# Patient Record
Sex: MALE | Race: WHITE | Employment: OTHER | URBAN - NONMETROPOLITAN AREA
[De-identification: names, ages, dates, MRNs, and addresses within clinical notes are randomized per-mention and may not be internally consistent; named-entity substitution may affect disease eponyms.]

---

## 2017-01-03 PROBLEM — E11.9 DIABETES MELLITUS, TYPE 2 (HCC): Status: ACTIVE | Noted: 2017-01-03

## 2017-01-12 ENCOUNTER — HOSPITAL ENCOUNTER (OUTPATIENT)
Dept: OTHER | Age: 67
Discharge: HOME | End: 2017-01-12
Attending: INTERNAL MEDICINE | Admitting: INTERNAL MEDICINE

## 2017-01-12 LAB
ANION GAP SERPL CALCULATED.3IONS-SCNC: 13 MMOL/L (ref 4–16)
BUN BLDV-MCNC: 51 MG/DL (ref 6–23)
CALCIUM SERPL-MCNC: 8.6 MG/DL (ref 8.3–10.6)
CHLORIDE BLD-SCNC: 96 MMOL/L (ref 99–110)
CO2: 23 MMOL/L (ref 21–32)
CREAT SERPL-MCNC: 2.7 MG/DL (ref 0.9–1.3)
GFR AFRICAN AMERICAN: 29 ML/MIN/1.73M2
GFR NON-AFRICAN AMERICAN: 24 ML/MIN/1.73M2
GLUCOSE BLD-MCNC: 284 MG/DL (ref 70–140)
POTASSIUM SERPL-SCNC: 4.3 MMOL/L (ref 3.5–5.1)
SODIUM BLD-SCNC: 132 MMOL/L (ref 135–145)

## 2017-01-17 ENCOUNTER — HOSPITAL ENCOUNTER (OUTPATIENT)
Dept: SLEEP CENTER | Age: 67
Discharge: HOME | End: 2017-01-17
Attending: INTERNAL MEDICINE | Admitting: INTERNAL MEDICINE

## 2017-03-03 ENCOUNTER — HOSPITAL ENCOUNTER (OUTPATIENT)
Dept: LAB | Age: 67
Discharge: HOME | End: 2017-03-03
Attending: INTERNAL MEDICINE | Admitting: INTERNAL MEDICINE

## 2017-03-03 LAB
ALBUMIN SERPL-MCNC: 3.9 GM/DL (ref 3.4–5)
ANION GAP SERPL CALCULATED.3IONS-SCNC: 10 MMOL/L (ref 4–16)
BUN BLDV-MCNC: 36 MG/DL (ref 6–23)
CALCIUM SERPL-MCNC: 8.7 MG/DL (ref 8.3–10.6)
CHLORIDE BLD-SCNC: 94 MMOL/L (ref 99–110)
CO2: 27 MMOL/L (ref 21–32)
CREAT SERPL-MCNC: 2.7 MG/DL (ref 0.9–1.3)
GFR AFRICAN AMERICAN: 29 ML/MIN/1.73M2
GFR NON-AFRICAN AMERICAN: 24 ML/MIN/1.73M2
GLUCOSE BLD-MCNC: 395 MG/DL (ref 70–140)
MAGNESIUM: 2.2 MG/DL (ref 1.8–2.4)
PHOSPHORUS: 3.3 MG/DL (ref 2.5–4.9)
POTASSIUM SERPL-SCNC: 4.9 MMOL/L (ref 3.5–5.1)
SODIUM BLD-SCNC: 131 MMOL/L (ref 135–145)

## 2017-04-07 ENCOUNTER — HOSPITAL ENCOUNTER (OUTPATIENT)
Dept: LAB | Age: 67
Discharge: HOME | End: 2017-04-07
Attending: INTERNAL MEDICINE | Admitting: INTERNAL MEDICINE

## 2017-04-07 LAB
ALBUMIN SERPL-MCNC: 4 GM/DL (ref 3.4–5)
ANION GAP SERPL CALCULATED.3IONS-SCNC: 9 MMOL/L (ref 4–16)
BUN BLDV-MCNC: 27 MG/DL (ref 6–23)
CALCIUM SERPL-MCNC: 9 MG/DL (ref 8.3–10.6)
CHLORIDE BLD-SCNC: 99 MMOL/L (ref 99–110)
CO2: 29 MMOL/L (ref 21–32)
CREAT SERPL-MCNC: 3 MG/DL (ref 0.9–1.3)
GFR AFRICAN AMERICAN: 25 ML/MIN/1.73M2
GFR NON-AFRICAN AMERICAN: 21 ML/MIN/1.73M2
GLUCOSE BLD-MCNC: 155 MG/DL (ref 70–140)
PHOSPHORUS: 3.9 MG/DL (ref 2.5–4.9)
POTASSIUM SERPL-SCNC: 4.2 MMOL/L (ref 3.5–5.1)
SODIUM BLD-SCNC: 137 MMOL/L (ref 135–145)

## 2017-05-19 ENCOUNTER — HOSPITAL ENCOUNTER (OUTPATIENT)
Dept: LAB | Age: 67
Discharge: HOME | End: 2017-05-19
Attending: INTERNAL MEDICINE | Admitting: INTERNAL MEDICINE

## 2017-05-19 LAB
ALBUMIN SERPL-MCNC: 3.7 GM/DL (ref 3.4–5)
ANION GAP SERPL CALCULATED.3IONS-SCNC: 11 MMOL/L (ref 4–16)
BUN BLDV-MCNC: 22 MG/DL (ref 6–23)
CALCIUM SERPL-MCNC: 8.9 MG/DL (ref 8.3–10.6)
CHLORIDE BLD-SCNC: 103 MMOL/L (ref 99–110)
CO2: 25 MMOL/L (ref 21–32)
CREAT SERPL-MCNC: 2.7 MG/DL (ref 0.9–1.3)
GFR AFRICAN AMERICAN: 29 ML/MIN/1.73M2
GFR NON-AFRICAN AMERICAN: 24 ML/MIN/1.73M2
GLUCOSE BLD-MCNC: 242 MG/DL (ref 70–140)
PHOSPHORUS: 3.1 MG/DL (ref 2.5–4.9)
POTASSIUM SERPL-SCNC: 4.7 MMOL/L (ref 3.5–5.1)
SODIUM BLD-SCNC: 139 MMOL/L (ref 135–145)

## 2017-05-28 PROBLEM — J13 STREPTOCOCCUS PNEUMONIAE PNEUMONIA (HCC): Status: ACTIVE | Noted: 2017-05-28

## 2017-05-29 PROBLEM — R65.20 SIRS DUE TO INFECTIOUS PROCESS WITH ACUTE ORGAN DYSFUNCTION (HCC): Status: ACTIVE | Noted: 2017-05-29

## 2017-05-29 PROBLEM — A41.9 SIRS DUE TO INFECTIOUS PROCESS WITH ACUTE ORGAN DYSFUNCTION (HCC): Status: ACTIVE | Noted: 2017-05-29

## 2017-06-01 PROBLEM — J13 STREPTOCOCCUS PNEUMONIAE PNEUMONIA (HCC): Status: RESOLVED | Noted: 2017-05-28 | Resolved: 2017-06-01

## 2017-06-01 PROBLEM — A41.9 SIRS DUE TO INFECTIOUS PROCESS WITH ACUTE ORGAN DYSFUNCTION (HCC): Status: RESOLVED | Noted: 2017-05-29 | Resolved: 2017-06-01

## 2017-06-01 PROBLEM — R65.20 SIRS DUE TO INFECTIOUS PROCESS WITH ACUTE ORGAN DYSFUNCTION (HCC): Status: RESOLVED | Noted: 2017-05-29 | Resolved: 2017-06-01

## 2017-06-01 PROBLEM — R53.81 PHYSICAL DEBILITY: Status: ACTIVE | Noted: 2017-06-01

## 2017-06-07 PROBLEM — R53.81 PHYSICAL DEBILITY: Status: RESOLVED | Noted: 2017-06-01 | Resolved: 2017-06-07

## 2017-08-25 ENCOUNTER — HOSPITAL ENCOUNTER (OUTPATIENT)
Dept: LAB | Age: 67
Discharge: HOME | End: 2017-08-25
Attending: INTERNAL MEDICINE | Admitting: INTERNAL MEDICINE

## 2017-08-25 LAB
ALBUMIN SERPL-MCNC: 4.5 GM/DL (ref 3.4–5)
ANION GAP SERPL CALCULATED.3IONS-SCNC: 11 MMOL/L (ref 4–16)
BUN BLDV-MCNC: 37 MG/DL (ref 6–23)
CALCIUM SERPL-MCNC: 9.3 MG/DL (ref 8.3–10.6)
CHLORIDE BLD-SCNC: 99 MMOL/L (ref 99–110)
CO2: 25 MMOL/L (ref 21–32)
CREAT SERPL-MCNC: 2.7 MG/DL (ref 0.9–1.3)
GFR AFRICAN AMERICAN: 29 ML/MIN/1.73M2
GFR NON-AFRICAN AMERICAN: 24 ML/MIN/1.73M2
GLUCOSE BLD-MCNC: 226 MG/DL (ref 70–140)
PHOSPHORUS: 3.7 MG/DL (ref 2.5–4.9)
POTASSIUM SERPL-SCNC: 4.4 MMOL/L (ref 3.5–5.1)
SODIUM BLD-SCNC: 135 MMOL/L (ref 135–145)

## 2017-09-22 ENCOUNTER — HOSPITAL ENCOUNTER (OUTPATIENT)
Dept: MRI IMAGING | Age: 67
Discharge: HOME | End: 2017-09-22
Attending: PAIN MEDICINE | Admitting: PAIN MEDICINE

## 2017-09-22 DIAGNOSIS — M54.16 RADICULOPATHY OF LUMBAR REGION: ICD-10-CM

## 2017-09-22 DIAGNOSIS — M54.16 LUMBAR RADICULOPATHY: ICD-10-CM

## 2017-09-22 DIAGNOSIS — M96.1 POSTLAMINECTOMY SYNDROME, UNSPECIFIED REGION: ICD-10-CM

## 2017-09-26 ENCOUNTER — OFFICE VISIT (OUTPATIENT)
Dept: SURGERY | Age: 67
End: 2017-09-26

## 2017-09-26 VITALS
SYSTOLIC BLOOD PRESSURE: 142 MMHG | HEIGHT: 70 IN | RESPIRATION RATE: 16 BRPM | HEART RATE: 77 BPM | WEIGHT: 284.4 LBS | DIASTOLIC BLOOD PRESSURE: 78 MMHG | BODY MASS INDEX: 40.71 KG/M2

## 2017-09-26 DIAGNOSIS — L72.3 SEBACEOUS CYST: Primary | ICD-10-CM

## 2017-09-26 PROCEDURE — 99202 OFFICE O/P NEW SF 15 MIN: CPT | Performed by: SURGERY

## 2017-10-03 ENCOUNTER — OFFICE VISIT (OUTPATIENT)
Dept: SURGERY | Age: 67
End: 2017-10-03

## 2017-10-03 VITALS
HEIGHT: 70 IN | OXYGEN SATURATION: 98 % | DIASTOLIC BLOOD PRESSURE: 76 MMHG | WEIGHT: 284.39 LBS | BODY MASS INDEX: 40.71 KG/M2 | HEART RATE: 88 BPM | SYSTOLIC BLOOD PRESSURE: 128 MMHG

## 2017-10-03 DIAGNOSIS — J86.9 ABSCESS OF CHEST (HCC): Primary | ICD-10-CM

## 2017-10-03 PROCEDURE — 99212 OFFICE O/P EST SF 10 MIN: CPT | Performed by: SURGERY

## 2017-10-03 NOTE — PROGRESS NOTES
Quit date: 7/24/2010    Smokeless tobacco: Never Used    Alcohol use Yes      Comment: \"quit- 1990's- use to drink every day\". Rarely    Drug use: No      Comment: daily for sleep/ on 8/16/2016\"I no longer do that\"    Sexual activity: Not Currently     Partners: Female     Other Topics Concern    None     Social History Narrative     Current Outpatient Prescriptions   Medication Sig Dispense Refill    Glycopyrrolate-Formoterol (BEVESPI AEROSPHERE) 9-4.8 MCG/ACT AERO Inhale 2 puffs into the lungs 2 times daily 1 Inhaler 5    valsartan (DIOVAN) 160 MG tablet Take 1 tablet by mouth daily 30 tablet 0    minoxidil (LONITEN) 2.5 MG tablet Take 1 tablet by mouth 2 times daily 30 tablet 0    doxazosin (CARDURA) 4 MG tablet Take 1 tablet by mouth daily 30 tablet 0    cloNIDine (CATAPRES) 0.1 MG tablet Take 1 tablet by mouth 3 times daily 60 tablet 0    linagliptin (TRADJENTA) 5 MG tablet Take 1 tablet by mouth daily 30 tablet 0    nitroGLYCERIN (NITROSTAT) 0.4 MG SL tablet up to max of 3 total doses.  If no relief after 1 dose, call 911. 25 tablet 0    albuterol sulfate HFA (PROVENTIL HFA) 108 (90 BASE) MCG/ACT inhaler Inhale 2 puffs into the lungs every 6 hours as needed for Wheezing or Shortness of Breath 30 Inhaler 0    escitalopram (LEXAPRO) 20 MG tablet Take 1 tablet by mouth daily 30 tablet 0    OLANZapine (ZYPREXA) 20 MG tablet Take 1 tablet by mouth nightly 30 tablet 0    carvedilol (COREG) 12.5 MG tablet Take 1 tablet by mouth 2 times daily (with meals) 60 tablet 0    amLODIPine (NORVASC) 10 MG tablet Take 1 tablet by mouth nightly 30 tablet 0    atorvastatin (LIPITOR) 80 MG tablet Take 1 tablet by mouth nightly 30 tablet 0    fluticasone (FLONASE) 50 MCG/ACT nasal spray 2 sprays by Nasal route daily 1 Bottle 0    clopidogrel (PLAVIX) 75 MG tablet Take 1 tablet by mouth daily 30 tablet 0    pantoprazole (PROTONIX) 20 MG tablet Take 1 tablet by mouth every morning (before breakfast) 30 tablet 0     No current facility-administered medications for this visit. Current Outpatient Prescriptions on File Prior to Visit   Medication Sig Dispense Refill    Glycopyrrolate-Formoterol (BEVESPI AEROSPHERE) 9-4.8 MCG/ACT AERO Inhale 2 puffs into the lungs 2 times daily 1 Inhaler 5    valsartan (DIOVAN) 160 MG tablet Take 1 tablet by mouth daily 30 tablet 0    minoxidil (LONITEN) 2.5 MG tablet Take 1 tablet by mouth 2 times daily 30 tablet 0    doxazosin (CARDURA) 4 MG tablet Take 1 tablet by mouth daily 30 tablet 0    cloNIDine (CATAPRES) 0.1 MG tablet Take 1 tablet by mouth 3 times daily 60 tablet 0    linagliptin (TRADJENTA) 5 MG tablet Take 1 tablet by mouth daily 30 tablet 0    nitroGLYCERIN (NITROSTAT) 0.4 MG SL tablet up to max of 3 total doses. If no relief after 1 dose, call 911. 25 tablet 0    albuterol sulfate HFA (PROVENTIL HFA) 108 (90 BASE) MCG/ACT inhaler Inhale 2 puffs into the lungs every 6 hours as needed for Wheezing or Shortness of Breath 30 Inhaler 0    escitalopram (LEXAPRO) 20 MG tablet Take 1 tablet by mouth daily 30 tablet 0    OLANZapine (ZYPREXA) 20 MG tablet Take 1 tablet by mouth nightly 30 tablet 0    carvedilol (COREG) 12.5 MG tablet Take 1 tablet by mouth 2 times daily (with meals) 60 tablet 0    amLODIPine (NORVASC) 10 MG tablet Take 1 tablet by mouth nightly 30 tablet 0    atorvastatin (LIPITOR) 80 MG tablet Take 1 tablet by mouth nightly 30 tablet 0    fluticasone (FLONASE) 50 MCG/ACT nasal spray 2 sprays by Nasal route daily 1 Bottle 0    clopidogrel (PLAVIX) 75 MG tablet Take 1 tablet by mouth daily 30 tablet 0    pantoprazole (PROTONIX) 20 MG tablet Take 1 tablet by mouth every morning (before breakfast) 30 tablet 0     No current facility-administered medications on file prior to visit. Allergies   Allergen Reactions    Penicillins Nausea Only       Review of Systems  Pertinent items are noted in HPI.       Objective:      BP (!) 142/78  Pulse 77  Resp 16  Ht 5' 10\" (1.778 m)  Wt 284 lb 6.4 oz (129 kg)  BMI 40.81 kg/m2  Physical Exam    Skin: 2 centimeter subcutaneous nodule over the left lateral chest. The lesion is  mobile. There is no erythema. There is no tenderness. Assessment:      Abscess of the left chest, this does not involve the breast tissue. Plan:     Finish antibiotics.  F/U prn

## 2017-10-03 NOTE — MR AVS SNAPSHOT
After Visit Summary             Luci Heller   10/3/2017 2:30 PM   Office Visit    Description:  Male : 1950   Provider:  Estefania Wakefield MD   Department:  [facility]              Your Follow-Up and Future Appointments         Below is a list of your follow-up and future appointments. This may not be a complete list as you may have made appointments directly with providers that we are not aware of or your providers may have made some for you. Please call your providers to confirm appointments. It is important to keep your appointments. Please bring your current insurance card, photo ID, co-pay, and all medication bottles to your appointment. If self-pay, payment is expected at the time of service. Your To-Do List     Follow-Up    Return if symptoms worsen or fail to improve. Information from Your Visit        Department     Name Address Phone Fax    [facility] [phone] [facility] ([place], [place]) [address] [phone]      You Were Seen for:         Comments    Abscess of chest [facility])   [[id number]]         Vital Signs     Blood Pressure Pulse Height Weight Oxygen Saturation Body Mass Index    128/76 88 5' 10.08\" (1.78 m) 284 lb 6.3 oz (129 kg) 98% 40.71 kg/m2    Smoking Status                   Former Smoker           Additional Information about your Body Mass Index (BMI)           Your BMI as listed above is considered obese (30 or more). BMI is an estimate of body fat, calculated from your height and weight. The higher your BMI, the greater your risk of heart disease, high blood pressure, type 2 diabetes, stroke, gallstones, arthritis, sleep apnea, and certain cancers. BMI is not perfect. It may overestimate body fat in athletes and people who are more muscular.   Even a small weight loss (between 5 and 10 percent of your current weight) by decreasing your calorie intake and REYES (dyspnea on exertion)    SAMANTHA on CPAP    Diabetes mellitus, type 2 (HCC)    Pneumonia due to Gram-negative bacteria (HCC)    Hyperkalemia    Adrenal mass (HCC)    Acute respiratory failure (HCC)    Adrenal mass (HCC)    Anemia, chronic disease    Diuretic-induced hypokalemia    Diastolic CHF (HCC)    TIA (transient ischemic attack)    Bipolar 1 disorder (HCC)    CAD (coronary artery disease)    Elevated troponin    Abnormal stress test    COPD (chronic obstructive pulmonary disease) ([facility] [address]      Immunizations as of 10/3/2017     Name Date    Influenza Vaccine, unspecified formulation 10/14/2016    Influenza Virus Vaccine 1/29/2016, 10/13/2014, 10/3/2013    Pneumococcal 13-valent Conjugate ([id number]) 10/17/2016    Pneumococcal Polysaccharide ([id number]) 10/3/2013    Tdap (Boostrix, Adacel) 6/10/2013      Preventive Care        Date Due    Hepatitis C screening is recommended for all adults regardless of risk factors born between [facility] at least once (lifetime) who have never been tested. 1950    Diabetic Foot Exam 1/1/1960    Eye Exam By An Eye Doctor 1/1/1960    Urine Check For Kidney Problems 1/1/1968    Colonoscopy 1/1/2000    Zoster Vaccine 1/1/2010    Yearly Flu Vaccine (1) 9/1/2017    Cholesterol Screening 1/4/2018    Hemoglobin A1C (Test For Long-Term Glucose Control) 5/29/2018    Pneumococcal Vaccines (two) for all adults aged 72 and over (2 of 2 - PPSV23) 10/3/2018    Tetanus Combination Vaccine (2 - Td) 6/10/2023            MyChart Signup           Our records indicate that you have declined MyChart signup.

## 2017-11-03 ENCOUNTER — HOSPITAL ENCOUNTER (OUTPATIENT)
Dept: LAB | Age: 67
Discharge: HOME | End: 2017-11-03
Attending: INTERNAL MEDICINE | Admitting: INTERNAL MEDICINE

## 2017-11-03 LAB
ALBUMIN SERPL-MCNC: 4.3 GM/DL (ref 3.4–5)
ANION GAP SERPL CALCULATED.3IONS-SCNC: 17 MMOL/L (ref 4–16)
BUN BLDV-MCNC: 24 MG/DL (ref 6–23)
CALCIUM SERPL-MCNC: 9.2 MG/DL (ref 8.3–10.6)
CHLORIDE BLD-SCNC: 98 MMOL/L (ref 99–110)
CO2: 24 MMOL/L (ref 21–32)
CREAT SERPL-MCNC: 2.6 MG/DL (ref 0.9–1.3)
GFR AFRICAN AMERICAN: 30 ML/MIN/1.73M2
GFR NON-AFRICAN AMERICAN: 25 ML/MIN/1.73M2
GLUCOSE BLD-MCNC: 231 MG/DL (ref 70–140)
PHOSPHORUS: 3 MG/DL (ref 2.5–4.9)
POTASSIUM SERPL-SCNC: 4.3 MMOL/L (ref 3.5–5.1)
SODIUM BLD-SCNC: 139 MMOL/L (ref 135–145)

## 2018-02-13 ENCOUNTER — HOSPITAL ENCOUNTER (OUTPATIENT)
Dept: LAB | Age: 68
Discharge: HOME | End: 2018-02-13
Attending: INTERNAL MEDICINE | Admitting: INTERNAL MEDICINE

## 2018-02-13 LAB
ALBUMIN SERPL-MCNC: 4 GM/DL (ref 3.4–5)
ANION GAP SERPL CALCULATED.3IONS-SCNC: 16 MMOL/L (ref 4–16)
BUN BLDV-MCNC: 23 MG/DL (ref 6–23)
CALCIUM SERPL-MCNC: 9.2 MG/DL (ref 8.3–10.6)
CHLORIDE BLD-SCNC: 98 MMOL/L (ref 99–110)
CO2: 25 MMOL/L (ref 21–32)
CREAT SERPL-MCNC: 2.6 MG/DL (ref 0.9–1.3)
GFR AFRICAN AMERICAN: 30 ML/MIN/1.73M2
GFR NON-AFRICAN AMERICAN: 25 ML/MIN/1.73M2
GLUCOSE BLD-MCNC: 112 MG/DL (ref 70–99)
PHOSPHORUS: 3.3 MG/DL (ref 2.5–4.9)
POTASSIUM SERPL-SCNC: 4 MMOL/L (ref 3.5–5.1)
SODIUM BLD-SCNC: 139 MMOL/L (ref 135–145)

## 2018-02-15 LAB — PARATHYROID HORMONE INTACT: 150

## 2018-05-18 ENCOUNTER — HOSPITAL ENCOUNTER (OUTPATIENT)
Dept: MRI IMAGING | Age: 68
Discharge: HOME | End: 2018-05-18

## 2018-05-18 DIAGNOSIS — M48.02 CERVICAL SPINAL STENOSIS: ICD-10-CM

## 2018-11-02 ENCOUNTER — HOSPITAL ENCOUNTER (OUTPATIENT)
Age: 68
Discharge: HOME | End: 2018-11-02
Payer: MEDICARE

## 2018-11-02 LAB
ALBUMIN SERPL-MCNC: 3.7 GM/DL (ref 3.4–5)
ALP BLD-CCNC: 112 IU/L (ref 40–129)
ALT SERPL-CCNC: 12 U/L (ref 10–40)
ANION GAP SERPL CALCULATED.3IONS-SCNC: 10 MMOL/L (ref 4–16)
AST SERPL-CCNC: 11 IU/L (ref 15–37)
BILIRUB SERPL-MCNC: 0.6 MG/DL (ref 0–1)
BUN BLDV-MCNC: 17 MG/DL (ref 6–23)
CALCIUM SERPL-MCNC: 9.1 MG/DL (ref 8.3–10.6)
CHLORIDE BLD-SCNC: 98 MMOL/L (ref 99–110)
CO2: 26 MMOL/L (ref 21–32)
CREAT SERPL-MCNC: 2.2 MG/DL (ref 0.9–1.3)
GFR AFRICAN AMERICAN: 36 ML/MIN/1.73M2
GFR NON-AFRICAN AMERICAN: 30 ML/MIN/1.73M2
GLUCOSE BLD-MCNC: 140 MG/DL (ref 70–99)
POTASSIUM SERPL-SCNC: 4.1 MMOL/L (ref 3.5–5.1)
SODIUM BLD-SCNC: 134 MMOL/L (ref 135–145)
TOTAL PROTEIN: 6.9 GM/DL (ref 6.4–8.2)

## 2018-11-02 PROCEDURE — 36415 COLL VENOUS BLD VENIPUNCTURE: CPT

## 2018-11-02 PROCEDURE — 80053 COMPREHEN METABOLIC PANEL: CPT

## 2018-12-01 ENCOUNTER — HOSPITAL ENCOUNTER (OUTPATIENT)
Age: 68
Discharge: HOME | End: 2018-12-01
Payer: MEDICARE

## 2018-12-01 ENCOUNTER — HOSPITAL ENCOUNTER (OUTPATIENT)
Dept: GENERAL RADIOLOGY | Age: 68
Discharge: HOME | End: 2018-12-01
Payer: MEDICARE

## 2018-12-01 DIAGNOSIS — R05.9 COUGH: ICD-10-CM

## 2018-12-01 LAB
ALBUMIN SERPL-MCNC: 3.6 GM/DL (ref 3.4–5)
ANION GAP SERPL CALCULATED.3IONS-SCNC: 13 MMOL/L (ref 4–16)
BUN BLDV-MCNC: 18 MG/DL (ref 6–23)
CALCIUM SERPL-MCNC: 8.7 MG/DL (ref 8.3–10.6)
CHLORIDE BLD-SCNC: 95 MMOL/L (ref 99–110)
CO2: 24 MMOL/L (ref 21–32)
CREAT SERPL-MCNC: 2 MG/DL (ref 0.9–1.3)
GFR AFRICAN AMERICAN: 40 ML/MIN/1.73M2
GFR NON-AFRICAN AMERICAN: 33 ML/MIN/1.73M2
GLUCOSE BLD-MCNC: 236 MG/DL (ref 70–99)
PHOSPHORUS: 2.1 MG/DL (ref 2.5–4.9)
POTASSIUM SERPL-SCNC: 4.1 MMOL/L (ref 3.5–5.1)
SODIUM BLD-SCNC: 132 MMOL/L (ref 135–145)

## 2018-12-01 PROCEDURE — 71046 X-RAY EXAM CHEST 2 VIEWS: CPT

## 2018-12-01 PROCEDURE — 36415 COLL VENOUS BLD VENIPUNCTURE: CPT

## 2018-12-01 PROCEDURE — 80069 RENAL FUNCTION PANEL: CPT

## 2019-01-25 ENCOUNTER — APPOINTMENT (OUTPATIENT)
Dept: GENERAL RADIOLOGY | Age: 69
DRG: 682 | End: 2019-01-25
Payer: MEDICARE

## 2019-01-25 ENCOUNTER — HOSPITAL ENCOUNTER (INPATIENT)
Age: 69
LOS: 5 days | Discharge: NURSING, REHAB OR LONG-TERM CARE | DRG: 682 | End: 2019-01-30
Attending: EMERGENCY MEDICINE | Admitting: FAMILY MEDICINE
Payer: MEDICARE

## 2019-01-25 DIAGNOSIS — R79.89 SERUM CREATININE RAISED: ICD-10-CM

## 2019-01-25 DIAGNOSIS — D72.829 LEUKOCYTOSIS, UNSPECIFIED TYPE: ICD-10-CM

## 2019-01-25 DIAGNOSIS — E87.1 HYPONATREMIA: ICD-10-CM

## 2019-01-25 DIAGNOSIS — L03.115 CELLULITIS OF RIGHT FOOT: ICD-10-CM

## 2019-01-25 DIAGNOSIS — M79.89 LEG SWELLING: ICD-10-CM

## 2019-01-25 DIAGNOSIS — J02.9 SORE THROAT: Primary | ICD-10-CM

## 2019-01-25 DIAGNOSIS — L03.90 CELLULITIS, UNSPECIFIED CELLULITIS SITE: ICD-10-CM

## 2019-01-25 DIAGNOSIS — R52 BODY ACHES: ICD-10-CM

## 2019-01-25 LAB
ALBUMIN SERPL-MCNC: 2.6 GM/DL (ref 3.4–5)
ALP BLD-CCNC: 171 IU/L (ref 40–129)
ALT SERPL-CCNC: 13 U/L (ref 10–40)
ANION GAP SERPL CALCULATED.3IONS-SCNC: 14 MMOL/L (ref 4–16)
AST SERPL-CCNC: 26 IU/L (ref 15–37)
BACTERIA: NEGATIVE /HPF
BASOPHILS ABSOLUTE: 0 K/CU MM
BASOPHILS RELATIVE PERCENT: 0.2 % (ref 0–1)
BILIRUB SERPL-MCNC: 1.5 MG/DL (ref 0–1)
BILIRUBIN URINE: NEGATIVE MG/DL
BLOOD, URINE: ABNORMAL
BUN BLDV-MCNC: 37 MG/DL (ref 6–23)
CALCIUM SERPL-MCNC: 8.2 MG/DL (ref 8.3–10.6)
CHLORIDE BLD-SCNC: 94 MMOL/L (ref 99–110)
CLARITY: CLEAR
CO2: 18 MMOL/L (ref 21–32)
COLOR: YELLOW
CREAT SERPL-MCNC: 2 MG/DL (ref 0.9–1.3)
DIFFERENTIAL TYPE: ABNORMAL
EOSINOPHILS ABSOLUTE: 0 K/CU MM
EOSINOPHILS RELATIVE PERCENT: 0 % (ref 0–3)
GFR AFRICAN AMERICAN: 40 ML/MIN/1.73M2
GFR NON-AFRICAN AMERICAN: 33 ML/MIN/1.73M2
GLUCOSE BLD-MCNC: 158 MG/DL (ref 70–99)
GLUCOSE, URINE: NEGATIVE MG/DL
HCT VFR BLD CALC: 34.6 % (ref 42–52)
HEMOGLOBIN: 10.2 GM/DL (ref 13.5–18)
IMMATURE NEUTROPHIL %: 2.9 % (ref 0–0.43)
KETONES, URINE: NEGATIVE MG/DL
LACTATE: 1.2 MMOL/L (ref 0.4–2)
LEUKOCYTE ESTERASE, URINE: NEGATIVE
LYMPHOCYTES ABSOLUTE: 0.7 K/CU MM
LYMPHOCYTES RELATIVE PERCENT: 4 % (ref 24–44)
MAGNESIUM: 2.8 MG/DL (ref 1.8–2.4)
MCH RBC QN AUTO: 21 PG (ref 27–31)
MCHC RBC AUTO-ENTMCNC: 29.5 % (ref 32–36)
MCV RBC AUTO: 71.2 FL (ref 78–100)
MONOCYTES ABSOLUTE: 0.7 K/CU MM
MONOCYTES RELATIVE PERCENT: 4.1 % (ref 0–4)
NITRITE URINE, QUANTITATIVE: NEGATIVE
NUCLEATED RBC %: 0 %
PDW BLD-RTO: 20.4 % (ref 11.7–14.9)
PH, URINE: 6 (ref 5–8)
PLATELET # BLD: 436 K/CU MM (ref 140–440)
PMV BLD AUTO: 9.1 FL (ref 7.5–11.1)
POTASSIUM SERPL-SCNC: 4.7 MMOL/L (ref 3.5–5.1)
PRO-BNP: 1287 PG/ML
PROTEIN UA: NEGATIVE MG/DL
RAPID INFLUENZA  B AGN: NEGATIVE
RAPID INFLUENZA A AGN: NEGATIVE
RBC # BLD: 4.86 M/CU MM (ref 4.6–6.2)
RBC URINE: <1 /HPF (ref 0–3)
SEGMENTED NEUTROPHILS ABSOLUTE COUNT: 15.7 K/CU MM
SEGMENTED NEUTROPHILS RELATIVE PERCENT: 88.8 % (ref 36–66)
SODIUM BLD-SCNC: 126 MMOL/L (ref 135–145)
SPECIFIC GRAVITY UA: 1 (ref 1–1.03)
SQUAMOUS EPITHELIAL: <1 /HPF
TOTAL CK: 141 IU/L (ref 38–174)
TOTAL IMMATURE NEUTOROPHIL: 0.51 K/CU MM
TOTAL NUCLEATED RBC: 0 K/CU MM
TOTAL PROTEIN: 8.1 GM/DL (ref 6.4–8.2)
TROPONIN T: <0.01 NG/ML
TSH HIGH SENSITIVITY: 1.35 UIU/ML (ref 0.27–4.2)
UROBILINOGEN, URINE: 8 MG/DL (ref 0.2–1)
WBC # BLD: 17.7 K/CU MM (ref 4–10.5)
WBC UA: <1 /HPF (ref 0–2)

## 2019-01-25 PROCEDURE — 81001 URINALYSIS AUTO W/SCOPE: CPT

## 2019-01-25 PROCEDURE — 36415 COLL VENOUS BLD VENIPUNCTURE: CPT

## 2019-01-25 PROCEDURE — 85025 COMPLETE CBC W/AUTO DIFF WBC: CPT

## 2019-01-25 PROCEDURE — 83735 ASSAY OF MAGNESIUM: CPT

## 2019-01-25 PROCEDURE — 96361 HYDRATE IV INFUSION ADD-ON: CPT

## 2019-01-25 PROCEDURE — 83880 ASSAY OF NATRIURETIC PEPTIDE: CPT

## 2019-01-25 PROCEDURE — 87081 CULTURE SCREEN ONLY: CPT

## 2019-01-25 PROCEDURE — 80053 COMPREHEN METABOLIC PANEL: CPT

## 2019-01-25 PROCEDURE — 2580000003 HC RX 258: Performed by: EMERGENCY MEDICINE

## 2019-01-25 PROCEDURE — 71045 X-RAY EXAM CHEST 1 VIEW: CPT

## 2019-01-25 PROCEDURE — 99285 EMERGENCY DEPT VISIT HI MDM: CPT

## 2019-01-25 PROCEDURE — 82550 ASSAY OF CK (CPK): CPT

## 2019-01-25 PROCEDURE — 6370000000 HC RX 637 (ALT 250 FOR IP): Performed by: EMERGENCY MEDICINE

## 2019-01-25 PROCEDURE — 96360 HYDRATION IV INFUSION INIT: CPT

## 2019-01-25 PROCEDURE — 87430 STREP A AG IA: CPT

## 2019-01-25 PROCEDURE — 87804 INFLUENZA ASSAY W/OPTIC: CPT

## 2019-01-25 PROCEDURE — 84484 ASSAY OF TROPONIN QUANT: CPT

## 2019-01-25 PROCEDURE — 1200000000 HC SEMI PRIVATE

## 2019-01-25 PROCEDURE — 73630 X-RAY EXAM OF FOOT: CPT

## 2019-01-25 PROCEDURE — 83605 ASSAY OF LACTIC ACID: CPT

## 2019-01-25 PROCEDURE — 84443 ASSAY THYROID STIM HORMONE: CPT

## 2019-01-25 RX ORDER — FLUTICASONE PROPIONATE 50 MCG
2 SPRAY, SUSPENSION (ML) NASAL DAILY
Status: DISCONTINUED | OUTPATIENT
Start: 2019-01-26 | End: 2019-01-30

## 2019-01-25 RX ORDER — DOXAZOSIN MESYLATE 4 MG/1
4 TABLET ORAL DAILY
Status: DISCONTINUED | OUTPATIENT
Start: 2019-01-26 | End: 2019-01-26

## 2019-01-25 RX ORDER — ACETAMINOPHEN 500 MG
1000 TABLET ORAL ONCE
Status: COMPLETED | OUTPATIENT
Start: 2019-01-25 | End: 2019-01-25

## 2019-01-25 RX ORDER — VANCOMYCIN HYDROCHLORIDE 1 G/200ML
1000 INJECTION, SOLUTION INTRAVENOUS EVERY 12 HOURS
Status: DISCONTINUED | OUTPATIENT
Start: 2019-01-26 | End: 2019-01-26 | Stop reason: DRUGHIGH

## 2019-01-25 RX ORDER — MINOXIDIL 2.5 MG/1
2.5 TABLET ORAL 2 TIMES DAILY
Status: DISCONTINUED | OUTPATIENT
Start: 2019-01-26 | End: 2019-01-26

## 2019-01-25 RX ORDER — AMLODIPINE BESYLATE 10 MG/1
10 TABLET ORAL NIGHTLY
Status: DISCONTINUED | OUTPATIENT
Start: 2019-01-26 | End: 2019-01-26

## 2019-01-25 RX ORDER — CLINDAMYCIN HYDROCHLORIDE 150 MG/1
450 CAPSULE ORAL ONCE
Status: COMPLETED | OUTPATIENT
Start: 2019-01-25 | End: 2019-01-25

## 2019-01-25 RX ORDER — 0.9 % SODIUM CHLORIDE 0.9 %
1000 INTRAVENOUS SOLUTION INTRAVENOUS ONCE
Status: COMPLETED | OUTPATIENT
Start: 2019-01-25 | End: 2019-01-25

## 2019-01-25 RX ORDER — PANTOPRAZOLE SODIUM 20 MG/1
20 TABLET, DELAYED RELEASE ORAL
Status: DISCONTINUED | OUTPATIENT
Start: 2019-01-26 | End: 2019-01-30 | Stop reason: HOSPADM

## 2019-01-25 RX ORDER — HYDROCODONE BITARTRATE AND ACETAMINOPHEN 5; 325 MG/1; MG/1
1 TABLET ORAL EVERY 6 HOURS PRN
Status: DISCONTINUED | OUTPATIENT
Start: 2019-01-25 | End: 2019-01-30 | Stop reason: HOSPADM

## 2019-01-25 RX ORDER — CLOPIDOGREL BISULFATE 75 MG/1
75 TABLET ORAL DAILY
Status: DISCONTINUED | OUTPATIENT
Start: 2019-01-26 | End: 2019-01-30 | Stop reason: HOSPADM

## 2019-01-25 RX ORDER — SODIUM CHLORIDE 0.9 % (FLUSH) 0.9 %
10 SYRINGE (ML) INJECTION PRN
Status: DISCONTINUED | OUTPATIENT
Start: 2019-01-25 | End: 2019-01-30 | Stop reason: HOSPADM

## 2019-01-25 RX ORDER — SODIUM CHLORIDE 0.9 % (FLUSH) 0.9 %
10 SYRINGE (ML) INJECTION EVERY 12 HOURS SCHEDULED
Status: DISCONTINUED | OUTPATIENT
Start: 2019-01-26 | End: 2019-01-30 | Stop reason: HOSPADM

## 2019-01-25 RX ORDER — HEPARIN SODIUM 5000 [USP'U]/ML
5000 INJECTION, SOLUTION INTRAVENOUS; SUBCUTANEOUS EVERY 8 HOURS SCHEDULED
Status: DISCONTINUED | OUTPATIENT
Start: 2019-01-26 | End: 2019-01-30 | Stop reason: HOSPADM

## 2019-01-25 RX ORDER — GUAIFENESIN 100 MG/5ML
200 SOLUTION ORAL ONCE
Status: COMPLETED | OUTPATIENT
Start: 2019-01-25 | End: 2019-01-25

## 2019-01-25 RX ORDER — ALBUTEROL SULFATE 90 UG/1
2 AEROSOL, METERED RESPIRATORY (INHALATION) EVERY 6 HOURS PRN
Status: DISCONTINUED | OUTPATIENT
Start: 2019-01-25 | End: 2019-01-30 | Stop reason: HOSPADM

## 2019-01-25 RX ORDER — CLONIDINE HYDROCHLORIDE 0.1 MG/1
0.1 TABLET ORAL 3 TIMES DAILY
Status: DISCONTINUED | OUTPATIENT
Start: 2019-01-26 | End: 2019-01-26

## 2019-01-25 RX ORDER — ACETAMINOPHEN 325 MG/1
650 TABLET ORAL EVERY 4 HOURS PRN
Status: DISCONTINUED | OUTPATIENT
Start: 2019-01-25 | End: 2019-01-30 | Stop reason: HOSPADM

## 2019-01-25 RX ORDER — ONDANSETRON 2 MG/ML
4 INJECTION INTRAMUSCULAR; INTRAVENOUS EVERY 6 HOURS PRN
Status: DISCONTINUED | OUTPATIENT
Start: 2019-01-25 | End: 2019-01-30 | Stop reason: HOSPADM

## 2019-01-25 RX ORDER — CARVEDILOL 12.5 MG/1
12.5 TABLET ORAL 2 TIMES DAILY WITH MEALS
Status: DISCONTINUED | OUTPATIENT
Start: 2019-01-26 | End: 2019-01-30 | Stop reason: HOSPADM

## 2019-01-25 RX ORDER — ATORVASTATIN CALCIUM 40 MG/1
80 TABLET, FILM COATED ORAL NIGHTLY
Status: DISCONTINUED | OUTPATIENT
Start: 2019-01-26 | End: 2019-01-26

## 2019-01-25 RX ORDER — NITROGLYCERIN 0.4 MG/1
0.4 TABLET SUBLINGUAL EVERY 5 MIN PRN
Status: DISCONTINUED | OUTPATIENT
Start: 2019-01-25 | End: 2019-01-30 | Stop reason: HOSPADM

## 2019-01-25 RX ORDER — ESCITALOPRAM OXALATE 10 MG/1
20 TABLET ORAL DAILY
Status: DISCONTINUED | OUTPATIENT
Start: 2019-01-26 | End: 2019-01-30 | Stop reason: HOSPADM

## 2019-01-25 RX ADMIN — CLINDAMYCIN HYDROCHLORIDE 450 MG: 150 CAPSULE ORAL at 20:55

## 2019-01-25 RX ADMIN — ACETAMINOPHEN 1000 MG: 500 TABLET ORAL at 20:45

## 2019-01-25 RX ADMIN — GUAIFENESIN 200 MG: 200 SOLUTION ORAL at 20:45

## 2019-01-25 RX ADMIN — SODIUM CHLORIDE 1000 ML: 9 INJECTION, SOLUTION INTRAVENOUS at 20:45

## 2019-01-25 ASSESSMENT — PAIN SCALES - GENERAL
PAINLEVEL_OUTOF10: 6
PAINLEVEL_OUTOF10: 4

## 2019-01-25 ASSESSMENT — PAIN DESCRIPTION - LOCATION
LOCATION: GENERALIZED
LOCATION: FOOT

## 2019-01-25 ASSESSMENT — PAIN DESCRIPTION - PAIN TYPE
TYPE: ACUTE PAIN
TYPE: ACUTE PAIN

## 2019-01-25 ASSESSMENT — PAIN DESCRIPTION - ONSET: ONSET: ON-GOING

## 2019-01-25 ASSESSMENT — PAIN DESCRIPTION - ORIENTATION: ORIENTATION: RIGHT

## 2019-01-25 ASSESSMENT — PAIN DESCRIPTION - FREQUENCY: FREQUENCY: CONTINUOUS

## 2019-01-26 LAB
ANION GAP SERPL CALCULATED.3IONS-SCNC: 14 MMOL/L (ref 4–16)
ANISOCYTOSIS: ABNORMAL
BASOPHILS ABSOLUTE: 0 K/CU MM
BASOPHILS RELATIVE PERCENT: 0.1 % (ref 0–1)
BUN BLDV-MCNC: 34 MG/DL (ref 6–23)
BURR CELLS: ABNORMAL
CALCIUM SERPL-MCNC: 7.4 MG/DL (ref 8.3–10.6)
CHLORIDE BLD-SCNC: 98 MMOL/L (ref 99–110)
CO2: 17 MMOL/L (ref 21–32)
CREAT SERPL-MCNC: 2 MG/DL (ref 0.9–1.3)
DIFFERENTIAL TYPE: ABNORMAL
EOSINOPHILS ABSOLUTE: 0 K/CU MM
EOSINOPHILS RELATIVE PERCENT: 0 % (ref 0–3)
GFR AFRICAN AMERICAN: 40 ML/MIN/1.73M2
GFR NON-AFRICAN AMERICAN: 33 ML/MIN/1.73M2
GLUCOSE BLD-MCNC: 171 MG/DL (ref 70–99)
GLUCOSE BLD-MCNC: 177 MG/DL (ref 70–99)
HCT VFR BLD CALC: 29.3 % (ref 42–52)
HEMOGLOBIN: 8.8 GM/DL (ref 13.5–18)
IMMATURE NEUTROPHIL %: 1.9 % (ref 0–0.43)
LYMPHOCYTES ABSOLUTE: 0.6 K/CU MM
LYMPHOCYTES RELATIVE PERCENT: 5.1 % (ref 24–44)
MCH RBC QN AUTO: 20.8 PG (ref 27–31)
MCHC RBC AUTO-ENTMCNC: 30 % (ref 32–36)
MCV RBC AUTO: 69.3 FL (ref 78–100)
MICROCYTES: ABNORMAL
MONOCYTES ABSOLUTE: 0.7 K/CU MM
MONOCYTES RELATIVE PERCENT: 6.1 % (ref 0–4)
NUCLEATED RBC %: 0 %
OVALOCYTES: ABNORMAL
PDW BLD-RTO: 20.2 % (ref 11.7–14.9)
PLATELET # BLD: 383 K/CU MM (ref 140–440)
PMV BLD AUTO: 8.9 FL (ref 7.5–11.1)
POLYCHROMASIA: ABNORMAL
POTASSIUM SERPL-SCNC: 4.4 MMOL/L (ref 3.5–5.1)
RBC # BLD: 4.23 M/CU MM (ref 4.6–6.2)
RBC # BLD: ABNORMAL 10*6/UL
SEGMENTED NEUTROPHILS ABSOLUTE COUNT: 9.6 K/CU MM
SEGMENTED NEUTROPHILS ABSOLUTE COUNT: ABNORMAL
SEGMENTED NEUTROPHILS RELATIVE PERCENT: 86.8 % (ref 36–66)
SODIUM BLD-SCNC: 129 MMOL/L (ref 135–145)
TOTAL IMMATURE NEUTOROPHIL: 0.21 K/CU MM
TOTAL NUCLEATED RBC: 0 K/CU MM
WBC # BLD: 11.1 K/CU MM (ref 4–10.5)

## 2019-01-26 PROCEDURE — 36415 COLL VENOUS BLD VENIPUNCTURE: CPT

## 2019-01-26 PROCEDURE — 6370000000 HC RX 637 (ALT 250 FOR IP): Performed by: FAMILY MEDICINE

## 2019-01-26 PROCEDURE — 94761 N-INVAS EAR/PLS OXIMETRY MLT: CPT

## 2019-01-26 PROCEDURE — 80048 BASIC METABOLIC PNL TOTAL CA: CPT

## 2019-01-26 PROCEDURE — 6360000002 HC RX W HCPCS: Performed by: HOSPITALIST

## 2019-01-26 PROCEDURE — 2580000003 HC RX 258: Performed by: HOSPITALIST

## 2019-01-26 PROCEDURE — 82962 GLUCOSE BLOOD TEST: CPT

## 2019-01-26 PROCEDURE — 6360000002 HC RX W HCPCS: Performed by: FAMILY MEDICINE

## 2019-01-26 PROCEDURE — 2580000003 HC RX 258: Performed by: FAMILY MEDICINE

## 2019-01-26 PROCEDURE — 6370000000 HC RX 637 (ALT 250 FOR IP): Performed by: INTERNAL MEDICINE

## 2019-01-26 PROCEDURE — 94660 CPAP INITIATION&MGMT: CPT

## 2019-01-26 PROCEDURE — 1200000000 HC SEMI PRIVATE

## 2019-01-26 PROCEDURE — 85025 COMPLETE CBC W/AUTO DIFF WBC: CPT

## 2019-01-26 PROCEDURE — 87040 BLOOD CULTURE FOR BACTERIA: CPT

## 2019-01-26 RX ORDER — AMLODIPINE BESYLATE 10 MG/1
10 TABLET ORAL NIGHTLY
Status: DISCONTINUED | OUTPATIENT
Start: 2019-01-26 | End: 2019-01-30 | Stop reason: HOSPADM

## 2019-01-26 RX ORDER — ATORVASTATIN CALCIUM 40 MG/1
80 TABLET, FILM COATED ORAL NIGHTLY
Status: DISCONTINUED | OUTPATIENT
Start: 2019-01-26 | End: 2019-01-30 | Stop reason: HOSPADM

## 2019-01-26 RX ORDER — SODIUM CHLORIDE 9 MG/ML
INJECTION, SOLUTION INTRAVENOUS CONTINUOUS
Status: DISCONTINUED | OUTPATIENT
Start: 2019-01-26 | End: 2019-01-27

## 2019-01-26 RX ORDER — SPIRONOLACTONE 25 MG/1
25 TABLET ORAL 2 TIMES DAILY
Status: DISCONTINUED | OUTPATIENT
Start: 2019-01-26 | End: 2019-01-30 | Stop reason: HOSPADM

## 2019-01-26 RX ADMIN — CLOPIDOGREL BISULFATE 75 MG: 75 TABLET ORAL at 10:34

## 2019-01-26 RX ADMIN — SPIRONOLACTONE 25 MG: 25 TABLET ORAL at 10:34

## 2019-01-26 RX ADMIN — ATORVASTATIN CALCIUM 80 MG: 40 TABLET, FILM COATED ORAL at 20:58

## 2019-01-26 RX ADMIN — PANTOPRAZOLE SODIUM 20 MG: 20 TABLET, DELAYED RELEASE ORAL at 06:31

## 2019-01-26 RX ADMIN — HEPARIN SODIUM 5000 UNITS: 5000 INJECTION, SOLUTION INTRAVENOUS; SUBCUTANEOUS at 02:01

## 2019-01-26 RX ADMIN — HEPARIN SODIUM 5000 UNITS: 5000 INJECTION, SOLUTION INTRAVENOUS; SUBCUTANEOUS at 06:31

## 2019-01-26 RX ADMIN — SPIRONOLACTONE 25 MG: 25 TABLET ORAL at 18:22

## 2019-01-26 RX ADMIN — FLUTICASONE PROPIONATE 2 SPRAY: 50 SPRAY, METERED NASAL at 10:40

## 2019-01-26 RX ADMIN — SODIUM CHLORIDE, PRESERVATIVE FREE 10 ML: 5 INJECTION INTRAVENOUS at 10:40

## 2019-01-26 RX ADMIN — HYDROCODONE BITARTRATE AND ACETAMINOPHEN 1 TABLET: 5; 325 TABLET ORAL at 14:03

## 2019-01-26 RX ADMIN — ESCITALOPRAM OXALATE 20 MG: 10 TABLET ORAL at 10:34

## 2019-01-26 RX ADMIN — CEFTRIAXONE 1 G: 1 INJECTION, POWDER, FOR SOLUTION INTRAMUSCULAR; INTRAVENOUS at 18:21

## 2019-01-26 RX ADMIN — HYDROCODONE BITARTRATE AND ACETAMINOPHEN 1 TABLET: 5; 325 TABLET ORAL at 20:59

## 2019-01-26 RX ADMIN — HEPARIN SODIUM 5000 UNITS: 5000 INJECTION, SOLUTION INTRAVENOUS; SUBCUTANEOUS at 22:35

## 2019-01-26 RX ADMIN — HEPARIN SODIUM 5000 UNITS: 5000 INJECTION, SOLUTION INTRAVENOUS; SUBCUTANEOUS at 14:02

## 2019-01-26 RX ADMIN — SODIUM CHLORIDE: 9 INJECTION, SOLUTION INTRAVENOUS at 10:40

## 2019-01-26 RX ADMIN — VANCOMYCIN HYDROCHLORIDE 2000 MG: 1 INJECTION, POWDER, LYOPHILIZED, FOR SOLUTION INTRAVENOUS at 02:10

## 2019-01-26 RX ADMIN — LINAGLIPTIN 5 MG: 5 TABLET, FILM COATED ORAL at 10:35

## 2019-01-26 RX ADMIN — CARVEDILOL 12.5 MG: 12.5 TABLET, FILM COATED ORAL at 10:34

## 2019-01-26 ASSESSMENT — PAIN SCALES - GENERAL
PAINLEVEL_OUTOF10: 7
PAINLEVEL_OUTOF10: 7
PAINLEVEL_OUTOF10: 6
PAINLEVEL_OUTOF10: 0
PAINLEVEL_OUTOF10: 6
PAINLEVEL_OUTOF10: 7
PAINLEVEL_OUTOF10: 7
PAINLEVEL_OUTOF10: 0

## 2019-01-26 ASSESSMENT — PAIN DESCRIPTION - DESCRIPTORS
DESCRIPTORS: SHARP
DESCRIPTORS: SHARP

## 2019-01-26 ASSESSMENT — PAIN DESCRIPTION - ORIENTATION
ORIENTATION: LEFT
ORIENTATION: LEFT

## 2019-01-26 ASSESSMENT — PAIN DESCRIPTION - PAIN TYPE
TYPE: ACUTE PAIN

## 2019-01-26 ASSESSMENT — ENCOUNTER SYMPTOMS
GASTROINTESTINAL NEGATIVE: 1
ALLERGIC/IMMUNOLOGIC NEGATIVE: 1
EYES NEGATIVE: 1
RESPIRATORY NEGATIVE: 1
SORE THROAT: 1

## 2019-01-26 ASSESSMENT — PAIN DESCRIPTION - PROGRESSION
CLINICAL_PROGRESSION: NOT CHANGED

## 2019-01-26 ASSESSMENT — PAIN DESCRIPTION - LOCATION
LOCATION: FOOT
LOCATION: RIB CAGE
LOCATION: RIB CAGE

## 2019-01-26 ASSESSMENT — PAIN DESCRIPTION - DIRECTION: RADIATING_TOWARDS: DOWN SIDE

## 2019-01-26 ASSESSMENT — PAIN DESCRIPTION - FREQUENCY
FREQUENCY: CONTINUOUS
FREQUENCY: CONTINUOUS

## 2019-01-26 ASSESSMENT — PAIN DESCRIPTION - ONSET
ONSET: ON-GOING
ONSET: ON-GOING

## 2019-01-26 ASSESSMENT — PAIN - FUNCTIONAL ASSESSMENT
PAIN_FUNCTIONAL_ASSESSMENT: PREVENTS OR INTERFERES SOME ACTIVE ACTIVITIES AND ADLS
PAIN_FUNCTIONAL_ASSESSMENT: PREVENTS OR INTERFERES SOME ACTIVE ACTIVITIES AND ADLS

## 2019-01-27 ENCOUNTER — APPOINTMENT (OUTPATIENT)
Dept: GENERAL RADIOLOGY | Age: 69
DRG: 682 | End: 2019-01-27
Payer: MEDICARE

## 2019-01-27 LAB
ANION GAP SERPL CALCULATED.3IONS-SCNC: 11 MMOL/L (ref 4–16)
BUN BLDV-MCNC: 21 MG/DL (ref 6–23)
CALCIUM SERPL-MCNC: 7.6 MG/DL (ref 8.3–10.6)
CHLORIDE BLD-SCNC: 100 MMOL/L (ref 99–110)
CO2: 19 MMOL/L (ref 21–32)
CREAT SERPL-MCNC: 1.7 MG/DL (ref 0.9–1.3)
GFR AFRICAN AMERICAN: 49 ML/MIN/1.73M2
GFR NON-AFRICAN AMERICAN: 40 ML/MIN/1.73M2
GLUCOSE BLD-MCNC: 154 MG/DL (ref 70–99)
POTASSIUM SERPL-SCNC: 4.8 MMOL/L (ref 3.5–5.1)
SODIUM BLD-SCNC: 130 MMOL/L (ref 135–145)
VANCOMYCIN RANDOM: 7.2 UG/ML

## 2019-01-27 PROCEDURE — 71045 X-RAY EXAM CHEST 1 VIEW: CPT

## 2019-01-27 PROCEDURE — 2580000003 HC RX 258: Performed by: FAMILY MEDICINE

## 2019-01-27 PROCEDURE — 94660 CPAP INITIATION&MGMT: CPT

## 2019-01-27 PROCEDURE — 97530 THERAPEUTIC ACTIVITIES: CPT

## 2019-01-27 PROCEDURE — 1200000000 HC SEMI PRIVATE

## 2019-01-27 PROCEDURE — 36415 COLL VENOUS BLD VENIPUNCTURE: CPT

## 2019-01-27 PROCEDURE — 6370000000 HC RX 637 (ALT 250 FOR IP): Performed by: FAMILY MEDICINE

## 2019-01-27 PROCEDURE — 2580000003 HC RX 258: Performed by: NURSE PRACTITIONER

## 2019-01-27 PROCEDURE — 6360000002 HC RX W HCPCS: Performed by: FAMILY MEDICINE

## 2019-01-27 PROCEDURE — 6360000002 HC RX W HCPCS: Performed by: HOSPITALIST

## 2019-01-27 PROCEDURE — 73630 X-RAY EXAM OF FOOT: CPT

## 2019-01-27 PROCEDURE — 97162 PT EVAL MOD COMPLEX 30 MIN: CPT

## 2019-01-27 PROCEDURE — 97116 GAIT TRAINING THERAPY: CPT

## 2019-01-27 PROCEDURE — 2580000003 HC RX 258: Performed by: HOSPITALIST

## 2019-01-27 PROCEDURE — 80048 BASIC METABOLIC PNL TOTAL CA: CPT

## 2019-01-27 PROCEDURE — 80202 ASSAY OF VANCOMYCIN: CPT

## 2019-01-27 PROCEDURE — 6370000000 HC RX 637 (ALT 250 FOR IP): Performed by: INTERNAL MEDICINE

## 2019-01-27 PROCEDURE — 2580000003 HC RX 258: Performed by: INTERNAL MEDICINE

## 2019-01-27 RX ORDER — SODIUM CHLORIDE 9 MG/ML
INJECTION, SOLUTION INTRAVENOUS CONTINUOUS
Status: DISCONTINUED | OUTPATIENT
Start: 2019-01-27 | End: 2019-01-28

## 2019-01-27 RX ADMIN — LINAGLIPTIN 5 MG: 5 TABLET, FILM COATED ORAL at 09:49

## 2019-01-27 RX ADMIN — ESCITALOPRAM OXALATE 20 MG: 10 TABLET ORAL at 09:49

## 2019-01-27 RX ADMIN — HEPARIN SODIUM 5000 UNITS: 5000 INJECTION, SOLUTION INTRAVENOUS; SUBCUTANEOUS at 05:41

## 2019-01-27 RX ADMIN — HYDROCODONE BITARTRATE AND ACETAMINOPHEN 1 TABLET: 5; 325 TABLET ORAL at 16:01

## 2019-01-27 RX ADMIN — SPIRONOLACTONE 25 MG: 25 TABLET ORAL at 09:50

## 2019-01-27 RX ADMIN — HEPARIN SODIUM 5000 UNITS: 5000 INJECTION, SOLUTION INTRAVENOUS; SUBCUTANEOUS at 16:01

## 2019-01-27 RX ADMIN — AMLODIPINE BESYLATE 10 MG: 10 TABLET ORAL at 21:12

## 2019-01-27 RX ADMIN — SODIUM CHLORIDE: 9 INJECTION, SOLUTION INTRAVENOUS at 12:21

## 2019-01-27 RX ADMIN — CEFTRIAXONE 1 G: 1 INJECTION, POWDER, FOR SOLUTION INTRAMUSCULAR; INTRAVENOUS at 16:08

## 2019-01-27 RX ADMIN — HYDROCODONE BITARTRATE AND ACETAMINOPHEN 1 TABLET: 5; 325 TABLET ORAL at 04:11

## 2019-01-27 RX ADMIN — SPIRONOLACTONE 25 MG: 25 TABLET ORAL at 21:12

## 2019-01-27 RX ADMIN — SODIUM CHLORIDE: 9 INJECTION, SOLUTION INTRAVENOUS at 16:01

## 2019-01-27 RX ADMIN — ATORVASTATIN CALCIUM 80 MG: 40 TABLET, FILM COATED ORAL at 21:12

## 2019-01-27 RX ADMIN — HEPARIN SODIUM 5000 UNITS: 5000 INJECTION, SOLUTION INTRAVENOUS; SUBCUTANEOUS at 21:13

## 2019-01-27 RX ADMIN — VANCOMYCIN HYDROCHLORIDE 1500 MG: 5 INJECTION, POWDER, LYOPHILIZED, FOR SOLUTION INTRAVENOUS at 12:22

## 2019-01-27 RX ADMIN — CLOPIDOGREL BISULFATE 75 MG: 75 TABLET ORAL at 09:49

## 2019-01-27 RX ADMIN — FLUTICASONE PROPIONATE 2 SPRAY: 50 SPRAY, METERED NASAL at 12:26

## 2019-01-27 RX ADMIN — PANTOPRAZOLE SODIUM 20 MG: 20 TABLET, DELAYED RELEASE ORAL at 05:41

## 2019-01-27 RX ADMIN — CARVEDILOL 12.5 MG: 12.5 TABLET, FILM COATED ORAL at 09:49

## 2019-01-27 RX ADMIN — SODIUM CHLORIDE: 9 INJECTION, SOLUTION INTRAVENOUS at 01:20

## 2019-01-27 RX ADMIN — CARVEDILOL 12.5 MG: 12.5 TABLET, FILM COATED ORAL at 16:01

## 2019-01-27 RX ADMIN — HYDROCODONE BITARTRATE AND ACETAMINOPHEN 1 TABLET: 5; 325 TABLET ORAL at 09:49

## 2019-01-27 ASSESSMENT — PAIN DESCRIPTION - PROGRESSION

## 2019-01-27 ASSESSMENT — PAIN DESCRIPTION - FREQUENCY
FREQUENCY: CONTINUOUS

## 2019-01-27 ASSESSMENT — PAIN DESCRIPTION - ORIENTATION
ORIENTATION: RIGHT

## 2019-01-27 ASSESSMENT — PAIN SCALES - GENERAL
PAINLEVEL_OUTOF10: 8
PAINLEVEL_OUTOF10: 9
PAINLEVEL_OUTOF10: 8
PAINLEVEL_OUTOF10: 7
PAINLEVEL_OUTOF10: 8

## 2019-01-27 ASSESSMENT — PAIN - FUNCTIONAL ASSESSMENT: PAIN_FUNCTIONAL_ASSESSMENT: PREVENTS OR INTERFERES SOME ACTIVE ACTIVITIES AND ADLS

## 2019-01-27 ASSESSMENT — PAIN DESCRIPTION - PAIN TYPE
TYPE: ACUTE PAIN

## 2019-01-27 ASSESSMENT — PAIN DESCRIPTION - ONSET
ONSET: ON-GOING
ONSET: ON-GOING

## 2019-01-27 ASSESSMENT — PAIN DESCRIPTION - DESCRIPTORS
DESCRIPTORS: ACHING

## 2019-01-27 ASSESSMENT — PAIN DESCRIPTION - LOCATION
LOCATION: FOOT

## 2019-01-28 ENCOUNTER — APPOINTMENT (OUTPATIENT)
Dept: ULTRASOUND IMAGING | Age: 69
DRG: 682 | End: 2019-01-28
Payer: MEDICARE

## 2019-01-28 LAB
ANION GAP SERPL CALCULATED.3IONS-SCNC: 12 MMOL/L (ref 4–16)
BUN BLDV-MCNC: 16 MG/DL (ref 6–23)
CALCIUM SERPL-MCNC: 7.3 MG/DL (ref 8.3–10.6)
CHLORIDE BLD-SCNC: 104 MMOL/L (ref 99–110)
CO2: 18 MMOL/L (ref 21–32)
CREAT SERPL-MCNC: 1.7 MG/DL (ref 0.9–1.3)
CULTURE: NORMAL
GFR AFRICAN AMERICAN: 49 ML/MIN/1.73M2
GFR NON-AFRICAN AMERICAN: 40 ML/MIN/1.73M2
GLUCOSE BLD-MCNC: 108 MG/DL (ref 70–99)
Lab: NORMAL
POTASSIUM SERPL-SCNC: 4.8 MMOL/L (ref 3.5–5.1)
REPORT STATUS: NORMAL
SODIUM BLD-SCNC: 134 MMOL/L (ref 135–145)
SPECIMEN: NORMAL
STREP A DIRECT SCREEN: NEGATIVE

## 2019-01-28 PROCEDURE — 2500000003 HC RX 250 WO HCPCS: Performed by: HOSPITALIST

## 2019-01-28 PROCEDURE — 80048 BASIC METABOLIC PNL TOTAL CA: CPT

## 2019-01-28 PROCEDURE — 6370000000 HC RX 637 (ALT 250 FOR IP): Performed by: FAMILY MEDICINE

## 2019-01-28 PROCEDURE — 94660 CPAP INITIATION&MGMT: CPT

## 2019-01-28 PROCEDURE — 6360000002 HC RX W HCPCS: Performed by: FAMILY MEDICINE

## 2019-01-28 PROCEDURE — 1200000000 HC SEMI PRIVATE

## 2019-01-28 PROCEDURE — 94761 N-INVAS EAR/PLS OXIMETRY MLT: CPT

## 2019-01-28 PROCEDURE — 36415 COLL VENOUS BLD VENIPUNCTURE: CPT

## 2019-01-28 PROCEDURE — 6370000000 HC RX 637 (ALT 250 FOR IP): Performed by: INTERNAL MEDICINE

## 2019-01-28 PROCEDURE — 2580000003 HC RX 258: Performed by: FAMILY MEDICINE

## 2019-01-28 PROCEDURE — 93971 EXTREMITY STUDY: CPT

## 2019-01-28 RX ADMIN — VANCOMYCIN HYDROCHLORIDE 1500 MG: 5 INJECTION, POWDER, LYOPHILIZED, FOR SOLUTION INTRAVENOUS at 12:28

## 2019-01-28 RX ADMIN — LINAGLIPTIN 5 MG: 5 TABLET, FILM COATED ORAL at 08:13

## 2019-01-28 RX ADMIN — HYDROCODONE BITARTRATE AND ACETAMINOPHEN 1 TABLET: 5; 325 TABLET ORAL at 10:52

## 2019-01-28 RX ADMIN — CLOPIDOGREL BISULFATE 75 MG: 75 TABLET ORAL at 08:13

## 2019-01-28 RX ADMIN — ATORVASTATIN CALCIUM 80 MG: 40 TABLET, FILM COATED ORAL at 21:13

## 2019-01-28 RX ADMIN — CARVEDILOL 12.5 MG: 12.5 TABLET, FILM COATED ORAL at 17:36

## 2019-01-28 RX ADMIN — SODIUM CHLORIDE, PRESERVATIVE FREE 10 ML: 5 INJECTION INTRAVENOUS at 21:13

## 2019-01-28 RX ADMIN — HEPARIN SODIUM 5000 UNITS: 5000 INJECTION, SOLUTION INTRAVENOUS; SUBCUTANEOUS at 21:13

## 2019-01-28 RX ADMIN — SPIRONOLACTONE 25 MG: 25 TABLET ORAL at 08:13

## 2019-01-28 RX ADMIN — TAZOBACTAM SODIUM AND PIPERACILLIN SODIUM 3.38 G: 375; 3 INJECTION, SOLUTION INTRAVENOUS at 21:16

## 2019-01-28 RX ADMIN — AMLODIPINE BESYLATE 10 MG: 10 TABLET ORAL at 21:13

## 2019-01-28 RX ADMIN — TAZOBACTAM SODIUM AND PIPERACILLIN SODIUM 3.38 G: 375; 3 INJECTION, SOLUTION INTRAVENOUS at 13:41

## 2019-01-28 RX ADMIN — HYDROCODONE BITARTRATE AND ACETAMINOPHEN 1 TABLET: 5; 325 TABLET ORAL at 03:39

## 2019-01-28 RX ADMIN — SPIRONOLACTONE 25 MG: 25 TABLET ORAL at 17:36

## 2019-01-28 RX ADMIN — ESCITALOPRAM OXALATE 20 MG: 10 TABLET ORAL at 08:13

## 2019-01-28 RX ADMIN — HEPARIN SODIUM 5000 UNITS: 5000 INJECTION, SOLUTION INTRAVENOUS; SUBCUTANEOUS at 13:42

## 2019-01-28 RX ADMIN — HEPARIN SODIUM 5000 UNITS: 5000 INJECTION, SOLUTION INTRAVENOUS; SUBCUTANEOUS at 05:31

## 2019-01-28 RX ADMIN — HYDROCODONE BITARTRATE AND ACETAMINOPHEN 1 TABLET: 5; 325 TABLET ORAL at 21:23

## 2019-01-28 RX ADMIN — SODIUM CHLORIDE, PRESERVATIVE FREE 10 ML: 5 INJECTION INTRAVENOUS at 08:26

## 2019-01-28 RX ADMIN — PANTOPRAZOLE SODIUM 20 MG: 20 TABLET, DELAYED RELEASE ORAL at 05:31

## 2019-01-28 RX ADMIN — FLUTICASONE PROPIONATE 2 SPRAY: 50 SPRAY, METERED NASAL at 08:17

## 2019-01-28 RX ADMIN — CARVEDILOL 12.5 MG: 12.5 TABLET, FILM COATED ORAL at 08:13

## 2019-01-28 ASSESSMENT — PAIN DESCRIPTION - PAIN TYPE
TYPE: ACUTE PAIN
TYPE: ACUTE PAIN

## 2019-01-28 ASSESSMENT — PAIN DESCRIPTION - PROGRESSION

## 2019-01-28 ASSESSMENT — PAIN DESCRIPTION - LOCATION
LOCATION: FOOT
LOCATION: FOOT

## 2019-01-28 ASSESSMENT — PAIN DESCRIPTION - FREQUENCY: FREQUENCY: CONTINUOUS

## 2019-01-28 ASSESSMENT — PAIN DESCRIPTION - ORIENTATION
ORIENTATION: RIGHT
ORIENTATION: RIGHT

## 2019-01-28 ASSESSMENT — PAIN SCALES - GENERAL
PAINLEVEL_OUTOF10: 9
PAINLEVEL_OUTOF10: 8
PAINLEVEL_OUTOF10: 7

## 2019-01-28 ASSESSMENT — PAIN DESCRIPTION - DESCRIPTORS: DESCRIPTORS: BURNING;ACHING;JABBING

## 2019-01-28 ASSESSMENT — PAIN DESCRIPTION - ONSET: ONSET: ON-GOING

## 2019-01-29 ENCOUNTER — APPOINTMENT (OUTPATIENT)
Dept: CT IMAGING | Age: 69
DRG: 682 | End: 2019-01-29
Payer: MEDICARE

## 2019-01-29 LAB
ANION GAP SERPL CALCULATED.3IONS-SCNC: 12 MMOL/L (ref 4–16)
BUN BLDV-MCNC: 14 MG/DL (ref 6–23)
CALCIUM SERPL-MCNC: 7.5 MG/DL (ref 8.3–10.6)
CHLORIDE BLD-SCNC: 102 MMOL/L (ref 99–110)
CO2: 20 MMOL/L (ref 21–32)
CREAT SERPL-MCNC: 1.8 MG/DL (ref 0.9–1.3)
GFR AFRICAN AMERICAN: 45 ML/MIN/1.73M2
GFR NON-AFRICAN AMERICAN: 38 ML/MIN/1.73M2
GLUCOSE BLD-MCNC: 118 MG/DL (ref 70–99)
PHOSPHORUS: 3.3 MG/DL (ref 2.5–4.9)
POTASSIUM SERPL-SCNC: 4.8 MMOL/L (ref 3.5–5.1)
SODIUM BLD-SCNC: 134 MMOL/L (ref 135–145)
URIC ACID: 4 MG/DL (ref 3.5–7.2)
VANCOMYCIN TROUGH: 24.8 UG/ML (ref 10–20)

## 2019-01-29 PROCEDURE — 73700 CT LOWER EXTREMITY W/O DYE: CPT

## 2019-01-29 PROCEDURE — 6370000000 HC RX 637 (ALT 250 FOR IP): Performed by: FAMILY MEDICINE

## 2019-01-29 PROCEDURE — 94660 CPAP INITIATION&MGMT: CPT

## 2019-01-29 PROCEDURE — 97535 SELF CARE MNGMENT TRAINING: CPT

## 2019-01-29 PROCEDURE — 6360000002 HC RX W HCPCS: Performed by: FAMILY MEDICINE

## 2019-01-29 PROCEDURE — 1200000000 HC SEMI PRIVATE

## 2019-01-29 PROCEDURE — 6370000000 HC RX 637 (ALT 250 FOR IP): Performed by: INTERNAL MEDICINE

## 2019-01-29 PROCEDURE — 36415 COLL VENOUS BLD VENIPUNCTURE: CPT

## 2019-01-29 PROCEDURE — 2500000003 HC RX 250 WO HCPCS: Performed by: HOSPITALIST

## 2019-01-29 PROCEDURE — 2580000003 HC RX 258: Performed by: FAMILY MEDICINE

## 2019-01-29 PROCEDURE — 80048 BASIC METABOLIC PNL TOTAL CA: CPT

## 2019-01-29 PROCEDURE — 84550 ASSAY OF BLOOD/URIC ACID: CPT

## 2019-01-29 PROCEDURE — 97166 OT EVAL MOD COMPLEX 45 MIN: CPT

## 2019-01-29 PROCEDURE — 84100 ASSAY OF PHOSPHORUS: CPT

## 2019-01-29 PROCEDURE — 80202 ASSAY OF VANCOMYCIN: CPT

## 2019-01-29 RX ADMIN — ESCITALOPRAM OXALATE 20 MG: 10 TABLET ORAL at 09:15

## 2019-01-29 RX ADMIN — HEPARIN SODIUM 5000 UNITS: 5000 INJECTION, SOLUTION INTRAVENOUS; SUBCUTANEOUS at 21:15

## 2019-01-29 RX ADMIN — SODIUM CHLORIDE, PRESERVATIVE FREE 10 ML: 5 INJECTION INTRAVENOUS at 09:15

## 2019-01-29 RX ADMIN — CARVEDILOL 12.5 MG: 12.5 TABLET, FILM COATED ORAL at 09:15

## 2019-01-29 RX ADMIN — HEPARIN SODIUM 5000 UNITS: 5000 INJECTION, SOLUTION INTRAVENOUS; SUBCUTANEOUS at 06:13

## 2019-01-29 RX ADMIN — PANTOPRAZOLE SODIUM 20 MG: 20 TABLET, DELAYED RELEASE ORAL at 06:13

## 2019-01-29 RX ADMIN — SODIUM CHLORIDE, PRESERVATIVE FREE 10 ML: 5 INJECTION INTRAVENOUS at 21:20

## 2019-01-29 RX ADMIN — SPIRONOLACTONE 25 MG: 25 TABLET ORAL at 18:01

## 2019-01-29 RX ADMIN — TAZOBACTAM SODIUM AND PIPERACILLIN SODIUM 3.38 G: 375; 3 INJECTION, SOLUTION INTRAVENOUS at 21:14

## 2019-01-29 RX ADMIN — HEPARIN SODIUM 5000 UNITS: 5000 INJECTION, SOLUTION INTRAVENOUS; SUBCUTANEOUS at 15:16

## 2019-01-29 RX ADMIN — CARVEDILOL 12.5 MG: 12.5 TABLET, FILM COATED ORAL at 18:01

## 2019-01-29 RX ADMIN — CLOPIDOGREL BISULFATE 75 MG: 75 TABLET ORAL at 09:15

## 2019-01-29 RX ADMIN — VANCOMYCIN HYDROCHLORIDE 1500 MG: 5 INJECTION, POWDER, LYOPHILIZED, FOR SOLUTION INTRAVENOUS at 09:16

## 2019-01-29 RX ADMIN — HYDROCODONE BITARTRATE AND ACETAMINOPHEN 1 TABLET: 5; 325 TABLET ORAL at 12:27

## 2019-01-29 RX ADMIN — TAZOBACTAM SODIUM AND PIPERACILLIN SODIUM 3.38 G: 375; 3 INJECTION, SOLUTION INTRAVENOUS at 11:50

## 2019-01-29 RX ADMIN — FLUTICASONE PROPIONATE 2 SPRAY: 50 SPRAY, METERED NASAL at 09:15

## 2019-01-29 RX ADMIN — AMLODIPINE BESYLATE 10 MG: 10 TABLET ORAL at 21:15

## 2019-01-29 RX ADMIN — ATORVASTATIN CALCIUM 80 MG: 40 TABLET, FILM COATED ORAL at 21:15

## 2019-01-29 RX ADMIN — LINAGLIPTIN 5 MG: 5 TABLET, FILM COATED ORAL at 09:15

## 2019-01-29 RX ADMIN — HYDROCODONE BITARTRATE AND ACETAMINOPHEN 1 TABLET: 5; 325 TABLET ORAL at 18:37

## 2019-01-29 RX ADMIN — HYDROCODONE BITARTRATE AND ACETAMINOPHEN 1 TABLET: 5; 325 TABLET ORAL at 06:13

## 2019-01-29 RX ADMIN — SPIRONOLACTONE 25 MG: 25 TABLET ORAL at 09:15

## 2019-01-29 RX ADMIN — TAZOBACTAM SODIUM AND PIPERACILLIN SODIUM 3.38 G: 375; 3 INJECTION, SOLUTION INTRAVENOUS at 03:34

## 2019-01-29 ASSESSMENT — PAIN SCALES - GENERAL
PAINLEVEL_OUTOF10: 7
PAINLEVEL_OUTOF10: 8
PAINLEVEL_OUTOF10: 6
PAINLEVEL_OUTOF10: 7
PAINLEVEL_OUTOF10: 6
PAINLEVEL_OUTOF10: 0
PAINLEVEL_OUTOF10: 8
PAINLEVEL_OUTOF10: 5
PAINLEVEL_OUTOF10: 0

## 2019-01-29 ASSESSMENT — PAIN DESCRIPTION - LOCATION
LOCATION: FOOT

## 2019-01-29 ASSESSMENT — PAIN - FUNCTIONAL ASSESSMENT
PAIN_FUNCTIONAL_ASSESSMENT: PREVENTS OR INTERFERES SOME ACTIVE ACTIVITIES AND ADLS

## 2019-01-29 ASSESSMENT — PAIN DESCRIPTION - ONSET
ONSET: ON-GOING
ONSET: GRADUAL

## 2019-01-29 ASSESSMENT — PAIN DESCRIPTION - PROGRESSION
CLINICAL_PROGRESSION: GRADUALLY IMPROVING
CLINICAL_PROGRESSION: GRADUALLY WORSENING
CLINICAL_PROGRESSION: GRADUALLY IMPROVING
CLINICAL_PROGRESSION: GRADUALLY WORSENING
CLINICAL_PROGRESSION: GRADUALLY IMPROVING
CLINICAL_PROGRESSION: GRADUALLY WORSENING
CLINICAL_PROGRESSION: GRADUALLY WORSENING

## 2019-01-29 ASSESSMENT — PAIN DESCRIPTION - FREQUENCY
FREQUENCY: CONTINUOUS
FREQUENCY: CONTINUOUS
FREQUENCY: OTHER (COMMENT)
FREQUENCY: CONTINUOUS
FREQUENCY: CONTINUOUS

## 2019-01-29 ASSESSMENT — PAIN DESCRIPTION - DESCRIPTORS
DESCRIPTORS: ACHING;BURNING

## 2019-01-29 ASSESSMENT — PAIN DESCRIPTION - ORIENTATION
ORIENTATION: RIGHT

## 2019-01-29 ASSESSMENT — PAIN DESCRIPTION - PAIN TYPE
TYPE: ACUTE PAIN

## 2019-01-30 VITALS
SYSTOLIC BLOOD PRESSURE: 110 MMHG | HEART RATE: 67 BPM | RESPIRATION RATE: 19 BRPM | HEIGHT: 69 IN | BODY MASS INDEX: 35.25 KG/M2 | TEMPERATURE: 98.4 F | DIASTOLIC BLOOD PRESSURE: 67 MMHG | WEIGHT: 238 LBS | OXYGEN SATURATION: 95 %

## 2019-01-30 LAB
ANION GAP SERPL CALCULATED.3IONS-SCNC: 11 MMOL/L (ref 4–16)
BUN BLDV-MCNC: 11 MG/DL (ref 6–23)
CALCIUM SERPL-MCNC: 7.7 MG/DL (ref 8.3–10.6)
CHLORIDE BLD-SCNC: 103 MMOL/L (ref 99–110)
CO2: 20 MMOL/L (ref 21–32)
CREAT SERPL-MCNC: 1.5 MG/DL (ref 0.9–1.3)
GFR AFRICAN AMERICAN: 56 ML/MIN/1.73M2
GFR NON-AFRICAN AMERICAN: 46 ML/MIN/1.73M2
GLUCOSE BLD-MCNC: 103 MG/DL (ref 70–99)
POTASSIUM SERPL-SCNC: 4.6 MMOL/L (ref 3.5–5.1)
SODIUM BLD-SCNC: 134 MMOL/L (ref 135–145)
VANCOMYCIN TROUGH: 15.7 UG/ML (ref 10–20)

## 2019-01-30 PROCEDURE — 80048 BASIC METABOLIC PNL TOTAL CA: CPT

## 2019-01-30 PROCEDURE — 80202 ASSAY OF VANCOMYCIN: CPT

## 2019-01-30 PROCEDURE — 6370000000 HC RX 637 (ALT 250 FOR IP): Performed by: FAMILY MEDICINE

## 2019-01-30 PROCEDURE — 6370000000 HC RX 637 (ALT 250 FOR IP): Performed by: INTERNAL MEDICINE

## 2019-01-30 PROCEDURE — 2580000003 HC RX 258: Performed by: FAMILY MEDICINE

## 2019-01-30 PROCEDURE — 36415 COLL VENOUS BLD VENIPUNCTURE: CPT

## 2019-01-30 PROCEDURE — 6360000002 HC RX W HCPCS: Performed by: FAMILY MEDICINE

## 2019-01-30 PROCEDURE — 94660 CPAP INITIATION&MGMT: CPT

## 2019-01-30 PROCEDURE — 2500000003 HC RX 250 WO HCPCS: Performed by: HOSPITALIST

## 2019-01-30 RX ORDER — DOXYCYCLINE HYCLATE 100 MG
100 TABLET ORAL 2 TIMES DAILY
Qty: 14 TABLET | Refills: 0 | Status: SHIPPED | OUTPATIENT
Start: 2019-01-30 | End: 2019-02-06

## 2019-01-30 RX ORDER — SPIRONOLACTONE 25 MG/1
25 TABLET ORAL DAILY
Qty: 30 TABLET | Refills: 3 | Status: SHIPPED | OUTPATIENT
Start: 2019-01-30 | End: 2019-05-24

## 2019-01-30 RX ORDER — INDOMETHACIN 25 MG/1
25 CAPSULE ORAL
Qty: 60 CAPSULE | Refills: 3 | Status: ON HOLD | OUTPATIENT
Start: 2019-01-30 | End: 2019-03-25 | Stop reason: HOSPADM

## 2019-01-30 RX ORDER — FLUTICASONE PROPIONATE 50 MCG
2 SPRAY, SUSPENSION (ML) NASAL 2 TIMES DAILY
Status: DISCONTINUED | OUTPATIENT
Start: 2019-01-30 | End: 2019-01-30 | Stop reason: HOSPADM

## 2019-01-30 RX ORDER — AMOXICILLIN AND CLAVULANATE POTASSIUM 875; 125 MG/1; MG/1
1 TABLET, FILM COATED ORAL 2 TIMES DAILY
Qty: 14 TABLET | Refills: 0 | Status: SHIPPED | OUTPATIENT
Start: 2019-01-30 | End: 2019-02-06

## 2019-01-30 RX ORDER — HYDROCODONE BITARTRATE AND ACETAMINOPHEN 5; 325 MG/1; MG/1
1-2 TABLET ORAL EVERY 6 HOURS PRN
Qty: 4 TABLET | Refills: 0 | Status: SHIPPED | OUTPATIENT
Start: 2019-01-30 | End: 2019-02-02

## 2019-01-30 RX ADMIN — HYDROCODONE BITARTRATE AND ACETAMINOPHEN 1 TABLET: 5; 325 TABLET ORAL at 00:43

## 2019-01-30 RX ADMIN — CLOPIDOGREL BISULFATE 75 MG: 75 TABLET ORAL at 10:00

## 2019-01-30 RX ADMIN — VANCOMYCIN HYDROCHLORIDE 1500 MG: 5 INJECTION, POWDER, LYOPHILIZED, FOR SOLUTION INTRAVENOUS at 10:00

## 2019-01-30 RX ADMIN — Medication 2 SPRAY: at 10:01

## 2019-01-30 RX ADMIN — PANTOPRAZOLE SODIUM 20 MG: 20 TABLET, DELAYED RELEASE ORAL at 05:48

## 2019-01-30 RX ADMIN — TAZOBACTAM SODIUM AND PIPERACILLIN SODIUM 3.38 G: 375; 3 INJECTION, SOLUTION INTRAVENOUS at 12:52

## 2019-01-30 RX ADMIN — SPIRONOLACTONE 25 MG: 25 TABLET ORAL at 16:36

## 2019-01-30 RX ADMIN — TAZOBACTAM SODIUM AND PIPERACILLIN SODIUM 3.38 G: 375; 3 INJECTION, SOLUTION INTRAVENOUS at 04:05

## 2019-01-30 RX ADMIN — HYDROCODONE BITARTRATE AND ACETAMINOPHEN 1 TABLET: 5; 325 TABLET ORAL at 10:00

## 2019-01-30 RX ADMIN — SPIRONOLACTONE 25 MG: 25 TABLET ORAL at 10:00

## 2019-01-30 RX ADMIN — ESCITALOPRAM OXALATE 20 MG: 10 TABLET ORAL at 10:00

## 2019-01-30 RX ADMIN — CARVEDILOL 12.5 MG: 12.5 TABLET, FILM COATED ORAL at 10:00

## 2019-01-30 RX ADMIN — LINAGLIPTIN 5 MG: 5 TABLET, FILM COATED ORAL at 10:00

## 2019-01-30 RX ADMIN — HEPARIN SODIUM 5000 UNITS: 5000 INJECTION, SOLUTION INTRAVENOUS; SUBCUTANEOUS at 05:48

## 2019-01-30 RX ADMIN — Medication 2 SPRAY: at 00:45

## 2019-01-30 RX ADMIN — CARVEDILOL 12.5 MG: 12.5 TABLET, FILM COATED ORAL at 16:36

## 2019-01-30 RX ADMIN — HEPARIN SODIUM 5000 UNITS: 5000 INJECTION, SOLUTION INTRAVENOUS; SUBCUTANEOUS at 15:02

## 2019-01-30 RX ADMIN — HYDROCODONE BITARTRATE AND ACETAMINOPHEN 1 TABLET: 5; 325 TABLET ORAL at 16:34

## 2019-01-30 RX ADMIN — SODIUM CHLORIDE, PRESERVATIVE FREE 10 ML: 5 INJECTION INTRAVENOUS at 10:01

## 2019-01-30 ASSESSMENT — PAIN DESCRIPTION - DESCRIPTORS
DESCRIPTORS: ACHING;BURNING;THROBBING

## 2019-01-30 ASSESSMENT — PAIN DESCRIPTION - PAIN TYPE
TYPE: ACUTE PAIN

## 2019-01-30 ASSESSMENT — PAIN SCALES - GENERAL
PAINLEVEL_OUTOF10: 6
PAINLEVEL_OUTOF10: 6
PAINLEVEL_OUTOF10: 8
PAINLEVEL_OUTOF10: 6

## 2019-01-30 ASSESSMENT — PAIN DESCRIPTION - ONSET
ONSET: ON-GOING

## 2019-01-30 ASSESSMENT — PAIN DESCRIPTION - LOCATION
LOCATION: FOOT

## 2019-01-30 ASSESSMENT — PAIN - FUNCTIONAL ASSESSMENT
PAIN_FUNCTIONAL_ASSESSMENT: PREVENTS OR INTERFERES SOME ACTIVE ACTIVITIES AND ADLS

## 2019-01-30 ASSESSMENT — PAIN DESCRIPTION - PROGRESSION
CLINICAL_PROGRESSION: GRADUALLY WORSENING
CLINICAL_PROGRESSION: GRADUALLY IMPROVING
CLINICAL_PROGRESSION: GRADUALLY WORSENING

## 2019-01-30 ASSESSMENT — PAIN DESCRIPTION - ORIENTATION
ORIENTATION: RIGHT

## 2019-01-30 ASSESSMENT — PAIN DESCRIPTION - FREQUENCY
FREQUENCY: CONTINUOUS

## 2019-01-31 LAB
CULTURE: NORMAL
CULTURE: NORMAL
Lab: NORMAL
Lab: NORMAL
REPORT STATUS: NORMAL
REPORT STATUS: NORMAL
SPECIMEN: NORMAL
SPECIMEN: NORMAL

## 2019-02-01 ENCOUNTER — HOSPITAL ENCOUNTER (OUTPATIENT)
Age: 69
Setting detail: SPECIMEN
Discharge: HOME | End: 2019-02-01

## 2019-02-01 LAB
ALBUMIN SERPL-MCNC: 2.4 GM/DL (ref 3.4–5)
ALP BLD-CCNC: 176 IU/L (ref 40–128)
ALT SERPL-CCNC: 21 U/L (ref 10–40)
ANION GAP SERPL CALCULATED.3IONS-SCNC: 13 MMOL/L (ref 4–16)
AST SERPL-CCNC: 21 IU/L (ref 15–37)
BASOPHILS ABSOLUTE: 0 K/CU MM
BASOPHILS RELATIVE PERCENT: 0.2 % (ref 0–1)
BILIRUB SERPL-MCNC: 0.7 MG/DL (ref 0–1)
BUN BLDV-MCNC: 15 MG/DL (ref 6–23)
CALCIUM SERPL-MCNC: 8 MG/DL (ref 8.3–10.6)
CHLORIDE BLD-SCNC: 100 MMOL/L (ref 99–110)
CO2: 20 MMOL/L (ref 21–32)
CREAT SERPL-MCNC: 1.9 MG/DL (ref 0.9–1.3)
DIFFERENTIAL TYPE: ABNORMAL
EOSINOPHILS ABSOLUTE: 0 K/CU MM
EOSINOPHILS RELATIVE PERCENT: 0 % (ref 0–3)
ESTIMATED AVERAGE GLUCOSE: 160 MG/DL
GFR AFRICAN AMERICAN: 43 ML/MIN/1.73M2
GFR NON-AFRICAN AMERICAN: 35 ML/MIN/1.73M2
GLUCOSE BLD-MCNC: 78 MG/DL (ref 70–99)
HBA1C MFR BLD: 7.2 % (ref 4.2–6.3)
HCT VFR BLD CALC: 30.9 % (ref 42–52)
HEMOGLOBIN: 8.9 GM/DL (ref 13.5–18)
IMMATURE NEUTROPHIL %: 1.5 % (ref 0–0.43)
LYMPHOCYTES ABSOLUTE: 1.4 K/CU MM
LYMPHOCYTES RELATIVE PERCENT: 10.4 % (ref 24–44)
MCH RBC QN AUTO: 21.1 PG (ref 27–31)
MCHC RBC AUTO-ENTMCNC: 28.8 % (ref 32–36)
MCV RBC AUTO: 73.4 FL (ref 78–100)
MONOCYTES ABSOLUTE: 0.9 K/CU MM
MONOCYTES RELATIVE PERCENT: 6.9 % (ref 0–4)
NUCLEATED RBC %: 0 %
PDW BLD-RTO: 21.5 % (ref 11.7–14.9)
PLATELET # BLD: 464 K/CU MM (ref 140–440)
PMV BLD AUTO: 8.8 FL (ref 7.5–11.1)
POTASSIUM SERPL-SCNC: 5.1 MMOL/L (ref 3.5–5.1)
RBC # BLD: 4.21 M/CU MM (ref 4.6–6.2)
SEGMENTED NEUTROPHILS ABSOLUTE COUNT: 10.7 K/CU MM
SEGMENTED NEUTROPHILS RELATIVE PERCENT: 81 % (ref 36–66)
SODIUM BLD-SCNC: 133 MMOL/L (ref 135–145)
TOTAL IMMATURE NEUTOROPHIL: 0.2 K/CU MM
TOTAL NUCLEATED RBC: 0 K/CU MM
TOTAL PROTEIN: 7.4 GM/DL (ref 6.4–8.2)
WBC # BLD: 13.2 K/CU MM (ref 4–10.5)

## 2019-02-01 PROCEDURE — 83036 HEMOGLOBIN GLYCOSYLATED A1C: CPT

## 2019-02-01 PROCEDURE — 36415 COLL VENOUS BLD VENIPUNCTURE: CPT

## 2019-02-01 PROCEDURE — 85025 COMPLETE CBC W/AUTO DIFF WBC: CPT

## 2019-02-01 PROCEDURE — 80053 COMPREHEN METABOLIC PANEL: CPT

## 2019-02-04 ENCOUNTER — HOSPITAL ENCOUNTER (OUTPATIENT)
Age: 69
Setting detail: SPECIMEN
Discharge: HOME | End: 2019-02-04

## 2019-02-04 LAB
ANION GAP SERPL CALCULATED.3IONS-SCNC: 13 MMOL/L (ref 4–16)
BASOPHILS ABSOLUTE: 0 K/CU MM
BASOPHILS RELATIVE PERCENT: 0.2 % (ref 0–1)
BUN BLDV-MCNC: 21 MG/DL (ref 6–23)
CALCIUM SERPL-MCNC: 7.9 MG/DL (ref 8.3–10.6)
CHLORIDE BLD-SCNC: 100 MMOL/L (ref 99–110)
CO2: 20 MMOL/L (ref 21–32)
CREAT SERPL-MCNC: 1.9 MG/DL (ref 0.9–1.3)
DIFFERENTIAL TYPE: ABNORMAL
EOSINOPHILS ABSOLUTE: 0 K/CU MM
EOSINOPHILS RELATIVE PERCENT: 0 % (ref 0–3)
GFR AFRICAN AMERICAN: 43 ML/MIN/1.73M2
GFR NON-AFRICAN AMERICAN: 35 ML/MIN/1.73M2
GLUCOSE BLD-MCNC: 132 MG/DL (ref 70–99)
HCT VFR BLD CALC: 25.9 % (ref 42–52)
HEMOGLOBIN: 7.5 GM/DL (ref 13.5–18)
IMMATURE NEUTROPHIL %: 0.7 % (ref 0–0.43)
LYMPHOCYTES ABSOLUTE: 1.1 K/CU MM
LYMPHOCYTES RELATIVE PERCENT: 9.2 % (ref 24–44)
MCH RBC QN AUTO: 21.1 PG (ref 27–31)
MCHC RBC AUTO-ENTMCNC: 29 % (ref 32–36)
MCV RBC AUTO: 73 FL (ref 78–100)
MONOCYTES ABSOLUTE: 0.9 K/CU MM
MONOCYTES RELATIVE PERCENT: 7 % (ref 0–4)
NUCLEATED RBC %: 0 %
PDW BLD-RTO: 21.2 % (ref 11.7–14.9)
PLATELET # BLD: 397 K/CU MM (ref 140–440)
PMV BLD AUTO: 9 FL (ref 7.5–11.1)
POTASSIUM SERPL-SCNC: 4.8 MMOL/L (ref 3.5–5.1)
RBC # BLD: 3.55 M/CU MM (ref 4.6–6.2)
SEGMENTED NEUTROPHILS ABSOLUTE COUNT: 10.3 K/CU MM
SEGMENTED NEUTROPHILS RELATIVE PERCENT: 82.9 % (ref 36–66)
SODIUM BLD-SCNC: 133 MMOL/L (ref 135–145)
TOTAL IMMATURE NEUTOROPHIL: 0.09 K/CU MM
TOTAL NUCLEATED RBC: 0 K/CU MM
WBC # BLD: 12.5 K/CU MM (ref 4–10.5)

## 2019-02-04 PROCEDURE — 85025 COMPLETE CBC W/AUTO DIFF WBC: CPT

## 2019-02-04 PROCEDURE — 36415 COLL VENOUS BLD VENIPUNCTURE: CPT

## 2019-02-04 PROCEDURE — 80048 BASIC METABOLIC PNL TOTAL CA: CPT

## 2019-02-07 ENCOUNTER — HOSPITAL ENCOUNTER (OUTPATIENT)
Age: 69
Setting detail: SPECIMEN
Discharge: HOME | End: 2019-02-07

## 2019-02-07 ENCOUNTER — HOSPITAL ENCOUNTER (OUTPATIENT)
Age: 69
Setting detail: SPECIMEN
Discharge: HOME | End: 2019-02-07
Payer: MEDICARE

## 2019-02-07 LAB
ANION GAP SERPL CALCULATED.3IONS-SCNC: 14 MMOL/L (ref 4–16)
BASOPHILS ABSOLUTE: 0 K/CU MM
BASOPHILS RELATIVE PERCENT: 0.3 % (ref 0–1)
BUN BLDV-MCNC: 19 MG/DL (ref 6–23)
CALCIUM SERPL-MCNC: 8.4 MG/DL (ref 8.3–10.6)
CHLORIDE BLD-SCNC: 98 MMOL/L (ref 99–110)
CO2: 21 MMOL/L (ref 21–32)
CREAT SERPL-MCNC: 1.9 MG/DL (ref 0.9–1.3)
DIFFERENTIAL TYPE: ABNORMAL
EOSINOPHILS ABSOLUTE: 0 K/CU MM
EOSINOPHILS RELATIVE PERCENT: 0 % (ref 0–3)
ESTIMATED AVERAGE GLUCOSE: 151 MG/DL
GFR AFRICAN AMERICAN: 43 ML/MIN/1.73M2
GFR NON-AFRICAN AMERICAN: 35 ML/MIN/1.73M2
GLUCOSE BLD-MCNC: 115 MG/DL (ref 70–99)
HBA1C MFR BLD: 6.9 % (ref 4.2–6.3)
HCT VFR BLD CALC: 25.9 % (ref 42–52)
HEMOGLOBIN: 7.5 GM/DL (ref 13.5–18)
IMMATURE NEUTROPHIL %: 0.6 % (ref 0–0.43)
LYMPHOCYTES ABSOLUTE: 1 K/CU MM
LYMPHOCYTES RELATIVE PERCENT: 9.7 % (ref 24–44)
MCH RBC QN AUTO: 21.2 PG (ref 27–31)
MCHC RBC AUTO-ENTMCNC: 29 % (ref 32–36)
MCV RBC AUTO: 73.4 FL (ref 78–100)
MONOCYTES ABSOLUTE: 0.8 K/CU MM
MONOCYTES RELATIVE PERCENT: 7.8 % (ref 0–4)
NUCLEATED RBC %: 0 %
PDW BLD-RTO: 21 % (ref 11.7–14.9)
PLATELET # BLD: 410 K/CU MM (ref 140–440)
PMV BLD AUTO: 8.7 FL (ref 7.5–11.1)
POTASSIUM SERPL-SCNC: 4.7 MMOL/L (ref 3.5–5.1)
RBC # BLD: 3.53 M/CU MM (ref 4.6–6.2)
SEGMENTED NEUTROPHILS ABSOLUTE COUNT: 8.7 K/CU MM
SEGMENTED NEUTROPHILS RELATIVE PERCENT: 81.6 % (ref 36–66)
SODIUM BLD-SCNC: 133 MMOL/L (ref 135–145)
TOTAL IMMATURE NEUTOROPHIL: 0.06 K/CU MM
TOTAL NUCLEATED RBC: 0 K/CU MM
WBC # BLD: 10.6 K/CU MM (ref 4–10.5)

## 2019-02-07 PROCEDURE — 83036 HEMOGLOBIN GLYCOSYLATED A1C: CPT

## 2019-02-07 PROCEDURE — 36415 COLL VENOUS BLD VENIPUNCTURE: CPT

## 2019-02-07 PROCEDURE — 80048 BASIC METABOLIC PNL TOTAL CA: CPT

## 2019-02-07 PROCEDURE — 85025 COMPLETE CBC W/AUTO DIFF WBC: CPT

## 2019-02-11 ENCOUNTER — HOSPITAL ENCOUNTER (OUTPATIENT)
Age: 69
Setting detail: SPECIMEN
Discharge: HOME | End: 2019-02-11
Payer: MEDICARE

## 2019-02-11 PROCEDURE — G0328 FECAL BLOOD SCRN IMMUNOASSAY: HCPCS

## 2019-02-13 ENCOUNTER — HOSPITAL ENCOUNTER (OUTPATIENT)
Age: 69
Setting detail: SPECIMEN
Discharge: HOME | End: 2019-02-13
Payer: MEDICARE

## 2019-02-13 LAB
ANION GAP SERPL CALCULATED.3IONS-SCNC: 12 MMOL/L (ref 4–16)
BUN BLDV-MCNC: 14 MG/DL (ref 6–23)
CALCIUM SERPL-MCNC: 8.1 MG/DL (ref 8.3–10.6)
CHLORIDE BLD-SCNC: 97 MMOL/L (ref 99–110)
CO2: 21 MMOL/L (ref 21–32)
CREAT SERPL-MCNC: 1.8 MG/DL (ref 0.9–1.3)
GFR AFRICAN AMERICAN: 45 ML/MIN/1.73M2
GFR NON-AFRICAN AMERICAN: 38 ML/MIN/1.73M2
GLUCOSE BLD-MCNC: 123 MG/DL (ref 70–99)
HCT VFR BLD CALC: 27.2 % (ref 42–52)
HEMOCCULT SP1 STL QL: POSITIVE
HEMOGLOBIN: 7.8 GM/DL (ref 13.5–18)
MCH RBC QN AUTO: 21 PG (ref 27–31)
MCHC RBC AUTO-ENTMCNC: 28.7 % (ref 32–36)
MCV RBC AUTO: 73.1 FL (ref 78–100)
OCCULT BLOOD 2: NEGATIVE
PDW BLD-RTO: 20.5 % (ref 11.7–14.9)
PLATELET # BLD: 549 K/CU MM (ref 140–440)
PMV BLD AUTO: 8.8 FL (ref 7.5–11.1)
POTASSIUM SERPL-SCNC: 4.4 MMOL/L (ref 3.5–5.1)
RBC # BLD: 3.72 M/CU MM (ref 4.6–6.2)
SODIUM BLD-SCNC: 130 MMOL/L (ref 135–145)
WBC # BLD: 10.1 K/CU MM (ref 4–10.5)

## 2019-02-13 PROCEDURE — 85027 COMPLETE CBC AUTOMATED: CPT

## 2019-02-13 PROCEDURE — 36415 COLL VENOUS BLD VENIPUNCTURE: CPT

## 2019-02-13 PROCEDURE — 80048 BASIC METABOLIC PNL TOTAL CA: CPT

## 2019-02-20 ENCOUNTER — HOSPITAL ENCOUNTER (OUTPATIENT)
Age: 69
Setting detail: SPECIMEN
Discharge: HOME | End: 2019-02-20
Payer: COMMERCIAL

## 2019-02-20 LAB
ANION GAP SERPL CALCULATED.3IONS-SCNC: 12 MMOL/L (ref 4–16)
BUN BLDV-MCNC: 13 MG/DL (ref 6–23)
CALCIUM SERPL-MCNC: 7.9 MG/DL (ref 8.3–10.6)
CHLORIDE BLD-SCNC: 98 MMOL/L (ref 99–110)
CO2: 22 MMOL/L (ref 21–32)
CREAT SERPL-MCNC: 1.8 MG/DL (ref 0.9–1.3)
GFR AFRICAN AMERICAN: 45 ML/MIN/1.73M2
GFR NON-AFRICAN AMERICAN: 38 ML/MIN/1.73M2
GLUCOSE BLD-MCNC: 113 MG/DL (ref 70–99)
HCT VFR BLD CALC: 28.4 % (ref 42–52)
HEMOGLOBIN: 7.8 GM/DL (ref 13.5–18)
MCH RBC QN AUTO: 20.5 PG (ref 27–31)
MCHC RBC AUTO-ENTMCNC: 27.5 % (ref 32–36)
MCV RBC AUTO: 74.7 FL (ref 78–100)
PDW BLD-RTO: 20.3 % (ref 11.7–14.9)
PLATELET # BLD: 446 K/CU MM (ref 140–440)
PMV BLD AUTO: 8.2 FL (ref 7.5–11.1)
POTASSIUM SERPL-SCNC: 4.4 MMOL/L (ref 3.5–5.1)
RBC # BLD: 3.8 M/CU MM (ref 4.6–6.2)
SODIUM BLD-SCNC: 132 MMOL/L (ref 135–145)
WBC # BLD: 9 K/CU MM (ref 4–10.5)

## 2019-02-20 PROCEDURE — 36415 COLL VENOUS BLD VENIPUNCTURE: CPT

## 2019-02-20 PROCEDURE — 80048 BASIC METABOLIC PNL TOTAL CA: CPT

## 2019-02-20 PROCEDURE — 85027 COMPLETE CBC AUTOMATED: CPT

## 2019-02-21 ENCOUNTER — HOSPITAL ENCOUNTER (OUTPATIENT)
Age: 69
Setting detail: SPECIMEN
Discharge: HOME | End: 2019-02-21
Payer: COMMERCIAL

## 2019-02-21 LAB
FERRITIN: 330 NG/ML (ref 30–400)
IRON: 16 UG/DL (ref 59–158)
PCT TRANSFERRIN: 10 % (ref 10–44)
TOTAL IRON BINDING CAPACITY: 162 UG/DL (ref 250–450)
UNSATURATED IRON BINDING CAPACITY: 146 UG/DL (ref 110–370)

## 2019-02-21 PROCEDURE — 83550 IRON BINDING TEST: CPT

## 2019-02-21 PROCEDURE — 83540 ASSAY OF IRON: CPT

## 2019-02-21 PROCEDURE — 36415 COLL VENOUS BLD VENIPUNCTURE: CPT

## 2019-02-21 PROCEDURE — 82728 ASSAY OF FERRITIN: CPT

## 2019-02-25 ENCOUNTER — HOSPITAL ENCOUNTER (OUTPATIENT)
Age: 69
Setting detail: SPECIMEN
Discharge: HOME | End: 2019-02-25
Payer: MEDICARE

## 2019-02-25 LAB
ACANTHOCYTES: ABNORMAL
ANION GAP SERPL CALCULATED.3IONS-SCNC: 13 MMOL/L (ref 4–16)
BANDED NEUTROPHILS ABSOLUTE COUNT: 0.45 K/CU MM
BANDED NEUTROPHILS RELATIVE PERCENT: 5 % (ref 5–11)
BUN BLDV-MCNC: 12 MG/DL (ref 6–23)
BURR CELLS: ABNORMAL
CALCIUM SERPL-MCNC: 8 MG/DL (ref 8.3–10.6)
CHLORIDE BLD-SCNC: 96 MMOL/L (ref 99–110)
CO2: 22 MMOL/L (ref 21–32)
CREAT SERPL-MCNC: 1.9 MG/DL (ref 0.9–1.3)
DIFFERENTIAL TYPE: ABNORMAL
GFR AFRICAN AMERICAN: 43 ML/MIN/1.73M2
GFR NON-AFRICAN AMERICAN: 35 ML/MIN/1.73M2
GLUCOSE BLD-MCNC: 93 MG/DL (ref 70–99)
HCT VFR BLD CALC: 26 % (ref 42–52)
HEMOGLOBIN: 7.4 GM/DL (ref 13.5–18)
LYMPHOCYTES ABSOLUTE: 0.8 K/CU MM
LYMPHOCYTES RELATIVE PERCENT: 9 % (ref 24–44)
MCH RBC QN AUTO: 20.7 PG (ref 27–31)
MCHC RBC AUTO-ENTMCNC: 28.5 % (ref 32–36)
MCV RBC AUTO: 72.8 FL (ref 78–100)
MICROCYTES: ABNORMAL
MONOCYTES ABSOLUTE: 0.3 K/CU MM
MONOCYTES RELATIVE PERCENT: 3 % (ref 0–4)
OVALOCYTES: ABNORMAL
PDW BLD-RTO: 20 % (ref 11.7–14.9)
PLATELET # BLD: 366 K/CU MM (ref 140–440)
PMV BLD AUTO: 8.7 FL (ref 7.5–11.1)
POLYCHROMASIA: ABNORMAL
POTASSIUM SERPL-SCNC: 4 MMOL/L (ref 3.5–5.1)
RBC # BLD: 3.57 M/CU MM (ref 4.6–6.2)
SEGMENTED NEUTROPHILS ABSOLUTE COUNT: 7.4 K/CU MM
SEGMENTED NEUTROPHILS RELATIVE PERCENT: 83 % (ref 36–66)
SODIUM BLD-SCNC: 131 MMOL/L (ref 135–145)
WBC # BLD: 9 K/CU MM (ref 4–10.5)

## 2019-02-25 PROCEDURE — 36415 COLL VENOUS BLD VENIPUNCTURE: CPT

## 2019-02-25 PROCEDURE — 85007 BL SMEAR W/DIFF WBC COUNT: CPT

## 2019-02-25 PROCEDURE — 80048 BASIC METABOLIC PNL TOTAL CA: CPT

## 2019-02-25 PROCEDURE — 85027 COMPLETE CBC AUTOMATED: CPT

## 2019-03-08 ENCOUNTER — APPOINTMENT (OUTPATIENT)
Dept: GENERAL RADIOLOGY | Age: 69
DRG: 163 | End: 2019-03-08
Payer: MEDICARE

## 2019-03-08 ENCOUNTER — APPOINTMENT (OUTPATIENT)
Dept: CT IMAGING | Age: 69
DRG: 163 | End: 2019-03-08
Payer: MEDICARE

## 2019-03-08 ENCOUNTER — HOSPITAL ENCOUNTER (INPATIENT)
Age: 69
LOS: 17 days | Discharge: NURSING, REHAB OR LONG-TERM CARE | DRG: 163 | End: 2019-03-25
Attending: EMERGENCY MEDICINE | Admitting: INTERNAL MEDICINE
Payer: MEDICARE

## 2019-03-08 DIAGNOSIS — J18.9 PNEUMONIA DUE TO ORGANISM: Primary | ICD-10-CM

## 2019-03-08 DIAGNOSIS — J86.9 EMPYEMA OF LEFT PLEURAL SPACE (HCC): ICD-10-CM

## 2019-03-08 DIAGNOSIS — J86.9 EMPYEMA (HCC): ICD-10-CM

## 2019-03-08 LAB
ALBUMIN SERPL-MCNC: 2.6 GM/DL (ref 3.4–5)
ALP BLD-CCNC: 95 IU/L (ref 40–129)
ALT SERPL-CCNC: 7 U/L (ref 10–40)
ANION GAP SERPL CALCULATED.3IONS-SCNC: 11 MMOL/L (ref 4–16)
AST SERPL-CCNC: 10 IU/L (ref 15–37)
BASOPHILS ABSOLUTE: 0 K/CU MM
BASOPHILS RELATIVE PERCENT: 0.5 % (ref 0–1)
BILIRUB SERPL-MCNC: 0.5 MG/DL (ref 0–1)
BUN BLDV-MCNC: 10 MG/DL (ref 6–23)
CALCIUM SERPL-MCNC: 7.9 MG/DL (ref 8.3–10.6)
CHLORIDE BLD-SCNC: 101 MMOL/L (ref 99–110)
CO2: 23 MMOL/L (ref 21–32)
CREAT SERPL-MCNC: 1.4 MG/DL (ref 0.9–1.3)
DIFFERENTIAL TYPE: ABNORMAL
EKG ATRIAL RATE: 141 BPM
EKG ATRIAL RATE: 147 BPM
EKG DIAGNOSIS: NORMAL
EKG DIAGNOSIS: NORMAL
EKG Q-T INTERVAL: 362 MS
EKG Q-T INTERVAL: 364 MS
EKG QRS DURATION: 100 MS
EKG QRS DURATION: 102 MS
EKG QTC CALCULATION (BAZETT): 527 MS
EKG QTC CALCULATION (BAZETT): 560 MS
EKG R AXIS: 19 DEGREES
EKG R AXIS: 24 DEGREES
EKG T AXIS: 29 DEGREES
EKG T AXIS: 5 DEGREES
EKG VENTRICULAR RATE: 126 BPM
EKG VENTRICULAR RATE: 144 BPM
EOSINOPHILS ABSOLUTE: 0 K/CU MM
EOSINOPHILS RELATIVE PERCENT: 0.1 % (ref 0–3)
GFR AFRICAN AMERICAN: >60 ML/MIN/1.73M2
GFR NON-AFRICAN AMERICAN: 50 ML/MIN/1.73M2
GLUCOSE BLD-MCNC: 115 MG/DL (ref 70–99)
GLUCOSE BLD-MCNC: 120 MG/DL (ref 70–99)
GLUCOSE BLD-MCNC: 144 MG/DL (ref 70–99)
HCT VFR BLD CALC: 30.1 % (ref 42–52)
HEMOGLOBIN: 8.4 GM/DL (ref 13.5–18)
IMMATURE NEUTROPHIL %: 0.6 % (ref 0–0.43)
LACTATE: 0.8 MMOL/L (ref 0.4–2)
LYMPHOCYTES ABSOLUTE: 1.7 K/CU MM
LYMPHOCYTES RELATIVE PERCENT: 20.9 % (ref 24–44)
MCH RBC QN AUTO: 20.8 PG (ref 27–31)
MCHC RBC AUTO-ENTMCNC: 27.9 % (ref 32–36)
MCV RBC AUTO: 74.5 FL (ref 78–100)
MONOCYTES ABSOLUTE: 0.5 K/CU MM
MONOCYTES RELATIVE PERCENT: 6.1 % (ref 0–4)
NUCLEATED RBC %: 0 %
PDW BLD-RTO: 22.6 % (ref 11.7–14.9)
PLATELET # BLD: 407 K/CU MM (ref 140–440)
PMV BLD AUTO: 8.2 FL (ref 7.5–11.1)
POTASSIUM SERPL-SCNC: 4.3 MMOL/L (ref 3.5–5.1)
PRO-BNP: 983.9 PG/ML
RBC # BLD: 4.04 M/CU MM (ref 4.6–6.2)
SEGMENTED NEUTROPHILS ABSOLUTE COUNT: 5.9 K/CU MM
SEGMENTED NEUTROPHILS RELATIVE PERCENT: 71.8 % (ref 36–66)
SODIUM BLD-SCNC: 135 MMOL/L (ref 135–145)
TOTAL IMMATURE NEUTOROPHIL: 0.05 K/CU MM
TOTAL NUCLEATED RBC: 0 K/CU MM
TOTAL PROTEIN: 8.3 GM/DL (ref 6.4–8.2)
TROPONIN T: 0.04 NG/ML
WBC # BLD: 8.3 K/CU MM (ref 4–10.5)

## 2019-03-08 PROCEDURE — 2580000003 HC RX 258: Performed by: NURSE PRACTITIONER

## 2019-03-08 PROCEDURE — 6360000002 HC RX W HCPCS: Performed by: INTERNAL MEDICINE

## 2019-03-08 PROCEDURE — 2580000003 HC RX 258: Performed by: INTERNAL MEDICINE

## 2019-03-08 PROCEDURE — 83605 ASSAY OF LACTIC ACID: CPT

## 2019-03-08 PROCEDURE — 84484 ASSAY OF TROPONIN QUANT: CPT

## 2019-03-08 PROCEDURE — 94640 AIRWAY INHALATION TREATMENT: CPT

## 2019-03-08 PROCEDURE — 99285 EMERGENCY DEPT VISIT HI MDM: CPT

## 2019-03-08 PROCEDURE — 71046 X-RAY EXAM CHEST 2 VIEWS: CPT

## 2019-03-08 PROCEDURE — 83880 ASSAY OF NATRIURETIC PEPTIDE: CPT

## 2019-03-08 PROCEDURE — 93010 ELECTROCARDIOGRAM REPORT: CPT | Performed by: INTERNAL MEDICINE

## 2019-03-08 PROCEDURE — 6370000000 HC RX 637 (ALT 250 FOR IP): Performed by: NURSE PRACTITIONER

## 2019-03-08 PROCEDURE — 6360000002 HC RX W HCPCS: Performed by: NURSE PRACTITIONER

## 2019-03-08 PROCEDURE — 71275 CT ANGIOGRAPHY CHEST: CPT

## 2019-03-08 PROCEDURE — 6360000004 HC RX CONTRAST MEDICATION: Performed by: EMERGENCY MEDICINE

## 2019-03-08 PROCEDURE — 2140000000 HC CCU INTERMEDIATE R&B

## 2019-03-08 PROCEDURE — 93005 ELECTROCARDIOGRAM TRACING: CPT | Performed by: EMERGENCY MEDICINE

## 2019-03-08 PROCEDURE — 2500000003 HC RX 250 WO HCPCS: Performed by: NURSE PRACTITIONER

## 2019-03-08 PROCEDURE — 2500000003 HC RX 250 WO HCPCS: Performed by: INTERNAL MEDICINE

## 2019-03-08 PROCEDURE — 80053 COMPREHEN METABOLIC PANEL: CPT

## 2019-03-08 PROCEDURE — 87040 BLOOD CULTURE FOR BACTERIA: CPT

## 2019-03-08 PROCEDURE — 6360000002 HC RX W HCPCS: Performed by: EMERGENCY MEDICINE

## 2019-03-08 PROCEDURE — 82962 GLUCOSE BLOOD TEST: CPT

## 2019-03-08 PROCEDURE — 36415 COLL VENOUS BLD VENIPUNCTURE: CPT

## 2019-03-08 PROCEDURE — 85025 COMPLETE CBC W/AUTO DIFF WBC: CPT

## 2019-03-08 RX ORDER — DOXAZOSIN MESYLATE 4 MG/1
4 TABLET ORAL DAILY
Status: DISCONTINUED | OUTPATIENT
Start: 2019-03-08 | End: 2019-03-09

## 2019-03-08 RX ORDER — SODIUM CHLORIDE 0.9 % (FLUSH) 0.9 %
10 SYRINGE (ML) INJECTION EVERY 12 HOURS SCHEDULED
Status: DISCONTINUED | OUTPATIENT
Start: 2019-03-08 | End: 2019-03-25 | Stop reason: HOSPADM

## 2019-03-08 RX ORDER — FLUTICASONE PROPIONATE 50 MCG
2 SPRAY, SUSPENSION (ML) NASAL DAILY
Status: DISCONTINUED | OUTPATIENT
Start: 2019-03-08 | End: 2019-03-25 | Stop reason: HOSPADM

## 2019-03-08 RX ORDER — SODIUM CHLORIDE 0.9 % (FLUSH) 0.9 %
10 SYRINGE (ML) INJECTION PRN
Status: DISCONTINUED | OUTPATIENT
Start: 2019-03-08 | End: 2019-03-25 | Stop reason: HOSPADM

## 2019-03-08 RX ORDER — NITROGLYCERIN 0.4 MG/1
0.4 TABLET SUBLINGUAL EVERY 5 MIN PRN
Status: DISCONTINUED | OUTPATIENT
Start: 2019-03-08 | End: 2019-03-25 | Stop reason: HOSPADM

## 2019-03-08 RX ORDER — SODIUM CHLORIDE 0.9 % (FLUSH) 0.9 %
10 SYRINGE (ML) INJECTION
Status: COMPLETED | OUTPATIENT
Start: 2019-03-08 | End: 2019-03-08

## 2019-03-08 RX ORDER — ALBUTEROL SULFATE 90 UG/1
2 AEROSOL, METERED RESPIRATORY (INHALATION) EVERY 6 HOURS PRN
Status: DISCONTINUED | OUTPATIENT
Start: 2019-03-08 | End: 2019-03-25 | Stop reason: HOSPADM

## 2019-03-08 RX ORDER — VANCOMYCIN HYDROCHLORIDE 1 G/200ML
1000 INJECTION, SOLUTION INTRAVENOUS ONCE
Status: COMPLETED | OUTPATIENT
Start: 2019-03-08 | End: 2019-03-08

## 2019-03-08 RX ORDER — OMEPRAZOLE 20 MG/1
20 CAPSULE, DELAYED RELEASE ORAL DAILY
COMMUNITY
End: 2019-03-08 | Stop reason: CLARIF

## 2019-03-08 RX ORDER — ONDANSETRON 2 MG/ML
4 INJECTION INTRAMUSCULAR; INTRAVENOUS EVERY 6 HOURS PRN
Status: DISCONTINUED | OUTPATIENT
Start: 2019-03-08 | End: 2019-03-25 | Stop reason: HOSPADM

## 2019-03-08 RX ORDER — DIVALPROEX SODIUM 500 MG/1
500 TABLET, DELAYED RELEASE ORAL 2 TIMES DAILY
COMMUNITY
End: 2019-03-08 | Stop reason: CLARIF

## 2019-03-08 RX ORDER — LISINOPRIL 20 MG/1
20 TABLET ORAL DAILY
COMMUNITY
End: 2019-03-08 | Stop reason: CLARIF

## 2019-03-08 RX ORDER — CLOPIDOGREL BISULFATE 75 MG/1
75 TABLET ORAL DAILY
Status: DISCONTINUED | OUTPATIENT
Start: 2019-03-08 | End: 2019-03-12

## 2019-03-08 RX ORDER — CARVEDILOL 12.5 MG/1
12.5 TABLET ORAL 2 TIMES DAILY WITH MEALS
Status: DISCONTINUED | OUTPATIENT
Start: 2019-03-08 | End: 2019-03-22

## 2019-03-08 RX ORDER — IPRATROPIUM BROMIDE AND ALBUTEROL SULFATE 2.5; .5 MG/3ML; MG/3ML
1 SOLUTION RESPIRATORY (INHALATION)
Status: DISCONTINUED | OUTPATIENT
Start: 2019-03-08 | End: 2019-03-11

## 2019-03-08 RX ORDER — SPIRONOLACTONE 25 MG/1
25 TABLET ORAL DAILY
Status: DISCONTINUED | OUTPATIENT
Start: 2019-03-08 | End: 2019-03-10

## 2019-03-08 RX ORDER — DILTIAZEM HYDROCHLORIDE 5 MG/ML
10 INJECTION INTRAVENOUS ONCE
Status: DISCONTINUED | OUTPATIENT
Start: 2019-03-08 | End: 2019-03-25

## 2019-03-08 RX ORDER — LEVETIRACETAM 500 MG/1
1000 TABLET ORAL 2 TIMES DAILY
COMMUNITY
End: 2019-03-08 | Stop reason: CLARIF

## 2019-03-08 RX ORDER — MIRTAZAPINE 30 MG/1
30 TABLET, FILM COATED ORAL NIGHTLY
COMMUNITY
End: 2019-03-08 | Stop reason: CLARIF

## 2019-03-08 RX ORDER — DEXTROSE MONOHYDRATE 25 G/50ML
12.5 INJECTION, SOLUTION INTRAVENOUS PRN
Status: DISCONTINUED | OUTPATIENT
Start: 2019-03-08 | End: 2019-03-25 | Stop reason: HOSPADM

## 2019-03-08 RX ORDER — ESCITALOPRAM OXALATE 10 MG/1
20 TABLET ORAL DAILY
Status: DISCONTINUED | OUTPATIENT
Start: 2019-03-08 | End: 2019-03-25 | Stop reason: HOSPADM

## 2019-03-08 RX ORDER — THIAMINE MONONITRATE (VIT B1) 100 MG
100 TABLET ORAL DAILY
COMMUNITY
End: 2019-03-08 | Stop reason: CLARIF

## 2019-03-08 RX ORDER — SODIUM CHLORIDE 9 MG/ML
INJECTION, SOLUTION INTRAVENOUS CONTINUOUS
Status: CANCELLED | OUTPATIENT
Start: 2019-03-08

## 2019-03-08 RX ORDER — DEXTROSE MONOHYDRATE 50 MG/ML
100 INJECTION, SOLUTION INTRAVENOUS PRN
Status: DISCONTINUED | OUTPATIENT
Start: 2019-03-08 | End: 2019-03-25 | Stop reason: HOSPADM

## 2019-03-08 RX ORDER — CLONIDINE HYDROCHLORIDE 0.1 MG/1
0.1 TABLET ORAL 3 TIMES DAILY
Status: DISCONTINUED | OUTPATIENT
Start: 2019-03-08 | End: 2019-03-09

## 2019-03-08 RX ORDER — FUROSEMIDE 10 MG/ML
40 INJECTION INTRAMUSCULAR; INTRAVENOUS 2 TIMES DAILY
Status: DISCONTINUED | OUTPATIENT
Start: 2019-03-08 | End: 2019-03-10

## 2019-03-08 RX ORDER — NICOTINE POLACRILEX 4 MG
15 LOZENGE BUCCAL PRN
Status: DISCONTINUED | OUTPATIENT
Start: 2019-03-08 | End: 2019-03-25 | Stop reason: HOSPADM

## 2019-03-08 RX ADMIN — FAMOTIDINE 20 MG: 10 INJECTION, SOLUTION INTRAVENOUS at 22:00

## 2019-03-08 RX ADMIN — CEFEPIME 2 G: 2 INJECTION, POWDER, FOR SOLUTION INTRAVENOUS at 23:22

## 2019-03-08 RX ADMIN — IPRATROPIUM BROMIDE AND ALBUTEROL SULFATE 1 AMPULE: .5; 3 SOLUTION RESPIRATORY (INHALATION) at 23:54

## 2019-03-08 RX ADMIN — IOPAMIDOL 90 ML: 755 INJECTION, SOLUTION INTRAVENOUS at 16:36

## 2019-03-08 RX ADMIN — Medication 10 ML: at 16:36

## 2019-03-08 RX ADMIN — LINAGLIPTIN 5 MG: 5 TABLET, FILM COATED ORAL at 22:04

## 2019-03-08 RX ADMIN — ENOXAPARIN SODIUM 40 MG: 40 INJECTION SUBCUTANEOUS at 22:08

## 2019-03-08 RX ADMIN — SODIUM CHLORIDE, PRESERVATIVE FREE 10 ML: 5 INJECTION INTRAVENOUS at 22:00

## 2019-03-08 RX ADMIN — AZITHROMYCIN MONOHYDRATE 500 MG: 500 INJECTION, POWDER, LYOPHILIZED, FOR SOLUTION INTRAVENOUS at 21:59

## 2019-03-08 RX ADMIN — FLUTICASONE PROPIONATE 2 SPRAY: 50 SPRAY, METERED NASAL at 22:15

## 2019-03-08 RX ADMIN — VANCOMYCIN HYDROCHLORIDE 1000 MG: 1 INJECTION, SOLUTION INTRAVENOUS at 17:48

## 2019-03-08 RX ADMIN — CLONIDINE HYDROCHLORIDE 0.1 MG: 0.1 TABLET ORAL at 22:08

## 2019-03-08 RX ADMIN — FUROSEMIDE 40 MG: 10 INJECTION, SOLUTION INTRAMUSCULAR; INTRAVENOUS at 22:00

## 2019-03-08 RX ADMIN — CARVEDILOL 12.5 MG: 12.5 TABLET, FILM COATED ORAL at 22:04

## 2019-03-08 RX ADMIN — DILTIAZEM HYDROCHLORIDE 5 MG/ML: 5 INJECTION INTRAVENOUS at 19:55

## 2019-03-08 ASSESSMENT — PAIN SCALES - GENERAL: PAINLEVEL_OUTOF10: 0

## 2019-03-09 LAB
ADENOVIRUS DETECTION BY PCR: NOT DETECTED
ALBUMIN SERPL-MCNC: 2.6 GM/DL (ref 3.4–5)
ALP BLD-CCNC: 83 IU/L (ref 40–128)
ALT SERPL-CCNC: <5 U/L (ref 10–40)
ANION GAP SERPL CALCULATED.3IONS-SCNC: 11 MMOL/L (ref 4–16)
AST SERPL-CCNC: 9 IU/L (ref 15–37)
BASOPHILS ABSOLUTE: 0 K/CU MM
BASOPHILS RELATIVE PERCENT: 0.4 % (ref 0–1)
BILIRUB SERPL-MCNC: 0.4 MG/DL (ref 0–1)
BORDETELLA PERTUSSIS PCR: NOT DETECTED
BUN BLDV-MCNC: 8 MG/DL (ref 6–23)
CALCIUM SERPL-MCNC: 8 MG/DL (ref 8.3–10.6)
CHLAMYDOPHILA PNEUMONIA PCR: NOT DETECTED
CHLORIDE BLD-SCNC: 99 MMOL/L (ref 99–110)
CO2: 24 MMOL/L (ref 21–32)
CORONAVIRUS 229E PCR: NOT DETECTED
CORONAVIRUS HKU1 PCR: NOT DETECTED
CORONAVIRUS NL63 PCR: NOT DETECTED
CORONAVIRUS OC43 PCR: NOT DETECTED
CREAT SERPL-MCNC: 1.5 MG/DL (ref 0.9–1.3)
DIFFERENTIAL TYPE: ABNORMAL
EOSINOPHILS ABSOLUTE: 0 K/CU MM
EOSINOPHILS RELATIVE PERCENT: 0.3 % (ref 0–3)
GFR AFRICAN AMERICAN: 56 ML/MIN/1.73M2
GFR NON-AFRICAN AMERICAN: 46 ML/MIN/1.73M2
GLUCOSE BLD-MCNC: 100 MG/DL (ref 70–99)
GLUCOSE BLD-MCNC: 137 MG/DL (ref 70–99)
GLUCOSE BLD-MCNC: 142 MG/DL (ref 70–99)
GLUCOSE BLD-MCNC: 173 MG/DL (ref 70–99)
HCT VFR BLD CALC: 27 % (ref 42–52)
HEMOGLOBIN: 7.5 GM/DL (ref 13.5–18)
HUMAN METAPNEUMOVIRUS PCR: NOT DETECTED
IMMATURE NEUTROPHIL %: 0.6 % (ref 0–0.43)
INFLUENZA A BY PCR: NOT DETECTED
INFLUENZA A H1 (2009) PCR: NOT DETECTED
INFLUENZA A H1 PANDEMIC PCR: NOT DETECTED
INFLUENZA A H3 PCR: NOT DETECTED
INFLUENZA B BY PCR: NOT DETECTED
LACTATE: 0.8 MMOL/L (ref 0.4–2)
LYMPHOCYTES ABSOLUTE: 1.7 K/CU MM
LYMPHOCYTES RELATIVE PERCENT: 23 % (ref 24–44)
MCH RBC QN AUTO: 20.9 PG (ref 27–31)
MCHC RBC AUTO-ENTMCNC: 27.8 % (ref 32–36)
MCV RBC AUTO: 75.4 FL (ref 78–100)
MONOCYTES ABSOLUTE: 0.6 K/CU MM
MONOCYTES RELATIVE PERCENT: 8 % (ref 0–4)
MYCOPLASMA PNEUMONIAE PCR: NOT DETECTED
NUCLEATED RBC %: 0 %
PARAINFLUENZA 1 PCR: NOT DETECTED
PARAINFLUENZA 2 PCR: NOT DETECTED
PARAINFLUENZA 3 PCR: NOT DETECTED
PARAINFLUENZA 4 PCR: NOT DETECTED
PDW BLD-RTO: 22.5 % (ref 11.7–14.9)
PLATELET # BLD: 353 K/CU MM (ref 140–440)
PMV BLD AUTO: 8.3 FL (ref 7.5–11.1)
POTASSIUM SERPL-SCNC: 4 MMOL/L (ref 3.5–5.1)
RBC # BLD: 3.58 M/CU MM (ref 4.6–6.2)
RHINOVIRUS ENTEROVIRUS PCR: NOT DETECTED
RSV PCR: NOT DETECTED
SEGMENTED NEUTROPHILS ABSOLUTE COUNT: 4.9 K/CU MM
SEGMENTED NEUTROPHILS RELATIVE PERCENT: 67.7 % (ref 36–66)
SODIUM BLD-SCNC: 134 MMOL/L (ref 135–145)
TOTAL IMMATURE NEUTOROPHIL: 0.04 K/CU MM
TOTAL NUCLEATED RBC: 0 K/CU MM
TOTAL PROTEIN: 7 GM/DL (ref 6.4–8.2)
TROPONIN T: 0.04 NG/ML
TROPONIN T: 0.04 NG/ML
WBC # BLD: 7.3 K/CU MM (ref 4–10.5)

## 2019-03-09 PROCEDURE — 6370000000 HC RX 637 (ALT 250 FOR IP): Performed by: HOSPITALIST

## 2019-03-09 PROCEDURE — 36415 COLL VENOUS BLD VENIPUNCTURE: CPT

## 2019-03-09 PROCEDURE — 80048 BASIC METABOLIC PNL TOTAL CA: CPT

## 2019-03-09 PROCEDURE — 94761 N-INVAS EAR/PLS OXIMETRY MLT: CPT

## 2019-03-09 PROCEDURE — 80053 COMPREHEN METABOLIC PANEL: CPT

## 2019-03-09 PROCEDURE — 87205 SMEAR GRAM STAIN: CPT

## 2019-03-09 PROCEDURE — 6370000000 HC RX 637 (ALT 250 FOR IP): Performed by: NURSE PRACTITIONER

## 2019-03-09 PROCEDURE — 2580000003 HC RX 258: Performed by: INTERNAL MEDICINE

## 2019-03-09 PROCEDURE — 6360000002 HC RX W HCPCS: Performed by: INTERNAL MEDICINE

## 2019-03-09 PROCEDURE — 85025 COMPLETE CBC W/AUTO DIFF WBC: CPT

## 2019-03-09 PROCEDURE — 82962 GLUCOSE BLOOD TEST: CPT

## 2019-03-09 PROCEDURE — 2500000003 HC RX 250 WO HCPCS: Performed by: NURSE PRACTITIONER

## 2019-03-09 PROCEDURE — 2500000003 HC RX 250 WO HCPCS: Performed by: INTERNAL MEDICINE

## 2019-03-09 PROCEDURE — 83605 ASSAY OF LACTIC ACID: CPT

## 2019-03-09 PROCEDURE — 94660 CPAP INITIATION&MGMT: CPT

## 2019-03-09 PROCEDURE — 87798 DETECT AGENT NOS DNA AMP: CPT

## 2019-03-09 PROCEDURE — 2140000000 HC CCU INTERMEDIATE R&B

## 2019-03-09 PROCEDURE — 6370000000 HC RX 637 (ALT 250 FOR IP): Performed by: INTERNAL MEDICINE

## 2019-03-09 PROCEDURE — 2580000003 HC RX 258: Performed by: NURSE PRACTITIONER

## 2019-03-09 PROCEDURE — 87486 CHLMYD PNEUM DNA AMP PROBE: CPT

## 2019-03-09 PROCEDURE — 84484 ASSAY OF TROPONIN QUANT: CPT

## 2019-03-09 PROCEDURE — 87581 M.PNEUMON DNA AMP PROBE: CPT

## 2019-03-09 PROCEDURE — 94640 AIRWAY INHALATION TREATMENT: CPT

## 2019-03-09 PROCEDURE — 99254 IP/OBS CNSLTJ NEW/EST MOD 60: CPT | Performed by: INTERNAL MEDICINE

## 2019-03-09 PROCEDURE — 6360000002 HC RX W HCPCS: Performed by: NURSE PRACTITIONER

## 2019-03-09 RX ORDER — HYDROCODONE BITARTRATE AND ACETAMINOPHEN 5; 325 MG/1; MG/1
1 TABLET ORAL 2 TIMES DAILY PRN
Status: DISCONTINUED | OUTPATIENT
Start: 2019-03-09 | End: 2019-03-12

## 2019-03-09 RX ORDER — DILTIAZEM HYDROCHLORIDE 60 MG/1
60 TABLET, FILM COATED ORAL EVERY 6 HOURS SCHEDULED
Status: DISCONTINUED | OUTPATIENT
Start: 2019-03-09 | End: 2019-03-22

## 2019-03-09 RX ORDER — ALPRAZOLAM 0.5 MG/1
0.5 TABLET ORAL 2 TIMES DAILY PRN
Status: DISCONTINUED | OUTPATIENT
Start: 2019-03-09 | End: 2019-03-25 | Stop reason: HOSPADM

## 2019-03-09 RX ADMIN — IPRATROPIUM BROMIDE AND ALBUTEROL SULFATE 1 AMPULE: .5; 3 SOLUTION RESPIRATORY (INHALATION) at 15:28

## 2019-03-09 RX ADMIN — INSULIN LISPRO 2 UNITS: 100 INJECTION, SOLUTION INTRAVENOUS; SUBCUTANEOUS at 12:14

## 2019-03-09 RX ADMIN — HYDROCODONE BITARTRATE AND ACETAMINOPHEN 1 TABLET: 5; 325 TABLET ORAL at 02:16

## 2019-03-09 RX ADMIN — VANCOMYCIN HYDROCHLORIDE 1750 MG: 5 INJECTION, POWDER, LYOPHILIZED, FOR SOLUTION INTRAVENOUS at 18:15

## 2019-03-09 RX ADMIN — IPRATROPIUM BROMIDE AND ALBUTEROL SULFATE 1 AMPULE: .5; 3 SOLUTION RESPIRATORY (INHALATION) at 11:22

## 2019-03-09 RX ADMIN — IPRATROPIUM BROMIDE AND ALBUTEROL SULFATE 1 AMPULE: .5; 3 SOLUTION RESPIRATORY (INHALATION) at 19:49

## 2019-03-09 RX ADMIN — DILTIAZEM HYDROCHLORIDE 60 MG: 60 TABLET, FILM COATED ORAL at 18:14

## 2019-03-09 RX ADMIN — CLOPIDOGREL BISULFATE 75 MG: 75 TABLET ORAL at 09:27

## 2019-03-09 RX ADMIN — CARVEDILOL 12.5 MG: 12.5 TABLET, FILM COATED ORAL at 18:14

## 2019-03-09 RX ADMIN — ENOXAPARIN SODIUM 40 MG: 40 INJECTION SUBCUTANEOUS at 21:12

## 2019-03-09 RX ADMIN — SODIUM CHLORIDE, PRESERVATIVE FREE 10 ML: 5 INJECTION INTRAVENOUS at 09:25

## 2019-03-09 RX ADMIN — FLUTICASONE PROPIONATE 2 SPRAY: 50 SPRAY, METERED NASAL at 09:56

## 2019-03-09 RX ADMIN — ESCITALOPRAM OXALATE 20 MG: 10 TABLET, FILM COATED ORAL at 21:12

## 2019-03-09 RX ADMIN — CEFEPIME 2 G: 2 INJECTION, POWDER, FOR SOLUTION INTRAVENOUS at 09:56

## 2019-03-09 RX ADMIN — FAMOTIDINE 20 MG: 10 INJECTION, SOLUTION INTRAVENOUS at 21:11

## 2019-03-09 RX ADMIN — CEFEPIME 2 G: 2 INJECTION, POWDER, FOR SOLUTION INTRAVENOUS at 21:12

## 2019-03-09 RX ADMIN — FUROSEMIDE 40 MG: 10 INJECTION, SOLUTION INTRAMUSCULAR; INTRAVENOUS at 18:13

## 2019-03-09 RX ADMIN — AZITHROMYCIN MONOHYDRATE 500 MG: 500 INJECTION, POWDER, LYOPHILIZED, FOR SOLUTION INTRAVENOUS at 21:42

## 2019-03-09 RX ADMIN — ALPRAZOLAM 0.5 MG: 0.5 TABLET ORAL at 00:32

## 2019-03-09 RX ADMIN — SODIUM CHLORIDE, PRESERVATIVE FREE 10 ML: 5 INJECTION INTRAVENOUS at 21:12

## 2019-03-09 RX ADMIN — FAMOTIDINE 20 MG: 10 INJECTION, SOLUTION INTRAVENOUS at 09:26

## 2019-03-09 RX ADMIN — LINAGLIPTIN 5 MG: 5 TABLET, FILM COATED ORAL at 09:27

## 2019-03-09 ASSESSMENT — PAIN SCALES - GENERAL
PAINLEVEL_OUTOF10: 3
PAINLEVEL_OUTOF10: 2
PAINLEVEL_OUTOF10: 6
PAINLEVEL_OUTOF10: 0

## 2019-03-09 ASSESSMENT — PAIN DESCRIPTION - LOCATION
LOCATION: CHEST
LOCATION: GENERALIZED

## 2019-03-09 ASSESSMENT — PAIN DESCRIPTION - PAIN TYPE
TYPE: CHRONIC PAIN
TYPE: ACUTE PAIN

## 2019-03-09 ASSESSMENT — PAIN DESCRIPTION - FREQUENCY: FREQUENCY: INTERMITTENT

## 2019-03-09 ASSESSMENT — PAIN DESCRIPTION - ONSET: ONSET: ON-GOING

## 2019-03-09 ASSESSMENT — PAIN - FUNCTIONAL ASSESSMENT
PAIN_FUNCTIONAL_ASSESSMENT: ACTIVITIES ARE NOT PREVENTED
PAIN_FUNCTIONAL_ASSESSMENT: PREVENTS OR INTERFERES SOME ACTIVE ACTIVITIES AND ADLS

## 2019-03-09 ASSESSMENT — PAIN DESCRIPTION - DESCRIPTORS
DESCRIPTORS: HEAVINESS
DESCRIPTORS: ACHING

## 2019-03-09 ASSESSMENT — PAIN DESCRIPTION - ORIENTATION: ORIENTATION: MID

## 2019-03-10 ENCOUNTER — APPOINTMENT (OUTPATIENT)
Dept: GENERAL RADIOLOGY | Age: 69
DRG: 163 | End: 2019-03-10
Payer: MEDICARE

## 2019-03-10 LAB
ANION GAP SERPL CALCULATED.3IONS-SCNC: 12 MMOL/L (ref 4–16)
ANION GAP SERPL CALCULATED.3IONS-SCNC: 8 MMOL/L (ref 4–16)
ANISOCYTOSIS: ABNORMAL
BASOPHILS ABSOLUTE: 0 K/CU MM
BASOPHILS RELATIVE PERCENT: 0.3 % (ref 0–1)
BUN BLDV-MCNC: 8 MG/DL (ref 6–23)
BUN BLDV-MCNC: 9 MG/DL (ref 6–23)
CALCIUM SERPL-MCNC: 7.6 MG/DL (ref 8.3–10.6)
CALCIUM SERPL-MCNC: 8 MG/DL (ref 8.3–10.6)
CHLORIDE BLD-SCNC: 97 MMOL/L (ref 99–110)
CHLORIDE BLD-SCNC: 98 MMOL/L (ref 99–110)
CO2: 22 MMOL/L (ref 21–32)
CO2: 25 MMOL/L (ref 21–32)
CREAT SERPL-MCNC: 1.6 MG/DL (ref 0.9–1.3)
CREAT SERPL-MCNC: 1.6 MG/DL (ref 0.9–1.3)
DIFFERENTIAL TYPE: ABNORMAL
EOSINOPHILS ABSOLUTE: 0 K/CU MM
EOSINOPHILS RELATIVE PERCENT: 0.2 % (ref 0–3)
FERRITIN: 56 NG/ML (ref 30–400)
FOLATE: 3.3 NG/ML (ref 3.1–17.5)
GFR AFRICAN AMERICAN: 52 ML/MIN/1.73M2
GFR AFRICAN AMERICAN: 52 ML/MIN/1.73M2
GFR NON-AFRICAN AMERICAN: 43 ML/MIN/1.73M2
GFR NON-AFRICAN AMERICAN: 43 ML/MIN/1.73M2
GLUCOSE BLD-MCNC: 104 MG/DL (ref 70–99)
GLUCOSE BLD-MCNC: 117 MG/DL (ref 70–99)
GLUCOSE BLD-MCNC: 126 MG/DL (ref 70–99)
GLUCOSE BLD-MCNC: 142 MG/DL (ref 70–99)
GLUCOSE BLD-MCNC: 151 MG/DL (ref 70–99)
GLUCOSE BLD-MCNC: 99 MG/DL (ref 70–99)
HCT VFR BLD CALC: 27.3 % (ref 42–52)
HEMOGLOBIN: 7.8 GM/DL (ref 13.5–18)
IMMATURE NEUTROPHIL %: 0.5 % (ref 0–0.43)
IRON: 21 UG/DL (ref 59–158)
LYMPHOCYTES ABSOLUTE: 1.9 K/CU MM
LYMPHOCYTES RELATIVE PERCENT: 21.8 % (ref 24–44)
MAGNESIUM: 2 MG/DL (ref 1.8–2.4)
MCH RBC QN AUTO: 20.9 PG (ref 27–31)
MCHC RBC AUTO-ENTMCNC: 28.6 % (ref 32–36)
MCV RBC AUTO: 73 FL (ref 78–100)
MICROCYTES: ABNORMAL
MONOCYTES ABSOLUTE: 0.6 K/CU MM
MONOCYTES RELATIVE PERCENT: 7.3 % (ref 0–4)
NUCLEATED RBC %: 0 %
OVALOCYTES: ABNORMAL
PCT TRANSFERRIN: 10 % (ref 10–44)
PDW BLD-RTO: 22.1 % (ref 11.7–14.9)
PHOSPHORUS: 3.8 MG/DL (ref 2.5–4.9)
PLATELET # BLD: 373 K/CU MM (ref 140–440)
PMV BLD AUTO: 8.3 FL (ref 7.5–11.1)
POLYCHROMASIA: ABNORMAL
POTASSIUM SERPL-SCNC: 3.7 MMOL/L (ref 3.5–5.1)
POTASSIUM SERPL-SCNC: 3.8 MMOL/L (ref 3.5–5.1)
PRO-BNP: 1464 PG/ML
PROCALCITONIN: 0.1
RBC # BLD: 3.74 M/CU MM (ref 4.6–6.2)
RBC # BLD: SLIGHT 10*6/UL
SEGMENTED NEUTROPHILS ABSOLUTE COUNT: 6.1 K/CU MM
SEGMENTED NEUTROPHILS ABSOLUTE COUNT: ABNORMAL
SEGMENTED NEUTROPHILS RELATIVE PERCENT: 69.9 % (ref 36–66)
SODIUM BLD-SCNC: 131 MMOL/L (ref 135–145)
SODIUM BLD-SCNC: 131 MMOL/L (ref 135–145)
TOTAL IMMATURE NEUTOROPHIL: 0.04 K/CU MM
TOTAL IRON BINDING CAPACITY: 201 UG/DL (ref 250–450)
TOTAL NUCLEATED RBC: 0 K/CU MM
UNSATURATED IRON BINDING CAPACITY: 180 UG/DL (ref 110–370)
VITAMIN B-12: 389.3 PG/ML (ref 211–911)
WBC # BLD: 8.6 K/CU MM (ref 4–10.5)

## 2019-03-10 PROCEDURE — 6360000002 HC RX W HCPCS: Performed by: INTERNAL MEDICINE

## 2019-03-10 PROCEDURE — 6370000000 HC RX 637 (ALT 250 FOR IP): Performed by: NURSE PRACTITIONER

## 2019-03-10 PROCEDURE — 80048 BASIC METABOLIC PNL TOTAL CA: CPT

## 2019-03-10 PROCEDURE — 6370000000 HC RX 637 (ALT 250 FOR IP): Performed by: INTERNAL MEDICINE

## 2019-03-10 PROCEDURE — 83540 ASSAY OF IRON: CPT

## 2019-03-10 PROCEDURE — 6360000002 HC RX W HCPCS: Performed by: NURSE PRACTITIONER

## 2019-03-10 PROCEDURE — 2580000003 HC RX 258: Performed by: NURSE PRACTITIONER

## 2019-03-10 PROCEDURE — 83550 IRON BINDING TEST: CPT

## 2019-03-10 PROCEDURE — 82962 GLUCOSE BLOOD TEST: CPT

## 2019-03-10 PROCEDURE — 83880 ASSAY OF NATRIURETIC PEPTIDE: CPT

## 2019-03-10 PROCEDURE — 84100 ASSAY OF PHOSPHORUS: CPT

## 2019-03-10 PROCEDURE — 82746 ASSAY OF FOLIC ACID SERUM: CPT

## 2019-03-10 PROCEDURE — 87899 AGENT NOS ASSAY W/OPTIC: CPT

## 2019-03-10 PROCEDURE — 2500000003 HC RX 250 WO HCPCS: Performed by: NURSE PRACTITIONER

## 2019-03-10 PROCEDURE — 71045 X-RAY EXAM CHEST 1 VIEW: CPT

## 2019-03-10 PROCEDURE — 2500000003 HC RX 250 WO HCPCS: Performed by: INTERNAL MEDICINE

## 2019-03-10 PROCEDURE — 87102 FUNGUS ISOLATION CULTURE: CPT

## 2019-03-10 PROCEDURE — 94640 AIRWAY INHALATION TREATMENT: CPT

## 2019-03-10 PROCEDURE — 36415 COLL VENOUS BLD VENIPUNCTURE: CPT

## 2019-03-10 PROCEDURE — 94660 CPAP INITIATION&MGMT: CPT

## 2019-03-10 PROCEDURE — 99232 SBSQ HOSP IP/OBS MODERATE 35: CPT | Performed by: INTERNAL MEDICINE

## 2019-03-10 PROCEDURE — 2580000003 HC RX 258: Performed by: INTERNAL MEDICINE

## 2019-03-10 PROCEDURE — 82728 ASSAY OF FERRITIN: CPT

## 2019-03-10 PROCEDURE — 87449 NOS EACH ORGANISM AG IA: CPT

## 2019-03-10 PROCEDURE — 82607 VITAMIN B-12: CPT

## 2019-03-10 PROCEDURE — 2140000000 HC CCU INTERMEDIATE R&B

## 2019-03-10 PROCEDURE — 83735 ASSAY OF MAGNESIUM: CPT

## 2019-03-10 PROCEDURE — 84145 PROCALCITONIN (PCT): CPT

## 2019-03-10 PROCEDURE — 94761 N-INVAS EAR/PLS OXIMETRY MLT: CPT

## 2019-03-10 RX ADMIN — DILTIAZEM HYDROCHLORIDE 60 MG: 60 TABLET, FILM COATED ORAL at 13:53

## 2019-03-10 RX ADMIN — SODIUM CHLORIDE, PRESERVATIVE FREE 10 ML: 5 INJECTION INTRAVENOUS at 08:13

## 2019-03-10 RX ADMIN — AZITHROMYCIN MONOHYDRATE 500 MG: 500 INJECTION, POWDER, LYOPHILIZED, FOR SOLUTION INTRAVENOUS at 21:05

## 2019-03-10 RX ADMIN — INSULIN LISPRO 2 UNITS: 100 INJECTION, SOLUTION INTRAVENOUS; SUBCUTANEOUS at 11:30

## 2019-03-10 RX ADMIN — INSULIN LISPRO 1 UNITS: 100 INJECTION, SOLUTION INTRAVENOUS; SUBCUTANEOUS at 21:05

## 2019-03-10 RX ADMIN — CEFEPIME 2 G: 2 INJECTION, POWDER, FOR SOLUTION INTRAVENOUS at 08:10

## 2019-03-10 RX ADMIN — CEFEPIME 2 G: 2 INJECTION, POWDER, FOR SOLUTION INTRAVENOUS at 21:05

## 2019-03-10 RX ADMIN — ESCITALOPRAM OXALATE 20 MG: 10 TABLET, FILM COATED ORAL at 08:11

## 2019-03-10 RX ADMIN — DILTIAZEM HYDROCHLORIDE 60 MG: 60 TABLET, FILM COATED ORAL at 01:00

## 2019-03-10 RX ADMIN — VANCOMYCIN HYDROCHLORIDE 1750 MG: 5 INJECTION, POWDER, LYOPHILIZED, FOR SOLUTION INTRAVENOUS at 18:07

## 2019-03-10 RX ADMIN — FLUTICASONE PROPIONATE 2 SPRAY: 50 SPRAY, METERED NASAL at 08:20

## 2019-03-10 RX ADMIN — IPRATROPIUM BROMIDE AND ALBUTEROL SULFATE 1 AMPULE: .5; 3 SOLUTION RESPIRATORY (INHALATION) at 12:00

## 2019-03-10 RX ADMIN — IPRATROPIUM BROMIDE AND ALBUTEROL SULFATE 1 AMPULE: .5; 3 SOLUTION RESPIRATORY (INHALATION) at 15:00

## 2019-03-10 RX ADMIN — ENOXAPARIN SODIUM 40 MG: 40 INJECTION SUBCUTANEOUS at 21:05

## 2019-03-10 RX ADMIN — LINAGLIPTIN 5 MG: 5 TABLET, FILM COATED ORAL at 08:11

## 2019-03-10 RX ADMIN — DILTIAZEM HYDROCHLORIDE 60 MG: 60 TABLET, FILM COATED ORAL at 18:06

## 2019-03-10 RX ADMIN — CARVEDILOL 12.5 MG: 12.5 TABLET, FILM COATED ORAL at 18:06

## 2019-03-10 RX ADMIN — SODIUM CHLORIDE, PRESERVATIVE FREE 10 ML: 5 INJECTION INTRAVENOUS at 21:05

## 2019-03-10 RX ADMIN — FAMOTIDINE 20 MG: 10 INJECTION, SOLUTION INTRAVENOUS at 21:05

## 2019-03-10 RX ADMIN — CLOPIDOGREL BISULFATE 75 MG: 75 TABLET ORAL at 08:11

## 2019-03-10 RX ADMIN — DILTIAZEM HYDROCHLORIDE 7.5 MG/HR: 5 INJECTION INTRAVENOUS at 06:02

## 2019-03-10 RX ADMIN — FAMOTIDINE 20 MG: 10 INJECTION, SOLUTION INTRAVENOUS at 08:10

## 2019-03-10 RX ADMIN — DILTIAZEM HYDROCHLORIDE 60 MG: 60 TABLET, FILM COATED ORAL at 06:01

## 2019-03-10 RX ADMIN — CARVEDILOL 12.5 MG: 12.5 TABLET, FILM COATED ORAL at 08:11

## 2019-03-10 RX ADMIN — IPRATROPIUM BROMIDE AND ALBUTEROL SULFATE 1 AMPULE: .5; 3 SOLUTION RESPIRATORY (INHALATION) at 07:22

## 2019-03-10 ASSESSMENT — PAIN SCALES - GENERAL
PAINLEVEL_OUTOF10: 0

## 2019-03-11 ENCOUNTER — APPOINTMENT (OUTPATIENT)
Dept: INTERVENTIONAL RADIOLOGY/VASCULAR | Age: 69
DRG: 163 | End: 2019-03-11
Payer: MEDICARE

## 2019-03-11 LAB
ANION GAP SERPL CALCULATED.3IONS-SCNC: 9 MMOL/L (ref 4–16)
APTT: 54.7 SECONDS (ref 21.2–33)
BASOPHILS ABSOLUTE: 0 K/CU MM
BASOPHILS RELATIVE PERCENT: 0.4 % (ref 0–1)
BUN BLDV-MCNC: 7 MG/DL (ref 6–23)
CALCIUM SERPL-MCNC: 8 MG/DL (ref 8.3–10.6)
CHLORIDE BLD-SCNC: 96 MMOL/L (ref 99–110)
CO2: 25 MMOL/L (ref 21–32)
CREAT SERPL-MCNC: 1.4 MG/DL (ref 0.9–1.3)
DIFFERENTIAL TYPE: ABNORMAL
DOSE AMOUNT: NORMAL
DOSE TIME: NORMAL
EOSINOPHILS ABSOLUTE: 0 K/CU MM
EOSINOPHILS RELATIVE PERCENT: 0.3 % (ref 0–3)
GFR AFRICAN AMERICAN: >60 ML/MIN/1.73M2
GFR NON-AFRICAN AMERICAN: 50 ML/MIN/1.73M2
GLUCOSE BLD-MCNC: 102 MG/DL (ref 70–99)
GLUCOSE BLD-MCNC: 114 MG/DL (ref 70–99)
GLUCOSE BLD-MCNC: 116 MG/DL (ref 70–99)
GLUCOSE BLD-MCNC: 128 MG/DL (ref 70–99)
GLUCOSE BLD-MCNC: 88 MG/DL (ref 70–99)
HCT VFR BLD CALC: 28 % (ref 42–52)
HEMOGLOBIN: 8.1 GM/DL (ref 13.5–18)
IMMATURE NEUTROPHIL %: 0.8 % (ref 0–0.43)
INR BLD: 1.18 INDEX
LV EF: 50 %
LVEF MODALITY: NORMAL
LYMPHOCYTES ABSOLUTE: 1.7 K/CU MM
LYMPHOCYTES RELATIVE PERCENT: 22.7 % (ref 24–44)
MCH RBC QN AUTO: 20.8 PG (ref 27–31)
MCHC RBC AUTO-ENTMCNC: 28.9 % (ref 32–36)
MCV RBC AUTO: 72 FL (ref 78–100)
MONOCYTES ABSOLUTE: 0.6 K/CU MM
MONOCYTES RELATIVE PERCENT: 7.3 % (ref 0–4)
NUCLEATED RBC %: 0 %
PDW BLD-RTO: 21.8 % (ref 11.7–14.9)
PLATELET # BLD: 350 K/CU MM (ref 140–440)
PMV BLD AUTO: 8.3 FL (ref 7.5–11.1)
POTASSIUM SERPL-SCNC: 3.9 MMOL/L (ref 3.5–5.1)
PROTHROMBIN TIME: 13.7 SECONDS (ref 9.12–12.5)
RBC # BLD: 3.89 M/CU MM (ref 4.6–6.2)
SEGMENTED NEUTROPHILS ABSOLUTE COUNT: 5.2 K/CU MM
SEGMENTED NEUTROPHILS RELATIVE PERCENT: 68.5 % (ref 36–66)
SODIUM BLD-SCNC: 130 MMOL/L (ref 135–145)
TOTAL IMMATURE NEUTOROPHIL: 0.06 K/CU MM
TOTAL NUCLEATED RBC: 0 K/CU MM
VANCOMYCIN TROUGH: 15.4 UG/ML (ref 10–20)
WBC # BLD: 7.5 K/CU MM (ref 4–10.5)

## 2019-03-11 PROCEDURE — BB4BZZZ ULTRASONOGRAPHY OF PLEURA: ICD-10-PCS | Performed by: RADIOLOGY

## 2019-03-11 PROCEDURE — 76604 US EXAM CHEST: CPT

## 2019-03-11 PROCEDURE — 85610 PROTHROMBIN TIME: CPT

## 2019-03-11 PROCEDURE — 80048 BASIC METABOLIC PNL TOTAL CA: CPT

## 2019-03-11 PROCEDURE — 2140000000 HC CCU INTERMEDIATE R&B

## 2019-03-11 PROCEDURE — 93306 TTE W/DOPPLER COMPLETE: CPT

## 2019-03-11 PROCEDURE — 97162 PT EVAL MOD COMPLEX 30 MIN: CPT

## 2019-03-11 PROCEDURE — 97116 GAIT TRAINING THERAPY: CPT

## 2019-03-11 PROCEDURE — 97535 SELF CARE MNGMENT TRAINING: CPT

## 2019-03-11 PROCEDURE — 80202 ASSAY OF VANCOMYCIN: CPT

## 2019-03-11 PROCEDURE — 94660 CPAP INITIATION&MGMT: CPT

## 2019-03-11 PROCEDURE — 2500000003 HC RX 250 WO HCPCS: Performed by: NURSE PRACTITIONER

## 2019-03-11 PROCEDURE — 2709999900 HC NON-CHARGEABLE SUPPLY

## 2019-03-11 PROCEDURE — 6360000002 HC RX W HCPCS: Performed by: NURSE PRACTITIONER

## 2019-03-11 PROCEDURE — 6360000002 HC RX W HCPCS: Performed by: INTERNAL MEDICINE

## 2019-03-11 PROCEDURE — 2580000003 HC RX 258: Performed by: NURSE PRACTITIONER

## 2019-03-11 PROCEDURE — 85025 COMPLETE CBC W/AUTO DIFF WBC: CPT

## 2019-03-11 PROCEDURE — 6370000000 HC RX 637 (ALT 250 FOR IP): Performed by: INTERNAL MEDICINE

## 2019-03-11 PROCEDURE — 85730 THROMBOPLASTIN TIME PARTIAL: CPT

## 2019-03-11 PROCEDURE — 82962 GLUCOSE BLOOD TEST: CPT

## 2019-03-11 PROCEDURE — 97166 OT EVAL MOD COMPLEX 45 MIN: CPT

## 2019-03-11 PROCEDURE — 2580000003 HC RX 258: Performed by: INTERNAL MEDICINE

## 2019-03-11 PROCEDURE — 36415 COLL VENOUS BLD VENIPUNCTURE: CPT

## 2019-03-11 PROCEDURE — 6370000000 HC RX 637 (ALT 250 FOR IP): Performed by: NURSE PRACTITIONER

## 2019-03-11 PROCEDURE — C1729 CATH, DRAINAGE: HCPCS

## 2019-03-11 PROCEDURE — 99232 SBSQ HOSP IP/OBS MODERATE 35: CPT | Performed by: INTERNAL MEDICINE

## 2019-03-11 RX ORDER — FOLIC ACID 1 MG/1
1 TABLET ORAL DAILY
Status: DISCONTINUED | OUTPATIENT
Start: 2019-03-11 | End: 2019-03-25 | Stop reason: HOSPADM

## 2019-03-11 RX ORDER — CYANOCOBALAMIN 1000 UG/ML
1000 INJECTION INTRAMUSCULAR; SUBCUTANEOUS ONCE
Status: COMPLETED | OUTPATIENT
Start: 2019-03-11 | End: 2019-03-11

## 2019-03-11 RX ORDER — IPRATROPIUM BROMIDE AND ALBUTEROL SULFATE 2.5; .5 MG/3ML; MG/3ML
1 SOLUTION RESPIRATORY (INHALATION)
Status: DISCONTINUED | OUTPATIENT
Start: 2019-03-11 | End: 2019-03-25 | Stop reason: HOSPADM

## 2019-03-11 RX ORDER — IPRATROPIUM BROMIDE AND ALBUTEROL SULFATE 2.5; .5 MG/3ML; MG/3ML
1 SOLUTION RESPIRATORY (INHALATION) EVERY 4 HOURS PRN
Status: DISCONTINUED | OUTPATIENT
Start: 2019-03-11 | End: 2019-03-11

## 2019-03-11 RX ADMIN — SODIUM CHLORIDE, PRESERVATIVE FREE 10 ML: 5 INJECTION INTRAVENOUS at 09:17

## 2019-03-11 RX ADMIN — SODIUM CHLORIDE, PRESERVATIVE FREE 10 ML: 5 INJECTION INTRAVENOUS at 21:19

## 2019-03-11 RX ADMIN — VANCOMYCIN HYDROCHLORIDE 1750 MG: 5 INJECTION, POWDER, LYOPHILIZED, FOR SOLUTION INTRAVENOUS at 16:37

## 2019-03-11 RX ADMIN — DILTIAZEM HYDROCHLORIDE 60 MG: 60 TABLET, FILM COATED ORAL at 16:36

## 2019-03-11 RX ADMIN — ESCITALOPRAM OXALATE 20 MG: 10 TABLET, FILM COATED ORAL at 09:17

## 2019-03-11 RX ADMIN — FAMOTIDINE 20 MG: 10 INJECTION, SOLUTION INTRAVENOUS at 21:19

## 2019-03-11 RX ADMIN — CEFEPIME 2 G: 2 INJECTION, POWDER, FOR SOLUTION INTRAVENOUS at 21:19

## 2019-03-11 RX ADMIN — CYANOCOBALAMIN 1000 MCG: 1000 INJECTION, SOLUTION INTRAMUSCULAR at 16:44

## 2019-03-11 RX ADMIN — FAMOTIDINE 20 MG: 10 INJECTION, SOLUTION INTRAVENOUS at 09:17

## 2019-03-11 RX ADMIN — FOLIC ACID 1 MG: 1 TABLET ORAL at 16:44

## 2019-03-11 RX ADMIN — DILTIAZEM HYDROCHLORIDE 60 MG: 60 TABLET, FILM COATED ORAL at 00:00

## 2019-03-11 RX ADMIN — LINAGLIPTIN 5 MG: 5 TABLET, FILM COATED ORAL at 09:18

## 2019-03-11 RX ADMIN — DILTIAZEM HYDROCHLORIDE 60 MG: 60 TABLET, FILM COATED ORAL at 12:44

## 2019-03-11 RX ADMIN — CARVEDILOL 12.5 MG: 12.5 TABLET, FILM COATED ORAL at 16:36

## 2019-03-11 RX ADMIN — DILTIAZEM HYDROCHLORIDE 60 MG: 60 TABLET, FILM COATED ORAL at 06:25

## 2019-03-11 RX ADMIN — FLUTICASONE PROPIONATE 2 SPRAY: 50 SPRAY, METERED NASAL at 11:49

## 2019-03-11 RX ADMIN — CLOPIDOGREL BISULFATE 75 MG: 75 TABLET ORAL at 09:18

## 2019-03-11 RX ADMIN — DILTIAZEM HYDROCHLORIDE 60 MG: 60 TABLET, FILM COATED ORAL at 23:41

## 2019-03-11 RX ADMIN — AZITHROMYCIN MONOHYDRATE 500 MG: 500 INJECTION, POWDER, LYOPHILIZED, FOR SOLUTION INTRAVENOUS at 21:19

## 2019-03-11 RX ADMIN — CARVEDILOL 12.5 MG: 12.5 TABLET, FILM COATED ORAL at 09:18

## 2019-03-11 RX ADMIN — CEFEPIME 2 G: 2 INJECTION, POWDER, FOR SOLUTION INTRAVENOUS at 09:17

## 2019-03-11 RX ADMIN — ENOXAPARIN SODIUM 40 MG: 40 INJECTION SUBCUTANEOUS at 21:19

## 2019-03-11 ASSESSMENT — PAIN DESCRIPTION - ORIENTATION
ORIENTATION: RIGHT;LEFT
ORIENTATION: RIGHT;LEFT

## 2019-03-11 ASSESSMENT — PAIN DESCRIPTION - LOCATION
LOCATION: LEG
LOCATION: LEG

## 2019-03-11 ASSESSMENT — PAIN SCALES - GENERAL
PAINLEVEL_OUTOF10: 2
PAINLEVEL_OUTOF10: 0
PAINLEVEL_OUTOF10: 0
PAINLEVEL_OUTOF10: 2
PAINLEVEL_OUTOF10: 0

## 2019-03-11 ASSESSMENT — PAIN DESCRIPTION - PAIN TYPE
TYPE: ACUTE PAIN
TYPE: ACUTE PAIN

## 2019-03-11 ASSESSMENT — PAIN DESCRIPTION - FREQUENCY
FREQUENCY: INTERMITTENT
FREQUENCY: INTERMITTENT

## 2019-03-11 ASSESSMENT — PAIN DESCRIPTION - DESCRIPTORS
DESCRIPTORS: ACHING
DESCRIPTORS: ACHING

## 2019-03-11 ASSESSMENT — PAIN DESCRIPTION - ONSET
ONSET: ON-GOING
ONSET: ON-GOING

## 2019-03-12 ENCOUNTER — APPOINTMENT (OUTPATIENT)
Dept: CT IMAGING | Age: 69
DRG: 163 | End: 2019-03-12
Payer: MEDICARE

## 2019-03-12 LAB
ANION GAP SERPL CALCULATED.3IONS-SCNC: 6 MMOL/L (ref 4–16)
ANION GAP SERPL CALCULATED.3IONS-SCNC: 8 MMOL/L (ref 4–16)
BASOPHILS ABSOLUTE: 0 K/CU MM
BASOPHILS RELATIVE PERCENT: 0.5 % (ref 0–1)
BUN BLDV-MCNC: 6 MG/DL (ref 6–23)
BUN BLDV-MCNC: 6 MG/DL (ref 6–23)
CALCIUM SERPL-MCNC: 7.5 MG/DL (ref 8.3–10.6)
CALCIUM SERPL-MCNC: 8.2 MG/DL (ref 8.3–10.6)
CHLORIDE BLD-SCNC: 95 MMOL/L (ref 99–110)
CHLORIDE BLD-SCNC: 98 MMOL/L (ref 99–110)
CO2: 22 MMOL/L (ref 21–32)
CO2: 25 MMOL/L (ref 21–32)
CREAT SERPL-MCNC: 1.2 MG/DL (ref 0.9–1.3)
CREAT SERPL-MCNC: 1.3 MG/DL (ref 0.9–1.3)
DIFFERENTIAL TYPE: ABNORMAL
EOSINOPHILS ABSOLUTE: 0 K/CU MM
EOSINOPHILS RELATIVE PERCENT: 0.2 % (ref 0–3)
FLUID TYPE: NORMAL INDEX
GFR AFRICAN AMERICAN: >60 ML/MIN/1.73M2
GFR AFRICAN AMERICAN: >60 ML/MIN/1.73M2
GFR NON-AFRICAN AMERICAN: 55 ML/MIN/1.73M2
GFR NON-AFRICAN AMERICAN: >60 ML/MIN/1.73M2
GLUCOSE BLD-MCNC: 103 MG/DL (ref 70–99)
GLUCOSE BLD-MCNC: 120 MG/DL (ref 70–99)
GLUCOSE BLD-MCNC: 128 MG/DL (ref 70–99)
GLUCOSE BLD-MCNC: 175 MG/DL (ref 70–99)
GLUCOSE BLD-MCNC: 92 MG/DL (ref 70–99)
GLUCOSE BLD-MCNC: 93 MG/DL (ref 70–99)
GLUCOSE BLD-MCNC: 97 MG/DL (ref 70–99)
GLUCOSE, FLUID: 2 MG/DL
HCT VFR BLD CALC: 27.5 % (ref 42–52)
HEMOGLOBIN: 7.5 GM/DL (ref 13.5–18)
IMMATURE NEUTROPHIL %: 0.6 % (ref 0–0.43)
LACTATE DEHYDROGENASE, FLUID: >2500 IU/L
LYMPHOCYTES ABSOLUTE: 1.4 K/CU MM
LYMPHOCYTES RELATIVE PERCENT: 21.2 % (ref 24–44)
LYMPHOCYTES, BODY FLUID: 2 %
MAGNESIUM: 2.1 MG/DL (ref 1.8–2.4)
MCH RBC QN AUTO: 20.6 PG (ref 27–31)
MCHC RBC AUTO-ENTMCNC: 27.3 % (ref 32–36)
MCV RBC AUTO: 75.5 FL (ref 78–100)
MESOTHELIAL FLUID: 0 /100 WBC
MONOCYTE, FLUID: 3 %
MONOCYTES ABSOLUTE: 0.6 K/CU MM
MONOCYTES RELATIVE PERCENT: 8.7 % (ref 0–4)
NEUTROPHIL, FLUID: 95 %
NUCLEATED RBC %: 0 %
OTHER CELLS FLUID: 0
PDW BLD-RTO: 21.3 % (ref 11.7–14.9)
PH FLUID: 7
PHOSPHORUS: 3.3 MG/DL (ref 2.5–4.9)
PLATELET # BLD: 294 K/CU MM (ref 140–440)
PMV BLD AUTO: 8.2 FL (ref 7.5–11.1)
POTASSIUM SERPL-SCNC: 4 MMOL/L (ref 3.5–5.1)
POTASSIUM SERPL-SCNC: 4.3 MMOL/L (ref 3.5–5.1)
PROTEIN FLUID: 2.9 GM/DL
RBC # BLD: 3.64 M/CU MM (ref 4.6–6.2)
RBC FLUID: NORMAL /CU MM
SEGMENTED NEUTROPHILS ABSOLUTE COUNT: 4.5 K/CU MM
SEGMENTED NEUTROPHILS RELATIVE PERCENT: 68.8 % (ref 36–66)
SODIUM BLD-SCNC: 126 MMOL/L (ref 135–145)
SODIUM BLD-SCNC: 128 MMOL/L (ref 135–145)
TOTAL IMMATURE NEUTOROPHIL: 0.04 K/CU MM
TOTAL NUCLEATED RBC: 0 K/CU MM
WBC # BLD: 6.6 K/CU MM (ref 4–10.5)
WBC FLUID: NORMAL /CU MM

## 2019-03-12 PROCEDURE — 36415 COLL VENOUS BLD VENIPUNCTURE: CPT

## 2019-03-12 PROCEDURE — 2580000003 HC RX 258: Performed by: INTERNAL MEDICINE

## 2019-03-12 PROCEDURE — 94660 CPAP INITIATION&MGMT: CPT

## 2019-03-12 PROCEDURE — 82150 ASSAY OF AMYLASE: CPT

## 2019-03-12 PROCEDURE — 2500000003 HC RX 250 WO HCPCS: Performed by: NURSE PRACTITIONER

## 2019-03-12 PROCEDURE — 88108 CYTOPATH CONCENTRATE TECH: CPT

## 2019-03-12 PROCEDURE — 83986 ASSAY PH BODY FLUID NOS: CPT

## 2019-03-12 PROCEDURE — 84100 ASSAY OF PHOSPHORUS: CPT

## 2019-03-12 PROCEDURE — 82945 GLUCOSE OTHER FLUID: CPT

## 2019-03-12 PROCEDURE — 6370000000 HC RX 637 (ALT 250 FOR IP): Performed by: INTERNAL MEDICINE

## 2019-03-12 PROCEDURE — 32551 INSERTION OF CHEST TUBE: CPT

## 2019-03-12 PROCEDURE — 87073 CULTURE BACTERIA ANAEROBIC: CPT

## 2019-03-12 PROCEDURE — 2580000003 HC RX 258: Performed by: NURSE PRACTITIONER

## 2019-03-12 PROCEDURE — 2580000003 HC RX 258: Performed by: RADIOLOGY

## 2019-03-12 PROCEDURE — 87071 CULTURE AEROBIC QUANT OTHER: CPT

## 2019-03-12 PROCEDURE — 6370000000 HC RX 637 (ALT 250 FOR IP): Performed by: HOSPITALIST

## 2019-03-12 PROCEDURE — 82962 GLUCOSE BLOOD TEST: CPT

## 2019-03-12 PROCEDURE — 99232 SBSQ HOSP IP/OBS MODERATE 35: CPT | Performed by: INTERNAL MEDICINE

## 2019-03-12 PROCEDURE — 83735 ASSAY OF MAGNESIUM: CPT

## 2019-03-12 PROCEDURE — 87205 SMEAR GRAM STAIN: CPT

## 2019-03-12 PROCEDURE — 85025 COMPLETE CBC W/AUTO DIFF WBC: CPT

## 2019-03-12 PROCEDURE — 6370000000 HC RX 637 (ALT 250 FOR IP): Performed by: NURSE PRACTITIONER

## 2019-03-12 PROCEDURE — 2140000000 HC CCU INTERMEDIATE R&B

## 2019-03-12 PROCEDURE — APPNB60 APP NON BILLABLE TIME 46-60 MINS: Performed by: NURSE PRACTITIONER

## 2019-03-12 PROCEDURE — 88305 TISSUE EXAM BY PATHOLOGIST: CPT

## 2019-03-12 PROCEDURE — 0W9B30Z DRAINAGE OF LEFT PLEURAL CAVITY WITH DRAINAGE DEVICE, PERCUTANEOUS APPROACH: ICD-10-PCS | Performed by: RADIOLOGY

## 2019-03-12 PROCEDURE — 84157 ASSAY OF PROTEIN OTHER: CPT

## 2019-03-12 PROCEDURE — 99223 1ST HOSP IP/OBS HIGH 75: CPT | Performed by: INTERNAL MEDICINE

## 2019-03-12 PROCEDURE — 6360000002 HC RX W HCPCS: Performed by: INTERNAL MEDICINE

## 2019-03-12 PROCEDURE — 80048 BASIC METABOLIC PNL TOTAL CA: CPT

## 2019-03-12 PROCEDURE — 6360000002 HC RX W HCPCS: Performed by: NURSE PRACTITIONER

## 2019-03-12 PROCEDURE — 83615 LACTATE (LD) (LDH) ENZYME: CPT

## 2019-03-12 PROCEDURE — 89051 BODY FLUID CELL COUNT: CPT

## 2019-03-12 RX ORDER — SODIUM CHLORIDE 0.9 % (FLUSH) 0.9 %
10 SYRINGE (ML) INJECTION 2 TIMES DAILY
Status: DISCONTINUED | OUTPATIENT
Start: 2019-03-12 | End: 2019-03-25 | Stop reason: HOSPADM

## 2019-03-12 RX ORDER — HYDROCODONE BITARTRATE AND ACETAMINOPHEN 5; 325 MG/1; MG/1
1 TABLET ORAL EVERY 4 HOURS PRN
Status: DISCONTINUED | OUTPATIENT
Start: 2019-03-12 | End: 2019-03-18

## 2019-03-12 RX ORDER — MORPHINE SULFATE 4 MG/ML
4 INJECTION, SOLUTION INTRAMUSCULAR; INTRAVENOUS EVERY 4 HOURS PRN
Status: DISCONTINUED | OUTPATIENT
Start: 2019-03-12 | End: 2019-03-14

## 2019-03-12 RX ORDER — PANTOPRAZOLE SODIUM 40 MG/1
40 TABLET, DELAYED RELEASE ORAL
Status: DISCONTINUED | OUTPATIENT
Start: 2019-03-12 | End: 2019-03-25 | Stop reason: HOSPADM

## 2019-03-12 RX ADMIN — SODIUM CHLORIDE, PRESERVATIVE FREE 10 ML: 5 INJECTION INTRAVENOUS at 09:04

## 2019-03-12 RX ADMIN — HYDROCODONE BITARTRATE AND ACETAMINOPHEN 1 TABLET: 5; 325 TABLET ORAL at 12:06

## 2019-03-12 RX ADMIN — FLUTICASONE PROPIONATE 2 SPRAY: 50 SPRAY, METERED NASAL at 10:02

## 2019-03-12 RX ADMIN — INSULIN LISPRO 1 UNITS: 100 INJECTION, SOLUTION INTRAVENOUS; SUBCUTANEOUS at 21:02

## 2019-03-12 RX ADMIN — FAMOTIDINE 20 MG: 10 INJECTION, SOLUTION INTRAVENOUS at 09:03

## 2019-03-12 RX ADMIN — DILTIAZEM HYDROCHLORIDE 60 MG: 60 TABLET, FILM COATED ORAL at 06:44

## 2019-03-12 RX ADMIN — LINAGLIPTIN 5 MG: 5 TABLET, FILM COATED ORAL at 09:02

## 2019-03-12 RX ADMIN — ONDANSETRON 4 MG: 2 INJECTION INTRAMUSCULAR; INTRAVENOUS at 13:03

## 2019-03-12 RX ADMIN — ALPRAZOLAM 0.5 MG: 0.5 TABLET ORAL at 12:57

## 2019-03-12 RX ADMIN — ENOXAPARIN SODIUM 40 MG: 40 INJECTION SUBCUTANEOUS at 21:02

## 2019-03-12 RX ADMIN — CARVEDILOL 12.5 MG: 12.5 TABLET, FILM COATED ORAL at 09:03

## 2019-03-12 RX ADMIN — MORPHINE SULFATE 4 MG: 4 INJECTION, SOLUTION INTRAMUSCULAR; INTRAVENOUS at 21:13

## 2019-03-12 RX ADMIN — DILTIAZEM HYDROCHLORIDE 60 MG: 60 TABLET, FILM COATED ORAL at 17:34

## 2019-03-12 RX ADMIN — DILTIAZEM HYDROCHLORIDE 60 MG: 60 TABLET, FILM COATED ORAL at 12:07

## 2019-03-12 RX ADMIN — PANTOPRAZOLE SODIUM 40 MG: 40 TABLET, DELAYED RELEASE ORAL at 17:34

## 2019-03-12 RX ADMIN — ESCITALOPRAM OXALATE 20 MG: 10 TABLET, FILM COATED ORAL at 09:02

## 2019-03-12 RX ADMIN — CARVEDILOL 12.5 MG: 12.5 TABLET, FILM COATED ORAL at 17:34

## 2019-03-12 RX ADMIN — CEFEPIME 2 G: 2 INJECTION, POWDER, FOR SOLUTION INTRAVENOUS at 09:02

## 2019-03-12 RX ADMIN — SODIUM CHLORIDE, PRESERVATIVE FREE 10 ML: 5 INJECTION INTRAVENOUS at 21:02

## 2019-03-12 RX ADMIN — VANCOMYCIN HYDROCHLORIDE 1750 MG: 5 INJECTION, POWDER, LYOPHILIZED, FOR SOLUTION INTRAVENOUS at 17:34

## 2019-03-12 RX ADMIN — SODIUM CHLORIDE, PRESERVATIVE FREE 10 ML: 5 INJECTION INTRAVENOUS at 21:05

## 2019-03-12 RX ADMIN — CEFEPIME 2 G: 2 INJECTION, POWDER, FOR SOLUTION INTRAVENOUS at 21:01

## 2019-03-12 RX ADMIN — FOLIC ACID 1 MG: 1 TABLET ORAL at 09:03

## 2019-03-12 RX ADMIN — IRON SUCROSE 200 MG: 20 INJECTION, SOLUTION INTRAVENOUS at 12:07

## 2019-03-12 RX ADMIN — MORPHINE SULFATE 4 MG: 4 INJECTION, SOLUTION INTRAMUSCULAR; INTRAVENOUS at 13:03

## 2019-03-12 RX ADMIN — AZITHROMYCIN MONOHYDRATE 500 MG: 500 INJECTION, POWDER, LYOPHILIZED, FOR SOLUTION INTRAVENOUS at 21:01

## 2019-03-12 ASSESSMENT — ENCOUNTER SYMPTOMS
VOMITING: 0
EYE REDNESS: 0
BACK PAIN: 0
NAUSEA: 0
SINUS PRESSURE: 0
SORE THROAT: 0
CONSTIPATION: 0
SHORTNESS OF BREATH: 1
COUGH: 1
CHEST TIGHTNESS: 0
DIARRHEA: 0
EYE ITCHING: 0
SINUS PAIN: 0
BLOOD IN STOOL: 1
APNEA: 0

## 2019-03-12 ASSESSMENT — PAIN SCALES - GENERAL
PAINLEVEL_OUTOF10: 9
PAINLEVEL_OUTOF10: 0
PAINLEVEL_OUTOF10: 7
PAINLEVEL_OUTOF10: 0
PAINLEVEL_OUTOF10: 10
PAINLEVEL_OUTOF10: 0
PAINLEVEL_OUTOF10: 0

## 2019-03-12 ASSESSMENT — PAIN DESCRIPTION - ONSET: ONSET: ON-GOING

## 2019-03-12 ASSESSMENT — PAIN DESCRIPTION - DESCRIPTORS: DESCRIPTORS: ACHING

## 2019-03-12 ASSESSMENT — PAIN DESCRIPTION - PAIN TYPE: TYPE: SURGICAL PAIN

## 2019-03-12 ASSESSMENT — PAIN DESCRIPTION - LOCATION: LOCATION: BACK

## 2019-03-12 ASSESSMENT — PAIN DESCRIPTION - ORIENTATION: ORIENTATION: LEFT

## 2019-03-13 ENCOUNTER — APPOINTMENT (OUTPATIENT)
Dept: GENERAL RADIOLOGY | Age: 69
DRG: 163 | End: 2019-03-13
Payer: MEDICARE

## 2019-03-13 LAB
ALBUMIN SERPL-MCNC: 2.4 GM/DL (ref 3.4–5)
ALP BLD-CCNC: 71 IU/L (ref 40–128)
ALT SERPL-CCNC: <5 U/L (ref 10–40)
AMYLASE FLUID: 23 U/L
AMYLASE: 385 U/L (ref 25–115)
ANION GAP SERPL CALCULATED.3IONS-SCNC: 7 MMOL/L (ref 4–16)
APTT: 60.8 SECONDS (ref 21.2–33)
AST SERPL-CCNC: 6 IU/L (ref 15–37)
BASOPHILS ABSOLUTE: 0 K/CU MM
BASOPHILS RELATIVE PERCENT: 0.5 % (ref 0–1)
BILIRUB SERPL-MCNC: 0.4 MG/DL (ref 0–1)
BUN BLDV-MCNC: 8 MG/DL (ref 6–23)
CALCIUM SERPL-MCNC: 7.8 MG/DL (ref 8.3–10.6)
CHLORIDE BLD-SCNC: 95 MMOL/L (ref 99–110)
CHLORIDE URINE RANDOM: 56 MMOL/L (ref 43–210)
CO2: 25 MMOL/L (ref 21–32)
CREAT SERPL-MCNC: 1.3 MG/DL (ref 0.9–1.3)
CULTURE: NORMAL
DIFFERENTIAL TYPE: ABNORMAL
EOSINOPHILS ABSOLUTE: 0 K/CU MM
EOSINOPHILS RELATIVE PERCENT: 0.3 % (ref 0–3)
GFR AFRICAN AMERICAN: >60 ML/MIN/1.73M2
GFR NON-AFRICAN AMERICAN: 55 ML/MIN/1.73M2
GLUCOSE BLD-MCNC: 101 MG/DL (ref 70–99)
GLUCOSE BLD-MCNC: 102 MG/DL (ref 70–99)
GLUCOSE BLD-MCNC: 118 MG/DL (ref 70–99)
GLUCOSE BLD-MCNC: 151 MG/DL (ref 70–99)
GLUCOSE BLD-MCNC: 90 MG/DL (ref 70–99)
HCT VFR BLD CALC: 26.6 % (ref 42–52)
HEMOGLOBIN: 7.4 GM/DL (ref 13.5–18)
IMMATURE NEUTROPHIL %: 0.5 % (ref 0–0.43)
INR BLD: 1.2 INDEX
LIPASE: 595 IU/L (ref 13–60)
LYMPHOCYTES ABSOLUTE: 1.3 K/CU MM
LYMPHOCYTES RELATIVE PERCENT: 20.2 % (ref 24–44)
Lab: NORMAL
MCH RBC QN AUTO: 20.6 PG (ref 27–31)
MCHC RBC AUTO-ENTMCNC: 27.8 % (ref 32–36)
MCV RBC AUTO: 73.9 FL (ref 78–100)
MONOCYTES ABSOLUTE: 0.6 K/CU MM
MONOCYTES RELATIVE PERCENT: 9.8 % (ref 0–4)
NUCLEATED RBC %: 0 %
OSMOLALITY URINE: 523 MOS/L (ref 292–1090)
PDW BLD-RTO: 21.2 % (ref 11.7–14.9)
PLATELET # BLD: 274 K/CU MM (ref 140–440)
PMV BLD AUTO: 8.2 FL (ref 7.5–11.1)
POTASSIUM SERPL-SCNC: 4.1 MMOL/L (ref 3.5–5.1)
POTASSIUM, UR: 43.1 MMOL/L (ref 22–119)
PROTHROMBIN TIME: 13.6 SECONDS (ref 9.12–12.5)
RBC # BLD: 3.6 M/CU MM (ref 4.6–6.2)
SEGMENTED NEUTROPHILS ABSOLUTE COUNT: 4.4 K/CU MM
SEGMENTED NEUTROPHILS RELATIVE PERCENT: 68.7 % (ref 36–66)
SODIUM BLD-SCNC: 127 MMOL/L (ref 135–145)
SODIUM URINE: 47 MMOL/L (ref 35–167)
SPECIMEN: NORMAL
TOTAL IMMATURE NEUTOROPHIL: 0.03 K/CU MM
TOTAL NUCLEATED RBC: 0 K/CU MM
TOTAL PROTEIN: 6.5 GM/DL (ref 6.4–8.2)
WBC # BLD: 6.4 K/CU MM (ref 4–10.5)

## 2019-03-13 PROCEDURE — 6360000002 HC RX W HCPCS: Performed by: NURSE PRACTITIONER

## 2019-03-13 PROCEDURE — 71045 X-RAY EXAM CHEST 1 VIEW: CPT

## 2019-03-13 PROCEDURE — 83690 ASSAY OF LIPASE: CPT

## 2019-03-13 PROCEDURE — 85730 THROMBOPLASTIN TIME PARTIAL: CPT

## 2019-03-13 PROCEDURE — 84300 ASSAY OF URINE SODIUM: CPT

## 2019-03-13 PROCEDURE — 6370000000 HC RX 637 (ALT 250 FOR IP): Performed by: NURSE PRACTITIONER

## 2019-03-13 PROCEDURE — 2580000003 HC RX 258: Performed by: RADIOLOGY

## 2019-03-13 PROCEDURE — 83935 ASSAY OF URINE OSMOLALITY: CPT

## 2019-03-13 PROCEDURE — 2580000003 HC RX 258: Performed by: NURSE PRACTITIONER

## 2019-03-13 PROCEDURE — 6370000000 HC RX 637 (ALT 250 FOR IP): Performed by: INTERNAL MEDICINE

## 2019-03-13 PROCEDURE — 82150 ASSAY OF AMYLASE: CPT

## 2019-03-13 PROCEDURE — 97110 THERAPEUTIC EXERCISES: CPT

## 2019-03-13 PROCEDURE — 36415 COLL VENOUS BLD VENIPUNCTURE: CPT

## 2019-03-13 PROCEDURE — 2140000000 HC CCU INTERMEDIATE R&B

## 2019-03-13 PROCEDURE — 85610 PROTHROMBIN TIME: CPT

## 2019-03-13 PROCEDURE — 99232 SBSQ HOSP IP/OBS MODERATE 35: CPT | Performed by: INTERNAL MEDICINE

## 2019-03-13 PROCEDURE — 6360000002 HC RX W HCPCS: Performed by: INTERNAL MEDICINE

## 2019-03-13 PROCEDURE — 84133 ASSAY OF URINE POTASSIUM: CPT

## 2019-03-13 PROCEDURE — 80053 COMPREHEN METABOLIC PANEL: CPT

## 2019-03-13 PROCEDURE — 2580000003 HC RX 258: Performed by: INTERNAL MEDICINE

## 2019-03-13 PROCEDURE — 94660 CPAP INITIATION&MGMT: CPT

## 2019-03-13 PROCEDURE — 85025 COMPLETE CBC W/AUTO DIFF WBC: CPT

## 2019-03-13 PROCEDURE — 80048 BASIC METABOLIC PNL TOTAL CA: CPT

## 2019-03-13 PROCEDURE — 82962 GLUCOSE BLOOD TEST: CPT

## 2019-03-13 PROCEDURE — 82436 ASSAY OF URINE CHLORIDE: CPT

## 2019-03-13 RX ORDER — ASPIRIN 81 MG/1
81 TABLET ORAL DAILY
Status: DISCONTINUED | OUTPATIENT
Start: 2019-03-13 | End: 2019-03-25 | Stop reason: HOSPADM

## 2019-03-13 RX ADMIN — AZITHROMYCIN MONOHYDRATE 500 MG: 500 INJECTION, POWDER, LYOPHILIZED, FOR SOLUTION INTRAVENOUS at 20:10

## 2019-03-13 RX ADMIN — ASPIRIN 81 MG: 81 TABLET, COATED ORAL at 14:03

## 2019-03-13 RX ADMIN — ONDANSETRON 4 MG: 2 INJECTION INTRAMUSCULAR; INTRAVENOUS at 09:44

## 2019-03-13 RX ADMIN — CEFEPIME 2 G: 2 INJECTION, POWDER, FOR SOLUTION INTRAVENOUS at 10:43

## 2019-03-13 RX ADMIN — HYDROCODONE BITARTRATE AND ACETAMINOPHEN 1 TABLET: 5; 325 TABLET ORAL at 14:03

## 2019-03-13 RX ADMIN — ENOXAPARIN SODIUM 40 MG: 40 INJECTION SUBCUTANEOUS at 20:12

## 2019-03-13 RX ADMIN — DILTIAZEM HYDROCHLORIDE 60 MG: 60 TABLET, FILM COATED ORAL at 06:24

## 2019-03-13 RX ADMIN — LINAGLIPTIN 5 MG: 5 TABLET, FILM COATED ORAL at 09:43

## 2019-03-13 RX ADMIN — CEFEPIME 2 G: 2 INJECTION, POWDER, FOR SOLUTION INTRAVENOUS at 20:11

## 2019-03-13 RX ADMIN — SODIUM CHLORIDE, PRESERVATIVE FREE 10 ML: 5 INJECTION INTRAVENOUS at 20:16

## 2019-03-13 RX ADMIN — CARVEDILOL 12.5 MG: 12.5 TABLET, FILM COATED ORAL at 18:27

## 2019-03-13 RX ADMIN — DILTIAZEM HYDROCHLORIDE 60 MG: 60 TABLET, FILM COATED ORAL at 00:16

## 2019-03-13 RX ADMIN — VANCOMYCIN HYDROCHLORIDE 1750 MG: 5 INJECTION, POWDER, LYOPHILIZED, FOR SOLUTION INTRAVENOUS at 18:28

## 2019-03-13 RX ADMIN — PANTOPRAZOLE SODIUM 40 MG: 40 TABLET, DELAYED RELEASE ORAL at 06:24

## 2019-03-13 RX ADMIN — DILTIAZEM HYDROCHLORIDE 60 MG: 60 TABLET, FILM COATED ORAL at 14:03

## 2019-03-13 RX ADMIN — SODIUM CHLORIDE, PRESERVATIVE FREE 10 ML: 5 INJECTION INTRAVENOUS at 20:12

## 2019-03-13 RX ADMIN — DILTIAZEM HYDROCHLORIDE 60 MG: 60 TABLET, FILM COATED ORAL at 18:27

## 2019-03-13 RX ADMIN — INSULIN LISPRO 1 UNITS: 100 INJECTION, SOLUTION INTRAVENOUS; SUBCUTANEOUS at 20:11

## 2019-03-13 RX ADMIN — FOLIC ACID 1 MG: 1 TABLET ORAL at 09:43

## 2019-03-13 RX ADMIN — ESCITALOPRAM OXALATE 20 MG: 10 TABLET, FILM COATED ORAL at 09:43

## 2019-03-13 RX ADMIN — CARVEDILOL 12.5 MG: 12.5 TABLET, FILM COATED ORAL at 09:43

## 2019-03-13 RX ADMIN — PANTOPRAZOLE SODIUM 40 MG: 40 TABLET, DELAYED RELEASE ORAL at 18:27

## 2019-03-13 RX ADMIN — MORPHINE SULFATE 4 MG: 4 INJECTION, SOLUTION INTRAMUSCULAR; INTRAVENOUS at 11:18

## 2019-03-13 RX ADMIN — HYDROCODONE BITARTRATE AND ACETAMINOPHEN 1 TABLET: 5; 325 TABLET ORAL at 09:44

## 2019-03-13 RX ADMIN — SODIUM CHLORIDE, PRESERVATIVE FREE 10 ML: 5 INJECTION INTRAVENOUS at 13:55

## 2019-03-13 RX ADMIN — MORPHINE SULFATE 4 MG: 4 INJECTION, SOLUTION INTRAMUSCULAR; INTRAVENOUS at 16:42

## 2019-03-13 RX ADMIN — SODIUM CHLORIDE, PRESERVATIVE FREE 10 ML: 5 INJECTION INTRAVENOUS at 09:48

## 2019-03-13 ASSESSMENT — PAIN DESCRIPTION - ONSET
ONSET: ON-GOING

## 2019-03-13 ASSESSMENT — PAIN SCALES - GENERAL
PAINLEVEL_OUTOF10: 0
PAINLEVEL_OUTOF10: 7
PAINLEVEL_OUTOF10: 5
PAINLEVEL_OUTOF10: 0
PAINLEVEL_OUTOF10: 7
PAINLEVEL_OUTOF10: 7
PAINLEVEL_OUTOF10: 8
PAINLEVEL_OUTOF10: 0
PAINLEVEL_OUTOF10: 2

## 2019-03-13 ASSESSMENT — PAIN DESCRIPTION - LOCATION
LOCATION: BACK
LOCATION_2: NECK
LOCATION: CHEST
LOCATION: CHEST
LOCATION: NECK;CHEST

## 2019-03-13 ASSESSMENT — PAIN DESCRIPTION - ORIENTATION
ORIENTATION: LEFT;POSTERIOR
ORIENTATION_2: LEFT;DISTAL

## 2019-03-13 ASSESSMENT — PAIN DESCRIPTION - FREQUENCY
FREQUENCY: CONTINUOUS

## 2019-03-13 ASSESSMENT — PAIN DESCRIPTION - PAIN TYPE
TYPE: ACUTE PAIN
TYPE_2: ACUTE PAIN

## 2019-03-13 ASSESSMENT — PAIN DESCRIPTION - DESCRIPTORS
DESCRIPTORS: ACHING
DESCRIPTORS: ACHING;CONSTANT
DESCRIPTORS: ACHING;CONSTANT
DESCRIPTORS: ACHING

## 2019-03-13 ASSESSMENT — PAIN DESCRIPTION - INTENSITY: RATING_2: 7

## 2019-03-13 ASSESSMENT — PAIN - FUNCTIONAL ASSESSMENT: PAIN_FUNCTIONAL_ASSESSMENT: PREVENTS OR INTERFERES SOME ACTIVE ACTIVITIES AND ADLS

## 2019-03-14 ENCOUNTER — APPOINTMENT (OUTPATIENT)
Dept: GENERAL RADIOLOGY | Age: 69
DRG: 163 | End: 2019-03-14
Payer: MEDICARE

## 2019-03-14 ENCOUNTER — APPOINTMENT (OUTPATIENT)
Dept: CT IMAGING | Age: 69
DRG: 163 | End: 2019-03-14
Payer: MEDICARE

## 2019-03-14 ENCOUNTER — APPOINTMENT (OUTPATIENT)
Dept: INTERVENTIONAL RADIOLOGY/VASCULAR | Age: 69
DRG: 163 | End: 2019-03-14
Payer: MEDICARE

## 2019-03-14 LAB
ANION GAP SERPL CALCULATED.3IONS-SCNC: 9 MMOL/L (ref 4–16)
BASOPHILS ABSOLUTE: 0 K/CU MM
BASOPHILS RELATIVE PERCENT: 0.3 % (ref 0–1)
BUN BLDV-MCNC: 9 MG/DL (ref 6–23)
CALCIUM SERPL-MCNC: 7.6 MG/DL (ref 8.3–10.6)
CHLORIDE BLD-SCNC: 94 MMOL/L (ref 99–110)
CO2: 21 MMOL/L (ref 21–32)
CREAT SERPL-MCNC: 1.1 MG/DL (ref 0.9–1.3)
DIFFERENTIAL TYPE: ABNORMAL
EOSINOPHILS ABSOLUTE: 0 K/CU MM
EOSINOPHILS RELATIVE PERCENT: 0 % (ref 0–3)
GFR AFRICAN AMERICAN: >60 ML/MIN/1.73M2
GFR NON-AFRICAN AMERICAN: >60 ML/MIN/1.73M2
GLUCOSE BLD-MCNC: 105 MG/DL (ref 70–99)
GLUCOSE BLD-MCNC: 113 MG/DL (ref 70–99)
GLUCOSE BLD-MCNC: 122 MG/DL (ref 70–99)
GLUCOSE BLD-MCNC: 123 MG/DL (ref 70–99)
GLUCOSE BLD-MCNC: 164 MG/DL (ref 70–99)
HCT VFR BLD CALC: 45.2 % (ref 42–52)
HEMOGLOBIN: 12.8 GM/DL (ref 13.5–18)
IMMATURE NEUTROPHIL %: 0.3 % (ref 0–0.43)
LEGIONELLA URINARY AG: NEGATIVE
LYMPHOCYTES ABSOLUTE: 1.1 K/CU MM
LYMPHOCYTES RELATIVE PERCENT: 16.5 % (ref 24–44)
MAGNESIUM: 2 MG/DL (ref 1.8–2.4)
MCH RBC QN AUTO: 20.9 PG (ref 27–31)
MCHC RBC AUTO-ENTMCNC: 28.3 % (ref 32–36)
MCV RBC AUTO: 73.7 FL (ref 78–100)
MONOCYTES ABSOLUTE: 0.4 K/CU MM
MONOCYTES RELATIVE PERCENT: 5.5 % (ref 0–4)
NUCLEATED RBC %: 0 %
PDW BLD-RTO: 22.2 % (ref 11.7–14.9)
PHOSPHORUS: 2.8 MG/DL (ref 2.5–4.9)
PLATELET # BLD: 200 K/CU MM (ref 140–440)
PMV BLD AUTO: 8.2 FL (ref 7.5–11.1)
POTASSIUM SERPL-SCNC: 4.2 MMOL/L (ref 3.5–5.1)
RBC # BLD: 6.13 M/CU MM (ref 4.6–6.2)
SEGMENTED NEUTROPHILS ABSOLUTE COUNT: 5 K/CU MM
SEGMENTED NEUTROPHILS RELATIVE PERCENT: 77.4 % (ref 36–66)
SODIUM BLD-SCNC: 124 MMOL/L (ref 135–145)
STREP PNEUMONIAE ANTIGEN: NORMAL
TOTAL IMMATURE NEUTOROPHIL: 0.02 K/CU MM
TOTAL NUCLEATED RBC: 0 K/CU MM
WBC # BLD: 6.5 K/CU MM (ref 4–10.5)

## 2019-03-14 PROCEDURE — 84100 ASSAY OF PHOSPHORUS: CPT

## 2019-03-14 PROCEDURE — C1729 CATH, DRAINAGE: HCPCS

## 2019-03-14 PROCEDURE — 71045 X-RAY EXAM CHEST 1 VIEW: CPT

## 2019-03-14 PROCEDURE — 94761 N-INVAS EAR/PLS OXIMETRY MLT: CPT

## 2019-03-14 PROCEDURE — 6360000002 HC RX W HCPCS: Performed by: NURSE PRACTITIONER

## 2019-03-14 PROCEDURE — 2709999900 HC NON-CHARGEABLE SUPPLY

## 2019-03-14 PROCEDURE — 6370000000 HC RX 637 (ALT 250 FOR IP): Performed by: INTERNAL MEDICINE

## 2019-03-14 PROCEDURE — 80048 BASIC METABOLIC PNL TOTAL CA: CPT

## 2019-03-14 PROCEDURE — 85025 COMPLETE CBC W/AUTO DIFF WBC: CPT

## 2019-03-14 PROCEDURE — 83735 ASSAY OF MAGNESIUM: CPT

## 2019-03-14 PROCEDURE — 6360000002 HC RX W HCPCS: Performed by: RADIOLOGY

## 2019-03-14 PROCEDURE — 36593 DECLOT VASCULAR DEVICE: CPT

## 2019-03-14 PROCEDURE — 32561 LYSE CHEST FIBRIN INIT DAY: CPT

## 2019-03-14 PROCEDURE — 2580000003 HC RX 258: Performed by: RADIOLOGY

## 2019-03-14 PROCEDURE — 99232 SBSQ HOSP IP/OBS MODERATE 35: CPT | Performed by: INTERNAL MEDICINE

## 2019-03-14 PROCEDURE — 3E0L3GC INTRODUCTION OF OTHER THERAPEUTIC SUBSTANCE INTO PLEURAL CAVITY, PERCUTANEOUS APPROACH: ICD-10-PCS | Performed by: RADIOLOGY

## 2019-03-14 PROCEDURE — 71250 CT THORAX DX C-: CPT

## 2019-03-14 PROCEDURE — 6370000000 HC RX 637 (ALT 250 FOR IP): Performed by: NURSE PRACTITIONER

## 2019-03-14 PROCEDURE — 2580000003 HC RX 258: Performed by: INTERNAL MEDICINE

## 2019-03-14 PROCEDURE — 82962 GLUCOSE BLOOD TEST: CPT

## 2019-03-14 PROCEDURE — 2140000000 HC CCU INTERMEDIATE R&B

## 2019-03-14 PROCEDURE — 6360000002 HC RX W HCPCS: Performed by: INTERNAL MEDICINE

## 2019-03-14 PROCEDURE — 36415 COLL VENOUS BLD VENIPUNCTURE: CPT

## 2019-03-14 PROCEDURE — 2580000003 HC RX 258: Performed by: NURSE PRACTITIONER

## 2019-03-14 RX ORDER — FUROSEMIDE 40 MG/1
40 TABLET ORAL DAILY
Status: DISCONTINUED | OUTPATIENT
Start: 2019-03-14 | End: 2019-03-19

## 2019-03-14 RX ORDER — SODIUM CHLORIDE 0.9 % (FLUSH) 0.9 %
50 SYRINGE (ML) INJECTION ONCE
Status: COMPLETED | OUTPATIENT
Start: 2019-03-14 | End: 2019-03-14

## 2019-03-14 RX ADMIN — DILTIAZEM HYDROCHLORIDE 60 MG: 60 TABLET, FILM COATED ORAL at 00:20

## 2019-03-14 RX ADMIN — DILTIAZEM HYDROCHLORIDE 60 MG: 60 TABLET, FILM COATED ORAL at 17:36

## 2019-03-14 RX ADMIN — HYDROCODONE BITARTRATE AND ACETAMINOPHEN 1 TABLET: 5; 325 TABLET ORAL at 15:01

## 2019-03-14 RX ADMIN — CARVEDILOL 12.5 MG: 12.5 TABLET, FILM COATED ORAL at 17:36

## 2019-03-14 RX ADMIN — SODIUM CHLORIDE, PRESERVATIVE FREE 10 ML: 5 INJECTION INTRAVENOUS at 20:51

## 2019-03-14 RX ADMIN — INSULIN LISPRO 1 UNITS: 100 INJECTION, SOLUTION INTRAVENOUS; SUBCUTANEOUS at 21:00

## 2019-03-14 RX ADMIN — SODIUM CHLORIDE, PRESERVATIVE FREE 10 ML: 5 INJECTION INTRAVENOUS at 20:52

## 2019-03-14 RX ADMIN — CEFEPIME 2 G: 2 INJECTION, POWDER, FOR SOLUTION INTRAVENOUS at 08:50

## 2019-03-14 RX ADMIN — HYDROCODONE BITARTRATE AND ACETAMINOPHEN 1 TABLET: 5; 325 TABLET ORAL at 21:00

## 2019-03-14 RX ADMIN — SODIUM CHLORIDE, PRESERVATIVE FREE 50 ML: 5 INJECTION INTRAVENOUS at 13:29

## 2019-03-14 RX ADMIN — PANTOPRAZOLE SODIUM 40 MG: 40 TABLET, DELAYED RELEASE ORAL at 17:36

## 2019-03-14 RX ADMIN — ASPIRIN 81 MG: 81 TABLET, COATED ORAL at 08:50

## 2019-03-14 RX ADMIN — AZITHROMYCIN MONOHYDRATE 500 MG: 500 INJECTION, POWDER, LYOPHILIZED, FOR SOLUTION INTRAVENOUS at 20:51

## 2019-03-14 RX ADMIN — SODIUM CHLORIDE, PRESERVATIVE FREE 10 ML: 5 INJECTION INTRAVENOUS at 08:50

## 2019-03-14 RX ADMIN — LINAGLIPTIN 5 MG: 5 TABLET, FILM COATED ORAL at 08:50

## 2019-03-14 RX ADMIN — ENOXAPARIN SODIUM 40 MG: 40 INJECTION SUBCUTANEOUS at 20:51

## 2019-03-14 RX ADMIN — DILTIAZEM HYDROCHLORIDE 60 MG: 60 TABLET, FILM COATED ORAL at 06:05

## 2019-03-14 RX ADMIN — FUROSEMIDE 40 MG: 40 TABLET ORAL at 09:08

## 2019-03-14 RX ADMIN — CARVEDILOL 12.5 MG: 12.5 TABLET, FILM COATED ORAL at 08:50

## 2019-03-14 RX ADMIN — ESCITALOPRAM OXALATE 20 MG: 10 TABLET, FILM COATED ORAL at 08:50

## 2019-03-14 RX ADMIN — PANTOPRAZOLE SODIUM 40 MG: 40 TABLET, DELAYED RELEASE ORAL at 06:05

## 2019-03-14 RX ADMIN — CEFEPIME 2 G: 2 INJECTION, POWDER, FOR SOLUTION INTRAVENOUS at 20:51

## 2019-03-14 RX ADMIN — HYDROCODONE BITARTRATE AND ACETAMINOPHEN 1 TABLET: 5; 325 TABLET ORAL at 10:43

## 2019-03-14 RX ADMIN — WATER 11 ML: 1 INJECTION INTRAMUSCULAR; INTRAVENOUS; SUBCUTANEOUS at 13:29

## 2019-03-14 RX ADMIN — SODIUM CHLORIDE, PRESERVATIVE FREE 10 ML: 5 INJECTION INTRAVENOUS at 10:45

## 2019-03-14 RX ADMIN — DILTIAZEM HYDROCHLORIDE 60 MG: 60 TABLET, FILM COATED ORAL at 12:29

## 2019-03-14 RX ADMIN — IRON SUCROSE 200 MG: 20 INJECTION, SOLUTION INTRAVENOUS at 10:16

## 2019-03-14 RX ADMIN — VANCOMYCIN HYDROCHLORIDE 1750 MG: 5 INJECTION, POWDER, LYOPHILIZED, FOR SOLUTION INTRAVENOUS at 18:07

## 2019-03-14 RX ADMIN — ALTEPLASE 10 MG: 2.2 INJECTION, POWDER, LYOPHILIZED, FOR SOLUTION INTRAVENOUS at 13:27

## 2019-03-14 RX ADMIN — FOLIC ACID 1 MG: 1 TABLET ORAL at 08:50

## 2019-03-14 ASSESSMENT — PAIN SCALES - GENERAL
PAINLEVEL_OUTOF10: 5
PAINLEVEL_OUTOF10: 5
PAINLEVEL_OUTOF10: 8
PAINLEVEL_OUTOF10: 6
PAINLEVEL_OUTOF10: 6

## 2019-03-14 ASSESSMENT — PAIN DESCRIPTION - PAIN TYPE
TYPE: ACUTE PAIN
TYPE: ACUTE PAIN
TYPE: CHRONIC PAIN

## 2019-03-14 ASSESSMENT — PAIN DESCRIPTION - ONSET
ONSET: ON-GOING
ONSET: ON-GOING

## 2019-03-14 ASSESSMENT — PAIN DESCRIPTION - FREQUENCY
FREQUENCY: CONTINUOUS

## 2019-03-14 ASSESSMENT — PAIN DESCRIPTION - LOCATION
LOCATION: BACK
LOCATION: CHEST
LOCATION: BACK;NECK

## 2019-03-14 ASSESSMENT — PAIN DESCRIPTION - DESCRIPTORS
DESCRIPTORS: ACHING

## 2019-03-14 ASSESSMENT — PAIN DESCRIPTION - ORIENTATION
ORIENTATION: LEFT;POSTERIOR
ORIENTATION: POSTERIOR;LEFT

## 2019-03-14 ASSESSMENT — PAIN - FUNCTIONAL ASSESSMENT: PAIN_FUNCTIONAL_ASSESSMENT: PREVENTS OR INTERFERES SOME ACTIVE ACTIVITIES AND ADLS

## 2019-03-15 ENCOUNTER — APPOINTMENT (OUTPATIENT)
Dept: GENERAL RADIOLOGY | Age: 69
DRG: 163 | End: 2019-03-15
Payer: MEDICARE

## 2019-03-15 ENCOUNTER — APPOINTMENT (OUTPATIENT)
Dept: INTERVENTIONAL RADIOLOGY/VASCULAR | Age: 69
DRG: 163 | End: 2019-03-15
Payer: MEDICARE

## 2019-03-15 LAB
ANION GAP SERPL CALCULATED.3IONS-SCNC: 10 MMOL/L (ref 4–16)
ANION GAP SERPL CALCULATED.3IONS-SCNC: 11 MMOL/L (ref 4–16)
BASOPHILS ABSOLUTE: 0.1 K/CU MM
BASOPHILS RELATIVE PERCENT: 0.5 % (ref 0–1)
BUN BLDV-MCNC: 10 MG/DL (ref 6–23)
BUN BLDV-MCNC: 10 MG/DL (ref 6–23)
CALCIUM SERPL-MCNC: 7.1 MG/DL (ref 8.3–10.6)
CALCIUM SERPL-MCNC: 7.3 MG/DL (ref 8.3–10.6)
CHLORIDE BLD-SCNC: 92 MMOL/L (ref 99–110)
CHLORIDE BLD-SCNC: 92 MMOL/L (ref 99–110)
CO2: 21 MMOL/L (ref 21–32)
CO2: 22 MMOL/L (ref 21–32)
CREAT SERPL-MCNC: 1.1 MG/DL (ref 0.9–1.3)
CREAT SERPL-MCNC: 1.1 MG/DL (ref 0.9–1.3)
DIFFERENTIAL TYPE: ABNORMAL
EOSINOPHILS ABSOLUTE: 0 K/CU MM
EOSINOPHILS RELATIVE PERCENT: 0.2 % (ref 0–3)
GFR AFRICAN AMERICAN: >60 ML/MIN/1.73M2
GFR AFRICAN AMERICAN: >60 ML/MIN/1.73M2
GFR NON-AFRICAN AMERICAN: >60 ML/MIN/1.73M2
GFR NON-AFRICAN AMERICAN: >60 ML/MIN/1.73M2
GLUCOSE BLD-MCNC: 106 MG/DL (ref 70–99)
GLUCOSE BLD-MCNC: 106 MG/DL (ref 70–99)
GLUCOSE BLD-MCNC: 127 MG/DL (ref 70–99)
GLUCOSE BLD-MCNC: 139 MG/DL (ref 70–99)
GLUCOSE BLD-MCNC: 193 MG/DL (ref 70–99)
GLUCOSE BLD-MCNC: 94 MG/DL (ref 70–99)
HCT VFR BLD CALC: 24.6 % (ref 42–52)
HEMOGLOBIN: 7 GM/DL (ref 13.5–18)
IMMATURE NEUTROPHIL %: 0.6 % (ref 0–0.43)
LYMPHOCYTES ABSOLUTE: 1.6 K/CU MM
LYMPHOCYTES RELATIVE PERCENT: 14.7 % (ref 24–44)
MCH RBC QN AUTO: 21 PG (ref 27–31)
MCHC RBC AUTO-ENTMCNC: 28.5 % (ref 32–36)
MCV RBC AUTO: 73.9 FL (ref 78–100)
MONOCYTES ABSOLUTE: 0.9 K/CU MM
MONOCYTES RELATIVE PERCENT: 7.8 % (ref 0–4)
NUCLEATED RBC %: 0 %
PDW BLD-RTO: 21.5 % (ref 11.7–14.9)
PLATELET # BLD: 259 K/CU MM (ref 140–440)
PMV BLD AUTO: 8.9 FL (ref 7.5–11.1)
POTASSIUM SERPL-SCNC: 4 MMOL/L (ref 3.5–5.1)
POTASSIUM SERPL-SCNC: 4 MMOL/L (ref 3.5–5.1)
PROCALCITONIN: 0.13
PROCALCITONIN: 0.14
RBC # BLD: 3.33 M/CU MM (ref 4.6–6.2)
REASON FOR REJECTION: NORMAL
REJECTED TEST: NORMAL
SEGMENTED NEUTROPHILS ABSOLUTE COUNT: 8.3 K/CU MM
SEGMENTED NEUTROPHILS RELATIVE PERCENT: 76.2 % (ref 36–66)
SODIUM BLD-SCNC: 124 MMOL/L (ref 135–145)
SODIUM BLD-SCNC: 124 MMOL/L (ref 135–145)
TOTAL IMMATURE NEUTOROPHIL: 0.07 K/CU MM
TOTAL NUCLEATED RBC: 0 K/CU MM
WBC # BLD: 10.9 K/CU MM (ref 4–10.5)

## 2019-03-15 PROCEDURE — 2580000003 HC RX 258: Performed by: INTERNAL MEDICINE

## 2019-03-15 PROCEDURE — 84145 PROCALCITONIN (PCT): CPT

## 2019-03-15 PROCEDURE — 2580000003 HC RX 258: Performed by: NURSE PRACTITIONER

## 2019-03-15 PROCEDURE — 2700000000 HC OXYGEN THERAPY PER DAY

## 2019-03-15 PROCEDURE — 80048 BASIC METABOLIC PNL TOTAL CA: CPT

## 2019-03-15 PROCEDURE — 6370000000 HC RX 637 (ALT 250 FOR IP): Performed by: HOSPITALIST

## 2019-03-15 PROCEDURE — 99232 SBSQ HOSP IP/OBS MODERATE 35: CPT | Performed by: INTERNAL MEDICINE

## 2019-03-15 PROCEDURE — 6360000002 HC RX W HCPCS: Performed by: NURSE PRACTITIONER

## 2019-03-15 PROCEDURE — 82962 GLUCOSE BLOOD TEST: CPT

## 2019-03-15 PROCEDURE — 36415 COLL VENOUS BLD VENIPUNCTURE: CPT

## 2019-03-15 PROCEDURE — 6370000000 HC RX 637 (ALT 250 FOR IP): Performed by: NURSE PRACTITIONER

## 2019-03-15 PROCEDURE — 6360000002 HC RX W HCPCS: Performed by: INTERNAL MEDICINE

## 2019-03-15 PROCEDURE — 85025 COMPLETE CBC W/AUTO DIFF WBC: CPT

## 2019-03-15 PROCEDURE — 88305 TISSUE EXAM BY PATHOLOGIST: CPT

## 2019-03-15 PROCEDURE — 2140000000 HC CCU INTERMEDIATE R&B

## 2019-03-15 PROCEDURE — 6370000000 HC RX 637 (ALT 250 FOR IP): Performed by: INTERNAL MEDICINE

## 2019-03-15 PROCEDURE — 94761 N-INVAS EAR/PLS OXIMETRY MLT: CPT

## 2019-03-15 PROCEDURE — 2580000003 HC RX 258: Performed by: RADIOLOGY

## 2019-03-15 PROCEDURE — 71045 X-RAY EXAM CHEST 1 VIEW: CPT

## 2019-03-15 PROCEDURE — 88108 CYTOPATH CONCENTRATE TECH: CPT

## 2019-03-15 RX ADMIN — PANTOPRAZOLE SODIUM 40 MG: 40 TABLET, DELAYED RELEASE ORAL at 06:10

## 2019-03-15 RX ADMIN — FUROSEMIDE 40 MG: 40 TABLET ORAL at 08:28

## 2019-03-15 RX ADMIN — LINAGLIPTIN 5 MG: 5 TABLET, FILM COATED ORAL at 08:28

## 2019-03-15 RX ADMIN — ASPIRIN 81 MG: 81 TABLET, COATED ORAL at 08:28

## 2019-03-15 RX ADMIN — ONDANSETRON 4 MG: 2 INJECTION INTRAMUSCULAR; INTRAVENOUS at 09:13

## 2019-03-15 RX ADMIN — SODIUM CHLORIDE, PRESERVATIVE FREE 10 ML: 5 INJECTION INTRAVENOUS at 08:28

## 2019-03-15 RX ADMIN — INSULIN LISPRO 1 UNITS: 100 INJECTION, SOLUTION INTRAVENOUS; SUBCUTANEOUS at 20:21

## 2019-03-15 RX ADMIN — ALPRAZOLAM 0.5 MG: 0.5 TABLET ORAL at 01:17

## 2019-03-15 RX ADMIN — DILTIAZEM HYDROCHLORIDE 60 MG: 60 TABLET, FILM COATED ORAL at 06:10

## 2019-03-15 RX ADMIN — AZITHROMYCIN MONOHYDRATE 500 MG: 500 INJECTION, POWDER, LYOPHILIZED, FOR SOLUTION INTRAVENOUS at 20:13

## 2019-03-15 RX ADMIN — PANTOPRAZOLE SODIUM 40 MG: 40 TABLET, DELAYED RELEASE ORAL at 17:22

## 2019-03-15 RX ADMIN — CEFEPIME 2 G: 2 INJECTION, POWDER, FOR SOLUTION INTRAVENOUS at 20:13

## 2019-03-15 RX ADMIN — SODIUM CHLORIDE, PRESERVATIVE FREE 10 ML: 5 INJECTION INTRAVENOUS at 20:15

## 2019-03-15 RX ADMIN — DILTIAZEM HYDROCHLORIDE 60 MG: 60 TABLET, FILM COATED ORAL at 18:58

## 2019-03-15 RX ADMIN — HYDROCODONE BITARTRATE AND ACETAMINOPHEN 1 TABLET: 5; 325 TABLET ORAL at 03:41

## 2019-03-15 RX ADMIN — ENOXAPARIN SODIUM 40 MG: 40 INJECTION SUBCUTANEOUS at 20:12

## 2019-03-15 RX ADMIN — DILTIAZEM HYDROCHLORIDE 60 MG: 60 TABLET, FILM COATED ORAL at 15:34

## 2019-03-15 RX ADMIN — FLUTICASONE PROPIONATE 2 SPRAY: 50 SPRAY, METERED NASAL at 08:31

## 2019-03-15 RX ADMIN — FOLIC ACID 1 MG: 1 TABLET ORAL at 08:28

## 2019-03-15 RX ADMIN — CEFEPIME 2 G: 2 INJECTION, POWDER, FOR SOLUTION INTRAVENOUS at 08:31

## 2019-03-15 RX ADMIN — HYDROCODONE BITARTRATE AND ACETAMINOPHEN 1 TABLET: 5; 325 TABLET ORAL at 20:13

## 2019-03-15 RX ADMIN — VANCOMYCIN HYDROCHLORIDE 1750 MG: 5 INJECTION, POWDER, LYOPHILIZED, FOR SOLUTION INTRAVENOUS at 17:23

## 2019-03-15 RX ADMIN — IRON SUCROSE 200 MG: 20 INJECTION, SOLUTION INTRAVENOUS at 19:00

## 2019-03-15 RX ADMIN — CARVEDILOL 12.5 MG: 12.5 TABLET, FILM COATED ORAL at 08:28

## 2019-03-15 RX ADMIN — HYDROCODONE BITARTRATE AND ACETAMINOPHEN 1 TABLET: 5; 325 TABLET ORAL at 09:12

## 2019-03-15 RX ADMIN — CARVEDILOL 12.5 MG: 12.5 TABLET, FILM COATED ORAL at 17:22

## 2019-03-15 RX ADMIN — DILTIAZEM HYDROCHLORIDE 60 MG: 60 TABLET, FILM COATED ORAL at 00:14

## 2019-03-15 RX ADMIN — ESCITALOPRAM OXALATE 20 MG: 10 TABLET, FILM COATED ORAL at 08:28

## 2019-03-15 ASSESSMENT — PAIN DESCRIPTION - LOCATION
LOCATION: BACK;NECK;FLANK
LOCATION: BACK;FLANK;NECK
LOCATION: BACK;FLANK

## 2019-03-15 ASSESSMENT — PAIN DESCRIPTION - FREQUENCY
FREQUENCY: CONTINUOUS
FREQUENCY: CONTINUOUS
FREQUENCY: INTERMITTENT

## 2019-03-15 ASSESSMENT — PAIN SCALES - GENERAL
PAINLEVEL_OUTOF10: 6
PAINLEVEL_OUTOF10: 6
PAINLEVEL_OUTOF10: 7
PAINLEVEL_OUTOF10: 6

## 2019-03-15 ASSESSMENT — PAIN DESCRIPTION - PAIN TYPE
TYPE: ACUTE PAIN

## 2019-03-15 ASSESSMENT — PAIN DESCRIPTION - ONSET: ONSET: ON-GOING

## 2019-03-15 ASSESSMENT — PAIN DESCRIPTION - ORIENTATION
ORIENTATION: POSTERIOR;LEFT
ORIENTATION: POSTERIOR;LEFT

## 2019-03-15 ASSESSMENT — PAIN DESCRIPTION - DESCRIPTORS
DESCRIPTORS: ACHING
DESCRIPTORS: ACHING

## 2019-03-16 ENCOUNTER — APPOINTMENT (OUTPATIENT)
Dept: GENERAL RADIOLOGY | Age: 69
DRG: 163 | End: 2019-03-16
Payer: MEDICARE

## 2019-03-16 ENCOUNTER — APPOINTMENT (OUTPATIENT)
Dept: CT IMAGING | Age: 69
DRG: 163 | End: 2019-03-16
Payer: MEDICARE

## 2019-03-16 LAB
AMORPHOUS: ABNORMAL /HPF
ANION GAP SERPL CALCULATED.3IONS-SCNC: 7 MMOL/L (ref 4–16)
ANISOCYTOSIS: ABNORMAL
APTT: 51.3 SECONDS (ref 21.2–33)
BACTERIA: NEGATIVE /HPF
BASOPHILS ABSOLUTE: 0 K/CU MM
BASOPHILS RELATIVE PERCENT: 0.3 % (ref 0–1)
BILIRUBIN URINE: NEGATIVE MG/DL
BLOOD, URINE: NEGATIVE
BUN BLDV-MCNC: 12 MG/DL (ref 6–23)
CALCIUM SERPL-MCNC: 7.8 MG/DL (ref 8.3–10.6)
CEA: 1.7 NG/ML
CHLORIDE BLD-SCNC: 88 MMOL/L (ref 99–110)
CHLORIDE URINE RANDOM: 69 MMOL/L (ref 43–210)
CLARITY: ABNORMAL
CO2: 27 MMOL/L (ref 21–32)
COLOR: YELLOW
CREAT SERPL-MCNC: 1.3 MG/DL (ref 0.9–1.3)
CULTURE: ABNORMAL
DIFFERENTIAL TYPE: ABNORMAL
DIFFERENTIAL TYPE: ABNORMAL
DOHLE BODIES: PRESENT
EOSINOPHILS ABSOLUTE: 0 K/CU MM
EOSINOPHILS RELATIVE PERCENT: 0.2 % (ref 0–3)
GFR AFRICAN AMERICAN: >60 ML/MIN/1.73M2
GFR NON-AFRICAN AMERICAN: 55 ML/MIN/1.73M2
GLUCOSE BLD-MCNC: 111 MG/DL (ref 70–99)
GLUCOSE BLD-MCNC: 134 MG/DL (ref 70–99)
GLUCOSE BLD-MCNC: 143 MG/DL (ref 70–99)
GLUCOSE BLD-MCNC: 167 MG/DL (ref 70–99)
GLUCOSE BLD-MCNC: 183 MG/DL (ref 70–99)
GLUCOSE BLD-MCNC: 96 MG/DL (ref 70–99)
GLUCOSE, URINE: NEGATIVE MG/DL
GRAM SMEAR: ABNORMAL
HCT VFR BLD CALC: 21 % (ref 42–52)
HCT VFR BLD CALC: 23.3 % (ref 42–52)
HEMOGLOBIN: 7.1 GM/DL (ref 13.5–18)
HEMOGLOBIN: ABNORMAL GM/DL (ref 13.5–18)
IMMATURE NEUTROPHIL %: 1.2 % (ref 0–0.43)
INR BLD: 1.28 INDEX
KETONES, URINE: ABNORMAL MG/DL
LEUKOCYTE ESTERASE, URINE: ABNORMAL
LYMPHOCYTES ABSOLUTE: 1.7 K/CU MM
LYMPHOCYTES RELATIVE PERCENT: 13.7 % (ref 24–44)
Lab: ABNORMAL
MAGNESIUM: 1.8 MG/DL (ref 1.8–2.4)
MCH RBC QN AUTO: 21 PG (ref 27–31)
MCH RBC QN AUTO: 23.4 PG (ref 27–31)
MCHC RBC AUTO-ENTMCNC: 28.6 % (ref 32–36)
MCHC RBC AUTO-ENTMCNC: 30.5 % (ref 32–36)
MCV RBC AUTO: 73.4 FL (ref 78–100)
MCV RBC AUTO: 76.6 FL (ref 78–100)
MONOCYTES ABSOLUTE: 0.9 K/CU MM
MONOCYTES RELATIVE PERCENT: 7.4 % (ref 0–4)
MUCUS: ABNORMAL HPF
NITRITE URINE, QUANTITATIVE: NEGATIVE
NUCLEATED RBC %: 0 %
OSMOLALITY URINE: NORMAL MOS/L (ref 292–1090)
OSMOLALITY: ABNORMAL MOS/L (ref 280–300)
PDW BLD-RTO: 22.1 % (ref 11.7–14.9)
PDW BLD-RTO: 23.6 % (ref 11.7–14.9)
PH, URINE: 6 (ref 5–8)
PHOSPHORUS: 2.8 MG/DL (ref 2.5–4.9)
PLATELET # BLD: 247 K/CU MM (ref 140–440)
PLATELET # BLD: 270 K/CU MM (ref 140–440)
PMV BLD AUTO: 8.6 FL (ref 7.5–11.1)
PMV BLD AUTO: 8.7 FL (ref 7.5–11.1)
POLYCHROMASIA: ABNORMAL
POTASSIUM SERPL-SCNC: 3.8 MMOL/L (ref 3.5–5.1)
POTASSIUM, UR: 43.5 MMOL/L (ref 22–119)
PROTEIN UA: 30 MG/DL
PROTHROMBIN TIME: 14.6 SECONDS (ref 9.12–12.5)
RBC # BLD: 2.86 M/CU MM (ref 4.6–6.2)
RBC # BLD: 3.04 M/CU MM (ref 4.6–6.2)
RBC # BLD: ABNORMAL 10*6/UL
RBC URINE: 4 /HPF (ref 0–3)
SEGMENTED NEUTROPHILS ABSOLUTE COUNT: 9.4 K/CU MM
SEGMENTED NEUTROPHILS RELATIVE PERCENT: 77.2 % (ref 36–66)
SODIUM BLD-SCNC: 122 MMOL/L (ref 135–145)
SODIUM BLD-SCNC: 122 MMOL/L (ref 135–145)
SODIUM URINE: 63 MMOL/L (ref 35–167)
SPECIFIC GRAVITY UA: 1.02 (ref 1–1.03)
SPECIMEN: ABNORMAL
SQUAMOUS EPITHELIAL: 1 /HPF
TOTAL IMMATURE NEUTOROPHIL: 0.15 K/CU MM
TOTAL IMMATURE NEUTOROPHIL: ABNORMAL K/CU MM
TOTAL NUCLEATED RBC: 0 K/CU MM
TRICHOMONAS: ABNORMAL /HPF
UROBILINOGEN, URINE: NORMAL MG/DL (ref 0.2–1)
WBC # BLD: 12.2 K/CU MM (ref 4–10.5)
WBC # BLD: 12.7 K/CU MM (ref 4–10.5)
WBC UA: ABNORMAL /HPF (ref 0–2)

## 2019-03-16 PROCEDURE — 2140000000 HC CCU INTERMEDIATE R&B

## 2019-03-16 PROCEDURE — 85610 PROTHROMBIN TIME: CPT

## 2019-03-16 PROCEDURE — 85730 THROMBOPLASTIN TIME PARTIAL: CPT

## 2019-03-16 PROCEDURE — 6360000002 HC RX W HCPCS: Performed by: NURSE PRACTITIONER

## 2019-03-16 PROCEDURE — 84295 ASSAY OF SERUM SODIUM: CPT

## 2019-03-16 PROCEDURE — 85025 COMPLETE CBC W/AUTO DIFF WBC: CPT

## 2019-03-16 PROCEDURE — 85027 COMPLETE CBC AUTOMATED: CPT

## 2019-03-16 PROCEDURE — 6370000000 HC RX 637 (ALT 250 FOR IP): Performed by: NURSE PRACTITIONER

## 2019-03-16 PROCEDURE — 84100 ASSAY OF PHOSPHORUS: CPT

## 2019-03-16 PROCEDURE — 86900 BLOOD TYPING SEROLOGIC ABO: CPT

## 2019-03-16 PROCEDURE — 82378 CARCINOEMBRYONIC ANTIGEN: CPT

## 2019-03-16 PROCEDURE — 80048 BASIC METABOLIC PNL TOTAL CA: CPT

## 2019-03-16 PROCEDURE — 2580000003 HC RX 258: Performed by: INTERNAL MEDICINE

## 2019-03-16 PROCEDURE — 6370000000 HC RX 637 (ALT 250 FOR IP): Performed by: INTERNAL MEDICINE

## 2019-03-16 PROCEDURE — 2580000003 HC RX 258: Performed by: RADIOLOGY

## 2019-03-16 PROCEDURE — 83935 ASSAY OF URINE OSMOLALITY: CPT

## 2019-03-16 PROCEDURE — 86922 COMPATIBILITY TEST ANTIGLOB: CPT

## 2019-03-16 PROCEDURE — 82436 ASSAY OF URINE CHLORIDE: CPT

## 2019-03-16 PROCEDURE — 71250 CT THORAX DX C-: CPT

## 2019-03-16 PROCEDURE — 83930 ASSAY OF BLOOD OSMOLALITY: CPT

## 2019-03-16 PROCEDURE — 84300 ASSAY OF URINE SODIUM: CPT

## 2019-03-16 PROCEDURE — 71045 X-RAY EXAM CHEST 1 VIEW: CPT

## 2019-03-16 PROCEDURE — 86901 BLOOD TYPING SEROLOGIC RH(D): CPT

## 2019-03-16 PROCEDURE — 83735 ASSAY OF MAGNESIUM: CPT

## 2019-03-16 PROCEDURE — P9016 RBC LEUKOCYTES REDUCED: HCPCS

## 2019-03-16 PROCEDURE — 86850 RBC ANTIBODY SCREEN: CPT

## 2019-03-16 PROCEDURE — 6360000002 HC RX W HCPCS: Performed by: INTERNAL MEDICINE

## 2019-03-16 PROCEDURE — 36415 COLL VENOUS BLD VENIPUNCTURE: CPT

## 2019-03-16 PROCEDURE — 81001 URINALYSIS AUTO W/SCOPE: CPT

## 2019-03-16 PROCEDURE — 82962 GLUCOSE BLOOD TEST: CPT

## 2019-03-16 PROCEDURE — 84133 ASSAY OF URINE POTASSIUM: CPT

## 2019-03-16 PROCEDURE — 36430 TRANSFUSION BLD/BLD COMPNT: CPT

## 2019-03-16 PROCEDURE — 2580000003 HC RX 258: Performed by: NURSE PRACTITIONER

## 2019-03-16 RX ORDER — TOLVAPTAN 15 MG/1
7.5 TABLET ORAL ONCE
Status: COMPLETED | OUTPATIENT
Start: 2019-03-16 | End: 2019-03-16

## 2019-03-16 RX ORDER — 0.9 % SODIUM CHLORIDE 0.9 %
250 INTRAVENOUS SOLUTION INTRAVENOUS ONCE
Status: COMPLETED | OUTPATIENT
Start: 2019-03-16 | End: 2019-03-16

## 2019-03-16 RX ADMIN — ONDANSETRON 4 MG: 2 INJECTION INTRAMUSCULAR; INTRAVENOUS at 17:29

## 2019-03-16 RX ADMIN — FUROSEMIDE 40 MG: 40 TABLET ORAL at 08:36

## 2019-03-16 RX ADMIN — LINAGLIPTIN 5 MG: 5 TABLET, FILM COATED ORAL at 08:36

## 2019-03-16 RX ADMIN — SODIUM CHLORIDE, PRESERVATIVE FREE 10 ML: 5 INJECTION INTRAVENOUS at 22:14

## 2019-03-16 RX ADMIN — ASPIRIN 81 MG: 81 TABLET, COATED ORAL at 08:36

## 2019-03-16 RX ADMIN — DILTIAZEM HYDROCHLORIDE 60 MG: 60 TABLET, FILM COATED ORAL at 00:10

## 2019-03-16 RX ADMIN — SODIUM CHLORIDE, PRESERVATIVE FREE 10 ML: 5 INJECTION INTRAVENOUS at 22:15

## 2019-03-16 RX ADMIN — PANTOPRAZOLE SODIUM 40 MG: 40 TABLET, DELAYED RELEASE ORAL at 06:18

## 2019-03-16 RX ADMIN — ONDANSETRON 4 MG: 2 INJECTION INTRAMUSCULAR; INTRAVENOUS at 11:34

## 2019-03-16 RX ADMIN — DILTIAZEM HYDROCHLORIDE 60 MG: 60 TABLET, FILM COATED ORAL at 12:50

## 2019-03-16 RX ADMIN — VANCOMYCIN HYDROCHLORIDE 1750 MG: 5 INJECTION, POWDER, LYOPHILIZED, FOR SOLUTION INTRAVENOUS at 17:33

## 2019-03-16 RX ADMIN — ESCITALOPRAM OXALATE 20 MG: 10 TABLET, FILM COATED ORAL at 08:36

## 2019-03-16 RX ADMIN — SODIUM CHLORIDE, PRESERVATIVE FREE 10 ML: 5 INJECTION INTRAVENOUS at 08:37

## 2019-03-16 RX ADMIN — HYDROCODONE BITARTRATE AND ACETAMINOPHEN 1 TABLET: 5; 325 TABLET ORAL at 08:06

## 2019-03-16 RX ADMIN — PANTOPRAZOLE SODIUM 40 MG: 40 TABLET, DELAYED RELEASE ORAL at 17:29

## 2019-03-16 RX ADMIN — INSULIN LISPRO 1 UNITS: 100 INJECTION, SOLUTION INTRAVENOUS; SUBCUTANEOUS at 21:12

## 2019-03-16 RX ADMIN — TOLVAPTAN 7.5 MG: 15 TABLET ORAL at 15:49

## 2019-03-16 RX ADMIN — DILTIAZEM HYDROCHLORIDE 60 MG: 60 TABLET, FILM COATED ORAL at 06:18

## 2019-03-16 RX ADMIN — CEFEPIME 2 G: 2 INJECTION, POWDER, FOR SOLUTION INTRAVENOUS at 22:14

## 2019-03-16 RX ADMIN — SODIUM CHLORIDE 250 ML: 9 INJECTION, SOLUTION INTRAVENOUS at 12:50

## 2019-03-16 RX ADMIN — CARVEDILOL 12.5 MG: 12.5 TABLET, FILM COATED ORAL at 17:29

## 2019-03-16 RX ADMIN — DILTIAZEM HYDROCHLORIDE 60 MG: 60 TABLET, FILM COATED ORAL at 17:29

## 2019-03-16 RX ADMIN — HYDROCODONE BITARTRATE AND ACETAMINOPHEN 1 TABLET: 5; 325 TABLET ORAL at 22:13

## 2019-03-16 RX ADMIN — FLUTICASONE PROPIONATE 2 SPRAY: 50 SPRAY, METERED NASAL at 08:38

## 2019-03-16 RX ADMIN — CARVEDILOL 12.5 MG: 12.5 TABLET, FILM COATED ORAL at 08:37

## 2019-03-16 RX ADMIN — FOLIC ACID 1 MG: 1 TABLET ORAL at 08:36

## 2019-03-16 RX ADMIN — AZITHROMYCIN MONOHYDRATE 500 MG: 500 INJECTION, POWDER, LYOPHILIZED, FOR SOLUTION INTRAVENOUS at 21:08

## 2019-03-16 RX ADMIN — CEFEPIME 2 G: 2 INJECTION, POWDER, FOR SOLUTION INTRAVENOUS at 08:37

## 2019-03-16 ASSESSMENT — PAIN DESCRIPTION - PAIN TYPE: TYPE: ACUTE PAIN

## 2019-03-16 ASSESSMENT — PAIN SCALES - GENERAL
PAINLEVEL_OUTOF10: 0
PAINLEVEL_OUTOF10: 7
PAINLEVEL_OUTOF10: 0
PAINLEVEL_OUTOF10: 8
PAINLEVEL_OUTOF10: 0
PAINLEVEL_OUTOF10: 0

## 2019-03-16 ASSESSMENT — PAIN DESCRIPTION - LOCATION: LOCATION: BACK

## 2019-03-17 ENCOUNTER — APPOINTMENT (OUTPATIENT)
Dept: GENERAL RADIOLOGY | Age: 69
DRG: 163 | End: 2019-03-17
Payer: MEDICARE

## 2019-03-17 LAB
ANION GAP SERPL CALCULATED.3IONS-SCNC: 7 MMOL/L (ref 4–16)
BASOPHILS ABSOLUTE: 0 K/CU MM
BASOPHILS RELATIVE PERCENT: 0.3 % (ref 0–1)
BUN BLDV-MCNC: 11 MG/DL (ref 6–23)
CALCIUM SERPL-MCNC: 8 MG/DL (ref 8.3–10.6)
CHLORIDE BLD-SCNC: 93 MMOL/L (ref 99–110)
CO2: 26 MMOL/L (ref 21–32)
CREAT SERPL-MCNC: 1.3 MG/DL (ref 0.9–1.3)
DIFFERENTIAL TYPE: ABNORMAL
DOSE AMOUNT: ABNORMAL
DOSE TIME: ABNORMAL
EOSINOPHILS ABSOLUTE: 0 K/CU MM
EOSINOPHILS RELATIVE PERCENT: 0.1 % (ref 0–3)
GFR AFRICAN AMERICAN: >60 ML/MIN/1.73M2
GFR NON-AFRICAN AMERICAN: 55 ML/MIN/1.73M2
GLUCOSE BLD-MCNC: 113 MG/DL (ref 70–99)
GLUCOSE BLD-MCNC: 122 MG/DL (ref 70–99)
GLUCOSE BLD-MCNC: 127 MG/DL (ref 70–99)
GLUCOSE BLD-MCNC: 156 MG/DL (ref 70–99)
GLUCOSE BLD-MCNC: 157 MG/DL (ref 70–99)
HCT VFR BLD CALC: 24.5 % (ref 42–52)
HEMOGLOBIN: 7 GM/DL (ref 13.5–18)
IMMATURE NEUTROPHIL %: 0.9 % (ref 0–0.43)
LYMPHOCYTES ABSOLUTE: 1.7 K/CU MM
LYMPHOCYTES RELATIVE PERCENT: 14.8 % (ref 24–44)
MCH RBC QN AUTO: 23.1 PG (ref 27–31)
MCHC RBC AUTO-ENTMCNC: 28.6 % (ref 32–36)
MCV RBC AUTO: 80.9 FL (ref 78–100)
MONOCYTES ABSOLUTE: 0.9 K/CU MM
MONOCYTES RELATIVE PERCENT: 7.9 % (ref 0–4)
NUCLEATED RBC %: 0 %
PDW BLD-RTO: 23.4 % (ref 11.7–14.9)
PLATELET # BLD: 267 K/CU MM (ref 140–440)
PMV BLD AUTO: 8.5 FL (ref 7.5–11.1)
POTASSIUM SERPL-SCNC: 3.8 MMOL/L (ref 3.5–5.1)
RBC # BLD: 3.03 M/CU MM (ref 4.6–6.2)
SEGMENTED NEUTROPHILS ABSOLUTE COUNT: 8.7 K/CU MM
SEGMENTED NEUTROPHILS RELATIVE PERCENT: 76 % (ref 36–66)
SODIUM BLD-SCNC: 126 MMOL/L (ref 135–145)
TOTAL IMMATURE NEUTOROPHIL: 0.1 K/CU MM
TOTAL NUCLEATED RBC: 0 K/CU MM
VANCOMYCIN TROUGH: 20.6 UG/ML (ref 10–20)
WBC # BLD: 11.5 K/CU MM (ref 4–10.5)

## 2019-03-17 PROCEDURE — 6360000002 HC RX W HCPCS: Performed by: NURSE PRACTITIONER

## 2019-03-17 PROCEDURE — 6370000000 HC RX 637 (ALT 250 FOR IP): Performed by: NURSE PRACTITIONER

## 2019-03-17 PROCEDURE — 6370000000 HC RX 637 (ALT 250 FOR IP): Performed by: INTERNAL MEDICINE

## 2019-03-17 PROCEDURE — 2580000003 HC RX 258: Performed by: NURSE PRACTITIONER

## 2019-03-17 PROCEDURE — 6360000002 HC RX W HCPCS: Performed by: INTERNAL MEDICINE

## 2019-03-17 PROCEDURE — 82962 GLUCOSE BLOOD TEST: CPT

## 2019-03-17 PROCEDURE — 94761 N-INVAS EAR/PLS OXIMETRY MLT: CPT

## 2019-03-17 PROCEDURE — 71045 X-RAY EXAM CHEST 1 VIEW: CPT

## 2019-03-17 PROCEDURE — 36415 COLL VENOUS BLD VENIPUNCTURE: CPT

## 2019-03-17 PROCEDURE — 2580000003 HC RX 258: Performed by: INTERNAL MEDICINE

## 2019-03-17 PROCEDURE — 2580000003 HC RX 258: Performed by: RADIOLOGY

## 2019-03-17 PROCEDURE — 85025 COMPLETE CBC W/AUTO DIFF WBC: CPT

## 2019-03-17 PROCEDURE — 6370000000 HC RX 637 (ALT 250 FOR IP): Performed by: HOSPITALIST

## 2019-03-17 PROCEDURE — 2100000000 HC CCU R&B

## 2019-03-17 PROCEDURE — 80202 ASSAY OF VANCOMYCIN: CPT

## 2019-03-17 PROCEDURE — 86900 BLOOD TYPING SEROLOGIC ABO: CPT

## 2019-03-17 PROCEDURE — 94640 AIRWAY INHALATION TREATMENT: CPT

## 2019-03-17 PROCEDURE — 80048 BASIC METABOLIC PNL TOTAL CA: CPT

## 2019-03-17 RX ADMIN — INSULIN LISPRO 1 UNITS: 100 INJECTION, SOLUTION INTRAVENOUS; SUBCUTANEOUS at 21:13

## 2019-03-17 RX ADMIN — PANTOPRAZOLE SODIUM 40 MG: 40 TABLET, DELAYED RELEASE ORAL at 06:28

## 2019-03-17 RX ADMIN — AZITHROMYCIN MONOHYDRATE 500 MG: 500 INJECTION, POWDER, LYOPHILIZED, FOR SOLUTION INTRAVENOUS at 21:12

## 2019-03-17 RX ADMIN — SODIUM CHLORIDE, PRESERVATIVE FREE 10 ML: 5 INJECTION INTRAVENOUS at 23:21

## 2019-03-17 RX ADMIN — HYDROCODONE BITARTRATE AND ACETAMINOPHEN 1 TABLET: 5; 325 TABLET ORAL at 10:26

## 2019-03-17 RX ADMIN — ALPRAZOLAM 0.5 MG: 0.5 TABLET ORAL at 03:49

## 2019-03-17 RX ADMIN — FLUTICASONE PROPIONATE 2 SPRAY: 50 SPRAY, METERED NASAL at 09:11

## 2019-03-17 RX ADMIN — DILTIAZEM HYDROCHLORIDE 60 MG: 60 TABLET, FILM COATED ORAL at 23:27

## 2019-03-17 RX ADMIN — SODIUM CHLORIDE, PRESERVATIVE FREE 10 ML: 5 INJECTION INTRAVENOUS at 09:10

## 2019-03-17 RX ADMIN — FOLIC ACID 1 MG: 1 TABLET ORAL at 09:11

## 2019-03-17 RX ADMIN — CARVEDILOL 12.5 MG: 12.5 TABLET, FILM COATED ORAL at 09:10

## 2019-03-17 RX ADMIN — FUROSEMIDE 40 MG: 40 TABLET ORAL at 09:10

## 2019-03-17 RX ADMIN — DILTIAZEM HYDROCHLORIDE 60 MG: 60 TABLET, FILM COATED ORAL at 06:28

## 2019-03-17 RX ADMIN — VANCOMYCIN HYDROCHLORIDE 1750 MG: 5 INJECTION, POWDER, LYOPHILIZED, FOR SOLUTION INTRAVENOUS at 18:06

## 2019-03-17 RX ADMIN — DILTIAZEM HYDROCHLORIDE 60 MG: 60 TABLET, FILM COATED ORAL at 15:08

## 2019-03-17 RX ADMIN — DILTIAZEM HYDROCHLORIDE 60 MG: 60 TABLET, FILM COATED ORAL at 18:06

## 2019-03-17 RX ADMIN — ASPIRIN 81 MG: 81 TABLET, COATED ORAL at 09:10

## 2019-03-17 RX ADMIN — CEFEPIME 2 G: 2 INJECTION, POWDER, FOR SOLUTION INTRAVENOUS at 21:12

## 2019-03-17 RX ADMIN — ESCITALOPRAM OXALATE 20 MG: 10 TABLET, FILM COATED ORAL at 09:10

## 2019-03-17 RX ADMIN — CARVEDILOL 12.5 MG: 12.5 TABLET, FILM COATED ORAL at 18:06

## 2019-03-17 RX ADMIN — DILTIAZEM HYDROCHLORIDE 60 MG: 60 TABLET, FILM COATED ORAL at 00:34

## 2019-03-17 RX ADMIN — PANTOPRAZOLE SODIUM 40 MG: 40 TABLET, DELAYED RELEASE ORAL at 18:06

## 2019-03-17 RX ADMIN — ALPRAZOLAM 0.5 MG: 0.5 TABLET ORAL at 23:27

## 2019-03-17 RX ADMIN — CEFEPIME 2 G: 2 INJECTION, POWDER, FOR SOLUTION INTRAVENOUS at 09:11

## 2019-03-17 RX ADMIN — HYDROCODONE BITARTRATE AND ACETAMINOPHEN 1 TABLET: 5; 325 TABLET ORAL at 03:49

## 2019-03-17 RX ADMIN — IPRATROPIUM BROMIDE AND ALBUTEROL SULFATE 1 AMPULE: .5; 3 SOLUTION RESPIRATORY (INHALATION) at 20:33

## 2019-03-17 RX ADMIN — SODIUM CHLORIDE, PRESERVATIVE FREE 10 ML: 5 INJECTION INTRAVENOUS at 21:12

## 2019-03-17 RX ADMIN — LINAGLIPTIN 5 MG: 5 TABLET, FILM COATED ORAL at 09:11

## 2019-03-17 RX ADMIN — HYDROCODONE BITARTRATE AND ACETAMINOPHEN 1 TABLET: 5; 325 TABLET ORAL at 23:27

## 2019-03-17 ASSESSMENT — PAIN DESCRIPTION - LOCATION
LOCATION: LEG;CHEST
LOCATION: BACK

## 2019-03-17 ASSESSMENT — PAIN DESCRIPTION - PAIN TYPE
TYPE: ACUTE PAIN
TYPE: CHRONIC PAIN;SURGICAL PAIN

## 2019-03-17 ASSESSMENT — PAIN SCALES - GENERAL
PAINLEVEL_OUTOF10: 0
PAINLEVEL_OUTOF10: 7
PAINLEVEL_OUTOF10: 0
PAINLEVEL_OUTOF10: 5
PAINLEVEL_OUTOF10: 8

## 2019-03-17 ASSESSMENT — PAIN DESCRIPTION - DESCRIPTORS: DESCRIPTORS: ACHING;SORE

## 2019-03-17 ASSESSMENT — PAIN DESCRIPTION - ORIENTATION: ORIENTATION: LEFT

## 2019-03-17 ASSESSMENT — PAIN DESCRIPTION - PROGRESSION: CLINICAL_PROGRESSION: GRADUALLY WORSENING

## 2019-03-18 ENCOUNTER — ANESTHESIA (OUTPATIENT)
Dept: OPERATING ROOM | Age: 69
DRG: 163 | End: 2019-03-18
Payer: MEDICARE

## 2019-03-18 ENCOUNTER — ANESTHESIA EVENT (OUTPATIENT)
Dept: OPERATING ROOM | Age: 69
DRG: 163 | End: 2019-03-18
Payer: MEDICARE

## 2019-03-18 ENCOUNTER — APPOINTMENT (OUTPATIENT)
Dept: GENERAL RADIOLOGY | Age: 69
DRG: 163 | End: 2019-03-18
Payer: MEDICARE

## 2019-03-18 VITALS
TEMPERATURE: 97 F | SYSTOLIC BLOOD PRESSURE: 103 MMHG | DIASTOLIC BLOOD PRESSURE: 72 MMHG | RESPIRATION RATE: 22 BRPM | OXYGEN SATURATION: 100 %

## 2019-03-18 PROBLEM — J86.9 EMPYEMA OF LEFT PLEURAL SPACE (HCC): Status: ACTIVE | Noted: 2019-03-18

## 2019-03-18 LAB
ANION GAP SERPL CALCULATED.3IONS-SCNC: 9 MMOL/L (ref 4–16)
ANION GAP SERPL CALCULATED.3IONS-SCNC: 9 MMOL/L (ref 4–16)
ANISOCYTOSIS: ABNORMAL
BASE EXCESS MIXED: 1.6 (ref 0–1.2)
BASE EXCESS MIXED: ABNORMAL (ref 0–1.2)
BASE EXCESS: ABNORMAL (ref 0–3.3)
BASOPHILS ABSOLUTE: 0 K/CU MM
BASOPHILS RELATIVE PERCENT: 0.3 % (ref 0–1)
BUN BLDV-MCNC: 10 MG/DL (ref 6–23)
BUN BLDV-MCNC: 11 MG/DL (ref 6–23)
CALCIUM SERPL-MCNC: 7.2 MG/DL (ref 8.3–10.6)
CALCIUM SERPL-MCNC: 7.5 MG/DL (ref 8.3–10.6)
CARBON MONOXIDE, BLOOD: 2.3 % (ref 0–5)
CHLORIDE BLD-SCNC: 100 MMOL/L (ref 99–110)
CHLORIDE BLD-SCNC: 99 MMOL/L (ref 99–110)
CO2 CONTENT: 24.1 MMOL/L (ref 19–24)
CO2 CONTENT: 27.6 MMOL/L (ref 19–24)
CO2: 23 MMOL/L (ref 21–32)
CO2: 26 MMOL/L (ref 21–32)
COMMENT: ABNORMAL
CREAT SERPL-MCNC: 1.3 MG/DL (ref 0.9–1.3)
CREAT SERPL-MCNC: 1.3 MG/DL (ref 0.9–1.3)
DIFFERENTIAL TYPE: ABNORMAL
EOSINOPHILS ABSOLUTE: 0 K/CU MM
EOSINOPHILS RELATIVE PERCENT: 0.1 % (ref 0–3)
GFR AFRICAN AMERICAN: >60 ML/MIN/1.73M2
GFR AFRICAN AMERICAN: >60 ML/MIN/1.73M2
GFR NON-AFRICAN AMERICAN: 55 ML/MIN/1.73M2
GFR NON-AFRICAN AMERICAN: 55 ML/MIN/1.73M2
GLUCOSE BLD-MCNC: 108 MG/DL (ref 70–99)
GLUCOSE BLD-MCNC: 134 MG/DL (ref 70–99)
GLUCOSE BLD-MCNC: 166 MG/DL (ref 70–99)
GLUCOSE BLD-MCNC: 169 MG/DL (ref 70–99)
GLUCOSE BLD-MCNC: 173 MG/DL (ref 70–99)
GLUCOSE BLD-MCNC: 184 MG/DL (ref 70–99)
HCO3 ARTERIAL: 22.9 MMOL/L (ref 18–23)
HCO3 ARTERIAL: 26.4 MMOL/L (ref 18–23)
HCT VFR BLD CALC: 22.6 % (ref 42–52)
HCT VFR BLD CALC: 23 % (ref 42–52)
HCT VFR BLD CALC: 23.9 % (ref 42–52)
HCT VFR BLD CALC: 24.6 % (ref 42–52)
HEMOGLOBIN: 7.2 GM/DL (ref 13.5–18)
HEMOGLOBIN: 7.2 GM/DL (ref 13.5–18)
HEMOGLOBIN: 7.9 GM/DL (ref 13.5–18)
HEMOGLOBIN: ABNORMAL GM/DL (ref 13.5–18)
IMMATURE NEUTROPHIL %: 0.9 % (ref 0–0.43)
LYMPHOCYTES ABSOLUTE: 1.7 K/CU MM
LYMPHOCYTES RELATIVE PERCENT: 15.8 % (ref 24–44)
MCH RBC QN AUTO: 23.7 PG (ref 27–31)
MCH RBC QN AUTO: 24 PG (ref 27–31)
MCHC RBC AUTO-ENTMCNC: 29.3 % (ref 32–36)
MCHC RBC AUTO-ENTMCNC: 30.5 % (ref 32–36)
MCV RBC AUTO: 77.7 FL (ref 78–100)
MCV RBC AUTO: 82 FL (ref 78–100)
METHEMOGLOBIN ARTERIAL: 1.2 %
MICROCYTES: ABNORMAL
MONOCYTES ABSOLUTE: 0.8 K/CU MM
MONOCYTES RELATIVE PERCENT: 7.7 % (ref 0–4)
NUCLEATED RBC %: 0 %
O2 SATURATION: 95.9 % (ref 96–97)
O2 SATURATION: 99.7 % (ref 96–97)
PCO2 ARTERIAL: 36.6 MMHG (ref 32–45)
PCO2 ARTERIAL: 38 MMHG (ref 32–45)
PDW BLD-RTO: 23.4 % (ref 11.7–14.9)
PDW BLD-RTO: 24.1 % (ref 11.7–14.9)
PH BLOOD: 7.41 (ref 7.34–7.45)
PH BLOOD: 7.45 (ref 7.34–7.45)
PLATELET # BLD: 288 K/CU MM (ref 140–440)
PLATELET # BLD: 291 K/CU MM (ref 140–440)
PMV BLD AUTO: 8.5 FL (ref 7.5–11.1)
PMV BLD AUTO: 8.7 FL (ref 7.5–11.1)
PO2 ARTERIAL: 109 MMHG (ref 75–100)
PO2 ARTERIAL: 202.3 MMHG (ref 75–100)
POC CALCIUM: 1.05 MMOL/L (ref 1.12–1.32)
POLYCHROMASIA: ABNORMAL
POTASSIUM SERPL-SCNC: 3.9 MMOL/L (ref 3.5–5.1)
POTASSIUM SERPL-SCNC: 4 MMOL/L (ref 3.5–4.5)
POTASSIUM SERPL-SCNC: 4.5 MMOL/L (ref 3.5–5.1)
RBC # BLD: 2.91 M/CU MM (ref 4.6–6.2)
RBC # BLD: 3 M/CU MM (ref 4.6–6.2)
SEGMENTED NEUTROPHILS ABSOLUTE COUNT: 8.1 K/CU MM
SEGMENTED NEUTROPHILS ABSOLUTE COUNT: ABNORMAL K/CU MM
SEGMENTED NEUTROPHILS RELATIVE PERCENT: 75.2 % (ref 36–66)
SODIUM BLD-SCNC: 132 MMOL/L (ref 135–145)
SODIUM BLD-SCNC: 134 MMOL/L (ref 135–145)
SODIUM BLD-SCNC: 137 MMOL/L (ref 138–146)
SOURCE, BLOOD GAS: ABNORMAL
TOTAL IMMATURE NEUTOROPHIL: 0.1 K/CU MM
TOTAL NUCLEATED RBC: 0 K/CU MM
WBC # BLD: 10.7 K/CU MM (ref 4–10.5)
WBC # BLD: 15.3 K/CU MM (ref 4–10.5)

## 2019-03-18 PROCEDURE — 6370000000 HC RX 637 (ALT 250 FOR IP): Performed by: PHYSICIAN ASSISTANT

## 2019-03-18 PROCEDURE — 71045 X-RAY EXAM CHEST 1 VIEW: CPT

## 2019-03-18 PROCEDURE — 94761 N-INVAS EAR/PLS OXIMETRY MLT: CPT

## 2019-03-18 PROCEDURE — 2580000003 HC RX 258: Performed by: PHYSICIAN ASSISTANT

## 2019-03-18 PROCEDURE — 6370000000 HC RX 637 (ALT 250 FOR IP)

## 2019-03-18 PROCEDURE — 94002 VENT MGMT INPAT INIT DAY: CPT

## 2019-03-18 PROCEDURE — 82803 BLOOD GASES ANY COMBINATION: CPT

## 2019-03-18 PROCEDURE — 6360000002 HC RX W HCPCS: Performed by: PHYSICIAN ASSISTANT

## 2019-03-18 PROCEDURE — 2580000003 HC RX 258: Performed by: THORACIC SURGERY (CARDIOTHORACIC VASCULAR SURGERY)

## 2019-03-18 PROCEDURE — 85025 COMPLETE CBC W/AUTO DIFF WBC: CPT

## 2019-03-18 PROCEDURE — 6360000002 HC RX W HCPCS: Performed by: NURSE ANESTHETIST, CERTIFIED REGISTERED

## 2019-03-18 PROCEDURE — 6370000000 HC RX 637 (ALT 250 FOR IP): Performed by: INTERNAL MEDICINE

## 2019-03-18 PROCEDURE — 2500000003 HC RX 250 WO HCPCS: Performed by: NURSE ANESTHETIST, CERTIFIED REGISTERED

## 2019-03-18 PROCEDURE — 0BJQ4ZZ INSPECTION OF PLEURA, PERCUTANEOUS ENDOSCOPIC APPROACH: ICD-10-PCS | Performed by: THORACIC SURGERY (CARDIOTHORACIC VASCULAR SURGERY)

## 2019-03-18 PROCEDURE — 85018 HEMOGLOBIN: CPT

## 2019-03-18 PROCEDURE — 0W9B00Z DRAINAGE OF LEFT PLEURAL CAVITY WITH DRAINAGE DEVICE, OPEN APPROACH: ICD-10-PCS | Performed by: THORACIC SURGERY (CARDIOTHORACIC VASCULAR SURGERY)

## 2019-03-18 PROCEDURE — 2709999900 HC NON-CHARGEABLE SUPPLY: Performed by: THORACIC SURGERY (CARDIOTHORACIC VASCULAR SURGERY)

## 2019-03-18 PROCEDURE — 6370000000 HC RX 637 (ALT 250 FOR IP): Performed by: NURSE PRACTITIONER

## 2019-03-18 PROCEDURE — 2580000003 HC RX 258: Performed by: INTERNAL MEDICINE

## 2019-03-18 PROCEDURE — 84132 ASSAY OF SERUM POTASSIUM: CPT

## 2019-03-18 PROCEDURE — 3700000001 HC ADD 15 MINUTES (ANESTHESIA): Performed by: THORACIC SURGERY (CARDIOTHORACIC VASCULAR SURGERY)

## 2019-03-18 PROCEDURE — 88108 CYTOPATH CONCENTRATE TECH: CPT

## 2019-03-18 PROCEDURE — 82330 ASSAY OF CALCIUM: CPT

## 2019-03-18 PROCEDURE — 84295 ASSAY OF SERUM SODIUM: CPT

## 2019-03-18 PROCEDURE — 2700000000 HC OXYGEN THERAPY PER DAY

## 2019-03-18 PROCEDURE — 2580000003 HC RX 258: Performed by: NURSE ANESTHETIST, CERTIFIED REGISTERED

## 2019-03-18 PROCEDURE — 88305 TISSUE EXAM BY PATHOLOGIST: CPT

## 2019-03-18 PROCEDURE — 36430 TRANSFUSION BLD/BLD COMPNT: CPT

## 2019-03-18 PROCEDURE — 80048 BASIC METABOLIC PNL TOTAL CA: CPT

## 2019-03-18 PROCEDURE — 82962 GLUCOSE BLOOD TEST: CPT

## 2019-03-18 PROCEDURE — 3700000000 HC ANESTHESIA ATTENDED CARE: Performed by: THORACIC SURGERY (CARDIOTHORACIC VASCULAR SURGERY)

## 2019-03-18 PROCEDURE — 0BCP0ZZ EXTIRPATION OF MATTER FROM LEFT PLEURA, OPEN APPROACH: ICD-10-PCS | Performed by: THORACIC SURGERY (CARDIOTHORACIC VASCULAR SURGERY)

## 2019-03-18 PROCEDURE — 85014 HEMATOCRIT: CPT

## 2019-03-18 PROCEDURE — 3E0L3GC INTRODUCTION OF OTHER THERAPEUTIC SUBSTANCE INTO PLEURAL CAVITY, PERCUTANEOUS APPROACH: ICD-10-PCS | Performed by: THORACIC SURGERY (CARDIOTHORACIC VASCULAR SURGERY)

## 2019-03-18 PROCEDURE — 3600000004 HC SURGERY LEVEL 4 BASE: Performed by: THORACIC SURGERY (CARDIOTHORACIC VASCULAR SURGERY)

## 2019-03-18 PROCEDURE — 94660 CPAP INITIATION&MGMT: CPT

## 2019-03-18 PROCEDURE — P9016 RBC LEUKOCYTES REDUCED: HCPCS

## 2019-03-18 PROCEDURE — C1713 ANCHOR/SCREW BN/BN,TIS/BN: HCPCS | Performed by: THORACIC SURGERY (CARDIOTHORACIC VASCULAR SURGERY)

## 2019-03-18 PROCEDURE — C1729 CATH, DRAINAGE: HCPCS | Performed by: THORACIC SURGERY (CARDIOTHORACIC VASCULAR SURGERY)

## 2019-03-18 PROCEDURE — P9045 ALBUMIN (HUMAN), 5%, 250 ML: HCPCS | Performed by: PHYSICIAN ASSISTANT

## 2019-03-18 PROCEDURE — 85027 COMPLETE CBC AUTOMATED: CPT

## 2019-03-18 PROCEDURE — 2100000000 HC CCU R&B

## 2019-03-18 PROCEDURE — 3600000014 HC SURGERY LEVEL 4 ADDTL 15MIN: Performed by: THORACIC SURGERY (CARDIOTHORACIC VASCULAR SURGERY)

## 2019-03-18 PROCEDURE — 7100000000 HC PACU RECOVERY - FIRST 15 MIN

## 2019-03-18 PROCEDURE — 0BNP0ZZ RELEASE LEFT PLEURA, OPEN APPROACH: ICD-10-PCS | Performed by: THORACIC SURGERY (CARDIOTHORACIC VASCULAR SURGERY)

## 2019-03-18 PROCEDURE — 94640 AIRWAY INHALATION TREATMENT: CPT

## 2019-03-18 DEVICE — SEALANT TISS GLUE 4ML PLEUR AIR LEAK PROGEL: Type: IMPLANTABLE DEVICE | Site: LUNG | Status: FUNCTIONAL

## 2019-03-18 RX ORDER — SODIUM CHLORIDE 9 MG/ML
INJECTION, SOLUTION INTRAVENOUS CONTINUOUS
Status: DISPENSED | OUTPATIENT
Start: 2019-03-18 | End: 2019-03-19

## 2019-03-18 RX ORDER — SODIUM CHLORIDE 0.9 % (FLUSH) 0.9 %
10 SYRINGE (ML) INJECTION PRN
Status: DISCONTINUED | OUTPATIENT
Start: 2019-03-18 | End: 2019-03-25 | Stop reason: HOSPADM

## 2019-03-18 RX ORDER — NALOXONE HYDROCHLORIDE 0.4 MG/ML
INJECTION, SOLUTION INTRAMUSCULAR; INTRAVENOUS; SUBCUTANEOUS PRN
Status: DISCONTINUED | OUTPATIENT
Start: 2019-03-18 | End: 2019-03-18 | Stop reason: SDUPTHER

## 2019-03-18 RX ORDER — EPHEDRINE SULFATE 50 MG/ML
INJECTION, SOLUTION INTRAVENOUS PRN
Status: DISCONTINUED | OUTPATIENT
Start: 2019-03-18 | End: 2019-03-18 | Stop reason: SDUPTHER

## 2019-03-18 RX ORDER — 0.9 % SODIUM CHLORIDE 0.9 %
250 INTRAVENOUS SOLUTION INTRAVENOUS ONCE
Status: COMPLETED | OUTPATIENT
Start: 2019-03-18 | End: 2019-03-18

## 2019-03-18 RX ORDER — ACETAMINOPHEN 325 MG/1
650 TABLET ORAL EVERY 4 HOURS PRN
Status: DISCONTINUED | OUTPATIENT
Start: 2019-03-18 | End: 2019-03-25 | Stop reason: HOSPADM

## 2019-03-18 RX ORDER — ALBUMIN, HUMAN INJ 5% 5 %
12.5 SOLUTION INTRAVENOUS ONCE
Status: COMPLETED | OUTPATIENT
Start: 2019-03-18 | End: 2019-03-18

## 2019-03-18 RX ORDER — FENTANYL CITRATE 50 UG/ML
INJECTION, SOLUTION INTRAMUSCULAR; INTRAVENOUS PRN
Status: DISCONTINUED | OUTPATIENT
Start: 2019-03-18 | End: 2019-03-18 | Stop reason: SDUPTHER

## 2019-03-18 RX ORDER — PROPOFOL 10 MG/ML
INJECTION, EMULSION INTRAVENOUS PRN
Status: DISCONTINUED | OUTPATIENT
Start: 2019-03-18 | End: 2019-03-18 | Stop reason: SDUPTHER

## 2019-03-18 RX ORDER — HYDROMORPHONE HCL 110MG/55ML
PATIENT CONTROLLED ANALGESIA SYRINGE INTRAVENOUS PRN
Status: DISCONTINUED | OUTPATIENT
Start: 2019-03-18 | End: 2019-03-18 | Stop reason: SDUPTHER

## 2019-03-18 RX ORDER — SODIUM CHLORIDE 9 MG/ML
INJECTION, SOLUTION INTRAVENOUS CONTINUOUS PRN
Status: DISCONTINUED | OUTPATIENT
Start: 2019-03-18 | End: 2019-03-18 | Stop reason: SDUPTHER

## 2019-03-18 RX ORDER — MORPHINE SULFATE 2 MG/ML
2 INJECTION, SOLUTION INTRAMUSCULAR; INTRAVENOUS
Status: DISCONTINUED | OUTPATIENT
Start: 2019-03-18 | End: 2019-03-20

## 2019-03-18 RX ORDER — DOCUSATE SODIUM 100 MG/1
100 CAPSULE, LIQUID FILLED ORAL 2 TIMES DAILY
Status: DISCONTINUED | OUTPATIENT
Start: 2019-03-18 | End: 2019-03-25 | Stop reason: HOSPADM

## 2019-03-18 RX ORDER — HYDROCODONE BITARTRATE AND ACETAMINOPHEN 5; 325 MG/1; MG/1
1 TABLET ORAL EVERY 4 HOURS PRN
Status: DISCONTINUED | OUTPATIENT
Start: 2019-03-18 | End: 2019-03-25 | Stop reason: HOSPADM

## 2019-03-18 RX ORDER — SODIUM CHLORIDE 0.9 % (FLUSH) 0.9 %
10 SYRINGE (ML) INJECTION EVERY 12 HOURS SCHEDULED
Status: DISCONTINUED | OUTPATIENT
Start: 2019-03-18 | End: 2019-03-25 | Stop reason: HOSPADM

## 2019-03-18 RX ORDER — SODIUM CHLORIDE 450 MG/100ML
INJECTION, SOLUTION INTRAVENOUS CONTINUOUS
Status: DISCONTINUED | OUTPATIENT
Start: 2019-03-18 | End: 2019-03-18

## 2019-03-18 RX ORDER — 0.9 % SODIUM CHLORIDE 0.9 %
250 INTRAVENOUS SOLUTION INTRAVENOUS ONCE
Status: DISCONTINUED | OUTPATIENT
Start: 2019-03-18 | End: 2019-03-20

## 2019-03-18 RX ORDER — MORPHINE SULFATE 4 MG/ML
4 INJECTION, SOLUTION INTRAMUSCULAR; INTRAVENOUS
Status: DISCONTINUED | OUTPATIENT
Start: 2019-03-18 | End: 2019-03-20

## 2019-03-18 RX ORDER — ROCURONIUM BROMIDE 10 MG/ML
INJECTION, SOLUTION INTRAVENOUS PRN
Status: DISCONTINUED | OUTPATIENT
Start: 2019-03-18 | End: 2019-03-18 | Stop reason: SDUPTHER

## 2019-03-18 RX ORDER — VECURONIUM BROMIDE 1 MG/ML
INJECTION, POWDER, LYOPHILIZED, FOR SOLUTION INTRAVENOUS PRN
Status: DISCONTINUED | OUTPATIENT
Start: 2019-03-18 | End: 2019-03-18 | Stop reason: SDUPTHER

## 2019-03-18 RX ORDER — BISACODYL 10 MG
10 SUPPOSITORY, RECTAL RECTAL DAILY PRN
Status: DISCONTINUED | OUTPATIENT
Start: 2019-03-18 | End: 2019-03-25 | Stop reason: HOSPADM

## 2019-03-18 RX ORDER — HYDROCODONE BITARTRATE AND ACETAMINOPHEN 5; 325 MG/1; MG/1
2 TABLET ORAL EVERY 4 HOURS PRN
Status: DISCONTINUED | OUTPATIENT
Start: 2019-03-18 | End: 2019-03-25 | Stop reason: HOSPADM

## 2019-03-18 RX ORDER — DEXAMETHASONE SODIUM PHOSPHATE 4 MG/ML
INJECTION, SOLUTION INTRA-ARTICULAR; INTRALESIONAL; INTRAMUSCULAR; INTRAVENOUS; SOFT TISSUE PRN
Status: DISCONTINUED | OUTPATIENT
Start: 2019-03-18 | End: 2019-03-18 | Stop reason: SDUPTHER

## 2019-03-18 RX ORDER — ONDANSETRON 2 MG/ML
INJECTION INTRAMUSCULAR; INTRAVENOUS PRN
Status: DISCONTINUED | OUTPATIENT
Start: 2019-03-18 | End: 2019-03-18 | Stop reason: SDUPTHER

## 2019-03-18 RX ORDER — SUCCINYLCHOLINE CHLORIDE 20 MG/ML
INJECTION INTRAMUSCULAR; INTRAVENOUS PRN
Status: DISCONTINUED | OUTPATIENT
Start: 2019-03-18 | End: 2019-03-18 | Stop reason: SDUPTHER

## 2019-03-18 RX ADMIN — PROPOFOL 120 MG: 10 INJECTION, EMULSION INTRAVENOUS at 08:11

## 2019-03-18 RX ADMIN — IPRATROPIUM BROMIDE AND ALBUTEROL SULFATE 1 AMPULE: .5; 3 SOLUTION RESPIRATORY (INHALATION) at 16:18

## 2019-03-18 RX ADMIN — EPHEDRINE SULFATE 10 MG: 50 INJECTION, SOLUTION INTRAMUSCULAR; INTRAVENOUS; SUBCUTANEOUS at 10:44

## 2019-03-18 RX ADMIN — EPHEDRINE SULFATE 10 MG: 50 INJECTION, SOLUTION INTRAMUSCULAR; INTRAVENOUS; SUBCUTANEOUS at 09:00

## 2019-03-18 RX ADMIN — DEXAMETHASONE SODIUM PHOSPHATE 8 MG: 4 INJECTION, SOLUTION INTRAMUSCULAR; INTRAVENOUS at 08:35

## 2019-03-18 RX ADMIN — EPHEDRINE SULFATE 10 MG: 50 INJECTION, SOLUTION INTRAMUSCULAR; INTRAVENOUS; SUBCUTANEOUS at 08:30

## 2019-03-18 RX ADMIN — INSULIN LISPRO 1 UNITS: 100 INJECTION, SOLUTION INTRAVENOUS; SUBCUTANEOUS at 21:48

## 2019-03-18 RX ADMIN — SUCCINYLCHOLINE CHLORIDE 100 MG: 20 INJECTION, SOLUTION INTRAMUSCULAR; INTRAVENOUS at 08:11

## 2019-03-18 RX ADMIN — ONDANSETRON 4 MG: 2 INJECTION INTRAMUSCULAR; INTRAVENOUS at 11:06

## 2019-03-18 RX ADMIN — SODIUM CHLORIDE, PRESERVATIVE FREE 10 ML: 5 INJECTION INTRAVENOUS at 21:35

## 2019-03-18 RX ADMIN — EPHEDRINE SULFATE 10 MG: 50 INJECTION, SOLUTION INTRAMUSCULAR; INTRAVENOUS; SUBCUTANEOUS at 09:30

## 2019-03-18 RX ADMIN — PANTOPRAZOLE SODIUM 40 MG: 40 TABLET, DELAYED RELEASE ORAL at 18:18

## 2019-03-18 RX ADMIN — PROPOFOL 80 MG: 10 INJECTION, EMULSION INTRAVENOUS at 12:15

## 2019-03-18 RX ADMIN — NALOXONE HYDROCHLORIDE 0.04 MG: 0.4 INJECTION, SOLUTION INTRAMUSCULAR; INTRAVENOUS; SUBCUTANEOUS at 12:08

## 2019-03-18 RX ADMIN — HYDROMORPHONE HYDROCHLORIDE 0.5 MG: 2 INJECTION INTRAMUSCULAR; INTRAVENOUS; SUBCUTANEOUS at 11:38

## 2019-03-18 RX ADMIN — AZITHROMYCIN MONOHYDRATE 500 MG: 500 INJECTION, POWDER, LYOPHILIZED, FOR SOLUTION INTRAVENOUS at 23:50

## 2019-03-18 RX ADMIN — ROCURONIUM BROMIDE 20 MG: 50 INJECTION, SOLUTION INTRAVENOUS at 08:57

## 2019-03-18 RX ADMIN — FENTANYL CITRATE 100 MCG: 50 INJECTION INTRAMUSCULAR; INTRAVENOUS at 09:59

## 2019-03-18 RX ADMIN — INSULIN LISPRO 2 UNITS: 100 INJECTION, SOLUTION INTRAVENOUS; SUBCUTANEOUS at 14:05

## 2019-03-18 RX ADMIN — LIDOCAINE HYDROCHLORIDE 100 MG: 20 INJECTION, SOLUTION INTRAVENOUS at 08:11

## 2019-03-18 RX ADMIN — IPRATROPIUM BROMIDE AND ALBUTEROL SULFATE 1 AMPULE: .5; 3 SOLUTION RESPIRATORY (INHALATION) at 07:22

## 2019-03-18 RX ADMIN — MORPHINE SULFATE 4 MG: 4 INJECTION INTRAVENOUS at 15:47

## 2019-03-18 RX ADMIN — IPRATROPIUM BROMIDE AND ALBUTEROL SULFATE 1 AMPULE: .5; 3 SOLUTION RESPIRATORY (INHALATION) at 21:25

## 2019-03-18 RX ADMIN — SODIUM CHLORIDE 250 ML: 9 INJECTION, SOLUTION INTRAVENOUS at 23:55

## 2019-03-18 RX ADMIN — DILTIAZEM HYDROCHLORIDE 60 MG: 60 TABLET, FILM COATED ORAL at 18:19

## 2019-03-18 RX ADMIN — HYDROCODONE BITARTRATE AND ACETAMINOPHEN 2 TABLET: 5; 325 TABLET ORAL at 19:41

## 2019-03-18 RX ADMIN — VECURONIUM BROMIDE FOR INJECTION 2 MG: 1 INJECTION, POWDER, LYOPHILIZED, FOR SOLUTION INTRAVENOUS at 10:44

## 2019-03-18 RX ADMIN — INSULIN LISPRO 2 UNITS: 100 INJECTION, SOLUTION INTRAVENOUS; SUBCUTANEOUS at 19:31

## 2019-03-18 RX ADMIN — EPHEDRINE SULFATE 5 MG: 50 INJECTION, SOLUTION INTRAMUSCULAR; INTRAVENOUS; SUBCUTANEOUS at 11:07

## 2019-03-18 RX ADMIN — CARVEDILOL 12.5 MG: 12.5 TABLET, FILM COATED ORAL at 07:53

## 2019-03-18 RX ADMIN — ROCURONIUM BROMIDE 50 MG: 50 INJECTION, SOLUTION INTRAVENOUS at 08:25

## 2019-03-18 RX ADMIN — ROCURONIUM BROMIDE 10 MG: 50 INJECTION, SOLUTION INTRAVENOUS at 09:45

## 2019-03-18 RX ADMIN — EPHEDRINE SULFATE 5 MG: 50 INJECTION, SOLUTION INTRAMUSCULAR; INTRAVENOUS; SUBCUTANEOUS at 11:02

## 2019-03-18 RX ADMIN — EPHEDRINE SULFATE 10 MG: 50 INJECTION, SOLUTION INTRAMUSCULAR; INTRAVENOUS; SUBCUTANEOUS at 08:42

## 2019-03-18 RX ADMIN — HYDROMORPHONE HYDROCHLORIDE 0.5 MG: 2 INJECTION INTRAMUSCULAR; INTRAVENOUS; SUBCUTANEOUS at 11:31

## 2019-03-18 RX ADMIN — EPHEDRINE SULFATE 10 MG: 50 INJECTION, SOLUTION INTRAMUSCULAR; INTRAVENOUS; SUBCUTANEOUS at 09:12

## 2019-03-18 RX ADMIN — ALBUMIN (HUMAN) 12.5 G: 12.5 INJECTION, SOLUTION INTRAVENOUS at 17:06

## 2019-03-18 RX ADMIN — EPHEDRINE SULFATE 10 MG: 50 INJECTION, SOLUTION INTRAMUSCULAR; INTRAVENOUS; SUBCUTANEOUS at 12:21

## 2019-03-18 RX ADMIN — FENTANYL CITRATE 100 MCG: 50 INJECTION INTRAMUSCULAR; INTRAVENOUS at 08:11

## 2019-03-18 RX ADMIN — ROCURONIUM BROMIDE 20 MG: 50 INJECTION, SOLUTION INTRAVENOUS at 10:13

## 2019-03-18 RX ADMIN — SODIUM CHLORIDE: 9 INJECTION, SOLUTION INTRAVENOUS at 10:19

## 2019-03-18 RX ADMIN — SODIUM CHLORIDE: 4.5 INJECTION, SOLUTION INTRAVENOUS at 13:15

## 2019-03-18 RX ADMIN — SODIUM CHLORIDE: 9 INJECTION, SOLUTION INTRAVENOUS at 21:28

## 2019-03-18 RX ADMIN — DOCUSATE SODIUM 100 MG: 100 CAPSULE, LIQUID FILLED ORAL at 21:35

## 2019-03-18 RX ADMIN — CARVEDILOL 12.5 MG: 12.5 TABLET, FILM COATED ORAL at 18:18

## 2019-03-18 RX ADMIN — CEFEPIME 2 G: 2 INJECTION, POWDER, FOR SOLUTION INTRAVENOUS at 21:41

## 2019-03-18 RX ADMIN — HYDROCODONE BITARTRATE AND ACETAMINOPHEN 2 TABLET: 5; 325 TABLET ORAL at 23:52

## 2019-03-18 RX ADMIN — SODIUM CHLORIDE: 9 INJECTION, SOLUTION INTRAVENOUS at 08:01

## 2019-03-18 RX ADMIN — SUGAMMADEX 200 MG: 100 INJECTION, SOLUTION INTRAVENOUS at 11:30

## 2019-03-18 ASSESSMENT — PULMONARY FUNCTION TESTS
PIF_VALUE: 23
PIF_VALUE: 4
PIF_VALUE: 23
PIF_VALUE: 31
PIF_VALUE: 7
PIF_VALUE: 24
PIF_VALUE: 23
PIF_VALUE: 23
PIF_VALUE: 25
PIF_VALUE: 23
PIF_VALUE: 5
PIF_VALUE: 23
PIF_VALUE: 0
PIF_VALUE: 0
PIF_VALUE: 23
PIF_VALUE: 25
PIF_VALUE: 23
PIF_VALUE: 1
PIF_VALUE: 19
PIF_VALUE: 23
PIF_VALUE: 21
PIF_VALUE: 4
PIF_VALUE: 2
PIF_VALUE: 23
PIF_VALUE: 12
PIF_VALUE: 31
PIF_VALUE: 20
PIF_VALUE: 23
PIF_VALUE: 31
PIF_VALUE: 23
PIF_VALUE: 23
PIF_VALUE: 1
PIF_VALUE: 2
PIF_VALUE: 35
PIF_VALUE: 19
PIF_VALUE: 25
PIF_VALUE: 18
PIF_VALUE: 23
PIF_VALUE: 30
PIF_VALUE: 23
PIF_VALUE: 29
PIF_VALUE: 26
PIF_VALUE: 24
PIF_VALUE: 23
PIF_VALUE: 23
PIF_VALUE: 26
PIF_VALUE: 5
PIF_VALUE: 23
PIF_VALUE: 26
PIF_VALUE: 23
PIF_VALUE: 20
PIF_VALUE: 17
PIF_VALUE: 23
PIF_VALUE: 23
PIF_VALUE: 21
PIF_VALUE: 23
PIF_VALUE: 24
PIF_VALUE: 18
PIF_VALUE: 23
PIF_VALUE: 2
PIF_VALUE: 23
PIF_VALUE: 17
PIF_VALUE: 17
PIF_VALUE: 0
PIF_VALUE: 26
PIF_VALUE: 26
PIF_VALUE: 23
PIF_VALUE: 31
PIF_VALUE: 23
PIF_VALUE: 23
PIF_VALUE: 0
PIF_VALUE: 20
PIF_VALUE: 23
PIF_VALUE: 26
PIF_VALUE: 23
PIF_VALUE: 23
PIF_VALUE: 4
PIF_VALUE: 23
PIF_VALUE: 0
PIF_VALUE: 23
PIF_VALUE: 17
PIF_VALUE: 23
PIF_VALUE: 1
PIF_VALUE: 23
PIF_VALUE: 23
PIF_VALUE: 2
PIF_VALUE: 23
PIF_VALUE: 23
PIF_VALUE: 18
PIF_VALUE: 23
PIF_VALUE: 31
PIF_VALUE: 24
PIF_VALUE: 23
PIF_VALUE: 13
PIF_VALUE: 23
PIF_VALUE: 27
PIF_VALUE: 23
PIF_VALUE: 3
PIF_VALUE: 4
PIF_VALUE: 1
PIF_VALUE: 23
PIF_VALUE: 23
PIF_VALUE: 19
PIF_VALUE: 7
PIF_VALUE: 23
PIF_VALUE: 2
PIF_VALUE: 0
PIF_VALUE: 15
PIF_VALUE: 1
PIF_VALUE: 26
PIF_VALUE: 23
PIF_VALUE: 27
PIF_VALUE: 23
PIF_VALUE: 0
PIF_VALUE: 23
PIF_VALUE: 3
PIF_VALUE: 23
PIF_VALUE: 5
PIF_VALUE: 31
PIF_VALUE: 19
PIF_VALUE: 19
PIF_VALUE: 1
PIF_VALUE: 0
PIF_VALUE: 23
PIF_VALUE: 26
PIF_VALUE: 31
PIF_VALUE: 17
PIF_VALUE: 30
PIF_VALUE: 23
PIF_VALUE: 31
PIF_VALUE: 23
PIF_VALUE: 23
PIF_VALUE: 19
PIF_VALUE: 19
PIF_VALUE: 23
PIF_VALUE: 18
PIF_VALUE: 23
PIF_VALUE: 26
PIF_VALUE: 30
PIF_VALUE: 31
PIF_VALUE: 26
PIF_VALUE: 19
PIF_VALUE: 29
PIF_VALUE: 23
PIF_VALUE: 0
PIF_VALUE: 16
PIF_VALUE: 24
PIF_VALUE: 30
PIF_VALUE: 2
PIF_VALUE: 17
PIF_VALUE: 23
PIF_VALUE: 31
PIF_VALUE: 23
PIF_VALUE: 23
PIF_VALUE: 5
PIF_VALUE: 23
PIF_VALUE: 19
PIF_VALUE: 23
PIF_VALUE: 19
PIF_VALUE: 23
PIF_VALUE: 3
PIF_VALUE: 23
PIF_VALUE: 25
PIF_VALUE: 2
PIF_VALUE: 19
PIF_VALUE: 30
PIF_VALUE: 23
PIF_VALUE: 16
PIF_VALUE: 26
PIF_VALUE: 23
PIF_VALUE: 32
PIF_VALUE: 23
PIF_VALUE: 2
PIF_VALUE: 23
PIF_VALUE: 17
PIF_VALUE: 18
PIF_VALUE: 20
PIF_VALUE: 18
PIF_VALUE: 1
PIF_VALUE: 23
PIF_VALUE: 16
PIF_VALUE: 16
PIF_VALUE: 23
PIF_VALUE: 23
PIF_VALUE: 19
PIF_VALUE: 6
PIF_VALUE: 4
PIF_VALUE: 17
PIF_VALUE: 19
PIF_VALUE: 3
PIF_VALUE: 8
PIF_VALUE: 22
PIF_VALUE: 23
PIF_VALUE: 1
PIF_VALUE: 23
PIF_VALUE: 1
PIF_VALUE: 19
PIF_VALUE: 23
PIF_VALUE: 1
PIF_VALUE: 23
PIF_VALUE: 31
PIF_VALUE: 23
PIF_VALUE: 9
PIF_VALUE: 23
PIF_VALUE: 31
PIF_VALUE: 23
PIF_VALUE: 2
PIF_VALUE: 23
PIF_VALUE: 2
PIF_VALUE: 1
PIF_VALUE: 24
PIF_VALUE: 23
PIF_VALUE: 26
PIF_VALUE: 23
PIF_VALUE: 23
PIF_VALUE: 4
PIF_VALUE: 23
PIF_VALUE: 18
PIF_VALUE: 19
PIF_VALUE: 26
PIF_VALUE: 17
PIF_VALUE: 23
PIF_VALUE: 1
PIF_VALUE: 23
PIF_VALUE: 21
PIF_VALUE: 23
PIF_VALUE: 23
PIF_VALUE: 1
PIF_VALUE: 31
PIF_VALUE: 24
PIF_VALUE: 23
PIF_VALUE: 22
PIF_VALUE: 26
PIF_VALUE: 23
PIF_VALUE: 20
PIF_VALUE: 4
PIF_VALUE: 23
PIF_VALUE: 23
PIF_VALUE: 3
PIF_VALUE: 24
PIF_VALUE: 23
PIF_VALUE: 5
PIF_VALUE: 1
PIF_VALUE: 23
PIF_VALUE: 19
PIF_VALUE: 23
PIF_VALUE: 6

## 2019-03-18 ASSESSMENT — PAIN DESCRIPTION - FREQUENCY
FREQUENCY: CONTINUOUS
FREQUENCY: INTERMITTENT
FREQUENCY: CONTINUOUS

## 2019-03-18 ASSESSMENT — PAIN SCALES - GENERAL
PAINLEVEL_OUTOF10: 10
PAINLEVEL_OUTOF10: 0
PAINLEVEL_OUTOF10: 2
PAINLEVEL_OUTOF10: 5
PAINLEVEL_OUTOF10: 9
PAINLEVEL_OUTOF10: 0
PAINLEVEL_OUTOF10: 9

## 2019-03-18 ASSESSMENT — PAIN DESCRIPTION - ORIENTATION
ORIENTATION: LEFT

## 2019-03-18 ASSESSMENT — PAIN DESCRIPTION - ONSET
ONSET: ON-GOING

## 2019-03-18 ASSESSMENT — PAIN DESCRIPTION - PROGRESSION
CLINICAL_PROGRESSION: GRADUALLY WORSENING
CLINICAL_PROGRESSION: GRADUALLY WORSENING
CLINICAL_PROGRESSION: GRADUALLY IMPROVING
CLINICAL_PROGRESSION: NOT CHANGED

## 2019-03-18 ASSESSMENT — PAIN DESCRIPTION - LOCATION
LOCATION: CHEST
LOCATION: CHEST;BACK

## 2019-03-18 ASSESSMENT — PAIN DESCRIPTION - DESCRIPTORS
DESCRIPTORS: ACHING

## 2019-03-18 ASSESSMENT — PAIN DESCRIPTION - PAIN TYPE
TYPE: SURGICAL PAIN
TYPE: SURGICAL PAIN;CHRONIC PAIN
TYPE: SURGICAL PAIN
TYPE: ACUTE PAIN;SURGICAL PAIN
TYPE: ACUTE PAIN;SURGICAL PAIN

## 2019-03-19 ENCOUNTER — APPOINTMENT (OUTPATIENT)
Dept: GENERAL RADIOLOGY | Age: 69
DRG: 163 | End: 2019-03-19
Payer: MEDICARE

## 2019-03-19 LAB
ANION GAP SERPL CALCULATED.3IONS-SCNC: 12 MMOL/L (ref 4–16)
BANDED NEUTROPHILS ABSOLUTE COUNT: 2.08 K/CU MM
BANDED NEUTROPHILS RELATIVE PERCENT: 13 % (ref 5–11)
BUN BLDV-MCNC: 15 MG/DL (ref 6–23)
CALCIUM SERPL-MCNC: 7.5 MG/DL (ref 8.3–10.6)
CHLORIDE BLD-SCNC: 96 MMOL/L (ref 99–110)
CO2: 22 MMOL/L (ref 21–32)
CREAT SERPL-MCNC: 1.3 MG/DL (ref 0.9–1.3)
DIFFERENTIAL TYPE: ABNORMAL
GFR AFRICAN AMERICAN: >60 ML/MIN/1.73M2
GFR NON-AFRICAN AMERICAN: 55 ML/MIN/1.73M2
GLUCOSE BLD-MCNC: 157 MG/DL (ref 70–99)
GLUCOSE BLD-MCNC: 173 MG/DL (ref 70–99)
GLUCOSE BLD-MCNC: 190 MG/DL (ref 70–99)
GLUCOSE BLD-MCNC: 197 MG/DL (ref 70–99)
GLUCOSE BLD-MCNC: 237 MG/DL (ref 70–99)
HCT VFR BLD CALC: 27.5 % (ref 42–52)
HEMOGLOBIN: 8.1 GM/DL (ref 13.5–18)
HIGH SENSITIVE C-REACTIVE PROTEIN: 56.2 MG/L
LYMPHOCYTES ABSOLUTE: 0.3 K/CU MM
LYMPHOCYTES RELATIVE PERCENT: 2 % (ref 24–44)
MCH RBC QN AUTO: 25.7 PG (ref 27–31)
MCHC RBC AUTO-ENTMCNC: 29.5 % (ref 32–36)
MCV RBC AUTO: 87.3 FL (ref 78–100)
MONOCYTES ABSOLUTE: 0.6 K/CU MM
MONOCYTES RELATIVE PERCENT: 4 % (ref 0–4)
PDW BLD-RTO: 23.4 % (ref 11.7–14.9)
PLATELET # BLD: 265 K/CU MM (ref 140–440)
PMV BLD AUTO: 8.8 FL (ref 7.5–11.1)
POLYCHROMASIA: ABNORMAL
POTASSIUM SERPL-SCNC: 4.4 MMOL/L (ref 3.5–5.1)
RBC # BLD: 3.15 M/CU MM (ref 4.6–6.2)
RBC # BLD: ABNORMAL 10*6/UL
SEGMENTED NEUTROPHILS ABSOLUTE COUNT: 13 K/CU MM
SEGMENTED NEUTROPHILS RELATIVE PERCENT: 81 % (ref 36–66)
SODIUM BLD-SCNC: 130 MMOL/L (ref 135–145)
TOXIC GRANULATION: PRESENT
WBC # BLD: 16 K/CU MM (ref 4–10.5)

## 2019-03-19 PROCEDURE — 97116 GAIT TRAINING THERAPY: CPT

## 2019-03-19 PROCEDURE — 2580000003 HC RX 258: Performed by: PHYSICIAN ASSISTANT

## 2019-03-19 PROCEDURE — 6370000000 HC RX 637 (ALT 250 FOR IP): Performed by: PHYSICIAN ASSISTANT

## 2019-03-19 PROCEDURE — 2100000000 HC CCU R&B

## 2019-03-19 PROCEDURE — 71045 X-RAY EXAM CHEST 1 VIEW: CPT

## 2019-03-19 PROCEDURE — 82962 GLUCOSE BLOOD TEST: CPT

## 2019-03-19 PROCEDURE — 2500000003 HC RX 250 WO HCPCS: Performed by: INTERNAL MEDICINE

## 2019-03-19 PROCEDURE — 85652 RBC SED RATE AUTOMATED: CPT

## 2019-03-19 PROCEDURE — 94660 CPAP INITIATION&MGMT: CPT

## 2019-03-19 PROCEDURE — 94761 N-INVAS EAR/PLS OXIMETRY MLT: CPT

## 2019-03-19 PROCEDURE — 2580000003 HC RX 258: Performed by: INTERNAL MEDICINE

## 2019-03-19 PROCEDURE — 97530 THERAPEUTIC ACTIVITIES: CPT

## 2019-03-19 PROCEDURE — 97164 PT RE-EVAL EST PLAN CARE: CPT

## 2019-03-19 PROCEDURE — 36415 COLL VENOUS BLD VENIPUNCTURE: CPT

## 2019-03-19 PROCEDURE — 97168 OT RE-EVAL EST PLAN CARE: CPT

## 2019-03-19 PROCEDURE — 94640 AIRWAY INHALATION TREATMENT: CPT

## 2019-03-19 PROCEDURE — 85007 BL SMEAR W/DIFF WBC COUNT: CPT

## 2019-03-19 PROCEDURE — 6360000002 HC RX W HCPCS: Performed by: PHYSICIAN ASSISTANT

## 2019-03-19 PROCEDURE — P9016 RBC LEUKOCYTES REDUCED: HCPCS

## 2019-03-19 PROCEDURE — 36430 TRANSFUSION BLD/BLD COMPNT: CPT

## 2019-03-19 PROCEDURE — 80048 BASIC METABOLIC PNL TOTAL CA: CPT

## 2019-03-19 PROCEDURE — 2700000000 HC OXYGEN THERAPY PER DAY

## 2019-03-19 PROCEDURE — 85027 COMPLETE CBC AUTOMATED: CPT

## 2019-03-19 PROCEDURE — 97535 SELF CARE MNGMENT TRAINING: CPT

## 2019-03-19 PROCEDURE — 99254 IP/OBS CNSLTJ NEW/EST MOD 60: CPT | Performed by: INTERNAL MEDICINE

## 2019-03-19 PROCEDURE — 84145 PROCALCITONIN (PCT): CPT

## 2019-03-19 PROCEDURE — 86141 C-REACTIVE PROTEIN HS: CPT

## 2019-03-19 PROCEDURE — 6360000002 HC RX W HCPCS: Performed by: INTERNAL MEDICINE

## 2019-03-19 RX ORDER — BUMETANIDE 0.25 MG/ML
2 INJECTION, SOLUTION INTRAMUSCULAR; INTRAVENOUS ONCE
Status: COMPLETED | OUTPATIENT
Start: 2019-03-19 | End: 2019-03-19

## 2019-03-19 RX ORDER — LINEZOLID 2 MG/ML
600 INJECTION, SOLUTION INTRAVENOUS EVERY 12 HOURS
Status: DISCONTINUED | OUTPATIENT
Start: 2019-03-19 | End: 2019-03-22

## 2019-03-19 RX ADMIN — FLUTICASONE PROPIONATE 2 SPRAY: 50 SPRAY, METERED NASAL at 08:44

## 2019-03-19 RX ADMIN — DOCUSATE SODIUM 100 MG: 100 CAPSULE, LIQUID FILLED ORAL at 20:50

## 2019-03-19 RX ADMIN — SODIUM CHLORIDE, PRESERVATIVE FREE 10 ML: 5 INJECTION INTRAVENOUS at 09:01

## 2019-03-19 RX ADMIN — DOCUSATE SODIUM 100 MG: 100 CAPSULE, LIQUID FILLED ORAL at 08:44

## 2019-03-19 RX ADMIN — VANCOMYCIN HYDROCHLORIDE 1500 MG: 5 INJECTION, POWDER, LYOPHILIZED, FOR SOLUTION INTRAVENOUS at 01:08

## 2019-03-19 RX ADMIN — ASPIRIN 81 MG: 81 TABLET, COATED ORAL at 08:43

## 2019-03-19 RX ADMIN — LINAGLIPTIN 5 MG: 5 TABLET, FILM COATED ORAL at 08:43

## 2019-03-19 RX ADMIN — TAZOBACTAM SODIUM AND PIPERACILLIN SODIUM 3.38 G: 375; 3 INJECTION, SOLUTION INTRAVENOUS at 17:38

## 2019-03-19 RX ADMIN — PANTOPRAZOLE SODIUM 40 MG: 40 TABLET, DELAYED RELEASE ORAL at 08:44

## 2019-03-19 RX ADMIN — ALPRAZOLAM 0.5 MG: 0.5 TABLET ORAL at 03:09

## 2019-03-19 RX ADMIN — ESCITALOPRAM OXALATE 20 MG: 10 TABLET, FILM COATED ORAL at 08:43

## 2019-03-19 RX ADMIN — SODIUM CHLORIDE: 9 INJECTION, SOLUTION INTRAVENOUS at 17:39

## 2019-03-19 RX ADMIN — INSULIN LISPRO 1 UNITS: 100 INJECTION, SOLUTION INTRAVENOUS; SUBCUTANEOUS at 20:55

## 2019-03-19 RX ADMIN — DILTIAZEM HYDROCHLORIDE 60 MG: 60 TABLET, FILM COATED ORAL at 17:50

## 2019-03-19 RX ADMIN — ALPRAZOLAM 0.5 MG: 0.5 TABLET ORAL at 20:51

## 2019-03-19 RX ADMIN — LINEZOLID 600 MG: 600 INJECTION, SOLUTION INTRAVENOUS at 15:44

## 2019-03-19 RX ADMIN — DILTIAZEM HYDROCHLORIDE 60 MG: 60 TABLET, FILM COATED ORAL at 12:50

## 2019-03-19 RX ADMIN — FUROSEMIDE 40 MG: 40 TABLET ORAL at 03:05

## 2019-03-19 RX ADMIN — MORPHINE SULFATE 4 MG: 4 INJECTION INTRAVENOUS at 17:39

## 2019-03-19 RX ADMIN — HYDROCODONE BITARTRATE AND ACETAMINOPHEN 2 TABLET: 5; 325 TABLET ORAL at 05:19

## 2019-03-19 RX ADMIN — CARVEDILOL 12.5 MG: 12.5 TABLET, FILM COATED ORAL at 17:50

## 2019-03-19 RX ADMIN — PANTOPRAZOLE SODIUM 40 MG: 40 TABLET, DELAYED RELEASE ORAL at 15:50

## 2019-03-19 RX ADMIN — INSULIN LISPRO 2 UNITS: 100 INJECTION, SOLUTION INTRAVENOUS; SUBCUTANEOUS at 12:51

## 2019-03-19 RX ADMIN — HYDROCODONE BITARTRATE AND ACETAMINOPHEN 2 TABLET: 5; 325 TABLET ORAL at 10:42

## 2019-03-19 RX ADMIN — HYDROCODONE BITARTRATE AND ACETAMINOPHEN 2 TABLET: 5; 325 TABLET ORAL at 20:50

## 2019-03-19 RX ADMIN — CEFEPIME 2 G: 2 INJECTION, POWDER, FOR SOLUTION INTRAVENOUS at 08:44

## 2019-03-19 RX ADMIN — FOLIC ACID 1 MG: 1 TABLET ORAL at 08:43

## 2019-03-19 RX ADMIN — INSULIN LISPRO 2 UNITS: 100 INJECTION, SOLUTION INTRAVENOUS; SUBCUTANEOUS at 08:45

## 2019-03-19 RX ADMIN — BUMETANIDE 2 MG: 0.25 INJECTION INTRAMUSCULAR; INTRAVENOUS at 08:44

## 2019-03-19 RX ADMIN — INSULIN LISPRO 2 UNITS: 100 INJECTION, SOLUTION INTRAVENOUS; SUBCUTANEOUS at 17:52

## 2019-03-19 RX ADMIN — IPRATROPIUM BROMIDE AND ALBUTEROL SULFATE 1 AMPULE: .5; 3 SOLUTION RESPIRATORY (INHALATION) at 16:06

## 2019-03-19 RX ADMIN — SODIUM CHLORIDE, PRESERVATIVE FREE 10 ML: 5 INJECTION INTRAVENOUS at 20:52

## 2019-03-19 ASSESSMENT — PAIN SCALES - GENERAL
PAINLEVEL_OUTOF10: 7
PAINLEVEL_OUTOF10: 0
PAINLEVEL_OUTOF10: 0
PAINLEVEL_OUTOF10: 7
PAINLEVEL_OUTOF10: 10
PAINLEVEL_OUTOF10: 2
PAINLEVEL_OUTOF10: 0
PAINLEVEL_OUTOF10: 7
PAINLEVEL_OUTOF10: 4

## 2019-03-19 ASSESSMENT — PAIN DESCRIPTION - ONSET: ONSET: ON-GOING

## 2019-03-19 ASSESSMENT — PAIN DESCRIPTION - ORIENTATION: ORIENTATION: LEFT

## 2019-03-19 ASSESSMENT — PAIN DESCRIPTION - DESCRIPTORS
DESCRIPTORS: ACHING
DESCRIPTORS: ACHING

## 2019-03-19 ASSESSMENT — PAIN DESCRIPTION - PROGRESSION: CLINICAL_PROGRESSION: GRADUALLY WORSENING

## 2019-03-19 ASSESSMENT — PAIN - FUNCTIONAL ASSESSMENT: PAIN_FUNCTIONAL_ASSESSMENT: PREVENTS OR INTERFERES SOME ACTIVE ACTIVITIES AND ADLS

## 2019-03-19 ASSESSMENT — PAIN DESCRIPTION - PAIN TYPE
TYPE: CHRONIC PAIN;SURGICAL PAIN
TYPE: ACUTE PAIN;SURGICAL PAIN

## 2019-03-19 ASSESSMENT — PAIN DESCRIPTION - FREQUENCY
FREQUENCY: CONTINUOUS
FREQUENCY: INTERMITTENT

## 2019-03-19 ASSESSMENT — PAIN DESCRIPTION - LOCATION
LOCATION: CHEST
LOCATION: BACK;CHEST

## 2019-03-20 ENCOUNTER — APPOINTMENT (OUTPATIENT)
Dept: GENERAL RADIOLOGY | Age: 69
DRG: 163 | End: 2019-03-20
Payer: MEDICARE

## 2019-03-20 LAB
ABO/RH: NORMAL
ANION GAP SERPL CALCULATED.3IONS-SCNC: 12 MMOL/L (ref 4–16)
ANISOCYTOSIS: ABNORMAL
ANTIBODY SCREEN: NEGATIVE
BUN BLDV-MCNC: 14 MG/DL (ref 6–23)
CALCIUM SERPL-MCNC: 7.8 MG/DL (ref 8.3–10.6)
CHLORIDE BLD-SCNC: 95 MMOL/L (ref 99–110)
CO2: 23 MMOL/L (ref 21–32)
COMPONENT: NORMAL
CREAT SERPL-MCNC: 1.4 MG/DL (ref 0.9–1.3)
CROSSMATCH RESULT: NORMAL
DIFFERENTIAL TYPE: ABNORMAL
GFR AFRICAN AMERICAN: >60 ML/MIN/1.73M2
GFR NON-AFRICAN AMERICAN: 50 ML/MIN/1.73M2
GLUCOSE BLD-MCNC: 126 MG/DL (ref 70–99)
GLUCOSE BLD-MCNC: 128 MG/DL (ref 70–99)
GLUCOSE BLD-MCNC: 128 MG/DL (ref 70–99)
GLUCOSE BLD-MCNC: 134 MG/DL (ref 70–99)
GLUCOSE BLD-MCNC: 158 MG/DL (ref 70–99)
HCT VFR BLD CALC: 27.3 % (ref 42–52)
HEMOGLOBIN: 8 GM/DL (ref 13.5–18)
LYMPHOCYTES ABSOLUTE: 1.4 K/CU MM
LYMPHOCYTES RELATIVE PERCENT: 9 % (ref 24–44)
MCH RBC QN AUTO: 26.1 PG (ref 27–31)
MCHC RBC AUTO-ENTMCNC: 29.3 % (ref 32–36)
MCV RBC AUTO: 89.2 FL (ref 78–100)
MONOCYTES ABSOLUTE: 0.8 K/CU MM
MONOCYTES RELATIVE PERCENT: 5 % (ref 0–4)
PDW BLD-RTO: 24 % (ref 11.7–14.9)
PLATELET # BLD: 268 K/CU MM (ref 140–440)
PMV BLD AUTO: 8.5 FL (ref 7.5–11.1)
POTASSIUM SERPL-SCNC: 4.3 MMOL/L (ref 3.5–5.1)
PROCALCITONIN: 0.09
RBC # BLD: 3.06 M/CU MM (ref 4.6–6.2)
SEGMENTED NEUTROPHILS ABSOLUTE COUNT: 13.8 K/CU MM
SEGMENTED NEUTROPHILS RELATIVE PERCENT: 86 % (ref 36–66)
SODIUM BLD-SCNC: 130 MMOL/L (ref 135–145)
STATUS: NORMAL
TRANSFUSION STATUS: NORMAL
UNIT DIVISION: 0
UNIT NUMBER: NORMAL
WBC # BLD: 16 K/CU MM (ref 4–10.5)

## 2019-03-20 PROCEDURE — 6370000000 HC RX 637 (ALT 250 FOR IP): Performed by: PHYSICIAN ASSISTANT

## 2019-03-20 PROCEDURE — 2580000003 HC RX 258: Performed by: PHYSICIAN ASSISTANT

## 2019-03-20 PROCEDURE — 97530 THERAPEUTIC ACTIVITIES: CPT

## 2019-03-20 PROCEDURE — 6370000000 HC RX 637 (ALT 250 FOR IP): Performed by: HOSPITALIST

## 2019-03-20 PROCEDURE — 94640 AIRWAY INHALATION TREATMENT: CPT

## 2019-03-20 PROCEDURE — 85007 BL SMEAR W/DIFF WBC COUNT: CPT

## 2019-03-20 PROCEDURE — 36415 COLL VENOUS BLD VENIPUNCTURE: CPT

## 2019-03-20 PROCEDURE — 71045 X-RAY EXAM CHEST 1 VIEW: CPT

## 2019-03-20 PROCEDURE — 2500000003 HC RX 250 WO HCPCS: Performed by: INTERNAL MEDICINE

## 2019-03-20 PROCEDURE — 2100000000 HC CCU R&B

## 2019-03-20 PROCEDURE — 99232 SBSQ HOSP IP/OBS MODERATE 35: CPT | Performed by: INTERNAL MEDICINE

## 2019-03-20 PROCEDURE — 85027 COMPLETE CBC AUTOMATED: CPT

## 2019-03-20 PROCEDURE — 87081 CULTURE SCREEN ONLY: CPT

## 2019-03-20 PROCEDURE — 97110 THERAPEUTIC EXERCISES: CPT

## 2019-03-20 PROCEDURE — 80048 BASIC METABOLIC PNL TOTAL CA: CPT

## 2019-03-20 PROCEDURE — 94150 VITAL CAPACITY TEST: CPT

## 2019-03-20 PROCEDURE — 94660 CPAP INITIATION&MGMT: CPT

## 2019-03-20 PROCEDURE — 82962 GLUCOSE BLOOD TEST: CPT

## 2019-03-20 PROCEDURE — 6360000002 HC RX W HCPCS: Performed by: INTERNAL MEDICINE

## 2019-03-20 PROCEDURE — 94761 N-INVAS EAR/PLS OXIMETRY MLT: CPT

## 2019-03-20 PROCEDURE — 97116 GAIT TRAINING THERAPY: CPT

## 2019-03-20 RX ORDER — BISACODYL 10 MG
10 SUPPOSITORY, RECTAL RECTAL ONCE
Status: COMPLETED | OUTPATIENT
Start: 2019-03-20 | End: 2019-03-20

## 2019-03-20 RX ORDER — LIDOCAINE 4 G/G
1 PATCH TOPICAL DAILY
Status: DISCONTINUED | OUTPATIENT
Start: 2019-03-20 | End: 2019-03-25 | Stop reason: HOSPADM

## 2019-03-20 RX ADMIN — ESCITALOPRAM OXALATE 20 MG: 10 TABLET, FILM COATED ORAL at 08:38

## 2019-03-20 RX ADMIN — PANTOPRAZOLE SODIUM 40 MG: 40 TABLET, DELAYED RELEASE ORAL at 16:34

## 2019-03-20 RX ADMIN — SODIUM CHLORIDE, PRESERVATIVE FREE 10 ML: 5 INJECTION INTRAVENOUS at 20:45

## 2019-03-20 RX ADMIN — PANTOPRAZOLE SODIUM 40 MG: 40 TABLET, DELAYED RELEASE ORAL at 08:39

## 2019-03-20 RX ADMIN — HYDROCODONE BITARTRATE AND ACETAMINOPHEN 2 TABLET: 5; 325 TABLET ORAL at 20:44

## 2019-03-20 RX ADMIN — TAZOBACTAM SODIUM AND PIPERACILLIN SODIUM 3.38 G: 375; 3 INJECTION, SOLUTION INTRAVENOUS at 02:05

## 2019-03-20 RX ADMIN — LINAGLIPTIN 5 MG: 5 TABLET, FILM COATED ORAL at 08:39

## 2019-03-20 RX ADMIN — ASPIRIN 81 MG: 81 TABLET, COATED ORAL at 08:39

## 2019-03-20 RX ADMIN — FLUTICASONE PROPIONATE 2 SPRAY: 50 SPRAY, METERED NASAL at 08:45

## 2019-03-20 RX ADMIN — SODIUM CHLORIDE, PRESERVATIVE FREE 10 ML: 5 INJECTION INTRAVENOUS at 08:48

## 2019-03-20 RX ADMIN — LINEZOLID 600 MG: 600 INJECTION, SOLUTION INTRAVENOUS at 14:14

## 2019-03-20 RX ADMIN — CARVEDILOL 12.5 MG: 12.5 TABLET, FILM COATED ORAL at 08:39

## 2019-03-20 RX ADMIN — TAZOBACTAM SODIUM AND PIPERACILLIN SODIUM 3.38 G: 375; 3 INJECTION, SOLUTION INTRAVENOUS at 16:34

## 2019-03-20 RX ADMIN — SODIUM CHLORIDE, PRESERVATIVE FREE 10 ML: 5 INJECTION INTRAVENOUS at 20:44

## 2019-03-20 RX ADMIN — INSULIN LISPRO 2 UNITS: 100 INJECTION, SOLUTION INTRAVENOUS; SUBCUTANEOUS at 16:50

## 2019-03-20 RX ADMIN — IPRATROPIUM BROMIDE AND ALBUTEROL SULFATE 1 AMPULE: .5; 3 SOLUTION RESPIRATORY (INHALATION) at 07:47

## 2019-03-20 RX ADMIN — HYDROCODONE BITARTRATE AND ACETAMINOPHEN 2 TABLET: 5; 325 TABLET ORAL at 13:05

## 2019-03-20 RX ADMIN — BISACODYL 10 MG: 10 SUPPOSITORY RECTAL at 14:14

## 2019-03-20 RX ADMIN — FOLIC ACID 1 MG: 1 TABLET ORAL at 08:38

## 2019-03-20 RX ADMIN — TAZOBACTAM SODIUM AND PIPERACILLIN SODIUM 3.38 G: 375; 3 INJECTION, SOLUTION INTRAVENOUS at 08:39

## 2019-03-20 RX ADMIN — DOCUSATE SODIUM 100 MG: 100 CAPSULE, LIQUID FILLED ORAL at 20:44

## 2019-03-20 RX ADMIN — IPRATROPIUM BROMIDE AND ALBUTEROL SULFATE 1 AMPULE: .5; 3 SOLUTION RESPIRATORY (INHALATION) at 15:29

## 2019-03-20 RX ADMIN — LINEZOLID 600 MG: 600 INJECTION, SOLUTION INTRAVENOUS at 02:05

## 2019-03-20 RX ADMIN — HYDROCODONE BITARTRATE AND ACETAMINOPHEN 2 TABLET: 5; 325 TABLET ORAL at 08:38

## 2019-03-20 RX ADMIN — IPRATROPIUM BROMIDE AND ALBUTEROL SULFATE 1 AMPULE: .5; 3 SOLUTION RESPIRATORY (INHALATION) at 19:45

## 2019-03-20 RX ADMIN — DOCUSATE SODIUM 100 MG: 100 CAPSULE, LIQUID FILLED ORAL at 08:38

## 2019-03-20 ASSESSMENT — PAIN SCALES - WONG BAKER
WONGBAKER_NUMERICALRESPONSE: 0

## 2019-03-20 ASSESSMENT — PAIN - FUNCTIONAL ASSESSMENT
PAIN_FUNCTIONAL_ASSESSMENT: ACTIVITIES ARE NOT PREVENTED
PAIN_FUNCTIONAL_ASSESSMENT: PREVENTS OR INTERFERES SOME ACTIVE ACTIVITIES AND ADLS
PAIN_FUNCTIONAL_ASSESSMENT: ACTIVITIES ARE NOT PREVENTED

## 2019-03-20 ASSESSMENT — PAIN DESCRIPTION - DESCRIPTORS
DESCRIPTORS: ACHING

## 2019-03-20 ASSESSMENT — PAIN DESCRIPTION - LOCATION
LOCATION: CHEST

## 2019-03-20 ASSESSMENT — PAIN DESCRIPTION - PAIN TYPE
TYPE: SURGICAL PAIN

## 2019-03-20 ASSESSMENT — PAIN SCALES - GENERAL
PAINLEVEL_OUTOF10: 8
PAINLEVEL_OUTOF10: 0
PAINLEVEL_OUTOF10: 0
PAINLEVEL_OUTOF10: 4
PAINLEVEL_OUTOF10: 0
PAINLEVEL_OUTOF10: 0
PAINLEVEL_OUTOF10: 2
PAINLEVEL_OUTOF10: 4
PAINLEVEL_OUTOF10: 0
PAINLEVEL_OUTOF10: 6
PAINLEVEL_OUTOF10: 8

## 2019-03-20 ASSESSMENT — PAIN DESCRIPTION - PROGRESSION
CLINICAL_PROGRESSION: NOT CHANGED
CLINICAL_PROGRESSION: GRADUALLY WORSENING

## 2019-03-20 ASSESSMENT — PAIN DESCRIPTION - FREQUENCY
FREQUENCY: CONTINUOUS
FREQUENCY: INTERMITTENT
FREQUENCY: INTERMITTENT

## 2019-03-20 ASSESSMENT — PAIN DESCRIPTION - DIRECTION
RADIATING_TOWARDS: BACK
RADIATING_TOWARDS: BACK

## 2019-03-20 ASSESSMENT — PAIN DESCRIPTION - ONSET
ONSET: GRADUAL
ONSET: ON-GOING

## 2019-03-20 ASSESSMENT — PAIN DESCRIPTION - ORIENTATION
ORIENTATION: LEFT

## 2019-03-21 ENCOUNTER — APPOINTMENT (OUTPATIENT)
Dept: GENERAL RADIOLOGY | Age: 69
DRG: 163 | End: 2019-03-21
Payer: MEDICARE

## 2019-03-21 LAB
ANION GAP SERPL CALCULATED.3IONS-SCNC: 12 MMOL/L (ref 4–16)
BASOPHILS ABSOLUTE: 0 K/CU MM
BASOPHILS RELATIVE PERCENT: 0.2 % (ref 0–1)
BUN BLDV-MCNC: 13 MG/DL (ref 6–23)
CALCIUM SERPL-MCNC: 7.4 MG/DL (ref 8.3–10.6)
CHLORIDE BLD-SCNC: 97 MMOL/L (ref 99–110)
CO2: 23 MMOL/L (ref 21–32)
CREAT SERPL-MCNC: 1.4 MG/DL (ref 0.9–1.3)
CULTURE: NORMAL
DIFFERENTIAL TYPE: ABNORMAL
EOSINOPHILS ABSOLUTE: 0 K/CU MM
EOSINOPHILS RELATIVE PERCENT: 0.1 % (ref 0–3)
GFR AFRICAN AMERICAN: >60 ML/MIN/1.73M2
GFR NON-AFRICAN AMERICAN: 50 ML/MIN/1.73M2
GLUCOSE BLD-MCNC: 124 MG/DL (ref 70–99)
GLUCOSE BLD-MCNC: 135 MG/DL (ref 70–99)
GLUCOSE BLD-MCNC: 137 MG/DL (ref 70–99)
GLUCOSE BLD-MCNC: 143 MG/DL (ref 70–99)
GLUCOSE BLD-MCNC: 159 MG/DL (ref 70–99)
HCT VFR BLD CALC: 20.5 % (ref 42–52)
HCT VFR BLD CALC: 23 % (ref 42–52)
HEMOGLOBIN: ABNORMAL GM/DL (ref 13.5–18)
HEMOGLOBIN: ABNORMAL GM/DL (ref 13.5–18)
IMMATURE NEUTROPHIL %: 1.1 % (ref 0–0.43)
LYMPHOCYTES ABSOLUTE: 1.4 K/CU MM
LYMPHOCYTES RELATIVE PERCENT: 9.8 % (ref 24–44)
Lab: NORMAL
MCH RBC QN AUTO: 25.7 PG (ref 27–31)
MCH RBC QN AUTO: 26.2 PG (ref 27–31)
MCHC RBC AUTO-ENTMCNC: 28.7 % (ref 32–36)
MCHC RBC AUTO-ENTMCNC: 30.2 % (ref 32–36)
MCV RBC AUTO: 86.5 FL (ref 78–100)
MCV RBC AUTO: 89.5 FL (ref 78–100)
MONOCYTES ABSOLUTE: 0.9 K/CU MM
MONOCYTES RELATIVE PERCENT: 6.2 % (ref 0–4)
NUCLEATED RBC %: 0 %
PDW BLD-RTO: 24 % (ref 11.7–14.9)
PDW BLD-RTO: 24.1 % (ref 11.7–14.9)
PLATELET # BLD: 264 K/CU MM (ref 140–440)
PLATELET # BLD: 264 K/CU MM (ref 140–440)
PMV BLD AUTO: 8.6 FL (ref 7.5–11.1)
PMV BLD AUTO: 8.6 FL (ref 7.5–11.1)
POTASSIUM SERPL-SCNC: 4 MMOL/L (ref 3.5–5.1)
PROCALCITONIN: 0.14
RBC # BLD: 2.37 M/CU MM (ref 4.6–6.2)
RBC # BLD: 2.57 M/CU MM (ref 4.6–6.2)
SEGMENTED NEUTROPHILS ABSOLUTE COUNT: 11.6 K/CU MM
SEGMENTED NEUTROPHILS RELATIVE PERCENT: 82.6 % (ref 36–66)
SODIUM BLD-SCNC: 132 MMOL/L (ref 135–145)
SPECIMEN: NORMAL
TOTAL IMMATURE NEUTOROPHIL: 0.15 K/CU MM
TOTAL NUCLEATED RBC: 0 K/CU MM
WBC # BLD: 14 K/CU MM (ref 4–10.5)
WBC # BLD: 14.9 K/CU MM (ref 4–10.5)

## 2019-03-21 PROCEDURE — 2580000003 HC RX 258: Performed by: PHYSICIAN ASSISTANT

## 2019-03-21 PROCEDURE — 6370000000 HC RX 637 (ALT 250 FOR IP): Performed by: PHYSICIAN ASSISTANT

## 2019-03-21 PROCEDURE — 2700000000 HC OXYGEN THERAPY PER DAY

## 2019-03-21 PROCEDURE — 2580000003 HC RX 258: Performed by: INTERNAL MEDICINE

## 2019-03-21 PROCEDURE — 99232 SBSQ HOSP IP/OBS MODERATE 35: CPT | Performed by: INTERNAL MEDICINE

## 2019-03-21 PROCEDURE — 82962 GLUCOSE BLOOD TEST: CPT

## 2019-03-21 PROCEDURE — 86850 RBC ANTIBODY SCREEN: CPT

## 2019-03-21 PROCEDURE — 6370000000 HC RX 637 (ALT 250 FOR IP): Performed by: INTERNAL MEDICINE

## 2019-03-21 PROCEDURE — 85027 COMPLETE CBC AUTOMATED: CPT

## 2019-03-21 PROCEDURE — 94761 N-INVAS EAR/PLS OXIMETRY MLT: CPT

## 2019-03-21 PROCEDURE — 36415 COLL VENOUS BLD VENIPUNCTURE: CPT

## 2019-03-21 PROCEDURE — 94150 VITAL CAPACITY TEST: CPT

## 2019-03-21 PROCEDURE — 80048 BASIC METABOLIC PNL TOTAL CA: CPT

## 2019-03-21 PROCEDURE — 86481 TB AG RESPONSE T-CELL SUSP: CPT

## 2019-03-21 PROCEDURE — 2100000000 HC CCU R&B

## 2019-03-21 PROCEDURE — 6360000002 HC RX W HCPCS: Performed by: INTERNAL MEDICINE

## 2019-03-21 PROCEDURE — 84145 PROCALCITONIN (PCT): CPT

## 2019-03-21 PROCEDURE — 36430 TRANSFUSION BLD/BLD COMPNT: CPT

## 2019-03-21 PROCEDURE — 94660 CPAP INITIATION&MGMT: CPT

## 2019-03-21 PROCEDURE — 86900 BLOOD TYPING SEROLOGIC ABO: CPT

## 2019-03-21 PROCEDURE — 85025 COMPLETE CBC W/AUTO DIFF WBC: CPT

## 2019-03-21 PROCEDURE — 2500000003 HC RX 250 WO HCPCS: Performed by: INTERNAL MEDICINE

## 2019-03-21 PROCEDURE — 86901 BLOOD TYPING SEROLOGIC RH(D): CPT

## 2019-03-21 PROCEDURE — 86922 COMPATIBILITY TEST ANTIGLOB: CPT

## 2019-03-21 PROCEDURE — 94640 AIRWAY INHALATION TREATMENT: CPT

## 2019-03-21 PROCEDURE — P9016 RBC LEUKOCYTES REDUCED: HCPCS

## 2019-03-21 PROCEDURE — 71045 X-RAY EXAM CHEST 1 VIEW: CPT

## 2019-03-21 RX ORDER — SODIUM CHLORIDE 0.9 % (FLUSH) 0.9 %
10 SYRINGE (ML) INJECTION EVERY 12 HOURS SCHEDULED
Status: DISCONTINUED | OUTPATIENT
Start: 2019-03-21 | End: 2019-03-25 | Stop reason: HOSPADM

## 2019-03-21 RX ORDER — 0.9 % SODIUM CHLORIDE 0.9 %
250 INTRAVENOUS SOLUTION INTRAVENOUS ONCE
Status: COMPLETED | OUTPATIENT
Start: 2019-03-21 | End: 2019-03-22

## 2019-03-21 RX ORDER — FUROSEMIDE 40 MG/1
40 TABLET ORAL DAILY
Status: DISCONTINUED | OUTPATIENT
Start: 2019-03-21 | End: 2019-03-25 | Stop reason: HOSPADM

## 2019-03-21 RX ORDER — SODIUM CHLORIDE 0.9 % (FLUSH) 0.9 %
10 SYRINGE (ML) INJECTION PRN
Status: DISCONTINUED | OUTPATIENT
Start: 2019-03-21 | End: 2019-03-25 | Stop reason: HOSPADM

## 2019-03-21 RX ORDER — LIDOCAINE HYDROCHLORIDE 10 MG/ML
5 INJECTION, SOLUTION EPIDURAL; INFILTRATION; INTRACAUDAL; PERINEURAL ONCE
Status: COMPLETED | OUTPATIENT
Start: 2019-03-21 | End: 2019-03-22

## 2019-03-21 RX ADMIN — HYDROCODONE BITARTRATE AND ACETAMINOPHEN 1 TABLET: 5; 325 TABLET ORAL at 03:51

## 2019-03-21 RX ADMIN — IPRATROPIUM BROMIDE AND ALBUTEROL SULFATE 1 AMPULE: .5; 3 SOLUTION RESPIRATORY (INHALATION) at 15:43

## 2019-03-21 RX ADMIN — FOLIC ACID 1 MG: 1 TABLET ORAL at 08:23

## 2019-03-21 RX ADMIN — SODIUM CHLORIDE, PRESERVATIVE FREE 10 ML: 5 INJECTION INTRAVENOUS at 21:33

## 2019-03-21 RX ADMIN — PANTOPRAZOLE SODIUM 40 MG: 40 TABLET, DELAYED RELEASE ORAL at 16:22

## 2019-03-21 RX ADMIN — TAZOBACTAM SODIUM AND PIPERACILLIN SODIUM 3.38 G: 375; 3 INJECTION, SOLUTION INTRAVENOUS at 01:36

## 2019-03-21 RX ADMIN — DOCUSATE SODIUM 100 MG: 100 CAPSULE, LIQUID FILLED ORAL at 08:23

## 2019-03-21 RX ADMIN — SODIUM CHLORIDE 250 ML: 9 INJECTION, SOLUTION INTRAVENOUS at 21:38

## 2019-03-21 RX ADMIN — DOCUSATE SODIUM 100 MG: 100 CAPSULE, LIQUID FILLED ORAL at 21:31

## 2019-03-21 RX ADMIN — IPRATROPIUM BROMIDE AND ALBUTEROL SULFATE 1 AMPULE: .5; 3 SOLUTION RESPIRATORY (INHALATION) at 19:35

## 2019-03-21 RX ADMIN — SODIUM CHLORIDE, PRESERVATIVE FREE 10 ML: 5 INJECTION INTRAVENOUS at 08:29

## 2019-03-21 RX ADMIN — ESCITALOPRAM OXALATE 20 MG: 10 TABLET, FILM COATED ORAL at 08:23

## 2019-03-21 RX ADMIN — PANTOPRAZOLE SODIUM 40 MG: 40 TABLET, DELAYED RELEASE ORAL at 06:20

## 2019-03-21 RX ADMIN — DILTIAZEM HYDROCHLORIDE 60 MG: 60 TABLET, FILM COATED ORAL at 04:38

## 2019-03-21 RX ADMIN — INSULIN LISPRO 2 UNITS: 100 INJECTION, SOLUTION INTRAVENOUS; SUBCUTANEOUS at 16:24

## 2019-03-21 RX ADMIN — ASPIRIN 81 MG: 81 TABLET, COATED ORAL at 08:22

## 2019-03-21 RX ADMIN — HYDROCODONE BITARTRATE AND ACETAMINOPHEN 2 TABLET: 5; 325 TABLET ORAL at 21:31

## 2019-03-21 RX ADMIN — CARVEDILOL 12.5 MG: 12.5 TABLET, FILM COATED ORAL at 08:22

## 2019-03-21 RX ADMIN — LINEZOLID 600 MG: 600 INJECTION, SOLUTION INTRAVENOUS at 01:36

## 2019-03-21 RX ADMIN — LINAGLIPTIN 5 MG: 5 TABLET, FILM COATED ORAL at 08:23

## 2019-03-21 RX ADMIN — HYDROCODONE BITARTRATE AND ACETAMINOPHEN 2 TABLET: 5; 325 TABLET ORAL at 16:22

## 2019-03-21 RX ADMIN — TAZOBACTAM SODIUM AND PIPERACILLIN SODIUM 3.38 G: 375; 3 INJECTION, SOLUTION INTRAVENOUS at 08:21

## 2019-03-21 RX ADMIN — LINEZOLID 600 MG: 600 INJECTION, SOLUTION INTRAVENOUS at 15:00

## 2019-03-21 RX ADMIN — TAZOBACTAM SODIUM AND PIPERACILLIN SODIUM 3.38 G: 375; 3 INJECTION, SOLUTION INTRAVENOUS at 16:23

## 2019-03-21 RX ADMIN — FLUTICASONE PROPIONATE 2 SPRAY: 50 SPRAY, METERED NASAL at 08:30

## 2019-03-21 RX ADMIN — FUROSEMIDE 40 MG: 40 TABLET ORAL at 08:23

## 2019-03-21 RX ADMIN — HYDROCODONE BITARTRATE AND ACETAMINOPHEN 2 TABLET: 5; 325 TABLET ORAL at 08:22

## 2019-03-21 ASSESSMENT — PAIN DESCRIPTION - ONSET
ONSET: ON-GOING
ONSET: ON-GOING
ONSET: PROGRESSIVE

## 2019-03-21 ASSESSMENT — PAIN SCALES - GENERAL
PAINLEVEL_OUTOF10: 3
PAINLEVEL_OUTOF10: 2
PAINLEVEL_OUTOF10: 6
PAINLEVEL_OUTOF10: 4
PAINLEVEL_OUTOF10: 2
PAINLEVEL_OUTOF10: 9
PAINLEVEL_OUTOF10: 10
PAINLEVEL_OUTOF10: 1
PAINLEVEL_OUTOF10: 9
PAINLEVEL_OUTOF10: 10

## 2019-03-21 ASSESSMENT — PAIN SCALES - WONG BAKER: WONGBAKER_NUMERICALRESPONSE: 0

## 2019-03-21 ASSESSMENT — PAIN DESCRIPTION - LOCATION
LOCATION: CHEST

## 2019-03-21 ASSESSMENT — PAIN DESCRIPTION - PAIN TYPE
TYPE: ACUTE PAIN;SURGICAL PAIN
TYPE: SURGICAL PAIN
TYPE: SURGICAL PAIN
TYPE: ACUTE PAIN;SURGICAL PAIN
TYPE: SURGICAL PAIN

## 2019-03-21 ASSESSMENT — PAIN DESCRIPTION - DESCRIPTORS
DESCRIPTORS: ACHING

## 2019-03-21 ASSESSMENT — PAIN DESCRIPTION - PROGRESSION
CLINICAL_PROGRESSION: GRADUALLY WORSENING
CLINICAL_PROGRESSION: GRADUALLY WORSENING
CLINICAL_PROGRESSION: RAPIDLY WORSENING

## 2019-03-21 ASSESSMENT — PAIN DESCRIPTION - ORIENTATION
ORIENTATION: LEFT
ORIENTATION: OTHER (COMMENT)
ORIENTATION: LEFT;OTHER (COMMENT)
ORIENTATION: LEFT
ORIENTATION: LEFT

## 2019-03-21 ASSESSMENT — PAIN - FUNCTIONAL ASSESSMENT: PAIN_FUNCTIONAL_ASSESSMENT: PREVENTS OR INTERFERES SOME ACTIVE ACTIVITIES AND ADLS

## 2019-03-21 ASSESSMENT — PAIN DESCRIPTION - FREQUENCY
FREQUENCY: INTERMITTENT
FREQUENCY: CONTINUOUS
FREQUENCY: CONTINUOUS

## 2019-03-22 ENCOUNTER — APPOINTMENT (OUTPATIENT)
Dept: GENERAL RADIOLOGY | Age: 69
DRG: 163 | End: 2019-03-22
Payer: MEDICARE

## 2019-03-22 LAB
ANION GAP SERPL CALCULATED.3IONS-SCNC: 8 MMOL/L (ref 4–16)
BUN BLDV-MCNC: 12 MG/DL (ref 6–23)
CALCIUM SERPL-MCNC: 7.7 MG/DL (ref 8.3–10.6)
CHLORIDE BLD-SCNC: 94 MMOL/L (ref 99–110)
CO2: 28 MMOL/L (ref 21–32)
CREAT SERPL-MCNC: 1.4 MG/DL (ref 0.9–1.3)
GFR AFRICAN AMERICAN: >60 ML/MIN/1.73M2
GFR NON-AFRICAN AMERICAN: 50 ML/MIN/1.73M2
GLUCOSE BLD-MCNC: 105 MG/DL (ref 70–99)
GLUCOSE BLD-MCNC: 116 MG/DL (ref 70–99)
GLUCOSE BLD-MCNC: 127 MG/DL (ref 70–99)
GLUCOSE BLD-MCNC: 154 MG/DL (ref 70–99)
GLUCOSE BLD-MCNC: 163 MG/DL (ref 70–99)
HCT VFR BLD CALC: 22.8 % (ref 42–52)
HEMOGLOBIN: ABNORMAL GM/DL (ref 13.5–18)
HIGH SENSITIVE C-REACTIVE PROTEIN: 178.3 MG/L
IRON: 19 UG/DL (ref 59–158)
MCH RBC QN AUTO: 26.4 PG (ref 27–31)
MCHC RBC AUTO-ENTMCNC: 30.7 % (ref 32–36)
MCV RBC AUTO: 86 FL (ref 78–100)
PCT TRANSFERRIN: 10 % (ref 10–44)
PDW BLD-RTO: 22.2 % (ref 11.7–14.9)
PLATELET # BLD: 241 K/CU MM (ref 140–440)
PMV BLD AUTO: 8.5 FL (ref 7.5–11.1)
POTASSIUM SERPL-SCNC: 3.5 MMOL/L (ref 3.5–5.1)
RBC # BLD: 2.65 M/CU MM (ref 4.6–6.2)
SODIUM BLD-SCNC: 130 MMOL/L (ref 135–145)
TOTAL IRON BINDING CAPACITY: 182 UG/DL (ref 250–450)
UNSATURATED IRON BINDING CAPACITY: 163 UG/DL (ref 110–370)
WBC # BLD: 11.5 K/CU MM (ref 4–10.5)

## 2019-03-22 PROCEDURE — C1751 CATH, INF, PER/CENT/MIDLINE: HCPCS

## 2019-03-22 PROCEDURE — 71045 X-RAY EXAM CHEST 1 VIEW: CPT

## 2019-03-22 PROCEDURE — 36569 INSJ PICC 5 YR+ W/O IMAGING: CPT

## 2019-03-22 PROCEDURE — 94660 CPAP INITIATION&MGMT: CPT

## 2019-03-22 PROCEDURE — 82962 GLUCOSE BLOOD TEST: CPT

## 2019-03-22 PROCEDURE — 80048 BASIC METABOLIC PNL TOTAL CA: CPT

## 2019-03-22 PROCEDURE — 6370000000 HC RX 637 (ALT 250 FOR IP): Performed by: PHYSICIAN ASSISTANT

## 2019-03-22 PROCEDURE — 83540 ASSAY OF IRON: CPT

## 2019-03-22 PROCEDURE — 87205 SMEAR GRAM STAIN: CPT

## 2019-03-22 PROCEDURE — 6370000000 HC RX 637 (ALT 250 FOR IP): Performed by: INTERNAL MEDICINE

## 2019-03-22 PROCEDURE — 2580000003 HC RX 258: Performed by: INTERNAL MEDICINE

## 2019-03-22 PROCEDURE — 85027 COMPLETE CBC AUTOMATED: CPT

## 2019-03-22 PROCEDURE — 94150 VITAL CAPACITY TEST: CPT

## 2019-03-22 PROCEDURE — 99232 SBSQ HOSP IP/OBS MODERATE 35: CPT | Performed by: INTERNAL MEDICINE

## 2019-03-22 PROCEDURE — 76937 US GUIDE VASCULAR ACCESS: CPT

## 2019-03-22 PROCEDURE — 83550 IRON BINDING TEST: CPT

## 2019-03-22 PROCEDURE — 36592 COLLECT BLOOD FROM PICC: CPT

## 2019-03-22 PROCEDURE — 2100000000 HC CCU R&B

## 2019-03-22 PROCEDURE — 87070 CULTURE OTHR SPECIMN AEROBIC: CPT

## 2019-03-22 PROCEDURE — 2500000003 HC RX 250 WO HCPCS: Performed by: INTERNAL MEDICINE

## 2019-03-22 PROCEDURE — 94640 AIRWAY INHALATION TREATMENT: CPT

## 2019-03-22 PROCEDURE — 6360000002 HC RX W HCPCS: Performed by: INTERNAL MEDICINE

## 2019-03-22 PROCEDURE — 94761 N-INVAS EAR/PLS OXIMETRY MLT: CPT

## 2019-03-22 PROCEDURE — 86141 C-REACTIVE PROTEIN HS: CPT

## 2019-03-22 PROCEDURE — 2700000000 HC OXYGEN THERAPY PER DAY

## 2019-03-22 PROCEDURE — 2580000003 HC RX 258: Performed by: PHYSICIAN ASSISTANT

## 2019-03-22 RX ADMIN — SODIUM CHLORIDE, PRESERVATIVE FREE 10 ML: 5 INJECTION INTRAVENOUS at 20:43

## 2019-03-22 RX ADMIN — TAZOBACTAM SODIUM AND PIPERACILLIN SODIUM 3.38 G: 375; 3 INJECTION, SOLUTION INTRAVENOUS at 16:34

## 2019-03-22 RX ADMIN — INSULIN LISPRO 1 UNITS: 100 INJECTION, SOLUTION INTRAVENOUS; SUBCUTANEOUS at 20:50

## 2019-03-22 RX ADMIN — DOCUSATE SODIUM 100 MG: 100 CAPSULE, LIQUID FILLED ORAL at 08:13

## 2019-03-22 RX ADMIN — SODIUM CHLORIDE, PRESERVATIVE FREE 10 ML: 5 INJECTION INTRAVENOUS at 20:42

## 2019-03-22 RX ADMIN — FLUTICASONE PROPIONATE 2 SPRAY: 50 SPRAY, METERED NASAL at 08:14

## 2019-03-22 RX ADMIN — IPRATROPIUM BROMIDE AND ALBUTEROL SULFATE 1 AMPULE: .5; 3 SOLUTION RESPIRATORY (INHALATION) at 11:18

## 2019-03-22 RX ADMIN — FOLIC ACID 1 MG: 1 TABLET ORAL at 08:13

## 2019-03-22 RX ADMIN — HYDROCODONE BITARTRATE AND ACETAMINOPHEN 2 TABLET: 5; 325 TABLET ORAL at 03:56

## 2019-03-22 RX ADMIN — DILTIAZEM HYDROCHLORIDE 60 MG: 60 TABLET, FILM COATED ORAL at 12:04

## 2019-03-22 RX ADMIN — TAZOBACTAM SODIUM AND PIPERACILLIN SODIUM 3.38 G: 375; 3 INJECTION, SOLUTION INTRAVENOUS at 08:39

## 2019-03-22 RX ADMIN — LINAGLIPTIN 5 MG: 5 TABLET, FILM COATED ORAL at 08:13

## 2019-03-22 RX ADMIN — DOCUSATE SODIUM 100 MG: 100 CAPSULE, LIQUID FILLED ORAL at 20:41

## 2019-03-22 RX ADMIN — SODIUM CHLORIDE, PRESERVATIVE FREE 10 ML: 5 INJECTION INTRAVENOUS at 08:40

## 2019-03-22 RX ADMIN — HYDROCODONE BITARTRATE AND ACETAMINOPHEN 2 TABLET: 5; 325 TABLET ORAL at 20:41

## 2019-03-22 RX ADMIN — IRON SUCROSE 200 MG: 20 INJECTION, SOLUTION INTRAVENOUS at 15:37

## 2019-03-22 RX ADMIN — ESCITALOPRAM OXALATE 20 MG: 10 TABLET, FILM COATED ORAL at 08:12

## 2019-03-22 RX ADMIN — HYDROCODONE BITARTRATE AND ACETAMINOPHEN 2 TABLET: 5; 325 TABLET ORAL at 08:12

## 2019-03-22 RX ADMIN — HYDROCODONE BITARTRATE AND ACETAMINOPHEN 2 TABLET: 5; 325 TABLET ORAL at 16:34

## 2019-03-22 RX ADMIN — LIDOCAINE HYDROCHLORIDE 5 ML: 10 INJECTION, SOLUTION EPIDURAL; INFILTRATION; INTRACAUDAL; PERINEURAL at 07:37

## 2019-03-22 RX ADMIN — FUROSEMIDE 40 MG: 40 TABLET ORAL at 08:13

## 2019-03-22 RX ADMIN — PANTOPRAZOLE SODIUM 40 MG: 40 TABLET, DELAYED RELEASE ORAL at 16:34

## 2019-03-22 RX ADMIN — PANTOPRAZOLE SODIUM 40 MG: 40 TABLET, DELAYED RELEASE ORAL at 08:38

## 2019-03-22 RX ADMIN — IPRATROPIUM BROMIDE AND ALBUTEROL SULFATE 1 AMPULE: .5; 3 SOLUTION RESPIRATORY (INHALATION) at 19:44

## 2019-03-22 RX ADMIN — CARVEDILOL 12.5 MG: 12.5 TABLET, FILM COATED ORAL at 08:13

## 2019-03-22 RX ADMIN — INSULIN LISPRO 2 UNITS: 100 INJECTION, SOLUTION INTRAVENOUS; SUBCUTANEOUS at 11:59

## 2019-03-22 RX ADMIN — ASPIRIN 81 MG: 81 TABLET, COATED ORAL at 08:13

## 2019-03-22 ASSESSMENT — PAIN SCALES - GENERAL
PAINLEVEL_OUTOF10: 0
PAINLEVEL_OUTOF10: 9
PAINLEVEL_OUTOF10: 3
PAINLEVEL_OUTOF10: 6
PAINLEVEL_OUTOF10: 8
PAINLEVEL_OUTOF10: 7
PAINLEVEL_OUTOF10: 9
PAINLEVEL_OUTOF10: 9
PAINLEVEL_OUTOF10: 2
PAINLEVEL_OUTOF10: 8
PAINLEVEL_OUTOF10: 8

## 2019-03-22 ASSESSMENT — PAIN DESCRIPTION - DESCRIPTORS
DESCRIPTORS: ACHING

## 2019-03-22 ASSESSMENT — PAIN DESCRIPTION - PAIN TYPE
TYPE: SURGICAL PAIN
TYPE: SURGICAL PAIN
TYPE: ACUTE PAIN;SURGICAL PAIN
TYPE: SURGICAL PAIN
TYPE: SURGICAL PAIN

## 2019-03-22 ASSESSMENT — PAIN - FUNCTIONAL ASSESSMENT
PAIN_FUNCTIONAL_ASSESSMENT: PREVENTS OR INTERFERES SOME ACTIVE ACTIVITIES AND ADLS

## 2019-03-22 ASSESSMENT — PAIN DESCRIPTION - LOCATION
LOCATION: CHEST
LOCATION: CHEST;FLANK
LOCATION: CHEST;FLANK
LOCATION: CHEST
LOCATION: CHEST
LOCATION: CHEST;FLANK
LOCATION: CHEST

## 2019-03-22 ASSESSMENT — PAIN DESCRIPTION - FREQUENCY
FREQUENCY: CONTINUOUS

## 2019-03-22 ASSESSMENT — PAIN DESCRIPTION - PROGRESSION
CLINICAL_PROGRESSION: GRADUALLY WORSENING
CLINICAL_PROGRESSION: GRADUALLY IMPROVING
CLINICAL_PROGRESSION: GRADUALLY WORSENING
CLINICAL_PROGRESSION: NOT CHANGED

## 2019-03-22 ASSESSMENT — PAIN DESCRIPTION - ORIENTATION
ORIENTATION: LEFT

## 2019-03-22 ASSESSMENT — PAIN DESCRIPTION - ONSET
ONSET: ON-GOING
ONSET: PROGRESSIVE
ONSET: ON-GOING
ONSET: ON-GOING

## 2019-03-23 ENCOUNTER — APPOINTMENT (OUTPATIENT)
Dept: GENERAL RADIOLOGY | Age: 69
DRG: 163 | End: 2019-03-23
Payer: MEDICARE

## 2019-03-23 LAB
ANION GAP SERPL CALCULATED.3IONS-SCNC: 9 MMOL/L (ref 4–16)
ANISOCYTOSIS: ABNORMAL
BASOPHILS ABSOLUTE: 0 K/CU MM
BASOPHILS RELATIVE PERCENT: 0.2 % (ref 0–1)
BUN BLDV-MCNC: 9 MG/DL (ref 6–23)
CALCIUM SERPL-MCNC: 7.7 MG/DL (ref 8.3–10.6)
CHLORIDE BLD-SCNC: 99 MMOL/L (ref 99–110)
CO2: 29 MMOL/L (ref 21–32)
CREAT SERPL-MCNC: 1.3 MG/DL (ref 0.9–1.3)
CULTURE: NORMAL
DIFFERENTIAL TYPE: ABNORMAL
EOSINOPHILS ABSOLUTE: 0 K/CU MM
EOSINOPHILS RELATIVE PERCENT: 0.1 % (ref 0–3)
GFR AFRICAN AMERICAN: >60 ML/MIN/1.73M2
GFR NON-AFRICAN AMERICAN: 55 ML/MIN/1.73M2
GLUCOSE BLD-MCNC: 101 MG/DL (ref 70–99)
GLUCOSE BLD-MCNC: 101 MG/DL (ref 70–99)
GLUCOSE BLD-MCNC: 120 MG/DL (ref 70–99)
GLUCOSE BLD-MCNC: 137 MG/DL (ref 70–99)
GLUCOSE BLD-MCNC: 162 MG/DL (ref 70–99)
GRAM SMEAR: NORMAL
HCT VFR BLD CALC: 23.1 % (ref 42–52)
HEMOGLOBIN: ABNORMAL GM/DL (ref 13.5–18)
IMMATURE NEUTROPHIL %: 1 % (ref 0–0.43)
LYMPHOCYTES ABSOLUTE: 1.3 K/CU MM
LYMPHOCYTES RELATIVE PERCENT: 13.9 % (ref 24–44)
Lab: NORMAL
MCH RBC QN AUTO: 26 PG (ref 27–31)
MCHC RBC AUTO-ENTMCNC: 29.9 % (ref 32–36)
MCV RBC AUTO: 87.2 FL (ref 78–100)
MONOCYTES ABSOLUTE: 0.6 K/CU MM
MONOCYTES RELATIVE PERCENT: 6 % (ref 0–4)
NUCLEATED RBC %: 0 %
PDW BLD-RTO: 22.4 % (ref 11.7–14.9)
PLATELET # BLD: 263 K/CU MM (ref 140–440)
PMV BLD AUTO: 8.4 FL (ref 7.5–11.1)
POLYCHROMASIA: ABNORMAL
POTASSIUM SERPL-SCNC: 3.2 MMOL/L (ref 3.5–5.1)
RBC # BLD: 2.65 M/CU MM (ref 4.6–6.2)
RBC # BLD: ABNORMAL 10*6/UL
SEGMENTED NEUTROPHILS ABSOLUTE COUNT: 7.3 K/CU MM
SEGMENTED NEUTROPHILS RELATIVE PERCENT: 78.8 % (ref 36–66)
SODIUM BLD-SCNC: 137 MMOL/L (ref 135–145)
SPECIMEN: NORMAL
STOMATOCYTES: ABNORMAL
TOTAL IMMATURE NEUTOROPHIL: 0.09 K/CU MM
TOTAL NUCLEATED RBC: 0 K/CU MM
WBC # BLD: 9.3 K/CU MM (ref 4–10.5)

## 2019-03-23 PROCEDURE — 6370000000 HC RX 637 (ALT 250 FOR IP): Performed by: SURGERY

## 2019-03-23 PROCEDURE — 6370000000 HC RX 637 (ALT 250 FOR IP): Performed by: INTERNAL MEDICINE

## 2019-03-23 PROCEDURE — 2580000003 HC RX 258: Performed by: PHYSICIAN ASSISTANT

## 2019-03-23 PROCEDURE — 36592 COLLECT BLOOD FROM PICC: CPT

## 2019-03-23 PROCEDURE — 2580000003 HC RX 258: Performed by: INTERNAL MEDICINE

## 2019-03-23 PROCEDURE — 82962 GLUCOSE BLOOD TEST: CPT

## 2019-03-23 PROCEDURE — 80048 BASIC METABOLIC PNL TOTAL CA: CPT

## 2019-03-23 PROCEDURE — 6370000000 HC RX 637 (ALT 250 FOR IP): Performed by: PHYSICIAN ASSISTANT

## 2019-03-23 PROCEDURE — 94640 AIRWAY INHALATION TREATMENT: CPT

## 2019-03-23 PROCEDURE — 94761 N-INVAS EAR/PLS OXIMETRY MLT: CPT

## 2019-03-23 PROCEDURE — 6360000002 HC RX W HCPCS: Performed by: INTERNAL MEDICINE

## 2019-03-23 PROCEDURE — 85025 COMPLETE CBC W/AUTO DIFF WBC: CPT

## 2019-03-23 PROCEDURE — 71045 X-RAY EXAM CHEST 1 VIEW: CPT

## 2019-03-23 PROCEDURE — 2700000000 HC OXYGEN THERAPY PER DAY

## 2019-03-23 PROCEDURE — 2100000000 HC CCU R&B

## 2019-03-23 PROCEDURE — 6370000000 HC RX 637 (ALT 250 FOR IP): Performed by: HOSPITALIST

## 2019-03-23 PROCEDURE — 2500000003 HC RX 250 WO HCPCS: Performed by: INTERNAL MEDICINE

## 2019-03-23 RX ORDER — POTASSIUM CHLORIDE 1.5 G/1.77G
40 POWDER, FOR SOLUTION ORAL PRN
Status: DISCONTINUED | OUTPATIENT
Start: 2019-03-23 | End: 2019-03-25 | Stop reason: HOSPADM

## 2019-03-23 RX ORDER — CARVEDILOL 3.12 MG/1
3.12 TABLET ORAL 2 TIMES DAILY
Status: DISCONTINUED | OUTPATIENT
Start: 2019-03-23 | End: 2019-03-23

## 2019-03-23 RX ORDER — POTASSIUM CHLORIDE 7.45 MG/ML
10 INJECTION INTRAVENOUS PRN
Status: DISCONTINUED | OUTPATIENT
Start: 2019-03-23 | End: 2019-03-25 | Stop reason: HOSPADM

## 2019-03-23 RX ORDER — POTASSIUM CHLORIDE 20 MEQ/1
40 TABLET, EXTENDED RELEASE ORAL PRN
Status: DISCONTINUED | OUTPATIENT
Start: 2019-03-23 | End: 2019-03-25 | Stop reason: HOSPADM

## 2019-03-23 RX ORDER — CARVEDILOL 3.12 MG/1
3.12 TABLET ORAL 2 TIMES DAILY
Status: DISCONTINUED | OUTPATIENT
Start: 2019-03-23 | End: 2019-03-25 | Stop reason: HOSPADM

## 2019-03-23 RX ADMIN — HYDROCODONE BITARTRATE AND ACETAMINOPHEN 2 TABLET: 5; 325 TABLET ORAL at 17:47

## 2019-03-23 RX ADMIN — PANTOPRAZOLE SODIUM 40 MG: 40 TABLET, DELAYED RELEASE ORAL at 16:04

## 2019-03-23 RX ADMIN — SODIUM CHLORIDE, PRESERVATIVE FREE 10 ML: 5 INJECTION INTRAVENOUS at 22:09

## 2019-03-23 RX ADMIN — ESCITALOPRAM OXALATE 20 MG: 10 TABLET, FILM COATED ORAL at 08:28

## 2019-03-23 RX ADMIN — PANTOPRAZOLE SODIUM 40 MG: 40 TABLET, DELAYED RELEASE ORAL at 06:27

## 2019-03-23 RX ADMIN — SODIUM CHLORIDE, PRESERVATIVE FREE 10 ML: 5 INJECTION INTRAVENOUS at 08:31

## 2019-03-23 RX ADMIN — TAZOBACTAM SODIUM AND PIPERACILLIN SODIUM 3.38 G: 375; 3 INJECTION, SOLUTION INTRAVENOUS at 17:23

## 2019-03-23 RX ADMIN — HYDROCODONE BITARTRATE AND ACETAMINOPHEN 2 TABLET: 5; 325 TABLET ORAL at 05:18

## 2019-03-23 RX ADMIN — TAZOBACTAM SODIUM AND PIPERACILLIN SODIUM 3.38 G: 375; 3 INJECTION, SOLUTION INTRAVENOUS at 08:29

## 2019-03-23 RX ADMIN — FUROSEMIDE 40 MG: 40 TABLET ORAL at 08:28

## 2019-03-23 RX ADMIN — ASPIRIN 81 MG: 81 TABLET, COATED ORAL at 08:29

## 2019-03-23 RX ADMIN — SODIUM CHLORIDE, PRESERVATIVE FREE 10 ML: 5 INJECTION INTRAVENOUS at 22:10

## 2019-03-23 RX ADMIN — LINAGLIPTIN 5 MG: 5 TABLET, FILM COATED ORAL at 08:29

## 2019-03-23 RX ADMIN — IPRATROPIUM BROMIDE AND ALBUTEROL SULFATE 1 AMPULE: .5; 3 SOLUTION RESPIRATORY (INHALATION) at 07:39

## 2019-03-23 RX ADMIN — HYDROCODONE BITARTRATE AND ACETAMINOPHEN 2 TABLET: 5; 325 TABLET ORAL at 10:43

## 2019-03-23 RX ADMIN — POTASSIUM CHLORIDE 40 MEQ: 20 TABLET, EXTENDED RELEASE ORAL at 10:43

## 2019-03-23 RX ADMIN — DOCUSATE SODIUM 100 MG: 100 CAPSULE, LIQUID FILLED ORAL at 08:29

## 2019-03-23 RX ADMIN — SODIUM CHLORIDE, PRESERVATIVE FREE 10 ML: 5 INJECTION INTRAVENOUS at 08:29

## 2019-03-23 RX ADMIN — IRON SUCROSE 200 MG: 20 INJECTION, SOLUTION INTRAVENOUS at 16:04

## 2019-03-23 RX ADMIN — CARVEDILOL 3.12 MG: 3.12 TABLET, FILM COATED ORAL at 17:27

## 2019-03-23 RX ADMIN — SODIUM CHLORIDE, PRESERVATIVE FREE 10 ML: 5 INJECTION INTRAVENOUS at 08:30

## 2019-03-23 RX ADMIN — HYDROCODONE BITARTRATE AND ACETAMINOPHEN 2 TABLET: 5; 325 TABLET ORAL at 22:09

## 2019-03-23 RX ADMIN — FOLIC ACID 1 MG: 1 TABLET ORAL at 08:28

## 2019-03-23 RX ADMIN — IPRATROPIUM BROMIDE AND ALBUTEROL SULFATE 1 AMPULE: .5; 3 SOLUTION RESPIRATORY (INHALATION) at 22:00

## 2019-03-23 RX ADMIN — HYDROCODONE BITARTRATE AND ACETAMINOPHEN 2 TABLET: 5; 325 TABLET ORAL at 01:05

## 2019-03-23 RX ADMIN — TAZOBACTAM SODIUM AND PIPERACILLIN SODIUM 3.38 G: 375; 3 INJECTION, SOLUTION INTRAVENOUS at 01:00

## 2019-03-23 RX ADMIN — INSULIN LISPRO 2 UNITS: 100 INJECTION, SOLUTION INTRAVENOUS; SUBCUTANEOUS at 16:51

## 2019-03-23 RX ADMIN — SODIUM CHLORIDE, PRESERVATIVE FREE 10 ML: 5 INJECTION INTRAVENOUS at 22:11

## 2019-03-23 ASSESSMENT — PAIN DESCRIPTION - FREQUENCY
FREQUENCY: CONTINUOUS

## 2019-03-23 ASSESSMENT — PAIN DESCRIPTION - LOCATION
LOCATION: CHEST
LOCATION: FLANK
LOCATION: FLANK
LOCATION: CHEST
LOCATION: FLANK
LOCATION: CHEST

## 2019-03-23 ASSESSMENT — PAIN DESCRIPTION - ONSET
ONSET: ON-GOING

## 2019-03-23 ASSESSMENT — PAIN DESCRIPTION - ORIENTATION
ORIENTATION: LEFT;OTHER (COMMENT)
ORIENTATION: LEFT;OTHER (COMMENT)
ORIENTATION: LEFT
ORIENTATION: LEFT
ORIENTATION: LEFT;OTHER (COMMENT)
ORIENTATION: LEFT

## 2019-03-23 ASSESSMENT — PAIN DESCRIPTION - PAIN TYPE
TYPE: SURGICAL PAIN
TYPE: SURGICAL PAIN
TYPE: ACUTE PAIN
TYPE: SURGICAL PAIN
TYPE: ACUTE PAIN

## 2019-03-23 ASSESSMENT — PAIN SCALES - GENERAL
PAINLEVEL_OUTOF10: 7
PAINLEVEL_OUTOF10: 7
PAINLEVEL_OUTOF10: 0
PAINLEVEL_OUTOF10: 7
PAINLEVEL_OUTOF10: 8
PAINLEVEL_OUTOF10: 8
PAINLEVEL_OUTOF10: 7
PAINLEVEL_OUTOF10: 0
PAINLEVEL_OUTOF10: 0
PAINLEVEL_OUTOF10: 3
PAINLEVEL_OUTOF10: 7

## 2019-03-23 ASSESSMENT — PAIN DESCRIPTION - PROGRESSION
CLINICAL_PROGRESSION: GRADUALLY WORSENING
CLINICAL_PROGRESSION: GRADUALLY WORSENING
CLINICAL_PROGRESSION: NOT CHANGED
CLINICAL_PROGRESSION: GRADUALLY WORSENING
CLINICAL_PROGRESSION: GRADUALLY WORSENING

## 2019-03-23 ASSESSMENT — PAIN DESCRIPTION - DESCRIPTORS
DESCRIPTORS: SORE
DESCRIPTORS: ACHING;SORE

## 2019-03-24 ENCOUNTER — APPOINTMENT (OUTPATIENT)
Dept: GENERAL RADIOLOGY | Age: 69
DRG: 163 | End: 2019-03-24
Payer: MEDICARE

## 2019-03-24 LAB
ANION GAP SERPL CALCULATED.3IONS-SCNC: 9 MMOL/L (ref 4–16)
BASOPHILS ABSOLUTE: 0 K/CU MM
BASOPHILS RELATIVE PERCENT: 0.3 % (ref 0–1)
BUN BLDV-MCNC: 9 MG/DL (ref 6–23)
CALCIUM SERPL-MCNC: 7.9 MG/DL (ref 8.3–10.6)
CHLORIDE BLD-SCNC: 96 MMOL/L (ref 99–110)
CO2: 29 MMOL/L (ref 21–32)
CREAT SERPL-MCNC: 1.3 MG/DL (ref 0.9–1.3)
DIFFERENTIAL TYPE: ABNORMAL
EOSINOPHILS ABSOLUTE: 0 K/CU MM
EOSINOPHILS RELATIVE PERCENT: 0.2 % (ref 0–3)
GFR AFRICAN AMERICAN: >60 ML/MIN/1.73M2
GFR NON-AFRICAN AMERICAN: 55 ML/MIN/1.73M2
GLUCOSE BLD-MCNC: 104 MG/DL (ref 70–99)
GLUCOSE BLD-MCNC: 110 MG/DL (ref 70–99)
GLUCOSE BLD-MCNC: 119 MG/DL (ref 70–99)
GLUCOSE BLD-MCNC: 127 MG/DL (ref 70–99)
GLUCOSE BLD-MCNC: 141 MG/DL (ref 70–99)
HCT VFR BLD CALC: 23.3 % (ref 42–52)
HEMOGLOBIN: ABNORMAL GM/DL (ref 13.5–18)
IMMATURE NEUTROPHIL %: 1.2 % (ref 0–0.43)
LYMPHOCYTES ABSOLUTE: 1.5 K/CU MM
LYMPHOCYTES RELATIVE PERCENT: 15.9 % (ref 24–44)
MCH RBC QN AUTO: 25.9 PG (ref 27–31)
MCHC RBC AUTO-ENTMCNC: 29.6 % (ref 32–36)
MCV RBC AUTO: 87.6 FL (ref 78–100)
MONOCYTES ABSOLUTE: 0.7 K/CU MM
MONOCYTES RELATIVE PERCENT: 7.4 % (ref 0–4)
NIL (NEGATIVE) SPOT CONTROL: NORMAL
NUCLEATED RBC %: 0 %
PANEL A SPOT COUNT: 1
PANEL B SPOT COUNT: 1
PDW BLD-RTO: 22.5 % (ref 11.7–14.9)
PLATELET # BLD: 289 K/CU MM (ref 140–440)
PMV BLD AUTO: 8.3 FL (ref 7.5–11.1)
POSITIVE CONTROL SPOT COUNT: NORMAL
POSITIVE CONTROL SPOT COUNT: NORMAL
POTASSIUM SERPL-SCNC: 3.3 MMOL/L (ref 3.5–5.1)
RBC # BLD: 2.66 M/CU MM (ref 4.6–6.2)
SEGMENTED NEUTROPHILS ABSOLUTE COUNT: 7 K/CU MM
SEGMENTED NEUTROPHILS RELATIVE PERCENT: 75 % (ref 36–66)
SODIUM BLD-SCNC: 134 MMOL/L (ref 135–145)
TB CELL IMMUNE MEASURE: NEGATIVE
TB CELL IMMUNE MEASURE: NORMAL
TOTAL IMMATURE NEUTOROPHIL: 0.11 K/CU MM
TOTAL NUCLEATED RBC: 0 K/CU MM
WBC # BLD: 9.3 K/CU MM (ref 4–10.5)

## 2019-03-24 PROCEDURE — 85025 COMPLETE CBC W/AUTO DIFF WBC: CPT

## 2019-03-24 PROCEDURE — 94761 N-INVAS EAR/PLS OXIMETRY MLT: CPT

## 2019-03-24 PROCEDURE — 6370000000 HC RX 637 (ALT 250 FOR IP): Performed by: HOSPITALIST

## 2019-03-24 PROCEDURE — 6370000000 HC RX 637 (ALT 250 FOR IP): Performed by: PHYSICIAN ASSISTANT

## 2019-03-24 PROCEDURE — 94660 CPAP INITIATION&MGMT: CPT

## 2019-03-24 PROCEDURE — 6360000002 HC RX W HCPCS: Performed by: INTERNAL MEDICINE

## 2019-03-24 PROCEDURE — 2100000000 HC CCU R&B

## 2019-03-24 PROCEDURE — 6370000000 HC RX 637 (ALT 250 FOR IP): Performed by: INTERNAL MEDICINE

## 2019-03-24 PROCEDURE — 2500000003 HC RX 250 WO HCPCS: Performed by: INTERNAL MEDICINE

## 2019-03-24 PROCEDURE — 2580000003 HC RX 258: Performed by: INTERNAL MEDICINE

## 2019-03-24 PROCEDURE — 71045 X-RAY EXAM CHEST 1 VIEW: CPT

## 2019-03-24 PROCEDURE — 94640 AIRWAY INHALATION TREATMENT: CPT

## 2019-03-24 PROCEDURE — 36592 COLLECT BLOOD FROM PICC: CPT

## 2019-03-24 PROCEDURE — 2580000003 HC RX 258: Performed by: PHYSICIAN ASSISTANT

## 2019-03-24 PROCEDURE — 6370000000 HC RX 637 (ALT 250 FOR IP): Performed by: SURGERY

## 2019-03-24 PROCEDURE — 82962 GLUCOSE BLOOD TEST: CPT

## 2019-03-24 PROCEDURE — 80048 BASIC METABOLIC PNL TOTAL CA: CPT

## 2019-03-24 RX ORDER — SPIRONOLACTONE 25 MG/1
25 TABLET ORAL DAILY
Status: DISCONTINUED | OUTPATIENT
Start: 2019-03-24 | End: 2019-03-25 | Stop reason: HOSPADM

## 2019-03-24 RX ORDER — POTASSIUM CHLORIDE 20 MEQ/1
20 TABLET, EXTENDED RELEASE ORAL ONCE
Status: DISCONTINUED | OUTPATIENT
Start: 2019-03-24 | End: 2019-03-25

## 2019-03-24 RX ADMIN — IPRATROPIUM BROMIDE AND ALBUTEROL SULFATE 1 AMPULE: .5; 3 SOLUTION RESPIRATORY (INHALATION) at 16:19

## 2019-03-24 RX ADMIN — IPRATROPIUM BROMIDE AND ALBUTEROL SULFATE 1 AMPULE: .5; 3 SOLUTION RESPIRATORY (INHALATION) at 08:40

## 2019-03-24 RX ADMIN — ALPRAZOLAM 0.5 MG: 0.5 TABLET ORAL at 20:12

## 2019-03-24 RX ADMIN — SODIUM CHLORIDE, PRESERVATIVE FREE 10 ML: 5 INJECTION INTRAVENOUS at 09:13

## 2019-03-24 RX ADMIN — TAZOBACTAM SODIUM AND PIPERACILLIN SODIUM 3.38 G: 375; 3 INJECTION, SOLUTION INTRAVENOUS at 16:56

## 2019-03-24 RX ADMIN — SODIUM CHLORIDE, PRESERVATIVE FREE 10 ML: 5 INJECTION INTRAVENOUS at 09:15

## 2019-03-24 RX ADMIN — TAZOBACTAM SODIUM AND PIPERACILLIN SODIUM 3.38 G: 375; 3 INJECTION, SOLUTION INTRAVENOUS at 09:11

## 2019-03-24 RX ADMIN — SODIUM CHLORIDE, PRESERVATIVE FREE 10 ML: 5 INJECTION INTRAVENOUS at 20:11

## 2019-03-24 RX ADMIN — IPRATROPIUM BROMIDE AND ALBUTEROL SULFATE 1 AMPULE: .5; 3 SOLUTION RESPIRATORY (INHALATION) at 12:14

## 2019-03-24 RX ADMIN — POTASSIUM CHLORIDE 40 MEQ: 20 TABLET, EXTENDED RELEASE ORAL at 09:11

## 2019-03-24 RX ADMIN — PANTOPRAZOLE SODIUM 40 MG: 40 TABLET, DELAYED RELEASE ORAL at 16:56

## 2019-03-24 RX ADMIN — IRON SUCROSE 200 MG: 20 INJECTION, SOLUTION INTRAVENOUS at 15:24

## 2019-03-24 RX ADMIN — TAZOBACTAM SODIUM AND PIPERACILLIN SODIUM 3.38 G: 375; 3 INJECTION, SOLUTION INTRAVENOUS at 02:19

## 2019-03-24 RX ADMIN — FUROSEMIDE 40 MG: 40 TABLET ORAL at 09:12

## 2019-03-24 RX ADMIN — FLUTICASONE PROPIONATE 2 SPRAY: 50 SPRAY, METERED NASAL at 09:13

## 2019-03-24 RX ADMIN — CARVEDILOL 3.12 MG: 3.12 TABLET, FILM COATED ORAL at 09:12

## 2019-03-24 RX ADMIN — IPRATROPIUM BROMIDE AND ALBUTEROL SULFATE 1 AMPULE: .5; 3 SOLUTION RESPIRATORY (INHALATION) at 20:08

## 2019-03-24 RX ADMIN — HYDROCODONE BITARTRATE AND ACETAMINOPHEN 2 TABLET: 5; 325 TABLET ORAL at 02:12

## 2019-03-24 RX ADMIN — ESCITALOPRAM OXALATE 20 MG: 10 TABLET, FILM COATED ORAL at 09:12

## 2019-03-24 RX ADMIN — PANTOPRAZOLE SODIUM 40 MG: 40 TABLET, DELAYED RELEASE ORAL at 06:08

## 2019-03-24 RX ADMIN — SPIRONOLACTONE 25 MG: 25 TABLET ORAL at 12:30

## 2019-03-24 RX ADMIN — LINAGLIPTIN 5 MG: 5 TABLET, FILM COATED ORAL at 09:12

## 2019-03-24 RX ADMIN — DOCUSATE SODIUM 100 MG: 100 CAPSULE, LIQUID FILLED ORAL at 09:12

## 2019-03-24 RX ADMIN — INSULIN LISPRO 2 UNITS: 100 INJECTION, SOLUTION INTRAVENOUS; SUBCUTANEOUS at 12:30

## 2019-03-24 RX ADMIN — HYDROCODONE BITARTRATE AND ACETAMINOPHEN 2 TABLET: 5; 325 TABLET ORAL at 12:29

## 2019-03-24 RX ADMIN — FOLIC ACID 1 MG: 1 TABLET ORAL at 09:12

## 2019-03-24 RX ADMIN — CARVEDILOL 3.12 MG: 3.12 TABLET, FILM COATED ORAL at 20:12

## 2019-03-24 RX ADMIN — SODIUM CHLORIDE, PRESERVATIVE FREE 10 ML: 5 INJECTION INTRAVENOUS at 20:10

## 2019-03-24 RX ADMIN — ASPIRIN 81 MG: 81 TABLET, COATED ORAL at 09:12

## 2019-03-24 RX ADMIN — HYDROCODONE BITARTRATE AND ACETAMINOPHEN 2 TABLET: 5; 325 TABLET ORAL at 20:12

## 2019-03-24 RX ADMIN — HYDROCODONE BITARTRATE AND ACETAMINOPHEN 2 TABLET: 5; 325 TABLET ORAL at 06:28

## 2019-03-24 ASSESSMENT — PAIN DESCRIPTION - LOCATION
LOCATION: CHEST
LOCATION: BACK;CHEST;GENERALIZED
LOCATION: CHEST
LOCATION: CHEST

## 2019-03-24 ASSESSMENT — PAIN SCALES - GENERAL
PAINLEVEL_OUTOF10: 0
PAINLEVEL_OUTOF10: 2
PAINLEVEL_OUTOF10: 8
PAINLEVEL_OUTOF10: 7
PAINLEVEL_OUTOF10: 7
PAINLEVEL_OUTOF10: 0
PAINLEVEL_OUTOF10: 4
PAINLEVEL_OUTOF10: 8
PAINLEVEL_OUTOF10: 4
PAINLEVEL_OUTOF10: 8
PAINLEVEL_OUTOF10: 0

## 2019-03-24 ASSESSMENT — PAIN DESCRIPTION - ORIENTATION
ORIENTATION: LEFT
ORIENTATION: LEFT;OUTER
ORIENTATION: LEFT;OTHER (COMMENT)

## 2019-03-24 ASSESSMENT — PAIN - FUNCTIONAL ASSESSMENT
PAIN_FUNCTIONAL_ASSESSMENT: PREVENTS OR INTERFERES SOME ACTIVE ACTIVITIES AND ADLS
PAIN_FUNCTIONAL_ASSESSMENT: ACTIVITIES ARE NOT PREVENTED
PAIN_FUNCTIONAL_ASSESSMENT: PREVENTS OR INTERFERES SOME ACTIVE ACTIVITIES AND ADLS

## 2019-03-24 ASSESSMENT — PAIN DESCRIPTION - PAIN TYPE
TYPE: ACUTE PAIN
TYPE: SURGICAL PAIN
TYPE: CHRONIC PAIN;SURGICAL PAIN
TYPE: ACUTE PAIN

## 2019-03-24 ASSESSMENT — PAIN DESCRIPTION - ONSET
ONSET: ON-GOING
ONSET: GRADUAL
ONSET: ON-GOING

## 2019-03-24 ASSESSMENT — PAIN DESCRIPTION - PROGRESSION
CLINICAL_PROGRESSION: GRADUALLY WORSENING

## 2019-03-24 ASSESSMENT — PAIN DESCRIPTION - DESCRIPTORS
DESCRIPTORS: ACHING;SORE
DESCRIPTORS: ACHING
DESCRIPTORS: SORE
DESCRIPTORS: ACHING

## 2019-03-24 ASSESSMENT — PAIN DESCRIPTION - FREQUENCY
FREQUENCY: CONTINUOUS
FREQUENCY: INTERMITTENT

## 2019-03-24 ASSESSMENT — PAIN DESCRIPTION - DIRECTION: RADIATING_TOWARDS: BACK

## 2019-03-25 VITALS
HEART RATE: 67 BPM | DIASTOLIC BLOOD PRESSURE: 72 MMHG | TEMPERATURE: 98.8 F | RESPIRATION RATE: 20 BRPM | WEIGHT: 230.2 LBS | SYSTOLIC BLOOD PRESSURE: 168 MMHG | OXYGEN SATURATION: 99 % | HEIGHT: 69 IN | BODY MASS INDEX: 34.1 KG/M2

## 2019-03-25 LAB
ABO/RH: NORMAL
ANION GAP SERPL CALCULATED.3IONS-SCNC: 11 MMOL/L (ref 4–16)
ANION GAP SERPL CALCULATED.3IONS-SCNC: 12 MMOL/L (ref 4–16)
ANISOCYTOSIS: ABNORMAL
ANTIBODY SCREEN: NEGATIVE
BASOPHILS ABSOLUTE: 0 K/CU MM
BASOPHILS RELATIVE PERCENT: 0.3 % (ref 0–1)
BUN BLDV-MCNC: 8 MG/DL (ref 6–23)
BUN BLDV-MCNC: 9 MG/DL (ref 6–23)
CALCIUM SERPL-MCNC: 8 MG/DL (ref 8.3–10.6)
CALCIUM SERPL-MCNC: 8.2 MG/DL (ref 8.3–10.6)
CHLORIDE BLD-SCNC: 95 MMOL/L (ref 99–110)
CHLORIDE BLD-SCNC: 97 MMOL/L (ref 99–110)
CO2: 27 MMOL/L (ref 21–32)
CO2: 27 MMOL/L (ref 21–32)
COMPONENT: NORMAL
COMPONENT: NORMAL
CREAT SERPL-MCNC: 1.3 MG/DL (ref 0.9–1.3)
CREAT SERPL-MCNC: 1.3 MG/DL (ref 0.9–1.3)
CROSSMATCH RESULT: NORMAL
CROSSMATCH RESULT: NORMAL
DIFFERENTIAL TYPE: ABNORMAL
DIFFERENTIAL TYPE: ABNORMAL
EOSINOPHILS ABSOLUTE: 0 K/CU MM
EOSINOPHILS RELATIVE PERCENT: 0.3 % (ref 0–3)
GFR AFRICAN AMERICAN: >60 ML/MIN/1.73M2
GFR AFRICAN AMERICAN: >60 ML/MIN/1.73M2
GFR NON-AFRICAN AMERICAN: 55 ML/MIN/1.73M2
GFR NON-AFRICAN AMERICAN: 55 ML/MIN/1.73M2
GLUCOSE BLD-MCNC: 140 MG/DL (ref 70–99)
GLUCOSE BLD-MCNC: 145 MG/DL (ref 70–99)
GLUCOSE BLD-MCNC: 183 MG/DL (ref 70–99)
GLUCOSE BLD-MCNC: 96 MG/DL (ref 70–99)
HCT VFR BLD CALC: 24.3 % (ref 42–52)
HEMOGLOBIN: 7.3 GM/DL (ref 13.5–18)
IMMATURE NEUTROPHIL %: 1.5 % (ref 0–0.43)
LYMPHOCYTES ABSOLUTE: 1.6 K/CU MM
LYMPHOCYTES RELATIVE PERCENT: 15.1 % (ref 24–44)
MAGNESIUM: 1.8 MG/DL (ref 1.8–2.4)
MCH RBC QN AUTO: 26.3 PG (ref 27–31)
MCHC RBC AUTO-ENTMCNC: 30 % (ref 32–36)
MCV RBC AUTO: 87.4 FL (ref 78–100)
MONOCYTES ABSOLUTE: 0.8 K/CU MM
MONOCYTES RELATIVE PERCENT: 8.1 % (ref 0–4)
NUCLEATED RBC %: 0 %
PDW BLD-RTO: 22.5 % (ref 11.7–14.9)
PLATELET # BLD: 246 K/CU MM (ref 140–440)
PMV BLD AUTO: 8.3 FL (ref 7.5–11.1)
POLYCHROMASIA: ABNORMAL
POTASSIUM SERPL-SCNC: 3.3 MMOL/L (ref 3.5–5.1)
POTASSIUM SERPL-SCNC: 3.9 MMOL/L (ref 3.5–5.1)
RBC # BLD: 2.78 M/CU MM (ref 4.6–6.2)
SEGMENTED NEUTROPHILS ABSOLUTE COUNT: 7.8 K/CU MM
SEGMENTED NEUTROPHILS ABSOLUTE COUNT: ABNORMAL K/CU MM
SEGMENTED NEUTROPHILS RELATIVE PERCENT: 74.7 % (ref 36–66)
SODIUM BLD-SCNC: 133 MMOL/L (ref 135–145)
SODIUM BLD-SCNC: 136 MMOL/L (ref 135–145)
STATUS: NORMAL
STATUS: NORMAL
STOMATOCYTES: ABNORMAL
TOTAL IMMATURE NEUTOROPHIL: 0.15 K/CU MM
TOTAL NUCLEATED RBC: 0 K/CU MM
TRANSFUSION STATUS: NORMAL
TRANSFUSION STATUS: NORMAL
UNIT DIVISION: 0
UNIT DIVISION: 0
UNIT NUMBER: NORMAL
UNIT NUMBER: NORMAL
WBC # BLD: 10.3 K/CU MM (ref 4–10.5)

## 2019-03-25 PROCEDURE — 6370000000 HC RX 637 (ALT 250 FOR IP): Performed by: PHYSICIAN ASSISTANT

## 2019-03-25 PROCEDURE — 83735 ASSAY OF MAGNESIUM: CPT

## 2019-03-25 PROCEDURE — 2580000003 HC RX 258: Performed by: PHYSICIAN ASSISTANT

## 2019-03-25 PROCEDURE — 97116 GAIT TRAINING THERAPY: CPT

## 2019-03-25 PROCEDURE — 6370000000 HC RX 637 (ALT 250 FOR IP): Performed by: HOSPITALIST

## 2019-03-25 PROCEDURE — 2500000003 HC RX 250 WO HCPCS: Performed by: INTERNAL MEDICINE

## 2019-03-25 PROCEDURE — 6370000000 HC RX 637 (ALT 250 FOR IP): Performed by: INTERNAL MEDICINE

## 2019-03-25 PROCEDURE — 94761 N-INVAS EAR/PLS OXIMETRY MLT: CPT

## 2019-03-25 PROCEDURE — 80048 BASIC METABOLIC PNL TOTAL CA: CPT

## 2019-03-25 PROCEDURE — 97110 THERAPEUTIC EXERCISES: CPT

## 2019-03-25 PROCEDURE — 94660 CPAP INITIATION&MGMT: CPT

## 2019-03-25 PROCEDURE — 2580000003 HC RX 258: Performed by: INTERNAL MEDICINE

## 2019-03-25 PROCEDURE — 97530 THERAPEUTIC ACTIVITIES: CPT

## 2019-03-25 PROCEDURE — 85025 COMPLETE CBC W/AUTO DIFF WBC: CPT

## 2019-03-25 PROCEDURE — 82962 GLUCOSE BLOOD TEST: CPT

## 2019-03-25 PROCEDURE — 99232 SBSQ HOSP IP/OBS MODERATE 35: CPT | Performed by: INTERNAL MEDICINE

## 2019-03-25 PROCEDURE — 6370000000 HC RX 637 (ALT 250 FOR IP): Performed by: SURGERY

## 2019-03-25 PROCEDURE — 84132 ASSAY OF SERUM POTASSIUM: CPT

## 2019-03-25 PROCEDURE — 94150 VITAL CAPACITY TEST: CPT

## 2019-03-25 RX ORDER — ONDANSETRON 4 MG/1
4 TABLET, FILM COATED ORAL 3 TIMES DAILY PRN
Qty: 30 TABLET | Refills: 0 | Status: SHIPPED | OUTPATIENT
Start: 2019-03-25 | End: 2019-06-05 | Stop reason: SDUPTHER

## 2019-03-25 RX ORDER — BISACODYL 10 MG
10 SUPPOSITORY, RECTAL RECTAL DAILY PRN
Qty: 5 SUPPOSITORY | Refills: 0 | Status: SHIPPED | OUTPATIENT
Start: 2019-03-25 | End: 2019-04-24

## 2019-03-25 RX ORDER — ASPIRIN 81 MG/1
81 TABLET ORAL DAILY
Qty: 30 TABLET | Refills: 3 | Status: SHIPPED | OUTPATIENT
Start: 2019-03-26 | End: 2019-08-27

## 2019-03-25 RX ORDER — NITROGLYCERIN 0.4 MG/1
TABLET SUBLINGUAL
Qty: 25 TABLET | Refills: 0 | Status: SHIPPED | OUTPATIENT
Start: 2019-03-25 | End: 2019-05-29 | Stop reason: SDUPTHER

## 2019-03-25 RX ORDER — IPRATROPIUM BROMIDE AND ALBUTEROL SULFATE 2.5; .5 MG/3ML; MG/3ML
3 SOLUTION RESPIRATORY (INHALATION)
Qty: 360 ML | Refills: 0 | Status: SHIPPED | OUTPATIENT
Start: 2019-03-25 | End: 2019-05-18 | Stop reason: DRUGHIGH

## 2019-03-25 RX ORDER — PANTOPRAZOLE SODIUM 40 MG/1
40 TABLET, DELAYED RELEASE ORAL
Qty: 30 TABLET | Refills: 0 | Status: ON HOLD | OUTPATIENT
Start: 2019-03-25 | End: 2019-04-25 | Stop reason: SDUPTHER

## 2019-03-25 RX ORDER — FUROSEMIDE 40 MG/1
40 TABLET ORAL DAILY
Qty: 60 TABLET | Refills: 0 | Status: SHIPPED | OUTPATIENT
Start: 2019-03-26 | End: 2019-05-24

## 2019-03-25 RX ORDER — AMOXICILLIN AND CLAVULANATE POTASSIUM 875; 125 MG/1; MG/1
1 TABLET, FILM COATED ORAL EVERY 12 HOURS SCHEDULED
Status: DISCONTINUED | OUTPATIENT
Start: 2019-03-25 | End: 2019-03-25 | Stop reason: HOSPADM

## 2019-03-25 RX ORDER — AMOXICILLIN AND CLAVULANATE POTASSIUM 875; 125 MG/1; MG/1
1 TABLET, FILM COATED ORAL EVERY 12 HOURS SCHEDULED
Qty: 54 TABLET | Refills: 0 | Status: SHIPPED | OUTPATIENT
Start: 2019-03-25 | End: 2019-04-25 | Stop reason: HOSPADM

## 2019-03-25 RX ORDER — FOLIC ACID 1 MG/1
1 TABLET ORAL DAILY
Qty: 30 TABLET | Refills: 0 | Status: SHIPPED | OUTPATIENT
Start: 2019-03-26 | End: 2019-06-05 | Stop reason: SDUPTHER

## 2019-03-25 RX ORDER — AMLODIPINE BESYLATE 10 MG/1
10 TABLET ORAL NIGHTLY
Status: DISCONTINUED | OUTPATIENT
Start: 2019-03-25 | End: 2019-03-25 | Stop reason: HOSPADM

## 2019-03-25 RX ORDER — POTASSIUM CHLORIDE 20 MEQ/1
20 TABLET, EXTENDED RELEASE ORAL 2 TIMES DAILY
Qty: 20 TABLET | Refills: 0
Start: 2019-03-25 | End: 2019-05-18 | Stop reason: SDUPTHER

## 2019-03-25 RX ORDER — ALPRAZOLAM 0.5 MG/1
0.5 TABLET ORAL 2 TIMES DAILY PRN
Qty: 10 TABLET | Refills: 0 | Status: SHIPPED | OUTPATIENT
Start: 2019-03-25 | End: 2019-03-30

## 2019-03-25 RX ORDER — PSEUDOEPHEDRINE HCL 30 MG
100 TABLET ORAL 2 TIMES DAILY
Qty: 30 CAPSULE | Refills: 0 | Status: SHIPPED | OUTPATIENT
Start: 2019-03-25 | End: 2019-08-27

## 2019-03-25 RX ORDER — POTASSIUM CHLORIDE 20 MEQ/1
40 TABLET, EXTENDED RELEASE ORAL ONCE
Status: DISCONTINUED | OUTPATIENT
Start: 2019-03-25 | End: 2019-03-25

## 2019-03-25 RX ORDER — CARVEDILOL 3.12 MG/1
3.12 TABLET ORAL 2 TIMES DAILY
Qty: 60 TABLET | Refills: 0 | Status: SHIPPED | OUTPATIENT
Start: 2019-03-25 | End: 2019-05-18 | Stop reason: SDUPTHER

## 2019-03-25 RX ORDER — HYDROCODONE BITARTRATE AND ACETAMINOPHEN 5; 325 MG/1; MG/1
2 TABLET ORAL EVERY 4 HOURS PRN
Qty: 24 TABLET | Refills: 0 | Status: SHIPPED | OUTPATIENT
Start: 2019-03-25 | End: 2019-03-28

## 2019-03-25 RX ORDER — ATORVASTATIN CALCIUM 40 MG/1
80 TABLET, FILM COATED ORAL NIGHTLY
Status: DISCONTINUED | OUTPATIENT
Start: 2019-03-25 | End: 2019-03-25 | Stop reason: HOSPADM

## 2019-03-25 RX ADMIN — SODIUM CHLORIDE, PRESERVATIVE FREE 10 ML: 5 INJECTION INTRAVENOUS at 10:01

## 2019-03-25 RX ADMIN — SODIUM CHLORIDE, PRESERVATIVE FREE 10 ML: 5 INJECTION INTRAVENOUS at 10:02

## 2019-03-25 RX ADMIN — DOCUSATE SODIUM 100 MG: 100 CAPSULE, LIQUID FILLED ORAL at 09:56

## 2019-03-25 RX ADMIN — LINAGLIPTIN 5 MG: 5 TABLET, FILM COATED ORAL at 09:56

## 2019-03-25 RX ADMIN — FUROSEMIDE 40 MG: 40 TABLET ORAL at 09:56

## 2019-03-25 RX ADMIN — FLUTICASONE PROPIONATE 2 SPRAY: 50 SPRAY, METERED NASAL at 09:58

## 2019-03-25 RX ADMIN — POTASSIUM CHLORIDE 40 MEQ: 20 TABLET, EXTENDED RELEASE ORAL at 06:31

## 2019-03-25 RX ADMIN — ESCITALOPRAM OXALATE 20 MG: 10 TABLET, FILM COATED ORAL at 09:56

## 2019-03-25 RX ADMIN — ALPRAZOLAM 0.5 MG: 0.5 TABLET ORAL at 01:08

## 2019-03-25 RX ADMIN — ACETAMINOPHEN 650 MG: 325 TABLET ORAL at 06:26

## 2019-03-25 RX ADMIN — TAZOBACTAM SODIUM AND PIPERACILLIN SODIUM 3.38 G: 375; 3 INJECTION, SOLUTION INTRAVENOUS at 01:09

## 2019-03-25 RX ADMIN — ASPIRIN 81 MG: 81 TABLET, COATED ORAL at 09:55

## 2019-03-25 RX ADMIN — AMOXICILLIN AND CLAVULANATE POTASSIUM 1 TABLET: 875; 125 TABLET, FILM COATED ORAL at 09:55

## 2019-03-25 RX ADMIN — HYDROCODONE BITARTRATE AND ACETAMINOPHEN 2 TABLET: 5; 325 TABLET ORAL at 02:06

## 2019-03-25 RX ADMIN — INSULIN LISPRO 2 UNITS: 100 INJECTION, SOLUTION INTRAVENOUS; SUBCUTANEOUS at 12:27

## 2019-03-25 RX ADMIN — SPIRONOLACTONE 25 MG: 25 TABLET ORAL at 09:56

## 2019-03-25 RX ADMIN — FOLIC ACID 1 MG: 1 TABLET ORAL at 09:56

## 2019-03-25 RX ADMIN — SODIUM CHLORIDE, PRESERVATIVE FREE 10 ML: 5 INJECTION INTRAVENOUS at 09:58

## 2019-03-25 RX ADMIN — CARVEDILOL 3.12 MG: 3.12 TABLET, FILM COATED ORAL at 09:56

## 2019-03-25 RX ADMIN — HYDROCODONE BITARTRATE AND ACETAMINOPHEN 2 TABLET: 5; 325 TABLET ORAL at 09:56

## 2019-03-25 RX ADMIN — PANTOPRAZOLE SODIUM 40 MG: 40 TABLET, DELAYED RELEASE ORAL at 09:56

## 2019-03-25 ASSESSMENT — PAIN DESCRIPTION - ONSET
ONSET: ON-GOING
ONSET: GRADUAL

## 2019-03-25 ASSESSMENT — PAIN DESCRIPTION - DESCRIPTORS
DESCRIPTORS: ACHING

## 2019-03-25 ASSESSMENT — PAIN DESCRIPTION - LOCATION
LOCATION: CHEST
LOCATION: CHEST
LOCATION: BACK;CHEST

## 2019-03-25 ASSESSMENT — PAIN DESCRIPTION - PAIN TYPE
TYPE: SURGICAL PAIN
TYPE: SURGICAL PAIN
TYPE: CHRONIC PAIN;SURGICAL PAIN

## 2019-03-25 ASSESSMENT — PAIN SCALES - GENERAL
PAINLEVEL_OUTOF10: 5
PAINLEVEL_OUTOF10: 6
PAINLEVEL_OUTOF10: 7
PAINLEVEL_OUTOF10: 7
PAINLEVEL_OUTOF10: 6
PAINLEVEL_OUTOF10: 0

## 2019-03-25 ASSESSMENT — PAIN DESCRIPTION - ORIENTATION
ORIENTATION: LEFT
ORIENTATION: LEFT;OUTER

## 2019-03-25 ASSESSMENT — PAIN DESCRIPTION - PROGRESSION
CLINICAL_PROGRESSION: GRADUALLY WORSENING
CLINICAL_PROGRESSION: GRADUALLY WORSENING

## 2019-03-25 ASSESSMENT — PAIN - FUNCTIONAL ASSESSMENT
PAIN_FUNCTIONAL_ASSESSMENT: PREVENTS OR INTERFERES SOME ACTIVE ACTIVITIES AND ADLS
PAIN_FUNCTIONAL_ASSESSMENT: PREVENTS OR INTERFERES SOME ACTIVE ACTIVITIES AND ADLS
PAIN_FUNCTIONAL_ASSESSMENT: ACTIVITIES ARE NOT PREVENTED

## 2019-03-25 ASSESSMENT — PAIN DESCRIPTION - FREQUENCY
FREQUENCY: INTERMITTENT
FREQUENCY: CONTINUOUS
FREQUENCY: INTERMITTENT

## 2019-03-25 ASSESSMENT — PAIN SCALES - WONG BAKER: WONGBAKER_NUMERICALRESPONSE: 0

## 2019-03-25 ASSESSMENT — PAIN DESCRIPTION - DIRECTION: RADIATING_TOWARDS: BACK

## 2019-03-28 ENCOUNTER — HOSPITAL ENCOUNTER (OUTPATIENT)
Age: 69
Discharge: HOME | End: 2019-03-28

## 2019-03-28 LAB
ALBUMIN SERPL-MCNC: 2.6 GM/DL (ref 3.4–5)
ALP BLD-CCNC: 96 IU/L (ref 40–128)
ALT SERPL-CCNC: 7 U/L (ref 10–40)
ANION GAP SERPL CALCULATED.3IONS-SCNC: 12 MMOL/L (ref 4–16)
AST SERPL-CCNC: 11 IU/L (ref 15–37)
BILIRUB SERPL-MCNC: 0.6 MG/DL (ref 0–1)
BUN BLDV-MCNC: 9 MG/DL (ref 6–23)
CALCIUM SERPL-MCNC: 8 MG/DL (ref 8.3–10.6)
CHLORIDE BLD-SCNC: 98 MMOL/L (ref 99–110)
CO2: 24 MMOL/L (ref 21–32)
CREAT SERPL-MCNC: 1.2 MG/DL (ref 0.9–1.3)
DIFFERENTIAL TYPE: ABNORMAL
ESTIMATED AVERAGE GLUCOSE: 97 MG/DL
GFR AFRICAN AMERICAN: >60 ML/MIN/1.73M2
GFR NON-AFRICAN AMERICAN: >60 ML/MIN/1.73M2
GLUCOSE BLD-MCNC: 92 MG/DL (ref 70–99)
HBA1C MFR BLD: 5 % (ref 4.2–6.3)
HCT VFR BLD CALC: 26.4 % (ref 42–52)
HEMOGLOBIN: 7.5 GM/DL (ref 13.5–18)
HYPOCHROMIA: ABNORMAL
LYMPHOCYTES ABSOLUTE: 1 K/CU MM
LYMPHOCYTES RELATIVE PERCENT: 8 % (ref 24–44)
MACROCYTES: ABNORMAL
MCH RBC QN AUTO: 26.2 PG (ref 27–31)
MCHC RBC AUTO-ENTMCNC: 28.4 % (ref 32–36)
MCV RBC AUTO: 92.3 FL (ref 78–100)
MONOCYTES ABSOLUTE: 0.6 K/CU MM
MONOCYTES RELATIVE PERCENT: 5 % (ref 0–4)
PDW BLD-RTO: 23.1 % (ref 11.7–14.9)
PLATELET # BLD: 262 K/CU MM (ref 140–440)
PMV BLD AUTO: 8.6 FL (ref 7.5–11.1)
POLYCHROMASIA: ABNORMAL
POTASSIUM SERPL-SCNC: 3.7 MMOL/L (ref 3.5–5.1)
RBC # BLD: 2.86 M/CU MM (ref 4.6–6.2)
SEGMENTED NEUTROPHILS ABSOLUTE COUNT: 10.9 K/CU MM
SEGMENTED NEUTROPHILS RELATIVE PERCENT: 87 % (ref 36–66)
SODIUM BLD-SCNC: 134 MMOL/L (ref 135–145)
TOTAL PROTEIN: 5.7 GM/DL (ref 6.4–8.2)
WBC # BLD: 12.5 K/CU MM (ref 4–10.5)

## 2019-03-28 PROCEDURE — 80053 COMPREHEN METABOLIC PANEL: CPT

## 2019-03-28 PROCEDURE — 85027 COMPLETE CBC AUTOMATED: CPT

## 2019-03-28 PROCEDURE — 36415 COLL VENOUS BLD VENIPUNCTURE: CPT

## 2019-03-28 PROCEDURE — 83036 HEMOGLOBIN GLYCOSYLATED A1C: CPT

## 2019-03-28 PROCEDURE — 85007 BL SMEAR W/DIFF WBC COUNT: CPT

## 2019-04-01 ENCOUNTER — HOSPITAL ENCOUNTER (OUTPATIENT)
Age: 69
Setting detail: SPECIMEN
Discharge: HOME | End: 2019-04-01
Payer: MEDICARE

## 2019-04-01 LAB
ANION GAP SERPL CALCULATED.3IONS-SCNC: 13 MMOL/L (ref 4–16)
BUN BLDV-MCNC: 9 MG/DL (ref 6–23)
CALCIUM SERPL-MCNC: 8.4 MG/DL (ref 8.3–10.6)
CHLORIDE BLD-SCNC: 95 MMOL/L (ref 99–110)
CO2: 23 MMOL/L (ref 21–32)
CREAT SERPL-MCNC: 1.3 MG/DL (ref 0.9–1.3)
GFR AFRICAN AMERICAN: >60 ML/MIN/1.73M2
GFR NON-AFRICAN AMERICAN: 55 ML/MIN/1.73M2
GLUCOSE BLD-MCNC: 104 MG/DL (ref 70–99)
HCT VFR BLD CALC: 28.4 % (ref 42–52)
HEMOGLOBIN: 8.1 GM/DL (ref 13.5–18)
MCH RBC QN AUTO: 26 PG (ref 27–31)
MCHC RBC AUTO-ENTMCNC: 28.5 % (ref 32–36)
MCV RBC AUTO: 91.3 FL (ref 78–100)
PDW BLD-RTO: 21.3 % (ref 11.7–14.9)
PLATELET # BLD: 348 K/CU MM (ref 140–440)
PMV BLD AUTO: 8.5 FL (ref 7.5–11.1)
POTASSIUM SERPL-SCNC: 3.8 MMOL/L (ref 3.5–5.1)
RBC # BLD: 3.11 M/CU MM (ref 4.6–6.2)
SODIUM BLD-SCNC: 131 MMOL/L (ref 135–145)
WBC # BLD: 9.1 K/CU MM (ref 4–10.5)

## 2019-04-01 PROCEDURE — 80048 BASIC METABOLIC PNL TOTAL CA: CPT

## 2019-04-01 PROCEDURE — 36415 COLL VENOUS BLD VENIPUNCTURE: CPT

## 2019-04-01 PROCEDURE — 85027 COMPLETE CBC AUTOMATED: CPT

## 2019-04-08 ENCOUNTER — HOSPITAL ENCOUNTER (OUTPATIENT)
Age: 69
Setting detail: SPECIMEN
Discharge: HOME | End: 2019-04-08
Payer: MEDICARE

## 2019-04-08 LAB
HCT VFR BLD CALC: 31.8 % (ref 42–52)
HEMOGLOBIN: 8.9 GM/DL (ref 13.5–18)
MCH RBC QN AUTO: 25.8 PG (ref 27–31)
MCHC RBC AUTO-ENTMCNC: 28 % (ref 32–36)
MCV RBC AUTO: 92.2 FL (ref 78–100)
PDW BLD-RTO: 19.2 % (ref 11.7–14.9)
PLATELET # BLD: 332 K/CU MM (ref 140–440)
PMV BLD AUTO: 8.4 FL (ref 7.5–11.1)
RBC # BLD: 3.45 M/CU MM (ref 4.6–6.2)
WBC # BLD: 6.4 K/CU MM (ref 4–10.5)

## 2019-04-08 PROCEDURE — 36415 COLL VENOUS BLD VENIPUNCTURE: CPT

## 2019-04-08 PROCEDURE — 85027 COMPLETE CBC AUTOMATED: CPT

## 2019-04-16 ENCOUNTER — HOSPITAL ENCOUNTER (OUTPATIENT)
Dept: GENERAL RADIOLOGY | Age: 69
Discharge: HOME | End: 2019-04-16
Payer: COMMERCIAL

## 2019-04-16 ENCOUNTER — HOSPITAL ENCOUNTER (OUTPATIENT)
Age: 69
Discharge: HOME | End: 2019-04-16
Payer: COMMERCIAL

## 2019-04-16 DIAGNOSIS — J90 RECURRENT LEFT PLEURAL EFFUSION: ICD-10-CM

## 2019-04-16 PROCEDURE — 71046 X-RAY EXAM CHEST 2 VIEWS: CPT

## 2019-04-18 ENCOUNTER — HOSPITAL ENCOUNTER (OUTPATIENT)
Age: 69
Discharge: HOME | End: 2019-04-18

## 2019-04-18 LAB
ALBUMIN SERPL-MCNC: 2.8 GM/DL (ref 3.4–5)
ALP BLD-CCNC: 101 IU/L (ref 40–128)
ALT SERPL-CCNC: 5 U/L (ref 10–40)
ANION GAP SERPL CALCULATED.3IONS-SCNC: 15 MMOL/L (ref 4–16)
AST SERPL-CCNC: 12 IU/L (ref 15–37)
BILIRUB SERPL-MCNC: 0.3 MG/DL (ref 0–1)
BUN BLDV-MCNC: 13 MG/DL (ref 6–23)
CALCIUM SERPL-MCNC: 8.2 MG/DL (ref 8.3–10.6)
CHLORIDE BLD-SCNC: 98 MMOL/L (ref 99–110)
CO2: 26 MMOL/L (ref 21–32)
CREAT SERPL-MCNC: 1.3 MG/DL (ref 0.9–1.3)
GFR AFRICAN AMERICAN: >60 ML/MIN/1.73M2
GFR NON-AFRICAN AMERICAN: 55 ML/MIN/1.73M2
GLUCOSE BLD-MCNC: 97 MG/DL (ref 70–99)
HCT VFR BLD CALC: 34.5 % (ref 42–52)
HEMOGLOBIN: 10 GM/DL (ref 13.5–18)
MCH RBC QN AUTO: 25.9 PG (ref 27–31)
MCHC RBC AUTO-ENTMCNC: 29 % (ref 32–36)
MCV RBC AUTO: 89.4 FL (ref 78–100)
PDW BLD-RTO: 17.2 % (ref 11.7–14.9)
PLATELET # BLD: 255 K/CU MM (ref 140–440)
PMV BLD AUTO: 8.9 FL (ref 7.5–11.1)
POTASSIUM SERPL-SCNC: 3.1 MMOL/L (ref 3.5–5.1)
RBC # BLD: 3.86 M/CU MM (ref 4.6–6.2)
SODIUM BLD-SCNC: 139 MMOL/L (ref 135–145)
TOTAL PROTEIN: 6.1 GM/DL (ref 6.4–8.2)
WBC # BLD: 7.7 K/CU MM (ref 4–10.5)

## 2019-04-18 PROCEDURE — 80053 COMPREHEN METABOLIC PANEL: CPT

## 2019-04-18 PROCEDURE — 85027 COMPLETE CBC AUTOMATED: CPT

## 2019-04-18 PROCEDURE — 36415 COLL VENOUS BLD VENIPUNCTURE: CPT

## 2019-04-21 ENCOUNTER — APPOINTMENT (OUTPATIENT)
Dept: GENERAL RADIOLOGY | Age: 69
DRG: 178 | End: 2019-04-21
Payer: MEDICARE

## 2019-04-21 ENCOUNTER — HOSPITAL ENCOUNTER (INPATIENT)
Age: 69
LOS: 4 days | Discharge: NURSING, REHAB OR LONG-TERM CARE | DRG: 178 | End: 2019-04-25
Attending: EMERGENCY MEDICINE | Admitting: HOSPITALIST
Payer: MEDICARE

## 2019-04-21 ENCOUNTER — APPOINTMENT (OUTPATIENT)
Dept: CT IMAGING | Age: 69
DRG: 178 | End: 2019-04-21
Payer: MEDICARE

## 2019-04-21 DIAGNOSIS — F31.9 BIPOLAR 1 DISORDER (HCC): ICD-10-CM

## 2019-04-21 DIAGNOSIS — R07.9 CHEST PAIN, UNSPECIFIED TYPE: Primary | ICD-10-CM

## 2019-04-21 DIAGNOSIS — R77.8 ELEVATED TROPONIN: ICD-10-CM

## 2019-04-21 DIAGNOSIS — J86.9: ICD-10-CM

## 2019-04-21 PROBLEM — J15.9 HOSPITAL-ACQUIRED BACTERIAL PNEUMONIA: Status: ACTIVE | Noted: 2019-04-21

## 2019-04-21 LAB
ALBUMIN SERPL-MCNC: 2.8 GM/DL (ref 3.4–5)
ALP BLD-CCNC: 106 IU/L (ref 40–129)
ALT SERPL-CCNC: 6 U/L (ref 10–40)
ANION GAP SERPL CALCULATED.3IONS-SCNC: 7 MMOL/L (ref 4–16)
AST SERPL-CCNC: 16 IU/L (ref 15–37)
BASOPHILS ABSOLUTE: 0 K/CU MM
BASOPHILS RELATIVE PERCENT: 0.3 % (ref 0–1)
BILIRUB SERPL-MCNC: 0.3 MG/DL (ref 0–1)
BUN BLDV-MCNC: 12 MG/DL (ref 6–23)
CALCIUM SERPL-MCNC: 8.5 MG/DL (ref 8.3–10.6)
CHLORIDE BLD-SCNC: 97 MMOL/L (ref 99–110)
CO2: 30 MMOL/L (ref 21–32)
CREAT SERPL-MCNC: 1.4 MG/DL (ref 0.9–1.3)
DIFFERENTIAL TYPE: ABNORMAL
EOSINOPHILS ABSOLUTE: 0 K/CU MM
EOSINOPHILS RELATIVE PERCENT: 0.1 % (ref 0–3)
GFR AFRICAN AMERICAN: >60 ML/MIN/1.73M2
GFR NON-AFRICAN AMERICAN: 50 ML/MIN/1.73M2
GLUCOSE BLD-MCNC: 129 MG/DL (ref 70–99)
HCT VFR BLD CALC: 34.3 % (ref 42–52)
HEMOGLOBIN: 10 GM/DL (ref 13.5–18)
IMMATURE NEUTROPHIL %: 0.5 % (ref 0–0.43)
LYMPHOCYTES ABSOLUTE: 1.8 K/CU MM
LYMPHOCYTES RELATIVE PERCENT: 22.9 % (ref 24–44)
MCH RBC QN AUTO: 26 PG (ref 27–31)
MCHC RBC AUTO-ENTMCNC: 29.2 % (ref 32–36)
MCV RBC AUTO: 89.1 FL (ref 78–100)
MONOCYTES ABSOLUTE: 0.6 K/CU MM
MONOCYTES RELATIVE PERCENT: 8.2 % (ref 0–4)
NUCLEATED RBC %: 0 %
PDW BLD-RTO: 17 % (ref 11.7–14.9)
PLATELET # BLD: 257 K/CU MM (ref 140–440)
PMV BLD AUTO: 9.1 FL (ref 7.5–11.1)
POTASSIUM SERPL-SCNC: 3.8 MMOL/L (ref 3.5–5.1)
RBC # BLD: 3.85 M/CU MM (ref 4.6–6.2)
SEGMENTED NEUTROPHILS ABSOLUTE COUNT: 5.3 K/CU MM
SEGMENTED NEUTROPHILS RELATIVE PERCENT: 68 % (ref 36–66)
SODIUM BLD-SCNC: 134 MMOL/L (ref 135–145)
TOTAL IMMATURE NEUTOROPHIL: 0.04 K/CU MM
TOTAL NUCLEATED RBC: 0 K/CU MM
TOTAL PROTEIN: 7.1 GM/DL (ref 6.4–8.2)
TROPONIN T: 0.03 NG/ML
WBC # BLD: 7.8 K/CU MM (ref 4–10.5)

## 2019-04-21 PROCEDURE — 84484 ASSAY OF TROPONIN QUANT: CPT

## 2019-04-21 PROCEDURE — 93005 ELECTROCARDIOGRAM TRACING: CPT | Performed by: EMERGENCY MEDICINE

## 2019-04-21 PROCEDURE — 6360000002 HC RX W HCPCS: Performed by: EMERGENCY MEDICINE

## 2019-04-21 PROCEDURE — 96375 TX/PRO/DX INJ NEW DRUG ADDON: CPT

## 2019-04-21 PROCEDURE — 85025 COMPLETE CBC W/AUTO DIFF WBC: CPT

## 2019-04-21 PROCEDURE — 1200000000 HC SEMI PRIVATE

## 2019-04-21 PROCEDURE — 6360000004 HC RX CONTRAST MEDICATION: Performed by: EMERGENCY MEDICINE

## 2019-04-21 PROCEDURE — 71275 CT ANGIOGRAPHY CHEST: CPT

## 2019-04-21 PROCEDURE — 71045 X-RAY EXAM CHEST 1 VIEW: CPT

## 2019-04-21 PROCEDURE — 99285 EMERGENCY DEPT VISIT HI MDM: CPT

## 2019-04-21 PROCEDURE — 80053 COMPREHEN METABOLIC PANEL: CPT

## 2019-04-21 PROCEDURE — 96374 THER/PROPH/DIAG INJ IV PUSH: CPT

## 2019-04-21 PROCEDURE — 2580000003 HC RX 258: Performed by: EMERGENCY MEDICINE

## 2019-04-21 RX ORDER — ONDANSETRON 2 MG/ML
4 INJECTION INTRAMUSCULAR; INTRAVENOUS ONCE
Status: COMPLETED | OUTPATIENT
Start: 2019-04-21 | End: 2019-04-21

## 2019-04-21 RX ORDER — SODIUM CHLORIDE 0.9 % (FLUSH) 0.9 %
10 SYRINGE (ML) INJECTION 2 TIMES DAILY
Status: DISCONTINUED | OUTPATIENT
Start: 2019-04-21 | End: 2019-04-25 | Stop reason: HOSPADM

## 2019-04-21 RX ORDER — MORPHINE SULFATE 4 MG/ML
4 INJECTION, SOLUTION INTRAMUSCULAR; INTRAVENOUS ONCE
Status: COMPLETED | OUTPATIENT
Start: 2019-04-21 | End: 2019-04-21

## 2019-04-21 RX ADMIN — IOPAMIDOL 80 ML: 755 INJECTION, SOLUTION INTRAVENOUS at 21:46

## 2019-04-21 RX ADMIN — ONDANSETRON 4 MG: 2 INJECTION INTRAMUSCULAR; INTRAVENOUS at 21:05

## 2019-04-21 RX ADMIN — MORPHINE SULFATE 4 MG: 4 INJECTION INTRAVENOUS at 21:05

## 2019-04-21 RX ADMIN — SODIUM CHLORIDE, PRESERVATIVE FREE 10 ML: 5 INJECTION INTRAVENOUS at 21:46

## 2019-04-21 ASSESSMENT — PAIN DESCRIPTION - PROGRESSION: CLINICAL_PROGRESSION: NOT CHANGED

## 2019-04-21 ASSESSMENT — PAIN DESCRIPTION - DESCRIPTORS
DESCRIPTORS: DISCOMFORT
DESCRIPTORS: PRESSURE

## 2019-04-21 ASSESSMENT — PAIN SCALES - GENERAL
PAINLEVEL_OUTOF10: 4
PAINLEVEL_OUTOF10: 2
PAINLEVEL_OUTOF10: 4
PAINLEVEL_OUTOF10: 2

## 2019-04-21 ASSESSMENT — PAIN DESCRIPTION - LOCATION
LOCATION: CHEST;RIB CAGE
LOCATION: COCCYX;BUTTOCKS;SACRUM

## 2019-04-21 ASSESSMENT — PAIN DESCRIPTION - ORIENTATION: ORIENTATION: MID

## 2019-04-21 ASSESSMENT — PAIN DESCRIPTION - FREQUENCY: FREQUENCY: CONTINUOUS

## 2019-04-21 ASSESSMENT — PAIN DESCRIPTION - PAIN TYPE
TYPE: ACUTE PAIN
TYPE: ACUTE PAIN

## 2019-04-21 ASSESSMENT — PAIN DESCRIPTION - ONSET: ONSET: ON-GOING

## 2019-04-22 ENCOUNTER — APPOINTMENT (OUTPATIENT)
Dept: NUCLEAR MEDICINE | Age: 69
DRG: 178 | End: 2019-04-22
Payer: MEDICARE

## 2019-04-22 ENCOUNTER — APPOINTMENT (OUTPATIENT)
Dept: CT IMAGING | Age: 69
DRG: 178 | End: 2019-04-22
Payer: MEDICARE

## 2019-04-22 LAB
ANION GAP SERPL CALCULATED.3IONS-SCNC: 9 MMOL/L (ref 4–16)
BUN BLDV-MCNC: 11 MG/DL (ref 6–23)
CALCIUM SERPL-MCNC: 8.2 MG/DL (ref 8.3–10.6)
CHLORIDE BLD-SCNC: 99 MMOL/L (ref 99–110)
CO2: 26 MMOL/L (ref 21–32)
CREAT SERPL-MCNC: 1.3 MG/DL (ref 0.9–1.3)
EKG ATRIAL RATE: 120 BPM
EKG ATRIAL RATE: 57 BPM
EKG DIAGNOSIS: NORMAL
EKG DIAGNOSIS: NORMAL
EKG P AXIS: 0 DEGREES
EKG P AXIS: 40 DEGREES
EKG P-R INTERVAL: 150 MS
EKG P-R INTERVAL: 158 MS
EKG Q-T INTERVAL: 340 MS
EKG Q-T INTERVAL: 480 MS
EKG QRS DURATION: 106 MS
EKG QRS DURATION: 116 MS
EKG QTC CALCULATION (BAZETT): 467 MS
EKG QTC CALCULATION (BAZETT): 480 MS
EKG R AXIS: 22 DEGREES
EKG R AXIS: 5 DEGREES
EKG T AXIS: 25 DEGREES
EKG T AXIS: 4 DEGREES
EKG VENTRICULAR RATE: 120 BPM
EKG VENTRICULAR RATE: 57 BPM
GFR AFRICAN AMERICAN: >60 ML/MIN/1.73M2
GFR NON-AFRICAN AMERICAN: 55 ML/MIN/1.73M2
GLUCOSE BLD-MCNC: 109 MG/DL (ref 70–99)
GLUCOSE BLD-MCNC: 116 MG/DL (ref 70–99)
GLUCOSE BLD-MCNC: 116 MG/DL (ref 70–99)
GLUCOSE BLD-MCNC: 130 MG/DL (ref 70–99)
HCT VFR BLD CALC: 30.4 % (ref 42–52)
HEMOGLOBIN: 8.7 GM/DL (ref 13.5–18)
LV EF: 57 %
LVEF MODALITY: NORMAL
MAGNESIUM: 1.9 MG/DL (ref 1.8–2.4)
MCH RBC QN AUTO: 25.7 PG (ref 27–31)
MCHC RBC AUTO-ENTMCNC: 28.6 % (ref 32–36)
MCV RBC AUTO: 89.7 FL (ref 78–100)
PDW BLD-RTO: 17.1 % (ref 11.7–14.9)
PLATELET # BLD: 238 K/CU MM (ref 140–440)
PMV BLD AUTO: 8.8 FL (ref 7.5–11.1)
POTASSIUM SERPL-SCNC: 3.2 MMOL/L (ref 3.5–5.1)
RBC # BLD: 3.39 M/CU MM (ref 4.6–6.2)
SODIUM BLD-SCNC: 134 MMOL/L (ref 135–145)
TROPONIN T: 0.03 NG/ML
TROPONIN T: 0.03 NG/ML
WBC # BLD: 6.3 K/CU MM (ref 4–10.5)

## 2019-04-22 PROCEDURE — A9500 TC99M SESTAMIBI: HCPCS | Performed by: INTERNAL MEDICINE

## 2019-04-22 PROCEDURE — 0W9B30Z DRAINAGE OF LEFT PLEURAL CAVITY WITH DRAINAGE DEVICE, PERCUTANEOUS APPROACH: ICD-10-PCS | Performed by: RADIOLOGY

## 2019-04-22 PROCEDURE — 32551 INSERTION OF CHEST TUBE: CPT

## 2019-04-22 PROCEDURE — 83735 ASSAY OF MAGNESIUM: CPT

## 2019-04-22 PROCEDURE — 6370000000 HC RX 637 (ALT 250 FOR IP): Performed by: INTERNAL MEDICINE

## 2019-04-22 PROCEDURE — 94761 N-INVAS EAR/PLS OXIMETRY MLT: CPT

## 2019-04-22 PROCEDURE — 80048 BASIC METABOLIC PNL TOTAL CA: CPT

## 2019-04-22 PROCEDURE — 93005 ELECTROCARDIOGRAM TRACING: CPT | Performed by: INTERNAL MEDICINE

## 2019-04-22 PROCEDURE — 87073 CULTURE BACTERIA ANAEROBIC: CPT

## 2019-04-22 PROCEDURE — 6360000002 HC RX W HCPCS: Performed by: HOSPITALIST

## 2019-04-22 PROCEDURE — 6370000000 HC RX 637 (ALT 250 FOR IP): Performed by: HOSPITALIST

## 2019-04-22 PROCEDURE — 94640 AIRWAY INHALATION TREATMENT: CPT

## 2019-04-22 PROCEDURE — 3430000000 HC RX DIAGNOSTIC RADIOPHARMACEUTICAL: Performed by: INTERNAL MEDICINE

## 2019-04-22 PROCEDURE — 2709999900 HC NON-CHARGEABLE SUPPLY

## 2019-04-22 PROCEDURE — 82962 GLUCOSE BLOOD TEST: CPT

## 2019-04-22 PROCEDURE — 2580000003 HC RX 258: Performed by: HOSPITALIST

## 2019-04-22 PROCEDURE — 84484 ASSAY OF TROPONIN QUANT: CPT

## 2019-04-22 PROCEDURE — 93010 ELECTROCARDIOGRAM REPORT: CPT | Performed by: INTERNAL MEDICINE

## 2019-04-22 PROCEDURE — 89051 BODY FLUID CELL COUNT: CPT

## 2019-04-22 PROCEDURE — 85027 COMPLETE CBC AUTOMATED: CPT

## 2019-04-22 PROCEDURE — 87205 SMEAR GRAM STAIN: CPT

## 2019-04-22 PROCEDURE — 78452 HT MUSCLE IMAGE SPECT MULT: CPT

## 2019-04-22 PROCEDURE — 2580000003 HC RX 258: Performed by: INTERNAL MEDICINE

## 2019-04-22 PROCEDURE — 6360000002 HC RX W HCPCS: Performed by: INTERNAL MEDICINE

## 2019-04-22 PROCEDURE — C1894 INTRO/SHEATH, NON-LASER: HCPCS

## 2019-04-22 PROCEDURE — C1769 GUIDE WIRE: HCPCS

## 2019-04-22 PROCEDURE — 99223 1ST HOSP IP/OBS HIGH 75: CPT | Performed by: INTERNAL MEDICINE

## 2019-04-22 PROCEDURE — 87071 CULTURE AEROBIC QUANT OTHER: CPT

## 2019-04-22 PROCEDURE — C1729 CATH, DRAINAGE: HCPCS

## 2019-04-22 PROCEDURE — 1200000000 HC SEMI PRIVATE

## 2019-04-22 PROCEDURE — 36415 COLL VENOUS BLD VENIPUNCTURE: CPT

## 2019-04-22 RX ORDER — FOLIC ACID 1 MG/1
1 TABLET ORAL DAILY
Status: DISCONTINUED | OUTPATIENT
Start: 2019-04-22 | End: 2019-04-25 | Stop reason: HOSPADM

## 2019-04-22 RX ORDER — ESCITALOPRAM OXALATE 10 MG/1
20 TABLET ORAL DAILY
Status: DISCONTINUED | OUTPATIENT
Start: 2019-04-22 | End: 2019-04-25 | Stop reason: HOSPADM

## 2019-04-22 RX ORDER — IPRATROPIUM BROMIDE AND ALBUTEROL SULFATE 2.5; .5 MG/3ML; MG/3ML
1 SOLUTION RESPIRATORY (INHALATION)
Status: DISCONTINUED | OUTPATIENT
Start: 2019-04-22 | End: 2019-04-22 | Stop reason: SDUPTHER

## 2019-04-22 RX ORDER — GUAIFENESIN 600 MG/1
600 TABLET, EXTENDED RELEASE ORAL 2 TIMES DAILY
Status: DISCONTINUED | OUTPATIENT
Start: 2019-04-22 | End: 2019-04-25 | Stop reason: HOSPADM

## 2019-04-22 RX ORDER — ATORVASTATIN CALCIUM 40 MG/1
80 TABLET, FILM COATED ORAL NIGHTLY
Status: DISCONTINUED | OUTPATIENT
Start: 2019-04-22 | End: 2019-04-25 | Stop reason: HOSPADM

## 2019-04-22 RX ORDER — FUROSEMIDE 40 MG/1
40 TABLET ORAL DAILY
Status: DISCONTINUED | OUTPATIENT
Start: 2019-04-22 | End: 2019-04-25 | Stop reason: HOSPADM

## 2019-04-22 RX ORDER — HYDROCODONE BITARTRATE AND ACETAMINOPHEN 5; 325 MG/1; MG/1
1 TABLET ORAL EVERY 4 HOURS PRN
Status: DISCONTINUED | OUTPATIENT
Start: 2019-04-22 | End: 2019-04-23

## 2019-04-22 RX ORDER — HYDROCODONE BITARTRATE AND ACETAMINOPHEN 5; 325 MG/1; MG/1
TABLET ORAL EVERY 4 HOURS PRN
Status: ON HOLD | COMMUNITY
End: 2019-04-25 | Stop reason: SDUPTHER

## 2019-04-22 RX ORDER — ALBUTEROL SULFATE 90 UG/1
2 AEROSOL, METERED RESPIRATORY (INHALATION) EVERY 6 HOURS PRN
Status: DISCONTINUED | OUTPATIENT
Start: 2019-04-22 | End: 2019-04-25 | Stop reason: HOSPADM

## 2019-04-22 RX ORDER — NITROGLYCERIN 0.4 MG/1
0.4 TABLET SUBLINGUAL EVERY 5 MIN PRN
Status: DISCONTINUED | OUTPATIENT
Start: 2019-04-22 | End: 2019-04-25 | Stop reason: HOSPADM

## 2019-04-22 RX ORDER — SODIUM CHLORIDE 0.9 % (FLUSH) 0.9 %
10 SYRINGE (ML) INJECTION EVERY 12 HOURS SCHEDULED
Status: DISCONTINUED | OUTPATIENT
Start: 2019-04-22 | End: 2019-04-25 | Stop reason: HOSPADM

## 2019-04-22 RX ORDER — ONDANSETRON 2 MG/ML
4 INJECTION INTRAMUSCULAR; INTRAVENOUS EVERY 6 HOURS PRN
Status: DISCONTINUED | OUTPATIENT
Start: 2019-04-22 | End: 2019-04-25 | Stop reason: HOSPADM

## 2019-04-22 RX ORDER — POTASSIUM CHLORIDE 7.45 MG/ML
10 INJECTION INTRAVENOUS PRN
Status: DISCONTINUED | OUTPATIENT
Start: 2019-04-22 | End: 2019-04-25 | Stop reason: HOSPADM

## 2019-04-22 RX ORDER — AMLODIPINE BESYLATE 10 MG/1
10 TABLET ORAL NIGHTLY
Status: DISCONTINUED | OUTPATIENT
Start: 2019-04-22 | End: 2019-04-25 | Stop reason: HOSPADM

## 2019-04-22 RX ORDER — DOCUSATE SODIUM 100 MG/1
100 CAPSULE, LIQUID FILLED ORAL 2 TIMES DAILY
Status: DISCONTINUED | OUTPATIENT
Start: 2019-04-22 | End: 2019-04-25 | Stop reason: HOSPADM

## 2019-04-22 RX ORDER — POLYETHYLENE GLYCOL 3350 17 G/17G
17 POWDER, FOR SOLUTION ORAL DAILY
COMMUNITY
End: 2019-05-29 | Stop reason: SDUPTHER

## 2019-04-22 RX ORDER — SPIRONOLACTONE 25 MG/1
25 TABLET ORAL DAILY
Status: DISCONTINUED | OUTPATIENT
Start: 2019-04-22 | End: 2019-04-25 | Stop reason: HOSPADM

## 2019-04-22 RX ORDER — FLUTICASONE PROPIONATE 50 MCG
2 SPRAY, SUSPENSION (ML) NASAL DAILY
Status: DISCONTINUED | OUTPATIENT
Start: 2019-04-22 | End: 2019-04-25 | Stop reason: HOSPADM

## 2019-04-22 RX ORDER — IPRATROPIUM BROMIDE AND ALBUTEROL SULFATE 2.5; .5 MG/3ML; MG/3ML
3 SOLUTION RESPIRATORY (INHALATION)
Status: DISCONTINUED | OUTPATIENT
Start: 2019-04-22 | End: 2019-04-25 | Stop reason: HOSPADM

## 2019-04-22 RX ORDER — POTASSIUM CHLORIDE 1.5 G/1.77G
40 POWDER, FOR SOLUTION ORAL PRN
Status: DISCONTINUED | OUTPATIENT
Start: 2019-04-22 | End: 2019-04-25 | Stop reason: HOSPADM

## 2019-04-22 RX ORDER — CARVEDILOL 3.12 MG/1
3.12 TABLET ORAL 2 TIMES DAILY
Status: DISCONTINUED | OUTPATIENT
Start: 2019-04-22 | End: 2019-04-25 | Stop reason: HOSPADM

## 2019-04-22 RX ORDER — ACETAMINOPHEN 160 MG
1 TABLET,DISINTEGRATING ORAL EVERY MORNING
COMMUNITY
End: 2019-05-29

## 2019-04-22 RX ORDER — POTASSIUM CHLORIDE 20 MEQ/1
40 TABLET, EXTENDED RELEASE ORAL PRN
Status: DISCONTINUED | OUTPATIENT
Start: 2019-04-22 | End: 2019-04-25 | Stop reason: HOSPADM

## 2019-04-22 RX ORDER — SODIUM CHLORIDE 0.9 % (FLUSH) 0.9 %
10 SYRINGE (ML) INJECTION PRN
Status: DISCONTINUED | OUTPATIENT
Start: 2019-04-22 | End: 2019-04-25 | Stop reason: HOSPADM

## 2019-04-22 RX ORDER — ALPRAZOLAM 0.5 MG/1
0.5 TABLET ORAL 2 TIMES DAILY
Status: DISCONTINUED | OUTPATIENT
Start: 2019-04-22 | End: 2019-04-25 | Stop reason: HOSPADM

## 2019-04-22 RX ORDER — PANTOPRAZOLE SODIUM 40 MG/1
40 TABLET, DELAYED RELEASE ORAL
Status: DISCONTINUED | OUTPATIENT
Start: 2019-04-22 | End: 2019-04-25 | Stop reason: HOSPADM

## 2019-04-22 RX ORDER — LEVOFLOXACIN 5 MG/ML
750 INJECTION, SOLUTION INTRAVENOUS EVERY 24 HOURS
Status: DISCONTINUED | OUTPATIENT
Start: 2019-04-22 | End: 2019-04-22

## 2019-04-22 RX ORDER — ASPIRIN 81 MG/1
81 TABLET ORAL DAILY
Status: DISCONTINUED | OUTPATIENT
Start: 2019-04-22 | End: 2019-04-25 | Stop reason: HOSPADM

## 2019-04-22 RX ORDER — OMEPRAZOLE 20 MG/1
20 CAPSULE, DELAYED RELEASE ORAL DAILY
Status: ON HOLD | COMMUNITY
End: 2019-04-25 | Stop reason: HOSPADM

## 2019-04-22 RX ORDER — ALPRAZOLAM 0.5 MG/1
0.5 TABLET ORAL 2 TIMES DAILY
Status: ON HOLD | COMMUNITY
End: 2019-04-25 | Stop reason: SDUPTHER

## 2019-04-22 RX ADMIN — GUAIFENESIN 600 MG: 600 TABLET, EXTENDED RELEASE ORAL at 12:42

## 2019-04-22 RX ADMIN — CARVEDILOL 3.12 MG: 3.12 TABLET, FILM COATED ORAL at 20:28

## 2019-04-22 RX ADMIN — VANCOMYCIN HYDROCHLORIDE 1750 MG: 5 INJECTION, POWDER, LYOPHILIZED, FOR SOLUTION INTRAVENOUS at 04:31

## 2019-04-22 RX ADMIN — ATORVASTATIN CALCIUM 80 MG: 40 TABLET, FILM COATED ORAL at 20:28

## 2019-04-22 RX ADMIN — AMLODIPINE BESYLATE 10 MG: 10 TABLET ORAL at 20:28

## 2019-04-22 RX ADMIN — DOCUSATE SODIUM 100 MG: 100 CAPSULE, LIQUID FILLED ORAL at 20:28

## 2019-04-22 RX ADMIN — MEROPENEM 1 G: 1 INJECTION, POWDER, FOR SOLUTION INTRAVENOUS at 14:52

## 2019-04-22 RX ADMIN — ESCITALOPRAM OXALATE 20 MG: 10 TABLET ORAL at 12:35

## 2019-04-22 RX ADMIN — HYDROCODONE BITARTRATE AND ACETAMINOPHEN 1 TABLET: 5; 325 TABLET ORAL at 12:36

## 2019-04-22 RX ADMIN — GUAIFENESIN 600 MG: 600 TABLET, EXTENDED RELEASE ORAL at 20:28

## 2019-04-22 RX ADMIN — FLUTICASONE PROPIONATE 2 SPRAY: 50 SPRAY, METERED NASAL at 12:37

## 2019-04-22 RX ADMIN — HYDROCODONE BITARTRATE AND ACETAMINOPHEN 1 TABLET: 5; 325 TABLET ORAL at 20:28

## 2019-04-22 RX ADMIN — FUROSEMIDE 40 MG: 40 TABLET ORAL at 12:36

## 2019-04-22 RX ADMIN — AMLODIPINE BESYLATE 10 MG: 10 TABLET ORAL at 02:59

## 2019-04-22 RX ADMIN — ALPRAZOLAM 0.5 MG: 0.5 TABLET ORAL at 02:59

## 2019-04-22 RX ADMIN — ALPRAZOLAM 0.5 MG: 0.5 TABLET ORAL at 12:35

## 2019-04-22 RX ADMIN — ALPRAZOLAM 0.5 MG: 0.5 TABLET ORAL at 20:28

## 2019-04-22 RX ADMIN — IPRATROPIUM BROMIDE AND ALBUTEROL SULFATE 3 ML: .5; 3 SOLUTION RESPIRATORY (INHALATION) at 14:54

## 2019-04-22 RX ADMIN — PANTOPRAZOLE SODIUM 40 MG: 40 TABLET, DELAYED RELEASE ORAL at 06:39

## 2019-04-22 RX ADMIN — SPIRONOLACTONE 25 MG: 25 TABLET ORAL at 12:36

## 2019-04-22 RX ADMIN — ATORVASTATIN CALCIUM 80 MG: 40 TABLET, FILM COATED ORAL at 02:59

## 2019-04-22 RX ADMIN — IPRATROPIUM BROMIDE AND ALBUTEROL SULFATE 3 ML: .5; 3 SOLUTION RESPIRATORY (INHALATION) at 08:04

## 2019-04-22 RX ADMIN — Medication 10 MILLICURIE: at 07:45

## 2019-04-22 RX ADMIN — MEROPENEM 1 G: 1 INJECTION, POWDER, FOR SOLUTION INTRAVENOUS at 23:07

## 2019-04-22 RX ADMIN — SODIUM CHLORIDE, PRESERVATIVE FREE 10 ML: 5 INJECTION INTRAVENOUS at 12:37

## 2019-04-22 RX ADMIN — HYDROCODONE BITARTRATE AND ACETAMINOPHEN 1 TABLET: 5; 325 TABLET ORAL at 17:07

## 2019-04-22 RX ADMIN — FOLIC ACID 1 MG: 1 TABLET ORAL at 12:36

## 2019-04-22 RX ADMIN — SERTRALINE HYDROCHLORIDE 50 MG: 50 TABLET ORAL at 20:28

## 2019-04-22 RX ADMIN — LEVOFLOXACIN 750 MG: 5 INJECTION, SOLUTION INTRAVENOUS at 02:58

## 2019-04-22 RX ADMIN — CARVEDILOL 3.12 MG: 3.12 TABLET, FILM COATED ORAL at 12:35

## 2019-04-22 RX ADMIN — DOCUSATE SODIUM 100 MG: 100 CAPSULE, LIQUID FILLED ORAL at 02:59

## 2019-04-22 RX ADMIN — CARVEDILOL 3.12 MG: 3.12 TABLET, FILM COATED ORAL at 02:59

## 2019-04-22 RX ADMIN — ENOXAPARIN SODIUM 40 MG: 40 INJECTION SUBCUTANEOUS at 12:36

## 2019-04-22 RX ADMIN — ASPIRIN 81 MG: 81 TABLET, COATED ORAL at 12:35

## 2019-04-22 RX ADMIN — HYDROCODONE BITARTRATE AND ACETAMINOPHEN 1 TABLET: 5; 325 TABLET ORAL at 02:59

## 2019-04-22 RX ADMIN — PANTOPRAZOLE SODIUM 40 MG: 40 TABLET, DELAYED RELEASE ORAL at 16:58

## 2019-04-22 ASSESSMENT — PAIN DESCRIPTION - FREQUENCY
FREQUENCY: CONTINUOUS
FREQUENCY: INTERMITTENT
FREQUENCY: CONTINUOUS

## 2019-04-22 ASSESSMENT — PAIN DESCRIPTION - PROGRESSION
CLINICAL_PROGRESSION: NOT CHANGED

## 2019-04-22 ASSESSMENT — PAIN SCALES - GENERAL
PAINLEVEL_OUTOF10: 8
PAINLEVEL_OUTOF10: 6
PAINLEVEL_OUTOF10: 5
PAINLEVEL_OUTOF10: 2
PAINLEVEL_OUTOF10: 6
PAINLEVEL_OUTOF10: 8
PAINLEVEL_OUTOF10: 8
PAINLEVEL_OUTOF10: 9

## 2019-04-22 ASSESSMENT — PAIN DESCRIPTION - PAIN TYPE
TYPE: SURGICAL PAIN
TYPE: ACUTE PAIN;SURGICAL PAIN
TYPE: ACUTE PAIN
TYPE: ACUTE PAIN

## 2019-04-22 ASSESSMENT — PAIN DESCRIPTION - ONSET
ONSET: GRADUAL
ONSET: ON-GOING

## 2019-04-22 ASSESSMENT — PAIN DESCRIPTION - DESCRIPTORS
DESCRIPTORS: ACHING;CONSTANT;DISCOMFORT
DESCRIPTORS: ACHING;CONSTANT;DISCOMFORT
DESCRIPTORS: ACHING
DESCRIPTORS: ACHING

## 2019-04-22 ASSESSMENT — PAIN DESCRIPTION - ORIENTATION
ORIENTATION: LEFT

## 2019-04-22 ASSESSMENT — PAIN DESCRIPTION - LOCATION
LOCATION: RIB CAGE;CHEST
LOCATION: RIB CAGE
LOCATION: CHEST;RIB CAGE
LOCATION: RIB CAGE

## 2019-04-22 ASSESSMENT — PAIN DESCRIPTION - DIRECTION: RADIATING_TOWARDS: BACK

## 2019-04-22 NOTE — CONSULTS
Department of Cardiovascular & Thoracic Surgery   Consult Note    Reason for Consult:  empyema    Admitting Physician: Dr. Julio Mcelroy    Date of Consult: 4/22/19      History Obtained From:  patient     HISTORY OF PRESENT ILLNESS:    The patient is a 71 y.o. male who presents with chest pain and hemoptysis. he was recently hospitalized for pneumonia and underwent L thoracotomy and decortication. He denies SOB, fever, or chills. His wound has healed well. He tried nitroglycerin for the pain with no relief.       Past Medical History:        Diagnosis Date    Anemia     per old chart    Arthritis     Back pain, chronic     \"have pain in lumbar mainly- have 5 bulging discs\"\"in my neck and my lumbar have herniated discs\"    Bipolar 1 disorder ([facility]     CAD (coronary artery disease) 1/27/2016    does not follow with a cardiologist    Cerebral artery occlusion with cerebral infarction ([facility]     \"had mini stroke in Jan 2016- fast heart rate when I would stand up - smile drooping on one side- lased just the one day\"    CKD (chronic kidney disease)     per old chart- consult with Dr Tania Schilder with admission 4/2016\"have low kidney function\"    COPD (chronic obstructive pulmonary disease) ([facility]     follow with Dr Víctor Sosa Diabetes mellitus, type 2 ([facility] 5/5/2080    Diastolic CHF ([facility] 5/93/9256    Gastric ulcer     H/O cardiovascular stress test 5/26/14    EF 68% mild ischemia anterior wall    Heart murmur     \"see Dr Hardik Mcdaniel- last echo 2014\" pt states\"I think they said I have a murmur but no chest pain or palpitations\"    Hiatal hernia     History of cardiac cath 6/11/14    MODERATE  CAD      Hx of migraines     HX OTHER MEDICAL     \"have thinning of kidney walls- they found I had that 15 yrs ago\" see Dr Bassam Vega"    Hyperlipidemia     Hypertension     Panic attack     'anything new gives me anxiety\"    Pleural effusion     scheduled for thoracentesis 7/28/2014, 8/17/2016    S/P thoracentesis     left side( per old chart had thora done 4/2016 and one done 2014)    Sleep apnea     sleep study x 2 - last one 4-5 yrs ago- uses c-pap\"    SOBOE (shortness of breath on exertion)     Spinal stenosis      Past Surgical History:        Procedure Laterality Date    CARPAL TUNNEL RELEASE Bilateral 1989    ELBOW SURGERY Left 2000's    KNEE SURGERY Bilateral     right knee x 2( scope)/ left knee- 7-8 yr ago   [address] Left 12/20/2013    THORACENTESIS  4/25/2016         THORACENTESIS Left 11/15/2016    Dr. Penny Edward 250mlsremoved     THORACOTOMY Left 03/18/2019    with Lysis of adhesions    THORACOTOMY Left 3/18/2019    THORACOSCOPY CONVERTED TO THORACOTOMY WITH LYSIS OF ADHESIONS performed by Cristal Quiroz MD at [address] [place] [address]      as a kid     Current Medications:   Current Facility-Administered Medications: albuterol sulfate  (90 Base) MCG/ACT inhaler 2 puff, 2 puff, Inhalation, Q6H PRN  amLODIPine (NORVASC) tablet 10 mg, 10 mg, Oral, Nightly  aspirin EC tablet 81 mg, 81 mg, Oral, Daily  atorvastatin (LIPITOR) tablet 80 mg, 80 mg, Oral, Nightly  carvedilol (COREG) tablet 3.125 mg, 3.125 mg, Oral, BID  docusate sodium (COLACE) capsule 100 mg, 100 mg, Oral, BID  escitalopram (LEXAPRO) tablet 20 mg, 20 mg, Oral, Daily  fluticasone (FLONASE) 50 MCG/ACT nasal spray 2 spray, 2 spray, Nasal, Daily  folic acid (FOLVITE) tablet 1 mg, 1 mg, Oral, Daily  furosemide (LASIX) tablet 40 mg, 40 mg, Oral, Daily  nitroGLYCERIN (NITROSTAT) SL tablet 0.4 mg, 0.4 mg, Sublingual, Q5 Min PRN  pantoprazole (PROTONIX) tablet 40 mg, 40 mg, Oral, BID AC  spironolactone (ALDACTONE) tablet 25 mg, 25 mg, Oral, Daily  ipratropium-albuterol (DUONEB) nebulizer solution 3 mL, 3 mL, Inhalation, Q4H WA  sodium chloride flush 0.9 % injection 10 mL, 10 mL, Intravenous, 2 times per day  sodium chloride flush 0.9 % injection 10 mL, 10 mL, Intravenous, PRN  magnesium hydroxide (MILK OF MAGNESIA) 400 MG/5ML suspension 30 mL, 30 mL, Oral, Daily PRN  ondansetron (ZOFRAN) injection 4 mg, 4 mg, Intravenous, Q6H PRN  enoxaparin (LOVENOX) injection 40 mg, 40 mg, Subcutaneous, Daily  HYDROcodone-acetaminophen (NORCO) 5-325 MG per tablet 1 tablet, 1 tablet, Oral, Q4H PRN  ALPRAZolam (XANAX) tablet 0.5 mg, 0.5 mg, Oral, BID  sertraline (ZOLOFT) tablet 50 mg, 50 mg, Oral, Nightly  [COMPLETED] vancomycin (VANCOCIN) 1,750 mg in dextrose 5 % 500 mL IVPB, 1,750 mg, Intravenous, Once **FOLLOWED BY** [START ON 4/23/2019] vancomycin (VANCOCIN) 1,500 mg in dextrose 5 % 500 mL IVPB, 1,500 mg, Intravenous, Q24H  technetium sestamibi (CARDIOLITE) injection 30 millicurie, 30 millicurie, Intravenous, ONCE PRN  regadenoson (LEXISCAN) injection 0.4 mg, 0.4 mg, Intravenous, ONCE PRN  guaiFENesin (MUCINEX) extended release tablet 600 mg, 600 mg, Oral, BID  potassium chloride (KLOR-CON M) extended release tablet 40 mEq, 40 mEq, Oral, PRN **OR** potassium chloride (KLOR-CON) packet 40 mEq, 40 mEq, Oral, PRN **OR** potassium chloride 10 mEq/100 mL IVPB (Peripheral Line), 10 mEq, Intravenous, PRN  meropenem (MERREM) 1 g in sodium chloride 0.9 % 100 mL IVPB (mini-bag), 1 g, Intravenous, Q8H  sodium chloride flush 0.9 % injection 10 mL, 10 mL, Intravenous, BID  Allergies:     Allergies   Allergen Reactions    Penicillins Nausea Only       Social History:   Social History     Socioeconomic History    Marital status:      Spouse name: Not on file    Number of children: Not on file    Years of education: Not on file    Highest education level: Not on file   Occupational History    Occupation: built trucks, retired     Employer: [employer] Financial resource strain: Not on file    Food insecurity:     Worry: Not on file     Inability: Not on file   [employer] needs:     Medical: Not on file     Non-medical: Not on file   Tobacco Use    Smoking status: Former Smoker     Packs/day: 1.50     Years: 30.00     Pack years: 45.00     Last intact   Mental status: normal affect    DATA:  CT      Impression   1. No evidence for a pulmonary embolism. 2. There is a moderate thick-walled pleural fluid collection containing gas   and complex fluid, concerning for a pyopneumothorax.  The previously seen   chest tube has been removed.  This collection has increased in size since the   prior examination of March 16, 2019.   3. Left lower lobe partial collapse with shift of the heart and mediastinum   to the left.  This could be related to secretions within the left lower lobe   bronchus.  Recommend short-term follow-up examination to exclude underlying   neoplasm. IMPRESSION  Patient Active Problem List   Diagnosis    COPD (chronic obstructive pulmonary disease) (HCC)    Pleural effusion, left    SAMANTHA on CPAP    Abnormal stress test    Chest pain    Chronic obstructive pulmonary disease ([address]    TIA (transient ischemic attack)    Hypertension    Bipolar 1 disorder ([address]    CAD (coronary artery disease)    Coronary artery disease involving native coronary artery of native heart without angina pectoris    Essential hypertension    REYES (dyspnea on exertion)    Anemia, chronic disease    Diuretic-induced hypokalemia    Diastolic CHF ([address]    Adrenal mass (HCC)    Acute respiratory failure (HCC)    Pneumonia due to gram-negative bacteria (HCC)    Hyperkalemia    Adrenal mass ([address]    Diabetes mellitus, type 2 (HCC)    Hyponatremia    Pneumonia due to organism    PAF (paroxysmal atrial fibrillation) (HCC)    Empyema of left pleural space ([address]    Hospital-acquired bacterial pneumonia       Empyema   S/p L thoracotomy and decortication    RECOMMENDATIONS:  The space in the L pleural cavity appears larger. IR placed a CT this morning, drainage has been bloody. Continue vanc/meropenem. Will follow.          Ayse Rodriguez PA-C

## 2019-04-22 NOTE — ED NOTES
Bed: ED-16  Expected date:   Expected time:   Means of arrival:   Comments:  kristy Rodriguez RN  04/21/19 2013

## 2019-04-22 NOTE — PROGRESS NOTES
PHARMACY TO DOSE VANCOMYCIN PER DR. REDD    INFECTION BEING TREATED = PNA  GOAL TROUGH = 15-20 mcg/ml  CULTURES = pending  OTHER ABT'S = levofloxacin    AGE = 71 y.o.     SEX = male  HEIGHT = 5' 9\" (1.753 m)  Wt Readings from Last 3 Encounters:   04/22/19 221 lb (100.2 kg)   04/10/19 212 lb (96.2 kg)   03/25/19 230 lb 3.2 oz (104.4 kg)     Estimated Creatinine Clearance: 58 mL/min (A) (based on SCr of 1.4 mg/dL (H)). Recent Labs     04/21/19  2042   WBC 7.8   BUN 12   CREATININE 1.4*     DOSING PLAN COMMENTS:  - Plan to start Vancomycin 1750 mg X1, followed by vancomycin 1500 mg q24h (based on recent dosing regimen)  - Pharmacy will continue to follow and adjust vancomycin dose if needed.     VANCO TROUGH SCHEDULED FOR 04/25/2019 @ 0400    [facility]  4/22/2019   5:56 AM  _______________________________________

## 2019-04-22 NOTE — PROGRESS NOTES
Consult received. Pt well known to me. He underwent a L thoracotomy for severe empyema and very thick pleural rind. Pt presents with CP. CT reviewed, empyema space is larger. Will ask IR to place a CT and plan to treat it as an empyema tube. The only other option would be a thoracoplasty which is very morbid. Will see today.

## 2019-04-22 NOTE — CARE COORDINATION
CM review of pt chart for readmission risk, last admission 3/8-3/28/19 pt needed to be discharged to SNF. Pt returns to ER today from [place] with chief complaint of CP, trop is elevated, no alternative to admission at this time.  ALYSSA,RN/CM

## 2019-04-22 NOTE — CONSULTS
dextrose 5 % 500 mL IVPB Q24H   sodium chloride flush 0.9 % injection 10 mL BID     Current Facility-Administered Medications   Medication Dose Route Frequency Provider Last Rate Last Dose    albuterol sulfate  (90 Base) MCG/ACT inhaler 2 puff  2 puff Inhalation Q6H PRN Eloy Sanhcez MD        amLODIPine (NORVASC) tablet 10 mg  10 mg Oral Nightly Eloy Sanchez MD   10 mg at 04/22/19 0259    aspirin EC tablet 81 mg  81 mg Oral Daily Eloy Sanchez MD        atorvastatin (LIPITOR) tablet 80 mg  80 mg Oral Nightly Eloy Sanchez MD   80 mg at 04/22/19 0259    carvedilol (COREG) tablet 3.125 mg  3.125 mg Oral BID Eloy Sanchez MD   3.125 mg at 04/22/19 0259    docusate sodium (COLACE) capsule 100 mg  100 mg Oral BID Eloy Sanchez MD   100 mg at 04/22/19 0259    escitalopram (LEXAPRO) tablet 20 mg  20 mg Oral Daily Eloy Sanchez MD        fluticasone (FLONASE) 50 MCG/ACT nasal spray 2 spray  2 spray Nasal Daily Eloy Sanchez MD        folic acid (FOLVITE) tablet 1 mg  1 mg Oral Daily Eloy Sanchez MD        furosemide (LASIX) tablet 40 mg  40 mg Oral Daily Eloy Sanchez MD        nitroGLYCERIN (NITROSTAT) SL tablet 0.4 mg  0.4 mg Sublingual Q5 Min PRN Eloy Sanchez MD        pantoprazole (PROTONIX) tablet 40 mg  40 mg Oral BID AC Eloy Sanchez MD        spironolactone (ALDACTONE) tablet 25 mg  25 mg Oral Daily Eloy Sanchez MD        ipratropium-albuterol (DUONEB) nebulizer solution 3 mL  3 mL Inhalation Q4H WA Eloy Sanchez MD        sodium chloride flush 0.9 % injection 10 mL  10 mL Intravenous 2 times per day Eloy Sanchez MD        sodium chloride flush 0.9 % injection 10 mL  10 mL Intravenous PRN Eloy Sanchez MD        magnesium hydroxide (MILK OF MAGNESIA) 400 MG/5ML suspension 30 mL  30 mL Oral Daily PRN Eloy Sanchez MD        ondansetron (ZOFRAN) injection 4 mg  4 mg Intravenous Q6H PRN Eloy Sanchez MD        enoxaparin (LOVENOX) injection 40 mg  40 mg Subcutaneous Daily Maciel Verma MD        levofloxacin (LEVAQUIN) 750 MG/150ML infusion 750 mg  750 mg Intravenous Q24H Maciel Verma MD   Stopped at 04/22/19 0431    HYDROcodone-acetaminophen (NORCO) 5-325 MG per tablet 1 tablet  1 tablet Oral Q4H PRN Maciel Verma MD   1 tablet at 04/22/19 0259    ALPRAZolam (XANAX) tablet 0.5 mg  0.5 mg Oral BID Maciel Verma MD   0.5 mg at 04/22/19 0259    sertraline (ZOLOFT) tablet 50 mg  50 mg Oral Nightly Maciel Verma MD        vancomycin (VANCOCIN) 1,750 mg in dextrose 5 % 500 mL IVPB  1,750 mg Intravenous Once Maciel Verma  mL/hr at 04/22/19 0431 1,750 mg at 04/22/19 0431    Followed by   Faythe Bumpers ON 4/23/2019] vancomycin (VANCOCIN) 1,500 mg in dextrose 5 % 500 mL IVPB  1,500 mg Intravenous Q24H Maciel Verma MD        sodium chloride flush 0.9 % injection 10 mL  10 mL Intravenous BID Jose M Astudillo MD   10 mL at 04/21/19 2146     Review of Systems:   · Constitutional: No Fever or Weight Loss   · Eyes: No Decreased Vision  · ENT: No Headaches, Hearing Loss or Vertigo  · Cardiovascular: As per HPI  · Respiratory: As per HPI  · Gastrointestinal: No abdominal pain, appetite loss, blood in stools, constipation, diarrhea or heartburn  · Genitourinary: No dysuria, trouble voiding, or hematuria  · Musculoskeletal:  No gait disturbance, weakness or joint complaints  · Integumentary: No rash or pruritis  · Neurological: No TIA or stroke symptoms  · Psychiatric: No anxiety or depression  · Endocrine: No malaise, fatigue or temperature intolerance  · Hematologic/Lymphatic: No bleeding problems, blood clots or swollen lymph nodes  · Allergic/Immunologic: No nasal congestion or hives  All systems negative except as marked.         Physical Examination:    Vitals:    04/21/19 2305 04/21/19 2335 04/22/19 0130 04/22/19 0504   BP: 106/63 124/66 (!) 171/84 130/60   Pulse: 63 62 65 75   Resp: 12 12 16 17   Temp:   97.9 °F (36.6 °C) 97.4 °F (36.3 °C)   TempSrc:   Oral Oral   SpO2: 93% 97% 95% 91%   Weight:   210 lb (95.3 kg) 221 lb (100.2 kg)   Height:   5' 9\" (1.753 m)        General Appearance:  No distress, conversant    Constitutional:  Well developed, Well nourished, No acute distress, Non-toxic appearance. HENT:  Normocephalic, Atraumatic, Bilateral external ears normal, Oropharynx moist, No oral exudates, Nose normal. Neck- Normal range of motion, No tenderness, Supple, No stridor,no apical-carotid delay  Lymphatics : no palpable lymph nodes  Eyes:  PERRL, EOMI, Conjunctiva normal, No discharge. Respiratory:  Normal breath sounds, No respiratory distress, No wheezing, No chest tenderness. ,no use of accessory muscles, crackles Absent   Cardiovascular: (PMI) apex non displaced,no lifts no thrills, ankle swelling Absent  , 1+, s1 and s2 audible,Murmur. Present, JVD not noted    Abdomen /GI:  Bowel sounds normal, Soft, No tenderness, No masses, No gross visceromegaly   :  No costovertebral angle tenderness   Musculoskeletal:  No edema, no tenderness, no deformities. Back- no tenderness  Integument:  Well hydrated, no rash   Lymphatic:  No lymphadenopathy noted   Neurologic:  Alert & oriented x 3, CN 2-12 normal, normal motor function, normal sensory function, no focal deficits noted           Medical decision making and Data review:    Lab Review   Recent Labs     04/21/19 2042   WBC 7.8   HGB 10.0*   HCT 34.3*         Recent Labs     04/21/19 2042   *   K 3.8   CL 97*   CO2 30   BUN 12   CREATININE 1.4*     Recent Labs     04/21/19 2042   AST 16   ALT 6*   BILITOT 0.3   ALKPHOS 106     Recent Labs     04/21/19 2042   TROPONINT 0.030*       No results for input(s): PROBNP in the last 72 hours.   Lab Results   Component Value Date    INR 1.28 03/16/2019    PROTIME 14.6 (H) 03/16/2019       EKG: (reviewed by myself)    ECHO:(reviewed by myself)    Chest Xray:(reviewed by myself)  Xr Chest Standard (2 Vw)    Result Date: 4/16/2019  EXAMINATION: TWO VIEWS OF THE CHEST been removed. The cardiomediastinal silhouette is unchanged. No pneumothorax. Unchanged extensive left pulmonary opacities. The right lung is clear. No acute osseous abnormality. 1. Status post left chest tube removal.  No pneumothorax. 2. Unchanged extensive left pulmonary opacities. Xr Chest Portable    Result Date: 3/23/2019  EXAMINATION: SINGLE XRAY VIEW OF THE CHEST 3/23/2019 6:11 am COMPARISON: 03/22/2019 6:04 a.m. HISTORY: ORDERING SYSTEM PROVIDED HISTORY: pleural effusion TECHNOLOGIST PROVIDED HISTORY: Reason for exam:->pleural effusion Ordering Physician Provided Reason for Exam: pleural effusion Acuity: Acute Type of Exam: Subsequent/Follow-up FINDINGS: Right PICC and left chest tubes remain present. Heart size and mediastinal contours are stable. Left lung opacities and left pleural effusion persists. No pneumothorax. Stable chest with left lung opacities and left pleural effusion. No pneumothorax. Cta Pulmonary W Contrast    Result Date: 4/21/2019  EXAMINATION: CTA OF THE CHEST 4/21/2019 9:35 pm TECHNIQUE: CTA of the chest was performed after the administration of intravenous contrast.  Multiplanar reformatted images are provided for review. MIP images are provided for review. Dose modulation, iterative reconstruction, and/or weight based adjustment of the mA/kV was utilized to reduce the radiation dose to as low as reasonably achievable. COMPARISON: CT of the chest dated March 16, 2019. HISTORY: ORDERING SYSTEM PROVIDED HISTORY: Chest pain, shortness of breath FINDINGS: Pulmonary Arteries: Pulmonary arteries are adequately opacified for evaluation. No evidence of intraluminal filling defect to suggest pulmonary embolism. Main pulmonary artery is normal in caliber. Mediastinum: No evidence of mediastinal lymphadenopathy. The heart and pericardium demonstrate no acute abnormality. There is no acute abnormality of the thoracic aorta. Lungs/pleura:  There is redemonstration of a disease), COPD (chronic obstructive pulmonary disease) ([facility] [address] Diabetes mellitus, type 2 ([address] Diastolic CHF ([address] Gastric ulcer, H/O cardiovascular stress test, Heart murmur, Hiatal hernia, History of cardiac cath, Hx of migraines, HX OTHER MEDICAL, Hyperlipidemia, Hypertension, Panic attack, Pleural effusion, S/P thoracentesis, Sleep apnea, SOBOE (shortness of breath on exertion), and Spinal stenosis. Plan and Recommendations:    Chest Pain: serial cardiac enzymes, EKG reviewed, further plan as per enzymes and clinical course may need stress test vs cardiac assessment depending on clinical course  Chesty pain could be lung related but he does have mild cad. I doubt he is having ACS  CHF: chronic Diastolic compensated heart failure. Continue meds  H/o atrial tach : monitor   Pneumothorax/ pneumonia: as per primary team / pulmonology and ct surgery   DVT prophylaxis if no contraindication  6. Dyslipidemia: continue statins           Thank you  much for consult and giving us the opportunity in contributing in the care of this patient. Please feel free to call me for any questions.        Lamar Linn MD, 4/22/2019 6:09 AM

## 2019-04-22 NOTE — CARE COORDINATION
Patient is from [place]; has been there for rehab sisce VATS in 3/2019. Contacted [facility] for Mary. Mariam Watts RN    [phone] Spoke to Mary from [place]; patient may return when medically stable.  Mariam Watts RN

## 2019-04-22 NOTE — CONSULTS
[address], [address] [facility]                                  CONSULTATION    PATIENT NAME: Dustin Torrez                   :        1950  MED REC NO:   [id number]                          ROOM:       200  ACCOUNT NO:   [de-identified]                           ADMIT DATE: 2019  PROVIDER:     Wolf Rousseau MD    CONSULT DATE:  2019    HISTORY OF PRESENT ILLNESS:  The patient is a 66-year-old gentleman with  multiple medical problems including COPD, coronary artery disease,  bipolar disorder, arthritis, obstructive sleep apnea, on CPAP;  hypertension, hyperlipidemia, who was admitted to the hospital few weeks  ago with complicated pneumonia. He had empyema and required VATS and  decortication. The patient was subsequently discharged to the rehab  facility. He did well, but then started having left-sided chest  discomfort, the site where he had his surgery done. He came back to the  emergency room. He was also having episodes of hemoptysis. He had a  CAT scan of the chest, which showed no evidence of PE, but significant  pleural fluid collection with gas and complex fluid concerning for  pyopneumothorax. He was subsequently admitted to the hospital.  The  amount of fluid was increased compared to 2019. He denies any  fever or chills. He denies any nausea or vomiting. PAST MEDICAL HISTORY:  Significant for COPD, coronary artery disease,  bipolar disorder, hypertension, hyperlipidemia and obstructive sleep  apnea, on CPAP. PAST SURGICAL HISTORY:  Remarkable for tonsillectomy, VATS and lysis of  adhesions, thoracentesis, elbow surgery and carpal tunnel release  surgery. FAMILY HISTORY:  Reveals that his mother had heart disease, father had  hypertension. SOCIAL HISTORY:  Reveals that he quit smoking in , but used to smoke  one and half packs per day for 30 years prior to that.   No history of  alcohol or drug abuse. MEDICATIONS:  Reviewed. ALLERGIES:  He is allergic to PENICILLIN. REVIEW OF SYSTEMS:  A 10- to 14-point review of systems were reviewed  and are negative except for what is mentioned in the history of present  illness. PHYSICAL EXAMINATION:  GENERAL:  The patient is alert, oriented x3, in no acute respiratory  distress. VITAL SIGNS:  His blood pressure is 130/60 mmHg, pulse of 75 per minute  and respiratory rate of 17 per minute. He is afebrile. His saturation  is 95% on room air. HEENT:  Essentially unremarkable. NECK:  There is no JVD. No lymphadenopathy. The neck is supple. LUNGS:  Revealed diminished breath sounds, left base. HEART:  Showed normal S1, S2. There was no S3 or S4 noted. ABDOMEN:  Benign. There is no evidence of any organomegaly. The bowel  sounds are present. NEUROLOGIC:  Reveals that he is awake and responsive, answering  questions appropriately and moving his extremities. DIAGNOSTIC DATA:  His CAT scan of the chest showed no evidence of PE,  moderate thick walled pleural fluid collection containing gas and  complex fluid concerning for pyopneumothorax. The fluid has increased  compared to the CAT scan done on 03/16/2019. Partial left lower lobe  collapse secondary to the fluid collection. His CBC showed a white count of 7.8, hemoglobin of 10.0 and hematocrit  of 34.3. His electrolytes showed a sodium of 134, potassium 3.2,  chloride 99, carbon dioxide 26, BUN 11 and creatinine 1.3. IMPRESSION:  1. Left lower lobe pleural fluid collection at the site of previous  thoracotomy, at the site of previous VATS surgery for decortication,  rule out empyema. 2.  COPD. 3.  Obstructive sleep apnea, on CPAP. 4.  Hypokalemia. PLAN:  1. The patient was seen by Dr. Gareth Barba and chest tube placement has been  ordered. 2.  Pleural fluid for appropriate studies including culture and  sensitivity.   3.  Continue broad-spectrum antibiotic coverage. 4.  Check procalcitonin level. 5.  Check BNP level. 6.  Sputum for culture and sensitivity. 7.  Auto-CPAP while sleeping. 8.  Supplement potassium and check magnesium and phosphorus. 9.  As per orders.         Ahmet Givens MD    D: 04/22/2019 10:19:25       T: 04/22/2019 12:14:12     [email or web address]  Job#: [id number]     Doc#: [id number]    CC:

## 2019-04-22 NOTE — ED NOTES
Report called to Kinjal PATTERSON for bed [address], [address] [facility]  04/22/19 [id number]

## 2019-04-22 NOTE — ED NOTES
23:39 Dr Jeffy Coffman returned call -- parked call @ [phone] -- notified Dr Garrison Schmidt [address]  04/21/19 [id number]

## 2019-04-22 NOTE — ED PROVIDER NOTES
eMERGENCY dEPARTMENT eNCOUnter      CHIEF COMPLAINT:   Chest pain    HPI: Patrick Elizabeth is a 71 y.o. male who presents to the Emergency Department complaining of chest pain. The patient states that the pain started last night and has been intermittent since then. He states that he first had an episode at 5:30 PM for which she was given nitro with relief. He had another episode at 1:30 AM which resolved with nitro as well. His third episode started at 6 PM tonight and he was given nitro without relief and so he came to the emergency department for further evaluation. It feels like pressure and is located in the center on the chest. The patient denies radiation of the pain. It is associated with a mild cough and intermittent hemoptysis. There are no exacerbating or relieving factors. Of note, the patient had LLL pneumonia with an empyema status post decortication March 18, 2019. The patient has a known history of CAD. There is no known history of DVT, PE, or thoracic aortic dissection. The patient denies leg pain or leg swelling. The patient denies fevers, chills, neck pain, shortness of breath, abdominal pain, nausea, vomiting, numbness, tingling, weakness, or any other complaints. REVIEW OF SYSTEMS:  CONSTITUTIONAL:  Denies fever, chills, weight loss or weakness  EYES:  Denies photophobia or discharge  ENT:  Denies sore throat or ear pain  CARDIOVASCULAR: + chest pain  RESPIRATORY:  + cough and hemoptysis  GI:  Denies abdominal pain, nausea, vomiting, or diarrhea  MUSCULOSKELETAL:  Denies back pain  SKIN:  No rash  NEUROLOGIC:  Denies headache, focal weakness or sensory changes  All systems negative except as marked. \"Remaining review of systems reviewed and negative. I have reviewed the nursing triage documentation and agree unless otherwise noted below. \"      PAST MEDICAL HISTORY:   Past Medical History:   Diagnosis Date    Anemia     per old chart    Arthritis     Back pain, chronic     \"have pain in lumbar mainly- have 5 bulging discs\"\"in my neck and my lumbar have herniated discs\"    [facility] 1 disorder ([facility] [address]     CAD (coronary artery disease) 1/27/2016    does not follow with a cardiologist    Cerebral artery occlusion with cerebral infarction ([facility] [address]     \"had mini stroke in Jan 2016- fast heart rate when I would stand up - smile drooping on one side- lased just the one day\"    CKD (chronic kidney disease)     per old chart- consult with Dr Prince Dsouza with admission 4/2016\"have low kidney function\"    COPD (chronic obstructive pulmonary disease) ([facility] [address]     follow with Dr Jolena Gitelman Diabetes mellitus, type 2 ([facility] [address] 4/0/5585    Diastolic CHF ([facility] [address] 6/93/9971    Gastric ulcer     H/O cardiovascular stress test 5/26/14    EF 68% mild ischemia anterior wall    Heart murmur     \"see Dr Priyanka Cedillo- last echo 2014\" pt states\"I think they said I have a murmur but no chest pain or palpitations\"    Hiatal hernia     History of cardiac cath 6/11/14    MODERATE  CAD      Hx of migraines     HX OTHER MEDICAL     \"have thinning of kidney walls- they found I had that 15 yrs ago\" see Dr Megan Yoder"    Hyperlipidemia     Hypertension     Panic attack     'anything new gives me anxiety\"    Pleural effusion     scheduled for thoracentesis 7/28/2014, 8/17/2016    S/P thoracentesis     left side( per old chart had thora done 4/2016 and one done 2014)    Sleep apnea     sleep study x 2 - last one 4-5 yrs ago- uses c-pap\"    SOBOE (shortness of breath on exertion)     Spinal stenosis        CURRENT MEDICATIONS:   Home medications reviewed.     SURGICAL HISTORY:   Past Surgical History:   Procedure Laterality Date    CARPAL TUNNEL RELEASE Bilateral 1989    ELBOW SURGERY Left 2000's    KNEE SURGERY Bilateral     right knee x 2( scope)/ left knee- 7-8 yr ago   [address] Left 12/20/2013    THORACENTESIS  4/25/2016         THORACENTESIS Left 11/15/2016    Dr. Mayen Sang 250mlsremoved     THORACOTOMY Left 2019    with Lysis of adhesions    THORACOTOMY Left 3/18/2019    THORACOSCOPY CONVERTED TO THORACOTOMY WITH LYSIS OF ADHESIONS performed by Anastacio Loco MD at [address] [place] [address]      as a kid       FAMILY HISTORY:   Family History   Problem Relation Age of Onset    Heart Disease Mother     High Blood Pressure Father        SOCIAL HISTORY:   Social History     Socioeconomic History    Marital status:      Spouse name: Not on file    Number of children: Not on file    Years of education: Not on file    Highest education level: Not on file   Occupational History    Occupation: built trucks, retired     Employer: [employer] resource strain: Not on file    Food insecurity:     Worry: Not on file     Inability: Not on file   [employer] needs:     Medical: Not on file     Non-medical: Not on file   Tobacco Use    Smoking status: Former Smoker     Packs/day: 1.50     Years: 30.00     Pack years: 45.00     Last attempt to quit: 2010     Years since quittin.7    Smokeless tobacco: Never Used   Substance and Sexual Activity    Alcohol use: Not Currently     Comment: \"quit- - use to drink every day\".   Rarely    Drug use: Not Currently     Frequency: 7.0 times per week     Comment: daily for sleep/ on 2016\"I no longer do that\"    Sexual activity: Not Currently     Partners: Female   Lifestyle    Physical activity:     Days per week: Not on file     Minutes per session: Not on file    Stress: Not on file   Relationships    Social connections:     Talks on phone: Not on file     Gets together: Not on file     Attends Church service: Not on file     Active member of club or organization: Not on file     Attends meetings of clubs or organizations: Not on file     Relationship status: Not on file    Intimate partner violence:     Fear of current or ex partner: Not on file     Emotionally abused: Not on file     Physically abused: Not on file Forced sexual activity: Not on file   Other Topics Concern    Not on file   Social History Narrative    Not on file       ALLERGIES: Patient has no known allergies. PHYSICAL EXAM:  VITAL SIGNS:   ED Triage Vitals   Enc Vitals Group      BP 04/21/19 2016 124/82      Pulse 04/21/19 2014 123      Resp 04/21/19 2014 16      Temp 04/21/19 2014 98 °F (36.7 °C)      Temp Source 04/21/19 2014 Oral      SpO2 04/21/19 2014 98 %      Weight 04/21/19 2014 210 lb (95.3 kg)      Height 04/21/19 2014 5' 9\" (1.753 m)      Head Circumference --       Peak Flow --       Pain Score --       Pain Loc --       Pain Edu? --       Excl. in [address]? --      Constitutional:  Non-toxic appearance  HENT: Normocephalic, Atraumatic, Bilateral external ears normal, Oropharynx moist, No oral exudates, Nose normal.  Eyes:  PERRL, Conjunctiva normal, No discharge. Neck: Normal range of motion, No tenderness, Supple, No stridor, No lymphadenopathy  Cardiovascular:  Tachycardic, Regular rhythm  Pulmonary/Chest:  Diminished breath sounds L>R, No respiratory distress, No wheezing  Abdomen:   Bowel sounds normal, Soft, No tenderness, No masses, No pulsatile masses  Back:  No tenderness, No CVA tenderness  Extremities:  Normal range of motion, Intact distal pulses, No edema, No tenderness  Skin:  Warm, Dry, No erythema, No rash      EKG Interpretation  Interpreted by me  Compared to 3/8/19  Rhythm: sinus tachycardia  Rate: tachycardic 120  Axis: normal  Ectopy: none  Conduction: incomplete RBBB  ST Segments: no acute change  T Waves: no acute change  Clinical Impression: sinus tachycardia, incomplete RBBB      Cardiac Monitor Strip Interpretation  Interpreted by me  Monitor strip interpreted for greater than 10 seconds  Rhythm: sinus tachycardia  Rate: tachycardic  Ectopy: none  ST Segments: normal      Radiology / Procedures:     CTA PULMONARY W CONTRAST (Final result)   Result time 04/21/19 23:52:36   Final result by Va Alvarado MD (04/21/19 23:52:36)                Impression:    1. No evidence for a pulmonary embolism. 2. There is a moderate thick-walled pleural fluid collection containing gas  and complex fluid, concerning for a pyopneumothorax.  The previously seen  chest tube has been removed.  This collection has increased in size since the  prior examination of March 16, 2019.  3. Left lower lobe partial collapse with shift of the heart and mediastinum  to the left.  This could be related to secretions within the left lower lobe  bronchus.  Recommend short-term follow-up examination to exclude underlying  neoplasm. Narrative:    EXAMINATION:  CTA OF THE CHEST 4/21/2019 9:35 pm    TECHNIQUE:  CTA of the chest was performed after the administration of intravenous  contrast.  Multiplanar reformatted images are provided for review.  MIP  images are provided for review. Dose modulation, iterative reconstruction,  and/or weight based adjustment of the mA/kV was utilized to reduce the  radiation dose to as low as reasonably achievable. COMPARISON:  CT of the chest dated March 16, 2019. HISTORY:  ORDERING SYSTEM PROVIDED HISTORY: Chest pain, shortness of breath    FINDINGS:  Pulmonary Arteries: Pulmonary arteries are adequately opacified for  evaluation.  No evidence of intraluminal filling defect to suggest pulmonary  embolism.  Main pulmonary artery is normal in caliber. Mediastinum: No evidence of mediastinal lymphadenopathy.  The heart and  pericardium demonstrate no acute abnormality.  There is no acute abnormality  of the thoracic aorta. Lungs/pleura: There is redemonstration of a moderate-to-large thick-walled  pleural fluid collection which contains a fluid level and multiple  collections of gas.  The previously noted chest tube has been removed.  This  collection measures 14.4 cm in craniocaudal dimension and 6.9 cm in  transverse dimension.  There is partial collapse of the left lower lobe.   This could be Immature Neutrophil % 0.5 (*)     All other components within normal limits   COMPREHENSIVE METABOLIC PANEL - Abnormal; Notable for the following components:    Sodium 134 (*)     Chloride 97 (*)     CREATININE 1.4 (*)     Glucose 129 (*)     Alb 2.8 (*)     ALT 6 (*)     GFR Non- 50 (*)     All other components within normal limits   TROPONIN - Abnormal; Notable for the following components:    Troponin T 0.030 (*)     All other components within normal limits   BASIC METABOLIC PANEL W/ REFLEX TO MG FOR LOW K - Abnormal; Notable for the following components:    Sodium 134 (*)     Potassium 3.2 (*)     Glucose 130 (*)     Calcium 8.2 (*)     GFR Non- 55 (*)     All other components within normal limits   CBC - Abnormal; Notable for the following components:    RBC 3.39 (*)     Hemoglobin 8.7 (*)     Hematocrit 30.4 (*)     MCH 25.7 (*)     MCHC 28.6 (*)     RDW 17.1 (*)     All other components within normal limits   TROPONIN - Abnormal; Notable for the following components:    Troponin T 0.028 (*)     All other components within normal limits   TROPONIN - Abnormal; Notable for the following components:    Troponin T 0.026 (*)     All other components within normal limits   COMPREHENSIVE METABOLIC PANEL - Abnormal; Notable for the following components:    CREATININE 1.5 (*)     Glucose 155 (*)     Calcium 8.1 (*)     Alb 2.6 (*)     Total Protein 5.8 (*)     ALT <5 (*)     AST 8 (*)     GFR Non- 46 (*)     GFR  56 (*)     All other components within normal limits   CBC WITH AUTO DIFFERENTIAL - Abnormal; Notable for the following components:    RBC 3.68 (*)     Hemoglobin 9.5 (*)     Hematocrit 33.0 (*)     MCH 25.8 (*)     MCHC 28.8 (*)     RDW 16.9 (*)     Segs Relative 68.5 (*)     Lymphocytes % 22.0 (*)     Monocytes % 8.3 (*)     Immature Neutrophil % 0.6 (*)     All other components within normal limits   BRAIN NATRIURETIC PEPTIDE - Abnormal; Notable for the following components:    Pro-BNP 1,164 (*)     All other components within normal limits   CREATININE, SERUM - Abnormal; Notable for the following components:    CREATININE 1.5 (*)     GFR Non- 46 (*)     GFR  56 (*)     All other components within normal limits       ED COURSE & MEDICAL DECISION MAKING:  Pertinent Labs & Imaging studies reviewed. (See chart for details)  On exam, the patient is afebrile and nontoxic appearing. He is initially tachycardic but this resolves. He is otherwise hemodynamically stable and neurologically intact. EKG shows sinus tachycardia with no ST elevation or depression. Labs are obtained and are significant for anemia (improving), and an elevated troponin (appears to be a chronic elevation). Chest x-ray shows stable to mildly decreased loculated left hydropneumothorax compared with 4/16/18. CT chest was obtained and is negative for PE. There is a moderate thick walled pleural fluid collection containing gas and complex fluid concerning for a pyopneumothorax. This collection has increased in size since the prior examination of March 16/2019. There is left lower lobe partial collapse with shift of the heart and mediastinum to the left. This could be related to secretions within the left lower lobe bronchus. The patient was treated with Morphine and Zofran     I suspect that the patient has chest pain, a chronically elevated troponin, and a pyopneumothorax. I have a low suspicion for STEMI, thoracic aortic dissection, pulmonary embolism, pericardial effusion or sepsis. I recommended admission to the hospital and the patient was agreeable. I discussed the case with the hospitalist who will admit the patient for further treatment and care. The patient is currently in stable condition awaiting admission. Clinical Impression:  1. Chest pain, unspecified type    2. Elevated troponin    3.  Pyopneumothorax ([address]          Comment: Please note this report has been produced using speech recognition software and may contain errors related to that system including errors in grammar, punctuation, and spelling, as well as words and phrases that may be inappropriate. If there are any questions or concerns please feel free to contact the dictating provider for clarification.         Prosper Aguillon MD  04/27/19 2126

## 2019-04-22 NOTE — PROGRESS NOTES
Patient was seen and evaluated bedside. Agree with current management. Chest tube was placed for empyema. CT surgery and pulmonology on board. Continue monitor closely.

## 2019-04-22 NOTE — H&P
Hospital Medicine History & Physical      PCP: Ricci Roca MD    Date of Admission: 4/21/2019    Date of Service: Pt seen/examined on 04/21/19 and Admitted to      taken from patient    Chief Complaint:  Acute chest pain that reoccurred after taking nitroglycerin ×1 day. History Of Present Illness: Mary Alice Ram is a 71 y.o. male presented to the emergency department complaining of chest pain after taking nitroglycerin 3 times and then the pain returned and he decided to come in and be  evaluated in the emergency department. The patient denies radiation of the pain no nausea vomiting or diaphoresis. He also had a CTA that was done that showed no pulmonary embolism. He has an extensive past medical history of left lower pneumonia with empyema and a partial collapse of that lung today per CT. He also had a prior pyopneumothorax status post VAC and decortication done by vascular surgery. At this time patient will be admitted to hospitalist service with a consultation of cardiac, vascular surgery and pulmonary for evaluation and treatment while hospitalized. Patient  denies any chest pain, no palpitation or shortness of breath at this time.     Past Medical History:        Diagnosis Date    Anemia     per old chart    Arthritis     Back pain, chronic     \"have pain in lumbar mainly- have 5 bulging discs\"\"in my neck and my lumbar have herniated discs\"    B[facility] 1 [facility] ([facility]     CAD (coronary artery disease) 1/27/2016    does not follow with a cardiologist    Cerebral artery occlusion with cerebral infarction ([facility]     \"had mini stroke in Jan 2016- fast heart rate when I would stand up - smile drooping on one side- lased just the one day\"    CKD (chronic kidney disease)     per old chart- consult with Dr Andrey Viera with admission 4/2016\"have low kidney function\"    COPD (chronic obstructive pulmonary disease) ([facility]     follow with Dr Lucien Maya Diabetes mellitus, type 2 ([facility] 1/3/2017 Historical Provider, MD   omeprazole (PRILOSEC) 20 MG delayed release capsule Take 20 mg by mouth daily   Yes Historical Provider, MD   epoetin ezequiel-epbx (RETACRIT) 99840 UNIT/ML SOLN injection Inject 5,000 Units into the skin three times a week Indications: one time a day every Tue for anemia   Yes Historical Provider, MD   ALPRAZolam (XANAX) 0.5 MG tablet Take 0.5 mg by mouth 2 times daily. Yes Historical Provider, MD   carvedilol (COREG) 3.125 MG tablet Take 1 tablet by mouth 2 times daily  Patient taking differently: Take 6.25 mg by mouth 2 times daily  3/25/19  Yes Reynold Kapadia MD   nitroGLYCERIN (NITROSTAT) 0.4 MG SL tablet up to max of 3 total doses.  If no relief after 1 dose, call 911. 3/25/19  Yes Reynold Kapadia MD   aspirin 81 MG EC tablet Take 1 tablet by mouth daily 3/26/19  Yes Reynold Kapadia MD   bisacodyl (DULCOLAX) 10 MG suppository Place 1 suppository rectally daily as needed for Constipation 3/25/19 4/24/19 Yes Reynold Kapadia MD   docusate sodium (COLACE, DULCOLAX) 100 MG CAPS Take 100 mg by mouth 2 times daily 3/25/19  Yes Reynold Kapadia MD   folic acid (FOLVITE) 1 MG tablet Take 1 tablet by mouth daily 3/26/19  Yes Reynold Kapadia MD   furosemide (LASIX) 40 MG tablet Take 1 tablet by mouth daily 3/26/19  Yes Reynold Kapadia MD   ipratropium-albuterol (DUONEB) 0.5-2.5 (3) MG/3ML SOLN nebulizer solution Inhale 3 mLs into the lungs every 4 hours (while awake) 3/25/19  Yes Reynold Kapadia MD   pantoprazole (PROTONIX) 40 MG tablet Take 1 tablet by mouth 2 times daily (before meals) 3/25/19   Reynold Kapadia MD   insulin lispro (HUMALOG) 100 UNIT/ML injection vial Inject 0-12 Units into the skin 3 times daily (with meals) Glucose: Dose: <139 No insulin 140-199 2 units 200-249 4 units 250-299 6 units 300-349 8 units 350-400 10 units About 400 12 units 3/25/19   Reynold Kapadia MD   insulin lispro (HUMALOG) 100 UNIT/ML injection vial Inject 0-6 Units into the skin nightly If <139       No Insulin 140-199      1 Unit 200-249      2 Constitutional: He is oriented to person, place, and time. He appears well-developed. HENT:   Head: Normocephalic. Eyes: Pupils are equal, round, and reactive to light. Neck: Normal range of motion. Neck supple. Cardiovascular: Normal rate and regular rhythm. Murmur heard. Pulmonary/Chest: Effort normal and breath sounds normal. No respiratory distress. Diminished w/ hyperinflated in the  posterior bases   Abdominal: Soft. Bowel sounds are normal.   Musculoskeletal: Normal range of motion. Neurological: He is alert and oriented to person, place, and time. Skin: Skin is warm and dry. Psychiatric: He has a normal mood and affect. His behavior is normal.         Imaging:    Xr Chest Standard (2 Vw)    Result Date: 4/16/2019  EXAMINATION: TWO VIEWS OF THE CHEST 4/16/2019 10:59 am COMPARISON: 03/24/2019 HISTORY: ORDERING SYSTEM PROVIDED HISTORY: Recurrent left pleural effusion TECHNOLOGIST PROVIDED HISTORY: Ordering Physician Provided Reason for Exam: s/p thoracotomy 3.18.19, pt reports hemoptysis since his surgery FINDINGS: Right-sided PICC line has been removed. Heart size stable. There is a large air-fluid collection laterally within the left pleural space. There is airspace disease in the left mid and lower lung likely representing atelectasis and there is pleural thickening on the left. Right lung is clear except for minimal atelectasis in the base. Large loculated left hydropneumothorax versus empyema with pleural thickening and adjacent atelectasis     Xr Chest Portable    Result Date: 4/21/2019  EXAMINATION: SINGLE XRAY VIEW OF THE CHEST 4/21/2019 9:05 pm COMPARISON: Chest radiograph 04/16/2019. HISTORY: ORDERING SYSTEM PROVIDED HISTORY: chest pain TECHNOLOGIST PROVIDED HISTORY: Reason for exam:->chest pain Ordering Physician Provided Reason for Exam: chest pain Acuity: Unknown Type of Exam: Unknown Initial encounter. FINDINGS: The cardiomediastinal silhouette is unchanged.   A suspected loculated left hydropneumothorax is again seen. This is stable to mildly decreased from the previous exam.  The right lung is clear. No acute osseous abnormality. Stable to mildly decreased loculated left hydropneumothorax compared with 04/16/2018. Xr Chest Portable    Result Date: 3/24/2019  EXAMINATION: SINGLE XRAY VIEW OF THE CHEST 3/24/2019 7:28 pm COMPARISON: Chest radiograph 03/23/2019. HISTORY: ORDERING SYSTEM PROVIDED HISTORY: post chest tube (pleural) removal TECHNOLOGIST PROVIDED HISTORY: Reason for exam:->post chest tube (pleural) removal Ordering Physician Provided Reason for Exam: post chest tube (pleural) removal Acuity: Acute Type of Exam: Initial FINDINGS: A right upper extremity PICC terminates at the cavoatrial junction. The left chest tube is been removed. The cardiomediastinal silhouette is unchanged. No pneumothorax. Unchanged extensive left pulmonary opacities. The right lung is clear. No acute osseous abnormality. 1. Status post left chest tube removal.  No pneumothorax. 2. Unchanged extensive left pulmonary opacities. Xr Chest Portable    Result Date: 3/23/2019  EXAMINATION: SINGLE XRAY VIEW OF THE CHEST 3/23/2019 6:11 am COMPARISON: 03/22/2019 6:04 a.m. HISTORY: ORDERING SYSTEM PROVIDED HISTORY: pleural effusion TECHNOLOGIST PROVIDED HISTORY: Reason for exam:->pleural effusion Ordering Physician Provided Reason for Exam: pleural effusion Acuity: Acute Type of Exam: Subsequent/Follow-up FINDINGS: Right PICC and left chest tubes remain present. Heart size and mediastinal contours are stable. Left lung opacities and left pleural effusion persists. No pneumothorax. Stable chest with left lung opacities and left pleural effusion. No pneumothorax.      Cta Pulmonary W Contrast    Result Date: 4/21/2019  EXAMINATION: CTA OF THE CHEST 4/21/2019 9:35 pm TECHNIQUE: CTA of the chest was performed after the administration of intravenous contrast.  Multiplanar reformatted images are provided for review. MIP images are provided for review. Dose modulation, iterative reconstruction, and/or weight based adjustment of the mA/kV was utilized to reduce the radiation dose to as low as reasonably achievable. COMPARISON: CT of the chest dated March 16, 2019. HISTORY: ORDERING SYSTEM PROVIDED HISTORY: Chest pain, shortness of breath FINDINGS: Pulmonary Arteries: Pulmonary arteries are adequately opacified for evaluation. No evidence of intraluminal filling defect to suggest pulmonary embolism. Main pulmonary artery is normal in caliber. Mediastinum: No evidence of mediastinal lymphadenopathy. The heart and pericardium demonstrate no acute abnormality. There is no acute abnormality of the thoracic aorta. Lungs/pleura: There is redemonstration of a moderate-to-large thick-walled pleural fluid collection which contains a fluid level and multiple collections of gas. The previously noted chest tube has been removed. This collection measures 14.4 cm in craniocaudal dimension and 6.9 cm in transverse dimension. There is partial collapse of the left lower lobe. This could be related to secretions within the left lower lobe bronchus. There is shift of the heart and mediastinum to the left. 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 Calcified granulomas are seen within the spleen. The remainder of the upper abdomen is otherwise unremarkable. Soft Tissues/Bones: No acute bone or soft tissue abnormality. 1. No evidence for a pulmonary embolism. 2. There is a moderate thick-walled pleural fluid collection containing gas and complex fluid, concerning for a pyopneumothorax. The previously seen chest tube has been removed. This collection has increased in size since the prior examination of March 16, 2019. 3. Left lower lobe partial collapse with shift of the heart and mediastinum to the left.   This could be related to secretions within the left lower lobe bronchus. Recommend short-term follow-up examination to exclude underlying neoplasm. EKG:      CBC   Recent Labs     04/21/19 2042   WBC 7.8   HGB 10.0*   HCT 34.3*         RENAL  Recent Labs     04/21/19 2042   *   K 3.8   CL 97*   CO2 30   BUN 12   CREATININE 1.4*     LFT'S  Recent Labs     04/21/19 2042   AST 16   ALT 6*   BILITOT 0.3   ALKPHOS 106     COAG  No results for input(s): INR in the last 72 hours. CARDIAC ENZYMES  Recent Labs     04/21/19 2042   TROPONINT 0.030*       U/A:   Lab Results   Component Value Date    COLORU YELLOW 03/16/2019    WBCUA NONE SEEN 03/16/2019    RBCUA 4 03/16/2019    MUCUS RARE 03/16/2019    BACTERIA NEGATIVE 03/16/2019    CLARITYU SLIGHTLY CLOUDY 03/16/2019    SPECGRAV 1.020 03/16/2019    LEUKOCYTESUR TRACE 03/16/2019    BLOODU NEGATIVE 03/16/2019    AMORPHOUS OCCASIONAL 03/16/2019       ABG    Lab Results   Component Value Date    DWJ4VZJ 26.4 03/18/2019    BEART 1 06/11/2014    BJK0NAT 38.0 03/18/2019    PO2ART 109 03/18/2019           Active Hospital Problems    Diagnosis Date Noted    Hospital-acquired bacterial pneumonia [J15.9] 04/21/2019       PHYSICIAN CERTIFICATION    I certify that Faustino Whelan is expected to be hospitalized for > 2  midnights based on the following assessment and plan:    ASSESSMENT/PLAN:  1. Acute unstable angina-patient's chest pain was unaffected by nitroglycerin ×3 and he decided to come in to be evaluated, serial troponins, EKG in the morning, cardiology has been consulted for evaluation and treatment. 2. Pyopneumothorax status post VAC decortication- with a chest tube had been removed for the empyema is collection of material that is growing and larger since the last CT done on 3/16/19. Will have cardiovascular surgery evaluate and treat as indicated  3.  Left lobe pneumonia with left partial lung collapse-vascular surgery has been in consulted for

## 2019-04-23 ENCOUNTER — APPOINTMENT (OUTPATIENT)
Dept: GENERAL RADIOLOGY | Age: 69
DRG: 178 | End: 2019-04-23
Payer: MEDICARE

## 2019-04-23 ENCOUNTER — APPOINTMENT (OUTPATIENT)
Dept: NUCLEAR MEDICINE | Age: 69
DRG: 178 | End: 2019-04-23
Payer: MEDICARE

## 2019-04-23 LAB
ALBUMIN SERPL-MCNC: 2.6 GM/DL (ref 3.4–5)
ALP BLD-CCNC: 90 IU/L (ref 40–129)
ALT SERPL-CCNC: <5 U/L (ref 10–40)
ANION GAP SERPL CALCULATED.3IONS-SCNC: 11 MMOL/L (ref 4–16)
AST SERPL-CCNC: 8 IU/L (ref 15–37)
BASOPHILS ABSOLUTE: 0 K/CU MM
BASOPHILS RELATIVE PERCENT: 0.3 % (ref 0–1)
BILIRUB SERPL-MCNC: 0.3 MG/DL (ref 0–1)
BUN BLDV-MCNC: 9 MG/DL (ref 6–23)
CALCIUM SERPL-MCNC: 8.1 MG/DL (ref 8.3–10.6)
CHLORIDE BLD-SCNC: 101 MMOL/L (ref 99–110)
CO2: 25 MMOL/L (ref 21–32)
CREAT SERPL-MCNC: 1.5 MG/DL (ref 0.9–1.3)
DIFFERENTIAL TYPE: ABNORMAL
EOSINOPHILS ABSOLUTE: 0 K/CU MM
EOSINOPHILS RELATIVE PERCENT: 0.3 % (ref 0–3)
FLUID TYPE: NORMAL INDEX
GFR AFRICAN AMERICAN: 56 ML/MIN/1.73M2
GFR NON-AFRICAN AMERICAN: 46 ML/MIN/1.73M2
GLUCOSE BLD-MCNC: 110 MG/DL (ref 70–99)
GLUCOSE BLD-MCNC: 117 MG/DL (ref 70–99)
GLUCOSE BLD-MCNC: 135 MG/DL (ref 70–99)
GLUCOSE BLD-MCNC: 155 MG/DL (ref 70–99)
GLUCOSE BLD-MCNC: 167 MG/DL (ref 70–99)
HCT VFR BLD CALC: 33 % (ref 42–52)
HEMOGLOBIN: 9.5 GM/DL (ref 13.5–18)
IMMATURE NEUTROPHIL %: 0.6 % (ref 0–0.43)
LYMPHOCYTES ABSOLUTE: 1.6 K/CU MM
LYMPHOCYTES RELATIVE PERCENT: 22 % (ref 24–44)
LYMPHOCYTES, BODY FLUID: 5 %
MAGNESIUM: 2.2 MG/DL (ref 1.8–2.4)
MCH RBC QN AUTO: 25.8 PG (ref 27–31)
MCHC RBC AUTO-ENTMCNC: 28.8 % (ref 32–36)
MCV RBC AUTO: 89.7 FL (ref 78–100)
MESOTHELIAL FLUID: 0 /100 WBC
MONOCYTE, FLUID: 2 %
MONOCYTES ABSOLUTE: 0.6 K/CU MM
MONOCYTES RELATIVE PERCENT: 8.3 % (ref 0–4)
NEUTROPHIL, FLUID: 93 %
NUCLEATED RBC %: 0 %
OTHER CELLS FLUID: 0
PDW BLD-RTO: 16.9 % (ref 11.7–14.9)
PHOSPHORUS: 3 MG/DL (ref 2.5–4.9)
PLATELET # BLD: 244 K/CU MM (ref 140–440)
PMV BLD AUTO: 8.5 FL (ref 7.5–11.1)
POTASSIUM SERPL-SCNC: 3.5 MMOL/L (ref 3.5–5.1)
PRO-BNP: 1164 PG/ML
PROCALCITONIN: 0.09
RBC # BLD: 3.68 M/CU MM (ref 4.6–6.2)
RBC FLUID: NORMAL /CU MM
SEGMENTED NEUTROPHILS ABSOLUTE COUNT: 4.9 K/CU MM
SEGMENTED NEUTROPHILS RELATIVE PERCENT: 68.5 % (ref 36–66)
SODIUM BLD-SCNC: 137 MMOL/L (ref 135–145)
TOTAL IMMATURE NEUTOROPHIL: 0.04 K/CU MM
TOTAL NUCLEATED RBC: 0 K/CU MM
TOTAL PROTEIN: 5.8 GM/DL (ref 6.4–8.2)
WBC # BLD: 7.2 K/CU MM (ref 4–10.5)
WBC FLUID: NORMAL /CU MM

## 2019-04-23 PROCEDURE — 6370000000 HC RX 637 (ALT 250 FOR IP): Performed by: INTERNAL MEDICINE

## 2019-04-23 PROCEDURE — 6360000002 HC RX W HCPCS: Performed by: HOSPITALIST

## 2019-04-23 PROCEDURE — APPSS30 APP SPLIT SHARED TIME 16-30 MINUTES: Performed by: NURSE PRACTITIONER

## 2019-04-23 PROCEDURE — 84100 ASSAY OF PHOSPHORUS: CPT

## 2019-04-23 PROCEDURE — 3430000000 HC RX DIAGNOSTIC RADIOPHARMACEUTICAL: Performed by: INTERNAL MEDICINE

## 2019-04-23 PROCEDURE — 87205 SMEAR GRAM STAIN: CPT

## 2019-04-23 PROCEDURE — 36415 COLL VENOUS BLD VENIPUNCTURE: CPT

## 2019-04-23 PROCEDURE — 82962 GLUCOSE BLOOD TEST: CPT

## 2019-04-23 PROCEDURE — 83735 ASSAY OF MAGNESIUM: CPT

## 2019-04-23 PROCEDURE — 85025 COMPLETE CBC W/AUTO DIFF WBC: CPT

## 2019-04-23 PROCEDURE — 84145 PROCALCITONIN (PCT): CPT

## 2019-04-23 PROCEDURE — 6370000000 HC RX 637 (ALT 250 FOR IP): Performed by: HOSPITALIST

## 2019-04-23 PROCEDURE — 2580000003 HC RX 258: Performed by: INTERNAL MEDICINE

## 2019-04-23 PROCEDURE — 2580000003 HC RX 258: Performed by: EMERGENCY MEDICINE

## 2019-04-23 PROCEDURE — 80053 COMPREHEN METABOLIC PANEL: CPT

## 2019-04-23 PROCEDURE — A9500 TC99M SESTAMIBI: HCPCS | Performed by: INTERNAL MEDICINE

## 2019-04-23 PROCEDURE — 87070 CULTURE OTHR SPECIMN AEROBIC: CPT

## 2019-04-23 PROCEDURE — 2580000003 HC RX 258: Performed by: HOSPITALIST

## 2019-04-23 PROCEDURE — 83880 ASSAY OF NATRIURETIC PEPTIDE: CPT

## 2019-04-23 PROCEDURE — 94761 N-INVAS EAR/PLS OXIMETRY MLT: CPT

## 2019-04-23 PROCEDURE — 99232 SBSQ HOSP IP/OBS MODERATE 35: CPT | Performed by: INTERNAL MEDICINE

## 2019-04-23 PROCEDURE — 6360000002 HC RX W HCPCS: Performed by: INTERNAL MEDICINE

## 2019-04-23 PROCEDURE — 87081 CULTURE SCREEN ONLY: CPT

## 2019-04-23 PROCEDURE — 71045 X-RAY EXAM CHEST 1 VIEW: CPT

## 2019-04-23 PROCEDURE — 93017 CV STRESS TEST TRACING ONLY: CPT

## 2019-04-23 PROCEDURE — 94660 CPAP INITIATION&MGMT: CPT

## 2019-04-23 PROCEDURE — 1200000000 HC SEMI PRIVATE

## 2019-04-23 RX ORDER — HYDROCODONE BITARTRATE AND ACETAMINOPHEN 5; 325 MG/1; MG/1
2 TABLET ORAL EVERY 4 HOURS PRN
Status: DISCONTINUED | OUTPATIENT
Start: 2019-04-23 | End: 2019-04-23

## 2019-04-23 RX ORDER — HYDROCODONE BITARTRATE AND ACETAMINOPHEN 5; 325 MG/1; MG/1
1 TABLET ORAL EVERY 4 HOURS PRN
Status: DISCONTINUED | OUTPATIENT
Start: 2019-04-23 | End: 2019-04-23

## 2019-04-23 RX ORDER — DEXTROSE MONOHYDRATE 50 MG/ML
100 INJECTION, SOLUTION INTRAVENOUS PRN
Status: DISCONTINUED | OUTPATIENT
Start: 2019-04-23 | End: 2019-04-25 | Stop reason: HOSPADM

## 2019-04-23 RX ORDER — OXYCODONE HYDROCHLORIDE AND ACETAMINOPHEN 5; 325 MG/1; MG/1
1 TABLET ORAL EVERY 4 HOURS PRN
Status: DISCONTINUED | OUTPATIENT
Start: 2019-04-23 | End: 2019-04-25 | Stop reason: HOSPADM

## 2019-04-23 RX ORDER — DEXTROSE MONOHYDRATE 25 G/50ML
12.5 INJECTION, SOLUTION INTRAVENOUS PRN
Status: DISCONTINUED | OUTPATIENT
Start: 2019-04-23 | End: 2019-04-25 | Stop reason: HOSPADM

## 2019-04-23 RX ORDER — OXYCODONE HYDROCHLORIDE AND ACETAMINOPHEN 5; 325 MG/1; MG/1
2 TABLET ORAL EVERY 4 HOURS PRN
Status: DISCONTINUED | OUTPATIENT
Start: 2019-04-23 | End: 2019-04-25 | Stop reason: HOSPADM

## 2019-04-23 RX ORDER — NICOTINE POLACRILEX 4 MG
15 LOZENGE BUCCAL PRN
Status: DISCONTINUED | OUTPATIENT
Start: 2019-04-23 | End: 2019-04-25 | Stop reason: HOSPADM

## 2019-04-23 RX ADMIN — ALPRAZOLAM 0.5 MG: 0.5 TABLET ORAL at 09:22

## 2019-04-23 RX ADMIN — MEROPENEM 1 G: 1 INJECTION, POWDER, FOR SOLUTION INTRAVENOUS at 13:22

## 2019-04-23 RX ADMIN — MEROPENEM 1 G: 1 INJECTION, POWDER, FOR SOLUTION INTRAVENOUS at 06:26

## 2019-04-23 RX ADMIN — SPIRONOLACTONE 25 MG: 25 TABLET ORAL at 09:22

## 2019-04-23 RX ADMIN — SERTRALINE HYDROCHLORIDE 50 MG: 50 TABLET ORAL at 21:48

## 2019-04-23 RX ADMIN — ESCITALOPRAM OXALATE 20 MG: 10 TABLET ORAL at 09:22

## 2019-04-23 RX ADMIN — FUROSEMIDE 40 MG: 40 TABLET ORAL at 09:22

## 2019-04-23 RX ADMIN — SODIUM CHLORIDE, PRESERVATIVE FREE 10 ML: 5 INJECTION INTRAVENOUS at 22:04

## 2019-04-23 RX ADMIN — SODIUM CHLORIDE, PRESERVATIVE FREE 10 ML: 5 INJECTION INTRAVENOUS at 11:23

## 2019-04-23 RX ADMIN — GUAIFENESIN 600 MG: 600 TABLET, EXTENDED RELEASE ORAL at 09:22

## 2019-04-23 RX ADMIN — DOCUSATE SODIUM 100 MG: 100 CAPSULE, LIQUID FILLED ORAL at 21:47

## 2019-04-23 RX ADMIN — SODIUM CHLORIDE, PRESERVATIVE FREE 10 ML: 5 INJECTION INTRAVENOUS at 22:05

## 2019-04-23 RX ADMIN — Medication 30 MILLICURIE: at 07:55

## 2019-04-23 RX ADMIN — OXYCODONE HYDROCHLORIDE AND ACETAMINOPHEN 2 TABLET: 5; 325 TABLET ORAL at 17:48

## 2019-04-23 RX ADMIN — ASPIRIN 81 MG: 81 TABLET, COATED ORAL at 09:22

## 2019-04-23 RX ADMIN — REGADENOSON 0.4 MG: 0.08 INJECTION, SOLUTION INTRAVENOUS at 07:50

## 2019-04-23 RX ADMIN — HYDROCODONE BITARTRATE AND ACETAMINOPHEN 2 TABLET: 5; 325 TABLET ORAL at 06:26

## 2019-04-23 RX ADMIN — CARVEDILOL 3.12 MG: 3.12 TABLET, FILM COATED ORAL at 09:22

## 2019-04-23 RX ADMIN — ALPRAZOLAM 0.5 MG: 0.5 TABLET ORAL at 21:48

## 2019-04-23 RX ADMIN — FOLIC ACID 1 MG: 1 TABLET ORAL at 09:22

## 2019-04-23 RX ADMIN — OXYCODONE HYDROCHLORIDE AND ACETAMINOPHEN 2 TABLET: 5; 325 TABLET ORAL at 13:03

## 2019-04-23 RX ADMIN — GUAIFENESIN 600 MG: 600 TABLET, EXTENDED RELEASE ORAL at 21:47

## 2019-04-23 RX ADMIN — HYDROCODONE BITARTRATE AND ACETAMINOPHEN 1 TABLET: 5; 325 TABLET ORAL at 01:19

## 2019-04-23 RX ADMIN — FLUTICASONE PROPIONATE 2 SPRAY: 50 SPRAY, METERED NASAL at 09:24

## 2019-04-23 RX ADMIN — DOCUSATE SODIUM 100 MG: 100 CAPSULE, LIQUID FILLED ORAL at 09:22

## 2019-04-23 RX ADMIN — MEROPENEM 1 G: 1 INJECTION, POWDER, FOR SOLUTION INTRAVENOUS at 21:56

## 2019-04-23 RX ADMIN — VANCOMYCIN HYDROCHLORIDE 1500 MG: 5 INJECTION, POWDER, LYOPHILIZED, FOR SOLUTION INTRAVENOUS at 04:36

## 2019-04-23 RX ADMIN — OXYCODONE HYDROCHLORIDE AND ACETAMINOPHEN 2 TABLET: 5; 325 TABLET ORAL at 21:52

## 2019-04-23 RX ADMIN — LINAGLIPTIN 5 MG: 5 TABLET, FILM COATED ORAL at 13:22

## 2019-04-23 RX ADMIN — PANTOPRAZOLE SODIUM 40 MG: 40 TABLET, DELAYED RELEASE ORAL at 15:43

## 2019-04-23 RX ADMIN — AMLODIPINE BESYLATE 10 MG: 10 TABLET ORAL at 21:48

## 2019-04-23 RX ADMIN — HYDROCODONE BITARTRATE AND ACETAMINOPHEN 1 TABLET: 5; 325 TABLET ORAL at 00:56

## 2019-04-23 RX ADMIN — INSULIN LISPRO 1 UNITS: 100 INJECTION, SOLUTION INTRAVENOUS; SUBCUTANEOUS at 21:58

## 2019-04-23 RX ADMIN — PANTOPRAZOLE SODIUM 40 MG: 40 TABLET, DELAYED RELEASE ORAL at 06:26

## 2019-04-23 RX ADMIN — ATORVASTATIN CALCIUM 80 MG: 40 TABLET, FILM COATED ORAL at 21:47

## 2019-04-23 RX ADMIN — SODIUM CHLORIDE, PRESERVATIVE FREE 10 ML: 5 INJECTION INTRAVENOUS at 09:23

## 2019-04-23 RX ADMIN — ENOXAPARIN SODIUM 40 MG: 40 INJECTION SUBCUTANEOUS at 09:22

## 2019-04-23 RX ADMIN — CARVEDILOL 3.12 MG: 3.12 TABLET, FILM COATED ORAL at 21:47

## 2019-04-23 ASSESSMENT — PAIN DESCRIPTION - DESCRIPTORS
DESCRIPTORS: ACHING;BURNING
DESCRIPTORS: ACHING;BURNING

## 2019-04-23 ASSESSMENT — PAIN DESCRIPTION - FREQUENCY
FREQUENCY: CONTINUOUS
FREQUENCY: CONTINUOUS

## 2019-04-23 ASSESSMENT — PAIN SCALES - GENERAL
PAINLEVEL_OUTOF10: 8
PAINLEVEL_OUTOF10: 6
PAINLEVEL_OUTOF10: 6
PAINLEVEL_OUTOF10: 7
PAINLEVEL_OUTOF10: 6
PAINLEVEL_OUTOF10: 7
PAINLEVEL_OUTOF10: 6
PAINLEVEL_OUTOF10: 7
PAINLEVEL_OUTOF10: 4
PAINLEVEL_OUTOF10: 8
PAINLEVEL_OUTOF10: 8
PAINLEVEL_OUTOF10: 7
PAINLEVEL_OUTOF10: 5
PAINLEVEL_OUTOF10: 7
PAINLEVEL_OUTOF10: 3
PAINLEVEL_OUTOF10: 7

## 2019-04-23 ASSESSMENT — PAIN DESCRIPTION - ORIENTATION
ORIENTATION: LEFT

## 2019-04-23 ASSESSMENT — PAIN DESCRIPTION - PAIN TYPE
TYPE: ACUTE PAIN

## 2019-04-23 ASSESSMENT — PAIN DESCRIPTION - LOCATION
LOCATION: BACK;RIB CAGE
LOCATION: BACK;RIB CAGE
LOCATION: RIB CAGE
LOCATION: RIB CAGE

## 2019-04-23 ASSESSMENT — PAIN DESCRIPTION - ONSET
ONSET: ON-GOING
ONSET: ON-GOING

## 2019-04-23 ASSESSMENT — PAIN DESCRIPTION - PROGRESSION
CLINICAL_PROGRESSION: NOT CHANGED
CLINICAL_PROGRESSION: NOT CHANGED
CLINICAL_PROGRESSION: GRADUALLY WORSENING

## 2019-04-23 NOTE — PROGRESS NOTES
[facility] [place]  Sapna Dozier  Phone: [phone]    Fax [phone]                  Perez Aquino MD, Urban Robbins MD, [address], MD, MD Daron Magaña MD Mela Fairy, MD Julianne Orn, APRBERNARDINO Barron, JULEE Roque, JULEE    Cardiology Progress Note     Today's Plan: Stress test    Admit Date:  4/21/2019    Consult reason/ Seen today for: Chest pain    Subjective and  Overnight Events:  Continues to have intermittent pain with chest tube. Patient denies any shortness of breath. Assessment / Plan / Recommendation:     1. ASCVD: Continue aspirin, statins, ACEinhibitors, beta blockers. Stress test negative for ischemia and EF is 57%  2. Elevated Troponin : will continue medical management for now. Most likely type 2. BNP is 1164 and has large empyema on L side. 3. CHF: Acute Diastolic compensated heart failure. Please continue Gudelines reccoemmended medical thearpy including Coreg, Lisinopril, lasix, and Aldactone. Daily weights , strict Is and Os  4. Pneomothroax/ pneumonia managed by Pulmonary and CTS  5. H/o Atrial tach: telemetry review sinus rhythm. 6. DVT prophylaxis if not contraindicated while in the hospital.   7. Dyslipidemia continue statin    Cardiology to sign off for now, please call as needed. History of Presenting Illness:    Chief complain on admission : 71 y. o.year old who is admitted for  Chief Complaint   Patient presents with    Chest Pain     x1day        Past medical history:    has a past medical history of Anemia, Arthritis, Back pain, chronic, Bipolar 1 disorder ([address] CAD (coronary artery disease), Cerebral artery occlusion with cerebral infarction ([address] CKD (chronic kidney disease), COPD (chronic obstructive pulmonary disease) ([address] Diabetes mellitus, type 2 ([address] Diastolic CHF ([address] Gastric ulcer, H/O cardiovascular stress test, Heart murmur, Hiatal hernia, History of cardiac cath, Hx of migraines, HX OTHER MEDICAL, Hyperlipidemia, Hypertension, Panic attack, Pleural effusion, S/P thoracentesis, Sleep apnea, SOBOE (shortness of breath on exertion), and Spinal stenosis. Past surgical history:   has a past surgical history that includes Neck surgery (1998); Carpal tunnel release (Bilateral, 1989); knee surgery (Bilateral); thoracentesis (Left, 12/20/2013); Elbow surgery (Left, 2000's); Thoracentesis (4/25/2016); Tonsillectomy; Thoracentesis (Left, 11/15/2016); thoracotomy (Left, 03/18/2019); and thoracotomy (Left, 3/18/2019). Social History:   reports that he quit smoking about 8 years ago. He has a 45.00 pack-year smoking history. He has never used smokeless tobacco. He reports that he drank alcohol. He reports that he has current or past drug history. Frequency: 7.00 times per week. Family history:  family history includes Heart Disease in his mother; High Blood Pressure in his father. No Known Allergies    Review of Systems:   All 14 systems were reviewed and are negative  Except for the positive findings  which as documented     BP (!) 120/91   Pulse 65   Temp 98.1 °F (36.7 °C) (Oral)   Resp 18   Ht 5' 9\" (1.753 m)   Wt 223 lb 3.2 oz (101.2 kg)   SpO2 95%   BMI 32.96 kg/m²       Intake/Output Summary (Last 24 hours) at 4/23/2019 1624  Last data filed at 4/23/2019 1544  Gross per 24 hour   Intake 660 ml   Output 700 ml   Net -40 ml       Physical Exam:  Constitutional:  Well developed, Well nourished, No acute distress, Non-toxic appearance. HENT:  Normocephalic, Atraumatic, Bilateral external ears normal, Oropharynx moist, No oral exudates, Nose normal. Neck- Normal range of motion, No tenderness, Supple, No stridor. Eyes:  PERRL, EOMI, Conjunctiva normal, No discharge. Respiratory:  diminished breath sounds, No respiratory distress, No wheezing, No chest tenderness.  L CT in place   Cardiovascular:  Normal heart rate, Normal rhythm, No murmurs appreciated, No rubs appreciated, No gallops appreciated, JVP not elevated  Abdomen/GI:  Bowel sounds normal, Soft, No tenderness, No masses, No pulsatile masses. Musculoskeletal:  Intact distal pulses, No edema, No tenderness, No cyanosis, No clubbing. Good range of motion in all major joints. No tenderness to palpation or major deformities noted. Back- No tenderness. Integument:  Warm, Dry, No erythema, No rash. Lymphatic:  No lymphadenopathy noted. Neurologic:  Alert & oriented x 3, Normal motor function, Normal sensory function, No focal deficits noted.    Psychiatric:  Affect  and  Mood :no change    Medications:    linagliptin  5 mg Oral Daily    insulin lispro  0-12 Units Subcutaneous TID WC    insulin lispro  0-6 Units Subcutaneous Nightly    amLODIPine  10 mg Oral Nightly    aspirin  81 mg Oral Daily    atorvastatin  80 mg Oral Nightly    carvedilol  3.125 mg Oral BID    docusate sodium  100 mg Oral BID    escitalopram  20 mg Oral Daily    fluticasone  2 spray Nasal Daily    folic acid  1 mg Oral Daily    furosemide  40 mg Oral Daily    pantoprazole  40 mg Oral BID AC    spironolactone  25 mg Oral Daily    ipratropium-albuterol  3 mL Inhalation Q4H WA    sodium chloride flush  10 mL Intravenous 2 times per day    enoxaparin  40 mg Subcutaneous Daily    ALPRAZolam  0.5 mg Oral BID    sertraline  50 mg Oral Nightly    vancomycin  1,500 mg Intravenous Q24H    guaiFENesin  600 mg Oral BID    meropenem  1 g Intravenous Q8H    sodium chloride flush  10 mL Intravenous BID      dextrose       oxyCODONE-acetaminophen **OR** oxyCODONE-acetaminophen, glucose, dextrose, glucagon (rDNA), dextrose, albuterol sulfate HFA, nitroGLYCERIN, sodium chloride flush, magnesium hydroxide, ondansetron, potassium chloride **OR** potassium alternative oral replacement **OR** potassium chloride    Lab Data:  CBC:   Recent Labs     04/21/19  2042 04/22/19  0700 04/23/19  0628   WBC 7.8 6.3 7.2 HGB 10.0* 8.7* 9.5*   HCT 34.3* 30.4* 33.0*   MCV 89.1 89.7 89.7    238 244     BMP:   Recent Labs     04/21/19 2042 04/22/19 0700 04/23/19  0628   * 134* 137   K 3.8 3.2* 3.5   CL 97* 99 101   CO2 30 26 25   PHOS  --   --  3.0   BUN 12 11 9   CREATININE 1.4* 1.3 1.5*     PT/INR: No results for input(s): PROTIME, INR in the last 72 hours. BNP:    Recent Labs     04/23/19  0628   PROBNP 1,164*     TROPONIN:   Recent Labs     04/21/19 2042 04/22/19 0700 04/22/19  1434   TROPONINT 0.030* 0.028* 0.026*        ECHO :   Echocardiogram 3/11/2019   Summary   Technically difficult examination due to body habitus.   Left ventricular function is normal, EF is estimated at 50%.  Mild concentric left ventricular hypertrophy.   Grade II diastolic dysfunction.   Right ventricular systolic pressure of 32 mm Hg.   Mild mitral regurgitation is present.   Mild tricuspid regurgitation is present.   No evidence of any pericardial effusion. All labs, medications and tests reviewed by myself , continue all other medications of all above medical condition listed as is except for changes mentioned above. Thank you very much for consult , please call with questions. Electronically signed by JULEE Clancy CNP on 4/23/2019 at 4:24 PM        I have seen ,spoken to  and examined this patient personally, independently of the nurse practitioner. I have reviewed the hospital care given to date and reviewed all pertinent labs and imaging. The plan was developed mutually at the time of the visit with the patient, Clinical Nurse Practitioner  and myself. I have spoken with patient, nursing staff and provided written and verbal instructions . The above note has been reviewed and I agree with the assessment, diagnosis, and treatment plan with changes made by me as follows     CARDIOLOGY ATTENDING ADDENDUM    HPI:  I have reviewed the above HPI  And agree with above   Georgie Rosales is a 71 y. o.year old who and presents with had concerns including Chest Pain (x1day). Chief Complaint   Patient presents with    Chest Pain     x1day     Interval history:  Cardiac testing is normal    Physical Exam:  General:  Awake, alert, NAD  Head:normal  Eye:normal  Neck:  No JVD   Chest:  Clear to auscultation, respiration easy  Cardiovascular:  RRR S1S2  Abdomen:   nontender  Extremities:  no edema  Pulses; palpable  Neuro: grossly normal      MEDICAL DECISION MAKING;    I agree with the above plan, which was planned by myself and discussed with NP.       Nellie Beyer MD [facility]

## 2019-04-23 NOTE — PROGRESS NOTES
Hospitalist Progress Note         Admit Date: 4/21/2019    PCP: Crow Perdomo MD     Chief Complaint   Patient presents with    Chest Pain     x1day        Assessment and Plan:      Hospital-acquired bacterial pneumonia/Pleural effusion, left/Chest pain:    I really doubt patient has ongoing infection with low pro-calcitonin. Wait for final cultures of the pleural fluid. Pulmonology and CT surgery on board.  Pyopneumothorax status post VAC decortication : CT surgery on board.  Essential hypertension/troponin elevation: No ACS.   BP stable        VTE prophylaxis LMWH  Pain control Percocet    Current Facility-Administered Medications   Medication Dose Route Frequency Provider Last Rate Last Dose    oxyCODONE-acetaminophen (PERCOCET) 5-325 MG per tablet 1 tablet  1 tablet Oral Q4H PRN Jake Escalera MD        Or    oxyCODONE-acetaminophen (PERCOCET) 5-325 MG per tablet 2 tablet  2 tablet Oral Q4H PRN Jake Escalera MD   2 tablet at 04/23/19 1303    linagliptin (TRADJENTA) tablet 5 mg  5 mg Oral Daily Jake Escalera MD   5 mg at 04/23/19 1322    insulin lispro (HUMALOG) injection vial 0-12 Units  0-12 Units Subcutaneous TID WC Jake Escalera MD        insulin lispro (HUMALOG) injection vial 0-6 Units  0-6 Units Subcutaneous Nightly Jake Escalera MD        glucose (GLUTOSE) 40 % oral gel 15 g  15 g Oral PRN Jake Escalera MD        dextrose 50 % solution 12.5 g  12.5 g Intravenous PRN Jake Escalera MD        glucagon (rDNA) injection 1 mg  1 mg Intramuscular PRN Jake Escalera MD        dextrose 5 % solution  100 mL/hr Intravenous PRN Jake Escalera MD        albuterol sulfate  (90 Base) MCG/ACT inhaler 2 puff  2 puff Inhalation Q6H PRN Nasir Guevara MD        amLODIPine (NORVASC) tablet 10 mg  10 mg Oral Nightly Nasir Guevara MD   10 mg at 04/22/19 2028    aspirin EC tablet 81 mg  81 mg Oral Daily Nasir Guevara MD   81 mg at 04/23/19 7687    atorvastatin (LIPITOR) tablet 80 mg  80 mg Oral Nightly Fausto Muller MD   80 mg at 04/22/19 2028    carvedilol (COREG) tablet 3.125 mg  3.125 mg Oral BID Fausto Muller MD   3.125 mg at 04/23/19 5120    docusate sodium (COLACE) capsule 100 mg  100 mg Oral BID Fausto Muller MD   100 mg at 04/23/19 0922    escitalopram (LEXAPRO) tablet 20 mg  20 mg Oral Daily Fausto Muller MD   20 mg at 04/23/19 0922    fluticasone (FLONASE) 50 MCG/ACT nasal spray 2 spray  2 spray Nasal Daily Fausto Muller MD   2 spray at 20/94/04 9464    folic acid (FOLVITE) tablet 1 mg  1 mg Oral Daily Fausto Muller MD   1 mg at 04/23/19 3956    furosemide (LASIX) tablet 40 mg  40 mg Oral Daily Fausto Muller MD   40 mg at 04/23/19 0922    nitroGLYCERIN (NITROSTAT) SL tablet 0.4 mg  0.4 mg Sublingual Q5 Min PRN Fausto Muller MD        pantoprazole (PROTONIX) tablet 40 mg  40 mg Oral BID AC Fausto Muller MD   40 mg at 04/23/19 0626    spironolactone (ALDACTONE) tablet 25 mg  25 mg Oral Daily Fausto Muller MD   25 mg at 04/23/19 0922    ipratropium-albuterol (DUONEB) nebulizer solution 3 mL  3 mL Inhalation Q4H Mary Jovel MD   Stopped at 04/23/19 0758    sodium chloride flush 0.9 % injection 10 mL  10 mL Intravenous 2 times per day Fausto Muller MD   10 mL at 04/23/19 0923    sodium chloride flush 0.9 % injection 10 mL  10 mL Intravenous PRN Fausto Muller MD        magnesium hydroxide (MILK OF MAGNESIA) 400 MG/5ML suspension 30 mL  30 mL Oral Daily PRN Fausto Muller MD        ondansetron (ZOFRAN) injection 4 mg  4 mg Intravenous Q6H PRN Fausto Muller MD        enoxaparin (LOVENOX) injection 40 mg  40 mg Subcutaneous Daily Fausto Muller MD   40 mg at 04/23/19 8079    ALPRAZolam (XANAX) tablet 0.5 mg  0.5 mg Oral BID Fausto Muller MD   0.5 mg at 04/23/19 5394    sertraline (ZOLOFT) tablet 50 mg  50 mg Oral Nightly Fausto Muller MD   50 mg at 04/22/19 2028    vancomycin (VANCOCIN) 1,500 mg in dextrose 5 % 500 mL IVPB  1,500 mg Intravenous Q24H Elvis España MD   Stopped at 04/23/19 2495    guaiFENesin [facility]) extended release tablet 600 mg  600 mg Oral BID Cas Iverson MD   600 mg at 04/23/19 4363    potassium chloride (KLOR-CON M) extended release tablet 40 mEq  40 mEq Oral PRN Cas Hassan MD        Or    potassium chloride (KLOR-CON) packet 40 mEq  40 mEq Oral PRN Cas Hassan MD        Or    potassium chloride 10 mEq/100 mL IVPB (Peripheral Line)  10 mEq Intravenous PRN Cas Hassan MD        meropenem (MERREM) 1 g in sodium chloride 0.9 % 100 mL IVPB (mini-bag)  1 g Intravenous Q8H Scot Padilla MD   Stopped at 04/23/19 1418    sodium chloride flush 0.9 % injection 10 mL  10 mL Intravenous BID Brenda Coronado MD   10 mL at 04/23/19 1123       Subjective:     Patient complains of chest pain on the side of the chest tube placement. No acute overnight events    Objective: Intake/Output Summary (Last 24 hours) at 4/23/2019 1422  Last data filed at 4/23/2019 1011  Gross per 24 hour   Intake 660 ml   Output 600 ml   Net 60 ml      Vitals:   Vitals:    04/23/19 0927   BP: (!) 163/80   Pulse: 71   Resp: 18   Temp: 97.4 °F (36.3 °C)   SpO2: 98%     Physical Exam:  General Appearance:    Alert, cooperative, no distress  Head:      Normocephalic, without obvious abnormality, atraumatic  Eyes:       Conjunctiva/corneas clear, EOM's intact  Lungs:    Clear to auscultation bilaterally, respirations unlabored, left CT tube+  Heart:                Regular rate and rhythm, S1 and S2 normal, no murmur,   rub or gallop  Abdomen:     Soft, non-tender, bowel sounds active, no masses, no organomegaly  Extremities:   Extremities normal, atraumatic, no cyanosis or edema  Neurological:   Grossly Intact.     Significant Diagnostic Studies:   DATA:    CBC   Recent Labs     04/21/19  2042 04/22/19  0700 04/23/19  0628   WBC 7.8 6.3 7.2   HGB 10.0* 8.7* 9.5*   HCT 34.3* 30.4* 33.0*    238 244      BMP hydropneumothorax versus empyema with pleural thickening and adjacent atelectasis     Xr Chest Portable    Result Date: 4/21/2019  EXAMINATION: SINGLE XRAY VIEW OF THE CHEST 4/21/2019 9:05 pm COMPARISON: Chest radiograph 04/16/2019. HISTORY: ORDERING SYSTEM PROVIDED HISTORY: chest pain TECHNOLOGIST PROVIDED HISTORY: Reason for exam:->chest pain Ordering Physician Provided Reason for Exam: chest pain Acuity: Unknown Type of Exam: Unknown Initial encounter. FINDINGS: The cardiomediastinal silhouette is unchanged. A suspected loculated left hydropneumothorax is again seen. This is stable to mildly decreased from the previous exam.  The right lung is clear. No acute osseous abnormality. Stable to mildly decreased loculated left hydropneumothorax compared with 04/16/2018. Xr Chest Portable    Result Date: 3/24/2019  EXAMINATION: SINGLE XRAY VIEW OF THE CHEST 3/24/2019 7:28 pm COMPARISON: Chest radiograph 03/23/2019. HISTORY: ORDERING SYSTEM PROVIDED HISTORY: post chest tube (pleural) removal TECHNOLOGIST PROVIDED HISTORY: Reason for exam:->post chest tube (pleural) removal Ordering Physician Provided Reason for Exam: post chest tube (pleural) removal Acuity: Acute Type of Exam: Initial FINDINGS: A right upper extremity PICC terminates at the cavoatrial junction. The left chest tube is been removed. The cardiomediastinal silhouette is unchanged. No pneumothorax. Unchanged extensive left pulmonary opacities. The right lung is clear. No acute osseous abnormality. 1. Status post left chest tube removal.  No pneumothorax. 2. Unchanged extensive left pulmonary opacities.      Ct Guided Chest Tube    Result Date: 4/22/2019  PROCEDURE: CT GUIDED CHEST TUBE PLACEMENT MODERATE CONSCIOUS SEDATION <Completed Date> H and P addendum: ASA 2 Mallampati 2 HISTORY: ORDERING SYSTEM PROVIDED HISTORY: empyema TECHNOLOGIST PROVIDED HISTORY: Reason for exam:->empyema Ordering Physician Provided Reason for Exam: empyema Arteries: Pulmonary arteries are adequately opacified for evaluation. No evidence of intraluminal filling defect to suggest pulmonary embolism. Main pulmonary artery is normal in caliber. Mediastinum: No evidence of mediastinal lymphadenopathy. The heart and pericardium demonstrate no acute abnormality. There is no acute abnormality of the thoracic aorta. Lungs/pleura: There is redemonstration of a moderate-to-large thick-walled pleural fluid collection which contains a fluid level and multiple collections of gas. The previously noted chest tube has been removed. This collection measures 14.4 cm in craniocaudal dimension and 6.9 cm in transverse dimension. There is partial collapse of the left lower lobe. This could be related to secretions within the left lower lobe bronchus. There is shift of the heart and mediastinum to the left. 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 Calcified granulomas are seen within the spleen. The remainder of the upper abdomen is otherwise unremarkable. Soft Tissues/Bones: No acute bone or soft tissue abnormality. 1. No evidence for a pulmonary embolism. 2. There is a moderate thick-walled pleural fluid collection containing gas and complex fluid, concerning for a pyopneumothorax. The previously seen chest tube has been removed. This collection has increased in size since the prior examination of March 16, 2019. 3. Left lower lobe partial collapse with shift of the heart and mediastinum to the left. This could be related to secretions within the left lower lobe bronchus. Recommend short-term follow-up examination to exclude underlying neoplasm. Nm Myocardial Spect Rest Exercise Or Rx    Result Date: 4/23/2019  Radiology exam is complete. No Radiologist dictation. Please follow up with ordering provider.            [place] Hospitalist

## 2019-04-23 NOTE — DISCHARGE INSTR - COC
Continuity of Care Form    Patient Name: Londell Cabot.    :  1950  MRN:  [id number]    Admit date:  2019  Discharge date:  19    Code Status Order: Full Code   Advance Directives:   [facility] Documentation     Date/Time Healthcare Directive Type of Healthcare Directive Copy in [facility] [address] Agent's Name Healthcare Agent's Phone Number    [phone]  No, patient does not have an advance directive for healthcare treatment -- -- -- -- --          Admitting Physician:  Fausto Muller MD  PCP: Edilberto Waller MD    Discharging Nurse: [facility] Unit/Room#: 1113/1113-A  Discharging Unit Phone Number: [phone]    Emergency Contact:   Extended Emergency Contact Information  Primary Emergency Contact: Jennifer Sams  Home Phone: [phone]  Relation: Other  Secondary Emergency Contact: cesar coker  Home Phone: [phone]  Mobile Phone: [phone]  Relation: Child    Past Surgical History:  Past Surgical History:   Procedure Laterality Date    CARPAL TUNNEL RELEASE Bilateral 1989    ELBOW SURGERY Left 's    KNEE SURGERY Bilateral     right knee x 2( scope)/ left knee- 7-8 yr ago   [address] Left 2013    THORACENTESIS  2016         THORACENTESIS Left 11/15/2016    Dr. Willard Peeling 250mlsremoved     THORACOTOMY Left 2019    with Lysis of adhesions    THORACOTOMY Left 3/18/2019    THORACOSCOPY CONVERTED TO THORACOTOMY WITH LYSIS OF ADHESIONS performed by Ct Jaime MD at [address] [place] [address]      as a kid       Immunization History:   Immunization History   Administered Date(s) Administered    Influenza Vaccine, unspecified formulation 10/14/2016    Influenza Virus Vaccine 10/03/2013, 10/13/2014, 2016    Influenza, High Dose (Fluzone 65 yrs and older) 2018    Pneumococcal 13-valent Conjugate ([id number]) 10/17/2016    Pneumococcal Polysaccharide ([id number]) 10/03/2013    Tdap (Boostrix, Adacel) 06/10/2013       Active Problems:  Patient Active Problem List   Diagnosis Code    COPD (chronic obstructive pulmonary disease) ([facility] [address] J44.9    Pleural effusion, left J90    SAMANTHA on CPAP G47.33, Z99.89    Abnormal stress test R94.39    Chest pain R07.9    Chronic obstructive pulmonary disease (HCC) J44.9    TIA (transient ischemic attack) G45.9    Hypertension I10    Bipolar 1 disorder ([facility]) F31.9    CAD (coronary artery disease) I25.10    Coronary artery disease involving native coronary artery of native heart without angina pectoris I25.10    Essential hypertension I10    REYES (dyspnea on exertion) R06.09    Anemia, chronic disease D63.8    Diuretic-induced hypokalemia E87.6, E75.8E6B    Diastolic CHF ([facility]) M29.91    Adrenal mass ([facility]) E27.9    Acute respiratory failure ([facility]) J96.00    Pneumonia due to gram-negative bacteria ([facility]) J15.6    Hyperkalemia E87.5    Adrenal mass ([facility]) E27.9    Diabetes mellitus, type 2 ([facility]) E11.9    Hyponatremia E87.1    Pneumonia due to organism J18.9    PAF (paroxysmal atrial fibrillation) ([facility]) I48.0    Empyema of left pleural space ([facility]) J86.9    Hospital-acquired bacterial pneumonia J15.9       Isolation/Infection:   Isolation          No Isolation            Nurse Assessment:  Last Vital Signs: BP (!) 163/80   Pulse 71   Temp 97.4 °F (36.3 °C) (Oral)   Resp 18   Ht 5' 9\" (1.753 m)   Wt 221 lb (100.2 kg)   SpO2 98%   BMI 32.64 kg/m²     Last documented pain score (0-10 scale): Pain Level: 8  Last Weight:   Wt Readings from Last 1 Encounters:   04/22/19 221 lb (100.2 kg)     Mental Status:  oriented, alert, coherent, logical, thought processes intact and able to concentrate and follow conversation    IV Access:  - None    Nursing Mobility/ADLs:  Walking   Assisted  Transfer  Assisted  Bathing  Assisted  Dressing  Assisted  Toileting  Assisted  Feeding  Assisted  Med Admin  Assisted  Med Delivery whole    Wound Care Documentation and Therapy:  Wound 01/26/19 Arm Right;Mid Skin Tear Cluster (Active)   Number of days: 87        Elimination:  Continence:   · Bowel: yes  · Bladder: Yes  Urinary Catheter: None   Colostomy/Ileostomy/Ileal Conduit: No       Date of Last BM: ***    Intake/Output Summary (Last 24 hours) at 4/23/2019 1149  Last data filed at 4/23/2019 7684  Gross per 24 hour   Intake 420 ml   Output 605 ml   Net -185 ml     I/O last 3 completed shifts: In: 5 [P.O.:420]  Out: 605 [Urine:550; Chest Tube:55]    Safety Concerns: At Risk for Falls    Impairments/Disabilities:      Vision      Patient's personal belongings (please select all that are sent with patient):  Glasses    RN SIGNATURE:  Electronically signed by Topher Haque RN on 4/25/19 at 4:03 PM    CASE MANAGEMENT/SOCIAL WORK SECTION    Inpatient Status Date: ***    Readmission Risk Assessment Score:  Readmission Risk              Risk of Unplanned Readmission:        36           Discharging to Facility/ Agency   · Name:   · Address:  · Phone:  · Fax:    Dialysis Facility (if applicable)   · Name:  · Address:  · Dialysis Schedule:  · Phone:  · Fax:    / signature: {Esignature:[id number]}    PHYSICIAN SECTION    Prognosis: Good    Condition at Discharge: Stable    Rehab Potential (if transferring to Rehab): Good      Physician Certification: I certify the above information and transfer of [place] Nataly.  is necessary for the continuing treatment of the diagnosis listed and that he requires [facility] for less 30 days.      Update Admission H&P: No change in H&P  Nutrition Therapy:  Current Nutrition Therapy:   - Oral Diet:  Carb Control 5 carbs/meal (2000kcals/day)    Routes of Feeding: Oral  Liquids: No Restrictions  Daily Fluid Restriction: no  Last Modified Barium Swallow with Video (Video Swallowing Test): not done    Treatments at the Time of Hospital Discharge:   Respiratory Treatments: None  Oxygen Therapy:  is not on home oxygen therapy. Ventilator:    - No ventilator support    Rehab Therapies: Physical Therapy and Occupational Therapy  Weight Bearing Status/Restrictions: No weight bearing restirctions    Continue chest tube to gravity bag. Flush the chest tube daily with 10 cc normal saline in sterile fashion. Please keep track of the chest tube output and send record with patient to follow up appointments with Dr. Ab Shea.      PHYSICIAN SIGNATURE:  Electronically signed by Neil Shankar MD on 4/25/19 at 12:13 PM

## 2019-04-23 NOTE — PROGRESS NOTES
pectoris    Essential hypertension    REYES (dyspnea on exertion)    Anemia, chronic disease    Diuretic-induced hypokalemia    Diastolic CHF (HCC)    Adrenal mass (HCC)    Acute respiratory failure (HCC)    Pneumonia due to gram-negative bacteria (HCC)    Hyperkalemia    Adrenal mass (HCC)    Diabetes mellitus, type 2 (HCC)    Hyponatremia    Pneumonia due to organism    PAF (paroxysmal atrial fibrillation) (HCC)    Empyema of left pleural space (HCC)    Hospital-acquired bacterial pneumonia       Plan:   1. Overall the patient is better. 2. Check MRSA nasal swab. 3. Inc. Activity.   Kyara Merrill MD  4/23/2019  10:13 AM

## 2019-04-23 NOTE — CARE COORDINATION
[address] Transitions Interview     2019    Patient: [facility]. Patient : 1950   MRN: [id number]  Reason for Admission:   RARS: Readmission Risk Score: 39         Spoke with:   patient      Readmission Risk  Patient Active Problem List   Diagnosis    COPD (chronic obstructive pulmonary disease) ([facility] [address]    Pleural effusion, left    SAMANTHA on CPAP    Abnormal stress test    Chest pain    Chronic obstructive pulmonary disease ([facility] [address]    TIA (transient ischemic attack)    Hypertension    Bipolar 1 disorder ([facility] [address]    CAD (coronary artery disease)    Coronary artery disease involving native coronary artery of native heart without angina pectoris    Essential hypertension    REYES (dyspnea on exertion)    Anemia, chronic disease    Diuretic-induced hypokalemia    Diastolic CHF ([facility] [address]    Adrenal mass (HCC)    Acute respiratory failure (HCC)    Pneumonia due to gram-negative bacteria (HCC)    Hyperkalemia    Adrenal mass ([facility] [address]    Diabetes mellitus, type 2 (HCC)    Hyponatremia    Pneumonia due to organism    PAF (paroxysmal atrial fibrillation) ([facility] [address]    Empyema of left pleural space (HCC)    Hospital-acquired bacterial pneumonia       Inpatient Assessment  Care Transitions Summary    Care Transitions Inpatient Review  Medication Review  Are you able to afford your medications?:  Yes  How often do you have difficulty taking your medications?:  I always take them as prescribed.   Housing Review  Who do you live with?:  Alone  Social Support  Do you have a ?:  No  Durable Medical Equipment  Functional Review  Ability to seek help/take action for Emergent/Urgent situations i.e. fire, crime, inclement weather or health crisis.:  Needs Assistance  Ability handle personal hygiene needs (bathing/dressing/grooming):  Needs Assistance  Ability to manage medications:  Dependent  Ability to prepare food:  Dependent  Ability to maintain home (clean home, laundry): Dependent  Ability to drive and/or has transportation:  Dependent  Ability to do shopping:  Dependent  Ability to manage finances:  Dependent  Is patient able to live independently?:  Yes  Hearing and Vision  Visual Impairment:  Reading glasses  Hearing Impairment:  None  Care Transitions Interventions         Follow Up:  Spoke with patient. Oriented to the role of Care Transition/ BPCI with letter given. Patient reports that he came into the facility with Chest pain. Patient reports that he was in [facility] for rehab prior to admit. Reports that he has a son who helps him when he can; but his time is limited due to work. Patient feels that he would benefit from continued therapy at SNF. Confirms plan as per CM note. Plans to return to [facility] at discharge. Denies any questions or concerns. Agreeable to continued Care Transition at discharge if criteria is met. Confirmed CTC contact information. Encouraged call back if needs arise. Follow up with patient. Patient reports that he is doing well. Confirms plan as above. Denies any questions or concerns. Agreeable to continued Care Transition. Confirmed CTC contact information. Encouraged call back if needs arise. Collaborated with CM in r/t above.    Future Appointments   Date Time Provider Makayla Horn   4/29/2019 12:30 PM [address], MD [facility]   5/13/2019  1:00 PM Mary Rincon MD [facility]  [facility]       Health Maintenance  Health Maintenance Due   Topic Date Due    Diabetic microalbuminuria test  01/01/1968    Lipid screen  01/04/2018       Radha Garcia RN

## 2019-04-23 NOTE — PROGRESS NOTES
[address] [facility]  consulted by Dr. Shayna Garcia for monitoring and adjustment. Indication for treatment: PNA  Goal trough: 15-20 mcg/mL     Pertinent Laboratory Values:   Temp Readings from Last 3 Encounters:   04/23/19 97.4 °F (36.3 °C) (Oral)   03/25/19 98.8 °F (37.1 °C) (Oral)   03/18/19 97 °F (36.1 °C)     Recent Labs     04/21/19 2042 04/22/19  0700 04/23/19  0628   WBC 7.8 6.3 7.2     Recent Labs     04/21/19 2042 04/22/19  0700 04/23/19  0628   BUN 12 11 9   CREATININE 1.4* 1.3 1.5*     Estimated Creatinine Clearance: 54 mL/min (A) (based on SCr of 1.5 mg/dL (H)). Intake/Output Summary (Last 24 hours) at 4/23/2019 1406  Last data filed at 4/23/2019 1011  Gross per 24 hour   Intake 660 ml   Output 600 ml   Net 60 ml       Pertinent Cultures:  Date    Source    Results  4/22   Surgical (Pleural fluid)  Pending  4/23   MRSA nasal   Pending    Vancomycin level:   TROUGH:  No results for input(s): VANCOTROUGH in the last 72 hours. RANDOM:  No results for input(s): VANCORANDOM in the last 72 hours. Assessment:  WBC and temperature: WNL. Afebrile. SCr, BUN, and urine output: Scr with slight increase. Day(s) of therapy: #2  Vancomycin level: To be collected    Plan:  Renal labs noted with slight increase. Scr = 1.5 and CrCl estimate = 54 ml/min. Current dosing is appropriate to continue at this time: vancomycin 1500 mg IVPB every 24 hours. Plan to check a trough level: 04-25-19 @ 0430. WBC WNL and patient is afebrile. MRSA nasal screen is pending at this time. Pharmacy will continue to monitor patient and adjust therapy as indicated.     Thank you for the consult,    Nessa Jones, [place], [facility]

## 2019-04-23 NOTE — PROGRESS NOTES
PATIENT NAME: Pretty Riley. TODAY'S DATE: 04/23/19    SUBJECTIVE:    Pt with minimal complaints. OBJECTIVE:   VITALS:    Vitals:    04/23/19 1542   BP: (!) 120/91   Pulse: 65   Resp: 18   Temp: 98.1 °F (36.7 °C)   SpO2: 95%     INTAKE/OUTPUT:    Date 04/23/19 0000 - 04/23/19 2359   Shift 8699-2841 2646-0120 3820-3689 24 Hour Total   INTAKE   P.O. 420 240  660   Shift Total(mL/kg) 420(4.2) 240(2.4)  660(6.5)   OUTPUT   Urine(mL/kg/hr) 550(0.7) 100  650   Chest Tube 35   35   Shift Total(mL/kg) 585(5.8) 100(1)  685(6.8)   Weight (kg) 100.2 101.2 101.2 101.2      Patient Vitals for the past 96 hrs (Last 3 readings):   Weight   04/23/19 0927 223 lb 3.2 oz (101.2 kg)   04/22/19 0504 221 lb (100.2 kg)   04/22/19 0130 210 lb (95.3 kg)       EXAM:  Blood pressure (!) 120/91, pulse 65, temperature 98.1 °F (36.7 °C), temperature source Oral, resp. rate 18, height 5' 9\" (1.753 m), weight 223 lb 3.2 oz (101.2 kg), SpO2 95 %. General appearance: No apparent distress, appears stated age and cooperative. Skin: unremarkable  HEENT Normocephalic, atraumatic without obvious deformity. Neck: Supple, Trachea midline   Lungs: Good respiratory effort. Diminished L side. CT with bloody output. No air leak noted.    Heart: Regular rate/ rhythm   Abdomen: Soft, non-tender or non-distended   Extremities: min edema warm well perfused  Neurologic: Alert, grossly intact  Mental status: normal affect      Data:  CBC:   Recent Labs     04/21/19 2042 04/22/19  0700 04/23/19  0628   WBC 7.8 6.3 7.2   HGB 10.0* 8.7* 9.5*   HCT 34.3* 30.4* 33.0*    238 244     BMP:    Recent Labs     04/21/19 2042 04/22/19  0700 04/23/19  0628   * 134* 137   K 3.8 3.2* 3.5   CL 97* 99 101   CO2 30 26 25   BUN 12 11 9   CREATININE 1.4* 1.3 1.5*   GLUCOSE 129* 130* 155*     Hepatic:   Recent Labs     04/21/19 2042 04/23/19  0628   AST 16 8*   ALT 6* <5*   BILITOT 0.3 0.3   ALKPHOS 106 90     Mag:      Recent Labs     04/22/19  0700

## 2019-04-23 NOTE — PROGRESS NOTES
04/23/19 0445   NIV Type   $NIV $Daily Charge   NIV Started Yes   Equipment Type ResMed   Mode CPAP   Mask Type Nasal pillows   Mask Size Medium   Bonnet size Medium   Settings/Measurements   Comfort Level Good   Using Accessory Muscles No   CPAP 5 cmH2O   Resp 16   SpO2 93   FiO2  21 %

## 2019-04-24 LAB
CREAT SERPL-MCNC: 1.5 MG/DL (ref 0.9–1.3)
CULTURE: NORMAL
GFR AFRICAN AMERICAN: 56 ML/MIN/1.73M2
GFR NON-AFRICAN AMERICAN: 46 ML/MIN/1.73M2
GLUCOSE BLD-MCNC: 100 MG/DL (ref 70–99)
GLUCOSE BLD-MCNC: 122 MG/DL (ref 70–99)
GLUCOSE BLD-MCNC: 135 MG/DL (ref 70–99)
GLUCOSE BLD-MCNC: 93 MG/DL (ref 70–99)
Lab: NORMAL
SPECIMEN: NORMAL

## 2019-04-24 PROCEDURE — 94761 N-INVAS EAR/PLS OXIMETRY MLT: CPT

## 2019-04-24 PROCEDURE — 82565 ASSAY OF CREATININE: CPT

## 2019-04-24 PROCEDURE — 6360000002 HC RX W HCPCS: Performed by: INTERNAL MEDICINE

## 2019-04-24 PROCEDURE — 2580000003 HC RX 258: Performed by: EMERGENCY MEDICINE

## 2019-04-24 PROCEDURE — 82962 GLUCOSE BLOOD TEST: CPT

## 2019-04-24 PROCEDURE — 2580000003 HC RX 258: Performed by: HOSPITALIST

## 2019-04-24 PROCEDURE — 2580000003 HC RX 258: Performed by: INTERNAL MEDICINE

## 2019-04-24 PROCEDURE — 6370000000 HC RX 637 (ALT 250 FOR IP): Performed by: INTERNAL MEDICINE

## 2019-04-24 PROCEDURE — 1200000000 HC SEMI PRIVATE

## 2019-04-24 PROCEDURE — 6370000000 HC RX 637 (ALT 250 FOR IP): Performed by: HOSPITALIST

## 2019-04-24 PROCEDURE — 36415 COLL VENOUS BLD VENIPUNCTURE: CPT

## 2019-04-24 PROCEDURE — 6360000002 HC RX W HCPCS: Performed by: HOSPITALIST

## 2019-04-24 PROCEDURE — 94640 AIRWAY INHALATION TREATMENT: CPT

## 2019-04-24 RX ADMIN — PANTOPRAZOLE SODIUM 40 MG: 40 TABLET, DELAYED RELEASE ORAL at 17:52

## 2019-04-24 RX ADMIN — SPIRONOLACTONE 25 MG: 25 TABLET ORAL at 08:01

## 2019-04-24 RX ADMIN — PANTOPRAZOLE SODIUM 40 MG: 40 TABLET, DELAYED RELEASE ORAL at 07:16

## 2019-04-24 RX ADMIN — OXYCODONE HYDROCHLORIDE AND ACETAMINOPHEN 2 TABLET: 5; 325 TABLET ORAL at 13:18

## 2019-04-24 RX ADMIN — VANCOMYCIN HYDROCHLORIDE 1500 MG: 5 INJECTION, POWDER, LYOPHILIZED, FOR SOLUTION INTRAVENOUS at 05:06

## 2019-04-24 RX ADMIN — OXYCODONE HYDROCHLORIDE AND ACETAMINOPHEN 2 TABLET: 5; 325 TABLET ORAL at 23:03

## 2019-04-24 RX ADMIN — AMLODIPINE BESYLATE 10 MG: 10 TABLET ORAL at 21:32

## 2019-04-24 RX ADMIN — MEROPENEM 1 G: 1 INJECTION, POWDER, FOR SOLUTION INTRAVENOUS at 08:03

## 2019-04-24 RX ADMIN — GUAIFENESIN 600 MG: 600 TABLET, EXTENDED RELEASE ORAL at 08:01

## 2019-04-24 RX ADMIN — CARVEDILOL 3.12 MG: 3.12 TABLET, FILM COATED ORAL at 21:32

## 2019-04-24 RX ADMIN — FOLIC ACID 1 MG: 1 TABLET ORAL at 08:02

## 2019-04-24 RX ADMIN — ATORVASTATIN CALCIUM 80 MG: 40 TABLET, FILM COATED ORAL at 21:32

## 2019-04-24 RX ADMIN — SODIUM CHLORIDE, PRESERVATIVE FREE 10 ML: 5 INJECTION INTRAVENOUS at 08:03

## 2019-04-24 RX ADMIN — LINAGLIPTIN 5 MG: 5 TABLET, FILM COATED ORAL at 08:01

## 2019-04-24 RX ADMIN — MEROPENEM 1 G: 1 INJECTION, POWDER, FOR SOLUTION INTRAVENOUS at 15:07

## 2019-04-24 RX ADMIN — SODIUM CHLORIDE, PRESERVATIVE FREE 10 ML: 5 INJECTION INTRAVENOUS at 21:33

## 2019-04-24 RX ADMIN — FLUTICASONE PROPIONATE 2 SPRAY: 50 SPRAY, METERED NASAL at 08:47

## 2019-04-24 RX ADMIN — IPRATROPIUM BROMIDE AND ALBUTEROL SULFATE 3 ML: .5; 3 SOLUTION RESPIRATORY (INHALATION) at 08:20

## 2019-04-24 RX ADMIN — OXYCODONE HYDROCHLORIDE AND ACETAMINOPHEN 2 TABLET: 5; 325 TABLET ORAL at 18:48

## 2019-04-24 RX ADMIN — GUAIFENESIN 600 MG: 600 TABLET, EXTENDED RELEASE ORAL at 21:32

## 2019-04-24 RX ADMIN — FUROSEMIDE 40 MG: 40 TABLET ORAL at 08:02

## 2019-04-24 RX ADMIN — MEROPENEM 1 G: 1 INJECTION, POWDER, FOR SOLUTION INTRAVENOUS at 21:31

## 2019-04-24 RX ADMIN — ALPRAZOLAM 0.5 MG: 0.5 TABLET ORAL at 08:01

## 2019-04-24 RX ADMIN — SERTRALINE HYDROCHLORIDE 50 MG: 50 TABLET ORAL at 21:32

## 2019-04-24 RX ADMIN — ALPRAZOLAM 0.5 MG: 0.5 TABLET ORAL at 21:32

## 2019-04-24 RX ADMIN — DOCUSATE SODIUM 100 MG: 100 CAPSULE, LIQUID FILLED ORAL at 21:32

## 2019-04-24 RX ADMIN — IPRATROPIUM BROMIDE AND ALBUTEROL SULFATE 3 ML: .5; 3 SOLUTION RESPIRATORY (INHALATION) at 21:37

## 2019-04-24 RX ADMIN — ESCITALOPRAM OXALATE 20 MG: 10 TABLET ORAL at 08:01

## 2019-04-24 RX ADMIN — OXYCODONE HYDROCHLORIDE AND ACETAMINOPHEN 2 TABLET: 5; 325 TABLET ORAL at 08:01

## 2019-04-24 RX ADMIN — CARVEDILOL 3.12 MG: 3.12 TABLET, FILM COATED ORAL at 08:02

## 2019-04-24 RX ADMIN — SODIUM CHLORIDE, PRESERVATIVE FREE 10 ML: 5 INJECTION INTRAVENOUS at 08:02

## 2019-04-24 RX ADMIN — ENOXAPARIN SODIUM 40 MG: 40 INJECTION SUBCUTANEOUS at 08:02

## 2019-04-24 RX ADMIN — ASPIRIN 81 MG: 81 TABLET, COATED ORAL at 08:01

## 2019-04-24 RX ADMIN — DOCUSATE SODIUM 100 MG: 100 CAPSULE, LIQUID FILLED ORAL at 08:02

## 2019-04-24 ASSESSMENT — PAIN SCALES - GENERAL
PAINLEVEL_OUTOF10: 7
PAINLEVEL_OUTOF10: 7
PAINLEVEL_OUTOF10: 4
PAINLEVEL_OUTOF10: 7
PAINLEVEL_OUTOF10: 7

## 2019-04-24 NOTE — PROGRESS NOTES
PATIENT NAME: Adriana Vilchis. TODAY'S DATE: 04/24/19    SUBJECTIVE:    Pt with minimal complaints. Denies CP. OBJECTIVE:   VITALS:    Vitals:    04/24/19 1130   BP: 124/75   Pulse: 67   Resp:    Temp:    SpO2:      INTAKE/OUTPUT:    Date 04/24/19 0000 - 04/24/19 2359   Shift 5628-6350 5579-2501 7607-7098 24 Hour Total   INTAKE   P.O.  740  740   Shift Total(mL/kg)  740(7.3)  740(7.3)   OUTPUT   Urine(mL/kg/hr)  925  925   Shift Total(mL/kg)  925(9.1)  925(9.1)   Weight (kg) 101.9 101.9 101.9 101.9      Patient Vitals for the past 96 hrs (Last 3 readings):   Weight   04/24/19 0600 224 lb 9.6 oz (101.9 kg)   04/23/19 0927 223 lb 3.2 oz (101.2 kg)   04/22/19 0504 221 lb (100.2 kg)       EXAM:  Blood pressure 124/75, pulse 67, temperature 98.5 °F (36.9 °C), temperature source Oral, resp. rate 16, height 5' 9\" (1.753 m), weight 224 lb 9.6 oz (101.9 kg), SpO2 98 %. General appearance: No apparent distress, appears stated age and cooperative. Skin: unremarkable  HEENT Normocephalic, atraumatic without obvious deformity. Neck: Supple, Trachea midline   Lungs: Good respiratory effort. Diminished L side. CT with bloody output. No air leak noted.    Heart: Regular rate/ rhythm   Abdomen: Soft, non-tender or non-distended   Extremities: min edema warm well perfused  Neurologic: Alert, grossly intact  Mental status: normal affect      Data:  CBC:   Recent Labs     04/21/19  2042 04/22/19  0700 04/23/19  0628   WBC 7.8 6.3 7.2   HGB 10.0* 8.7* 9.5*   HCT 34.3* 30.4* 33.0*    238 244     BMP:    Recent Labs     04/21/19  2042 04/22/19  0700 04/23/19  0628 04/24/19  0835   * 134* 137  --    K 3.8 3.2* 3.5  --    CL 97* 99 101  --    CO2 30 26 25  --    BUN 12 11 9  --    CREATININE 1.4* 1.3 1.5* 1.5*   GLUCOSE 129* 130* 155*  --      Hepatic:   Recent Labs     04/21/19 2042 04/23/19  0628   AST 16 8*   ALT 6* <5*   BILITOT 0.3 0.3   ALKPHOS 106 90     Mag:      Recent Labs     04/22/19  0700 04/23/19  0628   MG 1.9 2.2      Phos:     Recent Labs     04/23/19  0628   PHOS 3.0      INR: No results for input(s): INR in the last 72 hours. Radiology Review:  CXR   Impression:     Left thoracotomy tube placement without additional change. ASSESSMENT AND PLAN:    Patient Active Problem List   Diagnosis    COPD (chronic obstructive pulmonary disease) (HCC)    Pleural effusion, left    SAMANTHA on CPAP    Abnormal stress test    Chest pain    Chronic obstructive pulmonary disease ([address]    TIA (transient ischemic attack)    Hypertension    Bipolar 1 disorder ([address]    CAD (coronary artery disease)    Coronary artery disease involving native coronary artery of native heart without angina pectoris    Essential hypertension    REYES (dyspnea on exertion)    Anemia, chronic disease    Diuretic-induced hypokalemia    Diastolic CHF ([address]    Adrenal mass (HCC)    Acute respiratory failure ([address]    Pneumonia due to gram-negative bacteria (HCC)    Hyperkalemia    Adrenal mass ([address]    Diabetes mellitus, type 2 (HCC)    Hyponatremia    Pneumonia due to organism    [facility]ox[facility] fibrillation) ([facility])    Empyema of left pleural space ([address]    Hospital-acquired bacterial pneumonia         CXR stable, place CT to [facility]. Plan for pt to return to SNF with CT for 1-2 more weeks and we can remove it in the office. Continue IV abx      Mardella Devoid PA-C      CXR with continued trapped lung. Will plan for leaving CT as an empyema tube and withdraw slowly as an outpatient.

## 2019-04-24 NOTE — PROGRESS NOTES
Hospitalist Progress Note         Admit Date: 4/21/2019    PCP: Marcella George MD     Chief Complaint   Patient presents with    Chest Pain     x1day        Assessment and Plan:      Hospital-acquired bacterial pneumonia/Pleural effusion, left/Chest pain:    I really doubt patient has ongoing infection with low pro-calcitonin. Wait for final cultures of the pleural fluid. Pulmonology and CT surgery on board.  Pyopneumothorax status post VAC decortication : CT surgery on board.  Essential hypertension/troponin elevation: No ACS.   BP stable      VTE prophylaxis LMWH  Pain control Percocet    Current Facility-Administered Medications   Medication Dose Route Frequency Provider Last Rate Last Dose    oxyCODONE-acetaminophen (PERCOCET) 5-325 MG per tablet 1 tablet  1 tablet Oral Q4H PRN MD Maria L        Or    oxyCODONE-acetaminophen (PERCOCET) 5-325 MG per tablet 2 tablet  2 tablet Oral Q4H PRN MD Maria L   2 tablet at 04/24/19 1318    linagliptin (TRADJENTA) tablet 5 mg  5 mg Oral Daily MD Maria L   5 mg at 04/24/19 0801    insulin lispro (HUMALOG) injection vial 0-12 Units  0-12 Units Subcutaneous TID WC MD Maria L        insulin lispro (HUMALOG) injection vial 0-6 Units  0-6 Units Subcutaneous Nightly MD Maria L   1 Units at 04/23/19 2158    glucose (GLUTOSE) 40 % oral gel 15 g  15 g Oral PRN MD Maria L        dextrose 50 % solution 12.5 g  12.5 g Intravenous PRN MD Maria L        glucagon (rDNA) injection 1 mg  1 mg Intramuscular PRN MD Maria L        dextrose 5 % solution  100 mL/hr Intravenous PRN MD Maria L        albuterol sulfate  (90 Base) MCG/ACT inhaler 2 puff  2 puff Inhalation Q6H PRN Yessi Lewis MD        amLODIPine (NORVASC) tablet 10 mg  10 mg Oral Nightly Yessi Lewis MD   10 mg at 04/23/19 2148    aspirin EC tablet 81 mg  81 mg Oral Daily Yessi Lewis MD   81 mg at 04/24/19 0801    atorvastatin (LIPITOR) tablet 80 mg  80 mg Oral Nightly Melodie Plan, MD   80 mg at 04/23/19 2147    carvedilol (COREG) tablet 3.125 mg  3.125 mg Oral BID Melodie Kim MD   3.125 mg at 04/24/19 0802    docusate sodium (COLACE) capsule 100 mg  100 mg Oral BID Meldoie Kim MD   100 mg at 04/24/19 0802    escitalopram (LEXAPRO) tablet 20 mg  20 mg Oral Daily Melodie Plan, MD   20 mg at 04/24/19 0801    fluticasone (FLONASE) 50 MCG/ACT nasal spray 2 spray  2 spray Nasal Daily Melodie Plan, MD   2 spray at 70/48/41 4545    folic acid (FOLVITE) tablet 1 mg  1 mg Oral Daily Melodie MD Judy   1 mg at 04/24/19 0802    furosemide (LASIX) tablet 40 mg  40 mg Oral Daily Melodie MD Judy   40 mg at 04/24/19 0802    nitroGLYCERIN (NITROSTAT) SL tablet 0.4 mg  0.4 mg Sublingual Q5 Min PRN Melodie MD Judy        pantoprazole (PROTONIX) tablet 40 mg  40 mg Oral BID AC Melodie Plan, MD   40 mg at 04/24/19 0716    spironolactone (ALDACTONE) tablet 25 mg  25 mg Oral Daily Melodie Kim MD   25 mg at 04/24/19 0801    ipratropium-albuterol (DUONEB) nebulizer solution 3 mL  3 mL Inhalation Q4H WA Melodie Kim MD   3 mL at 04/24/19 0820    sodium chloride flush 0.9 % injection 10 mL  10 mL Intravenous 2 times per day Melodie MD Judy   10 mL at 04/24/19 0802    sodium chloride flush 0.9 % injection 10 mL  10 mL Intravenous PRN Melodie Plan, MD        magnesium hydroxide (MILK OF MAGNESIA) 400 MG/5ML suspension 30 mL  30 mL Oral Daily PRN Melodie MD Judy        ondansetron (ZOFRAN) injection 4 mg  4 mg Intravenous Q6H PRN Melodie MD Judy        enoxaparin (LOVENOX) injection 40 mg  40 mg Subcutaneous Daily Melodie MD Judy   40 mg at 04/24/19 0802    ALPRAZolam (XANAX) tablet 0.5 mg  0.5 mg Oral BID Melodie MD Judy   0.5 mg at 04/24/19 0801    sertraline (ZOLOFT) tablet 50 mg  50 mg Oral Nightly Melodie Kim MD   50 mg at 04/23/19 5789    vancomycin (VANCOCIN) 1,500 mg in dextrose 5 % 500 mL IVPB 04/21/19 2042 04/22/19  0700 04/23/19  0628 04/24/19  0835   * 134* 137  --    K 3.8 3.2* 3.5  --    CL 97* 99 101  --    CO2 30 26 25  --    PHOS  --   --  3.0  --    BUN 12 11 9  --    CREATININE 1.4* 1.3 1.5* 1.5*     LFT'S   Recent Labs     04/21/19 2042 04/23/19  0628   AST 16 8*   ALT 6* <5*   BILITOT 0.3 0.3   ALKPHOS 106 90     COAG No results for input(s): INR in the last 72 hours. POC:   Lab Results   Component Value Date    POCGLU 100 04/24/2019    POCGLU 122 04/24/2019    POCGLU 167 04/23/2019    POCGLU 110 04/23/2019     [id number]:  Lab Results   Component Value Date    LABA1C 5.0 03/28/2019     CARDIAC ENZYMES  No results for input(s): CKTOTAL, CKMB, CKMBINDEX, TROPONINI in the last 72 hours. Troponin:   Recent Labs     04/21/19 2042 04/22/19  0700 04/22/19  1434   TROPONINT 0.030* 0.028* 0.026*     BNP:   Recent Labs     04/23/19 0628   PROBNP 1,164*     U/A:    Lab Results   Component Value Date    COLORU YELLOW 03/16/2019    WBCUA NONE SEEN 03/16/2019    RBCUA 4 03/16/2019    MUCUS RARE 03/16/2019    BACTERIA NEGATIVE 03/16/2019    CLARITYU SLIGHTLY CLOUDY 03/16/2019    SPECGRAV 1.020 03/16/2019    LEUKOCYTESUR TRACE 03/16/2019    BLOODU NEGATIVE 03/16/2019    AMORPHOUS OCCASIONAL 03/16/2019       Xr Chest Standard (2 Vw)    Result Date: 4/16/2019  EXAMINATION: TWO VIEWS OF THE CHEST 4/16/2019 10:59 am COMPARISON: 03/24/2019 HISTORY: ORDERING SYSTEM PROVIDED HISTORY: Recurrent left pleural effusion TECHNOLOGIST PROVIDED HISTORY: Ordering Physician Provided Reason for Exam: s/p thoracotomy 3.18.19, pt reports hemoptysis since his surgery FINDINGS: Right-sided PICC line has been removed. Heart size stable. There is a large air-fluid collection laterally within the left pleural space. There is airspace disease in the left mid and lower lung likely representing atelectasis and there is pleural thickening on the left. Right lung is clear except for minimal atelectasis in the base. Large loculated left hydropneumothorax versus empyema with pleural thickening and adjacent atelectasis     Xr Chest Portable    Result Date: 4/21/2019  EXAMINATION: SINGLE XRAY VIEW OF THE CHEST 4/21/2019 9:05 pm COMPARISON: Chest radiograph 04/16/2019. HISTORY: ORDERING SYSTEM PROVIDED HISTORY: chest pain TECHNOLOGIST PROVIDED HISTORY: Reason for exam:->chest pain Ordering Physician Provided Reason for Exam: chest pain Acuity: Unknown Type of Exam: Unknown Initial encounter. FINDINGS: The cardiomediastinal silhouette is unchanged. A suspected loculated left hydropneumothorax is again seen. This is stable to mildly decreased from the previous exam.  The right lung is clear. No acute osseous abnormality. Stable to mildly decreased loculated left hydropneumothorax compared with 04/16/2018. Xr Chest Portable    Result Date: 3/24/2019  EXAMINATION: SINGLE XRAY VIEW OF THE CHEST 3/24/2019 7:28 pm COMPARISON: Chest radiograph 03/23/2019. HISTORY: ORDERING SYSTEM PROVIDED HISTORY: post chest tube (pleural) removal TECHNOLOGIST PROVIDED HISTORY: Reason for exam:->post chest tube (pleural) removal Ordering Physician Provided Reason for Exam: post chest tube (pleural) removal Acuity: Acute Type of Exam: Initial FINDINGS: A right upper extremity PICC terminates at the cavoatrial junction. The left chest tube is been removed. The cardiomediastinal silhouette is unchanged. No pneumothorax. Unchanged extensive left pulmonary opacities. The right lung is clear. No acute osseous abnormality. 1. Status post left chest tube removal.  No pneumothorax. 2. Unchanged extensive left pulmonary opacities.      Ct Guided Chest Tube    Result Date: 4/22/2019  PROCEDURE: CT GUIDED CHEST TUBE PLACEMENT MODERATE CONSCIOUS SEDATION <Completed Date> H and P addendum: ASA 2 Mallampati 2 HISTORY: ORDERING SYSTEM PROVIDED HISTORY: empyema TECHNOLOGIST PROVIDED HISTORY: Reason for exam:->empyema Ordering Physician Provided Reason for Exam: empyema Acuity: Acute Type of Exam: Initial Relevant Medical/Surgical History: empyema, pleural effusion, CHF Pneumothorax SEDATION: Versed 2 mg fentanyl 100 mcg TECHNIQUE: Informed consent was obtained after complete explanation of the procedure including the inherent risks. Patient understands and has given consent. Patient placed prone on the CT table. Scans performed again demonstrating a left posterior pleural based fluid collection with multiple small gas bubbles suggesting loculations. The skin overlying this prepped and draped using maximal sterile barrier technique. Skinny needle access site anesthetized with 2% lidocaine and a 5 Congolese needle catheter advanced into the pleural space. Once fluid aspirated the catheter stripped ahead of the guiding needle needle removed. The fluid aspirated is bloody in nature. Glidewire advanced through this access 5 Congolese catheter and catheter removed. 10 Congolese drainage tube was then placed over this wire. Aspiration performed. A large amount of gas is obtained aspirating through this access catheter. Catheter is connected to a pleura vac with wall suction. Views of the area concern of a bronchopleural fistula is raised. Clinical correlation needed. FINDINGS: Left-sided chest tube placement using CT guidance as described above     Successful CT guided placement of a  chest tube     Cta Pulmonary W Contrast    Result Date: 4/21/2019  EXAMINATION: CTA OF THE CHEST 4/21/2019 9:35 pm TECHNIQUE: CTA of the chest was performed after the administration of intravenous contrast.  Multiplanar reformatted images are provided for review. MIP images are provided for review. Dose modulation, iterative reconstruction, and/or weight based adjustment of the mA/kV was utilized to reduce the radiation dose to as low as reasonably achievable. COMPARISON: CT of the chest dated March 16, 2019.  HISTORY: ORDERING SYSTEM PROVIDED HISTORY: Chest pain, shortness of

## 2019-04-24 NOTE — PROGRESS NOTES
Pulmonary and Critical Care  Progress Note    Subjective: The patient chest tube drainage 40cc/24 hrs. Shortness of breath none. Chest pain at chest at chest tube site. Addressing respiratory complaints Patient is negative for  hemoptysis and cyanosis. CONSTITUTIONAL:  negative for fevers and chills. Past Medical History:     has a past medical history of Anemia, Arthritis, Back pain, chronic, Bipolar 1 disorder ([facility] [address] CAD (coronary artery disease), Cerebral artery occlusion with cerebral infarction ([facility] CKD (chronic kidney disease), COPD (chronic obstructive pulmonary disease) ([facility] [address] Diabetes mellitus, type 2 ([facility] [address] Diastolic CHF ([facility] [address] Gastric ulcer, H/O cardiovascular stress test, Heart murmur, Hiatal hernia, History of cardiac cath, Hx of migraines, HX OTHER MEDICAL, Hyperlipidemia, Hypertension, Panic attack, Pleural effusion, S/P thoracentesis, Sleep apnea, SOBOE (shortness of breath on exertion), and Spinal stenosis. has a past surgical history that includes Neck surgery (1998); Carpal tunnel release (Bilateral, 1989); knee surgery (Bilateral); thoracentesis (Left, 12/20/2013); Elbow surgery (Left, 2000's); Thoracentesis (4/25/2016); Tonsillectomy; Thoracentesis (Left, 11/15/2016); thoracotomy (Left, 03/18/2019); and thoracotomy (Left, 3/18/2019). reports that he quit smoking about 8 years ago. He has a 45.00 pack-year smoking history. He has never used smokeless tobacco. He reports that he drank alcohol. He reports that he has current or past drug history. Frequency: 7.00 times per week. Family history:  family history includes Heart Disease in his mother; High Blood Pressure in his father.     No Known Allergies  Social History:    Reviewed; no changes    Objective:   PHYSICAL EXAM:        VITALS:  BP (!) 141/89   Pulse 119   Temp 98.5 °F (36.9 °C) (Oral)   Resp 16   Ht 5' 9\" (1.753 m)   Wt 224 lb 9.6 oz (101.9 kg)   SpO2 98%   BMI 33.17 kg/m²     24HR INTAKE/OUTPUT: Intake/Output Summary (Last 24 hours) at 4/24/2019 1004  Last data filed at 4/24/2019 0031  Gross per 24 hour   Intake 1440 ml   Output 840 ml   Net 600 ml       CONSTITUTIONAL:  awake, alert, cooperative, no apparent distress, and appears stated age  LUNGS:  decreased breath sounds. Occ basilar crackles. CARDIOVASCULAR:  normal S1 and S2 and negative JVD  ABD:Abdomen soft, non-tender. BS normal. No masses,  No organomegaly  NEURO:Alert and oriented x3. Gait normal. Reflexes and motor strength normal and symmetric. Cranial nerves 2-12 and sensation grossly intact. DATA:    CBC:  Recent Labs     04/21/19 2042 04/22/19  0700 04/23/19  0628   WBC 7.8 6.3 7.2   RBC 3.85* 3.39* 3.68*   HGB 10.0* 8.7* 9.5*   HCT 34.3* 30.4* 33.0*    238 244   MCV 89.1 89.7 89.7   MCH 26.0* 25.7* 25.8*   MCHC 29.2* 28.6* 28.8*   RDW 17.0* 17.1* 16.9*   SEGSPCT 68.0*  --  68.5*      BMP:  Recent Labs     04/21/19 2042 04/22/19  0700 04/23/19  0628 04/24/19  0835   * 134* 137  --    K 3.8 3.2* 3.5  --    CL 97* 99 101  --    CO2 30 26 25  --    BUN 12 11 9  --    CREATININE 1.4* 1.3 1.5* 1.5*   CALCIUM 8.5 8.2* 8.1*  --    GLUCOSE 129* 130* 155*  --       ABG:  No results for input(s): PH, PO2ART, DYU6VLO, HCO3, BEART, O2SAT in the last 72 hours. Lab Results   Component Value Date    PROBNP 1,164 (H) 04/23/2019    PROBNP 1,464 (H) 03/10/2019    PROBNP 983.9 (H) 03/08/2019     No results found for: [facility]    Radiology Review:  Pertinent images / reports were reviewed as a part of this visit.     Assessment:     Patient Active Problem List   Diagnosis    COPD (chronic obstructive pulmonary disease) ([address]    Pleural effusion, left    SAMANTHA on CPAP    Abnormal stress test    Chest pain    Chronic obstructive pulmonary disease ([address]    TIA (transient ischemic attack)    Hypertension    Bipolar 1 disorder ([address]    CAD (coronary artery disease)    Coronary artery disease involving native coronary artery of native heart without angina pectoris    Essential hypertension    REYES (dyspnea on exertion)    Anemia, chronic disease    Diuretic-induced hypokalemia    Diastolic CHF (HCC)    Adrenal mass (HCC)    Acute respiratory failure (HCC)    Pneumonia due to gram-negative bacteria (HCC)    Hyperkalemia    Adrenal mass (HCC)    Diabetes mellitus, type 2 (HCC)    Hyponatremia    Pneumonia due to organism    PAF (paroxysmal atrial fibrillation) (HCC)    Empyema of left pleural space (HCC)    Hospital-acquired bacterial pneumonia       Plan:   1. Overall the patient is better. 2. Chest tube management as per CT surgery. 3. Inc. Activity. 4. Repeat CXR.   Elizabeth Crouch MD  4/24/2019  10:04 AM

## 2019-04-24 NOTE — CARE COORDINATION
Pt discharge plan continues to be to return to [place].      Electronically signed by ESTER Chung on 4/24/2019 at 11:56 AM

## 2019-04-25 ENCOUNTER — APPOINTMENT (OUTPATIENT)
Dept: GENERAL RADIOLOGY | Age: 69
DRG: 178 | End: 2019-04-25
Payer: MEDICARE

## 2019-04-25 VITALS
WEIGHT: 224.6 LBS | OXYGEN SATURATION: 98 % | BODY MASS INDEX: 33.27 KG/M2 | RESPIRATION RATE: 18 BRPM | TEMPERATURE: 98.2 F | SYSTOLIC BLOOD PRESSURE: 116 MMHG | HEART RATE: 62 BPM | DIASTOLIC BLOOD PRESSURE: 59 MMHG | HEIGHT: 69 IN

## 2019-04-25 LAB
CREAT SERPL-MCNC: 1.6 MG/DL (ref 0.9–1.3)
CULTURE: NORMAL
DOSE AMOUNT: NORMAL
DOSE TIME: NORMAL
GFR AFRICAN AMERICAN: 52 ML/MIN/1.73M2
GFR NON-AFRICAN AMERICAN: 43 ML/MIN/1.73M2
GLUCOSE BLD-MCNC: 119 MG/DL (ref 70–99)
GLUCOSE BLD-MCNC: 140 MG/DL (ref 70–99)
GLUCOSE BLD-MCNC: 93 MG/DL (ref 70–99)
GRAM SMEAR: NORMAL
GRAM SMEAR: NORMAL
Lab: NORMAL
SPECIMEN: NORMAL
VANCOMYCIN TROUGH: 16 UG/ML (ref 10–20)

## 2019-04-25 PROCEDURE — 6360000002 HC RX W HCPCS: Performed by: INTERNAL MEDICINE

## 2019-04-25 PROCEDURE — 6370000000 HC RX 637 (ALT 250 FOR IP): Performed by: HOSPITALIST

## 2019-04-25 PROCEDURE — 80202 ASSAY OF VANCOMYCIN: CPT

## 2019-04-25 PROCEDURE — 32551 INSERTION OF CHEST TUBE: CPT

## 2019-04-25 PROCEDURE — 94660 CPAP INITIATION&MGMT: CPT

## 2019-04-25 PROCEDURE — 36415 COLL VENOUS BLD VENIPUNCTURE: CPT

## 2019-04-25 PROCEDURE — 6360000002 HC RX W HCPCS: Performed by: HOSPITALIST

## 2019-04-25 PROCEDURE — 2580000003 HC RX 258: Performed by: INTERNAL MEDICINE

## 2019-04-25 PROCEDURE — 82962 GLUCOSE BLOOD TEST: CPT

## 2019-04-25 PROCEDURE — 94640 AIRWAY INHALATION TREATMENT: CPT

## 2019-04-25 PROCEDURE — 2580000003 HC RX 258: Performed by: HOSPITALIST

## 2019-04-25 PROCEDURE — 6370000000 HC RX 637 (ALT 250 FOR IP): Performed by: INTERNAL MEDICINE

## 2019-04-25 PROCEDURE — 82565 ASSAY OF CREATININE: CPT

## 2019-04-25 PROCEDURE — 94761 N-INVAS EAR/PLS OXIMETRY MLT: CPT

## 2019-04-25 PROCEDURE — 71045 X-RAY EXAM CHEST 1 VIEW: CPT

## 2019-04-25 RX ORDER — HYDROCODONE BITARTRATE AND ACETAMINOPHEN 5; 325 MG/1; MG/1
1 TABLET ORAL EVERY 4 HOURS PRN
Qty: 30 TABLET | Refills: 0 | Status: SHIPPED | OUTPATIENT
Start: 2019-04-25 | End: 2019-04-30

## 2019-04-25 RX ORDER — ESCITALOPRAM OXALATE 20 MG/1
20 TABLET ORAL DAILY
Qty: 30 TABLET | Refills: 3 | Status: SHIPPED | OUTPATIENT
Start: 2019-04-25 | End: 2019-05-24

## 2019-04-25 RX ORDER — ALPRAZOLAM 0.5 MG/1
0.5 TABLET ORAL 2 TIMES DAILY
Qty: 20 TABLET | Refills: 0 | Status: SHIPPED | OUTPATIENT
Start: 2019-04-25 | End: 2019-05-05

## 2019-04-25 RX ORDER — PANTOPRAZOLE SODIUM 40 MG/1
40 TABLET, DELAYED RELEASE ORAL
Qty: 60 TABLET | Refills: 0 | Status: ON HOLD | OUTPATIENT
Start: 2019-04-25 | End: 2019-10-11 | Stop reason: HOSPADM

## 2019-04-25 RX ADMIN — OXYCODONE HYDROCHLORIDE AND ACETAMINOPHEN 2 TABLET: 5; 325 TABLET ORAL at 15:25

## 2019-04-25 RX ADMIN — ESCITALOPRAM OXALATE 20 MG: 10 TABLET ORAL at 08:39

## 2019-04-25 RX ADMIN — SPIRONOLACTONE 25 MG: 25 TABLET ORAL at 08:40

## 2019-04-25 RX ADMIN — PANTOPRAZOLE SODIUM 40 MG: 40 TABLET, DELAYED RELEASE ORAL at 06:48

## 2019-04-25 RX ADMIN — ENOXAPARIN SODIUM 40 MG: 40 INJECTION SUBCUTANEOUS at 08:39

## 2019-04-25 RX ADMIN — ASPIRIN 81 MG: 81 TABLET, COATED ORAL at 08:40

## 2019-04-25 RX ADMIN — MEROPENEM 1 G: 1 INJECTION, POWDER, FOR SOLUTION INTRAVENOUS at 07:41

## 2019-04-25 RX ADMIN — IPRATROPIUM BROMIDE AND ALBUTEROL SULFATE 3 ML: .5; 3 SOLUTION RESPIRATORY (INHALATION) at 07:51

## 2019-04-25 RX ADMIN — MEROPENEM 1 G: 1 INJECTION, POWDER, FOR SOLUTION INTRAVENOUS at 15:54

## 2019-04-25 RX ADMIN — PANTOPRAZOLE SODIUM 40 MG: 40 TABLET, DELAYED RELEASE ORAL at 15:53

## 2019-04-25 RX ADMIN — LINAGLIPTIN 5 MG: 5 TABLET, FILM COATED ORAL at 08:40

## 2019-04-25 RX ADMIN — FOLIC ACID 1 MG: 1 TABLET ORAL at 08:40

## 2019-04-25 RX ADMIN — VANCOMYCIN HYDROCHLORIDE 1500 MG: 5 INJECTION, POWDER, LYOPHILIZED, FOR SOLUTION INTRAVENOUS at 05:22

## 2019-04-25 RX ADMIN — OXYCODONE HYDROCHLORIDE AND ACETAMINOPHEN 2 TABLET: 5; 325 TABLET ORAL at 05:28

## 2019-04-25 RX ADMIN — OXYCODONE HYDROCHLORIDE AND ACETAMINOPHEN 2 TABLET: 5; 325 TABLET ORAL at 10:53

## 2019-04-25 RX ADMIN — DOCUSATE SODIUM 100 MG: 100 CAPSULE, LIQUID FILLED ORAL at 08:40

## 2019-04-25 RX ADMIN — SODIUM CHLORIDE, PRESERVATIVE FREE 10 ML: 5 INJECTION INTRAVENOUS at 08:39

## 2019-04-25 RX ADMIN — GUAIFENESIN 600 MG: 600 TABLET, EXTENDED RELEASE ORAL at 08:39

## 2019-04-25 RX ADMIN — CARVEDILOL 3.12 MG: 3.12 TABLET, FILM COATED ORAL at 08:39

## 2019-04-25 RX ADMIN — ALPRAZOLAM 0.5 MG: 0.5 TABLET ORAL at 08:40

## 2019-04-25 RX ADMIN — FUROSEMIDE 40 MG: 40 TABLET ORAL at 08:39

## 2019-04-25 ASSESSMENT — PAIN SCALES - GENERAL
PAINLEVEL_OUTOF10: 7
PAINLEVEL_OUTOF10: 7
PAINLEVEL_OUTOF10: 8

## 2019-04-25 ASSESSMENT — PAIN DESCRIPTION - LOCATION: LOCATION: BACK

## 2019-04-25 ASSESSMENT — PAIN DESCRIPTION - PAIN TYPE: TYPE: ACUTE PAIN

## 2019-04-25 NOTE — PROGRESS NOTES
hours. Radiology Review:  CXR   Impression:     No change in the small to moderate loculated left-sided pneumothorax.  Chest  tube in place.  Moderate left basilar atelectasis and small left pleural  effusion. ASSESSMENT AND PLAN:    Patient Active Problem List   Diagnosis    COPD (chronic obstructive pulmonary disease) (HCC)    Pleural effusion, left    SAMANTHA on CPAP    Abnormal stress test    Chest pain    Chronic obstructive pulmonary disease ([address]    TIA (transient ischemic attack)    Hypertension    Bipolar 1 disorder ([address]    CAD (coronary artery disease)    Coronary artery disease involving native coronary artery of native heart without angina pectoris    Essential hypertension    REYES (dyspnea on exertion)    Anemia, chronic disease    Diuretic-induced hypokalemia    Diastolic CHF ([address]    Adrenal mass (HCC)    Acute respiratory failure ([address]    [facility] to gram[facility]negative bacteria ([facility])    Hyperkalemia    Adrenal mass ([address]    Diabetes mellitus, type 2 (HCC)    Hyponatremia    Pneumonia due to organism    PAF (paroxysmal atrial fibrillation) ([facility])    Empyema of left pleural space ([address]    Hospital-acquired bacterial pneumonia     Stable for DC today. Will place CT to heimlich valve prior to DC.   outpt f/u 2 weeks.      Trisha Dubon PA-C

## 2019-04-25 NOTE — DISCHARGE SUMMARY
Sumeet Irizarry. 1950 [id number]  PCP:  Tawanna Garcia MD    Admit date: 4/21/2019  Admitting Physician: Nan Lucia MD    Discharge date: 4/25/2019 Discharge Physician: Eva Leung MD         Discharge Diagnoses. As per below    Hospital Course:  History of present illness at admission: As per H&P  Subsequent Hospital Course:      Hospital-acquired bacterial pneumonia/Pleural effusion, left/Chest pain:    I really doubt patient has ongoing infection with low pro-calcitonin. Pneumonia was ruled out by the time of discharge.  Hx Pyopneumothorax status post VAC decortication : CT surgery evaluated   Essential hypertension/troponin elevation: No ACS. BP stable        VTE prophylaxis LMWH        On the day of discharge, pt felt better. No new complaints. Pertinent Exam Findings on Day of Discharge:  General Appearance:    Alert, cooperative, no distress, appears stated age  Head:    Normocephalic, without obvious abnormality, atraumatic  Eyes:    PERRL, conjunctiva/corneas clear, EOM's intact  Lungs:    Clear to auscultation bilaterally, respirations unlabored   Heart:    Regular rate and rhythm, S1 and S2 normal, no murmur,   rub or gallop  Abdomen:     Soft, non-tender, bowel sounds active, no masses, no organomegaly  Extremities:   Extremities normal, atraumatic, no cyanosis or edema    Consults:  IP CONSULT TO HOSPITALIST  IP CONSULT TO PHARMACY  IP CONSULT TO VASCULAR SURGERY  IP CONSULT TO PULMONOLOGY  IP CONSULT TO CARDIOLOGY  IP CONSULT TO INTERVENTIONAL RADIOLOGY    Patient Instructions:   Samuel Heller.    Home Medication Instructions [id number]    Printed on:04/25/19 1214   Medication Information                      albuterol sulfate HFA (PROVENTIL HFA) 108 (90 BASE) MCG/ACT inhaler  Inhale 2 puffs into the lungs every 6 hours as needed for Wheezing or Shortness of Breath             ALPRAZolam (XANAX) 0.5 MG tablet  Take 1 tablet by mouth 2 times daily for 10 days.             amLODIPine (NORVASC) 10 MG tablet  Take 1 tablet by mouth nightly             aspirin 81 MG EC tablet  Take 1 tablet by mouth daily             atorvastatin (LIPITOR) 80 MG tablet  Take 1 tablet by mouth nightly             carvedilol (COREG) 3.125 MG tablet  Take 1 tablet by mouth 2 times daily             docusate sodium (COLACE, DULCOLAX) 100 MG CAPS  Take 100 mg by mouth 2 times daily             epoetin ezequiel-epbx (RETACRIT) 13261 UNIT/ML SOLN injection  Inject 5,000 Units into the skin three times a week Indications: one time a day every Tue for anemia             escitalopram (LEXAPRO) 20 MG tablet  Take 1 tablet by mouth daily             fluticasone (FLONASE) 50 MCG/ACT nasal spray  2 sprays by Nasal route daily             folic acid (FOLVITE) 1 MG tablet  Take 1 tablet by mouth daily             furosemide (LASIX) 40 MG tablet  Take 1 tablet by mouth daily             HYDROcodone-acetaminophen (NORCO) 5-325 MG per tablet  Take 1 tablet by mouth every 4 hours as needed for Pain for up to 5 days.              insulin lispro (HUMALOG) 100 UNIT/ML injection vial  Inject 0-12 Units into the skin 3 times daily (with meals) Glucose: Dose: <139 No insulin 140-199 2 units 200-249 4 units 250-299 6 units 300-349 8 units 350-400 10 units About 400 12 units             insulin lispro (HUMALOG) 100 UNIT/ML injection vial  Inject 0-6 Units into the skin nightly If <139       No Insulin 140-199      1 Unit 200-249      2 Units 250-299      3 Units 300-349      4 Units 350-400      5 Units Above 400    6 Units             ipratropium-albuterol (DUONEB) 0.5-2.5 (3) MG/3ML SOLN nebulizer solution  Inhale 3 mLs into the lungs every 4 hours (while awake)             LACTOBACILLUS EXTRA STRENGTH CAPS  Take by mouth             linagliptin (TRADJENTA) 5 MG tablet  Take 1 tablet by mouth daily             mineral oil-hydrophilic petrolatum (AQUAPHOR) ointment  Apply topically as needed for Dry Skin Apply topically as needed. nitroGLYCERIN (NITROSTAT) 0.4 MG SL tablet  up to max of 3 total doses. If no relief after 1 dose, call 911. ondansetron (ZOFRAN) 4 MG tablet  Take 1 tablet by mouth 3 times daily as needed for Nausea or Vomiting             pantoprazole (PROTONIX) 40 MG tablet  Take 1 tablet by mouth 2 times daily (before meals)             polyethylene glycol (GLYCOLAX) powder  Take 17 g by mouth daily             potassium chloride (KLOR-CON M) 20 MEQ extended release tablet  Take 1 tablet by mouth 2 times daily for 10 days             spironolactone (ALDACTONE) 25 MG tablet  Take 1 tablet by mouth daily                   Diet:  DIET CARB CONTROL; Activity:   activity as tolerated     Discharge Condition:   good    Disposition:   home    Follow-up    Sami Geronimo MD  Schedule an appointment as soon as possible for a visit  Medical Management and F/U labs  [facility] [address]   [facility]      [address] [facility] [address]   Jessica Sherman MD  In 2 weeks  [address] [facility] [address]  [address] [facility] [address] [facility]   Ankit Serna MD  Schedule an appointment as soon as possible for a visit  Medical Management  [address] [facility]  [address]  [phone]         Time spent on discharge in the examination, evaluation, counseling and review of medications and discharge plan: 40 minutes       Discharge Physician Signed: Lizy Umana

## 2019-04-25 NOTE — CARE COORDINATION
LSW noted Pt has a discharge for today. LSW into room to discuss the discharge with the Pt. Transportation arranged through [employer] for a 6:45  time. Packet prepared. Pt did not want LSW to contact any family. Pt RN, SNF notified of the time.      Electronically signed by ESTER Vila on 4/25/2019 at 4:10 PM

## 2019-04-25 NOTE — PROGRESS NOTES
Pulmonary and Critical Care  Progress Note    Subjective: The patient chest tube drainage 67cc/24 hrs. MRSA nasal swab neg. Shortness of breath none. Chest pain at chest at chest tube site. Addressing respiratory complaints Patient is negative for  hemoptysis and cyanosis. CONSTITUTIONAL:  negative for fevers and chills. Past Medical History:     has a past medical history of Anemia, Arthritis, Back pain, chronic, Bipolar 1 disorder ([address] CAD (coronary artery disease), Cerebral artery occlusion with cerebral infarction ([facility] [address] CKD (chronic kidney disease), COPD (chronic obstructive pulmonary disease) ([facility] [address] Diabetes mellitus, type 2 ([facility] [address] Diastolic CHF ([facility] [address] Gastric ulcer, H/O cardiovascular stress test, Heart murmur, Hiatal hernia, History of cardiac cath, Hx of migraines, HX OTHER MEDICAL, Hyperlipidemia, Hypertension, Panic attack, Pleural effusion, S/P thoracentesis, Sleep apnea, SOBOE (shortness of breath on exertion), and Spinal stenosis. has a past surgical history that includes Neck surgery (1998); Carpal tunnel release (Bilateral, 1989); knee surgery (Bilateral); thoracentesis (Left, 12/20/2013); Elbow surgery (Left, 2000's); Thoracentesis (4/25/2016); Tonsillectomy; Thoracentesis (Left, 11/15/2016); thoracotomy (Left, 03/18/2019); and thoracotomy (Left, 3/18/2019). reports that he quit smoking about 8 years ago. He has a 45.00 pack-year smoking history. He has never used smokeless tobacco. He reports that he drank alcohol. He reports that he has current or past drug history. Frequency: 7.00 times per week. Family history:  family history includes Heart Disease in his mother; High Blood Pressure in his father.     No Known Allergies  Social History:    Reviewed; no changes    Objective:   PHYSICAL EXAM:        VITALS:  /85   Pulse 117   Temp 97.6 °F (36.4 °C) (Oral)   Resp 17   Ht 5' 9\" (1.753 m)   Wt 224 lb 9.6 oz (101.9 kg)   SpO2 97%   BMI 33.17 kg/m²     24HR INTAKE/OUTPUT: Intake/Output Summary (Last 24 hours) at 4/25/2019 1030  Last data filed at 4/25/2019 0650  Gross per 24 hour   Intake 1560 ml   Output 1542 ml   Net 18 ml       CONSTITUTIONAL:  awake, alert, cooperative, no apparent distress, and appears stated age  LUNGS:  decreased breath sounds. Occ basilar crackles. CARDIOVASCULAR:  normal S1 and S2 and negative JVD  ABD:Abdomen soft, non-tender. BS normal. No masses,  No organomegaly  NEURO:Alert and oriented x3. Gait normal. Reflexes and motor strength normal and symmetric. Cranial nerves 2-12 and sensation grossly intact. DATA:    CBC:  Recent Labs     04/23/19  0628   WBC 7.2   RBC 3.68*   HGB 9.5*   HCT 33.0*      MCV 89.7   MCH 25.8*   MCHC 28.8*   RDW 16.9*   SEGSPCT 68.5*      BMP:  Recent Labs     04/23/19  0628 04/24/19  0835 04/25/19  0434     --   --    K 3.5  --   --      --   --    CO2 25  --   --    BUN 9  --   --    CREATININE 1.5* 1.5* 1.6*   CALCIUM 8.1*  --   --    GLUCOSE 155*  --   --       ABG:  No results for input(s): PH, PO2ART, WOW1OPJ, HCO3, BEART, O2SAT in the last 72 hours. Lab Results   Component Value Date    PROBNP 1,164 (H) 04/23/2019    PROBNP 1,464 (H) 03/10/2019    PROBNP 983.9 (H) 03/08/2019     No results found for: [facility]    Radiology Review:  Pertinent images / reports were reviewed as a part of this visit.     Assessment:     Patient Active Problem List   Diagnosis    COPD (chronic obstructive pulmonary disease) (HCC)    Pleural effusion, left    SAMANTHA on CPAP    Abnormal stress test    Chest pain    Chronic obstructive pulmonary disease ([address]    TIA (transient ischemic attack)    Hypertension    Bipolar 1 disorder ([address]    CAD (coronary artery disease)    Coronary artery disease involving native coronary artery of native heart without angina pectoris    Essential hypertension    REYES (dyspnea on exertion)    Anemia, chronic disease    Diuretic-induced hypokalemia    Diastolic CHF ([address]    Adrenal mass ([facility] [address]    Acute respiratory failure (HCC)    Pneumonia due to gram-negative bacteria (HCC)    Hyperkalemia    Adrenal mass (HCC)    Diabetes mellitus, type 2 (HCC)    Hyponatremia    Pneumonia due to organism    PAF (paroxysmal atrial fibrillation) (HCC)    Empyema of left pleural space (HCC)    Hospital-acquired bacterial pneumonia       Plan:   1. Overall the patient is better. 2. Chest tube management as per CT surgery. 3. Inc. Activity.   4. D/C Alberto Peters MD  4/25/2019  10:30 AM

## 2019-04-25 NOTE — PLAN OF CARE
Problem: Pain:  Goal: Pain level will decrease  Description  Pain level will decrease  Outcome: Ongoing  Goal: Control of acute pain  Description  Control of acute pain  Outcome: Ongoing  Goal: Control of chronic pain  Description  Control of chronic pain  Outcome: Ongoing  Goal: Patient's pain/discomfort is manageable  Description  Patient's pain/discomfort is manageable  Outcome: Ongoing     Problem: Infection:  Goal: Will remain free from infection  Description  Will remain free from infection  Outcome: Ongoing     Problem: Safety:  Goal: Free from accidental physical injury  Description  Free from accidental physical injury  Outcome: Ongoing  Goal: Free from intentional harm  Description  Free from intentional harm  Outcome: Ongoing     Problem: Daily Care:  Goal: Daily care needs are met  Description  Daily care needs are met  Outcome: Ongoing     Problem: Skin Integrity:  Goal: Skin integrity will stabilize  Description  Skin integrity will stabilize  Outcome: Ongoing     Problem: Discharge Planning:  Goal: Patients continuum of care needs are met  Description  Patients continuum of care needs are met  Outcome: Ongoing     Problem: Discharge Planning:  Goal: Discharged to appropriate level of care  Description  Discharged to appropriate level of care  Outcome: Ongoing  Goal: Participates in care planning  Description  Participates in care planning  Outcome: Ongoing     Problem: Airway Clearance - Ineffective:  Goal: Clear lung sounds  Description  Clear lung sounds  Outcome: Ongoing  Goal: Ability to maintain a clear airway will improve  Description  Ability to maintain a clear airway will improve  Outcome: Ongoing     Problem: Fluid Volume - Deficit:  Goal: Achieves intake and output within specified parameters  Description  Achieves intake and output within specified parameters  Outcome: Ongoing     Problem: Gas Exchange - Impaired:  Goal: Levels of oxygenation will improve  Description  Levels of oxygenation will improve  Outcome: Ongoing     Problem: Hyperthermia:  Goal: Ability to maintain a body temperature in the normal range will improve  Description  Ability to maintain a body temperature in the normal range will improve  Outcome: Ongoing     Problem: Tobacco Use:  Goal: Will participate in inpatient tobacco-use cessation counseling  Description  Will participate in inpatient tobacco-use cessation counseling  Outcome: Ongoing

## 2019-04-26 ENCOUNTER — HOSPITAL ENCOUNTER (OUTPATIENT)
Age: 69
Setting detail: SPECIMEN
Discharge: HOME | End: 2019-04-26

## 2019-04-26 LAB
ALBUMIN SERPL-MCNC: 3 GM/DL (ref 3.4–5)
ALP BLD-CCNC: 102 IU/L (ref 40–128)
ALT SERPL-CCNC: <5 U/L (ref 10–40)
ANION GAP SERPL CALCULATED.3IONS-SCNC: 12 MMOL/L (ref 4–16)
AST SERPL-CCNC: 10 IU/L (ref 15–37)
BILIRUB SERPL-MCNC: 0.4 MG/DL (ref 0–1)
BUN BLDV-MCNC: 12 MG/DL (ref 6–23)
CALCIUM SERPL-MCNC: 8.6 MG/DL (ref 8.3–10.6)
CHLORIDE BLD-SCNC: 101 MMOL/L (ref 99–110)
CO2: 25 MMOL/L (ref 21–32)
CREAT SERPL-MCNC: 1.5 MG/DL (ref 0.9–1.3)
CULTURE: ABNORMAL
GFR AFRICAN AMERICAN: 56 ML/MIN/1.73M2
GFR NON-AFRICAN AMERICAN: 46 ML/MIN/1.73M2
GLUCOSE BLD-MCNC: 81 MG/DL (ref 70–99)
GRAM SMEAR: ABNORMAL
HCT VFR BLD CALC: 36.6 % (ref 42–52)
HEMOGLOBIN: 10.7 GM/DL (ref 13.5–18)
Lab: ABNORMAL
MCH RBC QN AUTO: 26 PG (ref 27–31)
MCHC RBC AUTO-ENTMCNC: 29.2 % (ref 32–36)
MCV RBC AUTO: 88.8 FL (ref 78–100)
PDW BLD-RTO: 16.6 % (ref 11.7–14.9)
PLATELET # BLD: 317 K/CU MM (ref 140–440)
PMV BLD AUTO: 9.1 FL (ref 7.5–11.1)
POTASSIUM SERPL-SCNC: 3.9 MMOL/L (ref 3.5–5.1)
RBC # BLD: 4.12 M/CU MM (ref 4.6–6.2)
SODIUM BLD-SCNC: 138 MMOL/L (ref 135–145)
SPECIMEN: ABNORMAL
TOTAL PROTEIN: 6.5 GM/DL (ref 6.4–8.2)
WBC # BLD: 7.9 K/CU MM (ref 4–10.5)

## 2019-04-26 PROCEDURE — 80053 COMPREHEN METABOLIC PANEL: CPT

## 2019-04-26 PROCEDURE — 36415 COLL VENOUS BLD VENIPUNCTURE: CPT

## 2019-04-26 PROCEDURE — 85027 COMPLETE CBC AUTOMATED: CPT

## 2019-05-03 ENCOUNTER — HOSPITAL ENCOUNTER (OUTPATIENT)
Age: 69
Setting detail: SPECIMEN
Discharge: HOME | End: 2019-05-03

## 2019-05-03 LAB
ANION GAP SERPL CALCULATED.3IONS-SCNC: 12 MMOL/L (ref 4–16)
BASOPHILS ABSOLUTE: 0 K/CU MM
BASOPHILS RELATIVE PERCENT: 0.5 % (ref 0–1)
BUN BLDV-MCNC: 19 MG/DL (ref 6–23)
CALCIUM SERPL-MCNC: 8.5 MG/DL (ref 8.3–10.6)
CHLORIDE BLD-SCNC: 100 MMOL/L (ref 99–110)
CO2: 25 MMOL/L (ref 21–32)
CREAT SERPL-MCNC: 1.8 MG/DL (ref 0.9–1.3)
DIFFERENTIAL TYPE: ABNORMAL
EOSINOPHILS ABSOLUTE: 0 K/CU MM
EOSINOPHILS RELATIVE PERCENT: 0.4 % (ref 0–3)
GFR AFRICAN AMERICAN: 45 ML/MIN/1.73M2
GFR NON-AFRICAN AMERICAN: 38 ML/MIN/1.73M2
GLUCOSE BLD-MCNC: 85 MG/DL (ref 70–99)
HCT VFR BLD CALC: 34.8 % (ref 42–52)
HEMOGLOBIN: 10.2 GM/DL (ref 13.5–18)
IMMATURE NEUTROPHIL %: 0.9 % (ref 0–0.43)
LYMPHOCYTES ABSOLUTE: 2 K/CU MM
LYMPHOCYTES RELATIVE PERCENT: 24.6 % (ref 24–44)
MCH RBC QN AUTO: 25.9 PG (ref 27–31)
MCHC RBC AUTO-ENTMCNC: 29.3 % (ref 32–36)
MCV RBC AUTO: 88.3 FL (ref 78–100)
MONOCYTES ABSOLUTE: 0.7 K/CU MM
MONOCYTES RELATIVE PERCENT: 8.6 % (ref 0–4)
NUCLEATED RBC %: 0 %
PDW BLD-RTO: 16.2 % (ref 11.7–14.9)
PLATELET # BLD: 317 K/CU MM (ref 140–440)
PMV BLD AUTO: 9 FL (ref 7.5–11.1)
POTASSIUM SERPL-SCNC: 4.6 MMOL/L (ref 3.5–5.1)
RBC # BLD: 3.94 M/CU MM (ref 4.6–6.2)
SEGMENTED NEUTROPHILS ABSOLUTE COUNT: 5.2 K/CU MM
SEGMENTED NEUTROPHILS RELATIVE PERCENT: 65 % (ref 36–66)
SODIUM BLD-SCNC: 137 MMOL/L (ref 135–145)
TOTAL IMMATURE NEUTOROPHIL: 0.07 K/CU MM
TOTAL NUCLEATED RBC: 0 K/CU MM
WBC # BLD: 8.1 K/CU MM (ref 4–10.5)

## 2019-05-03 PROCEDURE — 80048 BASIC METABOLIC PNL TOTAL CA: CPT

## 2019-05-03 PROCEDURE — 36415 COLL VENOUS BLD VENIPUNCTURE: CPT

## 2019-05-03 PROCEDURE — 85025 COMPLETE CBC W/AUTO DIFF WBC: CPT

## 2019-05-09 ENCOUNTER — HOSPITAL ENCOUNTER (OUTPATIENT)
Age: 69
Discharge: HOME | End: 2019-05-09

## 2019-05-09 LAB
ANION GAP SERPL CALCULATED.3IONS-SCNC: 12 MMOL/L (ref 4–16)
BUN BLDV-MCNC: 22 MG/DL (ref 6–23)
CALCIUM SERPL-MCNC: 8.8 MG/DL (ref 8.3–10.6)
CHLORIDE BLD-SCNC: 101 MMOL/L (ref 99–110)
CO2: 25 MMOL/L (ref 21–32)
CREAT SERPL-MCNC: 1.8 MG/DL (ref 0.9–1.3)
GFR AFRICAN AMERICAN: 45 ML/MIN/1.73M2
GFR NON-AFRICAN AMERICAN: 38 ML/MIN/1.73M2
GLUCOSE BLD-MCNC: 93 MG/DL (ref 70–99)
POTASSIUM SERPL-SCNC: 3.8 MMOL/L (ref 3.5–5.1)
SODIUM BLD-SCNC: 138 MMOL/L (ref 135–145)

## 2019-05-09 PROCEDURE — 36415 COLL VENOUS BLD VENIPUNCTURE: CPT

## 2019-05-09 PROCEDURE — 80048 BASIC METABOLIC PNL TOTAL CA: CPT

## 2019-05-14 ENCOUNTER — HOSPITAL ENCOUNTER (OUTPATIENT)
Age: 69
Discharge: HOME | End: 2019-05-14
Payer: COMMERCIAL

## 2019-05-14 ENCOUNTER — HOSPITAL ENCOUNTER (OUTPATIENT)
Dept: GENERAL RADIOLOGY | Age: 69
Discharge: HOME | End: 2019-05-14
Payer: COMMERCIAL

## 2019-05-14 DIAGNOSIS — J90 PLEURAL EFFUSION, LEFT: ICD-10-CM

## 2019-05-14 PROCEDURE — 71046 X-RAY EXAM CHEST 2 VIEWS: CPT

## 2019-05-17 ENCOUNTER — TELEPHONE (OUTPATIENT)
Dept: FAMILY MEDICINE CLINIC | Age: 69
End: 2019-05-17

## 2019-05-17 DIAGNOSIS — E11.42 DIABETIC PERIPHERAL NEUROPATHY (HCC): ICD-10-CM

## 2019-05-17 DIAGNOSIS — M54.16 LUMBAR RADICULOPATHY: ICD-10-CM

## 2019-05-17 DIAGNOSIS — N18.30 CHRONIC KIDNEY DISEASE (CKD), STAGE III (MODERATE) (HCC): ICD-10-CM

## 2019-05-17 RX ORDER — LANSOPRAZOLE 30 MG/1
30 CAPSULE, DELAYED RELEASE ORAL DAILY
COMMUNITY
End: 2019-05-18

## 2019-05-17 RX ORDER — LANCETS 33 GAUGE
1 EACH MISCELLANEOUS 2 TIMES DAILY
COMMUNITY
End: 2019-05-18

## 2019-05-17 RX ORDER — AMLODIPINE BESYLATE AND ATORVASTATIN CALCIUM 10; 80 MG/1; MG/1
1 TABLET, FILM COATED ORAL DAILY
COMMUNITY
End: 2019-05-18

## 2019-05-17 RX ORDER — POTASSIUM CHLORIDE 750 MG/1
20 TABLET, EXTENDED RELEASE ORAL DAILY
COMMUNITY
End: 2019-05-24

## 2019-05-17 RX ORDER — OLANZAPINE 20 MG/1
20 TABLET ORAL DAILY
COMMUNITY
End: 2019-05-29

## 2019-05-17 RX ORDER — CLOPIDOGREL BISULFATE 75 MG/1
75 TABLET ORAL DAILY
COMMUNITY
End: 2019-05-29

## 2019-05-17 RX ORDER — CARVEDILOL 12.5 MG/1
12.5 TABLET ORAL 2 TIMES DAILY
COMMUNITY
End: 2019-05-21

## 2019-05-17 RX ORDER — ALPRAZOLAM 0.5 MG/1
0.5 TABLET ORAL 2 TIMES DAILY PRN
COMMUNITY
End: 2019-05-20 | Stop reason: SDUPTHER

## 2019-05-17 RX ORDER — GABAPENTIN 800 MG/1
800 TABLET ORAL 3 TIMES DAILY
COMMUNITY
End: 2019-05-29

## 2019-05-17 NOTE — TELEPHONE ENCOUNTER
----- Message from Boogie Lopez sent at 5/17/2019  3:40 PM EDT -----  Contact: Hina Malcolm [phone]  NEED A VERBAL FOR [facility]

## 2019-05-18 ENCOUNTER — HOSPITAL ENCOUNTER (EMERGENCY)
Age: 69
Discharge: HOME | End: 2019-05-18
Attending: EMERGENCY MEDICINE
Payer: MEDICARE

## 2019-05-18 ENCOUNTER — APPOINTMENT (OUTPATIENT)
Dept: CT IMAGING | Age: 69
End: 2019-05-18
Payer: MEDICARE

## 2019-05-18 ENCOUNTER — APPOINTMENT (OUTPATIENT)
Dept: GENERAL RADIOLOGY | Age: 69
End: 2019-05-18
Payer: MEDICARE

## 2019-05-18 VITALS
HEIGHT: 69 IN | OXYGEN SATURATION: 95 % | SYSTOLIC BLOOD PRESSURE: 117 MMHG | BODY MASS INDEX: 31.1 KG/M2 | WEIGHT: 210 LBS | TEMPERATURE: 98 F | HEART RATE: 59 BPM | RESPIRATION RATE: 14 BRPM | DIASTOLIC BLOOD PRESSURE: 71 MMHG

## 2019-05-18 DIAGNOSIS — R00.0 TACHYCARDIA: ICD-10-CM

## 2019-05-18 DIAGNOSIS — R79.89 ELEVATED BRAIN NATRIURETIC PEPTIDE (BNP) LEVEL: ICD-10-CM

## 2019-05-18 DIAGNOSIS — R79.89 SERUM CREATININE RAISED: ICD-10-CM

## 2019-05-18 DIAGNOSIS — R07.9 CHEST PAIN, UNSPECIFIED TYPE: Primary | ICD-10-CM

## 2019-05-18 LAB
ALBUMIN SERPL-MCNC: 3 GM/DL (ref 3.4–5)
ALP BLD-CCNC: 102 IU/L (ref 40–129)
ALT SERPL-CCNC: 20 U/L (ref 10–40)
ANION GAP SERPL CALCULATED.3IONS-SCNC: 13 MMOL/L (ref 4–16)
APTT: 32.8 SECONDS (ref 21.2–33)
AST SERPL-CCNC: 25 IU/L (ref 15–37)
BASOPHILS ABSOLUTE: 0.1 K/CU MM
BASOPHILS RELATIVE PERCENT: 0.4 % (ref 0–1)
BILIRUB SERPL-MCNC: 0.3 MG/DL (ref 0–1)
BUN BLDV-MCNC: 18 MG/DL (ref 6–23)
CALCIUM SERPL-MCNC: 9.2 MG/DL (ref 8.3–10.6)
CHLORIDE BLD-SCNC: 101 MMOL/L (ref 99–110)
CO2: 21 MMOL/L (ref 21–32)
CREAT SERPL-MCNC: 1.5 MG/DL (ref 0.9–1.3)
DIFFERENTIAL TYPE: ABNORMAL
EOSINOPHILS ABSOLUTE: 0.1 K/CU MM
EOSINOPHILS RELATIVE PERCENT: 0.4 % (ref 0–3)
GFR AFRICAN AMERICAN: 56 ML/MIN/1.73M2
GFR NON-AFRICAN AMERICAN: 46 ML/MIN/1.73M2
GLUCOSE BLD-MCNC: 182 MG/DL (ref 70–99)
HCT VFR BLD CALC: 39.2 % (ref 42–52)
HEMOGLOBIN: 11.2 GM/DL (ref 13.5–18)
IMMATURE NEUTROPHIL %: 0.6 % (ref 0–0.43)
INR BLD: 1.04 INDEX
LACTATE: 1.4 MMOL/L (ref 0.4–2)
LIPASE: 19 IU/L (ref 13–60)
LYMPHOCYTES ABSOLUTE: 2.1 K/CU MM
LYMPHOCYTES RELATIVE PERCENT: 17.9 % (ref 24–44)
MAGNESIUM: 2.2 MG/DL (ref 1.8–2.4)
MCH RBC QN AUTO: 25.5 PG (ref 27–31)
MCHC RBC AUTO-ENTMCNC: 28.6 % (ref 32–36)
MCV RBC AUTO: 89.1 FL (ref 78–100)
MONOCYTES ABSOLUTE: 0.9 K/CU MM
MONOCYTES RELATIVE PERCENT: 7.5 % (ref 0–4)
NUCLEATED RBC %: 0 %
PDW BLD-RTO: 15 % (ref 11.7–14.9)
PLATELET # BLD: 280 K/CU MM (ref 140–440)
PMV BLD AUTO: 8.8 FL (ref 7.5–11.1)
POTASSIUM SERPL-SCNC: 3.7 MMOL/L (ref 3.5–5.1)
PRO-BNP: 552.7 PG/ML
PROTHROMBIN TIME: 11.8 SECONDS (ref 9.12–12.5)
RBC # BLD: 4.4 M/CU MM (ref 4.6–6.2)
SEGMENTED NEUTROPHILS ABSOLUTE COUNT: 8.4 K/CU MM
SEGMENTED NEUTROPHILS RELATIVE PERCENT: 73.2 % (ref 36–66)
SODIUM BLD-SCNC: 135 MMOL/L (ref 135–145)
TOTAL CK: 40 IU/L (ref 38–174)
TOTAL IMMATURE NEUTOROPHIL: 0.07 K/CU MM
TOTAL NUCLEATED RBC: 0 K/CU MM
TOTAL PROTEIN: 7.9 GM/DL (ref 6.4–8.2)
TROPONIN T: 0.02 NG/ML
TROPONIN T: <0.01 NG/ML
TSH HIGH SENSITIVITY: 1.37 UIU/ML (ref 0.27–4.2)
WBC # BLD: 11.5 K/CU MM (ref 4–10.5)

## 2019-05-18 PROCEDURE — 83880 ASSAY OF NATRIURETIC PEPTIDE: CPT

## 2019-05-18 PROCEDURE — 85025 COMPLETE CBC W/AUTO DIFF WBC: CPT

## 2019-05-18 PROCEDURE — 84443 ASSAY THYROID STIM HORMONE: CPT

## 2019-05-18 PROCEDURE — 2580000003 HC RX 258: Performed by: EMERGENCY MEDICINE

## 2019-05-18 PROCEDURE — 83690 ASSAY OF LIPASE: CPT

## 2019-05-18 PROCEDURE — 85730 THROMBOPLASTIN TIME PARTIAL: CPT

## 2019-05-18 PROCEDURE — 82550 ASSAY OF CK (CPK): CPT

## 2019-05-18 PROCEDURE — 85610 PROTHROMBIN TIME: CPT

## 2019-05-18 PROCEDURE — 83735 ASSAY OF MAGNESIUM: CPT

## 2019-05-18 PROCEDURE — 93005 ELECTROCARDIOGRAM TRACING: CPT | Performed by: EMERGENCY MEDICINE

## 2019-05-18 PROCEDURE — 96375 TX/PRO/DX INJ NEW DRUG ADDON: CPT

## 2019-05-18 PROCEDURE — 36415 COLL VENOUS BLD VENIPUNCTURE: CPT

## 2019-05-18 PROCEDURE — 96374 THER/PROPH/DIAG INJ IV PUSH: CPT

## 2019-05-18 PROCEDURE — 6360000002 HC RX W HCPCS: Performed by: EMERGENCY MEDICINE

## 2019-05-18 PROCEDURE — 84484 ASSAY OF TROPONIN QUANT: CPT

## 2019-05-18 PROCEDURE — 99291 CRITICAL CARE FIRST HOUR: CPT

## 2019-05-18 PROCEDURE — 83605 ASSAY OF LACTIC ACID: CPT

## 2019-05-18 PROCEDURE — 6360000004 HC RX CONTRAST MEDICATION: Performed by: EMERGENCY MEDICINE

## 2019-05-18 PROCEDURE — 71045 X-RAY EXAM CHEST 1 VIEW: CPT

## 2019-05-18 PROCEDURE — 71275 CT ANGIOGRAPHY CHEST: CPT

## 2019-05-18 PROCEDURE — 6370000000 HC RX 637 (ALT 250 FOR IP): Performed by: EMERGENCY MEDICINE

## 2019-05-18 PROCEDURE — 80053 COMPREHEN METABOLIC PANEL: CPT

## 2019-05-18 RX ORDER — ONDANSETRON 2 MG/ML
4 INJECTION INTRAMUSCULAR; INTRAVENOUS EVERY 30 MIN PRN
Status: DISCONTINUED | OUTPATIENT
Start: 2019-05-18 | End: 2019-05-19 | Stop reason: HOSPADM

## 2019-05-18 RX ORDER — FERROUS SULFATE 325(65) MG
650 TABLET ORAL
COMMUNITY
End: 2019-05-24

## 2019-05-18 RX ORDER — CARVEDILOL 12.5 MG/1
12.5 TABLET ORAL 2 TIMES DAILY WITH MEALS
Status: DISCONTINUED | OUTPATIENT
Start: 2019-05-18 | End: 2019-05-19 | Stop reason: HOSPADM

## 2019-05-18 RX ORDER — IPRATROPIUM BROMIDE AND ALBUTEROL SULFATE 2.5; .5 MG/3ML; MG/3ML
1 SOLUTION RESPIRATORY (INHALATION) EVERY 6 HOURS PRN
COMMUNITY
End: 2019-05-29 | Stop reason: SDUPTHER

## 2019-05-18 RX ORDER — 0.9 % SODIUM CHLORIDE 0.9 %
10 VIAL (ML) INJECTION
Status: COMPLETED | OUTPATIENT
Start: 2019-05-18 | End: 2019-05-18

## 2019-05-18 RX ORDER — 0.9 % SODIUM CHLORIDE 0.9 %
1000 INTRAVENOUS SOLUTION INTRAVENOUS ONCE
Status: COMPLETED | OUTPATIENT
Start: 2019-05-18 | End: 2019-05-18

## 2019-05-18 RX ORDER — CARVEDILOL 12.5 MG/1
12.5 TABLET ORAL 2 TIMES DAILY
Qty: 90 TABLET | Refills: 1 | Status: SHIPPED | OUTPATIENT
Start: 2019-05-18 | End: 2019-05-21 | Stop reason: SDUPTHER

## 2019-05-18 RX ORDER — MORPHINE SULFATE 4 MG/ML
4 INJECTION, SOLUTION INTRAMUSCULAR; INTRAVENOUS EVERY 30 MIN PRN
Status: DISCONTINUED | OUTPATIENT
Start: 2019-05-18 | End: 2019-05-19 | Stop reason: HOSPADM

## 2019-05-18 RX ORDER — CLOPIDOGREL BISULFATE 75 MG/1
75 TABLET ORAL ONCE
Status: COMPLETED | OUTPATIENT
Start: 2019-05-18 | End: 2019-05-18

## 2019-05-18 RX ORDER — OMEPRAZOLE 20 MG/1
20 CAPSULE, DELAYED RELEASE ORAL DAILY
COMMUNITY
End: 2019-08-27

## 2019-05-18 RX ADMIN — CLOPIDOGREL BISULFATE 75 MG: 75 TABLET ORAL at 16:25

## 2019-05-18 RX ADMIN — SODIUM CHLORIDE 1000 ML: 9 INJECTION, SOLUTION INTRAVENOUS at 16:19

## 2019-05-18 RX ADMIN — SODIUM CHLORIDE 1000 ML: 9 INJECTION, SOLUTION INTRAVENOUS at 16:20

## 2019-05-18 RX ADMIN — MORPHINE SULFATE 4 MG: 4 INJECTION, SOLUTION INTRAMUSCULAR; INTRAVENOUS at 16:25

## 2019-05-18 RX ADMIN — ONDANSETRON 4 MG: 2 INJECTION INTRAMUSCULAR; INTRAVENOUS at 16:25

## 2019-05-18 RX ADMIN — IOPAMIDOL 80 ML: 755 INJECTION, SOLUTION INTRAVENOUS at 17:26

## 2019-05-18 RX ADMIN — CARVEDILOL 12.5 MG: 12.5 TABLET, FILM COATED ORAL at 16:46

## 2019-05-18 RX ADMIN — SODIUM CHLORIDE, PRESERVATIVE FREE 10 ML: 5 INJECTION INTRAVENOUS at 17:26

## 2019-05-18 ASSESSMENT — PAIN SCALES - GENERAL: PAINLEVEL_OUTOF10: 8

## 2019-05-18 ASSESSMENT — ENCOUNTER SYMPTOMS
ALLERGIC/IMMUNOLOGIC NEGATIVE: 1
GASTROINTESTINAL NEGATIVE: 1
EYES NEGATIVE: 1
SHORTNESS OF BREATH: 1

## 2019-05-18 ASSESSMENT — PAIN DESCRIPTION - PAIN TYPE: TYPE: ACUTE PAIN

## 2019-05-18 ASSESSMENT — PAIN DESCRIPTION - LOCATION: LOCATION: CHEST

## 2019-05-18 NOTE — PROGRESS NOTES
Medication History  [facility]    Patient Name: Vijay Bee. 1950     Medication history has been completed by: Freddy Mari CPhT    Source(s) of information: Patient and [facility] discharge report    Primary Care Physician: Donya Harris MD     Pharmacy: Unknown    Allergies as of 05/18/2019 - Review Complete 05/18/2019   Allergen Reaction Noted    Nsaids      Pcn [penicillins]          Prior to Admission medications    Medication Sig Start Date End Date Taking?  Authorizing Provider   omeprazole (PRILOSEC) 20 MG delayed release capsule Take 20 mg by mouth daily   Yes Historical Provider, MD   ferrous sulfate 325 (65 Fe) MG tablet Take 650 mg by mouth daily (with breakfast)   Yes Historical Provider, MD   sertraline (ZOLOFT) 50 MG tablet Take 50 mg by mouth nightly   Yes Historical Provider, MD   carvedilol (COREG) 12.5 MG tablet Take 12.5 mg by mouth 2 times daily   Yes Historical Provider, MD   pantoprazole (PROTONIX) 40 MG tablet Take 1 tablet by mouth 2 times daily (before meals) 4/25/19 5/25/19 Yes Tico Nash MD   escitalopram (LEXAPRO) 20 MG tablet Take 1 tablet by mouth daily 4/25/19  Yes Tico Nash MD   LACTOBACILLUS EXTRA STRENGTH CAPS Take 1 each by mouth every morning    Yes Historical Provider, MD   epoetin ezequiel-epbx (RETACRIT) 19139 UNIT/ML SOLN injection Inject 5,000 Units into the skin three times a week Indications: one time a day every Tue for anemia   Yes Historical Provider, MD   polyethylene glycol (GLYCOLAX) powder Take 17 g by mouth daily   Yes Historical Provider, MD   aspirin 81 MG EC tablet Take 1 tablet by mouth daily 3/26/19  Yes Yusuf Mariscal MD   docusate sodium (COLACE, DULCOLAX) 100 MG CAPS Take 100 mg by mouth 2 times daily 3/25/19  Yes Yusuf Mariscal MD   folic acid (FOLVITE) 1 MG tablet Take 1 tablet by mouth daily 3/26/19  Yes Yusuf Mariscal MD   furosemide (LASIX) 40 MG tablet Take 1 tablet by mouth daily 3/26/19  Yes

## 2019-05-18 NOTE — ED PROVIDER NOTES
[facility]      TRIAGE CHIEF COMPLAINT:   Chest Pain (started today and has progressed; pt describes pain as a pressure)      [place]:  Obdulio Marshall is a 71 y.o. male that presents with chest pain shortness of breath palpitations. Patient has a history of high blood pressure he states he 1 home on Thursday the nursing home did not give him his medication to go home with he has been off his medications since Thursday he was on amlodipine and Coreg he has not had these medications couple hours ago he developed chest pain and tachycardia denies other questions or concerns of fevers or nausea vomiting no cough abdominal pain no other questions or concerns. Pain is constant. REVIEW OF SYSTEMS:  At least 10 systems reviewed and otherwise acutely negative except as in the [address] Review of Systems   Constitutional: Negative. HENT: Negative. Eyes: Negative. Respiratory: Positive for shortness of breath. Cardiovascular: Positive for chest pain and palpitations. Gastrointestinal: Negative. Endocrine: Negative. Polyphagia: 30.   Genitourinary: Negative. Musculoskeletal: Negative. Skin: Negative. Allergic/Immunologic: Negative. Neurological: Negative. Hematological: Negative. Psychiatric/Behavioral: Negative. All other systems reviewed and are negative.       Past Medical History:   Diagnosis Date    Anemia     per old chart    Arthritis     Back pain, chronic     \"have pain in lumbar mainly- have 5 bulging discs\"\"in my neck and my lumbar have herniated discs\"    Bipolar 1 disorder ([facility]     CAD (coronary artery disease) 1/27/2016    does not follow with a cardiologist    Cerebral artery occlusion with cerebral infarction ([facility]     \"had mini stroke in Jan 2016- fast heart rate when I would stand up - smile drooping on one side- lased just the one day\"    Chronic kidney disease (CKD), stage III (moderate) (HCC)     CKD (chronic kidney disease) per old chart- consult with Dr Jayme Dunbar with admission 4/2016\"have low kidney function\"    COPD (chronic obstructive pulmonary disease) ([facility]     follow with Dr Deb Ram Diabetes mellitus, type 2 ([facility] 1/3/2017    Diabetic peripheral neuropathy (HCC)     Diastolic CHF ([facility] 0/73/8733    Gastric ulcer     H/O cardiovascular stress test 5/26/14    EF 68% mild ischemia anterior wall    Heart murmur     \"see Dr Diomedes Nicholas- last echo 2014\" pt states\"I think they said I have a murmur but no chest pain or palpitations\"    Hiatal hernia     History of cardiac cath 6/11/14    MODERATE  CAD      Hx of migraines     HX OTHER MEDICAL     \"have thinning of kidney walls- they found I had that 15 yrs ago\" see Dr Emmie Rose"    Hyperlipidemia     Hypertension     Lumbar radiculopathy     Panic attack     'anything new gives me anxiety\"    Pleural effusion     scheduled for thoracentesis 7/28/2014, 8/17/2016    S/P thoracentesis     left side( per old chart had thora done 4/2016 and one done 2014)    Sleep apnea     sleep study x 2 - last one 4-5 yrs ago- uses c-pap\"    SOBOE (shortness of breath on exertion)     Spinal stenosis      Past Surgical History:   Procedure Laterality Date    CARPAL TUNNEL RELEASE Bilateral 1989    ELBOW SURGERY Left 2000's    KNEE SURGERY Bilateral     right knee x 2( scope)/ left knee- 7-8 yr ago   [address] fusion    THORACENTESIS Left 12/20/2013    THORACENTESIS  4/25/2016         THORACENTESIS Left 11/15/2016    Dr. Willard Peeling 250mlsremoved     THORACOTOMY Left 03/18/2019    with Lysis of adhesions    THORACOTOMY Left 3/18/2019    THORACOSCOPY CONVERTED TO THORACOTOMY WITH LYSIS OF ADHESIONS performed by Ct Jaime MD at [address] [place] [address]      as a kid     Family History   Problem Relation Age of Onset    Heart Disease Mother         heart attack    High Blood Pressure Father      Social History     Socioeconomic History    Marital status:  Spouse name: Not on file    Number of children: Not on file    Years of education: Not on file    Highest education level: Not on file   Occupational History    Occupation: built trucks, retired     Employer: [employer]   Social Needs    Financial resource strain: Not on file    Food insecurity:     Worry: Not on file     Inability: Not on file   [employer] needs:     Medical: Not on file     Non-medical: Not on file   Tobacco Use    Smoking status: Former Smoker     Packs/day: 1.50     Years: 30.00     Pack years: 45.00     Last attempt to quit: 2010     Years since quittin.8    Smokeless tobacco: Never Used   Substance and Sexual Activity    Alcohol use: Not Currently     Comment: \"quit- - use to drink every day\".   Rarely    Drug use: Not Currently     Frequency: 7.0 times per week     Comment: daily for sleep/ on 2016\"I no longer do that\"    Sexual activity: Not Currently     Partners: Female   Lifestyle    Physical activity:     Days per week: Not on file     Minutes per session: Not on file    Stress: Not on file   Relationships    Social connections:     Talks on phone: Not on file     Gets together: Not on file     Attends Jainism service: Not on file     Active member of club or organization: Not on file     Attends meetings of clubs or organizations: Not on file     Relationship status: Not on file    Intimate partner violence:     Fear of current or ex partner: Not on file     Emotionally abused: Not on file     Physically abused: Not on file     Forced sexual activity: Not on file   Other Topics Concern    Not on file   Social History Narrative    Not on file     Current Facility-Administered Medications   Medication Dose Route Frequency Provider Last Rate Last Dose    morphine sulfate (PF) injection 4 mg  4 mg Intravenous Q30 Min PRN Michael Beckman DO   4 mg at 19 1625    ondansetron (ZOFRAN) injection 4 mg  4 mg Intravenous Q30 Min PRN Gatito Mensah Isaak, DO   4 mg at 05/18/19 1625    carvedilol (COREG) tablet 12.5 mg  12.5 mg Oral BID  Eben Isaak, DO   12.5 mg at 05/18/19 1646     Current Outpatient Medications   Medication Sig Dispense Refill    omeprazole (PRILOSEC) 20 MG delayed release capsule Take 20 mg by mouth daily      ferrous sulfate 325 (65 Fe) MG tablet Take 650 mg by mouth daily (with breakfast)      sertraline (ZOLOFT) 50 MG tablet Take 50 mg by mouth nightly      carvedilol (COREG) 12.5 MG tablet Take 1 tablet by mouth 2 times daily 90 tablet 1    carvedilol (COREG) 12.5 MG tablet Take 12.5 mg by mouth 2 times daily      pantoprazole (PROTONIX) 40 MG tablet Take 1 tablet by mouth 2 times daily (before meals) 60 tablet 0    escitalopram (LEXAPRO) 20 MG tablet Take 1 tablet by mouth daily 30 tablet 3    LACTOBACILLUS EXTRA STRENGTH CAPS Take 1 each by mouth every morning       epoetin ezequiel-epbx (RETACRIT) 69737 UNIT/ML SOLN injection Inject 5,000 Units into the skin three times a week Indications: one time a day every Tue for anemia      polyethylene glycol (GLYCOLAX) powder Take 17 g by mouth daily      aspirin 81 MG EC tablet Take 1 tablet by mouth daily 30 tablet 3    docusate sodium (COLACE, DULCOLAX) 100 MG CAPS Take 100 mg by mouth 2 times daily 30 capsule 0    folic acid (FOLVITE) 1 MG tablet Take 1 tablet by mouth daily 30 tablet 0    furosemide (LASIX) 40 MG tablet Take 1 tablet by mouth daily 60 tablet 0    spironolactone (ALDACTONE) 25 MG tablet Take 1 tablet by mouth daily 30 tablet 3    linagliptin (TRADJENTA) 5 MG tablet Take 1 tablet by mouth daily 30 tablet 0    amLODIPine (NORVASC) 10 MG tablet Take 1 tablet by mouth nightly 30 tablet 0    atorvastatin (LIPITOR) 80 MG tablet Take 1 tablet by mouth nightly 30 tablet 0    fluticasone (FLONASE) 50 MCG/ACT nasal spray 2 sprays by Nasal route daily 1 Bottle 0    ipratropium-albuterol (DUONEB) 0.5-2.5 (3) MG/3ML SOLN nebulizer solution Inhale 1 vial into the lungs every 6 hours as needed for Shortness of Breath (wheezing)      gabapentin (NEURONTIN) 800 MG tablet Take 800 mg by mouth 3 times daily.  potassium chloride (KLOR-CON M) 10 MEQ extended release tablet Take 20 mEq by mouth daily      OLANZapine (ZYPREXA) 20 MG tablet Take 20 mg by mouth daily      clopidogrel (PLAVIX) 75 MG tablet Take 75 mg by mouth daily      insulin glargine (TOUJEO SOLOSTAR) 300 UNIT/ML injection pen Inject 42 Units into the skin nightly      aclidinium (TUDORZA PRESSAIR) 400 MCG/ACT AEPB inhaler Inhale 1 puff into the lungs 2 times daily      ALPRAZolam (XANAX) 0.5 MG tablet Take 0.5 mg by mouth 2 times daily as needed. Must last 30 days.  mineral oil-hydrophilic petrolatum (AQUAPHOR) ointment Apply topically as needed for Dry Skin Apply topically as needed.  nitroGLYCERIN (NITROSTAT) 0.4 MG SL tablet up to max of 3 total doses.  If no relief after 1 dose, call 911. 25 tablet 0    insulin lispro (HUMALOG) 100 UNIT/ML injection vial Inject 0-12 Units into the skin 3 times daily (with meals) Glucose: Dose: <139 No insulin 140-199 2 units 200-249 4 units 250-299 6 units 300-349 8 units 350-400 10 units About 400 12 units 1 vial 0    insulin lispro (HUMALOG) 100 UNIT/ML injection vial Inject 0-6 Units into the skin nightly If <139       No Insulin 140-199      1 Unit 200-249      2 Units 250-299      3 Units 300-349      4 Units 350-400      5 Units Above 400    6 Units 1 vial 3    ondansetron (ZOFRAN) 4 MG tablet Take 1 tablet by mouth 3 times daily as needed for Nausea or Vomiting 30 tablet 0    albuterol sulfate HFA (PROVENTIL HFA) 108 (90 BASE) MCG/ACT inhaler Inhale 2 puffs into the lungs every 6 hours as needed for Wheezing or Shortness of Breath 30 Inhaler 0      Allergies   Allergen Reactions    Nsaids      Renal     Pcn [Penicillins]      Per Dr Brendan Benito paper chart     Current Facility-Administered Medications   Medication Dose Route Frequency Provider Last Rate Last Dose    morphine sulfate (PF) injection 4 mg  4 mg Intravenous Q30 Min PRN Jackson Gisella, DO   4 mg at 05/18/19 1625    ondansetron (ZOFRAN) injection 4 mg  4 mg Intravenous Q30 Min PRN Abdirashid Gisella, DO   4 mg at 05/18/19 1625    carvedilol (COREG) tablet 12.5 mg  12.5 mg Oral BID  Eben Littlejaewa, DO   12.5 mg at 05/18/19 1646     Current Outpatient Medications   Medication Sig Dispense Refill    omeprazole (PRILOSEC) 20 MG delayed release capsule Take 20 mg by mouth daily      ferrous sulfate 325 (65 Fe) MG tablet Take 650 mg by mouth daily (with breakfast)      sertraline (ZOLOFT) 50 MG tablet Take 50 mg by mouth nightly      carvedilol (COREG) 12.5 MG tablet Take 1 tablet by mouth 2 times daily 90 tablet 1    carvedilol (COREG) 12.5 MG tablet Take 12.5 mg by mouth 2 times daily      pantoprazole (PROTONIX) 40 MG tablet Take 1 tablet by mouth 2 times daily (before meals) 60 tablet 0    escitalopram (LEXAPRO) 20 MG tablet Take 1 tablet by mouth daily 30 tablet 3    LACTOBACILLUS EXTRA STRENGTH CAPS Take 1 each by mouth every morning       epoetin ezequiel-epbx (RETACRIT) 74636 UNIT/ML SOLN injection Inject 5,000 Units into the skin three times a week Indications: one time a day every Tue for anemia      polyethylene glycol (GLYCOLAX) powder Take 17 g by mouth daily      aspirin 81 MG EC tablet Take 1 tablet by mouth daily 30 tablet 3    docusate sodium (COLACE, DULCOLAX) 100 MG CAPS Take 100 mg by mouth 2 times daily 30 capsule 0    folic acid (FOLVITE) 1 MG tablet Take 1 tablet by mouth daily 30 tablet 0    furosemide (LASIX) 40 MG tablet Take 1 tablet by mouth daily 60 tablet 0    spironolactone (ALDACTONE) 25 MG tablet Take 1 tablet by mouth daily 30 tablet 3    linagliptin (TRADJENTA) 5 MG tablet Take 1 tablet by mouth daily 30 tablet 0    amLODIPine (NORVASC) 10 MG tablet Take 1 tablet by mouth nightly 30 tablet 0    atorvastatin (LIPITOR) 80 MG tablet Take 1 tablet by Inhaler 0       Nursing Notes Reviewed    VITAL SIGNS:  ED Triage Vitals   Enc Vitals Group      BP       Pulse       Resp       Temp       Temp src       SpO2       Weight       Height       Head Circumference       Peak Flow       Pain Score       Pain Loc       Pain Edu? Excl. in [address]? PHYSICAL EXAM:  Physical Exam   Constitutional: He is oriented to person, place, and time. He appears well-developed and well-nourished. He is active and cooperative. Non-toxic appearance. He does not have a sickly appearance. He does not appear ill. No distress. HENT:   Head: Normocephalic and atraumatic. Right Ear: External ear normal.   Left Ear: External ear normal.   Mouth/Throat: Oropharynx is clear and moist.   Eyes: Pupils are equal, round, and reactive to light. Conjunctivae and EOM are normal. Right eye exhibits no discharge. Left eye exhibits no discharge. No scleral icterus. Neck: Normal range of motion, full passive range of motion without pain and phonation normal. No JVD present. No neck rigidity. No edema, no erythema and normal range of motion present. Cardiovascular: Regular rhythm, normal heart sounds, intact distal pulses and normal pulses. Tachycardia present. Exam reveals no gallop and no friction rub. No murmur heard. Pulmonary/Chest: Effort normal and breath sounds normal. No stridor. No respiratory distress. He has no wheezes. He has no rales. Abdominal: Soft. Bowel sounds are normal. He exhibits no distension and no mass. There is no tenderness. There is no rigidity, no rebound, no guarding, no tenderness at McBurney's point and negative Artis's sign. Musculoskeletal: Normal range of motion. He exhibits no edema, tenderness or deformity. Neurological: He is alert and oriented to person, place, and time. He has normal strength. He is not disoriented. He displays no atrophy and no tremor. No cranial nerve deficit or sensory deficit. He exhibits normal muscle tone.  He displays no seizure activity. Coordination normal. GCS eye subscore is 4. GCS verbal subscore is 5. GCS motor subscore is 6. Skin: Skin is warm. No rash noted. He is not diaphoretic. No erythema. No pallor. Psychiatric: He has a normal mood and affect. His behavior is normal. Judgment and thought content normal.   Nursing note and vitals reviewed.         I have reviewed andinterpreted all of the currently available lab results from this visit (if applicable):    Results for orders placed or performed during the hospital encounter of 05/18/19   CBC Auto Differential   Result Value Ref Range    WBC 11.5 (H) 4.0 - 10.5 K/CU MM    RBC 4.40 (L) 4.6 - 6.2 M/CU MM    Hemoglobin 11.2 (L) 13.5 - 18.0 GM/DL    Hematocrit 39.2 (L) 42 - 52 %    MCV 89.1 78 - 100 FL    MCH 25.5 (L) 27 - 31 PG    MCHC 28.6 (L) 32.0 - 36.0 %    RDW 15.0 (H) 11.7 - 14.9 %    Platelets 685 896 - 521 K/CU MM    MPV 8.8 7.5 - 11.1 FL    Differential Type AUTOMATED DIFFERENTIAL     Segs Relative 73.2 (H) 36 - 66 %    Lymphocytes % 17.9 (L) 24 - 44 %    Monocytes % 7.5 (H) 0 - 4 %    Eosinophils % 0.4 0 - 3 %    Basophils % 0.4 0 - 1 %    Segs Absolute 8.4 K/CU MM    Lymphocytes # 2.1 K/CU MM    Monocytes # 0.9 K/CU MM    Eosinophils # 0.1 K/CU MM    Basophils # 0.1 K/CU MM    Nucleated RBC % 0.0 %    Total Nucleated RBC 0.0 K/CU MM    Total Immature Neutrophil 0.07 K/CU MM    Immature Neutrophil % 0.6 (H) 0 - 0.43 %   CMP   Result Value Ref Range    Sodium 135 135 - 145 MMOL/L    Potassium 3.7 3.5 - 5.1 MMOL/L    Chloride 101 99 - 110 mMol/L    CO2 21 21 - 32 MMOL/L    BUN 18 6 - 23 MG/DL    CREATININE 1.5 (H) 0.9 - 1.3 MG/DL    Glucose 182 (H) 70 - 99 MG/DL    Calcium 9.2 8.3 - 10.6 MG/DL    Alb 3.0 (L) 3.4 - 5.0 GM/DL    Total Protein 7.9 6.4 - 8.2 GM/DL    Total Bilirubin 0.3 0.0 - 1.0 MG/DL    ALT 20 10 - 40 U/L    AST 25 15 - 37 IU/L    Alkaline Phosphatase 102 40 - 129 IU/L    GFR Non- 46 (L) >60 mL/min/1.73m2    GFR  56 (L) >60 mL/min/1.73m2    Anion Gap 13 4 - 16   Troponin   Result Value Ref Range    Troponin T 0.018 (H) <0.01 NG/ML   Lipase   Result Value Ref Range    Lipase 19 13 - 60 IU/L   CK   Result Value Ref Range    Total CK 40 38 - 174 IU/L   Brain Natriuretic Peptide   Result Value Ref Range    Pro-.7 (H) <300 PG/ML   Magnesium   Result Value Ref Range    Magnesium 2.2 1.8 - 2.4 mg/dl   PT - INR   Result Value Ref Range    Protime 11.8 9.12 - 12.5 SECONDS    INR 1.04 INDEX   Lactic Acid, Plasma   Result Value Ref Range    Lactate 1.4 0.4 - 2.0 mMOL/L   APTT   Result Value Ref Range    aPTT 32.8 21.2 - 33.0 SECONDS   TSH without Reflex   Result Value Ref Range    TSH, High Sensitivity 1.370 0.270 - 4.20 uIu/ml   Troponin   Result Value Ref Range    Troponin T <0.010 <0.01 NG/ML   EKG 12 Lead   Result Value Ref Range    Ventricular Rate 131 BPM    Atrial Rate 131 BPM    P-R Interval 176 ms    QRS Duration 110 ms    Q-T Interval 298 ms    QTc Calculation (Bazett) 440 ms    P Axis -9 degrees    R Axis -13 degrees    T Axis -10 degrees    Diagnosis       Sinus tachycardia  Incomplete right bundle branch block  Possible Lateral infarct , age undetermined  Abnormal ECG  When compared with ECG of 22-APR-2019 06:08,  Vent. rate has increased BY  74 BPM  Borderline criteria for Lateral infarct are now present  T wave inversion now evident in Anterior leads          Radiographs (if obtained):  [] The following radiograph was interpreted by myself in the absence of a radiologist:  [x] Radiologist's Report Reviewed:    CXR, CTA chest    Xr Chest Standard (2 Vw)    Result Date: 5/14/2019  EXAMINATION: TWO XRAY VIEWS OF THE CHEST 5/14/2019 9:36 am COMPARISON: 04/25/2019 HISTORY: ORDERING SYSTEM PROVIDED HISTORY: Pleural effusion, left TECHNOLOGIST PROVIDED HISTORY: Reason for exam:->pleural effusion Ordering Physician Provided Reason for Exam: hx left pleural effusion FINDINGS: Left-sided chest tube has been removed.   There is a persistent left-sided hydropneumothorax. Possibility of an empyema cannot be excluded. Some compressive left basilar atelectasis is present. The heart and mediastinal structures are midline. The right lung is clear. There is questionable blunting of the posterior costophrenic sulcus on the right consistent with a small right pleural effusion. Persistent left hydropneumothorax with compressive atelectasis left lung base. Empyema cannot be excluded. Probable small right pleural effusion. Xr Chest Portable    Result Date: 4/27/2019  EXAMINATION: SINGLE XRAY VIEW OF THE CHEST 4/23/2019 4:38 pm COMPARISON: 04/21/2019 HISTORY: ORDERING SYSTEM PROVIDED HISTORY: trapped lung TECHNOLOGIST PROVIDED HISTORY: Reason for exam:->trapped lung Ordering Physician Provided Reason for Exam: trapped lung Acuity: Acute Type of Exam: Initial Additional signs and symptoms: na Relevant Medical/Surgical History: diabetes, cad, copd FINDINGS: Interval placement left thoracotomy tube. No change in appearance of the complex loculated left pleural fluid collection containing air and fluid. Compressive atelectatic changes within the lingula and left lower lobe. The right lung is clear. Heart size and mediastinal contours are stable. Left thoracotomy tube placement without additional change. Xr Chest Portable    Result Date: 4/25/2019  EXAMINATION: SINGLE XRAY VIEW OF THE CHEST 4/25/2019 6:45 am COMPARISON: 04/23/2019 HISTORY: ORDERING SYSTEM PROVIDED HISTORY: chest tube TECHNOLOGIST PROVIDED HISTORY: Reason for exam:->chest tube Ordering Physician Provided Reason for Exam: chest tube Acuity: Unknown Type of Exam: Subsequent/Follow-up Additional signs and symptoms: chest tube Relevant Medical/Surgical History: chest tube Acute symptoms. FINDINGS: Heart size is normal.  A small to moderate amount of pleural air remains laterally in the mid left chest.  No air-fluid level.   Small left pleural effusion thought to be present. Small caliber chest tube present laterally in the lower left chest.  Right lung essentially clear. No pulmonary vascular congestion. Prior ACDF. No change in the small to moderate loculated left-sided pneumothorax. Chest tube in place. Moderate left basilar atelectasis and small left pleural effusion. Xr Chest Portable    Result Date: 4/21/2019  EXAMINATION: SINGLE XRAY VIEW OF THE CHEST 4/21/2019 9:05 pm COMPARISON: Chest radiograph 04/16/2019. HISTORY: ORDERING SYSTEM PROVIDED HISTORY: chest pain TECHNOLOGIST PROVIDED HISTORY: Reason for exam:->chest pain Ordering Physician Provided Reason for Exam: chest pain Acuity: Unknown Type of Exam: Unknown Initial encounter. FINDINGS: The cardiomediastinal silhouette is unchanged. A suspected loculated left hydropneumothorax is again seen. This is stable to mildly decreased from the previous exam.  The right lung is clear. No acute osseous abnormality. Stable to mildly decreased loculated left hydropneumothorax compared with 04/16/2018. Ct Guided Chest Tube    Result Date: 4/22/2019  PROCEDURE: CT GUIDED CHEST TUBE PLACEMENT MODERATE CONSCIOUS SEDATION <Completed Date> H and P addendum: ASA 2 Mallampati 2 HISTORY: ORDERING SYSTEM PROVIDED HISTORY: empyema TECHNOLOGIST PROVIDED HISTORY: Reason for exam:->empyema Ordering Physician Provided Reason for Exam: empyema Acuity: Acute Type of Exam: Initial Relevant Medical/Surgical History: empyema, pleural effusion, CHF Pneumothorax SEDATION: Versed 2 mg fentanyl 100 mcg TECHNIQUE: Informed consent was obtained after complete explanation of the procedure including the inherent risks. Patient understands and has given consent. Patient placed prone on the CT table. Scans performed again demonstrating a left posterior pleural based fluid collection with multiple small gas bubbles suggesting loculations. The skin overlying this prepped and draped using maximal sterile barrier technique.   Skinny needle access site anesthetized with 2% lidocaine and a 5 [place] needle catheter advanced into the pleural space. Once fluid aspirated the catheter stripped ahead of the guiding needle needle removed. The fluid aspirated is bloody in nature. Glidewire advanced through this access 5 [place]ran catheter and catheter removed. 10 [place]ran drainage tube was then placed over this wire. Aspiration performed. A large amount of gas is obtained aspirating through this access catheter. Catheter is connected to a pleura vac with wall suction. Views of the area concern of a bronchopleural fistula is raised. Clinical correlation needed. FINDINGS: Left-sided chest tube placement using CT guidance as described above     Successful CT guided placement of a  chest tube     Cta Pulmonary W Contrast    Result Date: 4/21/2019  EXAMINATION: CTA OF THE CHEST 4/21/2019 9:35 pm TECHNIQUE: CTA of the chest was performed after the administration of intravenous contrast.  Multiplanar reformatted images are provided for review. MIP images are provided for review. Dose modulation, iterative reconstruction, and/or weight based adjustment of the mA/kV was utilized to reduce the radiation dose to as low as reasonably achievable. COMPARISON: CT of the chest dated March 16, 2019. HISTORY: ORDERING SYSTEM PROVIDED HISTORY: Chest pain, shortness of breath FINDINGS: Pulmonary Arteries: Pulmonary arteries are adequately opacified for evaluation. No evidence of intraluminal filling defect to suggest pulmonary embolism. Main pulmonary artery is normal in caliber. Mediastinum: No evidence of mediastinal lymphadenopathy. The heart and pericardium demonstrate no acute abnormality. There is no acute abnormality of the thoracic aorta. Lungs/pleura: There is redemonstration of a moderate-to-large thick-walled pleural fluid collection which contains a fluid level and multiple collections of gas. The previously noted chest tube has been removed.   This collection measures 14.4 cm in craniocaudal dimension and 6.9 cm in transverse dimension. There is partial collapse of the left lower lobe. This could be related to secretions within the left lower lobe bronchus. There is shift of the heart and mediastinum to the left. 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 Calcified granulomas are seen within the spleen. The remainder of the upper abdomen is otherwise unremarkable. Soft Tissues/Bones: No acute bone or soft tissue abnormality. 1. No evidence for a pulmonary embolism. 2. There is a moderate thick-walled pleural fluid collection containing gas and complex fluid, concerning for a pyopneumothorax. The previously seen chest tube has been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

## 2019-05-18 NOTE — ED TRIAGE NOTES
Pt presents to the ED today via EMS with c/o chest pain that started 2-3 hours ago and has progressed. Pt describes the pain as a pressure type feeling.

## 2019-05-18 NOTE — ED NOTES
Care and report to PARVIZ ALLEN [facility], RN     Alexey Al RN  05/18/19 9982

## 2019-05-19 PROCEDURE — 93010 ELECTROCARDIOGRAM REPORT: CPT | Performed by: INTERNAL MEDICINE

## 2019-05-19 NOTE — ED NOTES
[facility] called for patient transport home, ETA [phone].      Abelino Khalil  05/18/19 213

## 2019-05-20 ENCOUNTER — HOSPITAL ENCOUNTER (OUTPATIENT)
Age: 69
Discharge: HOME | End: 2019-05-20
Payer: MEDICARE

## 2019-05-20 ENCOUNTER — HOSPITAL ENCOUNTER (OUTPATIENT)
Dept: GENERAL RADIOLOGY | Age: 69
Discharge: HOME | End: 2019-05-20
Payer: MEDICARE

## 2019-05-20 DIAGNOSIS — F41.9 ANXIETY: Primary | ICD-10-CM

## 2019-05-20 DIAGNOSIS — J90 PLEURAL EFFUSION, LEFT: ICD-10-CM

## 2019-05-20 PROCEDURE — 71046 X-RAY EXAM CHEST 2 VIEWS: CPT

## 2019-05-20 NOTE — TELEPHONE ENCOUNTER
MESSAGE FWD TO DR Linena Valdes TO SIGN AND SEND. [address] IS NOT ON HIS MEDICATION LIST. PLEASE ADVISE IF HE CAN HAVE IT. IF SO SIG AND DIRECTIONS.     Electronically signed by Marie Brown MA on 5/20/2019 at 5:10 PM

## 2019-05-20 NOTE — TELEPHONE ENCOUNTER
----- Message from Corinn Severs sent at 5/20/2019 12:00 PM EDT -----  Contact: PATIENT  MED REFILL:     NORCO [id number]     [address] [place] [address]

## 2019-05-21 ENCOUNTER — OFFICE VISIT (OUTPATIENT)
Dept: CARDIOLOGY CLINIC | Age: 69
End: 2019-05-21
Payer: MEDICARE

## 2019-05-21 VITALS
DIASTOLIC BLOOD PRESSURE: 100 MMHG | BODY MASS INDEX: 30.78 KG/M2 | SYSTOLIC BLOOD PRESSURE: 148 MMHG | HEART RATE: 74 BPM | HEIGHT: 70 IN | WEIGHT: 215 LBS

## 2019-05-21 DIAGNOSIS — I50.30 DIASTOLIC CONGESTIVE HEART FAILURE, UNSPECIFIED HF CHRONICITY (HCC): ICD-10-CM

## 2019-05-21 DIAGNOSIS — I48.0 PAF (PAROXYSMAL ATRIAL FIBRILLATION) (HCC): ICD-10-CM

## 2019-05-21 DIAGNOSIS — I10 ESSENTIAL HYPERTENSION: ICD-10-CM

## 2019-05-21 DIAGNOSIS — E78.5 HYPERLIPIDEMIA, UNSPECIFIED HYPERLIPIDEMIA TYPE: ICD-10-CM

## 2019-05-21 DIAGNOSIS — G47.33 OSA ON CPAP: ICD-10-CM

## 2019-05-21 DIAGNOSIS — N18.30 CHRONIC KIDNEY DISEASE (CKD), STAGE III (MODERATE) (HCC): ICD-10-CM

## 2019-05-21 DIAGNOSIS — Z99.89 OSA ON CPAP: ICD-10-CM

## 2019-05-21 DIAGNOSIS — I25.10 CORONARY ARTERY DISEASE INVOLVING NATIVE CORONARY ARTERY OF NATIVE HEART WITHOUT ANGINA PECTORIS: Primary | ICD-10-CM

## 2019-05-21 DIAGNOSIS — I50.32 CHRONIC DIASTOLIC CONGESTIVE HEART FAILURE (HCC): ICD-10-CM

## 2019-05-21 PROCEDURE — G8598 ASA/ANTIPLAT THER USED: HCPCS | Performed by: NURSE PRACTITIONER

## 2019-05-21 PROCEDURE — G8427 DOCREV CUR MEDS BY ELIG CLIN: HCPCS | Performed by: NURSE PRACTITIONER

## 2019-05-21 PROCEDURE — 1111F DSCHRG MED/CURRENT MED MERGE: CPT | Performed by: NURSE PRACTITIONER

## 2019-05-21 PROCEDURE — G8417 CALC BMI ABV UP PARAM F/U: HCPCS | Performed by: NURSE PRACTITIONER

## 2019-05-21 PROCEDURE — 4040F PNEUMOC VAC/ADMIN/RCVD: CPT | Performed by: NURSE PRACTITIONER

## 2019-05-21 PROCEDURE — 3017F COLORECTAL CA SCREEN DOC REV: CPT | Performed by: NURSE PRACTITIONER

## 2019-05-21 PROCEDURE — 1123F ACP DISCUSS/DSCN MKR DOCD: CPT | Performed by: NURSE PRACTITIONER

## 2019-05-21 PROCEDURE — 1036F TOBACCO NON-USER: CPT | Performed by: NURSE PRACTITIONER

## 2019-05-21 PROCEDURE — 93000 ELECTROCARDIOGRAM COMPLETE: CPT | Performed by: NURSE PRACTITIONER

## 2019-05-21 PROCEDURE — 99214 OFFICE O/P EST MOD 30 MIN: CPT | Performed by: NURSE PRACTITIONER

## 2019-05-21 RX ORDER — CARVEDILOL 12.5 MG/1
12.5 TABLET ORAL 2 TIMES DAILY
Qty: 90 TABLET | Refills: 1 | Status: SHIPPED | OUTPATIENT
Start: 2019-05-21 | End: 2019-05-24

## 2019-05-21 RX ORDER — ALPRAZOLAM 0.5 MG/1
0.5 TABLET ORAL 2 TIMES DAILY PRN
Qty: 60 TABLET | Refills: 0 | Status: SHIPPED | OUTPATIENT
Start: 2019-05-21 | End: 2019-06-20

## 2019-05-21 NOTE — PROGRESS NOTES
[facility] [facility],Third [address]  Phone: [phone]    Fax [phone]                  Louis Maciel MD, Melva Grady MD, [address] Arreola MD, MD Bryant Dodge MD Amil Camper, MD Katrina Ades, JULEE Evans, JULEE Monroy, JULEE    CARDIOLOGY  NOTE      5/21/2019    RE: Jori Roach.  (1950)                               TO:  Dr. Fabian Stout MD     The primary cardiologist is Dr. Viral Brownlee    CC: CAD, CHF, PAF, HTN, HLD, CKD, SAMANTHA. Patient denies all of the following:  Chest Pain  Palpitations  Shortness of Breath  Increased Edema  Dizziness  Syncope      HPI: Thank you for involving me in taking care of your patient Jori Roach., who is a  71y.o. year old male with a history of Thoracotomy, multiple chest tubes to manage loculated fluid, CAD, CHF, PAF, HTN, HLD, CKD, SAMANTHA. He has been having multiple occurences of tachycardia. He has not had his medications since leaving the hospital on 4/25/2019. He continues to have pain in his side and back and has ran out of pain medicine. Vitals:    05/21/19 1224   BP: (!) 148/100   Pulse: 74       Current Outpatient Medications   Medication Sig Dispense Refill    ALPRAZolam (XANAX) 0.5 MG tablet Take 1 tablet by mouth 2 times daily as needed (TAKE 1 BID PRN) for up to 30 days. Must last 30 days.  60 tablet 0    omeprazole (PRILOSEC) 20 MG delayed release capsule Take 20 mg by mouth daily      ferrous sulfate 325 (65 Fe) MG tablet Take 650 mg by mouth daily (with breakfast)      sertraline (ZOLOFT) 50 MG tablet Take 50 mg by mouth nightly      ipratropium-albuterol (DUONEB) 0.5-2.5 (3) MG/3ML SOLN nebulizer solution Inhale 1 vial into the lungs every 6 hours as needed for Shortness of Breath (wheezing)      carvedilol (COREG) 12.5 MG tablet Take 1 tablet by mouth 2 times daily 90 tablet 1    carvedilol (COREG) 12.5 MG tablet cardiovascular stress test 14    EF 68% mild ischemia anterior wall    Heart murmur     \"see Dr Lila Morejon- last echo \" pt states\"I think they said I have a murmur but no chest pain or palpitations\"    Hiatal hernia     History of cardiac cath 14    MODERATE  CAD      Hx of migraines     HX OTHER MEDICAL     \"have thinning of kidney walls- they found I had that 15 yrs ago\" see Dr Mahesh Simental"    Hyperlipidemia     Hypertension     Lumbar radiculopathy     Panic attack     'anything new gives me anxiety\"    Pleural effusion     scheduled for thoracentesis 2014, 2016    S/P thoracentesis     left side( per old chart had thora done 2016 and one done )    Sleep apnea     sleep study x 2 - last one 4-5 yrs ago- uses c-pap\"    SOBOE (shortness of breath on exertion)     Spinal stenosis      Past Surgical History:   Procedure Laterality Date    CARPAL TUNNEL RELEASE Bilateral 1989    ELBOW SURGERY Left 's    KNEE SURGERY Bilateral     right knee x 2( scope)/ left knee- 7-8 yr ago   [address] fusion    THORACENTESIS Left 2013    THORACENTESIS  2016         THORACENTESIS Left 11/15/2016    Dr. Dhillon [place] 250mlsremoved     THORACOTOMY Left 2019    with Lysis of adhesions    THORACOTOMY Left 3/18/2019    THORACOSCOPY CONVERTED TO THORACOTOMY WITH LYSIS OF ADHESIONS performed by Sunshine Liz MD at [address]      as a kid     Family History   Problem Relation Age of Onset    Heart Disease Mother         heart attack    High Blood Pressure Father      Social History     Tobacco Use    Smoking status: Former Smoker     Packs/day: 1.50     Years: 30.00     Pack years: 45.00     Last attempt to quit: 2010     Years since quittin.8    Smokeless tobacco: Never Used   Substance Use Topics    Alcohol use: Not Currently     Comment: \"quit- - use to drink every day\".   Rarely        Review of Systems - History obtained from the patient  General: negative for - fatigue, malaise, night sweats, weight gain  Psychological: negative for - anxiety, depression, sleep disturbances  Ophthalmic: negative for - blurry vision, loss of vision  ENT: negative for - headaches, vertigo, visual changes  Hematological and Lymphatic: negative for - bleeding problems, blood clots, bruising, fatigue or pallor  Endocrine: negative for - malaise/lethargy, palpitations, unexpected weight changes  Respiratory: negative for - cough, orthopnea, shortness of breath or tachypnea  Cardiovascular: negative for - chest pain, dyspnea on exertion, edema or palpitations  Gastrointestinal: no abdominal pain, change in bowel habits, or black or bloody stools  Genito-Urinary: no dysuria, trouble voiding, or hematuria  Musculoskeletal: negative for - gait disturbance, pain in left, lower back, swelling in bilateral leg   Neurological: negative for - confusion, dizziness, impaired coordination/balance or numbness/tingling  He is using a walker for ambulation. Objective:      Physical Exam:  BP (!) 148/100   Pulse 74   Ht 5' 9.5\" (1.765 m)   Wt 215 lb (97.5 kg)   BMI 31.29 kg/m²   Wt Readings from Last 3 Encounters:   05/21/19 215 lb (97.5 kg)   05/18/19 210 lb (95.3 kg)   04/29/19 210 lb (95.3 kg)     Body mass index is 31.29 kg/m². GENERAL - Alert, oriented, pleasant, in no apparent distress. Head unremarkable  Eyes - pupils equal and reactive to light - bilateral conjunctiva are pink: sclera are white   ENT - external ears intact, nose is intact:  tongue is midline pink and moist  Neck - Supple. No jugular venous distention noted. No carotid bruits appreciated. Cardiovascular - Normal S1 and S2:  murmur appreciated No, No gallop. Regular rate- Yes,  rhythm regular-Yes. Extremities - No cyanosis, clubbing, +1 edema to lower legs. Pulmonary - No respiratory distress. No wheezes or rales. Chest is Clear on the R side.  L side upper lobe is clear and Lower medications   EKG today Sinus rhythm    Hyperlipidemia  · Lipid panel reviewed  · Need current Lipids  · Goal is not able to be determined  · Patient is on a Statin    CKD  · Managed by Renal.   Lab Results   Component Value Date    CREATININE 1.5 (H) 05/18/2019   ·     SAMANTHA   Patient has a CPAP machine   Patient does wear it every night   Patient does sleep well.  Pulmonology manages     Patient seen, interviewed and examined. Testing was reviewed. Lifestyle and risk factor modificatons discussed. Various goals are discussed and questions answered. Continue current medications. Appropriate prescriptions are addressed. Questions answered and patient verbalizes understanding. Call for any problems, questions, or concerns.     Pt is to follow up in 3 months for Cardiac management    BP check in 1 month    Electronically signed by JULEE Clancy CNP on 5/21/2019 at 1:14 PM

## 2019-05-22 ENCOUNTER — TELEPHONE (OUTPATIENT)
Dept: FAMILY MEDICINE CLINIC | Age: 69
End: 2019-05-22

## 2019-05-22 LAB
EKG ATRIAL RATE: 131 BPM
EKG DIAGNOSIS: NORMAL
EKG P AXIS: -9 DEGREES
EKG P-R INTERVAL: 176 MS
EKG Q-T INTERVAL: 298 MS
EKG QRS DURATION: 110 MS
EKG QTC CALCULATION (BAZETT): 440 MS
EKG R AXIS: -13 DEGREES
EKG T AXIS: -10 DEGREES
EKG VENTRICULAR RATE: 131 BPM

## 2019-05-22 RX ORDER — LANSOPRAZOLE 30 MG/1
30 CAPSULE, DELAYED RELEASE ORAL DAILY
Qty: 30 CAPSULE | Refills: 0 | Status: SHIPPED | OUTPATIENT
Start: 2019-05-22 | End: 2019-08-27

## 2019-05-22 NOTE — TELEPHONE ENCOUNTER
SPK W/ PT ADVISED WE HAVE NOT SEEN PT SINCE SURGERY OR NURSING HOME, WE HAVE PREVACID ON FILE AS STOMACH MED HE USED TO RCV HERE. NO NORCO LISTED. PT WILL HAVE TO BE SEEN TO DISCUSS [address] AND REVIEW MEDS. CAN SEND PREVACID FOR NOW. PT VOICED UNDERSTANDING, HAS APPT 5/29.  SENT PREVACID ([address])  Electronically signed by Kaden Gallagher MA on 5/22/2019 at 8:53 AM

## 2019-05-22 NOTE — TELEPHONE ENCOUNTER
----- Message from Ksenia Gonzales sent at 5/22/2019  8:34 AM EDT -----  Contact: parveen   Needs his norco and his stomach meds says he needs it called in before noon to get it delivered by [facility] [address]

## 2019-05-22 NOTE — TELEPHONE ENCOUNTER
We received a [facility]icemail today at 11:34am [facility])[phone] stating patients heart rate was 132, bp 148/84 he's been in P.T. For a couple weeks for strength, balancing and encouragement. Patient with increased chest discomfort, see cardiologist yesterday 5-21-19 and hasnt received new meds rx'd from pharm. Their protocol is to alert pcp. Dr Jf Hinojosa is out so per  Dr. Maksim White I called and spoke with the therapist to see if there were any actual cp, sob fevers, or irreg hrt beat. He states none of this but patient received meds from cardio to slow heart rate just hasn't started yet. I waited a bit, called patient he stated he finally received the new medication from cardiologist and sine starting a while ago his heart rate is back to normal and he feels just fine.

## 2019-05-23 ENCOUNTER — TELEPHONE (OUTPATIENT)
Dept: FAMILY MEDICINE CLINIC | Age: 69
End: 2019-05-23

## 2019-05-23 NOTE — TELEPHONE ENCOUNTER
LM FOR PT ON THIRD PH# LISTED. FIRST PH# NOT WORKING. SECOND PH# LISTED IS FOR DIFFERENT PERSON. PT HAS NOT BEEN SEEN HERE SINCE HOSP OR NURSING HOME AND DOSES WE HAVE HERE ARE DIFFERENT/NOT ON MED LIST AND SOME OF THE MEDS HE LEFT DO NOT HAVE DOSES ON THE NOTE. NEED TO SPK W/ PT TO CLARIFY.   Electronically signed by Patricia Figueroa MA on 5/23/2019 at 4:58 PM

## 2019-05-24 ENCOUNTER — TELEPHONE (OUTPATIENT)
Dept: FAMILY MEDICINE CLINIC | Age: 69
End: 2019-05-24

## 2019-05-24 RX ORDER — AMLODIPINE BESYLATE AND ATORVASTATIN CALCIUM 10; 80 MG/1; MG/1
1 TABLET, FILM COATED ORAL DAILY
Qty: 7 TABLET | Refills: 0 | Status: SHIPPED | OUTPATIENT
Start: 2019-05-24 | End: 2019-05-24

## 2019-05-24 RX ORDER — AMLODIPINE BESYLATE 10 MG/1
10 TABLET ORAL DAILY
Qty: 7 TABLET | Refills: 0 | Status: SHIPPED | OUTPATIENT
Start: 2019-05-24 | End: 2019-05-29 | Stop reason: SDUPTHER

## 2019-05-24 RX ORDER — SPIRONOLACTONE 25 MG/1
25 TABLET ORAL DAILY
Qty: 7 TABLET | Refills: 0 | Status: SHIPPED | OUTPATIENT
Start: 2019-05-24 | End: 2019-05-29

## 2019-05-24 RX ORDER — FUROSEMIDE 40 MG/1
40 TABLET ORAL DAILY
Qty: 7 TABLET | Refills: 0 | Status: SHIPPED | OUTPATIENT
Start: 2019-05-24 | End: 2019-05-29 | Stop reason: SDUPTHER

## 2019-05-24 RX ORDER — ESCITALOPRAM OXALATE 20 MG/1
20 TABLET ORAL DAILY
Qty: 7 TABLET | Refills: 0 | Status: SHIPPED | OUTPATIENT
Start: 2019-05-24 | End: 2019-05-29 | Stop reason: SDUPTHER

## 2019-05-24 RX ORDER — ATORVASTATIN CALCIUM 80 MG/1
80 TABLET, FILM COATED ORAL DAILY
Qty: 7 TABLET | Refills: 0 | Status: SHIPPED | OUTPATIENT
Start: 2019-05-24 | End: 2019-05-29 | Stop reason: SDUPTHER

## 2019-05-24 RX ORDER — FERROUS SULFATE 325(65) MG
325 TABLET ORAL
Qty: 7 TABLET | Refills: 0 | Status: SHIPPED | OUTPATIENT
Start: 2019-05-24 | End: 2019-05-29 | Stop reason: SDUPTHER

## 2019-05-24 NOTE — TELEPHONE ENCOUNTER
----- Message from Terrence Slade sent at 5/24/2019 10:03 AM EDT -----  Contact: [address]  CADUET 10-80 MG NOT AVAILABLE     THE FOLLOWING ARE:  10/20  10/40  5/40   5/10  5/20  5/80   PLEASE LET HER KNOW WHICH YOU WOULD LIKE TO USE

## 2019-05-24 NOTE — TELEPHONE ENCOUNTER
MESSAGE FWD TO DR Berenice Phan TO ADDRESS.     Electronically signed by Pedro Luis Zarate MA on 5/24/2019 at 11:49 AM

## 2019-05-24 NOTE — TELEPHONE ENCOUNTER
[address] W/ PT, REVIEWED MEDS AND SENT MEDS FOR 7 DAYS, PT HAS APPT 5/29 TO REVIEW MEDS FROM [facility] ([facility])  Electronically signed by Lyle Marie MA on 5/24/2019 at 9:32 AM

## 2019-05-29 ENCOUNTER — OFFICE VISIT (OUTPATIENT)
Dept: FAMILY MEDICINE CLINIC | Age: 69
End: 2019-05-29
Payer: MEDICARE

## 2019-05-29 VITALS
BODY MASS INDEX: 31.25 KG/M2 | OXYGEN SATURATION: 95 % | WEIGHT: 211 LBS | HEIGHT: 69 IN | DIASTOLIC BLOOD PRESSURE: 80 MMHG | HEART RATE: 63 BPM | SYSTOLIC BLOOD PRESSURE: 130 MMHG

## 2019-05-29 DIAGNOSIS — J18.9 PNEUMONIA OF LEFT LUNG DUE TO INFECTIOUS ORGANISM, UNSPECIFIED PART OF LUNG: Primary | ICD-10-CM

## 2019-05-29 PROCEDURE — G8598 ASA/ANTIPLAT THER USED: HCPCS | Performed by: FAMILY MEDICINE

## 2019-05-29 PROCEDURE — G8417 CALC BMI ABV UP PARAM F/U: HCPCS | Performed by: FAMILY MEDICINE

## 2019-05-29 PROCEDURE — 4040F PNEUMOC VAC/ADMIN/RCVD: CPT | Performed by: FAMILY MEDICINE

## 2019-05-29 PROCEDURE — 3017F COLORECTAL CA SCREEN DOC REV: CPT | Performed by: FAMILY MEDICINE

## 2019-05-29 PROCEDURE — 99214 OFFICE O/P EST MOD 30 MIN: CPT | Performed by: FAMILY MEDICINE

## 2019-05-29 PROCEDURE — 1123F ACP DISCUSS/DSCN MKR DOCD: CPT | Performed by: FAMILY MEDICINE

## 2019-05-29 PROCEDURE — G8427 DOCREV CUR MEDS BY ELIG CLIN: HCPCS | Performed by: FAMILY MEDICINE

## 2019-05-29 PROCEDURE — 1036F TOBACCO NON-USER: CPT | Performed by: FAMILY MEDICINE

## 2019-05-29 RX ORDER — PANTOPRAZOLE SODIUM 20 MG/1
20 TABLET, DELAYED RELEASE ORAL DAILY
COMMUNITY
End: 2019-05-29

## 2019-05-29 RX ORDER — PANTOPRAZOLE SODIUM 40 MG/1
40 TABLET, DELAYED RELEASE ORAL
Qty: 60 TABLET | Refills: 0 | Status: CANCELLED | OUTPATIENT
Start: 2019-05-29 | End: 2019-06-28

## 2019-05-29 RX ORDER — PROMETHAZINE HYDROCHLORIDE 12.5 MG/1
12.5 TABLET ORAL EVERY 6 HOURS PRN
Qty: 30 TABLET | Refills: 0 | Status: SHIPPED | OUTPATIENT
Start: 2019-05-29 | End: 2019-06-05

## 2019-05-29 RX ORDER — POLYETHYLENE GLYCOL 3350 17 G/17G
17 POWDER, FOR SOLUTION ORAL DAILY
Qty: 510 G | Refills: 5 | Status: SHIPPED | OUTPATIENT
Start: 2019-05-29 | End: 2019-08-27

## 2019-05-29 RX ORDER — NYSTATIN 100000 [USP'U]/G
POWDER TOPICAL 4 TIMES DAILY
COMMUNITY
End: 2019-08-27

## 2019-05-29 RX ORDER — HYDROCODONE BITARTRATE AND ACETAMINOPHEN 5; 325 MG/1; MG/1
1 TABLET ORAL EVERY 4 HOURS
COMMUNITY
End: 2019-08-27

## 2019-05-29 RX ORDER — ATORVASTATIN CALCIUM 80 MG/1
80 TABLET, FILM COATED ORAL DAILY
Qty: 7 TABLET | Refills: 0 | Status: SHIPPED | OUTPATIENT
Start: 2019-05-29 | End: 2019-06-05 | Stop reason: SDUPTHER

## 2019-05-29 RX ORDER — CARVEDILOL 12.5 MG/1
12.5 TABLET ORAL 2 TIMES DAILY WITH MEALS
COMMUNITY
End: 2019-06-05 | Stop reason: SDUPTHER

## 2019-05-29 RX ORDER — PANTOPRAZOLE SODIUM 40 MG/1
40 TABLET, DELAYED RELEASE ORAL
Qty: 60 TABLET | Refills: 5 | Status: SHIPPED | OUTPATIENT
Start: 2019-05-29 | End: 2019-06-05 | Stop reason: SDUPTHER

## 2019-05-29 RX ORDER — IPRATROPIUM BROMIDE AND ALBUTEROL SULFATE 2.5; .5 MG/3ML; MG/3ML
1 SOLUTION RESPIRATORY (INHALATION) EVERY 6 HOURS PRN
Qty: 360 ML | Refills: 5 | Status: SHIPPED | OUTPATIENT
Start: 2019-05-29 | End: 2019-08-27

## 2019-05-29 RX ORDER — FERROUS SULFATE 325(65) MG
325 TABLET ORAL
Qty: 7 TABLET | Refills: 0 | Status: SHIPPED | OUTPATIENT
Start: 2019-05-29 | End: 2019-08-27

## 2019-05-29 RX ORDER — NITROGLYCERIN 0.4 MG/1
TABLET SUBLINGUAL
Qty: 25 TABLET | Refills: 0 | Status: SHIPPED | OUTPATIENT
Start: 2019-05-29 | End: 2019-06-05 | Stop reason: SDUPTHER

## 2019-05-29 RX ORDER — AMLODIPINE BESYLATE 10 MG/1
10 TABLET ORAL DAILY
Qty: 7 TABLET | Refills: 0 | Status: SHIPPED | OUTPATIENT
Start: 2019-05-29 | End: 2019-06-18 | Stop reason: SDUPTHER

## 2019-05-29 RX ORDER — ESCITALOPRAM OXALATE 20 MG/1
20 TABLET ORAL DAILY
Qty: 7 TABLET | Refills: 0 | Status: SHIPPED | OUTPATIENT
Start: 2019-05-29 | End: 2019-06-05 | Stop reason: SDUPTHER

## 2019-05-29 RX ORDER — BISACODYL 10 MG
10 SUPPOSITORY, RECTAL RECTAL DAILY PRN
COMMUNITY
End: 2019-08-27

## 2019-05-29 RX ORDER — OMEPRAZOLE 20 MG/1
20 CAPSULE, DELAYED RELEASE ORAL DAILY
Status: CANCELLED | OUTPATIENT
Start: 2019-05-29

## 2019-05-29 RX ORDER — FERROUS SULFATE 325(65) MG
325 TABLET ORAL
Qty: 7 TABLET | Refills: 0 | Status: CANCELLED | OUTPATIENT
Start: 2019-05-29

## 2019-05-29 RX ORDER — ALPRAZOLAM 0.5 MG/1
0.5 TABLET ORAL EVERY 12 HOURS PRN
COMMUNITY
End: 2019-07-08 | Stop reason: SDUPTHER

## 2019-05-29 RX ORDER — SPIRONOLACTONE 25 MG/1
25 TABLET ORAL DAILY
COMMUNITY
End: 2019-06-05 | Stop reason: SDUPTHER

## 2019-05-29 RX ORDER — FUROSEMIDE 40 MG/1
40 TABLET ORAL DAILY
Qty: 7 TABLET | Refills: 0 | Status: SHIPPED | OUTPATIENT
Start: 2019-05-29 | End: 2019-06-05 | Stop reason: SDUPTHER

## 2019-05-29 ASSESSMENT — PATIENT HEALTH QUESTIONNAIRE - PHQ9
1. LITTLE INTEREST OR PLEASURE IN DOING THINGS: 1
SUM OF ALL RESPONSES TO PHQ QUESTIONS 1-9: 1
SUM OF ALL RESPONSES TO PHQ9 QUESTIONS 1 & 2: 1
2. FEELING DOWN, DEPRESSED OR HOPELESS: 0
SUM OF ALL RESPONSES TO PHQ QUESTIONS 1-9: 1

## 2019-05-29 ASSESSMENT — ENCOUNTER SYMPTOMS: SHORTNESS OF BREATH: 0

## 2019-05-29 NOTE — PROGRESS NOTES
SURGERY Bilateral     right knee x 2( scope)/ left knee- 7-8 yr ago   [address] fusion    THORACENTESIS Left 12/20/2013    THORACENTESIS  4/25/2016         THORACENTESIS Left 11/15/2016    Dr. Tiarra Teresa 250mlsremoved     THORACOTOMY Left 03/18/2019    with Lysis of adhesions    THORACOTOMY Left 3/18/2019    THORACOSCOPY CONVERTED TO THORACOTOMY WITH LYSIS OF ADHESIONS performed by Anastacio Loco MD at [facility]      as a kid       CURRENT MEDICATIONS  Current Outpatient Medications   Medication Sig Dispense Refill    sertraline (ZOLOFT) 50 MG tablet Take 50 mg by mouth every evening      spironolactone (ALDACTONE) 25 MG tablet Take 25 mg by mouth daily      bisacodyl (DULCOLAX) 10 MG suppository Place 10 mg rectally daily as needed      carvedilol (COREG) 12.5 MG tablet Take 12.5 mg by mouth 2 times daily (with meals)      magnesium hydroxide (MILK OF MAGNESIA CONCENTRATE) 2400 MG/10ML SUSP Take 2,400 mg by mouth once as needed 30 ml po prn      nystatin (MYCOSTATIN) 135932 UNIT/GM powder Apply topically 4 times daily 1 gm powder apply groin topically prn      ALPRAZolam (XANAX) 0.5 MG tablet Take 0.5 mg by mouth every 12 hours as needed.  HYDROcodone-acetaminophen (NORCO) 5-325 MG per tablet Take 1 tablet by mouth every 4 hours.  Take 2 tab po q4 prn for pain      furosemide (LASIX) 40 MG tablet Take 1 tablet by mouth daily 7 tablet 0    linagliptin (TRADJENTA) 5 MG tablet Take 1 tablet by mouth daily 7 tablet 0    escitalopram (LEXAPRO) 20 MG tablet Take 1 tablet by mouth daily 7 tablet 0    ferrous sulfate 325 (65 Fe) MG tablet Take 1 tablet by mouth daily (with breakfast) 7 tablet 0    amLODIPine (NORVASC) 10 MG tablet Take 1 tablet by mouth daily 7 tablet 0    atorvastatin (LIPITOR) 80 MG tablet Take 1 tablet by mouth daily 7 tablet 0    lansoprazole (PREVACID) 30 MG delayed release capsule Take 1 capsule by mouth daily 30 capsule 0    ALPRAZolam (XANAX) 0.5 MG tablet Take 1 tablet by mouth 2 times daily as needed (TAKE 1 BID PRN) for up to 30 days. Must last 30 days. 60 tablet 0    omeprazole (PRILOSEC) 20 MG delayed release capsule Take 20 mg by mouth daily      ipratropium-albuterol (DUONEB) 0.5-2.5 (3) MG/3ML SOLN nebulizer solution Inhale 1 vial into the lungs every 6 hours as needed for Shortness of Breath (wheezing)      insulin glargine (TOUJEO SOLOSTAR) 300 UNIT/ML injection pen Inject 42 Units into the skin nightly      aclidinium (TUDORZA PRESSAIR) 400 MCG/ACT AEPB inhaler Inhale 1 puff into the lungs 2 times daily      pantoprazole (PROTONIX) 40 MG tablet Take 1 tablet by mouth 2 times daily (before meals) 60 tablet 0    mineral oil-hydrophilic petrolatum (AQUAPHOR) ointment Apply topically as needed for Dry Skin Apply topically as needed.  epoetin ezequiel-epbx (RETACRIT) 44100 UNIT/ML SOLN injection Inject 5,000 Units into the skin three times a week Indications: one time a day every Tue for anemia      polyethylene glycol (GLYCOLAX) powder Take 17 g by mouth daily      nitroGLYCERIN (NITROSTAT) 0.4 MG SL tablet up to max of 3 total doses. If no relief after 1 dose, call 911. 25 tablet 0    aspirin 81 MG EC tablet Take 1 tablet by mouth daily 30 tablet 3    docusate sodium (COLACE, DULCOLAX) 100 MG CAPS Take 100 mg by mouth 2 times daily 30 capsule 0    folic acid (FOLVITE) 1 MG tablet Take 1 tablet by mouth daily 30 tablet 0    ondansetron (ZOFRAN) 4 MG tablet Take 1 tablet by mouth 3 times daily as needed for Nausea or Vomiting 30 tablet 0    albuterol sulfate HFA (PROVENTIL HFA) 108 (90 BASE) MCG/ACT inhaler Inhale 2 puffs into the lungs every 6 hours as needed for Wheezing or Shortness of Breath 30 Inhaler 0    fluticasone (FLONASE) 50 MCG/ACT nasal spray 2 sprays by Nasal route daily 1 Bottle 0     No current facility-administered medications for this visit.         ALLERGIES  Allergies   Allergen Reactions    Nsaids      Renal     Pcn [Penicillins]      Per Dr Orlando Killian paper chart       PHYSICAL EXAM    Physical Exam   Constitutional: He is oriented to person, place, and time. He appears well-developed and well-nourished. No distress. HENT:   Head: Normocephalic and atraumatic. Cardiovascular: Normal rate, regular rhythm and normal heart sounds. Pulmonary/Chest: Effort normal. He has decreased breath sounds (in the LLL - better when compared to notes from previous exams. ). He has no wheezes. He has no rhonchi. Patient is speaking in full sentences. Lungs CTA in all lung fields outside of the LLL. Neurological: He is alert and oriented to person, place, and time. Skin: Skin is warm and dry. He is not diaphoretic. Psychiatric: He has a normal mood and affect. Thought content normal.   Nursing note and vitals reviewed. ASSESSMENT & PLAN    1. Pneumonia of left lung due to infectious organism, unspecified part of lung  Patient is to continue with physical therapy, and continue having home care added his place of residence. We'll need to contact home care to find out what medications he is actually on and what doses he needs to be taking as he had duplications of medications and different doses of medications including Coreg. Pt verbalized understanding of and agreement w/ POC. Electronically signed by LORRAINE Abraham on 5/29/2019      Please note that this chart was generated using dragon dictation software. Although every effort was made to ensure the accuracy of this automated transcription, some errors in transcription may have occurred.

## 2019-05-30 ENCOUNTER — TELEPHONE (OUTPATIENT)
Dept: FAMILY MEDICINE CLINIC | Age: 69
End: 2019-05-30

## 2019-05-30 NOTE — TELEPHONE ENCOUNTER
SPOKE WITH PT  AM REGARD. THE MESSAGE BELOW PER GARCIA BANEGAS PAC PT STATES HE IS CURRENTLY OUT OF HIS MEDICATION PT STATED GARC[facility] PAC TOLD HIM HE COULD NOT RECEIVE ANY MORE REFILLS UNTIL HE BROUGHT IN A D/C SUM. [address] PT IS CONFUSED STATES HE WENT OVER HIS MEDS. WITH GARCIA [facility] PAC AND WOULD LIKE TO KNOW WHEN HE COULD HAVE HIS MEDICATIONS REFILLED AND SENT  [facility]   Electronically signed by Shane Bah LPN on 2/9/8236 at 5:80 AM      LMTRC  AM MESSAGE BELOW PER [address] PAC WILL F/U [address]. Electronically signed by Shane Bah LPN on 8/92/6396 at 9:70 AM        LMTRC  [facility] PAC WILL F/U AGAIN TOMORROW.   Electronically signed by Shane Bah LPN on 5/08/6607 at 0:20 PM

## 2019-05-30 NOTE — TELEPHONE ENCOUNTER
Please call patient to find out thei home health care nurse to sort out his medication doses.     Thanks, Bharti

## 2019-06-03 NOTE — TELEPHONE ENCOUNTER
We need home health care to call us when they are there and go over the medications as we have multiple doses of some medications and there is confusion as to what he should be taking. We can refill some of the medications that we know the dosing on, but we really need HHC to call us like the pt and I discussed last week.

## 2019-06-03 NOTE — TELEPHONE ENCOUNTER
SPOKE WITH PT PER [address] PAC NEED TO CONFIRM MEDS WITH HOME CARE NURSE ASAP PT VOICED UNDERSTANDING AND STATED HE WOULD INFORM [address] HIS HOME CARE NURSE TO CALL US. PT STATED HE DID FIND HIS MEDICATIONS AT HIS HOME AND HE TALKED TO THE PHARM. WHO WILL BE DELIVERING A DISPILL PACKAGE TODAY TO TAKE HIS MEDICATIONS TODAY.   Electronically signed by Jeanette Purdy LPN on 5/9/8666 at 4:11 PM

## 2019-06-05 ENCOUNTER — TELEPHONE (OUTPATIENT)
Dept: FAMILY MEDICINE CLINIC | Age: 69
End: 2019-06-05

## 2019-06-05 RX ORDER — SPIRONOLACTONE 25 MG/1
25 TABLET ORAL DAILY
Qty: 30 TABLET | Refills: 5 | Status: SHIPPED | OUTPATIENT
Start: 2019-06-05 | End: 2019-08-27

## 2019-06-05 RX ORDER — ONDANSETRON 4 MG/1
4 TABLET, FILM COATED ORAL 3 TIMES DAILY PRN
Qty: 30 TABLET | Refills: 2 | Status: SHIPPED | OUTPATIENT
Start: 2019-06-05 | End: 2019-08-27 | Stop reason: ALTCHOICE

## 2019-06-05 RX ORDER — GREEN TEA/HOODIA GORDONII 315-12.5MG
1 CAPSULE ORAL DAILY
Qty: 30 TABLET | Refills: 0 | Status: SHIPPED | OUTPATIENT
Start: 2019-06-05 | End: 2019-08-27

## 2019-06-05 RX ORDER — ATORVASTATIN CALCIUM 80 MG/1
80 TABLET, FILM COATED ORAL DAILY
Qty: 30 TABLET | Refills: 5 | Status: SHIPPED | OUTPATIENT
Start: 2019-06-05 | End: 2019-08-27

## 2019-06-05 RX ORDER — FOLIC ACID 1 MG/1
1 TABLET ORAL DAILY
Qty: 30 TABLET | Refills: 5 | Status: SHIPPED | OUTPATIENT
Start: 2019-06-05 | End: 2019-08-27

## 2019-06-05 RX ORDER — CARVEDILOL 12.5 MG/1
12.5 TABLET ORAL 2 TIMES DAILY WITH MEALS
Qty: 60 TABLET | Refills: 5 | Status: SHIPPED | OUTPATIENT
Start: 2019-06-05 | End: 2019-08-27

## 2019-06-05 RX ORDER — NITROGLYCERIN 0.4 MG/1
TABLET SUBLINGUAL
Qty: 25 TABLET | Refills: 0 | Status: SHIPPED | OUTPATIENT
Start: 2019-06-05 | End: 2019-08-27

## 2019-06-05 RX ORDER — PANTOPRAZOLE SODIUM 40 MG/1
40 TABLET, DELAYED RELEASE ORAL
Qty: 60 TABLET | Refills: 5 | Status: SHIPPED | OUTPATIENT
Start: 2019-06-05 | End: 2019-08-27

## 2019-06-05 RX ORDER — FUROSEMIDE 40 MG/1
40 TABLET ORAL DAILY
Qty: 30 TABLET | Refills: 5 | Status: SHIPPED | OUTPATIENT
Start: 2019-06-05 | End: 2019-08-27

## 2019-06-05 RX ORDER — FLUTICASONE PROPIONATE 50 MCG
2 SPRAY, SUSPENSION (ML) NASAL DAILY
Qty: 1 BOTTLE | Refills: 2 | Status: SHIPPED | OUTPATIENT
Start: 2019-06-05 | End: 2019-08-27

## 2019-06-05 RX ORDER — ESCITALOPRAM OXALATE 20 MG/1
20 TABLET ORAL DAILY
Qty: 30 TABLET | Refills: 5 | Status: SHIPPED | OUTPATIENT
Start: 2019-06-05 | End: 2019-08-27

## 2019-06-05 NOTE — TELEPHONE ENCOUNTER
----- Message from Cyril Burris sent at 6/5/2019 11:04 AM EDT -----  Contact: PATIENT  FOR DAYS NOW TRYING TO GET [address] TO GET IN CONTACT WITH [facility]. NEEDS TO GET THINGS STRAIGHTENED OUT. SHE WAS CHECKING ON DISCREPANCIES. PLEASE CALL.  [phone]

## 2019-06-05 NOTE — TELEPHONE ENCOUNTER
SPOKE WITH PT  PM REGARD. MESSAGE BELOW PER [address] PAC PT VOICED UNDERSTANDING AND IS SO THANKFUL HIS MEDICATIONS ARE NOW AT THE PHARM.    Electronically signed by Chiara Salinas LPN on 0/2/5951 at 4:97 PM

## 2019-06-05 NOTE — TELEPHONE ENCOUNTER
The patient know that I did speak with his home health care nursing at the medication straight now, and that the refills were sent to [facility].

## 2019-06-18 ENCOUNTER — TELEPHONE (OUTPATIENT)
Dept: FAMILY MEDICINE CLINIC | Age: 69
End: 2019-06-18

## 2019-06-18 RX ORDER — AMLODIPINE BESYLATE 10 MG/1
10 TABLET ORAL DAILY
Qty: 30 TABLET | Refills: 3 | Status: SHIPPED | OUTPATIENT
Start: 2019-06-18 | End: 2019-08-27

## 2019-06-18 NOTE — TELEPHONE ENCOUNTER
ANN W/ PHARMACIST, ALL MEDS SENT 6/5. HE WILL GET THEM FILLED. AMLODIPINE NOT SENT, WILL SEND. NOTIFIED PT, VOICED UNDERSTADING.   Electronically signed by Portia Carter MA on 6/18/2019 at 11:27 AM

## 2019-06-21 ENCOUNTER — TELEPHONE (OUTPATIENT)
Dept: FAMILY MEDICINE CLINIC | Age: 69
End: 2019-06-21

## 2019-06-21 NOTE — TELEPHONE ENCOUNTER
PREV MESSAGE. MESSAGE FWD TO DR PLUNKETT'S INBOX TO ADDRESS.     Electronically signed by Dina Lopez MA on 6/21/2019 at 12:47 PM

## 2019-06-21 NOTE — TELEPHONE ENCOUNTER
----- Message from Sulema Denise sent at 6/21/2019 10:56 AM EDT -----  Contact: Reji Sender [address]  LAST DAY OF   2X WEEK FOR 4 WEEKS, [facility]GR[facility] A L[facility]LE [phone]

## 2019-06-21 NOTE — TELEPHONE ENCOUNTER
PREV MESSAGE. JUST 2 TIMES PER WEEK X 4 WEEKS.     Electronically signed by Kaylie Stewart MA on 6/21/2019 at 4:55 PM

## 2019-07-08 ENCOUNTER — TELEPHONE (OUTPATIENT)
Dept: CARDIOLOGY CLINIC | Age: 69
End: 2019-07-08

## 2019-07-08 DIAGNOSIS — F31.9 BIPOLAR 1 DISORDER (HCC): Primary | ICD-10-CM

## 2019-07-08 RX ORDER — ALPRAZOLAM 0.5 MG/1
0.5 TABLET ORAL EVERY 12 HOURS PRN
Qty: 60 TABLET | Refills: 0 | Status: SHIPPED | OUTPATIENT
Start: 2019-07-08 | End: 2019-08-06 | Stop reason: SDUPTHER

## 2019-07-09 ENCOUNTER — TELEPHONE (OUTPATIENT)
Dept: CARDIOLOGY CLINIC | Age: 69
End: 2019-07-09

## 2019-07-11 ENCOUNTER — TELEPHONE (OUTPATIENT)
Dept: CARDIOLOGY CLINIC | Age: 69
End: 2019-07-11

## 2019-07-11 NOTE — TELEPHONE ENCOUNTER
Desi the patient home health nurse called and stated that the patient heart rate was up running 140 -110 and he took a Xanex to help relax and bring it down and then also the patient took 1/2 carvedilol medication which is a change from what he has done before he was taking a whole carvedilol .  I spoke with Bharat Nuñez and she went ahead and took over the phone call

## 2019-07-15 NOTE — TELEPHONE ENCOUNTER
----- Message from Emi Ontiveros sent at 7/15/2019 11:41 AM EDT -----  Contact: PATIENT  [address] ?   W[place]  [phone]

## 2019-07-16 RX ORDER — GABAPENTIN 800 MG/1
800 TABLET ORAL 3 TIMES DAILY
Qty: 90 TABLET | Refills: 0 | Status: SHIPPED | OUTPATIENT
Start: 2019-07-16 | End: 2019-08-27 | Stop reason: SDUPTHER

## 2019-07-23 ENCOUNTER — TELEPHONE (OUTPATIENT)
Dept: FAMILY MEDICINE CLINIC | Age: 69
End: 2019-07-23

## 2019-07-25 ENCOUNTER — TELEPHONE (OUTPATIENT)
Dept: FAMILY MEDICINE CLINIC | Age: 69
End: 2019-07-25

## 2019-07-29 ENCOUNTER — TELEPHONE (OUTPATIENT)
Dept: CARDIOLOGY CLINIC | Age: 69
End: 2019-07-29

## 2019-07-30 ENCOUNTER — TELEPHONE (OUTPATIENT)
Dept: FAMILY MEDICINE CLINIC | Age: 69
End: 2019-07-30

## 2019-07-30 NOTE — TELEPHONE ENCOUNTER
Lm for # BELOW, ALREADY GAVE ORDERS TO D/C AMLODIPINE TO [facility] EARLIER. PLEASE SEE SEPARATE ENCOUNTER.   Electronically signed by Enrrique Bosch MA on 7/30/2019 at 4:35 PM

## 2019-07-31 ENCOUNTER — HOSPITAL ENCOUNTER (OUTPATIENT)
Dept: GENERAL RADIOLOGY | Age: 69
Discharge: HOME | End: 2019-07-31
Payer: MEDICARE

## 2019-07-31 ENCOUNTER — HOSPITAL ENCOUNTER (OUTPATIENT)
Age: 69
Discharge: HOME | End: 2019-07-31
Payer: MEDICARE

## 2019-07-31 DIAGNOSIS — R06.09 DOE (DYSPNEA ON EXERTION): ICD-10-CM

## 2019-07-31 DIAGNOSIS — J90 PLEURAL EFFUSION, LEFT: ICD-10-CM

## 2019-07-31 LAB
ALBUMIN SERPL-MCNC: 4.4 GM/DL (ref 3.4–5)
ANION GAP SERPL CALCULATED.3IONS-SCNC: 16 MMOL/L (ref 4–16)
BUN BLDV-MCNC: 19 MG/DL (ref 6–23)
CALCIUM SERPL-MCNC: 9.3 MG/DL (ref 8.3–10.6)
CHLORIDE BLD-SCNC: 99 MMOL/L (ref 99–110)
CO2: 22 MMOL/L (ref 21–32)
CREAT SERPL-MCNC: 1.8 MG/DL (ref 0.9–1.3)
GFR AFRICAN AMERICAN: 45 ML/MIN/1.73M2
GFR NON-AFRICAN AMERICAN: 38 ML/MIN/1.73M2
GLUCOSE BLD-MCNC: 106 MG/DL (ref 70–99)
PHOSPHORUS: 3.6 MG/DL (ref 2.5–4.9)
POTASSIUM SERPL-SCNC: 4.1 MMOL/L (ref 3.5–5.1)
SODIUM BLD-SCNC: 137 MMOL/L (ref 135–145)

## 2019-07-31 PROCEDURE — 71046 X-RAY EXAM CHEST 2 VIEWS: CPT

## 2019-07-31 PROCEDURE — 36415 COLL VENOUS BLD VENIPUNCTURE: CPT

## 2019-07-31 PROCEDURE — 80069 RENAL FUNCTION PANEL: CPT

## 2019-08-06 DIAGNOSIS — F31.9 BIPOLAR 1 DISORDER (HCC): ICD-10-CM

## 2019-08-06 RX ORDER — ALPRAZOLAM 0.5 MG/1
0.5 TABLET ORAL EVERY 12 HOURS PRN
Qty: 60 TABLET | Refills: 0 | Status: SHIPPED | OUTPATIENT
Start: 2019-08-06 | End: 2019-08-27

## 2019-08-06 NOTE — TELEPHONE ENCOUNTER
----- Message from Talha Anderson sent at 8/6/2019  9:17 AM EDT -----  Xanax 0.5 mg 1 bid prn   [address] delivery

## 2019-08-13 ENCOUNTER — OFFICE VISIT (OUTPATIENT)
Dept: FAMILY MEDICINE CLINIC | Age: 69
End: 2019-08-13
Payer: MEDICARE

## 2019-08-13 VITALS
HEIGHT: 69 IN | HEART RATE: 81 BPM | OXYGEN SATURATION: 96 % | SYSTOLIC BLOOD PRESSURE: 138 MMHG | WEIGHT: 212.8 LBS | BODY MASS INDEX: 31.52 KG/M2 | DIASTOLIC BLOOD PRESSURE: 74 MMHG

## 2019-08-13 DIAGNOSIS — M48.061 SPINAL STENOSIS OF LUMBAR REGION WITHOUT NEUROGENIC CLAUDICATION: ICD-10-CM

## 2019-08-13 DIAGNOSIS — R07.81 PLEURITIC CHEST PAIN: Primary | ICD-10-CM

## 2019-08-13 DIAGNOSIS — M48.02 CERVICAL STENOSIS OF SPINE: ICD-10-CM

## 2019-08-13 PROCEDURE — 99214 OFFICE O/P EST MOD 30 MIN: CPT | Performed by: PHYSICIAN ASSISTANT

## 2019-08-13 ASSESSMENT — ENCOUNTER SYMPTOMS
BACK PAIN: 1
COUGH: 0
SHORTNESS OF BREATH: 0

## 2019-08-13 NOTE — PROGRESS NOTES
Tobacco Use    Smoking status: Former Smoker     Packs/day: 1.50     Years: 30.00     Pack years: 45.00     Types: Cigarettes     Last attempt to quit: 2010     Years since quittin.0    Smokeless tobacco: Never Used   Substance and Sexual Activity    Alcohol use: Not Currently    Drug use: Yes     Types: Marijuana     Comment: DAILY    Sexual activity: Not Currently     Partners: Female   Lifestyle    Physical activity:     Days per week: None     Minutes per session: None    Stress: None   Relationships    Social connections:     Talks on phone: None     Gets together: None     Attends Taoist service: None     Active member of club or organization: None     Attends meetings of clubs or organizations: None     Relationship status: None    Intimate partner violence:     Fear of current or ex partner: None     Emotionally abused: None     Physically abused: None     Forced sexual activity: None   Other Topics Concern    None   Social History Narrative    None        CURRENT MEDICATIONS  Current Outpatient Medications   Medication Sig Dispense Refill    ALPRAZolam (XANAX) 0.5 MG tablet Take 1 tablet by mouth every 12 hours as needed for Anxiety for up to 30 days. (Patient taking differently: Take 0.5 mg by mouth 2 times daily as needed for Anxiety. ) 60 tablet 0    gabapentin (NEURONTIN) 800 MG tablet Take 1 tablet by mouth 3 times daily for 30 days.  90 tablet 0    fluticasone (FLONASE) 50 MCG/ACT nasal spray 2 sprays by Nasal route daily 1 Bottle 2    folic acid (FOLVITE) 1 MG tablet Take 1 tablet by mouth daily 30 tablet 5    furosemide (LASIX) 40 MG tablet Take 1 tablet by mouth daily 30 tablet 5    spironolactone (ALDACTONE) 25 MG tablet Take 1 tablet by mouth daily 30 tablet 5    escitalopram (LEXAPRO) 20 MG tablet Take 1 tablet by mouth daily 30 tablet 5    linagliptin (TRADJENTA) 5 MG tablet Take 1 tablet by mouth daily 30 tablet 5    nitroGLYCERIN (NITROSTAT) 0.4 MG SL tablet (Patient not taking: Reported on 8/13/2019) 510 g 5    Nebulizers (COMPRESSOR/NEBULIZER) MISC 1 Units by Does not apply route every 6 hours as needed (for shortness of breath or wheezing vie nebulizer while awake) (Patient not taking: Reported on 8/13/2019) 1 each 3    ipratropium-albuterol (DUONEB) 0.5-2.5 (3) MG/3ML SOLN nebulizer solution Inhale 3 mLs into the lungs every 6 hours as needed for Shortness of Breath (wheezing) (Patient not taking: Reported on 8/13/2019) 360 mL 5    omeprazole (PRILOSEC) 20 MG delayed release capsule Take 20 mg by mouth daily      insulin glargine (TOUJEO SOLOSTAR) 300 UNIT/ML injection pen Inject 42 Units into the skin nightly      aclidinium (TUDORZA PRESSAIR) 400 MCG/ACT AEPB inhaler Inhale 1 puff into the lungs 2 times daily      pantoprazole (PROTONIX) 40 MG tablet Take 1 tablet by mouth 2 times daily (before meals) 60 tablet 0    mineral oil-hydrophilic petrolatum (AQUAPHOR) ointment Apply topically as needed for Dry Skin Apply topically as needed.  epoetin ezequiel-epbx (RETACRIT) 45898 UNIT/ML SOLN injection Inject 5,000 Units into the skin three times a week Indications: one time a day every Tue for anemia      aspirin 81 MG EC tablet Take 1 tablet by mouth daily (Patient not taking: Reported on 8/13/2019) 30 tablet 3    docusate sodium (COLACE, DULCOLAX) 100 MG CAPS Take 100 mg by mouth 2 times daily (Patient not taking: Reported on 8/13/2019) 30 capsule 0     No current facility-administered medications for this visit. PHYSICAL EXAM    /74 (Site: Left Upper Arm, Position: Sitting, Cuff Size: Large Adult)   Pulse 81   Ht 5' 9\" (1.753 m)   Wt 212 lb 12.8 oz (96.5 kg)   SpO2 96% Comment: RA  BMI 31.43 kg/m²     Physical Exam   Constitutional: He is oriented to person, place, and time. He appears well-developed and well-nourished. Cardiovascular: Normal rate, regular rhythm and normal heart sounds.    Pulmonary/Chest: Effort normal and breath

## 2019-08-15 ENCOUNTER — HOSPITAL ENCOUNTER (OUTPATIENT)
Age: 69
Discharge: HOME | End: 2019-08-15
Payer: MEDICARE

## 2019-08-15 ENCOUNTER — HOSPITAL ENCOUNTER (OUTPATIENT)
Dept: GENERAL RADIOLOGY | Age: 69
Discharge: HOME | End: 2019-08-15
Payer: MEDICARE

## 2019-08-15 DIAGNOSIS — R07.81 PLEURITIC CHEST PAIN: ICD-10-CM

## 2019-08-15 PROCEDURE — 71046 X-RAY EXAM CHEST 2 VIEWS: CPT

## 2019-08-16 ENCOUNTER — TELEPHONE (OUTPATIENT)
Dept: FAMILY MEDICINE CLINIC | Age: 69
End: 2019-08-16

## 2019-08-16 NOTE — TELEPHONE ENCOUNTER
----- Message from Kimberley Mcdermott PA-C sent at 8/16/2019  9:35 AM EDT -----  CXR unchanged; with stable Hydropneumothorax; Results forwarded to Dr. Nasrin Diggs f/u at 8/19 appt.

## 2019-08-27 ENCOUNTER — OFFICE VISIT (OUTPATIENT)
Dept: FAMILY MEDICINE CLINIC | Age: 69
End: 2019-08-27
Payer: MEDICARE

## 2019-08-27 VITALS
HEART RATE: 72 BPM | HEIGHT: 69 IN | DIASTOLIC BLOOD PRESSURE: 74 MMHG | WEIGHT: 210.4 LBS | BODY MASS INDEX: 31.16 KG/M2 | SYSTOLIC BLOOD PRESSURE: 104 MMHG

## 2019-08-27 DIAGNOSIS — M48.061 SPINAL STENOSIS OF LUMBAR REGION WITHOUT NEUROGENIC CLAUDICATION: ICD-10-CM

## 2019-08-27 DIAGNOSIS — M48.02 CERVICAL STENOSIS OF SPINE: ICD-10-CM

## 2019-08-27 DIAGNOSIS — N18.30 CHRONIC KIDNEY DISEASE (CKD), STAGE III (MODERATE) (HCC): ICD-10-CM

## 2019-08-27 DIAGNOSIS — J86.9 EMPYEMA OF LEFT PLEURAL SPACE (HCC): ICD-10-CM

## 2019-08-27 DIAGNOSIS — N18.1 TYPE 2 DIABETES MELLITUS WITH STAGE 1 CHRONIC KIDNEY DISEASE, WITHOUT LONG-TERM CURRENT USE OF INSULIN (HCC): Primary | ICD-10-CM

## 2019-08-27 DIAGNOSIS — F31.9 BIPOLAR 1 DISORDER (HCC): ICD-10-CM

## 2019-08-27 DIAGNOSIS — N18.1 TYPE 2 DIABETES MELLITUS WITH STAGE 1 CHRONIC KIDNEY DISEASE, WITHOUT LONG-TERM CURRENT USE OF INSULIN (HCC): ICD-10-CM

## 2019-08-27 DIAGNOSIS — J44.9 CHRONIC OBSTRUCTIVE PULMONARY DISEASE, UNSPECIFIED COPD TYPE (HCC): ICD-10-CM

## 2019-08-27 DIAGNOSIS — E11.22 TYPE 2 DIABETES MELLITUS WITH STAGE 1 CHRONIC KIDNEY DISEASE, WITHOUT LONG-TERM CURRENT USE OF INSULIN (HCC): ICD-10-CM

## 2019-08-27 DIAGNOSIS — E11.22 TYPE 2 DIABETES MELLITUS WITH STAGE 1 CHRONIC KIDNEY DISEASE, WITHOUT LONG-TERM CURRENT USE OF INSULIN (HCC): Primary | ICD-10-CM

## 2019-08-27 PROCEDURE — 3017F COLORECTAL CA SCREEN DOC REV: CPT | Performed by: FAMILY MEDICINE

## 2019-08-27 PROCEDURE — 99214 OFFICE O/P EST MOD 30 MIN: CPT | Performed by: FAMILY MEDICINE

## 2019-08-27 PROCEDURE — G8598 ASA/ANTIPLAT THER USED: HCPCS | Performed by: FAMILY MEDICINE

## 2019-08-27 PROCEDURE — 3044F HG A1C LEVEL LT 7.0%: CPT | Performed by: FAMILY MEDICINE

## 2019-08-27 PROCEDURE — 3023F SPIROM DOC REV: CPT | Performed by: FAMILY MEDICINE

## 2019-08-27 PROCEDURE — G8926 SPIRO NO PERF OR DOC: HCPCS | Performed by: FAMILY MEDICINE

## 2019-08-27 PROCEDURE — 4040F PNEUMOC VAC/ADMIN/RCVD: CPT | Performed by: FAMILY MEDICINE

## 2019-08-27 PROCEDURE — 1036F TOBACCO NON-USER: CPT | Performed by: FAMILY MEDICINE

## 2019-08-27 PROCEDURE — 2022F DILAT RTA XM EVC RTNOPTHY: CPT | Performed by: FAMILY MEDICINE

## 2019-08-27 PROCEDURE — G8427 DOCREV CUR MEDS BY ELIG CLIN: HCPCS | Performed by: FAMILY MEDICINE

## 2019-08-27 PROCEDURE — G8417 CALC BMI ABV UP PARAM F/U: HCPCS | Performed by: FAMILY MEDICINE

## 2019-08-27 PROCEDURE — 1123F ACP DISCUSS/DSCN MKR DOCD: CPT | Performed by: FAMILY MEDICINE

## 2019-08-27 RX ORDER — FOLIC ACID 1 MG/1
1 TABLET ORAL DAILY
COMMUNITY
End: 2019-12-09 | Stop reason: SDUPTHER

## 2019-08-27 RX ORDER — ALPRAZOLAM 1 MG/1
1 TABLET ORAL 3 TIMES DAILY PRN
Qty: 90 TABLET | Refills: 0 | Status: SHIPPED | OUTPATIENT
Start: 2019-08-27 | End: 2019-09-27 | Stop reason: SDUPTHER

## 2019-08-27 RX ORDER — GABAPENTIN 800 MG/1
800 TABLET ORAL 3 TIMES DAILY
Qty: 90 TABLET | Refills: 3 | Status: SHIPPED | OUTPATIENT
Start: 2019-08-27 | End: 2019-08-27

## 2019-08-27 RX ORDER — GABAPENTIN 800 MG/1
800 TABLET ORAL 3 TIMES DAILY
COMMUNITY
End: 2019-08-27

## 2019-08-27 RX ORDER — PANTOPRAZOLE SODIUM 40 MG/1
40 TABLET, DELAYED RELEASE ORAL DAILY
Status: ON HOLD | COMMUNITY
End: 2019-12-18 | Stop reason: HOSPADM

## 2019-08-27 RX ORDER — ESCITALOPRAM OXALATE 20 MG/1
20 TABLET ORAL DAILY
COMMUNITY
End: 2019-12-09 | Stop reason: SDUPTHER

## 2019-08-27 RX ORDER — ALPRAZOLAM 0.5 MG/1
0.5 TABLET ORAL 2 TIMES DAILY PRN
COMMUNITY
End: 2019-08-27

## 2019-08-27 RX ORDER — FUROSEMIDE 40 MG/1
40 TABLET ORAL DAILY
COMMUNITY
End: 2019-11-01 | Stop reason: SDUPTHER

## 2019-08-27 RX ORDER — ATORVASTATIN CALCIUM 80 MG/1
80 TABLET, FILM COATED ORAL DAILY
COMMUNITY
End: 2019-12-09 | Stop reason: SDUPTHER

## 2019-08-27 RX ORDER — GABAPENTIN 800 MG/1
800 TABLET ORAL 3 TIMES DAILY
Qty: 90 TABLET | Refills: 0 | Status: SHIPPED | OUTPATIENT
Start: 2019-08-27 | End: 2019-09-27 | Stop reason: SDUPTHER

## 2019-08-27 RX ORDER — SPIRONOLACTONE 25 MG/1
25 TABLET ORAL DAILY
Status: ON HOLD | COMMUNITY
End: 2019-11-17 | Stop reason: HOSPADM

## 2019-08-27 RX ORDER — ALPRAZOLAM 1 MG/1
1 TABLET ORAL NIGHTLY PRN
Qty: 90 TABLET | Refills: 0 | Status: SHIPPED | OUTPATIENT
Start: 2019-08-27 | End: 2019-08-27 | Stop reason: SDUPTHER

## 2019-08-27 RX ORDER — CARVEDILOL 6.25 MG/1
6.25-12.5 TABLET ORAL 2 TIMES DAILY WITH MEALS
Status: ON HOLD | COMMUNITY
End: 2019-11-08 | Stop reason: HOSPADM

## 2019-08-27 ASSESSMENT — ENCOUNTER SYMPTOMS
ABDOMINAL PAIN: 0
SHORTNESS OF BREATH: 1
COUGH: 0
GASTROINTESTINAL NEGATIVE: 1
WHEEZING: 0
CHEST TIGHTNESS: 1

## 2019-08-27 NOTE — PROGRESS NOTES
(moderate) ([facility])        REVIEW OF SYMPTOMS    Review of Systems   Constitutional: Positive for unexpected weight change. Negative for activity change and appetite change. 100 pound weight loss in the past 4 months   HENT: Negative. Respiratory: Positive for chest tightness and shortness of breath. Negative for cough and wheezing. History of recurrent pleural effusion  COPD  No longer smokes   Cardiovascular: Negative. Negative for chest pain and leg swelling. History of coronary artery disease now stable   Gastrointestinal: Negative. Negative for abdominal pain. Endocrine:        Diabetes type 2 long-standing   Genitourinary: Negative. Musculoskeletal:        Both lumbar and cervical spine disease mostly spinal stenosis  Probably not surgically treatable  Wants pain management referral   Hematological: Negative.     Psychiatric/Behavioral:        [employer] 1 looking for psychiatry  Predominant symptoms anxiety       PAST MEDICAL HISTORY  Past Medical History:   Diagnosis Date    Anemia     per old chart    Arthritis     Back pain, chronic     \"have pain in lumbar mainly- have 5 bulging discs\"\"in my neck and my lumbar have herniated discs\"    Bipolar 1 disorder ([facility]     CAD (coronary artery disease) 1/27/2016    does not follow with a cardiologist    Cerebral artery occlusion with cerebral infarction ([facility]     \"had mini stroke in Jan 2016- fast heart rate when I would stand up - smile drooping on one side- lased just the one day\"    Chronic kidney disease (CKD), stage III (moderate) ([facility])     CKD (chronic kidney disease)     per old chart- consult with Dr Li Headings with admission 4/2016\"have low kidney function\"    COPD (chronic obstructive pulmonary disease) ([facility]     follow with Dr Desire Marr Diabetes mellitus, type 2 ([facility] 1/3/2017    Diabetic peripheral neuropathy ([facility])     Diastolic CHF ([address] 3/47/4675    Gastric ulcer     H/O cardiovascular stress test 5/26/14    EF insulin (HCC)  -     Hemoglobin A1C; Future    Bipolar 1 disorder (HCC)  -     ALPRAZolam (XANAX) 1 MG tablet; Take 1 tablet by mouth nightly as needed for Anxiety for up to 30 days. Empyema of left pleural space ([facility])    Chronic obstructive pulmonary disease, unspecified COPD type ([facility]    Cervical stenosis of spine    Spinal stenosis of lumbar region without neurogenic claudication    Chronic kidney disease (CKD), stage III (moderate) ([facility])    Other orders  -     gabapentin (NEURONTIN) 800 MG tablet; Take 1 tablet by mouth 3 times daily for 30 days. Getting A1c today because of with this weight loss he may be able to scale back his diabetes treatment  The pleural effusion is stable he needs follow-up with cardiothoracic surgery  He is getting a referral to pain management  Increase his Xanax for now until he finds a psychiatrist  I will see him back in 4    Return in about 4 weeks (around 9/24/2019). Electronically signed by Tam Herrmann MD on 8/27/2019    Please note that this chart was generated using dragon dictation software. Although every effort was made to ensure the accuracy of this automated transcription, some errors in transcription may have occurred.

## 2019-08-28 LAB
ESTIMATED AVERAGE GLUCOSE: 128.4 MG/DL
HBA1C MFR BLD: 6.1 %

## 2019-08-29 RX ORDER — DOCUSATE SODIUM 100 MG/1
100 CAPSULE, LIQUID FILLED ORAL DAILY
Qty: 30 CAPSULE | Refills: 1 | Status: SHIPPED | OUTPATIENT
Start: 2019-08-29 | End: 2019-10-28

## 2019-09-09 ENCOUNTER — OFFICE VISIT (OUTPATIENT)
Dept: INTERNAL MEDICINE CLINIC | Age: 69
End: 2019-09-09
Payer: MEDICARE

## 2019-09-09 VITALS
WEIGHT: 212.4 LBS | BODY MASS INDEX: 31.46 KG/M2 | OXYGEN SATURATION: 97 % | HEIGHT: 69 IN | HEART RATE: 54 BPM | DIASTOLIC BLOOD PRESSURE: 67 MMHG | SYSTOLIC BLOOD PRESSURE: 127 MMHG

## 2019-09-09 DIAGNOSIS — E11.42 DIABETIC PERIPHERAL NEUROPATHY (HCC): ICD-10-CM

## 2019-09-09 DIAGNOSIS — I10 ESSENTIAL HYPERTENSION: ICD-10-CM

## 2019-09-09 DIAGNOSIS — M54.16 LUMBAR RADICULOPATHY: ICD-10-CM

## 2019-09-09 DIAGNOSIS — E78.5 HYPERLIPIDEMIA, UNSPECIFIED HYPERLIPIDEMIA TYPE: ICD-10-CM

## 2019-09-09 DIAGNOSIS — M48.02 CERVICAL STENOSIS OF SPINE: ICD-10-CM

## 2019-09-09 DIAGNOSIS — E11.22 TYPE 2 DIABETES MELLITUS WITH STAGE 1 CHRONIC KIDNEY DISEASE, WITHOUT LONG-TERM CURRENT USE OF INSULIN (HCC): ICD-10-CM

## 2019-09-09 DIAGNOSIS — N18.1 TYPE 2 DIABETES MELLITUS WITH STAGE 1 CHRONIC KIDNEY DISEASE, WITHOUT LONG-TERM CURRENT USE OF INSULIN (HCC): ICD-10-CM

## 2019-09-09 DIAGNOSIS — Z76.89 ENCOUNTER TO ESTABLISH CARE WITH NEW DOCTOR: ICD-10-CM

## 2019-09-09 LAB
EPITHELIAL CELLS, UA: 2 /HPF (ref 0–5)
HYALINE CASTS: 0 /LPF (ref 0–8)
RBC UA: 1 /HPF (ref 0–4)
WBC UA: 0 /HPF (ref 0–5)

## 2019-09-09 PROCEDURE — G8598 ASA/ANTIPLAT THER USED: HCPCS | Performed by: FAMILY MEDICINE

## 2019-09-09 PROCEDURE — 3017F COLORECTAL CA SCREEN DOC REV: CPT | Performed by: FAMILY MEDICINE

## 2019-09-09 PROCEDURE — 99214 OFFICE O/P EST MOD 30 MIN: CPT | Performed by: FAMILY MEDICINE

## 2019-09-09 PROCEDURE — 2022F DILAT RTA XM EVC RTNOPTHY: CPT | Performed by: FAMILY MEDICINE

## 2019-09-09 PROCEDURE — G8417 CALC BMI ABV UP PARAM F/U: HCPCS | Performed by: FAMILY MEDICINE

## 2019-09-09 PROCEDURE — 4040F PNEUMOC VAC/ADMIN/RCVD: CPT | Performed by: FAMILY MEDICINE

## 2019-09-09 PROCEDURE — 1036F TOBACCO NON-USER: CPT | Performed by: FAMILY MEDICINE

## 2019-09-09 PROCEDURE — 3044F HG A1C LEVEL LT 7.0%: CPT | Performed by: FAMILY MEDICINE

## 2019-09-09 PROCEDURE — 1123F ACP DISCUSS/DSCN MKR DOCD: CPT | Performed by: FAMILY MEDICINE

## 2019-09-09 PROCEDURE — G8427 DOCREV CUR MEDS BY ELIG CLIN: HCPCS | Performed by: FAMILY MEDICINE

## 2019-09-09 RX ORDER — CYCLOBENZAPRINE HCL 5 MG
5 TABLET ORAL DAILY
Qty: 30 TABLET | Refills: 0 | Status: ON HOLD | OUTPATIENT
Start: 2019-09-09 | End: 2019-10-11

## 2019-09-09 ASSESSMENT — ENCOUNTER SYMPTOMS
BACK PAIN: 1
CONSTIPATION: 0
EYE ITCHING: 0
ABDOMINAL PAIN: 0
NAUSEA: 0
VOMITING: 0
CHEST TIGHTNESS: 0
COUGH: 0
EYE REDNESS: 0
ABDOMINAL DISTENTION: 0
DIARRHEA: 0
TROUBLE SWALLOWING: 0
SINUS PAIN: 0
WHEEZING: 0
SORE THROAT: 0
SHORTNESS OF BREATH: 1

## 2019-09-09 NOTE — PROGRESS NOTES
Ht 5' 9.02\" (1.753 m)   Wt 212 lb 6.4 oz (96.3 kg)   SpO2 97%   BMI 31.35 kg/m²      Physical Exam   Constitutional: He is oriented to person, place, and time. He appears well-developed and well-nourished. HENT:   Head: Normocephalic. Mouth/Throat: Oropharynx is clear and moist.   Eyes: Pupils are equal, round, and reactive to light. Conjunctivae are normal.   Neck: Neck supple. No thyromegaly present. Positive Spurling test on right side   Cardiovascular: Normal rate, regular rhythm and normal heart sounds. Pulmonary/Chest: Effort normal and breath sounds normal.   Abdominal: Soft. Bowel sounds are normal. He exhibits no distension. There is no tenderness. There is no guarding. Musculoskeletal: He exhibits edema. 5/5 muscular strength throughout  Positive b/l straight leg test for radulopathy   Neurological: He is alert and oriented to person, place, and time. Skin: Skin is warm and dry. Psychiatric: He has a normal mood and affect. His behavior is normal. Judgment and thought content normal.          Assessment / Plan:      1. Cervical stenosis of spine  - Cervical MRI result reviewed and result mentioned in HPI  - Muscular relaxant  - Continuous PT  - Started muscular relaxant  - May benefit from pain management counsult    - cyclobenzaprine (FLEXERIL) 5 MG tablet; Take 1 tablet by mouth daily  Dispense: 30 tablet; Refill: 0    2. Lumbar radiculopathy  - Same is item # 1  - MRI reviewed and mentioned in HPI    3. Essential hypertension  - Stable and at goal    - Microscopic Urinalysis  - Comprehensive Metabolic Panel; Future  - CBC Auto Differential; Future  - Lipid Panel; Future  - Hemoglobin A1C; Future  - TSH without Reflex; Future  - T4, Free; Future    4. Type 2 diabetes mellitus with stage 1 chronic kidney disease, without long-term current use of insulin (HCC)  - Stable and at goal Hbg A1C ( 6.1)    - Microscopic Urinalysis  - Comprehensive Metabolic Panel;  Future  - CBC Auto

## 2019-09-11 ENCOUNTER — OFFICE VISIT (OUTPATIENT)
Dept: INTERNAL MEDICINE CLINIC | Age: 69
End: 2019-09-11
Payer: MEDICARE

## 2019-09-11 ENCOUNTER — HOSPITAL ENCOUNTER (OUTPATIENT)
Age: 69
Discharge: HOME | End: 2019-09-11
Payer: MEDICARE

## 2019-09-11 VITALS
WEIGHT: 211.8 LBS | SYSTOLIC BLOOD PRESSURE: 180 MMHG | HEART RATE: 55 BPM | OXYGEN SATURATION: 100 % | BODY MASS INDEX: 31.26 KG/M2 | DIASTOLIC BLOOD PRESSURE: 96 MMHG

## 2019-09-11 DIAGNOSIS — M99.05 SOMATIC DYSFUNCTION OF PELVIS REGION: ICD-10-CM

## 2019-09-11 DIAGNOSIS — M99.06 SOMATIC DYSFUNCTION OF BOTH LOWER EXTREMITIES: ICD-10-CM

## 2019-09-11 DIAGNOSIS — M99.09 SOMATIC DYSFUNCTION OF BACK: ICD-10-CM

## 2019-09-11 DIAGNOSIS — M54.16 LUMBAR RADICULOPATHY: ICD-10-CM

## 2019-09-11 DIAGNOSIS — M48.02 CERVICAL STENOSIS OF SPINE: ICD-10-CM

## 2019-09-11 DIAGNOSIS — M99.01 SOMATIC DYSFUNCTION OF CERVICAL REGION: ICD-10-CM

## 2019-09-11 LAB
ALBUMIN SERPL-MCNC: 4.5 GM/DL (ref 3.4–5)
ALP BLD-CCNC: 151 IU/L (ref 40–128)
ALT SERPL-CCNC: 19 U/L (ref 10–40)
ANION GAP SERPL CALCULATED.3IONS-SCNC: 16 MMOL/L (ref 4–16)
AST SERPL-CCNC: 22 IU/L (ref 15–37)
BASOPHILS ABSOLUTE: 0.1 K/CU MM
BASOPHILS RELATIVE PERCENT: 0.6 % (ref 0–1)
BILIRUB SERPL-MCNC: 0.4 MG/DL (ref 0–1)
BUN BLDV-MCNC: 20 MG/DL (ref 6–23)
CALCIUM SERPL-MCNC: 9.3 MG/DL (ref 8.3–10.6)
CHLORIDE BLD-SCNC: 100 MMOL/L (ref 99–110)
CHOLESTEROL, FASTING: 134 MG/DL
CO2: 23 MMOL/L (ref 21–32)
CREAT SERPL-MCNC: 1.9 MG/DL (ref 0.9–1.3)
DIFFERENTIAL TYPE: ABNORMAL
EOSINOPHILS ABSOLUTE: 0 K/CU MM
EOSINOPHILS RELATIVE PERCENT: 0.2 % (ref 0–3)
ESTIMATED AVERAGE GLUCOSE: 140 MG/DL
GFR AFRICAN AMERICAN: 43 ML/MIN/1.73M2
GFR NON-AFRICAN AMERICAN: 35 ML/MIN/1.73M2
GLUCOSE BLD-MCNC: 105 MG/DL (ref 70–99)
HBA1C MFR BLD: 6.5 % (ref 4.2–6.3)
HCT VFR BLD CALC: 44.2 % (ref 42–52)
HDLC SERPL-MCNC: 47 MG/DL
HEMOGLOBIN: 12.9 GM/DL (ref 13.5–18)
IMMATURE NEUTROPHIL %: 0.8 % (ref 0–0.43)
LDL CHOLESTEROL DIRECT: 71 MG/DL
LYMPHOCYTES ABSOLUTE: 1.7 K/CU MM
LYMPHOCYTES RELATIVE PERCENT: 16.4 % (ref 24–44)
MCH RBC QN AUTO: 24.3 PG (ref 27–31)
MCHC RBC AUTO-ENTMCNC: 29.2 % (ref 32–36)
MCV RBC AUTO: 83.2 FL (ref 78–100)
MONOCYTES ABSOLUTE: 0.7 K/CU MM
MONOCYTES RELATIVE PERCENT: 6.5 % (ref 0–4)
NUCLEATED RBC %: 0 %
PDW BLD-RTO: 18.1 % (ref 11.7–14.9)
PLATELET # BLD: 278 K/CU MM (ref 140–440)
PMV BLD AUTO: 9.8 FL (ref 7.5–11.1)
POTASSIUM SERPL-SCNC: 4.2 MMOL/L (ref 3.5–5.1)
RBC # BLD: 5.31 M/CU MM (ref 4.6–6.2)
SEGMENTED NEUTROPHILS ABSOLUTE COUNT: 7.8 K/CU MM
SEGMENTED NEUTROPHILS RELATIVE PERCENT: 75.5 % (ref 36–66)
SODIUM BLD-SCNC: 139 MMOL/L (ref 135–145)
T4 FREE: 1.22 NG/DL (ref 0.9–1.8)
TOTAL IMMATURE NEUTOROPHIL: 0.08 K/CU MM
TOTAL NUCLEATED RBC: 0 K/CU MM
TOTAL PROTEIN: 8.4 GM/DL (ref 6.4–8.2)
TRIGLYCERIDE, FASTING: 122 MG/DL
TSH HIGH SENSITIVITY: 3.15 UIU/ML (ref 0.27–4.2)
WBC # BLD: 10.4 K/CU MM (ref 4–10.5)

## 2019-09-11 PROCEDURE — 98926 OSTEOPATH MANJ 3-4 REGIONS: CPT | Performed by: FAMILY MEDICINE

## 2019-09-11 PROCEDURE — 84443 ASSAY THYROID STIM HORMONE: CPT

## 2019-09-11 PROCEDURE — 80053 COMPREHEN METABOLIC PANEL: CPT

## 2019-09-11 PROCEDURE — 99213 OFFICE O/P EST LOW 20 MIN: CPT | Performed by: FAMILY MEDICINE

## 2019-09-11 PROCEDURE — G8427 DOCREV CUR MEDS BY ELIG CLIN: HCPCS | Performed by: FAMILY MEDICINE

## 2019-09-11 PROCEDURE — 1123F ACP DISCUSS/DSCN MKR DOCD: CPT | Performed by: FAMILY MEDICINE

## 2019-09-11 PROCEDURE — G8417 CALC BMI ABV UP PARAM F/U: HCPCS | Performed by: FAMILY MEDICINE

## 2019-09-11 PROCEDURE — 3017F COLORECTAL CA SCREEN DOC REV: CPT | Performed by: FAMILY MEDICINE

## 2019-09-11 PROCEDURE — 4040F PNEUMOC VAC/ADMIN/RCVD: CPT | Performed by: FAMILY MEDICINE

## 2019-09-11 PROCEDURE — 85025 COMPLETE CBC W/AUTO DIFF WBC: CPT

## 2019-09-11 PROCEDURE — G8598 ASA/ANTIPLAT THER USED: HCPCS | Performed by: FAMILY MEDICINE

## 2019-09-11 PROCEDURE — 80061 LIPID PANEL: CPT

## 2019-09-11 PROCEDURE — 84439 ASSAY OF FREE THYROXINE: CPT

## 2019-09-11 PROCEDURE — 83036 HEMOGLOBIN GLYCOSYLATED A1C: CPT

## 2019-09-11 PROCEDURE — 1036F TOBACCO NON-USER: CPT | Performed by: FAMILY MEDICINE

## 2019-09-11 PROCEDURE — 36415 COLL VENOUS BLD VENIPUNCTURE: CPT

## 2019-09-11 RX ORDER — PREDNISONE 10 MG/1
10 TABLET ORAL DAILY
Qty: 7 TABLET | Refills: 0 | Status: SHIPPED | OUTPATIENT
Start: 2019-09-11 | End: 2019-09-18

## 2019-09-11 ASSESSMENT — ENCOUNTER SYMPTOMS
CHEST TIGHTNESS: 0
SHORTNESS OF BREATH: 0
CONSTIPATION: 0
ABDOMINAL PAIN: 0
EYE ITCHING: 0
DIARRHEA: 0
EYE REDNESS: 0
SORE THROAT: 0
COUGH: 0
WHEEZING: 0
VOMITING: 0
BACK PAIN: 1
NAUSEA: 0
TROUBLE SWALLOWING: 0
ABDOMINAL DISTENTION: 0
SINUS PAIN: 0

## 2019-09-11 NOTE — PROGRESS NOTES
Subjective:      Chief Complaint: Neck and back pain    HPI:  Ellie San. is a 71 y.o. male who presents today     Neck and Back Pain: Patient continues to have moderate to sever back and neck pain. Pain is continuous, radiate to b/l lower extremity and right shoulder. Patient has multiple level of stenosis at both cervical and lumber spine. Pain is associated with numbness and tingling in both right upper and both lower extremity. Pain gets better with OMT and pain medications. He is scheduled to see a spine specialist for surgery.     Patient Active Problem List   Diagnosis    [facility]ch[facility] disease) [facility]    Pleural effusion, left    SAMANTHA on CPAP    Chronic obstructive pulmonary disease ([facility]    TIA (transient ischemic attack)    Bipolar 1 disorder ([address]    Coronary artery disease involving native coronary artery of native heart without angina pectoris    Essential hypertension    REYES (dyspnea on exertion)    Anemia, chronic disease    Diuretic-induced hypokalemia    Diastolic CHF ([address]    Adrenal mass ([facility]    Acute respiratory failure ([address]    Pneumonia due to gram-negative bacteria ([facility])    Hyperkalemia    Adrenal mass ([address]    Diabetes mellitus, type 2 ([address]    Hyponatremia    Pneumonia due to organism    PAF (paroxysmal atrial fibrillation) ([facility]    Empyema of left pleural space ([facility])    Hospital-acquired bacterial pneumonia    Hyperlipidemia    Lumbar radiculopathy    CKD (chronic kidney disease)    Diabetic peripheral neuropathy ([facility]    Chronic kidney disease (CKD), stage III (moderate) ([facility])    Cervical stenosis of spine    Spinal stenosis of lumbar region without neurogenic claudication    Somatic dysfunction of back    Somatic dysfunction of cervical region    Somatic dysfunction of pelvis region    Somatic dysfunction of both lower extremities       Past Medical History:   Diagnosis Date    Anemia     per old chart    Arthritis     Back pain, chronic     \"have pain in lumbar mainly- have 5 bulging discs\"\"in my neck and my lumbar have herniated discs\"    B[facility] 1 disorder ([facility]     CAD (coronary artery disease) 2016    does not follow with a cardiologist    Cerebral artery occlusion with cerebral infarction ([facility]     \"had mini stroke in 2016- fast heart rate when I would stand up - smile drooping on one side- lased just the one day\"    Chronic kidney disease (CKD), stage III (moderate) ([facility]     CKD (chronic kidney disease)     per old chart- consult with Dr Zheng Ash with admission 2016\"have low kidney function\"    COPD (chronic obstructive pulmonary disease) ([facility]     follow with Dr Ciara Barnes Diabetes mellitus, type 2 ([facility] 1/3/2017    Diabetic peripheral neuropathy ([facility])     Diastolic CHF ([facility] 3/25/0787    Gastric ulcer     H/O cardiovascular stress test 14    EF 68% mild ischemia anterior wall    Heart murmur     \"see Dr Bianca García- last echo \" pt states\"I think they said I have a murmur but no chest pain or palpitations\"    Hiatal hernia     History of cardiac cath 14    MODERATE  CAD      Hx of migraines     HX OTHER MEDICAL     \"have thinning of kidney walls- they found I had that 15 yrs ago\" see Dr Chelsie Chávez"    Hyperlipidemia     Hypertension     Lumbar radiculopathy     Panic attack     'anything new gives me anxiety\"    Pleural effusion     scheduled for thoracentesis 2014, 2016    S/P thoracentesis     left side( per old chart had thora done 2016 and one done )    Sleep apnea     sleep study x 2 - last one 4-5 yrs ago- uses c-pap\"    SOBOE (shortness of breath on exertion)     Spinal stenosis         Social History     Tobacco Use    Smoking status: Former Smoker     Packs/day: 1.50     Years: 30.00     Pack years: 45.00     Types: Cigarettes     Last attempt to quit: 2010     Years since quittin.1    Smokeless tobacco: Never Used   Substance Use Topics    Alcohol use: Not Currently        Review of Systems   Constitutional: Negative for appetite change, chills, fatigue, fever and unexpected weight change. HENT: Negative for congestion, ear pain, sinus pain, sore throat and trouble swallowing. Eyes: Negative for redness and itching. Respiratory: Negative for cough, chest tightness, shortness of breath and wheezing. Cardiovascular: Negative for chest pain and palpitations. Gastrointestinal: Negative for abdominal distention, abdominal pain, constipation, diarrhea, nausea and vomiting. Endocrine: Negative for polyuria. Genitourinary: Negative for difficulty urinating, dysuria, frequency and urgency. Musculoskeletal: Positive for arthralgias, back pain, myalgias and neck pain. Skin: Negative for rash. Neurological: Negative for dizziness and headaches. Psychiatric/Behavioral: Negative for behavioral problems, confusion and suicidal ideas. Objective:      BP (!) 180/96   Pulse 55   Wt 211 lb 12.8 oz (96.1 kg)   SpO2 100%   BMI 31.26 kg/m²      Physical Exam   Constitutional: He is oriented to person, place, and time. He appears well-developed and well-nourished. Patient noticeable in pain   HENT:   Head: Normocephalic. Mouth/Throat: Oropharynx is clear and moist.   Eyes: Pupils are equal, round, and reactive to light. Conjunctivae are normal.   Neck: No thyromegaly present. Cardiovascular: Regular rhythm and normal heart sounds. Bradycardia   Pulmonary/Chest: Effort normal and breath sounds normal.   Abdominal: Soft. Bowel sounds are normal. He exhibits no distension. There is no tenderness. There is no guarding. Musculoskeletal: He exhibits no edema. Decrease or limited ROM in both cervical spine and lumber spine   Lymphadenopathy:     He has no cervical adenopathy. Neurological: He is alert and oriented to person, place, and time. Skin: Skin is warm and dry. Psychiatric: He has a normal mood and affect.  His behavior is normal. Judgment and thought content normal.     Somatic Findings:   Decreased ROM of cervical spine  Right trapezius pain  Midline tender points, worst at L3-L5 and S1  Decrease nutation and counter-nutation of pelvis  Bilateral Para-spinal muscular spasm and tenderness  L1- L5 TART changes  Leg length discrepancy          Assessment / Plan:      1. Cervical stenosis of spine  - Continue your pain medication and muscular relaxant.  - Short course of oral Prednisone.  - Drink plenty of water.  - Follow up in a week for OMT reassessment.   - Patient knows that he has structural vertebral issue and can't be completely improved. OMT will provided symptomatic relief of pain and improve mobility and may decrease the dependence on Pain medications such Opioid analgesic. - predniSONE (DELTASONE) 10 MG tablet; Take 1 tablet by mouth daily for 7 days  Dispense: 7 tablet; Refill: 0    - VT OSTEOPATHIC MANIP,3-4 BODY REGN    2. Lumbar radiculopathy  - Same as item # 1    - predniSONE (DELTASONE) 10 MG tablet; Take 1 tablet by mouth daily for 7 days  Dispense: 7 tablet; Refill: 0    - VT OSTEOPATHIC MANIP,3-4 BODY REGN    Procedure Note:  The patient verbally consented to the application of OMT. Patient was positioned appropriately for the techniques and repositioned as needed. Appropriate hand positioning and monitoring technique was performed. Reassessment of the effectiveness of the techniques was performed    3. Somatic dysfunction of cervical region  - Soft myofacial technique was applied to the neck. - Reassessment: Improved pain and ROM of neck  - Need f/u OMT for further improvement. Drink plenty of fluid and home stretching exercises. - VT OSTEOPATHIC MANIP,3-4 BODY REGN    4. Somatic dysfunction of back  - Myofacial techniques applied to tense para-spinal muscles followed by muscle energy to the targeted muscle.    - Re-assessment: Post-treatment para-spinal muscle are less tense with increased ROM of lumber

## 2019-09-27 DIAGNOSIS — F31.9 BIPOLAR 1 DISORDER (HCC): ICD-10-CM

## 2019-09-27 RX ORDER — GABAPENTIN 800 MG/1
800 TABLET ORAL 3 TIMES DAILY
Qty: 90 TABLET | Refills: 0 | Status: SHIPPED | OUTPATIENT
Start: 2019-09-27 | End: 2019-10-28 | Stop reason: SDUPTHER

## 2019-09-27 RX ORDER — ALPRAZOLAM 1 MG/1
1 TABLET ORAL 3 TIMES DAILY PRN
Qty: 90 TABLET | Refills: 0 | Status: SHIPPED | OUTPATIENT
Start: 2019-09-27 | End: 2019-10-25 | Stop reason: SDUPTHER

## 2019-10-07 ENCOUNTER — APPOINTMENT (OUTPATIENT)
Dept: CT IMAGING | Age: 69
DRG: 178 | End: 2019-10-07
Payer: MEDICARE

## 2019-10-07 ENCOUNTER — HOSPITAL ENCOUNTER (INPATIENT)
Age: 69
LOS: 4 days | Discharge: HOME HEALTH | DRG: 178 | End: 2019-10-11
Attending: FAMILY MEDICINE | Admitting: INTERNAL MEDICINE
Payer: MEDICARE

## 2019-10-07 ENCOUNTER — APPOINTMENT (OUTPATIENT)
Dept: GENERAL RADIOLOGY | Age: 69
DRG: 178 | End: 2019-10-07
Payer: MEDICARE

## 2019-10-07 DIAGNOSIS — R07.9 CHEST PAIN, UNSPECIFIED TYPE: Primary | ICD-10-CM

## 2019-10-07 DIAGNOSIS — N17.9 AKI (ACUTE KIDNEY INJURY) (HCC): ICD-10-CM

## 2019-10-07 DIAGNOSIS — R06.00 DYSPNEA, UNSPECIFIED TYPE: ICD-10-CM

## 2019-10-07 DIAGNOSIS — M48.02 CERVICAL STENOSIS OF SPINE: ICD-10-CM

## 2019-10-07 DIAGNOSIS — J86.9 EMPYEMA OF LUNG (HCC): ICD-10-CM

## 2019-10-07 LAB
ALBUMIN SERPL-MCNC: 4.1 GM/DL (ref 3.4–5)
ALP BLD-CCNC: 125 IU/L (ref 40–129)
ALT SERPL-CCNC: 19 U/L (ref 10–40)
ANION GAP SERPL CALCULATED.3IONS-SCNC: 13 MMOL/L (ref 4–16)
AST SERPL-CCNC: 19 IU/L (ref 15–37)
BASOPHILS ABSOLUTE: 0.1 K/CU MM
BASOPHILS RELATIVE PERCENT: 0.4 % (ref 0–1)
BILIRUB SERPL-MCNC: 0.7 MG/DL (ref 0–1)
BUN BLDV-MCNC: 25 MG/DL (ref 6–23)
CALCIUM SERPL-MCNC: 8.8 MG/DL (ref 8.3–10.6)
CHLORIDE BLD-SCNC: 96 MMOL/L (ref 99–110)
CO2: 27 MMOL/L (ref 21–32)
CREAT SERPL-MCNC: 2.2 MG/DL (ref 0.9–1.3)
DIFFERENTIAL TYPE: ABNORMAL
EOSINOPHILS ABSOLUTE: 0.1 K/CU MM
EOSINOPHILS RELATIVE PERCENT: 0.4 % (ref 0–3)
GFR AFRICAN AMERICAN: 36 ML/MIN/1.73M2
GFR NON-AFRICAN AMERICAN: 30 ML/MIN/1.73M2
GLUCOSE BLD-MCNC: 104 MG/DL (ref 70–99)
HCT VFR BLD CALC: 46.8 % (ref 42–52)
HEMOGLOBIN: 14 GM/DL (ref 13.5–18)
IMMATURE NEUTROPHIL %: 1 % (ref 0–0.43)
LACTATE: 1 MMOL/L (ref 0.4–2)
LYMPHOCYTES ABSOLUTE: 2.3 K/CU MM
LYMPHOCYTES RELATIVE PERCENT: 18.1 % (ref 24–44)
MCH RBC QN AUTO: 24.6 PG (ref 27–31)
MCHC RBC AUTO-ENTMCNC: 29.9 % (ref 32–36)
MCV RBC AUTO: 82.4 FL (ref 78–100)
MONOCYTES ABSOLUTE: 0.8 K/CU MM
MONOCYTES RELATIVE PERCENT: 6.3 % (ref 0–4)
NUCLEATED RBC %: 0 %
PDW BLD-RTO: 19 % (ref 11.7–14.9)
PLATELET # BLD: 239 K/CU MM (ref 140–440)
PMV BLD AUTO: 9.5 FL (ref 7.5–11.1)
POTASSIUM SERPL-SCNC: 4.1 MMOL/L (ref 3.5–5.1)
PRO-BNP: 128.8 PG/ML
RBC # BLD: 5.68 M/CU MM (ref 4.6–6.2)
SEGMENTED NEUTROPHILS ABSOLUTE COUNT: 9.3 K/CU MM
SEGMENTED NEUTROPHILS RELATIVE PERCENT: 73.8 % (ref 36–66)
SODIUM BLD-SCNC: 136 MMOL/L (ref 135–145)
TOTAL IMMATURE NEUTOROPHIL: 0.12 K/CU MM
TOTAL NUCLEATED RBC: 0 K/CU MM
TOTAL PROTEIN: 8.4 GM/DL (ref 6.4–8.2)
TROPONIN T: <0.01 NG/ML
WBC # BLD: 12.6 K/CU MM (ref 4–10.5)

## 2019-10-07 PROCEDURE — 36415 COLL VENOUS BLD VENIPUNCTURE: CPT

## 2019-10-07 PROCEDURE — 96365 THER/PROPH/DIAG IV INF INIT: CPT

## 2019-10-07 PROCEDURE — 71250 CT THORAX DX C-: CPT

## 2019-10-07 PROCEDURE — 71046 X-RAY EXAM CHEST 2 VIEWS: CPT

## 2019-10-07 PROCEDURE — 1200000000 HC SEMI PRIVATE

## 2019-10-07 PROCEDURE — 96367 TX/PROPH/DG ADDL SEQ IV INF: CPT

## 2019-10-07 PROCEDURE — 85025 COMPLETE CBC W/AUTO DIFF WBC: CPT

## 2019-10-07 PROCEDURE — 84484 ASSAY OF TROPONIN QUANT: CPT

## 2019-10-07 PROCEDURE — 93005 ELECTROCARDIOGRAM TRACING: CPT | Performed by: PHYSICIAN ASSISTANT

## 2019-10-07 PROCEDURE — 96375 TX/PRO/DX INJ NEW DRUG ADDON: CPT

## 2019-10-07 PROCEDURE — 83880 ASSAY OF NATRIURETIC PEPTIDE: CPT

## 2019-10-07 PROCEDURE — 6360000002 HC RX W HCPCS: Performed by: FAMILY MEDICINE

## 2019-10-07 PROCEDURE — 80053 COMPREHEN METABOLIC PANEL: CPT

## 2019-10-07 PROCEDURE — 87040 BLOOD CULTURE FOR BACTERIA: CPT

## 2019-10-07 PROCEDURE — 99291 CRITICAL CARE FIRST HOUR: CPT

## 2019-10-07 PROCEDURE — 83605 ASSAY OF LACTIC ACID: CPT

## 2019-10-07 PROCEDURE — 94640 AIRWAY INHALATION TREATMENT: CPT

## 2019-10-07 PROCEDURE — 2580000003 HC RX 258: Performed by: FAMILY MEDICINE

## 2019-10-07 PROCEDURE — 6370000000 HC RX 637 (ALT 250 FOR IP): Performed by: FAMILY MEDICINE

## 2019-10-07 RX ORDER — IPRATROPIUM BROMIDE AND ALBUTEROL SULFATE 2.5; .5 MG/3ML; MG/3ML
3 SOLUTION RESPIRATORY (INHALATION) ONCE
Status: COMPLETED | OUTPATIENT
Start: 2019-10-07 | End: 2019-10-07

## 2019-10-07 RX ORDER — MORPHINE SULFATE 4 MG/ML
4 INJECTION, SOLUTION INTRAMUSCULAR; INTRAVENOUS ONCE
Status: COMPLETED | OUTPATIENT
Start: 2019-10-07 | End: 2019-10-07

## 2019-10-07 RX ADMIN — CEFEPIME HYDROCHLORIDE 2 G: 2 INJECTION, POWDER, FOR SOLUTION INTRAVENOUS at 22:52

## 2019-10-07 RX ADMIN — MORPHINE SULFATE 4 MG: 4 INJECTION, SOLUTION INTRAMUSCULAR; INTRAVENOUS at 21:21

## 2019-10-07 RX ADMIN — IPRATROPIUM BROMIDE AND ALBUTEROL SULFATE 3 AMPULE: .5; 3 SOLUTION RESPIRATORY (INHALATION) at 22:40

## 2019-10-07 RX ADMIN — VANCOMYCIN HYDROCHLORIDE 1500 MG: 5 INJECTION, POWDER, LYOPHILIZED, FOR SOLUTION INTRAVENOUS at 23:26

## 2019-10-07 ASSESSMENT — PAIN DESCRIPTION - FREQUENCY: FREQUENCY: CONTINUOUS

## 2019-10-07 ASSESSMENT — PAIN DESCRIPTION - PAIN TYPE
TYPE: ACUTE PAIN
TYPE: ACUTE PAIN

## 2019-10-07 ASSESSMENT — PAIN SCALES - GENERAL
PAINLEVEL_OUTOF10: 5
PAINLEVEL_OUTOF10: 8
PAINLEVEL_OUTOF10: 8

## 2019-10-07 ASSESSMENT — PAIN DESCRIPTION - DIRECTION: RADIATING_TOWARDS: WHEN COUGHING

## 2019-10-07 ASSESSMENT — PAIN DESCRIPTION - ORIENTATION
ORIENTATION: LEFT
ORIENTATION: LEFT

## 2019-10-07 ASSESSMENT — PAIN DESCRIPTION - LOCATION
LOCATION: CHEST
LOCATION: CHEST

## 2019-10-07 ASSESSMENT — PAIN DESCRIPTION - DESCRIPTORS: DESCRIPTORS: CONSTANT

## 2019-10-08 LAB
ANION GAP SERPL CALCULATED.3IONS-SCNC: 11 MMOL/L (ref 4–16)
BASOPHILS ABSOLUTE: 0 K/CU MM
BASOPHILS RELATIVE PERCENT: 0.4 % (ref 0–1)
BUN BLDV-MCNC: 25 MG/DL (ref 6–23)
CALCIUM SERPL-MCNC: 8.5 MG/DL (ref 8.3–10.6)
CHLORIDE BLD-SCNC: 99 MMOL/L (ref 99–110)
CO2: 29 MMOL/L (ref 21–32)
CREAT SERPL-MCNC: 2.1 MG/DL (ref 0.9–1.3)
DIFFERENTIAL TYPE: ABNORMAL
EOSINOPHILS ABSOLUTE: 0.1 K/CU MM
EOSINOPHILS RELATIVE PERCENT: 0.5 % (ref 0–3)
ESTIMATED AVERAGE GLUCOSE: 143 MG/DL
GFR AFRICAN AMERICAN: 38 ML/MIN/1.73M2
GFR NON-AFRICAN AMERICAN: 31 ML/MIN/1.73M2
GLUCOSE BLD-MCNC: 104 MG/DL (ref 70–99)
HBA1C MFR BLD: 6.6 % (ref 4.2–6.3)
HCT VFR BLD CALC: 46.7 % (ref 42–52)
HEMOGLOBIN: 13.1 GM/DL (ref 13.5–18)
IMMATURE NEUTROPHIL %: 0.9 % (ref 0–0.43)
LYMPHOCYTES ABSOLUTE: 1.7 K/CU MM
LYMPHOCYTES RELATIVE PERCENT: 17.7 % (ref 24–44)
MCH RBC QN AUTO: 24.8 PG (ref 27–31)
MCHC RBC AUTO-ENTMCNC: 28.1 % (ref 32–36)
MCV RBC AUTO: 88.4 FL (ref 78–100)
MONOCYTES ABSOLUTE: 0.7 K/CU MM
MONOCYTES RELATIVE PERCENT: 7.6 % (ref 0–4)
NUCLEATED RBC %: 0 %
PDW BLD-RTO: 18.2 % (ref 11.7–14.9)
PLATELET # BLD: 203 K/CU MM (ref 140–440)
PMV BLD AUTO: 9.5 FL (ref 7.5–11.1)
POTASSIUM SERPL-SCNC: 3.9 MMOL/L (ref 3.5–5.1)
RBC # BLD: 5.28 M/CU MM (ref 4.6–6.2)
SEGMENTED NEUTROPHILS ABSOLUTE COUNT: 7.1 K/CU MM
SEGMENTED NEUTROPHILS RELATIVE PERCENT: 72.9 % (ref 36–66)
SODIUM BLD-SCNC: 139 MMOL/L (ref 135–145)
TOTAL IMMATURE NEUTOROPHIL: 0.09 K/CU MM
TOTAL NUCLEATED RBC: 0 K/CU MM
WBC # BLD: 9.7 K/CU MM (ref 4–10.5)

## 2019-10-08 PROCEDURE — 6370000000 HC RX 637 (ALT 250 FOR IP): Performed by: INTERNAL MEDICINE

## 2019-10-08 PROCEDURE — 94761 N-INVAS EAR/PLS OXIMETRY MLT: CPT

## 2019-10-08 PROCEDURE — 85025 COMPLETE CBC W/AUTO DIFF WBC: CPT

## 2019-10-08 PROCEDURE — 96366 THER/PROPH/DIAG IV INF ADDON: CPT

## 2019-10-08 PROCEDURE — 6360000002 HC RX W HCPCS: Performed by: FAMILY MEDICINE

## 2019-10-08 PROCEDURE — 83036 HEMOGLOBIN GLYCOSYLATED A1C: CPT

## 2019-10-08 PROCEDURE — 2580000003 HC RX 258: Performed by: INTERNAL MEDICINE

## 2019-10-08 PROCEDURE — 96376 TX/PRO/DX INJ SAME DRUG ADON: CPT

## 2019-10-08 PROCEDURE — 36415 COLL VENOUS BLD VENIPUNCTURE: CPT

## 2019-10-08 PROCEDURE — 93010 ELECTROCARDIOGRAM REPORT: CPT | Performed by: INTERNAL MEDICINE

## 2019-10-08 PROCEDURE — 1200000000 HC SEMI PRIVATE

## 2019-10-08 PROCEDURE — 80048 BASIC METABOLIC PNL TOTAL CA: CPT

## 2019-10-08 RX ORDER — SODIUM CHLORIDE 0.9 % (FLUSH) 0.9 %
10 SYRINGE (ML) INJECTION EVERY 12 HOURS SCHEDULED
Status: DISCONTINUED | OUTPATIENT
Start: 2019-10-08 | End: 2019-10-11 | Stop reason: HOSPADM

## 2019-10-08 RX ORDER — ALPRAZOLAM 0.5 MG/1
1 TABLET ORAL 3 TIMES DAILY PRN
Status: DISCONTINUED | OUTPATIENT
Start: 2019-10-08 | End: 2019-10-11 | Stop reason: HOSPADM

## 2019-10-08 RX ORDER — ESCITALOPRAM OXALATE 10 MG/1
20 TABLET ORAL DAILY
Status: DISCONTINUED | OUTPATIENT
Start: 2019-10-08 | End: 2019-10-11 | Stop reason: HOSPADM

## 2019-10-08 RX ORDER — FUROSEMIDE 40 MG/1
40 TABLET ORAL DAILY
Status: DISCONTINUED | OUTPATIENT
Start: 2019-10-08 | End: 2019-10-11 | Stop reason: HOSPADM

## 2019-10-08 RX ORDER — SODIUM CHLORIDE 9 MG/ML
INJECTION, SOLUTION INTRAVENOUS CONTINUOUS
Status: DISPENSED | OUTPATIENT
Start: 2019-10-08 | End: 2019-10-08

## 2019-10-08 RX ORDER — FOLIC ACID 1 MG/1
1 TABLET ORAL DAILY
Status: DISCONTINUED | OUTPATIENT
Start: 2019-10-08 | End: 2019-10-11 | Stop reason: HOSPADM

## 2019-10-08 RX ORDER — CARVEDILOL 6.25 MG/1
6.25 TABLET ORAL 2 TIMES DAILY WITH MEALS
Status: DISCONTINUED | OUTPATIENT
Start: 2019-10-08 | End: 2019-10-10

## 2019-10-08 RX ORDER — CYCLOBENZAPRINE HCL 10 MG
5 TABLET ORAL NIGHTLY PRN
Status: DISCONTINUED | OUTPATIENT
Start: 2019-10-08 | End: 2019-10-11 | Stop reason: HOSPADM

## 2019-10-08 RX ORDER — SODIUM CHLORIDE 0.9 % (FLUSH) 0.9 %
10 SYRINGE (ML) INJECTION PRN
Status: DISCONTINUED | OUTPATIENT
Start: 2019-10-08 | End: 2019-10-11 | Stop reason: HOSPADM

## 2019-10-08 RX ORDER — MORPHINE SULFATE 4 MG/ML
4 INJECTION, SOLUTION INTRAMUSCULAR; INTRAVENOUS EVERY 30 MIN PRN
Status: DISCONTINUED | OUTPATIENT
Start: 2019-10-08 | End: 2019-10-08

## 2019-10-08 RX ORDER — MORPHINE SULFATE 4 MG/ML
2 INJECTION, SOLUTION INTRAMUSCULAR; INTRAVENOUS
Status: DISCONTINUED | OUTPATIENT
Start: 2019-10-08 | End: 2019-10-09

## 2019-10-08 RX ORDER — ATORVASTATIN CALCIUM 40 MG/1
80 TABLET, FILM COATED ORAL NIGHTLY
Status: DISCONTINUED | OUTPATIENT
Start: 2019-10-08 | End: 2019-10-11 | Stop reason: HOSPADM

## 2019-10-08 RX ORDER — NICOTINE POLACRILEX 4 MG
15 LOZENGE BUCCAL PRN
Status: DISCONTINUED | OUTPATIENT
Start: 2019-10-08 | End: 2019-10-11 | Stop reason: HOSPADM

## 2019-10-08 RX ORDER — DEXTROSE MONOHYDRATE 50 MG/ML
100 INJECTION, SOLUTION INTRAVENOUS PRN
Status: DISCONTINUED | OUTPATIENT
Start: 2019-10-08 | End: 2019-10-11 | Stop reason: HOSPADM

## 2019-10-08 RX ORDER — LACTOBACILLUS RHAMNOSUS GG 10B CELL
1 CAPSULE ORAL DAILY
Status: DISCONTINUED | OUTPATIENT
Start: 2019-10-08 | End: 2019-10-11 | Stop reason: HOSPADM

## 2019-10-08 RX ORDER — PANTOPRAZOLE SODIUM 40 MG/1
40 TABLET, DELAYED RELEASE ORAL
Status: DISCONTINUED | OUTPATIENT
Start: 2019-10-08 | End: 2019-10-11 | Stop reason: HOSPADM

## 2019-10-08 RX ORDER — DEXTROSE MONOHYDRATE 25 G/50ML
12.5 INJECTION, SOLUTION INTRAVENOUS PRN
Status: DISCONTINUED | OUTPATIENT
Start: 2019-10-08 | End: 2019-10-11 | Stop reason: HOSPADM

## 2019-10-08 RX ORDER — OXYCODONE AND ACETAMINOPHEN 7.5; 325 MG/1; MG/1
1 TABLET ORAL EVERY 6 HOURS PRN
Status: DISCONTINUED | OUTPATIENT
Start: 2019-10-08 | End: 2019-10-08

## 2019-10-08 RX ORDER — OXYCODONE AND ACETAMINOPHEN 7.5; 325 MG/1; MG/1
1 TABLET ORAL EVERY 6 HOURS PRN
Status: DISCONTINUED | OUTPATIENT
Start: 2019-10-08 | End: 2019-10-11 | Stop reason: HOSPADM

## 2019-10-08 RX ORDER — SPIRONOLACTONE 25 MG/1
25 TABLET ORAL DAILY
Status: DISCONTINUED | OUTPATIENT
Start: 2019-10-08 | End: 2019-10-11 | Stop reason: HOSPADM

## 2019-10-08 RX ORDER — DOCUSATE SODIUM 100 MG/1
100 CAPSULE, LIQUID FILLED ORAL DAILY
Status: DISCONTINUED | OUTPATIENT
Start: 2019-10-08 | End: 2019-10-11 | Stop reason: HOSPADM

## 2019-10-08 RX ORDER — ACETAMINOPHEN 80 MG
TABLET,CHEWABLE ORAL
Status: DISPENSED
Start: 2019-10-08 | End: 2019-10-08

## 2019-10-08 RX ORDER — GABAPENTIN 400 MG/1
800 CAPSULE ORAL 3 TIMES DAILY
Status: DISCONTINUED | OUTPATIENT
Start: 2019-10-08 | End: 2019-10-11 | Stop reason: HOSPADM

## 2019-10-08 RX ORDER — ONDANSETRON 2 MG/ML
4 INJECTION INTRAMUSCULAR; INTRAVENOUS EVERY 6 HOURS PRN
Status: DISCONTINUED | OUTPATIENT
Start: 2019-10-08 | End: 2019-10-11 | Stop reason: HOSPADM

## 2019-10-08 RX ORDER — GABAPENTIN 400 MG/1
800 CAPSULE ORAL ONCE
Status: COMPLETED | OUTPATIENT
Start: 2019-10-08 | End: 2019-10-08

## 2019-10-08 RX ORDER — ACETAMINOPHEN 325 MG/1
650 TABLET ORAL EVERY 4 HOURS PRN
Status: DISCONTINUED | OUTPATIENT
Start: 2019-10-08 | End: 2019-10-11 | Stop reason: HOSPADM

## 2019-10-08 RX ADMIN — ALPRAZOLAM 1 MG: 0.5 TABLET ORAL at 23:47

## 2019-10-08 RX ADMIN — ATORVASTATIN CALCIUM 80 MG: 40 TABLET, FILM COATED ORAL at 23:33

## 2019-10-08 RX ADMIN — ALPRAZOLAM 1 MG: 0.5 TABLET ORAL at 04:10

## 2019-10-08 RX ADMIN — CARVEDILOL 6.25 MG: 6.25 TABLET, FILM COATED ORAL at 10:10

## 2019-10-08 RX ADMIN — CYCLOBENZAPRINE 5 MG: 10 TABLET, FILM COATED ORAL at 23:47

## 2019-10-08 RX ADMIN — CYCLOBENZAPRINE 5 MG: 10 TABLET, FILM COATED ORAL at 04:09

## 2019-10-08 RX ADMIN — FUROSEMIDE 40 MG: 40 TABLET ORAL at 10:10

## 2019-10-08 RX ADMIN — MORPHINE SULFATE 2 MG: 4 INJECTION, SOLUTION INTRAMUSCULAR; INTRAVENOUS at 19:17

## 2019-10-08 RX ADMIN — SODIUM CHLORIDE: 9 INJECTION, SOLUTION INTRAVENOUS at 04:09

## 2019-10-08 RX ADMIN — MAGNESIUM HYDROXIDE 30 ML: 400 SUSPENSION ORAL at 23:48

## 2019-10-08 RX ADMIN — GABAPENTIN 800 MG: 400 CAPSULE ORAL at 23:33

## 2019-10-08 RX ADMIN — GABAPENTIN 800 MG: 400 CAPSULE ORAL at 16:32

## 2019-10-08 RX ADMIN — Medication 10 ML: at 04:10

## 2019-10-08 RX ADMIN — MORPHINE SULFATE 2 MG: 4 INJECTION, SOLUTION INTRAMUSCULAR; INTRAVENOUS at 11:33

## 2019-10-08 RX ADMIN — OXYCODONE HYDROCHLORIDE AND ACETAMINOPHEN 1 TABLET: 7.5; 325 TABLET ORAL at 04:16

## 2019-10-08 RX ADMIN — Medication 10 ML: at 10:12

## 2019-10-08 RX ADMIN — CARVEDILOL 6.25 MG: 6.25 TABLET, FILM COATED ORAL at 19:17

## 2019-10-08 RX ADMIN — MORPHINE SULFATE 4 MG: 4 INJECTION, SOLUTION INTRAMUSCULAR; INTRAVENOUS at 01:25

## 2019-10-08 RX ADMIN — GABAPENTIN 800 MG: 400 CAPSULE ORAL at 05:02

## 2019-10-08 RX ADMIN — Medication 1 CAPSULE: at 10:11

## 2019-10-08 RX ADMIN — FOLIC ACID 1 MG: 1 TABLET ORAL at 10:11

## 2019-10-08 RX ADMIN — ESCITALOPRAM OXALATE 20 MG: 10 TABLET ORAL at 10:10

## 2019-10-08 RX ADMIN — ALPRAZOLAM 1 MG: 0.5 TABLET ORAL at 13:30

## 2019-10-08 RX ADMIN — SPIRONOLACTONE 25 MG: 25 TABLET ORAL at 10:11

## 2019-10-08 RX ADMIN — OXYCODONE HYDROCHLORIDE AND ACETAMINOPHEN 1 TABLET: 7.5; 325 TABLET ORAL at 10:11

## 2019-10-08 RX ADMIN — DOCUSATE SODIUM 100 MG: 100 CAPSULE, LIQUID FILLED ORAL at 10:11

## 2019-10-08 RX ADMIN — Medication 10 ML: at 23:34

## 2019-10-08 RX ADMIN — PANTOPRAZOLE SODIUM 40 MG: 40 TABLET, DELAYED RELEASE ORAL at 10:11

## 2019-10-08 ASSESSMENT — PAIN SCALES - GENERAL
PAINLEVEL_OUTOF10: 7
PAINLEVEL_OUTOF10: 8
PAINLEVEL_OUTOF10: 7
PAINLEVEL_OUTOF10: 6
PAINLEVEL_OUTOF10: 8

## 2019-10-08 ASSESSMENT — PAIN DESCRIPTION - LOCATION
LOCATION: CHEST
LOCATION: CHEST

## 2019-10-08 ASSESSMENT — PAIN DESCRIPTION - DESCRIPTORS
DESCRIPTORS: SQUEEZING;STABBING
DESCRIPTORS: SQUEEZING;STABBING

## 2019-10-08 ASSESSMENT — PAIN DESCRIPTION - PAIN TYPE
TYPE: ACUTE PAIN
TYPE: ACUTE PAIN

## 2019-10-08 ASSESSMENT — PAIN DESCRIPTION - ORIENTATION
ORIENTATION: LEFT
ORIENTATION: LEFT

## 2019-10-08 ASSESSMENT — PAIN DESCRIPTION - FREQUENCY
FREQUENCY: CONTINUOUS
FREQUENCY: CONTINUOUS

## 2019-10-09 ENCOUNTER — TELEPHONE (OUTPATIENT)
Dept: FAMILY MEDICINE CLINIC | Age: 69
End: 2019-10-09

## 2019-10-09 ENCOUNTER — APPOINTMENT (OUTPATIENT)
Dept: GENERAL RADIOLOGY | Age: 69
DRG: 178 | End: 2019-10-09
Payer: MEDICARE

## 2019-10-09 PROBLEM — I50.32 CHRONIC DIASTOLIC HEART FAILURE (HCC): Status: ACTIVE | Noted: 2019-10-09

## 2019-10-09 PROBLEM — N18.9 ACUTE KIDNEY INJURY SUPERIMPOSED ON CKD (HCC): Status: ACTIVE | Noted: 2019-10-09

## 2019-10-09 PROBLEM — N17.9 ACUTE KIDNEY INJURY SUPERIMPOSED ON CKD (HCC): Status: ACTIVE | Noted: 2019-10-09

## 2019-10-09 LAB
ANION GAP SERPL CALCULATED.3IONS-SCNC: 11 MMOL/L (ref 4–16)
APTT: 30.6 SECONDS (ref 21.2–33)
BUN BLDV-MCNC: 23 MG/DL (ref 6–23)
CALCIUM SERPL-MCNC: 8.8 MG/DL (ref 8.3–10.6)
CHLORIDE BLD-SCNC: 94 MMOL/L (ref 99–110)
CO2: 27 MMOL/L (ref 21–32)
CREAT SERPL-MCNC: 1.9 MG/DL (ref 0.9–1.3)
ERYTHROCYTE SEDIMENTATION RATE: 47 MM/HR (ref 0–20)
GFR AFRICAN AMERICAN: 43 ML/MIN/1.73M2
GFR NON-AFRICAN AMERICAN: 35 ML/MIN/1.73M2
GLUCOSE BLD-MCNC: 118 MG/DL (ref 70–99)
GLUCOSE BLD-MCNC: 145 MG/DL (ref 70–99)
GLUCOSE BLD-MCNC: 149 MG/DL (ref 70–99)
GLUCOSE BLD-MCNC: 155 MG/DL (ref 70–99)
GLUCOSE BLD-MCNC: 158 MG/DL (ref 70–99)
HCT VFR BLD CALC: 46 % (ref 42–52)
HEMOGLOBIN: 13.4 GM/DL (ref 13.5–18)
HIGH SENSITIVE C-REACTIVE PROTEIN: 32.6 MG/L
INR BLD: 0.91 INDEX
MCH RBC QN AUTO: 24.3 PG (ref 27–31)
MCHC RBC AUTO-ENTMCNC: 29.1 % (ref 32–36)
MCV RBC AUTO: 83.3 FL (ref 78–100)
PDW BLD-RTO: 18.3 % (ref 11.7–14.9)
PLATELET # BLD: 227 K/CU MM (ref 140–440)
PMV BLD AUTO: 9.3 FL (ref 7.5–11.1)
POTASSIUM SERPL-SCNC: 4.2 MMOL/L (ref 3.5–5.1)
PROCALCITONIN: 0.04
PROTHROMBIN TIME: 10.5 SECONDS (ref 9.12–12.5)
RBC # BLD: 5.52 M/CU MM (ref 4.6–6.2)
SODIUM BLD-SCNC: 132 MMOL/L (ref 135–145)
WBC # BLD: 12.3 K/CU MM (ref 4–10.5)

## 2019-10-09 PROCEDURE — 1200000000 HC SEMI PRIVATE

## 2019-10-09 PROCEDURE — 86141 C-REACTIVE PROTEIN HS: CPT

## 2019-10-09 PROCEDURE — 87070 CULTURE OTHR SPECIMN AEROBIC: CPT

## 2019-10-09 PROCEDURE — 85610 PROTHROMBIN TIME: CPT

## 2019-10-09 PROCEDURE — 87205 SMEAR GRAM STAIN: CPT

## 2019-10-09 PROCEDURE — 90686 IIV4 VACC NO PRSV 0.5 ML IM: CPT | Performed by: INTERNAL MEDICINE

## 2019-10-09 PROCEDURE — 87186 SC STD MICRODIL/AGAR DIL: CPT

## 2019-10-09 PROCEDURE — 6370000000 HC RX 637 (ALT 250 FOR IP): Performed by: INTERNAL MEDICINE

## 2019-10-09 PROCEDURE — 6360000002 HC RX W HCPCS: Performed by: FAMILY MEDICINE

## 2019-10-09 PROCEDURE — 85652 RBC SED RATE AUTOMATED: CPT

## 2019-10-09 PROCEDURE — 6370000000 HC RX 637 (ALT 250 FOR IP): Performed by: FAMILY MEDICINE

## 2019-10-09 PROCEDURE — 2580000003 HC RX 258: Performed by: INTERNAL MEDICINE

## 2019-10-09 PROCEDURE — 94761 N-INVAS EAR/PLS OXIMETRY MLT: CPT

## 2019-10-09 PROCEDURE — 85730 THROMBOPLASTIN TIME PARTIAL: CPT

## 2019-10-09 PROCEDURE — 87077 CULTURE AEROBIC IDENTIFY: CPT

## 2019-10-09 PROCEDURE — 36415 COLL VENOUS BLD VENIPUNCTURE: CPT

## 2019-10-09 PROCEDURE — 6360000002 HC RX W HCPCS: Performed by: INTERNAL MEDICINE

## 2019-10-09 PROCEDURE — 87899 AGENT NOS ASSAY W/OPTIC: CPT

## 2019-10-09 PROCEDURE — 87147 CULTURE TYPE IMMUNOLOGIC: CPT

## 2019-10-09 PROCEDURE — G0008 ADMIN INFLUENZA VIRUS VAC: HCPCS | Performed by: INTERNAL MEDICINE

## 2019-10-09 PROCEDURE — 87449 NOS EACH ORGANISM AG IA: CPT

## 2019-10-09 PROCEDURE — 84145 PROCALCITONIN (PCT): CPT

## 2019-10-09 PROCEDURE — 80048 BASIC METABOLIC PNL TOTAL CA: CPT

## 2019-10-09 PROCEDURE — 82962 GLUCOSE BLOOD TEST: CPT

## 2019-10-09 PROCEDURE — 85027 COMPLETE CBC AUTOMATED: CPT

## 2019-10-09 RX ADMIN — ATORVASTATIN CALCIUM 80 MG: 40 TABLET, FILM COATED ORAL at 20:42

## 2019-10-09 RX ADMIN — GABAPENTIN 800 MG: 400 CAPSULE ORAL at 13:32

## 2019-10-09 RX ADMIN — DOCUSATE SODIUM 100 MG: 100 CAPSULE, LIQUID FILLED ORAL at 08:56

## 2019-10-09 RX ADMIN — INFLUENZA A VIRUS A/BRISBANE/02/2018 IVR-190 (H1N1) ANTIGEN (PROPIOLACTONE INACTIVATED), INFLUENZA A VIRUS A/KANSAS/14/2017 X-327 (H3N2) ANTIGEN (PROPIOLACTONE INACTIVATED), INFLUENZA B VIRUS B/MARYLAND/15/2016 ANTIGEN (PROPIOLACTONE INACTIVATED), INFLUENZA B VIRUS B/PHUKET/3073/2013 BVR-1B ANTIGEN (PROPIOLACTONE INACTIVATED) 0.5 ML: 15; 15; 15; 15 INJECTION, SUSPENSION INTRAMUSCULAR at 08:56

## 2019-10-09 RX ADMIN — Medication 1 CAPSULE: at 08:56

## 2019-10-09 RX ADMIN — MAGNESIUM HYDROXIDE 30 ML: 400 SUSPENSION ORAL at 13:32

## 2019-10-09 RX ADMIN — Medication 10 ML: at 08:56

## 2019-10-09 RX ADMIN — MORPHINE SULFATE 2 MG: 4 INJECTION, SOLUTION INTRAMUSCULAR; INTRAVENOUS at 07:07

## 2019-10-09 RX ADMIN — CYCLOBENZAPRINE 5 MG: 10 TABLET, FILM COATED ORAL at 20:42

## 2019-10-09 RX ADMIN — PANTOPRAZOLE SODIUM 40 MG: 40 TABLET, DELAYED RELEASE ORAL at 06:39

## 2019-10-09 RX ADMIN — CARVEDILOL 6.25 MG: 6.25 TABLET, FILM COATED ORAL at 08:56

## 2019-10-09 RX ADMIN — INSULIN LISPRO 1 UNITS: 100 INJECTION, SOLUTION INTRAVENOUS; SUBCUTANEOUS at 12:58

## 2019-10-09 RX ADMIN — VANCOMYCIN HYDROCHLORIDE 1500 MG: 5 INJECTION, POWDER, LYOPHILIZED, FOR SOLUTION INTRAVENOUS at 17:54

## 2019-10-09 RX ADMIN — CARVEDILOL 6.25 MG: 6.25 TABLET, FILM COATED ORAL at 17:54

## 2019-10-09 RX ADMIN — PIPERACILLIN AND TAZOBACTAM 2.25 G: 2; .25 INJECTION, POWDER, FOR SOLUTION INTRAVENOUS at 17:54

## 2019-10-09 RX ADMIN — OXYCODONE HYDROCHLORIDE AND ACETAMINOPHEN 1 TABLET: 7.5; 325 TABLET ORAL at 13:01

## 2019-10-09 RX ADMIN — PIPERACILLIN AND TAZOBACTAM 2.25 G: 2; .25 INJECTION, POWDER, FOR SOLUTION INTRAVENOUS at 23:35

## 2019-10-09 RX ADMIN — ESCITALOPRAM OXALATE 20 MG: 10 TABLET ORAL at 08:58

## 2019-10-09 RX ADMIN — SPIRONOLACTONE 25 MG: 25 TABLET ORAL at 08:56

## 2019-10-09 RX ADMIN — ALPRAZOLAM 1 MG: 0.5 TABLET ORAL at 21:15

## 2019-10-09 RX ADMIN — ENOXAPARIN SODIUM 30 MG: 30 INJECTION SUBCUTANEOUS at 17:54

## 2019-10-09 RX ADMIN — GABAPENTIN 800 MG: 400 CAPSULE ORAL at 08:56

## 2019-10-09 RX ADMIN — GABAPENTIN 800 MG: 400 CAPSULE ORAL at 20:42

## 2019-10-09 RX ADMIN — FOLIC ACID 1 MG: 1 TABLET ORAL at 08:56

## 2019-10-09 RX ADMIN — OXYCODONE HYDROCHLORIDE AND ACETAMINOPHEN 1 TABLET: 7.5; 325 TABLET ORAL at 20:42

## 2019-10-09 ASSESSMENT — PAIN SCALES - GENERAL
PAINLEVEL_OUTOF10: 8
PAINLEVEL_OUTOF10: 6
PAINLEVEL_OUTOF10: 4
PAINLEVEL_OUTOF10: 8

## 2019-10-10 ENCOUNTER — APPOINTMENT (OUTPATIENT)
Dept: INTERVENTIONAL RADIOLOGY/VASCULAR | Age: 69
DRG: 178 | End: 2019-10-10
Payer: MEDICARE

## 2019-10-10 ENCOUNTER — APPOINTMENT (OUTPATIENT)
Dept: GENERAL RADIOLOGY | Age: 69
DRG: 178 | End: 2019-10-10
Payer: MEDICARE

## 2019-10-10 LAB
ANION GAP SERPL CALCULATED.3IONS-SCNC: 13 MMOL/L (ref 4–16)
BUN BLDV-MCNC: 23 MG/DL (ref 6–23)
CALCIUM SERPL-MCNC: 9 MG/DL (ref 8.3–10.6)
CHLORIDE BLD-SCNC: 97 MMOL/L (ref 99–110)
CO2: 26 MMOL/L (ref 21–32)
CREAT SERPL-MCNC: 1.9 MG/DL (ref 0.9–1.3)
CULTURE: ABNORMAL
CULTURE: ABNORMAL
FLUID TYPE: NORMAL INDEX
GFR AFRICAN AMERICAN: 43 ML/MIN/1.73M2
GFR NON-AFRICAN AMERICAN: 35 ML/MIN/1.73M2
GLUCOSE BLD-MCNC: 120 MG/DL (ref 70–99)
GLUCOSE BLD-MCNC: 122 MG/DL (ref 70–99)
GLUCOSE BLD-MCNC: 140 MG/DL (ref 70–99)
GLUCOSE BLD-MCNC: 146 MG/DL (ref 70–99)
GLUCOSE BLD-MCNC: 163 MG/DL (ref 70–99)
HCT VFR BLD CALC: 45.1 % (ref 42–52)
HEMOGLOBIN: 13.5 GM/DL (ref 13.5–18)
LYMPHOCYTES, BODY FLUID: 1 %
Lab: ABNORMAL
MCH RBC QN AUTO: 24.9 PG (ref 27–31)
MCHC RBC AUTO-ENTMCNC: 29.9 % (ref 32–36)
MCV RBC AUTO: 83.2 FL (ref 78–100)
MONOCYTE, FLUID: 5 %
NEUTROPHIL, FLUID: 94 %
PDW BLD-RTO: 18 % (ref 11.7–14.9)
PLATELET # BLD: 202 K/CU MM (ref 140–440)
PMV BLD AUTO: 9.5 FL (ref 7.5–11.1)
POTASSIUM SERPL-SCNC: 4.5 MMOL/L (ref 3.5–5.1)
RBC # BLD: 5.42 M/CU MM (ref 4.6–6.2)
RBC FLUID: NORMAL /CU MM
REASON FOR REJECTION: NORMAL
REJECTED TEST: NORMAL
SODIUM BLD-SCNC: 136 MMOL/L (ref 135–145)
SPECIMEN: ABNORMAL
WBC # BLD: 9.1 K/CU MM (ref 4–10.5)
WBC FLUID: 4618 /CU MM

## 2019-10-10 PROCEDURE — 87073 CULTURE BACTERIA ANAEROBIC: CPT

## 2019-10-10 PROCEDURE — 71045 X-RAY EXAM CHEST 1 VIEW: CPT

## 2019-10-10 PROCEDURE — 2580000003 HC RX 258: Performed by: INTERNAL MEDICINE

## 2019-10-10 PROCEDURE — 80048 BASIC METABOLIC PNL TOTAL CA: CPT

## 2019-10-10 PROCEDURE — 82945 GLUCOSE OTHER FLUID: CPT

## 2019-10-10 PROCEDURE — 6360000002 HC RX W HCPCS: Performed by: INTERNAL MEDICINE

## 2019-10-10 PROCEDURE — C1729 CATH, DRAINAGE: HCPCS

## 2019-10-10 PROCEDURE — 6370000000 HC RX 637 (ALT 250 FOR IP): Performed by: FAMILY MEDICINE

## 2019-10-10 PROCEDURE — 1200000000 HC SEMI PRIVATE

## 2019-10-10 PROCEDURE — 6370000000 HC RX 637 (ALT 250 FOR IP): Performed by: INTERNAL MEDICINE

## 2019-10-10 PROCEDURE — 87116 MYCOBACTERIA CULTURE: CPT

## 2019-10-10 PROCEDURE — 89051 BODY FLUID CELL COUNT: CPT

## 2019-10-10 PROCEDURE — 82962 GLUCOSE BLOOD TEST: CPT

## 2019-10-10 PROCEDURE — 88305 TISSUE EXAM BY PATHOLOGIST: CPT

## 2019-10-10 PROCEDURE — 2709999900 HC NON-CHARGEABLE SUPPLY

## 2019-10-10 PROCEDURE — 32555 ASPIRATE PLEURA W/ IMAGING: CPT

## 2019-10-10 PROCEDURE — 36415 COLL VENOUS BLD VENIPUNCTURE: CPT

## 2019-10-10 PROCEDURE — 88108 CYTOPATH CONCENTRATE TECH: CPT

## 2019-10-10 PROCEDURE — 87205 SMEAR GRAM STAIN: CPT

## 2019-10-10 PROCEDURE — 0W9B3ZZ DRAINAGE OF LEFT PLEURAL CAVITY, PERCUTANEOUS APPROACH: ICD-10-PCS | Performed by: RADIOLOGY

## 2019-10-10 PROCEDURE — 87102 FUNGUS ISOLATION CULTURE: CPT

## 2019-10-10 PROCEDURE — 85027 COMPLETE CBC AUTOMATED: CPT

## 2019-10-10 RX ADMIN — INSULIN LISPRO 1 UNITS: 100 INJECTION, SOLUTION INTRAVENOUS; SUBCUTANEOUS at 13:03

## 2019-10-10 RX ADMIN — GABAPENTIN 800 MG: 400 CAPSULE ORAL at 20:35

## 2019-10-10 RX ADMIN — PIPERACILLIN AND TAZOBACTAM 2.25 G: 2; .25 INJECTION, POWDER, FOR SOLUTION INTRAVENOUS at 19:35

## 2019-10-10 RX ADMIN — PANTOPRAZOLE SODIUM 40 MG: 40 TABLET, DELAYED RELEASE ORAL at 06:58

## 2019-10-10 RX ADMIN — FOLIC ACID 1 MG: 1 TABLET ORAL at 10:10

## 2019-10-10 RX ADMIN — Medication 1 CAPSULE: at 10:10

## 2019-10-10 RX ADMIN — DOCUSATE SODIUM 100 MG: 100 CAPSULE, LIQUID FILLED ORAL at 10:10

## 2019-10-10 RX ADMIN — MAGNESIUM HYDROXIDE 30 ML: 400 SUSPENSION ORAL at 13:03

## 2019-10-10 RX ADMIN — ALPRAZOLAM 1 MG: 0.5 TABLET ORAL at 06:58

## 2019-10-10 RX ADMIN — VANCOMYCIN HYDROCHLORIDE 1500 MG: 5 INJECTION, POWDER, LYOPHILIZED, FOR SOLUTION INTRAVENOUS at 17:15

## 2019-10-10 RX ADMIN — ATORVASTATIN CALCIUM 80 MG: 40 TABLET, FILM COATED ORAL at 20:34

## 2019-10-10 RX ADMIN — Medication 10 ML: at 10:13

## 2019-10-10 RX ADMIN — SPIRONOLACTONE 25 MG: 25 TABLET ORAL at 10:10

## 2019-10-10 RX ADMIN — OXYCODONE HYDROCHLORIDE AND ACETAMINOPHEN 1 TABLET: 7.5; 325 TABLET ORAL at 19:35

## 2019-10-10 RX ADMIN — GABAPENTIN 800 MG: 400 CAPSULE ORAL at 10:09

## 2019-10-10 RX ADMIN — ENOXAPARIN SODIUM 30 MG: 30 INJECTION SUBCUTANEOUS at 10:10

## 2019-10-10 RX ADMIN — OXYCODONE HYDROCHLORIDE AND ACETAMINOPHEN 1 TABLET: 7.5; 325 TABLET ORAL at 00:52

## 2019-10-10 RX ADMIN — ALPRAZOLAM 1 MG: 0.5 TABLET ORAL at 20:34

## 2019-10-10 RX ADMIN — OXYCODONE HYDROCHLORIDE AND ACETAMINOPHEN 1 TABLET: 7.5; 325 TABLET ORAL at 13:03

## 2019-10-10 RX ADMIN — ESCITALOPRAM OXALATE 20 MG: 10 TABLET ORAL at 10:10

## 2019-10-10 RX ADMIN — HYDROMORPHONE HYDROCHLORIDE 0.5 MG: 1 INJECTION, SOLUTION INTRAMUSCULAR; INTRAVENOUS; SUBCUTANEOUS at 09:54

## 2019-10-10 RX ADMIN — CYCLOBENZAPRINE 5 MG: 10 TABLET, FILM COATED ORAL at 20:35

## 2019-10-10 RX ADMIN — GABAPENTIN 800 MG: 400 CAPSULE ORAL at 14:02

## 2019-10-10 RX ADMIN — OXYCODONE HYDROCHLORIDE AND ACETAMINOPHEN 1 TABLET: 7.5; 325 TABLET ORAL at 06:58

## 2019-10-10 RX ADMIN — PIPERACILLIN AND TAZOBACTAM 2.25 G: 2; .25 INJECTION, POWDER, FOR SOLUTION INTRAVENOUS at 10:13

## 2019-10-10 ASSESSMENT — PAIN DESCRIPTION - DESCRIPTORS
DESCRIPTORS: DISCOMFORT;CONSTANT;SHARP
DESCRIPTORS: CONSTANT;DISCOMFORT;SHARP

## 2019-10-10 ASSESSMENT — PAIN DESCRIPTION - FREQUENCY
FREQUENCY: CONTINUOUS
FREQUENCY: CONTINUOUS

## 2019-10-10 ASSESSMENT — PAIN DESCRIPTION - PROGRESSION
CLINICAL_PROGRESSION: NOT CHANGED

## 2019-10-10 ASSESSMENT — PAIN SCALES - GENERAL
PAINLEVEL_OUTOF10: 8
PAINLEVEL_OUTOF10: 8
PAINLEVEL_OUTOF10: 4
PAINLEVEL_OUTOF10: 9
PAINLEVEL_OUTOF10: 8
PAINLEVEL_OUTOF10: 5
PAINLEVEL_OUTOF10: 6

## 2019-10-10 ASSESSMENT — PAIN DESCRIPTION - ONSET
ONSET: ON-GOING
ONSET: ON-GOING

## 2019-10-10 ASSESSMENT — PAIN DESCRIPTION - PAIN TYPE: TYPE: ACUTE PAIN

## 2019-10-10 ASSESSMENT — PAIN DESCRIPTION - ORIENTATION
ORIENTATION: LEFT
ORIENTATION: LEFT

## 2019-10-10 ASSESSMENT — PAIN DESCRIPTION - LOCATION
LOCATION: CHEST
LOCATION: CHEST

## 2019-10-11 VITALS
HEIGHT: 69 IN | DIASTOLIC BLOOD PRESSURE: 66 MMHG | HEART RATE: 77 BPM | RESPIRATION RATE: 16 BRPM | WEIGHT: 211.4 LBS | BODY MASS INDEX: 31.31 KG/M2 | TEMPERATURE: 98.6 F | OXYGEN SATURATION: 95 % | SYSTOLIC BLOOD PRESSURE: 144 MMHG

## 2019-10-11 LAB
ANION GAP SERPL CALCULATED.3IONS-SCNC: 10 MMOL/L (ref 4–16)
BUN BLDV-MCNC: 24 MG/DL (ref 6–23)
CALCIUM SERPL-MCNC: 8.6 MG/DL (ref 8.3–10.6)
CHLORIDE BLD-SCNC: 97 MMOL/L (ref 99–110)
CO2: 27 MMOL/L (ref 21–32)
CREAT SERPL-MCNC: 2 MG/DL (ref 0.9–1.3)
CULTURE: ABNORMAL
GFR AFRICAN AMERICAN: 40 ML/MIN/1.73M2
GFR NON-AFRICAN AMERICAN: 33 ML/MIN/1.73M2
GLUCOSE BLD-MCNC: 112 MG/DL (ref 70–99)
GLUCOSE BLD-MCNC: 112 MG/DL (ref 70–99)
GLUCOSE BLD-MCNC: 163 MG/DL (ref 70–99)
GRAM SMEAR: ABNORMAL
HCT VFR BLD CALC: 39.7 % (ref 42–52)
HEMOGLOBIN: 12.2 GM/DL (ref 13.5–18)
LEGIONELLA URINARY AG: NEGATIVE
Lab: ABNORMAL
MCH RBC QN AUTO: 24.9 PG (ref 27–31)
MCHC RBC AUTO-ENTMCNC: 30.7 % (ref 32–36)
MCV RBC AUTO: 81.2 FL (ref 78–100)
PDW BLD-RTO: 17.9 % (ref 11.7–14.9)
PLATELET # BLD: 199 K/CU MM (ref 140–440)
PMV BLD AUTO: 9.2 FL (ref 7.5–11.1)
POTASSIUM SERPL-SCNC: 4.4 MMOL/L (ref 3.5–5.1)
RBC # BLD: 4.89 M/CU MM (ref 4.6–6.2)
SODIUM BLD-SCNC: 134 MMOL/L (ref 135–145)
SPECIMEN: ABNORMAL
STREP PNEUMONIAE ANTIGEN: NORMAL
WBC # BLD: 9.6 K/CU MM (ref 4–10.5)

## 2019-10-11 PROCEDURE — 85027 COMPLETE CBC AUTOMATED: CPT

## 2019-10-11 PROCEDURE — 82962 GLUCOSE BLOOD TEST: CPT

## 2019-10-11 PROCEDURE — 36415 COLL VENOUS BLD VENIPUNCTURE: CPT

## 2019-10-11 PROCEDURE — 6370000000 HC RX 637 (ALT 250 FOR IP): Performed by: INTERNAL MEDICINE

## 2019-10-11 PROCEDURE — 6370000000 HC RX 637 (ALT 250 FOR IP): Performed by: FAMILY MEDICINE

## 2019-10-11 PROCEDURE — 6360000002 HC RX W HCPCS: Performed by: INTERNAL MEDICINE

## 2019-10-11 PROCEDURE — 2580000003 HC RX 258: Performed by: INTERNAL MEDICINE

## 2019-10-11 PROCEDURE — 80048 BASIC METABOLIC PNL TOTAL CA: CPT

## 2019-10-11 RX ORDER — GUAIFENESIN 600 MG/1
600 TABLET, EXTENDED RELEASE ORAL 2 TIMES DAILY
Qty: 30 TABLET | Refills: 0 | Status: SHIPPED | OUTPATIENT
Start: 2019-10-11 | End: 2019-10-26

## 2019-10-11 RX ORDER — CYCLOBENZAPRINE HCL 5 MG
5 TABLET ORAL DAILY
Qty: 30 TABLET | Refills: 0 | Status: SHIPPED | OUTPATIENT
Start: 2019-10-11 | End: 2019-11-04 | Stop reason: ALTCHOICE

## 2019-10-11 RX ORDER — OXYCODONE AND ACETAMINOPHEN 7.5; 325 MG/1; MG/1
1 TABLET ORAL EVERY 6 HOURS PRN
Qty: 20 TABLET | Refills: 0 | Status: SHIPPED | OUTPATIENT
Start: 2019-10-11 | End: 2019-10-16

## 2019-10-11 RX ORDER — DOXYCYCLINE HYCLATE 100 MG
100 TABLET ORAL 2 TIMES DAILY
Qty: 28 TABLET | Refills: 0 | Status: SHIPPED | OUTPATIENT
Start: 2019-10-11 | End: 2019-10-25

## 2019-10-11 RX ORDER — BISACODYL 10 MG
10 SUPPOSITORY, RECTAL RECTAL ONCE
Status: COMPLETED | OUTPATIENT
Start: 2019-10-11 | End: 2019-10-11

## 2019-10-11 RX ADMIN — GABAPENTIN 800 MG: 400 CAPSULE ORAL at 10:11

## 2019-10-11 RX ADMIN — PIPERACILLIN AND TAZOBACTAM 2.25 G: 2; .25 INJECTION, POWDER, FOR SOLUTION INTRAVENOUS at 10:16

## 2019-10-11 RX ADMIN — FOLIC ACID 1 MG: 1 TABLET ORAL at 10:11

## 2019-10-11 RX ADMIN — BISACODYL 10 MG: 10 SUPPOSITORY RECTAL at 10:11

## 2019-10-11 RX ADMIN — ENOXAPARIN SODIUM 30 MG: 30 INJECTION SUBCUTANEOUS at 10:10

## 2019-10-11 RX ADMIN — INSULIN LISPRO 1 UNITS: 100 INJECTION, SOLUTION INTRAVENOUS; SUBCUTANEOUS at 14:27

## 2019-10-11 RX ADMIN — DOCUSATE SODIUM 100 MG: 100 CAPSULE, LIQUID FILLED ORAL at 10:11

## 2019-10-11 RX ADMIN — GABAPENTIN 800 MG: 400 CAPSULE ORAL at 14:27

## 2019-10-11 RX ADMIN — Medication 10 ML: at 10:16

## 2019-10-11 RX ADMIN — ESCITALOPRAM OXALATE 20 MG: 10 TABLET ORAL at 10:11

## 2019-10-11 RX ADMIN — Medication 1 CAPSULE: at 10:11

## 2019-10-11 RX ADMIN — OXYCODONE HYDROCHLORIDE AND ACETAMINOPHEN 1 TABLET: 7.5; 325 TABLET ORAL at 10:15

## 2019-10-11 RX ADMIN — SPIRONOLACTONE 25 MG: 25 TABLET ORAL at 10:11

## 2019-10-11 RX ADMIN — OXYCODONE HYDROCHLORIDE AND ACETAMINOPHEN 1 TABLET: 7.5; 325 TABLET ORAL at 03:22

## 2019-10-11 RX ADMIN — PIPERACILLIN AND TAZOBACTAM 2.25 G: 2; .25 INJECTION, POWDER, FOR SOLUTION INTRAVENOUS at 02:21

## 2019-10-11 RX ADMIN — PANTOPRAZOLE SODIUM 40 MG: 40 TABLET, DELAYED RELEASE ORAL at 06:34

## 2019-10-11 RX ADMIN — HYDROMORPHONE HYDROCHLORIDE 0.5 MG: 1 INJECTION, SOLUTION INTRAMUSCULAR; INTRAVENOUS; SUBCUTANEOUS at 00:31

## 2019-10-11 ASSESSMENT — PAIN SCALES - GENERAL
PAINLEVEL_OUTOF10: 3
PAINLEVEL_OUTOF10: 9
PAINLEVEL_OUTOF10: 7
PAINLEVEL_OUTOF10: 3
PAINLEVEL_OUTOF10: 6
PAINLEVEL_OUTOF10: 9

## 2019-10-11 ASSESSMENT — PAIN DESCRIPTION - ORIENTATION: ORIENTATION: LEFT

## 2019-10-11 ASSESSMENT — PAIN DESCRIPTION - DESCRIPTORS: DESCRIPTORS: ACHING

## 2019-10-11 ASSESSMENT — PAIN - FUNCTIONAL ASSESSMENT: PAIN_FUNCTIONAL_ASSESSMENT: PREVENTS OR INTERFERES SOME ACTIVE ACTIVITIES AND ADLS

## 2019-10-11 ASSESSMENT — PAIN DESCRIPTION - DIRECTION: RADIATING_TOWARDS: MID

## 2019-10-11 ASSESSMENT — PAIN DESCRIPTION - FREQUENCY: FREQUENCY: CONTINUOUS

## 2019-10-11 ASSESSMENT — PAIN DESCRIPTION - ONSET: ONSET: ON-GOING

## 2019-10-11 ASSESSMENT — PAIN DESCRIPTION - LOCATION: LOCATION: CHEST

## 2019-10-11 ASSESSMENT — PAIN DESCRIPTION - PAIN TYPE: TYPE: ACUTE PAIN

## 2019-10-11 ASSESSMENT — PAIN DESCRIPTION - PROGRESSION: CLINICAL_PROGRESSION: NOT CHANGED

## 2019-10-12 ENCOUNTER — CARE COORDINATION (OUTPATIENT)
Dept: CASE MANAGEMENT | Age: 69
End: 2019-10-12

## 2019-10-12 LAB
CULTURE: NORMAL
Lab: NORMAL
SPECIMEN: NORMAL

## 2019-10-14 ENCOUNTER — TELEPHONE (OUTPATIENT)
Dept: FAMILY MEDICINE CLINIC | Age: 69
End: 2019-10-14

## 2019-10-14 ENCOUNTER — CARE COORDINATION (OUTPATIENT)
Dept: CARE COORDINATION | Age: 69
End: 2019-10-14

## 2019-10-14 ENCOUNTER — CARE COORDINATION (OUTPATIENT)
Dept: CASE MANAGEMENT | Age: 69
End: 2019-10-14

## 2019-10-14 LAB
EKG ATRIAL RATE: 57 BPM
EKG DIAGNOSIS: NORMAL
EKG P AXIS: 43 DEGREES
EKG P-R INTERVAL: 172 MS
EKG Q-T INTERVAL: 456 MS
EKG QRS DURATION: 110 MS
EKG QTC CALCULATION (BAZETT): 443 MS
EKG R AXIS: -12 DEGREES
EKG T AXIS: 15 DEGREES
EKG VENTRICULAR RATE: 57 BPM

## 2019-10-15 ENCOUNTER — CARE COORDINATION (OUTPATIENT)
Dept: CASE MANAGEMENT | Age: 69
End: 2019-10-15

## 2019-10-16 ENCOUNTER — CARE COORDINATION (OUTPATIENT)
Dept: CASE MANAGEMENT | Age: 69
End: 2019-10-16

## 2019-10-18 ENCOUNTER — CARE COORDINATION (OUTPATIENT)
Dept: CARE COORDINATION | Age: 69
End: 2019-10-18

## 2019-10-22 ENCOUNTER — CARE COORDINATION (OUTPATIENT)
Dept: CASE MANAGEMENT | Age: 69
End: 2019-10-22

## 2019-10-23 ENCOUNTER — TELEPHONE (OUTPATIENT)
Dept: FAMILY MEDICINE CLINIC | Age: 69
End: 2019-10-23

## 2019-10-24 ENCOUNTER — TELEPHONE (OUTPATIENT)
Dept: FAMILY MEDICINE CLINIC | Age: 69
End: 2019-10-24

## 2019-10-24 DIAGNOSIS — M54.16 LUMBAR RADICULOPATHY: Primary | ICD-10-CM

## 2019-10-24 DIAGNOSIS — F31.9 BIPOLAR 1 DISORDER (HCC): ICD-10-CM

## 2019-10-25 ENCOUNTER — CARE COORDINATION (OUTPATIENT)
Dept: CARE COORDINATION | Age: 69
End: 2019-10-25

## 2019-10-25 DIAGNOSIS — F31.9 BIPOLAR 1 DISORDER (HCC): ICD-10-CM

## 2019-10-28 ENCOUNTER — TELEPHONE (OUTPATIENT)
Dept: FAMILY MEDICINE CLINIC | Age: 69
End: 2019-10-28

## 2019-10-28 DIAGNOSIS — F31.9 BIPOLAR 1 DISORDER (HCC): ICD-10-CM

## 2019-10-28 RX ORDER — GABAPENTIN 800 MG/1
800 TABLET ORAL 3 TIMES DAILY
Qty: 90 TABLET | Refills: 0 | Status: SHIPPED | OUTPATIENT
Start: 2019-10-28 | End: 2019-11-26 | Stop reason: SDUPTHER

## 2019-10-28 RX ORDER — ALPRAZOLAM 1 MG/1
1 TABLET ORAL 2 TIMES DAILY PRN
Qty: 60 TABLET | Refills: 0 | Status: SHIPPED | OUTPATIENT
Start: 2019-10-28 | End: 2019-11-18 | Stop reason: DRUGHIGH

## 2019-10-29 ENCOUNTER — CARE COORDINATION (OUTPATIENT)
Dept: CASE MANAGEMENT | Age: 69
End: 2019-10-29

## 2019-10-29 RX ORDER — GABAPENTIN 800 MG/1
800 TABLET ORAL 3 TIMES DAILY
Qty: 90 TABLET | Refills: 0 | Status: CANCELLED | OUTPATIENT
Start: 2019-10-29 | End: 2019-11-28

## 2019-10-29 RX ORDER — ALPRAZOLAM 1 MG/1
1 TABLET ORAL 2 TIMES DAILY PRN
Qty: 60 TABLET | Refills: 0 | Status: CANCELLED | OUTPATIENT
Start: 2019-10-29 | End: 2019-11-28

## 2019-10-30 ENCOUNTER — OFFICE VISIT (OUTPATIENT)
Dept: FAMILY MEDICINE CLINIC | Age: 69
End: 2019-10-30
Payer: MEDICARE

## 2019-10-30 VITALS
WEIGHT: 209 LBS | HEART RATE: 80 BPM | HEIGHT: 69 IN | DIASTOLIC BLOOD PRESSURE: 80 MMHG | SYSTOLIC BLOOD PRESSURE: 154 MMHG | BODY MASS INDEX: 30.96 KG/M2 | OXYGEN SATURATION: 95 %

## 2019-10-30 DIAGNOSIS — I10 ESSENTIAL HYPERTENSION: ICD-10-CM

## 2019-10-30 DIAGNOSIS — J18.9 PNEUMONIA OF LEFT LUNG DUE TO INFECTIOUS ORGANISM, UNSPECIFIED PART OF LUNG: Primary | ICD-10-CM

## 2019-10-30 DIAGNOSIS — J86.9 EMPYEMA LUNG (HCC): ICD-10-CM

## 2019-10-30 DIAGNOSIS — N17.9 AKI (ACUTE KIDNEY INJURY) (HCC): ICD-10-CM

## 2019-10-30 LAB
ANION GAP SERPL CALCULATED.3IONS-SCNC: 17 MMOL/L (ref 3–16)
BUN BLDV-MCNC: 23 MG/DL (ref 7–20)
CALCIUM SERPL-MCNC: 9.4 MG/DL (ref 8.3–10.6)
CHLORIDE BLD-SCNC: 96 MMOL/L (ref 99–110)
CO2: 25 MMOL/L (ref 21–32)
CREAT SERPL-MCNC: 2.1 MG/DL (ref 0.8–1.3)
GFR AFRICAN AMERICAN: 38
GFR NON-AFRICAN AMERICAN: 31
GLUCOSE BLD-MCNC: 111 MG/DL (ref 70–99)
POTASSIUM SERPL-SCNC: 3.9 MMOL/L (ref 3.5–5.1)
SODIUM BLD-SCNC: 138 MMOL/L (ref 136–145)

## 2019-10-30 PROCEDURE — 1111F DSCHRG MED/CURRENT MED MERGE: CPT | Performed by: PHYSICIAN ASSISTANT

## 2019-10-30 PROCEDURE — 1123F ACP DISCUSS/DSCN MKR DOCD: CPT | Performed by: PHYSICIAN ASSISTANT

## 2019-10-30 PROCEDURE — G8598 ASA/ANTIPLAT THER USED: HCPCS | Performed by: PHYSICIAN ASSISTANT

## 2019-10-30 PROCEDURE — G8427 DOCREV CUR MEDS BY ELIG CLIN: HCPCS | Performed by: PHYSICIAN ASSISTANT

## 2019-10-30 PROCEDURE — 3017F COLORECTAL CA SCREEN DOC REV: CPT | Performed by: PHYSICIAN ASSISTANT

## 2019-10-30 PROCEDURE — G8417 CALC BMI ABV UP PARAM F/U: HCPCS | Performed by: PHYSICIAN ASSISTANT

## 2019-10-30 PROCEDURE — G8482 FLU IMMUNIZE ORDER/ADMIN: HCPCS | Performed by: PHYSICIAN ASSISTANT

## 2019-10-30 PROCEDURE — 4040F PNEUMOC VAC/ADMIN/RCVD: CPT | Performed by: PHYSICIAN ASSISTANT

## 2019-10-30 PROCEDURE — 99214 OFFICE O/P EST MOD 30 MIN: CPT | Performed by: PHYSICIAN ASSISTANT

## 2019-10-30 PROCEDURE — 1036F TOBACCO NON-USER: CPT | Performed by: PHYSICIAN ASSISTANT

## 2019-10-30 ASSESSMENT — ENCOUNTER SYMPTOMS
COUGH: 0
CHEST TIGHTNESS: 0
SHORTNESS OF BREATH: 0
WHEEZING: 0

## 2019-10-31 ENCOUNTER — CARE COORDINATION (OUTPATIENT)
Dept: CASE MANAGEMENT | Age: 69
End: 2019-10-31

## 2019-10-31 ENCOUNTER — OFFICE VISIT (OUTPATIENT)
Dept: CARDIOLOGY CLINIC | Age: 69
End: 2019-10-31
Payer: MEDICARE

## 2019-10-31 ENCOUNTER — TELEPHONE (OUTPATIENT)
Dept: FAMILY MEDICINE CLINIC | Age: 69
End: 2019-10-31

## 2019-10-31 VITALS
HEART RATE: 71 BPM | DIASTOLIC BLOOD PRESSURE: 60 MMHG | SYSTOLIC BLOOD PRESSURE: 98 MMHG | BODY MASS INDEX: 29.92 KG/M2 | WEIGHT: 209 LBS | HEIGHT: 70 IN

## 2019-10-31 DIAGNOSIS — I48.0 PAF (PAROXYSMAL ATRIAL FIBRILLATION) (HCC): ICD-10-CM

## 2019-10-31 DIAGNOSIS — E78.5 HYPERLIPIDEMIA, UNSPECIFIED HYPERLIPIDEMIA TYPE: ICD-10-CM

## 2019-10-31 DIAGNOSIS — I50.32 CHRONIC DIASTOLIC HEART FAILURE (HCC): Primary | ICD-10-CM

## 2019-10-31 DIAGNOSIS — I25.10 CORONARY ARTERY DISEASE INVOLVING NATIVE CORONARY ARTERY OF NATIVE HEART WITHOUT ANGINA PECTORIS: ICD-10-CM

## 2019-10-31 DIAGNOSIS — I10 ESSENTIAL HYPERTENSION: ICD-10-CM

## 2019-10-31 PROCEDURE — G8482 FLU IMMUNIZE ORDER/ADMIN: HCPCS | Performed by: NURSE PRACTITIONER

## 2019-10-31 PROCEDURE — G8417 CALC BMI ABV UP PARAM F/U: HCPCS | Performed by: NURSE PRACTITIONER

## 2019-10-31 PROCEDURE — 1036F TOBACCO NON-USER: CPT | Performed by: NURSE PRACTITIONER

## 2019-10-31 PROCEDURE — 1123F ACP DISCUSS/DSCN MKR DOCD: CPT | Performed by: NURSE PRACTITIONER

## 2019-10-31 PROCEDURE — 99213 OFFICE O/P EST LOW 20 MIN: CPT | Performed by: NURSE PRACTITIONER

## 2019-10-31 PROCEDURE — 4040F PNEUMOC VAC/ADMIN/RCVD: CPT | Performed by: NURSE PRACTITIONER

## 2019-10-31 PROCEDURE — 3017F COLORECTAL CA SCREEN DOC REV: CPT | Performed by: NURSE PRACTITIONER

## 2019-10-31 PROCEDURE — 1111F DSCHRG MED/CURRENT MED MERGE: CPT | Performed by: NURSE PRACTITIONER

## 2019-10-31 PROCEDURE — G8427 DOCREV CUR MEDS BY ELIG CLIN: HCPCS | Performed by: NURSE PRACTITIONER

## 2019-10-31 PROCEDURE — G8598 ASA/ANTIPLAT THER USED: HCPCS | Performed by: NURSE PRACTITIONER

## 2019-11-01 RX ORDER — FUROSEMIDE 20 MG/1
20 TABLET ORAL DAILY
Qty: 30 TABLET | Refills: 1 | Status: ON HOLD | OUTPATIENT
Start: 2019-11-01 | End: 2019-11-17 | Stop reason: SDUPTHER

## 2019-11-04 ENCOUNTER — OFFICE VISIT (OUTPATIENT)
Dept: FAMILY MEDICINE CLINIC | Age: 69
End: 2019-11-04
Payer: MEDICARE

## 2019-11-04 VITALS
SYSTOLIC BLOOD PRESSURE: 148 MMHG | HEIGHT: 70 IN | BODY MASS INDEX: 29.92 KG/M2 | HEART RATE: 72 BPM | WEIGHT: 209 LBS | DIASTOLIC BLOOD PRESSURE: 96 MMHG | OXYGEN SATURATION: 98 %

## 2019-11-04 DIAGNOSIS — M54.2 NECK PAIN: Primary | ICD-10-CM

## 2019-11-04 PROCEDURE — 1123F ACP DISCUSS/DSCN MKR DOCD: CPT | Performed by: NURSE PRACTITIONER

## 2019-11-04 PROCEDURE — 4040F PNEUMOC VAC/ADMIN/RCVD: CPT | Performed by: NURSE PRACTITIONER

## 2019-11-04 PROCEDURE — 1111F DSCHRG MED/CURRENT MED MERGE: CPT | Performed by: NURSE PRACTITIONER

## 2019-11-04 PROCEDURE — 1036F TOBACCO NON-USER: CPT | Performed by: NURSE PRACTITIONER

## 2019-11-04 PROCEDURE — 3017F COLORECTAL CA SCREEN DOC REV: CPT | Performed by: NURSE PRACTITIONER

## 2019-11-04 PROCEDURE — G8427 DOCREV CUR MEDS BY ELIG CLIN: HCPCS | Performed by: NURSE PRACTITIONER

## 2019-11-04 PROCEDURE — G8482 FLU IMMUNIZE ORDER/ADMIN: HCPCS | Performed by: NURSE PRACTITIONER

## 2019-11-04 PROCEDURE — G8417 CALC BMI ABV UP PARAM F/U: HCPCS | Performed by: NURSE PRACTITIONER

## 2019-11-04 PROCEDURE — G8598 ASA/ANTIPLAT THER USED: HCPCS | Performed by: NURSE PRACTITIONER

## 2019-11-04 PROCEDURE — 99213 OFFICE O/P EST LOW 20 MIN: CPT | Performed by: NURSE PRACTITIONER

## 2019-11-04 RX ORDER — METHYLPREDNISOLONE 4 MG/1
TABLET ORAL
Qty: 1 KIT | Refills: 0 | Status: SHIPPED | OUTPATIENT
Start: 2019-11-04 | End: 2019-11-04 | Stop reason: SDUPTHER

## 2019-11-04 RX ORDER — TIZANIDINE 2 MG/1
2 TABLET ORAL 3 TIMES DAILY PRN
Qty: 20 TABLET | Refills: 0 | Status: SHIPPED | OUTPATIENT
Start: 2019-11-04 | End: 2019-11-14

## 2019-11-04 RX ORDER — METHYLPREDNISOLONE 4 MG/1
TABLET ORAL
Qty: 1 KIT | Refills: 0 | Status: SHIPPED | OUTPATIENT
Start: 2019-11-04 | End: 2019-11-10

## 2019-11-04 RX ORDER — TIZANIDINE 2 MG/1
2 TABLET ORAL 3 TIMES DAILY PRN
Qty: 20 TABLET | Refills: 0 | Status: SHIPPED | OUTPATIENT
Start: 2019-11-04 | End: 2019-11-04 | Stop reason: SDUPTHER

## 2019-11-04 ASSESSMENT — ENCOUNTER SYMPTOMS
TROUBLE SWALLOWING: 0
PHOTOPHOBIA: 0
VISUAL CHANGE: 0

## 2019-11-06 ENCOUNTER — APPOINTMENT (OUTPATIENT)
Dept: CT IMAGING | Age: 69
DRG: 312 | End: 2019-11-06
Payer: MEDICARE

## 2019-11-06 ENCOUNTER — HOSPITAL ENCOUNTER (INPATIENT)
Age: 69
LOS: 1 days | Discharge: HOME | DRG: 312 | End: 2019-11-08
Attending: EMERGENCY MEDICINE | Admitting: HOSPITALIST
Payer: MEDICARE

## 2019-11-06 ENCOUNTER — TELEPHONE (OUTPATIENT)
Dept: CARDIOLOGY CLINIC | Age: 69
End: 2019-11-06

## 2019-11-06 DIAGNOSIS — R55 SYNCOPE, UNSPECIFIED SYNCOPE TYPE: Primary | ICD-10-CM

## 2019-11-06 PROBLEM — I95.9 HYPOTENSIVE EPISODE: Status: ACTIVE | Noted: 2019-11-06

## 2019-11-06 PROBLEM — R00.1 BRADYCARDIA ON ECG: Status: ACTIVE | Noted: 2019-11-06

## 2019-11-06 LAB
ALBUMIN SERPL-MCNC: 3.3 GM/DL (ref 3.4–5)
ALP BLD-CCNC: 106 IU/L (ref 40–129)
ALT SERPL-CCNC: 21 U/L (ref 10–40)
ANION GAP SERPL CALCULATED.3IONS-SCNC: 11 MMOL/L (ref 4–16)
AST SERPL-CCNC: 21 IU/L (ref 15–37)
BASOPHILS ABSOLUTE: 0 K/CU MM
BASOPHILS RELATIVE PERCENT: 0.2 % (ref 0–1)
BILIRUB SERPL-MCNC: 0.4 MG/DL (ref 0–1)
BUN BLDV-MCNC: 25 MG/DL (ref 6–23)
CALCIUM SERPL-MCNC: 9.1 MG/DL (ref 8.3–10.6)
CHLORIDE BLD-SCNC: 102 MMOL/L (ref 99–110)
CO2: 25 MMOL/L (ref 21–32)
CREAT SERPL-MCNC: 1.7 MG/DL (ref 0.9–1.3)
DIFFERENTIAL TYPE: ABNORMAL
EOSINOPHILS ABSOLUTE: 0 K/CU MM
EOSINOPHILS RELATIVE PERCENT: 0.1 % (ref 0–3)
GFR AFRICAN AMERICAN: 49 ML/MIN/1.73M2
GFR NON-AFRICAN AMERICAN: 40 ML/MIN/1.73M2
GLUCOSE BLD-MCNC: 125 MG/DL (ref 70–99)
GLUCOSE BLD-MCNC: 126 MG/DL (ref 70–99)
HCT VFR BLD CALC: 40.4 % (ref 42–52)
HEMOGLOBIN: 12.4 GM/DL (ref 13.5–18)
IMMATURE NEUTROPHIL %: 0.8 % (ref 0–0.43)
LACTATE: 1.6 MMOL/L (ref 0.4–2)
LIPASE: 16 IU/L (ref 13–60)
LYMPHOCYTES ABSOLUTE: 1.2 K/CU MM
LYMPHOCYTES RELATIVE PERCENT: 8.7 % (ref 24–44)
MCH RBC QN AUTO: 25.4 PG (ref 27–31)
MCHC RBC AUTO-ENTMCNC: 30.7 % (ref 32–36)
MCV RBC AUTO: 82.6 FL (ref 78–100)
MONOCYTES ABSOLUTE: 0.4 K/CU MM
MONOCYTES RELATIVE PERCENT: 3.1 % (ref 0–4)
NUCLEATED RBC %: 0 %
PDW BLD-RTO: 18.2 % (ref 11.7–14.9)
PLATELET # BLD: 214 K/CU MM (ref 140–440)
PMV BLD AUTO: 9.9 FL (ref 7.5–11.1)
POTASSIUM SERPL-SCNC: 4 MMOL/L (ref 3.5–5.1)
PRO-BNP: 468.3 PG/ML
RBC # BLD: 4.89 M/CU MM (ref 4.6–6.2)
SEGMENTED NEUTROPHILS ABSOLUTE COUNT: 12.5 K/CU MM
SEGMENTED NEUTROPHILS RELATIVE PERCENT: 87.1 % (ref 36–66)
SODIUM BLD-SCNC: 138 MMOL/L (ref 135–145)
TOTAL IMMATURE NEUTOROPHIL: 0.11 K/CU MM
TOTAL NUCLEATED RBC: 0 K/CU MM
TOTAL PROTEIN: 7.2 GM/DL (ref 6.4–8.2)
TROPONIN T: 0.02 NG/ML
TROPONIN T: <0.01 NG/ML
WBC # BLD: 14.3 K/CU MM (ref 4–10.5)

## 2019-11-06 PROCEDURE — 70450 CT HEAD/BRAIN W/O DYE: CPT

## 2019-11-06 PROCEDURE — 85025 COMPLETE CBC W/AUTO DIFF WBC: CPT

## 2019-11-06 PROCEDURE — 80053 COMPREHEN METABOLIC PANEL: CPT

## 2019-11-06 PROCEDURE — 84443 ASSAY THYROID STIM HORMONE: CPT

## 2019-11-06 PROCEDURE — 6370000000 HC RX 637 (ALT 250 FOR IP): Performed by: NURSE PRACTITIONER

## 2019-11-06 PROCEDURE — 36415 COLL VENOUS BLD VENIPUNCTURE: CPT

## 2019-11-06 PROCEDURE — 6370000000 HC RX 637 (ALT 250 FOR IP): Performed by: EMERGENCY MEDICINE

## 2019-11-06 PROCEDURE — 2700000000 HC OXYGEN THERAPY PER DAY

## 2019-11-06 PROCEDURE — 96372 THER/PROPH/DIAG INJ SC/IM: CPT

## 2019-11-06 PROCEDURE — 93010 ELECTROCARDIOGRAM REPORT: CPT | Performed by: INTERNAL MEDICINE

## 2019-11-06 PROCEDURE — 6360000002 HC RX W HCPCS: Performed by: NURSE PRACTITIONER

## 2019-11-06 PROCEDURE — 82962 GLUCOSE BLOOD TEST: CPT

## 2019-11-06 PROCEDURE — 94761 N-INVAS EAR/PLS OXIMETRY MLT: CPT

## 2019-11-06 PROCEDURE — 83690 ASSAY OF LIPASE: CPT

## 2019-11-06 PROCEDURE — 99285 EMERGENCY DEPT VISIT HI MDM: CPT

## 2019-11-06 PROCEDURE — 84484 ASSAY OF TROPONIN QUANT: CPT

## 2019-11-06 PROCEDURE — 83605 ASSAY OF LACTIC ACID: CPT

## 2019-11-06 PROCEDURE — G0378 HOSPITAL OBSERVATION PER HR: HCPCS

## 2019-11-06 PROCEDURE — 2580000003 HC RX 258: Performed by: NURSE PRACTITIONER

## 2019-11-06 PROCEDURE — 83880 ASSAY OF NATRIURETIC PEPTIDE: CPT

## 2019-11-06 PROCEDURE — 93005 ELECTROCARDIOGRAM TRACING: CPT | Performed by: EMERGENCY MEDICINE

## 2019-11-06 RX ORDER — PANTOPRAZOLE SODIUM 40 MG/1
40 TABLET, DELAYED RELEASE ORAL DAILY
Status: DISCONTINUED | OUTPATIENT
Start: 2019-11-06 | End: 2019-11-08 | Stop reason: HOSPADM

## 2019-11-06 RX ORDER — NICOTINE POLACRILEX 4 MG
15 LOZENGE BUCCAL PRN
Status: DISCONTINUED | OUTPATIENT
Start: 2019-11-06 | End: 2019-11-08 | Stop reason: HOSPADM

## 2019-11-06 RX ORDER — SODIUM CHLORIDE 0.9 % (FLUSH) 0.9 %
10 SYRINGE (ML) INJECTION EVERY 12 HOURS SCHEDULED
Status: DISCONTINUED | OUTPATIENT
Start: 2019-11-06 | End: 2019-11-08 | Stop reason: HOSPADM

## 2019-11-06 RX ORDER — HYDROCODONE BITARTRATE AND ACETAMINOPHEN 5; 325 MG/1; MG/1
1 TABLET ORAL ONCE
Status: COMPLETED | OUTPATIENT
Start: 2019-11-06 | End: 2019-11-06

## 2019-11-06 RX ORDER — CARVEDILOL 12.5 MG/1
12.5 TABLET ORAL 2 TIMES DAILY WITH MEALS
Status: DISCONTINUED | OUTPATIENT
Start: 2019-11-06 | End: 2019-11-06

## 2019-11-06 RX ORDER — CARVEDILOL 6.25 MG/1
6.25 TABLET ORAL
Status: DISCONTINUED | OUTPATIENT
Start: 2019-11-07 | End: 2019-11-08 | Stop reason: HOSPADM

## 2019-11-06 RX ORDER — SODIUM CHLORIDE 0.9 % (FLUSH) 0.9 %
10 SYRINGE (ML) INJECTION PRN
Status: DISCONTINUED | OUTPATIENT
Start: 2019-11-06 | End: 2019-11-08 | Stop reason: HOSPADM

## 2019-11-06 RX ORDER — GABAPENTIN 400 MG/1
400 CAPSULE ORAL 3 TIMES DAILY
Status: DISCONTINUED | OUTPATIENT
Start: 2019-11-06 | End: 2019-11-08 | Stop reason: HOSPADM

## 2019-11-06 RX ORDER — METHYLPREDNISOLONE 4 MG/1
4 TABLET ORAL SEE ADMIN INSTRUCTIONS
Status: DISCONTINUED | OUTPATIENT
Start: 2019-11-06 | End: 2019-11-07

## 2019-11-06 RX ORDER — TIZANIDINE 2 MG/1
2 TABLET ORAL 3 TIMES DAILY PRN
Status: DISCONTINUED | OUTPATIENT
Start: 2019-11-06 | End: 2019-11-08 | Stop reason: HOSPADM

## 2019-11-06 RX ORDER — CARVEDILOL 12.5 MG/1
12.5 TABLET ORAL
Status: DISCONTINUED | OUTPATIENT
Start: 2019-11-07 | End: 2019-11-08 | Stop reason: HOSPADM

## 2019-11-06 RX ORDER — FOLIC ACID 1 MG/1
1 TABLET ORAL DAILY
Status: DISCONTINUED | OUTPATIENT
Start: 2019-11-06 | End: 2019-11-08 | Stop reason: HOSPADM

## 2019-11-06 RX ORDER — DEXTROSE MONOHYDRATE 25 G/50ML
12.5 INJECTION, SOLUTION INTRAVENOUS PRN
Status: DISCONTINUED | OUTPATIENT
Start: 2019-11-06 | End: 2019-11-08 | Stop reason: HOSPADM

## 2019-11-06 RX ORDER — ONDANSETRON 2 MG/ML
4 INJECTION INTRAMUSCULAR; INTRAVENOUS EVERY 6 HOURS PRN
Status: DISCONTINUED | OUTPATIENT
Start: 2019-11-06 | End: 2019-11-08 | Stop reason: HOSPADM

## 2019-11-06 RX ORDER — ATORVASTATIN CALCIUM 40 MG/1
80 TABLET, FILM COATED ORAL DAILY
Status: DISCONTINUED | OUTPATIENT
Start: 2019-11-06 | End: 2019-11-08 | Stop reason: HOSPADM

## 2019-11-06 RX ORDER — OXYCODONE HYDROCHLORIDE AND ACETAMINOPHEN 5; 325 MG/1; MG/1
2 TABLET ORAL EVERY 4 HOURS PRN
Status: DISCONTINUED | OUTPATIENT
Start: 2019-11-06 | End: 2019-11-08 | Stop reason: HOSPADM

## 2019-11-06 RX ORDER — ALPRAZOLAM 0.5 MG/1
1 TABLET ORAL 2 TIMES DAILY PRN
Status: DISCONTINUED | OUTPATIENT
Start: 2019-11-06 | End: 2019-11-08 | Stop reason: HOSPADM

## 2019-11-06 RX ORDER — ESCITALOPRAM OXALATE 10 MG/1
20 TABLET ORAL DAILY
Status: DISCONTINUED | OUTPATIENT
Start: 2019-11-06 | End: 2019-11-08 | Stop reason: HOSPADM

## 2019-11-06 RX ORDER — DEXTROSE MONOHYDRATE 50 MG/ML
100 INJECTION, SOLUTION INTRAVENOUS PRN
Status: DISCONTINUED | OUTPATIENT
Start: 2019-11-06 | End: 2019-11-08 | Stop reason: HOSPADM

## 2019-11-06 RX ADMIN — OXYCODONE HYDROCHLORIDE AND ACETAMINOPHEN 2 TABLET: 5; 325 TABLET ORAL at 22:45

## 2019-11-06 RX ADMIN — FOLIC ACID 1 MG: 1 TABLET ORAL at 22:44

## 2019-11-06 RX ADMIN — GABAPENTIN 400 MG: 400 CAPSULE ORAL at 22:45

## 2019-11-06 RX ADMIN — Medication 10 ML: at 22:45

## 2019-11-06 RX ADMIN — HYDROCODONE BITARTRATE AND ACETAMINOPHEN 1 TABLET: 5; 325 TABLET ORAL at 16:59

## 2019-11-06 RX ADMIN — ENOXAPARIN SODIUM 40 MG: 40 INJECTION SUBCUTANEOUS at 22:46

## 2019-11-06 RX ADMIN — TIZANIDINE 2 MG: 2 TABLET ORAL at 22:45

## 2019-11-06 RX ADMIN — ESCITALOPRAM OXALATE 20 MG: 10 TABLET ORAL at 22:44

## 2019-11-06 RX ADMIN — PANTOPRAZOLE SODIUM 40 MG: 40 TABLET, DELAYED RELEASE ORAL at 22:44

## 2019-11-06 RX ADMIN — ATORVASTATIN CALCIUM 80 MG: 40 TABLET, FILM COATED ORAL at 22:45

## 2019-11-06 ASSESSMENT — PAIN DESCRIPTION - LOCATION
LOCATION: NECK;BACK
LOCATION: NECK;SHOULDER

## 2019-11-06 ASSESSMENT — PAIN DESCRIPTION - PAIN TYPE
TYPE: ACUTE PAIN;CHRONIC PAIN
TYPE: ACUTE PAIN;CHRONIC PAIN

## 2019-11-06 ASSESSMENT — PAIN SCALES - GENERAL
PAINLEVEL_OUTOF10: 8
PAINLEVEL_OUTOF10: 8
PAINLEVEL_OUTOF10: 10
PAINLEVEL_OUTOF10: 7
PAINLEVEL_OUTOF10: 7

## 2019-11-06 ASSESSMENT — PAIN DESCRIPTION - PROGRESSION
CLINICAL_PROGRESSION: GRADUALLY WORSENING

## 2019-11-06 ASSESSMENT — PAIN DESCRIPTION - ORIENTATION
ORIENTATION: MID
ORIENTATION: RIGHT

## 2019-11-06 ASSESSMENT — PAIN DESCRIPTION - DESCRIPTORS
DESCRIPTORS: SPASM
DESCRIPTORS: SHARP

## 2019-11-06 ASSESSMENT — PAIN DESCRIPTION - FREQUENCY
FREQUENCY: CONTINUOUS
FREQUENCY: CONTINUOUS

## 2019-11-06 ASSESSMENT — PAIN DESCRIPTION - ONSET
ONSET: GRADUAL
ONSET: ON-GOING

## 2019-11-07 ENCOUNTER — APPOINTMENT (OUTPATIENT)
Dept: ULTRASOUND IMAGING | Age: 69
DRG: 312 | End: 2019-11-07
Payer: MEDICARE

## 2019-11-07 ENCOUNTER — APPOINTMENT (OUTPATIENT)
Dept: GENERAL RADIOLOGY | Age: 69
DRG: 312 | End: 2019-11-07
Payer: MEDICARE

## 2019-11-07 LAB
ALBUMIN SERPL-MCNC: 3.6 GM/DL (ref 3.4–5)
ALP BLD-CCNC: 96 IU/L (ref 40–128)
ALT SERPL-CCNC: 18 U/L (ref 10–40)
ANION GAP SERPL CALCULATED.3IONS-SCNC: 13 MMOL/L (ref 4–16)
AST SERPL-CCNC: 17 IU/L (ref 15–37)
BASOPHILS ABSOLUTE: 0 K/CU MM
BASOPHILS RELATIVE PERCENT: 0.3 % (ref 0–1)
BILIRUB SERPL-MCNC: 0.3 MG/DL (ref 0–1)
BUN BLDV-MCNC: 30 MG/DL (ref 6–23)
CALCIUM SERPL-MCNC: 8.7 MG/DL (ref 8.3–10.6)
CHLORIDE BLD-SCNC: 102 MMOL/L (ref 99–110)
CO2: 28 MMOL/L (ref 21–32)
CREAT SERPL-MCNC: 1.9 MG/DL (ref 0.9–1.3)
DIFFERENTIAL TYPE: ABNORMAL
EOSINOPHILS ABSOLUTE: 0 K/CU MM
EOSINOPHILS RELATIVE PERCENT: 0.2 % (ref 0–3)
GFR AFRICAN AMERICAN: 43 ML/MIN/1.73M2
GFR NON-AFRICAN AMERICAN: 35 ML/MIN/1.73M2
GLUCOSE BLD-MCNC: 121 MG/DL (ref 70–99)
HCT VFR BLD CALC: 40.9 % (ref 42–52)
HEMOGLOBIN: 12.5 GM/DL (ref 13.5–18)
IMMATURE NEUTROPHIL %: 0.6 % (ref 0–0.43)
LV EF: 58 %
LVEF MODALITY: NORMAL
LYMPHOCYTES ABSOLUTE: 2.2 K/CU MM
LYMPHOCYTES RELATIVE PERCENT: 17.3 % (ref 24–44)
MCH RBC QN AUTO: 25.3 PG (ref 27–31)
MCHC RBC AUTO-ENTMCNC: 30.6 % (ref 32–36)
MCV RBC AUTO: 82.6 FL (ref 78–100)
MONOCYTES ABSOLUTE: 1 K/CU MM
MONOCYTES RELATIVE PERCENT: 7.9 % (ref 0–4)
NUCLEATED RBC %: 0 %
PDW BLD-RTO: 18.4 % (ref 11.7–14.9)
PLATELET # BLD: 223 K/CU MM (ref 140–440)
PMV BLD AUTO: 9.6 FL (ref 7.5–11.1)
POTASSIUM SERPL-SCNC: 4 MMOL/L (ref 3.5–5.1)
RBC # BLD: 4.95 M/CU MM (ref 4.6–6.2)
SEGMENTED NEUTROPHILS ABSOLUTE COUNT: 9.2 K/CU MM
SEGMENTED NEUTROPHILS RELATIVE PERCENT: 73.7 % (ref 36–66)
SODIUM BLD-SCNC: 143 MMOL/L (ref 135–145)
TOTAL IMMATURE NEUTOROPHIL: 0.08 K/CU MM
TOTAL NUCLEATED RBC: 0 K/CU MM
TOTAL PROTEIN: 6.7 GM/DL (ref 6.4–8.2)
TROPONIN T: <0.01 NG/ML
TSH HIGH SENSITIVITY: 1.24 UIU/ML (ref 0.27–4.2)
WBC # BLD: 12.5 K/CU MM (ref 4–10.5)

## 2019-11-07 PROCEDURE — 96372 THER/PROPH/DIAG INJ SC/IM: CPT

## 2019-11-07 PROCEDURE — 85025 COMPLETE CBC W/AUTO DIFF WBC: CPT

## 2019-11-07 PROCEDURE — 93880 EXTRACRANIAL BILAT STUDY: CPT

## 2019-11-07 PROCEDURE — G0378 HOSPITAL OBSERVATION PER HR: HCPCS

## 2019-11-07 PROCEDURE — 36415 COLL VENOUS BLD VENIPUNCTURE: CPT

## 2019-11-07 PROCEDURE — 72040 X-RAY EXAM NECK SPINE 2-3 VW: CPT

## 2019-11-07 PROCEDURE — 6360000002 HC RX W HCPCS: Performed by: NURSE PRACTITIONER

## 2019-11-07 PROCEDURE — 6370000000 HC RX 637 (ALT 250 FOR IP): Performed by: NURSE PRACTITIONER

## 2019-11-07 PROCEDURE — 6370000000 HC RX 637 (ALT 250 FOR IP): Performed by: HOSPITALIST

## 2019-11-07 PROCEDURE — 2580000003 HC RX 258: Performed by: NURSE PRACTITIONER

## 2019-11-07 PROCEDURE — 84484 ASSAY OF TROPONIN QUANT: CPT

## 2019-11-07 PROCEDURE — 94761 N-INVAS EAR/PLS OXIMETRY MLT: CPT

## 2019-11-07 PROCEDURE — 93306 TTE W/DOPPLER COMPLETE: CPT

## 2019-11-07 PROCEDURE — 80053 COMPREHEN METABOLIC PANEL: CPT

## 2019-11-07 PROCEDURE — 99218 PR INITIAL OBSERVATION CARE/DAY 30 MINUTES: CPT | Performed by: INTERNAL MEDICINE

## 2019-11-07 RX ORDER — POLYETHYLENE GLYCOL 3350 17 G/17G
17 POWDER, FOR SOLUTION ORAL 2 TIMES DAILY
Status: DISCONTINUED | OUTPATIENT
Start: 2019-11-07 | End: 2019-11-08 | Stop reason: HOSPADM

## 2019-11-07 RX ADMIN — TIZANIDINE 2 MG: 2 TABLET ORAL at 10:25

## 2019-11-07 RX ADMIN — OXYCODONE HYDROCHLORIDE AND ACETAMINOPHEN 2 TABLET: 5; 325 TABLET ORAL at 10:25

## 2019-11-07 RX ADMIN — ALPRAZOLAM 1 MG: 0.5 TABLET ORAL at 20:45

## 2019-11-07 RX ADMIN — CARVEDILOL 12.5 MG: 12.5 TABLET, FILM COATED ORAL at 10:20

## 2019-11-07 RX ADMIN — ALPRAZOLAM 1 MG: 0.5 TABLET ORAL at 02:54

## 2019-11-07 RX ADMIN — POLYETHYLENE GLYCOL (3350) 17 G: 17 POWDER, FOR SOLUTION ORAL at 20:34

## 2019-11-07 RX ADMIN — OXYCODONE HYDROCHLORIDE AND ACETAMINOPHEN 2 TABLET: 5; 325 TABLET ORAL at 15:17

## 2019-11-07 RX ADMIN — ATORVASTATIN CALCIUM 80 MG: 40 TABLET, FILM COATED ORAL at 10:20

## 2019-11-07 RX ADMIN — MAGNESIUM HYDROXIDE 30 ML: 400 SUSPENSION ORAL at 12:31

## 2019-11-07 RX ADMIN — OXYCODONE HYDROCHLORIDE AND ACETAMINOPHEN 2 TABLET: 5; 325 TABLET ORAL at 20:45

## 2019-11-07 RX ADMIN — ENOXAPARIN SODIUM 40 MG: 40 INJECTION SUBCUTANEOUS at 10:21

## 2019-11-07 RX ADMIN — ESCITALOPRAM OXALATE 20 MG: 10 TABLET ORAL at 10:20

## 2019-11-07 RX ADMIN — GABAPENTIN 400 MG: 400 CAPSULE ORAL at 20:34

## 2019-11-07 RX ADMIN — GABAPENTIN 400 MG: 400 CAPSULE ORAL at 10:20

## 2019-11-07 RX ADMIN — TIZANIDINE 2 MG: 2 TABLET ORAL at 20:45

## 2019-11-07 RX ADMIN — TIZANIDINE 2 MG: 2 TABLET ORAL at 15:17

## 2019-11-07 RX ADMIN — PANTOPRAZOLE SODIUM 40 MG: 40 TABLET, DELAYED RELEASE ORAL at 04:46

## 2019-11-07 RX ADMIN — Medication 10 ML: at 10:21

## 2019-11-07 RX ADMIN — OXYCODONE HYDROCHLORIDE AND ACETAMINOPHEN 2 TABLET: 5; 325 TABLET ORAL at 02:54

## 2019-11-07 RX ADMIN — Medication 10 ML: at 20:34

## 2019-11-07 RX ADMIN — GABAPENTIN 400 MG: 400 CAPSULE ORAL at 15:13

## 2019-11-07 RX ADMIN — FOLIC ACID 1 MG: 1 TABLET ORAL at 10:20

## 2019-11-07 ASSESSMENT — PAIN DESCRIPTION - PROGRESSION
CLINICAL_PROGRESSION: GRADUALLY WORSENING

## 2019-11-07 ASSESSMENT — PAIN SCALES - GENERAL
PAINLEVEL_OUTOF10: 6
PAINLEVEL_OUTOF10: 9
PAINLEVEL_OUTOF10: 6
PAINLEVEL_OUTOF10: 9
PAINLEVEL_OUTOF10: 7
PAINLEVEL_OUTOF10: 6

## 2019-11-07 ASSESSMENT — PAIN DESCRIPTION - LOCATION: LOCATION: BACK;NECK

## 2019-11-07 ASSESSMENT — PAIN DESCRIPTION - PAIN TYPE: TYPE: ACUTE PAIN;CHRONIC PAIN

## 2019-11-08 ENCOUNTER — APPOINTMENT (OUTPATIENT)
Dept: GENERAL RADIOLOGY | Age: 69
DRG: 312 | End: 2019-11-08
Payer: MEDICARE

## 2019-11-08 VITALS
HEART RATE: 62 BPM | WEIGHT: 219.38 LBS | BODY MASS INDEX: 32.49 KG/M2 | HEIGHT: 69 IN | RESPIRATION RATE: 26 BRPM | SYSTOLIC BLOOD PRESSURE: 116 MMHG | DIASTOLIC BLOOD PRESSURE: 68 MMHG | TEMPERATURE: 98.5 F | OXYGEN SATURATION: 99 %

## 2019-11-08 LAB
ANION GAP SERPL CALCULATED.3IONS-SCNC: 11 MMOL/L (ref 4–16)
BUN BLDV-MCNC: 28 MG/DL (ref 6–23)
CALCIUM SERPL-MCNC: 8.5 MG/DL (ref 8.3–10.6)
CHLORIDE BLD-SCNC: 104 MMOL/L (ref 99–110)
CO2: 24 MMOL/L (ref 21–32)
CREAT SERPL-MCNC: 1.9 MG/DL (ref 0.9–1.3)
GFR AFRICAN AMERICAN: 43 ML/MIN/1.73M2
GFR NON-AFRICAN AMERICAN: 35 ML/MIN/1.73M2
GLUCOSE BLD-MCNC: 100 MG/DL (ref 70–99)
HCT VFR BLD CALC: 44.7 % (ref 42–52)
HEMOGLOBIN: 12.8 GM/DL (ref 13.5–18)
MCH RBC QN AUTO: 25.2 PG (ref 27–31)
MCHC RBC AUTO-ENTMCNC: 28.6 % (ref 32–36)
MCV RBC AUTO: 88.2 FL (ref 78–100)
PDW BLD-RTO: 18.7 % (ref 11.7–14.9)
PLATELET # BLD: 197 K/CU MM (ref 140–440)
PMV BLD AUTO: 9.8 FL (ref 7.5–11.1)
POTASSIUM SERPL-SCNC: 4.7 MMOL/L (ref 3.5–5.1)
RBC # BLD: 5.07 M/CU MM (ref 4.6–6.2)
SODIUM BLD-SCNC: 139 MMOL/L (ref 135–145)
WBC # BLD: 8.5 K/CU MM (ref 4–10.5)

## 2019-11-08 PROCEDURE — 97162 PT EVAL MOD COMPLEX 30 MIN: CPT

## 2019-11-08 PROCEDURE — 6370000000 HC RX 637 (ALT 250 FOR IP): Performed by: HOSPITALIST

## 2019-11-08 PROCEDURE — 6360000002 HC RX W HCPCS: Performed by: NURSE PRACTITIONER

## 2019-11-08 PROCEDURE — 36415 COLL VENOUS BLD VENIPUNCTURE: CPT

## 2019-11-08 PROCEDURE — 2580000003 HC RX 258: Performed by: NURSE PRACTITIONER

## 2019-11-08 PROCEDURE — 71046 X-RAY EXAM CHEST 2 VIEWS: CPT

## 2019-11-08 PROCEDURE — 97165 OT EVAL LOW COMPLEX 30 MIN: CPT

## 2019-11-08 PROCEDURE — 1200000000 HC SEMI PRIVATE

## 2019-11-08 PROCEDURE — 97535 SELF CARE MNGMENT TRAINING: CPT

## 2019-11-08 PROCEDURE — 85027 COMPLETE CBC AUTOMATED: CPT

## 2019-11-08 PROCEDURE — 97530 THERAPEUTIC ACTIVITIES: CPT

## 2019-11-08 PROCEDURE — 96372 THER/PROPH/DIAG INJ SC/IM: CPT

## 2019-11-08 PROCEDURE — 6370000000 HC RX 637 (ALT 250 FOR IP): Performed by: NURSE PRACTITIONER

## 2019-11-08 PROCEDURE — APPSS45 APP SPLIT SHARED TIME 31-45 MINUTES: Performed by: NURSE PRACTITIONER

## 2019-11-08 PROCEDURE — 80048 BASIC METABOLIC PNL TOTAL CA: CPT

## 2019-11-08 PROCEDURE — 99231 SBSQ HOSP IP/OBS SF/LOW 25: CPT | Performed by: INTERNAL MEDICINE

## 2019-11-08 RX ORDER — CARVEDILOL 6.25 MG/1
6.25 TABLET ORAL 2 TIMES DAILY WITH MEALS
Qty: 60 TABLET | Refills: 0 | Status: SHIPPED | OUTPATIENT
Start: 2019-11-08 | End: 2019-11-11 | Stop reason: SDUPTHER

## 2019-11-08 RX ORDER — BISACODYL 10 MG
10 SUPPOSITORY, RECTAL RECTAL DAILY PRN
Status: DISCONTINUED | OUTPATIENT
Start: 2019-11-08 | End: 2019-11-08 | Stop reason: HOSPADM

## 2019-11-08 RX ADMIN — PANTOPRAZOLE SODIUM 40 MG: 40 TABLET, DELAYED RELEASE ORAL at 06:24

## 2019-11-08 RX ADMIN — CARVEDILOL 12.5 MG: 12.5 TABLET, FILM COATED ORAL at 10:07

## 2019-11-08 RX ADMIN — POLYETHYLENE GLYCOL (3350) 17 G: 17 POWDER, FOR SOLUTION ORAL at 10:06

## 2019-11-08 RX ADMIN — OXYCODONE HYDROCHLORIDE AND ACETAMINOPHEN 2 TABLET: 5; 325 TABLET ORAL at 15:19

## 2019-11-08 RX ADMIN — TIZANIDINE 2 MG: 2 TABLET ORAL at 10:07

## 2019-11-08 RX ADMIN — OXYCODONE HYDROCHLORIDE AND ACETAMINOPHEN 2 TABLET: 5; 325 TABLET ORAL at 10:07

## 2019-11-08 RX ADMIN — ESCITALOPRAM OXALATE 20 MG: 10 TABLET ORAL at 10:07

## 2019-11-08 RX ADMIN — FOLIC ACID 1 MG: 1 TABLET ORAL at 10:07

## 2019-11-08 RX ADMIN — GABAPENTIN 400 MG: 400 CAPSULE ORAL at 15:19

## 2019-11-08 RX ADMIN — ENOXAPARIN SODIUM 40 MG: 40 INJECTION SUBCUTANEOUS at 10:06

## 2019-11-08 RX ADMIN — GABAPENTIN 400 MG: 400 CAPSULE ORAL at 10:07

## 2019-11-08 RX ADMIN — ATORVASTATIN CALCIUM 80 MG: 40 TABLET, FILM COATED ORAL at 10:07

## 2019-11-08 RX ADMIN — BISACODYL 10 MG: 10 SUPPOSITORY RECTAL at 15:19

## 2019-11-08 RX ADMIN — Medication 10 ML: at 10:06

## 2019-11-08 ASSESSMENT — PAIN DESCRIPTION - PROGRESSION
CLINICAL_PROGRESSION: GRADUALLY WORSENING

## 2019-11-08 ASSESSMENT — PAIN DESCRIPTION - LOCATION: LOCATION: BACK;NECK

## 2019-11-08 ASSESSMENT — PAIN DESCRIPTION - PAIN TYPE: TYPE: ACUTE PAIN;CHRONIC PAIN

## 2019-11-08 ASSESSMENT — PAIN SCALES - GENERAL
PAINLEVEL_OUTOF10: 7
PAINLEVEL_OUTOF10: 8
PAINLEVEL_OUTOF10: 8

## 2019-11-09 ENCOUNTER — CARE COORDINATION (OUTPATIENT)
Dept: CASE MANAGEMENT | Age: 69
End: 2019-11-09

## 2019-11-09 DIAGNOSIS — R55 SYNCOPE, UNSPECIFIED SYNCOPE TYPE: Primary | ICD-10-CM

## 2019-11-09 PROCEDURE — 1111F DSCHRG MED/CURRENT MED MERGE: CPT | Performed by: FAMILY MEDICINE

## 2019-11-11 ENCOUNTER — CARE COORDINATION (OUTPATIENT)
Dept: CARE COORDINATION | Age: 69
End: 2019-11-11

## 2019-11-11 LAB
EKG ATRIAL RATE: 52 BPM
EKG DIAGNOSIS: NORMAL
EKG P AXIS: 35 DEGREES
EKG P-R INTERVAL: 168 MS
EKG Q-T INTERVAL: 474 MS
EKG QRS DURATION: 114 MS
EKG QTC CALCULATION (BAZETT): 440 MS
EKG R AXIS: -8 DEGREES
EKG T AXIS: 11 DEGREES
EKG VENTRICULAR RATE: 52 BPM

## 2019-11-11 RX ORDER — CARVEDILOL 6.25 MG/1
6.25 TABLET ORAL 2 TIMES DAILY WITH MEALS
Qty: 60 TABLET | Refills: 5 | Status: ON HOLD | OUTPATIENT
Start: 2019-11-11 | End: 2019-11-17 | Stop reason: HOSPADM

## 2019-11-12 ENCOUNTER — CARE COORDINATION (OUTPATIENT)
Dept: CARE COORDINATION | Age: 69
End: 2019-11-12

## 2019-11-14 ENCOUNTER — OFFICE VISIT (OUTPATIENT)
Dept: FAMILY MEDICINE CLINIC | Age: 69
End: 2019-11-14
Payer: MEDICARE

## 2019-11-14 VITALS
TEMPERATURE: 98.5 F | HEART RATE: 65 BPM | WEIGHT: 213.8 LBS | OXYGEN SATURATION: 99 % | SYSTOLIC BLOOD PRESSURE: 136 MMHG | BODY MASS INDEX: 31.57 KG/M2 | DIASTOLIC BLOOD PRESSURE: 78 MMHG

## 2019-11-14 DIAGNOSIS — I50.32 CHRONIC DIASTOLIC CONGESTIVE HEART FAILURE (HCC): ICD-10-CM

## 2019-11-14 DIAGNOSIS — I10 ESSENTIAL HYPERTENSION: Primary | ICD-10-CM

## 2019-11-14 DIAGNOSIS — R55 SYNCOPE AND COLLAPSE: ICD-10-CM

## 2019-11-14 PROCEDURE — 99214 OFFICE O/P EST MOD 30 MIN: CPT | Performed by: NURSE PRACTITIONER

## 2019-11-14 PROCEDURE — 1111F DSCHRG MED/CURRENT MED MERGE: CPT | Performed by: NURSE PRACTITIONER

## 2019-11-15 ENCOUNTER — CARE COORDINATION (OUTPATIENT)
Dept: CASE MANAGEMENT | Age: 69
End: 2019-11-15

## 2019-11-15 ENCOUNTER — TELEPHONE (OUTPATIENT)
Dept: CARDIOLOGY CLINIC | Age: 69
End: 2019-11-15

## 2019-11-15 ENCOUNTER — HOSPITAL ENCOUNTER (INPATIENT)
Age: 69
LOS: 2 days | Discharge: HOME | DRG: 149 | End: 2019-11-17
Attending: EMERGENCY MEDICINE | Admitting: INTERNAL MEDICINE
Payer: MEDICARE

## 2019-11-15 ENCOUNTER — APPOINTMENT (OUTPATIENT)
Dept: GENERAL RADIOLOGY | Age: 69
DRG: 149 | End: 2019-11-15
Payer: MEDICARE

## 2019-11-15 DIAGNOSIS — G89.4 CHRONIC PAIN SYNDROME: ICD-10-CM

## 2019-11-15 DIAGNOSIS — R42 DIZZINESS: ICD-10-CM

## 2019-11-15 DIAGNOSIS — R77.8 ELEVATED TROPONIN I LEVEL: ICD-10-CM

## 2019-11-15 DIAGNOSIS — H81.10 BENIGN PAROXYSMAL POSITIONAL VERTIGO, UNSPECIFIED LATERALITY: Primary | ICD-10-CM

## 2019-11-15 LAB
ALBUMIN SERPL-MCNC: 3.2 GM/DL (ref 3.4–5)
ALP BLD-CCNC: 96 IU/L (ref 40–129)
ALT SERPL-CCNC: 27 U/L (ref 10–40)
ANION GAP SERPL CALCULATED.3IONS-SCNC: 11 MMOL/L (ref 4–16)
AST SERPL-CCNC: 23 IU/L (ref 15–37)
BILIRUB SERPL-MCNC: 0.6 MG/DL (ref 0–1)
BUN BLDV-MCNC: 16 MG/DL (ref 6–23)
CALCIUM SERPL-MCNC: 8.5 MG/DL (ref 8.3–10.6)
CHLORIDE BLD-SCNC: 101 MMOL/L (ref 99–110)
CO2: 24 MMOL/L (ref 21–32)
CREAT SERPL-MCNC: 1.9 MG/DL (ref 0.9–1.3)
GFR AFRICAN AMERICAN: 43 ML/MIN/1.73M2
GFR NON-AFRICAN AMERICAN: 35 ML/MIN/1.73M2
GLUCOSE BLD-MCNC: 101 MG/DL (ref 70–99)
HCT VFR BLD CALC: 43 % (ref 42–52)
HEMOGLOBIN: 12.9 GM/DL (ref 13.5–18)
MCH RBC QN AUTO: 25.4 PG (ref 27–31)
MCHC RBC AUTO-ENTMCNC: 30 % (ref 32–36)
MCV RBC AUTO: 84.6 FL (ref 78–100)
PDW BLD-RTO: 19.8 % (ref 11.7–14.9)
PLATELET # BLD: 188 K/CU MM (ref 140–440)
PMV BLD AUTO: 9.8 FL (ref 7.5–11.1)
POTASSIUM SERPL-SCNC: 4.3 MMOL/L (ref 3.5–5.1)
RBC # BLD: 5.08 M/CU MM (ref 4.6–6.2)
SODIUM BLD-SCNC: 136 MMOL/L (ref 135–145)
TOTAL PROTEIN: 7.1 GM/DL (ref 6.4–8.2)
TROPONIN T: 0.01 NG/ML
TROPONIN T: 0.02 NG/ML
WBC # BLD: 9.2 K/CU MM (ref 4–10.5)

## 2019-11-15 PROCEDURE — 93005 ELECTROCARDIOGRAM TRACING: CPT | Performed by: PHYSICIAN ASSISTANT

## 2019-11-15 PROCEDURE — 6370000000 HC RX 637 (ALT 250 FOR IP): Performed by: PHYSICIAN ASSISTANT

## 2019-11-15 PROCEDURE — G0378 HOSPITAL OBSERVATION PER HR: HCPCS

## 2019-11-15 PROCEDURE — 36415 COLL VENOUS BLD VENIPUNCTURE: CPT

## 2019-11-15 PROCEDURE — 80053 COMPREHEN METABOLIC PANEL: CPT

## 2019-11-15 PROCEDURE — 1200000000 HC SEMI PRIVATE

## 2019-11-15 PROCEDURE — 84484 ASSAY OF TROPONIN QUANT: CPT

## 2019-11-15 PROCEDURE — 85027 COMPLETE CBC AUTOMATED: CPT

## 2019-11-15 PROCEDURE — 99285 EMERGENCY DEPT VISIT HI MDM: CPT

## 2019-11-15 PROCEDURE — 6370000000 HC RX 637 (ALT 250 FOR IP): Performed by: INTERNAL MEDICINE

## 2019-11-15 PROCEDURE — 71046 X-RAY EXAM CHEST 2 VIEWS: CPT

## 2019-11-15 PROCEDURE — 2580000003 HC RX 258: Performed by: INTERNAL MEDICINE

## 2019-11-15 RX ORDER — ONDANSETRON 2 MG/ML
4 INJECTION INTRAMUSCULAR; INTRAVENOUS EVERY 6 HOURS PRN
Status: DISCONTINUED | OUTPATIENT
Start: 2019-11-15 | End: 2019-11-17 | Stop reason: HOSPADM

## 2019-11-15 RX ORDER — SPIRONOLACTONE 25 MG/1
25 TABLET ORAL DAILY
Status: DISCONTINUED | OUTPATIENT
Start: 2019-11-16 | End: 2019-11-16

## 2019-11-15 RX ORDER — TIZANIDINE 4 MG/1
4 TABLET ORAL EVERY 8 HOURS PRN
Status: DISCONTINUED | OUTPATIENT
Start: 2019-11-15 | End: 2019-11-17 | Stop reason: HOSPADM

## 2019-11-15 RX ORDER — ASPIRIN 81 MG/1
81 TABLET, CHEWABLE ORAL DAILY
Status: DISCONTINUED | OUTPATIENT
Start: 2019-11-16 | End: 2019-11-17 | Stop reason: HOSPADM

## 2019-11-15 RX ORDER — PANTOPRAZOLE SODIUM 20 MG/1
40 TABLET, DELAYED RELEASE ORAL DAILY
Status: DISCONTINUED | OUTPATIENT
Start: 2019-11-16 | End: 2019-11-17 | Stop reason: HOSPADM

## 2019-11-15 RX ORDER — CARVEDILOL 6.25 MG/1
6.25 TABLET ORAL 2 TIMES DAILY WITH MEALS
Status: DISCONTINUED | OUTPATIENT
Start: 2019-11-16 | End: 2019-11-16

## 2019-11-15 RX ORDER — FOLIC ACID 1 MG/1
1 TABLET ORAL DAILY
Status: DISCONTINUED | OUTPATIENT
Start: 2019-11-16 | End: 2019-11-17 | Stop reason: HOSPADM

## 2019-11-15 RX ORDER — ESCITALOPRAM OXALATE 10 MG/1
20 TABLET ORAL DAILY
Status: DISCONTINUED | OUTPATIENT
Start: 2019-11-16 | End: 2019-11-17 | Stop reason: HOSPADM

## 2019-11-15 RX ORDER — TRAMADOL HYDROCHLORIDE 50 MG/1
50 TABLET ORAL ONCE
Status: COMPLETED | OUTPATIENT
Start: 2019-11-15 | End: 2019-11-15

## 2019-11-15 RX ORDER — GABAPENTIN 400 MG/1
800 CAPSULE ORAL 3 TIMES DAILY
Status: DISCONTINUED | OUTPATIENT
Start: 2019-11-15 | End: 2019-11-17 | Stop reason: HOSPADM

## 2019-11-15 RX ORDER — OXYCODONE HYDROCHLORIDE AND ACETAMINOPHEN 5; 325 MG/1; MG/1
2 TABLET ORAL EVERY 8 HOURS PRN
Status: DISCONTINUED | OUTPATIENT
Start: 2019-11-15 | End: 2019-11-17 | Stop reason: HOSPADM

## 2019-11-15 RX ORDER — ATORVASTATIN CALCIUM 40 MG/1
80 TABLET, FILM COATED ORAL DAILY
Status: DISCONTINUED | OUTPATIENT
Start: 2019-11-16 | End: 2019-11-17 | Stop reason: HOSPADM

## 2019-11-15 RX ORDER — SODIUM CHLORIDE 0.9 % (FLUSH) 0.9 %
10 SYRINGE (ML) INJECTION PRN
Status: DISCONTINUED | OUTPATIENT
Start: 2019-11-15 | End: 2019-11-17 | Stop reason: HOSPADM

## 2019-11-15 RX ORDER — SODIUM CHLORIDE 0.9 % (FLUSH) 0.9 %
10 SYRINGE (ML) INJECTION EVERY 12 HOURS SCHEDULED
Status: DISCONTINUED | OUTPATIENT
Start: 2019-11-15 | End: 2019-11-17 | Stop reason: HOSPADM

## 2019-11-15 RX ORDER — ALPRAZOLAM 0.5 MG/1
1 TABLET ORAL 3 TIMES DAILY PRN
Status: DISCONTINUED | OUTPATIENT
Start: 2019-11-15 | End: 2019-11-17 | Stop reason: HOSPADM

## 2019-11-15 RX ORDER — MECLIZINE HYDROCHLORIDE 25 MG/1
25 TABLET ORAL ONCE
Status: COMPLETED | OUTPATIENT
Start: 2019-11-15 | End: 2019-11-15

## 2019-11-15 RX ADMIN — OXYCODONE HYDROCHLORIDE AND ACETAMINOPHEN 2 TABLET: 5; 325 TABLET ORAL at 22:39

## 2019-11-15 RX ADMIN — ALPRAZOLAM 1 MG: 0.5 TABLET ORAL at 22:06

## 2019-11-15 RX ADMIN — TIZANIDINE 4 MG: 4 TABLET ORAL at 22:39

## 2019-11-15 RX ADMIN — LINAGLIPTIN 5 MG: 5 TABLET, FILM COATED ORAL at 22:06

## 2019-11-15 RX ADMIN — Medication 10 ML: at 22:07

## 2019-11-15 RX ADMIN — GABAPENTIN 800 MG: 400 CAPSULE ORAL at 22:06

## 2019-11-15 RX ADMIN — TRAMADOL HYDROCHLORIDE 50 MG: 50 TABLET, FILM COATED ORAL at 18:00

## 2019-11-15 RX ADMIN — MECLIZINE HYDROCHLORIDE 25 MG: 25 TABLET ORAL at 18:00

## 2019-11-15 ASSESSMENT — PAIN DESCRIPTION - LOCATION
LOCATION: BACK;NECK

## 2019-11-15 ASSESSMENT — PAIN - FUNCTIONAL ASSESSMENT
PAIN_FUNCTIONAL_ASSESSMENT: ACTIVITIES ARE NOT PREVENTED
PAIN_FUNCTIONAL_ASSESSMENT: ACTIVITIES ARE NOT PREVENTED

## 2019-11-15 ASSESSMENT — PAIN SCALES - GENERAL
PAINLEVEL_OUTOF10: 8
PAINLEVEL_OUTOF10: 7
PAINLEVEL_OUTOF10: 9
PAINLEVEL_OUTOF10: 9

## 2019-11-15 ASSESSMENT — PAIN DESCRIPTION - FREQUENCY
FREQUENCY: CONTINUOUS
FREQUENCY: CONTINUOUS

## 2019-11-15 ASSESSMENT — PAIN DESCRIPTION - PAIN TYPE
TYPE: CHRONIC PAIN

## 2019-11-15 ASSESSMENT — PAIN DESCRIPTION - PROGRESSION
CLINICAL_PROGRESSION: GRADUALLY WORSENING

## 2019-11-15 ASSESSMENT — PAIN DESCRIPTION - ONSET
ONSET: ON-GOING
ONSET: ON-GOING

## 2019-11-15 ASSESSMENT — PAIN DESCRIPTION - DESCRIPTORS
DESCRIPTORS: SPASM
DESCRIPTORS: SPASM

## 2019-11-16 ENCOUNTER — APPOINTMENT (OUTPATIENT)
Dept: MRI IMAGING | Age: 69
DRG: 149 | End: 2019-11-16
Payer: MEDICARE

## 2019-11-16 LAB
ESTIMATED AVERAGE GLUCOSE: 137 MG/DL
GLUCOSE BLD-MCNC: 128 MG/DL (ref 70–99)
GLUCOSE BLD-MCNC: 93 MG/DL (ref 70–99)
GLUCOSE BLD-MCNC: 95 MG/DL (ref 70–99)
HBA1C MFR BLD: 6.4 % (ref 4.2–6.3)
HCT VFR BLD CALC: 38 % (ref 42–52)
HEMOGLOBIN: 11.4 GM/DL (ref 13.5–18)
MCH RBC QN AUTO: 25.7 PG (ref 27–31)
MCHC RBC AUTO-ENTMCNC: 30 % (ref 32–36)
MCV RBC AUTO: 85.6 FL (ref 78–100)
PDW BLD-RTO: 19.5 % (ref 11.7–14.9)
PLATELET # BLD: 176 K/CU MM (ref 140–440)
PMV BLD AUTO: 9.6 FL (ref 7.5–11.1)
RBC # BLD: 4.44 M/CU MM (ref 4.6–6.2)
TROPONIN T: 0.01 NG/ML
WBC # BLD: 8.5 K/CU MM (ref 4–10.5)

## 2019-11-16 PROCEDURE — 1200000000 HC SEMI PRIVATE

## 2019-11-16 PROCEDURE — G0378 HOSPITAL OBSERVATION PER HR: HCPCS

## 2019-11-16 PROCEDURE — 2580000003 HC RX 258: Performed by: INTERNAL MEDICINE

## 2019-11-16 PROCEDURE — 82533 TOTAL CORTISOL: CPT

## 2019-11-16 PROCEDURE — 70551 MRI BRAIN STEM W/O DYE: CPT

## 2019-11-16 PROCEDURE — 6370000000 HC RX 637 (ALT 250 FOR IP): Performed by: INTERNAL MEDICINE

## 2019-11-16 PROCEDURE — 85027 COMPLETE CBC AUTOMATED: CPT

## 2019-11-16 PROCEDURE — 83036 HEMOGLOBIN GLYCOSYLATED A1C: CPT

## 2019-11-16 PROCEDURE — 36415 COLL VENOUS BLD VENIPUNCTURE: CPT

## 2019-11-16 PROCEDURE — 84484 ASSAY OF TROPONIN QUANT: CPT

## 2019-11-16 PROCEDURE — 96374 THER/PROPH/DIAG INJ IV PUSH: CPT

## 2019-11-16 PROCEDURE — 96372 THER/PROPH/DIAG INJ SC/IM: CPT

## 2019-11-16 PROCEDURE — 82962 GLUCOSE BLOOD TEST: CPT

## 2019-11-16 PROCEDURE — 93005 ELECTROCARDIOGRAM TRACING: CPT | Performed by: INTERNAL MEDICINE

## 2019-11-16 PROCEDURE — 6360000002 HC RX W HCPCS: Performed by: HOSPITALIST

## 2019-11-16 PROCEDURE — 6360000002 HC RX W HCPCS: Performed by: INTERNAL MEDICINE

## 2019-11-16 PROCEDURE — 96361 HYDRATE IV INFUSION ADD-ON: CPT

## 2019-11-16 PROCEDURE — 6370000000 HC RX 637 (ALT 250 FOR IP): Performed by: PHYSICIAN ASSISTANT

## 2019-11-16 PROCEDURE — 99219 PR INITIAL OBSERVATION CARE/DAY 50 MINUTES: CPT | Performed by: INTERNAL MEDICINE

## 2019-11-16 PROCEDURE — 72141 MRI NECK SPINE W/O DYE: CPT

## 2019-11-16 RX ORDER — MECLIZINE HCL 12.5 MG/1
12.5 TABLET ORAL 3 TIMES DAILY PRN
Status: DISCONTINUED | OUTPATIENT
Start: 2019-11-16 | End: 2019-11-17 | Stop reason: HOSPADM

## 2019-11-16 RX ORDER — 0.9 % SODIUM CHLORIDE 0.9 %
500 INTRAVENOUS SOLUTION INTRAVENOUS ONCE
Status: COMPLETED | OUTPATIENT
Start: 2019-11-16 | End: 2019-11-16

## 2019-11-16 RX ORDER — NICOTINE POLACRILEX 4 MG
15 LOZENGE BUCCAL PRN
Status: DISCONTINUED | OUTPATIENT
Start: 2019-11-16 | End: 2019-11-17 | Stop reason: HOSPADM

## 2019-11-16 RX ORDER — MORPHINE SULFATE 2 MG/ML
2 INJECTION, SOLUTION INTRAMUSCULAR; INTRAVENOUS ONCE
Status: COMPLETED | OUTPATIENT
Start: 2019-11-16 | End: 2019-11-16

## 2019-11-16 RX ORDER — SODIUM CHLORIDE 9 MG/ML
INJECTION, SOLUTION INTRAVENOUS CONTINUOUS
Status: DISPENSED | OUTPATIENT
Start: 2019-11-16 | End: 2019-11-16

## 2019-11-16 RX ORDER — DEXTROSE MONOHYDRATE 25 G/50ML
12.5 INJECTION, SOLUTION INTRAVENOUS PRN
Status: DISCONTINUED | OUTPATIENT
Start: 2019-11-16 | End: 2019-11-17 | Stop reason: HOSPADM

## 2019-11-16 RX ORDER — DEXTROSE MONOHYDRATE 50 MG/ML
100 INJECTION, SOLUTION INTRAVENOUS PRN
Status: DISCONTINUED | OUTPATIENT
Start: 2019-11-16 | End: 2019-11-17 | Stop reason: HOSPADM

## 2019-11-16 RX ADMIN — Medication 10 ML: at 08:31

## 2019-11-16 RX ADMIN — LINAGLIPTIN 5 MG: 5 TABLET, FILM COATED ORAL at 08:30

## 2019-11-16 RX ADMIN — SODIUM CHLORIDE: 9 INJECTION, SOLUTION INTRAVENOUS at 11:47

## 2019-11-16 RX ADMIN — GABAPENTIN 800 MG: 400 CAPSULE ORAL at 13:27

## 2019-11-16 RX ADMIN — ALPRAZOLAM 1 MG: 0.5 TABLET ORAL at 13:27

## 2019-11-16 RX ADMIN — TIZANIDINE 4 MG: 4 TABLET ORAL at 07:06

## 2019-11-16 RX ADMIN — ASPIRIN 81 MG 81 MG: 81 TABLET ORAL at 08:30

## 2019-11-16 RX ADMIN — MORPHINE SULFATE 2 MG: 2 INJECTION, SOLUTION INTRAMUSCULAR; INTRAVENOUS at 17:50

## 2019-11-16 RX ADMIN — SODIUM CHLORIDE 500 ML: 9 INJECTION, SOLUTION INTRAVENOUS at 10:58

## 2019-11-16 RX ADMIN — OXYCODONE HYDROCHLORIDE AND ACETAMINOPHEN 2 TABLET: 5; 325 TABLET ORAL at 15:22

## 2019-11-16 RX ADMIN — SODIUM CHLORIDE: 9 INJECTION, SOLUTION INTRAVENOUS at 15:22

## 2019-11-16 RX ADMIN — ATORVASTATIN CALCIUM 80 MG: 40 TABLET, FILM COATED ORAL at 13:27

## 2019-11-16 RX ADMIN — ENOXAPARIN SODIUM 40 MG: 40 INJECTION SUBCUTANEOUS at 08:30

## 2019-11-16 RX ADMIN — FOLIC ACID 1 MG: 1 TABLET ORAL at 08:30

## 2019-11-16 RX ADMIN — ESCITALOPRAM OXALATE 20 MG: 10 TABLET ORAL at 08:30

## 2019-11-16 RX ADMIN — OXYCODONE HYDROCHLORIDE AND ACETAMINOPHEN 2 TABLET: 5; 325 TABLET ORAL at 07:06

## 2019-11-16 RX ADMIN — GABAPENTIN 800 MG: 400 CAPSULE ORAL at 08:30

## 2019-11-16 RX ADMIN — ALPRAZOLAM 1 MG: 0.5 TABLET ORAL at 21:10

## 2019-11-16 RX ADMIN — PANTOPRAZOLE SODIUM 40 MG: 20 TABLET, DELAYED RELEASE ORAL at 06:24

## 2019-11-16 RX ADMIN — TIZANIDINE 4 MG: 4 TABLET ORAL at 15:33

## 2019-11-16 RX ADMIN — GABAPENTIN 800 MG: 400 CAPSULE ORAL at 21:10

## 2019-11-16 ASSESSMENT — PAIN DESCRIPTION - PROGRESSION
CLINICAL_PROGRESSION: GRADUALLY WORSENING
CLINICAL_PROGRESSION: GRADUALLY WORSENING
CLINICAL_PROGRESSION: GRADUALLY IMPROVING
CLINICAL_PROGRESSION: GRADUALLY WORSENING
CLINICAL_PROGRESSION: GRADUALLY IMPROVING
CLINICAL_PROGRESSION: GRADUALLY IMPROVING
CLINICAL_PROGRESSION: GRADUALLY WORSENING
CLINICAL_PROGRESSION: NOT CHANGED
CLINICAL_PROGRESSION: GRADUALLY WORSENING
CLINICAL_PROGRESSION: GRADUALLY WORSENING
CLINICAL_PROGRESSION: GRADUALLY IMPROVING
CLINICAL_PROGRESSION: GRADUALLY WORSENING
CLINICAL_PROGRESSION: NOT CHANGED

## 2019-11-16 ASSESSMENT — PAIN DESCRIPTION - LOCATION
LOCATION: BACK;NECK

## 2019-11-16 ASSESSMENT — PAIN SCALES - GENERAL
PAINLEVEL_OUTOF10: 10
PAINLEVEL_OUTOF10: 5
PAINLEVEL_OUTOF10: 10
PAINLEVEL_OUTOF10: 7
PAINLEVEL_OUTOF10: 5
PAINLEVEL_OUTOF10: 10
PAINLEVEL_OUTOF10: 2

## 2019-11-16 ASSESSMENT — PAIN DESCRIPTION - ORIENTATION: ORIENTATION: RIGHT

## 2019-11-16 ASSESSMENT — PAIN DESCRIPTION - DESCRIPTORS: DESCRIPTORS: SPASM

## 2019-11-16 ASSESSMENT — PAIN - FUNCTIONAL ASSESSMENT
PAIN_FUNCTIONAL_ASSESSMENT: ACTIVITIES ARE NOT PREVENTED

## 2019-11-16 ASSESSMENT — PAIN DESCRIPTION - PAIN TYPE
TYPE: CHRONIC PAIN

## 2019-11-16 ASSESSMENT — PAIN DESCRIPTION - ONSET: ONSET: ON-GOING

## 2019-11-16 ASSESSMENT — PAIN DESCRIPTION - FREQUENCY: FREQUENCY: CONTINUOUS

## 2019-11-17 VITALS
TEMPERATURE: 97.4 F | OXYGEN SATURATION: 92 % | RESPIRATION RATE: 12 BRPM | SYSTOLIC BLOOD PRESSURE: 103 MMHG | BODY MASS INDEX: 31.44 KG/M2 | HEART RATE: 62 BPM | WEIGHT: 212.3 LBS | DIASTOLIC BLOOD PRESSURE: 63 MMHG | HEIGHT: 69 IN

## 2019-11-17 LAB
ALBUMIN SERPL-MCNC: 3.2 GM/DL (ref 3.4–5)
ANION GAP SERPL CALCULATED.3IONS-SCNC: 8 MMOL/L (ref 4–16)
BUN BLDV-MCNC: 17 MG/DL (ref 6–23)
CALCIUM SERPL-MCNC: 8.3 MG/DL (ref 8.3–10.6)
CHLORIDE BLD-SCNC: 106 MMOL/L (ref 99–110)
CO2: 25 MMOL/L (ref 21–32)
CORTISOL, PLASMA: 4.28
CREAT SERPL-MCNC: 1.9 MG/DL (ref 0.9–1.3)
EKG ATRIAL RATE: 117 BPM
EKG ATRIAL RATE: 50 BPM
EKG ATRIAL RATE: 55 BPM
EKG DIAGNOSIS: NORMAL
EKG P AXIS: -25 DEGREES
EKG P AXIS: 46 DEGREES
EKG P AXIS: 48 DEGREES
EKG P-R INTERVAL: 152 MS
EKG P-R INTERVAL: 156 MS
EKG P-R INTERVAL: 208 MS
EKG Q-T INTERVAL: 324 MS
EKG Q-T INTERVAL: 474 MS
EKG Q-T INTERVAL: 508 MS
EKG QRS DURATION: 106 MS
EKG QRS DURATION: 108 MS
EKG QRS DURATION: 108 MS
EKG QTC CALCULATION (BAZETT): 451 MS
EKG QTC CALCULATION (BAZETT): 453 MS
EKG QTC CALCULATION (BAZETT): 463 MS
EKG R AXIS: -20 DEGREES
EKG R AXIS: -7 DEGREES
EKG R AXIS: 3 DEGREES
EKG T AXIS: 15 DEGREES
EKG T AXIS: 43 DEGREES
EKG T AXIS: 46 DEGREES
EKG VENTRICULAR RATE: 117 BPM
EKG VENTRICULAR RATE: 50 BPM
EKG VENTRICULAR RATE: 55 BPM
GFR AFRICAN AMERICAN: 43 ML/MIN/1.73M2
GFR NON-AFRICAN AMERICAN: 35 ML/MIN/1.73M2
GLUCOSE BLD-MCNC: 112 MG/DL (ref 70–99)
GLUCOSE BLD-MCNC: 129 MG/DL (ref 70–99)
GLUCOSE BLD-MCNC: 90 MG/DL (ref 70–99)
GLUCOSE BLD-MCNC: 98 MG/DL (ref 70–99)
HCT VFR BLD CALC: 37.9 % (ref 42–52)
HEMOGLOBIN: 11.4 GM/DL (ref 13.5–18)
MCH RBC QN AUTO: 25.6 PG (ref 27–31)
MCHC RBC AUTO-ENTMCNC: 30.1 % (ref 32–36)
MCV RBC AUTO: 85.2 FL (ref 78–100)
PDW BLD-RTO: 19.5 % (ref 11.7–14.9)
PHOSPHORUS: 3.6 MG/DL (ref 2.5–4.9)
PLATELET # BLD: 181 K/CU MM (ref 140–440)
PMV BLD AUTO: 9.9 FL (ref 7.5–11.1)
POTASSIUM SERPL-SCNC: 4.6 MMOL/L (ref 3.5–5.1)
RBC # BLD: 4.45 M/CU MM (ref 4.6–6.2)
SODIUM BLD-SCNC: 139 MMOL/L (ref 135–145)
WBC # BLD: 7.5 K/CU MM (ref 4–10.5)

## 2019-11-17 PROCEDURE — 80069 RENAL FUNCTION PANEL: CPT

## 2019-11-17 PROCEDURE — 2580000003 HC RX 258: Performed by: INTERNAL MEDICINE

## 2019-11-17 PROCEDURE — 93005 ELECTROCARDIOGRAM TRACING: CPT | Performed by: PHYSICIAN ASSISTANT

## 2019-11-17 PROCEDURE — G0378 HOSPITAL OBSERVATION PER HR: HCPCS

## 2019-11-17 PROCEDURE — 96361 HYDRATE IV INFUSION ADD-ON: CPT

## 2019-11-17 PROCEDURE — 6370000000 HC RX 637 (ALT 250 FOR IP): Performed by: INTERNAL MEDICINE

## 2019-11-17 PROCEDURE — 85027 COMPLETE CBC AUTOMATED: CPT

## 2019-11-17 PROCEDURE — 6370000000 HC RX 637 (ALT 250 FOR IP): Performed by: PHYSICIAN ASSISTANT

## 2019-11-17 PROCEDURE — 96372 THER/PROPH/DIAG INJ SC/IM: CPT

## 2019-11-17 PROCEDURE — 93010 ELECTROCARDIOGRAM REPORT: CPT | Performed by: INTERNAL MEDICINE

## 2019-11-17 PROCEDURE — 6360000002 HC RX W HCPCS: Performed by: INTERNAL MEDICINE

## 2019-11-17 PROCEDURE — 36415 COLL VENOUS BLD VENIPUNCTURE: CPT

## 2019-11-17 PROCEDURE — 82962 GLUCOSE BLOOD TEST: CPT

## 2019-11-17 RX ORDER — MECLIZINE HCL 12.5 MG/1
12.5 TABLET ORAL 3 TIMES DAILY PRN
Qty: 20 TABLET | Refills: 0 | Status: SHIPPED | OUTPATIENT
Start: 2019-11-17 | End: 2019-11-19

## 2019-11-17 RX ORDER — FUROSEMIDE 20 MG/1
20 TABLET ORAL
Qty: 30 TABLET | Refills: 1 | Status: ON HOLD | OUTPATIENT
Start: 2019-11-18 | End: 2019-12-18 | Stop reason: HOSPADM

## 2019-11-17 RX ORDER — CLOPIDOGREL BISULFATE 75 MG/1
75 TABLET ORAL DAILY
Qty: 30 TABLET | Refills: 3 | Status: SHIPPED | OUTPATIENT
Start: 2019-11-17 | End: 2020-02-10

## 2019-11-17 RX ORDER — OXYCODONE HYDROCHLORIDE AND ACETAMINOPHEN 5; 325 MG/1; MG/1
2 TABLET ORAL EVERY 8 HOURS PRN
Qty: 8 TABLET | Refills: 0 | Status: SHIPPED | OUTPATIENT
Start: 2019-11-17 | End: 2019-11-20

## 2019-11-17 RX ORDER — ASPIRIN 81 MG/1
81 TABLET, CHEWABLE ORAL DAILY
Qty: 30 TABLET | Refills: 3 | Status: SHIPPED | OUTPATIENT
Start: 2019-11-18 | End: 2020-02-10 | Stop reason: ALTCHOICE

## 2019-11-17 RX ADMIN — ALPRAZOLAM 1 MG: 0.5 TABLET ORAL at 06:46

## 2019-11-17 RX ADMIN — TIZANIDINE 4 MG: 4 TABLET ORAL at 09:12

## 2019-11-17 RX ADMIN — GABAPENTIN 800 MG: 400 CAPSULE ORAL at 09:12

## 2019-11-17 RX ADMIN — TIZANIDINE 4 MG: 4 TABLET ORAL at 00:42

## 2019-11-17 RX ADMIN — ASPIRIN 81 MG 81 MG: 81 TABLET ORAL at 09:12

## 2019-11-17 RX ADMIN — Medication 10 ML: at 09:12

## 2019-11-17 RX ADMIN — ESCITALOPRAM OXALATE 20 MG: 10 TABLET ORAL at 09:12

## 2019-11-17 RX ADMIN — FOLIC ACID 1 MG: 1 TABLET ORAL at 09:12

## 2019-11-17 RX ADMIN — ENOXAPARIN SODIUM 40 MG: 40 INJECTION SUBCUTANEOUS at 09:11

## 2019-11-17 RX ADMIN — ATORVASTATIN CALCIUM 80 MG: 40 TABLET, FILM COATED ORAL at 09:12

## 2019-11-17 RX ADMIN — OXYCODONE HYDROCHLORIDE AND ACETAMINOPHEN 2 TABLET: 5; 325 TABLET ORAL at 09:12

## 2019-11-17 RX ADMIN — OXYCODONE HYDROCHLORIDE AND ACETAMINOPHEN 2 TABLET: 5; 325 TABLET ORAL at 00:42

## 2019-11-17 RX ADMIN — GABAPENTIN 800 MG: 400 CAPSULE ORAL at 17:30

## 2019-11-17 RX ADMIN — LINAGLIPTIN 5 MG: 5 TABLET, FILM COATED ORAL at 09:17

## 2019-11-17 RX ADMIN — PANTOPRAZOLE SODIUM 40 MG: 20 TABLET, DELAYED RELEASE ORAL at 06:46

## 2019-11-17 ASSESSMENT — PAIN DESCRIPTION - PROGRESSION
CLINICAL_PROGRESSION: NOT CHANGED

## 2019-11-17 ASSESSMENT — PAIN SCALES - GENERAL
PAINLEVEL_OUTOF10: 10
PAINLEVEL_OUTOF10: 6

## 2019-11-18 ENCOUNTER — HOSPITAL ENCOUNTER (EMERGENCY)
Age: 69
Discharge: HOME | End: 2019-11-18
Payer: MEDICARE

## 2019-11-18 ENCOUNTER — OFFICE VISIT (OUTPATIENT)
Dept: FAMILY MEDICINE CLINIC | Age: 69
End: 2019-11-18
Payer: MEDICARE

## 2019-11-18 ENCOUNTER — APPOINTMENT (OUTPATIENT)
Dept: CT IMAGING | Age: 69
End: 2019-11-18
Payer: MEDICARE

## 2019-11-18 VITALS
HEIGHT: 69 IN | RESPIRATION RATE: 17 BRPM | BODY MASS INDEX: 31.4 KG/M2 | DIASTOLIC BLOOD PRESSURE: 82 MMHG | SYSTOLIC BLOOD PRESSURE: 155 MMHG | HEART RATE: 57 BPM | WEIGHT: 212 LBS | OXYGEN SATURATION: 98 % | TEMPERATURE: 98.3 F

## 2019-11-18 VITALS
HEIGHT: 69 IN | WEIGHT: 214.8 LBS | BODY MASS INDEX: 31.81 KG/M2 | DIASTOLIC BLOOD PRESSURE: 80 MMHG | HEART RATE: 63 BPM | SYSTOLIC BLOOD PRESSURE: 140 MMHG

## 2019-11-18 DIAGNOSIS — R51.9 ACUTE NONINTRACTABLE HEADACHE, UNSPECIFIED HEADACHE TYPE: ICD-10-CM

## 2019-11-18 DIAGNOSIS — I50.32 CHRONIC DIASTOLIC HEART FAILURE (HCC): ICD-10-CM

## 2019-11-18 DIAGNOSIS — R77.8 ELEVATED TROPONIN: ICD-10-CM

## 2019-11-18 DIAGNOSIS — M48.02 CERVICAL STENOSIS OF SPINE: ICD-10-CM

## 2019-11-18 DIAGNOSIS — F31.9 BIPOLAR 1 DISORDER (HCC): ICD-10-CM

## 2019-11-18 DIAGNOSIS — I95.9 HYPOTENSIVE EPISODE: ICD-10-CM

## 2019-11-18 DIAGNOSIS — I10 ESSENTIAL HYPERTENSION: Primary | ICD-10-CM

## 2019-11-18 DIAGNOSIS — M54.16 LUMBAR RADICULOPATHY: ICD-10-CM

## 2019-11-18 DIAGNOSIS — N18.1 TYPE 2 DIABETES MELLITUS WITH STAGE 1 CHRONIC KIDNEY DISEASE, WITHOUT LONG-TERM CURRENT USE OF INSULIN (HCC): ICD-10-CM

## 2019-11-18 DIAGNOSIS — N18.9 CHRONIC KIDNEY DISEASE, UNSPECIFIED CKD STAGE: ICD-10-CM

## 2019-11-18 DIAGNOSIS — E11.22 TYPE 2 DIABETES MELLITUS WITH STAGE 1 CHRONIC KIDNEY DISEASE, WITHOUT LONG-TERM CURRENT USE OF INSULIN (HCC): ICD-10-CM

## 2019-11-18 LAB
ALBUMIN SERPL-MCNC: 3.6 GM/DL (ref 3.4–5)
ALP BLD-CCNC: 141 IU/L (ref 40–129)
ALT SERPL-CCNC: 25 U/L (ref 10–40)
ANION GAP SERPL CALCULATED.3IONS-SCNC: 10 MMOL/L (ref 4–16)
AST SERPL-CCNC: 25 IU/L (ref 15–37)
BACTERIA: NEGATIVE /HPF
BASOPHILS ABSOLUTE: 0 K/CU MM
BASOPHILS RELATIVE PERCENT: 0.2 % (ref 0–1)
BILIRUB SERPL-MCNC: 0.7 MG/DL (ref 0–1)
BILIRUBIN DIRECT: 0.2 MG/DL (ref 0–0.3)
BILIRUBIN URINE: NEGATIVE MG/DL
BILIRUBIN, INDIRECT: 0.5 MG/DL (ref 0–0.7)
BLOOD, URINE: NEGATIVE
BUN BLDV-MCNC: 19 MG/DL (ref 6–23)
CALCIUM SERPL-MCNC: 9.1 MG/DL (ref 8.3–10.6)
CHLORIDE BLD-SCNC: 104 MMOL/L (ref 99–110)
CLARITY: CLEAR
CO2: 24 MMOL/L (ref 21–32)
COLOR: ABNORMAL
CREAT SERPL-MCNC: 1.9 MG/DL (ref 0.9–1.3)
DIFFERENTIAL TYPE: ABNORMAL
EOSINOPHILS ABSOLUTE: 0 K/CU MM
EOSINOPHILS RELATIVE PERCENT: 0.2 % (ref 0–3)
GFR AFRICAN AMERICAN: 43 ML/MIN/1.73M2
GFR NON-AFRICAN AMERICAN: 35 ML/MIN/1.73M2
GLUCOSE BLD-MCNC: 109 MG/DL (ref 70–99)
GLUCOSE, URINE: NEGATIVE MG/DL
HCT VFR BLD CALC: 41.9 % (ref 42–52)
HEMOGLOBIN: 12.5 GM/DL (ref 13.5–18)
IMMATURE NEUTROPHIL %: 0.6 % (ref 0–0.43)
KETONES, URINE: NEGATIVE MG/DL
LEUKOCYTE ESTERASE, URINE: NEGATIVE
LYMPHOCYTES ABSOLUTE: 1.5 K/CU MM
LYMPHOCYTES RELATIVE PERCENT: 15 % (ref 24–44)
MCH RBC QN AUTO: 25.4 PG (ref 27–31)
MCHC RBC AUTO-ENTMCNC: 29.8 % (ref 32–36)
MCV RBC AUTO: 85 FL (ref 78–100)
MONOCYTES ABSOLUTE: 0.7 K/CU MM
MONOCYTES RELATIVE PERCENT: 7.3 % (ref 0–4)
NITRITE URINE, QUANTITATIVE: NEGATIVE
NUCLEATED RBC %: 0 %
PDW BLD-RTO: 19 % (ref 11.7–14.9)
PH, URINE: 7 (ref 5–8)
PLATELET # BLD: 201 K/CU MM (ref 140–440)
PMV BLD AUTO: 9.7 FL (ref 7.5–11.1)
POTASSIUM SERPL-SCNC: 4.4 MMOL/L (ref 3.5–5.1)
PROTEIN UA: NEGATIVE MG/DL
RBC # BLD: 4.93 M/CU MM (ref 4.6–6.2)
RBC URINE: <1 /HPF (ref 0–3)
SEGMENTED NEUTROPHILS ABSOLUTE COUNT: 7.8 K/CU MM
SEGMENTED NEUTROPHILS RELATIVE PERCENT: 76.7 % (ref 36–66)
SODIUM BLD-SCNC: 138 MMOL/L (ref 135–145)
SPECIFIC GRAVITY UA: 1.01 (ref 1–1.03)
TOTAL IMMATURE NEUTOROPHIL: 0.06 K/CU MM
TOTAL NUCLEATED RBC: 0 K/CU MM
TOTAL PROTEIN: 7.5 GM/DL (ref 6.4–8.2)
TRICHOMONAS: ABNORMAL /HPF
TROPONIN T: 0.01 NG/ML
TROPONIN T: 0.02 NG/ML
UROBILINOGEN, URINE: NORMAL MG/DL (ref 0.2–1)
WBC # BLD: 10.1 K/CU MM (ref 4–10.5)
WBC UA: <1 /HPF (ref 0–2)

## 2019-11-18 PROCEDURE — 6370000000 HC RX 637 (ALT 250 FOR IP): Performed by: PHYSICIAN ASSISTANT

## 2019-11-18 PROCEDURE — 99284 EMERGENCY DEPT VISIT MOD MDM: CPT

## 2019-11-18 PROCEDURE — 84484 ASSAY OF TROPONIN QUANT: CPT

## 2019-11-18 PROCEDURE — 4040F PNEUMOC VAC/ADMIN/RCVD: CPT | Performed by: FAMILY MEDICINE

## 2019-11-18 PROCEDURE — G8598 ASA/ANTIPLAT THER USED: HCPCS | Performed by: FAMILY MEDICINE

## 2019-11-18 PROCEDURE — G8482 FLU IMMUNIZE ORDER/ADMIN: HCPCS | Performed by: FAMILY MEDICINE

## 2019-11-18 PROCEDURE — 81001 URINALYSIS AUTO W/SCOPE: CPT

## 2019-11-18 PROCEDURE — 70450 CT HEAD/BRAIN W/O DYE: CPT

## 2019-11-18 PROCEDURE — 1111F DSCHRG MED/CURRENT MED MERGE: CPT | Performed by: FAMILY MEDICINE

## 2019-11-18 PROCEDURE — 1036F TOBACCO NON-USER: CPT | Performed by: FAMILY MEDICINE

## 2019-11-18 PROCEDURE — 96372 THER/PROPH/DIAG INJ SC/IM: CPT

## 2019-11-18 PROCEDURE — G8417 CALC BMI ABV UP PARAM F/U: HCPCS | Performed by: FAMILY MEDICINE

## 2019-11-18 PROCEDURE — G8427 DOCREV CUR MEDS BY ELIG CLIN: HCPCS | Performed by: FAMILY MEDICINE

## 2019-11-18 PROCEDURE — 3044F HG A1C LEVEL LT 7.0%: CPT | Performed by: FAMILY MEDICINE

## 2019-11-18 PROCEDURE — 85025 COMPLETE CBC W/AUTO DIFF WBC: CPT

## 2019-11-18 PROCEDURE — 80048 BASIC METABOLIC PNL TOTAL CA: CPT

## 2019-11-18 PROCEDURE — 3017F COLORECTAL CA SCREEN DOC REV: CPT | Performed by: FAMILY MEDICINE

## 2019-11-18 PROCEDURE — 80076 HEPATIC FUNCTION PANEL: CPT

## 2019-11-18 PROCEDURE — 93005 ELECTROCARDIOGRAM TRACING: CPT | Performed by: PHYSICIAN ASSISTANT

## 2019-11-18 PROCEDURE — 2022F DILAT RTA XM EVC RTNOPTHY: CPT | Performed by: FAMILY MEDICINE

## 2019-11-18 PROCEDURE — 36415 COLL VENOUS BLD VENIPUNCTURE: CPT

## 2019-11-18 PROCEDURE — 99214 OFFICE O/P EST MOD 30 MIN: CPT | Performed by: FAMILY MEDICINE

## 2019-11-18 PROCEDURE — 1123F ACP DISCUSS/DSCN MKR DOCD: CPT | Performed by: FAMILY MEDICINE

## 2019-11-18 RX ORDER — TIZANIDINE 4 MG/1
4 TABLET ORAL 3 TIMES DAILY
Qty: 60 TABLET | Refills: 2 | Status: ON HOLD | OUTPATIENT
Start: 2019-11-18 | End: 2019-12-18 | Stop reason: HOSPADM

## 2019-11-18 RX ORDER — ALPRAZOLAM 1 MG/1
1 TABLET ORAL 3 TIMES DAILY PRN
Qty: 90 TABLET | Refills: 0 | Status: SHIPPED | OUTPATIENT
Start: 2019-11-18 | End: 2019-11-19 | Stop reason: SDUPTHER

## 2019-11-18 RX ORDER — CLONIDINE HYDROCHLORIDE 0.1 MG/1
0.1 TABLET ORAL ONCE
Status: COMPLETED | OUTPATIENT
Start: 2019-11-18 | End: 2019-11-18

## 2019-11-18 RX ORDER — OXYCODONE HYDROCHLORIDE AND ACETAMINOPHEN 5; 325 MG/1; MG/1
1 TABLET ORAL ONCE
Status: COMPLETED | OUTPATIENT
Start: 2019-11-18 | End: 2019-11-18

## 2019-11-18 RX ORDER — SUMATRIPTAN 6 MG/.5ML
6 INJECTION, SOLUTION SUBCUTANEOUS ONCE
Status: COMPLETED | OUTPATIENT
Start: 2019-11-18 | End: 2019-11-18

## 2019-11-18 RX ADMIN — SUMATRIPTAN SUCCINATE 6 MG: 6 INJECTION SUBCUTANEOUS at 04:49

## 2019-11-18 RX ADMIN — OXYCODONE HYDROCHLORIDE AND ACETAMINOPHEN 1 TABLET: 5; 325 TABLET ORAL at 05:34

## 2019-11-18 RX ADMIN — CLONIDINE HYDROCHLORIDE 0.1 MG: 0.1 TABLET ORAL at 04:49

## 2019-11-18 ASSESSMENT — ENCOUNTER SYMPTOMS
SHORTNESS OF BREATH: 0
WHEEZING: 0
COUGH: 0
CHEST TIGHTNESS: 0
ABDOMINAL PAIN: 0
RESPIRATORY NEGATIVE: 1

## 2019-11-18 ASSESSMENT — PAIN SCALES - GENERAL
PAINLEVEL_OUTOF10: 10
PAINLEVEL_OUTOF10: 8
PAINLEVEL_OUTOF10: 10

## 2019-11-18 ASSESSMENT — PAIN DESCRIPTION - LOCATION: LOCATION: HEAD

## 2019-11-18 ASSESSMENT — PAIN DESCRIPTION - PAIN TYPE: TYPE: ACUTE PAIN

## 2019-11-19 ENCOUNTER — OFFICE VISIT (OUTPATIENT)
Dept: CARDIOLOGY CLINIC | Age: 69
End: 2019-11-19
Payer: MEDICARE

## 2019-11-19 VITALS
DIASTOLIC BLOOD PRESSURE: 60 MMHG | BODY MASS INDEX: 30.75 KG/M2 | HEIGHT: 70 IN | WEIGHT: 214.8 LBS | HEART RATE: 60 BPM | SYSTOLIC BLOOD PRESSURE: 108 MMHG

## 2019-11-19 DIAGNOSIS — I25.10 CORONARY ARTERY DISEASE INVOLVING NATIVE CORONARY ARTERY OF NATIVE HEART WITHOUT ANGINA PECTORIS: ICD-10-CM

## 2019-11-19 DIAGNOSIS — I48.0 PAF (PAROXYSMAL ATRIAL FIBRILLATION) (HCC): ICD-10-CM

## 2019-11-19 DIAGNOSIS — E78.5 HYPERLIPIDEMIA, UNSPECIFIED HYPERLIPIDEMIA TYPE: ICD-10-CM

## 2019-11-19 DIAGNOSIS — F31.9 BIPOLAR 1 DISORDER (HCC): ICD-10-CM

## 2019-11-19 DIAGNOSIS — I50.32 CHRONIC DIASTOLIC HEART FAILURE (HCC): Primary | ICD-10-CM

## 2019-11-19 DIAGNOSIS — N18.30 CHRONIC KIDNEY DISEASE (CKD), STAGE III (MODERATE) (HCC): ICD-10-CM

## 2019-11-19 PROCEDURE — G8427 DOCREV CUR MEDS BY ELIG CLIN: HCPCS | Performed by: NURSE PRACTITIONER

## 2019-11-19 PROCEDURE — 93010 ELECTROCARDIOGRAM REPORT: CPT | Performed by: INTERNAL MEDICINE

## 2019-11-19 PROCEDURE — G8482 FLU IMMUNIZE ORDER/ADMIN: HCPCS | Performed by: NURSE PRACTITIONER

## 2019-11-19 PROCEDURE — 1036F TOBACCO NON-USER: CPT | Performed by: NURSE PRACTITIONER

## 2019-11-19 PROCEDURE — 99214 OFFICE O/P EST MOD 30 MIN: CPT | Performed by: NURSE PRACTITIONER

## 2019-11-19 PROCEDURE — 1111F DSCHRG MED/CURRENT MED MERGE: CPT | Performed by: NURSE PRACTITIONER

## 2019-11-19 PROCEDURE — 3017F COLORECTAL CA SCREEN DOC REV: CPT | Performed by: NURSE PRACTITIONER

## 2019-11-19 PROCEDURE — G8598 ASA/ANTIPLAT THER USED: HCPCS | Performed by: NURSE PRACTITIONER

## 2019-11-19 PROCEDURE — 4040F PNEUMOC VAC/ADMIN/RCVD: CPT | Performed by: NURSE PRACTITIONER

## 2019-11-19 PROCEDURE — G8417 CALC BMI ABV UP PARAM F/U: HCPCS | Performed by: NURSE PRACTITIONER

## 2019-11-19 PROCEDURE — 1123F ACP DISCUSS/DSCN MKR DOCD: CPT | Performed by: NURSE PRACTITIONER

## 2019-11-19 RX ORDER — ALPRAZOLAM 1 MG/1
1 TABLET ORAL 3 TIMES DAILY PRN
Qty: 90 TABLET | Refills: 0 | Status: ON HOLD | OUTPATIENT
Start: 2019-11-19 | End: 2019-12-18 | Stop reason: HOSPADM

## 2019-11-19 RX ORDER — CARVEDILOL 6.25 MG/1
6.25 TABLET ORAL 2 TIMES DAILY WITH MEALS
Status: ON HOLD | COMMUNITY
End: 2020-05-13 | Stop reason: HOSPADM

## 2019-11-20 ENCOUNTER — TELEPHONE (OUTPATIENT)
Dept: CARDIOLOGY CLINIC | Age: 69
End: 2019-11-20

## 2019-11-22 ENCOUNTER — CARE COORDINATION (OUTPATIENT)
Dept: CARE COORDINATION | Age: 69
End: 2019-11-22

## 2019-11-27 ENCOUNTER — TELEPHONE (OUTPATIENT)
Dept: FAMILY MEDICINE CLINIC | Age: 69
End: 2019-11-27

## 2019-11-27 LAB
EKG ATRIAL RATE: 56 BPM
EKG DIAGNOSIS: NORMAL
EKG P AXIS: 39 DEGREES
EKG P-R INTERVAL: 150 MS
EKG Q-T INTERVAL: 448 MS
EKG QRS DURATION: 108 MS
EKG QTC CALCULATION (BAZETT): 432 MS
EKG R AXIS: -13 DEGREES
EKG T AXIS: 23 DEGREES
EKG VENTRICULAR RATE: 56 BPM

## 2019-11-27 RX ORDER — GABAPENTIN 800 MG/1
TABLET ORAL
Qty: 90 TABLET | Refills: 0 | Status: ON HOLD | OUTPATIENT
Start: 2019-11-27 | End: 2019-12-18 | Stop reason: HOSPADM

## 2019-12-02 ENCOUNTER — CARE COORDINATION (OUTPATIENT)
Dept: CASE MANAGEMENT | Age: 69
End: 2019-12-02

## 2019-12-04 ENCOUNTER — HOSPITAL ENCOUNTER (OUTPATIENT)
Age: 69
Discharge: HOME | End: 2019-12-04
Payer: MEDICARE

## 2019-12-04 LAB
ALBUMIN SERPL-MCNC: 4 GM/DL (ref 3.4–5)
ALBUMIN SERPL-MCNC: 4 GM/DL (ref 3.4–5)
ALP BLD-CCNC: 159 IU/L (ref 40–128)
ALP BLD-CCNC: 159 IU/L (ref 40–129)
ALT SERPL-CCNC: 33 U/L (ref 10–40)
ALT SERPL-CCNC: 33 U/L (ref 10–40)
AMPHETAMINES: NEGATIVE
ANION GAP SERPL CALCULATED.3IONS-SCNC: 13 MMOL/L (ref 4–16)
AST SERPL-CCNC: 40 IU/L (ref 15–37)
AST SERPL-CCNC: 40 IU/L (ref 15–37)
BARBITURATE SCREEN URINE: NEGATIVE
BASOPHILS ABSOLUTE: 0 K/CU MM
BASOPHILS RELATIVE PERCENT: 0.5 % (ref 0–1)
BENZODIAZEPINE SCREEN, URINE: ABNORMAL
BILIRUB SERPL-MCNC: 0.3 MG/DL (ref 0–1)
BILIRUB SERPL-MCNC: 0.3 MG/DL (ref 0–1)
BILIRUBIN DIRECT: 0.2 MG/DL (ref 0–0.3)
BILIRUBIN, INDIRECT: 0.1 MG/DL (ref 0–0.7)
BUN BLDV-MCNC: 19 MG/DL (ref 6–23)
CALCIUM SERPL-MCNC: 8.6 MG/DL (ref 8.3–10.6)
CANNABINOID SCREEN URINE: ABNORMAL
CHLORIDE BLD-SCNC: 104 MMOL/L (ref 99–110)
CO2: 24 MMOL/L (ref 21–32)
COCAINE METABOLITE: NEGATIVE
CREAT SERPL-MCNC: 2 MG/DL (ref 0.9–1.3)
DIFFERENTIAL TYPE: ABNORMAL
EOSINOPHILS ABSOLUTE: 0 K/CU MM
EOSINOPHILS RELATIVE PERCENT: 0.5 % (ref 0–3)
GFR AFRICAN AMERICAN: 40 ML/MIN/1.73M2
GFR NON-AFRICAN AMERICAN: 33 ML/MIN/1.73M2
GLUCOSE BLD-MCNC: 107 MG/DL (ref 70–99)
HCT VFR BLD CALC: 43.9 % (ref 42–52)
HEMOGLOBIN: 12.9 GM/DL (ref 13.5–18)
IMMATURE NEUTROPHIL %: 0.3 % (ref 0–0.43)
LYMPHOCYTES ABSOLUTE: 1.3 K/CU MM
LYMPHOCYTES RELATIVE PERCENT: 21.5 % (ref 24–44)
MCH RBC QN AUTO: 25.6 PG (ref 27–31)
MCHC RBC AUTO-ENTMCNC: 29.4 % (ref 32–36)
MCV RBC AUTO: 87.3 FL (ref 78–100)
MONOCYTES ABSOLUTE: 0.4 K/CU MM
MONOCYTES RELATIVE PERCENT: 6.4 % (ref 0–4)
NUCLEATED RBC %: 0 %
OPIATES, URINE: NEGATIVE
OXYCODONE: ABNORMAL
PDW BLD-RTO: 18.4 % (ref 11.7–14.9)
PHENCYCLIDINE, URINE: NEGATIVE
PLATELET # BLD: 249 K/CU MM (ref 140–440)
PMV BLD AUTO: 9.5 FL (ref 7.5–11.1)
POTASSIUM SERPL-SCNC: 4 MMOL/L (ref 3.5–5.1)
RBC # BLD: 5.03 M/CU MM (ref 4.6–6.2)
SEGMENTED NEUTROPHILS ABSOLUTE COUNT: 4.2 K/CU MM
SEGMENTED NEUTROPHILS RELATIVE PERCENT: 70.8 % (ref 36–66)
SODIUM BLD-SCNC: 141 MMOL/L (ref 135–145)
TOTAL IMMATURE NEUTOROPHIL: 0.02 K/CU MM
TOTAL NUCLEATED RBC: 0 K/CU MM
TOTAL PROTEIN: 7 GM/DL (ref 6.4–8.2)
TOTAL PROTEIN: 7 GM/DL (ref 6.4–8.2)
TSH HIGH SENSITIVITY: 3.99 UIU/ML (ref 0.27–4.2)
WBC # BLD: 5.9 K/CU MM (ref 4–10.5)

## 2019-12-04 PROCEDURE — 80307 DRUG TEST PRSMV CHEM ANLYZR: CPT

## 2019-12-04 PROCEDURE — 84436 ASSAY OF TOTAL THYROXINE: CPT

## 2019-12-04 PROCEDURE — 82248 BILIRUBIN DIRECT: CPT

## 2019-12-04 PROCEDURE — 84443 ASSAY THYROID STIM HORMONE: CPT

## 2019-12-04 PROCEDURE — 85025 COMPLETE CBC W/AUTO DIFF WBC: CPT

## 2019-12-04 PROCEDURE — 80053 COMPREHEN METABOLIC PANEL: CPT

## 2019-12-04 PROCEDURE — 36415 COLL VENOUS BLD VENIPUNCTURE: CPT

## 2019-12-06 ENCOUNTER — TELEPHONE (OUTPATIENT)
Dept: FAMILY MEDICINE CLINIC | Age: 69
End: 2019-12-06

## 2019-12-06 LAB
T4 TOTAL: 7.06 UG/DL (ref 5.1–14.1)
T4 TOTAL: NORMAL UG/DL (ref 5.1–14.1)

## 2019-12-06 NOTE — TELEPHONE ENCOUNTER
BALTA HAS BEEN TRYING TO GET HIS PILL DIVYA FILLED. WILL NEED IT FOR TUES SO COULD IT BE CALLED IN TODAY?

## 2019-12-09 ENCOUNTER — TELEPHONE (OUTPATIENT)
Dept: FAMILY MEDICINE CLINIC | Age: 69
End: 2019-12-09

## 2019-12-09 RX ORDER — ATORVASTATIN CALCIUM 80 MG/1
80 TABLET, FILM COATED ORAL DAILY
Qty: 30 TABLET | Refills: 2 | Status: SHIPPED | OUTPATIENT
Start: 2019-12-09 | End: 2020-02-10

## 2019-12-09 RX ORDER — FOLIC ACID 1 MG/1
1 TABLET ORAL DAILY
Qty: 30 TABLET | Refills: 2 | Status: SHIPPED | OUTPATIENT
Start: 2019-12-09 | End: 2020-02-10

## 2019-12-09 RX ORDER — ESCITALOPRAM OXALATE 20 MG/1
20 TABLET ORAL DAILY
Qty: 30 TABLET | Refills: 2 | Status: ON HOLD | OUTPATIENT
Start: 2019-12-09 | End: 2019-12-18 | Stop reason: HOSPADM

## 2019-12-10 RX ORDER — LINAGLIPTIN 5 MG/1
TABLET, FILM COATED ORAL
Qty: 60 TABLET | Refills: 4 | Status: ON HOLD
Start: 2019-12-10 | End: 2020-05-28 | Stop reason: HOSPADM

## 2019-12-11 ENCOUNTER — TELEPHONE (OUTPATIENT)
Dept: FAMILY MEDICINE CLINIC | Age: 69
End: 2019-12-11

## 2019-12-12 ENCOUNTER — HOSPITAL ENCOUNTER (OUTPATIENT)
Age: 69
Setting detail: SPECIMEN
Discharge: HOME | DRG: 190 | End: 2019-12-12
Payer: MEDICARE

## 2019-12-12 LAB
ALBUMIN SERPL-MCNC: 4.1 GM/DL (ref 3.4–5)
ALP BLD-CCNC: 151 IU/L (ref 40–128)
ALT SERPL-CCNC: 24 U/L (ref 10–40)
ANION GAP SERPL CALCULATED.3IONS-SCNC: 21 MMOL/L (ref 4–16)
AST SERPL-CCNC: 30 IU/L (ref 15–37)
BASOPHILS ABSOLUTE: 0 K/CU MM
BASOPHILS RELATIVE PERCENT: 0.5 % (ref 0–1)
BILIRUB SERPL-MCNC: 0.4 MG/DL (ref 0–1)
BUN BLDV-MCNC: 23 MG/DL (ref 6–23)
CALCIUM SERPL-MCNC: 8.9 MG/DL (ref 8.3–10.6)
CHLORIDE BLD-SCNC: 104 MMOL/L (ref 99–110)
CO2: 16 MMOL/L (ref 21–32)
CREAT SERPL-MCNC: 2 MG/DL (ref 0.9–1.3)
DIFFERENTIAL TYPE: ABNORMAL
EOSINOPHILS ABSOLUTE: 0.1 K/CU MM
EOSINOPHILS RELATIVE PERCENT: 0.7 % (ref 0–3)
GFR AFRICAN AMERICAN: 40 ML/MIN/1.73M2
GFR NON-AFRICAN AMERICAN: 33 ML/MIN/1.73M2
GLUCOSE FASTING: 105 MG/DL (ref 70–99)
HCT VFR BLD CALC: 49.2 % (ref 42–52)
HEMOGLOBIN: 13.8 GM/DL (ref 13.5–18)
IMMATURE NEUTROPHIL %: 0.5 % (ref 0–0.43)
LYMPHOCYTES ABSOLUTE: 2.1 K/CU MM
LYMPHOCYTES RELATIVE PERCENT: 27.2 % (ref 24–44)
MCH RBC QN AUTO: 25.5 PG (ref 27–31)
MCHC RBC AUTO-ENTMCNC: 28 % (ref 32–36)
MCV RBC AUTO: 90.8 FL (ref 78–100)
MONOCYTES ABSOLUTE: 0.6 K/CU MM
MONOCYTES RELATIVE PERCENT: 8.2 % (ref 0–4)
NUCLEATED RBC %: 0 %
PDW BLD-RTO: 18.4 % (ref 11.7–14.9)
PLATELET # BLD: 227 K/CU MM (ref 140–440)
PMV BLD AUTO: 9.7 FL (ref 7.5–11.1)
POTASSIUM SERPL-SCNC: 5.1 MMOL/L (ref 3.5–5.1)
RBC # BLD: 5.42 M/CU MM (ref 4.6–6.2)
SEGMENTED NEUTROPHILS ABSOLUTE COUNT: 4.8 K/CU MM
SEGMENTED NEUTROPHILS RELATIVE PERCENT: 62.9 % (ref 36–66)
SODIUM BLD-SCNC: 141 MMOL/L (ref 135–145)
TOTAL IMMATURE NEUTOROPHIL: 0.04 K/CU MM
TOTAL NUCLEATED RBC: 0 K/CU MM
TOTAL PROTEIN: 7.2 GM/DL (ref 6.4–8.2)
TSH HIGH SENSITIVITY: 3.19 UIU/ML (ref 0.27–4.2)
WBC # BLD: 7.6 K/CU MM (ref 4–10.5)

## 2019-12-12 PROCEDURE — 80053 COMPREHEN METABOLIC PANEL: CPT

## 2019-12-12 PROCEDURE — 84443 ASSAY THYROID STIM HORMONE: CPT

## 2019-12-12 PROCEDURE — 81001 URINALYSIS AUTO W/SCOPE: CPT

## 2019-12-12 PROCEDURE — 36415 COLL VENOUS BLD VENIPUNCTURE: CPT

## 2019-12-12 PROCEDURE — 85025 COMPLETE CBC W/AUTO DIFF WBC: CPT

## 2019-12-13 ENCOUNTER — HOSPITAL ENCOUNTER (OUTPATIENT)
Age: 69
Setting detail: SPECIMEN
Discharge: HOME | End: 2019-12-13
Payer: MEDICARE

## 2019-12-13 LAB
BACTERIA: ABNORMAL /HPF
BILIRUBIN URINE: NEGATIVE MG/DL
BLOOD, URINE: NEGATIVE
CLARITY: CLEAR
COLOR: ABNORMAL
GLUCOSE, URINE: NEGATIVE MG/DL
KETONES, URINE: NEGATIVE MG/DL
LEUKOCYTE ESTERASE, URINE: NEGATIVE
MUCUS: ABNORMAL HPF
NITRITE URINE, QUANTITATIVE: NEGATIVE
PH, URINE: 6 (ref 5–8)
PROTEIN UA: NEGATIVE MG/DL
RBC URINE: ABNORMAL /HPF (ref 0–3)
SPECIFIC GRAVITY UA: 1 (ref 1–1.03)
SQUAMOUS EPITHELIAL: 2 /HPF
TRICHOMONAS: ABNORMAL /HPF
UROBILINOGEN, URINE: NORMAL MG/DL (ref 0.2–1)
WBC UA: <1 /HPF (ref 0–2)

## 2019-12-14 ENCOUNTER — HOSPITAL ENCOUNTER (OUTPATIENT)
Age: 69
Setting detail: SPECIMEN
Discharge: HOME | End: 2019-12-14

## 2019-12-14 LAB
AMMONIA: 50 UMOL/L (ref 16–60)
DOSE AMOUNT: ABNORMAL
DOSE TIME: ABNORMAL
VALPROIC ACID LEVEL: 32 UG/ML (ref 50–100)

## 2019-12-14 PROCEDURE — 82140 ASSAY OF AMMONIA: CPT

## 2019-12-14 PROCEDURE — 80164 ASSAY DIPROPYLACETIC ACD TOT: CPT

## 2019-12-14 PROCEDURE — 36415 COLL VENOUS BLD VENIPUNCTURE: CPT

## 2019-12-15 ENCOUNTER — HOSPITAL ENCOUNTER (INPATIENT)
Age: 69
LOS: 3 days | Discharge: HOME HEALTH | DRG: 190 | End: 2019-12-18
Attending: EMERGENCY MEDICINE | Admitting: INTERNAL MEDICINE
Payer: MEDICARE

## 2019-12-15 ENCOUNTER — APPOINTMENT (OUTPATIENT)
Dept: GENERAL RADIOLOGY | Age: 69
DRG: 190 | End: 2019-12-15
Payer: MEDICARE

## 2019-12-15 ENCOUNTER — APPOINTMENT (OUTPATIENT)
Dept: CT IMAGING | Age: 69
DRG: 190 | End: 2019-12-15
Payer: MEDICARE

## 2019-12-15 DIAGNOSIS — M54.2 CHRONIC NECK PAIN: ICD-10-CM

## 2019-12-15 DIAGNOSIS — G89.29 CHRONIC NECK PAIN: ICD-10-CM

## 2019-12-15 DIAGNOSIS — J96.01 ACUTE RESPIRATORY FAILURE WITH HYPOXIA (HCC): Primary | ICD-10-CM

## 2019-12-15 DIAGNOSIS — R77.8 ELEVATED TROPONIN: ICD-10-CM

## 2019-12-15 PROBLEM — J96.21 ACUTE ON CHRONIC RESPIRATORY FAILURE WITH HYPOXIA (HCC): Status: ACTIVE | Noted: 2019-12-15

## 2019-12-15 LAB
ALBUMIN SERPL-MCNC: 3.7 GM/DL (ref 3.4–5)
ALP BLD-CCNC: 133 IU/L (ref 40–129)
ALT SERPL-CCNC: 17 U/L (ref 10–40)
ANION GAP SERPL CALCULATED.3IONS-SCNC: 15 MMOL/L (ref 4–16)
AST SERPL-CCNC: 20 IU/L (ref 15–37)
BASOPHILS ABSOLUTE: 0 K/CU MM
BASOPHILS RELATIVE PERCENT: 0.4 % (ref 0–1)
BILIRUB SERPL-MCNC: 0.4 MG/DL (ref 0–1)
BILIRUBIN DIRECT: 0.2 MG/DL (ref 0–0.3)
BILIRUBIN, INDIRECT: 0.2 MG/DL (ref 0–0.7)
BUN BLDV-MCNC: 20 MG/DL (ref 6–23)
CALCIUM SERPL-MCNC: 8.4 MG/DL (ref 8.3–10.6)
CHLORIDE BLD-SCNC: 103 MMOL/L (ref 99–110)
CO2: 21 MMOL/L (ref 21–32)
CREAT SERPL-MCNC: 1.9 MG/DL (ref 0.9–1.3)
DIFFERENTIAL TYPE: ABNORMAL
EOSINOPHILS ABSOLUTE: 0.1 K/CU MM
EOSINOPHILS RELATIVE PERCENT: 0.8 % (ref 0–3)
GFR AFRICAN AMERICAN: 43 ML/MIN/1.73M2
GFR NON-AFRICAN AMERICAN: 35 ML/MIN/1.73M2
GLUCOSE BLD-MCNC: 119 MG/DL (ref 70–99)
GLUCOSE BLD-MCNC: 89 MG/DL (ref 70–99)
HCT VFR BLD CALC: 38.9 % (ref 42–52)
HEMOGLOBIN: 11.3 GM/DL (ref 13.5–18)
IMMATURE NEUTROPHIL %: 0.5 % (ref 0–0.43)
LYMPHOCYTES ABSOLUTE: 1.5 K/CU MM
LYMPHOCYTES RELATIVE PERCENT: 19.8 % (ref 24–44)
MCH RBC QN AUTO: 25.2 PG (ref 27–31)
MCHC RBC AUTO-ENTMCNC: 29 % (ref 32–36)
MCV RBC AUTO: 86.8 FL (ref 78–100)
MONOCYTES ABSOLUTE: 0.7 K/CU MM
MONOCYTES RELATIVE PERCENT: 9.1 % (ref 0–4)
NUCLEATED RBC %: 0 %
PDW BLD-RTO: 18.4 % (ref 11.7–14.9)
PLATELET # BLD: 205 K/CU MM (ref 140–440)
PMV BLD AUTO: 10.1 FL (ref 7.5–11.1)
POTASSIUM SERPL-SCNC: 4.5 MMOL/L (ref 3.5–5.1)
PRO-BNP: 397.3 PG/ML
RBC # BLD: 4.48 M/CU MM (ref 4.6–6.2)
SEGMENTED NEUTROPHILS ABSOLUTE COUNT: 5.2 K/CU MM
SEGMENTED NEUTROPHILS RELATIVE PERCENT: 69.4 % (ref 36–66)
SODIUM BLD-SCNC: 139 MMOL/L (ref 135–145)
TOTAL IMMATURE NEUTOROPHIL: 0.04 K/CU MM
TOTAL NUCLEATED RBC: 0 K/CU MM
TOTAL PROTEIN: 6.4 GM/DL (ref 6.4–8.2)
TROPONIN T: 0.01 NG/ML
WBC # BLD: 7.5 K/CU MM (ref 4–10.5)

## 2019-12-15 PROCEDURE — 84484 ASSAY OF TROPONIN QUANT: CPT

## 2019-12-15 PROCEDURE — 2580000003 HC RX 258: Performed by: NURSE PRACTITIONER

## 2019-12-15 PROCEDURE — 87205 SMEAR GRAM STAIN: CPT

## 2019-12-15 PROCEDURE — 99285 EMERGENCY DEPT VISIT HI MDM: CPT

## 2019-12-15 PROCEDURE — 94761 N-INVAS EAR/PLS OXIMETRY MLT: CPT

## 2019-12-15 PROCEDURE — 85025 COMPLETE CBC W/AUTO DIFF WBC: CPT

## 2019-12-15 PROCEDURE — 2700000000 HC OXYGEN THERAPY PER DAY

## 2019-12-15 PROCEDURE — 6360000002 HC RX W HCPCS: Performed by: NURSE PRACTITIONER

## 2019-12-15 PROCEDURE — 71275 CT ANGIOGRAPHY CHEST: CPT

## 2019-12-15 PROCEDURE — 6370000000 HC RX 637 (ALT 250 FOR IP): Performed by: EMERGENCY MEDICINE

## 2019-12-15 PROCEDURE — 94640 AIRWAY INHALATION TREATMENT: CPT

## 2019-12-15 PROCEDURE — 6370000000 HC RX 637 (ALT 250 FOR IP): Performed by: NURSE PRACTITIONER

## 2019-12-15 PROCEDURE — 83880 ASSAY OF NATRIURETIC PEPTIDE: CPT

## 2019-12-15 PROCEDURE — 80053 COMPREHEN METABOLIC PANEL: CPT

## 2019-12-15 PROCEDURE — 82248 BILIRUBIN DIRECT: CPT

## 2019-12-15 PROCEDURE — 82962 GLUCOSE BLOOD TEST: CPT

## 2019-12-15 PROCEDURE — 93005 ELECTROCARDIOGRAM TRACING: CPT | Performed by: EMERGENCY MEDICINE

## 2019-12-15 PROCEDURE — 71046 X-RAY EXAM CHEST 2 VIEWS: CPT

## 2019-12-15 PROCEDURE — 6360000004 HC RX CONTRAST MEDICATION: Performed by: EMERGENCY MEDICINE

## 2019-12-15 PROCEDURE — 2140000000 HC CCU INTERMEDIATE R&B

## 2019-12-15 RX ORDER — METHYLPREDNISOLONE SODIUM SUCCINATE 125 MG/2ML
40 INJECTION, POWDER, LYOPHILIZED, FOR SOLUTION INTRAMUSCULAR; INTRAVENOUS EVERY 6 HOURS
Status: DISCONTINUED | OUTPATIENT
Start: 2019-12-15 | End: 2019-12-17

## 2019-12-15 RX ORDER — SODIUM CHLORIDE 0.9 % (FLUSH) 0.9 %
10 SYRINGE (ML) INJECTION EVERY 12 HOURS SCHEDULED
Status: DISCONTINUED | OUTPATIENT
Start: 2019-12-15 | End: 2019-12-18 | Stop reason: HOSPADM

## 2019-12-15 RX ORDER — ATORVASTATIN CALCIUM 40 MG/1
80 TABLET, FILM COATED ORAL DAILY
Status: DISCONTINUED | OUTPATIENT
Start: 2019-12-16 | End: 2019-12-18 | Stop reason: HOSPADM

## 2019-12-15 RX ORDER — IPRATROPIUM BROMIDE AND ALBUTEROL SULFATE 2.5; .5 MG/3ML; MG/3ML
1 SOLUTION RESPIRATORY (INHALATION) ONCE
Status: COMPLETED | OUTPATIENT
Start: 2019-12-15 | End: 2019-12-15

## 2019-12-15 RX ORDER — ESCITALOPRAM OXALATE 10 MG/1
20 TABLET ORAL DAILY
Status: DISCONTINUED | OUTPATIENT
Start: 2019-12-16 | End: 2019-12-17

## 2019-12-15 RX ORDER — SODIUM CHLORIDE 0.9 % (FLUSH) 0.9 %
10 SYRINGE (ML) INJECTION PRN
Status: DISCONTINUED | OUTPATIENT
Start: 2019-12-15 | End: 2019-12-18 | Stop reason: HOSPADM

## 2019-12-15 RX ORDER — LACTOBACILLUS RHAMNOSUS GG 10B CELL
1 CAPSULE ORAL DAILY
Status: DISCONTINUED | OUTPATIENT
Start: 2019-12-16 | End: 2019-12-18 | Stop reason: HOSPADM

## 2019-12-15 RX ORDER — ONDANSETRON 2 MG/ML
4 INJECTION INTRAMUSCULAR; INTRAVENOUS EVERY 6 HOURS PRN
Status: DISCONTINUED | OUTPATIENT
Start: 2019-12-15 | End: 2019-12-18 | Stop reason: HOSPADM

## 2019-12-15 RX ORDER — CARVEDILOL 6.25 MG/1
6.25 TABLET ORAL 2 TIMES DAILY WITH MEALS
Status: DISCONTINUED | OUTPATIENT
Start: 2019-12-16 | End: 2019-12-18 | Stop reason: HOSPADM

## 2019-12-15 RX ORDER — LABETALOL 20 MG/4 ML (5 MG/ML) INTRAVENOUS SYRINGE
5 EVERY 6 HOURS PRN
Status: DISCONTINUED | OUTPATIENT
Start: 2019-12-15 | End: 2019-12-18 | Stop reason: HOSPADM

## 2019-12-15 RX ORDER — PREDNISONE 20 MG/1
40 TABLET ORAL DAILY
Status: DISCONTINUED | OUTPATIENT
Start: 2019-12-18 | End: 2019-12-17

## 2019-12-15 RX ORDER — ALPRAZOLAM 0.25 MG/1
1 TABLET ORAL 3 TIMES DAILY PRN
Status: DISCONTINUED | OUTPATIENT
Start: 2019-12-15 | End: 2019-12-16

## 2019-12-15 RX ORDER — ASPIRIN 81 MG/1
81 TABLET, CHEWABLE ORAL DAILY
Status: DISCONTINUED | OUTPATIENT
Start: 2019-12-16 | End: 2019-12-18 | Stop reason: HOSPADM

## 2019-12-15 RX ORDER — TIZANIDINE 4 MG/1
4 TABLET ORAL 3 TIMES DAILY
Status: DISCONTINUED | OUTPATIENT
Start: 2019-12-15 | End: 2019-12-16

## 2019-12-15 RX ORDER — CLOPIDOGREL BISULFATE 75 MG/1
75 TABLET ORAL DAILY
Status: DISCONTINUED | OUTPATIENT
Start: 2019-12-16 | End: 2019-12-18 | Stop reason: HOSPADM

## 2019-12-15 RX ORDER — FUROSEMIDE 20 MG/1
20 TABLET ORAL
Status: DISCONTINUED | OUTPATIENT
Start: 2019-12-16 | End: 2019-12-18 | Stop reason: HOSPADM

## 2019-12-15 RX ORDER — ACETAMINOPHEN 325 MG/1
650 TABLET ORAL EVERY 4 HOURS PRN
Status: DISCONTINUED | OUTPATIENT
Start: 2019-12-15 | End: 2019-12-18 | Stop reason: HOSPADM

## 2019-12-15 RX ORDER — GABAPENTIN 400 MG/1
800 CAPSULE ORAL 3 TIMES DAILY
Status: DISCONTINUED | OUTPATIENT
Start: 2019-12-15 | End: 2019-12-16

## 2019-12-15 RX ORDER — FOLIC ACID 1 MG/1
1 TABLET ORAL DAILY
Status: DISCONTINUED | OUTPATIENT
Start: 2019-12-16 | End: 2019-12-18 | Stop reason: HOSPADM

## 2019-12-15 RX ORDER — PANTOPRAZOLE SODIUM 40 MG/1
40 TABLET, DELAYED RELEASE ORAL DAILY
Status: DISCONTINUED | OUTPATIENT
Start: 2019-12-16 | End: 2019-12-18 | Stop reason: HOSPADM

## 2019-12-15 RX ADMIN — AZITHROMYCIN MONOHYDRATE 500 MG: 500 INJECTION, POWDER, LYOPHILIZED, FOR SOLUTION INTRAVENOUS at 23:55

## 2019-12-15 RX ADMIN — IPRATROPIUM BROMIDE AND ALBUTEROL SULFATE 1 AMPULE: .5; 3 SOLUTION RESPIRATORY (INHALATION) at 18:43

## 2019-12-15 RX ADMIN — ALPRAZOLAM 1 MG: 0.25 TABLET ORAL at 23:57

## 2019-12-15 RX ADMIN — Medication 10 ML: at 23:57

## 2019-12-15 RX ADMIN — METHYLPREDNISOLONE SODIUM SUCCINATE 40 MG: 125 INJECTION, POWDER, LYOPHILIZED, FOR SOLUTION INTRAMUSCULAR; INTRAVENOUS at 23:56

## 2019-12-15 RX ADMIN — GABAPENTIN 800 MG: 400 CAPSULE ORAL at 23:56

## 2019-12-15 RX ADMIN — TIZANIDINE 4 MG: 4 TABLET ORAL at 23:56

## 2019-12-15 RX ADMIN — IOPAMIDOL 75 ML: 755 INJECTION, SOLUTION INTRAVENOUS at 20:12

## 2019-12-15 ASSESSMENT — PAIN DESCRIPTION - ORIENTATION: ORIENTATION: RIGHT

## 2019-12-15 ASSESSMENT — ENCOUNTER SYMPTOMS
ABDOMINAL PAIN: 0
STRIDOR: 0
VOICE CHANGE: 0
NAUSEA: 0
APNEA: 0
WHEEZING: 0
BACK PAIN: 0
SHORTNESS OF BREATH: 1
EYE PAIN: 0
COUGH: 0
CHOKING: 0
CHEST TIGHTNESS: 0
VOMITING: 0
TROUBLE SWALLOWING: 0

## 2019-12-15 ASSESSMENT — PAIN DESCRIPTION - LOCATION
LOCATION: HEAD;NECK;SHOULDER
LOCATION: BACK

## 2019-12-15 ASSESSMENT — PAIN SCALES - GENERAL
PAINLEVEL_OUTOF10: 9
PAINLEVEL_OUTOF10: 10

## 2019-12-15 ASSESSMENT — PAIN DESCRIPTION - PAIN TYPE: TYPE: CHRONIC PAIN

## 2019-12-16 LAB
ADENOVIRUS DETECTION BY PCR: NOT DETECTED
ADENOVIRUS DETECTION BY PCR: NOT DETECTED
ANION GAP SERPL CALCULATED.3IONS-SCNC: 11 MMOL/L (ref 4–16)
BORDETELLA PERTUSSIS PCR: NOT DETECTED
BORDETELLA PERTUSSIS PCR: NOT DETECTED
BUN BLDV-MCNC: 20 MG/DL (ref 6–23)
CALCIUM SERPL-MCNC: 8.8 MG/DL (ref 8.3–10.6)
CHLAMYDOPHILA PNEUMONIA PCR: NOT DETECTED
CHLAMYDOPHILA PNEUMONIA PCR: NOT DETECTED
CHLORIDE BLD-SCNC: 108 MMOL/L (ref 99–110)
CO2: 23 MMOL/L (ref 21–32)
CORONAVIRUS 229E PCR: NOT DETECTED
CORONAVIRUS 229E PCR: NOT DETECTED
CORONAVIRUS HKU1 PCR: NOT DETECTED
CORONAVIRUS HKU1 PCR: NOT DETECTED
CORONAVIRUS NL63 PCR: NOT DETECTED
CORONAVIRUS NL63 PCR: NOT DETECTED
CORONAVIRUS OC43 PCR: NOT DETECTED
CORONAVIRUS OC43 PCR: NOT DETECTED
CREAT SERPL-MCNC: 1.7 MG/DL (ref 0.9–1.3)
GFR AFRICAN AMERICAN: 49 ML/MIN/1.73M2
GFR NON-AFRICAN AMERICAN: 40 ML/MIN/1.73M2
GLUCOSE BLD-MCNC: 189 MG/DL (ref 70–99)
GLUCOSE BLD-MCNC: 248 MG/DL (ref 70–99)
GLUCOSE BLD-MCNC: 252 MG/DL (ref 70–99)
GLUCOSE BLD-MCNC: 299 MG/DL (ref 70–99)
GLUCOSE BLD-MCNC: 299 MG/DL (ref 70–99)
HCT VFR BLD CALC: 39.6 % (ref 42–52)
HEMOGLOBIN: 11.8 GM/DL (ref 13.5–18)
HUMAN METAPNEUMOVIRUS PCR: NOT DETECTED
HUMAN METAPNEUMOVIRUS PCR: NOT DETECTED
INFLUENZA A BY PCR: NOT DETECTED
INFLUENZA A BY PCR: NOT DETECTED
INFLUENZA A H1 (2009) PCR: NOT DETECTED
INFLUENZA A H1 (2009) PCR: NOT DETECTED
INFLUENZA A H1 PANDEMIC PCR: NOT DETECTED
INFLUENZA A H1 PANDEMIC PCR: NOT DETECTED
INFLUENZA A H3 PCR: NOT DETECTED
INFLUENZA A H3 PCR: NOT DETECTED
INFLUENZA B BY PCR: NOT DETECTED
INFLUENZA B BY PCR: NOT DETECTED
MCH RBC QN AUTO: 25.3 PG (ref 27–31)
MCHC RBC AUTO-ENTMCNC: 29.8 % (ref 32–36)
MCV RBC AUTO: 84.8 FL (ref 78–100)
MYCOPLASMA PNEUMONIAE PCR: NOT DETECTED
MYCOPLASMA PNEUMONIAE PCR: NOT DETECTED
PARAINFLUENZA 1 PCR: NOT DETECTED
PARAINFLUENZA 1 PCR: NOT DETECTED
PARAINFLUENZA 2 PCR: NOT DETECTED
PARAINFLUENZA 2 PCR: NOT DETECTED
PARAINFLUENZA 3 PCR: NOT DETECTED
PARAINFLUENZA 3 PCR: NOT DETECTED
PARAINFLUENZA 4 PCR: NOT DETECTED
PARAINFLUENZA 4 PCR: NOT DETECTED
PDW BLD-RTO: 18.2 % (ref 11.7–14.9)
PLATELET # BLD: 204 K/CU MM (ref 140–440)
PMV BLD AUTO: 9.6 FL (ref 7.5–11.1)
POTASSIUM SERPL-SCNC: 4.6 MMOL/L (ref 3.5–5.1)
PROCALCITONIN: 0.05
RBC # BLD: 4.67 M/CU MM (ref 4.6–6.2)
RHINOVIRUS ENTEROVIRUS PCR: NOT DETECTED
RHINOVIRUS ENTEROVIRUS PCR: NOT DETECTED
RSV PCR: NOT DETECTED
RSV PCR: NOT DETECTED
SODIUM BLD-SCNC: 142 MMOL/L (ref 135–145)
TROPONIN T: 0.01 NG/ML
TROPONIN T: <0.01 NG/ML
WBC # BLD: 10.7 K/CU MM (ref 4–10.5)

## 2019-12-16 PROCEDURE — 84145 PROCALCITONIN (PCT): CPT

## 2019-12-16 PROCEDURE — 2500000003 HC RX 250 WO HCPCS: Performed by: NURSE PRACTITIONER

## 2019-12-16 PROCEDURE — 87633 RESP VIRUS 12-25 TARGETS: CPT

## 2019-12-16 PROCEDURE — 6360000002 HC RX W HCPCS: Performed by: NURSE PRACTITIONER

## 2019-12-16 PROCEDURE — 36415 COLL VENOUS BLD VENIPUNCTURE: CPT

## 2019-12-16 PROCEDURE — 6370000000 HC RX 637 (ALT 250 FOR IP): Performed by: INTERNAL MEDICINE

## 2019-12-16 PROCEDURE — 2580000003 HC RX 258: Performed by: NURSE PRACTITIONER

## 2019-12-16 PROCEDURE — 82962 GLUCOSE BLOOD TEST: CPT

## 2019-12-16 PROCEDURE — 87798 DETECT AGENT NOS DNA AMP: CPT

## 2019-12-16 PROCEDURE — 6370000000 HC RX 637 (ALT 250 FOR IP): Performed by: NURSE PRACTITIONER

## 2019-12-16 PROCEDURE — 6370000000 HC RX 637 (ALT 250 FOR IP): Performed by: HOSPITALIST

## 2019-12-16 PROCEDURE — 6370000000 HC RX 637 (ALT 250 FOR IP): Performed by: PAIN MEDICINE

## 2019-12-16 PROCEDURE — 87486 CHLMYD PNEUM DNA AMP PROBE: CPT

## 2019-12-16 PROCEDURE — 85027 COMPLETE CBC AUTOMATED: CPT

## 2019-12-16 PROCEDURE — 2700000000 HC OXYGEN THERAPY PER DAY

## 2019-12-16 PROCEDURE — 87581 M.PNEUMON DNA AMP PROBE: CPT

## 2019-12-16 PROCEDURE — 94761 N-INVAS EAR/PLS OXIMETRY MLT: CPT

## 2019-12-16 PROCEDURE — 94640 AIRWAY INHALATION TREATMENT: CPT

## 2019-12-16 PROCEDURE — 99222 1ST HOSP IP/OBS MODERATE 55: CPT | Performed by: INTERNAL MEDICINE

## 2019-12-16 PROCEDURE — 2140000000 HC CCU INTERMEDIATE R&B

## 2019-12-16 PROCEDURE — 84484 ASSAY OF TROPONIN QUANT: CPT

## 2019-12-16 PROCEDURE — 80048 BASIC METABOLIC PNL TOTAL CA: CPT

## 2019-12-16 RX ORDER — LABETALOL 100 MG/1
50 TABLET, FILM COATED ORAL EVERY 12 HOURS SCHEDULED
Status: DISCONTINUED | OUTPATIENT
Start: 2019-12-16 | End: 2019-12-18 | Stop reason: HOSPADM

## 2019-12-16 RX ORDER — AMLODIPINE BESYLATE 5 MG/1
5 TABLET ORAL DAILY
Status: DISCONTINUED | OUTPATIENT
Start: 2019-12-16 | End: 2019-12-18

## 2019-12-16 RX ORDER — LORAZEPAM 0.5 MG/1
0.5 TABLET ORAL EVERY 8 HOURS PRN
Status: DISCONTINUED | OUTPATIENT
Start: 2019-12-16 | End: 2019-12-18 | Stop reason: HOSPADM

## 2019-12-16 RX ORDER — TIZANIDINE 2 MG/1
2 TABLET ORAL 3 TIMES DAILY
Status: DISCONTINUED | OUTPATIENT
Start: 2019-12-16 | End: 2019-12-18 | Stop reason: HOSPADM

## 2019-12-16 RX ORDER — GUAIFENESIN 600 MG/1
600 TABLET, EXTENDED RELEASE ORAL 2 TIMES DAILY
Status: DISCONTINUED | OUTPATIENT
Start: 2019-12-16 | End: 2019-12-18 | Stop reason: HOSPADM

## 2019-12-16 RX ORDER — IPRATROPIUM BROMIDE AND ALBUTEROL SULFATE 2.5; .5 MG/3ML; MG/3ML
1 SOLUTION RESPIRATORY (INHALATION)
Status: DISCONTINUED | OUTPATIENT
Start: 2019-12-16 | End: 2019-12-18 | Stop reason: HOSPADM

## 2019-12-16 RX ORDER — GABAPENTIN 300 MG/1
600 CAPSULE ORAL 3 TIMES DAILY
Status: DISCONTINUED | OUTPATIENT
Start: 2019-12-16 | End: 2019-12-18 | Stop reason: HOSPADM

## 2019-12-16 RX ORDER — HYDROCODONE BITARTRATE AND ACETAMINOPHEN 5; 325 MG/1; MG/1
1 TABLET ORAL EVERY 8 HOURS PRN
Status: DISCONTINUED | OUTPATIENT
Start: 2019-12-16 | End: 2019-12-18 | Stop reason: HOSPADM

## 2019-12-16 RX ADMIN — ASPIRIN 81 MG 81 MG: 81 TABLET ORAL at 09:20

## 2019-12-16 RX ADMIN — CLOPIDOGREL BISULFATE 75 MG: 75 TABLET ORAL at 09:20

## 2019-12-16 RX ADMIN — Medication 10 ML: at 09:23

## 2019-12-16 RX ADMIN — CARVEDILOL 6.25 MG: 6.25 TABLET, FILM COATED ORAL at 16:57

## 2019-12-16 RX ADMIN — ALPRAZOLAM 1 MG: 0.25 TABLET ORAL at 08:23

## 2019-12-16 RX ADMIN — CEFTRIAXONE 1 G: 1 INJECTION, POWDER, FOR SOLUTION INTRAMUSCULAR; INTRAVENOUS at 02:31

## 2019-12-16 RX ADMIN — Medication 1 CAPSULE: at 09:21

## 2019-12-16 RX ADMIN — TIZANIDINE 2 MG: 2 TABLET ORAL at 20:40

## 2019-12-16 RX ADMIN — METHYLPREDNISOLONE SODIUM SUCCINATE 40 MG: 125 INJECTION, POWDER, LYOPHILIZED, FOR SOLUTION INTRAMUSCULAR; INTRAVENOUS at 22:46

## 2019-12-16 RX ADMIN — IPRATROPIUM BROMIDE AND ALBUTEROL SULFATE 1 AMPULE: .5; 3 SOLUTION RESPIRATORY (INHALATION) at 15:04

## 2019-12-16 RX ADMIN — TIZANIDINE 4 MG: 4 TABLET ORAL at 13:36

## 2019-12-16 RX ADMIN — LORAZEPAM 0.5 MG: 0.5 TABLET ORAL at 16:57

## 2019-12-16 RX ADMIN — ESCITALOPRAM OXALATE 20 MG: 10 TABLET ORAL at 09:20

## 2019-12-16 RX ADMIN — THEOPHYLLINE ANHYDROUS 100 MG: 100 CAPSULE, EXTENDED RELEASE ORAL at 20:40

## 2019-12-16 RX ADMIN — Medication 10 ML: at 22:48

## 2019-12-16 RX ADMIN — HYDROCODONE BITARTRATE AND ACETAMINOPHEN 1 TABLET: 5; 325 TABLET ORAL at 20:45

## 2019-12-16 RX ADMIN — METHYLPREDNISOLONE SODIUM SUCCINATE 40 MG: 125 INJECTION, POWDER, LYOPHILIZED, FOR SOLUTION INTRAMUSCULAR; INTRAVENOUS at 16:57

## 2019-12-16 RX ADMIN — ENOXAPARIN SODIUM 40 MG: 40 INJECTION SUBCUTANEOUS at 09:21

## 2019-12-16 RX ADMIN — FUROSEMIDE 20 MG: 20 TABLET ORAL at 16:57

## 2019-12-16 RX ADMIN — IPRATROPIUM BROMIDE 0.5 MG: 0.5 SOLUTION RESPIRATORY (INHALATION) at 08:34

## 2019-12-16 RX ADMIN — AZITHROMYCIN MONOHYDRATE 500 MG: 500 INJECTION, POWDER, LYOPHILIZED, FOR SOLUTION INTRAVENOUS at 23:09

## 2019-12-16 RX ADMIN — FOLIC ACID 1 MG: 1 TABLET ORAL at 09:20

## 2019-12-16 RX ADMIN — CEFTRIAXONE 1 G: 1 INJECTION, POWDER, FOR SOLUTION INTRAMUSCULAR; INTRAVENOUS at 22:50

## 2019-12-16 RX ADMIN — THEOPHYLLINE ANHYDROUS 100 MG: 100 CAPSULE, EXTENDED RELEASE ORAL at 11:53

## 2019-12-16 RX ADMIN — GUAIFENESIN 600 MG: 600 TABLET, EXTENDED RELEASE ORAL at 20:38

## 2019-12-16 RX ADMIN — PANTOPRAZOLE SODIUM 40 MG: 40 TABLET, DELAYED RELEASE ORAL at 05:32

## 2019-12-16 RX ADMIN — TIZANIDINE 4 MG: 4 TABLET ORAL at 09:21

## 2019-12-16 RX ADMIN — GUAIFENESIN 600 MG: 600 TABLET, EXTENDED RELEASE ORAL at 11:53

## 2019-12-16 RX ADMIN — METHYLPREDNISOLONE SODIUM SUCCINATE 40 MG: 125 INJECTION, POWDER, LYOPHILIZED, FOR SOLUTION INTRAMUSCULAR; INTRAVENOUS at 09:22

## 2019-12-16 RX ADMIN — GABAPENTIN 600 MG: 300 CAPSULE ORAL at 20:38

## 2019-12-16 RX ADMIN — ATORVASTATIN CALCIUM 80 MG: 40 TABLET, FILM COATED ORAL at 09:20

## 2019-12-16 RX ADMIN — GABAPENTIN 800 MG: 400 CAPSULE ORAL at 13:36

## 2019-12-16 RX ADMIN — CARVEDILOL 6.25 MG: 6.25 TABLET, FILM COATED ORAL at 09:21

## 2019-12-16 RX ADMIN — AMLODIPINE BESYLATE 5 MG: 5 TABLET ORAL at 11:53

## 2019-12-16 RX ADMIN — LABETALOL HYDROCHLORIDE 50 MG: 100 TABLET, FILM COATED ORAL at 20:38

## 2019-12-16 RX ADMIN — LABETALOL 20 MG/4 ML (5 MG/ML) INTRAVENOUS SYRINGE 5 MG: at 00:10

## 2019-12-16 RX ADMIN — GABAPENTIN 800 MG: 400 CAPSULE ORAL at 09:20

## 2019-12-16 RX ADMIN — METHYLPREDNISOLONE SODIUM SUCCINATE 40 MG: 125 INJECTION, POWDER, LYOPHILIZED, FOR SOLUTION INTRAMUSCULAR; INTRAVENOUS at 04:52

## 2019-12-16 ASSESSMENT — PAIN SCALES - GENERAL
PAINLEVEL_OUTOF10: 0
PAINLEVEL_OUTOF10: 3
PAINLEVEL_OUTOF10: 6

## 2019-12-16 ASSESSMENT — PAIN DESCRIPTION - LOCATION: LOCATION: BACK

## 2019-12-17 PROBLEM — F31.4 BIPOLAR 1 DISORDER, DEPRESSED, SEVERE (HCC): Chronic | Status: ACTIVE | Noted: 2019-12-17

## 2019-12-17 PROBLEM — F31.63 SEVERE MIXED BIPOLAR 1 DISORDER WITHOUT PSYCHOSIS (HCC): Status: ACTIVE | Noted: 2019-12-17

## 2019-12-17 LAB
ALBUMIN SERPL-MCNC: 3.8 GM/DL (ref 3.4–5)
ALP BLD-CCNC: 114 IU/L (ref 40–129)
ALT SERPL-CCNC: 14 U/L (ref 10–40)
ANION GAP SERPL CALCULATED.3IONS-SCNC: 13 MMOL/L (ref 4–16)
ANISOCYTOSIS: ABNORMAL
AST SERPL-CCNC: 13 IU/L (ref 15–37)
BANDED NEUTROPHILS ABSOLUTE COUNT: 1.21 K/CU MM
BANDED NEUTROPHILS RELATIVE PERCENT: 6 % (ref 5–11)
BILIRUB SERPL-MCNC: 0.3 MG/DL (ref 0–1)
BUN BLDV-MCNC: 25 MG/DL (ref 6–23)
BURR CELLS: ABNORMAL
CALCIUM SERPL-MCNC: 9.2 MG/DL (ref 8.3–10.6)
CHLORIDE BLD-SCNC: 105 MMOL/L (ref 99–110)
CO2: 20 MMOL/L (ref 21–32)
CREAT SERPL-MCNC: 1.6 MG/DL (ref 0.9–1.3)
DIFFERENTIAL TYPE: ABNORMAL
GFR AFRICAN AMERICAN: 52 ML/MIN/1.73M2
GFR NON-AFRICAN AMERICAN: 43 ML/MIN/1.73M2
GLUCOSE BLD-MCNC: 217 MG/DL (ref 70–99)
GLUCOSE BLD-MCNC: 262 MG/DL (ref 70–99)
GLUCOSE BLD-MCNC: 278 MG/DL (ref 70–99)
GLUCOSE BLD-MCNC: 297 MG/DL (ref 70–99)
GLUCOSE BLD-MCNC: 366 MG/DL (ref 70–99)
HCT VFR BLD CALC: 38.4 % (ref 42–52)
HEMOGLOBIN: 11.6 GM/DL (ref 13.5–18)
LYMPHOCYTES ABSOLUTE: 1.6 K/CU MM
LYMPHOCYTES RELATIVE PERCENT: 8 % (ref 24–44)
MAGNESIUM: 2.5 MG/DL (ref 1.8–2.4)
MCH RBC QN AUTO: 25.3 PG (ref 27–31)
MCHC RBC AUTO-ENTMCNC: 30.2 % (ref 32–36)
MCV RBC AUTO: 83.7 FL (ref 78–100)
MONOCYTES ABSOLUTE: 0.2 K/CU MM
MONOCYTES RELATIVE PERCENT: 1 % (ref 0–4)
OVALOCYTES: ABNORMAL
PDW BLD-RTO: 17.7 % (ref 11.7–14.9)
PHOSPHORUS: 2.9 MG/DL (ref 2.5–4.9)
PLATELET # BLD: 231 K/CU MM (ref 140–440)
PMV BLD AUTO: 9.9 FL (ref 7.5–11.1)
POLYCHROMASIA: ABNORMAL
POTASSIUM SERPL-SCNC: 4.6 MMOL/L (ref 3.5–5.1)
PROCALCITONIN: 0.06
RBC # BLD: 4.59 M/CU MM (ref 4.6–6.2)
SEGMENTED NEUTROPHILS ABSOLUTE COUNT: 17.1 K/CU MM
SEGMENTED NEUTROPHILS RELATIVE PERCENT: 85 % (ref 36–66)
SODIUM BLD-SCNC: 138 MMOL/L (ref 135–145)
TOTAL PROTEIN: 7 GM/DL (ref 6.4–8.2)
WBC # BLD: 20.1 K/CU MM (ref 4–10.5)

## 2019-12-17 PROCEDURE — 6370000000 HC RX 637 (ALT 250 FOR IP): Performed by: PAIN MEDICINE

## 2019-12-17 PROCEDURE — 2580000003 HC RX 258: Performed by: NURSE PRACTITIONER

## 2019-12-17 PROCEDURE — 6370000000 HC RX 637 (ALT 250 FOR IP): Performed by: INTERNAL MEDICINE

## 2019-12-17 PROCEDURE — 84100 ASSAY OF PHOSPHORUS: CPT

## 2019-12-17 PROCEDURE — 6370000000 HC RX 637 (ALT 250 FOR IP): Performed by: HOSPITALIST

## 2019-12-17 PROCEDURE — 85007 BL SMEAR W/DIFF WBC COUNT: CPT

## 2019-12-17 PROCEDURE — 99221 1ST HOSP IP/OBS SF/LOW 40: CPT | Performed by: NURSE PRACTITIONER

## 2019-12-17 PROCEDURE — 6370000000 HC RX 637 (ALT 250 FOR IP): Performed by: NURSE PRACTITIONER

## 2019-12-17 PROCEDURE — APPSS60 APP SPLIT SHARED TIME 46-60 MINUTES: Performed by: NURSE PRACTITIONER

## 2019-12-17 PROCEDURE — 85027 COMPLETE CBC AUTOMATED: CPT

## 2019-12-17 PROCEDURE — 6360000002 HC RX W HCPCS: Performed by: NURSE PRACTITIONER

## 2019-12-17 PROCEDURE — 82962 GLUCOSE BLOOD TEST: CPT

## 2019-12-17 PROCEDURE — 36415 COLL VENOUS BLD VENIPUNCTURE: CPT

## 2019-12-17 PROCEDURE — 6360000002 HC RX W HCPCS: Performed by: INTERNAL MEDICINE

## 2019-12-17 PROCEDURE — 94762 N-INVAS EAR/PLS OXIMTRY CONT: CPT

## 2019-12-17 PROCEDURE — 94761 N-INVAS EAR/PLS OXIMETRY MLT: CPT

## 2019-12-17 PROCEDURE — 94640 AIRWAY INHALATION TREATMENT: CPT

## 2019-12-17 PROCEDURE — 80053 COMPREHEN METABOLIC PANEL: CPT

## 2019-12-17 PROCEDURE — 83735 ASSAY OF MAGNESIUM: CPT

## 2019-12-17 PROCEDURE — 2140000000 HC CCU INTERMEDIATE R&B

## 2019-12-17 PROCEDURE — 84145 PROCALCITONIN (PCT): CPT

## 2019-12-17 PROCEDURE — 93010 ELECTROCARDIOGRAM REPORT: CPT | Performed by: INTERNAL MEDICINE

## 2019-12-17 PROCEDURE — 99232 SBSQ HOSP IP/OBS MODERATE 35: CPT | Performed by: INTERNAL MEDICINE

## 2019-12-17 RX ORDER — BUSPIRONE HYDROCHLORIDE 10 MG/1
10 TABLET ORAL 3 TIMES DAILY
Status: DISCONTINUED | OUTPATIENT
Start: 2019-12-17 | End: 2019-12-18 | Stop reason: HOSPADM

## 2019-12-17 RX ORDER — DIVALPROEX SODIUM 500 MG/1
1000 TABLET, EXTENDED RELEASE ORAL NIGHTLY
Status: DISCONTINUED | OUTPATIENT
Start: 2019-12-17 | End: 2019-12-18 | Stop reason: HOSPADM

## 2019-12-17 RX ORDER — RISPERIDONE 0.5 MG/1
1 TABLET, FILM COATED ORAL NIGHTLY
Status: DISCONTINUED | OUTPATIENT
Start: 2019-12-17 | End: 2019-12-18 | Stop reason: HOSPADM

## 2019-12-17 RX ORDER — TRAZODONE HYDROCHLORIDE 50 MG/1
50 TABLET ORAL NIGHTLY PRN
Status: DISCONTINUED | OUTPATIENT
Start: 2019-12-17 | End: 2019-12-18 | Stop reason: HOSPADM

## 2019-12-17 RX ORDER — ESCITALOPRAM OXALATE 10 MG/1
10 TABLET ORAL DAILY
Status: DISCONTINUED | OUTPATIENT
Start: 2019-12-18 | End: 2019-12-18 | Stop reason: HOSPADM

## 2019-12-17 RX ORDER — ACETAMINOPHEN 80 MG
TABLET,CHEWABLE ORAL
Status: DISPENSED
Start: 2019-12-17 | End: 2019-12-18

## 2019-12-17 RX ORDER — METHYLPREDNISOLONE SODIUM SUCCINATE 40 MG/ML
40 INJECTION, POWDER, LYOPHILIZED, FOR SOLUTION INTRAMUSCULAR; INTRAVENOUS EVERY 8 HOURS
Status: DISCONTINUED | OUTPATIENT
Start: 2019-12-17 | End: 2019-12-18

## 2019-12-17 RX ADMIN — THEOPHYLLINE ANHYDROUS 100 MG: 100 CAPSULE, EXTENDED RELEASE ORAL at 22:24

## 2019-12-17 RX ADMIN — Medication 10 ML: at 09:07

## 2019-12-17 RX ADMIN — METHYLPREDNISOLONE SODIUM SUCCINATE 40 MG: 40 INJECTION, POWDER, FOR SOLUTION INTRAMUSCULAR; INTRAVENOUS at 18:42

## 2019-12-17 RX ADMIN — ATORVASTATIN CALCIUM 80 MG: 40 TABLET, FILM COATED ORAL at 09:04

## 2019-12-17 RX ADMIN — GUAIFENESIN 600 MG: 600 TABLET, EXTENDED RELEASE ORAL at 22:26

## 2019-12-17 RX ADMIN — RISPERIDONE 1 MG: 0.5 TABLET, FILM COATED ORAL at 22:25

## 2019-12-17 RX ADMIN — TIZANIDINE 2 MG: 2 TABLET ORAL at 12:50

## 2019-12-17 RX ADMIN — TIZANIDINE 2 MG: 2 TABLET ORAL at 23:02

## 2019-12-17 RX ADMIN — IPRATROPIUM BROMIDE AND ALBUTEROL SULFATE 1 AMPULE: .5; 3 SOLUTION RESPIRATORY (INHALATION) at 00:15

## 2019-12-17 RX ADMIN — GUAIFENESIN 600 MG: 600 TABLET, EXTENDED RELEASE ORAL at 09:05

## 2019-12-17 RX ADMIN — AMLODIPINE BESYLATE 5 MG: 5 TABLET ORAL at 09:06

## 2019-12-17 RX ADMIN — TRAZODONE HYDROCHLORIDE 50 MG: 50 TABLET ORAL at 22:46

## 2019-12-17 RX ADMIN — ESCITALOPRAM OXALATE 20 MG: 10 TABLET ORAL at 09:04

## 2019-12-17 RX ADMIN — LABETALOL HYDROCHLORIDE 50 MG: 100 TABLET, FILM COATED ORAL at 09:05

## 2019-12-17 RX ADMIN — CARVEDILOL 6.25 MG: 6.25 TABLET, FILM COATED ORAL at 18:43

## 2019-12-17 RX ADMIN — HYDROCODONE BITARTRATE AND ACETAMINOPHEN 1 TABLET: 5; 325 TABLET ORAL at 06:03

## 2019-12-17 RX ADMIN — FOLIC ACID 1 MG: 1 TABLET ORAL at 09:04

## 2019-12-17 RX ADMIN — Medication 10 ML: at 03:24

## 2019-12-17 RX ADMIN — METHYLPREDNISOLONE SODIUM SUCCINATE 40 MG: 125 INJECTION, POWDER, LYOPHILIZED, FOR SOLUTION INTRAMUSCULAR; INTRAVENOUS at 09:03

## 2019-12-17 RX ADMIN — ENOXAPARIN SODIUM 40 MG: 40 INJECTION SUBCUTANEOUS at 09:06

## 2019-12-17 RX ADMIN — GABAPENTIN 600 MG: 300 CAPSULE ORAL at 06:07

## 2019-12-17 RX ADMIN — METHYLPREDNISOLONE SODIUM SUCCINATE 40 MG: 125 INJECTION, POWDER, LYOPHILIZED, FOR SOLUTION INTRAMUSCULAR; INTRAVENOUS at 03:25

## 2019-12-17 RX ADMIN — CARVEDILOL 6.25 MG: 6.25 TABLET, FILM COATED ORAL at 09:05

## 2019-12-17 RX ADMIN — IPRATROPIUM BROMIDE AND ALBUTEROL SULFATE 1 AMPULE: .5; 3 SOLUTION RESPIRATORY (INHALATION) at 08:31

## 2019-12-17 RX ADMIN — CLOPIDOGREL BISULFATE 75 MG: 75 TABLET ORAL at 09:04

## 2019-12-17 RX ADMIN — LORAZEPAM 0.5 MG: 0.5 TABLET ORAL at 12:50

## 2019-12-17 RX ADMIN — IPRATROPIUM BROMIDE AND ALBUTEROL SULFATE 1 AMPULE: .5; 3 SOLUTION RESPIRATORY (INHALATION) at 12:09

## 2019-12-17 RX ADMIN — GABAPENTIN 600 MG: 300 CAPSULE ORAL at 22:25

## 2019-12-17 RX ADMIN — DIVALPROEX SODIUM 1000 MG: 500 TABLET, FILM COATED, EXTENDED RELEASE ORAL at 22:26

## 2019-12-17 RX ADMIN — TIZANIDINE 2 MG: 2 TABLET ORAL at 06:09

## 2019-12-17 RX ADMIN — IPRATROPIUM BROMIDE AND ALBUTEROL SULFATE 1 AMPULE: .5; 3 SOLUTION RESPIRATORY (INHALATION) at 21:47

## 2019-12-17 RX ADMIN — THEOPHYLLINE ANHYDROUS 100 MG: 100 CAPSULE, EXTENDED RELEASE ORAL at 09:04

## 2019-12-17 RX ADMIN — Medication 10 ML: at 22:24

## 2019-12-17 RX ADMIN — LORAZEPAM 0.5 MG: 0.5 TABLET ORAL at 01:15

## 2019-12-17 RX ADMIN — ASPIRIN 81 MG 81 MG: 81 TABLET ORAL at 09:05

## 2019-12-17 RX ADMIN — BUSPIRONE HYDROCHLORIDE 10 MG: 10 TABLET ORAL at 22:26

## 2019-12-17 RX ADMIN — PANTOPRAZOLE SODIUM 40 MG: 40 TABLET, DELAYED RELEASE ORAL at 06:03

## 2019-12-17 RX ADMIN — AZITHROMYCIN MONOHYDRATE 500 MG: 500 INJECTION, POWDER, LYOPHILIZED, FOR SOLUTION INTRAVENOUS at 23:32

## 2019-12-17 RX ADMIN — INSULIN LISPRO 2 UNITS: 100 INJECTION, SOLUTION INTRAVENOUS; SUBCUTANEOUS at 22:47

## 2019-12-17 RX ADMIN — LABETALOL HYDROCHLORIDE 50 MG: 100 TABLET, FILM COATED ORAL at 22:25

## 2019-12-17 RX ADMIN — CEFTRIAXONE 1 G: 1 INJECTION, POWDER, FOR SOLUTION INTRAMUSCULAR; INTRAVENOUS at 22:23

## 2019-12-17 RX ADMIN — GABAPENTIN 600 MG: 300 CAPSULE ORAL at 12:50

## 2019-12-17 RX ADMIN — Medication 1 CAPSULE: at 09:05

## 2019-12-17 ASSESSMENT — PAIN DESCRIPTION - LOCATION
LOCATION: SHOULDER
LOCATION: NECK;SHOULDER

## 2019-12-17 ASSESSMENT — PAIN DESCRIPTION - FREQUENCY: FREQUENCY: CONTINUOUS

## 2019-12-17 ASSESSMENT — PAIN SCALES - GENERAL
PAINLEVEL_OUTOF10: 0
PAINLEVEL_OUTOF10: 9
PAINLEVEL_OUTOF10: 2
PAINLEVEL_OUTOF10: 6

## 2019-12-17 ASSESSMENT — PAIN DESCRIPTION - PROGRESSION: CLINICAL_PROGRESSION: NOT CHANGED

## 2019-12-17 ASSESSMENT — PAIN DESCRIPTION - ONSET: ONSET: ON-GOING

## 2019-12-17 ASSESSMENT — PAIN DESCRIPTION - DESCRIPTORS: DESCRIPTORS: ACHING;SHARP

## 2019-12-17 ASSESSMENT — PAIN DESCRIPTION - ORIENTATION: ORIENTATION: RIGHT

## 2019-12-17 ASSESSMENT — PAIN - FUNCTIONAL ASSESSMENT: PAIN_FUNCTIONAL_ASSESSMENT: ACTIVITIES ARE NOT PREVENTED

## 2019-12-17 ASSESSMENT — PAIN DESCRIPTION - PAIN TYPE: TYPE: CHRONIC PAIN

## 2019-12-18 VITALS
WEIGHT: 225 LBS | BODY MASS INDEX: 33.33 KG/M2 | HEIGHT: 69 IN | HEART RATE: 81 BPM | SYSTOLIC BLOOD PRESSURE: 150 MMHG | OXYGEN SATURATION: 97 % | DIASTOLIC BLOOD PRESSURE: 66 MMHG | RESPIRATION RATE: 11 BRPM | TEMPERATURE: 97.9 F

## 2019-12-18 LAB
DOSE AMOUNT: ABNORMAL
DOSE TIME: ABNORMAL
GLUCOSE BLD-MCNC: 205 MG/DL (ref 70–99)
GLUCOSE BLD-MCNC: 380 MG/DL (ref 70–99)
THEOPHYLLINE LEVEL: 4.3 UG/ML (ref 10–20)

## 2019-12-18 PROCEDURE — 6370000000 HC RX 637 (ALT 250 FOR IP): Performed by: NURSE PRACTITIONER

## 2019-12-18 PROCEDURE — 82962 GLUCOSE BLOOD TEST: CPT

## 2019-12-18 PROCEDURE — 97110 THERAPEUTIC EXERCISES: CPT

## 2019-12-18 PROCEDURE — 6370000000 HC RX 637 (ALT 250 FOR IP): Performed by: HOSPITALIST

## 2019-12-18 PROCEDURE — APPSS60 APP SPLIT SHARED TIME 46-60 MINUTES: Performed by: NURSE PRACTITIONER

## 2019-12-18 PROCEDURE — 6370000000 HC RX 637 (ALT 250 FOR IP): Performed by: INTERNAL MEDICINE

## 2019-12-18 PROCEDURE — 94640 AIRWAY INHALATION TREATMENT: CPT

## 2019-12-18 PROCEDURE — 36415 COLL VENOUS BLD VENIPUNCTURE: CPT

## 2019-12-18 PROCEDURE — 97162 PT EVAL MOD COMPLEX 30 MIN: CPT

## 2019-12-18 PROCEDURE — 6370000000 HC RX 637 (ALT 250 FOR IP): Performed by: PAIN MEDICINE

## 2019-12-18 PROCEDURE — 6360000002 HC RX W HCPCS: Performed by: NURSE PRACTITIONER

## 2019-12-18 PROCEDURE — 2580000003 HC RX 258: Performed by: NURSE PRACTITIONER

## 2019-12-18 PROCEDURE — 97116 GAIT TRAINING THERAPY: CPT

## 2019-12-18 PROCEDURE — 80198 ASSAY OF THEOPHYLLINE: CPT

## 2019-12-18 PROCEDURE — 6360000002 HC RX W HCPCS: Performed by: INTERNAL MEDICINE

## 2019-12-18 RX ORDER — GUAIFENESIN 600 MG/1
600 TABLET, EXTENDED RELEASE ORAL 2 TIMES DAILY
Qty: 60 TABLET | Refills: 0 | Status: SHIPPED | OUTPATIENT
Start: 2019-12-18 | End: 2020-04-16

## 2019-12-18 RX ORDER — METHYLPREDNISOLONE SODIUM SUCCINATE 40 MG/ML
40 INJECTION, POWDER, LYOPHILIZED, FOR SOLUTION INTRAMUSCULAR; INTRAVENOUS EVERY 12 HOURS
Status: DISCONTINUED | OUTPATIENT
Start: 2019-12-18 | End: 2019-12-18 | Stop reason: HOSPADM

## 2019-12-18 RX ORDER — BUSPIRONE HYDROCHLORIDE 10 MG/1
10 TABLET ORAL 3 TIMES DAILY
Qty: 90 TABLET | Refills: 0 | Status: SHIPPED | OUTPATIENT
Start: 2019-12-18 | End: 2020-07-24 | Stop reason: SDUPTHER

## 2019-12-18 RX ORDER — ESCITALOPRAM OXALATE 10 MG/1
10 TABLET ORAL DAILY
Qty: 30 TABLET | Refills: 0 | Status: SHIPPED | OUTPATIENT
Start: 2019-12-19 | End: 2020-07-24 | Stop reason: SDUPTHER

## 2019-12-18 RX ORDER — TRAZODONE HYDROCHLORIDE 50 MG/1
50 TABLET ORAL NIGHTLY
Qty: 30 TABLET | Refills: 0 | Status: SHIPPED | OUTPATIENT
Start: 2019-12-18 | End: 2020-01-06 | Stop reason: SDUPTHER

## 2019-12-18 RX ORDER — RISPERIDONE 1 MG/1
1 TABLET, FILM COATED ORAL NIGHTLY
Qty: 30 TABLET | Refills: 0 | Status: SHIPPED | OUTPATIENT
Start: 2019-12-18 | End: 2020-01-06 | Stop reason: SDUPTHER

## 2019-12-18 RX ORDER — LABETALOL 100 MG/1
50 TABLET, FILM COATED ORAL EVERY 12 HOURS SCHEDULED
Qty: 60 TABLET | Refills: 0 | Status: SHIPPED | OUTPATIENT
Start: 2019-12-18 | End: 2020-01-21

## 2019-12-18 RX ORDER — TIZANIDINE 2 MG/1
2 TABLET ORAL 3 TIMES DAILY
Qty: 90 TABLET | Refills: 0 | Status: SHIPPED | OUTPATIENT
Start: 2019-12-18 | End: 2020-02-04 | Stop reason: SDUPTHER

## 2019-12-18 RX ORDER — GABAPENTIN 300 MG/1
600 CAPSULE ORAL 3 TIMES DAILY
Qty: 180 CAPSULE | Refills: 0 | Status: SHIPPED | OUTPATIENT
Start: 2019-12-18 | End: 2020-01-08 | Stop reason: SDUPTHER

## 2019-12-18 RX ORDER — FUROSEMIDE 20 MG/1
10 TABLET ORAL DAILY
Qty: 30 TABLET | Refills: 0 | Status: SHIPPED | OUTPATIENT
Start: 2019-12-18 | End: 2020-04-16 | Stop reason: SDUPTHER

## 2019-12-18 RX ORDER — AMLODIPINE BESYLATE 10 MG/1
10 TABLET ORAL DAILY
Qty: 30 TABLET | Refills: 0 | Status: SHIPPED | OUTPATIENT
Start: 2019-12-19 | End: 2020-01-06

## 2019-12-18 RX ORDER — AMLODIPINE BESYLATE 10 MG/1
10 TABLET ORAL DAILY
Status: DISCONTINUED | OUTPATIENT
Start: 2019-12-19 | End: 2019-12-18 | Stop reason: HOSPADM

## 2019-12-18 RX ORDER — DIVALPROEX SODIUM 500 MG/1
1000 TABLET, EXTENDED RELEASE ORAL NIGHTLY
Qty: 30 TABLET | Refills: 0 | Status: SHIPPED | OUTPATIENT
Start: 2019-12-18 | End: 2020-01-06 | Stop reason: SDUPTHER

## 2019-12-18 RX ADMIN — THEOPHYLLINE ANHYDROUS 100 MG: 100 CAPSULE, EXTENDED RELEASE ORAL at 13:41

## 2019-12-18 RX ADMIN — METHYLPREDNISOLONE SODIUM SUCCINATE 40 MG: 40 INJECTION, POWDER, FOR SOLUTION INTRAMUSCULAR; INTRAVENOUS at 09:00

## 2019-12-18 RX ADMIN — ATORVASTATIN CALCIUM 80 MG: 40 TABLET, FILM COATED ORAL at 09:01

## 2019-12-18 RX ADMIN — BUSPIRONE HYDROCHLORIDE 10 MG: 10 TABLET ORAL at 13:41

## 2019-12-18 RX ADMIN — LABETALOL HYDROCHLORIDE 50 MG: 100 TABLET, FILM COATED ORAL at 09:01

## 2019-12-18 RX ADMIN — TIZANIDINE 2 MG: 2 TABLET ORAL at 13:41

## 2019-12-18 RX ADMIN — ESCITALOPRAM OXALATE 10 MG: 10 TABLET ORAL at 09:02

## 2019-12-18 RX ADMIN — PANTOPRAZOLE SODIUM 40 MG: 40 TABLET, DELAYED RELEASE ORAL at 08:23

## 2019-12-18 RX ADMIN — METHYLPREDNISOLONE SODIUM SUCCINATE 40 MG: 40 INJECTION, POWDER, FOR SOLUTION INTRAMUSCULAR; INTRAVENOUS at 00:58

## 2019-12-18 RX ADMIN — BUSPIRONE HYDROCHLORIDE 10 MG: 10 TABLET ORAL at 09:01

## 2019-12-18 RX ADMIN — ENOXAPARIN SODIUM 40 MG: 40 INJECTION SUBCUTANEOUS at 09:01

## 2019-12-18 RX ADMIN — GUAIFENESIN 600 MG: 600 TABLET, EXTENDED RELEASE ORAL at 09:02

## 2019-12-18 RX ADMIN — THEOPHYLLINE ANHYDROUS 100 MG: 100 CAPSULE, EXTENDED RELEASE ORAL at 09:00

## 2019-12-18 RX ADMIN — HYDROCODONE BITARTRATE AND ACETAMINOPHEN 1 TABLET: 5; 325 TABLET ORAL at 17:00

## 2019-12-18 RX ADMIN — FOLIC ACID 1 MG: 1 TABLET ORAL at 09:01

## 2019-12-18 RX ADMIN — CARVEDILOL 6.25 MG: 6.25 TABLET, FILM COATED ORAL at 17:00

## 2019-12-18 RX ADMIN — IPRATROPIUM BROMIDE AND ALBUTEROL SULFATE 1 AMPULE: .5; 3 SOLUTION RESPIRATORY (INHALATION) at 16:00

## 2019-12-18 RX ADMIN — Medication 10 ML: at 09:00

## 2019-12-18 RX ADMIN — AMLODIPINE BESYLATE 5 MG: 5 TABLET ORAL at 09:01

## 2019-12-18 RX ADMIN — ASPIRIN 81 MG 81 MG: 81 TABLET ORAL at 09:01

## 2019-12-18 RX ADMIN — TIZANIDINE 2 MG: 2 TABLET ORAL at 09:01

## 2019-12-18 RX ADMIN — Medication 1 CAPSULE: at 09:01

## 2019-12-18 RX ADMIN — CARVEDILOL 6.25 MG: 6.25 TABLET, FILM COATED ORAL at 09:01

## 2019-12-18 RX ADMIN — HYDROCODONE BITARTRATE AND ACETAMINOPHEN 1 TABLET: 5; 325 TABLET ORAL at 09:05

## 2019-12-18 RX ADMIN — GABAPENTIN 600 MG: 300 CAPSULE ORAL at 13:41

## 2019-12-18 RX ADMIN — LORAZEPAM 0.5 MG: 0.5 TABLET ORAL at 10:03

## 2019-12-18 RX ADMIN — CLOPIDOGREL BISULFATE 75 MG: 75 TABLET ORAL at 09:01

## 2019-12-18 RX ADMIN — GABAPENTIN 600 MG: 300 CAPSULE ORAL at 09:00

## 2019-12-18 ASSESSMENT — PAIN SCALES - GENERAL
PAINLEVEL_OUTOF10: 5
PAINLEVEL_OUTOF10: 4
PAINLEVEL_OUTOF10: 3
PAINLEVEL_OUTOF10: 4

## 2019-12-18 ASSESSMENT — PAIN DESCRIPTION - PAIN TYPE
TYPE: CHRONIC PAIN
TYPE: CHRONIC PAIN

## 2019-12-18 ASSESSMENT — PAIN DESCRIPTION - LOCATION
LOCATION: NECK
LOCATION: BACK

## 2019-12-19 ENCOUNTER — CARE COORDINATION (OUTPATIENT)
Dept: CASE MANAGEMENT | Age: 69
End: 2019-12-19

## 2019-12-19 ENCOUNTER — TELEPHONE (OUTPATIENT)
Dept: FAMILY MEDICINE CLINIC | Age: 69
End: 2019-12-19

## 2019-12-19 NOTE — TELEPHONE ENCOUNTER
TO DR. Arely Chi--      NEEDS A NEW REFERRAL TO DR. STOVER'S OFFICE. PLEASE CALL HIM TODAY AND LET HIM KNOW IF HE NEEDS TO BE SEEN FOR A REFERRAL TO THE PAIN OFFICE OR IF A REFERRAL CAN BE SENT OVER WITHOUT AN APPT.     PHONE:  [phone]

## 2019-12-20 ENCOUNTER — CARE COORDINATION (OUTPATIENT)
Dept: CASE MANAGEMENT | Age: 69
End: 2019-12-20

## 2019-12-23 DIAGNOSIS — F31.9 BIPOLAR 1 DISORDER (HCC): ICD-10-CM

## 2019-12-23 DIAGNOSIS — M54.16 LUMBAR RADICULOPATHY: Primary | ICD-10-CM

## 2019-12-23 RX ORDER — ALPRAZOLAM 1 MG/1
1 TABLET ORAL 3 TIMES DAILY PRN
Qty: 90 TABLET | Refills: 0 | Status: SHIPPED | OUTPATIENT
Start: 2019-12-23 | End: 2020-01-20 | Stop reason: SDUPTHER

## 2019-12-26 ENCOUNTER — TELEPHONE (OUTPATIENT)
Dept: FAMILY MEDICINE CLINIC | Age: 69
End: 2019-12-26

## 2019-12-27 ENCOUNTER — TELEPHONE (OUTPATIENT)
Dept: FAMILY MEDICINE CLINIC | Age: 69
End: 2019-12-27

## 2019-12-27 ENCOUNTER — CARE COORDINATION (OUTPATIENT)
Dept: CASE MANAGEMENT | Age: 69
End: 2019-12-27

## 2020-01-02 ENCOUNTER — OFFICE VISIT (OUTPATIENT)
Dept: CARDIOLOGY CLINIC | Age: 70
End: 2020-01-02
Payer: MEDICARE

## 2020-01-02 ENCOUNTER — TELEPHONE (OUTPATIENT)
Dept: FAMILY MEDICINE CLINIC | Age: 70
End: 2020-01-02

## 2020-01-02 VITALS
WEIGHT: 234.4 LBS | DIASTOLIC BLOOD PRESSURE: 62 MMHG | BODY MASS INDEX: 34.72 KG/M2 | HEIGHT: 69 IN | HEART RATE: 66 BPM | SYSTOLIC BLOOD PRESSURE: 110 MMHG

## 2020-01-02 PROBLEM — L03.116 CELLULITIS OF LEFT LOWER EXTREMITY: Status: ACTIVE | Noted: 2020-01-02

## 2020-01-02 PROCEDURE — 93000 ELECTROCARDIOGRAM COMPLETE: CPT | Performed by: NURSE PRACTITIONER

## 2020-01-02 PROCEDURE — 99214 OFFICE O/P EST MOD 30 MIN: CPT | Performed by: NURSE PRACTITIONER

## 2020-01-02 RX ORDER — SPIRONOLACTONE 25 MG/1
12.5 TABLET ORAL 2 TIMES DAILY
COMMUNITY
End: 2020-04-16 | Stop reason: SDUPTHER

## 2020-01-02 RX ORDER — CEPHALEXIN 500 MG/1
500 CAPSULE ORAL 2 TIMES DAILY
Qty: 10 CAPSULE | Refills: 0 | Status: SHIPPED | OUTPATIENT
Start: 2020-01-02 | End: 2020-01-07

## 2020-01-02 NOTE — ASSESSMENT & PLAN NOTE
Primary and secondary prevention discussed with patient:   -Low sodium cardiac diet   -exercise 30 min a day three days a week  Continue current medications    Patient's cardiovascular risk history includes: LDL goal is under 80  existing CAD  hypertension  hyperlipidemia cardiovascular

## 2020-01-02 NOTE — LETTER
CLINICAL STAFF DOCUMENTATION    Luther Monk.  1950  [id number]    Have you had any Chest Pain - No    Have you had any Shortness of Breath - Yes    Have you had any dizziness - No    Have you had any palpitations - No    Do you have any edema - swelling in legs    If Yes - CHECK TO SEE IF THE EDEMA IS PITTING  How long have they been having edema - Day's  If Yes - Have they worn compression stockings No    Check Venous \"LEG PROBLEM Questionnaire\"    Do you have a surgery or procedure scheduled in the near future - No  If Yes- DO EKG  BE SURE TO GIVE THIS INFORMATION to [facility]    Ask patient if they want to sign up for MyChart if they are not already signed up    Check to see if we have an E-MAIL on file for the patient    Check medication list thoroughly!!!  BE SURE TO ASK PATIENT IF THEY NEED MEDICATION REFILLS

## 2020-01-02 NOTE — ASSESSMENT & PLAN NOTE
Controlled  He is to continue current medications   advised low salt diet   Testing ordered:  no   Last testing: Echo 2019

## 2020-01-02 NOTE — PROGRESS NOTES
Arthritis     Back pain, chronic     \"have pain in lumbar mainly- have 5 bulging discs\"\"in my neck and my lumbar have herniated discs\"    Bipolar 1 disorder ([facility]     CAD (coronary artery disease) 1/27/2016    does not follow with a cardiologist    Cerebral artery occlusion with cerebral infarction ([facility]     \"had mini stroke in Jan 2016- fast heart rate when I would stand up - smile drooping on one side- lased just the one day\"    Chronic kidney disease (CKD), stage III (moderate) ([facility])     CKD (chronic kidney disease)     per old chart- consult with Dr Lynette Ellington with admission 4/2016\"have low kidney function\"    COPD (chronic obstructive pulmonary disease) ([facility]     follow with Dr Chel Echeverria Diabetes mellitus, type 2 ([facility] 1/3/2017    Diabetic peripheral neuropathy ([facility])     Diastolic CHF ([facility] 6/80/6846    Gastric ulcer     H/O cardiovascular stress test 5/26/14    EF 68% mild ischemia anterior wall    Heart murmur     \"see Dr Brown Brain- last echo 2014\" pt states\"I think they said I have a murmur but no chest pain or palpitations\"    Hiatal hernia     History of cardiac cath 6/11/14    MODERATE  CAD      Hx of migraines     HX OTHER MEDICAL     \"have thinning of kidney walls- they found I had that 15 yrs ago\" see Dr Patrick Pod"    Hyperlipidemia     Hypertension     Lumbar radiculopathy     Panic attack     'anything new gives me anxiety\"    Pleural effusion     scheduled for thoracentesis 7/28/2014, 8/17/2016    S/P thoracentesis     left side( per old chart had thora done 4/2016 and one done 2014)    Sleep apnea     sleep study x 2 - last one 4-5 yrs ago- uses c-pap\"    SOBOE (shortness of breath on exertion)     Spinal stenosis      Past Surgical History:   Procedure Laterality Date    CARPAL TUNNEL RELEASE Bilateral 1989    ELBOW SURGERY Left 2000's    KNEE SURGERY Bilateral     right knee x 2( scope)/ left knee- 7-8 yr ago   [address] Spine fusion    THORACENTESIS Left effusion. Stress Test: 4/23/19  Normal perfusion study with normal distribution in all coronal, short, and    horizontal axis.    Normal LV function. LVEF is 57 %. The 10-year ASCVD risk score (Chayo Reaves., et al., 2013) is: 24.9%    Values used to calculate the score:      Age: 79 years      Sex: Male      Is Non- : No      Diabetic: Yes      Tobacco smoker: No      Systolic Blood Pressure: 706 mmHg      Is BP treated: Yes      HDL Cholesterol: 47 MG/DL      Total Cholesterol: 134 MG/DL    Assessment/ Plan:     Coronary artery disease involving native coronary artery of native heart without angina pectoris  Primary and secondary prevention discussed with patient:   -Low sodium cardiac diet   -exercise 30 min a day three days a week  Continue current medications    Patient's cardiovascular risk history includes: LDL goal is under 80  existing CAD  hypertension  hyperlipidemia cardiovascular     Diastolic CHF (HCC)    Appears hypervolemic   Cont with current medications ( will get BMP and Mag) will increase lasix to daily if okay renal function is okay. Monitor weight daily  Limit sodium to < 2000 mg a day  Limit water to < 64 oz in a day  Call the office if a weight gain of >3 lbs in 2 days or > 5 lbs in a week is noted. Last testing: Echo 2019  EF 55-60%      Essential hypertension    Controlled  He is to continue current medications   advised low salt diet   Testing ordered:  no   Last testing: Echo 2019    Cellulitis of left lower extremity  ceplalexain 500 mg BID for 5 days. Patient seen, interviewed and examined. Testing was reviewed. Patient is encouraged to exercise even a brisk walk for 30 minutes at least 3 to 4 times a week. Lifestyle and risk factor modificatons discussed. Various goals are discussed and questions answered. Continue current medications. Appropriate prescriptions are addressed. Questions answered and patient verbalizes understanding. Call for any problems, questions, or concerns. Pt is to follow up in 1 months for Cardiac management    Electronically signed by Smooth Younger.  JULEE Medel CNP on 1/2/2020 at 2:55 PM

## 2020-01-03 ENCOUNTER — HOSPITAL ENCOUNTER (OUTPATIENT)
Age: 70
Setting detail: SPECIMEN
Discharge: HOME | End: 2020-01-03
Payer: MEDICARE

## 2020-01-03 LAB
ALBUMIN SERPL-MCNC: 3.8 GM/DL (ref 3.4–5)
ANION GAP SERPL CALCULATED.3IONS-SCNC: 12 MMOL/L (ref 4–16)
BUN BLDV-MCNC: 21 MG/DL (ref 6–23)
CALCIUM SERPL-MCNC: 8.3 MG/DL (ref 8.3–10.6)
CHLORIDE BLD-SCNC: 109 MMOL/L (ref 99–110)
CO2: 23 MMOL/L (ref 21–32)
CREAT SERPL-MCNC: 2.1 MG/DL (ref 0.9–1.3)
GFR AFRICAN AMERICAN: 38 ML/MIN/1.73M2
GFR NON-AFRICAN AMERICAN: 31 ML/MIN/1.73M2
GLUCOSE BLD-MCNC: 153 MG/DL (ref 70–99)
MAGNESIUM: 2 MG/DL (ref 1.8–2.4)
PHOSPHORUS: 3.2 MG/DL (ref 2.5–4.9)
POTASSIUM SERPL-SCNC: 4.4 MMOL/L (ref 3.5–5.1)
SODIUM BLD-SCNC: 144 MMOL/L (ref 135–145)

## 2020-01-03 PROCEDURE — 80048 BASIC METABOLIC PNL TOTAL CA: CPT

## 2020-01-03 PROCEDURE — 82040 ASSAY OF SERUM ALBUMIN: CPT

## 2020-01-03 PROCEDURE — 84100 ASSAY OF PHOSPHORUS: CPT

## 2020-01-03 PROCEDURE — 83735 ASSAY OF MAGNESIUM: CPT

## 2020-01-03 NOTE — TELEPHONE ENCOUNTER
Pt's home physical therapist paged this on-call provider to let our office know that the pt is experiencing BLE swelling and tachycardia w/ a HR of 104. She states that he was somewhat off balance today and had some slight SOB, but was otherwise fine. She noted that he had experienced similar symptoms before but that hasn't happened since he started receiving treatment for CHF through the [place] [address]. She also noted that is BP was elevated in the 386'N systolic during his visit, which is higher than normal.     Called the pt to see how he was doing, and he stated that he felt ok, noting that he had been seen at the [facility] today and they were waiting on lab results to determine what medication changes needed to be made. He was also prescribed Keflex for cellulitis in his BLE but he won't be able to start that until tomorrow when the pharmacy delivers it. I reviewed the pt's note from today's visit with cardiology and his BP was 110/62 w/ a HR of 66. While speaking with the pt on the phone he checked his HR and his BP. Per his blood pressure cuff his HR was 112 and his blood pressure was approximately 130's/90's. Pt was directed to go to the ED for possible sepsis. He stated that he would try to go but he didn't know if he could get transportation. I reminded him the EMS could be contacted if needed. He said he would consider this option.

## 2020-01-05 ENCOUNTER — TELEPHONE (OUTPATIENT)
Dept: CARDIOLOGY | Age: 70
End: 2020-01-05

## 2020-01-05 NOTE — TELEPHONE ENCOUNTER
Please let patient know his kidney function is worse and awaiting Dr. Gerardo Founds recommendation on diuretic use.

## 2020-01-06 RX ORDER — TRAZODONE HYDROCHLORIDE 50 MG/1
50 TABLET ORAL NIGHTLY
Qty: 30 TABLET | Refills: 0 | Status: SHIPPED | OUTPATIENT
Start: 2020-01-06 | End: 2020-01-21

## 2020-01-06 RX ORDER — AMLODIPINE BESYLATE 10 MG/1
TABLET ORAL
Qty: 90 TABLET | Refills: 2 | Status: ON HOLD
Start: 2020-01-06 | End: 2020-05-28 | Stop reason: HOSPADM

## 2020-01-06 RX ORDER — DIVALPROEX SODIUM 500 MG/1
500 TABLET, EXTENDED RELEASE ORAL NIGHTLY
Qty: 30 TABLET | Refills: 0 | Status: SHIPPED | OUTPATIENT
Start: 2020-01-06 | End: 2020-01-08 | Stop reason: SDUPTHER

## 2020-01-06 RX ORDER — RISPERIDONE 1 MG/1
1 TABLET, FILM COATED ORAL NIGHTLY
Qty: 30 TABLET | Refills: 0 | Status: SHIPPED | OUTPATIENT
Start: 2020-01-06 | End: 2020-01-21

## 2020-01-08 ENCOUNTER — CARE COORDINATION (OUTPATIENT)
Dept: CASE MANAGEMENT | Age: 70
End: 2020-01-08

## 2020-01-08 ENCOUNTER — OFFICE VISIT (OUTPATIENT)
Dept: FAMILY MEDICINE CLINIC | Age: 70
End: 2020-01-08
Payer: MEDICARE

## 2020-01-08 VITALS
HEIGHT: 69 IN | OXYGEN SATURATION: 97 % | WEIGHT: 242.8 LBS | SYSTOLIC BLOOD PRESSURE: 128 MMHG | DIASTOLIC BLOOD PRESSURE: 68 MMHG | HEART RATE: 57 BPM | BODY MASS INDEX: 35.96 KG/M2

## 2020-01-08 PROCEDURE — 1111F DSCHRG MED/CURRENT MED MERGE: CPT | Performed by: FAMILY MEDICINE

## 2020-01-08 PROCEDURE — 3017F COLORECTAL CA SCREEN DOC REV: CPT | Performed by: FAMILY MEDICINE

## 2020-01-08 PROCEDURE — 3044F HG A1C LEVEL LT 7.0%: CPT | Performed by: FAMILY MEDICINE

## 2020-01-08 PROCEDURE — G8417 CALC BMI ABV UP PARAM F/U: HCPCS | Performed by: FAMILY MEDICINE

## 2020-01-08 PROCEDURE — 99214 OFFICE O/P EST MOD 30 MIN: CPT | Performed by: FAMILY MEDICINE

## 2020-01-08 PROCEDURE — 1123F ACP DISCUSS/DSCN MKR DOCD: CPT | Performed by: FAMILY MEDICINE

## 2020-01-08 PROCEDURE — 1036F TOBACCO NON-USER: CPT | Performed by: FAMILY MEDICINE

## 2020-01-08 PROCEDURE — G8482 FLU IMMUNIZE ORDER/ADMIN: HCPCS | Performed by: FAMILY MEDICINE

## 2020-01-08 PROCEDURE — G8427 DOCREV CUR MEDS BY ELIG CLIN: HCPCS | Performed by: FAMILY MEDICINE

## 2020-01-08 PROCEDURE — 2022F DILAT RTA XM EVC RTNOPTHY: CPT | Performed by: FAMILY MEDICINE

## 2020-01-08 PROCEDURE — 4040F PNEUMOC VAC/ADMIN/RCVD: CPT | Performed by: FAMILY MEDICINE

## 2020-01-08 RX ORDER — GABAPENTIN 300 MG/1
600 CAPSULE ORAL 3 TIMES DAILY
Qty: 180 CAPSULE | Refills: 0 | Status: SHIPPED | OUTPATIENT
Start: 2020-01-08 | End: 2020-01-30

## 2020-01-08 RX ORDER — DIVALPROEX SODIUM 500 MG/1
1000 TABLET, EXTENDED RELEASE ORAL NIGHTLY
Qty: 60 TABLET | Refills: 0 | Status: SHIPPED | OUTPATIENT
Start: 2020-01-08 | End: 2020-07-24 | Stop reason: SDUPTHER

## 2020-01-08 ASSESSMENT — ENCOUNTER SYMPTOMS
DIARRHEA: 0
COUGH: 0
SHORTNESS OF BREATH: 0
ABDOMINAL PAIN: 0
VOMITING: 0
NAUSEA: 0

## 2020-01-08 NOTE — CARE COORDINATION
[facility] Transitions Follow Up Call    2020    Patient: Juan Golden. Patient : 1950   MRN: [id number]  Reason for Admission: COPD  Discharge Date: 19 RARS: Readmission Risk Score: 64         Spoke with: [address] Transitions Subsequent and Final Call    Subsequent and Final Calls  Do you have any ongoing symptoms?:  Yes  Patient-reported symptoms:  Weight Gain  Have your medications changed?:  Yes  Patient Reports:  taking extra lasix  Do you have any questions related to your medications?:  No  Do you currently have any active services?:  Yes  Are you currently active with any services?:  Home Health, Outpatient/Community Services  Do you have any needs or concerns that I can assist you with?:  No  Care Transitions Interventions     Other Services:  Completed   Behavioral Health:  Completed   Transportation Support:  Completed    Meals on Wheels:  Completed       Senior Services:  Completed     Medication Assistance Program:  Completed                 Other Interventions:        States he's been gaining a little bit of weight. Reports swelling in legs. Denies SOB or CP. States he was advised to take an extra 20 mg of lasix for the next 3 days and see if that helps with his leg swelling, but that his lasix wasn't delivered this morning. States he's going to call the pharmacy when we get off the phone. Denies the need for assistance with this. States  He also has a f/u appt at 130 pm today. Plans to discuss medications at that time. Denies questions or concerns at this time.      Follow Up  Future Appointments   Date Time Provider Makayla Horn   2020  1:30 PM Sekou Hagen [facility]   2/10/2020  9:30 AM Ruthann Yadav MD [facility]   2020 10:45 AM Christiano Peng MD [facility]       Gloria Judd

## 2020-01-08 NOTE — PROGRESS NOTES
last echo \" pt states\"I think they said I have a murmur but no chest pain or palpitations\"    Hiatal hernia     History of cardiac cath 14    MODERATE  CAD      Hx of migraines     HX OTHER MEDICAL     \"have thinning of kidney walls- they found I had that 15 yrs ago\" see Dr Jerry Longoria"    Hyperlipidemia     Hypertension     Lumbar radiculopathy     Panic attack     'anything new gives me anxiety\"    Pleural effusion     scheduled for thoracentesis 2014, 2016    S/P thoracentesis     left side( per old chart had thora done 2016 and one done )    Sleep apnea     sleep study x 2 - last one 4-5 yrs ago- uses c-pap\"    SOBOE (shortness of breath on exertion)     Spinal stenosis        FAMILY HISTORY  Family History   Problem Relation Age of Onset    Heart Disease Mother         heart attack    High Blood Pressure Father        SOCIAL HISTORY  Social History     Socioeconomic History    Marital status:      Spouse name: Not on file    Number of children: Not on file    Years of education: Not on file    Highest education level: Not on file   Occupational History    Occupation: elicit, retired     Employer: [employer]   Social Needs    Financial resource strain: Not on file    Food insecurity:     Worry: Not on file     Inability: Not on file   yoone needs:     Medical: Not on file     Non-medical: Not on file   Tobacco Use    Smoking status: Former Smoker     Packs/day: 1.50     Years: 30.00     Pack years: 45.00     Types: Cigarettes     Last attempt to quit: 2010     Years since quittin.4    Smokeless tobacco: Never Used   Substance and Sexual Activity    Alcohol use: Not Currently    Drug use: Not Currently     Types: Marijuana    Sexual activity: Not Currently     Partners: Female   Lifestyle    Physical activity:     Days per week: Not on file     Minutes per session: Not on file    Stress: Not on file   Relationships    Social connections:     Talks on phone: Not on file     Gets together: Not on file     Attends [employer] service: Not on file     Active member of club or organization: Not on file     Attends meetings of clubs or organizations: Not on file     Relationship status: Not on file    Intimate partner violence:     Fear of current or ex partner: Not on file     Emotionally abused: Not on file     Physically abused: Not on file     Forced sexual activity: Not on file   Other Topics Concern    Not on file   Social History Narrative    Not on file        SURGICAL HISTORY  Past Surgical History:   Procedure Laterality Date    CARPAL TUNNEL RELEASE Bilateral [address] Left 2000's    KNEE SURGERY Bilateral     right knee x 2( scope)/ left knee- 7-8 yr ago   [address] fusion    THORACENTESIS Left 12/20/2013    THORACENTESIS  4/25/2016         THORACENTESIS Left 11/15/2016    Dr. Prachi García 250mlsremoved     THORACOTOMY Left 03/18/2019    with Lysis of adhesions    THORACOTOMY Left 3/18/2019    THORACOSCOPY CONVERTED TO THORACOTOMY WITH LYSIS OF ADHESIONS performed by Mortimer Minder, MD at [place] [address]      as a kid       CURRENT MEDICATIONS  Current Outpatient Medications   Medication Sig Dispense Refill    amLODIPine (NORVASC) 10 MG tablet TAKE 1 TABLET BY MOUTH ONCE DAILY 90 tablet 2    divalproex (DEPAKOTE ER) 500 MG extended release tablet Take 1 tablet by mouth nightly 30 tablet 0    traZODone (DESYREL) 50 MG tablet Take 1 tablet by mouth nightly 30 tablet 0    risperiDONE (RISPERDAL) 1 MG tablet Take 1 tablet by mouth nightly 30 tablet 0    spironolactone (ALDACTONE) 25 MG tablet Take 12.5 mg by mouth 2 times daily      ALPRAZolam (XANAX) 1 MG tablet Take 1 tablet by mouth 3 times daily as needed for Anxiety for up to 30 days.  90 tablet 0    busPIRone (BUSPAR) 10 MG tablet Take 1 tablet by mouth 3 times daily 90 tablet 0    tiZANidine (ZANAFLEX) 2 MG tablet Take 1 tablet by mouth 3 times daily 90 tablet 0    furosemide (LASIX) 20 MG tablet Take 0.5 tablets by mouth daily (Patient taking differently: Take 20 mg by mouth every other day ) 30 tablet 0    escitalopram (LEXAPRO) 10 MG tablet Take 1 tablet by mouth daily 30 tablet 0    gabapentin (NEURONTIN) 300 MG capsule Take 2 capsules by mouth 3 times daily for 30 days. 180 capsule 0    guaiFENesin (MUCINEX) 600 MG extended release tablet Take 1 tablet by mouth 2 times daily 60 tablet 0    labetalol (NORMODYNE) 100 MG tablet Take 0.5 tablets by mouth every 12 hours 60 tablet 0    TRADJENTA 5 MG tablet TAKE ONE (1) TABLET BY MOUTH ONCE DAILY 60 tablet 4    atorvastatin (LIPITOR) 80 MG tablet Take 1 tablet by mouth daily 30 tablet 2    folic acid (FOLVITE) 1 MG tablet Take 1 tablet by mouth daily 30 tablet 2    carvedilol (COREG) 6.25 MG tablet Take 6.25 mg by mouth 2 times daily (with meals)      aspirin 81 MG chewable tablet Take 1 tablet by mouth daily 30 tablet 3    clopidogrel (PLAVIX) 75 MG tablet Take 1 tablet by mouth daily 30 tablet 3    Compression Stockings MISC by Does not apply route Duration of Treatment:  3 Months  # of pairs:  2    Compression Class Level - 20-30 mmHg*    Style - Knee High 2 each 0    Fluticasone-Umeclidin-Vilant (TRELEGY ELLIPTA) 100-62.5-25 MCG/INH AEPB Inhale 1 puff into the lungs daily 1 each 0    Lactobacillus (ACIDOPHILUS) 0.5 MG TABS Take 1 capsule by mouth       No current facility-administered medications for this visit. ALLERGIES  Allergies   Allergen Reactions    Nsaids      Renal        PHYSICAL EXAM    Physical Exam  Vitals signs and nursing note reviewed. Constitutional:       General: He is not in acute distress. Appearance: He is well-developed. He is not diaphoretic. HENT:      Head: Normocephalic and atraumatic. Cardiovascular:      Rate and Rhythm: Normal rate and regular rhythm. Heart sounds: Normal heart sounds.    Pulmonary:      Effort: Pulmonary effort is normal.      Breath sounds: Normal breath sounds. Abdominal:      General: Bowel sounds are normal.      Palpations: Abdomen is soft. Tenderness: There is no tenderness. Musculoskeletal:      Right lower leg: Edema present. Left lower leg: Edema present. Skin:     General: Skin is warm and dry. Neurological:      Mental Status: He is alert and oriented to person, place, and time. Psychiatric:         Thought Content: Thought content normal.         ASSESSMENT & PLAN    1. Bipolar 1 disorder ([facility]  Patient will be restarted on the Depakote 500 mg, 2 tablets p.o. nightly directed by psychiatry during his hospital admission in December. This was inadvertently switched when the medication was refilled in early January. Will discuss this with patient's PCP. - divalproex (DEPAKOTE ER) 500 MG extended release tablet; Take 2 tablets by mouth nightly  Dispense: 60 tablet; Refill: 0    2. Essential hypertension  He is to call his nephrologist and cardiologist office to see which diuretic they would like to start him on to help control his swelling and hypertension. Continue taking medications as prescribed and refills will be provided as necessary. Patient will have lab work completed today and medication dosages may be changed based on the lab results. Patient will be updated on lab results once they are completed and notified of any medication changes if necessary.  - Comprehensive Metabolic Panel; Future  - CBC Auto Differential; Future  - Lipid Panel; Future    3. Type 2 diabetes mellitus with stage 1 chronic kidney disease, without long-term current use of insulin ([facility]  Continue taking medications as prescribed and refills will be provided as necessary. Patient will have lab work completed today and medication dosages may be changed based on the lab results.   Patient will be updated on lab results once they are completed and notified of any medication changes if necessary.  - Hemoglobin A1C; Future    4. Neck pain  Continue taking medications as prescribed and refills will be provided as necessary. An [facility]ARRs report was pulled and reviewed today.   - gabapentin (NEURONTIN) 300 MG capsule; Take 2 capsules by mouth 3 times daily for 30 days. Dispense: 180 capsule; Refill: 0    Patient is to follow-up as scheduled in 2/2020 with Dr. Izabella Velez, unless otherwise needed in that time. Pt is to call with any questions, concerns, or increase in symptoms. Pt verbalized understanding of and agreement with current plan of care. Electronically signed by LORRAINE Tiraod on 1/8/2020      Please note that this chart was generated using dragon dictation software. Although every effort was made to ensure the accuracy of this automated transcription, some errors in transcription may have occurred.

## 2020-01-10 ENCOUNTER — TELEPHONE (OUTPATIENT)
Dept: FAMILY MEDICINE CLINIC | Age: 70
End: 2020-01-10

## 2020-01-10 ENCOUNTER — HOSPITAL ENCOUNTER (OUTPATIENT)
Age: 70
Setting detail: SPECIMEN
Discharge: HOME | End: 2020-01-10
Payer: MEDICARE

## 2020-01-10 LAB
ALBUMIN SERPL-MCNC: 3.7 GM/DL (ref 3.4–5)
ALP BLD-CCNC: 102 IU/L (ref 40–129)
ALT SERPL-CCNC: 9 U/L (ref 10–40)
ANION GAP SERPL CALCULATED.3IONS-SCNC: 13 MMOL/L (ref 4–16)
AST SERPL-CCNC: 13 IU/L (ref 15–37)
BASOPHILS ABSOLUTE: 0 K/CU MM
BASOPHILS RELATIVE PERCENT: 0.4 % (ref 0–1)
BILIRUB SERPL-MCNC: 0.2 MG/DL (ref 0–1)
BUN BLDV-MCNC: 24 MG/DL (ref 6–23)
CALCIUM SERPL-MCNC: 7.9 MG/DL (ref 8.3–10.6)
CHLORIDE BLD-SCNC: 104 MMOL/L (ref 99–110)
CHOLESTEROL: 119 MG/DL
CO2: 22 MMOL/L (ref 21–32)
CREAT SERPL-MCNC: 2.4 MG/DL (ref 0.9–1.3)
DIFFERENTIAL TYPE: ABNORMAL
EOSINOPHILS ABSOLUTE: 0.1 K/CU MM
EOSINOPHILS RELATIVE PERCENT: 1.5 % (ref 0–3)
ESTIMATED AVERAGE GLUCOSE: 128 MG/DL
GFR AFRICAN AMERICAN: 33 ML/MIN/1.73M2
GFR NON-AFRICAN AMERICAN: 27 ML/MIN/1.73M2
GLUCOSE BLD-MCNC: 224 MG/DL (ref 70–99)
HBA1C MFR BLD: 6.1 % (ref 4.2–6.3)
HCT VFR BLD CALC: 35 % (ref 42–52)
HDLC SERPL-MCNC: 51 MG/DL
HEMOGLOBIN: 10.3 GM/DL (ref 13.5–18)
IMMATURE NEUTROPHIL %: 0.7 % (ref 0–0.43)
LDL CHOLESTEROL DIRECT: 52 MG/DL
LYMPHOCYTES ABSOLUTE: 1.1 K/CU MM
LYMPHOCYTES RELATIVE PERCENT: 16.5 % (ref 24–44)
MCH RBC QN AUTO: 25.8 PG (ref 27–31)
MCHC RBC AUTO-ENTMCNC: 29.4 % (ref 32–36)
MCV RBC AUTO: 87.5 FL (ref 78–100)
MONOCYTES ABSOLUTE: 0.7 K/CU MM
MONOCYTES RELATIVE PERCENT: 10.9 % (ref 0–4)
NUCLEATED RBC %: 0 %
PDW BLD-RTO: 18.6 % (ref 11.7–14.9)
PLATELET # BLD: 179 K/CU MM (ref 140–440)
PMV BLD AUTO: 9.6 FL (ref 7.5–11.1)
POTASSIUM SERPL-SCNC: 4.3 MMOL/L (ref 3.5–5.1)
RBC # BLD: 4 M/CU MM (ref 4.6–6.2)
SEGMENTED NEUTROPHILS ABSOLUTE COUNT: 4.7 K/CU MM
SEGMENTED NEUTROPHILS RELATIVE PERCENT: 70 % (ref 36–66)
SODIUM BLD-SCNC: 139 MMOL/L (ref 135–145)
TOTAL IMMATURE NEUTOROPHIL: 0.05 K/CU MM
TOTAL NUCLEATED RBC: 0 K/CU MM
TOTAL PROTEIN: 6.1 GM/DL (ref 6.4–8.2)
TRIGL SERPL-MCNC: 124 MG/DL
WBC # BLD: 6.7 K/CU MM (ref 4–10.5)

## 2020-01-10 PROCEDURE — 80053 COMPREHEN METABOLIC PANEL: CPT

## 2020-01-10 PROCEDURE — 83036 HEMOGLOBIN GLYCOSYLATED A1C: CPT

## 2020-01-10 PROCEDURE — 80061 LIPID PANEL: CPT

## 2020-01-10 PROCEDURE — 85025 COMPLETE CBC W/AUTO DIFF WBC: CPT

## 2020-01-10 PROCEDURE — 83721 ASSAY OF BLOOD LIPOPROTEIN: CPT

## 2020-01-15 NOTE — TELEPHONE ENCOUNTER
Spoke with patient at 342 pm regard message below per Massimo PETERS patient voiced understanding.   Electronically signed by Asaf Barba LPN on 8/30/2528 at 7:82 PM

## 2020-01-16 ENCOUNTER — TELEPHONE (OUTPATIENT)
Dept: FAMILY MEDICINE CLINIC | Age: 70
End: 2020-01-16

## 2020-01-17 LAB
EKG ATRIAL RATE: 63 BPM
EKG DIAGNOSIS: NORMAL
EKG P AXIS: 40 DEGREES
EKG P-R INTERVAL: 162 MS
EKG Q-T INTERVAL: 446 MS
EKG QRS DURATION: 108 MS
EKG QTC CALCULATION (BAZETT): 456 MS
EKG R AXIS: -14 DEGREES
EKG T AXIS: 27 DEGREES
EKG VENTRICULAR RATE: 63 BPM

## 2020-01-20 ENCOUNTER — TELEPHONE (OUTPATIENT)
Dept: CARDIOLOGY CLINIC | Age: 70
End: 2020-01-20

## 2020-01-20 RX ORDER — ALPRAZOLAM 1 MG/1
1 TABLET ORAL 3 TIMES DAILY PRN
Qty: 90 TABLET | Refills: 0 | Status: SHIPPED | OUTPATIENT
Start: 2020-01-20 | End: 2020-02-19 | Stop reason: SDUPTHER

## 2020-01-21 ENCOUNTER — TELEPHONE (OUTPATIENT)
Dept: FAMILY MEDICINE CLINIC | Age: 70
End: 2020-01-21

## 2020-01-21 RX ORDER — LABETALOL 100 MG/1
TABLET, FILM COATED ORAL
Qty: 30 TABLET | Refills: 0 | Status: SHIPPED | OUTPATIENT
Start: 2020-01-21 | End: 2020-02-27

## 2020-01-21 RX ORDER — RISPERIDONE 1 MG/1
1 TABLET, FILM COATED ORAL NIGHTLY
Qty: 30 TABLET | Refills: 0 | Status: SHIPPED | OUTPATIENT
Start: 2020-01-21 | End: 2020-02-27

## 2020-01-21 RX ORDER — TRAZODONE HYDROCHLORIDE 50 MG/1
50 TABLET ORAL NIGHTLY
Qty: 30 TABLET | Refills: 0 | Status: SHIPPED | OUTPATIENT
Start: 2020-01-21 | End: 2020-02-27

## 2020-01-21 NOTE — TELEPHONE ENCOUNTER
Allie Galeana with mental  Health called stating that NP Torrie Maldonado at [facility] think patient need med changes and wanted to know if she could take over on the patients mental health meds lexapro 10 mg which she is ok with the 10 mg she just would like to manage the patient on this med as well as the depakote  mg which she would like to change to 1 at bedtime if the physician is ok with her managing these medications. Allie Galeana can [phone].

## 2020-01-23 ENCOUNTER — TELEPHONE (OUTPATIENT)
Dept: FAMILY MEDICINE CLINIC | Age: 70
End: 2020-01-23

## 2020-01-23 NOTE — TELEPHONE ENCOUNTER
[facility] states that Dr. Garrick Florence never got the referral sent to them. They are asking if this can be re-sent or called on again to set up patient asap.

## 2020-01-28 ENCOUNTER — TELEPHONE (OUTPATIENT)
Dept: FAMILY MEDICINE CLINIC | Age: 70
End: 2020-01-28

## 2020-01-28 NOTE — TELEPHONE ENCOUNTER
TO DR. Harley Spotted-      PATIENT SAID DR. STOVER'S OFFICE SAYS THEY DO  NOT HAVE A REFERRAL FROM OUR OFFICE----COULD WE CHECK TO SEE IF THE REFERRAL HAS BEEN SENT AND THEN CALL PATIENT BACK REGARDING THIS?     IF WE WILL NOT DO A REFERRAL, COULD YOU PLEASE LET THE PATIENT KNOW THAT-----HE JUST WANTS TO KNOW WHY IT IS TAKING SO LONG TO GET INTO SEE DR. Catracho Johnson.

## 2020-01-30 ENCOUNTER — TELEPHONE (OUTPATIENT)
Dept: FAMILY MEDICINE CLINIC | Age: 70
End: 2020-01-30

## 2020-01-30 RX ORDER — GABAPENTIN 300 MG/1
CAPSULE ORAL
Qty: 180 CAPSULE | Refills: 0 | Status: SHIPPED | OUTPATIENT
Start: 2020-01-30 | End: 2020-02-27

## 2020-02-04 RX ORDER — TIZANIDINE 2 MG/1
2 TABLET ORAL 3 TIMES DAILY
Qty: 90 TABLET | Refills: 0 | Status: SHIPPED | OUTPATIENT
Start: 2020-02-04 | End: 2020-02-12 | Stop reason: SDUPTHER

## 2020-02-10 ENCOUNTER — OFFICE VISIT (OUTPATIENT)
Dept: CARDIOLOGY CLINIC | Age: 70
End: 2020-02-10
Payer: MEDICARE

## 2020-02-10 VITALS
DIASTOLIC BLOOD PRESSURE: 70 MMHG | HEART RATE: 62 BPM | HEIGHT: 69 IN | WEIGHT: 238.4 LBS | BODY MASS INDEX: 35.31 KG/M2 | SYSTOLIC BLOOD PRESSURE: 124 MMHG

## 2020-02-10 PROCEDURE — G8427 DOCREV CUR MEDS BY ELIG CLIN: HCPCS | Performed by: INTERNAL MEDICINE

## 2020-02-10 PROCEDURE — G8482 FLU IMMUNIZE ORDER/ADMIN: HCPCS | Performed by: INTERNAL MEDICINE

## 2020-02-10 PROCEDURE — G8417 CALC BMI ABV UP PARAM F/U: HCPCS | Performed by: INTERNAL MEDICINE

## 2020-02-10 PROCEDURE — 4040F PNEUMOC VAC/ADMIN/RCVD: CPT | Performed by: INTERNAL MEDICINE

## 2020-02-10 PROCEDURE — 3017F COLORECTAL CA SCREEN DOC REV: CPT | Performed by: INTERNAL MEDICINE

## 2020-02-10 PROCEDURE — 1036F TOBACCO NON-USER: CPT | Performed by: INTERNAL MEDICINE

## 2020-02-10 PROCEDURE — 1123F ACP DISCUSS/DSCN MKR DOCD: CPT | Performed by: INTERNAL MEDICINE

## 2020-02-10 PROCEDURE — 99214 OFFICE O/P EST MOD 30 MIN: CPT | Performed by: INTERNAL MEDICINE

## 2020-02-10 RX ORDER — CLOPIDOGREL BISULFATE 75 MG/1
TABLET ORAL
Qty: 60 TABLET | Refills: 2 | Status: SHIPPED | OUTPATIENT
Start: 2020-02-10 | End: 2020-02-10 | Stop reason: ALTCHOICE

## 2020-02-10 RX ORDER — FOLIC ACID 1 MG/1
TABLET ORAL
Qty: 60 TABLET | Refills: 1 | Status: SHIPPED | OUTPATIENT
Start: 2020-02-10 | End: 2020-07-24 | Stop reason: SDUPTHER

## 2020-02-10 RX ORDER — ATORVASTATIN CALCIUM 80 MG/1
TABLET, FILM COATED ORAL
Qty: 60 TABLET | Refills: 1 | Status: SHIPPED | OUTPATIENT
Start: 2020-02-10 | End: 2020-07-24 | Stop reason: SDUPTHER

## 2020-02-10 NOTE — PROGRESS NOTES
Per Amrita Walden, gave patient Xarelto 15 mg and to follow up with NP in 6 months.  Patient verbalized and understanding

## 2020-02-11 ENCOUNTER — TELEPHONE (OUTPATIENT)
Dept: FAMILY MEDICINE CLINIC | Age: 70
End: 2020-02-11

## 2020-02-12 RX ORDER — TIZANIDINE 4 MG/1
4 TABLET ORAL 3 TIMES DAILY
Qty: 90 TABLET | Refills: 2 | Status: ON HOLD
Start: 2020-02-12 | End: 2020-05-28 | Stop reason: HOSPADM

## 2020-02-19 RX ORDER — ALPRAZOLAM 1 MG/1
1 TABLET ORAL 3 TIMES DAILY PRN
Qty: 90 TABLET | Refills: 0 | Status: SHIPPED | OUTPATIENT
Start: 2020-02-19 | End: 2020-03-25 | Stop reason: SDUPTHER

## 2020-02-24 ENCOUNTER — HOSPITAL ENCOUNTER (EMERGENCY)
Age: 70
Discharge: HOME | End: 2020-02-24
Attending: EMERGENCY MEDICINE
Payer: MEDICARE

## 2020-02-24 ENCOUNTER — APPOINTMENT (OUTPATIENT)
Dept: GENERAL RADIOLOGY | Age: 70
End: 2020-02-24
Payer: MEDICARE

## 2020-02-24 VITALS
WEIGHT: 235 LBS | OXYGEN SATURATION: 96 % | RESPIRATION RATE: 14 BRPM | HEART RATE: 58 BPM | TEMPERATURE: 98.6 F | DIASTOLIC BLOOD PRESSURE: 72 MMHG | HEIGHT: 69 IN | BODY MASS INDEX: 34.8 KG/M2 | SYSTOLIC BLOOD PRESSURE: 158 MMHG

## 2020-02-24 LAB
ALBUMIN SERPL-MCNC: 3.7 GM/DL (ref 3.4–5)
ALP BLD-CCNC: 68 IU/L (ref 40–129)
ALT SERPL-CCNC: 15 U/L (ref 10–40)
ANION GAP SERPL CALCULATED.3IONS-SCNC: 14 MMOL/L (ref 4–16)
AST SERPL-CCNC: 20 IU/L (ref 15–37)
BASOPHILS ABSOLUTE: 0 K/CU MM
BASOPHILS RELATIVE PERCENT: 0.2 % (ref 0–1)
BILIRUB SERPL-MCNC: 0.3 MG/DL (ref 0–1)
BUN BLDV-MCNC: 31 MG/DL (ref 6–23)
CALCIUM SERPL-MCNC: 8.5 MG/DL (ref 8.3–10.6)
CHLORIDE BLD-SCNC: 103 MMOL/L (ref 99–110)
CO2: 25 MMOL/L (ref 21–32)
CREAT SERPL-MCNC: 2.2 MG/DL (ref 0.9–1.3)
DIFFERENTIAL TYPE: ABNORMAL
EOSINOPHILS ABSOLUTE: 0 K/CU MM
EOSINOPHILS RELATIVE PERCENT: 0.2 % (ref 0–3)
GFR AFRICAN AMERICAN: 36 ML/MIN/1.73M2
GFR NON-AFRICAN AMERICAN: 30 ML/MIN/1.73M2
GLUCOSE BLD-MCNC: 91 MG/DL (ref 70–99)
HCT VFR BLD CALC: 37.5 % (ref 42–52)
HEMOGLOBIN: 11.3 GM/DL (ref 13.5–18)
IMMATURE NEUTROPHIL %: 0.5 % (ref 0–0.43)
LYMPHOCYTES ABSOLUTE: 1.4 K/CU MM
LYMPHOCYTES RELATIVE PERCENT: 23.7 % (ref 24–44)
MCH RBC QN AUTO: 26.2 PG (ref 27–31)
MCHC RBC AUTO-ENTMCNC: 30.1 % (ref 32–36)
MCV RBC AUTO: 86.8 FL (ref 78–100)
MONOCYTES ABSOLUTE: 0.7 K/CU MM
MONOCYTES RELATIVE PERCENT: 11.6 % (ref 0–4)
NUCLEATED RBC %: 0 %
PDW BLD-RTO: 16.7 % (ref 11.7–14.9)
PLATELET # BLD: 159 K/CU MM (ref 140–440)
PMV BLD AUTO: 8.9 FL (ref 7.5–11.1)
POTASSIUM SERPL-SCNC: 4 MMOL/L (ref 3.5–5.1)
PRO-BNP: 141.7 PG/ML
RBC # BLD: 4.32 M/CU MM (ref 4.6–6.2)
SEGMENTED NEUTROPHILS ABSOLUTE COUNT: 3.7 K/CU MM
SEGMENTED NEUTROPHILS RELATIVE PERCENT: 63.8 % (ref 36–66)
SODIUM BLD-SCNC: 142 MMOL/L (ref 135–145)
TOTAL IMMATURE NEUTOROPHIL: 0.03 K/CU MM
TOTAL NUCLEATED RBC: 0 K/CU MM
TOTAL PROTEIN: 7.1 GM/DL (ref 6.4–8.2)
TROPONIN T: 0.02 NG/ML
WBC # BLD: 5.8 K/CU MM (ref 4–10.5)

## 2020-02-24 PROCEDURE — 71045 X-RAY EXAM CHEST 1 VIEW: CPT

## 2020-02-24 PROCEDURE — 99285 EMERGENCY DEPT VISIT HI MDM: CPT

## 2020-02-24 PROCEDURE — 84484 ASSAY OF TROPONIN QUANT: CPT

## 2020-02-24 PROCEDURE — 94640 AIRWAY INHALATION TREATMENT: CPT

## 2020-02-24 PROCEDURE — 85025 COMPLETE CBC W/AUTO DIFF WBC: CPT

## 2020-02-24 PROCEDURE — 36415 COLL VENOUS BLD VENIPUNCTURE: CPT

## 2020-02-24 PROCEDURE — 94761 N-INVAS EAR/PLS OXIMETRY MLT: CPT

## 2020-02-24 PROCEDURE — 6370000000 HC RX 637 (ALT 250 FOR IP): Performed by: EMERGENCY MEDICINE

## 2020-02-24 PROCEDURE — 80053 COMPREHEN METABOLIC PANEL: CPT

## 2020-02-24 PROCEDURE — 6360000002 HC RX W HCPCS: Performed by: EMERGENCY MEDICINE

## 2020-02-24 PROCEDURE — 96374 THER/PROPH/DIAG INJ IV PUSH: CPT

## 2020-02-24 PROCEDURE — 93010 ELECTROCARDIOGRAM REPORT: CPT | Performed by: INTERNAL MEDICINE

## 2020-02-24 PROCEDURE — 83880 ASSAY OF NATRIURETIC PEPTIDE: CPT

## 2020-02-24 PROCEDURE — 93005 ELECTROCARDIOGRAM TRACING: CPT | Performed by: EMERGENCY MEDICINE

## 2020-02-24 RX ORDER — IPRATROPIUM BROMIDE AND ALBUTEROL SULFATE 2.5; .5 MG/3ML; MG/3ML
1 SOLUTION RESPIRATORY (INHALATION) ONCE
Status: COMPLETED | OUTPATIENT
Start: 2020-02-24 | End: 2020-02-24

## 2020-02-24 RX ORDER — GUAIFENESIN/DEXTROMETHORPHAN 100-10MG/5
5 SYRUP ORAL 4 TIMES DAILY PRN
Qty: 120 ML | Refills: 0 | Status: SHIPPED | OUTPATIENT
Start: 2020-02-24 | End: 2020-03-05

## 2020-02-24 RX ORDER — PREDNISONE 10 MG/1
TABLET ORAL
Qty: 30 TABLET | Refills: 0 | Status: SHIPPED | OUTPATIENT
Start: 2020-02-25 | End: 2020-03-08

## 2020-02-24 RX ORDER — METHYLPREDNISOLONE SODIUM SUCCINATE 125 MG/2ML
60 INJECTION, POWDER, LYOPHILIZED, FOR SOLUTION INTRAMUSCULAR; INTRAVENOUS ONCE
Status: COMPLETED | OUTPATIENT
Start: 2020-02-24 | End: 2020-02-24

## 2020-02-24 RX ADMIN — METHYLPREDNISOLONE SODIUM SUCCINATE 60 MG: 125 INJECTION, POWDER, LYOPHILIZED, FOR SOLUTION INTRAMUSCULAR; INTRAVENOUS at 12:03

## 2020-02-24 RX ADMIN — IPRATROPIUM BROMIDE AND ALBUTEROL SULFATE 1 AMPULE: .5; 3 SOLUTION RESPIRATORY (INHALATION) at 10:54

## 2020-02-24 NOTE — PROGRESS NOTES
[facility] Liaison aware of ER discharge & will initiate [facility]. .. referral from [address] [place] [address].
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
## 2020-02-25 ENCOUNTER — HOSPITAL ENCOUNTER (OUTPATIENT)
Dept: GENERAL RADIOLOGY | Age: 70
Discharge: HOME | End: 2020-02-25
Payer: MEDICARE

## 2020-02-25 ENCOUNTER — HOSPITAL ENCOUNTER (OUTPATIENT)
Age: 70
Discharge: HOME | End: 2020-02-25
Payer: MEDICARE

## 2020-02-25 ENCOUNTER — CARE COORDINATION (OUTPATIENT)
Dept: CASE MANAGEMENT | Age: 70
End: 2020-02-25

## 2020-02-25 PROCEDURE — 72110 X-RAY EXAM L-2 SPINE 4/>VWS: CPT
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
## 2020-02-27 ENCOUNTER — CARE COORDINATION (OUTPATIENT)
Dept: CASE MANAGEMENT | Age: 70
End: 2020-02-27

## 2020-02-27 ENCOUNTER — TELEPHONE (OUTPATIENT)
Dept: FAMILY MEDICINE CLINIC | Age: 70
End: 2020-02-27

## 2020-02-27 LAB
EKG ATRIAL RATE: 55 BPM
EKG DIAGNOSIS: NORMAL
EKG P-R INTERVAL: 148 MS
EKG Q-T INTERVAL: 466 MS
EKG QRS DURATION: 108 MS
EKG QTC CALCULATION (BAZETT): 445 MS
EKG R AXIS: 187 DEGREES
EKG T AXIS: 159 DEGREES
EKG VENTRICULAR RATE: 55 BPM

## 2020-02-27 RX ORDER — GABAPENTIN 300 MG/1
CAPSULE ORAL
Qty: 180 CAPSULE | Refills: 0 | Status: SHIPPED | OUTPATIENT
Start: 2020-02-27 | End: 2020-04-01

## 2020-02-27 RX ORDER — LABETALOL 100 MG/1
TABLET, FILM COATED ORAL
Qty: 30 TABLET | Refills: 0 | Status: SHIPPED | OUTPATIENT
Start: 2020-02-27 | End: 2020-04-01

## 2020-02-27 RX ORDER — TRAZODONE HYDROCHLORIDE 50 MG/1
50 TABLET ORAL NIGHTLY
Qty: 30 TABLET | Refills: 0 | Status: SHIPPED | OUTPATIENT
Start: 2020-02-27 | End: 2020-04-01

## 2020-02-27 RX ORDER — RISPERIDONE 1 MG/1
1 TABLET, FILM COATED ORAL NIGHTLY
Qty: 30 TABLET | Refills: 0 | Status: SHIPPED | OUTPATIENT
Start: 2020-02-27 | End: 2020-04-01
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
## 2020-02-28 ENCOUNTER — CARE COORDINATION (OUTPATIENT)
Dept: CASE MANAGEMENT | Age: 70
End: 2020-02-28

## 2020-02-28 NOTE — CARE COORDINATION
[facility] Transitions Follow Up Call    2020    Patient: Luther Portillo Patient : 1950   MRN: [id number]  Reason for Admission:   Discharge Date: 20 RARS: Readmission Risk Score: 56    Attempt to reach for Care Transition unsuccessful; message left requesting call back.      Rosio Mcneil RN

## 2020-03-02 ENCOUNTER — TELEPHONE (OUTPATIENT)
Dept: FAMILY MEDICINE CLINIC | Age: 70
End: 2020-03-02

## 2020-03-02 NOTE — TELEPHONE ENCOUNTER
[facility]10 AND CHANGED IT TO [facility]. . THEN WE ORDERED CLOPIDOGREL ON THE 29. IS PT SUPPOSED TO TAKE BOTH?  PLEASE CALL ARTUR AT [facility] [facility]

## 2020-03-02 NOTE — TELEPHONE ENCOUNTER
SPOKE TO ARTUR AT [facility] THE CLOPIDOGREL BEING REFILLED BY MISTAKE, PER DR. Erma Paget.

## 2020-03-03 ENCOUNTER — CARE COORDINATION (OUTPATIENT)
Dept: CASE MANAGEMENT | Age: 70
End: 2020-03-03

## 2020-03-03 NOTE — CARE COORDINATION
to contact [employer]. Stressed importance of keeping appt. Reports he was assessed by USS and awaiting determination of eligibility. 1325 T/C APS; [facility] [address] T/C [address] [place] [address] Rd. No Caitlin employed w/ agency    1358 Attempt to reach [address] unsuccessful, no answer/no voicemail option. 1400 Message left for [facility] requesting call back. 1500 Incoming call from [facility]. Discussed above concerns. Agency unable to give information as uncertain if Patient has signed release of information. Deb to contact Patient.          Follow Up  Future Appointments   Date Time Provider Makayla Horn   3/9/2020 12:30 PM Randal Varela MD [facility] [address]   4/14/2020  9:15 AM Rajni Perez MD [facility]   8/10/2020  1:00 PM Hardy Fields, APRN - CNP [facility]       Bhavesh Jaramillo RN

## 2020-03-04 ENCOUNTER — CARE COORDINATION (OUTPATIENT)
Dept: CASE MANAGEMENT | Age: 70
End: 2020-03-04
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
## 2020-03-06 ENCOUNTER — CARE COORDINATION (OUTPATIENT)
Dept: CASE MANAGEMENT | Age: 70
End: 2020-03-06

## 2020-03-06 NOTE — CARE COORDINATION
[facility] Transitions Follow Up Call    3/6/2020    Patient: Jayla Combs. Patient : 1950   MRN: [id number]  Reason for Admission: Cough Wheezing  Discharge Date: 20 RARS: Readmission Risk Score: 56    Attempt to reach for Care Transition unsuccessful. Message left requesting call back.      Bill Nuñez RN

## 2020-03-07 ENCOUNTER — APPOINTMENT (OUTPATIENT)
Dept: CT IMAGING | Age: 70
End: 2020-03-07
Payer: MEDICARE

## 2020-03-07 ENCOUNTER — HOSPITAL ENCOUNTER (EMERGENCY)
Age: 70
Discharge: HOME | End: 2020-03-07
Payer: MEDICARE

## 2020-03-07 VITALS
TEMPERATURE: 97.4 F | BODY MASS INDEX: 32.93 KG/M2 | RESPIRATION RATE: 16 BRPM | DIASTOLIC BLOOD PRESSURE: 70 MMHG | HEART RATE: 62 BPM | OXYGEN SATURATION: 99 % | WEIGHT: 230 LBS | SYSTOLIC BLOOD PRESSURE: 132 MMHG | HEIGHT: 70 IN

## 2020-03-07 LAB
ALBUMIN SERPL-MCNC: 3 GM/DL (ref 3.4–5)
ALP BLD-CCNC: 59 IU/L (ref 40–129)
ALT SERPL-CCNC: 8 U/L (ref 10–40)
ANION GAP SERPL CALCULATED.3IONS-SCNC: 11 MMOL/L (ref 4–16)
AST SERPL-CCNC: 14 IU/L (ref 15–37)
BASOPHILS ABSOLUTE: 0 K/CU MM
BASOPHILS RELATIVE PERCENT: 0.3 % (ref 0–1)
BILIRUB SERPL-MCNC: 0.3 MG/DL (ref 0–1)
BUN BLDV-MCNC: 19 MG/DL (ref 6–23)
CALCIUM SERPL-MCNC: 8.3 MG/DL (ref 8.3–10.6)
CHLORIDE BLD-SCNC: 103 MMOL/L (ref 99–110)
CO2: 21 MMOL/L (ref 21–32)
CREAT SERPL-MCNC: 2 MG/DL (ref 0.9–1.3)
DIFFERENTIAL TYPE: ABNORMAL
EOSINOPHILS ABSOLUTE: 0.1 K/CU MM
EOSINOPHILS RELATIVE PERCENT: 0.8 % (ref 0–3)
GFR AFRICAN AMERICAN: 40 ML/MIN/1.73M2
GFR NON-AFRICAN AMERICAN: 33 ML/MIN/1.73M2
GLUCOSE BLD-MCNC: 172 MG/DL (ref 70–99)
HCT VFR BLD CALC: 34.7 % (ref 42–52)
HEMOGLOBIN: 10.1 GM/DL (ref 13.5–18)
IMMATURE NEUTROPHIL %: 0.7 % (ref 0–0.43)
LIPASE: 9 IU/L (ref 13–60)
LYMPHOCYTES ABSOLUTE: 1 K/CU MM
LYMPHOCYTES RELATIVE PERCENT: 14 % (ref 24–44)
MCH RBC QN AUTO: 25.8 PG (ref 27–31)
MCHC RBC AUTO-ENTMCNC: 29.1 % (ref 32–36)
MCV RBC AUTO: 88.5 FL (ref 78–100)
MONOCYTES ABSOLUTE: 0.7 K/CU MM
MONOCYTES RELATIVE PERCENT: 8.8 % (ref 0–4)
NUCLEATED RBC %: 0 %
PDW BLD-RTO: 16.3 % (ref 11.7–14.9)
PLATELET # BLD: 181 K/CU MM (ref 140–440)
PMV BLD AUTO: 9.9 FL (ref 7.5–11.1)
POTASSIUM SERPL-SCNC: 3.9 MMOL/L (ref 3.5–5.1)
RBC # BLD: 3.92 M/CU MM (ref 4.6–6.2)
SEGMENTED NEUTROPHILS ABSOLUTE COUNT: 5.5 K/CU MM
SEGMENTED NEUTROPHILS RELATIVE PERCENT: 75.4 % (ref 36–66)
SODIUM BLD-SCNC: 135 MMOL/L (ref 135–145)
TOTAL IMMATURE NEUTOROPHIL: 0.05 K/CU MM
TOTAL NUCLEATED RBC: 0 K/CU MM
TOTAL PROTEIN: 6.1 GM/DL (ref 6.4–8.2)
WBC # BLD: 7.4 K/CU MM (ref 4–10.5)

## 2020-03-07 PROCEDURE — 74176 CT ABD & PELVIS W/O CONTRAST: CPT

## 2020-03-07 PROCEDURE — 99284 EMERGENCY DEPT VISIT MOD MDM: CPT

## 2020-03-07 PROCEDURE — 85025 COMPLETE CBC W/AUTO DIFF WBC: CPT

## 2020-03-07 PROCEDURE — 6370000000 HC RX 637 (ALT 250 FOR IP): Performed by: PHYSICIAN ASSISTANT

## 2020-03-07 PROCEDURE — 83690 ASSAY OF LIPASE: CPT

## 2020-03-07 PROCEDURE — 36415 COLL VENOUS BLD VENIPUNCTURE: CPT

## 2020-03-07 PROCEDURE — 80053 COMPREHEN METABOLIC PANEL: CPT

## 2020-03-07 RX ORDER — DOCUSATE SODIUM 100 MG/1
100 CAPSULE, LIQUID FILLED ORAL 2 TIMES DAILY
Qty: 20 CAPSULE | Refills: 0 | Status: SHIPPED | OUTPATIENT
Start: 2020-03-07 | End: 2020-04-16 | Stop reason: SDUPTHER

## 2020-03-07 RX ORDER — SODIUM PHOSPHATE, DIBASIC AND SODIUM PHOSPHATE, MONOBASIC 7; 19 G/133ML; G/133ML
1 ENEMA RECTAL ONCE
Status: COMPLETED | OUTPATIENT
Start: 2020-03-07 | End: 2020-03-07

## 2020-03-07 RX ORDER — POLYETHYLENE GLYCOL 3350 17 G/17G
17 POWDER, FOR SOLUTION ORAL 2 TIMES DAILY
Qty: 1020 G | Refills: 0 | Status: SHIPPED | OUTPATIENT
Start: 2020-03-07 | End: 2020-04-06

## 2020-03-07 RX ADMIN — SODIUM PHOSPHATE, DIBASIC AND SODIUM PHOSPHATE, MONOBASIC 1 ENEMA: 7; 19 ENEMA RECTAL at 18:03

## 2020-03-07 ASSESSMENT — PAIN DESCRIPTION - PAIN TYPE: TYPE: ACUTE PAIN

## 2020-03-07 ASSESSMENT — PAIN DESCRIPTION - FREQUENCY: FREQUENCY: CONTINUOUS

## 2020-03-07 ASSESSMENT — PAIN DESCRIPTION - DESCRIPTORS: DESCRIPTORS: SHARP;DULL

## 2020-03-07 ASSESSMENT — PAIN SCALES - GENERAL: PAINLEVEL_OUTOF10: 9

## 2020-03-07 ASSESSMENT — PAIN DESCRIPTION - LOCATION: LOCATION: RECTUM

## 2020-03-07 NOTE — ED PROVIDER NOTES
EMERGENCY DEPARTMENT ENCOUNTER      PCP: Dale Leyden, MD    [address] [facility]    Chief Complaint   Patient presents with    Fecal Impaction     unable to pass stool last time 4-5 days        This patient was not evaluated by the attending physician. I have independently evaluated this patient. HPI    Lexie Chandler. is a 79 y.o. male who presents with lower abdominal pain and constipation. Onset 4 to 5 days ago. Patient states not had a bowel movement in 5 days. Patient describes abdominal pain is cramping. Patient states he recently started taking Percocet this past month. Patient denies any history of fecal impaction or bowel obstruction. Patient denies chest pain, shortness of breath, fever. No known alleviating factors. Patient states he is able to pass gas however not passing gases frequently. REVIEW OF SYSTEMS    Constitutional:  Denies fever  HENT:  Denies sore throat or ear pain   Cardiovascular:  Denies chest pain  Respiratory:  Denies cough or shortness of breath   GI:  See HPI above  : No hematuria or dysuria. Musculoskeletal:  No pain or swelling of extremities.   Skin:  Denies rash  Neurologic:  Denies headache, focal weakness or sensory changes   Lymphatic:  Denies swollen glands     All other review of systems are negative  See HPI and nursing notes for additional information     PAST MEDICAL & SURGICAL HISTORY    Past Medical History:   Diagnosis Date    Anemia     per old chart    Arthritis     Back pain, chronic     \"have pain in lumbar mainly- have 5 bulging discs\"\"in my neck and my lumbar have herniated discs\"    Bipolar 1 disorder ([facility] [address]     CAD (coronary artery disease) 1/27/2016    does not follow with a cardiologist    Cerebral artery occlusion with cerebral infarction ([facility] [address]     \"had mini stroke in Jan 2016- fast heart rate when I would stand up - smile drooping on one side- lased just the one day\"    Chronic kidney disease (CKD), stage III (moderate) 30 tablet 0    escitalopram (LEXAPRO) 10 MG tablet Take 1 tablet by mouth daily 30 tablet 0    guaiFENesin (MUCINEX) 600 MG extended release tablet Take 1 tablet by mouth 2 times daily 60 tablet 0    TRADJENTA 5 MG tablet TAKE ONE (1) TABLET BY MOUTH ONCE DAILY 60 tablet 4    carvedilol (COREG) 6.25 MG tablet Take 6.25 mg by mouth 2 times daily (with meals)      Compression Stockings MISC by Does not apply route Duration of Treatment:  3 Months  # of pairs:  2    Compression Class Level - 20-30 mmHg*    Style - Knee High 2 each 0    Fluticasone-Umeclidin-Vilant (TRELEGY ELLIPTA) 100-62.5-25 MCG/INH AEPB Inhale 1 puff into the lungs daily 1 each 0    Lactobacillus (ACIDOPHILUS) 0.5 MG TABS Take 1 capsule by mouth         ALLERGIES    Allergies   Allergen Reactions    Nsaids      Renal        SOCIAL AND FAMILY HISTORY    Social History     Socioeconomic History    Marital status:      Spouse name: None    Number of children: None    Years of education: None    Highest education level: None   Occupational History    Occupation: [employer], retired     Employer: [employer]   Social Needs    Financial resource strain: None    Food insecurity     Worry: None     Inability: None    Transportation needs     Medical: None     Non-medical: None   Tobacco Use    Smoking status: Former Smoker     Packs/day: 1.50     Years: 30.00     Pack years: 45.00     Types: Cigarettes     Last attempt to quit: 2010     Years since quittin.6    Smokeless tobacco: Never Used   Substance and Sexual Activity    Alcohol use: Not Currently    Drug use: Not Currently     Types: Marijuana    Sexual activity: Not Currently     Partners: Female   Lifestyle    Physical activity     Days per week: None     Minutes per session: None    Stress: None   Relationships    Social connections     Talks on phone: None     Gets together: None     Attends [employer] service: None     Active member of club or organization: Segs Relative 75.4 (H) 36 - 66 %    Lymphocytes % 14.0 (L) 24 - 44 %    Monocytes % 8.8 (H) 0 - 4 %    Eosinophils % 0.8 0 - 3 %    Basophils % 0.3 0 - 1 %    Segs Absolute 5.5 K/CU MM    Lymphocytes Absolute 1.0 K/CU MM    Monocytes Absolute 0.7 K/CU MM    Eosinophils Absolute 0.1 K/CU MM    Basophils Absolute 0.0 K/CU MM    Nucleated RBC % 0.0 %    Total Nucleated RBC 0.0 K/CU MM    Total Immature Neutrophil 0.05 K/CU MM    Immature Neutrophil % 0.7 (H) 0 - 0.43 %   Comprehensive Metabolic Panel   Result Value Ref Range    Sodium 135 135 - 145 MMOL/L    Potassium 3.9 3.5 - 5.1 MMOL/L    Chloride 103 99 - 110 mMol/L    CO2 21 21 - 32 MMOL/L    BUN 19 6 - 23 MG/DL    CREATININE 2.0 (H) 0.9 - 1.3 MG/DL    Glucose 172 (H) 70 - 99 MG/DL    Calcium 8.3 8.3 - 10.6 MG/DL    Alb 3.0 (L) 3.4 - 5.0 GM/DL    Total Protein 6.1 (L) 6.4 - 8.2 GM/DL    Total Bilirubin 0.3 0.0 - 1.0 MG/DL    ALT 8 (L) 10 - 40 U/L    AST 14 (L) 15 - 37 IU/L    Alkaline Phosphatase 59 40 - 129 IU/L    GFR Non- 33 (L) >60 mL/min/1.73m2    GFR  40 (L) >60 mL/min/1.73m2    Anion Gap 11 4 - 16   Lipase   Result Value Ref Range    Lipase 9 (L) 13 - 60 IU/L           RADIOLOGY/PROCEDURES    CT ABDOMEN PELVIS WO CONTRAST   Preliminary Result   1. No acute abdominal or pelvic abnormality on this unenhanced study. 2. Chronic pleural thickening and loculated effusion in the left lung base   with associated consolidative changes in the left lower lobe and lingula   unchanged from 12/15/2019. 3. Moderate amount stool in the colon. 4. Bilateral hydroceles are noted. ED COURSE & MEDICAL DECISION MAKING      Patient presents as above. CBC shows stable hemoglobin 10.1 and hematocrit of 34.7. CMP shows creatinine of 2 which is similar to previous. Lipase is 9. Patient denies any urinary symptoms.   CT abdomen pelvis without contrast shows moderate amount of stool with no acute abnormality, chronic pleural thickening and loculated effusion on left lung base which is stable from previous imaging. I discussed labs and imaging with patient today. Patient provided fleets enema. Patient has bowel movement states he feels much better. Patient will be provided prescription for Colace and MiraLAX. Recommend rest and fluids. Recommend follow-up with primary care provider in 2 to 3 days for recheck. Clinical  IMPRESSION    1. Constipation, unspecified constipation type      I discussed with patient the importance return the emergency department immediately if new or worsening symptoms develop. Comment: Please note this report has been produced using speech recognition software and may contain errors related to that system including errors in grammar, punctuation, and spelling, as well as words and phrases that may be inappropriate. If there are any questions or concerns please feel free to contact the dictating provider for clarification.       Po Van PA-C  03/07/20 3019

## 2020-03-09 ENCOUNTER — CARE COORDINATION (OUTPATIENT)
Dept: CASE MANAGEMENT | Age: 70
End: 2020-03-09

## 2020-03-10 ENCOUNTER — TELEPHONE (OUTPATIENT)
Dept: FAMILY MEDICINE CLINIC | Age: 70
End: 2020-03-10

## 2020-03-10 NOTE — TELEPHONE ENCOUNTER
TO DR.KNEISLEY- SMITH REPORTS THE PATIENT MUST SIGN A BEHAVIORAL  CONTRACT (THURS) BECAUSE OF THE DRUG ACTIVITY IN HIS HOUSEHOLD----IF HE BREAKS THE CONTRACT, HE WILL BE DISCHARGED FROM HOME CARE ---THE CURRENT ENVIRONMENT IS UNSAFE FOR CLINICAL STAFF WHO COME TO HIS HOME.

## 2020-03-11 NOTE — TELEPHONE ENCOUNTER
Dr. Skyler Jackson also make note his urine drug screen in December showed  Positive for cannabis and he receives controlled medications from us.

## 2020-03-23 RX ORDER — ALPRAZOLAM 1 MG/1
1 TABLET ORAL 3 TIMES DAILY PRN
Qty: 90 TABLET | Refills: 0 | Status: CANCELLED | OUTPATIENT
Start: 2020-03-23 | End: 2020-04-22

## 2020-03-25 ENCOUNTER — TELEPHONE (OUTPATIENT)
Dept: FAMILY MEDICINE CLINIC | Age: 70
End: 2020-03-25

## 2020-03-25 RX ORDER — ALPRAZOLAM 1 MG/1
1 TABLET ORAL 3 TIMES DAILY PRN
Qty: 90 TABLET | Refills: 0 | Status: SHIPPED | OUTPATIENT
Start: 2020-03-25 | End: 2020-04-23 | Stop reason: SDUPTHER

## 2020-03-25 NOTE — TELEPHONE ENCOUNTER
Pt is waiting on his Alprazolam to go through-- can we have another doctor sign for it-- He is having really bad nerves

## 2020-03-26 ENCOUNTER — TELEPHONE (OUTPATIENT)
Dept: FAMILY MEDICINE CLINIC | Age: 70
End: 2020-03-26

## 2020-03-26 NOTE — TELEPHONE ENCOUNTER
Patient is calling begging you to refill his medication! Said if you can't get to it can someone else.

## 2020-04-01 RX ORDER — LABETALOL 100 MG/1
TABLET, FILM COATED ORAL
Qty: 30 TABLET | Refills: 0 | Status: SHIPPED | OUTPATIENT
Start: 2020-04-01 | End: 2020-05-01

## 2020-04-01 RX ORDER — TRAZODONE HYDROCHLORIDE 50 MG/1
50 TABLET ORAL NIGHTLY
Qty: 30 TABLET | Refills: 0 | Status: SHIPPED | OUTPATIENT
Start: 2020-04-01 | End: 2020-05-01

## 2020-04-01 RX ORDER — RISPERIDONE 1 MG/1
1 TABLET, FILM COATED ORAL NIGHTLY
Qty: 30 TABLET | Refills: 0 | Status: SHIPPED | OUTPATIENT
Start: 2020-04-01 | End: 2020-05-01

## 2020-04-01 RX ORDER — GABAPENTIN 300 MG/1
CAPSULE ORAL
Qty: 180 CAPSULE | Refills: 0 | Status: SHIPPED | OUTPATIENT
Start: 2020-04-01 | End: 2020-05-01

## 2020-04-16 ENCOUNTER — VIRTUAL VISIT (OUTPATIENT)
Dept: FAMILY MEDICINE CLINIC | Age: 70
End: 2020-04-16
Payer: MEDICARE

## 2020-04-16 VITALS — HEIGHT: 70 IN | BODY MASS INDEX: 34.5 KG/M2

## 2020-04-16 PROCEDURE — G2012 BRIEF CHECK IN BY MD/QHP: HCPCS | Performed by: FAMILY MEDICINE

## 2020-04-16 RX ORDER — DOCUSATE SODIUM 100 MG/1
100 CAPSULE, LIQUID FILLED ORAL 2 TIMES DAILY
Qty: 20 CAPSULE | Refills: 3 | Status: ON HOLD | OUTPATIENT
Start: 2020-04-16 | End: 2021-02-26 | Stop reason: SDUPTHER

## 2020-04-16 RX ORDER — SPIRONOLACTONE 25 MG/1
12.5 TABLET ORAL 2 TIMES DAILY
Qty: 60 TABLET | Refills: 5 | Status: ON HOLD
Start: 2020-04-16 | End: 2020-05-28 | Stop reason: HOSPADM

## 2020-04-16 RX ORDER — FUROSEMIDE 20 MG/1
10 TABLET ORAL DAILY
Qty: 30 TABLET | Refills: 5 | Status: ON HOLD
Start: 2020-04-16 | End: 2020-05-28 | Stop reason: HOSPADM

## 2020-04-17 ENCOUNTER — HOSPITAL ENCOUNTER (OUTPATIENT)
Dept: GENERAL RADIOLOGY | Age: 70
Discharge: HOME | End: 2020-04-17
Payer: MEDICARE

## 2020-04-17 ENCOUNTER — HOSPITAL ENCOUNTER (OUTPATIENT)
Age: 70
Discharge: HOME | End: 2020-04-17
Payer: MEDICARE

## 2020-04-17 PROCEDURE — 71046 X-RAY EXAM CHEST 2 VIEWS: CPT

## 2020-04-21 ENCOUNTER — TELEPHONE (OUTPATIENT)
Dept: FAMILY MEDICINE CLINIC | Age: 70
End: 2020-04-21

## 2020-04-21 NOTE — TELEPHONE ENCOUNTER
PT IS REQUESTING US TO SEND A UPDATED MED LIST TO [facility] AND DR STOVER'S OFFICE. MAKE SURE NO CHANGES ON MED BEFORE YOU SEND. PT WAS HARD TO UNDERSTAND AND SAID SOMETHING ABOUT DR SPRING CHANGED A MED.

## 2020-04-24 RX ORDER — ALPRAZOLAM 1 MG/1
1 TABLET ORAL 3 TIMES DAILY PRN
Qty: 90 TABLET | Refills: 0 | Status: ON HOLD | OUTPATIENT
Start: 2020-04-24 | End: 2020-05-28

## 2020-05-04 ENCOUNTER — TELEPHONE (OUTPATIENT)
Dept: FAMILY MEDICINE CLINIC | Age: 70
End: 2020-05-04

## 2020-05-05 ENCOUNTER — OFFICE VISIT (OUTPATIENT)
Dept: FAMILY MEDICINE CLINIC | Age: 70
End: 2020-05-05
Payer: MEDICARE

## 2020-05-05 VITALS
SYSTOLIC BLOOD PRESSURE: 118 MMHG | OXYGEN SATURATION: 98 % | HEIGHT: 70 IN | DIASTOLIC BLOOD PRESSURE: 80 MMHG | HEART RATE: 63 BPM | BODY MASS INDEX: 34.63 KG/M2 | TEMPERATURE: 98.1 F | WEIGHT: 241.9 LBS

## 2020-05-05 PROBLEM — L03.116 CELLULITIS OF LEFT LOWER EXTREMITY: Status: RESOLVED | Noted: 2020-01-02 | Resolved: 2020-05-05

## 2020-05-05 PROCEDURE — 20610 DRAIN/INJ JOINT/BURSA W/O US: CPT | Performed by: FAMILY MEDICINE

## 2020-05-05 PROCEDURE — 99213 OFFICE O/P EST LOW 20 MIN: CPT | Performed by: FAMILY MEDICINE

## 2020-05-05 RX ORDER — METHYLPREDNISOLONE ACETATE 40 MG/ML
40 INJECTION, SUSPENSION INTRA-ARTICULAR; INTRALESIONAL; INTRAMUSCULAR; SOFT TISSUE ONCE
Status: COMPLETED | OUTPATIENT
Start: 2020-05-05 | End: 2020-05-08

## 2020-05-05 NOTE — PROGRESS NOTES
ADHESIONS performed by Lanette Farfan MD at [address]      as a kid       CURRENT MEDICATIONS  Current Outpatient Medications   Medication Sig Dispense Refill    gabapentin (NEURONTIN) 300 MG capsule TAKE TWO (2) CAPSULES BY MOUTH THREE TIMES DAILY 180 capsule 0    traZODone (DESYREL) 50 MG tablet TAKE ONE (1) TABLET BY MOUTH EVERY NIGHT 30 tablet 0    risperiDONE (RISPERDAL) 1 MG tablet TAKE ONE (1) TABLET BY MOUTH EVERY NIGHT 30 tablet 0    labetalol (NORMODYNE) 100 MG tablet TAKE 1/2  TABLET BY MOUTH EVERY 12 HOURS 30 tablet 0    ALPRAZolam (XANAX) 1 MG tablet Take 1 tablet by mouth 3 times daily as needed for Anxiety for up to 30 days.  90 tablet 0    furosemide (LASIX) 20 MG tablet Take 0.5 tablets by mouth daily 30 tablet 5    docusate sodium (COLACE) 100 MG capsule Take 1 capsule by mouth 2 times daily 20 capsule 3    spironolactone (ALDACTONE) 25 MG tablet Take 0.5 tablets by mouth 2 times daily 60 tablet 5    oxyCODONE-acetaminophen (PERCOCET) 7.5-325 MG per tablet       fluticasone-umeclidin-vilant (TRELEGY ELLIPTA) 100-62.5-25 MCG/INH AEPB Inhale 1 puff into the lungs daily 1 each 3    tiZANidine (ZANAFLEX) 4 MG tablet Take 1 tablet by mouth 3 times daily 90 tablet 2    atorvastatin (LIPITOR) 80 MG tablet TAKE 1 TABLET BY MOUTH ONCE DAILY 60 tablet 1    folic acid (FOLVITE) 1 MG tablet TAKE 1 TABLET BY MOUTH ONCE DAILY 60 tablet 1    rivaroxaban (XARELTO) 15 MG TABS tablet Take 1 tablet by mouth daily (with breakfast) 28 tablet 0    divalproex (DEPAKOTE ER) 500 MG extended release tablet Take 2 tablets by mouth nightly 60 tablet 0    amLODIPine (NORVASC) 10 MG tablet TAKE 1 TABLET BY MOUTH ONCE DAILY (Patient taking differently: Take 10 mg by mouth daily ) 90 tablet 2    busPIRone (BUSPAR) 10 MG tablet Take 1 tablet by mouth 3 times daily 90 tablet 0    escitalopram (LEXAPRO) 10 MG tablet Take 1 tablet by mouth daily 30 tablet 0    TRADJENTA 5 MG tablet TAKE ONE (1) TABLET

## 2020-05-08 RX ADMIN — METHYLPREDNISOLONE ACETATE 40 MG: 40 INJECTION, SUSPENSION INTRA-ARTICULAR; INTRALESIONAL; INTRAMUSCULAR; SOFT TISSUE at 11:09

## 2020-05-08 RX ADMIN — METHYLPREDNISOLONE ACETATE 40 MG: 40 INJECTION, SUSPENSION INTRA-ARTICULAR; INTRALESIONAL; INTRAMUSCULAR; SOFT TISSUE at 11:10

## 2020-05-09 ENCOUNTER — APPOINTMENT (OUTPATIENT)
Dept: NUCLEAR MEDICINE | Age: 70
DRG: 375 | End: 2020-05-09
Payer: MEDICARE

## 2020-05-09 ENCOUNTER — APPOINTMENT (OUTPATIENT)
Dept: GENERAL RADIOLOGY | Age: 70
DRG: 375 | End: 2020-05-09
Payer: MEDICARE

## 2020-05-09 ENCOUNTER — HOSPITAL ENCOUNTER (INPATIENT)
Age: 70
LOS: 4 days | Discharge: HOME | DRG: 375 | End: 2020-05-13
Attending: EMERGENCY MEDICINE | Admitting: INTERNAL MEDICINE
Payer: MEDICARE

## 2020-05-09 PROBLEM — R07.9 CHEST PAIN: Status: ACTIVE | Noted: 2020-05-09

## 2020-05-09 LAB
ABO/RH: NORMAL
ALBUMIN SERPL-MCNC: 3.6 GM/DL (ref 3.4–5)
ALP BLD-CCNC: 74 IU/L (ref 40–128)
ALT SERPL-CCNC: 7 U/L (ref 10–40)
AMMONIA: 35 UMOL/L (ref 16–60)
ANION GAP SERPL CALCULATED.3IONS-SCNC: 13 MMOL/L (ref 4–16)
ANTIBODY SCREEN: NEGATIVE
APTT: 35.4 SECONDS (ref 25.1–37.1)
AST SERPL-CCNC: 11 IU/L (ref 15–37)
BASOPHILS ABSOLUTE: 0 K/CU MM
BASOPHILS RELATIVE PERCENT: 0.4 % (ref 0–1)
BILIRUB SERPL-MCNC: 0.2 MG/DL (ref 0–1)
BUN BLDV-MCNC: 27 MG/DL (ref 6–23)
CALCIUM SERPL-MCNC: 8.4 MG/DL (ref 8.3–10.6)
CHLORIDE BLD-SCNC: 104 MMOL/L (ref 99–110)
CO2: 23 MMOL/L (ref 21–32)
CREAT SERPL-MCNC: 2.1 MG/DL (ref 0.9–1.3)
D DIMER: 375 NG/ML(DDU)
DIFFERENTIAL TYPE: ABNORMAL
DOSE AMOUNT: ABNORMAL
DOSE TIME: ABNORMAL
EKG ATRIAL RATE: 125 BPM
EKG DIAGNOSIS: NORMAL
EKG P AXIS: 11 DEGREES
EKG P-R INTERVAL: 150 MS
EKG Q-T INTERVAL: 336 MS
EKG QRS DURATION: 110 MS
EKG QTC CALCULATION (BAZETT): 484 MS
EKG R AXIS: -22 DEGREES
EKG T AXIS: 7 DEGREES
EKG VENTRICULAR RATE: 125 BPM
EOSINOPHILS ABSOLUTE: 0.1 K/CU MM
EOSINOPHILS RELATIVE PERCENT: 0.8 % (ref 0–3)
ESTIMATED AVERAGE GLUCOSE: 131 MG/DL
GFR AFRICAN AMERICAN: 38 ML/MIN/1.73M2
GFR NON-AFRICAN AMERICAN: 31 ML/MIN/1.73M2
GLUCOSE BLD-MCNC: 113 MG/DL (ref 70–99)
GLUCOSE BLD-MCNC: 141 MG/DL (ref 70–99)
GLUCOSE BLD-MCNC: 172 MG/DL (ref 70–99)
GLUCOSE BLD-MCNC: 177 MG/DL (ref 70–99)
GLUCOSE BLD-MCNC: 200 MG/DL (ref 70–99)
HBA1C MFR BLD: 6.2 % (ref 4.2–6.3)
HCT VFR BLD CALC: 22.9 % (ref 42–52)
HCT VFR BLD CALC: 25.3 % (ref 42–52)
HCT VFR BLD CALC: 27.1 % (ref 42–52)
HCT VFR BLD CALC: 27.2 % (ref 42–52)
HEMOGLOBIN: 6.8 GM/DL (ref 13.5–18)
HEMOGLOBIN: 7.4 GM/DL (ref 13.5–18)
HEMOGLOBIN: 8 GM/DL (ref 13.5–18)
HEMOGLOBIN: 8 GM/DL (ref 13.5–18)
IMMATURE NEUTROPHIL %: 0.6 % (ref 0–0.43)
INR BLD: 0.98 INDEX
LYMPHOCYTES ABSOLUTE: 1.5 K/CU MM
LYMPHOCYTES RELATIVE PERCENT: 17.6 % (ref 24–44)
MCH RBC QN AUTO: 23.3 PG (ref 27–31)
MCHC RBC AUTO-ENTMCNC: 29.5 % (ref 32–36)
MCV RBC AUTO: 78.8 FL (ref 78–100)
MONOCYTES ABSOLUTE: 1 K/CU MM
MONOCYTES RELATIVE PERCENT: 11.5 % (ref 0–4)
NUCLEATED RBC %: 0 %
PDW BLD-RTO: 15.7 % (ref 11.7–14.9)
PLATELET # BLD: 172 K/CU MM (ref 140–440)
PMV BLD AUTO: 9 FL (ref 7.5–11.1)
POTASSIUM SERPL-SCNC: 3.9 MMOL/L (ref 3.5–5.1)
PRO-BNP: 547.4 PG/ML
PROTHROMBIN TIME: 11.9 SECONDS (ref 11.7–14.5)
RBC # BLD: 3.44 M/CU MM (ref 4.6–6.2)
SEGMENTED NEUTROPHILS ABSOLUTE COUNT: 5.7 K/CU MM
SEGMENTED NEUTROPHILS RELATIVE PERCENT: 69.1 % (ref 36–66)
SODIUM BLD-SCNC: 140 MMOL/L (ref 135–145)
TOTAL IMMATURE NEUTOROPHIL: 0.05 K/CU MM
TOTAL NUCLEATED RBC: 0 K/CU MM
TOTAL PROTEIN: 6.5 GM/DL (ref 6.4–8.2)
TROPONIN T: 0.02 NG/ML
TROPONIN T: 0.03 NG/ML
TROPONIN T: 0.04 NG/ML
VALPROIC ACID LEVEL: 49.5 UG/ML (ref 50–100)
WBC # BLD: 8.3 K/CU MM (ref 4–10.5)

## 2020-05-09 PROCEDURE — 94761 N-INVAS EAR/PLS OXIMETRY MLT: CPT

## 2020-05-09 PROCEDURE — 99285 EMERGENCY DEPT VISIT HI MDM: CPT

## 2020-05-09 PROCEDURE — 86901 BLOOD TYPING SEROLOGIC RH(D): CPT

## 2020-05-09 PROCEDURE — 85730 THROMBOPLASTIN TIME PARTIAL: CPT

## 2020-05-09 PROCEDURE — 82140 ASSAY OF AMMONIA: CPT

## 2020-05-09 PROCEDURE — 2580000003 HC RX 258: Performed by: NURSE PRACTITIONER

## 2020-05-09 PROCEDURE — 6370000000 HC RX 637 (ALT 250 FOR IP): Performed by: NURSE PRACTITIONER

## 2020-05-09 PROCEDURE — 0DBL8ZX EXCISION OF TRANSVERSE COLON, VIA NATURAL OR ARTIFICIAL OPENING ENDOSCOPIC, DIAGNOSTIC: ICD-10-PCS | Performed by: SPECIALIST

## 2020-05-09 PROCEDURE — 6360000002 HC RX W HCPCS: Performed by: NURSE PRACTITIONER

## 2020-05-09 PROCEDURE — 96374 THER/PROPH/DIAG INJ IV PUSH: CPT

## 2020-05-09 PROCEDURE — 99222 1ST HOSP IP/OBS MODERATE 55: CPT | Performed by: INTERNAL MEDICINE

## 2020-05-09 PROCEDURE — 82962 GLUCOSE BLOOD TEST: CPT

## 2020-05-09 PROCEDURE — 83036 HEMOGLOBIN GLYCOSYLATED A1C: CPT

## 2020-05-09 PROCEDURE — 80053 COMPREHEN METABOLIC PANEL: CPT

## 2020-05-09 PROCEDURE — C9113 INJ PANTOPRAZOLE SODIUM, VIA: HCPCS | Performed by: EMERGENCY MEDICINE

## 2020-05-09 PROCEDURE — 85379 FIBRIN DEGRADATION QUANT: CPT

## 2020-05-09 PROCEDURE — 2580000003 HC RX 258: Performed by: EMERGENCY MEDICINE

## 2020-05-09 PROCEDURE — 85014 HEMATOCRIT: CPT

## 2020-05-09 PROCEDURE — 71045 X-RAY EXAM CHEST 1 VIEW: CPT

## 2020-05-09 PROCEDURE — 86900 BLOOD TYPING SEROLOGIC ABO: CPT

## 2020-05-09 PROCEDURE — 2140000000 HC CCU INTERMEDIATE R&B

## 2020-05-09 PROCEDURE — 80164 ASSAY DIPROPYLACETIC ACD TOT: CPT

## 2020-05-09 PROCEDURE — 93010 ELECTROCARDIOGRAM REPORT: CPT | Performed by: INTERNAL MEDICINE

## 2020-05-09 PROCEDURE — A9540 TC99M MAA: HCPCS | Performed by: EMERGENCY MEDICINE

## 2020-05-09 PROCEDURE — 83880 ASSAY OF NATRIURETIC PEPTIDE: CPT

## 2020-05-09 PROCEDURE — 78582 LUNG VENTILAT&PERFUS IMAGING: CPT

## 2020-05-09 PROCEDURE — 36415 COLL VENOUS BLD VENIPUNCTURE: CPT

## 2020-05-09 PROCEDURE — 93005 ELECTROCARDIOGRAM TRACING: CPT | Performed by: NURSE PRACTITIONER

## 2020-05-09 PROCEDURE — 85025 COMPLETE CBC W/AUTO DIFF WBC: CPT

## 2020-05-09 PROCEDURE — 93005 ELECTROCARDIOGRAM TRACING: CPT | Performed by: EMERGENCY MEDICINE

## 2020-05-09 PROCEDURE — 96361 HYDRATE IV INFUSION ADD-ON: CPT

## 2020-05-09 PROCEDURE — 6360000002 HC RX W HCPCS: Performed by: EMERGENCY MEDICINE

## 2020-05-09 PROCEDURE — 3430000000 HC RX DIAGNOSTIC RADIOPHARMACEUTICAL: Performed by: EMERGENCY MEDICINE

## 2020-05-09 PROCEDURE — 86850 RBC ANTIBODY SCREEN: CPT

## 2020-05-09 PROCEDURE — 85018 HEMOGLOBIN: CPT

## 2020-05-09 PROCEDURE — 6370000000 HC RX 637 (ALT 250 FOR IP): Performed by: HOSPITALIST

## 2020-05-09 PROCEDURE — 85610 PROTHROMBIN TIME: CPT

## 2020-05-09 PROCEDURE — 84484 ASSAY OF TROPONIN QUANT: CPT

## 2020-05-09 PROCEDURE — 94640 AIRWAY INHALATION TREATMENT: CPT

## 2020-05-09 PROCEDURE — 0DBF8ZX EXCISION OF RIGHT LARGE INTESTINE, VIA NATURAL OR ARTIFICIAL OPENING ENDOSCOPIC, DIAGNOSTIC: ICD-10-PCS | Performed by: SPECIALIST

## 2020-05-09 RX ORDER — ALPRAZOLAM 0.5 MG/1
1 TABLET ORAL 3 TIMES DAILY PRN
Status: DISCONTINUED | OUTPATIENT
Start: 2020-05-09 | End: 2020-05-13 | Stop reason: HOSPADM

## 2020-05-09 RX ORDER — DOCUSATE SODIUM 100 MG/1
100 CAPSULE, LIQUID FILLED ORAL 2 TIMES DAILY
Status: DISCONTINUED | OUTPATIENT
Start: 2020-05-09 | End: 2020-05-13 | Stop reason: HOSPADM

## 2020-05-09 RX ORDER — PANTOPRAZOLE SODIUM 40 MG/10ML
40 INJECTION, POWDER, LYOPHILIZED, FOR SOLUTION INTRAVENOUS ONCE
Status: COMPLETED | OUTPATIENT
Start: 2020-05-09 | End: 2020-05-09

## 2020-05-09 RX ORDER — PROMETHAZINE HYDROCHLORIDE 12.5 MG/1
12.5 TABLET ORAL EVERY 6 HOURS PRN
Status: DISCONTINUED | OUTPATIENT
Start: 2020-05-09 | End: 2020-05-13 | Stop reason: HOSPADM

## 2020-05-09 RX ORDER — ASPIRIN 81 MG/1
81 TABLET, CHEWABLE ORAL DAILY
Status: DISCONTINUED | OUTPATIENT
Start: 2020-05-10 | End: 2020-05-13 | Stop reason: HOSPADM

## 2020-05-09 RX ORDER — CARVEDILOL 6.25 MG/1
6.25 TABLET ORAL 2 TIMES DAILY WITH MEALS
Status: DISCONTINUED | OUTPATIENT
Start: 2020-05-09 | End: 2020-05-11

## 2020-05-09 RX ORDER — FUROSEMIDE 20 MG/1
10 TABLET ORAL DAILY
Status: DISCONTINUED | OUTPATIENT
Start: 2020-05-09 | End: 2020-05-13 | Stop reason: HOSPADM

## 2020-05-09 RX ORDER — SPIRONOLACTONE 25 MG/1
12.5 TABLET ORAL 2 TIMES DAILY
Status: DISCONTINUED | OUTPATIENT
Start: 2020-05-09 | End: 2020-05-13 | Stop reason: HOSPADM

## 2020-05-09 RX ORDER — POLYETHYLENE GLYCOL 3350 17 G/17G
17 POWDER, FOR SOLUTION ORAL DAILY PRN
Status: DISCONTINUED | OUTPATIENT
Start: 2020-05-09 | End: 2020-05-13 | Stop reason: HOSPADM

## 2020-05-09 RX ORDER — ASPIRIN 81 MG/1
324 TABLET, CHEWABLE ORAL ONCE
Status: DISCONTINUED | OUTPATIENT
Start: 2020-05-09 | End: 2020-05-13 | Stop reason: HOSPADM

## 2020-05-09 RX ORDER — DEXTROSE MONOHYDRATE 25 G/50ML
12.5 INJECTION, SOLUTION INTRAVENOUS PRN
Status: DISCONTINUED | OUTPATIENT
Start: 2020-05-09 | End: 2020-05-13 | Stop reason: HOSPADM

## 2020-05-09 RX ORDER — BUDESONIDE 0.25 MG/2ML
0.5 INHALANT ORAL 2 TIMES DAILY
Status: DISCONTINUED | OUTPATIENT
Start: 2020-05-09 | End: 2020-05-09

## 2020-05-09 RX ORDER — OXYCODONE AND ACETAMINOPHEN 7.5; 325 MG/1; MG/1
1 TABLET ORAL EVERY 4 HOURS PRN
Status: DISCONTINUED | OUTPATIENT
Start: 2020-05-09 | End: 2020-05-13 | Stop reason: HOSPADM

## 2020-05-09 RX ORDER — AMLODIPINE BESYLATE 10 MG/1
10 TABLET ORAL DAILY
Status: DISCONTINUED | OUTPATIENT
Start: 2020-05-09 | End: 2020-05-13 | Stop reason: HOSPADM

## 2020-05-09 RX ORDER — ATORVASTATIN CALCIUM 80 MG/1
80 TABLET, FILM COATED ORAL DAILY
Status: DISCONTINUED | OUTPATIENT
Start: 2020-05-09 | End: 2020-05-13 | Stop reason: HOSPADM

## 2020-05-09 RX ORDER — DIVALPROEX SODIUM 500 MG/1
1000 TABLET, EXTENDED RELEASE ORAL NIGHTLY
Status: DISCONTINUED | OUTPATIENT
Start: 2020-05-09 | End: 2020-05-13 | Stop reason: HOSPADM

## 2020-05-09 RX ORDER — ESCITALOPRAM OXALATE 10 MG/1
10 TABLET ORAL DAILY
Status: DISCONTINUED | OUTPATIENT
Start: 2020-05-09 | End: 2020-05-13 | Stop reason: HOSPADM

## 2020-05-09 RX ORDER — TRAZODONE HYDROCHLORIDE 50 MG/1
50 TABLET ORAL NIGHTLY
Status: DISCONTINUED | OUTPATIENT
Start: 2020-05-09 | End: 2020-05-13 | Stop reason: HOSPADM

## 2020-05-09 RX ORDER — FOLIC ACID 1 MG/1
1 TABLET ORAL DAILY
Status: DISCONTINUED | OUTPATIENT
Start: 2020-05-09 | End: 2020-05-13 | Stop reason: HOSPADM

## 2020-05-09 RX ORDER — NITROGLYCERIN 0.4 MG/1
0.4 TABLET SUBLINGUAL EVERY 5 MIN PRN
Status: DISCONTINUED | OUTPATIENT
Start: 2020-05-09 | End: 2020-05-13 | Stop reason: HOSPADM

## 2020-05-09 RX ORDER — IPRATROPIUM BROMIDE AND ALBUTEROL SULFATE 2.5; .5 MG/3ML; MG/3ML
1 SOLUTION RESPIRATORY (INHALATION)
Status: DISCONTINUED | OUTPATIENT
Start: 2020-05-09 | End: 2020-05-09

## 2020-05-09 RX ORDER — PANTOPRAZOLE SODIUM 40 MG/10ML
40 INJECTION, POWDER, LYOPHILIZED, FOR SOLUTION INTRAVENOUS DAILY
Status: DISCONTINUED | OUTPATIENT
Start: 2020-05-09 | End: 2020-05-13 | Stop reason: HOSPADM

## 2020-05-09 RX ORDER — BUSPIRONE HYDROCHLORIDE 10 MG/1
10 TABLET ORAL 3 TIMES DAILY
Status: DISCONTINUED | OUTPATIENT
Start: 2020-05-09 | End: 2020-05-13 | Stop reason: HOSPADM

## 2020-05-09 RX ORDER — ACETAMINOPHEN 650 MG/1
650 SUPPOSITORY RECTAL EVERY 6 HOURS PRN
Status: DISCONTINUED | OUTPATIENT
Start: 2020-05-09 | End: 2020-05-13 | Stop reason: HOSPADM

## 2020-05-09 RX ORDER — SODIUM CHLORIDE 0.9 % (FLUSH) 0.9 %
10 SYRINGE (ML) INJECTION PRN
Status: DISCONTINUED | OUTPATIENT
Start: 2020-05-09 | End: 2020-05-13 | Stop reason: HOSPADM

## 2020-05-09 RX ORDER — BUDESONIDE 0.25 MG/2ML
0.5 INHALANT ORAL
Status: DISCONTINUED | OUTPATIENT
Start: 2020-05-09 | End: 2020-05-13 | Stop reason: HOSPADM

## 2020-05-09 RX ORDER — 0.9 % SODIUM CHLORIDE 0.9 %
250 INTRAVENOUS SOLUTION INTRAVENOUS ONCE
Status: COMPLETED | OUTPATIENT
Start: 2020-05-09 | End: 2020-05-09

## 2020-05-09 RX ORDER — ONDANSETRON 2 MG/ML
4 INJECTION INTRAMUSCULAR; INTRAVENOUS EVERY 6 HOURS PRN
Status: DISCONTINUED | OUTPATIENT
Start: 2020-05-09 | End: 2020-05-13 | Stop reason: HOSPADM

## 2020-05-09 RX ORDER — NICOTINE POLACRILEX 4 MG
15 LOZENGE BUCCAL PRN
Status: DISCONTINUED | OUTPATIENT
Start: 2020-05-09 | End: 2020-05-13 | Stop reason: HOSPADM

## 2020-05-09 RX ORDER — SODIUM CHLORIDE 0.9 % (FLUSH) 0.9 %
10 SYRINGE (ML) INJECTION EVERY 12 HOURS SCHEDULED
Status: DISCONTINUED | OUTPATIENT
Start: 2020-05-09 | End: 2020-05-13 | Stop reason: HOSPADM

## 2020-05-09 RX ORDER — ACETAMINOPHEN 325 MG/1
650 TABLET ORAL EVERY 6 HOURS PRN
Status: DISCONTINUED | OUTPATIENT
Start: 2020-05-09 | End: 2020-05-13 | Stop reason: HOSPADM

## 2020-05-09 RX ORDER — 0.9 % SODIUM CHLORIDE 0.9 %
250 INTRAVENOUS SOLUTION INTRAVENOUS ONCE
Status: COMPLETED | OUTPATIENT
Start: 2020-05-09 | End: 2020-05-10

## 2020-05-09 RX ORDER — DEXTROSE MONOHYDRATE 50 MG/ML
100 INJECTION, SOLUTION INTRAVENOUS PRN
Status: DISCONTINUED | OUTPATIENT
Start: 2020-05-09 | End: 2020-05-13 | Stop reason: HOSPADM

## 2020-05-09 RX ADMIN — CARVEDILOL 6.25 MG: 6.25 TABLET, FILM COATED ORAL at 10:05

## 2020-05-09 RX ADMIN — SPIRONOLACTONE 12.5 MG: 25 TABLET ORAL at 12:33

## 2020-05-09 RX ADMIN — OXYCODONE HYDROCHLORIDE AND ACETAMINOPHEN 1 TABLET: 7.5; 325 TABLET ORAL at 21:27

## 2020-05-09 RX ADMIN — DIVALPROEX SODIUM 1000 MG: 500 TABLET, FILM COATED, EXTENDED RELEASE ORAL at 21:27

## 2020-05-09 RX ADMIN — IPRATROPIUM BROMIDE AND ALBUTEROL SULFATE 1 AMPULE: .5; 3 SOLUTION RESPIRATORY (INHALATION) at 08:06

## 2020-05-09 RX ADMIN — ESCITALOPRAM OXALATE 10 MG: 10 TABLET ORAL at 10:05

## 2020-05-09 RX ADMIN — SODIUM CHLORIDE 250 ML: 9 INJECTION, SOLUTION INTRAVENOUS at 02:17

## 2020-05-09 RX ADMIN — TRAZODONE HYDROCHLORIDE 50 MG: 50 TABLET ORAL at 21:28

## 2020-05-09 RX ADMIN — ALPRAZOLAM 1 MG: 0.5 TABLET ORAL at 12:58

## 2020-05-09 RX ADMIN — SODIUM CHLORIDE, PRESERVATIVE FREE 10 ML: 5 INJECTION INTRAVENOUS at 10:05

## 2020-05-09 RX ADMIN — SPIRONOLACTONE 12.5 MG: 25 TABLET ORAL at 21:28

## 2020-05-09 RX ADMIN — DOCUSATE SODIUM 100 MG: 100 CAPSULE, LIQUID FILLED ORAL at 10:05

## 2020-05-09 RX ADMIN — SODIUM CHLORIDE 250 ML: 9 INJECTION, SOLUTION INTRAVENOUS at 04:00

## 2020-05-09 RX ADMIN — DOCUSATE SODIUM 100 MG: 100 CAPSULE, LIQUID FILLED ORAL at 21:28

## 2020-05-09 RX ADMIN — ALPRAZOLAM 1 MG: 0.5 TABLET ORAL at 21:28

## 2020-05-09 RX ADMIN — BUSPIRONE HYDROCHLORIDE 10 MG: 10 TABLET ORAL at 21:28

## 2020-05-09 RX ADMIN — INSULIN LISPRO 2 UNITS: 100 INJECTION, SOLUTION INTRAVENOUS; SUBCUTANEOUS at 18:40

## 2020-05-09 RX ADMIN — FOLIC ACID 1 MG: 1 TABLET ORAL at 10:05

## 2020-05-09 RX ADMIN — BUSPIRONE HYDROCHLORIDE 10 MG: 10 TABLET ORAL at 12:34

## 2020-05-09 RX ADMIN — PANTOPRAZOLE SODIUM 40 MG: 40 INJECTION, POWDER, FOR SOLUTION INTRAVENOUS at 04:32

## 2020-05-09 RX ADMIN — AMLODIPINE BESYLATE 10 MG: 10 TABLET ORAL at 12:34

## 2020-05-09 RX ADMIN — Medication 6 MILLICURIE: at 09:10

## 2020-05-09 RX ADMIN — FUROSEMIDE 10 MG: 20 TABLET ORAL at 12:33

## 2020-05-09 RX ADMIN — ATORVASTATIN CALCIUM 80 MG: 80 TABLET, FILM COATED ORAL at 10:05

## 2020-05-09 RX ADMIN — BUSPIRONE HYDROCHLORIDE 10 MG: 10 TABLET ORAL at 10:05

## 2020-05-09 RX ADMIN — BUDESONIDE 500 MCG: 0.25 INHALANT RESPIRATORY (INHALATION) at 08:11

## 2020-05-09 RX ADMIN — SODIUM CHLORIDE, PRESERVATIVE FREE 10 ML: 5 INJECTION INTRAVENOUS at 21:30

## 2020-05-09 RX ADMIN — CARVEDILOL 6.25 MG: 6.25 TABLET, FILM COATED ORAL at 16:30

## 2020-05-09 ASSESSMENT — PAIN DESCRIPTION - FREQUENCY: FREQUENCY: INTERMITTENT

## 2020-05-09 ASSESSMENT — PAIN SCALES - GENERAL
PAINLEVEL_OUTOF10: 6
PAINLEVEL_OUTOF10: 0
PAINLEVEL_OUTOF10: 0
PAINLEVEL_OUTOF10: 7
PAINLEVEL_OUTOF10: 6

## 2020-05-09 ASSESSMENT — PAIN DESCRIPTION - PAIN TYPE: TYPE: CHRONIC PAIN

## 2020-05-09 ASSESSMENT — PAIN DESCRIPTION - ORIENTATION: ORIENTATION: LOWER

## 2020-05-09 ASSESSMENT — PAIN DESCRIPTION - DESCRIPTORS: DESCRIPTORS: ACHING;DISCOMFORT

## 2020-05-09 ASSESSMENT — PAIN DESCRIPTION - LOCATION: LOCATION: BACK

## 2020-05-09 NOTE — H&P
HISTORY AND PHYSICAL  (Hospitalist, Internal Medicine)  IDENTIFYING INFORMATION   PATIENT:  Sonya Webb MRN:  [id number]  ADMIT DATE: 5/9/2020  TIME OF EVALUATION: 5/9/2020 4:55 AM    CHIEF COMPLAINT     Chest pain  HISTORY OF PRESENT ILLNESS   Sonya Webb is a 79 y.o. male with a past medical history of COPD, bipolar, CAD, CKD 3, DM 2, diastolic CHF, and obstructive sleep apnea. He presents to the ED with complaints of left chest wall pain of 2 to 3 days duration. Patient states the pain usually occurs even at rest, worse with movement and without relieving measures. He describes the pain as a pressure feeling in the left chest wall region. Patient also reports associated S OB. He denies fever, chills, productive cough, palpitations, nausea and vomiting. The patient also reports he has been having bloody stool for the past 2 days. At the ER, EKG showed sinus tach with a rate of 120. He was noted to be hypotensive with SBP in 90s. Work-up significant for hemoglobin of 8, creatinine of 2.1, troponin of 0.019, proBNP of 547. Chest x-ray showed moderate left pleural effusion and bibasilar atelectasis to the right. The patient was given aspirin 3 2 4 mg by EMS. He received Protonix, 500 mL's bolus normal saline for hypotension at the ER. At time of this assessment, patient denied chest pain.       PMH listed below:    PAST MEDICAL, SURGICAL, FAMILY, and SOCIAL HISTORY     Past Medical History:   Diagnosis Date    Anemia     per old chart    Arthritis     Back pain, chronic     \"have pain in lumbar mainly- have 5 bulging discs\"\"in my neck and my lumbar have herniated discs\"    Bipolar 1 disorder ([address]     CAD (coronary artery disease) 1/27/2016    does not follow with a cardiologist    Cerebral artery occlusion with cerebral infarction ([address]     \"had mini stroke in Jan 2016- fast heart rate when I would stand up - smile drooping on one side- lased just the one day\"    Chronic kidney disease (CKD), stage III (moderate) (HCC)     CKD (chronic kidney disease)     per old chart- consult with Dr [place] with admission 4/2016\"have low kidney function\"    COPD (chronic obstructive [facility] disease) [facility]     follow with Dr Kee Class Diabetes mellitus, type 2 ([facility] 1/3/2017    Diabetic peripheral neuropathy (HCC)     Diastolic CHF ([facility] 2/54/5012    Gastric ulcer     H/O cardiovascular stress test 5/26/14    EF 68% mild ischemia anterior wall    Heart murmur     \"see Dr [facility]- last echo 2014\" pt states\"I think they said I have a murmur but no chest pain or palpitations\"    Hiatal hernia     History of cardiac cath 6/11/14    MODERATE  CAD      Hx of migraines     HX OTHER MEDICAL     \"have thinning of kidney walls- they found I had that 15 yrs ago\" see Dr Amanda Pitts"    Hyperlipidemia     Hypertension     Lumbar radiculopathy     Panic attack     'anything new gives me anxiety\"    Pleural effusion     scheduled for thoracentesis 7/28/2014, 8/17/2016    S/P thoracentesis     left side( per old chart had thora done 4/2016 and one done 2014)    Sleep apnea     sleep study x 2 - last one 4-5 yrs ago- uses c-pap\"    SOBOE (shortness of breath on exertion)     Spinal stenosis      Past Surgical History:   Procedure Laterality Date    CARPAL TUNNEL RELEASE Bilateral 1989    ELBOW SURGERY Left 2000's    KNEE SURGERY Bilateral     right knee x 2( scope)/ left knee- 7-8 yr ago   [address] fusion    THORACENTESIS Left 12/20/2013    THORACENTESIS  4/25/2016         THORACENTESIS Left 11/15/2016    Dr. Smith Retort 250mlsremoved     THORACOTOMY Left 03/18/2019    with Lysis of adhesions    THORACOTOMY Left 3/18/2019    THORACOSCOPY CONVERTED TO THORACOTOMY WITH LYSIS OF ADHESIONS performed by Radha Edward MD at [facility]      as a kid     Family History   Problem Relation Age of Onset    Heart Disease Mother         heart attack    High Blood Pressure Medications   Medication Sig Dispense Refill    gabapentin (NEURONTIN) 300 MG capsule TAKE TWO (2) CAPSULES BY MOUTH THREE TIMES DAILY 180 capsule 0    traZODone (DESYREL) 50 MG tablet TAKE ONE (1) TABLET BY MOUTH EVERY NIGHT 30 tablet 0    risperiDONE (RISPERDAL) 1 MG tablet TAKE ONE (1) TABLET BY MOUTH EVERY NIGHT 30 tablet 0    labetalol (NORMODYNE) 100 MG tablet TAKE 1/2  TABLET BY MOUTH EVERY 12 HOURS 30 tablet 0    ALPRAZolam (XANAX) 1 MG tablet Take 1 tablet by mouth 3 times daily as needed for Anxiety for up to 30 days.  90 tablet 0    furosemide (LASIX) 20 MG tablet Take 0.5 tablets by mouth daily 30 tablet 5    docusate sodium (COLACE) 100 MG capsule Take 1 capsule by mouth 2 times daily 20 capsule 3    spironolactone (ALDACTONE) 25 MG tablet Take 0.5 tablets by mouth 2 times daily 60 tablet 5    oxyCODONE-acetaminophen (PERCOCET) 7.5-325 MG per tablet       fluticasone-umeclidin-vilant (TRELEGY ELLIPTA) 100-62.5-25 MCG/INH AEPB Inhale 1 puff into the lungs daily 1 each 3    tiZANidine (ZANAFLEX) 4 MG tablet Take 1 tablet by mouth 3 times daily 90 tablet 2    atorvastatin (LIPITOR) 80 MG tablet TAKE 1 TABLET BY MOUTH ONCE DAILY 60 tablet 1    folic acid (FOLVITE) 1 MG tablet TAKE 1 TABLET BY MOUTH ONCE DAILY 60 tablet 1    rivaroxaban (XARELTO) 15 MG TABS tablet Take 1 tablet by mouth daily (with breakfast) 28 tablet 0    divalproex (DEPAKOTE ER) 500 MG extended release tablet Take 2 tablets by mouth nightly 60 tablet 0    amLODIPine (NORVASC) 10 MG tablet TAKE 1 TABLET BY MOUTH ONCE DAILY (Patient taking differently: Take 10 mg by mouth daily ) 90 tablet 2    busPIRone (BUSPAR) 10 MG tablet Take 1 tablet by mouth 3 times daily 90 tablet 0    escitalopram (LEXAPRO) 10 MG tablet Take 1 tablet by mouth daily 30 tablet 0    TRADJENTA 5 MG tablet TAKE ONE (1) TABLET BY MOUTH ONCE DAILY 60 tablet 4    carvedilol (COREG) 6.25 MG tablet Take 6.25 mg by mouth 2 times daily (with meals)      Compression Stockings MISC by Does not apply route Duration of Treatment:  3 Months  # of pairs:  2    Compression Class Level - 20-30 mmHg*    Style - Knee High 2 each 0    Lactobacillus (ACIDOPHILUS) 0.5 MG TABS Take 1 capsule by mouth           Allergies  Allergies   Allergen Reactions    Nsaids      Renal        REVIEW OF SYSTEMS   Within above limitations. 14 point review of systems reviewed. Pertinent positive or negative as per HPI or otherwise negative per 14 point systems review. PHYSICAL EXAM     Wt Readings from Last 3 Encounters:   05/09/20 241 lb (109.3 kg)   05/05/20 241 lb 14.4 oz (109.7 kg)   04/20/20 241 lb (109.3 kg)       Blood pressure 99/70, pulse 63, temperature 97.6 °F (36.4 °C), temperature source Oral, resp. rate 17, height 5' 9\" (1.753 m), weight 241 lb (109.3 kg), SpO2 96 %. General - AAO x 3,   Psych - Appropriate affect/speech. No agitation  Eyes - Eye lids intact. No scleral icterus  ENT - Lips wnl. External ear clear/dry/intact. No thyromegaly on inspection  Neuro - No gross peripheral or central neuro deficits on inspection  Heart - Sinus. RRR. S1 and S2 present. No added HS/murmurs appreciated. No elevated JVD appreciated  Lung - Adequate air entry b/l, No crackles/wheezes appreciated  GI - Soft. Protuberant abdomen no guarding/rigidity. No hepatosplenomegaly/ascites. BS+   - No CVA/suprapubic tenderness or palpable bladder distension  Skin - Intact. No rash/petechiae/ecchymosis. Warm extremities  MSK - Joints with normal ROM.  2+ edema BLE      LABS AND IMAGING   CBC  [unfilled]    Last 3 Hemoglobin  Lab Results   Component Value Date    HGB 8.0 05/09/2020    HGB 10.1 03/07/2020    HGB 11.3 02/24/2020     Last 3 WBC/ANC  Lab Results   Component Value Date    WBC 8.3 05/09/2020    WBC 7.4 03/07/2020    WBC 5.8 02/24/2020     No components found for: GRNLOCTYABS  Last 3 Platelets  No results found for: PLATELET  Chemistry  [unfilled]  [unfilled]  No results found

## 2020-05-09 NOTE — PROGRESS NOTES
pressure (!) 142/81, pulse 65, temperature 97.6 °F (36.4 °C), temperature source Oral, resp. rate 14, height 5' 9\" (1.753 m), weight 244 lb (110.7 kg), SpO2 98 %. Vital signs are normal.    HEENT: Normal HEENT exam.    Heart: Normal rate. Abdomen: Abdomen is soft. Extremities: Decreased range of motion. Neurological: Patient is alert. Pupils:  Pupils are equal, round, and reactive to light. Skin:  Warm.       Assessment & Plan  Chest pain  -V/Q low prob of PE  -serial CE  -ASA, BB, statin  -stress test Monday  Anemia with GI bleed  -hold xarelto and cnosult GI  COPD with left pleural effusion   -CXR stable, duoneb  Chronnic stable diastolic CHF  CKD 3  -stable and continue lasix and aldactone  -will inform his nephrologist Dr Rafa Mcqueen as per pt request  DM  -SSI  Bipolar  -continue psych meds  HTN  -CCB, labetalol  dVT prophylaxis  -SCD  Tisha Oviedo MD  5/9/2020

## 2020-05-09 NOTE — CONSULTS
Chart reviewed  Full note to follow                      Name:  Laura oPllack. /Age/Sex: 1950  (79 y.o. male)   MRN & CSN:  [id number] & [id number] Admission Date/Time: 2020 12:33 AM   Location:  3129/3129-A PCP: Apple Hahn, [address] [facility] Day: 1          Referring physician:  Johnny Genao MD         Reason for consultation:  CP        Thanks for referral.    Information source: patient    CC;  CP      HPI:   Thank you for involving me in taking  care of Laura Pollack. who  is a 79 y. o.year  Old male  Presents with  CAD, CKD, DM, SAMANTHA now with mild lt sided recurrent nonexertional nonradiatingCP with mild SOB, feels CW is tender for  afew days  . Also had hematochezia. Had [facility] years ago had diffuse disease, Pt still has mild chest heaviness with mild SOB. Trop mildly elevated                  Past medical history:    has a past medical history of Anemia, Arthritis, Back pain, chronic, Bipolar 1 disorder ([address] CAD (coronary artery disease), Cerebral artery occlusion with cerebral infarction ([address] Chronic kidney disease (CKD), stage III (moderate) ([address] CKD (chronic kidney disease), COPD (chronic obstructive pulmonary disease) ([address] Diabetes mellitus, type 2 ([address] Diabetic peripheral neuropathy ([address] Diastolic CHF ([address] Gastric ulcer, H/O cardiovascular stress test, Heart murmur, Hiatal hernia, History of cardiac cath, Hx of migraines, HX OTHER MEDICAL, Hyperlipidemia, Hypertension, Lumbar radiculopathy, Panic attack, Pleural effusion, S/P thoracentesis, Sleep apnea, SOBOE (shortness of breath on exertion), and Spinal stenosis. Past surgical history:   has a past surgical history that includes Neck surgery (); Carpal tunnel release (Bilateral, ); knee surgery (Bilateral); thoracentesis (Left, 2013); Elbow surgery (Left, 's); Thoracentesis (2016); Tonsillectomy;  Thoracentesis (Left, 11/15/2016); thoracotomy (Left, 2019); and thoracotomy

## 2020-05-09 NOTE — CONSULTS
that 15 yrs ago\" see Dr Gela Reeves"    Hyperlipidemia     Hypertension     Lumbar radiculopathy     Panic attack     'anything new gives me anxiety\"    Pleural effusion     scheduled for thoracentesis 7/28/2014, 8/17/2016    S/P thoracentesis     left side( per old chart had thora done 4/2016 and one done 2014)    Sleep apnea     sleep study x 2 - last one 4-5 yrs ago- uses c-pap\"    SOBOE (shortness of breath on exertion)     Spinal stenosis        Past Surgical History:        Procedure Laterality Date    CARPAL TUNNEL RELEASE Bilateral 1989    ELBOW SURGERY Left 2000's    KNEE SURGERY Bilateral     right knee x 2( scope)/ left knee- 7-8 yr ago   [address]    C Spine fusion    THORACENTESIS Left 12/20/2013    THORACENTESIS  4/25/2016         THORACENTESIS Left 11/15/2016    Dr. Kemi Chanel 250mlsremoved     THORACOTOMY Left 03/18/2019    with Lysis of adhesions    THORACOTOMY Left 3/18/2019    THORACOSCOPY CONVERTED TO THORACOTOMY WITH LYSIS OF ADHESIONS performed by Tammie Verdugo MD at [facility]      as a kid       Medications:   Scheduled Meds:   aspirin  324 mg Oral Once    atorvastatin  80 mg Oral Daily    busPIRone  10 mg Oral TID    carvedilol  6.25 mg Oral BID WC    divalproex  1,000 mg Oral Nightly    docusate sodium  100 mg Oral BID    escitalopram  10 mg Oral Daily    folic acid  1 mg Oral Daily    traZODone  50 mg Oral Nightly    sodium chloride flush  10 mL Intravenous 2 times per day    [START ON 5/10/2020] aspirin  81 mg Oral Daily    ipratropium-albuterol  1 ampule Inhalation Q4H WA    budesonide  0.5 mg Nebulization BID    insulin lispro  0-12 Units Subcutaneous TID     insulin lispro  0-6 Units Subcutaneous Nightly    pantoprazole  40 mg Intravenous Daily    furosemide  10 mg Oral Daily    amLODIPine  10 mg Oral Daily    spironolactone  12.5 mg Oral BID     Continuous Infusions:   dextrose       PRN Meds:. ALPRAZolam, oxyCODONE-acetaminophen,

## 2020-05-09 NOTE — CONSULTS
Shows a 63-year-old white gentleman, who is obese, lying flat  in bed, in no acute distress. He is awake, alert, and oriented and  pleasant to talk with. VITAL SIGNS:  Stable. HEENT:  Shows skull to be atraumatic. NECK:  Supple. CHEST:  Shows diminished breath sounds. HEART:  S1 and S2 are normal.  ABDOMEN:  Soft, obese, and nontender. Liver and spleen are not  palpable. Bowel sounds are present. RECTAL:  Deferred. CNS:  Shows the patient to be awake, alert, and oriented. There are no  focal sensorimotor signs. MUSCULOSKELETAL SYSTEM:  Shows evidence of degenerative joint disease  changes. LABORATORY DATA:  As above mentioned. IMPRESSION:  3  A 63-year-old white gentleman with multiple comorbidities, admitted  with left-sided chest pain and shortness of breath, and is noted to be  anemic with a two-day history of bright red blood per rectum; rule out  GI bleeding, source most likely lower gastrointestinal tract; however,  an upper GI bleeding lesion cannot be ruled out. 2.  History of esophageal ulcer noted on EGD on 10/04/2013. RECOMMENDATIONS:  1. Agree with present management. 2.  We will continue the patient on Protonix 40 mg every morning. 3.  Monitor the patient's serial H and H and transfuse on a p.r.n. basis  to keep hemoglobin above 7 gm percent. 4.  The patient will need an EGD, colonoscopy, and small bowel  evaluation for workup of his anemia and GI bleeding once the cardiac  workup is completed. 5.  The case and plan have been discussed in detail with the patient.         Tang Urbina MD    D: 05/09/2020 9:14:32       T: 05/09/2020 12:35:29     [email or web address]  Job#: [id number]     Doc#: [id number]    CC:  Ashli Martin MD

## 2020-05-09 NOTE — ED PROVIDER NOTES
admission 4/2016\"have low kidney function\"    COPD (chronic obstructive pulmonary disease) ([address]     follow with Dr Kriss Perera Diabetes mellitus, type 2 ([address] 1/3/2017    Diabetic peripheral neuropathy (HCC)     Diastolic CHF ([address] 1/32/8334    Gastric ulcer     H/O cardiovascular stress test 5/26/14    EF 68% mild ischemia anterior wall    Heart murmur     \"see Dr Kassy Humphrey- last echo 2014\" pt states\"I think they said I have a murmur but no chest pain or palpitations\"    Hiatal hernia     History of cardiac cath 6/11/14    MODERATE  CAD      Hx of migraines     HX OTHER MEDICAL     \"have thinning of kidney walls- they found I had that 15 yrs ago\" see Dr Radha Gutierrez"    Hyperlipidemia     Hypertension     Lumbar radiculopathy     Panic attack     'anything new gives me anxiety\"    Pleural effusion     scheduled for thoracentesis 7/28/2014, 8/17/2016    S/P thoracentesis     left side( per old chart had thora done 4/2016 and one done 2014)    Sleep apnea     sleep study x 2 - last one 4-5 yrs ago- uses c-pap\"    SOBOE (shortness of breath on exertion)     Spinal stenosis      Past Surgical History:   Procedure Laterality Date    CARPAL TUNNEL RELEASE Bilateral 1989    ELBOW SURGERY Left 2000's    KNEE SURGERY Bilateral     right knee x 2( scope)/ left knee- 7-8 yr ago   [address] fusion    THORACENTESIS Left 12/20/2013    THORACENTESIS  4/25/2016         THORACENTESIS Left 11/15/2016    Dr. Maggie Barron 250mlsremoved     THORACOTOMY Left 03/18/2019    with Lysis of adhesions    THORACOTOMY Left 3/18/2019    THORACOSCOPY CONVERTED TO THORACOTOMY WITH LYSIS OF ADHESIONS performed by Turner Peralta MD at [address] [place] [address]      as a kid     Family History   Problem Relation Age of Onset    Heart Disease Mother         heart attack    High Blood Pressure Father      Social History     Socioeconomic History    Marital status:      Spouse name: Not on file    Number of children: Not on file    Years of education: Not on file    Highest education level: Not on file   Occupational History    Occupation: built trucks, retired     Employer: [employer] resource strain: Not on file    Food insecurity     Worry: Not on file     Inability: Not on file   [employer] needs     Medical: Not on file     Non-medical: Not on file   Tobacco Use    Smoking status: Former Smoker     Packs/day: 1.50     Years: 30.00     Pack years: 45.00     Types: Cigarettes     Last attempt to quit: 2010     Years since quittin.8    Smokeless tobacco: Never Used   Substance and Sexual Activity    Alcohol use: Not Currently    Drug use: Not Currently     Types: Marijuana    Sexual activity: Not Currently     Partners: Female   Lifestyle    Physical activity     Days per week: Not on file     Minutes per session: Not on file    Stress: Not on file   Relationships    Social connections     Talks on phone: Not on file     Gets together: Not on file     Attends Shinto service: Not on file     Active member of club or organization: Not on file     Attends meetings of clubs or organizations: Not on file     Relationship status: Not on file    Intimate partner violence     Fear of current or ex partner: Not on file     Emotionally abused: Not on file     Physically abused: Not on file     Forced sexual activity: Not on file   Other Topics Concern    Not on file   Social History Narrative    Not on file     Current Facility-Administered Medications   Medication Dose Route Frequency Provider Last Rate Last Dose    aspirin chewable tablet 324 mg  324 mg Oral Once Renee Alvarez, DO        0.9 % sodium chloride bolus  250 mL Intravenous Once Julio Cesar Caceres  mL/hr at 20 0432 250 mL at 20 0432     Current Outpatient Medications   Medication Sig Dispense Refill    gabapentin (NEURONTIN) 300 MG capsule TAKE TWO (2) CAPSULES BY MOUTH THREE TIMES DAILY mmHg*    Style - Knee High 2 each 0    Lactobacillus (ACIDOPHILUS) 0.5 MG TABS Take 1 capsule by mouth       Allergies   Allergen Reactions    Nsaids      Renal        Nursing Notes Reviewed    Physical Exam:  ED Triage Vitals [05/09/20 0050]   Enc Vitals Group      /88      Pulse 124      Resp 18      Temp 97.6 °F (36.4 °C)      Temp Source Oral      SpO2 96 %      Weight 241 lb (109.3 kg)      Height 5' 9\" (1.753 m)      Head Circumference       Peak Flow       Pain Score       Pain Loc       Pain Edu? Excl. in [address]? GENERAL APPEARANCE: Awake and alert. Cooperative. No acute distress. HEAD: Normocephalic. Atraumatic. EYES: EOM's grossly intact. Sclera anicteric. ENT: Tolerates saliva. No trismus. NECK: Supple. Trachea midline. CARDIO: Tachycardic to the 120s but regular. Radial pulse 2+. LUNGS: Respirations unlabored. CTAB. ABDOMEN: Soft. Non-distended. Non-tender. Dark stool that is strongly guaiac positive. EXTREMITIES: No acute deformities. Pitting edema in bilateral lower extremities. Symmetric, nontender. SKIN: Warm and dry. appears pale. NEUROLOGICAL: No gross facial drooping. Moves all 4 extremities spontaneously. PSYCHIATRIC: Normal mood.      Labs:  Results for orders placed or performed during the hospital encounter of 05/09/20   CBC Auto Differential   Result Value Ref Range    WBC 8.3 4.0 - 10.5 K/CU MM    RBC 3.44 (L) 4.6 - 6.2 M/CU MM    Hemoglobin 8.0 (L) 13.5 - 18.0 GM/DL    Hematocrit 27.1 (L) 42 - 52 %    MCV 78.8 78 - 100 FL    MCH 23.3 (L) 27 - 31 PG    MCHC 29.5 (L) 32.0 - 36.0 %    RDW 15.7 (H) 11.7 - 14.9 %    Platelets 461 291 - 069 K/CU MM    MPV 9.0 7.5 - 11.1 FL    Differential Type AUTOMATED DIFFERENTIAL     Segs Relative 69.1 (H) 36 - 66 %    Lymphocytes % 17.6 (L) 24 - 44 %    Monocytes % 11.5 (H) 0 - 4 %    Eosinophils % 0.8 0 - 3 %    Basophils % 0.4 0 - 1 %    Segs Absolute 5.7 K/CU MM    Lymphocytes Absolute 1.5 K/CU MM    Monocytes Absolute increased BY  70 BPM  QRS axis shifted right  T wave inversion no longer evident in Lateral leads     TYPE AND SCREEN   Result Value Ref Range    ABO/Rh O POSITIVE     Antibody Screen NEGATIVE        EKG (if obtained): (All EKG's are interpreted by myself in the absence of a cardiologist)  Sinus tachycardia 125. Left axis with good R wave progression. Incomplete right bundle branch block noted. No acute ischemic change when compared to prior tracing from 1/2/2020. Radiographs (if obtained):  [] The following radiograph was interpreted by myself in the absence of a radiologist:  [x] Radiologist's Report reviewed at time of ED visit:  Xr Chest Standard (2 Vw)    Result Date: 4/17/2020  EXAMINATION: TWO X-RAY VIEWS OF THE CHEST 4/17/2020 12:56 pm COMPARISON: 02/24/2020 HISTORY: ORDERING SYSTEM PROVIDED HISTORY: Chronic obstructive pulmonary disease, unspecified COPD type ([facility] TECHNOLOGIST PROVIDED HISTORY: Reason for Exam: Follow up for COPD. Acuity: Unknown Type of Exam: Unknown FINDINGS: The heart and mediastinal structures are stable. Mild-to-moderate left pleural effusion is present similar to the prior exam from 2 months earlier. There is likely some compressive left basilar atelectasis. The right lung remains clear. Persistent mild-to-moderate left pleural effusion. Xr Chest Portable    Result Date: 5/9/2020  EXAMINATION: ONE XRAY VIEW OF THE CHEST 5/9/2020 12:51 am COMPARISON: 04/17/2020 HISTORY: ORDERING SYSTEM PROVIDED HISTORY: Chest pain TECHNOLOGIST PROVIDED HISTORY: Reason for exam:->Chest pain Reason for Exam: chest pain Acuity: Unknown Type of Exam: Initial FINDINGS: The left hemidiaphragm is elevated. Redemonstration to moderate left pleural effusion with left mid and lower lung consolidation. There appears to be least some degree of pleural thickening extending along the left lateral wall. The right lung demonstrates basilar atelectasis. Stable heart size and mediastinal contours. No change as described. ED Course and MDM:  Patient's EKG is reviewed and is reassuring. Troponin is indeterminate which is consistent with your prior values have fallen. He was given aspirin prior to arrival.  Does not continue to complain of chest pain here so nitro was held. Labs are notable for decreased H&H with a hemoglobin of 8 today, consistent with his reported recent bleeding. No active bowel movements in the emergency department. He was initially tachycardic, but this down trended with gentle fluid boluses. Due to the initial tachycardia, a d-dimer was obtained. This is mildly elevated when corrected for age. However patient has evidence of GI bleeding. As he is not persistently tachycardic, hypoxic, or with respiratory distress, have elected to hold anticoagulation and have ordered a VQ scan. Will plan to admit for further evaluation. Patient aware and agreeable to proceed. Final Impression:  1. Acute chest pain    2.  Acute GI bleeding      DISPOSITION Admitted 05/09/2020 04:55:12 AM      Patient referred to:  Raine Sommer MD  [address] [facility] [address]  [phone]          Discharge medications:  New Prescriptions    No medications on file     (Please note that portions of this note may have been completed with a voice recognition program. Efforts were made to edit the dictations but occasionally words are mis-transcribed.)    Angela Blackwell, [address], DO  05/09/20 3521

## 2020-05-10 LAB
ALBUMIN SERPL-MCNC: 3.2 GM/DL (ref 3.4–5)
ALP BLD-CCNC: 56 IU/L (ref 40–129)
ALT SERPL-CCNC: 6 U/L (ref 10–40)
AMYLASE: 87 U/L (ref 25–115)
ANION GAP SERPL CALCULATED.3IONS-SCNC: 8 MMOL/L (ref 4–16)
APTT: 31.3 SECONDS (ref 25.1–37.1)
AST SERPL-CCNC: 10 IU/L (ref 15–37)
BILIRUB SERPL-MCNC: 0.7 MG/DL (ref 0–1)
BUN BLDV-MCNC: 27 MG/DL (ref 6–23)
CALCIUM SERPL-MCNC: 8.2 MG/DL (ref 8.3–10.6)
CHLORIDE BLD-SCNC: 107 MMOL/L (ref 99–110)
CO2: 27 MMOL/L (ref 21–32)
CREAT SERPL-MCNC: 2 MG/DL (ref 0.9–1.3)
EKG ATRIAL RATE: 122 BPM
EKG ATRIAL RATE: 72 BPM
EKG DIAGNOSIS: NORMAL
EKG DIAGNOSIS: NORMAL
EKG P AXIS: 3 DEGREES
EKG P AXIS: 47 DEGREES
EKG P-R INTERVAL: 150 MS
EKG P-R INTERVAL: 166 MS
EKG Q-T INTERVAL: 336 MS
EKG Q-T INTERVAL: 418 MS
EKG QRS DURATION: 106 MS
EKG QRS DURATION: 110 MS
EKG QTC CALCULATION (BAZETT): 457 MS
EKG QTC CALCULATION (BAZETT): 478 MS
EKG R AXIS: -7 DEGREES
EKG R AXIS: -9 DEGREES
EKG T AXIS: 38 DEGREES
EKG T AXIS: 43 DEGREES
EKG VENTRICULAR RATE: 122 BPM
EKG VENTRICULAR RATE: 72 BPM
GFR AFRICAN AMERICAN: 40 ML/MIN/1.73M2
GFR NON-AFRICAN AMERICAN: 33 ML/MIN/1.73M2
GLUCOSE BLD-MCNC: 109 MG/DL (ref 70–99)
GLUCOSE BLD-MCNC: 111 MG/DL (ref 70–99)
GLUCOSE BLD-MCNC: 114 MG/DL (ref 70–99)
GLUCOSE BLD-MCNC: 136 MG/DL (ref 70–99)
GLUCOSE BLD-MCNC: 143 MG/DL (ref 70–99)
HCT VFR BLD CALC: 26.5 % (ref 42–52)
HCT VFR BLD CALC: 29 % (ref 42–52)
HEMOGLOBIN: 8.1 GM/DL (ref 13.5–18)
HEMOGLOBIN: 8.8 GM/DL (ref 13.5–18)
INR BLD: 0.96 INDEX
LIPASE: 14 IU/L (ref 13–60)
MCH RBC QN AUTO: 24 PG (ref 27–31)
MCHC RBC AUTO-ENTMCNC: 30.6 % (ref 32–36)
MCV RBC AUTO: 78.6 FL (ref 78–100)
PDW BLD-RTO: 15.9 % (ref 11.7–14.9)
PLATELET # BLD: 139 K/CU MM (ref 140–440)
PMV BLD AUTO: 9.1 FL (ref 7.5–11.1)
POTASSIUM SERPL-SCNC: 4.7 MMOL/L (ref 3.5–5.1)
PROTHROMBIN TIME: 11.6 SECONDS (ref 11.7–14.5)
RBC # BLD: 3.37 M/CU MM (ref 4.6–6.2)
SODIUM BLD-SCNC: 142 MMOL/L (ref 135–145)
TOTAL PROTEIN: 5.6 GM/DL (ref 6.4–8.2)
WBC # BLD: 8.3 K/CU MM (ref 4–10.5)

## 2020-05-10 PROCEDURE — 86850 RBC ANTIBODY SCREEN: CPT

## 2020-05-10 PROCEDURE — 86922 COMPATIBILITY TEST ANTIGLOB: CPT

## 2020-05-10 PROCEDURE — 6370000000 HC RX 637 (ALT 250 FOR IP): Performed by: HOSPITALIST

## 2020-05-10 PROCEDURE — 99232 SBSQ HOSP IP/OBS MODERATE 35: CPT | Performed by: INTERNAL MEDICINE

## 2020-05-10 PROCEDURE — 80053 COMPREHEN METABOLIC PANEL: CPT

## 2020-05-10 PROCEDURE — 86900 BLOOD TYPING SEROLOGIC ABO: CPT

## 2020-05-10 PROCEDURE — 85018 HEMOGLOBIN: CPT

## 2020-05-10 PROCEDURE — 36430 TRANSFUSION BLD/BLD COMPNT: CPT

## 2020-05-10 PROCEDURE — 82962 GLUCOSE BLOOD TEST: CPT

## 2020-05-10 PROCEDURE — 85610 PROTHROMBIN TIME: CPT

## 2020-05-10 PROCEDURE — 85730 THROMBOPLASTIN TIME PARTIAL: CPT

## 2020-05-10 PROCEDURE — 85027 COMPLETE CBC AUTOMATED: CPT

## 2020-05-10 PROCEDURE — 94761 N-INVAS EAR/PLS OXIMETRY MLT: CPT

## 2020-05-10 PROCEDURE — C9113 INJ PANTOPRAZOLE SODIUM, VIA: HCPCS | Performed by: SPECIALIST

## 2020-05-10 PROCEDURE — 6360000002 HC RX W HCPCS: Performed by: SPECIALIST

## 2020-05-10 PROCEDURE — 85014 HEMATOCRIT: CPT

## 2020-05-10 PROCEDURE — 82150 ASSAY OF AMYLASE: CPT

## 2020-05-10 PROCEDURE — 2580000003 HC RX 258: Performed by: NURSE PRACTITIONER

## 2020-05-10 PROCEDURE — 83690 ASSAY OF LIPASE: CPT

## 2020-05-10 PROCEDURE — P9016 RBC LEUKOCYTES REDUCED: HCPCS

## 2020-05-10 PROCEDURE — 6370000000 HC RX 637 (ALT 250 FOR IP): Performed by: NURSE PRACTITIONER

## 2020-05-10 PROCEDURE — 36415 COLL VENOUS BLD VENIPUNCTURE: CPT

## 2020-05-10 PROCEDURE — APPSS60 APP SPLIT SHARED TIME 46-60 MINUTES: Performed by: NURSE PRACTITIONER

## 2020-05-10 PROCEDURE — 93005 ELECTROCARDIOGRAM TRACING: CPT | Performed by: HOSPITALIST

## 2020-05-10 PROCEDURE — 93010 ELECTROCARDIOGRAM REPORT: CPT | Performed by: INTERNAL MEDICINE

## 2020-05-10 PROCEDURE — 86901 BLOOD TYPING SEROLOGIC RH(D): CPT

## 2020-05-10 PROCEDURE — 2140000000 HC CCU INTERMEDIATE R&B

## 2020-05-10 RX ORDER — RISPERIDONE 0.5 MG/1
1 TABLET, FILM COATED ORAL NIGHTLY
Status: DISCONTINUED | OUTPATIENT
Start: 2020-05-10 | End: 2020-05-13 | Stop reason: HOSPADM

## 2020-05-10 RX ORDER — TIZANIDINE 4 MG/1
4 TABLET ORAL ONCE
Status: COMPLETED | OUTPATIENT
Start: 2020-05-10 | End: 2020-05-10

## 2020-05-10 RX ADMIN — SODIUM CHLORIDE 250 ML: 9 INJECTION, SOLUTION INTRAVENOUS at 03:37

## 2020-05-10 RX ADMIN — DOCUSATE SODIUM 100 MG: 100 CAPSULE, LIQUID FILLED ORAL at 22:38

## 2020-05-10 RX ADMIN — RISPERIDONE 1 MG: 0.5 TABLET, FILM COATED ORAL at 01:05

## 2020-05-10 RX ADMIN — ASPIRIN 81 MG 81 MG: 81 TABLET ORAL at 08:52

## 2020-05-10 RX ADMIN — BUSPIRONE HYDROCHLORIDE 10 MG: 10 TABLET ORAL at 14:38

## 2020-05-10 RX ADMIN — FUROSEMIDE 10 MG: 20 TABLET ORAL at 08:52

## 2020-05-10 RX ADMIN — CARVEDILOL 6.25 MG: 6.25 TABLET, FILM COATED ORAL at 08:52

## 2020-05-10 RX ADMIN — TIZANIDINE 4 MG: 4 TABLET ORAL at 01:05

## 2020-05-10 RX ADMIN — DIVALPROEX SODIUM 1000 MG: 500 TABLET, FILM COATED, EXTENDED RELEASE ORAL at 22:26

## 2020-05-10 RX ADMIN — ATORVASTATIN CALCIUM 80 MG: 80 TABLET, FILM COATED ORAL at 08:53

## 2020-05-10 RX ADMIN — ALPRAZOLAM 1 MG: 0.5 TABLET ORAL at 12:30

## 2020-05-10 RX ADMIN — ESCITALOPRAM OXALATE 10 MG: 10 TABLET ORAL at 08:52

## 2020-05-10 RX ADMIN — BUSPIRONE HYDROCHLORIDE 10 MG: 10 TABLET ORAL at 22:26

## 2020-05-10 RX ADMIN — BUSPIRONE HYDROCHLORIDE 10 MG: 10 TABLET ORAL at 08:51

## 2020-05-10 RX ADMIN — PANTOPRAZOLE SODIUM 40 MG: 40 INJECTION, POWDER, FOR SOLUTION INTRAVENOUS at 08:52

## 2020-05-10 RX ADMIN — DOCUSATE SODIUM 100 MG: 100 CAPSULE, LIQUID FILLED ORAL at 08:51

## 2020-05-10 RX ADMIN — FOLIC ACID 1 MG: 1 TABLET ORAL at 08:51

## 2020-05-10 RX ADMIN — OXYCODONE HYDROCHLORIDE AND ACETAMINOPHEN 1 TABLET: 7.5; 325 TABLET ORAL at 22:26

## 2020-05-10 RX ADMIN — SODIUM CHLORIDE, PRESERVATIVE FREE 10 ML: 5 INJECTION INTRAVENOUS at 22:27

## 2020-05-10 RX ADMIN — CARVEDILOL 6.25 MG: 6.25 TABLET, FILM COATED ORAL at 17:32

## 2020-05-10 RX ADMIN — ALPRAZOLAM 1 MG: 0.5 TABLET ORAL at 22:26

## 2020-05-10 RX ADMIN — SPIRONOLACTONE 12.5 MG: 25 TABLET ORAL at 22:26

## 2020-05-10 RX ADMIN — SPIRONOLACTONE 12.5 MG: 25 TABLET ORAL at 08:51

## 2020-05-10 RX ADMIN — TRAZODONE HYDROCHLORIDE 50 MG: 50 TABLET ORAL at 22:26

## 2020-05-10 RX ADMIN — AMLODIPINE BESYLATE 10 MG: 10 TABLET ORAL at 08:52

## 2020-05-10 RX ADMIN — SODIUM CHLORIDE, PRESERVATIVE FREE 10 ML: 5 INJECTION INTRAVENOUS at 08:51

## 2020-05-10 RX ADMIN — RISPERIDONE 1 MG: 0.5 TABLET, FILM COATED ORAL at 22:26

## 2020-05-10 ASSESSMENT — PAIN DESCRIPTION - PAIN TYPE: TYPE: CHRONIC PAIN

## 2020-05-10 ASSESSMENT — PAIN SCALES - GENERAL
PAINLEVEL_OUTOF10: 0
PAINLEVEL_OUTOF10: 6
PAINLEVEL_OUTOF10: 0
PAINLEVEL_OUTOF10: 6
PAINLEVEL_OUTOF10: 0
PAINLEVEL_OUTOF10: 0

## 2020-05-10 ASSESSMENT — PAIN DESCRIPTION - FREQUENCY: FREQUENCY: INTERMITTENT

## 2020-05-10 ASSESSMENT — PAIN DESCRIPTION - LOCATION: LOCATION: BACK

## 2020-05-10 ASSESSMENT — PAIN DESCRIPTION - ORIENTATION: ORIENTATION: LOWER

## 2020-05-10 ASSESSMENT — ENCOUNTER SYMPTOMS
COUGH: 0
BACK PAIN: 0
DIARRHEA: 0
NAUSEA: 0
ABDOMINAL PAIN: 0
CONSTIPATION: 0
SINUS PAIN: 0
PHOTOPHOBIA: 0
VOMITING: 0
BLOOD IN STOOL: 1
SHORTNESS OF BREATH: 1

## 2020-05-10 ASSESSMENT — PAIN DESCRIPTION - DESCRIPTORS: DESCRIPTORS: ACHING;DISCOMFORT

## 2020-05-10 NOTE — PROGRESS NOTES
Complaint   Patient presents with    Chest Pain     x 2-3 days        Past medical history:    has a past medical history of Anemia, Arthritis, Back pain, chronic, Bipolar 1 disorder ([address] CAD (coronary artery disease), Cerebral artery occlusion with cerebral infarction ([address] Chronic kidney disease (CKD), stage III (moderate) ([address] CKD (chronic kidney disease), COPD (chronic obstructive pulmonary disease) ([address] Diabetes mellitus, type 2 ([facility] [address] [address] Diabetic peripheral neuropathy ([facility] [address] Diastolic CHF ([facility] [address] Gastric ulcer, H/O cardiovascular stress test, Heart murmur, Hiatal hernia, History of cardiac cath, Hx of migraines, HX OTHER MEDICAL, Hyperlipidemia, Hypertension, Lumbar radiculopathy, Panic attack, Pleural effusion, S/P thoracentesis, Sleep apnea, SOBOE (shortness of breath on exertion), and Spinal stenosis. Past surgical history:   has a past surgical history that includes Neck surgery (1998); Carpal tunnel release (Bilateral, 1989); knee surgery (Bilateral); thoracentesis (Left, 12/20/2013); Elbow surgery (Left, 2000's); Thoracentesis (4/25/2016); Tonsillectomy; Thoracentesis (Left, 11/15/2016); thoracotomy (Left, 03/18/2019); and thoracotomy (Left, 3/18/2019). Social History:   reports that he quit smoking about 9 years ago. His smoking use included cigarettes. He has a 45.00 pack-year smoking history. He has never used smokeless tobacco. He reports previous alcohol use. He reports previous drug use. Drug: Marijuana. Family history:  family history includes Heart Disease in his mother; High Blood Pressure in his father. Allergies   Allergen Reactions    Nsaids      Renal        Review of Systems   Constitutional: Negative for fatigue and fever. HENT: Negative for congestion and sinus pain. Eyes: Negative for photophobia and visual disturbance. Respiratory: Positive for shortness of breath (chronic, not worse). Negative for cough.     Cardiovascular: Negative for chest pain, palpitations and leg Telemetry Reviewed:   Sinus bradycardia Rate 54    Medications:    risperiDONE  1 mg Oral Nightly    aspirin  324 mg Oral Once    atorvastatin  80 mg Oral Daily    busPIRone  10 mg Oral TID    carvedilol  6.25 mg Oral BID     divalproex  1,000 mg Oral Nightly    docusate sodium  100 mg Oral BID    escitalopram  10 mg Oral Daily    folic acid  1 mg Oral Daily    traZODone  50 mg Oral Nightly    sodium chloride flush  10 mL Intravenous 2 times per day    aspirin  81 mg Oral Daily    insulin lispro  0-12 Units Subcutaneous TID     insulin lispro  0-6 Units Subcutaneous Nightly    pantoprazole  40 mg Intravenous Daily    furosemide  10 mg Oral Daily    amLODIPine  10 mg Oral Daily    spironolactone  12.5 mg Oral BID      dextrose       ALPRAZolam, oxyCODONE-acetaminophen, sodium chloride flush, acetaminophen **OR** acetaminophen, polyethylene glycol, promethazine **OR** ondansetron, nitroGLYCERIN, glucose, dextrose, glucagon (rDNA), dextrose, budesonide    Lab Data:  CBC:   Recent Labs     05/09/20  0047 05/09/20  1220 05/09/20  1656 05/09/20  2210   WBC 8.3  --   --   --    HGB 8.0* 8.0* 7.4* 6.8*   HCT 27.1* 27.2* 25.3* 22.9*   MCV 78.8  --   --   --      --   --   --      BMP:   Recent Labs     05/09/20 0047      K 3.9      CO2 23   BUN 27*   CREATININE 2.1*     PT/INR:   Recent Labs     05/09/20 0047   PROTIME 11.9   INR 0.98     BNP:    Recent Labs     05/09/20 0047   PROBNP 547.4*     TROPONIN:   Recent Labs     05/09/20  0047 05/09/20  0803 05/09/20  1202   TROPONINT 0.019* 0.028* 0.039*        ECHO :   Echocardiogram 11/2019 Summary   Limited study due to patients body habitus. Left ventricular function is normal, EF is estimated at 55-60%. Grade II diastolic dysfunction. Moderate left ventricular hypertrophy. Mild aortic and mitral regurgitation noted. No evidence of pericardial effusion.         All labs, medications and tests reviewed by myself ,

## 2020-05-10 NOTE — PROGRESS NOTES
No code noted on armband for patient to receive blood. Blood Bank called and informed of need to get another type and screen lab draw. New orders received for STAT type and screen.

## 2020-05-10 NOTE — PROGRESS NOTES
Glynn Viera. is a 79 y.o. male patient had some chest pain last nigh    Current Facility-Administered Medications   Medication Dose Route Frequency Provider Last Rate Last Dose    risperiDONE (RISPERDAL) tablet 1 mg  1 mg Oral Nightly Faina Chong, APRN - CNP   1 mg at 05/10/20 0105    aspirin chewable tablet 324 mg  324 mg Oral Once [facility], DO        ALPRAZolam Joseline Costain) tablet 1 mg  1 mg Oral TID PRN Rhoda Dom, APRN - CNP   1 mg at 05/09/20 2128    atorvastatin (LIPITOR) tablet 80 mg  80 mg Oral Daily Faina Chong, APRN - CNP   80 mg at 05/10/20 0853    busPIRone (BUSPAR) tablet 10 mg  10 mg Oral TID Rhoda Dom, APRN - CNP   10 mg at 05/10/20 0851    carvedilol (COREG) tablet 6.25 mg  6.25 mg Oral BID WC Faina Chong, APRN - CNP   6.25 mg at 05/10/20 0852    divalproex (DEPAKOTE ER) extended release tablet 1,000 mg  1,000 mg Oral Nightly Faina Chong, APRN - CNP   1,000 mg at 05/09/20 2127    docusate sodium (COLACE) capsule 100 mg  100 mg Oral BID Rhoda Dom, APRN - CNP   100 mg at 05/10/20 0851    escitalopram (LEXAPRO) tablet 10 mg  10 mg Oral Daily Faina Chong, APRN - CNP   10 mg at 13/85/37 8409    folic acid (FOLVITE) tablet 1 mg  1 mg Oral Daily Faina Chong, APRN - CNP   1 mg at 05/10/20 0851    oxyCODONE-acetaminophen (PERCOCET) 7.5-325 MG per tablet 1 tablet  1 tablet Oral Q4H PRN Rhoda Dom, APRN - CNP   1 tablet at 05/09/20 2127    traZODone (DESYREL) tablet 50 mg  50 mg Oral Nightly Faina Chong, APRN - CNP   50 mg at 05/09/20 2128    sodium chloride flush 0.9 % injection 10 mL  10 mL Intravenous 2 times per day Faina Chong, APRN - CNP   10 mL at 05/10/20 0851    sodium chloride flush 0.9 % injection 10 mL  10 mL Intravenous PRN JULEE Dorado CNP        acetaminophen (TYLENOL) tablet 650 mg  650 mg Oral Q6H PRN JULEE Dorado CNP        Or    acetaminophen (TYLENOL) suppository 650 mg  650 mg Rectal Q6H PRN Anshul Doyle, APRN - CNP        polyethylene glycol (GLYCOLAX) packet 17 g  17 g Oral Daily PRN [facility], APRN - CNP        promethazine (PHENERGAN) tablet 12.5 mg  12.5 mg Oral Q6H PRN [facility], APRN - CNP        Or    ondansetron (ZOFRAN) injection 4 mg  4 mg Intravenous Q6H PRN [facility], APRN - CNP        aspirin chewable tablet 81 mg  81 mg Oral Daily Faina Dapila, APRN - CNP   81 mg at 05/10/20 0852    nitroGLYCERIN (NITROSTAT) SL tablet 0.4 mg  0.4 mg Sublingual Q5 Min PRN [facility], APRN - CNP        insulin lispro (HUMALOG) injection vial 0-12 Units  0-12 Units Subcutaneous TID WC [facility], APRN - CNP   2 Units at 05/09/20 1840    insulin lispro (HUMALOG) injection vial 0-6 Units  0-6 Units Subcutaneous Nightly [facility], APRN - CNP   1 Units at 05/09/20 2134    glucose (GLUTOSE) 40 % oral gel 15 g  15 g Oral PRN [facility], APRN - CNP        dextrose 50 % IV solution  12.5 g Intravenous PRN [facility], APRN - CNP        glucagon (rDNA) injection 1 mg  1 mg Intramuscular PRN [facility], APRN - CNP        dextrose 5 % solution  100 mL/hr Intravenous PRN [facility], APRN - CNP        pantoprazole (PROTONIX) injection 40 mg  40 mg Intravenous Daily Katie Rodriguez MD   40 mg at 05/10/20 0852    furosemide (LASIX) tablet 10 mg  10 mg Oral Daily Roney Lopez MD   10 mg at 05/10/20 0852    amLODIPine (NORVASC) tablet 10 mg  10 mg Oral Daily Roney Lopez MD   10 mg at 05/10/20 4741    spironolactone (ALDACTONE) tablet 12.5 mg  12.5 mg Oral BID Roney Lopez MD   12.5 mg at 05/10/20 0851    budesonide (PULMICORT) nebulizer suspension 500 mcg  0.5 mg Nebulization As Directed RT PRN Roney Lopez MD         Allergies   Allergen Reactions    Nsaids      Renal      Active Problems:    Acute chest pain  Resolved Problems:    * No resolved hospital problems.  *    Blood pressure (!) 165/66, pulse 146, temperature 98.2 °F (36.8 °C), temperature source Oral, resp. rate 26, height 5' 9\" (1.753 m), weight 247 lb 4.8 oz (112.2 kg), SpO2 96 %. Subjective:  Symptoms:  Stable. Diet:  Poor intake. Pain:  He complains of pain that is mild. Objective:  General Appearance:  Comfortable. Vital signs: (most recent): Blood pressure (!) 165/66, pulse 146, temperature 98.2 °F (36.8 °C), temperature source Oral, resp. rate 26, height 5' 9\" (1.753 m), weight 247 lb 4.8 oz (112.2 kg), SpO2 96 %. Vital signs are normal.    HEENT: Normal HEENT exam.    Heart: Normal rate. Abdomen: Abdomen is soft. Extremities: Decreased range of motion. Neurological: Patient is alert. Pupils:  Pupils are equal, round, and reactive to light. Skin:  Warm.       Assessment & Plan  Chest pain  -V/Q low prob of PE  -serial CE  -ASA, BB, statin  -stress test Monday  Anemia with acute bld loss with GI bleed  -hold xarelto and consult GI  -s/p 1PRBC and egd after cardiac work up  COPD with left pleural effusion   -CXR stable, duoneb  Chronic stable diastolic CHF  CKD 3  -stable and continue lasix and aldactone  DM  -SSI  Bipolar  -continue psych meds  HTN  -CCB, labetalol  dVT prophylaxis  -SCD  Teto Acosta MD  5/10/2020

## 2020-05-11 ENCOUNTER — APPOINTMENT (OUTPATIENT)
Dept: NUCLEAR MEDICINE | Age: 70
DRG: 375 | End: 2020-05-11
Payer: MEDICARE

## 2020-05-11 ENCOUNTER — ANESTHESIA EVENT (OUTPATIENT)
Dept: ENDOSCOPY | Age: 70
DRG: 375 | End: 2020-05-11
Payer: MEDICARE

## 2020-05-11 LAB
ABO/RH: NORMAL
ALBUMIN SERPL-MCNC: 3.6 GM/DL (ref 3.4–5)
ALP BLD-CCNC: 56 IU/L (ref 40–128)
ALT SERPL-CCNC: 7 U/L (ref 10–40)
ANION GAP SERPL CALCULATED.3IONS-SCNC: 11 MMOL/L (ref 4–16)
ANTIBODY SCREEN: NEGATIVE
AST SERPL-CCNC: 9 IU/L (ref 15–37)
BASOPHILS ABSOLUTE: 0 K/CU MM
BASOPHILS RELATIVE PERCENT: 0.4 % (ref 0–1)
BILIRUB SERPL-MCNC: 0.3 MG/DL (ref 0–1)
BUN BLDV-MCNC: 29 MG/DL (ref 6–23)
CALCIUM SERPL-MCNC: 8.7 MG/DL (ref 8.3–10.6)
CHLORIDE BLD-SCNC: 104 MMOL/L (ref 99–110)
CO2: 24 MMOL/L (ref 21–32)
COMPONENT: NORMAL
CREAT SERPL-MCNC: 2 MG/DL (ref 0.9–1.3)
CROSSMATCH RESULT: NORMAL
DIFFERENTIAL TYPE: ABNORMAL
EOSINOPHILS ABSOLUTE: 0.1 K/CU MM
EOSINOPHILS RELATIVE PERCENT: 1.4 % (ref 0–3)
GFR AFRICAN AMERICAN: 40 ML/MIN/1.73M2
GFR NON-AFRICAN AMERICAN: 33 ML/MIN/1.73M2
GLUCOSE BLD-MCNC: 106 MG/DL (ref 70–99)
GLUCOSE BLD-MCNC: 110 MG/DL (ref 70–99)
GLUCOSE BLD-MCNC: 123 MG/DL (ref 70–99)
GLUCOSE BLD-MCNC: 160 MG/DL (ref 70–99)
GLUCOSE BLD-MCNC: 91 MG/DL (ref 70–99)
HCT VFR BLD CALC: 29.4 % (ref 42–52)
HCT VFR BLD CALC: 31.2 % (ref 42–52)
HEMOGLOBIN: 8.8 GM/DL (ref 13.5–18)
HEMOGLOBIN: 9.6 GM/DL (ref 13.5–18)
IMMATURE NEUTROPHIL %: 0.7 % (ref 0–0.43)
LV EF: 53 %
LV EF: 60 %
LVEF MODALITY: NORMAL
LVEF MODALITY: NORMAL
LYMPHOCYTES ABSOLUTE: 1.8 K/CU MM
LYMPHOCYTES RELATIVE PERCENT: 17.1 % (ref 24–44)
MCH RBC QN AUTO: 23.7 PG (ref 27–31)
MCHC RBC AUTO-ENTMCNC: 29.9 % (ref 32–36)
MCV RBC AUTO: 79 FL (ref 78–100)
MONOCYTES ABSOLUTE: 1.1 K/CU MM
MONOCYTES RELATIVE PERCENT: 10.6 % (ref 0–4)
NUCLEATED RBC %: 0 %
PDW BLD-RTO: 16 % (ref 11.7–14.9)
PLATELET # BLD: 154 K/CU MM (ref 140–440)
PMV BLD AUTO: 9.3 FL (ref 7.5–11.1)
POTASSIUM SERPL-SCNC: 4.5 MMOL/L (ref 3.5–5.1)
RBC # BLD: 3.72 M/CU MM (ref 4.6–6.2)
SEGMENTED NEUTROPHILS ABSOLUTE COUNT: 7.2 K/CU MM
SEGMENTED NEUTROPHILS RELATIVE PERCENT: 69.8 % (ref 36–66)
SODIUM BLD-SCNC: 139 MMOL/L (ref 135–145)
STATUS: NORMAL
TOTAL IMMATURE NEUTOROPHIL: 0.07 K/CU MM
TOTAL NUCLEATED RBC: 0 K/CU MM
TOTAL PROTEIN: 5.9 GM/DL (ref 6.4–8.2)
TRANSFUSION STATUS: NORMAL
UNIT DIVISION: 0
UNIT NUMBER: NORMAL
WBC # BLD: 10.3 K/CU MM (ref 4–10.5)

## 2020-05-11 PROCEDURE — A9500 TC99M SESTAMIBI: HCPCS | Performed by: INTERNAL MEDICINE

## 2020-05-11 PROCEDURE — 93306 TTE W/DOPPLER COMPLETE: CPT

## 2020-05-11 PROCEDURE — 3E073KZ INTRODUCTION OF OTHER DIAGNOSTIC SUBSTANCE INTO CORONARY ARTERY, PERCUTANEOUS APPROACH: ICD-10-PCS | Performed by: INTERNAL MEDICINE

## 2020-05-11 PROCEDURE — 80053 COMPREHEN METABOLIC PANEL: CPT

## 2020-05-11 PROCEDURE — 85025 COMPLETE CBC W/AUTO DIFF WBC: CPT

## 2020-05-11 PROCEDURE — 93017 CV STRESS TEST TRACING ONLY: CPT

## 2020-05-11 PROCEDURE — 94761 N-INVAS EAR/PLS OXIMETRY MLT: CPT

## 2020-05-11 PROCEDURE — 3430000000 HC RX DIAGNOSTIC RADIOPHARMACEUTICAL: Performed by: INTERNAL MEDICINE

## 2020-05-11 PROCEDURE — APPSS45 APP SPLIT SHARED TIME 31-45 MINUTES: Performed by: NURSE PRACTITIONER

## 2020-05-11 PROCEDURE — 2140000000 HC CCU INTERMEDIATE R&B

## 2020-05-11 PROCEDURE — 82962 GLUCOSE BLOOD TEST: CPT

## 2020-05-11 PROCEDURE — 78452 HT MUSCLE IMAGE SPECT MULT: CPT

## 2020-05-11 PROCEDURE — 85014 HEMATOCRIT: CPT

## 2020-05-11 PROCEDURE — 6360000002 HC RX W HCPCS: Performed by: SPECIALIST

## 2020-05-11 PROCEDURE — 6360000002 HC RX W HCPCS: Performed by: INTERNAL MEDICINE

## 2020-05-11 PROCEDURE — 2580000003 HC RX 258: Performed by: NURSE PRACTITIONER

## 2020-05-11 PROCEDURE — 6370000000 HC RX 637 (ALT 250 FOR IP): Performed by: HOSPITALIST

## 2020-05-11 PROCEDURE — 6370000000 HC RX 637 (ALT 250 FOR IP): Performed by: INTERNAL MEDICINE

## 2020-05-11 PROCEDURE — 6370000000 HC RX 637 (ALT 250 FOR IP): Performed by: NURSE PRACTITIONER

## 2020-05-11 PROCEDURE — 4A02XM4 MEASUREMENT OF CARDIAC TOTAL ACTIVITY, EXTERNAL APPROACH: ICD-10-PCS | Performed by: INTERNAL MEDICINE

## 2020-05-11 PROCEDURE — 85018 HEMOGLOBIN: CPT

## 2020-05-11 PROCEDURE — C9113 INJ PANTOPRAZOLE SODIUM, VIA: HCPCS | Performed by: SPECIALIST

## 2020-05-11 PROCEDURE — 6370000000 HC RX 637 (ALT 250 FOR IP): Performed by: SPECIALIST

## 2020-05-11 PROCEDURE — 36415 COLL VENOUS BLD VENIPUNCTURE: CPT

## 2020-05-11 PROCEDURE — 99232 SBSQ HOSP IP/OBS MODERATE 35: CPT | Performed by: INTERNAL MEDICINE

## 2020-05-11 RX ORDER — AMINOPHYLLINE DIHYDRATE 25 MG/ML
75 INJECTION, SOLUTION INTRAVENOUS ONCE
Status: COMPLETED | OUTPATIENT
Start: 2020-05-11 | End: 2020-05-11

## 2020-05-11 RX ORDER — CARVEDILOL 12.5 MG/1
12.5 TABLET ORAL 2 TIMES DAILY WITH MEALS
Status: DISCONTINUED | OUTPATIENT
Start: 2020-05-11 | End: 2020-05-13 | Stop reason: HOSPADM

## 2020-05-11 RX ADMIN — CARVEDILOL 12.5 MG: 12.5 TABLET, FILM COATED ORAL at 16:48

## 2020-05-11 RX ADMIN — SODIUM CHLORIDE, PRESERVATIVE FREE 10 ML: 5 INJECTION INTRAVENOUS at 12:04

## 2020-05-11 RX ADMIN — SODIUM CHLORIDE, PRESERVATIVE FREE 10 ML: 5 INJECTION INTRAVENOUS at 20:40

## 2020-05-11 RX ADMIN — REGADENOSON 0.4 MG: 0.08 INJECTION, SOLUTION INTRAVENOUS at 10:05

## 2020-05-11 RX ADMIN — RISPERIDONE 1 MG: 0.5 TABLET, FILM COATED ORAL at 20:40

## 2020-05-11 RX ADMIN — POLYETHYLENE GLYCOL 3350, SODIUM SULFATE ANHYDROUS, SODIUM BICARBONATE, SODIUM CHLORIDE, POTASSIUM CHLORIDE 4000 ML: 236; 22.74; 6.74; 5.86; 2.97 POWDER, FOR SOLUTION ORAL at 18:30

## 2020-05-11 RX ADMIN — CARVEDILOL 12.5 MG: 12.5 TABLET, FILM COATED ORAL at 12:04

## 2020-05-11 RX ADMIN — ATORVASTATIN CALCIUM 80 MG: 80 TABLET, FILM COATED ORAL at 12:05

## 2020-05-11 RX ADMIN — TRAZODONE HYDROCHLORIDE 50 MG: 50 TABLET ORAL at 20:40

## 2020-05-11 RX ADMIN — INSULIN LISPRO 2 UNITS: 100 INJECTION, SOLUTION INTRAVENOUS; SUBCUTANEOUS at 12:05

## 2020-05-11 RX ADMIN — AMINOPHYLLINE DIHYDRATE 75 MG: 25 INJECTION, SOLUTION INTRAVENOUS at 10:09

## 2020-05-11 RX ADMIN — ESCITALOPRAM OXALATE 10 MG: 10 TABLET ORAL at 12:04

## 2020-05-11 RX ADMIN — DIVALPROEX SODIUM 1000 MG: 500 TABLET, FILM COATED, EXTENDED RELEASE ORAL at 20:39

## 2020-05-11 RX ADMIN — PANTOPRAZOLE SODIUM 40 MG: 40 INJECTION, POWDER, FOR SOLUTION INTRAVENOUS at 12:02

## 2020-05-11 RX ADMIN — FOLIC ACID 1 MG: 1 TABLET ORAL at 12:04

## 2020-05-11 RX ADMIN — BUSPIRONE HYDROCHLORIDE 10 MG: 10 TABLET ORAL at 20:39

## 2020-05-11 RX ADMIN — BUSPIRONE HYDROCHLORIDE 10 MG: 10 TABLET ORAL at 12:03

## 2020-05-11 RX ADMIN — Medication 10 MILLICURIE: at 12:16

## 2020-05-11 RX ADMIN — FUROSEMIDE 10 MG: 20 TABLET ORAL at 12:04

## 2020-05-11 RX ADMIN — SPIRONOLACTONE 12.5 MG: 25 TABLET ORAL at 20:39

## 2020-05-11 RX ADMIN — SPIRONOLACTONE 12.5 MG: 25 TABLET ORAL at 12:04

## 2020-05-11 RX ADMIN — ASPIRIN 81 MG 81 MG: 81 TABLET ORAL at 12:03

## 2020-05-11 RX ADMIN — AMLODIPINE BESYLATE 10 MG: 10 TABLET ORAL at 12:03

## 2020-05-11 RX ADMIN — Medication 30 MILLICURIE: at 12:16

## 2020-05-11 RX ADMIN — DOCUSATE SODIUM 100 MG: 100 CAPSULE, LIQUID FILLED ORAL at 12:02

## 2020-05-11 ASSESSMENT — PAIN SCALES - GENERAL
PAINLEVEL_OUTOF10: 0

## 2020-05-11 ASSESSMENT — ENCOUNTER SYMPTOMS: SHORTNESS OF BREATH: 1

## 2020-05-11 NOTE — ANESTHESIA PRE PROCEDURE
Department of Anesthesiology  Preprocedure Note       Name:  Fabiana Dey. Age:  79 y.o.  :  1950                                          MRN:  [id number]         Date:  2020      Surgeon: Alethea Jimenez):  Bobby Rodriguez MD    Procedure: EGD ESOPHAGOGASTRODUODENOSCOPY (N/A )  COLONOSCOPY DIAGNOSTIC (N/A )    Medications prior to admission:   Prior to Admission medications    Medication Sig Start Date End Date Taking? Authorizing Provider   gabapentin (NEURONTIN) 300 MG capsule TAKE TWO (2) CAPSULES BY MOUTH THREE TIMES DAILY 20 Yes LORRAINE Howell   traZODone (DESYREL) 50 MG tablet TAKE ONE (1) TABLET BY MOUTH EVERY NIGHT 20  Yes LORRAINE Howell   risperiDONE (RISPERDAL) 1 MG tablet TAKE ONE (1) TABLET BY MOUTH EVERY NIGHT 20  Yes LORRAINE Howell   labetalol (NORMODYNE) 100 MG tablet TAKE 1/2  TABLET BY MOUTH EVERY 12 HOURS 20  Yes LORRAINE Howell   ALPRAZolam (XANAX) 1 MG tablet Take 1 tablet by mouth 3 times daily as needed for Anxiety for up to 30 days.  20 Yes Skyler Omalley PA-C   furosemide (LASIX) 20 MG tablet Take 0.5 tablets by mouth daily 20  Yes Darnell Segundo MD   docusate sodium (COLACE) 100 MG capsule Take 1 capsule by mouth 2 times daily 20  Yes Darnell Segundo MD   spironolactone (ALDACTONE) 25 MG tablet Take 0.5 tablets by mouth 2 times daily 20  Yes Darnell Segundo MD   oxyCODONE-acetaminophen (PERCOCET) 7.5-325 MG per tablet  20  Yes Myra Rodriguez MD   fluticasone-umeclidin-vilant (TRELEGY ELLIPTA) 300-64.0-43 MCG/INH AEPB Inhale 1 puff into the lungs daily 3/9/20  Yes Deniz Lino MD   tiZANidine (ZANAFLEX) 4 MG tablet Take 1 tablet by mouth 3 times daily 20  Yes Skyler Omalley PA-C   atorvastatin (LIPITOR) 80 MG tablet TAKE 1 TABLET BY MOUTH ONCE DAILY 2/10/20  Yes Darnell Segundo MD   folic acid (FOLVITE) 1 MG tablet TAKE 1 TABLET BY MOUTH ONCE DAILY 2/10/20  Yes Marcella Morales Ghassan Israel MD   rivaroxaban (XARELTO) 15 MG TABS tablet Take 1 tablet by mouth daily (with breakfast) 2/10/20  Yes Keyshawn Ferrer MD   divalproex (DEPAKOTE ER) 500 MG extended release tablet Take 2 tablets by mouth nightly 1/8/20  Yes LORRAINE Zimmerman   amLODIPine (NORVASC) 10 MG tablet TAKE 1 TABLET BY MOUTH ONCE DAILY  Patient taking differently: Take 10 mg by mouth daily  1/6/20  Yes Lina Hall MD   busPIRone (BUSPAR) 10 MG tablet Take 1 tablet by mouth 3 times daily 12/18/19  Yes Arleth Rhesa Parody, DO   escitalopram (LEXAPRO) 10 MG tablet Take 1 tablet by mouth daily 12/19/19  Yes Arleth Rhesa Parody, DO   TRADJENTA 5 MG tablet TAKE ONE (1) TABLET BY MOUTH ONCE DAILY 12/10/19  Yes LORRAINE Zimmerman   carvedilol (COREG) 6.25 MG tablet Take 6.25 mg by mouth 2 times daily (with meals)   Yes Historical Provider, MD   Compression Stockings MISC by Does not apply route Duration of Treatment:  3 Months  # of pairs:  2    Compression Class Level - 20-30 mmHg*    Style - Knee High 10/31/19   JULEE Eduardo CNP   Lactobacillus (ACIDOPHILUS) 0.5 MG TABS Take 1 capsule by mouth    Historical Provider, MD       Current medications:    Current Facility-Administered Medications   Medication Dose Route Frequency Provider Last Rate Last Dose    carvedilol (COREG) tablet 12.5 mg  12.5 mg Oral BID  Juan José Coulter DO        technetium sestamibi (CARDIOLITE) injection 10 millicurie  10 millicurie Intravenous ONCE PRN Carmen Beltrán MD        technetium sestamibi (CARDIOLITE) injection 30 millicurie  30 millicurie Intravenous ONCE PRN Carmen Beltrán MD        risperiDONE (RISPERDAL) tablet 1 mg  1 mg Oral Nightly JULEE Dorado CNP   1 mg at 05/10/20 2226    aspirin chewable tablet 324 mg  324 mg Oral Once Renee Alvarez DO        ALPRAZolam Jobie Shires) tablet 1 mg  1 mg Oral TID PRN JULEE Bautista CNP   1 mg at 05/10/20 2226    atorvastatin (LIPITOR) tablet 80 mg  80 mg Oral Daily Faina PANDA ChongN - CNP   80 mg at 05/10/20 0853    busPIRone (BUSPAR) tablet 10 mg  10 mg Oral TID Maria Ines Saldana APRN - CNP   10 mg at 05/10/20 2226    divalproex (DEPAKOTE ER) extended release tablet 1,000 mg  1,000 mg Oral Nightly Faina Castillo, APRN - CNP   1,000 mg at 05/10/20 2226    docusate sodium (COLACE) capsule 100 mg  100 mg Oral BID Maria Ines Saldana APRN - CNP   100 mg at 05/10/20 2238    escitalopram (LEXAPRO) tablet 10 mg  10 mg Oral Daily Faina Chong, APRN - CNP   10 mg at 06/34/94 6234    folic acid (FOLVITE) tablet 1 mg  1 mg Oral Daily Faina Chong, APRN - CNP   1 mg at 05/10/20 0851    oxyCODONE-acetaminophen (PERCOCET) 7.5-325 MG per tablet 1 tablet  1 tablet Oral Q4H PRN Maria Ines Saldana APRN - CNP   1 tablet at 05/10/20 2226    traZODone (DESYREL) tablet 50 mg  50 mg Oral Nightly Faina Chong, APRN - CNP   50 mg at 05/10/20 2226    sodium chloride flush 0.9 % injection 10 mL  10 mL Intravenous 2 times per day Maria Ines Saldana APRN - CNP   10 mL at 05/10/20 2227    sodium chloride flush 0.9 % injection 10 mL  10 mL Intravenous PRN Faina Chong, APRN - CNP        acetaminophen (TYLENOL) tablet 650 mg  650 mg Oral Q6H PRN Faina Chong, APRN - CNP        Or    acetaminophen (TYLENOL) suppository 650 mg  650 mg Rectal Q6H PRN Faina Chong, APRN - CNP        polyethylene glycol (GLYCOLAX) packet 17 g  17 g Oral Daily PRN Faina Chong, APRN - CNP        promethazine (PHENERGAN) tablet 12.5 mg  12.5 mg Oral Q6H PRN Faina Chong, APRN - CNP        Or    ondansetron (ZOFRAN) injection 4 mg  4 mg Intravenous Q6H PRN Faina Chong, APRN - CNP        aspirin chewable tablet 81 mg  81 mg Oral Daily JULEE Dorado CNP   81 mg at 05/10/20 0852    nitroGLYCERIN (NITROSTAT) SL tablet 0.4 mg  0.4 mg Sublingual Q5 Min PRN JULEE Dorado CNP        insulin lispro (HUMALOG) injection vial 0-12 Units  0-12 Units Subcutaneous TID  JULEE Luciano given: Not Answered      Vital Signs (Current):   Vitals:    05/10/20 1732 05/10/20 2220 05/11/20 0453 05/11/20 0800   BP: 116/79 (!) 151/95 (!) 150/80 (!) 136/103   Pulse: 74 71 62 131   Resp:  15 15 14   Temp:  36.9 °C (98.5 °F) 36.7 °C (98.1 °F) 36.4 °C (97.6 °F)   TempSrc:  Oral Oral Oral   SpO2:  95% 94% 97%   Weight:   238 lb 11.2 oz (108.3 kg)    Height:                                                  BP Readings from Last 3 Encounters:   05/11/20 (!) 136/103   05/05/20 118/80   03/07/20 132/70       NPO Status:                                                                                 BMI:   Wt Readings from Last 3 Encounters:   05/11/20 238 lb 11.2 oz (108.3 kg)   05/05/20 241 lb 14.4 oz (109.7 kg)   04/20/20 241 lb (109.3 kg)     Body mass index is 35.25 kg/m².     CBC  Lab Results   Component Value Date/Time    WBC 10.3 05/11/2020 04:16 AM    HGB 8.8 (L) 05/11/2020 04:16 AM    HCT 29.4 (L) 05/11/2020 04:16 AM     05/11/2020 04:16 AM       CMP  Lab Results   Component Value Date     05/11/2020    K 4.5 05/11/2020     05/11/2020    CO2 24 05/11/2020    BUN 29 (H) 05/11/2020    CREATININE 2.0 (H) 05/11/2020    GLUCOSE 106 (H) 05/11/2020    CALCIUM 8.7 05/11/2020    PROT 5.9 (L) 05/11/2020    LABALBU 3.6 05/11/2020    BILITOT 0.3 05/11/2020    ALKPHOS 56 05/11/2020    AST 9 (L) 05/11/2020    ALT 7 (L) 05/11/2020    LABGLOM 33 (L) 05/11/2020    GFRAA 40 (L) 05/11/2020       COAGS  Lab Results   Component Value Date/Time    PROTIME 11.6 (L) 05/10/2020 07:49 AM    PROTIME 9.6 (L) 02/10/2011 01:10 PM    INR 0.96 05/10/2020 07:49 AM    APTT 31.3 05/10/2020 07:49 AM     Anesthesia Evaluation  Patient summary reviewed and Nursing notes reviewed  Airway:         Dental:          Pulmonary:   (+) pneumonia:  COPD (iAcute on chronic respiratory failure with hypoxia , inhaler x 1 followed by Dr. Amy Milligan):  shortness of breath:  sleep apnea:                            ROS comment: Hx pleural unchanged from 12/15/2019. 3. Moderate amount stool in the colon. 4. Bilateral hydroceles are noted. .   Endo/Other:    (+) Diabetes (last HbA1c 6.2, /5/2020)Type II DM, using insulin, blood dyscrasia (blood loss anemai, transfused 1 PRBC. HGB: 8.8, 5/11/2020): anemia, arthritis: OA., . Pt had no PAT visit        ROS comment: marie knee scopes Abdominal:           Vascular:           ROS comment: Carotid US 10/2019  Bilateral internal carotid arteries demonstrate 0-50% stenosis.     Patent bilateral vertebral arteries with normal directional flow.     Moderate to severe atherosclerotic disease. .                             Anesthesia Plan        Modesta Moulton, APRN - CNP Chart review only.   The patient was not seen or examined by the PAT NP.  5/11/2020

## 2020-05-11 NOTE — PLAN OF CARE
Problem: Infection:  Goal: Will remain free from infection  Description: Will remain free from infection  5/9/2020 1041 by Anoop Love LPN  Outcome: Ongoing  5/9/2020 0545 by Dev Oviedo RN  Outcome: Ongoing     Problem: Safety:  Goal: Free from accidental physical injury  Description: Free from accidental physical injury  5/9/2020 1041 by Anoop Love LPN  Outcome: Ongoing  5/9/2020 0545 by Dev Oviedo RN  Outcome: Ongoing  Goal: Free from intentional harm  Description: Free from intentional harm  5/9/2020 1041 by Anoop Love LPN  Outcome: Ongoing  5/9/2020 0545 by Dev Oviedo RN  Outcome: Ongoing     Problem: Daily Care:  Goal: Daily care needs are met  Description: Daily care needs are met  5/9/2020 1041 by Anoop Love LPN  Outcome: Ongoing  5/9/2020 0545 by Dev Oviedo RN  Outcome: Ongoing     Problem: Pain:  Goal: Patient's pain/discomfort is manageable  Description: Patient's pain/discomfort is manageable  5/9/2020 1041 by Anoop Love LPN  Outcome: Ongoing  5/9/2020 0545 by Dev Oviedo RN  Outcome: Ongoing     Problem: Skin Integrity:  Goal: Skin integrity will stabilize  Description: Skin integrity will stabilize  5/9/2020 1041 by Anoop Love LPN  Outcome: Ongoing  5/9/2020 0545 by Dev Oviedo RN  Outcome: Ongoing     Problem: Discharge Planning:  Goal: Patients continuum of care needs are met  Description: Patients continuum of care needs are met  5/9/2020 1041 by Anoop Love LPN  Outcome: Ongoing  5/9/2020 0545 by Dev Oviedo RN  Outcome: Ongoing
improve  Outcome: Ongoing     Problem: Skin Integrity:  Goal: Risk for impaired skin integrity will decrease  Description: Risk for impaired skin integrity will decrease  Outcome: Ongoing
will stabilize  Description: Skin integrity will stabilize  5/11/2020 1006 by Sole James RN  Outcome: Ongoing  5/11/2020 0305 by Torrie Escalona RN  Outcome: Ongoing     Problem: Discharge Planning:  Goal: Discharged to appropriate level of care  Description: Discharged to appropriate level of care  5/11/2020 1006 by Sole James RN  Outcome: Ongoing  5/11/2020 0305 by Torrie Escalona RN  Outcome: Ongoing     Problem: Breathing Pattern - Ineffective:  Goal: Ability to achieve and maintain a regular respiratory rate will improve  Description: Ability to achieve and maintain a regular respiratory rate will improve  5/11/2020 1006 by Sole James RN  Outcome: Ongoing  5/11/2020 0305 by Torrie Escalona RN  Outcome: Ongoing     Problem: Mobility - Impaired:  Goal: Mobility will improve  Description: Mobility will improve  5/11/2020 1006 by Sole James RN  Outcome: Ongoing  5/11/2020 0305 by Torrie Escalona RN  Outcome: Ongoing

## 2020-05-11 NOTE — PROGRESS NOTES
Pleural effusion, S/P thoracentesis, Sleep apnea, SOBOE (shortness of breath on exertion), and Spinal stenosis. Past surgical history:   has a past surgical history that includes Neck surgery (1998); Carpal tunnel release (Bilateral, 1989); knee surgery (Bilateral); thoracentesis (Left, 12/20/2013); Elbow surgery (Left, 2000's); Thoracentesis (4/25/2016); Tonsillectomy; Thoracentesis (Left, 11/15/2016); thoracotomy (Left, 03/18/2019); and thoracotomy (Left, 3/18/2019). Social History:   reports that he quit smoking about 9 years ago. His smoking use included cigarettes. He has a 45.00 pack-year smoking history. He has never used smokeless tobacco. He reports previous alcohol use. He reports previous drug use. Drug: Marijuana. Family history:  family history includes Heart Disease in his mother; High Blood Pressure in his father. Allergies   Allergen Reactions    Nsaids      Renal        Review of Systems:   All 14 systems were reviewed and are negative  Except for the positive findings which are documented     BP (!) 136/103   Pulse 131   Temp 97.6 °F (36.4 °C) (Oral)   Resp 14   Ht 5' 9\" (1.753 m)   Wt 238 lb 11.2 oz (108.3 kg)   SpO2 97%   BMI 35.25 kg/m²       Intake/Output Summary (Last 24 hours) at 5/11/2020 1256  Last data filed at 5/11/2020 0453  Gross per 24 hour   Intake 10 ml   Output 2425 ml   Net -2415 ml       Physical Exam:  Constitutional:  Well developed, Well nourished, No acute distress  HENT:  Normocephalic, Atraumatic, Bilateral external ears normal,  Nose normal.   Neck- trachea is midline  Eyes:  PEERL, Conjunctiva pale  Respiratory:  Normal breath sounds, No respiratory distress, No wheezing, No chest tenderness.    Cardiovascular:  Normal heart rate, Normal rhythm, no murmurs appreciated, No rubs appreciated, No gallops appreciated, JVP not elevated chest wall tenderness  Abdomen/GI:  Soft, No tenderness  Musculoskeletal:  Intact distal pulses, no edema to lower legs,  No study due to patients body habitus. Patient was unable to lie on   side. Was unable to press on left chest area due to soreness from a recent   lung surgery. Left ventricular function is low normal, EF is estimated at 50-55%. Moderate left ventricular hypertrophy. No significant valvular disease noted. No evidence of pericardial effusion. NM Myocardial Spect:   Normal perfusion       Impression:  Active Problems:    Acute chest pain  Resolved Problems:    * No resolved hospital problems. *       All labs, medications and tests reviewed by myself, continue all other medications of all above medical condition listed as is except for changes mentioned above. Thank you   Please call with questions. Electronically signed by JULEE Silva CNP on 5/11/2020 at 12:56 PM     I have seen ,spoken to  and examined this patient personally, independently of the nurse practitioner. I have reviewed the hospital care given to date and reviewed all pertinent labs and imaging. The plan was developed mutually at the time of the visit with the patient,  NP  and myself. I have spoken with patient, nursing staff and provided written and verbal instructions . The above note has been reviewed and I agree with the assessment, diagnosis, and treatment plan with changes made by me as follows     CARDIOLOGY ATTENDING ADDENDUM    HPI:  I have reviewed the above HPI  And agree with above   Donita Russo is a 79 y. o.year old who and presents with had concerns including Chest Pain (x 2-3 days).   Chief Complaint   Patient presents with    Chest Pain     x 2-3 days     Interval history:  Mild SOB    Physical Exam:  General:  Awake, alert, NAD  Head:normal  Eye:normal  Neck:  No JVD   Chest:  Clear to auscultation, respiration easy  Cardiovascular:  RRR S1S2  Abdomen:   nontender  Extremities:  tr edema  Pulses; palpable  Neuro: grossly normal      MEDICAL DECISION MAKING;    I agree with the above plan, which was planned by myself

## 2020-05-11 NOTE — PROGRESS NOTES
Kourtney Mcdonald. is a 79 y.o. male patient ready for stress test and no abd pain  Current Facility-Administered Medications   Medication Dose Route Frequency Provider Last Rate Last Dose    risperiDONE (RISPERDAL) tablet 1 mg  1 mg Oral Nightly JULEE Dorado - CNP   1 mg at 05/10/20 2226    aspirin chewable tablet 324 mg  324 mg Oral Once [facility], DO        ALPRAZolam Dewey Eleuterio) tablet 1 mg  1 mg Oral TID PRN JULEE Wolf - CNP   1 mg at 05/10/20 2226    atorvastatin (LIPITOR) tablet 80 mg  80 mg Oral Daily Faina Chong APRN - CNP   80 mg at 05/10/20 0853    busPIRone (BUSPAR) tablet 10 mg  10 mg Oral TID JULEE Wolf - CNP   10 mg at 05/10/20 2226    carvedilol (COREG) tablet 6.25 mg  6.25 mg Oral BID WC Faina Chong APRN - CNP   6.25 mg at 05/10/20 1732    divalproex (DEPAKOTE ER) extended release tablet 1,000 mg  1,000 mg Oral Nightly Faina Chong APRN - CNP   1,000 mg at 05/10/20 2226    docusate sodium (COLACE) capsule 100 mg  100 mg Oral BID JULEE Wolf - CNP   100 mg at 05/10/20 2238    escitalopram (LEXAPRO) tablet 10 mg  10 mg Oral Daily Faina Chong APRN - CNP   10 mg at 90/26/46 6278    folic acid (FOLVITE) tablet 1 mg  1 mg Oral Daily JULEE Dorado - CNP   1 mg at 05/10/20 0851    oxyCODONE-acetaminophen (PERCOCET) 7.5-325 MG per tablet 1 tablet  1 tablet Oral Q4H PRN JULEE Wolf - CNP   1 tablet at 05/10/20 2226    traZODone (DESYREL) tablet 50 mg  50 mg Oral Nightly Faina Chong, APRN - CNP   50 mg at 05/10/20 2226    sodium chloride flush 0.9 % injection 10 mL  10 mL Intravenous 2 times per day JULEE Dorado - CNP   10 mL at 05/10/20 2227    sodium chloride flush 0.9 % injection 10 mL  10 mL Intravenous PRN JULEE Dorado - CNP        acetaminophen (TYLENOL) tablet 650 mg  650 mg Oral Q6H PRN JULEE Dorado CNP        Or    acetaminophen (TYLENOL) suppository 650 mg  650 mg Rectal Q6H PRN Faina Dapilah, APRN - CNP        polyethylene glycol (GLYCOLAX) packet 17 g  17 g Oral Daily PRN Faina Dapilah, APRN - CNP        promethazine (PHENERGAN) tablet 12.5 mg  12.5 mg Oral Q6H PRN Faina Dapilah, APRN - CNP        Or    ondansetron (ZOFRAN) injection 4 mg  4 mg Intravenous Q6H PRN [facility]aina [facility], APRN - CNP        aspirin chewable tablet 81 mg  81 mg Oral Daily Faina DapiSaint Al[facility]ons[facility] Eagle, APRN - CNP   81 mg at 05/10/20 0852    nitroGLYCERIN (NITROSTAT) SL tablet 0.4 mg  0.4 mg Sublingual Q5 Min PRN Faina [facility], APRN - CNP        insulin lispro (HUMALOG) injection vial 0-12 Units  0-12 Units Subcutaneous TID WC Faina [facility], APRN - CNP   2 Units at 05/09/20 1840    insulin lispro (HUMALOG) injection vial 0-6 Units  0-6 Units Subcutaneous Nightly Faina yTSaint [facility]phons[facility] Eagle, APRN - CNP   1 Units at 05/09/20 2134    glucose (GLUTOSE) 40 % oral gel 15 g  15 g Oral PRN Faina [facility], APRN - CNP        dextrose 50 % IV solution  12.5 g Intravenous PRN Faina [facility], APRN - CNP        glucagon (rDNA) injection 1 mg  1 mg Intramuscular PRN Faina [facility], APRN - CNP        dextrose 5 % solution  100 mL/hr Intravenous PRN Faina [facility], APRN - CNP        pantoprazole (PROTONIX) injection 40 mg  40 mg Intravenous Daily Ana Cristina Jones MD   40 mg at 05/10/20 0852    furosemide (LASIX) tablet 10 mg  10 mg Oral Daily Janae Lundberg MD   10 mg at 05/10/20 0852    amLODIPine (NORVASC) tablet 10 mg  10 mg Oral Daily Janae Lundberg MD   10 mg at 05/10/20 2155    spironolactone (ALDACTONE) tablet 12.5 mg  12.5 mg Oral BID Janae Lundberg MD   12.5 mg at 05/10/20 2226    budesonide (PULMICORT) nebulizer suspension 500 mcg  0.5 mg Nebulization As Directed RT PRN Jaane Lundberg MD         Allergies   Allergen Reactions    Nsaids      Renal      Active Problems:    Acute chest pain  Resolved Problems:    * No resolved hospital problems.  *    Blood pressure (!) 150/80, pulse 62, temperature 98.1 °F (36.7 °C), temperature source Oral, resp. rate 15, height 5' 9\" (1.753 m), weight 238 lb 11.2 oz (108.3 kg), SpO2 94 %. Subjective:  Symptoms:  Stable. Diet:  NPO. Pain:  He complains of pain that is mild. Objective:  General Appearance:  Comfortable. Vital signs: (most recent): Blood pressure (!) 150/80, pulse 62, temperature 98.1 °F (36.7 °C), temperature source Oral, resp. rate 15, height 5' 9\" (1.753 m), weight 238 lb 11.2 oz (108.3 kg), SpO2 94 %. Vital signs are normal.    HEENT: Normal HEENT exam.    Heart: Normal rate. Abdomen: Abdomen is soft. Extremities: Decreased range of motion. Neurological: Patient is alert. Pupils:  Pupils are equal, round, and reactive to light. Skin:  Warm.       Assessment & Plan  Chest pain  -V/Q low prob of PE  -serial CE  -ASA,increase  BB, statin  -stress test today  Anemia with acute bld loss with GI bleed  -hold xarelto and consult GI  -s/p 1PRBC and egd after cardiac work up  COPD with left pleural effusion   -CXR stable, duoneb  Chronic stable diastolic CHF  CKD 3  -stable and continue lasix and aldactone  DM  -SSI  Bipolar  -continue psych meds  HTN  -CCB, BB, aldactone  dVT prophylaxis  -SCD      After stress test then GI work up   Konrad Jackson MD  5/11/2020

## 2020-05-11 NOTE — PROGRESS NOTES
Nephrology Progress Note        [address], [address] [place] [address]  Phone: [phone]  Office Hours: 8:30AM - 4:30PM  Monday - Friday       [facility]  MD Samina Bryson November, DO [address],  [address]  [facility], [place] [address]  PHONE: [phone]  FAX: [phone]    5/11/2020 8:07 AM  Subjective:   Admit Date: 5/9/2020  PCP: Elo García MD  Interval History: very good uop yesterday      Diet: DIET GENERAL; Carb Control: 5 carb choices (75 gms)/meal      Data:   Scheduled Meds:   carvedilol  12.5 mg Oral BID WC    risperiDONE  1 mg Oral Nightly    aspirin  324 mg Oral Once    atorvastatin  80 mg Oral Daily    busPIRone  10 mg Oral TID    divalproex  1,000 mg Oral Nightly    docusate sodium  100 mg Oral BID    escitalopram  10 mg Oral Daily    folic acid  1 mg Oral Daily    traZODone  50 mg Oral Nightly    sodium chloride flush  10 mL Intravenous 2 times per day    aspirin  81 mg Oral Daily    insulin lispro  0-12 Units Subcutaneous TID WC    insulin lispro  0-6 Units Subcutaneous Nightly    pantoprazole  40 mg Intravenous Daily    furosemide  10 mg Oral Daily    amLODIPine  10 mg Oral Daily    spironolactone  12.5 mg Oral BID     Continuous Infusions:   dextrose       PRN Meds:ALPRAZolam, oxyCODONE-acetaminophen, sodium chloride flush, acetaminophen **OR** acetaminophen, polyethylene glycol, promethazine **OR** ondansetron, nitroGLYCERIN, glucose, dextrose, glucagon (rDNA), dextrose, budesonide  I/O last 3 completed shifts: In: 10 [I.V.:10]  Out: 2850 [Urine:2850]  No intake/output data recorded.     Intake/Output Summary (Last 24 hours) at 5/11/2020 0807  Last data filed at 5/11/2020 0453  Gross per 24 hour   Intake 10 ml   Output 2850 ml   Net -2840 ml       CBC:   Recent Labs     05/09/20  0047  05/10/20  0749 05/10/20  2011 05/11/20  0416   WBC 8.3  --  8.3  --  10.3   HGB 8.0*   < > 8.1* 8.8* 8.8*    --  139*  --  154    < > = values in this interval not displayed. BMP:    Recent Labs     05/09/20  0047 05/10/20  0749 05/11/20  0416    142 139   K 3.9 4.7 4.5    107 104   CO2 23 27 24   BUN 27* 27* 29*   CREATININE 2.1* 2.0* 2.0*   GLUCOSE 200* 111* 106*     Hepatic:   Recent Labs     05/09/20  0047 05/10/20  0749 05/11/20  0416   AST 11* 10* 9*   ALT 7* 6* 7*   BILITOT 0.2 0.7 0.3   ALKPHOS 74 56 56     Troponin: No results for input(s): TROPONINI in the last 72 hours. BNP: No results for input(s): BNP in the last 72 hours. Lipids: No results for input(s): CHOL, HDL in the last 72 hours.     Invalid input(s): LDLCALCU  ABGs:   Lab Results   Component Value Date    PO2ART 109 03/18/2019    QMW1CNF 38.0 03/18/2019     INR:   Recent Labs     05/09/20  0047 05/10/20  0749   INR 0.98 0.96       Objective:   Vitals: BP (!) 150/80   Pulse 62   Temp 98.1 °F (36.7 °C) (Oral)   Resp 15   Ht 5' 9\" (1.753 m)   Wt 238 lb 11.2 oz (108.3 kg)   SpO2 94%   BMI 35.25 kg/m²   General appearance: alert and cooperative with exam, in no acute distress  HEENT: normocephalic, atraumatic,   Neck: supple, trachea midline  Lungs: clear to auscultation bilaterally, breathing comfortably   Heart[de-identified] tachycardic  Abdomen: soft, non-tender; non distended,  Extremities: extremities atraumatic, no cyanosis or edema  Neurologic: alert, oriented, follows commands, interactive    Assessment and Plan:       Patient Active Problem List   Diagnosis    COPD (chronic obstructive pulmonary disease) (HCC)    Pleural effusion, left    SAMANTHA on CPAP    Chronic obstructive pulmonary disease (HCC)    TIA (transient ischemic attack)    B[facility]    Coronary artery disease involving native coronary artery of native heart without angina pectoris    Essential hypertension    REYES (dyspnea on exertion)    Anemia, chronic disease    Diuretic-induced hypokalemia    Diastolic CHF ([address]    Acute respiratory failure

## 2020-05-12 ENCOUNTER — ANESTHESIA (OUTPATIENT)
Dept: ENDOSCOPY | Age: 70
DRG: 375 | End: 2020-05-12
Payer: MEDICARE

## 2020-05-12 VITALS
SYSTOLIC BLOOD PRESSURE: 121 MMHG | DIASTOLIC BLOOD PRESSURE: 65 MMHG | RESPIRATION RATE: 20 BRPM | OXYGEN SATURATION: 100 %

## 2020-05-12 LAB
ALBUMIN SERPL-MCNC: 3.6 GM/DL (ref 3.4–5)
ALP BLD-CCNC: 54 IU/L (ref 40–128)
ALT SERPL-CCNC: 9 U/L (ref 10–40)
ANION GAP SERPL CALCULATED.3IONS-SCNC: 13 MMOL/L (ref 4–16)
APTT: 31.7 SECONDS (ref 25.1–37.1)
AST SERPL-CCNC: 13 IU/L (ref 15–37)
BASOPHILS ABSOLUTE: 0 K/CU MM
BASOPHILS RELATIVE PERCENT: 0.3 % (ref 0–1)
BILIRUB SERPL-MCNC: 0.4 MG/DL (ref 0–1)
BUN BLDV-MCNC: 28 MG/DL (ref 6–23)
CALCIUM SERPL-MCNC: 8.5 MG/DL (ref 8.3–10.6)
CHLORIDE BLD-SCNC: 102 MMOL/L (ref 99–110)
CO2: 23 MMOL/L (ref 21–32)
CREAT SERPL-MCNC: 1.9 MG/DL (ref 0.9–1.3)
DIFFERENTIAL TYPE: ABNORMAL
EOSINOPHILS ABSOLUTE: 0.2 K/CU MM
EOSINOPHILS RELATIVE PERCENT: 1.8 % (ref 0–3)
GFR AFRICAN AMERICAN: 43 ML/MIN/1.73M2
GFR NON-AFRICAN AMERICAN: 35 ML/MIN/1.73M2
GLUCOSE BLD-MCNC: 100 MG/DL (ref 70–99)
GLUCOSE BLD-MCNC: 105 MG/DL (ref 70–99)
GLUCOSE BLD-MCNC: 148 MG/DL (ref 70–99)
GLUCOSE BLD-MCNC: 99 MG/DL (ref 70–99)
HCT VFR BLD CALC: 29.7 % (ref 42–52)
HCT VFR BLD CALC: 36 % (ref 42–52)
HEMOGLOBIN: 10.4 GM/DL (ref 13.5–18)
HEMOGLOBIN: 9.1 GM/DL (ref 13.5–18)
IMMATURE NEUTROPHIL %: 0.6 % (ref 0–0.43)
INR BLD: 0.93 INDEX
LYMPHOCYTES ABSOLUTE: 1.6 K/CU MM
LYMPHOCYTES RELATIVE PERCENT: 18.3 % (ref 24–44)
MCH RBC QN AUTO: 23.7 PG (ref 27–31)
MCHC RBC AUTO-ENTMCNC: 30.6 % (ref 32–36)
MCV RBC AUTO: 77.3 FL (ref 78–100)
MONOCYTES ABSOLUTE: 0.9 K/CU MM
MONOCYTES RELATIVE PERCENT: 10.7 % (ref 0–4)
NUCLEATED RBC %: 0 %
PDW BLD-RTO: 16.4 % (ref 11.7–14.9)
PLATELET # BLD: 168 K/CU MM (ref 140–440)
PMV BLD AUTO: 8.9 FL (ref 7.5–11.1)
POTASSIUM SERPL-SCNC: 3.9 MMOL/L (ref 3.5–5.1)
PROTHROMBIN TIME: 11.3 SECONDS (ref 11.7–14.5)
RBC # BLD: 3.84 M/CU MM (ref 4.6–6.2)
SEGMENTED NEUTROPHILS ABSOLUTE COUNT: 6 K/CU MM
SEGMENTED NEUTROPHILS RELATIVE PERCENT: 68.3 % (ref 36–66)
SODIUM BLD-SCNC: 138 MMOL/L (ref 135–145)
TOTAL IMMATURE NEUTOROPHIL: 0.05 K/CU MM
TOTAL NUCLEATED RBC: 0 K/CU MM
TOTAL PROTEIN: 6.1 GM/DL (ref 6.4–8.2)
WBC # BLD: 8.7 K/CU MM (ref 4–10.5)

## 2020-05-12 PROCEDURE — 85018 HEMOGLOBIN: CPT

## 2020-05-12 PROCEDURE — 6370000000 HC RX 637 (ALT 250 FOR IP): Performed by: INTERNAL MEDICINE

## 2020-05-12 PROCEDURE — 2709999900 HC NON-CHARGEABLE SUPPLY: Performed by: SPECIALIST

## 2020-05-12 PROCEDURE — 0DB68ZX EXCISION OF STOMACH, VIA NATURAL OR ARTIFICIAL OPENING ENDOSCOPIC, DIAGNOSTIC: ICD-10-PCS | Performed by: SPECIALIST

## 2020-05-12 PROCEDURE — 3609012400 HC EGD TRANSORAL BIOPSY SINGLE/MULTIPLE: Performed by: SPECIALIST

## 2020-05-12 PROCEDURE — 3700000000 HC ANESTHESIA ATTENDED CARE: Performed by: SPECIALIST

## 2020-05-12 PROCEDURE — 6360000002 HC RX W HCPCS: Performed by: SPECIALIST

## 2020-05-12 PROCEDURE — 0DB68ZZ EXCISION OF STOMACH, VIA NATURAL OR ARTIFICIAL OPENING ENDOSCOPIC: ICD-10-PCS | Performed by: SPECIALIST

## 2020-05-12 PROCEDURE — 85610 PROTHROMBIN TIME: CPT

## 2020-05-12 PROCEDURE — C9113 INJ PANTOPRAZOLE SODIUM, VIA: HCPCS | Performed by: SPECIALIST

## 2020-05-12 PROCEDURE — 80053 COMPREHEN METABOLIC PANEL: CPT

## 2020-05-12 PROCEDURE — 6370000000 HC RX 637 (ALT 250 FOR IP): Performed by: HOSPITALIST

## 2020-05-12 PROCEDURE — 36415 COLL VENOUS BLD VENIPUNCTURE: CPT

## 2020-05-12 PROCEDURE — 2140000000 HC CCU INTERMEDIATE R&B

## 2020-05-12 PROCEDURE — 3700000001 HC ADD 15 MINUTES (ANESTHESIA): Performed by: SPECIALIST

## 2020-05-12 PROCEDURE — 94761 N-INVAS EAR/PLS OXIMETRY MLT: CPT

## 2020-05-12 PROCEDURE — 85025 COMPLETE CBC W/AUTO DIFF WBC: CPT

## 2020-05-12 PROCEDURE — 2580000003 HC RX 258: Performed by: NURSE ANESTHETIST, CERTIFIED REGISTERED

## 2020-05-12 PROCEDURE — 2580000003 HC RX 258: Performed by: NURSE PRACTITIONER

## 2020-05-12 PROCEDURE — 2580000003 HC RX 258: Performed by: ANESTHESIOLOGY

## 2020-05-12 PROCEDURE — 85014 HEMATOCRIT: CPT

## 2020-05-12 PROCEDURE — 3609010600 HC COLONOSCOPY POLYPECTOMY SNARE/COLD BIOPSY: Performed by: SPECIALIST

## 2020-05-12 PROCEDURE — 88305 TISSUE EXAM BY PATHOLOGIST: CPT

## 2020-05-12 PROCEDURE — 85730 THROMBOPLASTIN TIME PARTIAL: CPT

## 2020-05-12 PROCEDURE — 6360000002 HC RX W HCPCS: Performed by: NURSE ANESTHETIST, CERTIFIED REGISTERED

## 2020-05-12 PROCEDURE — 6370000000 HC RX 637 (ALT 250 FOR IP): Performed by: NURSE PRACTITIONER

## 2020-05-12 PROCEDURE — 82962 GLUCOSE BLOOD TEST: CPT

## 2020-05-12 PROCEDURE — 99232 SBSQ HOSP IP/OBS MODERATE 35: CPT | Performed by: INTERNAL MEDICINE

## 2020-05-12 PROCEDURE — 2500000003 HC RX 250 WO HCPCS: Performed by: NURSE ANESTHETIST, CERTIFIED REGISTERED

## 2020-05-12 PROCEDURE — APPSS45 APP SPLIT SHARED TIME 31-45 MINUTES: Performed by: NURSE PRACTITIONER

## 2020-05-12 RX ORDER — SODIUM CHLORIDE, SODIUM LACTATE, POTASSIUM CHLORIDE, CALCIUM CHLORIDE 600; 310; 30; 20 MG/100ML; MG/100ML; MG/100ML; MG/100ML
INJECTION, SOLUTION INTRAVENOUS ONCE
Status: COMPLETED | OUTPATIENT
Start: 2020-05-12 | End: 2020-05-12

## 2020-05-12 RX ORDER — LIDOCAINE HYDROCHLORIDE 20 MG/ML
INJECTION, SOLUTION INFILTRATION; PERINEURAL PRN
Status: DISCONTINUED | OUTPATIENT
Start: 2020-05-12 | End: 2020-05-12 | Stop reason: SDUPTHER

## 2020-05-12 RX ORDER — SODIUM CHLORIDE, SODIUM LACTATE, POTASSIUM CHLORIDE, CALCIUM CHLORIDE 600; 310; 30; 20 MG/100ML; MG/100ML; MG/100ML; MG/100ML
INJECTION, SOLUTION INTRAVENOUS CONTINUOUS PRN
Status: DISCONTINUED | OUTPATIENT
Start: 2020-05-12 | End: 2020-05-12 | Stop reason: SDUPTHER

## 2020-05-12 RX ORDER — PROPOFOL 10 MG/ML
INJECTION, EMULSION INTRAVENOUS PRN
Status: DISCONTINUED | OUTPATIENT
Start: 2020-05-12 | End: 2020-05-12 | Stop reason: SDUPTHER

## 2020-05-12 RX ADMIN — SODIUM CHLORIDE, PRESERVATIVE FREE 10 ML: 5 INJECTION INTRAVENOUS at 20:05

## 2020-05-12 RX ADMIN — BUSPIRONE HYDROCHLORIDE 10 MG: 10 TABLET ORAL at 14:55

## 2020-05-12 RX ADMIN — FOLIC ACID 1 MG: 1 TABLET ORAL at 14:44

## 2020-05-12 RX ADMIN — SPIRONOLACTONE 12.5 MG: 25 TABLET ORAL at 14:43

## 2020-05-12 RX ADMIN — ESCITALOPRAM OXALATE 10 MG: 10 TABLET ORAL at 14:44

## 2020-05-12 RX ADMIN — PROPOFOL 10 MG: 10 INJECTION, EMULSION INTRAVENOUS at 13:52

## 2020-05-12 RX ADMIN — PANTOPRAZOLE SODIUM 40 MG: 40 INJECTION, POWDER, FOR SOLUTION INTRAVENOUS at 14:44

## 2020-05-12 RX ADMIN — AMLODIPINE BESYLATE 10 MG: 10 TABLET ORAL at 14:43

## 2020-05-12 RX ADMIN — DOCUSATE SODIUM 100 MG: 100 CAPSULE, LIQUID FILLED ORAL at 14:42

## 2020-05-12 RX ADMIN — SODIUM CHLORIDE, POTASSIUM CHLORIDE, SODIUM LACTATE AND CALCIUM CHLORIDE: 600; 310; 30; 20 INJECTION, SOLUTION INTRAVENOUS at 13:17

## 2020-05-12 RX ADMIN — RISPERIDONE 1 MG: 0.5 TABLET, FILM COATED ORAL at 20:25

## 2020-05-12 RX ADMIN — LIDOCAINE HYDROCHLORIDE 100 MG: 20 INJECTION, SOLUTION INFILTRATION; PERINEURAL at 13:20

## 2020-05-12 RX ADMIN — OXYCODONE HYDROCHLORIDE AND ACETAMINOPHEN 1 TABLET: 7.5; 325 TABLET ORAL at 20:05

## 2020-05-12 RX ADMIN — BUSPIRONE HYDROCHLORIDE 10 MG: 10 TABLET ORAL at 20:05

## 2020-05-12 RX ADMIN — OXYCODONE HYDROCHLORIDE AND ACETAMINOPHEN 1 TABLET: 7.5; 325 TABLET ORAL at 03:35

## 2020-05-12 RX ADMIN — SODIUM CHLORIDE, POTASSIUM CHLORIDE, SODIUM LACTATE AND CALCIUM CHLORIDE: 600; 310; 30; 20 INJECTION, SOLUTION INTRAVENOUS at 12:33

## 2020-05-12 RX ADMIN — CARVEDILOL 12.5 MG: 12.5 TABLET, FILM COATED ORAL at 16:50

## 2020-05-12 RX ADMIN — ATORVASTATIN CALCIUM 80 MG: 80 TABLET, FILM COATED ORAL at 14:43

## 2020-05-12 RX ADMIN — PROPOFOL 200 MG: 10 INJECTION, EMULSION INTRAVENOUS at 13:20

## 2020-05-12 RX ADMIN — PHENYLEPHRINE HYDROCHLORIDE 100 MCG: 10 INJECTION INTRAVENOUS at 13:36

## 2020-05-12 RX ADMIN — TRAZODONE HYDROCHLORIDE 50 MG: 50 TABLET ORAL at 20:05

## 2020-05-12 RX ADMIN — SPIRONOLACTONE 12.5 MG: 25 TABLET ORAL at 20:25

## 2020-05-12 RX ADMIN — ASPIRIN 81 MG 81 MG: 81 TABLET ORAL at 14:43

## 2020-05-12 RX ADMIN — FUROSEMIDE 10 MG: 20 TABLET ORAL at 14:42

## 2020-05-12 RX ADMIN — BUSPIRONE HYDROCHLORIDE 10 MG: 10 TABLET ORAL at 14:43

## 2020-05-12 RX ADMIN — DIVALPROEX SODIUM 1000 MG: 500 TABLET, FILM COATED, EXTENDED RELEASE ORAL at 20:05

## 2020-05-12 RX ADMIN — SODIUM CHLORIDE, PRESERVATIVE FREE 10 ML: 5 INJECTION INTRAVENOUS at 14:44

## 2020-05-12 ASSESSMENT — PAIN SCALES - GENERAL
PAINLEVEL_OUTOF10: 10
PAINLEVEL_OUTOF10: 0
PAINLEVEL_OUTOF10: 2
PAINLEVEL_OUTOF10: 0
PAINLEVEL_OUTOF10: 9
PAINLEVEL_OUTOF10: 5

## 2020-05-12 ASSESSMENT — PULMONARY FUNCTION TESTS
PIF_VALUE: 0
PIF_VALUE: 1
PIF_VALUE: 0

## 2020-05-12 NOTE — PROGRESS NOTES
Hospitalist Progress Note      Name:  Aguilar Lang. /Age/Sex: 1950  (79 y.o. male)   MRN & CSN:  [id number] & [id number] Admission Date/Time: 2020 12:33 AM   Location:  ENDO/NONE PCP: Debbie Mcleod MD         Hospital Day: 4    History of Present Illness:     Chief Complaint: Aguilar Rodriguez is a 79 y.o.  male  who presents with chest pain     The patient seen and examined at bedside before scopes. He says feeling good, denies having abdominal pain, nausea or vomiting. Ten point ROS reviewed negative, unless as noted above    Objective:        Intake/Output Summary (Last 24 hours) at 2020 1321  Last data filed at 2020 0719  Gross per 24 hour   Intake 2000 ml   Output 950 ml   Net 1050 ml      Vitals:   Vitals:    20 0719   BP:    Pulse: 72   Resp: 13   Temp:    SpO2:      Physical Exam:   General Appearance: alert and oriented to person, place and time, in no acute distress  Cardiovascular: normal rate, regular rhythm, normal S1 and S2  Pulmonary/Chest: Decreased breath sounds bilaterally  Abdomen: soft, non-tender, non-distended, normal bowel sounds, no masses   Extremities: no cyanosis, clubbing or edema, pulse   Skin: warm and dry, no rash or erythema  Head: normocephalic and atraumatic  Eyes: pupils equal, round, and reactive to light  Neck: supple and non-tender without mass, no thyromegaly   Musculoskeletal: normal range of motion, no joint swelling, deformity or tenderness  Neurological: alert, oriented, normal speech, no focal findings or movement disorder noted    Medications:   Medications:    carvedilol  12.5 mg Oral BID WC    risperiDONE  1 mg Oral Nightly    aspirin  324 mg Oral Once    atorvastatin  80 mg Oral Daily    busPIRone  10 mg Oral TID    divalproex  1,000 mg Oral Nightly    docusate sodium  100 mg Oral BID    escitalopram  10 mg Oral Daily    folic acid  1 mg Oral Daily    traZODone  50 mg Oral Nightly    sodium chloride flush  10 5/9/2020  EXAMINATION: NUCLEAR MEDICINE PERFUSION SCAN, 5/9/2020 TECHNIQUE: 5.6 millicuries of EP-74R MAA was administered intravenously prior to planar imaging of the lungs in similar projections. COMPARISON: Chest radiograph 05/09/2020. HISTORY: ORDERING SYSTEM PROVIDED HISTORY: Elevated D-dimer - CKD TECHNOLOGIST PROVIDED HISTORY: Reason for exam:->Elevated D-dimer - CKD Reason for Exam: Elevated D-dimer Acuity: Unknown Type of Exam: Unknown FINDINGS: PERFUSION: There is normal perfusion to the right lung. There is diminished perfusion to a diminutive left lung. This is similar to the remote study from 36 months earlier. CHEST RADIOGRAPH: Volume loss left lung with left pleural fluid is evident. Stable perfusion to the lungs with a diminutive left lung with associated pleural-parenchymal abnormalities noted on recent chest radiograph. The probability of pulmonary embolus is low. Xr Chest Portable    Result Date: 5/9/2020  EXAMINATION: ONE XRAY VIEW OF THE CHEST 5/9/2020 12:51 am COMPARISON: 04/17/2020 HISTORY: ORDERING SYSTEM PROVIDED HISTORY: Chest pain TECHNOLOGIST PROVIDED HISTORY: Reason for exam:->Chest pain Reason for Exam: chest pain Acuity: Unknown Type of Exam: Initial FINDINGS: The left hemidiaphragm is elevated. Redemonstration to moderate left pleural effusion with left mid and lower lung consolidation. There appears to be least some degree of pleural thickening extending along the left lateral wall. The right lung demonstrates basilar atelectasis. Stable heart size and mediastinal contours. No change as described.        Assessment and Plan:   Hector Randolph is a 79 y.o.  male  who presents with chest pain    Chest pain likely non cardiac or type II elevation from GI bleed    Acute blood loss anemia secondary to GI bleed  Status post EGD and colonoscopy, did biopsy of polyps per GI  GI following, appreciate recommendations  Monitor CBC  Continue Protonix  Holding Xarelto, will

## 2020-05-12 NOTE — PROGRESS NOTES
Cardiology Progress Note     Today's Plan continue current medications    Admit Date:  5/9/2020    Consult reason/ Seen today for: chest pain    Subjective and  Overnight Events: patient states that he is having a c scope today. He states that he has to be home by morning, because he has to pay bills. Encouraged him to discuss with primary and case mangement. Telemetry SR     Assessment / Plan / Recommendation:     1. Chest pain- had mild elevation of troponin- suspect type II leak secondary to anemia- NM stress test completed- no ischemia noted - continue with coreg  2. Anemia- Gi is following- hemoglobin down to 6.8 on admit. - stable -  He is for EGD and colonoscopey today. H/H slightly lower today. 3. Paroxysmal afib: currently he is in SR- He does not tolerate anticoagulation d/t GI bleeding-  Was on plavix and asa as a class II recommendation - hold anticoagulation - on BB   4. Chronic HFpEF- EF 55%- does not appear to be in fluid overload- continue coreg-aldactone and lasix  5. HTN: not well controlled, BB increased per nephrology- continue Amlodipine  6. ANA/CKD- nephrology following       History of Presenting Illness:    Chief complain on admission : 79 y. o.year old who is admitted for  Chief Complaint   Patient presents with    Chest Pain     x 2-3 days        Past medical history:    has a past medical history of Anemia, Arthritis, Back pain, chronic, Bipolar 1 disorder ([address] CAD (coronary artery disease), Cerebral artery occlusion with cerebral infarction ([address] Chronic kidney disease (CKD), stage III (moderate) ([address] CKD (chronic kidney disease), COPD (chronic obstructive pulmonary disease) ([address] Diabetes mellitus, type 2 ([address] Diabetic peripheral neuropathy ([address] Diastolic CHF ([address] Gastric ulcer, H/O cardiovascular stress test, Heart murmur, Hiatal hernia, History of cardiac cath, Hx of migraines, HX OTHER Recent Labs     05/10/20  0749 05/11/20  0416 05/12/20  0556    139 138   K 4.7 4.5 3.9    104 102   CO2 27 24 23   BUN 27* 29* 28*   CREATININE 2.0* 2.0* 1.9*     PT/INR:   Recent Labs     05/10/20  0749 05/12/20  0556   PROTIME 11.6* 11.3*   INR 0.96 0.93     BNP:    No results for input(s): PROBNP in the last 72 hours. TROPONIN:   Recent Labs     05/09/20  1202   TROPONINT 0.039*        ECHO : 05/11/2020   Limited study due to patients body habitus. Patient was unable to lie on   side. Was unable to press on left chest area due to soreness from a recent   lung surgery. Left ventricular function is low normal, EF is estimated at 50-55%. Moderate left ventricular hypertrophy. No significant valvular disease noted. No evidence of pericardial effusion. NM Myocardial Spect:   Normal perfusion       Impression:  Active Problems:    Acute chest pain  Resolved Problems:    * No resolved hospital problems. *       All labs, medications and tests reviewed by myself, continue all other medications of all above medical condition listed as is except for changes mentioned above. Thank you   Please call with questions. Electronically signed by JULEE Raines CNP on 5/12/2020 at 11:44 AM     I have seen ,spoken to  and examined this patient personally, independently of the nurse practitioner. I have reviewed the hospital care given to date and reviewed all pertinent labs and imaging. The plan was developed mutually at the time of the visit with the patient,  NP  and myself. I have spoken with patient, nursing staff and provided written and verbal instructions . The above note has been reviewed and I agree with the assessment, diagnosis, and treatment plan with changes made by me as follows     CARDIOLOGY ATTENDING ADDENDUM    HPI:  I have reviewed the above HPI  And agree with above   Hillary Ocampo is a 79 y. o.year old who and presents with had concerns including Chest Pain (x 2-3 days).   Chief

## 2020-05-12 NOTE — PROGRESS NOTES
--  168    < > = values in this interval not displayed. BMP:    Recent Labs     05/10/20  0749 05/11/20  0416 05/12/20  0556    139 138   K 4.7 4.5 3.9    104 102   CO2 27 24 23   BUN 27* 29* 28*   CREATININE 2.0* 2.0* 1.9*   GLUCOSE 111* 106* 100*     Hepatic:   Recent Labs     05/10/20  0749 05/11/20  0416 05/12/20  0556   AST 10* 9* 13*   ALT 6* 7* 9*   BILITOT 0.7 0.3 0.4   ALKPHOS 56 56 54     Troponin: No results for input(s): TROPONINI in the last 72 hours. BNP: No results for input(s): BNP in the last 72 hours. Lipids: No results for input(s): CHOL, HDL in the last 72 hours.     Invalid input(s): LDLCALCU  ABGs:   Lab Results   Component Value Date    PO2ART 109 03/18/2019    WOT0IFJ 38.0 03/18/2019     INR:   Recent Labs     05/10/20  0749 05/12/20  0556   INR 0.96 0.93       Objective:   Vitals: BP (!) 162/88   Pulse 72   Temp 97.6 °F (36.4 °C) (Oral)   Resp 13   Ht 5' 9\" (1.753 m)   Wt 238 lb 11.2 oz (108.3 kg)   SpO2 98%   BMI 35.25 kg/m²   General appearance:  in no acute distress  HEENT: normocephalic, atraumatic,   Neck: supple, trachea midline  Lungs:  breathing comfortably   Heart[de-identified] regular rate and rhythm,   Abdomen: soft, non-tender; non distended,   Extremities: extremities atraumatic, no cyanosis or edema      Assessment and Plan:     Patient Active Problem List   Diagnosis    COPD (chronic obstructive pulmonary disease) (HCC)    Pleural effusion, left    SAMANTHA on CPAP    Chronic obstructive pulmonary disease (HCC)    TIA (transient ischemic attack)    Bipolar 1 disorder ([facility]    Coronary artery disease involving native coronary artery of native heart without angina pectoris    Essential hypertension    REYES (dyspnea on exertion)    Anemia, chronic disease    Diuretic-induced hypokalemia    Diastolic CHF ([facility]    Acute respiratory failure (HCC)    Pneumonia due to gram-negative bacteria (HCC)    Hyperkalemia    Adrenal mass (HCC)    Diabetes mellitus, type 2 ([facility]    Hyponatremia    Pneumonia due to organism    PAF (paroxysmal atrial fibrillation) (HCC)    Empyema of left pleural space (HCC)    Hospital-acquired bacterial pneumonia    Hyperlipidemia    Lumbar radiculopathy    CKD (chronic kidney disease)    Diabetic peripheral neuropathy (HCC)    Chronic kidney disease (CKD), stage III (moderate) (HCC)    Cervical stenosis of spine    Spinal stenosis of lumbar region without neurogenic claudication    Somatic dysfunction of back    Somatic dysfunction of cervical region    Somatic dysfunction of pelvis region    Somatic dysfunction of both lower extremities    Empyema lung (HCC)    Acute kidney injury superimposed on CKD ([facility]    Chronic diastolic heart failure (HCC)    Syncope and collapse    Hypotensive episode    Bradycardia on ECG    Syncope    Lightheadedness    Dizziness    Acute on chronic respiratory failure with hypoxia (HCC)    Severe mixed bipolar 1 disorder without psychosis (HCC)    Acute chest pain      CKD3/4  Acute shortness of breath  Acute anemia from GI etiology  Chronic diastolic chf     Suggest   - cr down to 1.5 today  - continue supportive mgmt  - avoid nephrotoxins and renally dose meds                          Electronically signed by Orlando Alcocer DO on 5/12/2020 at 8:37 Jason Rutledge MD  [address] [place], [place] [address]  [place], [place] [address]  PHONE: [phone]  FAX: [phone]

## 2020-05-13 VITALS
OXYGEN SATURATION: 97 % | WEIGHT: 238.7 LBS | RESPIRATION RATE: 16 BRPM | HEART RATE: 68 BPM | SYSTOLIC BLOOD PRESSURE: 149 MMHG | BODY MASS INDEX: 35.35 KG/M2 | TEMPERATURE: 98.1 F | HEIGHT: 69 IN | DIASTOLIC BLOOD PRESSURE: 81 MMHG

## 2020-05-13 LAB
ALBUMIN SERPL-MCNC: 3.7 GM/DL (ref 3.4–5)
ALP BLD-CCNC: 58 IU/L (ref 40–128)
ALT SERPL-CCNC: 10 U/L (ref 10–40)
ANION GAP SERPL CALCULATED.3IONS-SCNC: 12 MMOL/L (ref 4–16)
AST SERPL-CCNC: 13 IU/L (ref 15–37)
BASOPHILS ABSOLUTE: 0 K/CU MM
BASOPHILS RELATIVE PERCENT: 0.2 % (ref 0–1)
BILIRUB SERPL-MCNC: 0.5 MG/DL (ref 0–1)
BUN BLDV-MCNC: 25 MG/DL (ref 6–23)
CALCIUM SERPL-MCNC: 8.6 MG/DL (ref 8.3–10.6)
CHLORIDE BLD-SCNC: 102 MMOL/L (ref 99–110)
CO2: 22 MMOL/L (ref 21–32)
CREAT SERPL-MCNC: 1.8 MG/DL (ref 0.9–1.3)
DIFFERENTIAL TYPE: ABNORMAL
EOSINOPHILS ABSOLUTE: 0.1 K/CU MM
EOSINOPHILS RELATIVE PERCENT: 0.8 % (ref 0–3)
GFR AFRICAN AMERICAN: 45 ML/MIN/1.73M2
GFR NON-AFRICAN AMERICAN: 37 ML/MIN/1.73M2
GLUCOSE BLD-MCNC: 105 MG/DL (ref 70–99)
GLUCOSE BLD-MCNC: 112 MG/DL (ref 70–99)
GLUCOSE BLD-MCNC: 117 MG/DL (ref 70–99)
HCT VFR BLD CALC: 32.3 % (ref 42–52)
HEMOGLOBIN: 9.8 GM/DL (ref 13.5–18)
IMMATURE NEUTROPHIL %: 0.4 % (ref 0–0.43)
LYMPHOCYTES ABSOLUTE: 1.4 K/CU MM
LYMPHOCYTES RELATIVE PERCENT: 13.8 % (ref 24–44)
MCH RBC QN AUTO: 23.7 PG (ref 27–31)
MCHC RBC AUTO-ENTMCNC: 30.3 % (ref 32–36)
MCV RBC AUTO: 78.2 FL (ref 78–100)
MONOCYTES ABSOLUTE: 1 K/CU MM
MONOCYTES RELATIVE PERCENT: 9.7 % (ref 0–4)
NUCLEATED RBC %: 0 %
PDW BLD-RTO: 16.2 % (ref 11.7–14.9)
PLATELET # BLD: 167 K/CU MM (ref 140–440)
PMV BLD AUTO: 9 FL (ref 7.5–11.1)
POTASSIUM SERPL-SCNC: 4 MMOL/L (ref 3.5–5.1)
RBC # BLD: 4.13 M/CU MM (ref 4.6–6.2)
SEGMENTED NEUTROPHILS ABSOLUTE COUNT: 7.4 K/CU MM
SEGMENTED NEUTROPHILS RELATIVE PERCENT: 75.1 % (ref 36–66)
SODIUM BLD-SCNC: 136 MMOL/L (ref 135–145)
TOTAL IMMATURE NEUTOROPHIL: 0.04 K/CU MM
TOTAL NUCLEATED RBC: 0 K/CU MM
TOTAL PROTEIN: 6.2 GM/DL (ref 6.4–8.2)
WBC # BLD: 9.8 K/CU MM (ref 4–10.5)

## 2020-05-13 PROCEDURE — 99231 SBSQ HOSP IP/OBS SF/LOW 25: CPT | Performed by: SURGERY

## 2020-05-13 PROCEDURE — 82962 GLUCOSE BLOOD TEST: CPT

## 2020-05-13 PROCEDURE — 2580000003 HC RX 258: Performed by: NURSE PRACTITIONER

## 2020-05-13 PROCEDURE — 6370000000 HC RX 637 (ALT 250 FOR IP): Performed by: HOSPITALIST

## 2020-05-13 PROCEDURE — 94761 N-INVAS EAR/PLS OXIMETRY MLT: CPT

## 2020-05-13 PROCEDURE — C9113 INJ PANTOPRAZOLE SODIUM, VIA: HCPCS | Performed by: SPECIALIST

## 2020-05-13 PROCEDURE — 99232 SBSQ HOSP IP/OBS MODERATE 35: CPT | Performed by: INTERNAL MEDICINE

## 2020-05-13 PROCEDURE — 85014 HEMATOCRIT: CPT

## 2020-05-13 PROCEDURE — 36415 COLL VENOUS BLD VENIPUNCTURE: CPT

## 2020-05-13 PROCEDURE — 80053 COMPREHEN METABOLIC PANEL: CPT

## 2020-05-13 PROCEDURE — 6370000000 HC RX 637 (ALT 250 FOR IP): Performed by: INTERNAL MEDICINE

## 2020-05-13 PROCEDURE — 6360000002 HC RX W HCPCS: Performed by: SPECIALIST

## 2020-05-13 PROCEDURE — 85025 COMPLETE CBC W/AUTO DIFF WBC: CPT

## 2020-05-13 PROCEDURE — APPSS60 APP SPLIT SHARED TIME 46-60 MINUTES: Performed by: NURSE PRACTITIONER

## 2020-05-13 PROCEDURE — 6370000000 HC RX 637 (ALT 250 FOR IP): Performed by: NURSE PRACTITIONER

## 2020-05-13 PROCEDURE — 85018 HEMOGLOBIN: CPT

## 2020-05-13 RX ORDER — CARVEDILOL 12.5 MG/1
12.5 TABLET ORAL 2 TIMES DAILY WITH MEALS
Qty: 60 TABLET | Refills: 3 | Status: ON HOLD | OUTPATIENT
Start: 2020-05-13 | End: 2020-05-28 | Stop reason: HOSPADM

## 2020-05-13 RX ORDER — ASPIRIN 81 MG/1
81 TABLET, CHEWABLE ORAL DAILY
Qty: 30 TABLET | Refills: 1 | Status: SHIPPED | OUTPATIENT
Start: 2020-05-14 | End: 2020-07-24 | Stop reason: SDUPTHER

## 2020-05-13 RX ADMIN — ASPIRIN 81 MG 81 MG: 81 TABLET ORAL at 09:50

## 2020-05-13 RX ADMIN — BUSPIRONE HYDROCHLORIDE 10 MG: 10 TABLET ORAL at 09:51

## 2020-05-13 RX ADMIN — PANTOPRAZOLE SODIUM 40 MG: 40 INJECTION, POWDER, FOR SOLUTION INTRAVENOUS at 09:51

## 2020-05-13 RX ADMIN — FUROSEMIDE 10 MG: 20 TABLET ORAL at 09:51

## 2020-05-13 RX ADMIN — SODIUM CHLORIDE, PRESERVATIVE FREE 10 ML: 5 INJECTION INTRAVENOUS at 09:51

## 2020-05-13 RX ADMIN — CARVEDILOL 12.5 MG: 12.5 TABLET, FILM COATED ORAL at 09:53

## 2020-05-13 RX ADMIN — ATORVASTATIN CALCIUM 80 MG: 80 TABLET, FILM COATED ORAL at 09:50

## 2020-05-13 RX ADMIN — AMLODIPINE BESYLATE 10 MG: 10 TABLET ORAL at 09:50

## 2020-05-13 RX ADMIN — ESCITALOPRAM OXALATE 10 MG: 10 TABLET ORAL at 09:50

## 2020-05-13 RX ADMIN — SPIRONOLACTONE 12.5 MG: 25 TABLET ORAL at 10:02

## 2020-05-13 RX ADMIN — DOCUSATE SODIUM 100 MG: 100 CAPSULE, LIQUID FILLED ORAL at 09:51

## 2020-05-13 RX ADMIN — FOLIC ACID 1 MG: 1 TABLET ORAL at 09:50

## 2020-05-13 ASSESSMENT — ENCOUNTER SYMPTOMS
BLOOD IN STOOL: 1
BACK PAIN: 0
SHORTNESS OF BREATH: 0
RECTAL PAIN: 0
CHEST TIGHTNESS: 0
ABDOMINAL DISTENTION: 0
ABDOMINAL PAIN: 0
COUGH: 0

## 2020-05-13 ASSESSMENT — PAIN SCALES - GENERAL: PAINLEVEL_OUTOF10: 0

## 2020-05-13 NOTE — PROGRESS NOTES
Pt discharged home w/ [facility]. D/C instructions reviewed, Pt verbalized understanding. Meds to bed delivered. Pt was informed that ASA 81 mg was not covered and he would need to get that on his own. Pt instructed to follow up with physicians as directed. Transportation home provided by [facility].  approval obtained. First contacted [employer], voicemail left and did not receive a call back.

## 2020-05-13 NOTE — PROGRESS NOTES
Hyperkalemia    Adrenal mass (HCC)    Diabetes mellitus, type 2 (HCC)    Hyponatremia    Pneumonia due to organism    PAF (paroxysmal atrial fibrillation) (HCC)    Empyema of left pleural space (HCC)    Hospital-acquired bacterial pneumonia    Hyperlipidemia    Lumbar radiculopathy    CKD (chronic kidney disease)    Diabetic peripheral neuropathy (HCC)    Chronic kidney disease (CKD), stage III (moderate) (HCC)    Cervical stenosis of spine    Spinal stenosis of lumbar region without neurogenic claudication    Somatic dysfunction of back    Somatic dysfunction of cervical region    Somatic dysfunction of pelvis region    Somatic dysfunction of both lower extremities    Empyema lung (HCC)    Acute kidney injury superimposed on CKD ([facility]    Chronic diastolic heart failure (HCC)    Syncope and collapse    Hypotensive episode    Bradycardia on ECG    Syncope    Lightheadedness    Dizziness    Acute on chronic respiratory failure with hypoxia (HCC)    Severe mixed bipolar 1 disorder without psychosis (HCC)    Acute chest pain      CKD3/4  Acute shortness of breath  Acute anemia from GI etiology  Chronic diastolic chf  Hemorrhoids  2 colon masses and multiple polyps: per egd and colonoscopy on 5/12/80     Suggest   - cr down to 1.8  - continue supportive mgmt  - avoid nephrotoxins and renally dose meds  - ok to dc per renal standpoint  - continue aldactone and lasix on dc, and his antihypertensives                                 Electronically signed by Yanet Alvarez DO on 5/13/2020 at 8:46 AM    [facility]  MD Patel Doran DO [place] ,  [address]  [place], [place] [address]  PHONE: [phone]  FAX: [phone]

## 2020-05-13 NOTE — ADT AUTH CERT
mg IV daily   Per Cardiology   Subjective and  Overnight Events: patient states that he is having a c scope today. He states that he has to be home by morning, because he has to pay bills. Encouraged him to discuss with primary and case mangement.        Telemetry SR        Assessment / Plan / Recommendation:        1. Chest pain- had mild elevation of troponin- suspect type II leak secondary to anemia- NM stress test completed- no ischemia noted - continue with coreg   2. Anemia- Gi is following- hemoglobin down to 6.8 on admit. - stable -  He is for EGD and colonoscopey today. H/H slightly lower today.     3. Paroxysmal afib: currently he is in SR- He does not tolerate anticoagulation d/t GI bleeding-  Was on plavix and asa as a class II recommendation - hold anticoagulation - on BB    4. Chronic HFpEF- EF 55%- does not appear to be in fluid overload- continue coreg-aldactone and lasix   5. HTN: not well controlled, BB increased per nephrology- continue Amlodipine   6.  ANA/CKD- nephrology following        Per GI   Pre-operative Diagnosis:ANEMIA / GIT BLEEDING       Post-operative Diagnosis GASTRIC POLYP--BX   /   4 CM MASS AT HF AND 3 CM MASS AT SF--BX---ALSO 15 POLYPS SCATTERED IN THE RIGHT COLON--HDS--HDS    SameaProcedure: EGD WITH BX  / COLONOSCOPY WITH BX         Per Nephrology      CKD3/4   Acute shortness of breath   Acute anemia from GI etiology   Chronic diastolic chf       Suggest    - cr down to 1.5 today   - continue supportive mgmt   - avoid nephrotoxins and renally dose meds

## 2020-05-13 NOTE — CONSULTS
80 mg Oral Daily JULEE Mcgraw CNP   80 mg at 05/12/20 1443    busPIRone (BUSPAR) tablet 10 mg  10 mg Oral TID JULEE Mcgraw CNP   10 mg at 05/12/20 2005    divalproex (DEPAKOTE ER) extended release tablet 1,000 mg  1,000 mg Oral Nightly JULEE Dorado - CNP   1,000 mg at 05/12/20 2005    docusate sodium (COLACE) capsule 100 mg  100 mg Oral BID JULEE Mcgraw CNP   100 mg at 05/12/20 1442    escitalopram (LEXAPRO) tablet 10 mg  10 mg Oral Daily JULEE Dorado - CNP   10 mg at 53/17/73 2532    folic acid (FOLVITE) tablet 1 mg  1 mg Oral Daily JULEE Dorado - CNP   1 mg at 05/12/20 1444    oxyCODONE-acetaminophen (PERCOCET) 7.5-325 MG per tablet 1 tablet  1 tablet Oral Q4H PRN JULEE Mcgraw CNP   1 tablet at 05/12/20 2005    traZODone (DESYREL) tablet 50 mg  50 mg Oral Nightly JULEE Dorado - CNP   50 mg at 05/12/20 2005    sodium chloride flush 0.9 % injection 10 mL  10 mL Intravenous 2 times per day JULEE Mcgraw CNP   10 mL at 05/12/20 2005    sodium chloride flush 0.9 % injection 10 mL  10 mL Intravenous PRN Faina Chong APRN - CNP        acetaminophen (TYLENOL) tablet 650 mg  650 mg Oral Q6H PRN JULEE Dorado - CNP        Or    acetaminophen (TYLENOL) suppository 650 mg  650 mg Rectal Q6H PRN Faina Chong, APRN - CNP        polyethylene glycol (GLYCOLAX) packet 17 g  17 g Oral Daily PRN Faian Chong APRN - MARCELLA        promethazine (PHENERGAN) tablet 12.5 mg  12.5 mg Oral Q6H PRN JULEE Dorado - CNP        Or    ondansetron (ZOFRAN) injection 4 mg  4 mg Intravenous Q6H PRN Faina Chong APRN - CNP        aspirin chewable tablet 81 mg  81 mg Oral Daily JULEE Dorado - CNP   81 mg at 05/12/20 1443    nitroGLYCERIN (NITROSTAT) SL tablet 0.4 mg  0.4 mg Sublingual Q5 Min PRN JULEE Dorado CNP        insulin lispro (HUMALOG) injection vial 0-12 Units  0-12 Units Subcutaneous TID Cardiovascular:      Rate and Rhythm: Normal rate and regular rhythm. Pulmonary:      Effort: Pulmonary effort is normal. No respiratory distress. Abdominal:      General: There is no distension. Palpations: Abdomen is soft. There is no mass. Tenderness: There is no abdominal tenderness. There is no guarding. Hernia: No hernia is present. Musculoskeletal: Normal range of motion. General: No swelling or tenderness. Skin:     General: Skin is warm and dry. Neurological:      General: No focal deficit present. Mental Status: He is alert and oriented to person, place, and time. Psychiatric:         Mood and Affect: Mood normal.         Behavior: Behavior normal.       DATA:    CBC:   Lab Results   Component Value Date    WBC 9.8 05/13/2020    RBC 4.13 05/13/2020    HGB 9.8 05/13/2020    HCT 32.3 05/13/2020    MCV 78.2 05/13/2020    MCH 23.7 05/13/2020    MCHC 30.3 05/13/2020    RDW 16.2 05/13/2020     05/13/2020    MPV 9.0 05/13/2020     CMP:    Lab Results   Component Value Date     05/13/2020    K 4.0 05/13/2020     05/13/2020    CO2 22 05/13/2020    BUN 25 05/13/2020    CREATININE 1.8 05/13/2020    GFRAA 45 05/13/2020    LABGLOM 37 05/13/2020    GLUCOSE 105 05/13/2020    PROT 6.2 05/13/2020    PROT 6.6 10/24/2011    LABALBU 3.7 05/13/2020    CALCIUM 8.6 05/13/2020    BILITOT 0.5 05/13/2020    ALKPHOS 58 05/13/2020    AST 13 05/13/2020    ALT 10 05/13/2020       ASSESSMENT/RECOMMENDATIONS:  GI bleeding due to multiple polyps in the right colon to the splenic flexure. Pt can be discharged and make an appointment with me for next week to get surgery scheduled. Thank you for the consult. Re Mooer.

## 2020-05-13 NOTE — PROGRESS NOTES
CLINICAL PHARMACY NOTE: MEDS TO [address] Select Patient?: Yes  Total # of Prescriptions Filled: 1   The following medications were delivered to the patient:  · Carvedilol 12.5mg  Total # of Interventions Completed: 0  Time Spent (min): 15    Additional Documentation:  His aspirin is not covered by his insurance.

## 2020-05-13 NOTE — PROGRESS NOTES
(chronic obstructive pulmonary disease) ([facility] [address] Diabetes mellitus, type 2 ([facility] Diabetic peripheral neuropathy ([facility] [address] Diastolic CHF ([facility] Gastric ulcer, H/O cardiovascular stress test, Heart murmur, Hiatal hernia, History of cardiac cath, Hx of migraines, HX OTHER MEDICAL, Hyperlipidemia, Hypertension, Lumbar radiculopathy, Panic attack, Pleural effusion, S/P thoracentesis, Sleep apnea, SOBOE (shortness of breath on exertion), and Spinal stenosis. Past surgical history:   has a past surgical history that includes Neck surgery (1998); Carpal tunnel release (Bilateral, 1989); knee surgery (Bilateral); thoracentesis (Left, 12/20/2013); Elbow surgery (Left, 2000's); Thoracentesis (4/25/2016); Tonsillectomy; Thoracentesis (Left, 11/15/2016); thoracotomy (Left, 03/18/2019); thoracotomy (Left, 3/18/2019); Upper gastrointestinal endoscopy (N/A, 5/12/2020); and Colonoscopy (N/A, 5/12/2020). Social History:   reports that he quit smoking about 9 years ago. His smoking use included cigarettes. He has a 45.00 pack-year smoking history. He has never used smokeless tobacco. He reports previous alcohol use. He reports previous drug use. Drug: Marijuana. Family history:  family history includes Heart Disease in his mother; High Blood Pressure in his father. Allergies   Allergen Reactions    Nsaids      Renal        Review of Systems:   All 14 systems were reviewed and are negative  Except for the positive findings which are documented     BP (!) 155/80   Pulse 88   Temp 98.5 °F (36.9 °C) (Oral)   Resp 17   Ht 5' 9\" (1.753 m)   Wt 238 lb 11.2 oz (108.3 kg)   SpO2 97%   BMI 35.25 kg/m²       Intake/Output Summary (Last 24 hours) at 5/13/2020 1104  Last data filed at 5/13/2020 0602  Gross per 24 hour   Intake 400 ml   Output 400 ml   Net 0 ml       Physical Exam  Vitals signs and nursing note reviewed. Constitutional:       Appearance: Normal appearance. HENT:      Head: Normocephalic and atraumatic.

## 2020-05-13 NOTE — DISCHARGE SUMMARY
Discharge Summary Note  Patient ID:  Hasmukh Bailey.  [id number]  79 y.o.  1950    Admit date: 5/9/2020    Discharge date and time: No discharge date for patient encounter. Admitting Physician: Riya Guerra MD     Discharge Physician: Aubree Alfonso MD    Admission Diagnoses:   Chest pain [R07.9]  Acute GI bleeding [K92.2]  Acute chest pain [R07.9]    Discharge Diagnoses and Hospital Course:   Chest pain likely non cardiac or type II elevation from GI bleed     Acute blood loss anemia secondary to GI bleed  Status post EGD and colonoscopy, did biopsy of 2 masses per GI  Resume Xarelto  Evaluated by general surgery, to be followed as an outpatient for scheduling surgery for possible colectomy    Colon adenocarcinoma, invasive low-grade at hepatic flexure  Tubulovillous adenoma near splenic flexure with high-grade dysplasia     Paroxysmal A.  Fib  Restarted Xarelto     Chronic heart failure with preserved ejection fraction-compensated     Hypertension  Continue amlodipine and beta-blocker  Nephrology following, titrated medications     ANA on CKD stage III  Nephrology following, appreciate recommendations     Type 2 diabetes  Continue home medications     Bipolar disorder  Continue psych meds    Admission Condition: fair    Discharged Condition: stable    Consults: GI, cardiology, nephrology and general surgery    Significant Diagnostic Studies:   CBC with Differential:    Lab Results   Component Value Date    WBC 9.8 05/13/2020    RBC 4.13 05/13/2020    HGB 9.8 05/13/2020    HCT 32.3 05/13/2020     05/13/2020    MCV 78.2 05/13/2020    MCH 23.7 05/13/2020    MCHC 30.3 05/13/2020    RDW 16.2 05/13/2020    NRBC 1 11/19/2016    SEGSPCT 75.1 05/13/2020    BANDSPCT 6 12/17/2019    LYMPHOPCT 13.8 05/13/2020    MONOPCT 9.7 05/13/2020    EOSPCT 1.3 06/07/2011    BASOPCT 0.2 05/13/2020    MONOSABS 1.0 05/13/2020    LYMPHSABS 1.4 05/13/2020    EOSABS 0.1 05/13/2020    BASOSABS 0.0 05/13/2020 thickening extending along the left lateral wall. The right lung demonstrates basilar atelectasis. Stable heart size and mediastinal contours. No change as described. Surgical Pathology from Colonoscopy done on 5/12/2020  A.  Benign gastric fundic polyp. Sonya Stacy LOW GRADE ADENOCARCINOMA ARISING IN DYSPLASTIC  HYPERPLASTIC       POLYP, 4 CM. SESSILE HEPATIC FLEXURE MASS. C.  Tubulovillous adenoma with high grade dysplasia, 2 cm.  sessile near splenic flexure mass. Discharge Exam:  General Appearance: alert and oriented to person, place and time, in no acute distress  Cardiovascular: normal rate, regular rhythm, normal S1 and S2  Pulmonary/Chest: Decreased breath sounds bilaterally  Abdomen: soft, non-tender, non-distended, normal bowel sounds, no masses   Extremities: no cyanosis, clubbing or edema, pulse   Skin: warm and dry, no rash or erythema  Head: normocephalic and atraumatic  Eyes: pupils equal, round, and reactive to light  Neck: supple and non-tender without mass, no thyromegaly   Musculoskeletal: normal range of motion, no joint swelling, deformity or tenderness  Neurological: alert, oriented, normal speech, no focal findings or movement disorder noted    Disposition: home    Patient Instructions:     Discharge Medications:   Marj Heller. Home Medication Instructions ALEKSEY:[id number]    Printed on:05/13/20 5785   Medication Information                      ALPRAZolam (XANAX) 1 MG tablet  Take 1 tablet by mouth 3 times daily as needed for Anxiety for up to 30 days.              amLODIPine (NORVASC) 10 MG tablet  TAKE 1 TABLET BY MOUTH ONCE DAILY             aspirin 81 MG chewable tablet  Take 1 tablet by mouth daily             atorvastatin (LIPITOR) 80 MG tablet  TAKE 1 TABLET BY MOUTH ONCE DAILY             busPIRone (BUSPAR) 10 MG tablet  Take 1 tablet by mouth 3 times daily             carvedilol (COREG) 12.5 MG tablet  Take 1 tablet by mouth 2 times daily (with meals)

## 2020-05-14 ENCOUNTER — CARE COORDINATION (OUTPATIENT)
Dept: CASE MANAGEMENT | Age: 70
End: 2020-05-14

## 2020-05-14 ENCOUNTER — TELEPHONE (OUTPATIENT)
Dept: FAMILY MEDICINE CLINIC | Age: 70
End: 2020-05-14

## 2020-05-14 NOTE — TELEPHONE ENCOUNTER
HOME CARE WON'T START TODAY---PATIENT HASN'T ANSWERED PHONE CALL---HANG CALLED SON AND SET UP ANOTHER APPT FOR LATER DATE TO START HOME CARE

## 2020-05-15 ENCOUNTER — CARE COORDINATION (OUTPATIENT)
Dept: CASE MANAGEMENT | Age: 70
End: 2020-05-15

## 2020-05-18 ENCOUNTER — CARE COORDINATION (OUTPATIENT)
Dept: CASE MANAGEMENT | Age: 70
End: 2020-05-18

## 2020-05-18 NOTE — CARE COORDINATION
[facility] 24/7 for any health concerns. Confirmed transportation for 5/19/2020 appt w/ Dr Jay Gutierrez. Patient only taking Coreg 12.5mg 1/2 tab in am, 1/2 in pm; NOT taking Coreg 12.5mg BID as directed upon discharge. States \"it wipes me out too much\". CTN to route note to PCP. Patient declined med rec review. Patient states he has [address] service per [facility] and will run out of meds after this pm dose. Reports he contacted [facility] and was told copays went up with his new insurance and would have to pay $6000 between now and October. I dont even have $10\". CTN to f/u with pharmacy. Discussed benefits of [facility] referral to discuss rx options; agreeable. Patient states he hasnt applied for Medicaid b/c/ he thinks income is too high. Denies any other resource needs at this time. Patient now living in own home; no further social issues of past.   Reviewed the following, verbalizes understanding :  From CDC: Are you at higher risk for severe illness?  Wash your hands often.  Avoid close contact (6 feet, which is about two arm lengths) with people who are sick.  Put distance between yourself and other people if COVID-19 is spreading in your community.  Clean and disinfect frequently touched surfaces.  Avoid all cruise travel and non-essential air travel.  Call your healthcare professional if you have concerns about COVID-19 and your underlying condition or if you are sick. For more information on steps you can take to protect yourself, see CDC's How to Protect Yourself    [address] re: above rx cost concerns. Reports Patient has outstanding [address] balance of $471.30 which he needs to pay off or make partial good martin payment on before rx can be filled. Reports increase in some copays since Patient changed Medicare coverage--Trajendta $116, Xarelto $118. Jarondeneen Golden has spoken directly to Patient re: issues and need for payment.    [address]

## 2020-05-19 ENCOUNTER — APPOINTMENT (OUTPATIENT)
Dept: CT IMAGING | Age: 70
DRG: 329 | End: 2020-05-19
Payer: MEDICARE

## 2020-05-19 ENCOUNTER — APPOINTMENT (OUTPATIENT)
Dept: GENERAL RADIOLOGY | Age: 70
DRG: 329 | End: 2020-05-19
Payer: MEDICARE

## 2020-05-19 ENCOUNTER — HOSPITAL ENCOUNTER (INPATIENT)
Age: 70
LOS: 8 days | Discharge: NURSING, REHAB OR LONG-TERM CARE | DRG: 329 | End: 2020-05-28
Attending: EMERGENCY MEDICINE | Admitting: INTERNAL MEDICINE
Payer: MEDICARE

## 2020-05-19 PROBLEM — R07.9 CHEST PAIN: Status: ACTIVE | Noted: 2020-05-19

## 2020-05-19 LAB
ALBUMIN SERPL-MCNC: 3.7 GM/DL (ref 3.4–5)
ALP BLD-CCNC: 77 IU/L (ref 40–128)
ALT SERPL-CCNC: 8 U/L (ref 10–40)
ANION GAP SERPL CALCULATED.3IONS-SCNC: 11 MMOL/L (ref 4–16)
AST SERPL-CCNC: 11 IU/L (ref 15–37)
BASOPHILS ABSOLUTE: 0 K/CU MM
BASOPHILS ABSOLUTE: 0 K/CU MM
BASOPHILS RELATIVE PERCENT: 0.5 % (ref 0–1)
BASOPHILS RELATIVE PERCENT: 0.5 % (ref 0–1)
BILIRUB SERPL-MCNC: 0.2 MG/DL (ref 0–1)
BUN BLDV-MCNC: 29 MG/DL (ref 6–23)
CALCIUM SERPL-MCNC: 8.3 MG/DL (ref 8.3–10.6)
CHLORIDE BLD-SCNC: 103 MMOL/L (ref 99–110)
CO2: 25 MMOL/L (ref 21–32)
CREAT SERPL-MCNC: 2 MG/DL (ref 0.9–1.3)
DIFFERENTIAL TYPE: ABNORMAL
DIFFERENTIAL TYPE: ABNORMAL
EOSINOPHILS ABSOLUTE: 0.3 K/CU MM
EOSINOPHILS ABSOLUTE: 0.3 K/CU MM
EOSINOPHILS RELATIVE PERCENT: 3.1 % (ref 0–3)
EOSINOPHILS RELATIVE PERCENT: 3.5 % (ref 0–3)
FERRITIN: 15 NG/ML (ref 30–400)
FOLATE: 20 NG/ML (ref 3.1–17.5)
GFR AFRICAN AMERICAN: 40 ML/MIN/1.73M2
GFR NON-AFRICAN AMERICAN: 33 ML/MIN/1.73M2
GLUCOSE BLD-MCNC: 197 MG/DL (ref 70–99)
HCT VFR BLD CALC: 25.7 % (ref 42–52)
HCT VFR BLD CALC: 27.9 % (ref 42–52)
HEMOGLOBIN: 7.4 GM/DL (ref 13.5–18)
HEMOGLOBIN: 8.3 GM/DL (ref 13.5–18)
IMMATURE NEUTROPHIL %: 0.6 % (ref 0–0.43)
IMMATURE NEUTROPHIL %: 0.6 % (ref 0–0.43)
IRON: 16 UG/DL (ref 59–158)
LACTIC ACID, SEPSIS: 0.9 MMOL/L (ref 0.5–1.9)
LACTIC ACID, SEPSIS: 1.3 MMOL/L (ref 0.5–1.9)
LIPASE: 25 IU/L (ref 13–60)
LYMPHOCYTES ABSOLUTE: 1.4 K/CU MM
LYMPHOCYTES ABSOLUTE: 1.5 K/CU MM
LYMPHOCYTES RELATIVE PERCENT: 15.5 % (ref 24–44)
LYMPHOCYTES RELATIVE PERCENT: 18.1 % (ref 24–44)
MCH RBC QN AUTO: 23.9 PG (ref 27–31)
MCH RBC QN AUTO: 24.1 PG (ref 27–31)
MCHC RBC AUTO-ENTMCNC: 28.8 % (ref 32–36)
MCHC RBC AUTO-ENTMCNC: 29.7 % (ref 32–36)
MCV RBC AUTO: 80.9 FL (ref 78–100)
MCV RBC AUTO: 83.2 FL (ref 78–100)
MONOCYTES ABSOLUTE: 0.8 K/CU MM
MONOCYTES ABSOLUTE: 0.8 K/CU MM
MONOCYTES RELATIVE PERCENT: 9.1 % (ref 0–4)
MONOCYTES RELATIVE PERCENT: 9.2 % (ref 0–4)
NUCLEATED RBC %: 0 %
NUCLEATED RBC %: 0 %
PCT TRANSFERRIN: 5 % (ref 10–44)
PDW BLD-RTO: 17.5 % (ref 11.7–14.9)
PDW BLD-RTO: 17.5 % (ref 11.7–14.9)
PLATELET # BLD: 151 K/CU MM (ref 140–440)
PLATELET # BLD: 218 K/CU MM (ref 140–440)
PMV BLD AUTO: 9.2 FL (ref 7.5–11.1)
PMV BLD AUTO: 9.3 FL (ref 7.5–11.1)
POTASSIUM SERPL-SCNC: 4.1 MMOL/L (ref 3.5–5.1)
PRO-BNP: 334.1 PG/ML
PROLACTIN: 49.7 NG/ML
RBC # BLD: 3.09 M/CU MM (ref 4.6–6.2)
RBC # BLD: 3.45 M/CU MM (ref 4.6–6.2)
RETICULOCYTE COUNT PCT: 2.6 % (ref 0.2–2.2)
SEGMENTED NEUTROPHILS ABSOLUTE COUNT: 5.8 K/CU MM
SEGMENTED NEUTROPHILS ABSOLUTE COUNT: 6.2 K/CU MM
SEGMENTED NEUTROPHILS RELATIVE PERCENT: 68.2 % (ref 36–66)
SEGMENTED NEUTROPHILS RELATIVE PERCENT: 71.1 % (ref 36–66)
SODIUM BLD-SCNC: 139 MMOL/L (ref 135–145)
TOTAL IMMATURE NEUTOROPHIL: 0.05 K/CU MM
TOTAL IMMATURE NEUTOROPHIL: 0.05 K/CU MM
TOTAL IRON BINDING CAPACITY: 339 UG/DL (ref 250–450)
TOTAL NUCLEATED RBC: 0 K/CU MM
TOTAL NUCLEATED RBC: 0 K/CU MM
TOTAL PROTEIN: 6.5 GM/DL (ref 6.4–8.2)
TROPONIN T: 0.02 NG/ML
TROPONIN T: 0.03 NG/ML
TROPONIN T: 0.03 NG/ML
UNSATURATED IRON BINDING CAPACITY: 323 UG/DL (ref 110–370)
VITAMIN B-12: 439.1 PG/ML (ref 211–911)
WBC # BLD: 8.5 K/CU MM (ref 4–10.5)
WBC # BLD: 8.7 K/CU MM (ref 4–10.5)

## 2020-05-19 PROCEDURE — 93005 ELECTROCARDIOGRAM TRACING: CPT | Performed by: EMERGENCY MEDICINE

## 2020-05-19 PROCEDURE — 99291 CRITICAL CARE FIRST HOUR: CPT

## 2020-05-19 PROCEDURE — 96375 TX/PRO/DX INJ NEW DRUG ADDON: CPT

## 2020-05-19 PROCEDURE — 80053 COMPREHEN METABOLIC PANEL: CPT

## 2020-05-19 PROCEDURE — 83880 ASSAY OF NATRIURETIC PEPTIDE: CPT

## 2020-05-19 PROCEDURE — 82607 VITAMIN B-12: CPT

## 2020-05-19 PROCEDURE — 96365 THER/PROPH/DIAG IV INF INIT: CPT

## 2020-05-19 PROCEDURE — 71045 X-RAY EXAM CHEST 1 VIEW: CPT

## 2020-05-19 PROCEDURE — 71250 CT THORAX DX C-: CPT

## 2020-05-19 PROCEDURE — 85025 COMPLETE CBC W/AUTO DIFF WBC: CPT

## 2020-05-19 PROCEDURE — 6370000000 HC RX 637 (ALT 250 FOR IP): Performed by: EMERGENCY MEDICINE

## 2020-05-19 PROCEDURE — 83690 ASSAY OF LIPASE: CPT

## 2020-05-19 PROCEDURE — 85045 AUTOMATED RETICULOCYTE COUNT: CPT

## 2020-05-19 PROCEDURE — 87040 BLOOD CULTURE FOR BACTERIA: CPT

## 2020-05-19 PROCEDURE — 2500000003 HC RX 250 WO HCPCS: Performed by: EMERGENCY MEDICINE

## 2020-05-19 PROCEDURE — 36415 COLL VENOUS BLD VENIPUNCTURE: CPT

## 2020-05-19 PROCEDURE — G0378 HOSPITAL OBSERVATION PER HR: HCPCS

## 2020-05-19 PROCEDURE — 6370000000 HC RX 637 (ALT 250 FOR IP): Performed by: INTERNAL MEDICINE

## 2020-05-19 PROCEDURE — 96368 THER/DIAG CONCURRENT INF: CPT

## 2020-05-19 PROCEDURE — 99205 OFFICE O/P NEW HI 60 MIN: CPT | Performed by: INTERNAL MEDICINE

## 2020-05-19 PROCEDURE — 99292 CRITICAL CARE ADDL 30 MIN: CPT

## 2020-05-19 PROCEDURE — 2580000003 HC RX 258: Performed by: INTERNAL MEDICINE

## 2020-05-19 PROCEDURE — 6360000002 HC RX W HCPCS: Performed by: EMERGENCY MEDICINE

## 2020-05-19 PROCEDURE — 84146 ASSAY OF PROLACTIN: CPT

## 2020-05-19 PROCEDURE — 84484 ASSAY OF TROPONIN QUANT: CPT

## 2020-05-19 PROCEDURE — 83550 IRON BINDING TEST: CPT

## 2020-05-19 PROCEDURE — 2580000003 HC RX 258: Performed by: EMERGENCY MEDICINE

## 2020-05-19 PROCEDURE — 83605 ASSAY OF LACTIC ACID: CPT

## 2020-05-19 PROCEDURE — 82746 ASSAY OF FOLIC ACID SERUM: CPT

## 2020-05-19 PROCEDURE — 83540 ASSAY OF IRON: CPT

## 2020-05-19 PROCEDURE — 93010 ELECTROCARDIOGRAM REPORT: CPT | Performed by: INTERNAL MEDICINE

## 2020-05-19 PROCEDURE — 96361 HYDRATE IV INFUSION ADD-ON: CPT

## 2020-05-19 PROCEDURE — APPNB60 APP NON BILLABLE TIME 46-60 MINS: Performed by: NURSE PRACTITIONER

## 2020-05-19 PROCEDURE — 82728 ASSAY OF FERRITIN: CPT

## 2020-05-19 RX ORDER — ONDANSETRON 2 MG/ML
4 INJECTION INTRAMUSCULAR; INTRAVENOUS EVERY 30 MIN PRN
Status: DISCONTINUED | OUTPATIENT
Start: 2020-05-19 | End: 2020-05-19 | Stop reason: SDUPTHER

## 2020-05-19 RX ORDER — DIVALPROEX SODIUM 500 MG/1
1000 TABLET, EXTENDED RELEASE ORAL NIGHTLY
Status: DISCONTINUED | OUTPATIENT
Start: 2020-05-19 | End: 2020-05-21

## 2020-05-19 RX ORDER — RISPERIDONE 0.5 MG/1
1 TABLET, FILM COATED ORAL NIGHTLY
Status: DISCONTINUED | OUTPATIENT
Start: 2020-05-19 | End: 2020-05-21

## 2020-05-19 RX ORDER — SODIUM CHLORIDE 0.9 % (FLUSH) 0.9 %
10 SYRINGE (ML) INJECTION PRN
Status: DISCONTINUED | OUTPATIENT
Start: 2020-05-19 | End: 2020-05-21

## 2020-05-19 RX ORDER — LACTOBACILLUS RHAMNOSUS GG 10B CELL
1 CAPSULE ORAL DAILY
Status: DISCONTINUED | OUTPATIENT
Start: 2020-05-19 | End: 2020-05-21

## 2020-05-19 RX ORDER — ESCITALOPRAM OXALATE 10 MG/1
10 TABLET ORAL DAILY
Status: DISCONTINUED | OUTPATIENT
Start: 2020-05-19 | End: 2020-05-21

## 2020-05-19 RX ORDER — MORPHINE SULFATE 4 MG/ML
4 INJECTION, SOLUTION INTRAMUSCULAR; INTRAVENOUS EVERY 30 MIN PRN
Status: DISCONTINUED | OUTPATIENT
Start: 2020-05-19 | End: 2020-05-19 | Stop reason: ALTCHOICE

## 2020-05-19 RX ORDER — SPIRONOLACTONE 25 MG/1
12.5 TABLET ORAL 2 TIMES DAILY
Status: DISCONTINUED | OUTPATIENT
Start: 2020-05-19 | End: 2020-05-21

## 2020-05-19 RX ORDER — ONDANSETRON 2 MG/ML
4 INJECTION INTRAMUSCULAR; INTRAVENOUS EVERY 6 HOURS PRN
Status: DISCONTINUED | OUTPATIENT
Start: 2020-05-19 | End: 2020-05-21

## 2020-05-19 RX ORDER — ALPRAZOLAM 0.5 MG/1
1 TABLET ORAL 3 TIMES DAILY PRN
Status: DISCONTINUED | OUTPATIENT
Start: 2020-05-19 | End: 2020-05-21

## 2020-05-19 RX ORDER — PROMETHAZINE HYDROCHLORIDE 25 MG/1
12.5 TABLET ORAL EVERY 6 HOURS PRN
Status: DISCONTINUED | OUTPATIENT
Start: 2020-05-19 | End: 2020-05-21

## 2020-05-19 RX ORDER — POLYETHYLENE GLYCOL 3350 17 G/17G
17 POWDER, FOR SOLUTION ORAL DAILY PRN
Status: DISCONTINUED | OUTPATIENT
Start: 2020-05-19 | End: 2020-05-21

## 2020-05-19 RX ORDER — ASPIRIN 81 MG/1
81 TABLET, CHEWABLE ORAL DAILY
Status: DISCONTINUED | OUTPATIENT
Start: 2020-05-19 | End: 2020-05-20

## 2020-05-19 RX ORDER — TRAZODONE HYDROCHLORIDE 50 MG/1
50 TABLET ORAL NIGHTLY
Status: DISCONTINUED | OUTPATIENT
Start: 2020-05-19 | End: 2020-05-21

## 2020-05-19 RX ORDER — VANCOMYCIN HYDROCHLORIDE 1 G/200ML
1000 INJECTION, SOLUTION INTRAVENOUS ONCE
Status: COMPLETED | OUTPATIENT
Start: 2020-05-19 | End: 2020-05-19

## 2020-05-19 RX ORDER — BUSPIRONE HYDROCHLORIDE 10 MG/1
10 TABLET ORAL 3 TIMES DAILY
Status: DISCONTINUED | OUTPATIENT
Start: 2020-05-19 | End: 2020-05-21

## 2020-05-19 RX ORDER — 0.9 % SODIUM CHLORIDE 0.9 %
1000 INTRAVENOUS SOLUTION INTRAVENOUS ONCE
Status: COMPLETED | OUTPATIENT
Start: 2020-05-19 | End: 2020-05-19

## 2020-05-19 RX ORDER — FUROSEMIDE 20 MG/1
10 TABLET ORAL DAILY
Status: DISCONTINUED | OUTPATIENT
Start: 2020-05-19 | End: 2020-05-21

## 2020-05-19 RX ORDER — GABAPENTIN 400 MG/1
400 CAPSULE ORAL 2 TIMES DAILY
Status: DISCONTINUED | OUTPATIENT
Start: 2020-05-19 | End: 2020-05-21

## 2020-05-19 RX ORDER — TIZANIDINE 4 MG/1
4 TABLET ORAL 3 TIMES DAILY
Status: DISCONTINUED | OUTPATIENT
Start: 2020-05-19 | End: 2020-05-21

## 2020-05-19 RX ORDER — ATORVASTATIN CALCIUM 40 MG/1
80 TABLET, FILM COATED ORAL DAILY
Status: DISCONTINUED | OUTPATIENT
Start: 2020-05-19 | End: 2020-05-21

## 2020-05-19 RX ORDER — ATROPINE SULFATE 0.4 MG/ML
0.4 AMPUL (ML) INJECTION ONCE
Status: DISCONTINUED | OUTPATIENT
Start: 2020-05-19 | End: 2020-05-19

## 2020-05-19 RX ORDER — ASPIRIN 81 MG/1
324 TABLET, CHEWABLE ORAL ONCE
Status: COMPLETED | OUTPATIENT
Start: 2020-05-19 | End: 2020-05-19

## 2020-05-19 RX ORDER — ATROPINE SULFATE 1 MG/ML
0.4 INJECTION, SOLUTION INTRAMUSCULAR; INTRAVENOUS; SUBCUTANEOUS ONCE
Status: COMPLETED | OUTPATIENT
Start: 2020-05-19 | End: 2020-05-19

## 2020-05-19 RX ORDER — FOLIC ACID 1 MG/1
1 TABLET ORAL DAILY
Status: DISCONTINUED | OUTPATIENT
Start: 2020-05-19 | End: 2020-05-21

## 2020-05-19 RX ORDER — SODIUM CHLORIDE 0.9 % (FLUSH) 0.9 %
10 SYRINGE (ML) INJECTION EVERY 12 HOURS SCHEDULED
Status: DISCONTINUED | OUTPATIENT
Start: 2020-05-19 | End: 2020-05-21

## 2020-05-19 RX ORDER — ACETAMINOPHEN 650 MG/1
650 SUPPOSITORY RECTAL EVERY 6 HOURS PRN
Status: DISCONTINUED | OUTPATIENT
Start: 2020-05-19 | End: 2020-05-21

## 2020-05-19 RX ORDER — ACETAMINOPHEN 325 MG/1
650 TABLET ORAL EVERY 6 HOURS PRN
Status: DISCONTINUED | OUTPATIENT
Start: 2020-05-19 | End: 2020-05-21

## 2020-05-19 RX ORDER — OXYCODONE AND ACETAMINOPHEN 7.5; 325 MG/1; MG/1
1 TABLET ORAL EVERY 6 HOURS PRN
Status: DISCONTINUED | OUTPATIENT
Start: 2020-05-19 | End: 2020-05-21

## 2020-05-19 RX ADMIN — BUSPIRONE HYDROCHLORIDE 10 MG: 10 TABLET ORAL at 09:22

## 2020-05-19 RX ADMIN — TRAZODONE HYDROCHLORIDE 50 MG: 50 TABLET ORAL at 22:20

## 2020-05-19 RX ADMIN — OXYCODONE HYDROCHLORIDE AND ACETAMINOPHEN 1 TABLET: 7.5; 325 TABLET ORAL at 10:39

## 2020-05-19 RX ADMIN — SPIRONOLACTONE 12.5 MG: 25 TABLET ORAL at 22:20

## 2020-05-19 RX ADMIN — SODIUM CHLORIDE 1000 ML: 9 INJECTION, SOLUTION INTRAVENOUS at 02:31

## 2020-05-19 RX ADMIN — FUROSEMIDE 10 MG: 20 TABLET ORAL at 09:22

## 2020-05-19 RX ADMIN — Medication 1 CAPSULE: at 09:22

## 2020-05-19 RX ADMIN — ATROPINE SULFATE 0.4 MG: 1 INJECTION, SOLUTION INTRAMUSCULAR; INTRAVENOUS; SUBCUTANEOUS at 03:52

## 2020-05-19 RX ADMIN — TIZANIDINE 4 MG: 4 TABLET ORAL at 14:39

## 2020-05-19 RX ADMIN — ASPIRIN 81 MG 81 MG: 81 TABLET ORAL at 09:22

## 2020-05-19 RX ADMIN — GLUCAGON HYDROCHLORIDE 5 MG: KIT at 04:00

## 2020-05-19 RX ADMIN — TIZANIDINE 4 MG: 4 TABLET ORAL at 09:22

## 2020-05-19 RX ADMIN — ASPIRIN 81 MG 324 MG: 81 TABLET ORAL at 02:31

## 2020-05-19 RX ADMIN — ONDANSETRON 4 MG: 2 INJECTION INTRAMUSCULAR; INTRAVENOUS at 05:04

## 2020-05-19 RX ADMIN — GABAPENTIN 400 MG: 400 CAPSULE ORAL at 09:22

## 2020-05-19 RX ADMIN — SODIUM CHLORIDE, PRESERVATIVE FREE 10 ML: 5 INJECTION INTRAVENOUS at 09:23

## 2020-05-19 RX ADMIN — SPIRONOLACTONE 12.5 MG: 25 TABLET ORAL at 09:22

## 2020-05-19 RX ADMIN — SODIUM CHLORIDE, PRESERVATIVE FREE 10 ML: 5 INJECTION INTRAVENOUS at 22:22

## 2020-05-19 RX ADMIN — ESCITALOPRAM OXALATE 10 MG: 10 TABLET ORAL at 09:22

## 2020-05-19 RX ADMIN — CEFEPIME 2 G: 2 INJECTION, POWDER, FOR SOLUTION INTRAVENOUS at 03:37

## 2020-05-19 RX ADMIN — BUSPIRONE HYDROCHLORIDE 10 MG: 10 TABLET ORAL at 14:39

## 2020-05-19 RX ADMIN — DIVALPROEX SODIUM 1000 MG: 500 TABLET, FILM COATED, EXTENDED RELEASE ORAL at 22:19

## 2020-05-19 RX ADMIN — ATORVASTATIN CALCIUM 80 MG: 40 TABLET, FILM COATED ORAL at 09:22

## 2020-05-19 RX ADMIN — FOLIC ACID 1 MG: 1 TABLET ORAL at 09:22

## 2020-05-19 RX ADMIN — GABAPENTIN 400 MG: 400 CAPSULE ORAL at 22:20

## 2020-05-19 RX ADMIN — TIZANIDINE 4 MG: 4 TABLET ORAL at 22:21

## 2020-05-19 RX ADMIN — BUSPIRONE HYDROCHLORIDE 10 MG: 10 TABLET ORAL at 22:21

## 2020-05-19 RX ADMIN — RISPERIDONE 1 MG: 0.5 TABLET ORAL at 22:21

## 2020-05-19 RX ADMIN — VANCOMYCIN HYDROCHLORIDE 1000 MG: 1 INJECTION, SOLUTION INTRAVENOUS at 03:38

## 2020-05-19 ASSESSMENT — ENCOUNTER SYMPTOMS
STRIDOR: 0
SINUS PAIN: 0
GASTROINTESTINAL NEGATIVE: 1
WHEEZING: 0
SHORTNESS OF BREATH: 1
COLOR CHANGE: 0
RECTAL PAIN: 0
EYE PAIN: 0
CHOKING: 0
PHOTOPHOBIA: 0
DIARRHEA: 0
SINUS PRESSURE: 0
VOICE CHANGE: 0
BLOOD IN STOOL: 0
EYES NEGATIVE: 1
VOMITING: 0
RHINORRHEA: 0
EYE DISCHARGE: 0
HEARTBURN: 0
FACIAL SWELLING: 0
NAUSEA: 0
CHEST TIGHTNESS: 0
TROUBLE SWALLOWING: 0
CONSTIPATION: 0
RESPIRATORY NEGATIVE: 1
EYE REDNESS: 0
SHORTNESS OF BREATH: 0
COUGH: 0
APNEA: 0
EYE ITCHING: 0
ABDOMINAL PAIN: 0
BACK PAIN: 0

## 2020-05-19 ASSESSMENT — PAIN DESCRIPTION - PROGRESSION
CLINICAL_PROGRESSION: NOT CHANGED

## 2020-05-19 ASSESSMENT — PAIN SCALES - GENERAL
PAINLEVEL_OUTOF10: 4
PAINLEVEL_OUTOF10: 0
PAINLEVEL_OUTOF10: 0
PAINLEVEL_OUTOF10: 4
PAINLEVEL_OUTOF10: 3
PAINLEVEL_OUTOF10: 6
PAINLEVEL_OUTOF10: 9
PAINLEVEL_OUTOF10: 0
PAINLEVEL_OUTOF10: 2

## 2020-05-19 ASSESSMENT — PAIN DESCRIPTION - PAIN TYPE
TYPE: CHRONIC PAIN
TYPE: CHRONIC PAIN

## 2020-05-19 ASSESSMENT — PAIN DESCRIPTION - DESCRIPTORS
DESCRIPTORS: ACHING;DISCOMFORT
DESCRIPTORS: ACHING;DISCOMFORT

## 2020-05-19 ASSESSMENT — PAIN DESCRIPTION - ORIENTATION
ORIENTATION: MID
ORIENTATION: MID

## 2020-05-19 ASSESSMENT — PAIN DESCRIPTION - ONSET
ONSET: ON-GOING
ONSET: ON-GOING

## 2020-05-19 ASSESSMENT — PAIN DESCRIPTION - LOCATION
LOCATION: NECK
LOCATION: NECK

## 2020-05-19 ASSESSMENT — PAIN DESCRIPTION - FREQUENCY
FREQUENCY: INTERMITTENT
FREQUENCY: INTERMITTENT

## 2020-05-19 NOTE — ED PROVIDER NOTES
for congestion, dental problem, drooling, facial swelling, nosebleeds, postnasal drip, rhinorrhea, sinus pressure, sinus pain, tinnitus, trouble swallowing and voice change. Eyes: Negative. Negative for photophobia, pain, discharge, redness, itching and visual disturbance. Respiratory: Negative. Negative for apnea, cough, choking, chest tightness, shortness of breath, wheezing and stridor. Cardiovascular: Positive for chest pain and palpitations. Negative for claudication, leg swelling, syncope and near-syncope. Gastrointestinal: Negative. Negative for abdominal pain, anorexia, blood in stool, constipation, diarrhea, heartburn, nausea, rectal pain and vomiting. Endocrine: Negative for polyuria. Genitourinary: Negative. Negative for dysuria, flank pain, frequency, hematuria and urgency. Musculoskeletal: Negative. Negative for arthralgias, back pain, gait problem, myalgias, neck pain and neck stiffness. Skin: Negative. Negative for color change, pallor, rash and wound. Neurological: Negative. Negative for dizziness, speech difficulty, weakness, light-headedness, numbness and headaches. Psychiatric/Behavioral: Negative. Negative for agitation, confusion, self-injury, sleep disturbance and suicidal ideas. The patient is not nervous/anxious. All other systems reviewed and are negative. Except as noted above the remainder of the review of systems was reviewed and negative.        PAST MEDICAL HISTORY     Past Medical History:   Diagnosis Date    Anemia     per old chart    Arthritis     Back pain, chronic     \"have pain in lumbar mainly- have 5 bulging discs\"\"in my neck and my lumbar have herniated discs\"    Bipolar 1 disorder ([facility]     CAD (coronary artery disease) 1/27/2016    does not follow with a cardiologist    Cerebral artery occlusion with cerebral infarction ([facility]     \"had mini stroke in Jan 2016- fast heart rate when I would stand up - smile drooping on one side- lased just the one day\"    Chronic kidney disease (CKD), stage III (moderate) (HCC)     CKD (chronic kidney disease)     per old chart- consult with Dr Tomasz Cummings with admission 4/2016\"have low kidney function\"    COPD (chronic obstructive pulmonary disease) ([facility]     follow with Dr Genesis Cole Diabetes mellitus, type 2 ([facility] 1/3/2017    Diabetic peripheral neuropathy (HCC)     Diastolic CHF ([facility] 4/21/1751    Gastric ulcer     H/O cardiovascular stress test 5/26/14    EF 68% mild ischemia anterior wall    Heart murmur     \"see Dr Say Sargent- last echo 2014\" pt states\"I think they said I have a murmur but no chest pain or palpitations\"    Hiatal hernia     History of cardiac cath 6/11/14    MODERATE  CAD      Hx of migraines     HX OTHER MEDICAL     \"have thinning of kidney walls- they found I had that 15 yrs ago\" see Dr Dante Pichardo"    Hyperlipidemia     Hypertension     Lumbar radiculopathy     Panic attack     'anything new gives me anxiety\"    Pleural effusion     scheduled for thoracentesis 7/28/2014, 8/17/2016    S/P thoracentesis     left side( per old chart had thora done 4/2016 and one done 2014)    Sleep apnea     sleep study x 2 - last one 4-5 yrs ago- uses c-pap\"    SOBOE (shortness of breath on exertion)     Spinal stenosis        Prior to Admission medications    Medication Sig Start Date End Date Taking?  Authorizing Provider   aspirin 81 MG chewable tablet Take 1 tablet by mouth daily 5/14/20   Michoacano Bermudez MD   carvedilol (COREG) 12.5 MG tablet Take 1 tablet by mouth 2 times daily (with meals) 5/13/20   [place]ano Bermudez MD   gabapentin (NEURONTIN) 300 MG capsule TAKE TWO (2) CAPSULES BY MOUTH THREE TIMES DAILY 5/1/20 5/31/20  LORRAINE Toribio   traZODone (DESYREL) 50 MG tablet TAKE ONE (1) TABLET BY MOUTH EVERY NIGHT 5/1/20   LORRAINE Toribio   risperiDONE (RISPERDAL) 1 MG tablet TAKE ONE (1) TABLET BY MOUTH EVERY NIGHT 5/1/20   LORRAINE Toribio   ALPRAZolam Ariadne Baldwin) CULTURE, BLOOD 1   CULTURE, BLOOD 2   LIPASE   LACTATE, SEPSIS   URINALYSIS   LACTATE, SEPSIS   ANEMIA PANEL   PROLACTIN          EKG: All EKG's are interpreted by the Emergency Department Physician who either signs or Co-signs this chart in the absence of a cardiologist.       EKG Interpretation    Interpreted by emergency department physician    Rhythm: normal sinus   Rate: tachycardia  Axis: normal  Ectopy: none  Conduction: normal  ST Segments: no acute change  T Waves: no acute change  Q Waves: none    Clinical Impression: no acute changes    Rashaad Slade     RADIOLOGY:     Non-plain film images such as CT, Ultrasound and MRI are read by the radiologist. Plain radiographic images are visualized and preliminarily interpreted by the emergency physician. Interpretation per the Radiologist below, if available at the time of this note:    CT CHEST WO CONTRAST   Final Result   No acute pulmonary abnormality. No significant change from 12/15/2019 of   chronic empyema along the left lower chest with adjacent consolidation and   left lung volume loss. XR CHEST PORTABLE   Final Result   Stable examination Ree demonstrating pleuroparenchymal opacity in the left   lung base with volume loss.                ED BEDSIDE ULTRASOUND:   Performed by ED Physician Rashaad Slade, DO       LABS:  Labs Reviewed   CBC WITH AUTO DIFFERENTIAL - Abnormal; Notable for the following components:       Result Value    RBC 3.45 (*)     Hemoglobin 8.3 (*)     Hematocrit 27.9 (*)     MCH 24.1 (*)     MCHC 29.7 (*)     RDW 17.5 (*)     Segs Relative 68.2 (*)     Lymphocytes % 18.1 (*)     Monocytes % 9.1 (*)     Eosinophils % 3.5 (*)     Immature Neutrophil % 0.6 (*)     All other components within normal limits   COMPREHENSIVE METABOLIC PANEL W/ REFLEX TO MG FOR LOW K - Abnormal; Notable for the following components:    BUN 29 (*)     CREATININE 2.0 (*)     Glucose 197 (*)     ALT 8 (*)     AST 11 (*)     GFR Non- American 33 (*)     GFR  40 (*)     All other components within normal limits   TROPONIN - Abnormal; Notable for the following components:    Troponin T 0.017 (*)     All other components within normal limits   BRAIN NATRIURETIC PEPTIDE - Abnormal; Notable for the following components:    Pro-.1 (*)     All other components within normal limits   CULTURE, BLOOD 1   CULTURE, BLOOD 2   LIPASE   LACTATE, SEPSIS   URINALYSIS   LACTATE, SEPSIS   ANEMIA PANEL   PROLACTIN       All other labs were within normal range or not returned as of this dictation. EMERGENCY DEPARTMENT COURSE and DIFFERENTIAL DIAGNOSIS/MDM:   Vitals:    Vitals:    05/19/20 0311 05/19/20 0316 05/19/20 0321 05/19/20 0343   BP: 86/64 (!) 91/58 87/60 108/70   Pulse: 54 52  52   Resp: 11 12  15   Temp:       TempSrc:       SpO2: 92% 93% 95% 98%   Weight:       Height:               MDM  Number of Diagnoses or Management Options  Diagnosis management comments: 66-year-old male presents emergency department via EMS for chief complaint of chest pain. Patient has a history of recurrent chest pain and coronary artery disease. Patient had a recent [place] that was negative on 10 May. In the emergency department, patient was initially tachycardic but abruptly became bradycardic with a heart rate of 52 and became hypotensive. Systolic blood pressure dropped to 88. Patient was started on IV fluids and given and 0.5 mg of atropine. After discussing events that led up to his chest pain, it was discovered that the patient took an extra dose of his beta-blocker and calcium channel blocker. This does explain the current symptoms. Patient was given glucagon and norepinephrine was ordered to the bedside in case patient required support of his systolic blood pressures. Blood pressure is now 110/70. At this time, will not treat with high-dose insulin. Patient is still mentating well.   Discussed the case with hospitalist and will admit

## 2020-05-19 NOTE — ED NOTES
Narrative   EXAMINATION:   ONE XRAY VIEW OF THE CHEST       5/19/2020 2:10 am       COMPARISON:   05/09/2020       HISTORY:   ORDERING SYSTEM PROVIDED HISTORY: cp   TECHNOLOGIST PROVIDED HISTORY:   Reason for exam:->cp   Reason for Exam: chest pain   Acuity: Unknown   Type of Exam: Unknown       FINDINGS:   The cardiac silhouette appears within normal limits for size given portable   technique.  Again seen is pleuroparenchymal opacity in the left lung base,   stable.  No pneumothorax           Impression   Stable examination Ree demonstrating pleuroparenchymal opacity in the left   lung base with volume loss.         Kindra Keller RN  05/19/20 [id number]

## 2020-05-19 NOTE — CARE COORDINATION
Received a referral yesterday from Angie Ramos. She was doing a follow up call with patient when he expressed he has trouble affording his medications due to new insurance. An application was requested to be mailed to him. I have the application ready to go out Friday. However if patient needs any assistance at discharge please contact me. He is eligible for a one time voucher on NEW medication only.

## 2020-05-19 NOTE — CARE COORDINATION
LSW reviewed chart/into room to initiate discharge planning. LSW introduced self and role of LSW. Pt is from home alone but is planning on getting a roommate. Pt stated that he has 3 bedrooms that he is not using. Pt has access to transportation. Pt stated that he has a PCP. Pt is able to afford Rx. Pt would like HC when discharged. Pt has had [address] home care in the past and would like them again.      Electronically signed by ESTER Corey on 5/19/2020 at 4:16 PM

## 2020-05-19 NOTE — ED TRIAGE NOTES
Pt states that he was asleep and woke up to left side chest pain and  Heaviness. Pt denies any other symptoms.

## 2020-05-19 NOTE — CONSULTS
obstructive pulmonary disease) ([facility]     follow with Dr Tiffanie Small Diabetes mellitus, type 2 ([facility] 1/3/2017    Diabetic peripheral neuropathy (HCC)     Diastolic CHF ([facility] 7/05/7857    Gastric ulcer     H/O cardiovascular stress test 5/26/14    EF 68% mild ischemia anterior wall    Heart murmur     \"see Dr Jose Colon- last echo 2014\" pt states\"I think they said I have a murmur but no chest pain or palpitations\"    Hiatal hernia     History of cardiac cath 6/11/14    MODERATE  CAD      Hx of migraines     HX OTHER MEDICAL     \"have thinning of kidney walls- they found I had that 15 yrs ago\" see Dr Marah Palafox"    Hyperlipidemia     Hypertension     Lumbar radiculopathy     Panic attack     'anything new gives me anxiety\"    Pleural effusion     scheduled for thoracentesis 7/28/2014, 8/17/2016    S/P thoracentesis     left side( per old chart had thora done 4/2016 and one done 2014)    Sleep apnea     sleep study x 2 - last one 4-5 yrs ago- uses c-pap\"    SOBOE (shortness of breath on exertion)     Spinal stenosis        Surgical history :   Past Surgical History:   Procedure Laterality Date    CARPAL TUNNEL RELEASE Bilateral 1989    COLONOSCOPY N/A 5/12/2020    COLONOSCOPY POLYPECTOMY SNARE/COLD BIOPSY WITH SPOT INK TATTOO performed by Margi Sharma MD at [facility] Left 2000's    KNEE SURGERY Bilateral     right knee x 2( scope)/ left knee- 7-8 yr ago   [address] fusion    THORACENTESIS Left 12/20/2013    THORACENTESIS  4/25/2016         THORACENTESIS Left 11/15/2016    Dr. Rosemary Mock 250mlsremoved     THORACOTOMY Left 03/18/2019    with Lysis of adhesions    THORACOTOMY Left 3/18/2019    THORACOSCOPY CONVERTED TO THORACOTOMY WITH LYSIS OF ADHESIONS performed by Dvaid Anne MD at [address] [place] [address]      as a kid    UPPER GASTROINTESTINAL ENDOSCOPY N/A 5/12/2020    EGD BIOPSY performed by Margi Sharma MD at [facility] ENDOSCOPY       Family history:   Family

## 2020-05-19 NOTE — ED NOTES
Hospitalist at [address] [place] [address], [address] [place] [address]  05/19/20 6542

## 2020-05-19 NOTE — H&P
History and Physical      Name:  Polina Villegas /Age/Sex: 1950  (79 y.o. male)   MRN & CSN:  [id number] & [id number] Admission Date/Time: 2020  2:06 AM   Location:  ED25/ED-25 PCP: Collins Brown MD       Hospital Day: 1    Assessment and Plan:   Polina Villegas is a 79 y.o.  male  who presents with chest pain    -Chest pain; recurrent; no EKG changes troponin unchanged nuclear stress test May 11 negative for active ischemia  -Coronary artery disease; had cardiac cath several years ago showed diffuse disease  -Hypotension on presentation to ER patient took extra dose of carvedilol and extra dose of amlodipine  -Bradycardia on presentation to ER patient took extra dose of carvedilol improved post atropine and glucagon  -Morbid obesity  -Chronic kidney disease stage III creatinine at baseline  -Obstructive sleep apnea    Plan  Serial troponin  Telemetry  Patient needs refills on all of his medications on discharge   consult for home health nurse visit arrangements      Diet No diet orders on file   DVT Prophylaxis [] Lovenox, []  Heparin, [] SCDs, [] Ambulation   GI Prophylaxis [] PPI,  [] H2 Blocker,  [] Carafate,  [] Diet/Tube Feeds   Code Status Prior   Disposition Patient requires continued admission due to    MDM [] Low, [] Moderate,[]  High  Patient's risk as above due to      History of Present Illness:     Chief Complaint: Chest pain  Polina Villegas is a 79 y.o.  male  who presents with chest pain. Patient presented to ER by EMS for evaluation of chest pain. It was left-sided heaviness 8 out of 10 in severity started around 1 AM has been constant since started no alleviating or aggravating factors. No shortness of breath, fever, chills, cough, nausea vomiting. No palpitation. The ER blood pressure dropped to systolic 88 heart rate dropped to early 50s patient admitted taking extra carvedilol and extra amlodipine.   Patient was given IV fluids glucagon and atropine. Ten point ROS reviewed negative, unless as noted above    Objective: Intake/Output Summary (Last 24 hours) at 5/19/2020 0349  Last data filed at 5/19/2020 0343  Gross per 24 hour   Intake 1000 ml   Output --   Net 1000 ml      Vitals:   Vitals:    05/19/20 0343   BP: 108/70   Pulse: 52   Resp: 15   Temp:    SpO2: 98%     Physical Exam:   GEN Awake male, sitting upright in bed in no apparent distress. Appears given age. EYES Pupils are equally round. No scleral erythema, discharge, or conjunctivitis. HENT Mucous membranes are moist. Oral pharynx without exudates, no evidence of thrush. NECK Supple, no apparent thyromegaly or masses. RESP Clear to auscultation, no wheezes, rales or rhonchi. Symmetric chest movement while on room air. CARDIO/VASC S1/S2 auscultated. Regular rate without appreciable murmurs, rubs, or gallops. No JVD or carotid bruits. Peripheral pulses equal bilaterally and palpable. No peripheral edema. GI Abdomen is soft without significant tenderness, masses, or guarding. Bowel sounds are normoactive. Rectal exam deferred.  No costovertebral angle tenderness. Zimmer catheter is not present. HEME/LYMPH No palpable cervical lymphadenopathy and no hepatosplenomegaly. No petechiae or ecchymoses. MSK No gross joint deformities. SKIN Normal coloration, warm, dry. NEURO Cranial nerves appear grossly intact, normal speech, no lateralizing weakness. PSYCH Awake, alert, oriented x 4. Affect appropriate.     Past Medical History:      Past Medical History:   Diagnosis Date    Anemia     per old chart    Arthritis     Back pain, chronic     \"have pain in lumbar mainly- have 5 bulging discs\"\"in my neck and my lumbar have herniated discs\"    Bipolar 1 disorder ([facility]     CAD (coronary artery disease) 1/27/2016    does not follow with a cardiologist    Cerebral artery occlusion with cerebral infarction [facility])     \"had mini stroke in Jan 2016- fast heart rate when I

## 2020-05-20 ENCOUNTER — ANESTHESIA EVENT (OUTPATIENT)
Dept: OPERATING ROOM | Age: 70
DRG: 329 | End: 2020-05-20
Payer: MEDICARE

## 2020-05-20 LAB
ANION GAP SERPL CALCULATED.3IONS-SCNC: 10 MMOL/L (ref 4–16)
BACTERIA: NEGATIVE /HPF
BASOPHILS ABSOLUTE: 0 K/CU MM
BASOPHILS RELATIVE PERCENT: 0.4 % (ref 0–1)
BILIRUBIN URINE: NEGATIVE MG/DL
BLOOD, URINE: ABNORMAL
BUN BLDV-MCNC: 31 MG/DL (ref 6–23)
CALCIUM SERPL-MCNC: 8.2 MG/DL (ref 8.3–10.6)
CHLORIDE BLD-SCNC: 106 MMOL/L (ref 99–110)
CLARITY: CLEAR
CO2: 25 MMOL/L (ref 21–32)
COLOR: ABNORMAL
CREAT SERPL-MCNC: 2.1 MG/DL (ref 0.9–1.3)
DIFFERENTIAL TYPE: ABNORMAL
EOSINOPHILS ABSOLUTE: 0.3 K/CU MM
EOSINOPHILS RELATIVE PERCENT: 4 % (ref 0–3)
GFR AFRICAN AMERICAN: 38 ML/MIN/1.73M2
GFR NON-AFRICAN AMERICAN: 31 ML/MIN/1.73M2
GLUCOSE BLD-MCNC: 104 MG/DL (ref 70–99)
GLUCOSE, URINE: NEGATIVE MG/DL
HCT VFR BLD CALC: 25.2 % (ref 42–52)
HEMOGLOBIN: 7.1 GM/DL (ref 13.5–18)
IMMATURE NEUTROPHIL %: 0.7 % (ref 0–0.43)
KETONES, URINE: NEGATIVE MG/DL
LEUKOCYTE ESTERASE, URINE: NEGATIVE
LYMPHOCYTES ABSOLUTE: 1.4 K/CU MM
LYMPHOCYTES RELATIVE PERCENT: 19.8 % (ref 24–44)
MAGNESIUM: 2.2 MG/DL (ref 1.8–2.4)
MCH RBC QN AUTO: 23.7 PG (ref 27–31)
MCHC RBC AUTO-ENTMCNC: 28.2 % (ref 32–36)
MCV RBC AUTO: 84 FL (ref 78–100)
MONOCYTES ABSOLUTE: 0.7 K/CU MM
MONOCYTES RELATIVE PERCENT: 9.7 % (ref 0–4)
NITRITE URINE, QUANTITATIVE: NEGATIVE
NUCLEATED RBC %: 0 %
PDW BLD-RTO: 17.8 % (ref 11.7–14.9)
PH, URINE: 6 (ref 5–8)
PLATELET # BLD: 169 K/CU MM (ref 140–440)
PMV BLD AUTO: 9.6 FL (ref 7.5–11.1)
POTASSIUM SERPL-SCNC: 4.7 MMOL/L (ref 3.5–5.1)
PROTEIN UA: NEGATIVE MG/DL
RBC # BLD: 3 M/CU MM (ref 4.6–6.2)
RBC URINE: ABNORMAL /HPF (ref 0–3)
SEGMENTED NEUTROPHILS ABSOLUTE COUNT: 4.5 K/CU MM
SEGMENTED NEUTROPHILS RELATIVE PERCENT: 65.4 % (ref 36–66)
SODIUM BLD-SCNC: 141 MMOL/L (ref 135–145)
SPECIFIC GRAVITY UA: 1.01 (ref 1–1.03)
TOTAL IMMATURE NEUTOROPHIL: 0.05 K/CU MM
TOTAL NUCLEATED RBC: 0 K/CU MM
TRICHOMONAS: ABNORMAL /HPF
UROBILINOGEN, URINE: NORMAL MG/DL (ref 0.2–1)
WBC # BLD: 6.8 K/CU MM (ref 4–10.5)
WBC UA: <1 /HPF (ref 0–2)

## 2020-05-20 PROCEDURE — 83735 ASSAY OF MAGNESIUM: CPT

## 2020-05-20 PROCEDURE — 6370000000 HC RX 637 (ALT 250 FOR IP): Performed by: INTERNAL MEDICINE

## 2020-05-20 PROCEDURE — 81001 URINALYSIS AUTO W/SCOPE: CPT

## 2020-05-20 PROCEDURE — 2060000000 HC ICU INTERMEDIATE R&B

## 2020-05-20 PROCEDURE — 99231 SBSQ HOSP IP/OBS SF/LOW 25: CPT | Performed by: INTERNAL MEDICINE

## 2020-05-20 PROCEDURE — 99231 SBSQ HOSP IP/OBS SF/LOW 25: CPT | Performed by: SURGERY

## 2020-05-20 PROCEDURE — 6370000000 HC RX 637 (ALT 250 FOR IP): Performed by: SURGERY

## 2020-05-20 PROCEDURE — 2580000003 HC RX 258: Performed by: INTERNAL MEDICINE

## 2020-05-20 PROCEDURE — APPSS60 APP SPLIT SHARED TIME 46-60 MINUTES: Performed by: NURSE PRACTITIONER

## 2020-05-20 PROCEDURE — 94761 N-INVAS EAR/PLS OXIMETRY MLT: CPT

## 2020-05-20 PROCEDURE — 85025 COMPLETE CBC W/AUTO DIFF WBC: CPT

## 2020-05-20 PROCEDURE — 80048 BASIC METABOLIC PNL TOTAL CA: CPT

## 2020-05-20 RX ADMIN — TIZANIDINE 4 MG: 4 TABLET ORAL at 08:47

## 2020-05-20 RX ADMIN — ASPIRIN 81 MG 81 MG: 81 TABLET ORAL at 08:47

## 2020-05-20 RX ADMIN — TIZANIDINE 4 MG: 4 TABLET ORAL at 13:47

## 2020-05-20 RX ADMIN — TIZANIDINE 4 MG: 4 TABLET ORAL at 20:52

## 2020-05-20 RX ADMIN — TRAZODONE HYDROCHLORIDE 50 MG: 50 TABLET ORAL at 20:52

## 2020-05-20 RX ADMIN — FUROSEMIDE 10 MG: 20 TABLET ORAL at 08:47

## 2020-05-20 RX ADMIN — BUSPIRONE HYDROCHLORIDE 10 MG: 10 TABLET ORAL at 13:47

## 2020-05-20 RX ADMIN — ALPRAZOLAM 1 MG: 0.5 TABLET ORAL at 23:16

## 2020-05-20 RX ADMIN — SPIRONOLACTONE 12.5 MG: 25 TABLET ORAL at 20:53

## 2020-05-20 RX ADMIN — ATORVASTATIN CALCIUM 80 MG: 40 TABLET, FILM COATED ORAL at 08:47

## 2020-05-20 RX ADMIN — ALPRAZOLAM 1 MG: 0.5 TABLET ORAL at 00:31

## 2020-05-20 RX ADMIN — OXYCODONE HYDROCHLORIDE AND ACETAMINOPHEN 1 TABLET: 7.5; 325 TABLET ORAL at 00:31

## 2020-05-20 RX ADMIN — Medication 1 CAPSULE: at 08:47

## 2020-05-20 RX ADMIN — RISPERIDONE 1 MG: 0.5 TABLET ORAL at 20:53

## 2020-05-20 RX ADMIN — MAGNESIUM CITRATE 296 ML: 1.75 LIQUID ORAL at 13:47

## 2020-05-20 RX ADMIN — DIVALPROEX SODIUM 1000 MG: 500 TABLET, FILM COATED, EXTENDED RELEASE ORAL at 20:52

## 2020-05-20 RX ADMIN — SPIRONOLACTONE 12.5 MG: 25 TABLET ORAL at 08:47

## 2020-05-20 RX ADMIN — BUSPIRONE HYDROCHLORIDE 10 MG: 10 TABLET ORAL at 08:47

## 2020-05-20 RX ADMIN — SODIUM CHLORIDE, PRESERVATIVE FREE 10 ML: 5 INJECTION INTRAVENOUS at 20:52

## 2020-05-20 RX ADMIN — GABAPENTIN 400 MG: 400 CAPSULE ORAL at 20:52

## 2020-05-20 RX ADMIN — SODIUM CHLORIDE, PRESERVATIVE FREE 10 ML: 5 INJECTION INTRAVENOUS at 08:49

## 2020-05-20 RX ADMIN — OXYCODONE HYDROCHLORIDE AND ACETAMINOPHEN 1 TABLET: 7.5; 325 TABLET ORAL at 23:16

## 2020-05-20 RX ADMIN — BUSPIRONE HYDROCHLORIDE 10 MG: 10 TABLET ORAL at 20:52

## 2020-05-20 RX ADMIN — ESCITALOPRAM OXALATE 10 MG: 10 TABLET ORAL at 08:47

## 2020-05-20 RX ADMIN — GABAPENTIN 400 MG: 400 CAPSULE ORAL at 08:47

## 2020-05-20 RX ADMIN — FOLIC ACID 1 MG: 1 TABLET ORAL at 08:47

## 2020-05-20 ASSESSMENT — ENCOUNTER SYMPTOMS
BLOOD IN STOOL: 1
CHOKING: 0
COLOR CHANGE: 0
ABDOMINAL PAIN: 0
COUGH: 0
SHORTNESS OF BREATH: 0
SHORTNESS OF BREATH: 1
ABDOMINAL DISTENTION: 0

## 2020-05-20 ASSESSMENT — PAIN DESCRIPTION - PROGRESSION: CLINICAL_PROGRESSION: NOT CHANGED

## 2020-05-20 ASSESSMENT — PAIN SCALES - GENERAL
PAINLEVEL_OUTOF10: 0
PAINLEVEL_OUTOF10: 3
PAINLEVEL_OUTOF10: 7
PAINLEVEL_OUTOF10: 0
PAINLEVEL_OUTOF10: 8
PAINLEVEL_OUTOF10: 0

## 2020-05-20 ASSESSMENT — PAIN DESCRIPTION - DESCRIPTORS: DESCRIPTORS: ACHING

## 2020-05-20 ASSESSMENT — PAIN DESCRIPTION - FREQUENCY: FREQUENCY: INTERMITTENT

## 2020-05-20 ASSESSMENT — PAIN DESCRIPTION - LOCATION: LOCATION: BUTTOCKS

## 2020-05-20 ASSESSMENT — PAIN DESCRIPTION - PAIN TYPE: TYPE: ACUTE PAIN

## 2020-05-20 ASSESSMENT — PAIN DESCRIPTION - ORIENTATION: ORIENTATION: MID

## 2020-05-20 ASSESSMENT — PAIN DESCRIPTION - ONSET: ONSET: ON-GOING

## 2020-05-20 ASSESSMENT — PAIN - FUNCTIONAL ASSESSMENT: PAIN_FUNCTIONAL_ASSESSMENT: ACTIVITIES ARE NOT PREVENTED

## 2020-05-20 NOTE — CONSULTS
Department of [address] MD   Consult Note      Reason for Consult:  Colon cancer    Referring Physician:  Dr. Bharati Urban:    Chief Complaint   Patient presents with    Chest Pain       History Obtained From:  patient    HISTORY OF PRESENTILLNESS:                The patient is a 79 y.o. male who presents with Gi bleeding and hypotension. He had a colonoscopy last week showing multiple polyps and one was malignant. He needs an extended right colon resection to remove the cancer and the other polyps.     Past Medical History:    Past Medical History:   Diagnosis Date    Anemia     per old chart    Arthritis     Back pain, chronic     \"have pain in lumbar mainly- have 5 bulging discs\"\"in my neck and my lumbar have herniated discs\"    Bipolar 1 disorder ([facility]     CAD (coronary artery disease) 1/27/2016    does not follow with a cardiologist    Cerebral artery occlusion with cerebral infarction ([facility]     \"had mini stroke in Jan 2016- fast heart rate when I would stand up - smile drooping on one side- lased just the one day\"    Chronic kidney disease (CKD), stage III (moderate) [facility]     CKD (chronic kidney disease)     per old chart- consult with Dr Rafa Mcqueen with admission 4/2016\"have low kidney function\"    COPD (chronic obstructive pulmonary disease) ([facility]     follow with Dr Kelly Comment Diabetes mellitus, type 2 ([facility] 1/3/2017    Diabetic peripheral neuropathy ([facility])     Diastolic CHF ([facility] 0/86/0819    Gastric ulcer     H/O cardiovascular stress test 5/26/14    EF 68% mild ischemia anterior wall    Heart murmur     \"see Dr Domonique Haynes- last echo 2014\" pt states\"I think they said I have a murmur but no chest pain or palpitations\"    Hiatal hernia     History of cardiac cath 6/11/14    MODERATE  CAD      Hx of migraines     HX OTHER MEDICAL     \"have thinning of kidney walls- they found I had that 15 yrs ago\" see Dr Rose Marie Calloway"    Hyperlipidemia     Hypertension     Lumbar choking and shortness of breath. Cardiovascular: Positive for chest pain. Negative for leg swelling. Gastrointestinal: Positive for blood in stool. Negative for abdominal distention and abdominal pain. Skin: Positive for pallor. Negative for color change. Neurological: Negative for dizziness and headaches. Hematological: Negative for adenopathy. Psychiatric/Behavioral: Negative for agitation and behavioral problems. PHYSICALEXAM:    /65   Pulse 62   Temp 98.2 °F (36.8 °C) (Oral)   Resp 18   Ht 5' 9.5\" (1.765 m)   Wt 236 lb 9.6 oz (107.3 kg)   SpO2 95%   BMI 34.44 kg/m²    Physical Exam  Vitals signs reviewed. Constitutional:       General: He is not in acute distress. Appearance: Normal appearance. He is obese. He is ill-appearing. HENT:      Head: Normocephalic and atraumatic. Neck:      Musculoskeletal: Normal range of motion and neck supple. Cardiovascular:      Rate and Rhythm: Normal rate and regular rhythm. Pulmonary:      Effort: Pulmonary effort is normal. No respiratory distress. Abdominal:      General: Bowel sounds are normal. There is no distension. Palpations: There is no mass. Tenderness: There is no abdominal tenderness. There is no guarding or rebound. Hernia: No hernia is present. Musculoskeletal: Normal range of motion. General: No swelling. Skin:     General: Skin is warm and dry. Capillary Refill: Capillary refill takes less than 2 seconds. Coloration: Skin is pale. Neurological:      General: No focal deficit present. Mental Status: He is alert and oriented to person, place, and time.    Psychiatric:         Mood and Affect: Mood normal.         Behavior: Behavior normal.       DATA:    CBC:   Lab Results   Component Value Date    WBC 6.8 05/20/2020    RBC 3.00 05/20/2020    HGB 7.1 05/20/2020    HCT 25.2 05/20/2020    MCV 84.0 05/20/2020    MCH 23.7 05/20/2020    MCHC 28.2 05/20/2020    RDW 17.8

## 2020-05-20 NOTE — CONSULTS
[address] [place] [address], [address] [facility]                                  CONSULTATION    PATIENT NAME: Elias Perales                   :        1950  MED REC NO:   [id number]                          ROOM:       26  ACCOUNT NO:   [de-identified]                           ADMIT DATE: 2020  PROVIDER:     Brenda Moura MD    CONSULT DATE:  2020    HISTORY OF PRESENT ILLNESS:  This is a 60-year-old white male who was  admitted with chest pain and states that he is going to have cardiac  catheterization. He also has chronic pain syndrome due to chronic low  back pain, degenerative disk disease, chronic lumbar radiculopathy and  spinal stenosis. The patient continues to complain of a dull aching  pain in the lower back as well as in the neck area. No radicular pain  to the upper extremities. Radicular pain to the lower extremities. REVIEW OF SYSTEMS:  Positive for COPD and sleep apnea. Also for  hypertension, coronary artery disease, congestive heart failure, and  diabetes. PHYSICAL EXAMINATION:  GENERAL:  Awake, alert, and oriented x3. HEENT:  Neck is supple. No lymph node palpated. CHEST:  Unlabored breathing. CARDIOVASCULAR:  No cyanosis, no edema. ABDOMEN:  Soft. NEUROLOGIC:  Cranial nerves are grossly intact. No focal deficit. No  ataxic gait. No incontinence of urine or stools. No saddle anesthesia. Had some difficulty moving his neck freely which could be from some  stiffness as the patient states. Straight leg raise is negative. Facet  loading is positive bilaterally. IMPRESSION:  1. Chronic pain syndrome. 2.  Spinal stenosis. 3.  Degenerative disk disease of the spine. PLAN:  The patient is seen in the outpatient clinic, where he gets  Percocet 7.5 mg t.i.d., that is a total of 22.5 mg of Percocet per day. He was last seen on 2020 and he had an appointment today.    Currently, numerical 0-10

## 2020-05-20 NOTE — PROGRESS NOTES
anemia  2. Anemia  3. HTN  4. PAF  5. TIA  6. CKD  7. DM-2  8. LVH  9. Colon Adenocarcinoma-    Troponin elevation from type II demand ischemia leak in the setting of anemia. Patient presents with chest pain and with shortness of breath with mild elevation of troponin. Patient was recently admitted with similar findings and troponins were mildly elevated at that time to  Patient is having anemia and that could be the reason why patient is having mild troponin increase in the setting of LVH and CKD  Stress test done just few days ago -showed no ischemia  LVEF was normal at that time    I think patient having anemia given that patient was on Xarelto for PAF with a stroke history is causing troponinemia is a type II NSTEMI with demand ischemia.    abdominal pain relieved with percocet, pain could be associated with colon adenocarcinoma. Do not recommend additional work-up from cardiac standpoint. Will sign off, please re-consult for any new cardiac complaints or concerns. Ervin Jaimes, APRN 5/20/2020 8:51 AM       CARDIOLOGY ATTENDING ADDENDUM    HPI:  I have reviewed the above HPI  And agree with above     Ed Nava is a 79 y. o.year old who presents with had concerns including Chest Pain. Chief Complaint   Patient presents with    Chest Pain       Interval history:  Abdominal pain    Physical Exam:  General:  Awake, alert, NAD  Head:normal  Eye:normal  Neck:  No JVD   Chest:  Clear to auscultation, respiration easy  Cardiovascular:  RRR S1S2  Abdomen:  Soft, bowel sounds present  Extremities:  no edema  Pulses; palpable  Neuro: grossly normal      MEDICAL DECISION MAKING;    I agree with the above plan, which was planned by myself and discussed with NP. I have seen and examined this patient personally, independently of the nurse practitioner. I have reviewed the hospital care given to date and reviewed all pertinent labs and imaging. The plan was developed mutually at the time of the visit with the

## 2020-05-20 NOTE — ANESTHESIA PRE PROCEDURE
Department of Anesthesiology  Preprocedure Note       Name:  Brian Chopra. Age:  79 y.o.  :  1950                                          MRN:  [id number]         Date:  2020      Surgeon: Philly Keys):  Tone Martinez MD    Procedure: BOWEL RESECTION (N/A Abdomen)    Medications prior to admission:   Prior to Admission medications    Medication Sig Start Date End Date Taking? Authorizing Provider   aspirin 81 MG chewable tablet Take 1 tablet by mouth daily 20   Cherrie Camejo MD   carvedilol (COREG) 12.5 MG tablet Take 1 tablet by mouth 2 times daily (with meals) 20   Cherrie Camejo MD   gabapentin (NEURONTIN) 300 MG capsule TAKE TWO (2) CAPSULES BY MOUTH THREE TIMES DAILY 20  LORRAINE Kay   traZODone (DESYREL) 50 MG tablet TAKE ONE (1) TABLET BY MOUTH EVERY NIGHT 20   LORRAINE Kay   risperiDONE (RISPERDAL) 1 MG tablet TAKE ONE (1) TABLET BY MOUTH EVERY NIGHT 20   LORRAINE Kay   ALPRAZolam (XANAX) 1 MG tablet Take 1 tablet by mouth 3 times daily as needed for Anxiety for up to 30 days.  20  Alondra Willson PA-C   furosemide (LASIX) 20 MG tablet Take 0.5 tablets by mouth daily 20   Shadi Aguillon MD   docusate sodium (COLACE) 100 MG capsule Take 1 capsule by mouth 2 times daily 20   Shadi Aguillon MD   spironolactone (ALDACTONE) 25 MG tablet Take 0.5 tablets by mouth 2 times daily 20   Shadi Aguillon MD   oxyCODONE-acetaminophen (PERCOCET) 7.5-325 MG per tablet  20   Gino Woodard MD   fluticasone-umeclidin-vilant (TRELEGY ELLIPTA) 785-79.5-49 MCG/INH AEPB Inhale 1 puff into the lungs daily 3/9/20   Eri Pope MD   tiZANidine (ZANAFLEX) 4 MG tablet Take 1 tablet by mouth 3 times daily 20   Tennie Aquas, PA-C   atorvastatin (LIPITOR) 80 MG tablet TAKE 1 TABLET BY MOUTH ONCE DAILY 2/10/20   Shadi Aguillon MD   folic acid (FOLVITE) 1 MG tablet TAKE 1 TABLET BY MOUTH ONCE DAILY 2/10/20   Garo Rehman MD   rivaroxaban (XARELTO) 15 MG TABS tablet Take 1 tablet by mouth daily (with breakfast) 2/10/20   Jaquan Stevens MD   divalproex (DEPAKOTE ER) 500 MG extended release tablet Take 2 tablets by mouth nightly 1/8/20   LORRAINE Johnson   amLODIPine (NORVASC) 10 MG tablet TAKE 1 TABLET BY MOUTH ONCE DAILY  Patient taking differently: Take 10 mg by mouth daily  1/6/20   Garo Rehman MD   busPIRone (BUSPAR) 10 MG tablet Take 1 tablet by mouth 3 times daily 12/18/19   Jose Almeida DO   escitalopram (LEXAPRO) 10 MG tablet Take 1 tablet by mouth daily 12/19/19   Jose Almeida DO   TRADJENTA 5 MG tablet TAKE ONE (1) TABLET BY MOUTH ONCE DAILY 12/10/19   LORRAINE Johnson   Compression Stockings MISC by Does not apply route Duration of Treatment:  3 Months  # of pairs:  2    Compression Class Level - 20-30 mmHg*    Style - Knee High 10/31/19   JULEE Cox - CNP   Lactobacillus (ACIDOPHILUS) 0.5 MG TABS Take 1 capsule by mouth    Historical Provider, MD       Current medications:    No current facility-administered medications for this visit. No current outpatient medications on file.      Facility-Administered Medications Ordered in Other Visits   Medication Dose Route Frequency Provider Last Rate Last Dose    magnesium citrate solution 296 mL  296 mL Oral Once Félix Salguero MD        norepinephrine (LEVOPHED) 16 mg in dextrose 5% 250 mL infusion  2 mcg/min Intravenous Continuous Lizz Baer DO   Stopped at 05/19/20 0420    sodium chloride flush 0.9 % injection 10 mL  10 mL Intravenous 2 times per day Jose Richardson MD   10 mL at 05/20/20 0849    sodium chloride flush 0.9 % injection 10 mL  10 mL Intravenous PRN Jose Richardson MD        acetaminophen (TYLENOL) tablet 650 mg  650 mg Oral Q6H PRN MD Romero Rodríguez acetaminophen (TYLENOL) suppository 650 mg  650 mg Rectal Q6H PRN Jose Richardson MD  polyethylene glycol (GLYCOLAX) packet 17 g  17 g Oral Daily PRN Pan Myers MD        promethazine (PHENERGAN) tablet 12.5 mg  12.5 mg Oral Q6H PRN Pan Myers MD        Or    ondansetron (ZOFRAN) injection 4 mg  4 mg Intravenous Q6H PRN Pan Myers MD        ALPRAZolam [place]) tablet 1 mg  1 mg Oral TID PRN Pan Myers MD   1 mg at 05/20/20 0031    atorvastatin (LIPITOR) tablet 80 mg  80 mg Oral Daily Pan Myers MD   80 mg at 05/20/20 0847    busPIRone (BUSPAR) tablet 10 mg  10 mg Oral TID Pan Myers MD   10 mg at 05/20/20 0847    divalproex (DEPAKOTE ER) extended release tablet 1,000 mg  1,000 mg Oral Nightly Pan Myers MD   1,000 mg at 05/19/20 2219    escitalopram (LEXAPRO) tablet 10 mg  10 mg Oral Daily Pan Myers MD   10 mg at 99/16/26 0238    folic acid (FOLVITE) tablet 1 mg  1 mg Oral Daily Pan Myers MD   1 mg at 05/20/20 0847    furosemide (LASIX) tablet 10 mg  10 mg Oral Daily Pan Myers MD   10 mg at 05/20/20 0847    gabapentin (NEURONTIN) capsule 400 mg  400 mg Oral BID Pan Myers MD   400 mg at 05/20/20 0847    lactobacillus (CULTURELLE) capsule 1 capsule  1 capsule Oral Daily Pan Myers MD   1 capsule at 05/20/20 0847    oxyCODONE-acetaminophen (PERCOCET) 7.5-325 MG per tablet 1 tablet  1 tablet Oral Q6H PRN Pan Myers MD   1 tablet at 05/20/20 0031    risperiDONE (RISPERDAL) tablet 1 mg  1 mg Oral Nightly Pan Myers MD   1 mg at 05/19/20 2221    spironolactone (ALDACTONE) tablet 12.5 mg  12.5 mg Oral BID Pan Myers MD   12.5 mg at 05/20/20 0847    tiZANidine (ZANAFLEX) tablet 4 mg  4 mg Oral TID Pan Myers MD   4 mg at 05/20/20 0847    traZODone (DESYREL) tablet 50 mg  50 mg Oral Nightly Pan Myers MD   50 mg at 05/19/20 2220       Allergies:     Allergies   Allergen Reactions    Nsaids      Renal        Problem List:    Patient Active Problem List   Diagnosis Code    COPD [facility]chronic obstructive pulmonary disease) ([facility] J44.9    Pleural effusion, left J90    SAMANTHA on CPAP G47.33, Z99.89    Chronic obstructive pulmonary disease (HCC) J44.9    TIA (transient ischemic attack) G45.9    Bipolar 1 disorder ([facility]) F31.9    Coronary artery disease involving native coronary artery of native heart without angina pectoris I25.10    Essential hypertension I10    REYES (dyspnea on exertion) R06.09    Anemia, chronic disease D63.8    Diuretic-induced hypokalemia E87.6, Q64.4M8E    Diastolic CHF ([facility]) Q41.69    Acute respiratory failure ([facility]) J96.00    Pneumonia due to gram-negative bacteria ([facility]) J15.6    Hyperkalemia E87.5    Adrenal mass ([facility]) E27.8    Diabetes mellitus, type 2 ([facility]) E11.9    Hyponatremia E87.1    Pneumonia due to organism J18.9    PAF (paroxysmal atrial fibrillation) ([facility]) I48.0    Empyema of left pleural space ([facility]) J86.9    Hospital-acquired bacterial pneumonia J15.9    Hyperlipidemia E78.5    Lumbar radiculopathy M54.16    CKD (chronic kidney disease) N18.9    Diabetic peripheral neuropathy ([facility]) E11.42    Chronic kidney disease (CKD), stage III (moderate) ([facility]) N18.3    Cervical stenosis of spine M48.02    Spinal stenosis of lumbar region without neurogenic claudication M48.061    Somatic dysfunction of back M99.09    Somatic dysfunction of cervical region M99.01    Somatic dysfunction of pelvis region M99.05    Somatic dysfunction of both lower extremities M99.06    Empyema lung ([facility]) J86.9    Acute kidney injury superimposed on CKD ([facility] N17.9, N18.9    Chronic diastolic heart failure ([facility]) I50.32    Syncope and collapse R55    Hypotensive episode I95.9    Bradycardia on ECG R00.1    Syncope R55    Lightheadedness R42    Dizziness R42    Acute on chronic respiratory failure with hypoxia ([facility]) J96.21    Severe mixed bipolar 1 disorder without psychosis ([facility] F31.63    Acute chest pain R07.9    Chest pain R07.9       Past Medical History:        Diagnosis LABGLOM 31 05/20/2020    GLUCOSE 104 05/20/2020    PROT 6.5 05/19/2020    PROT 6.6 10/24/2011    CALCIUM 8.2 05/20/2020    BILITOT 0.2 05/19/2020    ALKPHOS 77 05/19/2020    AST 11 05/19/2020    ALT 8 05/19/2020       POC Tests:   No results for input(s): POCGLU, POCNA, POCK, POCCL, POCBUN, POCHEMO, POCHCT in the last 72 hours. Coags:   Lab Results   Component Value Date    PROTIME 11.3 05/12/2020    PROTIME 9.6 02/10/2011    INR 0.93 05/12/2020    APTT 31.7 05/12/2020       HCG (If Applicable): No results found for: PREGTESTUR, PREGSERUM, HCG, HCGQUANT     ABGs:   Lab Results   Component Value Date    PO2ART 109 03/18/2019    NWT8QZY 38.0 03/18/2019    SOU2LTG 26.4 03/18/2019    BEART 1 06/11/2014        Type & Screen (If Applicable):  No results found for: [facility]    Anesthesia Evaluation  Patient summary reviewed and Nursing notes reviewed no history of anesthetic complications:   Airway: Mallampati: II  TM distance: <3 FB   Neck ROM: limited  Mouth opening: > = 3 FB Dental:    (+) lower dentures, edentulous and implants      Pulmonary:normal exam    (+) pneumonia:  COPD:  shortness of breath:  sleep apnea: on CPAP,                            ROS comment: Former smoker  Hx thoracentesis multiple times for effusions   CT CHEST EMPYEMA  CHRONIC.  FOLLOWS WITH PULM AS OUTPT   Cardiovascular:  Exercise tolerance: poor (<4 METS),   (+) hypertension:, CAD:, dysrhythmias: atrial fibrillation, CHF: diastolic, hyperlipidemia      ECG reviewed      Echocardiogram reviewed         Beta Blocker:  Dose within 24 Hrs      ROS comment: Moderate LVH  Sinus tachycardia   Cannot rule out Inferior infarct , age undetermined   Possible Anterolateral infarct , age undetermined   Abnormal ECG   When compared with ECG of 10-MAY-2020 10:45,   Incomplete right bundle branch block is no longer present   Borderline criteria for Anterior infarct are now present   Borderline criteria for Anterolateral infarct are now present work-up is needed from cardiac standpoint at this time given this is probably demand ischemia and already we had stress test and echo done in last few days.        Thanks again for allowing me to participate in care of this patient. Please call me if you have any questions.     With best regards.        Rey Flynn MD, 5/19/2020 3:12 PM        Levo? gtt     Neuro/Psych:   (+) CVA:, TIA, headaches: migraine headaches, psychiatric history (bipolar):depression/anxiety             GI/Hepatic/Renal:   (+) hiatal hernia, PUD, renal disease: CRI, morbid obesity          Endo/Other:    (+) DiabetesType II DM, , blood dyscrasia: anticoagulation therapy and anemia, arthritis: OA., .                  ROS comment: H/h 7.1/25  Abdominal:   (+) obese,         Vascular: negative vascular ROS. ROS comment: Peripheral neuropathy . Anesthesia Plan      general and regional     ASA 4     (Discussed bilateral TAP and rectus sheath blocks. Patient agrees )  Induction: intravenous. arterial line  MIPS: Postoperative trial extubation and Postoperative ventilation. Anesthetic plan and risks discussed with patient. Plan discussed with CRNA. JULEE Szymanski - CRNA   5/20/2020      Pre Anesthesia Evaluation complete. Anesthesia plan, risks, benefits, alternatives, and personnel discussed with patient and/or legal guardian. Patient and/or legal guardian verbalized an understanding and agreed to proceed. Anesthesia plan discussed with care team members and agreed upon.   JULEE Hunt - SOPHIA  5/21/2020

## 2020-05-21 ENCOUNTER — ANESTHESIA (OUTPATIENT)
Dept: OPERATING ROOM | Age: 70
DRG: 329 | End: 2020-05-21
Payer: MEDICARE

## 2020-05-21 ENCOUNTER — CARE COORDINATION (OUTPATIENT)
Dept: CASE MANAGEMENT | Age: 70
End: 2020-05-21

## 2020-05-21 VITALS
OXYGEN SATURATION: 100 % | DIASTOLIC BLOOD PRESSURE: 75 MMHG | RESPIRATION RATE: 13 BRPM | TEMPERATURE: 96.9 F | SYSTOLIC BLOOD PRESSURE: 134 MMHG

## 2020-05-21 LAB
ANION GAP SERPL CALCULATED.3IONS-SCNC: 7 MMOL/L (ref 4–16)
BASE EXCESS MIXED: 3.5 (ref 0–1.2)
BASE EXCESS: ABNORMAL (ref 0–3.3)
BASOPHILS ABSOLUTE: 0 K/CU MM
BASOPHILS RELATIVE PERCENT: 0.3 % (ref 0–1)
BUN BLDV-MCNC: 25 MG/DL (ref 6–23)
CALCIUM SERPL-MCNC: 8.4 MG/DL (ref 8.3–10.6)
CHLORIDE BLD-SCNC: 99 MMOL/L (ref 99–110)
CO2: 29 MMOL/L (ref 21–32)
CO2: 29 MMOL/L (ref 21–32)
CREAT SERPL-MCNC: 2 MG/DL (ref 0.9–1.3)
DIFFERENTIAL TYPE: ABNORMAL
EOSINOPHILS ABSOLUTE: 0.3 K/CU MM
EOSINOPHILS RELATIVE PERCENT: 5.6 % (ref 0–3)
GFR AFRICAN AMERICAN: 40 ML/MIN/1.73M2
GFR NON-AFRICAN AMERICAN: 33 ML/MIN/1.73M2
GLUCOSE BLD-MCNC: 118 MG/DL (ref 70–99)
GLUCOSE BLD-MCNC: 214 MG/DL (ref 70–99)
GLUCOSE BLD-MCNC: 85 MG/DL (ref 70–99)
HCO3 ARTERIAL: 27.7 MMOL/L (ref 18–23)
HCT VFR BLD CALC: 22 % (ref 42–52)
HCT VFR BLD CALC: 28.1 % (ref 42–52)
HCT VFR BLD CALC: 31.5 % (ref 42–52)
HEMOGLOBIN: 7.6 GM/DL (ref 13.5–18)
HEMOGLOBIN: 8.4 GM/DL (ref 13.5–18)
HEMOGLOBIN: 9.5 GM/DL (ref 13.5–18)
IMMATURE NEUTROPHIL %: 0.5 % (ref 0–0.43)
LYMPHOCYTES ABSOLUTE: 1.2 K/CU MM
LYMPHOCYTES RELATIVE PERCENT: 20.1 % (ref 24–44)
MAGNESIUM: 2.8 MG/DL (ref 1.8–2.4)
MCH RBC QN AUTO: 23.7 PG (ref 27–31)
MCHC RBC AUTO-ENTMCNC: 29.9 % (ref 32–36)
MCV RBC AUTO: 79.4 FL (ref 78–100)
MONOCYTES ABSOLUTE: 0.6 K/CU MM
MONOCYTES RELATIVE PERCENT: 9.8 % (ref 0–4)
NUCLEATED RBC %: 0 %
O2 SATURATION: 98.6 % (ref 96–97)
PCO2 ARTERIAL: 39.7 MMHG (ref 32–45)
PDW BLD-RTO: 17.4 % (ref 11.7–14.9)
PH BLOOD: 7.45 (ref 7.34–7.45)
PLATELET # BLD: 197 K/CU MM (ref 140–440)
PMV BLD AUTO: 9.5 FL (ref 7.5–11.1)
PO2 ARTERIAL: 112.2 MMHG (ref 75–100)
POC CALCIUM: 1.12 MMOL/L (ref 1.12–1.32)
POC CHLORIDE: 104 MMOL/L (ref 98–109)
POC CREATININE: 2 MG/DL (ref 0.9–1.3)
POTASSIUM SERPL-SCNC: 4.3 MMOL/L (ref 3.5–4.5)
POTASSIUM SERPL-SCNC: 4.4 MMOL/L (ref 3.5–5.1)
RBC # BLD: 3.54 M/CU MM (ref 4.6–6.2)
SEGMENTED NEUTROPHILS ABSOLUTE COUNT: 3.8 K/CU MM
SEGMENTED NEUTROPHILS RELATIVE PERCENT: 63.7 % (ref 36–66)
SODIUM BLD-SCNC: 135 MMOL/L (ref 135–145)
SODIUM BLD-SCNC: 142 MMOL/L (ref 138–146)
SOURCE, BLOOD GAS: ABNORMAL
TOTAL IMMATURE NEUTOROPHIL: 0.03 K/CU MM
TOTAL NUCLEATED RBC: 0 K/CU MM
WBC # BLD: 5.9 K/CU MM (ref 4–10.5)

## 2020-05-21 PROCEDURE — 81301 MICROSATELLITE INSTABILITY: CPT

## 2020-05-21 PROCEDURE — 88309 TISSUE EXAM BY PATHOLOGIST: CPT

## 2020-05-21 PROCEDURE — 7100000001 HC PACU RECOVERY - ADDTL 15 MIN: Performed by: SURGERY

## 2020-05-21 PROCEDURE — 3700000001 HC ADD 15 MINUTES (ANESTHESIA): Performed by: SURGERY

## 2020-05-21 PROCEDURE — 86900 BLOOD TYPING SEROLOGIC ABO: CPT

## 2020-05-21 PROCEDURE — 88341 IMHCHEM/IMCYTCHM EA ADD ANTB: CPT

## 2020-05-21 PROCEDURE — 0DTF0ZZ RESECTION OF RIGHT LARGE INTESTINE, OPEN APPROACH: ICD-10-PCS | Performed by: SURGERY

## 2020-05-21 PROCEDURE — 88342 IMHCHEM/IMCYTCHM 1ST ANTB: CPT

## 2020-05-21 PROCEDURE — 6360000002 HC RX W HCPCS: Performed by: NURSE ANESTHETIST, CERTIFIED REGISTERED

## 2020-05-21 PROCEDURE — 83735 ASSAY OF MAGNESIUM: CPT

## 2020-05-21 PROCEDURE — 6360000002 HC RX W HCPCS: Performed by: SURGERY

## 2020-05-21 PROCEDURE — 2700000000 HC OXYGEN THERAPY PER DAY

## 2020-05-21 PROCEDURE — 3600000014 HC SURGERY LEVEL 4 ADDTL 15MIN: Performed by: SURGERY

## 2020-05-21 PROCEDURE — 6360000002 HC RX W HCPCS: Performed by: ANESTHESIOLOGY

## 2020-05-21 PROCEDURE — 2580000003 HC RX 258: Performed by: INTERNAL MEDICINE

## 2020-05-21 PROCEDURE — 36415 COLL VENOUS BLD VENIPUNCTURE: CPT

## 2020-05-21 PROCEDURE — 44139 MOBILIZATION OF COLON: CPT | Performed by: SURGERY

## 2020-05-21 PROCEDURE — 2580000003 HC RX 258: Performed by: SURGERY

## 2020-05-21 PROCEDURE — 6360000002 HC RX W HCPCS: Performed by: NURSE PRACTITIONER

## 2020-05-21 PROCEDURE — 94761 N-INVAS EAR/PLS OXIMETRY MLT: CPT

## 2020-05-21 PROCEDURE — P9045 ALBUMIN (HUMAN), 5%, 250 ML: HCPCS | Performed by: NURSE ANESTHETIST, CERTIFIED REGISTERED

## 2020-05-21 PROCEDURE — 86901 BLOOD TYPING SEROLOGIC RH(D): CPT

## 2020-05-21 PROCEDURE — 3700000000 HC ANESTHESIA ATTENDED CARE: Performed by: SURGERY

## 2020-05-21 PROCEDURE — 85025 COMPLETE CBC W/AUTO DIFF WBC: CPT

## 2020-05-21 PROCEDURE — 80048 BASIC METABOLIC PNL TOTAL CA: CPT

## 2020-05-21 PROCEDURE — 85014 HEMATOCRIT: CPT

## 2020-05-21 PROCEDURE — 85018 HEMOGLOBIN: CPT

## 2020-05-21 PROCEDURE — 3600000004 HC SURGERY LEVEL 4 BASE: Performed by: SURGERY

## 2020-05-21 PROCEDURE — 2580000003 HC RX 258: Performed by: NURSE ANESTHETIST, CERTIFIED REGISTERED

## 2020-05-21 PROCEDURE — 2500000003 HC RX 250 WO HCPCS: Performed by: NURSE ANESTHETIST, CERTIFIED REGISTERED

## 2020-05-21 PROCEDURE — P9016 RBC LEUKOCYTES REDUCED: HCPCS

## 2020-05-21 PROCEDURE — 86850 RBC ANTIBODY SCREEN: CPT

## 2020-05-21 PROCEDURE — 44625 REPAIR BOWEL OPENING: CPT | Performed by: SURGERY

## 2020-05-21 PROCEDURE — 2060000000 HC ICU INTERMEDIATE R&B

## 2020-05-21 PROCEDURE — 44140 PARTIAL REMOVAL OF COLON: CPT | Performed by: SURGERY

## 2020-05-21 PROCEDURE — 82962 GLUCOSE BLOOD TEST: CPT

## 2020-05-21 PROCEDURE — 86922 COMPATIBILITY TEST ANTIGLOB: CPT

## 2020-05-21 PROCEDURE — 2720000010 HC SURG SUPPLY STERILE: Performed by: SURGERY

## 2020-05-21 PROCEDURE — 2709999900 HC NON-CHARGEABLE SUPPLY: Performed by: SURGERY

## 2020-05-21 PROCEDURE — 7100000000 HC PACU RECOVERY - FIRST 15 MIN: Performed by: SURGERY

## 2020-05-21 PROCEDURE — 2500000003 HC RX 250 WO HCPCS: Performed by: SURGERY

## 2020-05-21 RX ORDER — PROMETHAZINE HYDROCHLORIDE 25 MG/1
12.5 TABLET ORAL EVERY 6 HOURS PRN
Status: DISCONTINUED | OUTPATIENT
Start: 2020-05-21 | End: 2020-05-28 | Stop reason: HOSPADM

## 2020-05-21 RX ORDER — SODIUM CHLORIDE 9 MG/ML
INJECTION, SOLUTION INTRAVENOUS CONTINUOUS PRN
Status: DISCONTINUED | OUTPATIENT
Start: 2020-05-21 | End: 2020-05-21 | Stop reason: SDUPTHER

## 2020-05-21 RX ORDER — SODIUM CHLORIDE, SODIUM LACTATE, POTASSIUM CHLORIDE, CALCIUM CHLORIDE 600; 310; 30; 20 MG/100ML; MG/100ML; MG/100ML; MG/100ML
INJECTION, SOLUTION INTRAVENOUS CONTINUOUS PRN
Status: DISCONTINUED | OUTPATIENT
Start: 2020-05-21 | End: 2020-05-21 | Stop reason: SDUPTHER

## 2020-05-21 RX ORDER — ONDANSETRON 2 MG/ML
4 INJECTION INTRAMUSCULAR; INTRAVENOUS
Status: DISCONTINUED | OUTPATIENT
Start: 2020-05-21 | End: 2020-05-21 | Stop reason: HOSPADM

## 2020-05-21 RX ORDER — PIPERACILLIN SODIUM, TAZOBACTAM SODIUM 3; .375 G/15ML; G/15ML
3.38 INJECTION, POWDER, LYOPHILIZED, FOR SOLUTION INTRAVENOUS ONCE
Status: DISCONTINUED | OUTPATIENT
Start: 2020-05-21 | End: 2020-05-21 | Stop reason: CLARIF

## 2020-05-21 RX ORDER — ALBUMIN, HUMAN INJ 5% 5 %
SOLUTION INTRAVENOUS PRN
Status: DISCONTINUED | OUTPATIENT
Start: 2020-05-21 | End: 2020-05-21 | Stop reason: SDUPTHER

## 2020-05-21 RX ORDER — PHENYLEPHRINE HYDROCHLORIDE 10 MG/ML
INJECTION INTRAVENOUS PRN
Status: DISCONTINUED | OUTPATIENT
Start: 2020-05-21 | End: 2020-05-21 | Stop reason: SDUPTHER

## 2020-05-21 RX ORDER — LIDOCAINE HYDROCHLORIDE 20 MG/ML
INJECTION, SOLUTION INTRAVENOUS PRN
Status: DISCONTINUED | OUTPATIENT
Start: 2020-05-21 | End: 2020-05-21 | Stop reason: SDUPTHER

## 2020-05-21 RX ORDER — ONDANSETRON 2 MG/ML
INJECTION INTRAMUSCULAR; INTRAVENOUS PRN
Status: DISCONTINUED | OUTPATIENT
Start: 2020-05-21 | End: 2020-05-21 | Stop reason: SDUPTHER

## 2020-05-21 RX ORDER — ROCURONIUM BROMIDE 10 MG/ML
INJECTION, SOLUTION INTRAVENOUS PRN
Status: DISCONTINUED | OUTPATIENT
Start: 2020-05-21 | End: 2020-05-21 | Stop reason: SDUPTHER

## 2020-05-21 RX ORDER — FENTANYL CITRATE 50 UG/ML
50 INJECTION, SOLUTION INTRAMUSCULAR; INTRAVENOUS EVERY 5 MIN PRN
Status: DISCONTINUED | OUTPATIENT
Start: 2020-05-21 | End: 2020-05-21 | Stop reason: HOSPADM

## 2020-05-21 RX ORDER — FENTANYL CITRATE 50 UG/ML
25 INJECTION, SOLUTION INTRAMUSCULAR; INTRAVENOUS EVERY 5 MIN PRN
Status: DISCONTINUED | OUTPATIENT
Start: 2020-05-21 | End: 2020-05-21 | Stop reason: HOSPADM

## 2020-05-21 RX ORDER — ROPIVACAINE HYDROCHLORIDE 5 MG/ML
INJECTION, SOLUTION EPIDURAL; INFILTRATION; PERINEURAL
Status: COMPLETED | OUTPATIENT
Start: 2020-05-21 | End: 2020-05-21

## 2020-05-21 RX ORDER — DEXAMETHASONE SODIUM PHOSPHATE 4 MG/ML
INJECTION, SOLUTION INTRA-ARTICULAR; INTRALESIONAL; INTRAMUSCULAR; INTRAVENOUS; SOFT TISSUE PRN
Status: DISCONTINUED | OUTPATIENT
Start: 2020-05-21 | End: 2020-05-21 | Stop reason: SDUPTHER

## 2020-05-21 RX ORDER — GLYCOPYRROLATE 1 MG/5 ML
SYRINGE (ML) INTRAVENOUS PRN
Status: DISCONTINUED | OUTPATIENT
Start: 2020-05-21 | End: 2020-05-21 | Stop reason: SDUPTHER

## 2020-05-21 RX ORDER — SODIUM CHLORIDE 0.9 % (FLUSH) 0.9 %
10 SYRINGE (ML) INJECTION PRN
Status: DISCONTINUED | OUTPATIENT
Start: 2020-05-21 | End: 2020-05-28 | Stop reason: HOSPADM

## 2020-05-21 RX ORDER — ONDANSETRON 2 MG/ML
4 INJECTION INTRAMUSCULAR; INTRAVENOUS EVERY 6 HOURS PRN
Status: DISCONTINUED | OUTPATIENT
Start: 2020-05-21 | End: 2020-05-28 | Stop reason: HOSPADM

## 2020-05-21 RX ORDER — DEXTROSE, SODIUM CHLORIDE, AND POTASSIUM CHLORIDE 5; .45; .15 G/100ML; G/100ML; G/100ML
1000 INJECTION INTRAVENOUS CONTINUOUS
Status: DISCONTINUED | OUTPATIENT
Start: 2020-05-21 | End: 2020-05-22

## 2020-05-21 RX ORDER — SODIUM CHLORIDE 0.9 % (FLUSH) 0.9 %
10 SYRINGE (ML) INJECTION EVERY 12 HOURS SCHEDULED
Status: DISCONTINUED | OUTPATIENT
Start: 2020-05-21 | End: 2020-05-28 | Stop reason: HOSPADM

## 2020-05-21 RX ORDER — FENTANYL CITRATE 50 UG/ML
INJECTION, SOLUTION INTRAMUSCULAR; INTRAVENOUS PRN
Status: DISCONTINUED | OUTPATIENT
Start: 2020-05-21 | End: 2020-05-21 | Stop reason: SDUPTHER

## 2020-05-21 RX ORDER — 0.9 % SODIUM CHLORIDE 0.9 %
20 INTRAVENOUS SOLUTION INTRAVENOUS ONCE
Status: COMPLETED | OUTPATIENT
Start: 2020-05-21 | End: 2020-05-21

## 2020-05-21 RX ORDER — PROPOFOL 10 MG/ML
INJECTION, EMULSION INTRAVENOUS PRN
Status: DISCONTINUED | OUTPATIENT
Start: 2020-05-21 | End: 2020-05-21 | Stop reason: SDUPTHER

## 2020-05-21 RX ORDER — HYDRALAZINE HYDROCHLORIDE 20 MG/ML
5 INJECTION INTRAMUSCULAR; INTRAVENOUS EVERY 10 MIN PRN
Status: DISCONTINUED | OUTPATIENT
Start: 2020-05-21 | End: 2020-05-21 | Stop reason: HOSPADM

## 2020-05-21 RX ORDER — LABETALOL HYDROCHLORIDE 5 MG/ML
5 INJECTION, SOLUTION INTRAVENOUS EVERY 10 MIN PRN
Status: DISCONTINUED | OUTPATIENT
Start: 2020-05-21 | End: 2020-05-21 | Stop reason: HOSPADM

## 2020-05-21 RX ORDER — 0.9 % SODIUM CHLORIDE 0.9 %
20 INTRAVENOUS SOLUTION INTRAVENOUS ONCE
Status: DISCONTINUED | OUTPATIENT
Start: 2020-05-21 | End: 2020-05-21

## 2020-05-21 RX ADMIN — PHENYLEPHRINE HYDROCHLORIDE 200 MCG: 10 INJECTION INTRAVENOUS at 12:14

## 2020-05-21 RX ADMIN — SODIUM CHLORIDE, POTASSIUM CHLORIDE, SODIUM LACTATE AND CALCIUM CHLORIDE: 600; 310; 30; 20 INJECTION, SOLUTION INTRAVENOUS at 11:55

## 2020-05-21 RX ADMIN — ROCURONIUM BROMIDE 20 MG: 10 INJECTION INTRAVENOUS at 12:48

## 2020-05-21 RX ADMIN — FENTANYL CITRATE 25 MCG: 50 INJECTION INTRAMUSCULAR; INTRAVENOUS at 12:24

## 2020-05-21 RX ADMIN — ROPIVACAINE HYDROCHLORIDE 50 ML: 5 INJECTION, SOLUTION EPIDURAL; INFILTRATION; PERINEURAL at 14:20

## 2020-05-21 RX ADMIN — SODIUM CHLORIDE, PRESERVATIVE FREE 10 ML: 5 INJECTION INTRAVENOUS at 09:20

## 2020-05-21 RX ADMIN — ALBUMIN (HUMAN) 250 ML: 12.5 INJECTION, SOLUTION INTRAVENOUS at 14:24

## 2020-05-21 RX ADMIN — SODIUM CHLORIDE: 900 INJECTION INTRAVENOUS at 11:55

## 2020-05-21 RX ADMIN — POTASSIUM CHLORIDE, DEXTROSE MONOHYDRATE AND SODIUM CHLORIDE 1000 ML: 150; 5; 450 INJECTION, SOLUTION INTRAVENOUS at 17:56

## 2020-05-21 RX ADMIN — FENTANYL CITRATE 50 MCG: 50 INJECTION INTRAMUSCULAR; INTRAVENOUS at 15:53

## 2020-05-21 RX ADMIN — HYDROMORPHONE HYDROCHLORIDE 0.5 MG: 1 INJECTION, SOLUTION INTRAMUSCULAR; INTRAVENOUS; SUBCUTANEOUS at 23:18

## 2020-05-21 RX ADMIN — ALBUMIN (HUMAN) 250 ML: 12.5 INJECTION, SOLUTION INTRAVENOUS at 14:17

## 2020-05-21 RX ADMIN — PIPERACILLIN AND TAZOBACTAM 3.38 G: 3; .375 INJECTION, POWDER, LYOPHILIZED, FOR SOLUTION INTRAVENOUS at 21:14

## 2020-05-21 RX ADMIN — ROCURONIUM BROMIDE 50 MG: 10 INJECTION INTRAVENOUS at 12:08

## 2020-05-21 RX ADMIN — FENTANYL CITRATE 50 MCG: 50 INJECTION INTRAMUSCULAR; INTRAVENOUS at 15:32

## 2020-05-21 RX ADMIN — DEXAMETHASONE SODIUM PHOSPHATE 4 MG: 4 INJECTION, SOLUTION INTRAMUSCULAR; INTRAVENOUS at 12:24

## 2020-05-21 RX ADMIN — SUGAMMADEX 200 MG: 100 INJECTION, SOLUTION INTRAVENOUS at 14:18

## 2020-05-21 RX ADMIN — ONDANSETRON 4 MG: 2 INJECTION INTRAMUSCULAR; INTRAVENOUS at 14:18

## 2020-05-21 RX ADMIN — SODIUM CHLORIDE 20 ML: 9 INJECTION, SOLUTION INTRAVENOUS at 17:56

## 2020-05-21 RX ADMIN — ROCURONIUM BROMIDE 20 MG: 10 INJECTION INTRAVENOUS at 12:35

## 2020-05-21 RX ADMIN — FENTANYL CITRATE 75 MCG: 50 INJECTION INTRAMUSCULAR; INTRAVENOUS at 12:08

## 2020-05-21 RX ADMIN — ROCURONIUM BROMIDE 10 MG: 10 INJECTION INTRAVENOUS at 13:30

## 2020-05-21 RX ADMIN — PROPOFOL 180 MG: 10 INJECTION, EMULSION INTRAVENOUS at 12:08

## 2020-05-21 RX ADMIN — LIDOCAINE HYDROCHLORIDE 100 MG: 20 INJECTION, SOLUTION INTRAVENOUS at 12:08

## 2020-05-21 RX ADMIN — PIPERACILLIN AND TAZOBACTAM 3.38 G: 3; .375 INJECTION, POWDER, FOR SOLUTION INTRAVENOUS at 12:42

## 2020-05-21 RX ADMIN — Medication 0.2 MG: at 13:24

## 2020-05-21 ASSESSMENT — PULMONARY FUNCTION TESTS
PIF_VALUE: 23
PIF_VALUE: 19
PIF_VALUE: 18
PIF_VALUE: 19
PIF_VALUE: 19
PIF_VALUE: 8
PIF_VALUE: 12
PIF_VALUE: 20
PIF_VALUE: 18
PIF_VALUE: 12
PIF_VALUE: 20
PIF_VALUE: 22
PIF_VALUE: 19
PIF_VALUE: 19
PIF_VALUE: 22
PIF_VALUE: 20
PIF_VALUE: 20
PIF_VALUE: 18
PIF_VALUE: 19
PIF_VALUE: 18
PIF_VALUE: 23
PIF_VALUE: 21
PIF_VALUE: 19
PIF_VALUE: 2
PIF_VALUE: 12
PIF_VALUE: 23
PIF_VALUE: 0
PIF_VALUE: 18
PIF_VALUE: 24
PIF_VALUE: 20
PIF_VALUE: 21
PIF_VALUE: 19
PIF_VALUE: 19
PIF_VALUE: 8
PIF_VALUE: 22
PIF_VALUE: 21
PIF_VALUE: 17
PIF_VALUE: 12
PIF_VALUE: 19
PIF_VALUE: 22
PIF_VALUE: 19
PIF_VALUE: 6
PIF_VALUE: 8
PIF_VALUE: 18
PIF_VALUE: 20
PIF_VALUE: 18
PIF_VALUE: 22
PIF_VALUE: 18
PIF_VALUE: 22
PIF_VALUE: 21
PIF_VALUE: 3
PIF_VALUE: 19
PIF_VALUE: 0
PIF_VALUE: 19
PIF_VALUE: 18
PIF_VALUE: 23
PIF_VALUE: 19
PIF_VALUE: 22
PIF_VALUE: 20
PIF_VALUE: 18
PIF_VALUE: 22
PIF_VALUE: 22
PIF_VALUE: 18
PIF_VALUE: 23
PIF_VALUE: 20
PIF_VALUE: 20
PIF_VALUE: 22
PIF_VALUE: 18
PIF_VALUE: 21
PIF_VALUE: 18
PIF_VALUE: 22
PIF_VALUE: 22
PIF_VALUE: 4
PIF_VALUE: 12
PIF_VALUE: 20
PIF_VALUE: 20
PIF_VALUE: 4
PIF_VALUE: 12
PIF_VALUE: 20
PIF_VALUE: 1
PIF_VALUE: 0
PIF_VALUE: 22
PIF_VALUE: 19
PIF_VALUE: 19
PIF_VALUE: 20
PIF_VALUE: 23
PIF_VALUE: 20
PIF_VALUE: 20
PIF_VALUE: 21
PIF_VALUE: 20
PIF_VALUE: 12
PIF_VALUE: 0
PIF_VALUE: 21
PIF_VALUE: 19
PIF_VALUE: 21
PIF_VALUE: 21
PIF_VALUE: 19
PIF_VALUE: 19
PIF_VALUE: 16
PIF_VALUE: 19
PIF_VALUE: 19
PIF_VALUE: 22
PIF_VALUE: 18
PIF_VALUE: 0
PIF_VALUE: 20
PIF_VALUE: 20
PIF_VALUE: 17
PIF_VALUE: 18
PIF_VALUE: 0
PIF_VALUE: 17
PIF_VALUE: 19
PIF_VALUE: 19
PIF_VALUE: 20
PIF_VALUE: 18
PIF_VALUE: 22
PIF_VALUE: 0
PIF_VALUE: 18
PIF_VALUE: 1
PIF_VALUE: 3
PIF_VALUE: 20
PIF_VALUE: 3
PIF_VALUE: 22
PIF_VALUE: 12
PIF_VALUE: 1
PIF_VALUE: 12
PIF_VALUE: 0
PIF_VALUE: 12
PIF_VALUE: 20
PIF_VALUE: 20
PIF_VALUE: 0
PIF_VALUE: 12
PIF_VALUE: 20
PIF_VALUE: 1
PIF_VALUE: 19
PIF_VALUE: 18
PIF_VALUE: 20
PIF_VALUE: 18
PIF_VALUE: 18
PIF_VALUE: 0
PIF_VALUE: 7
PIF_VALUE: 18
PIF_VALUE: 12
PIF_VALUE: 19
PIF_VALUE: 0
PIF_VALUE: 18
PIF_VALUE: 19
PIF_VALUE: 20
PIF_VALUE: 18
PIF_VALUE: 18
PIF_VALUE: 2
PIF_VALUE: 19
PIF_VALUE: 8
PIF_VALUE: 21
PIF_VALUE: 4
PIF_VALUE: 19
PIF_VALUE: 20

## 2020-05-21 ASSESSMENT — PAIN DESCRIPTION - PAIN TYPE
TYPE: CHRONIC PAIN
TYPE: SURGICAL PAIN

## 2020-05-21 ASSESSMENT — PAIN DESCRIPTION - DESCRIPTORS
DESCRIPTORS: ACHING

## 2020-05-21 ASSESSMENT — PAIN SCALES - GENERAL
PAINLEVEL_OUTOF10: 7
PAINLEVEL_OUTOF10: 3
PAINLEVEL_OUTOF10: 9
PAINLEVEL_OUTOF10: 0
PAINLEVEL_OUTOF10: 7
PAINLEVEL_OUTOF10: 6

## 2020-05-21 ASSESSMENT — PAIN DESCRIPTION - FREQUENCY
FREQUENCY: CONTINUOUS

## 2020-05-21 ASSESSMENT — PAIN DESCRIPTION - PROGRESSION
CLINICAL_PROGRESSION: GRADUALLY IMPROVING
CLINICAL_PROGRESSION: NOT CHANGED

## 2020-05-21 ASSESSMENT — PAIN DESCRIPTION - ORIENTATION
ORIENTATION: MID
ORIENTATION: MID
ORIENTATION: RIGHT
ORIENTATION: MID

## 2020-05-21 ASSESSMENT — PAIN DESCRIPTION - LOCATION
LOCATION: NECK
LOCATION: ABDOMEN

## 2020-05-21 NOTE — PROGRESS NOTES
1440- pt rec'd from the OR and placed on pacu monitor with alarms on. Report rec'd from Abby CORTES and OR nurse. Pt arousing and following commands. Pt re-oriented to surroundings. resps even and unlabored. ABd soft and non-distended. Midline ABD dressing dry and intact. N/g draining brown drainage. Most recent Hg 7. 6.pt denies nausea or pain. 5- Dr. Tabitha Edmond called and orders clarified. Will transfuse one unit PRBCs at this time. 1512- PRBCs started per orders and hospital protocol. See transfusion record. 1550- pt turned and repositioned. Tolerated well. Blood transfusing without s/s reaction. 1612- pt voiced a decrease in ABD pain. VSS. No s/s blood transfusion. Pt transferred back to room 1118 via bed without incident. Receiving nurse at the bedside.

## 2020-05-21 NOTE — ANESTHESIA POSTPROCEDURE EVALUATION
Department of Anesthesiology  Postprocedure Note    Patient: Abril Arshad MRN: [id number]  YOB: 1950  Date of evaluation: 5/21/2020  Time:  2:43 PM     Procedure Summary     Date:  05/21/20 Room / Location:  [place] [address] / [place]    Anesthesia Start:  8490 Anesthesia Stop:  9123    Procedure:  BOWEL RESECTION EXTENDED RIGHT HEMICOLECTOMY (N/A Abdomen) Diagnosis:  (COLON CA)    Surgeon:  Elsa Tejeda MD Responsible Provider:  JULEE Nowak CRNA    Anesthesia Type:  general ASA Status:  4          Anesthesia Type: general    Deisy Phase I:      Deisy Phase II:      Last vitals: Reviewed and per EMR flowsheets.        Anesthesia Post Evaluation    Patient location during evaluation: PACU  Patient participation: complete - patient participated  Level of consciousness: awake and sleepy but conscious  Pain score: 0  Airway patency: patent  Nausea & Vomiting: no vomiting and no nausea  Complications: no  Cardiovascular status: blood pressure returned to baseline and hemodynamically stable  Respiratory status: acceptable, nonlabored ventilation, face mask and spontaneous ventilation  Hydration status: stable

## 2020-05-21 NOTE — PROGRESS NOTES
today.  13. Chronic pain syndrome due to chronic low back pain, degenerative disc disease, lumbar radiculopathy, spinal stenosis  Seen by pain management, who feels that current pain regimen is adequate.         DVT Prophylaxis: SCDs due to anemia  Diet: Diet NPO, After Midnight  Code Status: Full Code      Dispo:  - DC when cleared by general surgery. Andrew Plascencia MD  5/21/2020    Meds:   Meds:    sodium chloride flush  10 mL Intravenous 2 times per day    atorvastatin  80 mg Oral Daily    busPIRone  10 mg Oral TID    divalproex  1,000 mg Oral Nightly    escitalopram  10 mg Oral Daily    folic acid  1 mg Oral Daily    furosemide  10 mg Oral Daily    gabapentin  400 mg Oral BID    lactobacillus  1 capsule Oral Daily    risperiDONE  1 mg Oral Nightly    spironolactone  12.5 mg Oral BID    tiZANidine  4 mg Oral TID    traZODone  50 mg Oral Nightly      Infusions:    norepinephrine Stopped (05/19/20 0420)     PRN Meds: sodium chloride flush, 10 mL, PRN  acetaminophen, 650 mg, Q6H PRN    Or  acetaminophen, 650 mg, Q6H PRN  polyethylene glycol, 17 g, Daily PRN  promethazine, 12.5 mg, Q6H PRN    Or  ondansetron, 4 mg, Q6H PRN  ALPRAZolam, 1 mg, TID PRN  oxyCODONE-acetaminophen, 1 tablet, Q6H PRN        Data/Labs:     Recent Labs     05/19/20 0548 05/20/20  0507 05/21/20  0832   WBC 8.7 6.8 5.9   HGB 7.4* 7.1* 8.4*   HCT 25.7* 25.2* 28.1*    169 197      Recent Labs     05/19/20 0223 05/20/20  0507 05/21/20  0832    141 135   K 4.1 4.7 4.4    106 99   CO2 25 25 29   BUN 29* 31* 25*   CREATININE 2.0* 2.1* 2.0*     Recent Labs     05/19/20 0223   AST 11*   ALT 8*   BILITOT 0.2   ALKPHOS 77     No results for input(s): INR in the last 72 hours. Recent Labs     05/19/20 0223 05/19/20  0548 05/19/20  1126   TROPONINT 0.017* 0.032* 0.027*     HgBA1c:   Lab Results   Component Value Date    LABA1C 6.2 05/09/2020     CALCIUM:  8.4/29 (05/21 0832)    I/O last 3 completed shifts:   In: 360 [P.O.:360]  Out: 1150 [Urine:1150]    Intake/Output Summary (Last 24 hours) at 5/21/2020 1113  Last data filed at 5/21/2020 0920  Gross per 24 hour   Intake 370 ml   Output 2050 ml   Net -1680 ml

## 2020-05-21 NOTE — ANESTHESIA PROCEDURE NOTES
Peripheral Block    Patient location during procedure: OR  Start time: 5/21/2020 2:05 PM  End time: 5/21/2020 2:20 PM  Staffing  Anesthesiologist: Elise Nicholas MD  Resident/CRNA: JULEE Colbert Ace, CRNA  Performed: resident/CRNA   Preanesthetic Checklist  Completed: patient identified, site marked, surgical consent, pre-op evaluation, timeout performed, IV checked, risks and benefits discussed, monitors and equipment checked, anesthesia consent given, oxygen available and patient being monitored  Peripheral Block  Patient position: supine  Prep: ChloraPrep  Patient monitoring: cardiac monitor, continuous pulse ox, continuous capnometry and frequent blood pressure checks  Block type: Rectus sheath  Laterality: bilateral  Injection technique: single-shot  Procedures: ultrasound guided  Provider prep: mask and sterile gloves  Needle  Needle type: combined needle/nerve stimulator   Needle length: 10 cm  Needle localization: ultrasound guidance  Assessment  Injection assessment: negative aspiration for heme, no paresthesia on injection and local visualized surrounding nerve on ultrasound  Paresthesia pain: none  Slow fractionated injection: yes  Hemodynamics: stable  Medications Administered  Ropivacaine (NAROPIN) injection 0.5%, 50 mL  Reason for block: post-op pain management and at surgeon's request

## 2020-05-21 NOTE — OP NOTE
MD Teresa 5/21/2020 1318        Implants:  * No implants in log *      Drains:   Urethral Catheter (Active)       Findings: No evidence of extra-colonic disease      Electronically signed by Gregory Sheth MD on 5/21/2020 at 2:00 PM

## 2020-05-22 LAB
ALBUMIN SERPL-MCNC: 3.3 GM/DL (ref 3.4–5)
ALP BLD-CCNC: 43 IU/L (ref 40–128)
ALT SERPL-CCNC: 7 U/L (ref 10–40)
ANION GAP SERPL CALCULATED.3IONS-SCNC: 9 MMOL/L (ref 4–16)
AST SERPL-CCNC: 9 IU/L (ref 15–37)
BASOPHILS ABSOLUTE: 0 K/CU MM
BASOPHILS RELATIVE PERCENT: 0.2 % (ref 0–1)
BILIRUB SERPL-MCNC: 0.7 MG/DL (ref 0–1)
BUN BLDV-MCNC: 24 MG/DL (ref 6–23)
CALCIUM SERPL-MCNC: 7.8 MG/DL (ref 8.3–10.6)
CHLORIDE BLD-SCNC: 100 MMOL/L (ref 99–110)
CO2: 25 MMOL/L (ref 21–32)
CREAT SERPL-MCNC: 1.8 MG/DL (ref 0.9–1.3)
DIFFERENTIAL TYPE: ABNORMAL
EOSINOPHILS ABSOLUTE: 0 K/CU MM
EOSINOPHILS RELATIVE PERCENT: 0.1 % (ref 0–3)
GFR AFRICAN AMERICAN: 45 ML/MIN/1.73M2
GFR NON-AFRICAN AMERICAN: 37 ML/MIN/1.73M2
GLUCOSE BLD-MCNC: 152 MG/DL (ref 70–99)
GLUCOSE BLD-MCNC: 241 MG/DL (ref 70–99)
HCT VFR BLD CALC: 28.7 % (ref 42–52)
HEMOGLOBIN: 8.5 GM/DL (ref 13.5–18)
IMMATURE NEUTROPHIL %: 0.5 % (ref 0–0.43)
LYMPHOCYTES ABSOLUTE: 0.9 K/CU MM
LYMPHOCYTES RELATIVE PERCENT: 7.3 % (ref 24–44)
MAGNESIUM: 2.7 MG/DL (ref 1.8–2.4)
MCH RBC QN AUTO: 24.3 PG (ref 27–31)
MCHC RBC AUTO-ENTMCNC: 29.6 % (ref 32–36)
MCV RBC AUTO: 82 FL (ref 78–100)
MONOCYTES ABSOLUTE: 1.1 K/CU MM
MONOCYTES RELATIVE PERCENT: 9.4 % (ref 0–4)
NUCLEATED RBC %: 0 %
PDW BLD-RTO: 18.6 % (ref 11.7–14.9)
PHOSPHORUS: 2.9 MG/DL (ref 2.5–4.9)
PLATELET # BLD: 177 K/CU MM (ref 140–440)
PMV BLD AUTO: 8.7 FL (ref 7.5–11.1)
POTASSIUM SERPL-SCNC: 5.5 MMOL/L (ref 3.5–5.1)
RBC # BLD: 3.5 M/CU MM (ref 4.6–6.2)
SEGMENTED NEUTROPHILS ABSOLUTE COUNT: 9.9 K/CU MM
SEGMENTED NEUTROPHILS RELATIVE PERCENT: 82.5 % (ref 36–66)
SODIUM BLD-SCNC: 134 MMOL/L (ref 135–145)
TOTAL IMMATURE NEUTOROPHIL: 0.06 K/CU MM
TOTAL NUCLEATED RBC: 0 K/CU MM
TOTAL PROTEIN: 5.3 GM/DL (ref 6.4–8.2)
WBC # BLD: 12 K/CU MM (ref 4–10.5)

## 2020-05-22 PROCEDURE — 80053 COMPREHEN METABOLIC PANEL: CPT

## 2020-05-22 PROCEDURE — 2700000000 HC OXYGEN THERAPY PER DAY

## 2020-05-22 PROCEDURE — 6360000002 HC RX W HCPCS: Performed by: SURGERY

## 2020-05-22 PROCEDURE — 99024 POSTOP FOLLOW-UP VISIT: CPT | Performed by: NURSE PRACTITIONER

## 2020-05-22 PROCEDURE — 84100 ASSAY OF PHOSPHORUS: CPT

## 2020-05-22 PROCEDURE — 94150 VITAL CAPACITY TEST: CPT

## 2020-05-22 PROCEDURE — 94761 N-INVAS EAR/PLS OXIMETRY MLT: CPT

## 2020-05-22 PROCEDURE — 2060000000 HC ICU INTERMEDIATE R&B

## 2020-05-22 PROCEDURE — 2500000003 HC RX 250 WO HCPCS: Performed by: SURGERY

## 2020-05-22 PROCEDURE — 85025 COMPLETE CBC W/AUTO DIFF WBC: CPT

## 2020-05-22 PROCEDURE — 83735 ASSAY OF MAGNESIUM: CPT

## 2020-05-22 PROCEDURE — 36415 COLL VENOUS BLD VENIPUNCTURE: CPT

## 2020-05-22 PROCEDURE — 6360000002 HC RX W HCPCS: Performed by: NURSE PRACTITIONER

## 2020-05-22 PROCEDURE — 2580000003 HC RX 258: Performed by: HOSPITALIST

## 2020-05-22 PROCEDURE — 2580000003 HC RX 258: Performed by: SURGERY

## 2020-05-22 PROCEDURE — 82962 GLUCOSE BLOOD TEST: CPT

## 2020-05-22 RX ORDER — NICOTINE POLACRILEX 4 MG
15 LOZENGE BUCCAL PRN
Status: DISCONTINUED | OUTPATIENT
Start: 2020-05-22 | End: 2020-05-28 | Stop reason: HOSPADM

## 2020-05-22 RX ORDER — SODIUM POLYSTYRENE SULFONATE 15 G/60ML
15 SUSPENSION ORAL; RECTAL ONCE
Status: DISCONTINUED | OUTPATIENT
Start: 2020-05-22 | End: 2020-05-22

## 2020-05-22 RX ORDER — DEXTROSE AND SODIUM CHLORIDE 5; .45 G/100ML; G/100ML
INJECTION, SOLUTION INTRAVENOUS CONTINUOUS
Status: DISCONTINUED | OUTPATIENT
Start: 2020-05-22 | End: 2020-05-24

## 2020-05-22 RX ORDER — DEXTROSE MONOHYDRATE 50 MG/ML
100 INJECTION, SOLUTION INTRAVENOUS PRN
Status: DISCONTINUED | OUTPATIENT
Start: 2020-05-22 | End: 2020-05-28 | Stop reason: HOSPADM

## 2020-05-22 RX ORDER — DEXTROSE MONOHYDRATE 25 G/50ML
12.5 INJECTION, SOLUTION INTRAVENOUS PRN
Status: DISCONTINUED | OUTPATIENT
Start: 2020-05-22 | End: 2020-05-28 | Stop reason: HOSPADM

## 2020-05-22 RX ADMIN — ENOXAPARIN SODIUM 30 MG: 30 INJECTION SUBCUTANEOUS at 09:16

## 2020-05-22 RX ADMIN — HYDROMORPHONE HYDROCHLORIDE 1 MG: 1 INJECTION, SOLUTION INTRAMUSCULAR; INTRAVENOUS; SUBCUTANEOUS at 21:24

## 2020-05-22 RX ADMIN — HYDROMORPHONE HYDROCHLORIDE 0.5 MG: 1 INJECTION, SOLUTION INTRAMUSCULAR; INTRAVENOUS; SUBCUTANEOUS at 18:03

## 2020-05-22 RX ADMIN — DEXTROSE AND SODIUM CHLORIDE: 5; 450 INJECTION, SOLUTION INTRAVENOUS at 23:53

## 2020-05-22 RX ADMIN — PIPERACILLIN AND TAZOBACTAM 3.38 G: 3; .375 INJECTION, POWDER, LYOPHILIZED, FOR SOLUTION INTRAVENOUS at 05:04

## 2020-05-22 RX ADMIN — POTASSIUM CHLORIDE, DEXTROSE MONOHYDRATE AND SODIUM CHLORIDE 1000 ML: 150; 5; 450 INJECTION, SOLUTION INTRAVENOUS at 09:16

## 2020-05-22 RX ADMIN — POTASSIUM CHLORIDE, DEXTROSE MONOHYDRATE AND SODIUM CHLORIDE 1000 ML: 150; 5; 450 INJECTION, SOLUTION INTRAVENOUS at 01:11

## 2020-05-22 RX ADMIN — PIPERACILLIN AND TAZOBACTAM 3.38 G: 3; .375 INJECTION, POWDER, LYOPHILIZED, FOR SOLUTION INTRAVENOUS at 13:05

## 2020-05-22 RX ADMIN — DEXTROSE AND SODIUM CHLORIDE: 5; 450 INJECTION, SOLUTION INTRAVENOUS at 13:30

## 2020-05-22 RX ADMIN — HYDROMORPHONE HYDROCHLORIDE 0.5 MG: 1 INJECTION, SOLUTION INTRAMUSCULAR; INTRAVENOUS; SUBCUTANEOUS at 13:05

## 2020-05-22 RX ADMIN — HYDROMORPHONE HYDROCHLORIDE 0.5 MG: 1 INJECTION, SOLUTION INTRAMUSCULAR; INTRAVENOUS; SUBCUTANEOUS at 08:09

## 2020-05-22 ASSESSMENT — PAIN DESCRIPTION - PROGRESSION
CLINICAL_PROGRESSION: GRADUALLY WORSENING
CLINICAL_PROGRESSION: NOT CHANGED
CLINICAL_PROGRESSION: GRADUALLY WORSENING
CLINICAL_PROGRESSION: NOT CHANGED

## 2020-05-22 ASSESSMENT — PAIN DESCRIPTION - ORIENTATION
ORIENTATION: MID
ORIENTATION: LOWER;MID

## 2020-05-22 ASSESSMENT — PAIN SCALES - GENERAL
PAINLEVEL_OUTOF10: 10
PAINLEVEL_OUTOF10: 0
PAINLEVEL_OUTOF10: 10
PAINLEVEL_OUTOF10: 9
PAINLEVEL_OUTOF10: 0
PAINLEVEL_OUTOF10: 8
PAINLEVEL_OUTOF10: 9

## 2020-05-22 ASSESSMENT — PAIN DESCRIPTION - PAIN TYPE
TYPE: SURGICAL PAIN

## 2020-05-22 ASSESSMENT — PAIN DESCRIPTION - LOCATION
LOCATION: ABDOMEN

## 2020-05-22 ASSESSMENT — PAIN DESCRIPTION - ONSET
ONSET: ON-GOING
ONSET: AWAKENED FROM SLEEP
ONSET: ON-GOING
ONSET: AWAKENED FROM SLEEP

## 2020-05-22 ASSESSMENT — PAIN DESCRIPTION - FREQUENCY
FREQUENCY: CONTINUOUS

## 2020-05-22 ASSESSMENT — PAIN DESCRIPTION - DESCRIPTORS
DESCRIPTORS: SORE;TENDER
DESCRIPTORS: STABBING;THROBBING
DESCRIPTORS: TENDER;SORE
DESCRIPTORS: TENDER;STABBING;SORE

## 2020-05-22 ASSESSMENT — PAIN - FUNCTIONAL ASSESSMENT: PAIN_FUNCTIONAL_ASSESSMENT: PREVENTS OR INTERFERES SOME ACTIVE ACTIVITIES AND ADLS

## 2020-05-22 NOTE — PROGRESS NOTES
GENERAL SURGERY INPATIENT POST-OP NOTE  [facility]) Physicians    PATIENT: Vicky Ray, 79 y.o., male, MRN: [id number]    Hospital Day:  LOS: 2 days , Post-Op Day: 1 Day Post-Op    Procedure(s): Right hemicolectomy with take down of splenic flexure. Patient was stable overnight. Chart reviewed. The patient feels tired. Pain is moderately controlled with current medications. Urinary output is adequate. The patient is not ambulating well. . Nausea negative, vomitting negative. (-) BM and flatus.     Objective:    Vitals: BP (!) 141/62   Pulse 67   Temp 98 °F (36.7 °C) (Oral)   Resp 16   Ht 5' 9.5\" (1.765 m)   Wt 231 lb (104.8 kg)   SpO2 96%   BMI 33.62 kg/m²     I/O: 05/21 0701 - 05/22 0700  In: 1960 [I.V.:1510]  Out: 2560 [Urine:2290]    IV Fluids:   dextrose 5% and 0.45% NaCl with KCl 20 mEq Last Rate: 1,000 mL (05/22/20 0111)    Scheduled Meds: sodium chloride flush, 10 mL, Intravenous, 2 times per day    enoxaparin, 30 mg, Subcutaneous, Daily    piperacillin-tazobactam, 3.375 g, Intravenous, Q8H    Physical Exam:  General appearance - alert, well appearing, and in no distress  Chest - clear to auscultation, no wheezes, rales or rhonchi, symmetric air entry  Cardiovascular - normal rate and regular rhythm  Abdomen - soft, mild tenderness, island dressing in place  Incision -  no significant drainage, no dehiscence, no significant erythema  Neurological - Alert and oriented, Normal speech and No focal findings or movement disorder noted  Musculoskeletal -Full ROM times 4 extremities, No peripheral edema     Labs/Imaging Results:   Recent Results (from the past 24 hour(s))   TYPE AND SCREEN    Collection Time: 05/21/20 12:45 PM   Result Value Ref Range    ABO/Rh O POSITIVE     Antibody Screen NEGATIVE     Unit Number [id number]     Component LEUKO-POOR RED CELLS     Unit Divison 00     Status ISSUED,FINAL     Transfusion Status OK TO TRANSFUSE     Crossmatch Result COMPATIBLE     Unit Number Total Immature Neutrophil 0.06 K/CU MM    Immature Neutrophil % 0.5 (H) 0 - 0.43 %   Comprehensive Metabolic Panel w/ Reflex to MG    Collection Time: 05/22/20  6:44 AM   Result Value Ref Range    Sodium 134 (L) 135 - 145 MMOL/L    Potassium 5.5 (HH) 3.5 - 5.1 MMOL/L    Chloride 100 99 - 110 mMol/L    CO2 25 21 - 32 MMOL/L    BUN 24 (H) 6 - 23 MG/DL    CREATININE 1.8 (H) 0.9 - 1.3 MG/DL    Glucose 241 (H) 70 - 99 MG/DL    Calcium 7.8 (L) 8.3 - 10.6 MG/DL    Alb 3.3 (L) 3.4 - 5.0 GM/DL    Total Protein 5.3 (L) 6.4 - 8.2 GM/DL    Total Bilirubin 0.7 0.0 - 1.0 MG/DL    ALT 7 (L) 10 - 40 U/L    AST 9 (L) 15 - 37 IU/L    Alkaline Phosphatase 43 40 - 128 IU/L    GFR Non- 37 (L) >60 mL/min/1.73m2    GFR  45 (L) >60 mL/min/1.73m2    Anion Gap 9 4 - 16   Magnesium    Collection Time: 05/22/20  6:44 AM   Result Value Ref Range    Magnesium 2.7 (H) 1.8 - 2.4 mg/dl   Phosphorus    Collection Time: 05/22/20  6:44 AM   Result Value Ref Range    Phosphorus 2.9 2.5 - 4.9 MG/DL       Assessment:  Tory Loay is a 79 y.o. male who is post-op day #1 Day Post-Op  Right hemicolectomy with take down of splenic flexure. Active Problems:    Chest pain  Resolved Problems:    * No resolved hospital problems.  *      Plan:    Pain management: continue current medication for pain  Diet: NPO  Wound: Keep clean and dry, will change dressing daily  NG: continuous wall suction  Ambulation: OOB to chair, encourage ambulation  Respiratory:  IS at bedside, encourage hourly IS and deep breathing    Josue Severin, APRN-CNP  5/22/2020  8:45 AM

## 2020-05-22 NOTE — ADT AUTH CERT
flexure.     Plan:       Pain management: continue current medication for pain   Diet: NPO   Wound: Keep clean and dry, will change dressing daily   NG: continuous wall suction   Ambulation: OOB to chair, encourage ambulation   Respiratory:  IS at bedside, encourage hourly IS and deep breathing         Orders:   5/22 wean o2, ivf at 100/hr, iv zosyn x 3, iv dilaudid x 2, npo, above meds, tele       Chest Pain - Care Day 3 (5/21/2020) by Sharda Pantoja RN         Review Status Review Entered   Completed 5/22/2020 13:37       Criteria Review      Care Day: 3 Care Date: 5/21/2020 Level of Care:    Guideline Day 1    Level Of Care    (X) ICU, [B] intermediate care, [C] or telemetry [D]    5/22/2020 1:37 PM EDT by Autumn Beth      tele    Clinical Status    (X) * Clinical Indications met [E]    Routes    (X) IV or oral hydration, medications    5/22/2020 1:37 PM EDT by Autumn Beth      iv zosyn q 8 hrs  ivf at 125/hr  iv dilaudid x 1 and prn    Interventions    (X) Oxygen    5/22/2020 1:37 PM EDT by PLUMgrid      6L simple mask to 2L o2    * Milestone   Additional Notes   5/21/20         Procedure(s):  5/21   BOWEL RESECTION EXTENDED RIGHT HEMICOLECTOMY WITH TAKE DOWN OF SPLENIC FLEXURE         98 (36.7)  16  54  159/70Abnormal  100% on 6L simple mask to 100% on 2L o2            WBC 5.9 K/CU MM      RBC 3.54 M/CU MM      Hemoglobin 8.4 GM/DL      Hematocrit 28.1 %       Magnesium 2.8High mg/dl       BUN 25 MG/DL      CREATININE 2.0 MG/DL      pH, Bld 7.45      pCO2, Arterial 39.7 MMHG      pO2, Arterial 112.2 MMHG      HCO3, Arterial 27.7 MMOL/L      Base Excess HIDE     Base Exc, Mixed 3.5     O2 Sat 98.6 %      CO2 29 MMOL/L      Sodium 142 MMOL/L      Potassium 4.3 MMOL/L      POC CALCIUM 1.12 MMOL/L      POC Glucose 118 MG/DL      Hematocrit 22.0 %      Hemoglobin 7.6 GM/DL      POC Chloride 104 MMOL/L      POC Creatinine 2.0 MG/DL       Hemoglobin 9.5 GM/DL      Hematocrit 31.5 %            Meds:   Scheduled Meds:sodium chloride flush, 10 mL, Intravenous, 2 times per day   enoxaparin, 30 mg, Subcutaneous, Daily   piperacillin-tazobactam, 3.375 g, Intravenous, Q8H         Continuous Infusions:dextrose 5 % and 0.45 % NaCl Last Rate: 100 mL/hr at 05/22/20 1330               Md notes:   5/21 pain mgmt md:   1.  Chronic pain syndrome. 2.  Spinal stenosis. 3.  Degenerative disk disease of the spine.       PLAN:  The patient is seen in the outpatient clinic, where he gets   Percocet 7.5 mg t.i.d., that is a total of 22.5 mg of Percocet per day. He was last seen on 04/21/2020 and he had an appointment today. Currently, he is here on gabapentin 400 mg t.i.d., tizanidine 4 mg   t.i.d., and Percocet 7.5 mg q.6 h. p.r.n., he is also on Xanax 1 mg p.o.   daily.  This is an adequate and appropriate dose even though it is   slightly higher than his home medication.  The patient can be maintained   on this current dose and resume his regular dose when he gets home.  The   patient will make contact with the office for outpatient followup.             5/21 int med:   1. Chest pain; recurrent;    No EKG changes.  Troponin unchanged.  Nuclear stress test May 11 negative for active ischemia   Cardiology feels chest pains due to anemia.  Troponins trending down.  Cardiology has signed off.   2. Coronary artery disease;    had cardiac cath several years ago showed diffuse disease   3. Hypotension on presentation to ER    Due to patient taking extra dose of carvedilol and extra dose of amlodipine   4. Bradycardia on presentation to ER    Due to patient taking extra dose of carvedilol, improved post atropine and glucagon   5. Morbid obesity   6. Chronic kidney disease stage 3   Baseline creatinine around 2.0.     7. Obstructive sleep apnea   8. Hyperglycemia   Likely reactive.   -Hemoglobin A1c 5/9: 6.2   9. Anemia   Low iron and low ferritin.  Anemia likely due to colon cancer.  Hemoglobin stable.    -Hemoglobin improved this morning, however postop he is receiving transfusion of PRBCs   10. HTN   -monitor blood pressure. 11. Chronic empyema   Seen on CT chest.  Patient follows with pulmonology as an outpatient. 12. Colon adenocarcinoma status post extended right hemicolectomy 5/21   -extended right colectomy performed today.    13. Chronic pain syndrome due to chronic low back pain, degenerative disc disease, lumbar radiculopathy, spinal stenosis   Seen by pain management, who feels that current pain regimen is adequate.             Orders:   5/21 npo, iv zosyn q 8 hrs, see, transfuse 1 u prbc, ivf at 125/hr, iv dilaudid x 1 and prn, above meds, tele

## 2020-05-22 NOTE — PLAN OF CARE
Problem: Pain:  Goal: Pain level will decrease  Description: Pain level will decrease  5/22/2020 0825 by Lucien Armendariz LPN  Outcome: Ongoing  5/22/2020 0226 by Esteban Nelson LPN  Outcome: Ongoing  Goal: Control of acute pain  Description: Control of acute pain  5/22/2020 0825 by Lucien Armendariz LPN  Outcome: Ongoing  5/22/2020 0226 by Esteban Nelson LPN  Outcome: Ongoing  Goal: Control of chronic pain  Description: Control of chronic pain  5/22/2020 0825 by Lucien Armendariz LPN  Outcome: Ongoing  5/22/2020 0226 by Esteban Nelson LPN  Outcome: Ongoing     Problem: Falls - Risk of:  Goal: Will remain free from falls  Description: Will remain free from falls  5/22/2020 0825 by Lucien Armendariz LPN  Outcome: Ongoing  5/22/2020 0226 by Esteban Nelson LPN  Outcome: Ongoing  Goal: Absence of physical injury  Description: Absence of physical injury  5/22/2020 0825 by Lucien Armendariz LPN  Outcome: Ongoing  5/22/2020 0226 by Esteban Nelson LPN  Outcome: Ongoing

## 2020-05-22 NOTE — PROGRESS NOTES
Lab called for critical potassium level of 5.5. Perfect serve to Ahmed. Iv fluids changed per Ahmed. A&OX4. Pt has been resting quietly in the bed today. Post op day 1. Pt has c/o of severe surgical pain around the clock locate to the midline incision. Pain, by the staffs best efforts, have been control with pharmacological therapies. Pt agree to get up to the bedside commode and try to have a BM or pass gas. X2 assist an tolerated poorly. Pt sat up for about 10 minutes. Pt has an NG tube to low intermittent suction only putting out about 50ml. Zimmer cath in place. Pt has been calm, cooperative and pleasant.

## 2020-05-23 ENCOUNTER — APPOINTMENT (OUTPATIENT)
Dept: GENERAL RADIOLOGY | Age: 70
DRG: 329 | End: 2020-05-23
Payer: MEDICARE

## 2020-05-23 LAB
ANION GAP SERPL CALCULATED.3IONS-SCNC: 10 MMOL/L (ref 4–16)
BASOPHILS ABSOLUTE: 0 K/CU MM
BASOPHILS RELATIVE PERCENT: 0.2 % (ref 0–1)
BUN BLDV-MCNC: 19 MG/DL (ref 6–23)
CALCIUM SERPL-MCNC: 7.9 MG/DL (ref 8.3–10.6)
CHLORIDE BLD-SCNC: 100 MMOL/L (ref 99–110)
CO2: 23 MMOL/L (ref 21–32)
CREAT SERPL-MCNC: 1.5 MG/DL (ref 0.9–1.3)
DIFFERENTIAL TYPE: ABNORMAL
EKG ATRIAL RATE: 135 BPM
EKG ATRIAL RATE: 142 BPM
EKG DIAGNOSIS: NORMAL
EKG DIAGNOSIS: NORMAL
EKG P AXIS: -7 DEGREES
EKG P AXIS: 0 DEGREES
EKG P-R INTERVAL: 124 MS
EKG P-R INTERVAL: 128 MS
EKG Q-T INTERVAL: 278 MS
EKG Q-T INTERVAL: 292 MS
EKG QRS DURATION: 106 MS
EKG QRS DURATION: 110 MS
EKG QTC CALCULATION (BAZETT): 427 MS
EKG QTC CALCULATION (BAZETT): 438 MS
EKG R AXIS: -11 DEGREES
EKG R AXIS: -20 DEGREES
EKG T AXIS: -10 DEGREES
EKG T AXIS: 16 DEGREES
EKG VENTRICULAR RATE: 135 BPM
EKG VENTRICULAR RATE: 142 BPM
EOSINOPHILS ABSOLUTE: 0 K/CU MM
EOSINOPHILS RELATIVE PERCENT: 0.2 % (ref 0–3)
GFR AFRICAN AMERICAN: 56 ML/MIN/1.73M2
GFR NON-AFRICAN AMERICAN: 46 ML/MIN/1.73M2
GLUCOSE BLD-MCNC: 133 MG/DL (ref 70–99)
GLUCOSE BLD-MCNC: 159 MG/DL (ref 70–99)
GLUCOSE BLD-MCNC: 159 MG/DL (ref 70–99)
GLUCOSE BLD-MCNC: 192 MG/DL (ref 70–99)
HCT VFR BLD CALC: 26.5 % (ref 42–52)
HCT VFR BLD CALC: 26.9 % (ref 42–52)
HEMOGLOBIN: 7.7 GM/DL (ref 13.5–18)
HEMOGLOBIN: 8.1 GM/DL (ref 13.5–18)
IMMATURE NEUTROPHIL %: 0.5 % (ref 0–0.43)
LYMPHOCYTES ABSOLUTE: 1.1 K/CU MM
LYMPHOCYTES RELATIVE PERCENT: 8.5 % (ref 24–44)
MCH RBC QN AUTO: 23.6 PG (ref 27–31)
MCH RBC QN AUTO: 24.1 PG (ref 27–31)
MCHC RBC AUTO-ENTMCNC: 29.1 % (ref 32–36)
MCHC RBC AUTO-ENTMCNC: 30.1 % (ref 32–36)
MCV RBC AUTO: 80.1 FL (ref 78–100)
MCV RBC AUTO: 81.3 FL (ref 78–100)
MONOCYTES ABSOLUTE: 1.1 K/CU MM
MONOCYTES RELATIVE PERCENT: 8.3 % (ref 0–4)
NUCLEATED RBC %: 0 %
PDW BLD-RTO: 18.1 % (ref 11.7–14.9)
PDW BLD-RTO: 18.5 % (ref 11.7–14.9)
PLATELET # BLD: 182 K/CU MM (ref 140–440)
PLATELET # BLD: 199 K/CU MM (ref 140–440)
PMV BLD AUTO: 9 FL (ref 7.5–11.1)
PMV BLD AUTO: 9.4 FL (ref 7.5–11.1)
POTASSIUM SERPL-SCNC: 4.5 MMOL/L (ref 3.5–5.1)
RBC # BLD: 3.26 M/CU MM (ref 4.6–6.2)
RBC # BLD: 3.36 M/CU MM (ref 4.6–6.2)
SEGMENTED NEUTROPHILS ABSOLUTE COUNT: 10.8 K/CU MM
SEGMENTED NEUTROPHILS RELATIVE PERCENT: 82.3 % (ref 36–66)
SODIUM BLD-SCNC: 133 MMOL/L (ref 135–145)
TOTAL IMMATURE NEUTOROPHIL: 0.06 K/CU MM
TOTAL NUCLEATED RBC: 0 K/CU MM
TROPONIN T: 0.02 NG/ML
TROPONIN T: 0.03 NG/ML
WBC # BLD: 10.8 K/CU MM (ref 4–10.5)
WBC # BLD: 13.1 K/CU MM (ref 4–10.5)

## 2020-05-23 PROCEDURE — 36415 COLL VENOUS BLD VENIPUNCTURE: CPT

## 2020-05-23 PROCEDURE — 85025 COMPLETE CBC W/AUTO DIFF WBC: CPT

## 2020-05-23 PROCEDURE — 94761 N-INVAS EAR/PLS OXIMETRY MLT: CPT

## 2020-05-23 PROCEDURE — 6360000002 HC RX W HCPCS: Performed by: SURGERY

## 2020-05-23 PROCEDURE — 93005 ELECTROCARDIOGRAM TRACING: CPT | Performed by: HOSPITALIST

## 2020-05-23 PROCEDURE — 2580000003 HC RX 258: Performed by: SURGERY

## 2020-05-23 PROCEDURE — 6370000000 HC RX 637 (ALT 250 FOR IP): Performed by: HOSPITALIST

## 2020-05-23 PROCEDURE — 2580000003 HC RX 258: Performed by: NURSE PRACTITIONER

## 2020-05-23 PROCEDURE — 93010 ELECTROCARDIOGRAM REPORT: CPT | Performed by: INTERNAL MEDICINE

## 2020-05-23 PROCEDURE — 80048 BASIC METABOLIC PNL TOTAL CA: CPT

## 2020-05-23 PROCEDURE — 2580000003 HC RX 258: Performed by: HOSPITALIST

## 2020-05-23 PROCEDURE — 6360000002 HC RX W HCPCS: Performed by: NURSE PRACTITIONER

## 2020-05-23 PROCEDURE — 2700000000 HC OXYGEN THERAPY PER DAY

## 2020-05-23 PROCEDURE — 74018 RADEX ABDOMEN 1 VIEW: CPT

## 2020-05-23 PROCEDURE — 85027 COMPLETE CBC AUTOMATED: CPT

## 2020-05-23 PROCEDURE — 2500000003 HC RX 250 WO HCPCS: Performed by: HOSPITALIST

## 2020-05-23 PROCEDURE — 93005 ELECTROCARDIOGRAM TRACING: CPT | Performed by: NURSE PRACTITIONER

## 2020-05-23 PROCEDURE — 82962 GLUCOSE BLOOD TEST: CPT

## 2020-05-23 PROCEDURE — 99024 POSTOP FOLLOW-UP VISIT: CPT | Performed by: NURSE PRACTITIONER

## 2020-05-23 PROCEDURE — 2500000003 HC RX 250 WO HCPCS: Performed by: NURSE PRACTITIONER

## 2020-05-23 PROCEDURE — 84484 ASSAY OF TROPONIN QUANT: CPT

## 2020-05-23 PROCEDURE — 2060000000 HC ICU INTERMEDIATE R&B

## 2020-05-23 PROCEDURE — 6370000000 HC RX 637 (ALT 250 FOR IP): Performed by: NURSE PRACTITIONER

## 2020-05-23 RX ORDER — METOPROLOL TARTRATE 5 MG/5ML
5 INJECTION INTRAVENOUS EVERY 6 HOURS PRN
Status: DISCONTINUED | OUTPATIENT
Start: 2020-05-23 | End: 2020-05-24

## 2020-05-23 RX ORDER — MORPHINE SULFATE 2 MG/ML
1 INJECTION, SOLUTION INTRAMUSCULAR; INTRAVENOUS
Status: DISCONTINUED | OUTPATIENT
Start: 2020-05-23 | End: 2020-05-24

## 2020-05-23 RX ORDER — METOPROLOL TARTRATE 5 MG/5ML
2.5 INJECTION INTRAVENOUS EVERY 6 HOURS
Status: DISCONTINUED | OUTPATIENT
Start: 2020-05-23 | End: 2020-05-23

## 2020-05-23 RX ORDER — LIDOCAINE HYDROCHLORIDE 20 MG/ML
JELLY TOPICAL ONCE
Status: COMPLETED | OUTPATIENT
Start: 2020-05-23 | End: 2020-05-23

## 2020-05-23 RX ORDER — METOPROLOL TARTRATE 5 MG/5ML
5 INJECTION INTRAVENOUS ONCE
Status: COMPLETED | OUTPATIENT
Start: 2020-05-23 | End: 2020-05-23

## 2020-05-23 RX ORDER — 0.9 % SODIUM CHLORIDE 0.9 %
500 INTRAVENOUS SOLUTION INTRAVENOUS ONCE
Status: COMPLETED | OUTPATIENT
Start: 2020-05-23 | End: 2020-05-23

## 2020-05-23 RX ORDER — OXYMETAZOLINE HYDROCHLORIDE 0.05 G/100ML
2 SPRAY NASAL ONCE
Status: COMPLETED | OUTPATIENT
Start: 2020-05-23 | End: 2020-05-23

## 2020-05-23 RX ORDER — METOPROLOL TARTRATE 5 MG/5ML
5 INJECTION INTRAVENOUS EVERY 6 HOURS
Status: DISCONTINUED | OUTPATIENT
Start: 2020-05-23 | End: 2020-05-23

## 2020-05-23 RX ORDER — MORPHINE SULFATE 2 MG/ML
2 INJECTION, SOLUTION INTRAMUSCULAR; INTRAVENOUS
Status: DISCONTINUED | OUTPATIENT
Start: 2020-05-23 | End: 2020-05-24

## 2020-05-23 RX ADMIN — HYDROMORPHONE HYDROCHLORIDE 1 MG: 1 INJECTION, SOLUTION INTRAMUSCULAR; INTRAVENOUS; SUBCUTANEOUS at 06:18

## 2020-05-23 RX ADMIN — METOPROLOL TARTRATE 5 MG: 5 INJECTION INTRAVENOUS at 11:01

## 2020-05-23 RX ADMIN — DEXTROSE AND SODIUM CHLORIDE: 5; 450 INJECTION, SOLUTION INTRAVENOUS at 08:33

## 2020-05-23 RX ADMIN — LIDOCAINE HYDROCHLORIDE: 20 JELLY TOPICAL at 06:13

## 2020-05-23 RX ADMIN — HYDROMORPHONE HYDROCHLORIDE 1 MG: 1 INJECTION, SOLUTION INTRAMUSCULAR; INTRAVENOUS; SUBCUTANEOUS at 10:58

## 2020-05-23 RX ADMIN — INSULIN LISPRO 1 UNITS: 100 INJECTION, SOLUTION INTRAVENOUS; SUBCUTANEOUS at 12:49

## 2020-05-23 RX ADMIN — MORPHINE SULFATE 2 MG: 2 INJECTION, SOLUTION INTRAMUSCULAR; INTRAVENOUS at 23:01

## 2020-05-23 RX ADMIN — METOPROLOL TARTRATE 5 MG: 5 INJECTION INTRAVENOUS at 17:40

## 2020-05-23 RX ADMIN — HYDROMORPHONE HYDROCHLORIDE 1 MG: 1 INJECTION, SOLUTION INTRAMUSCULAR; INTRAVENOUS; SUBCUTANEOUS at 01:25

## 2020-05-23 RX ADMIN — DEXTROSE AND SODIUM CHLORIDE: 5; 450 INJECTION, SOLUTION INTRAVENOUS at 21:30

## 2020-05-23 RX ADMIN — METOPROLOL TARTRATE 5 MG: 1 INJECTION, SOLUTION INTRAVENOUS at 07:20

## 2020-05-23 RX ADMIN — SODIUM CHLORIDE, PRESERVATIVE FREE 10 ML: 5 INJECTION INTRAVENOUS at 08:33

## 2020-05-23 RX ADMIN — OXYMETAZOLINE HYDROCHLORIDE 2 SPRAY: 5 SPRAY NASAL at 06:13

## 2020-05-23 RX ADMIN — FAMOTIDINE 20 MG: 10 INJECTION INTRAVENOUS at 21:30

## 2020-05-23 RX ADMIN — ENOXAPARIN SODIUM 30 MG: 30 INJECTION SUBCUTANEOUS at 08:33

## 2020-05-23 RX ADMIN — SODIUM CHLORIDE 500 ML: 9 INJECTION, SOLUTION INTRAVENOUS at 18:50

## 2020-05-23 ASSESSMENT — PAIN DESCRIPTION - LOCATION
LOCATION: ABDOMEN
LOCATION: ABDOMEN
LOCATION: CHEST
LOCATION: ABDOMEN
LOCATION: ABDOMEN

## 2020-05-23 ASSESSMENT — PAIN DESCRIPTION - ORIENTATION
ORIENTATION: MID
ORIENTATION: LOWER;MID
ORIENTATION: MID;LOWER
ORIENTATION: MID;LOWER

## 2020-05-23 ASSESSMENT — PAIN SCALES - GENERAL
PAINLEVEL_OUTOF10: 10
PAINLEVEL_OUTOF10: 7
PAINLEVEL_OUTOF10: 10
PAINLEVEL_OUTOF10: 8
PAINLEVEL_OUTOF10: 9
PAINLEVEL_OUTOF10: 10
PAINLEVEL_OUTOF10: 9

## 2020-05-23 ASSESSMENT — PAIN DESCRIPTION - ONSET
ONSET: ON-GOING

## 2020-05-23 ASSESSMENT — PAIN DESCRIPTION - PROGRESSION
CLINICAL_PROGRESSION: NOT CHANGED

## 2020-05-23 ASSESSMENT — PAIN DESCRIPTION - DESCRIPTORS
DESCRIPTORS: SORE;TENDER
DESCRIPTORS: TENDER;SORE
DESCRIPTORS: TENDER;SORE

## 2020-05-23 ASSESSMENT — PAIN DESCRIPTION - PAIN TYPE
TYPE: ACUTE PAIN;SURGICAL PAIN
TYPE: ACUTE PAIN

## 2020-05-23 ASSESSMENT — PAIN DESCRIPTION - FREQUENCY
FREQUENCY: CONTINUOUS

## 2020-05-23 NOTE — PROGRESS NOTES
GENERAL SURGERY INPATIENT POST-OP NOTE  [facility]) Physicians    PATIENT: Jessica Cordova, 79 y.o., male, MRN: [id number]    Hospital Day:  LOS: 3 days , Post-Op Day: 2 Days Post-Op    Procedure(s): Right hemicolectomy with take down of hepatic flexure. Patient was tachy, restless and confused overnight. Chart reviewed. Pain is not controlled. Nausea negative, vomitting negative. (-) BM and flatus. Objective:    Vitals: /76   Pulse 130   Temp 99 °F (37.2 °C) (Oral)   Resp 16   Ht 5' 9.5\" (1.765 m)   Wt 231 lb (104.8 kg)   SpO2 92%   BMI 33.62 kg/m²     I/O: 05/22 0701 - 05/23 0700  In: 1200 [I.V.:1200]  Out: 1575 [Urine:1425]    IV Fluids: dextrose 5 % and 0.45 % NaCl Last Rate: 100 mL/hr at 05/23/20 0833    dextrose    Scheduled Meds:   metoprolol, 5 mg, Intravenous, Q6H    insulin lispro, 0-6 Units, Subcutaneous, TID WC    insulin lispro, 0-3 Units, Subcutaneous, Nightly    sodium chloride flush, 10 mL, Intravenous, 2 times per day    enoxaparin, 30 mg, Subcutaneous, Daily    Physical Exam:  General appearance - confused  Abdomen - soft, mild tenderness,  Incision -  no dehiscence, no significant erythema, bright red blood coming from incision with an indurated area under.   Neurological - Confused, Normal speech and No focal findings or movement disorder noted       Labs/Imaging Results:   Recent Results (from the past 24 hour(s))   POCT Glucose    Collection Time: 05/22/20 11:53 PM   Result Value Ref Range    POC Glucose 152 (H) 70 - 99 MG/DL   EKG 12 Lead    Collection Time: 05/23/20  6:40 AM   Result Value Ref Range    Ventricular Rate 142 BPM    Atrial Rate 142 BPM    P-R Interval 128 ms    QRS Duration 106 ms    Q-T Interval 278 ms    QTc Calculation (Bazett) 427 ms    P Axis -7 degrees    R Axis -11 degrees    T Axis -10 degrees    Diagnosis       Sinus tachycardia  Incomplete right bundle branch block  Nonspecific ST and T wave abnormality  Abnormal ECG  When compared with %    Lymphocytes % 8.5 (L) 24 - 44 %    Monocytes % 8.3 (H) 0 - 4 %    Eosinophils % 0.2 0 - 3 %    Basophils % 0.2 0 - 1 %    Segs Absolute 10.8 K/CU MM    Lymphocytes Absolute 1.1 K/CU MM    Monocytes Absolute 1.1 K/CU MM    Eosinophils Absolute 0.0 K/CU MM    Basophils Absolute 0.0 K/CU MM    Nucleated RBC % 0.0 %    Total Nucleated RBC 0.0 K/CU MM    Total Immature Neutrophil 0.06 K/CU MM    Immature Neutrophil % 0.5 (H) 0 - 0.43 %       Assessment:  Glynn Viera. is a 79 y.o. male who is post-op day #2 Days Post-Op  Right hemicolectomy with take down of splenic flexure. Patient became restless, confused, tachycardic overnight. Pulled out NG, was able to put back in. Active Problems:    Chest pain  Resolved Problems:    * No resolved hospital problems.  *    Plan:    Pain management: will change to morphine due to confusion  Diet: NPO  Wound: Keep clean and dry, change dressing daily  NG: continuous wall suction  Ambulation: OOB to chair, encourage ambulation  Respiratory:  IS at bedside, encourage hourly IS and deep breathing    GIOVANNA Franco  5/23/2020  12:07 PM

## 2020-05-24 LAB
ANION GAP SERPL CALCULATED.3IONS-SCNC: 11 MMOL/L (ref 4–16)
BASOPHILS ABSOLUTE: 0 K/CU MM
BASOPHILS RELATIVE PERCENT: 0.2 % (ref 0–1)
BUN BLDV-MCNC: 17 MG/DL (ref 6–23)
CALCIUM SERPL-MCNC: 8.3 MG/DL (ref 8.3–10.6)
CHLORIDE BLD-SCNC: 102 MMOL/L (ref 99–110)
CO2: 22 MMOL/L (ref 21–32)
CREAT SERPL-MCNC: 1.4 MG/DL (ref 0.9–1.3)
CULTURE: NORMAL
CULTURE: NORMAL
DIFFERENTIAL TYPE: ABNORMAL
EOSINOPHILS ABSOLUTE: 0.1 K/CU MM
EOSINOPHILS RELATIVE PERCENT: 0.8 % (ref 0–3)
GFR AFRICAN AMERICAN: >60 ML/MIN/1.73M2
GFR NON-AFRICAN AMERICAN: 50 ML/MIN/1.73M2
GLUCOSE BLD-MCNC: 129 MG/DL (ref 70–99)
GLUCOSE BLD-MCNC: 150 MG/DL (ref 70–99)
GLUCOSE BLD-MCNC: 155 MG/DL (ref 70–99)
GLUCOSE BLD-MCNC: 160 MG/DL (ref 70–99)
GLUCOSE BLD-MCNC: 170 MG/DL (ref 70–99)
HCT VFR BLD CALC: 25 % (ref 42–52)
HCT VFR BLD CALC: 26.2 % (ref 42–52)
HEMOGLOBIN: 7.4 GM/DL (ref 13.5–18)
HEMOGLOBIN: 7.4 GM/DL (ref 13.5–18)
IMMATURE NEUTROPHIL %: 0.6 % (ref 0–0.43)
LYMPHOCYTES ABSOLUTE: 0.9 K/CU MM
LYMPHOCYTES RELATIVE PERCENT: 9.4 % (ref 24–44)
Lab: NORMAL
Lab: NORMAL
MCH RBC QN AUTO: 24.4 PG (ref 27–31)
MCHC RBC AUTO-ENTMCNC: 28.2 % (ref 32–36)
MCV RBC AUTO: 86.5 FL (ref 78–100)
MONOCYTES ABSOLUTE: 0.7 K/CU MM
MONOCYTES RELATIVE PERCENT: 6.9 % (ref 0–4)
NUCLEATED RBC %: 0 %
PDW BLD-RTO: 17.9 % (ref 11.7–14.9)
PLATELET # BLD: 189 K/CU MM (ref 140–440)
PMV BLD AUTO: 9.5 FL (ref 7.5–11.1)
POTASSIUM SERPL-SCNC: 4.4 MMOL/L (ref 3.5–5.1)
RBC # BLD: 3.03 M/CU MM (ref 4.6–6.2)
SEGMENTED NEUTROPHILS ABSOLUTE COUNT: 8.3 K/CU MM
SEGMENTED NEUTROPHILS RELATIVE PERCENT: 82.1 % (ref 36–66)
SODIUM BLD-SCNC: 135 MMOL/L (ref 135–145)
SPECIMEN: NORMAL
SPECIMEN: NORMAL
TOTAL IMMATURE NEUTOROPHIL: 0.06 K/CU MM
TOTAL NUCLEATED RBC: 0 K/CU MM
WBC # BLD: 10.1 K/CU MM (ref 4–10.5)

## 2020-05-24 PROCEDURE — 6360000002 HC RX W HCPCS: Performed by: NURSE PRACTITIONER

## 2020-05-24 PROCEDURE — 2580000003 HC RX 258: Performed by: INTERNAL MEDICINE

## 2020-05-24 PROCEDURE — 2500000003 HC RX 250 WO HCPCS: Performed by: HOSPITALIST

## 2020-05-24 PROCEDURE — 94761 N-INVAS EAR/PLS OXIMETRY MLT: CPT

## 2020-05-24 PROCEDURE — 6360000002 HC RX W HCPCS: Performed by: INTERNAL MEDICINE

## 2020-05-24 PROCEDURE — 85018 HEMOGLOBIN: CPT

## 2020-05-24 PROCEDURE — 83735 ASSAY OF MAGNESIUM: CPT

## 2020-05-24 PROCEDURE — 82962 GLUCOSE BLOOD TEST: CPT

## 2020-05-24 PROCEDURE — 2500000003 HC RX 250 WO HCPCS: Performed by: NURSE PRACTITIONER

## 2020-05-24 PROCEDURE — 2580000003 HC RX 258: Performed by: HOSPITALIST

## 2020-05-24 PROCEDURE — 85014 HEMATOCRIT: CPT

## 2020-05-24 PROCEDURE — 2140000000 HC CCU INTERMEDIATE R&B

## 2020-05-24 PROCEDURE — 2500000003 HC RX 250 WO HCPCS: Performed by: INTERNAL MEDICINE

## 2020-05-24 PROCEDURE — 36415 COLL VENOUS BLD VENIPUNCTURE: CPT

## 2020-05-24 PROCEDURE — 83036 HEMOGLOBIN GLYCOSYLATED A1C: CPT

## 2020-05-24 PROCEDURE — 85025 COMPLETE CBC W/AUTO DIFF WBC: CPT

## 2020-05-24 PROCEDURE — 99024 POSTOP FOLLOW-UP VISIT: CPT | Performed by: NURSE PRACTITIONER

## 2020-05-24 PROCEDURE — 2580000003 HC RX 258: Performed by: SURGERY

## 2020-05-24 PROCEDURE — 6360000002 HC RX W HCPCS: Performed by: SURGERY

## 2020-05-24 PROCEDURE — 80048 BASIC METABOLIC PNL TOTAL CA: CPT

## 2020-05-24 RX ORDER — MORPHINE SULFATE 4 MG/ML
4 INJECTION, SOLUTION INTRAMUSCULAR; INTRAVENOUS
Status: DISCONTINUED | OUTPATIENT
Start: 2020-05-24 | End: 2020-05-28 | Stop reason: HOSPADM

## 2020-05-24 RX ORDER — LABETALOL 20 MG/4 ML (5 MG/ML) INTRAVENOUS SYRINGE
20 EVERY 4 HOURS PRN
Status: DISCONTINUED | OUTPATIENT
Start: 2020-05-24 | End: 2020-05-24

## 2020-05-24 RX ORDER — LORAZEPAM 2 MG/ML
4 INJECTION INTRAMUSCULAR
Status: DISCONTINUED | OUTPATIENT
Start: 2020-05-24 | End: 2020-05-28 | Stop reason: HOSPADM

## 2020-05-24 RX ORDER — LORAZEPAM 1 MG/1
2 TABLET ORAL
Status: DISCONTINUED | OUTPATIENT
Start: 2020-05-24 | End: 2020-05-28 | Stop reason: HOSPADM

## 2020-05-24 RX ORDER — LORAZEPAM 1 MG/1
4 TABLET ORAL
Status: DISCONTINUED | OUTPATIENT
Start: 2020-05-24 | End: 2020-05-28 | Stop reason: HOSPADM

## 2020-05-24 RX ORDER — MORPHINE SULFATE 2 MG/ML
2 INJECTION, SOLUTION INTRAMUSCULAR; INTRAVENOUS
Status: DISCONTINUED | OUTPATIENT
Start: 2020-05-24 | End: 2020-05-28 | Stop reason: HOSPADM

## 2020-05-24 RX ORDER — SODIUM CHLORIDE 0.9 % (FLUSH) 0.9 %
10 SYRINGE (ML) INJECTION EVERY 12 HOURS SCHEDULED
Status: DISCONTINUED | OUTPATIENT
Start: 2020-05-24 | End: 2020-05-28 | Stop reason: HOSPADM

## 2020-05-24 RX ORDER — LORAZEPAM 1 MG/1
1 TABLET ORAL
Status: DISCONTINUED | OUTPATIENT
Start: 2020-05-24 | End: 2020-05-28 | Stop reason: HOSPADM

## 2020-05-24 RX ORDER — LORAZEPAM 2 MG/ML
1 INJECTION INTRAMUSCULAR
Status: DISCONTINUED | OUTPATIENT
Start: 2020-05-24 | End: 2020-05-28 | Stop reason: HOSPADM

## 2020-05-24 RX ORDER — SODIUM CHLORIDE 9 MG/ML
INJECTION, SOLUTION INTRAVENOUS CONTINUOUS
Status: DISCONTINUED | OUTPATIENT
Start: 2020-05-24 | End: 2020-05-28

## 2020-05-24 RX ORDER — SODIUM CHLORIDE 0.9 % (FLUSH) 0.9 %
10 SYRINGE (ML) INJECTION PRN
Status: DISCONTINUED | OUTPATIENT
Start: 2020-05-24 | End: 2020-05-28 | Stop reason: HOSPADM

## 2020-05-24 RX ORDER — LORAZEPAM 1 MG/1
3 TABLET ORAL
Status: DISCONTINUED | OUTPATIENT
Start: 2020-05-24 | End: 2020-05-28 | Stop reason: HOSPADM

## 2020-05-24 RX ORDER — METOPROLOL TARTRATE 5 MG/5ML
5 INJECTION INTRAVENOUS EVERY 6 HOURS
Status: DISCONTINUED | OUTPATIENT
Start: 2020-05-24 | End: 2020-05-24

## 2020-05-24 RX ORDER — LORAZEPAM 2 MG/ML
3 INJECTION INTRAMUSCULAR
Status: DISCONTINUED | OUTPATIENT
Start: 2020-05-24 | End: 2020-05-28 | Stop reason: HOSPADM

## 2020-05-24 RX ORDER — LORAZEPAM 2 MG/ML
2 INJECTION INTRAMUSCULAR
Status: DISCONTINUED | OUTPATIENT
Start: 2020-05-24 | End: 2020-05-28 | Stop reason: HOSPADM

## 2020-05-24 RX ADMIN — SODIUM CHLORIDE, PRESERVATIVE FREE 10 ML: 5 INJECTION INTRAVENOUS at 21:44

## 2020-05-24 RX ADMIN — MORPHINE SULFATE 4 MG: 4 INJECTION INTRAVENOUS at 17:43

## 2020-05-24 RX ADMIN — METOPROLOL TARTRATE 5 MG: 1 INJECTION, SOLUTION INTRAVENOUS at 03:53

## 2020-05-24 RX ADMIN — SODIUM CHLORIDE: 9 INJECTION, SOLUTION INTRAVENOUS at 17:43

## 2020-05-24 RX ADMIN — METOPROLOL TARTRATE 5 MG: 1 INJECTION, SOLUTION INTRAVENOUS at 23:34

## 2020-05-24 RX ADMIN — FAMOTIDINE 20 MG: 10 INJECTION INTRAVENOUS at 21:44

## 2020-05-24 RX ADMIN — INSULIN LISPRO 1 UNITS: 100 INJECTION, SOLUTION INTRAVENOUS; SUBCUTANEOUS at 13:52

## 2020-05-24 RX ADMIN — MORPHINE SULFATE 2 MG: 2 INJECTION, SOLUTION INTRAMUSCULAR; INTRAVENOUS at 14:49

## 2020-05-24 RX ADMIN — MORPHINE SULFATE 2 MG: 2 INJECTION, SOLUTION INTRAMUSCULAR; INTRAVENOUS at 10:47

## 2020-05-24 RX ADMIN — METOPROLOL TARTRATE 5 MG: 1 INJECTION, SOLUTION INTRAVENOUS at 17:43

## 2020-05-24 RX ADMIN — FAMOTIDINE 20 MG: 10 INJECTION INTRAVENOUS at 10:47

## 2020-05-24 RX ADMIN — ENOXAPARIN SODIUM 30 MG: 30 INJECTION SUBCUTANEOUS at 10:47

## 2020-05-24 RX ADMIN — MORPHINE SULFATE 2 MG: 2 INJECTION, SOLUTION INTRAMUSCULAR; INTRAVENOUS at 03:32

## 2020-05-24 RX ADMIN — DEXTROSE AND SODIUM CHLORIDE: 5; 450 INJECTION, SOLUTION INTRAVENOUS at 10:46

## 2020-05-24 ASSESSMENT — PAIN SCALES - GENERAL
PAINLEVEL_OUTOF10: 9
PAINLEVEL_OUTOF10: 8
PAINLEVEL_OUTOF10: 9
PAINLEVEL_OUTOF10: 0
PAINLEVEL_OUTOF10: 9
PAINLEVEL_OUTOF10: 9
PAINLEVEL_OUTOF10: 0

## 2020-05-24 ASSESSMENT — PAIN DESCRIPTION - PAIN TYPE: TYPE: CHRONIC PAIN

## 2020-05-24 ASSESSMENT — PAIN DESCRIPTION - PROGRESSION
CLINICAL_PROGRESSION: NOT CHANGED
CLINICAL_PROGRESSION: NOT CHANGED

## 2020-05-24 ASSESSMENT — PAIN DESCRIPTION - ONSET: ONSET: ON-GOING

## 2020-05-24 ASSESSMENT — PAIN DESCRIPTION - DESCRIPTORS: DESCRIPTORS: ACHING;PRESSURE

## 2020-05-24 ASSESSMENT — PAIN DESCRIPTION - LOCATION: LOCATION: HEAD

## 2020-05-24 ASSESSMENT — PAIN - FUNCTIONAL ASSESSMENT: PAIN_FUNCTIONAL_ASSESSMENT: ACTIVITIES ARE NOT PREVENTED

## 2020-05-24 ASSESSMENT — PAIN DESCRIPTION - FREQUENCY: FREQUENCY: CONTINUOUS

## 2020-05-24 NOTE — PROGRESS NOTES
elevated, but likely reactive. Start sliding scale insulin at low-dose. 10. anemia    Hemoglobin stable. Patient received 1 unit PRBC postop. Monitor hemoglobin.  -monitor h/h, check iron studies  11. HTN  Monitor blood pressure. Uncontrolled due to pain, prn IV labetalol while holding all PO meds  12. Chronic empyema  Seen on CT chest. Stable on chest CT. If pt develops leukocytosis or fevers, may consider pulm consult, otherwise f/u outpatient  13. Chronic pain syndrome due to chronic low back pain, degenerative disc disease, lumbar radiculopathy, spinal stenosis  Seen by pain management, who feels that current pain regimen is adequate. 14.   15. Sinus Tachycardia  -EKG with sinus tachycardia. On tele 's, sinus. May be due to uncontrolled pain. Will increase IV morphine to 2-4 mg q2h prn, pt on PO percocet at home chronically,  Metoprolol IVPB as pt not able to take home coreg,increase IV morphine to 2-4 mg q2h prn, takes PO percocet chronically, tx to step down, h/h stable, will check lactate, check UA, urine tox, no h/o etoh use, pt was on xanax at home, ? Benzo withdrawal, will start CIWA and monitor  16.  H/o multiple CVAs--pt reports was on xarelto previously but stopped as outpatient         DVT Prophylaxis: SQ lovenox  Diet: Diet NPO Effective Now Exceptions are: Ice Chips  Code Status: Full Code      Dispo:  - likely d/c to SNF vs.  [facility] in 3-4 days    Italo Delgado MD  5/24/2020    Meds:   Meds:    metoprolol (LOPRESSOR) ivpb  5 mg Intravenous Q6H    famotidine (PEPCID) injection  20 mg Intravenous BID    insulin lispro  0-6 Units Subcutaneous TID WC    insulin lispro  0-3 Units Subcutaneous Nightly    sodium chloride flush  10 mL Intravenous 2 times per day    enoxaparin  30 mg Subcutaneous Daily      Infusions:    sodium chloride      dextrose       PRN Meds: labetalol, 20 mg, Q4H PRN  morphine, 2 mg, Q2H PRN  morphine, 1 mg, Q2H PRN  glucose, 15 g, PRN  dextrose, 12.5 g,

## 2020-05-24 NOTE — PROGRESS NOTES
GENERAL SURGERY INPATIENT POST-OP NOTE  [facility]) Physicians    PATIENT: Fabiana Dawson, 79 y.o., male, MRN: [id number]    Hospital Day:  LOS: 4 days , Post-Op Day: 3 Days Post-Op    Procedure(s): Right hemicolectomy with take down of hepatic flexure. Patient stable overnight. Chart reviewed. Pain is not controlled. Nausea negative, vomitting negative. (-) BM and flatus.     Objective:    Vitals: BP (!) 182/94 Comment: notified RN Celia  Pulse 135   Temp 98.3 °F (36.8 °C) (Oral)   Resp 20   Ht 5' 9.5\" (1.765 m)   Wt 231 lb (104.8 kg)   SpO2 93%   BMI 33.62 kg/m²     I/O: 05/23 0701 - 05/24 0700  In: 0   Out: 3525 [Urine:2575]    IV Fluids: dextrose 5 % and 0.45 % NaCl Last Rate: 100 mL/hr at 05/23/20 2130    dextrose    Scheduled Meds: famotidine (PEPCID) injection, 20 mg, Intravenous, BID    insulin lispro, 0-6 Units, Subcutaneous, TID WC    insulin lispro, 0-3 Units, Subcutaneous, Nightly    sodium chloride flush, 10 mL, Intravenous, 2 times per day    enoxaparin, 30 mg, Subcutaneous, Daily    Physical Exam:  General appearance - alert and oriented  Abdomen - soft, mild tenderness, Island dressing on, no drainage noted  Neurological - Alert, Normal speech and No focal findings or movement disorder noted       Labs/Imaging Results:   Recent Results (from the past 24 hour(s))   Basic Metabolic Panel    Collection Time: 05/23/20 10:23 AM   Result Value Ref Range    Sodium 133 (L) 135 - 145 MMOL/L    Potassium 4.5 3.5 - 5.1 MMOL/L    Chloride 100 99 - 110 mMol/L    CO2 23 21 - 32 MMOL/L    Anion Gap 10 4 - 16    BUN 19 6 - 23 MG/DL    CREATININE 1.5 (H) 0.9 - 1.3 MG/DL    Glucose 159 (H) 70 - 99 MG/DL    Calcium 7.9 (L) 8.3 - 10.6 MG/DL    GFR Non- 46 (L) >60 mL/min/1.73m2    GFR  56 (L) >60 mL/min/1.73m2   Troponin    Collection Time: 05/23/20 10:23 AM   Result Value Ref Range    Troponin T 0.028 (H) <0.01 NG/ML   CBC Auto Differential    Collection Time:

## 2020-05-25 LAB
ABO/RH: NORMAL
ALBUMIN SERPL-MCNC: 3.1 GM/DL (ref 3.4–5)
ALP BLD-CCNC: 50 IU/L (ref 40–129)
ALT SERPL-CCNC: 8 U/L (ref 10–40)
AMPHETAMINES: NEGATIVE
ANION GAP SERPL CALCULATED.3IONS-SCNC: 10 MMOL/L (ref 4–16)
ANION GAP SERPL CALCULATED.3IONS-SCNC: 11 MMOL/L (ref 4–16)
ANTIBODY SCREEN: NEGATIVE
APTT: 33.7 SECONDS (ref 25.1–37.1)
AST SERPL-CCNC: 9 IU/L (ref 15–37)
BACTERIA: NEGATIVE /HPF
BARBITURATE SCREEN URINE: NEGATIVE
BASOPHILS ABSOLUTE: 0 K/CU MM
BASOPHILS RELATIVE PERCENT: 0.2 % (ref 0–1)
BENZODIAZEPINE SCREEN, URINE: NEGATIVE
BILIRUB SERPL-MCNC: 0.7 MG/DL (ref 0–1)
BILIRUBIN DIRECT: 0.2 MG/DL (ref 0–0.3)
BILIRUBIN URINE: NEGATIVE MG/DL
BILIRUBIN, INDIRECT: 0.5 MG/DL (ref 0–0.7)
BLOOD, URINE: NEGATIVE
BUN BLDV-MCNC: 17 MG/DL (ref 6–23)
BUN BLDV-MCNC: 18 MG/DL (ref 6–23)
CALCIUM SERPL-MCNC: 8 MG/DL (ref 8.3–10.6)
CALCIUM SERPL-MCNC: 8.5 MG/DL (ref 8.3–10.6)
CANNABINOID SCREEN URINE: NEGATIVE
CHLORIDE BLD-SCNC: 103 MMOL/L (ref 99–110)
CHLORIDE BLD-SCNC: 104 MMOL/L (ref 99–110)
CLARITY: CLEAR
CO2: 21 MMOL/L (ref 21–32)
CO2: 22 MMOL/L (ref 21–32)
COCAINE METABOLITE: NEGATIVE
COLOR: YELLOW
COMPONENT: NORMAL
COMPONENT: NORMAL
CREAT SERPL-MCNC: 1.3 MG/DL (ref 0.9–1.3)
CREAT SERPL-MCNC: 1.4 MG/DL (ref 0.9–1.3)
CROSSMATCH RESULT: NORMAL
CROSSMATCH RESULT: NORMAL
DIFFERENTIAL TYPE: ABNORMAL
EOSINOPHILS ABSOLUTE: 0.2 K/CU MM
EOSINOPHILS RELATIVE PERCENT: 2 % (ref 0–3)
ESTIMATED AVERAGE GLUCOSE: 105 MG/DL
FERRITIN: 153 NG/ML (ref 30–400)
FOLATE: >20 NG/ML (ref 3.1–17.5)
GFR AFRICAN AMERICAN: >60 ML/MIN/1.73M2
GFR AFRICAN AMERICAN: >60 ML/MIN/1.73M2
GFR NON-AFRICAN AMERICAN: 50 ML/MIN/1.73M2
GFR NON-AFRICAN AMERICAN: 55 ML/MIN/1.73M2
GLUCOSE BLD-MCNC: 108 MG/DL (ref 70–99)
GLUCOSE BLD-MCNC: 114 MG/DL (ref 70–99)
GLUCOSE BLD-MCNC: 118 MG/DL (ref 70–99)
GLUCOSE BLD-MCNC: 121 MG/DL (ref 70–99)
GLUCOSE BLD-MCNC: 125 MG/DL (ref 70–99)
GLUCOSE BLD-MCNC: 126 MG/DL (ref 70–99)
GLUCOSE, URINE: 50 MG/DL
HBA1C MFR BLD: 5.3 % (ref 4.2–6.3)
HCT VFR BLD CALC: 24.3 % (ref 42–52)
HEMOGLOBIN: 7.1 GM/DL (ref 13.5–18)
IMMATURE NEUTROPHIL %: 0.5 % (ref 0–0.43)
INR BLD: 0.97 INDEX
IRON: 29 UG/DL (ref 59–158)
KETONES, URINE: NEGATIVE MG/DL
LACTATE: 0.8 MMOL/L (ref 0.4–2)
LEUKOCYTE ESTERASE, URINE: NEGATIVE
LYMPHOCYTES ABSOLUTE: 1 K/CU MM
LYMPHOCYTES RELATIVE PERCENT: 11.9 % (ref 24–44)
MAGNESIUM: 2.1 MG/DL (ref 1.8–2.4)
MAGNESIUM: 2.2 MG/DL (ref 1.8–2.4)
MCH RBC QN AUTO: 23.3 PG (ref 27–31)
MCHC RBC AUTO-ENTMCNC: 29.2 % (ref 32–36)
MCV RBC AUTO: 79.7 FL (ref 78–100)
MONOCYTES ABSOLUTE: 0.7 K/CU MM
MONOCYTES RELATIVE PERCENT: 7.7 % (ref 0–4)
MUCUS: ABNORMAL HPF
NITRITE URINE, QUANTITATIVE: NEGATIVE
NUCLEATED RBC %: 0 %
OPIATES, URINE: ABNORMAL
OXYCODONE: NEGATIVE
PCT TRANSFERRIN: 10 % (ref 10–44)
PDW BLD-RTO: 18.1 % (ref 11.7–14.9)
PH, URINE: 7 (ref 5–8)
PHENCYCLIDINE, URINE: NEGATIVE
PLATELET # BLD: 232 K/CU MM (ref 140–440)
PMV BLD AUTO: 9.1 FL (ref 7.5–11.1)
POTASSIUM SERPL-SCNC: 4.4 MMOL/L (ref 3.5–5.1)
POTASSIUM SERPL-SCNC: 4.4 MMOL/L (ref 3.5–5.1)
PROTEIN UA: NEGATIVE MG/DL
PROTHROMBIN TIME: 11.7 SECONDS (ref 11.7–14.5)
RBC # BLD: 3.05 M/CU MM (ref 4.6–6.2)
RBC URINE: ABNORMAL /HPF (ref 0–3)
SEGMENTED NEUTROPHILS ABSOLUTE COUNT: 6.6 K/CU MM
SEGMENTED NEUTROPHILS RELATIVE PERCENT: 77.7 % (ref 36–66)
SODIUM BLD-SCNC: 135 MMOL/L (ref 135–145)
SODIUM BLD-SCNC: 136 MMOL/L (ref 135–145)
SPECIFIC GRAVITY UA: 1.02 (ref 1–1.03)
STATUS: NORMAL
STATUS: NORMAL
TOTAL IMMATURE NEUTOROPHIL: 0.04 K/CU MM
TOTAL IRON BINDING CAPACITY: 297 UG/DL (ref 250–450)
TOTAL NUCLEATED RBC: 0 K/CU MM
TOTAL PROTEIN: 5.6 GM/DL (ref 6.4–8.2)
TRANSFUSION STATUS: NORMAL
TRANSFUSION STATUS: NORMAL
TRICHOMONAS: ABNORMAL /HPF
TSH HIGH SENSITIVITY: 3.31 UIU/ML (ref 0.27–4.2)
UNIT DIVISION: 0
UNIT DIVISION: 0
UNIT NUMBER: NORMAL
UNIT NUMBER: NORMAL
UNSATURATED IRON BINDING CAPACITY: 268 UG/DL (ref 110–370)
UROBILINOGEN, URINE: 2 MG/DL (ref 0.2–1)
VITAMIN B-12: 355 PG/ML (ref 211–911)
WBC # BLD: 8.5 K/CU MM (ref 4–10.5)
WBC UA: 1 /HPF (ref 0–2)

## 2020-05-25 PROCEDURE — 82248 BILIRUBIN DIRECT: CPT

## 2020-05-25 PROCEDURE — 83550 IRON BINDING TEST: CPT

## 2020-05-25 PROCEDURE — 2500000003 HC RX 250 WO HCPCS: Performed by: HOSPITALIST

## 2020-05-25 PROCEDURE — 99024 POSTOP FOLLOW-UP VISIT: CPT | Performed by: NURSE PRACTITIONER

## 2020-05-25 PROCEDURE — 2580000003 HC RX 258: Performed by: INTERNAL MEDICINE

## 2020-05-25 PROCEDURE — 6360000002 HC RX W HCPCS: Performed by: INTERNAL MEDICINE

## 2020-05-25 PROCEDURE — 83540 ASSAY OF IRON: CPT

## 2020-05-25 PROCEDURE — 82607 VITAMIN B-12: CPT

## 2020-05-25 PROCEDURE — 6360000002 HC RX W HCPCS: Performed by: SURGERY

## 2020-05-25 PROCEDURE — 2580000003 HC RX 258: Performed by: HOSPITALIST

## 2020-05-25 PROCEDURE — 80307 DRUG TEST PRSMV CHEM ANLYZR: CPT

## 2020-05-25 PROCEDURE — 82728 ASSAY OF FERRITIN: CPT

## 2020-05-25 PROCEDURE — 94761 N-INVAS EAR/PLS OXIMETRY MLT: CPT

## 2020-05-25 PROCEDURE — 85025 COMPLETE CBC W/AUTO DIFF WBC: CPT

## 2020-05-25 PROCEDURE — 81001 URINALYSIS AUTO W/SCOPE: CPT

## 2020-05-25 PROCEDURE — 2500000003 HC RX 250 WO HCPCS: Performed by: INTERNAL MEDICINE

## 2020-05-25 PROCEDURE — 83735 ASSAY OF MAGNESIUM: CPT

## 2020-05-25 PROCEDURE — 85730 THROMBOPLASTIN TIME PARTIAL: CPT

## 2020-05-25 PROCEDURE — 2140000000 HC CCU INTERMEDIATE R&B

## 2020-05-25 PROCEDURE — 83605 ASSAY OF LACTIC ACID: CPT

## 2020-05-25 PROCEDURE — 80053 COMPREHEN METABOLIC PANEL: CPT

## 2020-05-25 PROCEDURE — 84443 ASSAY THYROID STIM HORMONE: CPT

## 2020-05-25 PROCEDURE — 82962 GLUCOSE BLOOD TEST: CPT

## 2020-05-25 PROCEDURE — 94664 DEMO&/EVAL PT USE INHALER: CPT

## 2020-05-25 PROCEDURE — 85610 PROTHROMBIN TIME: CPT

## 2020-05-25 PROCEDURE — 82746 ASSAY OF FOLIC ACID SERUM: CPT

## 2020-05-25 PROCEDURE — 2500000003 HC RX 250 WO HCPCS: Performed by: NURSE PRACTITIONER

## 2020-05-25 RX ADMIN — MORPHINE SULFATE 4 MG: 4 INJECTION INTRAVENOUS at 10:04

## 2020-05-25 RX ADMIN — METOPROLOL TARTRATE 5 MG: 1 INJECTION, SOLUTION INTRAVENOUS at 09:42

## 2020-05-25 RX ADMIN — FAMOTIDINE 20 MG: 10 INJECTION INTRAVENOUS at 22:16

## 2020-05-25 RX ADMIN — VALPROATE SODIUM 250 MG: 100 INJECTION, SOLUTION INTRAVENOUS at 19:02

## 2020-05-25 RX ADMIN — MORPHINE SULFATE 4 MG: 4 INJECTION INTRAVENOUS at 15:17

## 2020-05-25 RX ADMIN — ENOXAPARIN SODIUM 30 MG: 30 INJECTION SUBCUTANEOUS at 10:04

## 2020-05-25 RX ADMIN — METOPROLOL TARTRATE 5 MG: 1 INJECTION, SOLUTION INTRAVENOUS at 22:03

## 2020-05-25 RX ADMIN — FAMOTIDINE 20 MG: 10 INJECTION INTRAVENOUS at 10:04

## 2020-05-25 RX ADMIN — SODIUM CHLORIDE, PRESERVATIVE FREE 10 ML: 5 INJECTION INTRAVENOUS at 10:05

## 2020-05-25 ASSESSMENT — PAIN SCALES - GENERAL
PAINLEVEL_OUTOF10: 8
PAINLEVEL_OUTOF10: 10
PAINLEVEL_OUTOF10: 8

## 2020-05-25 ASSESSMENT — PAIN DESCRIPTION - PAIN TYPE: TYPE: CHRONIC PAIN

## 2020-05-25 ASSESSMENT — PAIN DESCRIPTION - DESCRIPTORS: DESCRIPTORS: ACHING;CONSTANT

## 2020-05-25 ASSESSMENT — PAIN DESCRIPTION - LOCATION: LOCATION: NECK

## 2020-05-25 ASSESSMENT — PAIN DESCRIPTION - FREQUENCY: FREQUENCY: CONTINUOUS

## 2020-05-25 NOTE — PLAN OF CARE
Problem: Pain:  Goal: Pain level will decrease  Description: Pain level will decrease  5/25/2020 0225 by Naila Narayan RN  Outcome: Ongoing  5/24/2020 1409 by Marylene Pine, RN  Outcome: Ongoing  Goal: Control of acute pain  Description: Control of acute pain  5/25/2020 0225 by Naila Narayan RN  Outcome: Ongoing  5/24/2020 1409 by Marylene Pine, RN  Outcome: Ongoing  Goal: Control of chronic pain  Description: Control of chronic pain  5/25/2020 0225 by Naila Narayan RN  Outcome: Ongoing  5/24/2020 1409 by Marylene Pine, RN  Outcome: Ongoing     Problem: Falls - Risk of:  Goal: Will remain free from falls  Description: Will remain free from falls  5/25/2020 0225 by Naila Narayan RN  Outcome: Ongoing  5/24/2020 1409 by Marylene Pine, RN  Outcome: Ongoing  Goal: Absence of physical injury  Description: Absence of physical injury  5/25/2020 0225 by Naila Narayan RN  Outcome: Ongoing  5/24/2020 1409 by Marylene Pine, RN  Outcome: Ongoing

## 2020-05-25 NOTE — PROGRESS NOTES
Depacon 1000 mg IV ordered. Pharmacy recommends that with IV dose to change it to 250 mg every 6. Will adjust dose.

## 2020-05-25 NOTE — PROGRESS NOTES
Segs Absolute 6.6 K/CU MM    Lymphocytes Absolute 1.0 K/CU MM    Monocytes Absolute 0.7 K/CU MM    Eosinophils Absolute 0.2 K/CU MM    Basophils Absolute 0.0 K/CU MM    Nucleated RBC % 0.0 %    Total Nucleated RBC 0.0 K/CU MM    Total Immature Neutrophil 0.04 K/CU MM    Immature Neutrophil % 0.5 (H) 0 - 0.43 %   Iron and TIBC    Collection Time: 05/25/20  4:08 AM   Result Value Ref Range    Iron 29 (L) 59 - 158 ug/dL    UIBC 268 110 - 370 ug/dL    TIBC 297 250 - 450 ug/dL    Transferrin % 10 10 - 44 %   Ferritin    Collection Time: 05/25/20  4:08 AM   Result Value Ref Range    Ferritin 153 30 - 400 NG/ML   Vitamin B12 & Folate    Collection Time: 05/25/20  4:08 AM   Result Value Ref Range    Vitamin B-12 355.0 211 - 911 pg/ml    Folate >20.0 (H) 3.1 - 17.5 NG/ML   Hepatic function panel    Collection Time: 05/25/20  4:08 AM   Result Value Ref Range    Alb 3.1 (L) 3.4 - 5.0 GM/DL    Total Bilirubin 0.7 0.0 - 1.0 MG/DL    Bilirubin, Direct 0.2 0.0 - 0.3 MG/DL    Bilirubin, Indirect 0.5 0 - 0.7 MG/DL    Alkaline Phosphatase 50 40 - 129 IU/L    AST 9 (L) 15 - 37 IU/L    ALT 8 (L) 10 - 40 U/L    Total Protein 5.6 (L) 6.4 - 8.2 GM/DL   Protime/INR & PTT    Collection Time: 05/25/20  4:08 AM   Result Value Ref Range    Protime 11.7 11.7 - 14.5 SECONDS    INR 0.97 INDEX    aPTT 33.7 25.1 - 37.1 SECONDS   Lactic acid, plasma    Collection Time: 05/25/20  4:08 AM   Result Value Ref Range    Lactate 0.8 0.4 - 2.0 mMOL/L   TSH without Reflex    Collection Time: 05/25/20  4:08 AM   Result Value Ref Range    TSH, High Sensitivity 3.310 0.270 - 4.20 uIu/ml       Assessment/Plan:  Susan Pemberton is a 79 y.o. male who is post-op day #4 Days Post-Op  Right hemicolectomy with take down of splenic flexure. Per nurse, had a large dark bloody stool x 2. Hgb stable. Complains of neck pain, pain medication was increased by hospitalist.  Still tachy with movement/pain, getting metoprolol Q6hr.   150 out of NG yesterday, per nurse

## 2020-05-26 PROBLEM — I47.19 ATRIAL TACHYCARDIA: Status: ACTIVE | Noted: 2020-05-26

## 2020-05-26 PROBLEM — I47.1 ATRIAL TACHYCARDIA (HCC): Status: ACTIVE | Noted: 2020-05-26

## 2020-05-26 LAB
ANION GAP SERPL CALCULATED.3IONS-SCNC: 12 MMOL/L (ref 4–16)
BASOPHILS ABSOLUTE: 0 K/CU MM
BASOPHILS RELATIVE PERCENT: 0.5 % (ref 0–1)
BUN BLDV-MCNC: 19 MG/DL (ref 6–23)
CALCIUM SERPL-MCNC: 8.5 MG/DL (ref 8.3–10.6)
CHLORIDE BLD-SCNC: 103 MMOL/L (ref 99–110)
CO2: 19 MMOL/L (ref 21–32)
CREAT SERPL-MCNC: 1.4 MG/DL (ref 0.9–1.3)
DIFFERENTIAL TYPE: ABNORMAL
EOSINOPHILS ABSOLUTE: 0.2 K/CU MM
EOSINOPHILS RELATIVE PERCENT: 2.5 % (ref 0–3)
GFR AFRICAN AMERICAN: >60 ML/MIN/1.73M2
GFR NON-AFRICAN AMERICAN: 50 ML/MIN/1.73M2
GLUCOSE BLD-MCNC: 113 MG/DL (ref 70–99)
GLUCOSE BLD-MCNC: 113 MG/DL (ref 70–99)
GLUCOSE BLD-MCNC: 114 MG/DL (ref 70–99)
GLUCOSE BLD-MCNC: 120 MG/DL (ref 70–99)
GLUCOSE BLD-MCNC: 122 MG/DL (ref 70–99)
GLUCOSE BLD-MCNC: 132 MG/DL (ref 70–99)
HCT VFR BLD CALC: 23.9 % (ref 42–52)
HEMOGLOBIN: 7.2 GM/DL (ref 13.5–18)
IMMATURE NEUTROPHIL %: 0.4 % (ref 0–0.43)
LYMPHOCYTES ABSOLUTE: 1 K/CU MM
LYMPHOCYTES RELATIVE PERCENT: 13.2 % (ref 24–44)
MAGNESIUM: 2.2 MG/DL (ref 1.8–2.4)
MCH RBC QN AUTO: 23.8 PG (ref 27–31)
MCHC RBC AUTO-ENTMCNC: 30.1 % (ref 32–36)
MCV RBC AUTO: 79.1 FL (ref 78–100)
MONOCYTES ABSOLUTE: 0.6 K/CU MM
MONOCYTES RELATIVE PERCENT: 7.8 % (ref 0–4)
NUCLEATED RBC %: 0 %
PDW BLD-RTO: 18.1 % (ref 11.7–14.9)
PLATELET # BLD: 224 K/CU MM (ref 140–440)
PMV BLD AUTO: 8.4 FL (ref 7.5–11.1)
POTASSIUM SERPL-SCNC: 4.2 MMOL/L (ref 3.5–5.1)
RBC # BLD: 3.02 M/CU MM (ref 4.6–6.2)
SEGMENTED NEUTROPHILS ABSOLUTE COUNT: 5.5 K/CU MM
SEGMENTED NEUTROPHILS RELATIVE PERCENT: 75.6 % (ref 36–66)
SODIUM BLD-SCNC: 134 MMOL/L (ref 135–145)
TOTAL IMMATURE NEUTOROPHIL: 0.03 K/CU MM
TOTAL NUCLEATED RBC: 0 K/CU MM
WBC # BLD: 7.3 K/CU MM (ref 4–10.5)

## 2020-05-26 PROCEDURE — U0002 COVID-19 LAB TEST NON-CDC: HCPCS

## 2020-05-26 PROCEDURE — 80048 BASIC METABOLIC PNL TOTAL CA: CPT

## 2020-05-26 PROCEDURE — 2500000003 HC RX 250 WO HCPCS: Performed by: NURSE PRACTITIONER

## 2020-05-26 PROCEDURE — 36415 COLL VENOUS BLD VENIPUNCTURE: CPT

## 2020-05-26 PROCEDURE — 97166 OT EVAL MOD COMPLEX 45 MIN: CPT

## 2020-05-26 PROCEDURE — 2580000003 HC RX 258: Performed by: HOSPITALIST

## 2020-05-26 PROCEDURE — 85025 COMPLETE CBC W/AUTO DIFF WBC: CPT

## 2020-05-26 PROCEDURE — 83735 ASSAY OF MAGNESIUM: CPT

## 2020-05-26 PROCEDURE — 97530 THERAPEUTIC ACTIVITIES: CPT

## 2020-05-26 PROCEDURE — 6360000002 HC RX W HCPCS: Performed by: INTERNAL MEDICINE

## 2020-05-26 PROCEDURE — 2500000003 HC RX 250 WO HCPCS: Performed by: HOSPITALIST

## 2020-05-26 PROCEDURE — 94761 N-INVAS EAR/PLS OXIMETRY MLT: CPT

## 2020-05-26 PROCEDURE — 99024 POSTOP FOLLOW-UP VISIT: CPT | Performed by: NURSE PRACTITIONER

## 2020-05-26 PROCEDURE — 82962 GLUCOSE BLOOD TEST: CPT

## 2020-05-26 PROCEDURE — 6360000002 HC RX W HCPCS: Performed by: SURGERY

## 2020-05-26 PROCEDURE — 99223 1ST HOSP IP/OBS HIGH 75: CPT | Performed by: INTERNAL MEDICINE

## 2020-05-26 PROCEDURE — 2580000003 HC RX 258: Performed by: INTERNAL MEDICINE

## 2020-05-26 PROCEDURE — 97162 PT EVAL MOD COMPLEX 30 MIN: CPT

## 2020-05-26 PROCEDURE — 2140000000 HC CCU INTERMEDIATE R&B

## 2020-05-26 RX ADMIN — FAMOTIDINE 20 MG: 10 INJECTION INTRAVENOUS at 21:47

## 2020-05-26 RX ADMIN — VALPROATE SODIUM 250 MG: 100 INJECTION, SOLUTION INTRAVENOUS at 15:07

## 2020-05-26 RX ADMIN — ENOXAPARIN SODIUM 30 MG: 30 INJECTION SUBCUTANEOUS at 10:52

## 2020-05-26 RX ADMIN — SODIUM CHLORIDE: 9 INJECTION, SOLUTION INTRAVENOUS at 06:39

## 2020-05-26 RX ADMIN — METOPROLOL TARTRATE 5 MG: 1 INJECTION, SOLUTION INTRAVENOUS at 14:16

## 2020-05-26 RX ADMIN — VALPROATE SODIUM 250 MG: 100 INJECTION, SOLUTION INTRAVENOUS at 06:39

## 2020-05-26 RX ADMIN — VALPROATE SODIUM 250 MG: 100 INJECTION, SOLUTION INTRAVENOUS at 00:41

## 2020-05-26 RX ADMIN — METOPROLOL TARTRATE 5 MG: 1 INJECTION, SOLUTION INTRAVENOUS at 04:28

## 2020-05-26 RX ADMIN — AMIODARONE HYDROCHLORIDE 0.5 MG/MIN: 50 INJECTION, SOLUTION INTRAVENOUS at 17:14

## 2020-05-26 RX ADMIN — FAMOTIDINE 20 MG: 10 INJECTION INTRAVENOUS at 10:52

## 2020-05-26 RX ADMIN — LORAZEPAM 2 MG: 2 INJECTION INTRAMUSCULAR; INTRAVENOUS at 03:47

## 2020-05-26 RX ADMIN — VALPROATE SODIUM 250 MG: 100 INJECTION, SOLUTION INTRAVENOUS at 22:44

## 2020-05-26 ASSESSMENT — PAIN SCALES - GENERAL: PAINLEVEL_OUTOF10: 6

## 2020-05-26 ASSESSMENT — PAIN DESCRIPTION - FREQUENCY: FREQUENCY: CONTINUOUS

## 2020-05-26 ASSESSMENT — PAIN DESCRIPTION - PAIN TYPE: TYPE: CHRONIC PAIN

## 2020-05-26 ASSESSMENT — PAIN DESCRIPTION - DESCRIPTORS: DESCRIPTORS: ACHING

## 2020-05-26 ASSESSMENT — PAIN DESCRIPTION - LOCATION: LOCATION: NECK

## 2020-05-26 NOTE — CARE COORDINATION
LSW spoke with pt regarding PT/OT recommendation for placement. Pt stated he is not sure and wants to think about it. NH list given to pt. LSW will follow up with pt in the morning. If pt agrees to go to a SNF COVID test is needed prior to admission. LSW PS that doctor and informed him of this info.

## 2020-05-26 NOTE — PROGRESS NOTES
outpatient    >Chronic pain syndrome due to chronic low back pain, degenerative disc disease, lumbar radiculopathy, spinal stenosis  Seen by pain management, who feels that current pain regimen is adequate. >Sinus Tachycardia  EKG with sinus tachycardia. On tele 's, sinus. May be due to uncontrolled pain. Will increase IV morphine to 2-4 mg q2h prn, pt on PO percocet at home chronically,  Metoprolol IVPB as pt not able to take home coreg,increase IV morphine to 2-4 mg q2h prn, takes PO percocet chronically, tx to step down, h/h stable, will check lactate, check UA, urine tox, no h/o etoh use, pt was on xanax at home, ? Benzo withdrawal, will start CIWA and monitor  -cardio re consulted. >H/o multiple CVAs  Pt reports was on xarelto previously but stopped as outpatient    >Bipolar disorder  -restart Depakote which is IV. Holding other psych meds until patient can take p.o. Lack of psych meds could be contributing to patient's mental status changes.     Diet DIET FULL LIQUID;   DVT Prophylaxis ? Lovenox   Code Status Full Code   Disposition  pending surgery clearance     History of Present Illness:     Pt S&E. No chest pain, no dyspnea, no abd pain, no n/V, no F/C.     10-14 point ROS reviewed negative, unless as noted above    Objective: Intake/Output Summary (Last 24 hours) at 5/26/2020 1038  Last data filed at 5/26/2020 0657  Gross per 24 hour   Intake 1200 ml   Output 2250 ml   Net -1050 ml      Vitals:   Vitals:    05/26/20 0935   BP:    Pulse:    Resp: 21   Temp:    SpO2: 97%     Physical Exam:      GEN Awake male, cooperative, no apparent distress. RESP Decreased air sound. Symmetric chest movement . CARDIO/VASC S1/S2 auscultated. Regular rate. GI Abdomen is soft without significant tenderness, Bowel sounds are normoactive. MSK No gross joint deformities. SKIN Normal coloration, warm, dry. NEURO/PSYCH Awake, alert, oriented x 4. Affect appropriate.     Medications: Medications:    valproate sodium (DEPACON) IVPB  250 mg Intravenous Q6H    metoprolol (LOPRESSOR) ivpb  5 mg Intravenous Q6H    insulin lispro  0-6 Units Subcutaneous 6 times per day    sodium chloride flush  10 mL Intravenous 2 times per day    famotidine (PEPCID) injection  20 mg Intravenous BID    sodium chloride flush  10 mL Intravenous 2 times per day    enoxaparin  30 mg Subcutaneous Daily      Infusions:    sodium chloride 75 mL/hr at 05/26/20 0639    dextrose       PRN Meds: labetalol (TRANDATE) IVPB, 20 mg, Q4H PRN  morphine, 2 mg, Q2H PRN  morphine, 4 mg, Q2H PRN  sodium chloride flush, 10 mL, PRN  LORazepam, 1 mg, Q1H PRN    Or  LORazepam, 1 mg, Q1H PRN    Or  LORazepam, 2 mg, Q1H PRN    Or  LORazepam, 2 mg, Q1H PRN    Or  LORazepam, 3 mg, Q1H PRN    Or  LORazepam, 3 mg, Q1H PRN    Or  LORazepam, 4 mg, Q1H PRN    Or  LORazepam, 4 mg, Q1H PRN  glucose, 15 g, PRN  dextrose, 12.5 g, PRN  glucagon (rDNA), 1 mg, PRN  dextrose, 100 mL/hr, PRN  sodium chloride flush, 10 mL, PRN  promethazine, 12.5 mg, Q6H PRN    Or  ondansetron, 4 mg, Q6H PRN          Electronically signed by Petr Will MD on 5/26/2020 at 10:38 AM

## 2020-05-26 NOTE — PROGRESS NOTES
instructed today. Cues were given for safety, sequence, UE/LE placement, visual cues, and balance. Activities performed today included bed mobility training, sup-sit, sit-stand, SPT. Education: Role of OT, OT POC, d/c needs, home safety    Safety: Left in chair with all needs in reach. Gait belt used for transfer and mobility. Time in:  1017  Time out:  1041  Timed treatment minutes:  15  Total treatment time:  24    Electronically signed by:    [id number], OTR/L, [place]   [id number]   1:48 PM, 5/26/2020

## 2020-05-26 NOTE — CONSULTS
CARDIOLOGY CONSULT NOTE   Reason for consultation:  Atrial tachycardia     Referring physician:  Art May MD     Primary care physician: Jerzy Garzon MD      Dear  Dr. Art May MD   Thanks for the consult. Chief Complaints :  Chief Complaint   Patient presents with    Chest Pain        History of present illness:Felipe is a 79 y. o.year old who was admitted with chest pain and anemia as part of work-up he was found to have colon cancer he underwent hemicolectomy on 21st he is postop day 5 noted to have frequent episodes of tachycardia which appears to be atrial tach. He does have history of atrial fibrillation atrial tachycardia with preserved EF most recent stress test in May was normal cardiac cath in 2014 showed diffuse bad LAD disease. Blood pressures been running high he is still n.p.o. due to ileus    Past medical history:    has a past medical history of Anemia, Arthritis, Back pain, chronic, Bipolar 1 disorder ([address] CAD (coronary artery disease), Cerebral artery occlusion with cerebral infarction ([address] Chronic kidney disease (CKD), stage III (moderate) ([address] CKD (chronic kidney disease), COPD (chronic obstructive pulmonary disease) ([address] Diabetes mellitus, type 2 ([address] Diabetic peripheral neuropathy ([address] Diastolic CHF ([address] Gastric ulcer, H/O cardiovascular stress test, Heart murmur, Hiatal hernia, History of cardiac cath, Hx of migraines, HX OTHER MEDICAL, Hyperlipidemia, Hypertension, Lumbar radiculopathy, Panic attack, Pleural effusion, S/P thoracentesis, Sleep apnea, SOBOE (shortness of breath on exertion), and Spinal stenosis. Past surgical history:   has a past surgical history that includes Neck surgery (1998); Carpal tunnel release (Bilateral, 1989); knee surgery (Bilateral); thoracentesis (Left, 12/20/2013); Elbow surgery (Left, 2000's); Thoracentesis (4/25/2016); Tonsillectomy;  Thoracentesis (Left, 11/15/2016); thoracotomy (Left, 03/18/2019); thoracotomy (Left, Route Frequency Provider Last Rate Last Dose    valproate (DEPACON) 250 mg in dextrose 5 % 100 mL IVPB  250 mg Intravenous Q6H Geovany Santos MD   Stopped at 05/26/20 0739    0.9 % sodium chloride infusion   Intravenous Continuous Aldo Lao MD 75 mL/hr at 05/26/20 0639      metoprolol (LOPRESSOR) 5 mg in sodium chloride 0.9 % 50 mL IVPB  5 mg Intravenous Q6H Geovany Santos  mL/hr at 05/26/20 1416 5 mg at 05/26/20 1416    labetalol (NORMODYNE;TRANDATE) 20 mg in sodium chloride 0.9 % 50 mL IVPB  20 mg Intravenous Q4H PRN Aldo Lao MD        morphine (PF) injection 2 mg  2 mg Intravenous Q2H PRN Aldo Lao MD        morphine sulfate (PF) injection 4 mg  4 mg Intravenous Q2H PRN Aldo Lao MD   4 mg at 05/25/20 1517    insulin lispro (HUMALOG) injection vial 0-6 Units  0-6 Units Subcutaneous 6 times per day Aldo Lao MD        sodium chloride flush 0.9 % injection 10 mL  10 mL Intravenous 2 times per day Aldo Lao MD   10 mL at 05/25/20 1005    sodium chloride flush 0.9 % injection 10 mL  10 mL Intravenous PRN Aldo Lao MD        LORazepam (ATIVAN) tablet 1 mg  1 mg Oral Q1H PRN Aldo Lao MD        Or    LORazepam (ATIVAN) injection 1 mg  1 mg Intravenous Q1H PRN Aldo Lao MD        Or    LORazepam (ATIVAN) tablet 2 mg  2 mg Oral Q1H PRN Aldo Lao MD        Or    LORazepam (ATIVAN) injection 2 mg  2 mg Intravenous Q1H PRN Aldo Lao MD   2 mg at 05/26/20 0347    Or    LORazepam (ATIVAN) tablet 3 mg  3 mg Oral Q1H PRN Aldo Lao MD        Or    LORazepam (ATIVAN) injection 3 mg  3 mg Intravenous Q1H PRN Aldo Lao MD        Or    LORazepam (ATIVAN) tablet 4 mg  4 mg Oral Q1H PRN Aldo Lao MD        Or    LORazepam (ATIVAN) injection 4 mg  4 mg Intravenous Q1H PRN Aldo Lao MD        famotidine (PEPCID) injection 20 mg  20 mg Intravenous BID Delicia Barragan 36 months earlier. CHEST RADIOGRAPH: Volume loss left lung with left pleural fluid is evident. Stable perfusion to the lungs with a diminutive left lung with associated pleural-parenchymal abnormalities noted on recent chest radiograph. The probability of pulmonary embolus is low. Xr Chest Portable    Result Date: 5/19/2020  EXAMINATION: ONE XRAY VIEW OF THE CHEST 5/19/2020 2:10 am COMPARISON: 05/09/2020 HISTORY: ORDERING SYSTEM PROVIDED HISTORY: cp TECHNOLOGIST PROVIDED HISTORY: Reason for exam:->cp Reason for Exam: chest pain Acuity: Unknown Type of Exam: Unknown FINDINGS: The cardiac silhouette appears within normal limits for size given portable technique. Again seen is pleuroparenchymal opacity in the left lung base, stable. No pneumothorax     Stable examination Ree demonstrating pleuroparenchymal opacity in the left lung base with volume loss. Xr Chest Portable    Result Date: 5/9/2020  EXAMINATION: ONE XRAY VIEW OF THE CHEST 5/9/2020 12:51 am COMPARISON: 04/17/2020 HISTORY: ORDERING SYSTEM PROVIDED HISTORY: Chest pain TECHNOLOGIST PROVIDED HISTORY: Reason for exam:->Chest pain Reason for Exam: chest pain Acuity: Unknown Type of Exam: Initial FINDINGS: The left hemidiaphragm is elevated. Redemonstration to moderate left pleural effusion with left mid and lower lung consolidation. There appears to be least some degree of pleural thickening extending along the left lateral wall. The right lung demonstrates basilar atelectasis. Stable heart size and mediastinal contours. No change as described. Xr Abdomen For Ng/og/ne Tube Placement    Result Date: 5/23/2020  EXAMINATION: ONE SUPINE XRAY VIEW(S) OF THE ABDOMEN 5/23/2020 8:23 am COMPARISON: None. HISTORY: ORDERING SYSTEM PROVIDED HISTORY: Confirmation of course of NG/OG/NE tube and location of tip of tube TECHNOLOGIST PROVIDED HISTORY: Reason for exam:->Confirmation of course of NG/OG/NE tube and location of tip of tube Portable? ->Yes

## 2020-05-26 NOTE — PROGRESS NOTES
5.0 - 8.0    Protein, UA NEGATIVE NEGATIVE MG/DL    Urobilinogen, Urine 2.0 (H) 0.2 - 1.0 MG/DL    Nitrite Urine, Quantitative NEGATIVE NEGATIVE    Leukocyte Esterase, Urine NEGATIVE NEGATIVE    RBC, UA NONE SEEN 0 - 3 /HPF    WBC, UA 1 0 - 2 /HPF    Bacteria, UA NEGATIVE NEGATIVE /HPF    Mucus, UA RARE (A) NEGATIVE HPF    Trichomonas, UA NONE SEEN NONE SEEN /HPF   Drug screen multi urine    Collection Time: 05/25/20  3:10 PM   Result Value Ref Range    Cannabinoid Scrn, Ur NEGATIVE NEGATIVE    Amphetamines NEGATIVE NEGATIVE    Cocaine Metabolite NEGATIVE NEGATIVE    Benzodiazepine Screen, Urine NEGATIVE NEGATIVE    Barbiturate Screen, Ur NEGATIVE NEGATIVE    Opiates, Urine UNCONFIRMED POSITIVE (A) NEGATIVE    Phencyclidine, Urine NEGATIVE NEGATIVE    Oxycodone NEGATIVE NEGATIVE   POCT Glucose    Collection Time: 05/25/20  5:15 PM   Result Value Ref Range    POC Glucose 118 (H) 70 - 99 MG/DL   POCT Glucose    Collection Time: 05/25/20 10:07 PM   Result Value Ref Range    POC Glucose 114 (H) 70 - 99 MG/DL   POCT Glucose    Collection Time: 05/26/20 12:39 AM   Result Value Ref Range    POC Glucose 113 (H) 70 - 99 MG/DL   POCT Glucose    Collection Time: 05/26/20  3:52 AM   Result Value Ref Range    POC Glucose 113 (H) 70 - 99 MG/DL   Basic Metabolic Panel    Collection Time: 05/26/20  5:02 AM   Result Value Ref Range    Sodium 134 (L) 135 - 145 MMOL/L    Potassium 4.2 3.5 - 5.1 MMOL/L    Chloride 103 99 - 110 mMol/L    CO2 19 (L) 21 - 32 MMOL/L    Anion Gap 12 4 - 16    BUN 19 6 - 23 MG/DL    CREATININE 1.4 (H) 0.9 - 1.3 MG/DL    Glucose 114 (H) 70 - 99 MG/DL    Calcium 8.5 8.3 - 10.6 MG/DL    GFR Non-African American 50 (L) >60 mL/min/1.73m2    GFR African American >60 >60 mL/min/1.73m2   Magnesium    Collection Time: 05/26/20  5:02 AM   Result Value Ref Range    Magnesium 2.2 1.8 - 2.4 mg/dl   CBC Auto Differential    Collection Time: 05/26/20  5:02 AM   Result Value Ref Range    WBC 7.3 4.0 - 10.5 K/CU MM    RBC 3.02 (L) 4.6 - 6.2 M/CU MM    Hemoglobin 7.2 (L) 13.5 - 18.0 GM/DL    Hematocrit 23.9 (L) 42 - 52 %    MCV 79.1 78 - 100 FL    MCH 23.8 (L) 27 - 31 PG    MCHC 30.1 (L) 32.0 - 36.0 %    RDW 18.1 (H) 11.7 - 14.9 %    Platelets 419 331 - 728 K/CU MM    MPV 8.4 7.5 - 11.1 FL    Differential Type AUTOMATED DIFFERENTIAL     Segs Relative 75.6 (H) 36 - 66 %    Lymphocytes % 13.2 (L) 24 - 44 %    Monocytes % 7.8 (H) 0 - 4 %    Eosinophils % 2.5 0 - 3 %    Basophils % 0.5 0 - 1 %    Segs Absolute 5.5 K/CU MM    Lymphocytes Absolute 1.0 K/CU MM    Monocytes Absolute 0.6 K/CU MM    Eosinophils Absolute 0.2 K/CU MM    Basophils Absolute 0.0 K/CU MM    Nucleated RBC % 0.0 %    Total Nucleated RBC 0.0 K/CU MM    Total Immature Neutrophil 0.03 K/CU MM    Immature Neutrophil % 0.4 0 - 0.43 %       Assessment/Plan:  Sivakumar Martinez is a 79 y.o. male who is post-op day #5 Days Post-Op  Right hemicolectomy with take down of splenic flexure. Hgb stable. Still tachy with movement/pain, getting metoprolol Q6hr; will consult cardio. Minimal out of NG yesterday, patient pulled out. Will leave out and start a full liquid diet. May resume home medications    Active Problems:    Chest pain  Resolved Problems:    * No resolved hospital problems.  JULEE Ambriz-CNP  5/26/2020  7:44 AM

## 2020-05-26 NOTE — PROGRESS NOTES
History  Lives With: Alone, Friend(s)  Type of Home: House  Home Layout: Two level  Home Access: (?)  Home Equipment: Rolling walker, Cane  ADL Assistance: Independent  Ambulation Assistance: Independent  Transfer Assistance: Independent  Additional Comments: Pt is a poor historian and contradicts self. Some above info gathered from charting from previous admissions. Examination of body systems (includes body structures/functions, activity/participation limitations):  · Observation:  Supine in bed upon arrival. Dec overall arousal. Easy to arouse but difficult to maintain arousal. Cooperative with therapy but needing consistent cues for attending to task. Simple commands/questions with inc time for processing. · Vision:  Glasses  · Hearing:  [employer]   · Cardiopulmonary:  Stable vitals throughout session. · Orientation: Difficult to get responses. Did state \"yes\" to being in a hospital.     Musculoskeletal  · ROM R/L:  [facility] BLE[facility] with PROM and observed active ROM during functional tasks due to dec command following   · Strength R/L:  BLEs at least 3+/5 observed with standing activity. Unable to formally assess with MMT with dec attention to task and command following. Dec strength observed in function and endurance. Mobility/treatment:   · Rolling L/R:  NT   · Supine to sit:  maxA for sequencing LEs to EOB and uprighting trunk. No initiation to task but able to assist some with each part very minimally during transition. · Transfers:   · Sit to stand: David from EOB for steadying and facilitation fwd over GAGAN with bilat HHA. · Stand to sit: maxA to recliner for heavy assist with eccentric control. Appeared to have abdominal discomfort limiting his ability to control transition, slightly retropulsive. · Step pivot: David with bilat HHA, VCs for sequencing full pivot turn.    · Sitting balance: SBA/CGA at EOB static and light dynamic with UE support x ~6 minutes   · Standing balance:  David x2 with bilat HHA Treatment plan:  Strengthening; Functional Mobility Training; ROM; Transfer Training; Balance Training; ADL/Self-care Training; Endurance Training; Gait Training; IADL Training; Stair training; Neuromuscular Re-education; Home Exercise Program; Patient/Caregiver Education & Training; Modalities; Positioning;  Safety Education & Training; Equipment Evaluation, Education, & procurement    Recommendations for NURSING mobility: stand pivot transfers     Time:   Time in: 1018  Time out: 1041  Timed treatment minutes: 12  Total time: 23    Electronically signed by:    Gerald Bustillo [id number]  5/26/2020, 2:02 PM

## 2020-05-27 PROBLEM — C18.8 OVERLAPPING MALIGNANT NEOPLASM OF COLON (HCC): Status: ACTIVE | Noted: 2020-05-27

## 2020-05-27 LAB
ANION GAP SERPL CALCULATED.3IONS-SCNC: 16 MMOL/L (ref 4–16)
BASOPHILS ABSOLUTE: 0 K/CU MM
BASOPHILS RELATIVE PERCENT: 0.3 % (ref 0–1)
BUN BLDV-MCNC: 19 MG/DL (ref 6–23)
CALCIUM SERPL-MCNC: 8.2 MG/DL (ref 8.3–10.6)
CHLORIDE BLD-SCNC: 107 MMOL/L (ref 99–110)
CO2: 15 MMOL/L (ref 21–32)
CREAT SERPL-MCNC: 1.5 MG/DL (ref 0.9–1.3)
DIFFERENTIAL TYPE: ABNORMAL
EOSINOPHILS ABSOLUTE: 0.2 K/CU MM
EOSINOPHILS RELATIVE PERCENT: 3.5 % (ref 0–3)
GFR AFRICAN AMERICAN: 56 ML/MIN/1.73M2
GFR NON-AFRICAN AMERICAN: 46 ML/MIN/1.73M2
GLUCOSE BLD-MCNC: 106 MG/DL (ref 70–99)
GLUCOSE BLD-MCNC: 113 MG/DL (ref 70–99)
GLUCOSE BLD-MCNC: 113 MG/DL (ref 70–99)
GLUCOSE BLD-MCNC: 116 MG/DL (ref 70–99)
GLUCOSE BLD-MCNC: 134 MG/DL (ref 70–99)
HCT VFR BLD CALC: 26.4 % (ref 42–52)
HEMOGLOBIN: 7.6 GM/DL (ref 13.5–18)
IMMATURE NEUTROPHIL %: 0.8 % (ref 0–0.43)
LYMPHOCYTES ABSOLUTE: 1.2 K/CU MM
LYMPHOCYTES RELATIVE PERCENT: 18.6 % (ref 24–44)
MAGNESIUM: 2.4 MG/DL (ref 1.8–2.4)
MCH RBC QN AUTO: 23.8 PG (ref 27–31)
MCHC RBC AUTO-ENTMCNC: 28.8 % (ref 32–36)
MCV RBC AUTO: 82.8 FL (ref 78–100)
MONOCYTES ABSOLUTE: 0.6 K/CU MM
MONOCYTES RELATIVE PERCENT: 8.7 % (ref 0–4)
NUCLEATED RBC %: 0 %
PDW BLD-RTO: 18.4 % (ref 11.7–14.9)
PLATELET # BLD: 224 K/CU MM (ref 140–440)
PMV BLD AUTO: 8.6 FL (ref 7.5–11.1)
POTASSIUM SERPL-SCNC: 4.1 MMOL/L (ref 3.5–5.1)
RBC # BLD: 3.19 M/CU MM (ref 4.6–6.2)
REASON FOR REJECTION: NORMAL
REJECTED TEST: NORMAL
SEGMENTED NEUTROPHILS ABSOLUTE COUNT: 4.5 K/CU MM
SEGMENTED NEUTROPHILS RELATIVE PERCENT: 68.1 % (ref 36–66)
SODIUM BLD-SCNC: 138 MMOL/L (ref 135–145)
TOTAL IMMATURE NEUTOROPHIL: 0.05 K/CU MM
TOTAL NUCLEATED RBC: 0 K/CU MM
WBC # BLD: 6.6 K/CU MM (ref 4–10.5)

## 2020-05-27 PROCEDURE — 82962 GLUCOSE BLOOD TEST: CPT

## 2020-05-27 PROCEDURE — 2140000000 HC CCU INTERMEDIATE R&B

## 2020-05-27 PROCEDURE — 85025 COMPLETE CBC W/AUTO DIFF WBC: CPT

## 2020-05-27 PROCEDURE — 6370000000 HC RX 637 (ALT 250 FOR IP): Performed by: NURSE PRACTITIONER

## 2020-05-27 PROCEDURE — 36415 COLL VENOUS BLD VENIPUNCTURE: CPT

## 2020-05-27 PROCEDURE — 2500000003 HC RX 250 WO HCPCS: Performed by: HOSPITALIST

## 2020-05-27 PROCEDURE — 80048 BASIC METABOLIC PNL TOTAL CA: CPT

## 2020-05-27 PROCEDURE — 2580000003 HC RX 258: Performed by: INTERNAL MEDICINE

## 2020-05-27 PROCEDURE — 6360000002 HC RX W HCPCS: Performed by: SURGERY

## 2020-05-27 PROCEDURE — 99232 SBSQ HOSP IP/OBS MODERATE 35: CPT | Performed by: INTERNAL MEDICINE

## 2020-05-27 PROCEDURE — 6370000000 HC RX 637 (ALT 250 FOR IP): Performed by: HOSPITALIST

## 2020-05-27 PROCEDURE — 97116 GAIT TRAINING THERAPY: CPT

## 2020-05-27 PROCEDURE — 2500000003 HC RX 250 WO HCPCS: Performed by: NURSE PRACTITIONER

## 2020-05-27 PROCEDURE — 2580000003 HC RX 258: Performed by: HOSPITALIST

## 2020-05-27 PROCEDURE — 6360000002 HC RX W HCPCS: Performed by: INTERNAL MEDICINE

## 2020-05-27 PROCEDURE — 2580000003 HC RX 258: Performed by: SURGERY

## 2020-05-27 PROCEDURE — 99024 POSTOP FOLLOW-UP VISIT: CPT | Performed by: SURGERY

## 2020-05-27 PROCEDURE — 97530 THERAPEUTIC ACTIVITIES: CPT

## 2020-05-27 PROCEDURE — 83735 ASSAY OF MAGNESIUM: CPT

## 2020-05-27 PROCEDURE — 93005 ELECTROCARDIOGRAM TRACING: CPT | Performed by: HOSPITALIST

## 2020-05-27 PROCEDURE — APPSS45 APP SPLIT SHARED TIME 31-45 MINUTES: Performed by: NURSE PRACTITIONER

## 2020-05-27 RX ORDER — RISPERIDONE 0.5 MG/1
1 TABLET, FILM COATED ORAL NIGHTLY
Status: DISCONTINUED | OUTPATIENT
Start: 2020-05-27 | End: 2020-05-28 | Stop reason: HOSPADM

## 2020-05-27 RX ORDER — ESCITALOPRAM OXALATE 10 MG/1
10 TABLET ORAL DAILY
Status: DISCONTINUED | OUTPATIENT
Start: 2020-05-27 | End: 2020-05-28 | Stop reason: HOSPADM

## 2020-05-27 RX ORDER — BUSPIRONE HYDROCHLORIDE 10 MG/1
10 TABLET ORAL 3 TIMES DAILY
Status: DISCONTINUED | OUTPATIENT
Start: 2020-05-27 | End: 2020-05-28 | Stop reason: HOSPADM

## 2020-05-27 RX ORDER — DIVALPROEX SODIUM 500 MG/1
500 TABLET, EXTENDED RELEASE ORAL 2 TIMES DAILY
Status: DISCONTINUED | OUTPATIENT
Start: 2020-05-27 | End: 2020-05-28 | Stop reason: HOSPADM

## 2020-05-27 RX ORDER — FAMOTIDINE 20 MG/1
10 TABLET, FILM COATED ORAL 2 TIMES DAILY
Status: DISCONTINUED | OUTPATIENT
Start: 2020-05-27 | End: 2020-05-28 | Stop reason: HOSPADM

## 2020-05-27 RX ADMIN — MORPHINE SULFATE 2 MG: 2 INJECTION, SOLUTION INTRAMUSCULAR; INTRAVENOUS at 22:09

## 2020-05-27 RX ADMIN — RISPERIDONE 1 MG: 0.5 TABLET, FILM COATED ORAL at 22:08

## 2020-05-27 RX ADMIN — MORPHINE SULFATE 2 MG: 2 INJECTION, SOLUTION INTRAMUSCULAR; INTRAVENOUS at 14:02

## 2020-05-27 RX ADMIN — MORPHINE SULFATE 2 MG: 2 INJECTION, SOLUTION INTRAMUSCULAR; INTRAVENOUS at 10:08

## 2020-05-27 RX ADMIN — METOPROLOL TARTRATE 5 MG: 1 INJECTION, SOLUTION INTRAVENOUS at 08:56

## 2020-05-27 RX ADMIN — DIVALPROEX SODIUM 500 MG: 500 TABLET, FILM COATED, EXTENDED RELEASE ORAL at 22:08

## 2020-05-27 RX ADMIN — ENOXAPARIN SODIUM 30 MG: 30 INJECTION SUBCUTANEOUS at 09:01

## 2020-05-27 RX ADMIN — FAMOTIDINE 10 MG: 20 TABLET, FILM COATED ORAL at 22:08

## 2020-05-27 RX ADMIN — METOPROLOL TARTRATE 25 MG: 25 TABLET, FILM COATED ORAL at 22:08

## 2020-05-27 RX ADMIN — SODIUM CHLORIDE, PRESERVATIVE FREE 10 ML: 5 INJECTION INTRAVENOUS at 22:11

## 2020-05-27 RX ADMIN — AMIODARONE HYDROCHLORIDE 0.5 MG/MIN: 50 INJECTION, SOLUTION INTRAVENOUS at 12:27

## 2020-05-27 RX ADMIN — FAMOTIDINE 20 MG: 10 INJECTION INTRAVENOUS at 08:56

## 2020-05-27 RX ADMIN — VALPROATE SODIUM 250 MG: 100 INJECTION, SOLUTION INTRAVENOUS at 14:05

## 2020-05-27 RX ADMIN — ESCITALOPRAM OXALATE 10 MG: 10 TABLET ORAL at 17:59

## 2020-05-27 RX ADMIN — VALPROATE SODIUM 250 MG: 100 INJECTION, SOLUTION INTRAVENOUS at 05:55

## 2020-05-27 RX ADMIN — SODIUM CHLORIDE, PRESERVATIVE FREE 10 ML: 5 INJECTION INTRAVENOUS at 08:56

## 2020-05-27 RX ADMIN — SODIUM CHLORIDE: 9 INJECTION, SOLUTION INTRAVENOUS at 17:59

## 2020-05-27 RX ADMIN — METOPROLOL TARTRATE 25 MG: 25 TABLET, FILM COATED ORAL at 15:27

## 2020-05-27 RX ADMIN — SODIUM CHLORIDE: 9 INJECTION, SOLUTION INTRAVENOUS at 02:08

## 2020-05-27 RX ADMIN — SODIUM CHLORIDE, PRESERVATIVE FREE 10 ML: 5 INJECTION INTRAVENOUS at 22:09

## 2020-05-27 RX ADMIN — BUSPIRONE HYDROCHLORIDE 10 MG: 10 TABLET ORAL at 17:59

## 2020-05-27 RX ADMIN — BUSPIRONE HYDROCHLORIDE 10 MG: 10 TABLET ORAL at 22:08

## 2020-05-27 ASSESSMENT — PAIN SCALES - GENERAL
PAINLEVEL_OUTOF10: 4
PAINLEVEL_OUTOF10: 8
PAINLEVEL_OUTOF10: 0
PAINLEVEL_OUTOF10: 9
PAINLEVEL_OUTOF10: 6

## 2020-05-27 ASSESSMENT — PAIN DESCRIPTION - LOCATION: LOCATION: BACK

## 2020-05-27 ASSESSMENT — PAIN DESCRIPTION - DESCRIPTORS: DESCRIPTORS: ACHING

## 2020-05-27 ASSESSMENT — PAIN DESCRIPTION - PAIN TYPE: TYPE: CHRONIC PAIN

## 2020-05-27 ASSESSMENT — PAIN DESCRIPTION - ORIENTATION: ORIENTATION: LOWER

## 2020-05-27 ASSESSMENT — PAIN - FUNCTIONAL ASSESSMENT: PAIN_FUNCTIONAL_ASSESSMENT: ACTIVITIES ARE NOT PREVENTED

## 2020-05-27 NOTE — PROGRESS NOTES
Cardiology Progress Note     Today's Plan : stop IV amiodarone     Admit Date:  5/19/2020    Consult reason/ Seen today for: atrial tachycardia    Subjective and  Overnight Events:  Up in the chair states he is feeling somewhat better today. Continues with abdominal tenderness. Telemetry SR - SB     Assessment / Plan / Recommendation:     1. Tachycardia- occurred while patient was NPO- now taking po - will stop amiodarone gtt- change BB to po-can  titrate as warranted   2. ASCVD- h/o diffuse LAD disease stress test 05/2020-normal perfusion- no chest pain-stable  3. CHF- does not appear to be in fluid overload - monitor fluid status- no po intake documented    4. HTN- will change BB to po  - pain may be causing BP to elevate   5. S/p hemicolectomy per surgery       History of Presenting Illness:    Chief complain on admission : 79 y. o.year old who is admitted for  Chief Complaint   Patient presents with    Chest Pain        Past medical history:    has a past medical history of Anemia, Arthritis, Back pain, chronic, Bipolar 1 disorder ([address] CAD (coronary artery disease), Cerebral artery occlusion with cerebral infarction ([address] Chronic kidney disease (CKD), stage III (moderate) ([address] CKD (chronic kidney disease), COPD (chronic obstructive pulmonary disease) ([address] Diabetes mellitus, type 2 ([address] Diabetic peripheral neuropathy ([address] Diastolic CHF ([address] Gastric ulcer, H/O cardiovascular stress test, Heart murmur, Hiatal hernia, History of cardiac cath, Hx of migraines, HX OTHER MEDICAL, Hyperlipidemia, Hypertension, Lumbar radiculopathy, Overlapping malignant neoplasm of colon ([address] Panic attack, Pleural effusion, S/P thoracentesis, Sleep apnea, SOBOE (shortness of breath on exertion), and Spinal stenosis. Past surgical history:   has a past surgical history that includes Neck surgery (1998);  Carpal tunnel release (Bilateral, 1989); knee surgery (Bilateral); thoracentesis (Left, 12/20/2013); Elbow surgery (Left, 2000's); Thoracentesis (4/25/2016); Tonsillectomy; Thoracentesis (Left, 11/15/2016); thoracotomy (Left, 03/18/2019); thoracotomy (Left, 3/18/2019); Upper gastrointestinal endoscopy (N/A, 5/12/2020); Colonoscopy (N/A, 5/12/2020); and Small intestine surgery (N/A, 5/21/2020). Social History:   reports that he quit smoking about 9 years ago. His smoking use included cigarettes. He has a 45.00 pack-year smoking history. He has never used smokeless tobacco. He reports previous alcohol use. He reports previous drug use. Drug: Marijuana. Family history:  family history includes Heart Disease in his mother; High Blood Pressure in his father. Allergies   Allergen Reactions    Nsaids      Renal        Review of Systems:   All 14 systems were reviewed and are negative  Except for the positive findings which are documented     BP (!) 156/74   Pulse 63   Temp 98 °F (36.7 °C) (Oral)   Resp 26   Ht 5' 9.5\" (1.765 m)   Wt 228 lb (103.4 kg)   SpO2 97%   BMI 33.19 kg/m²       Intake/Output Summary (Last 24 hours) at 5/27/2020 1338  Last data filed at 5/27/2020 4781  Gross per 24 hour   Intake --   Output 800 ml   Net -800 ml       Physical Exam:  Constitutional:  Well developed, Well nourished, No acute distress  HENT:  Normocephalic, Atraumatic, Bilateral external ears normal,  Nose normal.   Neck- trachea is midline  Eyes:  PEERL, Conjunctiva normal  Respiratory:  Normal breath sounds, No respiratory distress, No wheezing, No chest tenderness. Cardiovascular:  Normal heart rate, Normal rhythm, no murmurs appreciated, No rubs appreciated, No gallops appreciated, JVP not elevated  Abdomen/GI:  Soft, + tenderness- dressing noted -dry and intact  Musculoskeletal:  Intact distal pulses, no edema to lower legs,  No tenderness, No cyanosis, No clubbing. Integument:  Warm, Dry  Lymphatic:  No lymphadenopathy noted.    Neurologic:  Alert hospital problems. *       All labs, medications and tests reviewed by myself, continue all other medications of all above medical condition listed as is except for changes mentioned above. Thank you   Please call with questions. Electronically signed by JULEE Mckeon CNP on 5/27/2020 at 1:38 PM   [address] [facility]    I have seen ,spoken to  and examined this patient personally, independently of the nurse practitioner. I have reviewed the hospital care given to date and reviewed all pertinent labs and imaging. The plan was developed mutually at the time of the visit with the patient,  NP   and myself. I have spoken with patient, nursing staff and provided written and verbal instructions . The above note has been reviewed and I agree with the assessment, diagnosis, and treatment plan with changes made by me as follows     HPI:  I have reviewed the above HPI  And agree with above   Trixie Baez is a 79 y. o.year old who and presents with had concerns including Chest Pain. Chief Complaint   Patient presents with    Chest Pain     Please review addendum/changes made to note above   Interval history:  Sob , abdominal pain     Physical Exam:  General:  Awake, alert, NAD  Head:normal  Eye:normal  Neck:  No JVD   Chest:  Clear to auscultation, respiration easy  Cardiovascular:  S1 and S2 audible, No added heart sounds, No signs of ankle edema, or volume overload, No evidence of JVD, No crackles  Abdomen:   nontender  Extremities:  No  edema  Pulses; palpable  Neuro: grossly normal      MEDICAL DECISION MAKING;    I agree with the above plan, which was planned by myself and discussed with NP.   Continue metoprolol      Clearance MD Tony Bronson [facility] [place] 05/27/20

## 2020-05-27 NOTE — DISCHARGE INSTR - COC
performed by Sandro Blackwell MD at [facility]      as a kid    UPPER GASTROINTESTINAL ENDOSCOPY N/A 5/12/2020    EGD BIOPSY performed by Yasmany Herrera MD at [facility] ENDOSCOPY       Immunization History:   Immunization History   Administered Date(s) Administered    Influenza A (G3L4-10) Vaccine PF IM 04/07/2010    Influenza Vaccine, unspecified formulation 10/14/2016    Influenza Virus Vaccine 10/03/2013, 10/13/2014, 01/29/2016, 10/14/2016    Influenza, High Dose (Fluzone 65 yrs and older) 11/11/2018    Influenza, Florencia Ramal, IM, PF (6 mo and older Fluzone, Flulaval, Fluarix, and 3 yrs and older Afluria) 10/09/2019    Pneumococcal Conjugate 13-valent ([id number]) 10/17/2016    Pneumococcal Polysaccharide ([id number]) 01/01/2009, 10/03/2013    Tdap (Boostrix, Adacel) 06/10/2013       Active Problems:  Patient Active Problem List   Diagnosis Code    COPD (chronic obstructive pulmonary disease) ([facility] J44.9    Pleural effusion, left J90    SAMANTHA on CPAP G47.33, Z99.89    Chronic obstructive pulmonary disease (HCC) J44.9    TIA (transient ischemic attack) G45.9    Bipolar 1 disorder ([facility] F31.9    Coronary artery disease involving native coronary artery of native heart without angina pectoris I25.10    Essential hypertension I10    REYES (dyspnea on exertion) R06.09    Anemia, chronic disease D63.8    Diuretic-induced hypokalemia E87.6, B61.0N4Q    Diastolic CHF ([facility] J32.28    Acute respiratory failure ([facility]) J96.00    Pneumonia due to gram-negative bacteria ([facility]) J15.6    Hyperkalemia E87.5    Adrenal mass ([facility]) E27.8    Diabetes mellitus, type 2 ([facility] E11.9    Hyponatremia E87.1    Pneumonia due to organism J18.9    PAF (paroxysmal atrial fibrillation) ([facility]) I48.0    Empyema of left pleural space ([facility]) J86.9    Hospital-acquired bacterial pneumonia J15.9    Hyperlipidemia E78.5    Lumbar radiculopathy M54.16    CKD (chronic kidney disease) N18.9    Diabetic peripheral neuropathy ([facility]) E11.42    Chronic kidney disease (CKD), stage III (moderate) ([facility]) N18.3    Cervical stenosis of spine M48.02    Spinal stenosis of lumbar region without neurogenic claudication M48.061    Somatic dysfunction of back M99.09    Somatic dysfunction of cervical region M99.01    Somatic dysfunction of pelvis region M99.05    Somatic dysfunction of both lower extremities M99.06    Empyema lung ([facility]) J86.9    Acute kidney injury superimposed on CKD ([facility]) N17.9, N18.9    Chronic diastolic heart failure ([facility]) I50.32    Syncope and collapse R55    Hypotensive episode I95.9    Bradycardia on ECG R00.1    Syncope R55    Lightheadedness R42    Dizziness R42    Acute on chronic respiratory failure with hypoxia ([facility]) J96.21    Severe mixed bipolar 1 disorder without psychosis ([facility]) F31.63    Acute chest pain R07.9    Chest pain R07.9    Atrial tachycardia ([facility]) I47.1    Overlapping malignant neoplasm of colon ([facility]) C18.8       Isolation/Infection:   Isolation          Contact and Droplet        Patient Infection Status     Infection Onset Added Last Indicated Last Indicated By Review Planned Expiration Resolved Resolved By    COVID-19 Rule Out 05/26/20 05/26/20 05/26/20 Covid-19 Ambulatory (Ordered)        MRSA 10/09/19 10/10/19 10/09/19 Culture, Sputum              Nurse Assessment:  Last Vital Signs: BP (!) 156/74   Pulse 63   Temp 98 °F (36.7 °C) (Oral)   Resp 26   Ht 5' 9.5\" (1.765 m)   Wt 228 lb (103.4 kg)   SpO2 97%   BMI 33.19 kg/m²     Last documented pain score (0-10 scale): Pain Level: 8  Last Weight:   Wt Readings from Last 1 Encounters:   05/27/20 228 lb (103.4 kg)     Mental Status:  {IP PT MENTAL STATUS:20229}    IV Access:  {[facility] IV ACCESS:077905172}    Nursing Mobility/ADLs:  Walking   {[facility] DME ONMF:387236912}  Transfer  {[facility] DME XUXS:513014115}  Bathing  {[facility] DME KQBE:616085252}  Dressing  {[facility] DME NPRJ:847878680}  Toileting  {CHP DME HMKQ:353803305}  Feeding  {CHP DME ZJIP:059250004}  Med Admin  {[facility] DME FGAP:691024661}  Med Delivery   { [facility] MED Delivery:726968642}    Wound Care Documentation and Therapy:        Elimination:  Continence:   · Bowel: {YES / QZ:11634}  · Bladder: {YES / LN:55771}  Urinary Catheter: {Urinary Catheter:597336490}   Colostomy/Ileostomy/Ileal Conduit: {YES / QP:00947}       Date of Last BM: ***    Intake/Output Summary (Last 24 hours) at 5/27/2020 1435  Last data filed at 5/27/2020 0726  Gross per 24 hour   Intake --   Output 800 ml   Net -800 ml     No intake/output data recorded. Safety Concerns:     [address] Safety Concerns:303666575}    Impairments/Disabilities:      [address] Impairments/Disabilities:334267199}      Patient's personal belongings (please select all that are sent with patient):  {[facility] DME Belongings:795235437}    RN SIGNATURE:  {Esignature:784328959}      PHYSICIAN SECTION    Prognosis: Good    Condition at Discharge: Stable    Rehab Potential (if transferring to Rehab): Good    Recommended Labs or Other Treatments After Discharge: CBC, BMp in 1 week    Physician Certification: I certify the above information and transfer of Kelli Osorio.  is necessary for the continuing treatment of the diagnosis listed and that he requires [facility] for less 30 days. Update Admission H&P: No change in H&P     Nutrition Therapy:  Current Nutrition Therapy:   - Oral Diet:  Carb Control 4 carbs/meal (1800kcals/day)  - Oral Nutrition Supplement:  Standard  three times a day    Routes of Feeding: Oral  Liquids: No Restrictions  Daily Fluid Restriction: no  Last Modified Barium Swallow with Video (Video Swallowing Test): not done    Treatments at the Time of Hospital Discharge:   Respiratory Treatments:   Oxygen Therapy:  is not on home oxygen therapy.   Ventilator:    - No ventilator support    Rehab Therapies: Physical Therapy, Occupational Therapy and Speech/Language Therapy  Weight Bearing Status/Restrictions: As per

## 2020-05-27 NOTE — PROGRESS NOTES
soft without significant tenderness, Bowel sounds are normoactive. MSK No gross joint deformities. SKIN Normal coloration, warm, dry. NEURO/PSYCH Awake, alert, oriented x 4. Affect appropriate.     Medications:   Medications:    insulin lispro  0-6 Units Subcutaneous 4x Daily AC & HS    valproate sodium (DEPACON) IVPB  250 mg Intravenous Q6H    metoprolol (LOPRESSOR) ivpb  5 mg Intravenous Q6H    sodium chloride flush  10 mL Intravenous 2 times per day    famotidine (PEPCID) injection  20 mg Intravenous BID    sodium chloride flush  10 mL Intravenous 2 times per day    enoxaparin  30 mg Subcutaneous Daily      Infusions:    amiodarone 450mg/250ml D5W infusion 0.5 mg/min (05/26/20 1714)    sodium chloride 75 mL/hr at 05/27/20 0208    dextrose       PRN Meds: labetalol (TRANDATE) IVPB, 20 mg, Q4H PRN  morphine, 2 mg, Q2H PRN  morphine, 4 mg, Q2H PRN  sodium chloride flush, 10 mL, PRN  LORazepam, 1 mg, Q1H PRN    Or  LORazepam, 1 mg, Q1H PRN    Or  LORazepam, 2 mg, Q1H PRN    Or  LORazepam, 2 mg, Q1H PRN    Or  LORazepam, 3 mg, Q1H PRN    Or  LORazepam, 3 mg, Q1H PRN    Or  LORazepam, 4 mg, Q1H PRN    Or  LORazepam, 4 mg, Q1H PRN  glucose, 15 g, PRN  dextrose, 12.5 g, PRN  glucagon (rDNA), 1 mg, PRN  dextrose, 100 mL/hr, PRN  sodium chloride flush, 10 mL, PRN  promethazine, 12.5 mg, Q6H PRN    Or  ondansetron, 4 mg, Q6H PRN          Electronically signed by Denny Esposito MD on 5/27/2020 at 11:15 AM

## 2020-05-27 NOTE — PROGRESS NOTES
goals  Time Frame for Short term goals: 1 week   Short term goal 1: Pt will perform sit><supine modA   Short term goal 2: Pt will transfer sit><stand David   Short term goal 3: Pt will transfer to bed/recliner CGA   Short term goal 4: Pt will ambulate 50ft with LRAD CGA        Electronically signed by:    Daly Rey PTA  5/27/2020, 8:31 AM

## 2020-05-28 ENCOUNTER — CARE COORDINATION (OUTPATIENT)
Dept: CASE MANAGEMENT | Age: 70
End: 2020-05-28

## 2020-05-28 VITALS
WEIGHT: 224 LBS | DIASTOLIC BLOOD PRESSURE: 82 MMHG | SYSTOLIC BLOOD PRESSURE: 142 MMHG | OXYGEN SATURATION: 99 % | BODY MASS INDEX: 32.07 KG/M2 | RESPIRATION RATE: 16 BRPM | HEIGHT: 70 IN | TEMPERATURE: 98 F | HEART RATE: 59 BPM

## 2020-05-28 PROBLEM — C18.9 COLON ADENOCARCINOMA (HCC): Status: ACTIVE | Noted: 2020-05-28

## 2020-05-28 LAB
ANION GAP SERPL CALCULATED.3IONS-SCNC: 11 MMOL/L (ref 4–16)
BASOPHILS ABSOLUTE: 0 K/CU MM
BASOPHILS RELATIVE PERCENT: 0.3 % (ref 0–1)
BUN BLDV-MCNC: 17 MG/DL (ref 6–23)
CALCIUM SERPL-MCNC: 8.2 MG/DL (ref 8.3–10.6)
CHLORIDE BLD-SCNC: 108 MMOL/L (ref 99–110)
CO2: 21 MMOL/L (ref 21–32)
CREAT SERPL-MCNC: 1.5 MG/DL (ref 0.9–1.3)
DIFFERENTIAL TYPE: ABNORMAL
EKG ATRIAL RATE: 53 BPM
EKG DIAGNOSIS: NORMAL
EKG P AXIS: 23 DEGREES
EKG P-R INTERVAL: 118 MS
EKG Q-T INTERVAL: 494 MS
EKG QRS DURATION: 110 MS
EKG QTC CALCULATION (BAZETT): 463 MS
EKG R AXIS: -6 DEGREES
EKG T AXIS: 18 DEGREES
EKG VENTRICULAR RATE: 53 BPM
EOSINOPHILS ABSOLUTE: 0.3 K/CU MM
EOSINOPHILS RELATIVE PERCENT: 4.1 % (ref 0–3)
GFR AFRICAN AMERICAN: 56 ML/MIN/1.73M2
GFR NON-AFRICAN AMERICAN: 46 ML/MIN/1.73M2
GLUCOSE BLD-MCNC: 127 MG/DL (ref 70–99)
GLUCOSE BLD-MCNC: 128 MG/DL (ref 70–99)
GLUCOSE BLD-MCNC: 96 MG/DL (ref 70–99)
GLUCOSE BLD-MCNC: 98 MG/DL (ref 70–99)
HCT VFR BLD CALC: 23.6 % (ref 42–52)
HEMOGLOBIN: 7 GM/DL (ref 13.5–18)
IMMATURE NEUTROPHIL %: 0.8 % (ref 0–0.43)
LYMPHOCYTES ABSOLUTE: 1.2 K/CU MM
LYMPHOCYTES RELATIVE PERCENT: 19.8 % (ref 24–44)
MAGNESIUM: 2.2 MG/DL (ref 1.8–2.4)
MCH RBC QN AUTO: 23.9 PG (ref 27–31)
MCHC RBC AUTO-ENTMCNC: 29.7 % (ref 32–36)
MCV RBC AUTO: 80.5 FL (ref 78–100)
MONOCYTES ABSOLUTE: 0.6 K/CU MM
MONOCYTES RELATIVE PERCENT: 9.4 % (ref 0–4)
NUCLEATED RBC %: 0 %
PDW BLD-RTO: 18.8 % (ref 11.7–14.9)
PLATELET # BLD: 249 K/CU MM (ref 140–440)
PMV BLD AUTO: 9.2 FL (ref 7.5–11.1)
POTASSIUM SERPL-SCNC: 4.4 MMOL/L (ref 3.5–5.1)
RBC # BLD: 2.93 M/CU MM (ref 4.6–6.2)
SARS-COV-2, NAAT: NOT DETECTED
SARS-COV-2: NOT DETECTED
SEGMENTED NEUTROPHILS ABSOLUTE COUNT: 4.1 K/CU MM
SEGMENTED NEUTROPHILS RELATIVE PERCENT: 65.6 % (ref 36–66)
SODIUM BLD-SCNC: 140 MMOL/L (ref 135–145)
SOURCE: NORMAL
SOURCE: NORMAL
TOTAL IMMATURE NEUTOROPHIL: 0.05 K/CU MM
TOTAL NUCLEATED RBC: 0 K/CU MM
WBC # BLD: 6.2 K/CU MM (ref 4–10.5)

## 2020-05-28 PROCEDURE — 2580000003 HC RX 258: Performed by: INTERNAL MEDICINE

## 2020-05-28 PROCEDURE — 6370000000 HC RX 637 (ALT 250 FOR IP): Performed by: HOSPITALIST

## 2020-05-28 PROCEDURE — U0002 COVID-19 LAB TEST NON-CDC: HCPCS

## 2020-05-28 PROCEDURE — 99024 POSTOP FOLLOW-UP VISIT: CPT | Performed by: SURGERY

## 2020-05-28 PROCEDURE — 85025 COMPLETE CBC W/AUTO DIFF WBC: CPT

## 2020-05-28 PROCEDURE — 97116 GAIT TRAINING THERAPY: CPT

## 2020-05-28 PROCEDURE — 94150 VITAL CAPACITY TEST: CPT

## 2020-05-28 PROCEDURE — 93010 ELECTROCARDIOGRAM REPORT: CPT | Performed by: INTERNAL MEDICINE

## 2020-05-28 PROCEDURE — 80048 BASIC METABOLIC PNL TOTAL CA: CPT

## 2020-05-28 PROCEDURE — 99232 SBSQ HOSP IP/OBS MODERATE 35: CPT | Performed by: INTERNAL MEDICINE

## 2020-05-28 PROCEDURE — 36415 COLL VENOUS BLD VENIPUNCTURE: CPT

## 2020-05-28 PROCEDURE — 83735 ASSAY OF MAGNESIUM: CPT

## 2020-05-28 PROCEDURE — 97530 THERAPEUTIC ACTIVITIES: CPT

## 2020-05-28 PROCEDURE — 6360000002 HC RX W HCPCS: Performed by: INTERNAL MEDICINE

## 2020-05-28 PROCEDURE — APPSS30 APP SPLIT SHARED TIME 16-30 MINUTES: Performed by: NURSE PRACTITIONER

## 2020-05-28 PROCEDURE — 82962 GLUCOSE BLOOD TEST: CPT

## 2020-05-28 PROCEDURE — 6370000000 HC RX 637 (ALT 250 FOR IP): Performed by: NURSE PRACTITIONER

## 2020-05-28 PROCEDURE — 94761 N-INVAS EAR/PLS OXIMETRY MLT: CPT

## 2020-05-28 PROCEDURE — 97110 THERAPEUTIC EXERCISES: CPT

## 2020-05-28 PROCEDURE — 6360000002 HC RX W HCPCS: Performed by: SURGERY

## 2020-05-28 RX ORDER — GABAPENTIN 300 MG/1
300 CAPSULE ORAL 2 TIMES DAILY
Status: ON HOLD | DISCHARGE
Start: 2020-05-28 | End: 2020-06-05 | Stop reason: SDUPTHER

## 2020-05-28 RX ORDER — ALPRAZOLAM 1 MG/1
1 TABLET ORAL 3 TIMES DAILY PRN
Qty: 15 TABLET | Refills: 0 | Status: ON HOLD | OUTPATIENT
Start: 2020-05-28 | End: 2020-06-05 | Stop reason: HOSPADM

## 2020-05-28 RX ORDER — FAMOTIDINE 10 MG
10 TABLET ORAL 2 TIMES DAILY
Qty: 60 TABLET | Refills: 3 | DISCHARGE
Start: 2020-05-28 | End: 2020-07-24 | Stop reason: SDUPTHER

## 2020-05-28 RX ORDER — OXYCODONE HYDROCHLORIDE 5 MG/1
5 TABLET ORAL EVERY 6 HOURS PRN
Qty: 20 TABLET | Refills: 0 | Status: ON HOLD | OUTPATIENT
Start: 2020-05-28 | End: 2020-06-05

## 2020-05-28 RX ADMIN — BUSPIRONE HYDROCHLORIDE 10 MG: 10 TABLET ORAL at 08:48

## 2020-05-28 RX ADMIN — FAMOTIDINE 10 MG: 20 TABLET, FILM COATED ORAL at 08:48

## 2020-05-28 RX ADMIN — SODIUM CHLORIDE, PRESERVATIVE FREE 10 ML: 5 INJECTION INTRAVENOUS at 08:50

## 2020-05-28 RX ADMIN — BUSPIRONE HYDROCHLORIDE 10 MG: 10 TABLET ORAL at 15:38

## 2020-05-28 RX ADMIN — MORPHINE SULFATE 4 MG: 4 INJECTION INTRAVENOUS at 12:11

## 2020-05-28 RX ADMIN — ENOXAPARIN SODIUM 30 MG: 30 INJECTION SUBCUTANEOUS at 08:48

## 2020-05-28 RX ADMIN — DIVALPROEX SODIUM 500 MG: 500 TABLET, FILM COATED, EXTENDED RELEASE ORAL at 08:48

## 2020-05-28 RX ADMIN — ESCITALOPRAM OXALATE 10 MG: 10 TABLET ORAL at 08:48

## 2020-05-28 RX ADMIN — METOPROLOL TARTRATE 25 MG: 25 TABLET, FILM COATED ORAL at 08:48

## 2020-05-28 RX ADMIN — MORPHINE SULFATE 2 MG: 2 INJECTION, SOLUTION INTRAMUSCULAR; INTRAVENOUS at 05:43

## 2020-05-28 RX ADMIN — SODIUM CHLORIDE: 9 INJECTION, SOLUTION INTRAVENOUS at 08:48

## 2020-05-28 RX ADMIN — SODIUM CHLORIDE, PRESERVATIVE FREE 10 ML: 5 INJECTION INTRAVENOUS at 19:51

## 2020-05-28 ASSESSMENT — PAIN SCALES - GENERAL
PAINLEVEL_OUTOF10: 9
PAINLEVEL_OUTOF10: 9
PAINLEVEL_OUTOF10: 0
PAINLEVEL_OUTOF10: 0
PAINLEVEL_OUTOF10: 3

## 2020-05-28 ASSESSMENT — PAIN DESCRIPTION - LOCATION: LOCATION: BACK

## 2020-05-28 ASSESSMENT — PAIN DESCRIPTION - ORIENTATION: ORIENTATION: LOWER

## 2020-05-28 ASSESSMENT — PAIN - FUNCTIONAL ASSESSMENT: PAIN_FUNCTIONAL_ASSESSMENT: ACTIVITIES ARE NOT PREVENTED

## 2020-05-28 ASSESSMENT — PAIN DESCRIPTION - PAIN TYPE: TYPE: CHRONIC PAIN

## 2020-05-28 NOTE — PROGRESS NOTES
Cardiology Progress Note     Today's Plan : will sign off and be available if needed    Admit Date:  5/19/2020    Consult reason/ Seen today for: atrial tachycardia    Subjective and  Overnight Events:  Up siting in chair-states he is tired. There is no reported chest pain, sob or palpitations. Telemetry SR - SB     Assessment / Plan / Recommendation:     1. Tachycardia- occurred while patient was NPO- now taking po - will stop amiodarone gtt- change BB to po-can- titrate as warranted  2. ASCVD- h/o diffuse LAD disease stress test 05/2020-normal perfusion- no chest pain-stable  3. CHF- does not appear to be in fluid overload - monitor fluid status- will stop IV fluids as he is taking po- +1500 yesterday   4. HTN- will change BB to po- pain may be causing BP to elevate - BP controlled   5. S/p hemicolectomy per surgery       History of Presenting Illness:    Chief complain on admission : 79 y. o.year old who is admitted for  Chief Complaint   Patient presents with    Chest Pain        Past medical history:    has a past medical history of Anemia, Arthritis, Back pain, chronic, Bipolar 1 disorder ([address] CAD (coronary artery disease), Cerebral artery occlusion with cerebral infarction ([address] Chronic kidney disease (CKD), stage III (moderate) ([address] CKD (chronic kidney disease), COPD (chronic obstructive pulmonary disease) ([address] Diabetes mellitus, type 2 ([address] Diabetic peripheral neuropathy ([address] Diastolic CHF ([address] Gastric ulcer, H/O cardiovascular stress test, Heart murmur, Hiatal hernia, History of cardiac cath, Hx of migraines, HX OTHER MEDICAL, Hyperlipidemia, Hypertension, Lumbar radiculopathy, Overlapping malignant neoplasm of colon ([address] Panic attack, Pleural effusion, S/P thoracentesis, Sleep apnea, SOBOE (shortness of breath on exertion), and Spinal stenosis.   Past surgical history:   has a past surgical history that includes Neck surgery (1998); Carpal tunnel release (Bilateral, 1989); knee surgery (Bilateral); thoracentesis (Left, 12/20/2013); Elbow surgery (Left, 2000's); Thoracentesis (4/25/2016); Tonsillectomy; Thoracentesis (Left, 11/15/2016); thoracotomy (Left, 03/18/2019); thoracotomy (Left, 3/18/2019); Upper gastrointestinal endoscopy (N/A, 5/12/2020); Colonoscopy (N/A, 5/12/2020); and Small intestine surgery (N/A, 5/21/2020). Social History:   reports that he quit smoking about 9 years ago. His smoking use included cigarettes. He has a 45.00 pack-year smoking history. He has never used smokeless tobacco. He reports previous alcohol use. He reports previous drug use. Drug: Marijuana. Family history:  family history includes Heart Disease in his mother; High Blood Pressure in his father. Allergies   Allergen Reactions    Nsaids      Renal        Review of Systems:   All 14 systems were reviewed and are negative  Except for the positive findings which are documented     /76   Pulse 65   Temp 97.6 °F (36.4 °C) (Oral)   Resp 18   Ht 5' 9.5\" (1.765 m)   Wt 224 lb (101.6 kg)   SpO2 96%   BMI 32.60 kg/m²       Intake/Output Summary (Last 24 hours) at 5/28/2020 1156  Last data filed at 5/28/2020 0545  Gross per 24 hour   Intake 3112.5 ml   Output 1000 ml   Net 2112.5 ml       Physical Exam:  Constitutional:  Well developed, Well nourished, No acute distress  HENT:  Normocephalic, Atraumatic, Bilateral external ears normal,  Nose normal.   Neck- trachea is midline  Eyes:  PEERL, Conjunctiva pale  Respiratory:  Normal breath sounds, No respiratory distress, No wheezing, No chest tenderness. Cardiovascular:  Normal heart rate, Normal rhythm, no murmurs appreciated, No rubs appreciated, No gallops appreciated, JVP not elevated  Abdomen/GI:  Soft, + tenderness  Musculoskeletal:  Intact distal pulses, no edema to lower legs,  No tenderness, No cyanosis, No clubbing.    Integument:  Warm, Dry  Lymphatic:  No lymphadenopathy noted. Neurologic:  Alert & oriented  Psychiatric:  Affect and Mood :pleasant     Medications:    insulin lispro  0-6 Units Subcutaneous 4x Daily AC & HS    metoprolol tartrate  25 mg Oral BID    risperiDONE  1 mg Oral Nightly    escitalopram  10 mg Oral Daily    busPIRone  10 mg Oral TID    divalproex  500 mg Oral BID    famotidine  10 mg Oral BID    sodium chloride flush  10 mL Intravenous 2 times per day    sodium chloride flush  10 mL Intravenous 2 times per day    enoxaparin  30 mg Subcutaneous Daily      sodium chloride 75 mL/hr at 05/28/20 0848    dextrose       labetalol (TRANDATE) IVPB, morphine, morphine, sodium chloride flush, LORazepam **OR** LORazepam **OR** LORazepam **OR** LORazepam **OR** LORazepam **OR** LORazepam **OR** LORazepam **OR** LORazepam, glucose, dextrose, glucagon (rDNA), dextrose, sodium chloride flush, promethazine **OR** ondansetron    Lab Data:  CBC:   Recent Labs     05/26/20  0502 05/27/20  0631 05/28/20  0454   WBC 7.3 6.6 6.2   HGB 7.2* 7.6* 7.0*   HCT 23.9* 26.4* 23.6*   MCV 79.1 82.8 80.5    224 249     BMP:   Recent Labs     05/26/20  0502 05/27/20  0336 05/28/20  0454   * 138 140   K 4.2 4.1 4.4    107 108   CO2 19* 15* 21   BUN 19 19 17   CREATININE 1.4* 1.5* 1.5*     PT/INR:   No results for input(s): PROTIME, INR in the last 72 hours. BNP:  No results for input(s): PROBNP in the last 72 hours. TROPONIN: No results for input(s): TROPONINT in the last 72 hours. Impression:  Active Problems:    Pleural effusion, left    Chronic obstructive pulmonary disease (HCC)    TIA (transient ischemic attack)    Coronary artery disease involving native coronary artery of native heart without angina pectoris    Diastolic CHF (HCC)    PAF (paroxysmal atrial fibrillation) (HCC)    Chest pain    Atrial tachycardia (HCC)    Overlapping malignant neoplasm of colon ([facility]  Resolved Problems:    * No resolved hospital problems.  *

## 2020-05-28 NOTE — PROGRESS NOTES
bed/recliner CGA   Short term goal 4: Pt will ambulate 50ft with LRAD CGA    Electronically signed by:     Alek Anguiano PTA  5/28/2020, 9:51 AM

## 2020-05-28 NOTE — DISCHARGE SUMMARY
Discharge Summary    Name:  Maura Beasley. /Age/Sex: 1950  (79 y.o. male)   MRN & CSN:  [id number] & [id number] Admission Date/Time: 2020  2:06 AM   Attending:  Jennifer Moyer MD Discharging Physician: Matilde Spear MD     HPI:     Maura Goldstein is a 79 y.o.  male  who presents with chest pain. Patient presented to ER by EMS for evaluation of chest pain. It was left-sided heaviness 8 out of 10 in severity started around 1 AM has been constant since started no alleviating or aggravating factors. No shortness of breath, fever, chills, cough, nausea vomiting. No palpitation. The ER blood pressure dropped to systolic 88 heart rate dropped to early 50s patient admitted taking extra carvedilol and extra amlodipine. Patient was given IV fluids glucagon and atropine. Hospital Course:   Maura Goldstein is a 79 y.o.  male  who presents with Colon adenocarcinoma ([facility] [address]    >Colon adenocarcinoma status post extended right hemicolectomy   Extended right colectomy performed .  Management as per general surgery.  Has NG with suction.  Currently n.p.o.  pain control, OOB, ambulate,NPO, NG, MIVF  -had BM, tolerating po intake, restart home po meds. - sx and clinically improved, cleared by surgery and was discharged to SNF in a stable condition.      >Sinus Tachycardia  EKG with sinus tachycardia. On tele 's, sinus.   May be due to uncontrolled pain. Will increase IV morphine to 2-4 mg q2h prn, pt on PO percocet at home chronically,  Metoprolol IVPB as pt not able to take home coreg,increase IV morphine to 2-4 mg q2h prn, takes PO percocet chronically, tx to step down, h/h stable, will check lactate, check UA, urine tox, no h/o etoh use, pt was on xanax at home, ? Benzo withdrawal, will start CIWA and monitor  -cardio re consulted.  Amiodarone, IV, switched to po metoprolol.      > Chest pain; recurrent;   >Coronary artery disease;   had cardiac cath several years ago showed diffuse disease  No EKG changes.  Troponin unchanged.  Nuclear stress test May 11 negative for active ischemia  Cardiology feels chest pain due to anemia.  Troponins trending down.  Cardiology has signed off.     >Hypotension on presentation to ER   Due to patient taking extra dose of carvedilol and extra dose of amlodipine, resolved     >Bradycardia on presentation to ER   Due to patient taking extra dose of carvedilol, improved post atropine and glucagon  - medications adjusted, resolved.      >Morbid obesity  >Chronic kidney disease stage 3  Baseline creatinine around 2.0.  Stable, avoid nephrotoxins  -creatinine continues to improve beyond baseline.     >Obstructive sleep apnea  >Diabetes mellitus type 2  Hemoglobin A1c 5/9: 6.2, controlled  Glucose elevated, but likely reactive.  sliding scale insulin at low-dose.     >anemia    Hemoglobin stable. Patient received 1 unit PRBC postop.  Monitor hemoglobin. -hemoglobin stable.  Low iron.  consider oral iron      >HTN  Monitor blood pressure. Uncontrolled due to pain, prn IV labetalol while holding all PO meds  Resume po meds     >Chronic empyema  Seen on CT chest. Stable on chest CT. If pt develops leukocytosis or fevers, may consider pulm consult, otherwise f/u outpatient     >Chronic pain syndrome due to chronic low back pain, degenerative disc disease, lumbar radiculopathy, spinal stenosis  Seen by pain management, who feels that current pain regimen is adequate.     >H/o multiple CVAs  Pt reports was on xarelto previously but stopped as outpatient     >Bipolar disorder  -restart Depakote which is IV.  Holding other psych meds until patient can take p.o.  Lack of psych meds could be contributing to patient's mental status changes. - resume home meds, seem to be back to baseline. The patient expressed appropriate understanding of and agreement with the discharge recommendations, medications, and plan.      Consults this admission:  IP CONSULT TO

## 2020-05-29 LAB
EKG ATRIAL RATE: 119 BPM
EKG DIAGNOSIS: NORMAL
EKG P AXIS: 1 DEGREES
EKG P-R INTERVAL: 140 MS
EKG Q-T INTERVAL: 352 MS
EKG QRS DURATION: 106 MS
EKG QTC CALCULATION (BAZETT): 495 MS
EKG R AXIS: -20 DEGREES
EKG T AXIS: 16 DEGREES
EKG VENTRICULAR RATE: 119 BPM

## 2020-06-01 ENCOUNTER — HOSPITAL ENCOUNTER (OUTPATIENT)
Age: 70
Setting detail: SPECIMEN
Discharge: HOME | End: 2020-06-01
Payer: MEDICARE

## 2020-06-01 ENCOUNTER — HOSPITAL ENCOUNTER (OUTPATIENT)
Age: 70
Setting detail: OBSERVATION
Discharge: NURSING, REHAB OR LONG-TERM CARE | End: 2020-06-05
Attending: EMERGENCY MEDICINE | Admitting: INTERNAL MEDICINE
Payer: MEDICARE

## 2020-06-01 ENCOUNTER — APPOINTMENT (OUTPATIENT)
Dept: CT IMAGING | Age: 70
End: 2020-06-01
Payer: MEDICARE

## 2020-06-01 PROBLEM — J18.9 PNEUMONIA: Status: ACTIVE | Noted: 2020-06-01

## 2020-06-01 LAB
ABO/RH: NORMAL
ALBUMIN SERPL-MCNC: 3 GM/DL (ref 3.4–5)
ALBUMIN SERPL-MCNC: 3.2 GM/DL (ref 3.4–5)
ALBUMIN SERPL-MCNC: 3.4 GM/DL (ref 3.4–5)
ALP BLD-CCNC: 59 IU/L (ref 40–129)
ALP BLD-CCNC: 63 IU/L (ref 40–128)
ALP BLD-CCNC: 66 IU/L (ref 40–129)
ALT SERPL-CCNC: 6 U/L (ref 10–40)
ANION GAP SERPL CALCULATED.3IONS-SCNC: 10 MMOL/L (ref 4–16)
ANION GAP SERPL CALCULATED.3IONS-SCNC: 11 MMOL/L (ref 4–16)
ANION GAP SERPL CALCULATED.3IONS-SCNC: 12 MMOL/L (ref 4–16)
ANTIBODY SCREEN: NEGATIVE
APTT: 31.1 SECONDS (ref 25.1–37.1)
APTT: 34.8 SECONDS (ref 25.1–37.1)
AST SERPL-CCNC: 10 IU/L (ref 15–37)
AST SERPL-CCNC: 8 IU/L (ref 15–37)
AST SERPL-CCNC: 9 IU/L (ref 15–37)
BACTERIA: NEGATIVE /HPF
BASOPHILS ABSOLUTE: 0 K/CU MM
BASOPHILS RELATIVE PERCENT: 0.4 % (ref 0–1)
BILIRUB SERPL-MCNC: 0.3 MG/DL (ref 0–1)
BILIRUBIN URINE: NEGATIVE MG/DL
BLOOD, URINE: NEGATIVE
BUN BLDV-MCNC: 13 MG/DL (ref 6–23)
BUN BLDV-MCNC: 13 MG/DL (ref 6–23)
BUN BLDV-MCNC: 14 MG/DL (ref 6–23)
CALCIUM SERPL-MCNC: 7.8 MG/DL (ref 8.3–10.6)
CALCIUM SERPL-MCNC: 7.9 MG/DL (ref 8.3–10.6)
CALCIUM SERPL-MCNC: 8.1 MG/DL (ref 8.3–10.6)
CHLORIDE BLD-SCNC: 103 MMOL/L (ref 99–110)
CHLORIDE BLD-SCNC: 104 MMOL/L (ref 99–110)
CHLORIDE BLD-SCNC: 104 MMOL/L (ref 99–110)
CLARITY: CLEAR
CO2: 22 MMOL/L (ref 21–32)
CO2: 24 MMOL/L (ref 21–32)
CO2: 24 MMOL/L (ref 21–32)
COLOR: ABNORMAL
CREAT SERPL-MCNC: 1.7 MG/DL (ref 0.9–1.3)
CREAT SERPL-MCNC: 1.8 MG/DL (ref 0.9–1.3)
CREAT SERPL-MCNC: 1.8 MG/DL (ref 0.9–1.3)
D DIMER: 952 NG/ML(DDU)
DIFFERENTIAL TYPE: ABNORMAL
EOSINOPHILS ABSOLUTE: 0.2 K/CU MM
EOSINOPHILS ABSOLUTE: 0.3 K/CU MM
EOSINOPHILS ABSOLUTE: 0.3 K/CU MM
EOSINOPHILS RELATIVE PERCENT: 3.4 % (ref 0–3)
EOSINOPHILS RELATIVE PERCENT: 3.8 % (ref 0–3)
EOSINOPHILS RELATIVE PERCENT: 5 % (ref 0–3)
FERRITIN: 50 NG/ML (ref 30–400)
FIBRINOGEN LEVEL: 292 MG/DL (ref 196.9–442.1)
GFR AFRICAN AMERICAN: 45 ML/MIN/1.73M2
GFR AFRICAN AMERICAN: 45 ML/MIN/1.73M2
GFR AFRICAN AMERICAN: 48 ML/MIN/1.73M2
GFR NON-AFRICAN AMERICAN: 37 ML/MIN/1.73M2
GFR NON-AFRICAN AMERICAN: 37 ML/MIN/1.73M2
GFR NON-AFRICAN AMERICAN: 40 ML/MIN/1.73M2
GLUCOSE BLD-MCNC: 134 MG/DL (ref 70–99)
GLUCOSE BLD-MCNC: 147 MG/DL (ref 70–99)
GLUCOSE BLD-MCNC: 152 MG/DL (ref 70–99)
GLUCOSE BLD-MCNC: 160 MG/DL (ref 70–99)
GLUCOSE, URINE: NEGATIVE MG/DL
HCT VFR BLD CALC: 22.6 % (ref 42–52)
HCT VFR BLD CALC: 24.8 % (ref 42–52)
HCT VFR BLD CALC: 26.8 % (ref 42–52)
HEMOGLOBIN: 6.4 GM/DL (ref 13.5–18)
HEMOGLOBIN: 7 GM/DL (ref 13.5–18)
HEMOGLOBIN: 7.6 GM/DL (ref 13.5–18)
IMMATURE NEUTROPHIL %: 1 % (ref 0–0.43)
IMMATURE NEUTROPHIL %: 1.1 % (ref 0–0.43)
IMMATURE NEUTROPHIL %: 1.3 % (ref 0–0.43)
INR BLD: 0.89 INDEX
INR BLD: 0.96 INDEX
KETONES, URINE: NEGATIVE MG/DL
LACTATE DEHYDROGENASE: 149 IU/L (ref 120–246)
LEUKOCYTE ESTERASE, URINE: NEGATIVE
LIPASE: 16 IU/L (ref 13–60)
LYMPHOCYTES ABSOLUTE: 1.1 K/CU MM
LYMPHOCYTES ABSOLUTE: 1.1 K/CU MM
LYMPHOCYTES ABSOLUTE: 1.6 K/CU MM
LYMPHOCYTES RELATIVE PERCENT: 16.2 % (ref 24–44)
LYMPHOCYTES RELATIVE PERCENT: 19.1 % (ref 24–44)
LYMPHOCYTES RELATIVE PERCENT: 23.9 % (ref 24–44)
MCH RBC QN AUTO: 23.3 PG (ref 27–31)
MCH RBC QN AUTO: 23.5 PG (ref 27–31)
MCH RBC QN AUTO: 23.7 PG (ref 27–31)
MCHC RBC AUTO-ENTMCNC: 28.2 % (ref 32–36)
MCHC RBC AUTO-ENTMCNC: 28.3 % (ref 32–36)
MCHC RBC AUTO-ENTMCNC: 28.4 % (ref 32–36)
MCV RBC AUTO: 82.7 FL (ref 78–100)
MCV RBC AUTO: 83.1 FL (ref 78–100)
MCV RBC AUTO: 83.5 FL (ref 78–100)
MONOCYTES ABSOLUTE: 0.4 K/CU MM
MONOCYTES ABSOLUTE: 0.5 K/CU MM
MONOCYTES ABSOLUTE: 0.5 K/CU MM
MONOCYTES RELATIVE PERCENT: 6.6 % (ref 0–4)
MONOCYTES RELATIVE PERCENT: 7.1 % (ref 0–4)
MONOCYTES RELATIVE PERCENT: 7.8 % (ref 0–4)
NITRITE URINE, QUANTITATIVE: NEGATIVE
NUCLEATED RBC %: 0 %
PDW BLD-RTO: 19.1 % (ref 11.7–14.9)
PDW BLD-RTO: 19.1 % (ref 11.7–14.9)
PDW BLD-RTO: 19.2 % (ref 11.7–14.9)
PH, URINE: 6 (ref 5–8)
PLATELET # BLD: 208 K/CU MM (ref 140–440)
PLATELET # BLD: 212 K/CU MM (ref 140–440)
PLATELET # BLD: 236 K/CU MM (ref 140–440)
PMV BLD AUTO: 8.9 FL (ref 7.5–11.1)
PMV BLD AUTO: 9.2 FL (ref 7.5–11.1)
PMV BLD AUTO: 9.4 FL (ref 7.5–11.1)
POTASSIUM SERPL-SCNC: 4 MMOL/L (ref 3.5–5.1)
POTASSIUM SERPL-SCNC: 4.2 MMOL/L (ref 3.5–5.1)
POTASSIUM SERPL-SCNC: 4.3 MMOL/L (ref 3.5–5.1)
PRO-BNP: 363.8 PG/ML
PROCALCITONIN: 0.07
PROTEIN UA: NEGATIVE MG/DL
PROTHROMBIN TIME: 10.8 SECONDS (ref 11.7–14.5)
PROTHROMBIN TIME: 11.6 SECONDS (ref 11.7–14.5)
RBC # BLD: 2.72 M/CU MM (ref 4.6–6.2)
RBC # BLD: 3 M/CU MM (ref 4.6–6.2)
RBC # BLD: 3.21 M/CU MM (ref 4.6–6.2)
RBC URINE: ABNORMAL /HPF (ref 0–3)
SEGMENTED NEUTROPHILS ABSOLUTE COUNT: 3.9 K/CU MM
SEGMENTED NEUTROPHILS ABSOLUTE COUNT: 4.2 K/CU MM
SEGMENTED NEUTROPHILS ABSOLUTE COUNT: 4.9 K/CU MM
SEGMENTED NEUTROPHILS RELATIVE PERCENT: 61.6 % (ref 36–66)
SEGMENTED NEUTROPHILS RELATIVE PERCENT: 69.4 % (ref 36–66)
SEGMENTED NEUTROPHILS RELATIVE PERCENT: 71.5 % (ref 36–66)
SODIUM BLD-SCNC: 137 MMOL/L (ref 135–145)
SODIUM BLD-SCNC: 137 MMOL/L (ref 135–145)
SODIUM BLD-SCNC: 140 MMOL/L (ref 135–145)
SPECIFIC GRAVITY UA: 1.01 (ref 1–1.03)
TOTAL IMMATURE NEUTOROPHIL: 0.06 K/CU MM
TOTAL IMMATURE NEUTOROPHIL: 0.07 K/CU MM
TOTAL IMMATURE NEUTOROPHIL: 0.09 K/CU MM
TOTAL NUCLEATED RBC: 0 K/CU MM
TOTAL PROTEIN: 5.2 GM/DL (ref 6.4–8.2)
TOTAL PROTEIN: 5.5 GM/DL (ref 6.4–8.2)
TOTAL PROTEIN: 6.1 GM/DL (ref 6.4–8.2)
TRANSITIONAL EPITHELIAL: <1 /HPF
TRICHOMONAS: ABNORMAL /HPF
TROPONIN T: 0.02 NG/ML
TROPONIN T: 0.03 NG/ML
UROBILINOGEN, URINE: NORMAL MG/DL (ref 0.2–1)
WBC # BLD: 5.6 K/CU MM (ref 4–10.5)
WBC # BLD: 6.8 K/CU MM (ref 4–10.5)
WBC # BLD: 6.9 K/CU MM (ref 4–10.5)
WBC UA: <1 /HPF (ref 0–2)

## 2020-06-01 PROCEDURE — 82728 ASSAY OF FERRITIN: CPT

## 2020-06-01 PROCEDURE — 2580000003 HC RX 258: Performed by: PHYSICIAN ASSISTANT

## 2020-06-01 PROCEDURE — 87070 CULTURE OTHR SPECIMN AEROBIC: CPT

## 2020-06-01 PROCEDURE — 2580000003 HC RX 258: Performed by: EMERGENCY MEDICINE

## 2020-06-01 PROCEDURE — U0002 COVID-19 LAB TEST NON-CDC: HCPCS

## 2020-06-01 PROCEDURE — 81001 URINALYSIS AUTO W/SCOPE: CPT

## 2020-06-01 PROCEDURE — 87633 RESP VIRUS 12-25 TARGETS: CPT

## 2020-06-01 PROCEDURE — 96375 TX/PRO/DX INJ NEW DRUG ADDON: CPT

## 2020-06-01 PROCEDURE — 87798 DETECT AGENT NOS DNA AMP: CPT

## 2020-06-01 PROCEDURE — 83690 ASSAY OF LIPASE: CPT

## 2020-06-01 PROCEDURE — 84145 PROCALCITONIN (PCT): CPT

## 2020-06-01 PROCEDURE — 96376 TX/PRO/DX INJ SAME DRUG ADON: CPT

## 2020-06-01 PROCEDURE — 6360000002 HC RX W HCPCS: Performed by: EMERGENCY MEDICINE

## 2020-06-01 PROCEDURE — 85025 COMPLETE CBC W/AUTO DIFF WBC: CPT

## 2020-06-01 PROCEDURE — 36415 COLL VENOUS BLD VENIPUNCTURE: CPT

## 2020-06-01 PROCEDURE — 87899 AGENT NOS ASSAY W/OPTIC: CPT

## 2020-06-01 PROCEDURE — 87040 BLOOD CULTURE FOR BACTERIA: CPT

## 2020-06-01 PROCEDURE — 87205 SMEAR GRAM STAIN: CPT

## 2020-06-01 PROCEDURE — 86141 C-REACTIVE PROTEIN HS: CPT

## 2020-06-01 PROCEDURE — 83615 LACTATE (LD) (LDH) ENZYME: CPT

## 2020-06-01 PROCEDURE — 80053 COMPREHEN METABOLIC PANEL: CPT

## 2020-06-01 PROCEDURE — 87581 M.PNEUMON DNA AMP PROBE: CPT

## 2020-06-01 PROCEDURE — 83880 ASSAY OF NATRIURETIC PEPTIDE: CPT

## 2020-06-01 PROCEDURE — 84484 ASSAY OF TROPONIN QUANT: CPT

## 2020-06-01 PROCEDURE — 85379 FIBRIN DEGRADATION QUANT: CPT

## 2020-06-01 PROCEDURE — 6360000002 HC RX W HCPCS: Performed by: PHYSICIAN ASSISTANT

## 2020-06-01 PROCEDURE — 96366 THER/PROPH/DIAG IV INF ADDON: CPT

## 2020-06-01 PROCEDURE — 96365 THER/PROPH/DIAG IV INF INIT: CPT

## 2020-06-01 PROCEDURE — 6370000000 HC RX 637 (ALT 250 FOR IP): Performed by: PHYSICIAN ASSISTANT

## 2020-06-01 PROCEDURE — 86901 BLOOD TYPING SEROLOGIC RH(D): CPT

## 2020-06-01 PROCEDURE — 86900 BLOOD TYPING SEROLOGIC ABO: CPT

## 2020-06-01 PROCEDURE — 85384 FIBRINOGEN ACTIVITY: CPT

## 2020-06-01 PROCEDURE — 96372 THER/PROPH/DIAG INJ SC/IM: CPT

## 2020-06-01 PROCEDURE — 74176 CT ABD & PELVIS W/O CONTRAST: CPT

## 2020-06-01 PROCEDURE — 85610 PROTHROMBIN TIME: CPT

## 2020-06-01 PROCEDURE — 87486 CHLMYD PNEUM DNA AMP PROBE: CPT

## 2020-06-01 PROCEDURE — 85730 THROMBOPLASTIN TIME PARTIAL: CPT

## 2020-06-01 PROCEDURE — G0378 HOSPITAL OBSERVATION PER HR: HCPCS

## 2020-06-01 PROCEDURE — 96368 THER/DIAG CONCURRENT INF: CPT

## 2020-06-01 PROCEDURE — 87449 NOS EACH ORGANISM AG IA: CPT

## 2020-06-01 PROCEDURE — 1200000000 HC SEMI PRIVATE

## 2020-06-01 PROCEDURE — 99285 EMERGENCY DEPT VISIT HI MDM: CPT

## 2020-06-01 PROCEDURE — 86850 RBC ANTIBODY SCREEN: CPT

## 2020-06-01 PROCEDURE — 82962 GLUCOSE BLOOD TEST: CPT

## 2020-06-01 RX ORDER — ONDANSETRON 2 MG/ML
4 INJECTION INTRAMUSCULAR; INTRAVENOUS EVERY 30 MIN PRN
Status: DISCONTINUED | OUTPATIENT
Start: 2020-06-01 | End: 2020-06-05 | Stop reason: HOSPADM

## 2020-06-01 RX ORDER — MORPHINE SULFATE 4 MG/ML
4 INJECTION, SOLUTION INTRAMUSCULAR; INTRAVENOUS EVERY 30 MIN PRN
Status: DISCONTINUED | OUTPATIENT
Start: 2020-06-01 | End: 2020-06-04

## 2020-06-01 RX ORDER — ONDANSETRON 2 MG/ML
4 INJECTION INTRAMUSCULAR; INTRAVENOUS EVERY 6 HOURS PRN
Status: DISCONTINUED | OUTPATIENT
Start: 2020-06-01 | End: 2020-06-05 | Stop reason: HOSPADM

## 2020-06-01 RX ORDER — ESCITALOPRAM OXALATE 10 MG/1
10 TABLET ORAL DAILY
Status: DISCONTINUED | OUTPATIENT
Start: 2020-06-02 | End: 2020-06-05 | Stop reason: HOSPADM

## 2020-06-01 RX ORDER — FAMOTIDINE 20 MG/1
10 TABLET, FILM COATED ORAL 2 TIMES DAILY
Status: DISCONTINUED | OUTPATIENT
Start: 2020-06-01 | End: 2020-06-05 | Stop reason: HOSPADM

## 2020-06-01 RX ORDER — HEPARIN SODIUM 5000 [USP'U]/ML
5000 INJECTION, SOLUTION INTRAVENOUS; SUBCUTANEOUS EVERY 8 HOURS SCHEDULED
Status: DISCONTINUED | OUTPATIENT
Start: 2020-06-01 | End: 2020-06-05 | Stop reason: HOSPADM

## 2020-06-01 RX ORDER — ACETAMINOPHEN 325 MG/1
650 TABLET ORAL EVERY 6 HOURS PRN
Status: DISCONTINUED | OUTPATIENT
Start: 2020-06-01 | End: 2020-06-05 | Stop reason: HOSPADM

## 2020-06-01 RX ORDER — DEXTROSE MONOHYDRATE 50 MG/ML
100 INJECTION, SOLUTION INTRAVENOUS PRN
Status: DISCONTINUED | OUTPATIENT
Start: 2020-06-01 | End: 2020-06-05 | Stop reason: HOSPADM

## 2020-06-01 RX ORDER — POLYETHYLENE GLYCOL 3350 17 G/17G
17 POWDER, FOR SOLUTION ORAL DAILY PRN
Status: DISCONTINUED | OUTPATIENT
Start: 2020-06-01 | End: 2020-06-05 | Stop reason: HOSPADM

## 2020-06-01 RX ORDER — NICOTINE POLACRILEX 4 MG
15 LOZENGE BUCCAL PRN
Status: DISCONTINUED | OUTPATIENT
Start: 2020-06-01 | End: 2020-06-05 | Stop reason: HOSPADM

## 2020-06-01 RX ORDER — OXYCODONE HYDROCHLORIDE 5 MG/1
5 TABLET ORAL EVERY 6 HOURS PRN
Status: DISCONTINUED | OUTPATIENT
Start: 2020-06-01 | End: 2020-06-05 | Stop reason: HOSPADM

## 2020-06-01 RX ORDER — MORPHINE SULFATE 2 MG/ML
2 INJECTION, SOLUTION INTRAMUSCULAR; INTRAVENOUS EVERY 4 HOURS PRN
Status: DISCONTINUED | OUTPATIENT
Start: 2020-06-01 | End: 2020-06-04

## 2020-06-01 RX ORDER — ATORVASTATIN CALCIUM 80 MG/1
80 TABLET, FILM COATED ORAL DAILY
Status: DISCONTINUED | OUTPATIENT
Start: 2020-06-01 | End: 2020-06-05 | Stop reason: HOSPADM

## 2020-06-01 RX ORDER — DOCUSATE SODIUM 100 MG/1
100 CAPSULE, LIQUID FILLED ORAL 2 TIMES DAILY
Status: DISCONTINUED | OUTPATIENT
Start: 2020-06-01 | End: 2020-06-05 | Stop reason: HOSPADM

## 2020-06-01 RX ORDER — BUSPIRONE HYDROCHLORIDE 10 MG/1
10 TABLET ORAL 3 TIMES DAILY
Status: DISCONTINUED | OUTPATIENT
Start: 2020-06-01 | End: 2020-06-05 | Stop reason: HOSPADM

## 2020-06-01 RX ORDER — SODIUM CHLORIDE 0.9 % (FLUSH) 0.9 %
10 SYRINGE (ML) INJECTION PRN
Status: DISCONTINUED | OUTPATIENT
Start: 2020-06-01 | End: 2020-06-05 | Stop reason: HOSPADM

## 2020-06-01 RX ORDER — PROMETHAZINE HYDROCHLORIDE 12.5 MG/1
12.5 TABLET ORAL EVERY 6 HOURS PRN
Status: DISCONTINUED | OUTPATIENT
Start: 2020-06-01 | End: 2020-06-05 | Stop reason: HOSPADM

## 2020-06-01 RX ORDER — SODIUM CHLORIDE 0.9 % (FLUSH) 0.9 %
10 SYRINGE (ML) INJECTION EVERY 12 HOURS SCHEDULED
Status: DISCONTINUED | OUTPATIENT
Start: 2020-06-01 | End: 2020-06-05 | Stop reason: HOSPADM

## 2020-06-01 RX ORDER — DEXTROSE MONOHYDRATE 25 G/50ML
12.5 INJECTION, SOLUTION INTRAVENOUS PRN
Status: DISCONTINUED | OUTPATIENT
Start: 2020-06-01 | End: 2020-06-05 | Stop reason: HOSPADM

## 2020-06-01 RX ORDER — ASPIRIN 81 MG/1
81 TABLET, CHEWABLE ORAL DAILY
Status: DISCONTINUED | OUTPATIENT
Start: 2020-06-01 | End: 2020-06-05 | Stop reason: HOSPADM

## 2020-06-01 RX ADMIN — DOCUSATE SODIUM 100 MG: 100 CAPSULE, LIQUID FILLED ORAL at 20:48

## 2020-06-01 RX ADMIN — PROMETHAZINE HYDROCHLORIDE 12.5 MG: 12.5 TABLET ORAL at 20:58

## 2020-06-01 RX ADMIN — VANCOMYCIN HYDROCHLORIDE 2000 MG: 1 INJECTION, POWDER, LYOPHILIZED, FOR SOLUTION INTRAVENOUS at 19:48

## 2020-06-01 RX ADMIN — ONDANSETRON 4 MG: 2 INJECTION INTRAMUSCULAR; INTRAVENOUS at 16:18

## 2020-06-01 RX ADMIN — MORPHINE SULFATE 4 MG: 4 INJECTION, SOLUTION INTRAMUSCULAR; INTRAVENOUS at 16:18

## 2020-06-01 RX ADMIN — FAMOTIDINE 10 MG: 20 TABLET, FILM COATED ORAL at 20:48

## 2020-06-01 RX ADMIN — CEFEPIME 2 G: 2 INJECTION, POWDER, FOR SOLUTION INTRAVENOUS at 18:48

## 2020-06-01 RX ADMIN — SODIUM CHLORIDE, PRESERVATIVE FREE 10 ML: 5 INJECTION INTRAVENOUS at 20:48

## 2020-06-01 RX ADMIN — BUSPIRONE HYDROCHLORIDE 10 MG: 10 TABLET ORAL at 20:48

## 2020-06-01 RX ADMIN — HEPARIN SODIUM 5000 UNITS: 5000 INJECTION, SOLUTION INTRAVENOUS; SUBCUTANEOUS at 21:02

## 2020-06-01 RX ADMIN — ATORVASTATIN CALCIUM 80 MG: 80 TABLET, FILM COATED ORAL at 22:50

## 2020-06-01 RX ADMIN — OXYCODONE HYDROCHLORIDE 5 MG: 5 TABLET ORAL at 21:48

## 2020-06-01 RX ADMIN — MORPHINE SULFATE 2 MG: 2 INJECTION, SOLUTION INTRAMUSCULAR; INTRAVENOUS at 20:48

## 2020-06-01 ASSESSMENT — PAIN DESCRIPTION - PAIN TYPE
TYPE: ACUTE PAIN;SURGICAL PAIN
TYPE: ACUTE PAIN;SURGICAL PAIN
TYPE: ACUTE PAIN

## 2020-06-01 ASSESSMENT — PAIN SCALES - GENERAL
PAINLEVEL_OUTOF10: 0
PAINLEVEL_OUTOF10: 6
PAINLEVEL_OUTOF10: 8
PAINLEVEL_OUTOF10: 7

## 2020-06-01 ASSESSMENT — PAIN DESCRIPTION - FREQUENCY
FREQUENCY: CONTINUOUS
FREQUENCY: CONTINUOUS

## 2020-06-01 ASSESSMENT — PAIN DESCRIPTION - DESCRIPTORS
DESCRIPTORS: SORE;TIGHTNESS
DESCRIPTORS: PRESSURE;SORE;TIGHTNESS

## 2020-06-01 ASSESSMENT — PAIN DESCRIPTION - LOCATION
LOCATION: ABDOMEN

## 2020-06-01 ASSESSMENT — PAIN DESCRIPTION - PROGRESSION: CLINICAL_PROGRESSION: NOT CHANGED

## 2020-06-01 ASSESSMENT — PAIN DESCRIPTION - ORIENTATION: ORIENTATION: LOWER

## 2020-06-01 ASSESSMENT — PAIN DESCRIPTION - ONSET: ONSET: ON-GOING

## 2020-06-01 NOTE — H&P
SURGERY Left 2000's    KNEE SURGERY Bilateral     right knee x 2( scope)/ left knee- 7-8 yr ago   [address] fusion   [address]  N/A 5/21/2020    BOWEL RESECTION EXTENDED RIGHT HEMICOLECTOMY performed by Aicha Kelley MD at [facility] Left 12/20/2013    THORACENTESIS  4/25/2016         THORACENTESIS Left 11/15/2016    Dr. Everett Face 250mlsremoved     THORACOTOMY Left 03/18/2019    with Lysis of adhesions    THORACOTOMY Left 3/18/2019    THORACOSCOPY CONVERTED TO THORACOTOMY WITH LYSIS OF ADHESIONS performed by Maribell Carranza MD at [facility]      as a kid    UPPER GASTROINTESTINAL ENDOSCOPY N/A 5/12/2020    EGD BIOPSY performed by Clemencia Moyer MD at [facility] ENDOSCOPY       Medications Prior to Admission:    Prior to Admission medications    Medication Sig Start Date End Date Taking? Authorizing Provider   oxyCODONE (ROXICODONE) 5 MG immediate release tablet Take 1 tablet by mouth every 6 hours as needed for Pain (moderate to severe pain) for up to 5 days. 5/28/20 6/2/20  Brianna Al MD   famotidine (PEPCID) 10 MG tablet Take 1 tablet by mouth 2 times daily 5/28/20   Brianna Al MD   metoprolol tartrate (LOPRESSOR) 25 MG tablet Take 1 tablet by mouth 2 times daily 5/28/20   Brianna Al MD   insulin lispro (HUMALOG) 100 UNIT/ML injection vial **Low Dose Correction Algorithm**Insulin lispro (HUMALOG)  3 TIMES DAILY WITH MEALSGlucose: Dose: No [id number] 1 Aexf477-181 2 Gnsiv587-542 3 Jfswh193-694 4 Auejz134-673 5 Mgtyt707 and above 6 Units 5/28/20   Brianna Al MD   gabapentin (NEURONTIN) 300 MG capsule Take 1 capsule by mouth 2 times daily for 30 days. 5/28/20 6/27/20  Brianna Al MD   enoxaparin (LOVENOX) 30 MG/0.3ML injection Inject 0.3 mLs into the skin daily 5/29/20   Brianna Al MD   ALPRAZolam [place]) 1 MG tablet Take 1 tablet by mouth 3 times daily as needed for Anxiety for up to 5 days.  5/28/20 6/2/20  Ammar 22 24   BUN 14 13   CREATININE 1.7* 1.8*     LFT'S  Recent Labs     06/01/20  0554 06/01/20  1616   AST 9* 10*   ALT 6* 6*   BILITOT 0.3 0.3   ALKPHOS 63 66     COAG  Recent Labs     06/01/20  1616   INR 0.96     CARDIAC ENZYMES  No results for input(s): CKTOTAL, CKMB, CKMBINDEX, TROPONINI in the last 72 hours. U/A:    Lab Results   Component Value Date    COLORU STRAW 06/01/2020    WBCUA <1 06/01/2020    RBCUA NONE SEEN 06/01/2020    MUCUS RARE 05/25/2020    BACTERIA NEGATIVE 06/01/2020    CLARITYU CLEAR 06/01/2020    SPECGRAV 1.008 06/01/2020    LEUKOCYTESUR NEGATIVE 06/01/2020    BLOODU NEGATIVE 06/01/2020    AMORPHOUS OCCASIONAL 03/16/2019     ABG    Lab Results   Component Value Date    WIG1XKB 27.7 05/21/2020    BEART 1 06/11/2014    HHX0CTW 39.7 05/21/2020    PO2ART 112.2 05/21/2020         Medical Decision Making:  Acute pulmonary opacities representing pulmonary edema vs pneumonia, COVID-19 rule out   -Clinical concern due to recent hospitalization and CT showing right upper and lower lobe pulmonary opacities representing pulmonary edema vs pneumonia. -Tested on 5/28, was negative. Retested today, pending   -Resp disease panel pending, MRSA nares pending   -BC pending, legionella and strep pending   -D-dimer, ferritin pending   -Telemetry and continuous pulse ox   -Droplet plus precaution   -Consider ID and pulmonology consults    -Continue Vanc and cefepime    Anemia   -Monitor H&H   -Transfuse for hgb <7    Detectable troponin    -Denies chest pain.  Detectable troponin, in setting of hx of CAD and CKD, has chronically detectable troponin upon trending    -Trend x2, consider cardiology consult for increasing troponin, cardiologist is Dr. Rothman Rather    Chronic diastolic heart failure   -Monitor for signs of fluid overload, repeat CXR   -Most recent echo 5/9 with LVF low normal, hypertrophy, EF 50-55%    Colon adenocarcinoma s/p right hemicolectomy 5/21   -Dr. Kurt Bellamy contacted by ER    Chronic

## 2020-06-01 NOTE — ACP (ADVANCE CARE PLANNING)
patient's behalf when appropriate. Care Preferences    Ventilation: \"If you were in your present state of health and suddenly became very ill and were unable to breathe on your own, what would your preference be about the use of a ventilator (breathing machine) if it were available to you? \"      Would the patient desire the use of ventilator (breathing machine)?: yes    \"If your health worsens and it becomes clear that your chance of recovery is unlikely, what would your preference be about the use of a ventilator (breathing machine) if it were available to you? \"     Would the patient desire the use of ventilator (breathing machine)?: No      Resuscitation  \"CPR works best to restart the heart when there is a sudden event, like a heart attack, in someone who is otherwise healthy. Unfortunately, CPR does not typically restart the heart for people who have serious health conditions or who are very sick. \"    \"In the event your heart stopped as a result of an underlying serious health condition, would you want attempts to be made to restart your heart (answer \"yes\" for attempt to resuscitate) or would you prefer a natural death (answer \"no\" for do not attempt to resuscitate)? \" yes      NOTE: If the patient has a valid advance directive AND now provides care preference(s) that are inconsistent with that prior directive, advise the patient to consider either: creating a new advance directive that complies with state-specific requirements; or, if that is not possible, orally revoking that prior directive in accordance with state-specific requirements, which must be documented in the EHR. [] Yes   [x] No   Educated Patient / Rakel Mccurdy regarding differences between Advance Directives and portable DNR orders.     Length of ACP Conversation in minutes:      Conversation Outcomes:  [x] ACP discussion completed  [] Existing advance directive reviewed with patient; no changes to patient's previously recorded wishes  [] New Advance Directive completed  [] Portable Do Not Rescitate prepared for Provider review and signature  [] POLST/POST/MOLST/MOST prepared for Provider review and signature      Follow-up plan:    [] Schedule follow-up conversation to continue planning  [] Referred individual to Provider for additional questions/concerns   [] Advised patient/agent/surrogate to review completed ACP document and update if needed with changes in condition, patient preferences or care setting    [] This note routed to one or more involved healthcare providers

## 2020-06-01 NOTE — ED PROVIDER NOTES
but no chest pain or palpitations\"    Hiatal hernia     History of cardiac cath 6/11/14    MODERATE  CAD      Hx of migraines     HX OTHER MEDICAL     \"have thinning of kidney walls- they found I had that 15 yrs ago\" see Dr Huang Shown"    Hyperlipidemia     Hypertension     Lumbar radiculopathy     Overlapping malignant neoplasm of colon ([facility] [address] 5/27/2020    Panic attack     'anything new gives me anxiety\"    Pleural effusion     scheduled for thoracentesis 7/28/2014, 8/17/2016    S/P thoracentesis     left side( per old chart had thora done 4/2016 and one done 2014)    Sleep apnea     sleep study x 2 - last one 4-5 yrs ago- uses c-pap\"    SOBOE (shortness of breath on exertion)     Spinal stenosis      Past Surgical History:   Procedure Laterality Date    CARPAL TUNNEL RELEASE Bilateral 1989    COLONOSCOPY N/A 5/12/2020    COLONOSCOPY POLYPECTOMY SNARE/COLD BIOPSY WITH SPOT INK TATTOO performed by Issac Agarwal MD at [facility] Left 2000's    KNEE SURGERY Bilateral     right knee x 2( scope)/ left knee- 7-8 yr ago   [address] fusion    SMALL INTESTINE SURGERY N/A 5/21/2020    BOWEL RESECTION EXTENDED RIGHT HEMICOLECTOMY performed by Sasha Campbell MD at [facility] Left 12/20/2013    THORACENTESIS  4/25/2016         THORACENTESIS Left 11/15/2016    Dr. Watts Spotted 250mlsremoved     THORACOTOMY Left 03/18/2019    with Lysis of adhesions    THORACOTOMY Left 3/18/2019    THORACOSCOPY CONVERTED TO THORACOTOMY WITH LYSIS OF ADHESIONS performed by Ja Díaz MD at [address] [place] [address]      as a kid    UPPER GASTROINTESTINAL ENDOSCOPY N/A 5/12/2020    EGD BIOPSY performed by Issac Agarwal MD at [facility] ENDOSCOPY     Family History   Problem Relation Age of Onset    Heart Disease Mother         heart attack    High Blood Pressure Father      Social History     Socioeconomic History    Marital status:      Spouse name: Not on file    (porcine) injection 5,000 Units  5,000 Units Subcutaneous 3 times per day Rosaura Alcantara PA-C        [START ON 6/2/2020] insulin lispro (HUMALOG) injection vial 0-12 Units  0-12 Units Subcutaneous TID WC Rani Mckeon PA-C        insulin lispro (HUMALOG) injection vial 0-6 Units  0-6 Units Subcutaneous Nightly Rosaura Alcantara PA-C        morphine (PF) injection 2 mg  2 mg Intravenous Q4H PRN Rani Mckeon PA-C   2 mg at 06/01/20 2048    [START ON 6/2/2020] cefepime (MAXIPIME) 2 g IVPB minibag  2 g Intravenous Q12H Rani Mckeon PA-C         Allergies   Allergen Reactions    Nsaids      Renal        Nursing Notes Reviewed    Physical Exam:  Triage VS:    ED Triage Vitals [06/01/20 1532]   Enc Vitals Group      BP (!) 139/57      Pulse 65      Resp 18      Temp 98.4 °F (36.9 °C)      Temp Source Oral      SpO2 97 %      Weight 240 lb (108.9 kg)      Height 5' 9.5\" (1.765 m)      Head Circumference       Peak Flow       Pain Score       Pain Loc       Pain Edu? Excl. in [address]? My pulse ox interpretation is - normal    General appearance:  No acute distress. Skin:  Warm. Dry. Eye:  Extraocular movements intact. Ears, nose, mouth and throat:  Oral mucosa moist   Neck:  Trachea midline. Extremity:  No swelling. Normal ROM    Heart:  Regular rate and rhythm, normal S1 & S2, no extra heart sounds. Perfusion:  intact  Respiratory:  Lungs clear to auscultation bilaterally. Respirations nonlabored. Abdominal:  Normal bowel sounds. Soft. Mild tenderness to palpation diffusely without rebound or guarding. Midline incision with staples intact, clean and dry. Non distended.   Neurological:  Alert and oriented          Psychiatric:  Appropriate    I have reviewed and interpreted all of the currently available lab results from this visit (if applicable):  Results for orders placed or performed during the hospital encounter of 06/01/20   CBC Auto Differential   Result Value Ref Range    WBC 6.9 4.0 - Quantitative NEGATIVE NEGATIVE    Leukocyte Esterase, Urine NEGATIVE NEGATIVE    RBC, UA NONE SEEN 0 - 3 /HPF    WBC, UA <1 0 - 2 /HPF    Bacteria, UA NEGATIVE NEGATIVE /HPF    Trans Epithel, UA <1 /HPF    Trichomonas, UA NONE SEEN NONE SEEN /HPF   Protime/INR & PTT   Result Value Ref Range    Protime 11.6 (L) 11.7 - 14.5 SECONDS    INR 0.96 INDEX    aPTT 34.8 25.1 - 37.1 SECONDS   Troponin   Result Value Ref Range    Troponin T 0.025 (H) <0.01 NG/ML   Brain Natriuretic Peptide   Result Value Ref Range    Pro-.8 (H) <300 PG/ML   TYPE AND SCREEN   Result Value Ref Range    ABO/Rh O POSITIVE     Antibody Screen NEGATIVE       Radiographs (if obtained):  Radiologist's Report Reviewed:  No results found. EKG (if obtained): (All EKG's are interpreted by myself in the absence of a cardiologist)        MDM:  9year-old male with history as above presents with concern for abdominal pain, reported low hemoglobin. Has had cough off and on. He is in no acute distress. I reviewed labs from earlier and does appear that he had had a mild drop in hemoglobin but appears that at baseline he was just above 7 while here in the hospital.  We have sent repeat labs. 1820- CT results reviewed- appears to have pulmonary opacities in upper and lower lobes on the right, question for pneumonia or pulmonary edema. Trop and BNP added to labs. Other labs actually appear to be stable, Hgb 7.3-does not require transfusion. With concern for possible pneumonia I have    Ordered a dose of broad-spectrum antibiotics as he had recently been admitted to the hospital and was also intubated for the procedure. Given a dose of cefepime and vancomycin. Did discuss with Dr. Melba Briseno to make her aware of patient as he will be admitted. Discussed with hospitalist team Iam Enamorado for admission at [address] [place] [address]. Clinical Impression:  1.  Pneumonia due to organism      Disposition referral (if applicable):  MD Yazan Yang

## 2020-06-01 NOTE — ADT AUTH CERT
Chest Pain - Care Day 9 (5/27/2020) by Dawson Chase RN         Review Status Review Entered   Completed 6/1/2020 07:38       Criteria Review      Care Day: 9 Care Date: 5/27/2020 Level of Care:    Guideline Day 2    Clinical Status    (X) * Hemodynamic stability    (X) * Acute coronary syndrome ruled out    (X) * Pain absent or managed    (X) * Dangerous arrhythmia absent    6/1/2020 7:38 AM EDT by Breanna Amezcua      Telemetry SR - SB    ( ) * No identification of etiology of pain requiring inpatient care    6/1/2020 7:38 AM EDT by Matthias Son S/p hemicolectomy    ( ) * Discharge plans and education understood    Activity    ( ) * Ambulatory    6/1/2020 7:38 AM EDT by Breanna Amezcua      Needs to ambulate    Routes    (X) * Oral hydration, medications, and diet    Medications    (X) Possible aspirin or cardiac medications    (X) Discontinue IV medications    6/1/2020 7:38 AM EDT by Breanna Amezcua      Tachycardia- occurred while patient was NPO- now taking po - will stop amiodarone gtt- change BB to po-can  titrate as warranted    * Milestone   Additional Notes   5/27/2020      BP (!) 156/74   Pulse 63   Temp 98 °F (36.7 °C) (Oral)   Resp 26   Ht 5' 9.5\" (1.765 m)   Wt 228 lb (103.4 kg)   SpO2 97%   BMI 33.19 kg/m²       Labs      WBC: 6.6   RBC: 3.19 (L)   Hemoglobin Quant: 7.6 (L)   Hematocrit: 26.4 (L)   MCV: 82.8   MCH: 23.8 (L)   MCHC: 28.8 (L)   MPV: 8.6   RDW: 18.4 (H)   Platelet Count: 846         Orders   POCT glucose 4 x daily   Lopressor IV q6h discontinued today and started po   Depacon IV q6h stopped today   IV NS 75 ml/h   IV Amiodarone gtt stopped today    Morphine IV q2h prn given x 3 doses today         Per general surgery   More alert   Abd soft, bowels worming   No po intake recorded   Path shows 2 cancers and all 17 lymph nodes negative for metastatic disease. Needs to ambulate and need record of po intake.       Per IM      Hospital Day: 9       Assessment and Plan:   Karen Villanueva Jr. is a 79 y.o.  male  who presents with <principal problem not specified>           >Colon adenocarcinoma status post extended right hemicolectomy 5/21  Extended right colectomy performed 5/21.  Management as per general surgery.  Has NG with suction.  Currently n.p.o.  pain control, OOB, ambulate,NPO, NG, MIVF  -had BM, tolerating po intake, restart home po meds.        >Sinus Tachycardia  EKG with sinus tachycardia. On tele 's, sinus.   May be due to uncontrolled pain. Will increase IV morphine to 2-4 mg q2h prn, pt on PO percocet at home chronically,  Metoprolol IVPB as pt not able to take home coreg,increase IV morphine to 2-4 mg q2h prn, takes PO percocet chronically, tx to step down, h/h stable, will check lactate, check UA, urine tox, no h/o etoh use, pt was on xanax at home, ? Benzo withdrawal, will start CIWA and monitor  -cardio re consulted. Amiodarone, IV, switched to po metoprolol.        > Chest pain; recurrent;    >Coronary artery disease;   had cardiac cath several years ago showed diffuse disease  No EKG changes.  Troponin unchanged.  Nuclear stress test May 11 negative for active ischemia  Cardiology feels chest pain due to anemia.  Troponins trending down.  Cardiology has signed off.       >Hypotension on presentation to ER   Due to patient taking extra dose of carvedilol and extra dose of amlodipine, resolved       >Bradycardia on presentation to ER   Due to patient taking extra dose of carvedilol, improved post atropine and glucagon       >Morbid obesity   >Chronic kidney disease stage 3  Baseline creatinine around 2.0.  Stable, avoid nephrotoxins  -creatinine continues to improve beyond baseline.       >Obstructive sleep apnea   >Diabetes mellitus type 2  Hemoglobin A1c 5/9: 6.2, controlled  Glucose elevated, but likely reactive.  Start sliding scale insulin at low-dose.       >anemia    Hemoglobin stable.   Patient received 1 unit PRBC postop.  Monitor

## 2020-06-01 NOTE — ED TRIAGE NOTES
Pt presents to ED from [facility] for c/o abd pain and low hgb. Per EMS pts hgb at [facility] was 6.0. Pt reports he is one week post op colon resection done by Dr. Earl Iverson. Pt denies n/v. Pt reports he has had a BM since surgery. Pt a&ox4.

## 2020-06-02 ENCOUNTER — APPOINTMENT (OUTPATIENT)
Dept: GENERAL RADIOLOGY | Age: 70
End: 2020-06-02
Payer: MEDICARE

## 2020-06-02 LAB
BASOPHILS ABSOLUTE: 0 K/CU MM
BASOPHILS RELATIVE PERCENT: 0.3 % (ref 0–1)
DIFFERENTIAL TYPE: ABNORMAL
EOSINOPHILS ABSOLUTE: 0.2 K/CU MM
EOSINOPHILS RELATIVE PERCENT: 3.5 % (ref 0–3)
GLUCOSE BLD-MCNC: 105 MG/DL (ref 70–99)
GLUCOSE BLD-MCNC: 115 MG/DL (ref 70–99)
GLUCOSE BLD-MCNC: 117 MG/DL (ref 70–99)
HCT VFR BLD CALC: 27.5 % (ref 42–52)
HEMOGLOBIN: 7.8 GM/DL (ref 13.5–18)
HIGH SENSITIVE C-REACTIVE PROTEIN: 14.7 MG/L
IMMATURE NEUTROPHIL %: 0.9 % (ref 0–0.43)
LEGIONELLA URINARY AG: NEGATIVE
LYMPHOCYTES ABSOLUTE: 0.7 K/CU MM
LYMPHOCYTES RELATIVE PERCENT: 11.6 % (ref 24–44)
MCH RBC QN AUTO: 22.7 PG (ref 27–31)
MCHC RBC AUTO-ENTMCNC: 28.4 % (ref 32–36)
MCV RBC AUTO: 80.2 FL (ref 78–100)
MONOCYTES ABSOLUTE: 0.4 K/CU MM
MONOCYTES RELATIVE PERCENT: 7.6 % (ref 0–4)
NUCLEATED RBC %: 0 %
PDW BLD-RTO: 19 % (ref 11.7–14.9)
PLATELET # BLD: 253 K/CU MM (ref 140–440)
PMV BLD AUTO: 9.1 FL (ref 7.5–11.1)
RBC # BLD: 3.43 M/CU MM (ref 4.6–6.2)
SEGMENTED NEUTROPHILS ABSOLUTE COUNT: 4.4 K/CU MM
SEGMENTED NEUTROPHILS RELATIVE PERCENT: 76.1 % (ref 36–66)
STREP PNEUMONIAE ANTIGEN: NORMAL
TOTAL IMMATURE NEUTOROPHIL: 0.05 K/CU MM
TOTAL NUCLEATED RBC: 0 K/CU MM
WBC # BLD: 5.8 K/CU MM (ref 4–10.5)

## 2020-06-02 PROCEDURE — 2580000003 HC RX 258: Performed by: PHYSICIAN ASSISTANT

## 2020-06-02 PROCEDURE — 85379 FIBRIN DEGRADATION QUANT: CPT

## 2020-06-02 PROCEDURE — G0378 HOSPITAL OBSERVATION PER HR: HCPCS

## 2020-06-02 PROCEDURE — 85610 PROTHROMBIN TIME: CPT

## 2020-06-02 PROCEDURE — 85025 COMPLETE CBC W/AUTO DIFF WBC: CPT

## 2020-06-02 PROCEDURE — 96367 TX/PROPH/DG ADDL SEQ IV INF: CPT

## 2020-06-02 PROCEDURE — 84484 ASSAY OF TROPONIN QUANT: CPT

## 2020-06-02 PROCEDURE — 6370000000 HC RX 637 (ALT 250 FOR IP): Performed by: PHYSICIAN ASSISTANT

## 2020-06-02 PROCEDURE — 82962 GLUCOSE BLOOD TEST: CPT

## 2020-06-02 PROCEDURE — 85384 FIBRINOGEN ACTIVITY: CPT

## 2020-06-02 PROCEDURE — 96376 TX/PRO/DX INJ SAME DRUG ADON: CPT

## 2020-06-02 PROCEDURE — 94761 N-INVAS EAR/PLS OXIMETRY MLT: CPT

## 2020-06-02 PROCEDURE — 96366 THER/PROPH/DIAG IV INF ADDON: CPT

## 2020-06-02 PROCEDURE — 6360000002 HC RX W HCPCS: Performed by: INTERNAL MEDICINE

## 2020-06-02 PROCEDURE — 80053 COMPREHEN METABOLIC PANEL: CPT

## 2020-06-02 PROCEDURE — 71045 X-RAY EXAM CHEST 1 VIEW: CPT

## 2020-06-02 PROCEDURE — 2580000003 HC RX 258: Performed by: INTERNAL MEDICINE

## 2020-06-02 PROCEDURE — 85730 THROMBOPLASTIN TIME PARTIAL: CPT

## 2020-06-02 PROCEDURE — 6360000002 HC RX W HCPCS: Performed by: PHYSICIAN ASSISTANT

## 2020-06-02 RX ADMIN — DOCUSATE SODIUM 100 MG: 100 CAPSULE, LIQUID FILLED ORAL at 21:00

## 2020-06-02 RX ADMIN — METOPROLOL TARTRATE 25 MG: 25 TABLET, FILM COATED ORAL at 09:36

## 2020-06-02 RX ADMIN — FAMOTIDINE 10 MG: 20 TABLET, FILM COATED ORAL at 09:36

## 2020-06-02 RX ADMIN — METOPROLOL TARTRATE 25 MG: 25 TABLET, FILM COATED ORAL at 21:00

## 2020-06-02 RX ADMIN — SODIUM CHLORIDE, PRESERVATIVE FREE 10 ML: 5 INJECTION INTRAVENOUS at 02:48

## 2020-06-02 RX ADMIN — SODIUM CHLORIDE, PRESERVATIVE FREE 10 ML: 5 INJECTION INTRAVENOUS at 09:37

## 2020-06-02 RX ADMIN — OXYCODONE HYDROCHLORIDE 5 MG: 5 TABLET ORAL at 03:49

## 2020-06-02 RX ADMIN — BUSPIRONE HYDROCHLORIDE 10 MG: 10 TABLET ORAL at 13:53

## 2020-06-02 RX ADMIN — ESCITALOPRAM OXALATE 10 MG: 10 TABLET ORAL at 09:36

## 2020-06-02 RX ADMIN — IRON SUCROSE 200 MG: 20 INJECTION, SOLUTION INTRAVENOUS at 09:36

## 2020-06-02 RX ADMIN — BUSPIRONE HYDROCHLORIDE 10 MG: 10 TABLET ORAL at 21:00

## 2020-06-02 RX ADMIN — ASPIRIN 81 MG 81 MG: 81 TABLET ORAL at 09:36

## 2020-06-02 RX ADMIN — CEFEPIME 2 G: 2 INJECTION, POWDER, FOR SOLUTION INTRAVENOUS at 07:06

## 2020-06-02 RX ADMIN — BUSPIRONE HYDROCHLORIDE 10 MG: 10 TABLET ORAL at 09:36

## 2020-06-02 RX ADMIN — FAMOTIDINE 10 MG: 20 TABLET, FILM COATED ORAL at 21:00

## 2020-06-02 RX ADMIN — DOCUSATE SODIUM 100 MG: 100 CAPSULE, LIQUID FILLED ORAL at 09:36

## 2020-06-02 RX ADMIN — OXYCODONE HYDROCHLORIDE 5 MG: 5 TABLET ORAL at 13:53

## 2020-06-02 RX ADMIN — CEFEPIME 2 G: 2 INJECTION, POWDER, FOR SOLUTION INTRAVENOUS at 17:47

## 2020-06-02 RX ADMIN — ATORVASTATIN CALCIUM 80 MG: 80 TABLET, FILM COATED ORAL at 21:06

## 2020-06-02 RX ADMIN — MORPHINE SULFATE 2 MG: 2 INJECTION, SOLUTION INTRAMUSCULAR; INTRAVENOUS at 02:48

## 2020-06-02 RX ADMIN — SODIUM CHLORIDE, PRESERVATIVE FREE 10 ML: 5 INJECTION INTRAVENOUS at 21:00

## 2020-06-02 ASSESSMENT — PAIN DESCRIPTION - DESCRIPTORS
DESCRIPTORS: SORE;TIGHTNESS
DESCRIPTORS: ACHING
DESCRIPTORS: SORE;TIGHTNESS

## 2020-06-02 ASSESSMENT — PAIN SCALES - GENERAL
PAINLEVEL_OUTOF10: 6
PAINLEVEL_OUTOF10: 4
PAINLEVEL_OUTOF10: 8
PAINLEVEL_OUTOF10: 4

## 2020-06-02 ASSESSMENT — PAIN - FUNCTIONAL ASSESSMENT
PAIN_FUNCTIONAL_ASSESSMENT: ACTIVITIES ARE NOT PREVENTED
PAIN_FUNCTIONAL_ASSESSMENT: ACTIVITIES ARE NOT PREVENTED
PAIN_FUNCTIONAL_ASSESSMENT: PREVENTS OR INTERFERES SOME ACTIVE ACTIVITIES AND ADLS

## 2020-06-02 ASSESSMENT — PAIN DESCRIPTION - PROGRESSION
CLINICAL_PROGRESSION: GRADUALLY WORSENING
CLINICAL_PROGRESSION: GRADUALLY IMPROVING
CLINICAL_PROGRESSION: GRADUALLY IMPROVING
CLINICAL_PROGRESSION: GRADUALLY WORSENING

## 2020-06-02 ASSESSMENT — PAIN DESCRIPTION - LOCATION
LOCATION: ABDOMEN

## 2020-06-02 ASSESSMENT — PAIN DESCRIPTION - ONSET
ONSET: AWAKENED FROM SLEEP
ONSET: GRADUAL
ONSET: AWAKENED FROM SLEEP

## 2020-06-02 ASSESSMENT — PAIN DESCRIPTION - ORIENTATION
ORIENTATION: LOWER
ORIENTATION: LOWER
ORIENTATION: MID

## 2020-06-02 ASSESSMENT — PAIN DESCRIPTION - FREQUENCY
FREQUENCY: CONTINUOUS

## 2020-06-02 ASSESSMENT — PAIN DESCRIPTION - PAIN TYPE
TYPE: SURGICAL PAIN
TYPE: ACUTE PAIN;SURGICAL PAIN
TYPE: SURGICAL PAIN;ACUTE PAIN

## 2020-06-02 NOTE — CONSULTS
History of cardiac cath 6/11/14    MODERATE  CAD      Hx of migraines     HX OTHER MEDICAL     \"have thinning of kidney walls- they found I had that 15 yrs ago\" see Dr Henao Pro"    Hyperlipidemia     Hypertension     Lumbar radiculopathy     Overlapping malignant neoplasm of colon ([facility] [address] 5/27/2020    Panic attack     'anything new gives me anxiety\"    Pleural effusion     scheduled for thoracentesis 7/28/2014, 8/17/2016    S/P thoracentesis     left side( per old chart had thora done 4/2016 and one done 2014)    Sleep apnea     sleep study x 2 - last one 4-5 yrs ago- uses c-pap\"    SOBOE (shortness of breath on exertion)     Spinal stenosis        Past Surgical History:        Procedure Laterality Date    CARPAL TUNNEL RELEASE Bilateral 1989    COLONOSCOPY N/A 5/12/2020    COLONOSCOPY POLYPECTOMY SNARE/COLD BIOPSY WITH SPOT INK TATTOO performed by Pedro Duron MD at [facility] Left 2000's    KNEE SURGERY Bilateral     right knee x 2( scope)/ left knee- 7-8 yr ago   [address] fusion   [address]  N/A 5/21/2020    BOWEL RESECTION EXTENDED RIGHT HEMICOLECTOMY performed by Sofia Styles MD at [facility] Left 12/20/2013    THORACENTESIS  4/25/2016         THORACENTESIS Left 11/15/2016    Dr. Jaun Reagan 250mlsremoved     THORACOTOMY Left 03/18/2019    with Lysis of adhesions    THORACOTOMY Left 3/18/2019    THORACOSCOPY CONVERTED TO THORACOTOMY WITH LYSIS OF ADHESIONS performed by Talha Hogan MD at [address] [place] [address]      as a kid    UPPER GASTROINTESTINAL ENDOSCOPY N/A 5/12/2020    EGD BIOPSY performed by Pedro Duron MD at [facility] ENDOSCOPY       Medications:   Prior to Admission medications    Medication Sig Start Date End Date Taking? Authorizing Provider   oxyCODONE (ROXICODONE) 5 MG immediate release tablet Take 1 tablet by mouth every 6 hours as needed for Pain (moderate to severe pain) for up to 5 days.  5/28/20 6/2/20 hours.  BNP: No results for input(s): BNP in the last 72 hours. Lipids: No results for input(s): CHOL, HDL in the last 72 hours. Invalid input(s): LDLCALCU  ABGs:   Lab Results   Component Value Date    PO2ART 112.2 05/21/2020    ATG8DEX 39.7 05/21/2020     INR:   Recent Labs     06/01/20  1616 06/01/20  2104   INR 0.96 0.89       I/O last 3 completed shifts: In: 20 [I.V.:20]  Out: 525 [Urine:525]      No intake/output data recorded.      -----------------------------------------------------------------      Assessment and Recommendations     Patient Active Problem List   Diagnosis    COPD (chronic obstructive pulmonary disease) ([facility]    Pleural effusion, left    SAMANTHA on CPAP    Chronic obstructive pulmonary disease ([facility]    TIA (transient ischemic attack)    Bipolar 1 disorder ([address]    Coronary artery disease involving native coronary artery of native heart without angina pectoris    Essential hypertension    REYES (dyspnea on exertion)    Anemia, chronic disease    Diuretic-induced hypokalemia    Diastolic CHF ([address]    Acute respiratory failure ([facility]    Pneumonia due to gram-negative bacteria ([facility])    Hyperkalemia    Adrenal mass ([address]    Diabetes mellitus, type 2 ([address]    Hyponatremia    Pneumonia due to organism    PAF (paroxysmal atrial fibrillation) ([facility]    Empyema of left pleural space ([facility]    Hospital-acquired bacterial pneumonia    Hyperlipidemia    Lumbar radiculopathy    CKD (chronic kidney disease)    Diabetic peripheral neuropathy ([facility]    Chronic kidney disease (CKD), stage III (moderate) ([facility])    Cervical stenosis of spine    Spinal stenosis of lumbar region without neurogenic claudication    Somatic dysfunction of back    Somatic dysfunction of cervical region    Somatic dysfunction of pelvis region    Somatic dysfunction of both lower extremities    Empyema lung ([address]    Acute kidney injury superimposed on CKD ([address]    Chronic diastolic heart failure ([address]   

## 2020-06-02 NOTE — DISCHARGE INSTR - COC
Continuity of Care Form    Patient Name: [facility]. :  1950  MRN:  [id number]    Admit date:  2020  Discharge date:  20    Code Status Order: Full Code   Advance Directives:     Admitting Physician:  Herman French MD  PCP: Lina Hall MD    Discharging Nurse: Lary Cuevas Unit/Room#: 2559/7818-X  Discharging Unit Phone Number: [phone]    Emergency Contact:   Extended Emergency Contact Information  Primary Emergency Contact: [address] Phone: [phone]  Mobile Phone: [phone]  Relation: Child   needed?  No  Secondary Emergency Contact: Trixie Montague  Home Phone: [phone]  Mobile Phone: [phone]  Relation: Child    Past Surgical History:  Past Surgical History:   Procedure Laterality Date    CARPAL TUNNEL RELEASE Bilateral     COLONOSCOPY N/A 2020    COLONOSCOPY POLYPECTOMY SNARE/COLD BIOPSY WITH SPOT INK TATTOO performed by Grant Samuel MD at [facility] Left 's    KNEE SURGERY Bilateral     right knee x 2( scope)/ left knee- 7-8 yr ago   [address] fusion   [address] N/A 2020    BOWEL RESECTION EXTENDED RIGHT HEMICOLECTOMY performed by Gene Brady MD at [address] [place] [address] Left 2013    THORACENTESIS  2016         THORACENTESIS Left 11/15/2016    Dr. Kemi Chanel 250mlsremoved     THORACOTOMY Left 2019    with Lysis of adhesions    THORACOTOMY Left 3/18/2019    THORACOSCOPY CONVERTED TO THORACOTOMY WITH LYSIS OF ADHESIONS performed by Tammie Verdugo MD at [place] [address]      as a kid    UPPER GASTROINTESTINAL ENDOSCOPY N/A 2020    EGD BIOPSY performed [facility] Grant [facility] MD at [facility] ENDOSCOPY       Immunization History:   Immunization History   Administered Date(s) Administered    Influenza A (A4A9-90) Vaccine PF IM 2010    Influenza Vaccine, unspecified formulation 10/14/2016     Empyema lung (HCC) J86.9    Acute kidney injury superimposed on CKD (HCC) N17.9, N18.9    Chronic diastolic heart failure (HCC) I50.32    Syncope and collapse R55    Hypotensive episode I95.9    Bradycardia on ECG R00.1    Syncope R55    Lightheadedness R42    Dizziness R42    Acute on chronic respiratory failure with hypoxia (HCC) J96.21    Severe mixed bipolar 1 disorder without psychosis (HCC) F31.63    Acute chest pain R07.9    Chest pain R07.9    Atrial tachycardia (HCC) I47.1    Overlapping malignant neoplasm of colon (HCC) C18.8    Colon adenocarcinoma (HCC) C18.9    Pneumonia J18.9       Isolation/Infection:   Isolation          Droplet Plus  Contact        Patient Infection Status     Infection Onset Added Last Indicated Last Indicated By Review Planned Expiration Resolved Resolved By    COVID-19 Rule Out 06/01/20 06/01/20 06/01/20 Covid-19 Ambulatory (Ordered)        MRSA 10/09/19 10/10/19 10/09/19 Culture, Sputum        Resolved    COVID-19 Rule Out 05/26/20 05/26/20 05/28/20 COVID-19 (Ordered)   05/28/20 Rule-Out Test Resulted          Nurse Assessment:  Last Vital Signs: BP (!) 141/63   Pulse 67   Temp 97.6 °F (36.4 °C) (Oral)   Resp 16   Ht (S) 5' 9.5\" (1.765 m)   Wt (S) 227 lb 8 oz (103.2 kg)   SpO2 97%   BMI 33.11 kg/m²     Last documented pain score (0-10 scale): Pain Level: 6  Last Weight:   Wt Readings from Last 1 Encounters:   06/02/20 (S) 227 lb 8 oz (103.2 kg)     Mental Status:  oriented and alert    IV Access:  - None    Nursing Mobility/ADLs:  Walking   Assisted  Transfer  Assisted  Bathing  Assisted  Dressing  Assisted  Toileting  Assisted  Feeding  Assisted  Med Admin  Assisted  Med Delivery   whole    Wound Care Documentation and Therapy:        Elimination:  Continence:   · Bowel: No  · Bladder: No  Urinary Catheter: None   Colostomy/Ileostomy/Ileal Conduit: No       Date of Last BM: ***    Intake/Output Summary (Last 24 hours) at 6/2/2020 1001  Last data filed at 2020 0248  Gross per 24 hour   Intake 20 ml   Output 525 ml   Net -505 ml     I/O last 3 completed shifts: In: 20 [I.V.:20]  Out: 525 [Urine:525]    Safety Concerns:      At Risk for Falls    Impairments/Disabilities:      Vision      Patient's personal belongings (please select all that are sent with patient):  Glasses    RN SIGNATURE:  Glennda Gottron LPN   CASE MANAGEMENT/SOCIAL WORK SECTION    Inpatient Status Date: ***    Readmission Risk Assessment Score:  Readmission Risk              Risk of Unplanned Readmission:        48           Discharging to Facility/ Agency   · Name:   · Address:  · Phone:  · Fax:    Dialysis Facility (if applicable)   · Name:  · Address:  · Dialysis Schedule:  · Phone:  · Fax:    / signature: {Esignature:120555981}    PHYSICIAN SECTION  Nutrition Therapy:  Current Nutrition Therapy:   [address] [place] [address] [facility] Diet List:145534672}    Routes of Feeding: {CHP DME Other Feedings:313002314}  Liquids: {Slp liquid thickness:86760}  Daily Fluid Restriction: {CHP DME Yes amt example:958945809}  Last Modified Barium Swallow with Video (Video Swallowing Test): {Done Not Done WPSH:907037840}    Treatments at the Time of Hospital Discharge:   Respiratory Treatments: ***  Oxygen Therapy:  {Therapy; copd oxygen:72325}  Ventilator:    { CC Vent QTDY:722450555}    Rehab Therapies: {THERAPEUTIC INTERVENTION:7350209230}  Weight Bearing Status/Restrictions: [address] [place] [address] Weight Bearin}  Other Medical Equipment (for information only, NOT a DME order):  {EQUIPMENT:841998646}  Other Treatments: ***    Prognosis: {Prognosis:8726431777}    Condition at Discharge: [facility] Patient Condition:168455380}    Rehab Potential (if transferring to Rehab): {Prognosis:9215699811}    Recommended Labs or Other Treatments After Discharge: ***    Physician Certification: I certify the above information and transfer of Polina ReinosoJuancarlos  is necessary for the continuing treatment of the diagnosis listed and

## 2020-06-02 NOTE — PROGRESS NOTES
Sonya Beltran. is a 79 y.o. male patient has some abd discomfort.  Denies any blood or tarry stools    Current Facility-Administered Medications   Medication Dose Route Frequency Provider Last Rate Last Dose    morphine sulfate (PF) injection 4 mg  4 mg Intravenous Q30 Min PRN Ekaterina Stevens MD   4 mg at 06/01/20 1618    ondansetron (ZOFRAN) injection 4 mg  4 mg Intravenous Q30 Min PRN Ekaterina Stevens MD   4 mg at 06/01/20 1618    aspirin chewable tablet 81 mg  81 mg Oral Daily Rosaura Alcantara PA-C        atorvastatin (LIPITOR) tablet 80 mg  80 mg Oral Daily Rosaura Alcantara PA-C   80 mg at 06/01/20 2250    busPIRone (BUSPAR) tablet 10 mg  10 mg Oral TID Amauri Abreu PA-C   10 mg at 06/01/20 2048    docusate sodium (COLACE) capsule 100 mg  100 mg Oral BID Amauri Abreu PA-C   100 mg at 06/01/20 2048    escitalopram (LEXAPRO) tablet 10 mg  10 mg Oral Daily Rosaura Alcantara PA-C        famotidine (PEPCID) tablet 10 mg  10 mg Oral BID Rosaura Alcantara PA-C   10 mg at 06/01/20 2048    fluticasone-umeclidin-vilant (TRELEGY ELLIPTA) 100-62.5-25 MCG/INH inhaler 1 puff  1 puff Inhalation Daily Rosaura Alcantara PA-C        metoprolol tartrate (LOPRESSOR) tablet 25 mg  25 mg Oral BID Rosaura Alcantara PA-C        oxyCODONE (ROXICODONE) immediate release tablet 5 mg  5 mg Oral Q6H PRN Amauri Abreu PA-C   5 mg at 06/02/20 0349    sodium chloride flush 0.9 % injection 10 mL  10 mL Intravenous 2 times per day Amauri Abreu PA-C   10 mL at 06/01/20 2048    sodium chloride flush 0.9 % injection 10 mL  10 mL Intravenous PRN Rosaura Alcantara PA-C   10 mL at 06/02/20 0248    polyethylene glycol (GLYCOLAX) packet 17 g  17 g Oral Daily PRN Rosaura Alcantara PA-C        promethazine (PHENERGAN) tablet 12.5 mg  12.5 mg Oral Q6H PRN Amauri Abreu PA-C   12.5 mg at 06/01/20 2058    Or    ondansetron (ZOFRAN) injection 4 mg  4 mg Intravenous Q6H PRN Amauri Abreu PA-C        glucose (GLUTOSE) 40 % oral gel 15 g  15 g Oral PRN Amauri Abreu, is soft. (Surgical incision with no signs of infection. Mild tenderness on abd exam. ). Extremities: Decreased range of motion. Neurological: Patient is alert. Pupils:  Pupils are equal, round, and reactive to light.       Assessment & Plan  Anemia s/p hemicolectomy 2/2 adenocarconima 5/21  -surg consulted  -iron transfusion  Lung opacities on CT  -cefepime, vanc  -bld cx, strep, legionella and resp PCR pending  -COVID penidng  Trop  -trendindg down and unlikely ACS  -ASA  DM  -SSI  mOd PCM  -clears   chornic diastolic CHF  -stable  HTN  -BB  DVT prophylaxis  -lovenox held with anemia      Once covid neg then can transfer out and then will need PT/OT for precert to Hussein Mcclain MD  6/2/2020

## 2020-06-03 LAB
ALBUMIN SERPL-MCNC: 3.2 GM/DL (ref 3.4–5)
ALBUMIN SERPL-MCNC: 3.4 GM/DL (ref 3.4–5)
ALP BLD-CCNC: 64 IU/L (ref 40–129)
ALP BLD-CCNC: 65 IU/L (ref 40–128)
ALT SERPL-CCNC: 6 U/L (ref 10–40)
ALT SERPL-CCNC: <5 U/L (ref 10–40)
ANION GAP SERPL CALCULATED.3IONS-SCNC: 11 MMOL/L (ref 4–16)
ANION GAP SERPL CALCULATED.3IONS-SCNC: 15 MMOL/L (ref 4–16)
ANISOCYTOSIS: ABNORMAL
APTT: 30.3 SECONDS (ref 25.1–37.1)
APTT: 35.5 SECONDS (ref 25.1–37.1)
AST SERPL-CCNC: 7 IU/L (ref 15–37)
AST SERPL-CCNC: 9 IU/L (ref 15–37)
BASOPHILS ABSOLUTE: 0 K/CU MM
BASOPHILS RELATIVE PERCENT: 0.4 % (ref 0–1)
BILIRUB SERPL-MCNC: 0.5 MG/DL (ref 0–1)
BILIRUB SERPL-MCNC: 0.5 MG/DL (ref 0–1)
BUN BLDV-MCNC: 10 MG/DL (ref 6–23)
BUN BLDV-MCNC: 10 MG/DL (ref 6–23)
CALCIUM SERPL-MCNC: 7.9 MG/DL (ref 8.3–10.6)
CALCIUM SERPL-MCNC: 8.6 MG/DL (ref 8.3–10.6)
CHLORIDE BLD-SCNC: 101 MMOL/L (ref 99–110)
CHLORIDE BLD-SCNC: 104 MMOL/L (ref 99–110)
CO2: 23 MMOL/L (ref 21–32)
CO2: 25 MMOL/L (ref 21–32)
CREAT SERPL-MCNC: 1.5 MG/DL (ref 0.9–1.3)
CREAT SERPL-MCNC: 1.6 MG/DL (ref 0.9–1.3)
D DIMER: 1013 NG/ML(DDU)
D DIMER: 983 NG/ML(DDU)
DIFFERENTIAL TYPE: ABNORMAL
DIFFERENTIAL TYPE: ABNORMAL
EOSINOPHILS ABSOLUTE: 0.2 K/CU MM
EOSINOPHILS RELATIVE PERCENT: 3.6 % (ref 0–3)
FIBRINOGEN LEVEL: 277 MG/DL (ref 196.9–442.1)
FIBRINOGEN LEVEL: 296 MG/DL (ref 196.9–442.1)
GFR AFRICAN AMERICAN: 52 ML/MIN/1.73M2
GFR AFRICAN AMERICAN: 56 ML/MIN/1.73M2
GFR NON-AFRICAN AMERICAN: 43 ML/MIN/1.73M2
GFR NON-AFRICAN AMERICAN: 46 ML/MIN/1.73M2
GLUCOSE BLD-MCNC: 102 MG/DL (ref 70–99)
GLUCOSE BLD-MCNC: 106 MG/DL (ref 70–99)
GLUCOSE BLD-MCNC: 120 MG/DL (ref 70–99)
GLUCOSE BLD-MCNC: 129 MG/DL (ref 70–99)
GLUCOSE BLD-MCNC: 155 MG/DL (ref 70–99)
GLUCOSE BLD-MCNC: 213 MG/DL (ref 70–99)
HCT VFR BLD CALC: 26.8 % (ref 42–52)
HCT VFR BLD CALC: 27.6 % (ref 42–52)
HEMOGLOBIN: 7.7 GM/DL (ref 13.5–18)
HEMOGLOBIN: 7.7 GM/DL (ref 13.5–18)
IMMATURE NEUTROPHIL %: 1.1 % (ref 0–0.43)
INR BLD: 0.9 INDEX
INR BLD: 1.01 INDEX
LYMPHOCYTES ABSOLUTE: 0.8 K/CU MM
LYMPHOCYTES RELATIVE PERCENT: 14.2 % (ref 24–44)
MCH RBC QN AUTO: 23.3 PG (ref 27–31)
MCHC RBC AUTO-ENTMCNC: 28.7 % (ref 32–36)
MCV RBC AUTO: 81 FL (ref 78–100)
MONOCYTES ABSOLUTE: 0.4 K/CU MM
MONOCYTES RELATIVE PERCENT: 7.5 % (ref 0–4)
NUCLEATED RBC %: 0 %
PDW BLD-RTO: 18.8 % (ref 11.7–14.9)
PLATELET # BLD: 270 K/CU MM (ref 140–440)
PMV BLD AUTO: 8.8 FL (ref 7.5–11.1)
POTASSIUM SERPL-SCNC: 3.8 MMOL/L (ref 3.5–5.1)
POTASSIUM SERPL-SCNC: 4.4 MMOL/L (ref 3.5–5.1)
PROTHROMBIN TIME: 10.9 SECONDS (ref 11.7–14.5)
PROTHROMBIN TIME: 12.2 SECONDS (ref 11.7–14.5)
RBC # BLD: 3.31 M/CU MM (ref 4.6–6.2)
RBC # BLD: ABNORMAL 10*6/UL
SARS-COV-2: NOT DETECTED
SEGMENTED NEUTROPHILS ABSOLUTE COUNT: 4.1 K/CU MM
SEGMENTED NEUTROPHILS RELATIVE PERCENT: 73.2 % (ref 36–66)
SODIUM BLD-SCNC: 139 MMOL/L (ref 135–145)
SODIUM BLD-SCNC: 140 MMOL/L (ref 135–145)
SOURCE: NORMAL
TOTAL IMMATURE NEUTOROPHIL: 0.06 K/CU MM
TOTAL NUCLEATED RBC: 0 K/CU MM
TOTAL PROTEIN: 5.6 GM/DL (ref 6.4–8.2)
TOTAL PROTEIN: 5.9 GM/DL (ref 6.4–8.2)
TOTAL RETICULOCYTE COUNT: 0.16 K/CU MM
WBC # BLD: 5.6 K/CU MM (ref 4–10.5)

## 2020-06-03 PROCEDURE — 36415 COLL VENOUS BLD VENIPUNCTURE: CPT

## 2020-06-03 PROCEDURE — 6360000002 HC RX W HCPCS: Performed by: PHYSICIAN ASSISTANT

## 2020-06-03 PROCEDURE — 80053 COMPREHEN METABOLIC PANEL: CPT

## 2020-06-03 PROCEDURE — G0378 HOSPITAL OBSERVATION PER HR: HCPCS

## 2020-06-03 PROCEDURE — 96376 TX/PRO/DX INJ SAME DRUG ADON: CPT

## 2020-06-03 PROCEDURE — 6370000000 HC RX 637 (ALT 250 FOR IP): Performed by: PHYSICIAN ASSISTANT

## 2020-06-03 PROCEDURE — 82962 GLUCOSE BLOOD TEST: CPT

## 2020-06-03 PROCEDURE — 85379 FIBRIN DEGRADATION QUANT: CPT

## 2020-06-03 PROCEDURE — 96366 THER/PROPH/DIAG IV INF ADDON: CPT

## 2020-06-03 PROCEDURE — 2580000003 HC RX 258: Performed by: INTERNAL MEDICINE

## 2020-06-03 PROCEDURE — 85730 THROMBOPLASTIN TIME PARTIAL: CPT

## 2020-06-03 PROCEDURE — 6370000000 HC RX 637 (ALT 250 FOR IP): Performed by: INTERNAL MEDICINE

## 2020-06-03 PROCEDURE — 85610 PROTHROMBIN TIME: CPT

## 2020-06-03 PROCEDURE — 94761 N-INVAS EAR/PLS OXIMETRY MLT: CPT

## 2020-06-03 PROCEDURE — 85018 HEMOGLOBIN: CPT

## 2020-06-03 PROCEDURE — 2580000003 HC RX 258: Performed by: PHYSICIAN ASSISTANT

## 2020-06-03 PROCEDURE — 85384 FIBRINOGEN ACTIVITY: CPT

## 2020-06-03 PROCEDURE — 85014 HEMATOCRIT: CPT

## 2020-06-03 PROCEDURE — 85025 COMPLETE CBC W/AUTO DIFF WBC: CPT

## 2020-06-03 PROCEDURE — 6360000002 HC RX W HCPCS: Performed by: INTERNAL MEDICINE

## 2020-06-03 RX ORDER — SPIRONOLACTONE 25 MG/1
12.5 TABLET ORAL 2 TIMES DAILY
Status: DISCONTINUED | OUTPATIENT
Start: 2020-06-03 | End: 2020-06-05 | Stop reason: HOSPADM

## 2020-06-03 RX ADMIN — OXYCODONE HYDROCHLORIDE 5 MG: 5 TABLET ORAL at 16:50

## 2020-06-03 RX ADMIN — CEFEPIME 2 G: 2 INJECTION, POWDER, FOR SOLUTION INTRAVENOUS at 17:59

## 2020-06-03 RX ADMIN — FAMOTIDINE 10 MG: 20 TABLET, FILM COATED ORAL at 08:58

## 2020-06-03 RX ADMIN — BUSPIRONE HYDROCHLORIDE 10 MG: 10 TABLET ORAL at 08:58

## 2020-06-03 RX ADMIN — OXYCODONE HYDROCHLORIDE 5 MG: 5 TABLET ORAL at 23:58

## 2020-06-03 RX ADMIN — INSULIN LISPRO 2 UNITS: 100 INJECTION, SOLUTION INTRAVENOUS; SUBCUTANEOUS at 20:17

## 2020-06-03 RX ADMIN — ASPIRIN 81 MG 81 MG: 81 TABLET ORAL at 08:58

## 2020-06-03 RX ADMIN — SPIRONOLACTONE 12.5 MG: 25 TABLET ORAL at 17:59

## 2020-06-03 RX ADMIN — CEFEPIME 2 G: 2 INJECTION, POWDER, FOR SOLUTION INTRAVENOUS at 05:48

## 2020-06-03 RX ADMIN — BUSPIRONE HYDROCHLORIDE 10 MG: 10 TABLET ORAL at 20:08

## 2020-06-03 RX ADMIN — OXYCODONE HYDROCHLORIDE 5 MG: 5 TABLET ORAL at 00:10

## 2020-06-03 RX ADMIN — ESCITALOPRAM OXALATE 10 MG: 10 TABLET ORAL at 08:58

## 2020-06-03 RX ADMIN — OXYCODONE HYDROCHLORIDE 5 MG: 5 TABLET ORAL at 10:04

## 2020-06-03 RX ADMIN — METOPROLOL TARTRATE 25 MG: 25 TABLET, FILM COATED ORAL at 08:58

## 2020-06-03 RX ADMIN — SODIUM CHLORIDE, PRESERVATIVE FREE 10 ML: 5 INJECTION INTRAVENOUS at 08:59

## 2020-06-03 RX ADMIN — IRON SUCROSE 200 MG: 20 INJECTION, SOLUTION INTRAVENOUS at 08:58

## 2020-06-03 RX ADMIN — MORPHINE SULFATE 2 MG: 2 INJECTION, SOLUTION INTRAMUSCULAR; INTRAVENOUS at 20:08

## 2020-06-03 RX ADMIN — DOCUSATE SODIUM 100 MG: 100 CAPSULE, LIQUID FILLED ORAL at 08:58

## 2020-06-03 RX ADMIN — SPIRONOLACTONE 12.5 MG: 25 TABLET ORAL at 10:08

## 2020-06-03 RX ADMIN — ATORVASTATIN CALCIUM 80 MG: 80 TABLET, FILM COATED ORAL at 20:17

## 2020-06-03 RX ADMIN — BUSPIRONE HYDROCHLORIDE 10 MG: 10 TABLET ORAL at 16:50

## 2020-06-03 RX ADMIN — SODIUM CHLORIDE, PRESERVATIVE FREE 10 ML: 5 INJECTION INTRAVENOUS at 20:07

## 2020-06-03 RX ADMIN — METOPROLOL TARTRATE 25 MG: 25 TABLET, FILM COATED ORAL at 20:08

## 2020-06-03 RX ADMIN — FAMOTIDINE 10 MG: 20 TABLET, FILM COATED ORAL at 20:08

## 2020-06-03 ASSESSMENT — PAIN DESCRIPTION - FREQUENCY
FREQUENCY: CONTINUOUS

## 2020-06-03 ASSESSMENT — PAIN SCALES - GENERAL
PAINLEVEL_OUTOF10: 0
PAINLEVEL_OUTOF10: 8
PAINLEVEL_OUTOF10: 7
PAINLEVEL_OUTOF10: 10
PAINLEVEL_OUTOF10: 8
PAINLEVEL_OUTOF10: 9
PAINLEVEL_OUTOF10: 7
PAINLEVEL_OUTOF10: 2
PAINLEVEL_OUTOF10: 2
PAINLEVEL_OUTOF10: 6

## 2020-06-03 ASSESSMENT — PAIN DESCRIPTION - ORIENTATION
ORIENTATION: MID

## 2020-06-03 ASSESSMENT — PAIN DESCRIPTION - DESCRIPTORS
DESCRIPTORS: SORE
DESCRIPTORS: ACHING

## 2020-06-03 ASSESSMENT — PAIN DESCRIPTION - PAIN TYPE
TYPE: SURGICAL PAIN
TYPE: ACUTE PAIN

## 2020-06-03 ASSESSMENT — PAIN - FUNCTIONAL ASSESSMENT
PAIN_FUNCTIONAL_ASSESSMENT: ACTIVITIES ARE NOT PREVENTED

## 2020-06-03 ASSESSMENT — PAIN DESCRIPTION - LOCATION
LOCATION: CHEST
LOCATION: ABDOMEN

## 2020-06-03 ASSESSMENT — PAIN DESCRIPTION - PROGRESSION
CLINICAL_PROGRESSION: GRADUALLY IMPROVING
CLINICAL_PROGRESSION: NOT CHANGED

## 2020-06-03 ASSESSMENT — PAIN DESCRIPTION - ONSET
ONSET: GRADUAL
ONSET: GRADUAL
ONSET: ON-GOING
ONSET: ON-GOING

## 2020-06-03 NOTE — CARE COORDINATION
Phoned and spoke with patient on COVID unit regarding discharge planning. Patient confirmed that he is normally from home but has admitted to [facility] from [facility] where he was at for rehab. Patient stated that he plans to return to [facility] on discharge to finish rehab . Per previous Case Management note contact has been made with [facility] and patient will need a precert to return.

## 2020-06-03 NOTE — PROGRESS NOTES
Nephrology Progress Note        [address], [facility], [facility] [address]  Phone: [phone]  Office Hours: 8:30AM - 4:30PM  Monday - Friday       [facility]  MD Marya Chong, DO [address],  [facility]  PHONE: [phone]  FAX: [phone]    6/3/2020 9:27 AM  Subjective:   Admit Date: 6/1/2020  PCP: Romana Milner MD  Interval History: feeling well, although sleepy  Nonoliguric   BP elevated this am but he is also complaining of pain    Diet: DIET CLEAR LIQUID;      Data:   Scheduled Meds:   aspirin  81 mg Oral Daily    atorvastatin  80 mg Oral Daily    busPIRone  10 mg Oral TID    docusate sodium  100 mg Oral BID    escitalopram  10 mg Oral Daily    famotidine  10 mg Oral BID    fluticasone-umeclidin-vilant  1 puff Inhalation Daily    metoprolol tartrate  25 mg Oral BID    sodium chloride flush  10 mL Intravenous 2 times per day    [Held by provider] heparin (porcine)  5,000 Units Subcutaneous 3 times per day    insulin lispro  0-12 Units Subcutaneous TID WC    insulin lispro  0-6 Units Subcutaneous Nightly    cefepime  2 g Intravenous Q12H    iron sucrose  200 mg Intravenous Q24H     Continuous Infusions:   dextrose       PRN Meds:morphine, ondansetron, oxyCODONE, sodium chloride flush, polyethylene glycol, promethazine **OR** ondansetron, glucose, dextrose, glucagon (rDNA), dextrose, acetaminophen **OR** acetaminophen, morphine  I/O last 3 completed shifts:  In: -   Out: 2150 [Urine:2150]  I/O this shift:   In: 480 [P.O.:480]  Out: 200 [Urine:200]    Intake/Output Summary (Last 24 hours) at 6/3/2020 0927  Last data filed at 6/3/2020 0802  Gross per 24 hour   Intake 480 ml   Output 2350 ml   Net -1870 ml       CBC:   Recent Labs     06/01/20  1616 06/01/20  2104 06/02/20  2325   WBC 6.9 5.6 5.8   HGB 7.6* 7.0* 7.8*    208 253       BMP:    Recent Labs     06/01/20  1616 06/01/20  2106 Hyponatremia    Pneumonia due to organism    PAF (paroxysmal atrial fibrillation) (HCC)    Empyema of left pleural space (HCC)    Hospital-acquired bacterial pneumonia    Hyperlipidemia    Lumbar radiculopathy    CKD (chronic kidney disease)    Diabetic peripheral neuropathy (HCC)    Chronic kidney disease (CKD), stage III (moderate) (HCC)    Cervical stenosis of spine    Spinal stenosis of lumbar region without neurogenic claudication    Somatic dysfunction of back    Somatic dysfunction of cervical region    Somatic dysfunction of pelvis region    Somatic dysfunction of both lower extremities    Empyema lung (HCC)    Acute kidney injury superimposed on CKD ([address]    Chronic diastolic heart failure (HCC)    Syncope and collapse    Hypotensive episode    Bradycardia on ECG    Syncope    Lightheadedness    Dizziness    Acute on chronic respiratory failure with hypoxia (HCC)    Severe mixed bipolar 1 disorder without psychosis (HCC)    Acute chest pain    Chest pain    Atrial tachycardia (HCC)    Overlapping malignant neoplasm of colon (HCC)    Colon adenocarcinoma (HCC)    Pneumonia      ANA on CKD3  DM2  HTN  Recent right colectomy on 5/21 for colon cancer  afib  anemia     Suggest   Continue supportive mgmt   Look euvolemic on exam today,  Continue BP meds, resume his aldactone  On iv iron  Will follow  Avoid nephrotoxins  BMP tomorrow                         Electronically signed by Carlota Hernández DO on 6/3/2020 at 9:27 MD Cee Hilario DO  [facility],  [address]  [place], [place] [address]  PHONE: [phone]  FAX: [phone]

## 2020-06-04 LAB
GLUCOSE BLD-MCNC: 102 MG/DL (ref 70–99)
GLUCOSE BLD-MCNC: 105 MG/DL (ref 70–99)
GLUCOSE BLD-MCNC: 126 MG/DL (ref 70–99)
GLUCOSE BLD-MCNC: 151 MG/DL (ref 70–99)
HCT VFR BLD CALC: 26.6 % (ref 42–52)
HEMOGLOBIN: 7.7 GM/DL (ref 13.5–18)

## 2020-06-04 PROCEDURE — 6360000002 HC RX W HCPCS: Performed by: PHYSICIAN ASSISTANT

## 2020-06-04 PROCEDURE — 80053 COMPREHEN METABOLIC PANEL: CPT

## 2020-06-04 PROCEDURE — 6360000002 HC RX W HCPCS: Performed by: INTERNAL MEDICINE

## 2020-06-04 PROCEDURE — 36415 COLL VENOUS BLD VENIPUNCTURE: CPT

## 2020-06-04 PROCEDURE — 85384 FIBRINOGEN ACTIVITY: CPT

## 2020-06-04 PROCEDURE — 85014 HEMATOCRIT: CPT

## 2020-06-04 PROCEDURE — 2580000003 HC RX 258: Performed by: INTERNAL MEDICINE

## 2020-06-04 PROCEDURE — 96376 TX/PRO/DX INJ SAME DRUG ADON: CPT

## 2020-06-04 PROCEDURE — 85730 THROMBOPLASTIN TIME PARTIAL: CPT

## 2020-06-04 PROCEDURE — 97530 THERAPEUTIC ACTIVITIES: CPT

## 2020-06-04 PROCEDURE — 85379 FIBRIN DEGRADATION QUANT: CPT

## 2020-06-04 PROCEDURE — 97162 PT EVAL MOD COMPLEX 30 MIN: CPT

## 2020-06-04 PROCEDURE — 85610 PROTHROMBIN TIME: CPT

## 2020-06-04 PROCEDURE — 96366 THER/PROPH/DIAG IV INF ADDON: CPT

## 2020-06-04 PROCEDURE — 97166 OT EVAL MOD COMPLEX 45 MIN: CPT

## 2020-06-04 PROCEDURE — G0378 HOSPITAL OBSERVATION PER HR: HCPCS

## 2020-06-04 PROCEDURE — 2580000003 HC RX 258: Performed by: PHYSICIAN ASSISTANT

## 2020-06-04 PROCEDURE — 94761 N-INVAS EAR/PLS OXIMETRY MLT: CPT

## 2020-06-04 PROCEDURE — 6370000000 HC RX 637 (ALT 250 FOR IP): Performed by: INTERNAL MEDICINE

## 2020-06-04 PROCEDURE — 85025 COMPLETE CBC W/AUTO DIFF WBC: CPT

## 2020-06-04 PROCEDURE — 82962 GLUCOSE BLOOD TEST: CPT

## 2020-06-04 PROCEDURE — 6370000000 HC RX 637 (ALT 250 FOR IP): Performed by: PHYSICIAN ASSISTANT

## 2020-06-04 PROCEDURE — 85018 HEMOGLOBIN: CPT

## 2020-06-04 RX ORDER — FUROSEMIDE 20 MG/1
20 TABLET ORAL DAILY
Status: DISCONTINUED | OUTPATIENT
Start: 2020-06-04 | End: 2020-06-05 | Stop reason: HOSPADM

## 2020-06-04 RX ADMIN — CEFEPIME 2 G: 2 INJECTION, POWDER, FOR SOLUTION INTRAVENOUS at 18:03

## 2020-06-04 RX ADMIN — OXYCODONE HYDROCHLORIDE 5 MG: 5 TABLET ORAL at 18:03

## 2020-06-04 RX ADMIN — IRON SUCROSE 200 MG: 20 INJECTION, SOLUTION INTRAVENOUS at 12:38

## 2020-06-04 RX ADMIN — OXYCODONE HYDROCHLORIDE 5 MG: 5 TABLET ORAL at 10:15

## 2020-06-04 RX ADMIN — ASPIRIN 81 MG 81 MG: 81 TABLET ORAL at 08:23

## 2020-06-04 RX ADMIN — METOPROLOL TARTRATE 25 MG: 25 TABLET, FILM COATED ORAL at 08:22

## 2020-06-04 RX ADMIN — ESCITALOPRAM OXALATE 10 MG: 10 TABLET ORAL at 08:22

## 2020-06-04 RX ADMIN — SPIRONOLACTONE 12.5 MG: 25 TABLET ORAL at 18:03

## 2020-06-04 RX ADMIN — METOPROLOL TARTRATE 25 MG: 25 TABLET, FILM COATED ORAL at 23:48

## 2020-06-04 RX ADMIN — MORPHINE SULFATE 2 MG: 2 INJECTION, SOLUTION INTRAMUSCULAR; INTRAVENOUS at 01:00

## 2020-06-04 RX ADMIN — SPIRONOLACTONE 12.5 MG: 25 TABLET ORAL at 08:22

## 2020-06-04 RX ADMIN — CEFEPIME 2 G: 2 INJECTION, POWDER, FOR SOLUTION INTRAVENOUS at 06:45

## 2020-06-04 RX ADMIN — DOCUSATE SODIUM 100 MG: 100 CAPSULE, LIQUID FILLED ORAL at 08:22

## 2020-06-04 RX ADMIN — FAMOTIDINE 10 MG: 20 TABLET, FILM COATED ORAL at 23:48

## 2020-06-04 RX ADMIN — BUSPIRONE HYDROCHLORIDE 10 MG: 10 TABLET ORAL at 08:22

## 2020-06-04 RX ADMIN — FAMOTIDINE 10 MG: 20 TABLET, FILM COATED ORAL at 08:22

## 2020-06-04 RX ADMIN — BUSPIRONE HYDROCHLORIDE 10 MG: 10 TABLET ORAL at 23:48

## 2020-06-04 RX ADMIN — BUSPIRONE HYDROCHLORIDE 10 MG: 10 TABLET ORAL at 15:22

## 2020-06-04 RX ADMIN — ATORVASTATIN CALCIUM 80 MG: 80 TABLET, FILM COATED ORAL at 23:47

## 2020-06-04 RX ADMIN — SODIUM CHLORIDE, PRESERVATIVE FREE 10 ML: 5 INJECTION INTRAVENOUS at 08:23

## 2020-06-04 RX ADMIN — DOCUSATE SODIUM 100 MG: 100 CAPSULE, LIQUID FILLED ORAL at 23:47

## 2020-06-04 RX ADMIN — FUROSEMIDE 20 MG: 20 TABLET ORAL at 12:38

## 2020-06-04 RX ADMIN — OXYCODONE HYDROCHLORIDE 5 MG: 5 TABLET ORAL at 23:57

## 2020-06-04 RX ADMIN — MORPHINE SULFATE 2 MG: 2 INJECTION, SOLUTION INTRAMUSCULAR; INTRAVENOUS at 06:53

## 2020-06-04 RX ADMIN — SODIUM CHLORIDE, PRESERVATIVE FREE 10 ML: 5 INJECTION INTRAVENOUS at 06:54

## 2020-06-04 ASSESSMENT — PAIN SCALES - GENERAL
PAINLEVEL_OUTOF10: 10
PAINLEVEL_OUTOF10: 7
PAINLEVEL_OUTOF10: 8
PAINLEVEL_OUTOF10: 0
PAINLEVEL_OUTOF10: 9
PAINLEVEL_OUTOF10: 10
PAINLEVEL_OUTOF10: 0

## 2020-06-04 ASSESSMENT — PAIN DESCRIPTION - PAIN TYPE
TYPE: ACUTE PAIN
TYPE: SURGICAL PAIN
TYPE: ACUTE PAIN
TYPE: ACUTE PAIN

## 2020-06-04 ASSESSMENT — PAIN DESCRIPTION - ORIENTATION
ORIENTATION: MID
ORIENTATION: MID
ORIENTATION: RIGHT;LEFT;MID

## 2020-06-04 ASSESSMENT — PAIN DESCRIPTION - DESCRIPTORS
DESCRIPTORS: ACHING;CONSTANT
DESCRIPTORS: SORE

## 2020-06-04 ASSESSMENT — PAIN DESCRIPTION - LOCATION
LOCATION: ABDOMEN
LOCATION: NECK

## 2020-06-04 ASSESSMENT — PAIN DESCRIPTION - PROGRESSION
CLINICAL_PROGRESSION: GRADUALLY IMPROVING
CLINICAL_PROGRESSION: GRADUALLY IMPROVING

## 2020-06-04 ASSESSMENT — PAIN DESCRIPTION - ONSET
ONSET: ON-GOING
ONSET: ON-GOING

## 2020-06-04 ASSESSMENT — PAIN DESCRIPTION - FREQUENCY
FREQUENCY: CONTINUOUS
FREQUENCY: CONTINUOUS

## 2020-06-04 NOTE — CONSULTS
Diastolic CHF ([facility] 4/97/6603    Gastric ulcer     H/O cardiovascular stress test 5/26/14    EF 68% mild ischemia anterior wall    Heart murmur     \"see Dr Quan Briseno- last echo 2014\" pt states\"I think they said I have a murmur but no chest pain or palpitations\"    Hiatal hernia     History of cardiac cath 6/11/14    MODERATE  CAD      Hx of migraines     HX OTHER MEDICAL     \"have thinning of kidney walls- they found I had that 15 yrs ago\" see Dr Paul Mike"    Hyperlipidemia     Hypertension     Lumbar radiculopathy     Overlapping malignant neoplasm of colon ([facility] 5/27/2020    Panic attack     'anything new gives me anxiety\"    Pleural effusion     scheduled for thoracentesis 7/28/2014, 8/17/2016    S/P thoracentesis     left side( per old chart had thora done 4/2016 and one done 2014)    Sleep apnea     sleep study x 2 - last one 4-5 yrs ago- uses c-pap\"    SOBOE (shortness of breath on exertion)     Spinal stenosis        Past Surgical History:    Past Surgical History:   Procedure Laterality Date    CARPAL TUNNEL RELEASE Bilateral 1989    COLONOSCOPY N/A 5/12/2020    COLONOSCOPY POLYPECTOMY SNARE/COLD BIOPSY WITH SPOT INK TATTOO performed by Juana Luz MD at [facility] Left 2000's    KNEE SURGERY Bilateral     right knee x 2( scope)/ left knee- 7-8 yr ago   [address] fusion   [address]  N/A 5/21/2020    BOWEL RESECTION EXTENDED RIGHT HEMICOLECTOMY performed by Chirag Koo MD at [facility] Left 12/20/2013    THORACENTESIS  4/25/2016         THORACENTESIS Left 11/15/2016    Dr. GEORGE [facility] 250mlsremoved     THORACOTOMY Left 03/18/2019    with Lysis of adhesions    THORACOTOMY Left 3/18/2019    THORACOSCOPY CONVERTED TO THORACOTOMY WITH LYSIS OF ADHESIONS performed by Darwin Haynes MD at [address]       as a kid    UPPER GASTROINTESTINAL ENDOSCOPY N/A 5/12/2020    EGD BIOPSY performed by Juana Luz MD mg Intravenous Q6H PRN Rosaura Alcantara PA-C        glucose (GLUTOSE) 40 % oral gel 15 g  15 g Oral PRN Rosaura Alcantara PA-C        dextrose 50 % IV solution  12.5 g Intravenous PRN Rosaura Alcantara PA-C        glucagon (rDNA) injection 1 mg  1 mg Intramuscular PRN Rosaura Alcantara PA-C        dextrose 5 % solution  100 mL/hr Intravenous PRN Rosaura Alcantara PA-C        acetaminophen (TYLENOL) tablet 650 mg  650 mg Oral Q6H PRN Deysi Lyons PA-C        Or    acetaminophen (TYLENOL) suppository 650 mg  650 mg Rectal Q6H PRN Rosaura Alcantara PA-C        [Held by provider] heparin (porcine) injection 5,000 Units  5,000 Units Subcutaneous 3 times per day Deysi Lyons PA-C   Stopped at 06/03/20 2200    insulin lispro (HUMALOG) injection vial 0-12 Units  0-12 Units Subcutaneous TID WC Deysi Lyons PA-C        insulin lispro (HUMALOG) injection vial 0-6 Units  0-6 Units Subcutaneous Nightly Deysi Lyons PA-C   2 Units at 06/03/20 2017    morphine (PF) injection 2 mg  2 mg Intravenous Q4H PRN Deysi Lyons PA-C   2 mg at 06/04/20 6815    cefepime (MAXIPIME) 2 g IVPB minibag  2 g Intravenous Q12H Deysi Lyons PA-C   Stopped at 06/04/20 7092    iron sucrose (VENOFER) 200 mg in sodium chloride 0.9 % 100 mL IVPB  200 mg Intravenous Q24H Janeen Logan MD   Stopped at 06/03/20 1004       Allergies:  Nsaids    Social History:   Social History     Socioeconomic History    Marital status:      Spouse name: None    Number of children: None    Years of education: None    Highest education level: None   Occupational History    Occupation: built trucks, retired     Employer: [employer]   Social Needs    Financial resource strain: None    Food insecurity     Worry: None     Inability: None    Transportation needs     Medical: None     Non-medical: None   Tobacco Use    Smoking status: Former Smoker     Packs/day: 1.50     Years: 30.00     Pack years: 45.00     Types: Cigarettes     Last attempt to quit: 7/24/2010 staples  MUSCULOSKELETAL:  0+ pitting edema lower extremities  NEUROLOGIC:  Mental Status Exam:  Level of Alertness:   awake  Orientation:   person, place, time    DATA:    Lab Results   Component Value Date    WBC 5.6 06/03/2020    HGB 7.7 (L) 06/04/2020    HCT 26.6 (L) 06/04/2020     06/03/2020     06/03/2020    K 3.8 06/03/2020     06/03/2020    CO2 23 06/03/2020    BUN 10 06/03/2020    CREATININE 1.5 (H) 06/03/2020    GLUCOSE 155 (H) 06/03/2020    CALCIUM 7.9 (L) 06/03/2020    PROT 5.6 (L) 06/03/2020    BILITOT 0.5 06/03/2020    AST 9 (L) 06/03/2020    ALT <5 (L) 06/03/2020    ALKPHOS 64 06/03/2020    AMYLASE 87 05/10/2020    LIPASE 16 06/01/2020    INR 1.01 06/03/2020    GLUF 105 (H) 12/12/2019    LABA1C 5.3 05/24/2020       Imaging:   Ct Abdomen Pelvis Wo Contrast Additional Contrast? None    Result Date: 6/1/2020  EXAMINATION: CT OF THE ABDOMEN AND PELVIS WITHOUT CONTRAST 6/1/2020 5:57 pm TECHNIQUE: CT of the abdomen and pelvis was performed without the administration of intravenous contrast. Multiplanar reformatted images are provided for review. Dose modulation, iterative reconstruction, and/or weight based adjustment of the mA/kV was utilized to reduce the radiation dose to as low as reasonably achievable. COMPARISON: CT abdomen/pelvis 03/07/2020, CT chest 05/19/2020 HISTORY: ORDERING SYSTEM PROVIDED HISTORY: abdominal pain s/p hemicolectomy TECHNOLOGIST PROVIDED HISTORY: Reason for exam:->abdominal pain s/p hemicolectomy Additional Contrast?->None Reason for Exam: abdominal pain s/p hemicolectomy Acuity: Acute Type of Exam: Initial Additional signs and symptoms: surg. hx; small bowel. Relevant Medical/Surgical History: none FINDINGS: Thorax base:  Normal heart size with no significant pericardial effusion. Extensive coronary artery calcifications. Evidence of remote healed granulomatous disease.   There is stable chronic left pleural effusion and pleural thickening with hemithorax base represent mild pulmonary edema or developing pneumonia. 2. Status post right hemicolectomy with intact anastomosis. Postsurgical bowel adhesions with no evidence of obstruction. 3. Mild postsurgical peritoneal changes. No evidence of an abscess or perforation. 4. Mild perihepatic ascites. 5. Small fat containing left periumbilical incisional hernia. Pertinent laboratory and imaging studies were personally reviewed if available. IMPRESSION:    Hannah Lyon is a 79 y.o. male with abdominal pain and low hgb.     Patient Active Problem List    Diagnosis Date Noted    Severe mixed bipolar 1 disorder without psychosis ([address] 12/17/2019     Priority: High     Class: Acute    Pleural effusion, left 12/19/2013     Priority: High    REYES (dyspnea on exertion) 04/23/2016     Priority: Low    SAMANTHA on CPAP 01/28/2014     Priority: Low    Pneumonia 06/01/2020    Colon adenocarcinoma ([address] 05/28/2020    Overlapping malignant neoplasm of colon ([address] 05/27/2020    Atrial tachycardia ([address] 05/26/2020    Chest pain 05/19/2020    Acute chest pain 05/09/2020    Acute on chronic respiratory failure with hypoxia ([address] 12/15/2019    Lightheadedness 11/15/2019    Dizziness 11/15/2019    Syncope     Syncope and collapse 11/06/2019    Hypotensive episode 11/06/2019    Bradycardia on ECG 11/06/2019    Acute kidney injury superimposed on CKD ([address] 10/09/2019    Chronic diastolic heart failure ([address] 10/09/2019    Empyema lung ([address] 10/07/2019    Somatic dysfunction of back 09/11/2019    Somatic dysfunction of cervical region 09/11/2019    Somatic dysfunction of pelvis region 09/11/2019    Somatic dysfunction of both lower extremities 09/11/2019    Cervical stenosis of spine 08/13/2019    Spinal stenosis of lumbar region without neurogenic claudication 08/13/2019    Hyperlipidemia     Lumbar radiculopathy     CKD (chronic kidney disease)     Diabetic peripheral neuropathy (HCC)     Chronic kidney

## 2020-06-04 NOTE — PROGRESS NOTES
Diastolic CHF ([address]    Acute respiratory failure ([address]    Pneumonia due to gram-negative bacteria (HCC)    Hyperkalemia    Adrenal mass ([address]    Diabetes mellitus, type 2 (HCC)    Hyponatremia    Pneumonia due to organism    PAF (paroxysmal atrial fibrillation) (HCC)    Empyema of left pleural space (HCC)    Hospital-acquired bacterial pneumonia    Hyperlipidemia    Lumbar radiculopathy    CKD (chronic kidney disease)    Diabetic peripheral neuropathy (HCC)    Chronic kidney disease (CKD), stage III (moderate) (HCC)    Cervical stenosis of spine    Spinal stenosis of lumbar region without neurogenic claudication    Somatic dysfunction of back    Somatic dysfunction of cervical region    Somatic dysfunction of pelvis region    Somatic dysfunction of both lower extremities    Empyema lung (HCC)    Acute kidney injury superimposed on CKD (HCC)    Chronic diastolic heart failure (HCC)    Syncope and collapse    Hypotensive episode    Bradycardia on ECG    Syncope    Lightheadedness    Dizziness    Acute on chronic respiratory failure with hypoxia (HCC)    Severe mixed bipolar 1 disorder without psychosis (HCC)    Acute chest pain    Chest pain    Atrial tachycardia (HCC)    Overlapping malignant neoplasm of colon (HCC)    Colon adenocarcinoma (HCC)    Pneumonia      ANA on CKD3; better  DM2  HTN  Recent right colectomy on 5/21 for colon cancer  Afib  Anemia     Suggest   Resume lasix 20mg po daily today  Continue aldactone  Will follow  Avoid nephrotoxins  BMP tomorrow                    Electronically signed by Rajendra Lagos DO on 6/4/2020 at 9:53 AM    [facility]  MD Miky Mosley DO [address],  [address] [facility], [place] [address]  PHONE: [phone]  FAX: [phone]

## 2020-06-04 NOTE — PROGRESS NOTES
Pt alert and oriented x 4, pt resting in bed. Pt pleasant and cooperative with care provided. Call light in reach, no needs voiced at this time.  Will cont to monitor

## 2020-06-05 VITALS
TEMPERATURE: 98.5 F | WEIGHT: 215 LBS | HEART RATE: 59 BPM | BODY MASS INDEX: 30.78 KG/M2 | HEIGHT: 70 IN | SYSTOLIC BLOOD PRESSURE: 144 MMHG | RESPIRATION RATE: 16 BRPM | OXYGEN SATURATION: 97 % | DIASTOLIC BLOOD PRESSURE: 67 MMHG

## 2020-06-05 LAB
GLUCOSE BLD-MCNC: 107 MG/DL (ref 70–99)
GLUCOSE BLD-MCNC: 128 MG/DL (ref 70–99)
HCT VFR BLD CALC: 28.8 % (ref 42–52)
HCT VFR BLD CALC: 29.9 % (ref 42–52)
HEMOGLOBIN: 8.3 GM/DL (ref 13.5–18)
HEMOGLOBIN: 8.5 GM/DL (ref 13.5–18)

## 2020-06-05 PROCEDURE — 6370000000 HC RX 637 (ALT 250 FOR IP): Performed by: PHYSICIAN ASSISTANT

## 2020-06-05 PROCEDURE — 82962 GLUCOSE BLOOD TEST: CPT

## 2020-06-05 PROCEDURE — 6360000002 HC RX W HCPCS: Performed by: INTERNAL MEDICINE

## 2020-06-05 PROCEDURE — 2580000003 HC RX 258: Performed by: PHYSICIAN ASSISTANT

## 2020-06-05 PROCEDURE — 96366 THER/PROPH/DIAG IV INF ADDON: CPT

## 2020-06-05 PROCEDURE — 6360000002 HC RX W HCPCS: Performed by: PHYSICIAN ASSISTANT

## 2020-06-05 PROCEDURE — 2580000003 HC RX 258: Performed by: INTERNAL MEDICINE

## 2020-06-05 PROCEDURE — 97116 GAIT TRAINING THERAPY: CPT

## 2020-06-05 PROCEDURE — G0378 HOSPITAL OBSERVATION PER HR: HCPCS

## 2020-06-05 PROCEDURE — 85018 HEMOGLOBIN: CPT

## 2020-06-05 PROCEDURE — 6370000000 HC RX 637 (ALT 250 FOR IP): Performed by: INTERNAL MEDICINE

## 2020-06-05 PROCEDURE — 85014 HEMATOCRIT: CPT

## 2020-06-05 PROCEDURE — 97530 THERAPEUTIC ACTIVITIES: CPT

## 2020-06-05 RX ORDER — GABAPENTIN 300 MG/1
300 CAPSULE ORAL 2 TIMES DAILY
Qty: 60 CAPSULE | Refills: 0 | Status: SHIPPED | OUTPATIENT
Start: 2020-06-05 | End: 2020-07-24 | Stop reason: SDUPTHER

## 2020-06-05 RX ORDER — AMLODIPINE BESYLATE 10 MG/1
10 TABLET ORAL DAILY
Qty: 30 TABLET | Refills: 3 | Status: SHIPPED | OUTPATIENT
Start: 2020-06-05 | End: 2020-07-24 | Stop reason: SDUPTHER

## 2020-06-05 RX ORDER — OXYCODONE HYDROCHLORIDE 5 MG/1
5 TABLET ORAL EVERY 6 HOURS PRN
Qty: 10 TABLET | Refills: 0 | Status: SHIPPED | OUTPATIENT
Start: 2020-06-05 | End: 2020-06-10

## 2020-06-05 RX ORDER — SPIRONOLACTONE 25 MG/1
12.5 TABLET ORAL 2 TIMES DAILY
Qty: 30 TABLET | Refills: 0 | Status: SHIPPED | OUTPATIENT
Start: 2020-06-05 | End: 2020-07-24 | Stop reason: SDUPTHER

## 2020-06-05 RX ORDER — AMLODIPINE BESYLATE 10 MG/1
10 TABLET ORAL DAILY
Status: DISCONTINUED | OUTPATIENT
Start: 2020-06-05 | End: 2020-06-05 | Stop reason: HOSPADM

## 2020-06-05 RX ORDER — FUROSEMIDE 20 MG/1
20 TABLET ORAL DAILY
Qty: 60 TABLET | Refills: 0 | Status: SHIPPED | OUTPATIENT
Start: 2020-06-05 | End: 2020-07-24 | Stop reason: SDUPTHER

## 2020-06-05 RX ORDER — FERROUS SULFATE 325(65) MG
325 TABLET ORAL 2 TIMES DAILY
Qty: 60 TABLET | Refills: 0 | Status: SHIPPED | OUTPATIENT
Start: 2020-06-05 | End: 2020-07-24 | Stop reason: SDUPTHER

## 2020-06-05 RX ADMIN — FAMOTIDINE 10 MG: 20 TABLET, FILM COATED ORAL at 10:58

## 2020-06-05 RX ADMIN — METOPROLOL TARTRATE 25 MG: 25 TABLET, FILM COATED ORAL at 10:59

## 2020-06-05 RX ADMIN — BUSPIRONE HYDROCHLORIDE 10 MG: 10 TABLET ORAL at 14:43

## 2020-06-05 RX ADMIN — OXYCODONE HYDROCHLORIDE 5 MG: 5 TABLET ORAL at 14:43

## 2020-06-05 RX ADMIN — BUSPIRONE HYDROCHLORIDE 10 MG: 10 TABLET ORAL at 10:58

## 2020-06-05 RX ADMIN — ESCITALOPRAM OXALATE 10 MG: 10 TABLET ORAL at 10:58

## 2020-06-05 RX ADMIN — CEFEPIME 2 G: 2 INJECTION, POWDER, FOR SOLUTION INTRAVENOUS at 06:13

## 2020-06-05 RX ADMIN — DOCUSATE SODIUM 100 MG: 100 CAPSULE, LIQUID FILLED ORAL at 10:58

## 2020-06-05 RX ADMIN — FUROSEMIDE 20 MG: 20 TABLET ORAL at 10:59

## 2020-06-05 RX ADMIN — AMLODIPINE BESYLATE 10 MG: 10 TABLET ORAL at 14:43

## 2020-06-05 RX ADMIN — OXYCODONE HYDROCHLORIDE 5 MG: 5 TABLET ORAL at 06:13

## 2020-06-05 RX ADMIN — SODIUM CHLORIDE, PRESERVATIVE FREE 10 ML: 5 INJECTION INTRAVENOUS at 11:01

## 2020-06-05 RX ADMIN — ASPIRIN 81 MG 81 MG: 81 TABLET ORAL at 10:58

## 2020-06-05 RX ADMIN — IRON SUCROSE 200 MG: 20 INJECTION, SOLUTION INTRAVENOUS at 10:55

## 2020-06-05 RX ADMIN — SPIRONOLACTONE 12.5 MG: 25 TABLET ORAL at 10:59

## 2020-06-05 ASSESSMENT — PAIN DESCRIPTION - DESCRIPTORS
DESCRIPTORS: ACHING
DESCRIPTORS: ACHING;CONSTANT

## 2020-06-05 ASSESSMENT — PAIN DESCRIPTION - FREQUENCY
FREQUENCY: CONTINUOUS
FREQUENCY: CONTINUOUS

## 2020-06-05 ASSESSMENT — PAIN - FUNCTIONAL ASSESSMENT: PAIN_FUNCTIONAL_ASSESSMENT: ACTIVITIES ARE NOT PREVENTED

## 2020-06-05 ASSESSMENT — PAIN SCALES - GENERAL
PAINLEVEL_OUTOF10: 10
PAINLEVEL_OUTOF10: 10
PAINLEVEL_OUTOF10: 0
PAINLEVEL_OUTOF10: 7
PAINLEVEL_OUTOF10: 7
PAINLEVEL_OUTOF10: 0

## 2020-06-05 ASSESSMENT — PAIN DESCRIPTION - ONSET
ONSET: AWAKENED FROM SLEEP
ONSET: AWAKENED FROM SLEEP

## 2020-06-05 ASSESSMENT — PAIN DESCRIPTION - ORIENTATION
ORIENTATION: MID
ORIENTATION: MID

## 2020-06-05 ASSESSMENT — PAIN DESCRIPTION - PAIN TYPE
TYPE: SURGICAL PAIN
TYPE: ACUTE PAIN;SURGICAL PAIN

## 2020-06-05 ASSESSMENT — PAIN DESCRIPTION - LOCATION
LOCATION: ABDOMEN
LOCATION: ABDOMEN

## 2020-06-05 ASSESSMENT — PAIN DESCRIPTION - PROGRESSION: CLINICAL_PROGRESSION: GRADUALLY IMPROVING

## 2020-06-05 NOTE — PROGRESS NOTES
Nephrology Progress Note        [address], [address] [place] [address]  Phone: [phone]  Office Hours: 8:30AM - 4:30PM  Monday - Friday       [facility]  MD Samina Bryson November, DO [address],  [address] [facility], [place] [address]  PHONE: [phone]  FAX: [phone]    6/5/2020 11:05 AM  Subjective:   Admit Date: 6/1/2020  PCP: Elo García MD  Interval History:   On room air  Elevated BP  Diet: DIET CARB CONTROL; Carb Control: 4 carb choices (60 gms)/meal      Data:   Scheduled Meds:   amLODIPine  10 mg Oral Daily    furosemide  20 mg Oral Daily    spironolactone  12.5 mg Oral BID    aspirin  81 mg Oral Daily    atorvastatin  80 mg Oral Daily    busPIRone  10 mg Oral TID    docusate sodium  100 mg Oral BID    escitalopram  10 mg Oral Daily    famotidine  10 mg Oral BID    fluticasone-umeclidin-vilant  1 puff Inhalation Daily    metoprolol tartrate  25 mg Oral BID    sodium chloride flush  10 mL Intravenous 2 times per day    [Held by provider] heparin (porcine)  5,000 Units Subcutaneous 3 times per day    insulin lispro  0-12 Units Subcutaneous TID WC    insulin lispro  0-6 Units Subcutaneous Nightly    cefepime  2 g Intravenous Q12H    iron sucrose  200 mg Intravenous Q24H     Continuous Infusions:   dextrose       PRN Meds:ondansetron, oxyCODONE, sodium chloride flush, polyethylene glycol, promethazine **OR** ondansetron, glucose, dextrose, glucagon (rDNA), dextrose, acetaminophen **OR** acetaminophen  I/O last 3 completed shifts: In: 440.4 [P.O.:240;  I.V.:200.4]  Out: 1950 [Urine:1950]  I/O this shift:  In: 240 [P.O.:240]  Out: -     Intake/Output Summary (Last 24 hours) at 6/5/2020 1105  Last data filed at 6/5/2020 1102  Gross per 24 hour   Intake 680.4 ml   Output 1525 ml   Net -844.6 ml       CBC:   Recent Labs     06/02/20  2325 06/03/20  0953 06/04/20  0304 06/04/20  2350   WBC 5.8 5.6  --   --    HGB 7.8* 7. 7*  7.7* 7.7* 8.5*    270  --   --        BMP:    Recent Labs     06/02/20 2325 06/03/20  0953    139   K 4.4 3.8    101   CO2 25 23   BUN 10 10   CREATININE 1.6* 1.5*   GLUCOSE 102* 155*     Hepatic:   Recent Labs     06/02/20 2325 06/03/20  0953   AST 7* 9*   ALT 6* <5*   BILITOT 0.5 0.5   ALKPHOS 65 64     Troponin: No results for input(s): TROPONINI in the last 72 hours. BNP: No results for input(s): BNP in the last 72 hours. Lipids: No results for input(s): CHOL, HDL in the last 72 hours.     Invalid input(s): LDLCALCU  ABGs:   Lab Results   Component Value Date    PO2ART 112.2 05/21/2020    KIX9NKY 39.7 05/21/2020     INR:   Recent Labs     06/02/20 2325 06/03/20  0953   INR 0.90 1.01       Objective:   Vitals: BP (!) 180/81   Pulse 60   Temp 98.2 °F (36.8 °C) (Oral)   Resp 16   Ht (S) 5' 9.5\" (1.765 m)   Wt 215 lb (97.5 kg)   SpO2 96%   BMI 31.29 kg/m²   General appearance: alert and cooperative with exam, in no acute distress  HEENT: normocephalic, atraumatic,   Neck: supple, trachea midline  Lungs:breathing comfortably on room air  Abdomen non distended,  Extremities: extremities atraumatic, no cyanosis or edema  Neurologic: alert, oriented, follows commands, interactive    Assessment and Plan:       ANA on CKD3; better  DM2  HTN  Recent right colectomy on 5/21 for colon cancer  Afib  Anemia     Suggest   Restart amlodipine 10mg daily, continue aldactone and lasix 20mg po daily  Will follow  Avoid nephrotoxins  BMP tomorrow                            Electronically signed by Valentín Esparza DO on 6/5/2020 at 11:05 MD Aurelia Rivas DO [address], [address] [place], [place] [address]  PHONE: [phone]  FAX: [phone]

## 2020-06-05 NOTE — DISCHARGE SUMMARY
(ALDACTONE) 25 MG tablet  Take 0.5 tablets by mouth 2 times daily                 Objective Findings at Discharge:   BP (!) 156/77   Pulse 53   Temp 96.2 °F (35.7 °C) (Oral)   Resp 18   Ht (S) 5' 9.5\" (1.765 m)   Wt 215 lb (97.5 kg)   SpO2 95%   BMI 31.29 kg/m²            PHYSICAL EXAM   GEN Awake male, laying in bed in no apparent distress. Appears given age. EYES Pupils are equally round. No scleral discharge  HENT Atraumatic and symmetric head  NECK No apparent thyromegaly  RESP Symmetric chest movement while on room air. CARDIO/VASC Peripheral pulses equal bilaterally and palpable. No peripheral edema. GI Abdomen is not distended. Rectal exam deferred.  Zimmer catheter is not present. HEME/LYMPH No petechiae or ecchymoses. MSK Spontaneous movement of BL upper extremities  SKIN Normal coloration, warm, dry.  - incision is CDI on abdomen   NEURO Cranial nerves appear grossly intact  PSYCH Awake, alert. BMP/CBC  Recent Labs     06/02/20  2325 06/03/20  0953 06/04/20  0304 06/04/20  2350    139  --   --    K 4.4 3.8  --   --     101  --   --    CO2 25 23  --   --    BUN 10 10  --   --    CREATININE 1.6* 1.5*  --   --    WBC 5.8 5.6  --   --    HCT 27.5* 26.8*  27.6* 26.6* 29.9*    270  --   --      SIGNIFICANT IMAGING AND LABS:  Impression:  ct a/p wo contrast      1. Acute mild patchy right upper and lower lobe pulmonary opacities which may  represent mild pulmonary edema or developing pneumonia. 2. Status post right hemicolectomy with intact anastomosis.  Postsurgical  bowel adhesions with no evidence of obstruction. 3. Mild postsurgical peritoneal changes.  No evidence of an abscess or  perforation. 4. Mild perihepatic ascites. 5. Small fat containing left periumbilical incisional hernia.      Discharge Time of 36 minutes    Electronically signed by Becky Maki DO on 6/5/2020 at 7:44 AM

## 2020-06-09 ENCOUNTER — OFFICE VISIT (OUTPATIENT)
Dept: SURGERY | Age: 70
End: 2020-06-09

## 2020-06-09 ENCOUNTER — HOSPITAL ENCOUNTER (OUTPATIENT)
Age: 70
Setting detail: SPECIMEN
Discharge: HOME | End: 2020-06-09

## 2020-06-09 VITALS
SYSTOLIC BLOOD PRESSURE: 98 MMHG | HEART RATE: 70 BPM | TEMPERATURE: 98.4 F | DIASTOLIC BLOOD PRESSURE: 61 MMHG | OXYGEN SATURATION: 96 %

## 2020-06-09 LAB
ALBUMIN SERPL-MCNC: 3.1 GM/DL (ref 3.4–5)
ALP BLD-CCNC: 66 IU/L (ref 40–128)
ALT SERPL-CCNC: 6 U/L (ref 10–40)
ANION GAP SERPL CALCULATED.3IONS-SCNC: 10 MMOL/L (ref 4–16)
ANISOCYTOSIS: ABNORMAL
AST SERPL-CCNC: 10 IU/L (ref 15–37)
BASOPHILS ABSOLUTE: 0 K/CU MM
BASOPHILS RELATIVE PERCENT: 0.5 % (ref 0–1)
BILIRUB SERPL-MCNC: 0.3 MG/DL (ref 0–1)
BUN BLDV-MCNC: 16 MG/DL (ref 6–23)
CALCIUM SERPL-MCNC: 8.1 MG/DL (ref 8.3–10.6)
CHLORIDE BLD-SCNC: 107 MMOL/L (ref 99–110)
CO2: 23 MMOL/L (ref 21–32)
CREAT SERPL-MCNC: 1.7 MG/DL (ref 0.9–1.3)
DIFFERENTIAL TYPE: ABNORMAL
EOSINOPHILS ABSOLUTE: 0.3 K/CU MM
EOSINOPHILS RELATIVE PERCENT: 4.3 % (ref 0–3)
GFR AFRICAN AMERICAN: 48 ML/MIN/1.73M2
GFR NON-AFRICAN AMERICAN: 40 ML/MIN/1.73M2
GLUCOSE BLD-MCNC: 85 MG/DL (ref 70–99)
HCT VFR BLD CALC: 28.6 % (ref 42–52)
HEMOGLOBIN: 7.9 GM/DL (ref 13.5–18)
IMMATURE NEUTROPHIL %: 1.2 % (ref 0–0.43)
LYMPHOCYTES ABSOLUTE: 1.3 K/CU MM
LYMPHOCYTES RELATIVE PERCENT: 21.6 % (ref 24–44)
MCH RBC QN AUTO: 24 PG (ref 27–31)
MCHC RBC AUTO-ENTMCNC: 27.6 % (ref 32–36)
MCV RBC AUTO: 86.9 FL (ref 78–100)
MONOCYTES ABSOLUTE: 0.8 K/CU MM
MONOCYTES RELATIVE PERCENT: 13.1 % (ref 0–4)
NUCLEATED RBC %: 0 %
OVALOCYTES: ABNORMAL
PDW BLD-RTO: 23 % (ref 11.7–14.9)
PLATELET # BLD: 233 K/CU MM (ref 140–440)
PMV BLD AUTO: 9.5 FL (ref 7.5–11.1)
POLYCHROMASIA: ABNORMAL
POTASSIUM SERPL-SCNC: 4.3 MMOL/L (ref 3.5–5.1)
RBC # BLD: 3.29 M/CU MM (ref 4.6–6.2)
RBC # BLD: ABNORMAL 10*6/UL
SEGMENTED NEUTROPHILS ABSOLUTE COUNT: 3.3 K/CU MM
SEGMENTED NEUTROPHILS RELATIVE PERCENT: 59.3 % (ref 36–66)
SODIUM BLD-SCNC: 140 MMOL/L (ref 135–145)
TOTAL IMMATURE NEUTOROPHIL: 0.07 K/CU MM
TOTAL NUCLEATED RBC: 0 K/CU MM
TOTAL PROTEIN: 5.6 GM/DL (ref 6.4–8.2)
WBC # BLD: 5.8 K/CU MM (ref 4–10.5)

## 2020-06-09 PROCEDURE — 80053 COMPREHEN METABOLIC PANEL: CPT

## 2020-06-09 PROCEDURE — 36415 COLL VENOUS BLD VENIPUNCTURE: CPT

## 2020-06-09 PROCEDURE — 99024 POSTOP FOLLOW-UP VISIT: CPT | Performed by: NURSE PRACTITIONER

## 2020-06-09 PROCEDURE — 85025 COMPLETE CBC W/AUTO DIFF WBC: CPT

## 2020-06-09 RX ORDER — OXYCODONE AND ACETAMINOPHEN 7.5; 325 MG/1; MG/1
1 TABLET ORAL EVERY 6 HOURS PRN
Status: ON HOLD | COMMUNITY
End: 2020-08-05 | Stop reason: SDUPTHER

## 2020-06-16 ENCOUNTER — HOSPITAL ENCOUNTER (OUTPATIENT)
Age: 70
Setting detail: SPECIMEN
Discharge: HOME | End: 2020-06-16

## 2020-06-16 LAB
HCT VFR BLD CALC: 31.1 % (ref 42–52)
HEMOGLOBIN: 8.8 GM/DL (ref 13.5–18)
MCH RBC QN AUTO: 24.3 PG (ref 27–31)
MCHC RBC AUTO-ENTMCNC: 28.3 % (ref 32–36)
MCV RBC AUTO: 85.9 FL (ref 78–100)
PDW BLD-RTO: 21.7 % (ref 11.7–14.9)
PLATELET # BLD: 172 K/CU MM (ref 140–440)
PMV BLD AUTO: 9.6 FL (ref 7.5–11.1)
RBC # BLD: 3.62 M/CU MM (ref 4.6–6.2)
WBC # BLD: 5 K/CU MM (ref 4–10.5)

## 2020-06-16 PROCEDURE — 36415 COLL VENOUS BLD VENIPUNCTURE: CPT

## 2020-06-16 PROCEDURE — 85027 COMPLETE CBC AUTOMATED: CPT

## 2020-06-18 ENCOUNTER — OFFICE VISIT (OUTPATIENT)
Dept: SURGERY | Age: 70
End: 2020-06-18

## 2020-06-18 VITALS
DIASTOLIC BLOOD PRESSURE: 59 MMHG | OXYGEN SATURATION: 96 % | HEART RATE: 62 BPM | TEMPERATURE: 97.3 F | SYSTOLIC BLOOD PRESSURE: 114 MMHG

## 2020-06-18 PROCEDURE — 99024 POSTOP FOLLOW-UP VISIT: CPT | Performed by: NURSE PRACTITIONER

## 2020-06-23 ENCOUNTER — HOSPITAL ENCOUNTER (OUTPATIENT)
Age: 70
Setting detail: SPECIMEN
Discharge: HOME | End: 2020-06-23

## 2020-06-23 LAB
HCT VFR BLD CALC: 32.8 % (ref 42–52)
HEMOGLOBIN: 9.2 GM/DL (ref 13.5–18)
MCH RBC QN AUTO: 24 PG (ref 27–31)
MCHC RBC AUTO-ENTMCNC: 28 % (ref 32–36)
MCV RBC AUTO: 85.4 FL (ref 78–100)
PDW BLD-RTO: 21.1 % (ref 11.7–14.9)
PLATELET # BLD: 168 K/CU MM (ref 140–440)
PMV BLD AUTO: 9 FL (ref 7.5–11.1)
RBC # BLD: 3.84 M/CU MM (ref 4.6–6.2)
WBC # BLD: 5.8 K/CU MM (ref 4–10.5)

## 2020-06-23 PROCEDURE — 36415 COLL VENOUS BLD VENIPUNCTURE: CPT

## 2020-06-23 PROCEDURE — 85027 COMPLETE CBC AUTOMATED: CPT

## 2020-06-29 ENCOUNTER — HOSPITAL ENCOUNTER (OUTPATIENT)
Age: 70
Setting detail: SPECIMEN
Discharge: HOME | End: 2020-06-29

## 2020-06-29 LAB
ALBUMIN SERPL-MCNC: 3.2 GM/DL (ref 3.4–5)
ALP BLD-CCNC: 54 IU/L (ref 40–128)
ALT SERPL-CCNC: 6 U/L (ref 10–40)
ANION GAP SERPL CALCULATED.3IONS-SCNC: 10 MMOL/L (ref 4–16)
AST SERPL-CCNC: 13 IU/L (ref 15–37)
BILIRUB SERPL-MCNC: 0.3 MG/DL (ref 0–1)
BUN BLDV-MCNC: 27 MG/DL (ref 6–23)
CALCIUM SERPL-MCNC: 8.3 MG/DL (ref 8.3–10.6)
CHLORIDE BLD-SCNC: 109 MMOL/L (ref 99–110)
CO2: 21 MMOL/L (ref 21–32)
CREAT SERPL-MCNC: 2.1 MG/DL (ref 0.9–1.3)
GFR AFRICAN AMERICAN: 38 ML/MIN/1.73M2
GFR NON-AFRICAN AMERICAN: 31 ML/MIN/1.73M2
GLUCOSE BLD-MCNC: 76 MG/DL (ref 70–99)
HCT VFR BLD CALC: 34.2 % (ref 42–52)
HEMOGLOBIN: 9.3 GM/DL (ref 13.5–18)
MCH RBC QN AUTO: 23.7 PG (ref 27–31)
MCHC RBC AUTO-ENTMCNC: 27.2 % (ref 32–36)
MCV RBC AUTO: 87.2 FL (ref 78–100)
PDW BLD-RTO: 20.8 % (ref 11.7–14.9)
PLATELET # BLD: 157 K/CU MM (ref 140–440)
PMV BLD AUTO: 9 FL (ref 7.5–11.1)
POTASSIUM SERPL-SCNC: 4.2 MMOL/L (ref 3.5–5.1)
RBC # BLD: 3.92 M/CU MM (ref 4.6–6.2)
SODIUM BLD-SCNC: 140 MMOL/L (ref 135–145)
TOTAL PROTEIN: 5.9 GM/DL (ref 6.4–8.2)
WBC # BLD: 5.1 K/CU MM (ref 4–10.5)

## 2020-06-29 PROCEDURE — 80053 COMPREHEN METABOLIC PANEL: CPT

## 2020-06-29 PROCEDURE — 85027 COMPLETE CBC AUTOMATED: CPT

## 2020-06-29 PROCEDURE — 36415 COLL VENOUS BLD VENIPUNCTURE: CPT

## 2020-07-02 ENCOUNTER — HOSPITAL ENCOUNTER (OUTPATIENT)
Age: 70
Setting detail: SPECIMEN
Discharge: HOME | End: 2020-07-02
Payer: MEDICARE

## 2020-07-02 LAB
ALBUMIN SERPL-MCNC: 3.5 GM/DL (ref 3.4–5)
ALP BLD-CCNC: 56 IU/L (ref 40–128)
ALT SERPL-CCNC: 7 U/L (ref 10–40)
ANION GAP SERPL CALCULATED.3IONS-SCNC: 9 MMOL/L (ref 4–16)
AST SERPL-CCNC: 19 IU/L (ref 15–37)
BILIRUB SERPL-MCNC: 0.3 MG/DL (ref 0–1)
BUN BLDV-MCNC: 20 MG/DL (ref 6–23)
CALCIUM SERPL-MCNC: 8.8 MG/DL (ref 8.3–10.6)
CHLORIDE BLD-SCNC: 107 MMOL/L (ref 99–110)
CO2: 25 MMOL/L (ref 21–32)
CREAT SERPL-MCNC: 2 MG/DL (ref 0.9–1.3)
GFR AFRICAN AMERICAN: 40 ML/MIN/1.73M2
GFR NON-AFRICAN AMERICAN: 33 ML/MIN/1.73M2
GLUCOSE BLD-MCNC: 71 MG/DL (ref 70–99)
HCT VFR BLD CALC: 35.6 % (ref 42–52)
HEMOGLOBIN: 10 GM/DL (ref 13.5–18)
MCH RBC QN AUTO: 24.4 PG (ref 27–31)
MCHC RBC AUTO-ENTMCNC: 28.1 % (ref 32–36)
MCV RBC AUTO: 86.8 FL (ref 78–100)
PDW BLD-RTO: 21.3 % (ref 11.7–14.9)
PLATELET # BLD: 171 K/CU MM (ref 140–440)
PMV BLD AUTO: 9.4 FL (ref 7.5–11.1)
POTASSIUM SERPL-SCNC: 4.2 MMOL/L (ref 3.5–5.1)
PRO-BNP: 417.5 PG/ML
RBC # BLD: 4.1 M/CU MM (ref 4.6–6.2)
SODIUM BLD-SCNC: 141 MMOL/L (ref 135–145)
TOTAL PROTEIN: 6 GM/DL (ref 6.4–8.2)
WBC # BLD: 5.1 K/CU MM (ref 4–10.5)

## 2020-07-02 PROCEDURE — 36415 COLL VENOUS BLD VENIPUNCTURE: CPT

## 2020-07-02 PROCEDURE — 80053 COMPREHEN METABOLIC PANEL: CPT

## 2020-07-02 PROCEDURE — 83880 ASSAY OF NATRIURETIC PEPTIDE: CPT

## 2020-07-02 PROCEDURE — 85027 COMPLETE CBC AUTOMATED: CPT

## 2020-07-22 ENCOUNTER — OFFICE VISIT (OUTPATIENT)
Dept: FAMILY MEDICINE CLINIC | Age: 70
End: 2020-07-22
Payer: MEDICARE

## 2020-07-22 VITALS
OXYGEN SATURATION: 95 % | TEMPERATURE: 97.3 F | WEIGHT: 227.8 LBS | BODY MASS INDEX: 32.61 KG/M2 | HEART RATE: 50 BPM | DIASTOLIC BLOOD PRESSURE: 58 MMHG | SYSTOLIC BLOOD PRESSURE: 115 MMHG | HEIGHT: 70 IN

## 2020-07-22 PROCEDURE — 99214 OFFICE O/P EST MOD 30 MIN: CPT | Performed by: PHYSICIAN ASSISTANT

## 2020-07-22 PROCEDURE — 1111F DSCHRG MED/CURRENT MED MERGE: CPT | Performed by: PHYSICIAN ASSISTANT

## 2020-07-22 RX ORDER — ALPRAZOLAM 1 MG/1
1 TABLET ORAL 3 TIMES DAILY PRN
Qty: 90 TABLET | Refills: 0 | Status: SHIPPED | OUTPATIENT
Start: 2020-07-22 | End: 2020-08-21

## 2020-07-22 RX ORDER — TRAZODONE HYDROCHLORIDE 50 MG/1
50 TABLET ORAL NIGHTLY
COMMUNITY
Start: 2020-06-30 | End: 2020-07-24 | Stop reason: SDUPTHER

## 2020-07-22 RX ORDER — TIZANIDINE 4 MG/1
4 TABLET ORAL EVERY 6 HOURS PRN
Qty: 30 TABLET | Refills: 1 | Status: SHIPPED | OUTPATIENT
Start: 2020-07-22 | End: 2020-08-21

## 2020-07-22 ASSESSMENT — ENCOUNTER SYMPTOMS
SHORTNESS OF BREATH: 0
CONSTIPATION: 0
RHINORRHEA: 0
COUGH: 0
SORE THROAT: 0
ABDOMINAL PAIN: 0
VOMITING: 0
EYE PAIN: 0
DIARRHEA: 0
NAUSEA: 0

## 2020-07-22 NOTE — PROGRESS NOTES
7/22/2020    [address] Chief Complaint   Patient presents with    Follow-Up from Hospital     6-1-20, .pain, squad, DX CHF    Follow-up     PT REPORTS 3 FT COLON REMOVED DUE TO POLPS ON COLON    Other     PT REPORTS RX OF PERCOCET WAS STOLEN , PT STATES HE IS IS ON XANAX BUT I DONT SEE IT IN MED LIST       HPI  History obtained from the patient. Andreea Wang is a 79 y.o. male who presents today for hospital follow up. The patient has a history of DM type 2, HTN, diastolic CHF, CKD, SAMANTHA, COPD. He presented to the ED on 5/9/20 for chest pain, and he states that he was told this was due to CHF. He went back into the hospital on 5/19/20 for chest pain again and was admitted 5/19/20 through 5/28/20. The patient was admitted to the hospital again 6/1/20 through 6/5/20 for anemia and pneumonia. At admission, his hgb was 6.4. He had a hemicolectomy for adenocarcinoma on 5/21/20 with Dr. Radha Peace. He was given IV iron in the hospital and Maxipime to tx pneumonia. He was discharged from the hospital to [facility], where he stayed 6/5/20 through 7/2/20. Patient feels the pneumonia has resolved. Denies cough, SOB. The patient states that he had been taking 2-3 tablets of Gabapentin 300 mg 2-3 times daily, but this dose was lowered in the nursing home to 300 mg BID. Notes pain in \"rib cage,\" bilaterally, worse on the right side. Notes that pain is worse in the morning and gets better throughout the day. States that he has pain in his right shoulder as well. He has trouble moving his right shoulder first thing in the morning. He uses his other arm to help \"wake up\" his right arm, though he denies numbness or tingling. No radiation of the pain down his arm. Denies falls or injury. Also complains of nerve pain \"electric shock\" in right thumb. And \"twitchy legs. \" All of these issues have started since reducing his dose of Gabapentin. He sees nephrologist, Dr. Sharyn Iglesias.  The patient states that he has not seen Dr. Ronald Slade since before hospital admission and needs to make an appointment with him. REVIEW OF SYMPTOMS  Review of Systems   Constitutional: Negative for chills and fever. HENT: Negative for ear pain, rhinorrhea and sore throat. Eyes: Negative for pain and visual disturbance. Respiratory: Negative for cough and shortness of breath. Cardiovascular: Negative for chest pain and palpitations. Gastrointestinal: Negative for abdominal pain, constipation, diarrhea, nausea and vomiting. Genitourinary: Negative for dysuria, frequency and urgency. Skin: Negative for rash. Neurological: Negative for dizziness, syncope and light-headedness. Psychiatric/Behavioral: Negative for suicidal ideas. The patient is not nervous/anxious.         PAST MEDICAL HISTORY  Past Medical History:   Diagnosis Date    Anemia     per old chart    Arthritis     Back pain, chronic     \"have pain in lumbar mainly- have 5 bulging discs\"\"in my neck and my lumbar have herniated discs\"    [facility]     CAD (coronary artery disease) 1/27/2016    does not follow with a cardiologist    Cerebral artery occlusion with cerebral infarction ([facility]     \"had mini stroke in Jan 2016- fast heart rate when I would stand up - smile drooping on one side- lased just the one day\"    Chronic kidney disease (CKD), stage III (moderate) ([facility])     CKD (chronic kidney disease)     per old chart- consult with Dr Ronald Slade with admission 4/2016\"have low kidney function\"    COPD (chronic obstructive pulmonary disease) ([facility]     follow with Dr Paula Rolle Diabetes mellitus, type 2 ([facility] 1/3/2017    Diabetic peripheral neuropathy ([facility])     Diastolic CHF ([facility] 2/55/9268    Gastric ulcer     H/O cardiovascular stress test 5/26/14    EF 68% mild ischemia anterior wall    Heart murmur     \"see Dr Ayesha Whitaker- last echo 2014\" pt states\"I think they said I have a murmur but no chest pain or palpitations\"    Hiatal hernia  History of cardiac cath 6/11/14    MODERATE  CAD      Hx of migraines     HX OTHER MEDICAL     \"have thinning of kidney walls- they found I had that 15 yrs ago\" see Dr Alex Rosa"    Hyperlipidemia     Hypertension     Lumbar radiculopathy     Overlapping malignant neoplasm of colon ([facility] 5/27/2020    Panic attack     'anything new gives me anxiety\"    Pleural effusion     scheduled for thoracentesis 7/28/2014, 8/17/2016    S/P thoracentesis     left side( per old chart had thora done 4/2016 and one done 2014)    Sleep apnea     sleep study x 2 - last one 4-5 yrs ago- uses c-pap\"    SOBOE (shortness of breath on exertion)     Spinal stenosis        FAMILY HISTORY  Family History   Problem Relation Age of Onset    Heart Disease Mother         heart attack    High Blood Pressure Father        SOCIAL HISTORY  Social History     Socioeconomic History    Marital status:      Spouse name: Not on file    Number of children: Not on file    Years of education: Not on file    Highest education level: Not on file   Occupational History    Occupation: [employer], retired     Employer: [employer]   Social Needs    Financial resource strain: Not on file    Food insecurity     Worry: Not on file     Inability: Not on file   Next Caller needs     Medical: Not on file     Non-medical: Not on file   Tobacco Use    Smoking status: Former Smoker     Packs/day: 1.50     Years: 30.00     Pack years: 45.00     Types: Cigarettes     Last attempt to quit: 7/24/2010     Years since quitting: 10.0    Smokeless tobacco: Never Used   Substance and Sexual Activity    Alcohol use: Not Currently    Drug use: Not Currently     Types: Marijuana    Sexual activity: Not Currently     Partners: Female   Lifestyle    Physical activity     Days per week: Not on file     Minutes per session: Not on file    Stress: Not on file   Relationships    Social connections     Talks on phone: Not on file     Gets together: Not on file     Attends Restorationism service: Not on file     Active member of club or organization: Not on file     Attends meetings of clubs or organizations: Not on file     Relationship status: Not on file    Intimate partner violence     Fear of current or ex partner: Not on file     Emotionally abused: Not on file     Physically abused: Not on file     Forced sexual activity: Not on file   Other Topics Concern    Not on file   Social History Narrative    Not on file        SURGICAL HISTORY  Past Surgical History:   Procedure Laterality Date    CARPAL TUNNEL RELEASE Bilateral 1989    COLONOSCOPY N/A 5/12/2020    COLONOSCOPY POLYPECTOMY SNARE/COLD BIOPSY WITH SPOT INK TATTOO performed by Mikael Fitzpatrick MD at [facility] 2000's    KNEE SURGERY Bilateral     right knee x 2( scope)/ left knee- 7-8 yr ago   [address] fusion   [address]  N/A 5/21/2020    BOWEL RESECTION EXTENDED RIGHT HEMICOLECTOMY performed by Marylee Beckers, MD at [address] [place] [address] Left 12/20/2013    THORACENTESIS  4/25/2016         THORACENTESIS Left 11/15/2016    Dr. Maco Maharaj 250mlsremoved     THORACOTOMY Left 03/18/2019    with Lysis of adhesions    THORACOTOMY Left 3/18/2019    THORACOSCOPY CONVERTED TO THORACOTOMY WITH LYSIS OF ADHESIONS performed by Javier Carvajal MD at [address] [place] [address]      as a kid    UPPER GASTROINTESTINAL ENDOSCOPY N/A 5/12/2020    EGD BIOPSY performed by Mikael Fitzpatrick MD at [address] [place] [address]  Current Outpatient Medications   Medication Sig Dispense Refill    traZODone (DESYREL) 50 MG tablet Take 50 mg by mouth nightly      ALPRAZolam (XANAX) 1 MG tablet Take 1 tablet by mouth 3 times daily as needed for Anxiety for up to 30 days.  90 tablet 0    tiZANidine (ZANAFLEX) 4 MG tablet Take 1 tablet by mouth every 6 hours as needed (muscle spasms in neck) 30 tablet 1    oxyCODONE-acetaminophen (PERCOCET) 7.5-325 MG per tablet Take 1 tablet by mouth every 6 hours as needed for Pain.  spironolactone (ALDACTONE) 25 MG tablet Take 0.5 tablets by mouth 2 times daily 30 tablet 0    furosemide (LASIX) 20 MG tablet Take 1 tablet by mouth daily 60 tablet 0    ferrous sulfate (IRON 325) 325 (65 Fe) MG tablet Take 1 tablet by mouth 2 times daily 60 tablet 0    gabapentin (NEURONTIN) 300 MG capsule Take 1 capsule by mouth 2 times daily for 30 days.  60 capsule 0    amLODIPine (NORVASC) 10 MG tablet Take 1 tablet by mouth daily 30 tablet 3    famotidine (PEPCID) 10 MG tablet Take 1 tablet by mouth 2 times daily 60 tablet 3    metoprolol tartrate (LOPRESSOR) 25 MG tablet Take 1 tablet by mouth 2 times daily 60 tablet 3    aspirin 81 MG chewable tablet Take 1 tablet by mouth daily 30 tablet 1    risperiDONE (RISPERDAL) 1 MG tablet TAKE ONE (1) TABLET BY MOUTH EVERY NIGHT 30 tablet 0    docusate sodium (COLACE) 100 MG capsule Take 1 capsule by mouth 2 times daily 20 capsule 3    fluticasone-umeclidin-vilant (TRELEGY ELLIPTA) 100-62.5-25 MCG/INH AEPB Inhale 1 puff into the lungs daily 1 each 3    atorvastatin (LIPITOR) 80 MG tablet TAKE 1 TABLET BY MOUTH ONCE DAILY 60 tablet 1    folic acid (FOLVITE) 1 MG tablet TAKE 1 TABLET BY MOUTH ONCE DAILY 60 tablet 1    divalproex (DEPAKOTE ER) 500 MG extended release tablet Take 2 tablets by mouth nightly 60 tablet 0    busPIRone (BUSPAR) 10 MG tablet Take 1 tablet by mouth 3 times daily 90 tablet 0    escitalopram (LEXAPRO) 10 MG tablet Take 1 tablet by mouth daily 30 tablet 0    insulin lispro (HUMALOG) 100 UNIT/ML injection vial **Low Dose Correction Algorithm**Insulin lispro (HUMALOG)  3 TIMES DAILY WITH MEALSGlucose: Dose: No [id number] 1 [id number] 2 [id number] 3 [id number] 4 [id number] 5 [id number] and above 6 Units (Patient not taking: Reported on 7/22/2020) 1 vial 3    enoxaparin (LOVENOX) 30 MG/0.3ML injection Inject 0.3 mLs into the skin daily (Patient not taking: Reported on 7/22/2020)  0    Compression Stockings MISC by Does not apply route Duration of Treatment:  3 Months  # of pairs:  2    Compression Class Level - 20-30 mmHg*    Style - Knee High (Patient not taking: Reported on 7/22/2020) 2 each 0    Lactobacillus (ACIDOPHILUS) 0.5 MG TABS Take 1 capsule by mouth       No current facility-administered medications for this visit. ALLERGIES  Allergies   Allergen Reactions    Nsaids      Renal        RECENT LABS    Lab Results   Component Value Date    LABA1C 5.3 05/24/2020     Lab Results   Component Value Date     05/24/2020       Lab Results   Component Value Date    CHOL 119 01/10/2020    CHOL 269 (H) 01/04/2017    CHOL 144 01/27/2016     No results found for: [employer] [address]    Lab Results   Component Value Date    WBC 5.1 07/02/2020    HGB 10.0 (L) 07/02/2020    HCT 35.6 (L) 07/02/2020    MCV 86.8 07/02/2020     07/02/2020       PHYSICAL EXAM  BP (!) 115/58   Pulse 50   Temp 97.3 °F (36.3 °C)   Ht 5' 9.5\" (1.765 m)   Wt 227 lb 12.8 oz (103.3 kg)   SpO2 95%   BMI 33.16 kg/m²     Physical Exam  Constitutional:       Appearance: Normal appearance. HENT:      Head: Normocephalic and atraumatic. Eyes:      Comments: EOM grossly intact. Cardiovascular:      Rate and Rhythm: Normal rate and regular rhythm. Heart sounds: No murmur. No friction rub. No gallop. Comments: No carotid bruits. Pulmonary:      Effort: Pulmonary effort is normal.      Breath sounds: Normal breath sounds. No wheezing, rhonchi or rales. Skin:     General: Skin is warm and dry. Neurological:      Mental Status: He is alert and oriented to person, place, and time. Comments: Cranial nerves II-XII grossly intact   Psychiatric:         Mood and Affect: Mood normal.         Behavior: Behavior normal.         ASSESSMENT & PLAN  1. Anemia, unspecified type  Will check fasting labs and advise based on findings.    - CBC Auto Differential; Future  - Iron; Future  - Transferrin; Future  - Ferritin; Future    2. Chronic kidney disease, unspecified CKD stage  Will check CMP. - Comprehensive Metabolic Panel; Future    3. Neck pain  Refilled pt's Tizanidine, which he states helps with neck pain. - tiZANidine (ZANAFLEX) 4 MG tablet; Take 1 tablet by mouth every 6 hours as needed (muscle spasms in neck)  Dispense: 30 tablet; Refill: 1    4. Anxiety  Refilled Xanax. Checked PDMP. No signs of misuse. - ALPRAZolam (XANAX) 1 MG tablet; Take 1 tablet by mouth 3 times daily as needed for Anxiety for up to 30 days. Dispense: 90 tablet; Refill: 0    The patient states that he would like to try a new pharmacy, called [facility]. He states that when he is closer to needing refills on his medications, he will call and request refills and give us the information on where to send his prescriptions for DivvyDose. I told the patient that we can increase his dose of Gabapentin 300 mg, two tablets TID (what it was before the dose was changed). Return in about 3 months (around 10/22/2020) for On controlled meds needs seen every 3 months.             Electronically signed by Mumtaz Del Angel PA-C on 7/22/2020

## 2020-07-24 ENCOUNTER — TELEPHONE (OUTPATIENT)
Dept: FAMILY MEDICINE CLINIC | Age: 70
End: 2020-07-24

## 2020-07-24 DIAGNOSIS — N18.9 CHRONIC KIDNEY DISEASE, UNSPECIFIED CKD STAGE: ICD-10-CM

## 2020-07-24 DIAGNOSIS — D64.9 ANEMIA, UNSPECIFIED TYPE: ICD-10-CM

## 2020-07-24 LAB
A/G RATIO: 1.4 (ref 1.1–2.2)
ALBUMIN SERPL-MCNC: 3.7 G/DL (ref 3.4–5)
ALP BLD-CCNC: 99 U/L (ref 40–129)
ALT SERPL-CCNC: 20 U/L (ref 10–40)
ANION GAP SERPL CALCULATED.3IONS-SCNC: 12 MMOL/L (ref 3–16)
AST SERPL-CCNC: 28 U/L (ref 15–37)
BASOPHILS ABSOLUTE: 0 K/UL (ref 0–0.2)
BASOPHILS RELATIVE PERCENT: 0.5 %
BILIRUB SERPL-MCNC: <0.2 MG/DL (ref 0–1)
BUN BLDV-MCNC: 26 MG/DL (ref 7–20)
CALCIUM SERPL-MCNC: 8.5 MG/DL (ref 8.3–10.6)
CHLORIDE BLD-SCNC: 109 MMOL/L (ref 99–110)
CO2: 22 MMOL/L (ref 21–32)
CREAT SERPL-MCNC: 1.9 MG/DL (ref 0.8–1.3)
EOSINOPHILS ABSOLUTE: 0.4 K/UL (ref 0–0.6)
EOSINOPHILS RELATIVE PERCENT: 6.5 %
FERRITIN: 30.3 NG/ML (ref 30–400)
GFR AFRICAN AMERICAN: 43
GFR NON-AFRICAN AMERICAN: 35
GLOBULIN: 2.7 G/DL
GLUCOSE BLD-MCNC: 142 MG/DL (ref 70–99)
HCT VFR BLD CALC: 34.9 % (ref 40.5–52.5)
HEMOGLOBIN: 10.9 G/DL (ref 13.5–17.5)
IRON: 48 UG/DL (ref 59–158)
LYMPHOCYTES ABSOLUTE: 1.4 K/UL (ref 1–5.1)
LYMPHOCYTES RELATIVE PERCENT: 25.5 %
MCH RBC QN AUTO: 25.1 PG (ref 26–34)
MCHC RBC AUTO-ENTMCNC: 31.2 G/DL (ref 31–36)
MCV RBC AUTO: 80.2 FL (ref 80–100)
MONOCYTES ABSOLUTE: 0.6 K/UL (ref 0–1.3)
MONOCYTES RELATIVE PERCENT: 10.1 %
NEUTROPHILS ABSOLUTE: 3.1 K/UL (ref 1.7–7.7)
NEUTROPHILS RELATIVE PERCENT: 57.4 %
PDW BLD-RTO: 21.3 % (ref 12.4–15.4)
PLATELET # BLD: 157 K/UL (ref 135–450)
PMV BLD AUTO: 8.3 FL (ref 5–10.5)
POTASSIUM SERPL-SCNC: 4.5 MMOL/L (ref 3.5–5.1)
RBC # BLD: 4.34 M/UL (ref 4.2–5.9)
SODIUM BLD-SCNC: 143 MMOL/L (ref 136–145)
TOTAL PROTEIN: 6.4 G/DL (ref 6.4–8.2)
TRANSFERRIN: 290 MG/DL (ref 200–360)
WBC # BLD: 5.5 K/UL (ref 4–11)

## 2020-07-24 RX ORDER — FUROSEMIDE 20 MG/1
20 TABLET ORAL DAILY
Qty: 30 TABLET | Refills: 5 | Status: ON HOLD | OUTPATIENT
Start: 2020-07-24 | End: 2021-01-25 | Stop reason: HOSPADM

## 2020-07-24 RX ORDER — ASPIRIN 81 MG/1
81 TABLET, CHEWABLE ORAL DAILY
Qty: 30 TABLET | Refills: 5 | Status: ON HOLD | OUTPATIENT
Start: 2020-07-24 | End: 2020-08-05 | Stop reason: HOSPADM

## 2020-07-24 RX ORDER — ESCITALOPRAM OXALATE 10 MG/1
10 TABLET ORAL DAILY
Qty: 30 TABLET | Refills: 5 | Status: ON HOLD | OUTPATIENT
Start: 2020-07-24 | End: 2021-02-03

## 2020-07-24 RX ORDER — FOLIC ACID 1 MG/1
1 TABLET ORAL DAILY
Qty: 30 TABLET | Refills: 5 | Status: ON HOLD | OUTPATIENT
Start: 2020-07-24 | End: 2021-01-25 | Stop reason: HOSPADM

## 2020-07-24 RX ORDER — ATORVASTATIN CALCIUM 80 MG/1
80 TABLET, FILM COATED ORAL DAILY
Qty: 30 TABLET | Refills: 5 | Status: ON HOLD | OUTPATIENT
Start: 2020-07-24 | End: 2021-02-03

## 2020-07-24 RX ORDER — FERROUS SULFATE 325(65) MG
325 TABLET ORAL 2 TIMES DAILY
Qty: 60 TABLET | Refills: 5 | Status: ON HOLD | OUTPATIENT
Start: 2020-07-24 | End: 2021-01-25 | Stop reason: HOSPADM

## 2020-07-24 RX ORDER — DIVALPROEX SODIUM 500 MG/1
1000 TABLET, EXTENDED RELEASE ORAL NIGHTLY
Qty: 60 TABLET | Refills: 5 | Status: ON HOLD | OUTPATIENT
Start: 2020-07-24 | End: 2021-02-03

## 2020-07-24 RX ORDER — TRAZODONE HYDROCHLORIDE 50 MG/1
50 TABLET ORAL NIGHTLY
Qty: 30 TABLET | Refills: 5 | Status: ON HOLD | OUTPATIENT
Start: 2020-07-24 | End: 2021-01-25 | Stop reason: HOSPADM

## 2020-07-24 RX ORDER — AMLODIPINE BESYLATE 10 MG/1
10 TABLET ORAL DAILY
Qty: 30 TABLET | Refills: 5 | Status: ON HOLD | OUTPATIENT
Start: 2020-07-24 | End: 2020-08-05 | Stop reason: HOSPADM

## 2020-07-24 RX ORDER — BUSPIRONE HYDROCHLORIDE 10 MG/1
10 TABLET ORAL 3 TIMES DAILY
Qty: 90 TABLET | Refills: 5 | Status: ON HOLD | OUTPATIENT
Start: 2020-07-24 | End: 2021-01-25 | Stop reason: HOSPADM

## 2020-07-24 RX ORDER — SPIRONOLACTONE 25 MG/1
12.5 TABLET ORAL 2 TIMES DAILY
Qty: 30 TABLET | Refills: 5 | Status: ON HOLD | OUTPATIENT
Start: 2020-07-24 | End: 2020-08-05 | Stop reason: HOSPADM

## 2020-07-24 RX ORDER — FAMOTIDINE 10 MG
10 TABLET ORAL 2 TIMES DAILY
Qty: 60 TABLET | Refills: 5 | Status: ON HOLD | OUTPATIENT
Start: 2020-07-24 | End: 2021-02-03

## 2020-07-24 RX ORDER — RISPERIDONE 1 MG/1
1 TABLET, FILM COATED ORAL DAILY
Qty: 30 TABLET | Refills: 5 | Status: ON HOLD | OUTPATIENT
Start: 2020-07-24 | End: 2021-01-25 | Stop reason: HOSPADM

## 2020-07-24 RX ORDER — GABAPENTIN 300 MG/1
600 CAPSULE ORAL 3 TIMES DAILY
Qty: 180 CAPSULE | Refills: 5 | Status: ON HOLD | OUTPATIENT
Start: 2020-07-24 | End: 2021-01-25 | Stop reason: HOSPADM

## 2020-07-28 ENCOUNTER — VIRTUAL VISIT (OUTPATIENT)
Dept: FAMILY MEDICINE CLINIC | Age: 70
End: 2020-07-28
Payer: MEDICARE

## 2020-07-28 VITALS — BODY MASS INDEX: 32.5 KG/M2 | HEIGHT: 70 IN | WEIGHT: 227 LBS

## 2020-07-28 PROCEDURE — G0438 PPPS, INITIAL VISIT: HCPCS | Performed by: FAMILY MEDICINE

## 2020-07-28 ASSESSMENT — PATIENT HEALTH QUESTIONNAIRE - PHQ9
SUM OF ALL RESPONSES TO PHQ QUESTIONS 1-9: 0
SUM OF ALL RESPONSES TO PHQ QUESTIONS 1-9: 0

## 2020-07-28 ASSESSMENT — LIFESTYLE VARIABLES: HOW OFTEN DO YOU HAVE A DRINK CONTAINING ALCOHOL: 0

## 2020-07-28 NOTE — PATIENT INSTRUCTIONS
Personalized Preventive Plan for Yaniv Holder. - 7/28/2020  Medicare offers a range of preventive health benefits. Some of the tests and screenings are paid in full while other may be subject to a deductible, co-insurance, and/or copay. Some of these benefits include a comprehensive review of your medical history including lifestyle, illnesses that may run in your family, and various assessments and screenings as appropriate. After reviewing your medical record and screening and assessments performed today your provider may have ordered immunizations, labs, imaging, and/or referrals for you. A list of these orders (if applicable) as well as your Preventive Care list are included within your After Visit Summary for your review. Other Preventive Recommendations:    · A preventive eye exam performed by an eye specialist is recommended every 1-2 years to screen for glaucoma; cataracts, macular degeneration, and other eye disorders. · A preventive dental visit is recommended every 6 months. · Try to get at least 150 minutes of exercise per week or 10,000 steps per day on a pedometer . · Order or download the FREE \"Exercise & Physical Activity: Your Everyday Guide\" from [employer]. Call 0-333.466.4739 or search [employer] online. · You need 2171-9981 mg of calcium and 5698-5868 IU of vitamin D per day. It is possible to meet your calcium requirement with diet alone, but a vitamin D supplement is usually necessary to meet this goal.  · When exposed to the sun, use a sunscreen that protects against both UVA and UVB radiation with an SPF of 30 or greater. Reapply every 2 to 3 hours or after sweating, drying off with a towel, or swimming. · Always wear a seat belt when traveling in a car. Always wear a helmet when riding a bicycle or motorcycle.

## 2020-07-28 NOTE — PROGRESS NOTES
Medicare Annual Wellness Visit  Name: Ant Kaufman HHOJRD Date: 2020   MRN: [id number] Sex: Male   Age: 79 y.o. Ethnicity: Non-/Non    : 1950 Race: [place]. is here for Medicare AWV    Screenings for behavioral, psychosocial and functional/safety risks, and cognitive dysfunction are all negative except as indicated below. These results, as well as other patient data from the [address] form, are documented in Flowsheets linked to this Encounter. Allergies   Allergen Reactions    Nsaids      Renal        Prior to Visit Medications    Medication Sig Taking? Authorizing Provider   amLODIPine (NORVASC) 10 MG tablet Take 1 tablet by mouth daily Yes Guillermo Ortiz PA-C   atorvastatin (LIPITOR) 80 MG tablet Take 1 tablet by mouth daily Yes Guillermo Ortiz PA-C   aspirin 81 MG chewable tablet Take 1 tablet by mouth daily Yes Guillermo Ortiz PA-C   busPIRone (BUSPAR) 10 MG tablet Take 1 tablet by mouth 3 times daily Yes Guillermo Ortiz PA-C   divalproex (DEPAKOTE ER) 500 MG extended release tablet Take 2 tablets by mouth nightly Yes Guillermo Ortiz PA-C   escitalopram (LEXAPRO) 10 MG tablet Take 1 tablet by mouth daily Yes Guillermo Ortiz PA-C   famotidine (PEPCID) 10 MG tablet Take 1 tablet by mouth 2 times daily Yes Guillermo Ortiz PA-C   ferrous sulfate (IRON 325) 325 (65 Fe) MG tablet Take 1 tablet by mouth 2 times daily Yes Guillermo Ortiz PA-C   furosemide (LASIX) 20 MG tablet Take 1 tablet by mouth daily Yes Guillermo Ortiz PA-C   folic acid (FOLVITE) 1 MG tablet Take 1 tablet by mouth daily Yes Guillermo Ortiz PA-C   gabapentin (NEURONTIN) 300 MG capsule Take 2 capsules by mouth 3 times daily for 180 days.  Yes Guillermo Ortiz PA-C   metoprolol tartrate (LOPRESSOR) 25 MG tablet Take 1 tablet by mouth 2 times daily Yes Guillermo Ortiz PA-C   risperiDONE (RISPERDAL) 1 MG tablet Take 1 tablet by mouth daily Yes Guillermo Ortiz PA-C Cerebral artery occlusion with cerebral infarction ([facility]     \"had mini stroke in Jan 2016- fast heart rate when I would stand up - smile drooping on one side- lased just the one day\"    Chronic kidney disease (CKD), stage III (moderate) ([facility])     CKD (chronic kidney disease)     per old chart- consult with Dr Sabina Wetzel with admission 4/2016\"have low kidney function\"    COPD (chronic obstructive pulmonary disease) ([facility]     follow with Dr Vianca Boggs Diabetes mellitus, type 2 ([facility] 1/3/2017    Diabetic peripheral neuropathy (HCC)     Diastolic CHF ([facility] 0/57/5607    Gastric ulcer     H/O cardiovascular stress test 5/26/14    EF 68% mild ischemia anterior wall    Heart murmur     \"see Dr Devante Luna- last echo 2014\" pt states\"I think they said I have a murmur but no chest pain or palpitations\"    Hiatal hernia     History of cardiac cath 6/11/14    MODERATE  CAD      Hx of migraines     HX OTHER MEDICAL     \"have thinning of kidney walls- they found I had that 15 yrs ago\" see Dr Neeta Sarabia"    Hyperlipidemia     Hypertension     Lumbar radiculopathy     Overlapping malignant neoplasm [facility] colon ([facility] 5/27/2020    Panic attack     'anything new gives me anxiety\"    Pleural effusion     scheduled for thoracentesis 7/28/2014, 8/17/2016    S/P thoracentesis     left side( per old chart had thora done 4/2016 and one done 2014)    Sleep apnea     sleep study x 2 - last one 4-5 yrs ago- uses c-pap\"    SOBOE (shortness of breath on exertion)     Spinal stenosis        Past Surgical History:   Procedure Laterality Date    CARPAL TUNNEL RELEASE Bilateral 1989    COLONOSCOPY N/A 5/12/2020    COLONOSCOPY POLYPECTOMY SNARE/COLD BIOPSY WITH SPOT INK TATTOO performed by Tai Hall MD at [facility] Left 2000's    KNEE SURGERY Bilateral     right knee x 2( scope)/ left knee- 7-8 yr ago   [address] fusion    SMALL INTESTINE SURGERY N/A 5/21/2020    BOWEL RESECTION EXTENDED RIGHT HEMICOLECTOMY performed by Martir Eldridge MD at [address] Left 12/20/2013    THORACENTESIS  4/25/2016         THORACENTESIS Left 11/15/2016    Dr. Messer Richar 250mlsremoved     THORACOTOMY Left 03/18/2019    with Lysis of adhesions    THORACOTOMY Left 3/18/2019    THORACOSCOPY CONVERTED TO THORACOTOMY WITH LYSIS OF ADHESIONS performed by Vishal Dc MD at [facility]      as a kid    UPPER GASTROINTESTINAL ENDOSCOPY N/A 5/12/2020    EGD BIOPSY performed by Dimitris Saeed MD at [facility] ENDOSCOPY       Family History   Problem Relation Age of Onset    Heart Disease Mother         heart attack    High Blood Pressure Father        CareTeam (Including outside providers/suppliers regularly involved in providing care):   Patient Care Team:  Federico Gonzalez MD as PCP - General (Family Medicine)  Federico Gonzalez MD as PCP - [facility] Empaneled Provider  Delano San MD as Consulting Physician (Cardiology)    Wt Readings from Last 3 Encounters:   07/28/20 227 lb (103 kg)   07/22/20 227 lb 12.8 oz (103.3 kg)   06/05/20 215 lb (97.5 kg)     Vitals:    07/28/20 1449   Weight: 227 lb (103 kg)   Height: 5' 9.5\" (1.765 m)     Body mass index is 33.04 kg/m². Based upon direct observation of the patient, evaluation of cognition reveals recent and remote memory intact. Patient's complete Health Risk Assessment and screening values have been reviewed and are found in Flowsheets. The following problems were reviewed today and where indicated follow up appointments were made and/or referrals ordered. Positive Risk Factor Screenings with Interventions:     General Health:  General  In general, how would you say your health is?: Good  In the past 7 days, have you experienced any of the following?  New or Increased Pain, New or Increased Fatigue, Loneliness, Social Isolation, Stress or Anger?: (!) New or Increased Pain, New or Increased Fatigue, Loneliness  Do you get the social and emotional Maintenance Status  Immunization History   Administered Date(s) Administered    Influenza A (Q1C2-83) Vaccine PF IM 04/07/2010    Influenza Vaccine, unspecified formulation 10/14/2016    Influenza Virus Vaccine 10/03/2013, 10/13/2014, 01/29/2016, 10/14/2016    Influenza, High Dose (Fluzone 65 yrs and older) 11/11/2018    Influenza, Quadv, IM, PF (6 mo and older Fluzone, Flulaval, Fluarix, and 3 yrs and older Afluria) 10/09/2019    Pneumococcal Conjugate 13-valent ([id number]) 10/17/2016    Pneumococcal Polysaccharide ([id number]) 01/01/2009, 10/03/2013    Tdap (Boostrix, Adacel) 06/10/2013        Health Maintenance   Topic Date Due    Hepatitis C screen  1950    Diabetic retinal exam  01/01/1960    Diabetic microalbuminuria test  01/01/1968    Shingles Vaccine (1 of 2) 01/01/2000    Diabetic foot exam  09/14/2018    Pneumococcal 65+ years Vaccine (2 of 2 - PPSV23) 10/03/2018    Annual Wellness Visit (AWV)  03/14/2020    Flu vaccine (1) 09/01/2020    Lipid screen  01/10/2021    Low dose CT lung screening  05/19/2021    A1C test (Diabetic or Prediabetic)  05/24/2021    Potassium monitoring  07/24/2021    Creatinine monitoring  07/24/2021    DTaP/Tdap/Td vaccine (2 - Td) 06/10/2023    Colon cancer screen colonoscopy  05/12/2030    AAA screen  Completed    Hepatitis A vaccine  Aged Out    Hib vaccine  Aged Out    Meningococcal (ACWY) vaccine  Aged Out     Recommendations for Re-vinyl Due: see orders and patient instructions/AVS.    Unable to obtain three vital signs due to patient not having equipment to take temperature or BP. Recommended screening schedule for the next 5-10 years is provided to the patient in written form: see Patient Instructions/AVS.    Brenton Gosselin, LPN, 3/32/0792, performed the documented evaluation under the direct supervision of the attending physician.     Bisi Carvajal. is a 79 y.o. male being evaluated by a Virtual Visit (audio visit)

## 2020-07-30 ENCOUNTER — APPOINTMENT (OUTPATIENT)
Dept: CT IMAGING | Age: 70
End: 2020-07-30
Payer: MEDICARE

## 2020-07-30 ENCOUNTER — APPOINTMENT (OUTPATIENT)
Dept: GENERAL RADIOLOGY | Age: 70
End: 2020-07-30
Payer: MEDICARE

## 2020-07-30 ENCOUNTER — HOSPITAL ENCOUNTER (OUTPATIENT)
Age: 70
Setting detail: OBSERVATION
Discharge: NURSING, REHAB OR LONG-TERM CARE | End: 2020-08-05
Attending: EMERGENCY MEDICINE
Payer: MEDICARE
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
## 2020-07-30 PROBLEM — R53.1 ACUTE LEFT-SIDED WEAKNESS: Status: ACTIVE | Noted: 2020-07-30
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
## 2020-07-30 LAB
ALBUMIN SERPL-MCNC: 3.4 GM/DL (ref 3.4–5)
ALCOHOL SCREEN SERUM: <0.01 %WT/VOL
ALP BLD-CCNC: 64 IU/L (ref 40–129)
ALT SERPL-CCNC: 8 U/L (ref 10–40)
AMPHETAMINES: NEGATIVE
ANION GAP SERPL CALCULATED.3IONS-SCNC: 12 MMOL/L (ref 4–16)
AST SERPL-CCNC: 12 IU/L (ref 15–37)
BACTERIA: NEGATIVE /HPF
BARBITURATE SCREEN URINE: NEGATIVE
BASOPHILS ABSOLUTE: 0 K/CU MM
BASOPHILS RELATIVE PERCENT: 0.6 % (ref 0–1)
BENZODIAZEPINE SCREEN, URINE: NEGATIVE
BILIRUB SERPL-MCNC: 0.3 MG/DL (ref 0–1)
BILIRUBIN URINE: NEGATIVE MG/DL
BLOOD, URINE: NEGATIVE
BUN BLDV-MCNC: 30 MG/DL (ref 6–23)
CALCIUM SERPL-MCNC: 8.6 MG/DL (ref 8.3–10.6)
CANNABINOID SCREEN URINE: NEGATIVE
CHLORIDE BLD-SCNC: 105 MMOL/L (ref 99–110)
CLARITY: CLEAR
CO2: 22 MMOL/L (ref 21–32)
COCAINE METABOLITE: NEGATIVE
COLOR: ABNORMAL
CREAT SERPL-MCNC: 2 MG/DL (ref 0.9–1.3)
DIFFERENTIAL TYPE: ABNORMAL
EOSINOPHILS ABSOLUTE: 0.2 K/CU MM
EOSINOPHILS RELATIVE PERCENT: 4.6 % (ref 0–3)
GFR AFRICAN AMERICAN: 40 ML/MIN/1.73M2
GFR NON-AFRICAN AMERICAN: 33 ML/MIN/1.73M2
GLUCOSE BLD-MCNC: 87 MG/DL (ref 70–99)
GLUCOSE BLD-MCNC: 88 MG/DL
GLUCOSE BLD-MCNC: 88 MG/DL (ref 70–99)
GLUCOSE, URINE: NEGATIVE MG/DL
HCT VFR BLD CALC: 45.1 % (ref 42–52)
HEMOGLOBIN: 12 GM/DL (ref 13.5–18)
IMMATURE NEUTROPHIL %: 0.8 % (ref 0–0.43)
KETONES, URINE: NEGATIVE MG/DL
LEUKOCYTE ESTERASE, URINE: NEGATIVE
LIPASE: 15 IU/L (ref 13–60)
LYMPHOCYTES ABSOLUTE: 1.6 K/CU MM
LYMPHOCYTES RELATIVE PERCENT: 30.8 % (ref 24–44)
MAGNESIUM: 2.6 MG/DL (ref 1.8–2.4)
MCH RBC QN AUTO: 25 PG (ref 27–31)
MCHC RBC AUTO-ENTMCNC: 26.6 % (ref 32–36)
MCV RBC AUTO: 94 FL (ref 78–100)
MONOCYTES ABSOLUTE: 0.4 K/CU MM
MONOCYTES RELATIVE PERCENT: 8.1 % (ref 0–4)
NITRITE URINE, QUANTITATIVE: NEGATIVE
NUCLEATED RBC %: 0 %
OPIATES, URINE: NEGATIVE
OXYCODONE: NEGATIVE
PDW BLD-RTO: 18.7 % (ref 11.7–14.9)
PH, URINE: 7 (ref 5–8)
PHENCYCLIDINE, URINE: NEGATIVE
PLATELET # BLD: 152 K/CU MM (ref 140–440)
PMV BLD AUTO: 10.2 FL (ref 7.5–11.1)
POTASSIUM SERPL-SCNC: 4.8 MMOL/L (ref 3.5–5.1)
PRO-BNP: 129.4 PG/ML
PROTEIN UA: NEGATIVE MG/DL
RBC # BLD: 4.8 M/CU MM (ref 4.6–6.2)
RBC URINE: <1 /HPF (ref 0–3)
SEGMENTED NEUTROPHILS ABSOLUTE COUNT: 2.9 K/CU MM
SEGMENTED NEUTROPHILS RELATIVE PERCENT: 55.1 % (ref 36–66)
SODIUM BLD-SCNC: 139 MMOL/L (ref 135–145)
SPECIFIC GRAVITY UA: 1.02 (ref 1–1.03)
TOTAL CK: 58 IU/L (ref 38–174)
TOTAL IMMATURE NEUTOROPHIL: 0.04 K/CU MM
TOTAL NUCLEATED RBC: 0 K/CU MM
TOTAL PROTEIN: 6.8 GM/DL (ref 6.4–8.2)
TRICHOMONAS: ABNORMAL /HPF
TROPONIN T: 0.01 NG/ML
TROPONIN T: 0.02 NG/ML
TSH HIGH SENSITIVITY: 3.11 UIU/ML (ref 0.27–4.2)
UROBILINOGEN, URINE: NORMAL MG/DL (ref 0.2–1)
WBC # BLD: 5.2 K/CU MM (ref 4–10.5)
WBC UA: ABNORMAL /HPF (ref 0–2)

## 2020-07-30 PROCEDURE — 82550 ASSAY OF CK (CPK): CPT

## 2020-07-30 PROCEDURE — 84443 ASSAY THYROID STIM HORMONE: CPT

## 2020-07-30 PROCEDURE — 6370000000 HC RX 637 (ALT 250 FOR IP): Performed by: NURSE PRACTITIONER

## 2020-07-30 PROCEDURE — 2580000003 HC RX 258: Performed by: NURSE PRACTITIONER

## 2020-07-30 PROCEDURE — 81001 URINALYSIS AUTO W/SCOPE: CPT

## 2020-07-30 PROCEDURE — 6360000004 HC RX CONTRAST MEDICATION: Performed by: EMERGENCY MEDICINE

## 2020-07-30 PROCEDURE — 72132 CT LUMBAR SPINE W/DYE: CPT

## 2020-07-30 PROCEDURE — 6370000000 HC RX 637 (ALT 250 FOR IP): Performed by: PHYSICIAN ASSISTANT

## 2020-07-30 PROCEDURE — 93005 ELECTROCARDIOGRAM TRACING: CPT | Performed by: EMERGENCY MEDICINE

## 2020-07-30 PROCEDURE — 85025 COMPLETE CBC W/AUTO DIFF WBC: CPT

## 2020-07-30 PROCEDURE — 70450 CT HEAD/BRAIN W/O DYE: CPT

## 2020-07-30 PROCEDURE — 71045 X-RAY EXAM CHEST 1 VIEW: CPT

## 2020-07-30 PROCEDURE — G0378 HOSPITAL OBSERVATION PER HR: HCPCS

## 2020-07-30 PROCEDURE — 36415 COLL VENOUS BLD VENIPUNCTURE: CPT

## 2020-07-30 PROCEDURE — 99285 EMERGENCY DEPT VISIT HI MDM: CPT

## 2020-07-30 PROCEDURE — 72170 X-RAY EXAM OF PELVIS: CPT

## 2020-07-30 PROCEDURE — 2580000003 HC RX 258: Performed by: PHYSICIAN ASSISTANT

## 2020-07-30 PROCEDURE — 83735 ASSAY OF MAGNESIUM: CPT

## 2020-07-30 PROCEDURE — 6370000000 HC RX 637 (ALT 250 FOR IP): Performed by: FAMILY MEDICINE

## 2020-07-30 PROCEDURE — 94640 AIRWAY INHALATION TREATMENT: CPT

## 2020-07-30 PROCEDURE — 80053 COMPREHEN METABOLIC PANEL: CPT

## 2020-07-30 PROCEDURE — 72126 CT NECK SPINE W/DYE: CPT

## 2020-07-30 PROCEDURE — 96372 THER/PROPH/DIAG INJ SC/IM: CPT

## 2020-07-30 PROCEDURE — 84484 ASSAY OF TROPONIN QUANT: CPT

## 2020-07-30 PROCEDURE — 73030 X-RAY EXAM OF SHOULDER: CPT

## 2020-07-30 PROCEDURE — 82962 GLUCOSE BLOOD TEST: CPT

## 2020-07-30 PROCEDURE — 6360000002 HC RX W HCPCS: Performed by: NURSE PRACTITIONER

## 2020-07-30 PROCEDURE — 70496 CT ANGIOGRAPHY HEAD: CPT

## 2020-07-30 PROCEDURE — 80307 DRUG TEST PRSMV CHEM ANLYZR: CPT

## 2020-07-30 PROCEDURE — 83880 ASSAY OF NATRIURETIC PEPTIDE: CPT

## 2020-07-30 PROCEDURE — 72129 CT CHEST SPINE W/DYE: CPT

## 2020-07-30 PROCEDURE — 83690 ASSAY OF LIPASE: CPT

## 2020-07-30 PROCEDURE — G0480 DRUG TEST DEF 1-7 CLASSES: HCPCS

## 2020-07-30 RX ORDER — FUROSEMIDE 20 MG/1
20 TABLET ORAL DAILY
Status: DISCONTINUED | OUTPATIENT
Start: 2020-07-30 | End: 2020-08-05 | Stop reason: HOSPADM

## 2020-07-30 RX ORDER — 0.9 % SODIUM CHLORIDE 0.9 %
1000 INTRAVENOUS SOLUTION INTRAVENOUS ONCE
Status: COMPLETED | OUTPATIENT
Start: 2020-07-30 | End: 2020-07-30

## 2020-07-30 RX ORDER — SODIUM CHLORIDE 9 MG/ML
INJECTION, SOLUTION INTRAVENOUS CONTINUOUS
Status: ACTIVE | OUTPATIENT
Start: 2020-07-30 | End: 2020-07-31

## 2020-07-30 RX ORDER — DIVALPROEX SODIUM 500 MG/1
1000 TABLET, EXTENDED RELEASE ORAL NIGHTLY
Status: DISCONTINUED | OUTPATIENT
Start: 2020-07-30 | End: 2020-08-05 | Stop reason: HOSPADM

## 2020-07-30 RX ORDER — FERROUS SULFATE 325(65) MG
325 TABLET ORAL 2 TIMES DAILY
Status: DISCONTINUED | OUTPATIENT
Start: 2020-07-30 | End: 2020-08-05 | Stop reason: HOSPADM

## 2020-07-30 RX ORDER — BUSPIRONE HYDROCHLORIDE 10 MG/1
10 TABLET ORAL 3 TIMES DAILY
Status: DISCONTINUED | OUTPATIENT
Start: 2020-07-30 | End: 2020-08-05 | Stop reason: HOSPADM

## 2020-07-30 RX ORDER — LACTOBACILLUS RHAMNOSUS GG 10B CELL
1 CAPSULE ORAL DAILY
Status: DISCONTINUED | OUTPATIENT
Start: 2020-07-31 | End: 2020-08-05 | Stop reason: HOSPADM

## 2020-07-30 RX ORDER — GABAPENTIN 300 MG/1
600 CAPSULE ORAL 3 TIMES DAILY
Status: DISCONTINUED | OUTPATIENT
Start: 2020-07-30 | End: 2020-08-05 | Stop reason: HOSPADM

## 2020-07-30 RX ORDER — ESCITALOPRAM OXALATE 10 MG/1
10 TABLET ORAL DAILY
Status: DISCONTINUED | OUTPATIENT
Start: 2020-07-30 | End: 2020-08-05 | Stop reason: HOSPADM

## 2020-07-30 RX ORDER — TIZANIDINE 4 MG/1
TABLET ORAL
Qty: 30 TABLET | Refills: 11 | OUTPATIENT
Start: 2020-07-30

## 2020-07-30 RX ORDER — LABETALOL 20 MG/4 ML (5 MG/ML) INTRAVENOUS SYRINGE
10 EVERY 10 MIN PRN
Status: DISCONTINUED | OUTPATIENT
Start: 2020-07-30 | End: 2020-08-05 | Stop reason: HOSPADM

## 2020-07-30 RX ORDER — RISPERIDONE 0.5 MG/1
1 TABLET, FILM COATED ORAL DAILY
Status: DISCONTINUED | OUTPATIENT
Start: 2020-07-30 | End: 2020-08-05 | Stop reason: HOSPADM

## 2020-07-30 RX ORDER — TRAZODONE HYDROCHLORIDE 50 MG/1
50 TABLET ORAL NIGHTLY
Status: DISCONTINUED | OUTPATIENT
Start: 2020-07-30 | End: 2020-08-05 | Stop reason: HOSPADM

## 2020-07-30 RX ORDER — ASPIRIN 81 MG/1
324 TABLET, CHEWABLE ORAL ONCE
Status: COMPLETED | OUTPATIENT
Start: 2020-07-30 | End: 2020-07-30

## 2020-07-30 RX ORDER — ATORVASTATIN CALCIUM 80 MG/1
80 TABLET, FILM COATED ORAL NIGHTLY
Status: DISCONTINUED | OUTPATIENT
Start: 2020-07-30 | End: 2020-08-05 | Stop reason: HOSPADM

## 2020-07-30 RX ORDER — ALPRAZOLAM 0.5 MG/1
1 TABLET ORAL 3 TIMES DAILY PRN
Status: DISCONTINUED | OUTPATIENT
Start: 2020-07-30 | End: 2020-08-05 | Stop reason: HOSPADM

## 2020-07-30 RX ORDER — ONDANSETRON 2 MG/ML
4 INJECTION INTRAMUSCULAR; INTRAVENOUS EVERY 6 HOURS PRN
Status: DISCONTINUED | OUTPATIENT
Start: 2020-07-30 | End: 2020-08-05 | Stop reason: HOSPADM

## 2020-07-30 RX ORDER — PROMETHAZINE HYDROCHLORIDE 25 MG/1
12.5 TABLET ORAL EVERY 6 HOURS PRN
Status: DISCONTINUED | OUTPATIENT
Start: 2020-07-30 | End: 2020-08-05 | Stop reason: HOSPADM

## 2020-07-30 RX ORDER — PANTOPRAZOLE SODIUM 40 MG/1
40 TABLET, DELAYED RELEASE ORAL
Status: DISCONTINUED | OUTPATIENT
Start: 2020-07-31 | End: 2020-07-30

## 2020-07-30 RX ORDER — BUDESONIDE AND FORMOTEROL FUMARATE DIHYDRATE 160; 4.5 UG/1; UG/1
2 AEROSOL RESPIRATORY (INHALATION) 2 TIMES DAILY
Status: DISCONTINUED | OUTPATIENT
Start: 2020-07-30 | End: 2020-08-05 | Stop reason: HOSPADM

## 2020-07-30 RX ORDER — POLYETHYLENE GLYCOL 3350 17 G/17G
17 POWDER, FOR SOLUTION ORAL DAILY PRN
Status: DISCONTINUED | OUTPATIENT
Start: 2020-07-30 | End: 2020-08-05 | Stop reason: HOSPADM

## 2020-07-30 RX ORDER — FOLIC ACID 1 MG/1
1 TABLET ORAL DAILY
Status: DISCONTINUED | OUTPATIENT
Start: 2020-07-30 | End: 2020-08-05 | Stop reason: HOSPADM

## 2020-07-30 RX ORDER — SODIUM CHLORIDE 0.9 % (FLUSH) 0.9 %
10 SYRINGE (ML) INJECTION EVERY 12 HOURS SCHEDULED
Status: DISCONTINUED | OUTPATIENT
Start: 2020-07-30 | End: 2020-08-05 | Stop reason: HOSPADM

## 2020-07-30 RX ORDER — DOCUSATE SODIUM 100 MG/1
100 CAPSULE, LIQUID FILLED ORAL 2 TIMES DAILY
Status: DISCONTINUED | OUTPATIENT
Start: 2020-07-30 | End: 2020-08-05 | Stop reason: HOSPADM

## 2020-07-30 RX ORDER — FAMOTIDINE 20 MG/1
10 TABLET, FILM COATED ORAL 2 TIMES DAILY
Status: DISCONTINUED | OUTPATIENT
Start: 2020-07-30 | End: 2020-08-05 | Stop reason: HOSPADM

## 2020-07-30 RX ORDER — SODIUM CHLORIDE 0.9 % (FLUSH) 0.9 %
10 SYRINGE (ML) INJECTION PRN
Status: DISCONTINUED | OUTPATIENT
Start: 2020-07-30 | End: 2020-08-05 | Stop reason: HOSPADM

## 2020-07-30 RX ORDER — ASPIRIN 81 MG/1
81 TABLET, CHEWABLE ORAL DAILY
Status: DISCONTINUED | OUTPATIENT
Start: 2020-07-30 | End: 2020-08-05 | Stop reason: HOSPADM

## 2020-07-30 RX ORDER — OXYCODONE AND ACETAMINOPHEN 7.5; 325 MG/1; MG/1
1 TABLET ORAL EVERY 6 HOURS PRN
Status: DISCONTINUED | OUTPATIENT
Start: 2020-07-30 | End: 2020-08-05 | Stop reason: HOSPADM

## 2020-07-30 RX ORDER — TIZANIDINE 4 MG/1
4 TABLET ORAL EVERY 6 HOURS PRN
Status: DISCONTINUED | OUTPATIENT
Start: 2020-07-30 | End: 2020-08-05 | Stop reason: HOSPADM

## 2020-07-30 RX ADMIN — ENOXAPARIN SODIUM 30 MG: 30 INJECTION SUBCUTANEOUS at 18:40

## 2020-07-30 RX ADMIN — ASPIRIN 324 MG: 81 TABLET, CHEWABLE ORAL at 15:57

## 2020-07-30 RX ADMIN — BUDESONIDE AND FORMOTEROL FUMARATE DIHYDRATE 2 PUFF: 160; 4.5 AEROSOL RESPIRATORY (INHALATION) at 22:00

## 2020-07-30 RX ADMIN — TIZANIDINE 4 MG: 4 TABLET ORAL at 18:40

## 2020-07-30 RX ADMIN — IOPAMIDOL 80 ML: 755 INJECTION, SOLUTION INTRAVENOUS at 14:02

## 2020-07-30 RX ADMIN — SODIUM CHLORIDE: 9 INJECTION, SOLUTION INTRAVENOUS at 18:41

## 2020-07-30 RX ADMIN — BUSPIRONE HYDROCHLORIDE 10 MG: 10 TABLET ORAL at 22:03

## 2020-07-30 RX ADMIN — ASPIRIN 81 MG CHEWABLE TABLET 81 MG: 81 TABLET CHEWABLE at 18:40

## 2020-07-30 RX ADMIN — FOLIC ACID 1 MG: 1 TABLET ORAL at 18:41

## 2020-07-30 RX ADMIN — FERROUS SULFATE TAB 325 MG (65 MG ELEMENTAL FE) 325 MG: 325 (65 FE) TAB at 22:04

## 2020-07-30 RX ADMIN — OXYCODONE HYDROCHLORIDE AND ACETAMINOPHEN 1 TABLET: 7.5; 325 TABLET ORAL at 18:41

## 2020-07-30 RX ADMIN — SODIUM CHLORIDE, PRESERVATIVE FREE 10 ML: 5 INJECTION INTRAVENOUS at 21:57

## 2020-07-30 RX ADMIN — GABAPENTIN 600 MG: 300 CAPSULE ORAL at 22:04

## 2020-07-30 RX ADMIN — DIVALPROEX SODIUM 1000 MG: 500 TABLET, FILM COATED, EXTENDED RELEASE ORAL at 22:03

## 2020-07-30 RX ADMIN — SODIUM CHLORIDE 1000 ML: 9 INJECTION, SOLUTION INTRAVENOUS at 15:31

## 2020-07-30 RX ADMIN — ATORVASTATIN CALCIUM 80 MG: 80 TABLET, FILM COATED ORAL at 21:55

## 2020-07-30 RX ADMIN — RISPERIDONE 1 MG: 0.5 TABLET, FILM COATED ORAL at 18:41

## 2020-07-30 RX ADMIN — FUROSEMIDE 20 MG: 20 TABLET ORAL at 18:41

## 2020-07-30 RX ADMIN — FAMOTIDINE 10 MG: 20 TABLET ORAL at 22:03

## 2020-07-30 RX ADMIN — DOCUSATE SODIUM 100 MG: 100 CAPSULE, LIQUID FILLED ORAL at 22:04

## 2020-07-30 RX ADMIN — ESCITALOPRAM OXALATE 10 MG: 10 TABLET ORAL at 18:41

## 2020-07-30 ASSESSMENT — PAIN SCALES - GENERAL
PAINLEVEL_OUTOF10: 5
PAINLEVEL_OUTOF10: 7
PAINLEVEL_OUTOF10: 5
PAINLEVEL_OUTOF10: 0

## 2020-07-30 ASSESSMENT — PAIN DESCRIPTION - LOCATION
LOCATION: NECK
LOCATION: ARM

## 2020-07-30 ASSESSMENT — PAIN DESCRIPTION - ORIENTATION: ORIENTATION: RIGHT;LEFT

## 2020-07-30 ASSESSMENT — PAIN DESCRIPTION - FREQUENCY: FREQUENCY: CONTINUOUS

## 2020-07-30 ASSESSMENT — PAIN DESCRIPTION - DESCRIPTORS: DESCRIPTORS: SHARP

## 2020-07-30 ASSESSMENT — PAIN DESCRIPTION - PAIN TYPE: TYPE: ACUTE PAIN

## 2020-07-30 NOTE — H&P
History and Physical  JULEE Martínez-BC   Internal Medicine Hospitalist      Name:  Marty Lei. /Age/Sex: 1950  (79 y.o. male)   MRN & CSN:  [id number] & [id number] Admission Date/Time: 2020  1:35 PM   [facility] PCP: Omar Berg MD       Hospital Day: 1      Supervising Physician: Dr. Brian Deal     Chief Complaint: Fatigue and Aphasia     Assessment and Plan:   Marty Kuhn is a 79 y.o. male who presents with Acute left-sided weakness     Acute left-sided weakness, facial droop, r/o CVA - CTs/CTA non-acute, NIH score/assessment- negative; initial trop 0.017 (BL 0.022), denied CP.  Slurred speech - appears resolved prior to ED arrival.         - Last Echo (2020) EF 50-55%         - admit observation, telemetry monitoring          - NIHSS/Neuro, NV checks         - c/w ASA, Lipitor         - trend trop         - pending MRI Brain w/o contrast         - check lab works in AM         - consider neuro consult if no improvement     Fall at home today - Images no fx.         - PT/OT eval and treat         - fall precautions     Bradycardia on ECG, rate - asymptomatic.         - cont tele monitoring         - hold Lopressor for now       CKD stage 3 - presenting Cr 2.0 (BL 1.9)         - cont gentle IVF         - monitor BMP     COPD - not in exacerbation, breathing stable on room air.          - CXR shows LLL opacity but asymptomatic for respiratory infection         - continue home breathing treatment         - pulse ox spot check     Chronic Illnesses: will continue current home medications unless contraindicated by above plan and assessment.           - CAD - on ASA, Lipitor, Lopressor         - diastolic CHF - stable, no edema, no SOB, on oral Lasix         - anemia - cont Ferrous sulfate         - gastric ulcer - on Protonix         - hyperlipidemia         - bipolar 1 d/o         - MRSA - start contact isolation         - seizure - c/w Depakote ER, seizure percautions     Current diagnosis and plan of management discussed with the patient at the time of admission in lay language who agree to the above plan and disposition of admission for further care. All concerns and questions addressed. Patient assessment and plan in conjunction with supervising physician - Dr. Theodore Ritchie NPO Effective Now Exceptions are: Other (See Comments)    DVT Prophylaxis [x] Lovenox, []  Heparin, [] SCDs, [] Ambulation  [] Long term AC   GI Prophylaxis [x] PPI,  [] H2 Blocker,  [] Carafate,  [] Diet,  [] No GI prophylaxis, N/A: patient is not under significant medical stress, non-ICU or is receiving a diet/tube feeds   Code Status  Full CODE   Disposition Patient requires continued admission due to Acute left-sided weakness. Discharge Plan: back to home / [facility] / when able / discharge to hospice after seen by case management team. Patient plans to return home upon discharge   MDM [] Low, [x] Moderate,[]  High  Patient's risk as above due to:      [x] One or more chronic illnesses with mild exacerbation progression      [] Two or more stable chronic illnesses      [] Undiagnosed new problem with uncertain prognosis      [] Elective major surgery      []Prescription drug management     History of Present Illness:     Principal Problem: Acute left-sided weakness  Emmett Delgado is a 79 y.o. morbidly obese male with past medical history of CAD, diastolic CHF, CKD stage 3, COPD, seizure, gastric ulcer, hyperlipidemia, hypertension, and bipolar presented to the ED with complaints of fatigue, generalized versus left-sided weakness after an episode of fall at home. Patient reports that patient reports that he has been weak for several days and had a fall today. He denies headache, dizziness, lightheadedness, and head injury.  Stated friends thought he might have some facial droop as well as slurred speech so they called EMS and brought him to the ED. Patient stated that he feels generally weak. Patient denies chest pain, palpitation, fever, chills or diaphoresis, cough, SOB or difficulty breathing. No respiratory distress noted while on room air. Upon interview, the patient provided the history as above. ED Course: Discussed case with ED physician prior to admission. ROS: Ten point ROS reviewed and negative, unless as noted above per HPI. Objective:   No intake or output data in the 24 hours ending 07/30/20 1707     Vitals:   Vitals:    07/30/20 1314 07/30/20 1317 07/30/20 1319 07/30/20 1319   BP:   (!) 127/56    Pulse:  50     Resp:  19     Temp:    97.6 °F (36.4 °C)   TempSrc:    Oral   SpO2:  99%     Weight: 227 lb (103 kg)      Height: 5' 9\" (1.753 m)        Physical Exam: 07/30/20    GEN  -Awake, alert, appearing male, lying in bed, cooperative, able to give adequate history. Appears given age. EYES   -Pupils are equally round. No vision changes. No scleral erythema, discharge, or conjunctivitis. HENT  -MM are moist. Oral pharynx without exudates, no evidence of thrush. NECK  -Supple, no apparent thyromegaly or masses. RESP  -LS CTA equal bilat, no wheezes, rales or rhonchi. Symmetric chest movement. No respiratory distress noted. C/V  -S1/S2 auscultated. RRR, bradyacrdia without appreciable M/R/G. No JVD or carotid bruits. Peripheral pulses equal bilaterally and palpable. Peripheral pulses equal bilaterally and palpable. No peripheral edema. No reproducible chest wall tenderness. GI  -Abdomen is soft, round, non-distended, no significant tenderness. No masses or guarding. + BS in all quadrants. Rectal exam deferred.   -Zimmer catheter is not present. LYMPH  -No palpable cervical lymphadenopathy and no hepatosplenomegaly. No petechiae or ecchymoses. MS  -B/L extremities strong muscles strength. Full movements. No gross joint deformities. No swelling, intact sensation symmetrical.   SKIN  -Normal coloration, warm, dry.  No open wounds or ulcers. NEURO  -CN 2-12 appear grossly intact, normal speech, no lateralizing weakness. 1a  Level of consciousness: 0=alert; keenly responsive   1b. LOC questions:  0=Performs both tasks correctly   1c. LOC commands: 0=Performs both tasks correctly   2. Best Gaze: 0=normal   3. Visual: 0=No visual loss   4. Facial Palsy: 0=Normal symmetric movement   5a. Motor left arm: 0=No drift, limb holds 90 (or 45) degrees for full 10 seconds   5b. Motor right arm: 0=No drift, limb holds 90 (or 45) degrees for full 10 seconds   6a. motor left le=No drift, limb holds 90 (or 45) degrees for full 10 seconds   6b  Motor right le=No drift, limb holds 90 (or 45) degrees for full 10 seconds   7. Limb Ataxia: 0=Absent   8. Sensory: 0=Normal; no sensory loss   9. Best Language:  0=No aphasia, normal   10. Dysarthria: 0=Normal   11. Extinction and Inattention: 0=No abnormality   12. Distal motor function: 0=Normal     Total:  0     PSYC  -Awake, alert, oriented x 4.  Appropriate affect    Past Medical History:      Past Medical History:   Diagnosis Date    Anemia     per old chart    Arthritis     Back pain, chronic     \"have pain in lumbar mainly- have 5 bulging discs\"\"in my neck and my lumbar have herniated discs\"    Bipolar 1 disorder ([facility]     CAD (coronary artery disease) 2016    does not follow with a cardiologist    Cerebral artery occlusion with cerebral infarction ([facility]     \"had mini stroke in 2016- fast heart rate when I would stand up - smile drooping on one side- lased just the one day\"    Chronic kidney disease (CKD), stage III (moderate) (HCC)     CKD (chronic kidney disease)     per old chart- consult with Dr Annie Alatorre with admission 2016\"have low kidney function\"    COPD (chronic obstructive pulmonary disease) ([facility]     follow with Dr Forrest Redd Diabetes mellitus, type 2 ([facility] 1/3/2017    Diabetic peripheral neuropathy (HCC)     Diastolic CHF ([facility] 3756    Gastric ulcer     H/O cardiovascular stress test 5/26/14    EF 68% mild ischemia anterior wall    Heart murmur     \"see Dr Javier Lopez- last echo 2014\" pt states\"I think they said I have a murmur but no chest pain or palpitations\"    Hiatal hernia     History of cardiac cath 6/11/14    MODERATE  CAD      Hx of migraines     HX OTHER MEDICAL     \"have thinning of kidney walls- they found I had that 15 yrs ago\" see Dr Ellen Sosa"    Hyperlipidemia     Hypertension     Lumbar radiculopathy     Overlapping malignant neoplasm of colon ([facility] 5/27/2020    Panic attack     'anything new gives me anxiety\"    Pleural effusion     scheduled for thoracentesis 7/28/2014, 8/17/2016    S/P thoracentesis     left side( per old chart had thora done 4/2016 and one done 2014)    Sleep apnea     sleep study x 2 - last one 4-5 yrs ago- uses c-pap\"    SOBOE (shortness of breath on exertion)     Spinal stenosis      Past Surgery History:  Patient  has a past surgical history that includes Neck surgery (1998); Carpal tunnel release (Bilateral, 1989); knee surgery (Bilateral); thoracentesis (Left, 12/20/2013); Elbow surgery (Left, 2000's); Thoracentesis (4/25/2016); Tonsillectomy; Thoracentesis (Left, 11/15/2016); thoracotomy (Left, 03/18/2019); thoracotomy (Left, 3/18/2019); Upper gastrointestinal endoscopy (N/A, 5/12/2020); Colonoscopy (N/A, 5/12/2020); and Small intestine surgery (N/A, 5/21/2020). Social History:    FAM HX: Assessed: family history includes Heart Disease in his mother; High Blood Pressure in his father.   Soc HX:   Social History     Socioeconomic History    Marital status:      Spouse name: Not on file    Number of children: Not on file    Years of education: Not on file    Highest education level: Not on file   Occupational History    Occupation: built trucks, retired     Employer: [employer] Financial resource strain: Not on file    Food insecurity     Worry: Not on file     Inability: Not on file    Transportation needs     Medical: Not on file     Non-medical: Not on file   Tobacco Use    Smoking status: Former Smoker     Packs/day: 1.50     Years: 30.00     Pack years: 45.00     Types: Cigarettes     Last attempt to quit: 7/24/2010     Years since quitting: 10.0    Smokeless tobacco: Never Used   Substance and Sexual Activity    Alcohol use: Not Currently    Drug use: Not Currently     Types: Marijuana    Sexual activity: Not Currently     Partners: Female   Lifestyle    Physical activity     Days per week: Not on file     Minutes per session: Not on file    Stress: Not on file   Relationships    Social connections     Talks on phone: Not on file     Gets together: Not on file     Attends Yazidism service: Not on file     Active member of club or organization: Not on file     Attends meetings of clubs or organizations: Not on file     Relationship status: Not on file    Intimate partner violence     Fear of current or ex partner: Not on file     Emotionally abused: Not on file     Physically abused: Not on file     Forced sexual activity: Not on file   Other Topics Concern    Not on file   Social History Narrative    Not on file     TOBACCO:   reports that he quit smoking about 10 years ago. His smoking use included cigarettes. He has a 45.00 pack-year smoking history. He has never used smokeless tobacco.  ETOH:   reports previous alcohol use. Drugs:  reports previous drug use. Drug: Marijuana. Allergies: Allergies   Allergen Reactions    Nsaids      Renal      Medications:   Medications:    Infusions:   PRN Meds:   Prior to Admission Meds:  Prior to Admission medications    Medication Sig Start Date End Date Taking?  Authorizing Provider   amLODIPine (NORVASC) 10 MG tablet Take 1 tablet by mouth daily 7/24/20 1/20/21  Gregory Mcgrath PA-C   atorvastatin (LIPITOR) 80 MG tablet Take 1 tablet by mouth daily 7/24/20 1/20/21  Gregory Mcgrath PA-C   aspirin 81 MG chewable tablet Algorithm**Insulin lispro (HUMALOG)  3 TIMES DAILY WITH MEALSGlucose: Dose: No Yhdosie857-142 1 Zhqw889-912 2 Zitns956-177 3 Xgsdd076-099 4 Wftel167-589 5 Kepyn283 and above 6 Units  Patient not taking: Reported on 7/22/2020 5/28/20   Shae Rojas MD   enoxaparin (LOVENOX) 30 MG/0.3ML injection Inject 0.3 mLs into the skin daily 5/29/20   Shae Rojas MD   docusate sodium (COLACE) 100 MG capsule Take 1 capsule by mouth 2 times daily 4/16/20   Esvin Casas MD   fluticasone-umeclidin-vilant (TRELEGY ELLIPTA) 760-88.8-82 MCG/INH AEPB Inhale 1 puff into the lungs daily 3/9/20   Shawna Tatum MD   Compression Stockings MISC by Does not apply route Duration of Treatment:  3 Months  # of pairs:  2    Compression Class Level - 20-30 mmHg*    Style - Knee High  Patient not taking: Reported on 7/22/2020 10/31/19   JULEE Marte - CNP   Lactobacillus (ACIDOPHILUS) 0.5 MG TABS Take 1 capsule by mouth    Historical Provider, MD     Data:     Laboratory this visit:  Reviewed  Recent Labs     07/30/20  1400   WBC 5.2   HGB 12.0*   HCT 45.1         Recent Labs     07/30/20  1400      K 4.8      CO2 22   BUN 30*   CREATININE 2.0*     Recent Labs     07/30/20  1400   AST 12*   ALT 8*   BILITOT 0.3   ALKPHOS 64     No results for input(s): INR in the last 72 hours. Recent Labs     07/30/20  1400   CKTOTAL 62     Invalid input(s): PRO-BNP    Radiology this visit:  Reviewed. Xr Pelvis (1-2 Views)    Result Date: 7/30/2020  EXAMINATION: ONE XRAY VIEW OF THE PELVIS 7/30/2020 2:12 pm COMPARISON: None. HISTORY: ORDERING SYSTEM PROVIDED HISTORY: trauma TECHNOLOGIST PROVIDED HISTORY: Reason for exam:->trauma Reason for Exam: pain FINDINGS: Pelvic imaging is limited due to patient body habitus. Poor visualization skeletal structures. There is no evidence of hip fracture or dislocation. Visualized pelvic bones are otherwise intact.   Bladder is distended with excreted contrast from recent Thoracic Spine W Contrast    Result Date: 7/30/2020  EXAMINATION: CT OF THE CERVICAL SPINE WITH CONTRAST; CT OF THE THORACIC SPINE WITH CONTRAST 7/30/2020 2:01 pm; 7/30/2020 2:04 pm TECHNIQUE: CT of the cervical and thoracic spine was performed with the administration of intravenous contrast. Multiplanar reformatted images are provided for review. Dose modulation, iterative reconstruction, and/or weight based adjustment of the mA/kV was utilized to reduce the radiation dose to as low as reasonably achievable. COMPARISON: None. HISTORY: ORDERING SYSTEM PROVIDED HISTORY: fall TECHNOLOGIST PROVIDED HISTORY: Reason for exam:->fall Reason for Exam: fall/stroke like Acuity: Acute Type of Exam: Initial Relevant Medical/Surgical History: 80 ml isopvue 370; FINDINGS: BONES/ALIGNMENT: Prior postsurgical change of ACDF C5-6. No evidence hardware complication there is no acute fracture or subluxation in the cervical or thoracic spine. Curvature of the thoracic spine convex to the right may be positional due to patient bending. Mild degree of scoliosis can be excluded DEGENERATIVE CHANGES: Diffuse degenerative changes the cervical spine contributing to mild spinal canal stenosis C4-5 and neuroforaminal stenosis right C3-4 and C4-5 degenerative spondylosis in the thoracic spine contributes to mild stenosis at T9-10 through T12-L1. SOFT TISSUES: Paraspinal soft tissues are normal.  Cardiomegaly is observed in the chest and there is a moderate left pleural effusion with dense airspace opacities in the left lower lobe. 1. No acute abnormality in the cervical or thoracic spine. 2. Postsurgical and degenerative changes in the cervical spine and chronic degenerative changes in the thoracic spine are detailed above. 3. Left pleural effusion and dense airspace opacification in the left lower lobe partially visualized on this exam.  Recommend correlation with history and any pulmonary symptoms.      Ct Lumbar Spine W Contrast    Result Date: 7/30/2020  EXAMINATION: CT OF THE LUMBAR SPINE WITH CONTRAST  7/30/2020 2:25 pm TECHNIQUE: CT of the lumbar spine was performed with the administration of intravenous contrast. Multiplanar reformatted images are provided for review. Dose modulation, iterative reconstruction, and/or weight based adjustment of the mA/kV was utilized to reduce the radiation dose to as low as reasonably achievable. COMPARISON: None HISTORY: ORDERING SYSTEM PROVIDED HISTORY: fall TECHNOLOGIST PROVIDED HISTORY: Reason for exam:->fall Reason for Exam: fall; other Acuity: Acute Type of Exam: Initial Relevant Medical/Surgical History: 80 ML ISOVUE 370 FINDINGS: BONES/ALIGNMENT:  Spinous and transverse processes are intact. Vertebral bodies demonstrate normal height. The superior endplate of L1 not completely included in the field of view. No acute fracture or dislocation. SOFT TISSUES: No paraspinal mass or hematoma. Limited images of the retroperitoneum demonstrate no acute abnormality. Atherosclerotic calcifications are noted of the aorta. DEGENERATIVE CHANGES: There are mild multilevel disc degenerative changes. Disc degenerative changes are moderate in severity at L5-S1. No significant canal narrowing. Mild bilateral L5-S1 neural foraminal narrowing. No acute traumatic injury of the lumbar spine. Multilevel disc degenerative changes most prominent at L5-S1 with mild bilateral neural foraminal narrowing. Xr Shoulder Left (min 2 Views)    Result Date: 7/30/2020  EXAMINATION: THREE XRAY VIEWS OF THE LEFT SHOULDER 7/30/2020 2:12 pm COMPARISON: None. HISTORY: ORDERING SYSTEM PROVIDED HISTORY: pain TECHNOLOGIST PROVIDED HISTORY: Reason for exam:->pain Reason for Exam: pain FINDINGS: Bone mineral density appears decreased. No acute fracture or dislocation. Moderate AC joint osteoarthritis. Glenohumeral joint is maintained. No erosions are present. No evidence of rotator cuff calcification.      1. No acute osseous abnormality of the left shoulder. 2. Decreased bone mineral density. 3. Moderate AC joint osteoarthritis. Xr Chest Portable    Result Date: 7/30/2020  EXAMINATION: ONE XRAY VIEW OF THE CHEST 7/30/2020 2:12 pm COMPARISON: 06/02/2020 HISTORY: ORDERING SYSTEM PROVIDED HISTORY: fall/stroke TECHNOLOGIST PROVIDED HISTORY: Reason for exam:->fall/stroke Reason for Exam: pain FINDINGS: There is a left pleural effusion with left basilar atelectasis unchanged since prior exam.  Left lower lobe opacity suggests possible consolidation from pneumonia. There is stable interstitial pulmonary edema with cardiomegaly suggesting CHF. No pneumothorax. Stable examination with left lower lobe opacity and left pleural effusion. Pneumonia is in the differential. Cardiomegaly with interstitial pulmonary edema suggesting CHF. Cta Head Neck W Contrast    Result Date: 7/30/2020  EXAMINATION: CTA OF THE HEAD AND NECK WITH CONTRAST 7/30/2020 2:35 pm: TECHNIQUE: CTA of the head and neck was performed with the administration of intravenous contrast. Multiplanar reformatted images are provided for review. MIP images are provided for review. Stenosis of the internal carotid arteries measured using NASCET criteria. Dose modulation, iterative reconstruction, and/or weight based adjustment of the mA/kV was utilized to reduce the radiation dose to as low as reasonably achievable. COMPARISON: CT brain earlier same day HISTORY: ORDERING SYSTEM PROVIDED HISTORY: stroke like TECHNOLOGIST PROVIDED HISTORY: Reason for exam:->stroke like Reason for Exam: stroke like Acuity: Acute Type of Exam: Initial Relevant Medical/Surgical History: 80MNL UISOVUE 56 FINDINGS: CTA NECK: AORTIC ARCH/ARCH VESSELS: No dissection or arterial injury. No significant stenosis of the brachiocephalic or subclavian arteries. Mild atherosclerotic calcifications are present within the aortic arch.  CAROTID ARTERIES: The common carotid arteries are normal in appearance. There is no significant stenosis. There is a short segment 75% stenosis within the proximal right internal carotid artery secondary to calcified and noncalcified atherosclerotic plaques. There is a short segment 60% stenosis at the origin of the left internal carotid artery based on NASCET criteria secondary to calcified atherosclerotic plaque. VERTEBRAL ARTERIES: The right vertebral artery is markedly hypoplastic and terminates in the right posteroinferior cerebellar artery, an anatomic variant. There is a mild stenosis at the origin of the left vertebral artery secondary to calcified atherosclerotic plaque. SOFT TISSUES: Emphysematous changes are present within the upper lobes. There is no superior mediastinal lymphadenopathy. There is no cervical lymphadenopathy or soft tissue mass. BONES: No lytic or blastic osseous lesions are identified. The sequelae of anterior cervical discectomy and fusion at C5-C6 is noted. CTA HEAD: ANTERIOR CIRCULATION: Atherosclerotic calcifications are present within the cavernous segments of the internal carotid arteries. There is no significant stenosis or aneurysm. The anterior and middle cerebral arteries are normal without significant stenosis or aneurysm identified. POSTERIOR CIRCULATION: The basilar artery is normal.  There is no significant stenosis or aneurysm. The posterior cerebral arteries are normal in appearance. No significant stenosis or aneurysm is identified. OTHER: No dural venous sinus thrombosis on this non-dedicated study. BRAIN: There is no vascular malformation identified. 1. No major branch occlusion, significant stenosis or cerebral aneurysm identified within the [id number]. 2. Short segment 75% stenosis at the origin the right internal carotid artery based on NASCET criteria. 3. Short segment 60% stenosis at the origin of the left internal carotid artery based on NASCET criteria.      EKG this visit:   EKG: Sinus bradycardia, rate 50   Low voltage QRS   Incomplete right bundle branch block   Borderline ECG   When compared with ECG of 27-MAY-2020 18:40,   T wave inversion no longer evident in Anterior leads     Current Treatment Team:  Treatment Team: Attending Provider: Franck Remy DO; Physician Assistant: Digna Browne PA-C; Registered Nurse: Cierra Thomson, RN; Registered Nurse: YESENIA Álvarez APRN-BC [facility]  7/30/2020 5:07 PM      Electronically signed by JULEE Friend CNP on 7/30/2020 at 5:07 PM

## 2020-07-30 NOTE — ED PROVIDER NOTES
no specific ST wave changes appreciated. Prior EKG to compare with was not available       All diagnostic, treatment, and disposition decisions were made by myself in conjunction with the Advanced Practice Provider. For all further details of the patient's emergency department visit, please see the Advanced Practice Provider's documentation.         Laina Mcqueen DO  08/03/20 [id number]

## 2020-07-30 NOTE — ED PROVIDER NOTES
Patient Identification  Antonella Sanchez is a 79 y.o. male    Chief Complaint  Fatigue and Aphasia      HPI  (History provided by patient, EMS)  This is a 79 y.o. male who was brought in by EMS for chief complaint of fatigue, general weakness. EMS was called to house after patient had fall, they were told by friends that patient had been weak for several days, they thought he might have some left-sided weakness and facial droop as well as slurred speech so brought him to ED. Patient states he feels generally weak. He reports a fall today when going to the door for EMS. Notes pain in his left shoulder since that time. He denies headache or head injury. No neck or back pain. No numbness. No difficulty with swallowing. LKW was 3 days ago. REVIEW OF SYSTEMS    Constitutional:  Denies fever, chills  HENT:  Denies sore throat or ear pain   Eyes: Denies vision changes, eye pain  Cardiovascular:  Denies chest pain, syncope  Respiratory:  Denies shortness of breath, cough   GI:  Denies abdominal pain, nausea, vomiting  :  Denies dysuria, discharge  Musculoskeletal:  Denies back pain. + shoulder pain  Skin:  Denies rash, pruritis  Neurologic:  Denies headache, sensory changes. + general weakness    See HPI and nursing notes for additional information     I have reviewed the following nursing documentation:  Allergies:    Allergies   Allergen Reactions    Nsaids      Renal        Past medical history:  has a past medical history of Anemia, Arthritis, Back pain, chronic, Bipolar 1 disorder ([address] CAD (coronary artery disease) (1/27/2016), Cerebral artery occlusion with cerebral infarction ([address] Chronic kidney disease (CKD), stage III (moderate) ([address] CKD (chronic kidney disease), COPD (chronic obstructive pulmonary disease) ([address] Diabetes mellitus, type 2 ([address] (1/3/2017), Diabetic peripheral neuropathy ([address] Diastolic CHF ([address] (1/57/4739), Gastric ulcer, H/O cardiovascular stress test (5/26/14), Heart murmur, Hiatal hernia, History of cardiac cath (6/11/14), migraines, OTHER MEDICAL, Hyperlipidemia, Hypertension, Lumbar radiculopathy, Overlapping malignant neoplasm of colon ([facility] (5/27/2020), Panic attack, Pleural effusion, S/P thoracentesis, Sleep apnea, SOBOE (shortness of breath on exertion), and Spinal stenosis. Past surgical history:  has a past surgical history that includes Neck surgery (1998); Carpal tunnel release (Bilateral, 1989); knee surgery (Bilateral); thoracentesis (Left, 12/20/2013); Elbow surgery (Left, 2000's); Thoracentesis (4/25/2016); Tonsillectomy; Thoracentesis (Left, 11/15/2016); thoracotomy (Left, 03/18/2019); thoracotomy (Left, 3/18/2019); Upper gastrointestinal endoscopy (N/A, 5/12/2020); Colonoscopy (N/A, 5/12/2020); and Small intestine surgery (N/A, 5/21/2020). Home medications:   Prior to Admission medications    Medication Sig Start Date End Date Taking?  Authorizing Provider   amLODIPine (NORVASC) 10 MG tablet Take 1 tablet by mouth daily 7/24/20 1/20/21  Estelle Woods PA-C   atorvastatin (LIPITOR) 80 MG tablet Take 1 tablet by mouth daily 7/24/20 1/20/21  Estelle Woods PA-C   aspirin 81 MG chewable tablet Take 1 tablet by mouth daily 7/24/20 1/20/21  Estelle Woods PA-C   busPIRone (BUSPAR) 10 MG tablet Take 1 tablet by mouth 3 times daily 7/24/20 1/20/21  Estelle Woods PA-C   divalproex (DEPAKOTE ER) 500 MG extended release tablet Take 2 tablets by mouth nightly 7/24/20 1/20/21  Estelle Woods PA-C   escitalopram (LEXAPRO) 10 MG tablet Take 1 tablet by mouth daily 7/24/20 1/20/21  Estelle Woods PA-C   famotidine (PEPCID) 10 MG tablet Take 1 tablet by mouth 2 times daily 7/24/20 1/20/21  Estelle Woods PA-C   ferrous sulfate (IRON 325) 325 (65 Fe) MG tablet Take 1 tablet by mouth 2 times daily 7/24/20 1/20/21  Estelle Woods PA-C   furosemide (LASIX) 20 MG tablet Take 1 tablet by mouth daily 7/24/20 1/20/21  Estelle Woods PA-C   folic acid (FOLVITE) 1 MG tablet Take 1 tablet by mouth daily 7/24/20 1/20/21  Paola Alvarez PA-C   gabapentin (NEURONTIN) 300 MG capsule Take 2 capsules by mouth 3 times daily for 180 days. 7/24/20 1/20/21  Paola Alvarez PA-C   metoprolol tartrate (LOPRESSOR) 25 MG tablet Take 1 tablet by mouth 2 times daily 7/24/20 1/20/21  Paola Alvarez PA-C   risperiDONE (RISPERDAL) 1 MG tablet Take 1 tablet by mouth daily 7/24/20 1/20/21  Paola Alvarez PA-C   spironolactone (ALDACTONE) 25 MG tablet Take 0.5 tablets by mouth 2 times daily 7/24/20 1/20/21  Paola Alvarez PA-C   traZODone (DESYREL) 50 MG tablet Take 1 tablet by mouth nightly 7/24/20 1/20/21  Paola Alvarez PA-C   ALPRAZolam Colleen Pennant) 1 MG tablet Take 1 tablet by mouth 3 times daily as needed for Anxiety for up to 30 days. 7/22/20 8/21/20  Paola Alvraez PA-C   tiZANidine (ZANAFLEX) 4 MG tablet Take 1 tablet by mouth every 6 hours as needed (muscle spasms in neck) 7/22/20 8/21/20  Paola Alvarez PA-C   oxyCODONE-acetaminophen (PERCOCET) 7.5-325 MG per tablet Take 1 tablet by mouth every 6 hours as needed for Pain.     Historical Provider, MD   insulin lispro (HUMALOG) 100 UNIT/ML injection vial **Low Dose Correction Algorithm**Insulin lispro (HUMALOG)  3 TIMES DAILY WITH MEALSGlucose: Dose: No [id number] 1 Irkz100-115 2 Pkngi402-012 3 Fgicn432-938 4 Iplwo914-767 5 Erthw082 and above 6 Units  Patient not taking: Reported on 7/22/2020 5/28/20   Pratima Carballo MD   enoxaparin (LOVENOX) 30 MG/0.3ML injection Inject 0.3 mLs into the skin daily 5/29/20   Pratima Carballo MD   docusate sodium (COLACE) 100 MG capsule Take 1 capsule by mouth 2 times daily 4/16/20   Garo Rehman MD   fluticasone-umeclidin-vilant (TRELEGY ELLIPTA) 304-58.7-01 MCG/INH AEPB Inhale 1 puff into the lungs daily 3/9/20   Reji Fitzgerald MD   Compression Stockings MISC by Does not apply route Duration of Treatment:  3 Months  # of pairs:  2    Compression Class Level - 20-30 mmHg*    Style - Knee High  Patient not taking: Reported on 7/22/2020 10/31/19   Hugh Riser, APRN - CNP   Lactobacillus (ACIDOPHILUS) 0.5 MG TABS Take 1 capsule by mouth    Historical Provider, MD       Social history:  reports that he quit smoking about 10 years ago. His smoking use included cigarettes. He has a 45.00 pack-year smoking history. He has never used smokeless tobacco. He reports previous alcohol use. He reports previous drug use. Drug: Marijuana. Family history:    Family History   Problem Relation Age of Onset    Heart Disease Mother         heart attack    High Blood Pressure Father          Exam  BP (!) 127/56   Pulse 50   Temp 97.6 °F (36.4 °C) (Oral)   Resp 19   Ht 5' 9\" (1.753 m)   Wt 227 lb (103 kg)   SpO2 99%   BMI 33.52 kg/m²   Nursing note and vitals reviewed. Constitutional: Well developed, well nourished. No acute distress. HENT:      Head: Normocephalic and atraumatic. Ears: External ears normal.      Nose: Nose normal.     Mouth: Membrane mucosa moist and pink. No posterior oropharynx erythema or tonsillar edema  Eyes: Anicteric sclera. No discharge, PERRL  Neck: Supple. Trachea midline. Cardiovascular: RRR, no murmurs, rubs, or gallops, radial pulses 2+ bilaterally. Pulmonary/Chest: Effort normal. No respiratory distress. CTAB. No stridor. No wheezes. No rales. Abdominal: Soft. Nontender to palpation. No distension. No guarding, rebound tenderness, or evidence of ascites. : No CVA tenderness. Musculoskeletal: Moves all extremities. No gross deformity. No tenderness to palpation of the cervical, thoracic, lumbar spine and paraspinal muscles. NEUROLOGICAL: Slightly slow to respond but awake and oriented to person, place, and time. GCS 15. Cranial nerves 2-12 grossly intact except for subtle left facial droop. Strength 4/5 throughout, slightly worse on left. Light touch sensation intact throughout. No expressive or receptive aphasia. Skin: Warm and dry.  No rash.  Psychiatric: Normal mood and affect. Behavior is normal.      EKG   Please see Dr. Jordan Sanchez note for EKG read. Radiographs (if obtained):  [] The following radiograph was interpreted by myself in the absence of a radiologist:   [x] Radiologist's Report Reviewed:  XR CHEST PORTABLE   Preliminary Result   Stable examination with left lower lobe opacity and left pleural effusion. Pneumonia is in the differential.      Cardiomegaly with interstitial pulmonary edema suggesting CHF. XR PELVIS (1-2 VIEWS)   Final Result   Technically limited pelvic radiograph due to body habitus. No obvious   skeletal abnormality. XR SHOULDER LEFT (MIN 2 VIEWS)   Preliminary Result   1. No acute osseous abnormality of the left shoulder. 2. Decreased bone mineral density. 3. Moderate AC joint osteoarthritis. CTA HEAD NECK W CONTRAST   Preliminary Result   1. No major branch occlusion, significant stenosis or cerebral aneurysm   identified within the [id number]. 2. Short segment 75% stenosis at the origin the right internal carotid artery   based on NASCET criteria. 3. Short segment 60% stenosis at the origin of the left internal carotid   artery based on NASCET criteria. CT LUMBAR SPINE W CONTRAST   Preliminary Result   No acute traumatic injury of the lumbar spine. Multilevel disc degenerative changes most prominent at L5-S1 with mild   bilateral neural foraminal narrowing. CT THORACIC SPINE W CONTRAST   Final Result   1. No acute abnormality in the cervical or thoracic spine. 2. Postsurgical and degenerative changes in the cervical spine and chronic   degenerative changes in the thoracic spine are detailed above. 3. Left pleural effusion and dense airspace opacification in the left lower   lobe partially visualized on this exam.  Recommend correlation with history   and any pulmonary symptoms. CT CERVICAL SPINE W CONTRAST   Final Result   1.  No acute abnormality in the cervical or thoracic spine. 2. Postsurgical and degenerative changes in the cervical spine and chronic   degenerative changes in the thoracic spine are detailed above. 3. Left pleural effusion and dense airspace opacification in the left lower   lobe partially visualized on this exam.  Recommend correlation with history   and any pulmonary symptoms. CT HEAD WO CONTRAST   Final Result   Stable exam without acute intracranial abnormality. Chronic age-related microangiopathic white matter ischemic changes and   age-related cerebral atrophy. Chronic bilateral right greater than left   subdural hygromas without change since prior examination.                 Labs  Results for orders placed or performed during the hospital encounter of 07/30/20   CBC Auto Differential   Result Value Ref Range    WBC 5.2 4.0 - 10.5 K/CU MM    RBC 4.80 4.6 - 6.2 M/CU MM    Hemoglobin 12.0 (L) 13.5 - 18.0 GM/DL    Hematocrit 45.1 42 - 52 %    MCV 94.0 78 - 100 FL    MCH 25.0 (L) 27 - 31 PG    MCHC 26.6 (L) 32.0 - 36.0 %    RDW 18.7 (H) 11.7 - 14.9 %    Platelets 402 099 - 313 K/CU MM    MPV 10.2 7.5 - 11.1 FL    Differential Type AUTOMATED DIFFERENTIAL     Segs Relative 55.1 36 - 66 %    Lymphocytes % 30.8 24 - 44 %    Monocytes % 8.1 (H) 0 - 4 %    Eosinophils % 4.6 (H) 0 - 3 %    Basophils % 0.6 0 - 1 %    Segs Absolute 2.9 K/CU MM    Lymphocytes Absolute 1.6 K/CU MM    Monocytes Absolute 0.4 K/CU MM    Eosinophils Absolute 0.2 K/CU MM    Basophils Absolute 0.0 K/CU MM    Nucleated RBC % 0.0 %    Total Nucleated RBC 0.0 K/CU MM    Total Immature Neutrophil 0.04 K/CU MM    Immature Neutrophil % 0.8 (H) 0 - 0.43 %   CMP   Result Value Ref Range    Sodium 139 135 - 145 MMOL/L    Potassium 4.8 3.5 - 5.1 MMOL/L    Chloride 105 99 - 110 mMol/L    CO2 22 21 - 32 MMOL/L    BUN 30 (H) 6 - 23 MG/DL    CREATININE 2.0 (H) 0.9 - 1.3 MG/DL    Glucose 87 70 - 99 MG/DL    Calcium 8.6 8.3 - 10.6 MG/DL    Alb 3.4 3.4 - 5.0 GM/DL    Total Protein 6.8 6.4 - 8.2 GM/DL    Total Bilirubin 0.3 0.0 - 1.0 MG/DL    ALT 8 (L) 10 - 40 U/L    AST 12 (L) 15 - 37 IU/L    Alkaline Phosphatase 64 40 - 129 IU/L    GFR Non- 33 (L) >60 mL/min/1.73m2    GFR  40 (L) >60 mL/min/1.73m2    Anion Gap 12 4 - 16   TSH without Reflex   Result Value Ref Range    TSH, High Sensitivity 3.110 0.270 - 4.20 uIu/ml   Magnesium   Result Value Ref Range    Magnesium 2.6 (H) 1.8 - 2.4 mg/dl   Lipase   Result Value Ref Range    Lipase 15 13 - 60 IU/L   Troponin   Result Value Ref Range    Troponin T 0.017 (H) <0.01 NG/ML   CK   Result Value Ref Range    Total CK 58 38 - 174 IU/L   Brain Natriuretic Peptide   Result Value Ref Range    Pro-.4 <300 PG/ML   ETOH Blood   Result Value Ref Range    Alcohol Scrn <0.01 <0.01 %WT/VOL   POC Blood Glucose   Result Value Ref Range    Glucose 88 mg/dL   POCT Glucose   Result Value Ref Range    POC Glucose 88 70 - 99 MG/DL   EKG 12 Lead   Result Value Ref Range    Ventricular Rate 50 BPM    Atrial Rate 50 BPM    P-R Interval 160 ms    QRS Duration 108 ms    Q-T Interval 502 ms    QTc Calculation (Bazett) 457 ms    P Axis 6 degrees    R Axis -10 degrees    T Axis 29 degrees    Diagnosis       Sinus bradycardia  Low voltage QRS  Incomplete right bundle branch block  Borderline ECG  When compared with ECG of 27-MAY-2020 18:40,  T wave inversion no longer evident in Anterior leads           MDM  Patient presents for weakness, slurred speech. Here has slurred speech and slight left facial droop and general weakness that is worse on the left. Clinically appears dehydrated. Started on IV fluids. Accu-Chek is normal.  EKG shows sinus bradycardia. Blood pressure here has been normal.  Extensive work-up performed which reveals no clear source of patient's symptoms. I am concerned that he is had a CVA. He is outside [facility]PA window, last known well was 3 days ago, [address] [place] [address] neurology was not called.   CTA head and neck reveals bilateral carotid stenoses, worst at 75%. Imaging of chest as well as left shoulder, spine reveals left lower lobe airspace disease and pleural effusion. Discussed with patient, he denies fevers, shortness of breath, coughing. Will defer to hospitalist service for treatment. Plan is to admit. Consult placed to hospitalist Maria A NARANJO who agreed to admit. This patient was also seen and evaluated by Dr. Glory Blake. Final Impression  1. Abnormal chest x-ray    2. Bradycardia    3. Stenosis of carotid artery, unspecified laterality    4. Left-sided weakness    5. Facial droop    6. Slurred speech        Blood pressure (!) 127/56, pulse 50, temperature 97.6 °F (36.4 °C), temperature source Oral, resp. rate 19, height 5' 9\" (1.753 m), weight 227 lb (103 kg), SpO2 99 %. Disposition:  Admit to telemetry in stable condition. Patient was given scripts for the following medications. I counseled patient how to take these medications. New Prescriptions    No medications on file       This chart was generated using the [address] [place] [address] dictation system. I created this record but it may contain dictation errors given the limitations of this technology.         Ashanti Linda PA-C  07/30/20 KIET Manning  07/30/20 [id number]

## 2020-07-30 NOTE — ED NOTES
Report called to William Kitchen on 3E. All questions answered. Patient in a gown, clean and dry. Clyde Ortiz RN coming down to do bedside neuro assessment.      Tamar Torrez RN  07/30/20 0211

## 2020-07-30 NOTE — ED NOTES
Bed: 02TR-02  Expected date:   Expected time:   Means of arrival:   Comments:  [facility] alert, FELISHA yesterday     Eduin Monae RN  07/30/20 9611

## 2020-07-31 ENCOUNTER — APPOINTMENT (OUTPATIENT)
Dept: MRI IMAGING | Age: 70
End: 2020-07-31
Payer: MEDICARE

## 2020-07-31 LAB
CHOLESTEROL: 104 MG/DL
HDLC SERPL-MCNC: 29 MG/DL
LDL CHOLESTEROL DIRECT: 50 MG/DL
REASON FOR REJECTION: NORMAL
REASON FOR REJECTION: NORMAL
REJECTED TEST: NORMAL
REJECTED TEST: NORMAL
TRIGL SERPL-MCNC: 130 MG/DL
TROPONIN T: 0.01 NG/ML

## 2020-07-31 PROCEDURE — 36415 COLL VENOUS BLD VENIPUNCTURE: CPT

## 2020-07-31 PROCEDURE — 97535 SELF CARE MNGMENT TRAINING: CPT

## 2020-07-31 PROCEDURE — 84484 ASSAY OF TROPONIN QUANT: CPT

## 2020-07-31 PROCEDURE — 6370000000 HC RX 637 (ALT 250 FOR IP): Performed by: NURSE PRACTITIONER

## 2020-07-31 PROCEDURE — G0378 HOSPITAL OBSERVATION PER HR: HCPCS

## 2020-07-31 PROCEDURE — 93010 ELECTROCARDIOGRAM REPORT: CPT | Performed by: INTERNAL MEDICINE

## 2020-07-31 PROCEDURE — 83036 HEMOGLOBIN GLYCOSYLATED A1C: CPT

## 2020-07-31 PROCEDURE — 97116 GAIT TRAINING THERAPY: CPT

## 2020-07-31 PROCEDURE — 97162 PT EVAL MOD COMPLEX 30 MIN: CPT

## 2020-07-31 PROCEDURE — 83721 ASSAY OF BLOOD LIPOPROTEIN: CPT

## 2020-07-31 PROCEDURE — 97530 THERAPEUTIC ACTIVITIES: CPT

## 2020-07-31 PROCEDURE — 6360000002 HC RX W HCPCS: Performed by: NURSE PRACTITIONER

## 2020-07-31 PROCEDURE — 92610 EVALUATE SWALLOWING FUNCTION: CPT

## 2020-07-31 PROCEDURE — 97166 OT EVAL MOD COMPLEX 45 MIN: CPT

## 2020-07-31 PROCEDURE — 94640 AIRWAY INHALATION TREATMENT: CPT

## 2020-07-31 PROCEDURE — 80061 LIPID PANEL: CPT

## 2020-07-31 PROCEDURE — 96372 THER/PROPH/DIAG INJ SC/IM: CPT

## 2020-07-31 PROCEDURE — 70551 MRI BRAIN STEM W/O DYE: CPT

## 2020-07-31 PROCEDURE — 2580000003 HC RX 258: Performed by: NURSE PRACTITIONER

## 2020-07-31 RX ORDER — ACETAMINOPHEN 650 MG/1
650 SUPPOSITORY RECTAL EVERY 4 HOURS PRN
Status: DISCONTINUED | OUTPATIENT
Start: 2020-07-31 | End: 2020-08-05 | Stop reason: HOSPADM

## 2020-07-31 RX ORDER — ACETAMINOPHEN 325 MG/1
650 TABLET ORAL EVERY 4 HOURS PRN
Status: DISCONTINUED | OUTPATIENT
Start: 2020-07-31 | End: 2020-08-05 | Stop reason: HOSPADM

## 2020-07-31 RX ADMIN — ACETAMINOPHEN 650 MG: 325 TABLET ORAL at 21:34

## 2020-07-31 RX ADMIN — FERROUS SULFATE TAB 325 MG (65 MG ELEMENTAL FE) 325 MG: 325 (65 FE) TAB at 09:00

## 2020-07-31 RX ADMIN — BUSPIRONE HYDROCHLORIDE 10 MG: 10 TABLET ORAL at 21:35

## 2020-07-31 RX ADMIN — GABAPENTIN 600 MG: 300 CAPSULE ORAL at 09:00

## 2020-07-31 RX ADMIN — ATORVASTATIN CALCIUM 80 MG: 80 TABLET, FILM COATED ORAL at 20:48

## 2020-07-31 RX ADMIN — GABAPENTIN 600 MG: 300 CAPSULE ORAL at 15:12

## 2020-07-31 RX ADMIN — FUROSEMIDE 20 MG: 20 TABLET ORAL at 09:00

## 2020-07-31 RX ADMIN — DOCUSATE SODIUM 100 MG: 100 CAPSULE, LIQUID FILLED ORAL at 20:48

## 2020-07-31 RX ADMIN — ENOXAPARIN SODIUM 30 MG: 30 INJECTION SUBCUTANEOUS at 17:20

## 2020-07-31 RX ADMIN — ASPIRIN 81 MG CHEWABLE TABLET 81 MG: 81 TABLET CHEWABLE at 09:00

## 2020-07-31 RX ADMIN — DOCUSATE SODIUM 100 MG: 100 CAPSULE, LIQUID FILLED ORAL at 08:59

## 2020-07-31 RX ADMIN — SODIUM CHLORIDE, PRESERVATIVE FREE 10 ML: 5 INJECTION INTRAVENOUS at 20:48

## 2020-07-31 RX ADMIN — FAMOTIDINE 10 MG: 20 TABLET ORAL at 20:48

## 2020-07-31 RX ADMIN — RISPERIDONE 1 MG: 0.5 TABLET, FILM COATED ORAL at 09:00

## 2020-07-31 RX ADMIN — FAMOTIDINE 10 MG: 20 TABLET ORAL at 09:00

## 2020-07-31 RX ADMIN — ESCITALOPRAM OXALATE 10 MG: 10 TABLET ORAL at 08:59

## 2020-07-31 RX ADMIN — FOLIC ACID 1 MG: 1 TABLET ORAL at 09:00

## 2020-07-31 RX ADMIN — BUSPIRONE HYDROCHLORIDE 10 MG: 10 TABLET ORAL at 15:12

## 2020-07-31 RX ADMIN — GABAPENTIN 600 MG: 300 CAPSULE ORAL at 20:48

## 2020-07-31 RX ADMIN — Medication 1 CAPSULE: at 09:00

## 2020-07-31 RX ADMIN — FERROUS SULFATE TAB 325 MG (65 MG ELEMENTAL FE) 325 MG: 325 (65 FE) TAB at 20:48

## 2020-07-31 RX ADMIN — BUDESONIDE AND FORMOTEROL FUMARATE DIHYDRATE 2 PUFF: 160; 4.5 AEROSOL RESPIRATORY (INHALATION) at 21:09

## 2020-07-31 RX ADMIN — BUSPIRONE HYDROCHLORIDE 10 MG: 10 TABLET ORAL at 09:00

## 2020-07-31 RX ADMIN — DIVALPROEX SODIUM 1000 MG: 500 TABLET, FILM COATED, EXTENDED RELEASE ORAL at 20:48

## 2020-07-31 ASSESSMENT — PAIN SCALES - GENERAL
PAINLEVEL_OUTOF10: 0
PAINLEVEL_OUTOF10: 3
PAINLEVEL_OUTOF10: 0

## 2020-07-31 NOTE — PROGRESS NOTES
Speech Language Pathology  Facility/Department: [address] [place] [address]   CLINICAL BEDSIDE SWALLOW EVALUATION    NAME: Kylah Carvalho. : 1950  MRN: [id number]    IMPRESSIONS: Bisi Carvajal. was referred for a bedside swallow evaluation after being admitted to [facility] with acute left-sided weakness concerning for stroke. MRI pending. Medical hx includes CHF, COPD, CVA, DM, colon cancer, bipolar disorder, CKD, seizures. No known history of dysphagia prior to admission. Pt seen for evaluation seated upright in bed, alert, cooperative. Oral mechanism examination WFL without focal deficit noted. He was seen with his breakfast tray with soft and regular solid textures and thin liquids via cup/straw. Oral stage WFL with slow/adequate mastication, intact AP transit/clearance. Pharyngeal stage WFL with timely swallow initiation, intact laryngeal elevation, no s/s aspiration. Pt also seen with pills administered by RN; pt swallowed several whole pills with liquid wash with intact oral clearance and no s/s aspiration. Speech/language screened and appeared intact in conversation throughout. Recommend continued current diet. No further acute SLP needs identified. ADMISSION DATE: 2020  ADMITTING DIAGNOSIS: has COPD (chronic obstructive pulmonary disease) ([address] Pleural effusion, left; SAMANTHA on CPAP; Chronic obstructive pulmonary disease ([address] TIA (transient ischemic attack); Bipolar 1 disorder ([address] Coronary artery disease involving native coronary artery of native heart without angina pectoris; Essential hypertension; REYES (dyspnea on exertion); Anemia, chronic disease; Diuretic-induced hypokalemia; Diastolic CHF ([address] Acute respiratory failure ([address] Pneumonia due to gram-negative bacteria ([address] Hyperkalemia; Adrenal mass ([address] Diabetes mellitus, type 2 ([address] Hyponatremia; Pneumonia due to organism; PAF (paroxysmal atrial fibrillation) ([address] Empyema of left pleural space ([address]  Hospital-acquired bacterial pneumonia; Hyperlipidemia; Lumbar radiculopathy; CKD (chronic kidney disease); Diabetic peripheral neuropathy ([address] Chronic kidney disease (CKD), stage III (moderate) ([address] Cervical stenosis of spine; Spinal stenosis of lumbar region without neurogenic claudication; Somatic dysfunction of back; Somatic dysfunction of cervical region; Somatic dysfunction of pelvis region; Somatic dysfunction of both lower extremities; Empyema lung ([address] Acute kidney injury superimposed on CKD ([address] Chronic diastolic heart failure ([address] Syncope and collapse; Hypotensive episode; Bradycardia on ECG; Syncope; Lightheadedness; Dizziness; Acute on chronic respiratory failure with hypoxia ([address] Severe mixed bipolar 1 disorder without psychosis ([address] Acute chest pain; Chest pain; Atrial tachycardia ([address] Overlapping malignant neoplasm of colon ([address] Colon adenocarcinoma ([address] Pneumonia; Acute left-sided weakness; and Fall at home on their problem list.  ONSET DATE: this admission    Recent Chest Xray/CT of Chest: see chart    Date of Eval: 7/31/2020  Evaluating Therapist: Hannah Casillas    Current Diet level:  Current Diet : Regular  Current Liquid Diet : Thin      Primary Complaint  Patient Complaint: denies current complaint    Pain:  Pain Assessment  Pain Assessment: 0-10  Pain Level: 0  Patient's Stated Pain Goal: No pain  Pain Type: Acute pain  Pain Location: Neck  Pain Orientation: Right, Left  Pain Descriptors: Sharp  Pain Frequency: Continuous    Reason for Referral  Shantal Cote was referred for a bedside swallow evaluation to assess the efficiency of his swallow function, identify signs and symptoms of aspiration and make recommendations regarding safe dietary consistencies, effective compensatory strategies, and safe eating environment.     Impression  Dysphagia Diagnosis: Swallow function appears grossly intact  Dysphagia Outcome Severity Scale: Level 6: Within functional limits/Modified independence Treatment Plan  Requires SLP Intervention: No  Duration/Frequency of Treatment: N/A          Recommended Diet and Intervention  Diet Solids Recommendation: Regular  Liquid Consistency Recommendation: Thin  Recommended Form of Meds: PO          Compensatory Swallowing Strategies       Treatment/Goals  Short-term Goals  Timeframe for Short-term Goals: N/A    General  Chart Reviewed: Yes  Behavior/Cognition: Alert; Cooperative  Communication Observation: Functional  Follows Directions: Simple  Dentition: Dentures bottom; Dentures top;Edentulous  Patient Positioning: Upright in bed  Baseline Vocal Quality: Normal  Prior Dysphagia History: none known prior to admission  Consistencies Administered: Reg solid; Dysphagia Minced and Moist (Dysphagia II); Thin - cup; Thin - straw           Vision/Hearing  Vision  Vision: Impaired  Vision Exceptions: Wears glasses at all times  Hearing  Hearing: Within functional limits    Oral Motor Deficits  Oral/Motor  Oral Motor: Within functional limits    Oral Phase Dysfunction  Oral Phase  Oral Phase: WFL     Indicators of Pharyngeal Phase Dysfunction   Pharyngeal Phase  Pharyngeal Phase: WFL    Prognosis  Individuals consulted  Consulted and agree with results and recommendations: Patient    Education  Patient Education: results  Patient Education Response: Verbalizes understanding  Safety Devices in place: Yes  Type of devices:  All fall risk precautions in place       Therapy Time  SLP Individual Minutes  Time In: 2297  Time Out: 8712  Minutes: [facility]-SLP  7/31/2020 9:32 AM

## 2020-07-31 NOTE — CONSULTS
Kay Cyr [address] 1950, [id number], 8/1/2020    History  [place]:  The primary encounter diagnosis was Abnormal chest x-ray. Diagnoses of Bradycardia, Stenosis of carotid artery, unspecified laterality, Left-sided weakness, Facial droop, and Slurred speech were also pertinent to this visit. Patient  has a past medical history of Anemia, Arthritis, Back pain, chronic, Bipolar 1 disorder ([address] CAD (coronary artery disease), Cerebral artery occlusion with cerebral infarction ([address] Chronic kidney disease (CKD), stage III (moderate) ([address] CKD (chronic kidney disease), COPD (chronic obstructive pulmonary disease) ([address] Diabetes mellitus, type 2 ([address] Diabetic peripheral neuropathy ([address] Diastolic CHF ([address] Gastric ulcer, H/O cardiovascular stress test, Heart murmur, Hiatal hernia, History of cardiac cath, Hx of migraines, HX OTHER MEDICAL, Hyperlipidemia, Hypertension, Lumbar radiculopathy, Overlapping malignant neoplasm of colon ([address] Panic attack, Pleural effusion, S/P thoracentesis, Sleep apnea, SOBOE (shortness of breath on exertion), and Spinal stenosis. Patient  has a past surgical history that includes Neck surgery (1998); Carpal tunnel release (Bilateral, 1989); knee surgery (Bilateral); thoracentesis (Left, 12/20/2013); Elbow surgery (Left, 2000's); Thoracentesis (4/25/2016); Tonsillectomy; Thoracentesis (Left, 11/15/2016); thoracotomy (Left, 03/18/2019); thoracotomy (Left, 3/18/2019); Upper gastrointestinal endoscopy (N/A, 5/12/2020); Colonoscopy (N/A, 5/12/2020); and Small intestine surgery (N/A, 5/21/2020). Subjective:  Patient states:  Amenable to therapy. Pain:  Pain in R shoulder 5/10. Communication with other providers:  Handoff to RN, partial co-eval with OT.    Restrictions: Fall risk    Home Setup/Prior level of function  Social/Functional History  Lives With: Alone  Type of Home: House(Large Double House)  Home Layout: Two level, Able to Live on Main level with bedroom/bathroom(Basement)  Home Access: Stairs to enter with rails  Entrance Stairs - Number of Steps: 7 steps  Bathroom Shower/Tub: Walk-in shower  Bathroom Toilet: Standard  Bathroom Equipment: Shower chair  Receives Help From: Family  ADL Assistance: Independent  Homemaking Assistance: Needs assistance(Neighbor does laundry. Pt cooks (sometimes). Neighbor drives patient to grocery store and pt uses the grocery store scooter to get around while shopping.)  Homemaking Responsibilities: Yes  Ambulation Assistance: Independent  Transfer Assistance: Independent  Active : Yes(Has license, does not drive.)    Examination of body systems (includes body structures/functions, activity/participation limitations):  · Observation:  Sitting up in chair upon arrival   · Vision:  WFL, glasses  · Hearing:  Min [place]  · Cardiopulmonary:  No O2 needs  · Cognition: min impaired, tangential and word finding difficulties, increased response time, see OT/SLP note for further evaluation. Musculoskeletal  · ROM R/L:  WFL. · Strength R/L:  4/5, weakness in function and endurance. · Neuro:  Decreased coordination and awareness of environment     Mobility:  · Transfers: Mod A  · Sitting balance:  SBA. · Standing balance:  CGA-min A.    · Gait: CGA-min A    [facility] 6 Clicks Inpatient Mobility:  AM-PAC Inpatient Mobility Raw Score : 15    Treatment:  Transfers: from chair x3 with RW, min instability, decreased LE power, mod A to stand, cues for anterior weight shift and set up/positioning. Standing balance: x2 min x2 at chair with RW, CGA to min A, cues for posture. Gait: ambulated x2, x8ft, x12ft, slow estella, min instability, poor awareness of environment, cues and assist for balance and AD negotiation. Safety: patient left in chair with chair alarm, call light within reach, RN notified, gait belt used.     Assessment:  Patient is a 78 yo male who presents with L sided weakness and fall, MRI negative for acute infarct, c/o R shoulder pain. Patient demonstrates impaired mobility and cognition and would benefit from d/c to SNF. Complexity: Moderate  Prognosis: Good, no significant barriers to participation at this time. Plan Times per week: 3+/week, 1 week,      Equipment: TBD    Goals:  Short term goals  Time Frame for Short term goals: 1 week  Short term goal 1: Patient will perform supine to sit mod I. Short term goal 2: Patient will perform STS mod I. Short term goal 3: Patient will ambulate x100ft mod I. Treatment plan:  Bed mobility, transfers, balance, gait, TA, TX. Recommendations for NURSING mobility: ambulate to BR with RW and gait belt.     Time:   Time in: 1430  Time out: 1503  Timed treatment minutes: 23  Total time: 33    Electronically signed by:    Dickson Mendez, PT  8/1/2020, 7:47 AM

## 2020-07-31 NOTE — PROGRESS NOTES
Hospitalist Progress Note      Name:  Marge Christensen. /Age/Sex: 1950  (79 y.o. male)   MRN & CSN:  [id number] & [id number] Admission Date/Time: 2020  1:35 PM   Location:  3018/3018- PCP: Regina Brandon MD         Hospital Day: 2    Assessment and Plan:   Marge López is a 79 y.o.  male  who presents with Acute left-sided weakness. -- Bilateral leg weakness  --Fall at home  Apparently patient reported left-sided weakness on admission. CT head negative for acute abnormality  No acute abnormality of cervical and thoracic spine  No acute abnormality of the shoulder or pelvis. Plan  PT/OT evaluation  MRI brain to rule out ischemic stroke      --Bilateral carotid stenoses  75% stenosis at origin of the rt ICA  60% stenosis at the origin of the left ICA  Plan  Vascular surgery consult  ASA 81 mg + statin     --Bradycardia- resolved with discontinuation of metoprolol. Other diagnoses          - CAD - on ASA, Lipitor.         - diastolic CHF - stable, no edema, no SOB, on oral Lasix         - anemia - cont Ferrous sulfate         - gastric ulcer - on Protonix         - hyperlipidemia         - bipolar 1 d/o         - MRSA - start contact isolation         - seizure - c/w Depakote ER, seizure precautions         -CKD 3 -stable. -COPD-stable. Diet DIET GENERAL;   DVT Prophylaxis [x] Lovenox, []  Heparin, [] SCDs, [] Ambulation   GI Prophylaxis [] PPI,  [] H2 Blocker,  [] Carafate,  [] Diet/Tube Feeds   Code Status Full Code   Disposition Pending PT/OT eval.    MDM [] Low, [x] Moderate,[]  High     History of Present Illness:   Patient seen and examined. He reports that he still has generalized weakness. He informed me that he did not really have left sided weakness but more of bilateral leg weakness. Ten point ROS reviewed negative, unless as noted above    Objective:        Intake/Output Summary (Last 24 hours) at 2020 1009  Last data filed at 2020 0531  Gross per 24 hour   Intake --   Output 575 ml   Net -575 ml      Vitals:   Vitals:    07/31/20 0845   BP: (!) 140/67   Pulse: 62   Resp: 17   Temp: 97.5 °F (36.4 °C)   SpO2: 98%     Physical Exam:   GEN Awake male, sitting upright in bed. RESP breathing comfortably on room air  CARDIO/VASC S1/S2 auscultated. Regular rate without appreciable murmurs. Peripheral pulses equal bilaterally and palpable. No peripheral edema. GI Abdomen is soft without significant tenderness, masses, or guarding. Bowel sounds are normoactive. MSK No gross joint deformities. SKIN Normal coloration, warm, dry. NEURO Cranial nerves appear grossly intact, normal speech, no lateralizing weakness. PSYCH Awake, alert, oriented x 3.     Medications:   Medications:    aspirin  81 mg Oral Daily    atorvastatin  80 mg Oral Nightly    busPIRone  10 mg Oral TID    divalproex  1,000 mg Oral Nightly    docusate sodium  100 mg Oral BID    enoxaparin  30 mg Subcutaneous Daily    escitalopram  10 mg Oral Daily    famotidine  10 mg Oral BID    ferrous sulfate  325 mg Oral BID    folic acid  1 mg Oral Daily    furosemide  20 mg Oral Daily    traZODone  50 mg Oral Nightly    risperiDONE  1 mg Oral Daily    [Held by provider] metoprolol tartrate  25 mg Oral BID    lactobacillus  1 capsule Oral Daily    gabapentin  600 mg Oral TID    sodium chloride flush  10 mL Intravenous 2 times per day    budesonide-formoterol  2 puff Inhalation BID      Infusions:   PRN Meds: ALPRAZolam, 1 mg, TID PRN  tiZANidine, 4 mg, Q6H PRN  sodium chloride flush, 10 mL, PRN  polyethylene glycol, 17 g, Daily PRN  promethazine, 12.5 mg, Q6H PRN    Or  ondansetron, 4 mg, Q6H PRN  labetalol, 10 mg, Q10 Min PRN  oxyCODONE-acetaminophen, 1 tablet, Q6H PRN        CBC   Recent Labs     07/30/20  1400   WBC 5.2   HGB 12.0*   HCT 45.1         BMP   Recent Labs     07/30/20  1400      K 4.8      CO2 22   BUN 30*   CREATININE 2.0*       Radiology

## 2020-07-31 NOTE — CONSULTS
[address] 1950, [id number], 7/31/2020    Discharge Recommendation: Skilled Nursing Facility      History:  [place]:  The primary encounter diagnosis was Abnormal chest x-ray. Diagnoses of Bradycardia, Stenosis of carotid artery, unspecified laterality, Left-sided weakness, Facial droop, and Slurred speech were also pertinent to this visit. Subjective:  Patient states: Agreeable to therapy. Pain: Pt reports increased pain of R shoulder. Communication with other providers: PT, RN  Restrictions: General Precautions, Fall Risk    Home Setup/Prior level of function:  Social/Functional History  Lives With: Alone  Type of Home: House(Large Double House)  Home Layout: Two level, Able to Live on Main level with bedroom/bathroom(Basement)  Home Access: Stairs to enter with rails  Entrance Stairs - Number of Steps: 7 steps  Bathroom Shower/Tub: Walk-in shower  Bathroom Toilet: Standard  Bathroom Equipment: Shower chair  Receives Help From: Family  ADL Assistance: Independent  Homemaking Assistance: Needs assistance(Neighbor does laundry. Pt cooks (sometimes). Neighbor drives patient to grocery store and pt uses the grocery store scooter to get around while shopping.)  Homemaking Responsibilities: Yes  Ambulation Assistance: Independent  Transfer Assistance: Independent  Active : Yes(Has license, does not drive.)    Examination:  · Observation: Supine in bed upon arrival  · Vision: Wears glasses. · Hearing: [facility]  · Vitals: Stable vitals throughout session    Body Systems and functions:  · ROM: AROM approx 90 degrees BUE shoulder flexion. · Strength: 4/5 LUE shoulder flexion, RUE not formally tested d/t pain.  strength decreased on R hand. · Sensation: WFL  · Tone: Normal  · Coordination: Pt performed opposition of digits, slow pace with B hands, slightly decreased ability on R hand compared to L.  Finger to nose assessment performed and pt demo decreased coordination with R hand. · Perception: WNL    Activities of Daily Living (ADLs):  · Feeding: Supervision (pt eating breakfast upon arrival, slow pace, occasionally missed mouth, decreased  strength for grasping utensil, required setup). · Grooming: Kiran (anticipate assist to sequence/continue through tasks, anticipate slow pace throughout). · UB bathing: Kiran (recommend pt complete in seated with assist to initiate/continue through tasks). · LB bathing: ModA  · UB dressing: Kiran (d/t decreased AROM of BUE and increased pain in R shoulder). · LB dressing: Kiran (pt donned socks while seated in chair requiring increased time and assist to full doff sock). · Toileting: ModA (anticipate assist with toilet transfers and paulino care/clothing manipulation in standing)    Cognitive and Psychosocial Functioning:  · Overall cognitive status: WFL (self, time, location). Demo slight decrease in processing and required increased cueing. · Affect: Slurred speech, slow speech, facial droop noted. Balance:   · Sitting: Kiran (In the morning, pt demo poor sitting balance with R side lean assist to maintain upright sitting at EOB. During afternoon session, pt demo improved sitting balance demo no R side lean). · Standing: CGA-Kiran (with RW, VC to maintain upright posture)    Functional Mobility:  · Bed Mobility: ModA (supine to seated and seated to supine bed mobility with assist for leg positioning and to pull self up). · Transfers: ModA-MaxA (STS from chair with RW)  · Ambulation: CGA-Kiran (ambulated approx 20ft with RW, VC to maintain upright and for RW manipulation). AM-PAC 6 click short form for inpatient daily activity:   How much help from another person does the patient currently need. .. Unable  Dep A Lot  Max A A Lot   Mod A A Little  Min A A Little   CGA  SBA None   Mod I  Indep  Sup   1. Putting on and taking off regular lower body clothing?  [] 1    [] 2   [] 2 [x] 3   [] 3   [] 4      2. Bathing (including washing, rinsing, drying)? [] 1   [] 2   [x] 2 [] 3 [] 3 [] 4   3. Toileting, which includes using toilet, bedpan, or urinal? [] 1    [] 2   [x] 2   [] 3   [] 3   [] 4     4. Putting on and taking off regular upper body clothing? [] 1   [] 2   [] 2   [x] 3   [] 3    [] 4      5. Taking care of personal grooming such as brushing teeth? [] 1   [] 2    [] 2 [x] 3    [] 3   [] 4      6. Eating meals? [] 1   [] 2   [] 2   [] 3   [] 3   [x] 4      Raw Score:  17     [24=0% impaired(CH), 23=1-19%(CI), 20-22=20-39%(CJ), 15-19=40-59%(CK), 10-14=60-79%(CL), 7-9=80-99%(CM), 6=100%(CN)]     Treatment:  Therapeutic Activity Training:   Therapeutic activity training was instructed today. Cues were given for safety, sequence, UE/LE placement, awareness, and balance. Activities performed today included bed mobility training, sup-sit, sit-stand, ambulation. Self Care Training:   Cues were given for safety, sequence, UE/LE placement, visual cues, and balance. Activities performed today included dressing and feeding. Safety Measures: Gait belt used, Left in bed, Alarm in place    Assessment:  Assessment  Performance deficits / Impairments: Decreased functional mobility , Decreased ROM, Decreased balance, Decreased endurance, Decreased high-level IADLs, Decreased ADL status, Decreased strength, Decreased posture  Treatment Diagnosis: L sided weakness  Prognosis: Good  Decision Making: Medium Complexity  REQUIRES OT FOLLOW UP: Yes    Plan:  Plan  Times per week: 4+  Times per day: Daily    Pt is a 79year old M admitted for L-sided weakness. Pt reports that prior to admission he was independent in all ADLs, IADLs, and functional mobility. This date, pt demo decreased functional mobility with intermittent decreased sitting balance (R side lean). Pt demo decreased AROM and coordination of B hands, and decreased  strength of R hand impacting ADL status.  Pt is currently functioning below baseline and would benefit from skilled OT services at SNF. Goals:  1. Pt will complete all aspects of bed mobility for EOB/OOB ADLs with Kiran. 2. Pt will complete UB/LB bathing with CGA and AE as needed. 3. Pt will complete all aspects of LB dressing with SBAA and AE as needed. 4. Pt will complete all functional transfers to and from bed, chair, toilet, shower chair with Kiran and RW.   5. Pt will ambulate HH distance to bathroom for toileting with SBA and RW. 6. Pt will complete all aspects of toileting task with Kiran. 7. Pt will complete oral hygiene/grooming routine in standing at sink with CGA demo good dynamic standing balance for approx 8 minutes. 8. Pt will complete ther ex/ther act with focus on UB strengthening. Time:   Time in: 1015  Time out: 1040  Time in: 1435  Time out: 1500  Timed treatment minutes: 38  Total time: 50      Electronically signed by:       ACE Joseph/L, [address] [place] [address], .[id number]

## 2020-08-01 PROCEDURE — U0002 COVID-19 LAB TEST NON-CDC: HCPCS

## 2020-08-01 PROCEDURE — G0378 HOSPITAL OBSERVATION PER HR: HCPCS

## 2020-08-01 PROCEDURE — 94640 AIRWAY INHALATION TREATMENT: CPT

## 2020-08-01 PROCEDURE — 2700000000 HC OXYGEN THERAPY PER DAY

## 2020-08-01 PROCEDURE — 2580000003 HC RX 258: Performed by: NURSE PRACTITIONER

## 2020-08-01 PROCEDURE — 36415 COLL VENOUS BLD VENIPUNCTURE: CPT

## 2020-08-01 PROCEDURE — 84484 ASSAY OF TROPONIN QUANT: CPT

## 2020-08-01 PROCEDURE — 93005 ELECTROCARDIOGRAM TRACING: CPT | Performed by: NURSE PRACTITIONER

## 2020-08-01 PROCEDURE — 6360000002 HC RX W HCPCS: Performed by: NURSE PRACTITIONER

## 2020-08-01 PROCEDURE — 83880 ASSAY OF NATRIURETIC PEPTIDE: CPT

## 2020-08-01 PROCEDURE — 80048 BASIC METABOLIC PNL TOTAL CA: CPT

## 2020-08-01 PROCEDURE — 96372 THER/PROPH/DIAG INJ SC/IM: CPT

## 2020-08-01 PROCEDURE — 94761 N-INVAS EAR/PLS OXIMETRY MLT: CPT

## 2020-08-01 PROCEDURE — 6370000000 HC RX 637 (ALT 250 FOR IP): Performed by: NURSE PRACTITIONER

## 2020-08-01 PROCEDURE — 6370000000 HC RX 637 (ALT 250 FOR IP)

## 2020-08-01 PROCEDURE — 85025 COMPLETE CBC W/AUTO DIFF WBC: CPT

## 2020-08-01 RX ORDER — ASPIRIN 81 MG/1
324 TABLET, CHEWABLE ORAL ONCE
Status: DISCONTINUED | OUTPATIENT
Start: 2020-08-02 | End: 2020-08-05 | Stop reason: HOSPADM

## 2020-08-01 RX ORDER — ASPIRIN 325 MG
TABLET ORAL
Status: COMPLETED
Start: 2020-08-01 | End: 2020-08-01

## 2020-08-01 RX ORDER — NITROGLYCERIN 0.4 MG/1
0.4 TABLET SUBLINGUAL EVERY 5 MIN PRN
Status: DISCONTINUED | OUTPATIENT
Start: 2020-08-01 | End: 2020-08-05 | Stop reason: HOSPADM

## 2020-08-01 RX ORDER — NITROGLYCERIN 0.4 MG/1
TABLET SUBLINGUAL
Status: COMPLETED
Start: 2020-08-01 | End: 2020-08-01

## 2020-08-01 RX ORDER — METOPROLOL TARTRATE 5 MG/5ML
5 INJECTION INTRAVENOUS ONCE
Status: COMPLETED | OUTPATIENT
Start: 2020-08-02 | End: 2020-08-02

## 2020-08-01 RX ADMIN — GABAPENTIN 600 MG: 300 CAPSULE ORAL at 15:18

## 2020-08-01 RX ADMIN — DIVALPROEX SODIUM 1000 MG: 500 TABLET, FILM COATED, EXTENDED RELEASE ORAL at 22:26

## 2020-08-01 RX ADMIN — ATORVASTATIN CALCIUM 80 MG: 80 TABLET, FILM COATED ORAL at 22:27

## 2020-08-01 RX ADMIN — BUSPIRONE HYDROCHLORIDE 10 MG: 10 TABLET ORAL at 15:18

## 2020-08-01 RX ADMIN — DOCUSATE SODIUM 100 MG: 100 CAPSULE, LIQUID FILLED ORAL at 09:10

## 2020-08-01 RX ADMIN — BUDESONIDE AND FORMOTEROL FUMARATE DIHYDRATE 2 PUFF: 160; 4.5 AEROSOL RESPIRATORY (INHALATION) at 21:50

## 2020-08-01 RX ADMIN — ALPRAZOLAM 1 MG: 0.5 TABLET ORAL at 22:50

## 2020-08-01 RX ADMIN — FAMOTIDINE 10 MG: 20 TABLET ORAL at 09:09

## 2020-08-01 RX ADMIN — GABAPENTIN 600 MG: 300 CAPSULE ORAL at 09:09

## 2020-08-01 RX ADMIN — FOLIC ACID 1 MG: 1 TABLET ORAL at 09:10

## 2020-08-01 RX ADMIN — FAMOTIDINE 10 MG: 20 TABLET ORAL at 22:27

## 2020-08-01 RX ADMIN — BUSPIRONE HYDROCHLORIDE 10 MG: 10 TABLET ORAL at 09:10

## 2020-08-01 RX ADMIN — ASPIRIN 325 MG ORAL TABLET 325 MG: 325 PILL ORAL at 23:38

## 2020-08-01 RX ADMIN — RISPERIDONE 1 MG: 0.5 TABLET, FILM COATED ORAL at 09:09

## 2020-08-01 RX ADMIN — ASPIRIN 81 MG CHEWABLE TABLET 81 MG: 81 TABLET CHEWABLE at 09:09

## 2020-08-01 RX ADMIN — FERROUS SULFATE TAB 325 MG (65 MG ELEMENTAL FE) 325 MG: 325 (65 FE) TAB at 22:26

## 2020-08-01 RX ADMIN — FERROUS SULFATE TAB 325 MG (65 MG ELEMENTAL FE) 325 MG: 325 (65 FE) TAB at 09:10

## 2020-08-01 RX ADMIN — Medication 1 CAPSULE: at 09:09

## 2020-08-01 RX ADMIN — FUROSEMIDE 20 MG: 20 TABLET ORAL at 09:10

## 2020-08-01 RX ADMIN — NITROGLYCERIN: 0.4 TABLET SUBLINGUAL at 23:38

## 2020-08-01 RX ADMIN — GABAPENTIN 600 MG: 300 CAPSULE ORAL at 22:28

## 2020-08-01 RX ADMIN — ESCITALOPRAM OXALATE 10 MG: 10 TABLET ORAL at 09:10

## 2020-08-01 RX ADMIN — TRAZODONE HYDROCHLORIDE 50 MG: 50 TABLET ORAL at 22:27

## 2020-08-01 RX ADMIN — ENOXAPARIN SODIUM 30 MG: 30 INJECTION SUBCUTANEOUS at 22:26

## 2020-08-01 RX ADMIN — BUSPIRONE HYDROCHLORIDE 10 MG: 10 TABLET ORAL at 22:27

## 2020-08-01 RX ADMIN — SODIUM CHLORIDE, PRESERVATIVE FREE 10 ML: 5 INJECTION INTRAVENOUS at 22:26

## 2020-08-01 RX ADMIN — BUDESONIDE AND FORMOTEROL FUMARATE DIHYDRATE 2 PUFF: 160; 4.5 AEROSOL RESPIRATORY (INHALATION) at 08:59

## 2020-08-01 RX ADMIN — DOCUSATE SODIUM 100 MG: 100 CAPSULE, LIQUID FILLED ORAL at 22:28

## 2020-08-01 ASSESSMENT — PAIN SCALES - GENERAL: PAINLEVEL_OUTOF10: 0

## 2020-08-01 NOTE — PROGRESS NOTES
Hospitalist Progress Note      Name:  Alannah Bernal. /Age/Sex: 1950  (79 y.o. male)   MRN & CSN:  [id number] & [id number] Admission Date/Time: 2020  1:35 PM   Location:  [address] [place], [place] [address] PCP: Lary Alonzo MD         Hospital Day: 3    Assessment and Plan:   Alannah Patel is a 79 y.o.  male  who presents with Acute left-sided weakness. -- Bilateral leg weakness  --Fall at home  Apparently patient reported left-sided weakness on admission. No acute stroke on MRI brain. CT head negative for acute abnormality  No acute abnormality of cervical and thoracic spine  No acute abnormality of the shoulder or pelvis. Plan  PT/OT evaluation -SNF placement recommended. COVID 19 ordered. --Bilateral carotid stenoses  75% stenosis at origin of the rt ICA  60% stenosis at the origin of the left ICA  Plan  Vascular surgery consulted. I discussed with the vascular surgeon [Dr. Jaziel Mehta on 2020 and the recommended patient be discharged to SNF and then follow-up with him outpatient to plan further treatment of his stenoses. On ASA 81 mg + statin     -- Bilateral subdural hygromas- stable. --Bradycardia- resolved with discontinuation of metoprolol. Other diagnoses          - CAD - on ASA, Lipitor.         - diastolic CHF - stable, no edema, no SOB, on oral Lasix         - anemia - cont Ferrous sulfate         - gastric ulcer - on Protonix         - hyperlipidemia         - bipolar 1 d/o         - MRSA - start contact isolation         - seizure - c/w Depakote ER, seizure precautions         -CKD 3 -stable. -COPD-stable. Diet DIET GENERAL;   DVT Prophylaxis [x] Lovenox, []  Heparin, [] SCDs, [] Ambulation   GI Prophylaxis [] PPI,  [] H2 Blocker,  [] Carafate,  [] Diet/Tube Feeds   Code Status Full Code   Disposition  SNF placement plan underway   MDM [] Low, [x] Moderate,[]  High     History of Present Illness:   Patient seen and examined. No clinical change.   No acute stroke on MRI. Ten point ROS reviewed negative, unless as noted above    Objective:     No intake or output data in the 24 hours ending 08/01/20 1045   Vitals:   Vitals:    08/01/20 0741   BP:    Pulse:    Resp: 16   Temp: 97.4 °F (36.3 °C)   SpO2: 94%     Physical Exam:   GEN Awake male, sitting upright in bed. RESP breathing comfortably on room air  CARDIO/VASC S1/S2 auscultated. Regular rate without appreciable murmurs. Peripheral pulses equal bilaterally and palpable. No peripheral edema. GI Abdomen is soft without significant tenderness, masses, or guarding. Bowel sounds are normoactive. MSK No gross joint deformities. SKIN Normal coloration, warm, dry. NEURO Cranial nerves appear grossly intact, normal speech, no lateralizing weakness. PSYCH Awake, alert, oriented x 3.     Medications:   Medications:    aspirin  81 mg Oral Daily    atorvastatin  80 mg Oral Nightly    busPIRone  10 mg Oral TID    divalproex  1,000 mg Oral Nightly    docusate sodium  100 mg Oral BID    enoxaparin  30 mg Subcutaneous Daily    escitalopram  10 mg Oral Daily    famotidine  10 mg Oral BID    ferrous sulfate  325 mg Oral BID    folic acid  1 mg Oral Daily    furosemide  20 mg Oral Daily    traZODone  50 mg Oral Nightly    risperiDONE  1 mg Oral Daily    lactobacillus  1 capsule Oral Daily    gabapentin  600 mg Oral TID    sodium chloride flush  10 mL Intravenous 2 times per day    budesonide-formoterol  2 puff Inhalation BID      Infusions:   PRN Meds: acetaminophen, 650 mg, Q4H PRN  acetaminophen, 650 mg, Q4H PRN  ALPRAZolam, 1 mg, TID PRN  tiZANidine, 4 mg, Q6H PRN  sodium chloride flush, 10 mL, PRN  polyethylene glycol, 17 g, Daily PRN  promethazine, 12.5 mg, Q6H PRN    Or  ondansetron, 4 mg, Q6H PRN  labetalol, 10 mg, Q10 Min PRN  oxyCODONE-acetaminophen, 1 tablet, Q6H PRN        CBC   Recent Labs     07/30/20  1400   WBC 5.2   HGB 12.0*   HCT 45.1         BMP   Recent Labs 07/30/20  1400      K 4.8      CO2 22   BUN 30*   CREATININE 2.0*       Radiology report reviewed     Electronically signed by Malvin Ferrari MD on 8/1/2020 at 10:45 AM

## 2020-08-02 ENCOUNTER — APPOINTMENT (OUTPATIENT)
Dept: GENERAL RADIOLOGY | Age: 70
End: 2020-08-02
Payer: MEDICARE

## 2020-08-02 LAB
ANION GAP SERPL CALCULATED.3IONS-SCNC: 12 MMOL/L (ref 4–16)
BASOPHILS ABSOLUTE: 0 K/CU MM
BASOPHILS RELATIVE PERCENT: 0.3 % (ref 0–1)
BUN BLDV-MCNC: 22 MG/DL (ref 6–23)
CALCIUM SERPL-MCNC: 9 MG/DL (ref 8.3–10.6)
CHLORIDE BLD-SCNC: 104 MMOL/L (ref 99–110)
CO2: 25 MMOL/L (ref 21–32)
CREAT SERPL-MCNC: 2 MG/DL (ref 0.9–1.3)
DIFFERENTIAL TYPE: ABNORMAL
EKG ATRIAL RATE: 136 BPM
EKG DIAGNOSIS: NORMAL
EKG P AXIS: -24 DEGREES
EKG P-R INTERVAL: 136 MS
EKG Q-T INTERVAL: 324 MS
EKG QRS DURATION: 112 MS
EKG QTC CALCULATION (BAZETT): 487 MS
EKG R AXIS: -37 DEGREES
EKG T AXIS: 1 DEGREES
EKG VENTRICULAR RATE: 136 BPM
EOSINOPHILS ABSOLUTE: 0.2 K/CU MM
EOSINOPHILS RELATIVE PERCENT: 3.2 % (ref 0–3)
GFR AFRICAN AMERICAN: 40 ML/MIN/1.73M2
GFR NON-AFRICAN AMERICAN: 33 ML/MIN/1.73M2
GLUCOSE BLD-MCNC: 153 MG/DL (ref 70–99)
HCT VFR BLD CALC: 38.8 % (ref 42–52)
HEMOGLOBIN: 11.8 GM/DL (ref 13.5–18)
IMMATURE NEUTROPHIL %: 0.3 % (ref 0–0.43)
LYMPHOCYTES ABSOLUTE: 1.4 K/CU MM
LYMPHOCYTES RELATIVE PERCENT: 19.3 % (ref 24–44)
MCH RBC QN AUTO: 24.8 PG (ref 27–31)
MCHC RBC AUTO-ENTMCNC: 30.4 % (ref 32–36)
MCV RBC AUTO: 81.7 FL (ref 78–100)
MONOCYTES ABSOLUTE: 0.6 K/CU MM
MONOCYTES RELATIVE PERCENT: 7.9 % (ref 0–4)
NUCLEATED RBC %: 0 %
PDW BLD-RTO: 18.2 % (ref 11.7–14.9)
PLATELET # BLD: 172 K/CU MM (ref 140–440)
PMV BLD AUTO: 9.7 FL (ref 7.5–11.1)
POTASSIUM SERPL-SCNC: 4.1 MMOL/L (ref 3.5–5.1)
PRO-BNP: 175.3 PG/ML
RBC # BLD: 4.75 M/CU MM (ref 4.6–6.2)
SEGMENTED NEUTROPHILS ABSOLUTE COUNT: 4.8 K/CU MM
SEGMENTED NEUTROPHILS RELATIVE PERCENT: 69 % (ref 36–66)
SODIUM BLD-SCNC: 141 MMOL/L (ref 135–145)
TOTAL IMMATURE NEUTOROPHIL: 0.02 K/CU MM
TOTAL NUCLEATED RBC: 0 K/CU MM
TROPONIN T: 0.01 NG/ML
TROPONIN T: 0.02 NG/ML
WBC # BLD: 7 K/CU MM (ref 4–10.5)

## 2020-08-02 PROCEDURE — 2580000003 HC RX 258: Performed by: NURSE PRACTITIONER

## 2020-08-02 PROCEDURE — G0378 HOSPITAL OBSERVATION PER HR: HCPCS

## 2020-08-02 PROCEDURE — 96372 THER/PROPH/DIAG INJ SC/IM: CPT

## 2020-08-02 PROCEDURE — 94761 N-INVAS EAR/PLS OXIMETRY MLT: CPT

## 2020-08-02 PROCEDURE — 2500000003 HC RX 250 WO HCPCS: Performed by: NURSE PRACTITIONER

## 2020-08-02 PROCEDURE — 94640 AIRWAY INHALATION TREATMENT: CPT

## 2020-08-02 PROCEDURE — 96375 TX/PRO/DX INJ NEW DRUG ADDON: CPT

## 2020-08-02 PROCEDURE — 84484 ASSAY OF TROPONIN QUANT: CPT

## 2020-08-02 PROCEDURE — 99214 OFFICE O/P EST MOD 30 MIN: CPT | Performed by: INTERNAL MEDICINE

## 2020-08-02 PROCEDURE — 71045 X-RAY EXAM CHEST 1 VIEW: CPT

## 2020-08-02 PROCEDURE — 6360000002 HC RX W HCPCS: Performed by: NURSE PRACTITIONER

## 2020-08-02 PROCEDURE — 93010 ELECTROCARDIOGRAM REPORT: CPT | Performed by: INTERNAL MEDICINE

## 2020-08-02 PROCEDURE — 6370000000 HC RX 637 (ALT 250 FOR IP): Performed by: NURSE PRACTITIONER

## 2020-08-02 PROCEDURE — 36415 COLL VENOUS BLD VENIPUNCTURE: CPT

## 2020-08-02 RX ORDER — MORPHINE SULFATE 2 MG/ML
INJECTION, SOLUTION INTRAMUSCULAR; INTRAVENOUS
Status: DISPENSED
Start: 2020-08-02 | End: 2020-08-02

## 2020-08-02 RX ORDER — MORPHINE SULFATE 2 MG/ML
2 INJECTION, SOLUTION INTRAMUSCULAR; INTRAVENOUS ONCE
Status: COMPLETED | OUTPATIENT
Start: 2020-08-02 | End: 2020-08-02

## 2020-08-02 RX ADMIN — SODIUM CHLORIDE, PRESERVATIVE FREE 10 ML: 5 INJECTION INTRAVENOUS at 09:52

## 2020-08-02 RX ADMIN — ENOXAPARIN SODIUM 30 MG: 30 INJECTION SUBCUTANEOUS at 17:19

## 2020-08-02 RX ADMIN — FERROUS SULFATE TAB 325 MG (65 MG ELEMENTAL FE) 325 MG: 325 (65 FE) TAB at 09:51

## 2020-08-02 RX ADMIN — Medication 1 CAPSULE: at 09:52

## 2020-08-02 RX ADMIN — FUROSEMIDE 20 MG: 20 TABLET ORAL at 09:52

## 2020-08-02 RX ADMIN — RISPERIDONE 1 MG: 0.5 TABLET, FILM COATED ORAL at 09:51

## 2020-08-02 RX ADMIN — BUSPIRONE HYDROCHLORIDE 10 MG: 10 TABLET ORAL at 09:51

## 2020-08-02 RX ADMIN — GABAPENTIN 600 MG: 300 CAPSULE ORAL at 09:51

## 2020-08-02 RX ADMIN — FAMOTIDINE 10 MG: 20 TABLET ORAL at 21:56

## 2020-08-02 RX ADMIN — BUSPIRONE HYDROCHLORIDE 10 MG: 10 TABLET ORAL at 21:56

## 2020-08-02 RX ADMIN — METOPROLOL TARTRATE 5 MG: 1 INJECTION, SOLUTION INTRAVENOUS at 00:04

## 2020-08-02 RX ADMIN — BUDESONIDE AND FORMOTEROL FUMARATE DIHYDRATE 2 PUFF: 160; 4.5 AEROSOL RESPIRATORY (INHALATION) at 08:28

## 2020-08-02 RX ADMIN — ASPIRIN 81 MG CHEWABLE TABLET 81 MG: 81 TABLET CHEWABLE at 09:51

## 2020-08-02 RX ADMIN — TRAZODONE HYDROCHLORIDE 50 MG: 50 TABLET ORAL at 21:55

## 2020-08-02 RX ADMIN — DIVALPROEX SODIUM 1000 MG: 500 TABLET, FILM COATED, EXTENDED RELEASE ORAL at 21:56

## 2020-08-02 RX ADMIN — SODIUM CHLORIDE, PRESERVATIVE FREE 10 ML: 5 INJECTION INTRAVENOUS at 21:56

## 2020-08-02 RX ADMIN — MORPHINE SULFATE 2 MG: 2 INJECTION, SOLUTION INTRAMUSCULAR; INTRAVENOUS at 00:10

## 2020-08-02 RX ADMIN — FAMOTIDINE 10 MG: 20 TABLET ORAL at 09:52

## 2020-08-02 RX ADMIN — BUSPIRONE HYDROCHLORIDE 10 MG: 10 TABLET ORAL at 15:02

## 2020-08-02 RX ADMIN — FERROUS SULFATE TAB 325 MG (65 MG ELEMENTAL FE) 325 MG: 325 (65 FE) TAB at 21:55

## 2020-08-02 RX ADMIN — FOLIC ACID 1 MG: 1 TABLET ORAL at 09:52

## 2020-08-02 RX ADMIN — BUDESONIDE AND FORMOTEROL FUMARATE DIHYDRATE 2 PUFF: 160; 4.5 AEROSOL RESPIRATORY (INHALATION) at 22:36

## 2020-08-02 RX ADMIN — OXYCODONE HYDROCHLORIDE AND ACETAMINOPHEN 1 TABLET: 7.5; 325 TABLET ORAL at 04:36

## 2020-08-02 RX ADMIN — ESCITALOPRAM OXALATE 10 MG: 10 TABLET ORAL at 09:51

## 2020-08-02 RX ADMIN — GABAPENTIN 600 MG: 300 CAPSULE ORAL at 15:02

## 2020-08-02 RX ADMIN — ATORVASTATIN CALCIUM 80 MG: 80 TABLET, FILM COATED ORAL at 21:55

## 2020-08-02 RX ADMIN — DOCUSATE SODIUM 100 MG: 100 CAPSULE, LIQUID FILLED ORAL at 21:56

## 2020-08-02 RX ADMIN — GABAPENTIN 600 MG: 300 CAPSULE ORAL at 21:55

## 2020-08-02 RX ADMIN — DOCUSATE SODIUM 100 MG: 100 CAPSULE, LIQUID FILLED ORAL at 09:52

## 2020-08-02 ASSESSMENT — PAIN DESCRIPTION - PROGRESSION
CLINICAL_PROGRESSION: NOT CHANGED
CLINICAL_PROGRESSION: GRADUALLY WORSENING
CLINICAL_PROGRESSION: NOT CHANGED

## 2020-08-02 ASSESSMENT — PAIN DESCRIPTION - DESCRIPTORS
DESCRIPTORS: ACHING
DESCRIPTORS: ACHING;PRESSURE

## 2020-08-02 ASSESSMENT — PAIN DESCRIPTION - ORIENTATION
ORIENTATION: LEFT
ORIENTATION: MID

## 2020-08-02 ASSESSMENT — PAIN DESCRIPTION - FREQUENCY
FREQUENCY: CONTINUOUS
FREQUENCY: CONTINUOUS

## 2020-08-02 ASSESSMENT — PAIN SCALES - GENERAL
PAINLEVEL_OUTOF10: 8
PAINLEVEL_OUTOF10: 0
PAINLEVEL_OUTOF10: 6
PAINLEVEL_OUTOF10: 6
PAINLEVEL_OUTOF10: 0

## 2020-08-02 ASSESSMENT — PAIN DESCRIPTION - ONSET
ONSET: SUDDEN
ONSET: ON-GOING

## 2020-08-02 ASSESSMENT — PAIN DESCRIPTION - LOCATION
LOCATION: NECK
LOCATION: CHEST

## 2020-08-02 ASSESSMENT — PAIN DESCRIPTION - PAIN TYPE
TYPE: ACUTE PAIN

## 2020-08-02 NOTE — PROGRESS NOTES
Hospitalist Progress Note      Name:  Lizzy Ortiz. /Age/Sex: 1950  (79 y.o. male)   MRN & CSN:  [id number] & [id number] Admission Date/Time: 2020  1:35 PM   Location:  301/3018- PCP: Keira Gamble MD         Hospital Day: 4    Assessment and Plan:   Lizzy Ortiz. is a 79 y.o.  male  who presents with Acute left-sided weakness. -- Bilateral leg weakness  --Fall at home  Apparently patient reported left-sided weakness on admission. No acute stroke on MRI brain. CT head negative for acute abnormality  No acute abnormality of cervical and thoracic spine  No acute abnormality of the shoulder or pelvis. Plan  PT/OT evaluation -SNF placement recommended. COVID 19 ordered. --Chest pain -now pain-free. Troponin chronically elevated and at baseline. ECG showed sinus tachycardia when he was having chest pain but heart rates is now back to baseline. Plan: Continue to monitor. Cardiology consulted overnight- await recommendation. --- Sinus tachycardia   Heart went up to 135 bpm last night requiring metoprolol. Patient had been on lopressor 25 mg BID prior to this admission but this was discontinued on admission because of bradycardia. I wonder if he has tachy-tonny syndrome. Cardiology is on board to evaluate. --Bilateral carotid stenoses  75% stenosis at origin of the rt ICA  60% stenosis at the origin of the left ICA  Plan  Vascular surgery consulted. I discussed with the vascular surgeon [Dr. Rajni Arnold on 2020 and the recommended patient be discharged to SNF and then follow-up with him outpatient to plan further treatment of his stenoses. On ASA 81 mg + statin     -- Bilateral subdural hygromas- stable. --Bradycardia- resolved with discontinuation of metoprolol.     Other diagnoses          - CAD - on ASA, Lipitor.         - diastolic CHF - stable, no edema, no SOB, on oral Lasix         - anemia - cont Ferrous sulfate         - gastric ulcer - on Protonix         - hyperlipidemia         - bipolar 1 d/o         - MRSA - start contact isolation         - seizure - c/w Depakote ER, seizure precautions         -CKD 3 -stable. -COPD-stable. Diet DIET GENERAL;   DVT Prophylaxis [x] Lovenox, []  Heparin, [] SCDs, [] Ambulation   GI Prophylaxis [] PPI,  [] H2 Blocker,  [] Carafate,  [] Diet/Tube Feeds   Code Status Full Code   Disposition  SNF placement plan underway   MDM [] Low, [x] Moderate,[]  High     History of Present Illness:   Patient seen and examined. He he complained of chest pain overnight. ECG showed sinus tachycardia with heart rate up to 135 beats per minute. Troponin T mildly elevated but he has chronic troponin elevation and current level is at his baseline. Ten point ROS reviewed negative, unless as noted above    Objective: Intake/Output Summary (Last 24 hours) at 8/2/2020 1056  Last data filed at 8/1/2020 2230  Gross per 24 hour   Intake 120 ml   Output 400 ml   Net -280 ml      Vitals:   Vitals:    08/02/20 0948   BP: 112/71   Pulse:    Resp: 18   Temp: 97.9 °F (36.6 °C)   SpO2: 95%     Physical Exam:   GEN Awake male, sitting upright in bed. RESP breathing comfortably on room air  CARDIO/VASC S1/S2 auscultated. Regular rate without appreciable murmurs. Peripheral pulses equal bilaterally and palpable. No peripheral edema. GI Abdomen is soft without significant tenderness, masses, or guarding. Bowel sounds are normoactive. MSK No gross joint deformities. SKIN Normal coloration, warm, dry. NEURO normal speech, no lateralizing weakness. PSYCH Awake, alert, oriented x 3.     Medications:   Medications:    morphine        metoprolol (LOPRESSOR) ivpb  5 mg Intravenous Once    aspirin  324 mg Oral Once    aspirin  81 mg Oral Daily    atorvastatin  80 mg Oral Nightly    busPIRone  10 mg Oral TID    divalproex  1,000 mg Oral Nightly    docusate sodium  100 mg Oral BID    enoxaparin  30 mg Subcutaneous Daily  escitalopram  10 mg Oral Daily    famotidine  10 mg Oral BID    ferrous sulfate  325 mg Oral BID    folic acid  1 mg Oral Daily    furosemide  20 mg Oral Daily    traZODone  50 mg Oral Nightly    risperiDONE  1 mg Oral Daily    lactobacillus  1 capsule Oral Daily    gabapentin  600 mg Oral TID    sodium chloride flush  10 mL Intravenous 2 times per day    budesonide-formoterol  2 puff Inhalation BID      Infusions:   PRN Meds: nitroGLYCERIN, 0.4 mg, Q5 Min PRN  acetaminophen, 650 mg, Q4H PRN  acetaminophen, 650 mg, Q4H PRN  ALPRAZolam, 1 mg, TID PRN  tiZANidine, 4 mg, Q6H PRN  sodium chloride flush, 10 mL, PRN  polyethylene glycol, 17 g, Daily PRN  promethazine, 12.5 mg, Q6H PRN    Or  ondansetron, 4 mg, Q6H PRN  labetalol, 10 mg, Q10 Min PRN  oxyCODONE-acetaminophen, 1 tablet, Q6H PRN        CBC   Recent Labs     07/30/20  1400 08/01/20  2354   WBC 5.2 7.0   HGB 12.0* 11.8*   HCT 45.1 38.8*    172      BMP   Recent Labs     07/30/20  1400 08/01/20  2354    141   K 4.8 4.1    104   CO2 22 25   BUN 30* 22   CREATININE 2.0* 2.0*       Radiology report reviewed     Electronically signed by Manan Londono MD on 8/2/2020 at 10:56 AM

## 2020-08-02 NOTE — PROGRESS NOTES
Call initiated by: Nursing staff:  [facility]  Call addressed around: 8/1/2020 11:40 PM      Reason for call: Tachycardia in 130s. Orders placed:   Patient assessed. Also reports 7/10 mid-sternal chest pain. Described as a feeling of chest pressure with aches. -EKG sinus tach, inferior-posterior infarct, possible acute MI. Also reviewed by Dr. Malena Muniz, no ST elevation.  -Stat troponin and trend  -Nitro and ASA  -Chest xray.    Continue telemetry        JULEE Conrad - CNP

## 2020-08-02 NOTE — CONSULTS
Name:  Leidy Roach. /Age/Sex: 1950  (79 y.o. male)   MRN & CSN:  [id number] & [id number] Admission Date/Time: 2020  1:35 PM   Location:  3018/3018-A PCP: Lesia Doss [address] Day: 4          Referring physician:  No admitting provider for patient encounter. Reason for consultation:  CP        Thanks for referral.    Information source: patient    CC; Fatigue and aphasia      HPI:   Thank you for involving me in taking  care of Leidy Roach. who  is a 79 y. o.year  Old male  Presents with  H/o HTN, hyperlipidimea, CAD,CKD,    Was admitted with weakness, ? Focal on lt side , has a fall at home, Neuro everett neg so far, last night became tachy and had CP, had nonsp ST changes. Now painfee and trops unremarkable, cardiolite recently was normal.                  Past medical history:    has a past medical history of Anemia, Arthritis, Back pain, chronic, Bipolar 1 disorder ([address] CAD (coronary artery disease), Cerebral artery occlusion with cerebral infarction ([address] Chronic kidney disease (CKD), stage III (moderate) ([address] CKD (chronic kidney disease), COPD (chronic obstructive pulmonary disease) ([address] Diabetes mellitus, type 2 ([address] Diabetic peripheral neuropathy ([address] Diastolic CHF ([address] Gastric ulcer, H/O cardiovascular stress test, Heart murmur, Hiatal hernia, History of cardiac cath, Hx of migraines, HX OTHER MEDICAL, Hyperlipidemia, Hypertension, Lumbar radiculopathy, Overlapping malignant neoplasm of colon ([address] Panic attack, Pleural effusion, S/P thoracentesis, Sleep apnea, SOBOE (shortness of breath on exertion), and Spinal stenosis. Past surgical history:   has a past surgical history that includes Neck surgery (); Carpal tunnel release (Bilateral, ); knee surgery (Bilateral); thoracentesis (Left, 2013); Elbow surgery (Left, 's); Thoracentesis (2016); Tonsillectomy;  Thoracentesis (Left, 11/15/2016); thoracotomy (Left, 03/18/2019); thoracotomy (Left, 3/18/2019); Upper gastrointestinal endoscopy (N/A, 5/12/2020); Colonoscopy (N/A, 5/12/2020); and Small intestine surgery (N/A, 5/21/2020). Social History:   reports that he quit smoking about 10 years ago. His smoking use included cigarettes. He has a 45.00 pack-year smoking history. He has never used smokeless tobacco. He reports previous alcohol use. He reports previous drug use. Drug: Marijuana. Family history:  family history includes Heart Disease in his mother; High Blood Pressure in his father.     Allergies   Allergen Reactions    Nsaids      Renal        metoprolol (LOPRESSOR) 5 mg in sodium chloride 0.9 % 50 mL IVPB, Once  aspirin chewable tablet 324 mg, Once  nitroGLYCERIN (NITROSTAT) SL tablet 0.4 mg, Q5 Min PRN  acetaminophen (TYLENOL) tablet 650 mg, Q4H PRN  acetaminophen (TYLENOL) suppository 650 mg, Q4H PRN  ALPRAZolam (XANAX) tablet 1 mg, TID PRN  aspirin chewable tablet 81 mg, Daily  atorvastatin (LIPITOR) tablet 80 mg, Nightly  busPIRone (BUSPAR) tablet 10 mg, TID  divalproex (DEPAKOTE ER) extended release tablet 1,000 mg, Nightly  docusate sodium (COLACE) capsule 100 mg, BID  enoxaparin (LOVENOX) injection 30 mg, Daily  escitalopram (LEXAPRO) tablet 10 mg, Daily  famotidine (PEPCID) tablet 10 mg, BID  ferrous sulfate (IRON 325) tablet 855 mg, BID  folic acid (FOLVITE) tablet 1 mg, Daily  furosemide (LASIX) tablet 20 mg, Daily  traZODone (DESYREL) tablet 50 mg, Nightly  tiZANidine (ZANAFLEX) tablet 4 mg, Q6H PRN  risperiDONE (RISPERDAL) tablet 1 mg, Daily  lactobacillus (CULTURELLE) capsule 1 capsule, Daily  gabapentin (NEURONTIN) capsule 600 mg, TID  sodium chloride flush 0.9 % injection 10 mL, 2 times per day  sodium chloride flush 0.9 % injection 10 mL, PRN  polyethylene glycol (GLYCOLAX) packet 17 g, Daily PRN  promethazine (PHENERGAN) tablet 12.5 mg, Q6H PRN    Or  ondansetron (ZOFRAN) injection 4 mg, Q6H PRN  labetalol (NORMODYNE;TRANDATE) injection syringe 10 mg, Q10 Min PRN  budesonide-formoterol (SYMBICORT) 160-4.5 MCG/ACT inhaler 2 puff, BID  oxyCODONE-acetaminophen (PERCOCET) 7.5-325 MG per tablet 1 tablet, Q6H PRN      Current Facility-Administered Medications   Medication Dose Route Frequency Provider Last Rate Last Dose    metoprolol (LOPRESSOR) 5 mg in sodium chloride 0.9 % 50 mL IVPB  5 mg Intravenous Once JULEE Dorado CNP        aspirin chewable tablet 324 mg  324 mg Oral Once JULEE Dorado - CNP        nitroGLYCERIN (NITROSTAT) SL tablet 0.4 mg  0.4 mg Sublingual Q5 Min PRN JULEE Dorado - CNP        acetaminophen (TYLENOL) tablet 650 mg  650 mg Oral Q4H PRN Faina Chong APRN - CNP   650 mg at 07/31/20 2134    acetaminophen (TYLENOL) suppository 650 mg  650 mg Rectal Q4H PRN Faina Chong APRN - CNP        ALPRAZolam Brenda Morales) tablet 1 mg  1 mg Oral TID PRN Marielle Porras APRN - CNP   1 mg at 08/01/20 2250    aspirin chewable tablet 81 mg  81 mg Oral Daily JULEE Macias - CNP   81 mg at 08/02/20 0951    atorvastatin (LIPITOR) tablet 80 mg  80 mg Oral Nightly Zee Moreno APRN - CNP   80 mg at 08/01/20 2227    busPIRone (BUSPAR) tablet 10 mg  10 mg Oral TID JULEE Escamilla - CNP   10 mg at 08/02/20 1502    divalproex (DEPAKOTE ER) extended release tablet 1,000 mg  1,000 mg Oral Nightly Zee Moreno APRN - CNP   1,000 mg at 08/01/20 2226    docusate sodium (COLACE) capsule 100 mg  100 mg Oral BID Marielle Porras APRN - CNP   100 mg at 08/02/20 0952    enoxaparin (LOVENOX) injection 30 mg  30 mg Subcutaneous Daily Zee Moreno APRN - CNP   30 mg at 08/01/20 2226    escitalopram (LEXAPRO) tablet 10 mg  10 mg Oral Daily Marielle Porras APRN - CNP   10 mg at 08/02/20 0951    famotidine (PEPCID) tablet 10 mg  10 mg Oral BID Marielle Comes, APRN - CNP   10 mg at 08/02/20 4386    ferrous sulfate (IRON 325) tablet 325 mg  325 mg Oral BID Marielle Comes, APRN - CNP   325 mg at 55/67/36 2476    folic acid (FOLVITE) tablet 1 mg  1 mg Oral Daily Марина Alexander, APRN - CNP   1 mg at 08/02/20 5689    furosemide (LASIX) tablet 20 mg  20 mg Oral Daily Марина Kand, APRN - CNP   20 mg at 08/02/20 0952    traZODone (DESYREL) tablet 50 mg  50 mg Oral Nightly Марина Alexander, APRN - CNP   50 mg at 08/01/20 2227    tiZANidine (ZANAFLEX) tablet 4 mg  4 mg Oral Q6H PRN Марина Kand, APRN - CNP   4 mg at 07/30/20 1840    risperiDONE (RISPERDAL) tablet 1 mg  1 mg Oral Daily Марина Kand, APRN - CNP   1 mg at 08/02/20 0951    lactobacillus (CULTURELLE) capsule 1 capsule  1 capsule Oral Daily Марина Clarkd, APRN - CNP   1 capsule at 08/02/20 5609    gabapentin (NEURONTIN) capsule 600 mg  600 mg Oral TID Марина Alexnader, APRN - CNP   600 mg at 08/02/20 1502    sodium chloride flush 0.9 % injection 10 mL  10 mL Intravenous 2 times per day Марина Alexander, APRN - CNP   10 mL at 08/02/20 3020    sodium chloride flush 0.9 % injection 10 mL  10 mL Intravenous PRN Zee Moreno APRN - CNP        polyethylene glycol (GLYCOLAX) packet 17 g  17 g Oral Daily PRN Марина Alexander, APRN - CNP        promethazine (PHENERGAN) tablet 12.5 mg  12.5 mg Oral Q6H PRN Марина Alexander, APRN - CNP        Or    ondansetron (ZOFRAN) injection 4 mg  4 mg Intravenous Q6H PRN Марина Clarkd, APRN - CNP        labetalol (NORMODYNE;TRANDATE) injection syringe 10 mg  10 mg Intravenous Q10 Min PRN Марина Alexander, APRN - CNP        budesonide-formoterol (SYMBICORT) 160-4.5 MCG/ACT inhaler 2 puff  2 puff Inhalation BID Sagar Rubi MD   2 puff at 08/02/20 0828    oxyCODONE-acetaminophen (PERCOCET) 7.5-325 MG per tablet 1 tablet  1 tablet Oral Q6H PRN Марина Alexander, APRN - CNP   1 tablet at 08/02/20 0298     Review of Systems:  All 14 systems reviewed, all negative except for  CP, weakness    Physical Examination:    /81   Pulse 59   Temp 97.5 °F (36.4 °C) (Oral)   Resp 17   Ht 5' 9\" (1.753 m)   Wt 220 lb (99.8 kg)   SpO2 95% BMI 32.49 kg/m²      Wt Readings from Last 3 Encounters:   08/02/20 220 lb (99.8 kg)   07/28/20 227 lb (103 kg)   07/22/20 227 lb 12.8 oz (103.3 kg)     Body mass index is 32.49 kg/m². General Appearance:  fair  Head: normocephalic     Eyes: normal, noninjected conjunctiva    ENT: normal mucosa, noninjected throat, normal     NECK: No JVP  No thyromegaly        Cardiovascular: No thrills palpated   Auscultation: Normal S1 and S2,  no murmur   carotid bruit no   Abdominal Aorta no bruit    Respiratory:    Breath sounds Clear = 0    Extremities:  none Edema clubbing ,   no cyanosis    SKIN: Warm and well perfused, no pallor or cyanosis    Vascular exam:  Pedal Pulses: palp  bilaterally        Abdomen:  No masses or tenderness. No organomegaly noted. Neurological:  Oriented to time, place, and person   No focal neurological deficit noted. Psychiatric:normal mood, no anxiety    Lab Review   Recent Labs     08/01/20  2354   WBC 7.0   HGB 11.8*   HCT 38.8*         Recent Labs     08/01/20  2354      K 4.1      CO2 25   BUN 22   CREATININE 2.0*     No results for input(s): AST, ALT, ALB, BILIDIR, BILITOT, ALKPHOS in the last 72 hours. No results for input(s): TROPONINI in the last 72 hours.   Lab Results   Component Value Date    BNP 38 05/05/2014    BNP 33 12/20/2013    BNP 66.2 06/07/2011     Lab Results   Component Value Date    INR 1.01 06/03/2020    PROTIME 12.2 06/03/2020           Assessment/Recommendations:     - CP; serial trops, EKGS, CPM, might need LHC if has more CP, old LHC reviewed  - HTN on meds  - hyperlip on statin  - check Holter  - has carotid stenosis per Rochelle Phan MD, 8/2/2020 5:07 PM

## 2020-08-02 NOTE — PROGRESS NOTES
Patient is complaining of chest pain. Hospitalist was across the harman. Vitals taken and also EKG. Hospitalist in to see.

## 2020-08-03 LAB
SARS-COV-2: NOT DETECTED
SOURCE: NORMAL
TROPONIN T: 0.01 NG/ML
TROPONIN T: 0.01 NG/ML
TROPONIN T: 0.02 NG/ML
TROPONIN T: <0.01 NG/ML

## 2020-08-03 PROCEDURE — APPSS15 APP SPLIT SHARED TIME 0-15 MINUTES: Performed by: NURSE PRACTITIONER

## 2020-08-03 PROCEDURE — G0378 HOSPITAL OBSERVATION PER HR: HCPCS

## 2020-08-03 PROCEDURE — 6370000000 HC RX 637 (ALT 250 FOR IP): Performed by: NURSE PRACTITIONER

## 2020-08-03 PROCEDURE — 6360000002 HC RX W HCPCS: Performed by: NURSE PRACTITIONER

## 2020-08-03 PROCEDURE — 97530 THERAPEUTIC ACTIVITIES: CPT

## 2020-08-03 PROCEDURE — 94640 AIRWAY INHALATION TREATMENT: CPT

## 2020-08-03 PROCEDURE — 93226 XTRNL ECG REC<48 HR SCAN A/R: CPT

## 2020-08-03 PROCEDURE — 97116 GAIT TRAINING THERAPY: CPT

## 2020-08-03 PROCEDURE — 36415 COLL VENOUS BLD VENIPUNCTURE: CPT

## 2020-08-03 PROCEDURE — 96372 THER/PROPH/DIAG INJ SC/IM: CPT

## 2020-08-03 PROCEDURE — 94761 N-INVAS EAR/PLS OXIMETRY MLT: CPT

## 2020-08-03 PROCEDURE — 2580000003 HC RX 258: Performed by: NURSE PRACTITIONER

## 2020-08-03 PROCEDURE — 97110 THERAPEUTIC EXERCISES: CPT

## 2020-08-03 PROCEDURE — 93225 XTRNL ECG REC<48 HRS REC: CPT

## 2020-08-03 PROCEDURE — 97535 SELF CARE MNGMENT TRAINING: CPT

## 2020-08-03 PROCEDURE — 84484 ASSAY OF TROPONIN QUANT: CPT

## 2020-08-03 PROCEDURE — 99214 OFFICE O/P EST MOD 30 MIN: CPT | Performed by: INTERNAL MEDICINE

## 2020-08-03 RX ADMIN — Medication 1 CAPSULE: at 10:48

## 2020-08-03 RX ADMIN — TRAZODONE HYDROCHLORIDE 50 MG: 50 TABLET ORAL at 21:26

## 2020-08-03 RX ADMIN — ESCITALOPRAM OXALATE 10 MG: 10 TABLET ORAL at 10:49

## 2020-08-03 RX ADMIN — OXYCODONE HYDROCHLORIDE AND ACETAMINOPHEN 1 TABLET: 7.5; 325 TABLET ORAL at 10:56

## 2020-08-03 RX ADMIN — BUSPIRONE HYDROCHLORIDE 10 MG: 10 TABLET ORAL at 15:35

## 2020-08-03 RX ADMIN — DOCUSATE SODIUM 100 MG: 100 CAPSULE, LIQUID FILLED ORAL at 10:48

## 2020-08-03 RX ADMIN — FUROSEMIDE 20 MG: 20 TABLET ORAL at 10:48

## 2020-08-03 RX ADMIN — ASPIRIN 81 MG CHEWABLE TABLET 81 MG: 81 TABLET CHEWABLE at 10:48

## 2020-08-03 RX ADMIN — FERROUS SULFATE TAB 325 MG (65 MG ELEMENTAL FE) 325 MG: 325 (65 FE) TAB at 21:25

## 2020-08-03 RX ADMIN — DIVALPROEX SODIUM 1000 MG: 500 TABLET, FILM COATED, EXTENDED RELEASE ORAL at 21:26

## 2020-08-03 RX ADMIN — SODIUM CHLORIDE, PRESERVATIVE FREE 10 ML: 5 INJECTION INTRAVENOUS at 10:49

## 2020-08-03 RX ADMIN — SODIUM CHLORIDE, PRESERVATIVE FREE 10 ML: 5 INJECTION INTRAVENOUS at 21:27

## 2020-08-03 RX ADMIN — OXYCODONE HYDROCHLORIDE AND ACETAMINOPHEN 1 TABLET: 7.5; 325 TABLET ORAL at 21:26

## 2020-08-03 RX ADMIN — GABAPENTIN 600 MG: 300 CAPSULE ORAL at 15:35

## 2020-08-03 RX ADMIN — GABAPENTIN 600 MG: 300 CAPSULE ORAL at 10:47

## 2020-08-03 RX ADMIN — ENOXAPARIN SODIUM 30 MG: 30 INJECTION SUBCUTANEOUS at 18:30

## 2020-08-03 RX ADMIN — DOCUSATE SODIUM 100 MG: 100 CAPSULE, LIQUID FILLED ORAL at 21:26

## 2020-08-03 RX ADMIN — GABAPENTIN 600 MG: 300 CAPSULE ORAL at 21:26

## 2020-08-03 RX ADMIN — RISPERIDONE 1 MG: 0.5 TABLET, FILM COATED ORAL at 10:48

## 2020-08-03 RX ADMIN — ATORVASTATIN CALCIUM 80 MG: 80 TABLET, FILM COATED ORAL at 21:26

## 2020-08-03 RX ADMIN — BUDESONIDE AND FORMOTEROL FUMARATE DIHYDRATE 2 PUFF: 160; 4.5 AEROSOL RESPIRATORY (INHALATION) at 07:28

## 2020-08-03 RX ADMIN — FAMOTIDINE 10 MG: 20 TABLET ORAL at 10:48

## 2020-08-03 RX ADMIN — FAMOTIDINE 10 MG: 20 TABLET ORAL at 21:25

## 2020-08-03 RX ADMIN — FOLIC ACID 1 MG: 1 TABLET ORAL at 10:48

## 2020-08-03 RX ADMIN — BUSPIRONE HYDROCHLORIDE 10 MG: 10 TABLET ORAL at 21:26

## 2020-08-03 RX ADMIN — FERROUS SULFATE TAB 325 MG (65 MG ELEMENTAL FE) 325 MG: 325 (65 FE) TAB at 10:48

## 2020-08-03 RX ADMIN — BUSPIRONE HYDROCHLORIDE 10 MG: 10 TABLET ORAL at 10:48

## 2020-08-03 ASSESSMENT — PAIN DESCRIPTION - FREQUENCY
FREQUENCY: CONTINUOUS
FREQUENCY: CONTINUOUS

## 2020-08-03 ASSESSMENT — PAIN SCALES - GENERAL
PAINLEVEL_OUTOF10: 0
PAINLEVEL_OUTOF10: 8
PAINLEVEL_OUTOF10: 7
PAINLEVEL_OUTOF10: 0

## 2020-08-03 ASSESSMENT — PAIN DESCRIPTION - PAIN TYPE
TYPE: ACUTE PAIN
TYPE: ACUTE PAIN

## 2020-08-03 ASSESSMENT — PAIN DESCRIPTION - DESCRIPTORS
DESCRIPTORS: ACHING
DESCRIPTORS: ACHING

## 2020-08-03 ASSESSMENT — PAIN DESCRIPTION - ONSET
ONSET: ON-GOING
ONSET: ON-GOING

## 2020-08-03 ASSESSMENT — PAIN DESCRIPTION - LOCATION
LOCATION: NECK
LOCATION: NECK

## 2020-08-03 ASSESSMENT — PAIN DESCRIPTION - ORIENTATION
ORIENTATION: LEFT
ORIENTATION: LEFT

## 2020-08-03 ASSESSMENT — PAIN DESCRIPTION - PROGRESSION
CLINICAL_PROGRESSION: NOT CHANGED
CLINICAL_PROGRESSION: NOT CHANGED

## 2020-08-03 NOTE — PROGRESS NOTES
Cardiology Note    Admit Date:  7/30/2020    Admission diagnosis / Complaint: chest pain      Subjective:  Mr. Ralph Boogie is sitting up in the chair. Denies cardiac complaints. Reports chest pain has resolved. States he wants to go home. Assessment and Plan:    1. Chest Pain: Troponins chronically elevated. He is at baseline. EKG reviewed. . Will hold on [facility] for now as patient denies further chest pain. 2. HTN: stable. Continue current medical management    3. HLD: continue statins    4. Holter pending    5. Carotid Stenosis per CTS    Objective:   BP (!) 113/57   Pulse 65   Temp 97.4 °F (36.3 °C) (Oral)   Resp 18   Ht 5' 9\" (1.753 m)   Wt 220 lb (99.8 kg)   SpO2 94%   BMI 32.49 kg/m²       Intake/Output Summary (Last 24 hours) at 8/3/2020 1124  Last data filed at 8/3/2020 0711  Gross per 24 hour   Intake --   Output 1100 ml   Net -1100 ml       TELEMETRY: Sinus    has a past medical history of Anemia, Arthritis, Back pain, chronic, Bipolar 1 disorder ([address] CAD (coronary artery disease), Cerebral artery occlusion with cerebral infarction ([address] Chronic kidney disease (CKD), stage III (moderate) ([address] CKD (chronic kidney disease), COPD (chronic obstructive pulmonary disease) ([address] Diabetes mellitus, type 2 ([address] Diabetic peripheral neuropathy ([address] Diastolic CHF ([address] Gastric ulcer, H/O cardiovascular stress test, Heart murmur, Hiatal hernia, History of cardiac cath, Hx of migraines, HX OTHER MEDICAL, Hyperlipidemia, Hypertension, Lumbar radiculopathy, Overlapping malignant neoplasm of colon ([address] Panic attack, Pleural effusion, S/P thoracentesis, Sleep apnea, SOBOE (shortness of breath on exertion), and Spinal stenosis. has a past surgical history that includes Neck surgery (1998); Carpal tunnel release (Bilateral, 1989); knee surgery (Bilateral); thoracentesis (Left, 12/20/2013); Elbow surgery (Left, 2000's); Thoracentesis (4/25/2016); Tonsillectomy;  Thoracentesis (Left, 11/15/2016); thoracotomy (Left, 03/18/2019); thoracotomy (Left, 3/18/2019); Upper gastrointestinal endoscopy (N/A, 5/12/2020); Colonoscopy (N/A, 5/12/2020); and Small intestine surgery (N/A, 5/21/2020). Physical Exam:  General:  Awake, alert, NAD  Skin:  Warm and dry  Neck:  JVD not appreciated  Chest:  Clear to auscultation, respiration easy  Cardiovascular:  RRR S1S2  Abdomen:  Soft nontender  Extremities:  No edema (evidence that patient did have bilateral lower leg edema recently as legs are wrinkled)    Medications:    aspirin  324 mg Oral Once    aspirin  81 mg Oral Daily    atorvastatin  80 mg Oral Nightly    busPIRone  10 mg Oral TID    divalproex  1,000 mg Oral Nightly    docusate sodium  100 mg Oral BID    enoxaparin  30 mg Subcutaneous Daily    escitalopram  10 mg Oral Daily    famotidine  10 mg Oral BID    ferrous sulfate  325 mg Oral BID    folic acid  1 mg Oral Daily    furosemide  20 mg Oral Daily    traZODone  50 mg Oral Nightly    risperiDONE  1 mg Oral Daily    lactobacillus  1 capsule Oral Daily    gabapentin  600 mg Oral TID    sodium chloride flush  10 mL Intravenous 2 times per day    budesonide-formoterol  2 puff Inhalation BID       nitroGLYCERIN, acetaminophen, acetaminophen, ALPRAZolam, tiZANidine, sodium chloride flush, polyethylene glycol, promethazine **OR** ondansetron, labetalol, oxyCODONE-acetaminophen    Lab Data:  CBC:   Recent Labs     08/01/20  2354   WBC 7.0   HGB 11.8*   HCT 38.8*   MCV 81.7        BMP:   Recent Labs     08/01/20  2354      K 4.1      CO2 25   BUN 22   CREATININE 2.0*     All labs reviewed          JULEE Adrian-CNP 8/3/2020 11:24 AM     I have seen ,spoken to  and examined this patient personally, independently of the nurse practitioner. I have reviewed the hospital care given to date and reviewed all pertinent labs and imaging. The plan was developed mutually at the time of the visit with the patient,  NP  and myself.  I have

## 2020-08-03 NOTE — PROGRESS NOTES
significantly decreased gait velocity, pt reports fatigue as reason for slow velocity. Cues for attention to environment and upright posture with good carryover noted. Assessment / Impression:       Patient's tolerance of treatment:  Tolerated session well, extended time required for all activities. Adverse Reaction: None noted  Significant change in status and impact:  Improved mobility and out of bed tolerance. Barriers to improvement:  Strength, balance, endurance    Plan for Next Session:    Progress independence with bed mobility, transfers, and gait per POC. Time in:  1403  Time out:  1429  Timed treatment minutes:  26  Total treatment time:  26    Previously filed items:  Social/Functional History  Lives With: Alone  Type of Home: House(Large Double House)  Home Layout: Two level, Able to Live on Main level with bedroom/bathroom(Basement)  Home Access: Stairs to enter with rails  Entrance Stairs - Number of Steps: 7 steps  Bathroom Shower/Tub: Walk-in shower  Bathroom Toilet: Standard  Bathroom Equipment: Shower chair  Receives Help From: Family  ADL Assistance: Independent  Homemaking Assistance: Needs assistance(Neighbor does laundry. Pt cooks (sometimes). Neighbor drives patient to grocery store and pt uses the grocery store scooter to get around while shopping.)  Homemaking Responsibilities: Yes  Ambulation Assistance: Independent  Transfer Assistance: Independent  Active : [employer], does not drive.)  Short term goals  Time Frame for Short term goals: 1 week  Short term goal 1: Patient will perform supine to sit mod I. Short term goal 2: Patient will perform STS mod I.   Short term goal 3: Patient will ambulate x100ft mod I.       Electronically signed by:    Wilton Caruso PT  8/3/2020, 2:32 PM

## 2020-08-03 NOTE — CONSULTS
Department of Cardiovascular & Thoracic Surgery   Consult Note    Reason for Consult: Carotid artery disease probable neurologic event  Requesting Physician: Dr. Kuldip Roberson    Date of Consult: 8/1/20      History Obtained From:  patient     HISTORY OF PRESENT ILLNESS:    The patient is a 79 y.o. male who presents with complaints of facial droop and difficulty using the left side of the body   he also had slurred speech along with the same symptom.     Work-up included carotid Doppler study and carotid assessment by CTA and CT scan    past Medical History:        Diagnosis Date    Anemia     per old chart    Arthritis     Back pain, chronic     \"have pain in lumbar mainly- have 5 bulging discs\"\"in my neck and my lumbar have herniated discs\"    Bipolar 1 disorder ([facility]     CAD (coronary artery disease) 1/27/2016    does not follow with a cardiologist    Cerebral artery occlusion with cerebral infarction ([facility]     \"had mini stroke in Jan 2016- fast heart rate when I would stand up - smile drooping on one side- lased just the one day\"    Chronic kidney disease (CKD), stage III (moderate) ([facility]     CKD (chronic kidney disease)     per old chart- consult with Dr Xena Barnes with admission 4/2016\"have low kidney function\"    COPD (chronic obstructive pulmonary disease) ([facility]     follow with Dr Kenan Leiva Diabetes mellitus, type 2 ([facility] 1/3/2017    Diabetic peripheral neuropathy ([facility])     Diastolic CHF ([facility] 7/69/2523    Gastric ulcer     H/O cardiovascular stress test 5/26/14    EF 68% mild ischemia anterior wall    Heart murmur     \"see Dr Camilo Johnson- last echo 2014\" pt states\"I think they said I have a murmur but no chest pain or palpitations\"    Hiatal hernia     History of cardiac cath 6/11/14    MODERATE  CAD      Hx of migraines     HX OTHER MEDICAL     \"have thinning of kidney walls- they found I had that 15 yrs ago\" see Dr Alejo Myers"    Hyperlipidemia     Hypertension     Lumbar radiculopathy     TID  divalproex (DEPAKOTE ER) extended release tablet 1,000 mg, 1,000 mg, Oral, Nightly  docusate sodium (COLACE) capsule 100 mg, 100 mg, Oral, BID  enoxaparin (LOVENOX) injection 30 mg, 30 mg, Subcutaneous, Daily  escitalopram (LEXAPRO) tablet 10 mg, 10 mg, Oral, Daily  famotidine (PEPCID) tablet 10 mg, 10 mg, Oral, BID  ferrous sulfate (IRON 325) tablet 325 mg, 325 mg, Oral, BID  folic acid (FOLVITE) tablet 1 mg, 1 mg, Oral, Daily  furosemide (LASIX) tablet 20 mg, 20 mg, Oral, Daily  traZODone (DESYREL) tablet 50 mg, 50 mg, Oral, Nightly  tiZANidine (ZANAFLEX) tablet 4 mg, 4 mg, Oral, Q6H PRN  risperiDONE (RISPERDAL) tablet 1 mg, 1 mg, Oral, Daily  lactobacillus (CULTURELLE) capsule 1 capsule, 1 capsule, Oral, Daily  gabapentin (NEURONTIN) capsule 600 mg, 600 mg, Oral, TID  sodium chloride flush 0.9 % injection 10 mL, 10 mL, Intravenous, 2 times per day  sodium chloride flush 0.9 % injection 10 mL, 10 mL, Intravenous, PRN  polyethylene glycol (GLYCOLAX) packet 17 g, 17 g, Oral, Daily PRN  promethazine (PHENERGAN) tablet 12.5 mg, 12.5 mg, Oral, Q6H PRN **OR** ondansetron (ZOFRAN) injection 4 mg, 4 mg, Intravenous, Q6H PRN  labetalol (NORMODYNE;TRANDATE) injection syringe 10 mg, 10 mg, Intravenous, Q10 Min PRN  budesonide-formoterol (SYMBICORT) 160-4.5 MCG/ACT inhaler 2 puff, 2 puff, Inhalation, BID  oxyCODONE-acetaminophen (PERCOCET) 7.5-325 MG per tablet 1 tablet, 1 tablet, Oral, Q6H PRN  Allergies:     Allergies   Allergen Reactions    Nsaids      Renal        Social History:   Social History     Socioeconomic History    Marital status:      Spouse name: Not on file    Number of children: Not on file    Years of education: Not on file    Highest education level: Not on file   Occupational History    Occupation: built trucks, retired     Employer: [employer] Financial resource strain: Not on file    Food insecurity     Worry: Not on file     Inability: Not on file   [employer] needs     Medical: Not on file     Non-medical: Not on file   Tobacco Use    Smoking status: Former Smoker     Packs/day: 1.50     Years: 30.00     Pack years: 45.00     Types: Cigarettes     Last attempt to quit: 7/24/2010     Years since quitting: 10.0    Smokeless tobacco: Never Used   Substance and Sexual Activity    Alcohol use: Not Currently    Drug use: Not Currently     Types: Marijuana    Sexual activity: Not Currently     Partners: Female   Lifestyle    Physical activity     Days per week: Not on file     Minutes per session: Not on file    Stress: Not on file   Relationships    Social connections     Talks on phone: Not on file     Gets together: Not on file     Attends Che[employer] service: Not on file     Active member of club or organization: Not on file     Attends meetings of clubs or organizations: Not on file     Relationship status: Not on file    Intimate partner violence     Fear of current or ex partner: Not on file     Emotionally abused: Not on file     Physically abused: Not on file     Forced sexual activity: Not on file   Other Topics Concern    Not on file   Social History Narrative    Not on file       Family History:        Problem Relation Age of Onset    Heart Disease Mother         heart attack    High Blood Pressure Father        REVIEW OF SYSTEMS:  Constitutional: - fatigue, - fever, - chills, - night sweats  Eyes: - vision loss  Cardiovascular: -  chest pain, - palpitations, - leg swelling, - leg pain   Respiratory: - cough, - shortness of breath, - wheezing   GI: - nausea, - vomiting, - abdominal pain, - constipation, - diarrhea   : - dysuria   MSK: - joint pain, - muscle pain  Integument: - rash, - skin color change   Heme: - easy bruising or bleeding  Neurologic: - headache, - weakness, - dizziness, - paresthesias       EXAM:  Constitutional: Blood pressure (!) 113/57, pulse 72, temperature 97.4 °F (36.3 °C), temperature source Oral, resp.  rate 18, height 5' 9\" (1.753 m), weight 220 lb (99.8 kg), SpO2 94 %. No apparent distress, appears stated age and cooperative. Neurologic: follows commands, complains of difficulty using his left hand but he is able to move the left side of the body but strength is diminished  Lungs: Good respiratory effort.  Clear to auscultation,   CV: Regular rate/ rhythm , no peripheral edema, feet warm and well perfused  GI: Soft, non-tender in all four quadrants, non-distended, + bowel sounds, liver and spleen no palpable masses  : bladder nondistended   MSK: no obvious deformity   Skin: warm, pink and dry       DATA:  Carotid Doppler images reviewed and CT of the neck reviewed  Bilateral calcific disease noted at the ICA takeoff worse on the right side approaching 75% noted    MRI of the brain noted multiple extensive small vessel disease noted no acute event is identified      IMPRESSION  Patient Active Problem List   Diagnosis    COPD (chronic obstructive pulmonary disease) (HCC)    Pleural effusion, left    SAMANTHA on CPAP    Chronic obstructive pulmonary disease (HCC)    TIA (transient ischemic attack)    Bipolar 1 disorder ([facility]    Coronary artery disease involving native coronary artery of native heart without angina pectoris    Essential hypertension    REYES (dyspnea on exertion)    Anemia, chronic disease    Diuretic-induced hypokalemia    Diastolic CHF (HCC)    Acute respiratory failure (HCC)    Pneumonia due to gram-negative bacteria (HCC)    Hyperkalemia    Adrenal mass (HCC)    Diabetes mellitus, type 2 (HCC)    Hyponatremia    Pneumonia due to organism    PAF (paroxysmal atrial fibrillation) (HCC)    Empyema of left pleural space (HCC)    Hospital-acquired bacterial pneumonia    Hyperlipidemia    Lumbar radiculopathy    CKD (chronic kidney disease)    Diabetic peripheral neuropathy (HCC)    Chronic kidney disease (CKD), stage III (moderate) (HCC)    Cervical stenosis of spine    Spinal stenosis of lumbar region without neurogenic claudication    Somatic dysfunction of back    Somatic dysfunction of cervical region    Somatic dysfunction of pelvis region    Somatic dysfunction of both lower extremities    Empyema lung (HCC)    Acute kidney injury superimposed on CKD (HCC)    Chronic diastolic heart failure (HCC)    Syncope and collapse    Hypotensive episode    Bradycardia on ECG    Syncope    Lightheadedness    Dizziness    Acute on chronic respiratory failure with hypoxia (HCC)    Severe mixed bipolar 1 disorder without psychosis ([address]    Acute chest pain    Chest pain    Atrial tachycardia ([address]    Overlapping malignant neoplasm of colon ([address]    Colon adenocarcinoma ([address]    Pneumonia    Acute left-sided weakness    Fall at home             RECOMMENDATIONS:  Patient most likely right hemispheric neurologic event result of right-sided ICA stenosis superimposed on his chronic small vessel disease intracranially  Patient advised this consideration and he is in most likely going to require a right carotid endarterectomy after further resolution is present neurologic symptoms    Patient advised accordingly

## 2020-08-03 NOTE — PROGRESS NOTES
Hospitalist Progress Note      Name:  Bisi Carvajal. /Age/Sex: 1950  (79 y.o. male)   MRN & CSN:  [id number] & [id number] Admission Date/Time: 2020  1:35 PM   Location:  [facility]- PCP: Esvin Casas MD         Hospital Day: 5    Assessment and Plan:   Bisi Shell is a 79 y.o.  male  who presents with Acute left-sided weakness. -- Bilateral leg weakness  --Fall at home  Apparently patient reported left-sided weakness on admission. No acute stroke on MRI brain. CT head negative for acute abnormality  No acute abnormality of cervical and thoracic spine  No acute abnormality of the shoulder or pelvis. Plan  PT/OT evaluation -SNF placement recommended. COVID 19 ordered. --Chest pain -now pain-free. Troponin chronically elevated and at baseline. ECG showed sinus tachycardia when he was having chest pain but heart rates is now back to baseline. Plan: Continue medical mgt of CAD. Cardiology on board - may do [facility] if chest pain recurs. --- Sinus tachycardia   Heart went up to 135 bpm last night requiring metoprolol. Patient had been on lopressor 25 mg BID prior to this admission but this was discontinued on admission because of bradycardia. I wonder if he has tachy-tonny syndrome. Cardiology is on board  - holter monitoring ordered. --Bilateral carotid stenoses  75% stenosis at origin of the rt ICA  60% stenosis at the origin of the left ICA  Plan  Vascular surgery consulted. I discussed with the vascular surgeon [Dr. Nicho Cantrell on 2020 and the recommended patient be discharged to SNF and then follow-up with him outpatient to plan further treatment of his stenoses. On ASA 81 mg + statin     -- Bilateral subdural hygromas- stable. --Bradycardia- resolved with discontinuation of metoprolol.     Other diagnoses          - CAD - on ASA, Lipitor.         - diastolic CHF - stable, no edema, no SOB, on oral Lasix         - anemia - cont Ferrous sulfate         - gastric ulcer - on Protonix         - hyperlipidemia         - bipolar 1 d/o         - MRSA - start contact isolation         - seizure - c/w Depakote ER, seizure precautions         -CKD 3 -stable. -COPD-stable. Diet DIET GENERAL;   DVT Prophylaxis [x] Lovenox, []  Heparin, [] SCDs, [] Ambulation   GI Prophylaxis [] PPI,  [] H2 Blocker,  [] Carafate,  [] Diet/Tube Feeds   Code Status Full Code   Disposition  SNF placement plan underway   MDM [] Low, [x] Moderate,[]  High     History of Present Illness:   Patient seen and examined. Denies chest pain. Awaiting SNF placement. Ten point ROS reviewed negative, unless as noted above    Objective: Intake/Output Summary (Last 24 hours) at 8/3/2020 1057  Last data filed at 8/3/2020 0711  Gross per 24 hour   Intake --   Output 1100 ml   Net -1100 ml      Vitals:   Vitals:    08/03/20 1042   BP:    Pulse: 65   Resp:    Temp:    SpO2:      Physical Exam:   GEN Awake male, sitting upright in bed. RESP breathing comfortably on room air  CARDIO/VASC S1/S2 auscultated. Regular rate without appreciable murmurs. Peripheral pulses equal bilaterally and palpable. No peripheral edema. GI Abdomen is soft without significant tenderness, masses, or guarding. Bowel sounds are normoactive. MSK No gross joint deformities. SKIN Normal coloration, warm, dry. NEURO normal speech, no lateralizing weakness. PSYCH Awake, alert, oriented x 3.     Medications:   Medications:    metoprolol (LOPRESSOR) ivpb  5 mg Intravenous Once    aspirin  324 mg Oral Once    aspirin  81 mg Oral Daily    atorvastatin  80 mg Oral Nightly    busPIRone  10 mg Oral TID    divalproex  1,000 mg Oral Nightly    docusate sodium  100 mg Oral BID    enoxaparin  30 mg Subcutaneous Daily    escitalopram  10 mg Oral Daily    famotidine  10 mg Oral BID    ferrous sulfate  325 mg Oral BID    folic acid  1 mg Oral Daily    furosemide  20 mg Oral Daily    traZODone  50 mg Oral Nightly  risperiDONE  1 mg Oral Daily    lactobacillus  1 capsule Oral Daily    gabapentin  600 mg Oral TID    sodium chloride flush  10 mL Intravenous 2 times per day    budesonide-formoterol  2 puff Inhalation BID      Infusions:   PRN Meds: nitroGLYCERIN, 0.4 mg, Q5 Min PRN  acetaminophen, 650 mg, Q4H PRN  acetaminophen, 650 mg, Q4H PRN  ALPRAZolam, 1 mg, TID PRN  tiZANidine, 4 mg, Q6H PRN  sodium chloride flush, 10 mL, PRN  polyethylene glycol, 17 g, Daily PRN  promethazine, 12.5 mg, Q6H PRN    Or  ondansetron, 4 mg, Q6H PRN  labetalol, 10 mg, Q10 Min PRN  oxyCODONE-acetaminophen, 1 tablet, Q6H PRN        CBC   Recent Labs     08/01/20  2354   WBC 7.0   HGB 11.8*   HCT 38.8*         BMP   Recent Labs     08/01/20  2354      K 4.1      CO2 25   BUN 22   CREATININE 2.0*       Radiology report reviewed     Electronically signed by Valentina Stafford MD on 8/3/2020 at 10:57 AM

## 2020-08-03 NOTE — DISCHARGE INSTR - COC
Continuity of Care Form    Patient Name: Jason Moyer. :  1950  MRN:  [id number]    Admit date:  2020  Discharge date:  ***    Code Status Order: Full Code   Advance Directives:   Advance Care Flowsheet Documentation     Date/Time Healthcare Directive Type of Healthcare Directive Copy in [address] Agent's Name Healthcare Agent's Phone Number    20 1844  No, patient does not have an advance directive for healthcare treatment -- -- -- -- --          Admitting Physician:  No admitting provider for patient encounter. PCP: Krissy Quintana MD    Discharging Nurse: [facility] Unit/Room#: 5325/1299-I  Discharging Unit Phone Number: ***    Emergency Contact:   Extended Emergency Contact Information  Primary Emergency Contact: Raj Douglas  Phone: [phone]  Mobile Phone: [phone]  Relation: Child   needed?  No  Secondary Emergency Contact: Sue Jeter  [place] Phone: [phone]  Mobile Phone: [phone]  Relation: Child    Past Surgical History:  Past Surgical History:   Procedure Laterality Date    CARPAL TUNNEL RELEASE Bilateral     COLONOSCOPY N/A 2020    COLONOSCOPY POLYPECTOMY SNARE/COLD BIOPSY WITH SPOT INK TATTOO performed by Bharat Tee MD at [facility] Left 's    KNEE SURGERY Bilateral     right knee x 2( scope)/ left knee- 7-8 yr ago   [address] fusion   [address]  N/A 2020    BOWEL RESECTION EXTENDED RIGHT HEMICOLECTOMY performed by Sushil Ahuja MD at [address] [place] [address] Left 2013    THORACENTESIS  2016         THORACENTESIS Left 11/15/2016    Dr. García Dash 250mlsremoved     THORACOTOMY Left 2019    with Lysis of adhesions    THORACOTOMY Left 3/18/2019    THORACOSCOPY CONVERTED TO THORACOTOMY WITH LYSIS OF ADHESIONS performed by Lauralee Phalen, MD at [address] [place] [address]      as a kid    UPPER GASTROINTESTINAL ENDOSCOPY N/A 5/12/2020    EGD BIOPSY performed by Merary Granger MD at [facility] ENDOSCOPY       Immunization History:   Immunization History   Administered Date(s) Administered    Influenza A (O9X0-43) Vaccine PF IM 04/07/2010    Influenza Vaccine, unspecified formulation 10/14/2016    Influenza Virus Vaccine 10/03/2013, 10/13/2014, 01/29/2016, 10/14/2016    Influenza, High Dose (Fluzone 65 yrs and older) 11/11/2018    Influenza, Quadv, IM, PF (6 mo and older Fluzone, Flulaval, Fluarix, and 3 yrs and older Afluria) 10/09/2019    Pneumococcal Conjugate 13-valent (Ijhnztx14) 10/17/2016    Pneumococcal Polysaccharide (Ttfaglrnc18) 01/01/2009, 10/03/2013    Tdap (Boostrix, Adacel) 06/10/2013       Active Problems:  Patient Active Problem List   Diagnosis Code    [facility] J44.9    Pleural effusion, left J90    SAMANTHA on CPAP G47.33, Z99.89    Chronic obstructive pulmonary disease (HCC) J44.9    TIA (transient ischemic attack) G45.9    Bipolar 1 disorder ([facility] F31.9    Coronary artery disease involving native coronary artery of native heart without angina pectoris I25.10    Essential hypertension I10    REYES (dyspnea on exertion) R06.09    Anemia, chronic disease D63.8    Diuretic-induced hypokalemia E87.6, E12.5Z7X    Diastolic CHF ([facility] B78.71    Acute respiratory failure (HCC) J96.00    Pneumonia due to gram-negative bacteria ([facility]) J15.6    Hyperkalemia E87.5    Adrenal mass ([facility]) E27.8    Diabetes mellitus, type 2 ([facility]) E11.9    Hyponatremia E87.1    Pneumonia due to organism J18.9    PAF (paroxysmal atrial fibrillation) ([facility]) I48.0    Empyema of left pleural space ([facility]) J86.9    Hospital-acquired bacterial pneumonia J15.9    Hyperlipidemia E78.5    Lumbar radiculopathy M54.16    CKD (chronic kidney disease) N18.9    Diabetic peripheral neuropathy ([facility]) E11.42    Chronic kidney disease (CKD), stage III (moderate) ([facility]) N18.3    Cervical stenosis of spine M48.02    Spinal stenosis of lumbar region without neurogenic claudication M48.061    Somatic dysfunction of back M99.09    Somatic dysfunction of cervical region M99.01    Somatic dysfunction of pelvis region M99.05    Somatic dysfunction of both lower extremities M99.06    Empyema lung (HCC) J86.9    Acute kidney injury superimposed on CKD (HCC) N17.9, N18.9    Chronic diastolic heart failure (HCC) I50.32    Syncope and collapse R55    Hypotensive episode I95.9    Bradycardia on ECG R00.1    Syncope R55    Lightheadedness R42    Dizziness R42    Acute on chronic respiratory failure with hypoxia (HCC) J96.21    Severe mixed bipolar 1 disorder without psychosis ([facility]) F31.63    Acute chest pain R07.9    Chest pain R07.9    Atrial tachycardia (HCC) I47.1    Overlapping malignant neoplasm of colon (HCC) C18.8    Colon adenocarcinoma (HCC) C18.9    Pneumonia J18.9    Acute left-sided weakness R53.1    Fall at home W19. Lida Morrell, Y92.009       Isolation/Infection:   Isolation          Contact        Patient Infection Status     Infection Onset Added Last Indicated Last Indicated By Review Planned Expiration Resolved Resolved By    COVID-19 Rule Out 08/01/20 08/01/20 08/01/20 Covid-19 Ambulatory (Ordered)        MRSA 10/09/19 10/10/19 10/09/19 Culture, Sputum        Resolved    COVID-19 Rule Out 06/01/20 06/01/20 06/01/20 Covid-19 Ambulatory (Ordered)   06/03/20 Rule-Out Test Resulted    COVID-19 Rule Out 05/26/20 05/26/20 05/28/20 COVID-19 (Ordered)   05/28/20 Rule-Out Test Resulted          Nurse Assessment:  Last Vital Signs: /86   Pulse 69   Temp 97.7 °F (36.5 °C) (Oral)   Resp 16   Ht 5' 9\" (1.753 m)   Wt 220 lb (99.8 kg)   SpO2 93%   BMI 32.49 kg/m²     Last documented pain score (0-10 scale): Pain Level: 0  Last Weight:   Wt Readings from Last 1 Encounters:   08/03/20 220 lb (99.8 kg)     Mental Status:  {IP PT MENTAL STATUS:66681}    IV Access:  [facility] IV XPOKSB:[id number]}    Nursing Mobility/ADLs:  Walking   {[facility] DME YATC:197381819}  Transfer  {[facility] D[facility] PEDA:405273039}  Bathing  {[facility] D[facility] EEI}  Dressing  {[facility] D[facility] SAXU:537515290}  Toileting  {[facility] DME LUUQ:057159155}  Feeding  {[facility] DME DYKE:155741706}  Med Admin  {[facility] DME GOGF:826808495}  Med Delivery   { [facility] MED Delivery:071560327}    Wound Care Documentation and Therapy:        Elimination:  Continence:   · Bowel: {YES / ZU:15886}  · Bladder: {YES / OC:78169}  Urinary Catheter: {Urinary Catheter:144572881}   Colostomy/Ileostomy/Ileal Conduit: {YES / OP:86321}       Date of Last BM: ***    Intake/Output Summary (Last 24 hours) at 8/3/2020 1030  Last data filed at 8/3/2020 0711  Gross per 24 hour   Intake --   Output 1100 ml   Net -1100 ml     I/O last 3 completed shifts:  In: -   Out: 400 [Urine:400]    Safety Concerns:     [address] [employer] Safety Concerns:611945747}    Impairments/Disabilities:      [address] [employer] Impairments/Disabilities:936140064}    Patient's personal belongings (please select all that are sent with patient):  {[facility] DME Belongings:952298642}    RN SIGNATURE:  {Esignature:167243298}                PHYSICIAN SECTION    Nutrition Therapy:  Current Nutrition Therapy:   - Oral Diet:  General    Routes of Feeding: Oral  Liquids: No Restrictions  Daily Fluid Restriction: no  Last Modified Barium Swallow with Video (Video Swallowing Test): not done    Treatments at the Time of Hospital Discharge:   Respiratory Treatments: none  Oxygen Therapy:  is not on home oxygen therapy.   Ventilator:    - No ventilator support    Rehab Therapies: Physical Therapy and Occupational Therapy  Weight Bearing Status/Restrictions: No weight bearing restirctions  Other Medical Equipment (for information only, NOT a DME order):  none  Other Treatments: none  Prognosis: Good    Condition at Discharge: Stable    Rehab Potential (if transferring to Rehab): Good    Recommended Labs or Other Treatments After Discharge:  BMP weekly    Physician Certification: I certify the above information and transfer of Efraín Aragon.  is necessary for the continuing treatment of the diagnosis listed and that he requires [facility] for less 30 days.      Update Admission H&P: No change in H&P    PHYSICIAN SIGNATURE:  Electronically signed by Charo Jimenez MD on 8/5/20 at 10:52 AM EDT

## 2020-08-03 NOTE — PROGRESS NOTES
Occupational Therapy    Occupational Therapy Treatment Note  Name: Dulce Ornelas. MRN: [id number] :   1950   Date:  8/3/2020   Admission Date: 2020 Room:  [facility]-   Restrictions/Precautions:    Fall risk  Communication with other providers:   Cleared for treatment by Karlie Villafana RN. Subjective:  Patient states:  \"I need to get out of bed\"  Pain:   Location, Type, Intensity (0/10 to 10/10):  4/10 back    Objective:    Observation:  Pt alert and oriented. Objective Measures:HR 86    Treatment, including education:  Transfers  Supine to sit :Min A  Scooting :SBA  Sit to stand :CGA  Stand to sit :CGA  SPT:CGA      Self Care Training:   Cues were given for safety, sequence, UE/LE placement, visual cues, and balance. Activities performed today included dressing, toileting, hand hygiene, and grooming. Grooming  Sup to brush hair, pt reports teeth are missing and nursing aware. Bathing   SBA to wash buttocks and paulino area while standing at RW. Therapeutic Exercise:  Cues were given for technique, safety, recruitment, and rationale. Cues were verbal and/or tactile. For BUE strengthening for ADL & functional mobility Indep pt performed BUE strengthening HEP c 2# hand weights x 20 reps x 6 exercises with Minimal difficulty. For BUE strengthening for ADL & functional mobility Indep pt performed BUE strengthening HEP using Medium strength theraband  X 5 exer for R/L UE x 10 reps c minimal rest breaks. Therapeutic Activity Training:   Therapeutic activity training was instructed today. Cues were given for safety, sequence, UE/LE placement, awareness, and balance. Activities performed today included bed mobility training, sup-sit, sit-stand, SPT.       Safety Measures: Gait belt used, Left in bed, Pull/Bed Alarm activated and call light left in reach          Assessment / Impression:        Patient's tolerance of treatment: Good   Adverse Reaction: None  Significant change in status and impact:  None  Barriers to improvement:  None    Plan for Next Session:    Continue with POC.     Time in:  0935  Time out:  1015  Timed treatment minutes:  40  Total treatment time:  40  Electronically signed by:    Flo Palma [address]    8/3/2020, 10:15 AM    Previously filed values:

## 2020-08-03 NOTE — CARE COORDINATION
Called patient to confirm plan for rehab at discharge. He shared plan to return to [facility] for rehab.     10:00 AM  Called referral to Mary/admissions at [facility] who states patient recently there. She will review and initiate pre-cert. Packet started and will need AVS and any RX. PAS/RR completed. 2:11 PM  Received call back from Mary/admissions with [facility] who states unable to initiate pre-cert until PT update is available. Whiteboard note placed to alert therapy to need and note was placed at 8:37 AM this morning also. Pt new to [facility] at discharge. Please call report to [phone] and fax orders, AVS, and discharge summaries to [phone].

## 2020-08-04 LAB
ACQUISITION DURATION: NORMAL S
AVERAGE HEART RATE: 77 BPM
EKG ATRIAL RATE: 141 BPM
EKG DIAGNOSIS: NORMAL
EKG DIAGNOSIS: NORMAL
EKG Q-T INTERVAL: 386 MS
EKG QRS DURATION: 112 MS
EKG QTC CALCULATION (BAZETT): 569 MS
EKG R AXIS: -6 DEGREES
EKG T AXIS: 4 DEGREES
EKG VENTRICULAR RATE: 131 BPM
FASTEST SUPRAVENTRICULAR RATE: 127 BPM
HOOKUP DATE: NORMAL
HOOKUP TIME: NORMAL
LONGEST SUPRAVENTRICULAR RATE: 123 BPM
MAX HEART RATE TIME/DATE: NORMAL
MAX HEART RATE: 150 BPM
MIN HEART RATE TIME/DATE: NORMAL
MIN HEART RATE: 49 BPM
NUMBER OF FASTEST SUPRAVENTRICULAR BEATS: 8
NUMBER OF LONGEST SUPRAVENTRICULAR BEATS: 43
NUMBER OF QRS COMPLEXES: NORMAL
NUMBER OF SUPRAVENTRICULAR BEATS IN RUNS: 364
NUMBER OF SUPRAVENTRICULAR COUPLETS: 4
NUMBER OF SUPRAVENTRICULAR ECTOPICS: 390
NUMBER OF SUPRAVENTRICULAR ISOLATED BEATS: 18
NUMBER OF SUPRAVENTRICULAR RUNS: 20
NUMBER OF VENTRICULAR BEATS IN RUNS: 0
NUMBER OF VENTRICULAR BIGEMINAL CYCLES: 0
NUMBER OF VENTRICULAR COUPLETS: 0
NUMBER OF VENTRICULAR ECTOPICS: 9
NUMBER OF VENTRICULAR ISOLATED BEATS: 9
NUMBER OF VENTRICULAR RUNS: 0
TROPONIN T: 0.02 NG/ML
TROPONIN T: 0.02 NG/ML
TROPONIN T: 0.04 NG/ML

## 2020-08-04 PROCEDURE — 99214 OFFICE O/P EST MOD 30 MIN: CPT | Performed by: INTERNAL MEDICINE

## 2020-08-04 PROCEDURE — 6360000002 HC RX W HCPCS: Performed by: NURSE PRACTITIONER

## 2020-08-04 PROCEDURE — APPSS15 APP SPLIT SHARED TIME 0-15 MINUTES: Performed by: NURSE PRACTITIONER

## 2020-08-04 PROCEDURE — 6370000000 HC RX 637 (ALT 250 FOR IP): Performed by: INTERNAL MEDICINE

## 2020-08-04 PROCEDURE — G0378 HOSPITAL OBSERVATION PER HR: HCPCS

## 2020-08-04 PROCEDURE — 93010 ELECTROCARDIOGRAM REPORT: CPT | Performed by: INTERNAL MEDICINE

## 2020-08-04 PROCEDURE — 36415 COLL VENOUS BLD VENIPUNCTURE: CPT

## 2020-08-04 PROCEDURE — 96372 THER/PROPH/DIAG INJ SC/IM: CPT

## 2020-08-04 PROCEDURE — 96365 THER/PROPH/DIAG IV INF INIT: CPT

## 2020-08-04 PROCEDURE — 84484 ASSAY OF TROPONIN QUANT: CPT

## 2020-08-04 PROCEDURE — 94761 N-INVAS EAR/PLS OXIMETRY MLT: CPT

## 2020-08-04 PROCEDURE — 93005 ELECTROCARDIOGRAM TRACING: CPT | Performed by: INTERNAL MEDICINE

## 2020-08-04 PROCEDURE — 96366 THER/PROPH/DIAG IV INF ADDON: CPT

## 2020-08-04 PROCEDURE — 2580000003 HC RX 258: Performed by: NURSE PRACTITIONER

## 2020-08-04 PROCEDURE — 97535 SELF CARE MNGMENT TRAINING: CPT

## 2020-08-04 PROCEDURE — 97530 THERAPEUTIC ACTIVITIES: CPT

## 2020-08-04 PROCEDURE — 6370000000 HC RX 637 (ALT 250 FOR IP): Performed by: NURSE PRACTITIONER

## 2020-08-04 PROCEDURE — 2500000003 HC RX 250 WO HCPCS: Performed by: INTERNAL MEDICINE

## 2020-08-04 PROCEDURE — 96361 HYDRATE IV INFUSION ADD-ON: CPT

## 2020-08-04 RX ORDER — DILTIAZEM HYDROCHLORIDE 5 MG/ML
10 INJECTION INTRAVENOUS ONCE
Status: COMPLETED | OUTPATIENT
Start: 2020-08-04 | End: 2020-08-04

## 2020-08-04 RX ORDER — DILTIAZEM HYDROCHLORIDE 5 MG/ML
30 INJECTION INTRAVENOUS EVERY 6 HOURS
Status: DISCONTINUED | OUTPATIENT
Start: 2020-08-04 | End: 2020-08-04

## 2020-08-04 RX ADMIN — GABAPENTIN 600 MG: 300 CAPSULE ORAL at 15:24

## 2020-08-04 RX ADMIN — ENOXAPARIN SODIUM 30 MG: 30 INJECTION SUBCUTANEOUS at 20:58

## 2020-08-04 RX ADMIN — DILTIAZEM HYDROCHLORIDE 10 MG: 5 INJECTION INTRAVENOUS at 02:04

## 2020-08-04 RX ADMIN — DILTIAZEM HYDROCHLORIDE 30 MG: 30 TABLET, FILM COATED ORAL at 11:35

## 2020-08-04 RX ADMIN — SODIUM CHLORIDE, PRESERVATIVE FREE 10 ML: 5 INJECTION INTRAVENOUS at 20:58

## 2020-08-04 RX ADMIN — GABAPENTIN 600 MG: 300 CAPSULE ORAL at 09:52

## 2020-08-04 RX ADMIN — ATORVASTATIN CALCIUM 80 MG: 80 TABLET, FILM COATED ORAL at 20:57

## 2020-08-04 RX ADMIN — SODIUM CHLORIDE, PRESERVATIVE FREE 10 ML: 5 INJECTION INTRAVENOUS at 09:53

## 2020-08-04 RX ADMIN — ESCITALOPRAM OXALATE 10 MG: 10 TABLET ORAL at 09:52

## 2020-08-04 RX ADMIN — Medication 1 CAPSULE: at 09:52

## 2020-08-04 RX ADMIN — FERROUS SULFATE TAB 325 MG (65 MG ELEMENTAL FE) 325 MG: 325 (65 FE) TAB at 20:58

## 2020-08-04 RX ADMIN — FAMOTIDINE 10 MG: 20 TABLET ORAL at 09:52

## 2020-08-04 RX ADMIN — ASPIRIN 81 MG CHEWABLE TABLET 81 MG: 81 TABLET CHEWABLE at 09:52

## 2020-08-04 RX ADMIN — OXYCODONE HYDROCHLORIDE AND ACETAMINOPHEN 1 TABLET: 7.5; 325 TABLET ORAL at 09:52

## 2020-08-04 RX ADMIN — DOCUSATE SODIUM 100 MG: 100 CAPSULE, LIQUID FILLED ORAL at 09:52

## 2020-08-04 RX ADMIN — DILTIAZEM HYDROCHLORIDE 30 MG: 30 TABLET, FILM COATED ORAL at 06:37

## 2020-08-04 RX ADMIN — BUSPIRONE HYDROCHLORIDE 10 MG: 10 TABLET ORAL at 09:53

## 2020-08-04 RX ADMIN — DIVALPROEX SODIUM 1000 MG: 500 TABLET, FILM COATED, EXTENDED RELEASE ORAL at 20:57

## 2020-08-04 RX ADMIN — DILTIAZEM HYDROCHLORIDE 30 MG: 30 TABLET, FILM COATED ORAL at 20:58

## 2020-08-04 RX ADMIN — OXYCODONE HYDROCHLORIDE AND ACETAMINOPHEN 1 TABLET: 7.5; 325 TABLET ORAL at 21:00

## 2020-08-04 RX ADMIN — GABAPENTIN 600 MG: 300 CAPSULE ORAL at 20:57

## 2020-08-04 RX ADMIN — FERROUS SULFATE TAB 325 MG (65 MG ELEMENTAL FE) 325 MG: 325 (65 FE) TAB at 09:53

## 2020-08-04 RX ADMIN — FUROSEMIDE 20 MG: 20 TABLET ORAL at 09:53

## 2020-08-04 RX ADMIN — BUSPIRONE HYDROCHLORIDE 10 MG: 10 TABLET ORAL at 15:24

## 2020-08-04 RX ADMIN — TRAZODONE HYDROCHLORIDE 50 MG: 50 TABLET ORAL at 20:57

## 2020-08-04 RX ADMIN — FAMOTIDINE 10 MG: 20 TABLET ORAL at 20:57

## 2020-08-04 RX ADMIN — BUSPIRONE HYDROCHLORIDE 10 MG: 10 TABLET ORAL at 20:58

## 2020-08-04 RX ADMIN — RISPERIDONE 1 MG: 0.5 TABLET, FILM COATED ORAL at 09:52

## 2020-08-04 RX ADMIN — DEXTROSE MONOHYDRATE 5 MG/HR: 50 INJECTION, SOLUTION INTRAVENOUS at 02:13

## 2020-08-04 RX ADMIN — FOLIC ACID 1 MG: 1 TABLET ORAL at 09:52

## 2020-08-04 RX ADMIN — DOCUSATE SODIUM 100 MG: 100 CAPSULE, LIQUID FILLED ORAL at 20:57

## 2020-08-04 ASSESSMENT — PAIN DESCRIPTION - LOCATION
LOCATION: NECK
LOCATION: NECK

## 2020-08-04 ASSESSMENT — PAIN DESCRIPTION - ORIENTATION
ORIENTATION: LEFT
ORIENTATION: LEFT

## 2020-08-04 ASSESSMENT — PAIN SCALES - GENERAL
PAINLEVEL_OUTOF10: 6
PAINLEVEL_OUTOF10: 8

## 2020-08-04 ASSESSMENT — PAIN DESCRIPTION - FREQUENCY
FREQUENCY: CONTINUOUS
FREQUENCY: CONTINUOUS

## 2020-08-04 ASSESSMENT — PAIN DESCRIPTION - PROGRESSION
CLINICAL_PROGRESSION: NOT CHANGED

## 2020-08-04 ASSESSMENT — PAIN DESCRIPTION - DESCRIPTORS
DESCRIPTORS: ACHING;BURNING
DESCRIPTORS: ACHING;BURNING;CONSTANT

## 2020-08-04 ASSESSMENT — PAIN DESCRIPTION - PAIN TYPE
TYPE: ACUTE PAIN
TYPE: ACUTE PAIN

## 2020-08-04 ASSESSMENT — PAIN DESCRIPTION - ONSET
ONSET: ON-GOING
ONSET: ON-GOING

## 2020-08-04 NOTE — PROGRESS NOTES
Received orders from Dr. Don Espinosa to administer cardizem 10mg bolus IV and then start pt on cardizem gtt.

## 2020-08-04 NOTE — PROGRESS NOTES
Cardiology Note    Admit Date:  7/30/2020    Admission diagnosis / Complaint: chest pain      Subjective:  Mr. Catalina Caballero is sitting up in the chair. Holter is pending at this time. Denies cardiac complaints. Reports chest pain has resolved. States he wants to go home. Assessment and Plan:    1. Chest Pain: Troponins chronically elevated. He is at baseline. EKG reviewed. . Will hold on [facility] for now as patient denies further chest pain. 2. HTN: stable. Continue current medical management    3. HLD: continue statins    4. Holter pending    5. Carotid Stenosis per CTS    Objective:   /61   Pulse 73   Temp 97.8 °F (36.6 °C) (Oral)   Resp 16   Ht 5' 9\" (1.753 m)   Wt 220 lb (99.8 kg)   SpO2 92%   BMI 32.49 kg/m²       Intake/Output Summary (Last 24 hours) at 8/4/2020 1305  Last data filed at 8/3/2020 2115  Gross per 24 hour   Intake 115 ml   Output 650 ml   Net -535 ml       TELEMETRY: Sinus    has a past medical history of Anemia, Arthritis, Back pain, chronic, Bipolar 1 disorder ([address] CAD (coronary artery disease), Cerebral artery occlusion with cerebral infarction ([address] Chronic kidney disease (CKD), stage III (moderate) ([address] CKD (chronic kidney disease), COPD (chronic obstructive pulmonary disease) ([address] Diabetes mellitus, type 2 ([address] Diabetic peripheral neuropathy ([address] Diastolic CHF ([address] Gastric ulcer, H/O cardiovascular stress test, Heart murmur, Hiatal hernia, History of cardiac cath, Hx of migraines, HX OTHER MEDICAL, Hyperlipidemia, Hypertension, Lumbar radiculopathy, Overlapping malignant neoplasm of colon ([address] Panic attack, Pleural effusion, S/P thoracentesis, Sleep apnea, SOBOE (shortness of breath on exertion), and Spinal stenosis. has a past surgical history that includes Neck surgery (1998); Carpal tunnel release (Bilateral, 1989); knee surgery (Bilateral); thoracentesis (Left, 12/20/2013); Elbow surgery (Left, 2000's); Thoracentesis (4/25/2016);  Tonsillectomy; Thoracentesis (Left, 11/15/2016); thoracotomy (Left, 03/18/2019); thoracotomy (Left, 3/18/2019); Upper gastrointestinal endoscopy (N/A, 5/12/2020); Colonoscopy (N/A, 5/12/2020); and Small intestine surgery (N/A, 5/21/2020). Physical Exam:  General:  Awake, alert, NAD  Skin:  Warm and dry  Neck:  JVD not appreciated  Chest:  Clear to auscultation, respiration easy  Cardiovascular:  RRR S1S2  Abdomen:  Soft nontender  Extremities:  No edema (evidence that patient did have bilateral lower leg edema recently as legs are wrinkled)    Medications:    dilTIAZem  30 mg Oral 4 times per day    aspirin  324 mg Oral Once    aspirin  81 mg Oral Daily    atorvastatin  80 mg Oral Nightly    busPIRone  10 mg Oral TID    divalproex  1,000 mg Oral Nightly    docusate sodium  100 mg Oral BID    enoxaparin  30 mg Subcutaneous Daily    escitalopram  10 mg Oral Daily    famotidine  10 mg Oral BID    ferrous sulfate  325 mg Oral BID    folic acid  1 mg Oral Daily    furosemide  20 mg Oral Daily    traZODone  50 mg Oral Nightly    risperiDONE  1 mg Oral Daily    lactobacillus  1 capsule Oral Daily    gabapentin  600 mg Oral TID    sodium chloride flush  10 mL Intravenous 2 times per day    budesonide-formoterol  2 puff Inhalation BID      diltiazem (CARDIZEM) 100 mg in dextrose 5% 100 mL ([facility]) Stopped (08/04/20 0350)     nitroGLYCERIN, acetaminophen, acetaminophen, ALPRAZolam, tiZANidine, sodium chloride flush, polyethylene glycol, promethazine **OR** ondansetron, labetalol, oxyCODONE-acetaminophen    Lab Data:  CBC:   Recent Labs     08/01/20  2354   WBC 7.0   HGB 11.8*   HCT 38.8*   MCV 81.7        BMP:   Recent Labs     08/01/20  2354      K 4.1      CO2 25   BUN 22   CREATININE 2.0*     All labs reviewed          GIOVANNA Adrian 8/4/2020 1:05 PM       I have seen ,spoken to  and examined this patient personally, independently of the nurse practitioner.  I have reviewed the hospital care given to date and reviewed all pertinent labs and imaging. The plan was developed mutually at the time of the visit with the patient,  NP  and myself. I have spoken with patient, nursing staff and provided written and verbal instructions . The above note has been reviewed and I agree with the assessment, diagnosis, and treatment plan with changes made by me as follows     CARDIOLOGY ATTENDING ADDENDUM    HPI:  I have reviewed the above HPI  And agree with above   Awan Doris is a 79 y. o.year old who and presents with had concerns including Fatigue and Aphasia. Chief Complaint   Patient presents with    Fatigue    Aphasia     Interval history:  Had svt    Physical Exam:  General:  Awake, alert, NAD  Head:normal  Eye:normal  Neck:  No JVD   Chest:  Clear to auscultation, respiration easy  Cardiovascular:  RRR S1S2  Abdomen:   nontender  Extremities:  no edema  Pulses; palpable  Neuro: grossly normal      MEDICAL DECISION MAKING;    I agree with the above plan, which was planned by myself and discussed with NP.   Had svt in sr on ccb        Ashlyn Crawford MD [facility]

## 2020-08-04 NOTE — PROGRESS NOTES
Hospitalist Progress Note      Name:  Antonella Sanchez /Age/Sex: 1950  (79 y.o. male)   MRN & CSN:  [id number] & [id number] Admission Date/Time: 2020  1:35 PM   Location:  [facility] PCP: Mary An MD         Hospital Day: 6    Assessment and Plan:   Antonella Sanchez is a 79 y.o.  male  who presents with Acute left-sided weakness. -- Bilateral leg weakness  --Fall at home  Apparently patient reported left-sided weakness on admission. No acute stroke on MRI brain. CT head negative for acute abnormality  No acute abnormality of cervical and thoracic spine  No acute abnormality of the shoulder or pelvis. Plan  PT/OT evaluation -SNF placement recommended. --Chest pain -now pain-free. Troponin chronically elevated and at baseline. ECG showed sinus tachycardia when he was having chest pain but heart rates is now back to baseline. Plan: Continue medical mgt of CAD. Cardiology on board - may do LHC if chest pain recurs. --- Paroxysmal SVT  Heart rate now controlled. Off Cardizem drip. Started on p.o. Cardizem 30 mg every 6 hours. Cardiology on board. --Bilateral carotid stenoses  75% stenosis at origin of the rt ICA  60% stenosis at the origin of the left ICA  Plan  Vascular surgery consulted. I discussed with the vascular surgeon [Dr. Adia Hearn on 2020 and the recommended patient be discharged to SNF and then follow-up with him outpatient to plan further treatment of his stenoses. On ASA 81 mg + statin     -- Bilateral subdural hygromas- stable. Other diagnoses          - CAD - on ASA, Lipitor.         - diastolic CHF - stable, no edema, no SOB, on oral Lasix         - anemia - cont Ferrous sulfate         - gastric ulcer - on Protonix         - hyperlipidemia         - bipolar 1 d/o         - MRSA - start contact isolation         - seizure - c/w Depakote ER, seizure precautions         -CKD 3 -stable. -COPD-stable.        Diet DIET GENERAL;   DVT Prophylaxis [x] Lovenox, []  Heparin, [] SCDs, [] Ambulation   GI Prophylaxis [] PPI,  [] H2 Blocker,  [] Carafate,  [] Diet/Tube Feeds   Code Status Full Code   Disposition  SNF placement plan underway. COVID-19 negative. MDM [] Low, [x] Moderate,[]  High     History of Present Illness:   Patient seen and examined. He had an episode of SVT last night requiring Cardizem drip. Heart rate is now controlled and is off Cardizem drip. Denies chest pain. Awaiting SNF placement. Ten point ROS reviewed negative, unless as noted above    Objective: Intake/Output Summary (Last 24 hours) at 8/4/2020 1029  Last data filed at 8/3/2020 2115  Gross per 24 hour   Intake 115 ml   Output 650 ml   Net -535 ml      Vitals:   Vitals:    08/04/20 0949   BP: 125/61   Pulse: 73   Resp: 16   Temp: 97.8 °F (36.6 °C)   SpO2: 92%     Physical Exam:   GEN Awake male, sitting upright in bed. RESP breathing comfortably on room air  CARDIO/VASC S1/S2 auscultated. Regular rate without appreciable murmurs. Peripheral pulses equal bilaterally and palpable. No peripheral edema. GI Abdomen is soft without significant tenderness, masses, or guarding. Bowel sounds are normoactive. MSK No gross joint deformities. SKIN Normal coloration, warm, dry. NEURO normal speech, no lateralizing weakness. PSYCH Awake, alert, oriented x 3.     Medications:   Medications:    dilTIAZem  30 mg Oral 4 times per day    aspirin  324 mg Oral Once    aspirin  81 mg Oral Daily    atorvastatin  80 mg Oral Nightly    busPIRone  10 mg Oral TID    divalproex  1,000 mg Oral Nightly    docusate sodium  100 mg Oral BID    enoxaparin  30 mg Subcutaneous Daily    escitalopram  10 mg Oral Daily    famotidine  10 mg Oral BID    ferrous sulfate  325 mg Oral BID    folic acid  1 mg Oral Daily    furosemide  20 mg Oral Daily    traZODone  50 mg Oral Nightly    risperiDONE  1 mg Oral Daily    lactobacillus  1 capsule Oral Daily    gabapentin  600 mg Oral TID    sodium chloride flush  10 mL Intravenous 2 times per day    budesonide-formoterol  2 puff Inhalation BID      Infusions:    diltiazem (CARDIZEM) 100 mg in dextrose 5% 100 mL (ADD-[place]) Stopped (08/04/20 0350)     PRN Meds: nitroGLYCERIN, 0.4 mg, Q5 Min PRN  acetaminophen, 650 mg, Q4H PRN  acetaminophen, 650 mg, Q4H PRN  ALPRAZolam, 1 mg, TID PRN  tiZANidine, 4 mg, Q6H PRN  sodium chloride flush, 10 mL, PRN  polyethylene glycol, 17 g, Daily PRN  promethazine, 12.5 mg, Q6H PRN    Or  ondansetron, 4 mg, Q6H PRN  labetalol, 10 mg, Q10 Min PRN  oxyCODONE-acetaminophen, 1 tablet, Q6H PRN        CBC   Recent Labs     08/01/20  2354   WBC 7.0   HGB 11.8*   HCT 38.8*         BMP   Recent Labs     08/01/20  2354      K 4.1      CO2 25   BUN 22   CREATININE 2.0*       Radiology report reviewed     Electronically signed by Brenna Shi MD on 8/4/2020 at 10:29 AM

## 2020-08-04 NOTE — PROGRESS NOTES
Pt ahs been in SVT for approximately 45 minutes, contacted Faina/hospitalist; ran stat ekg which identified SVT. Per [facility]'s request, tested Dr. Yuniel Mcdermott (Cardiology). Charge RN aware.

## 2020-08-04 NOTE — PROGRESS NOTES
Occupational Therapy  . Occupational Therapy Treatment Note  Name: Francis Nuñez. MRN: [id number] :   1950   Date:  2020   Admission Date: 2020 Room:  3018/3018-A   Restrictions/Precautions:    General precautions; Fall Risk    Communication with other providers:  Per chart review and Nurse Bender, patient is appropriate for therapeutic intervention. Notified Nurse Yulia of pt's pain level and nurse reports pt is not yet due for pain meds. Subjective:  Patient states: \"My tail bone is so sore, can I get back in bed? \" Pt agreeable to perform ADLs first to address food stained gown, face and hands. Pain:   Location, Type, Intensity (0/10 to 10/10):  8/10, tail bone    Objective:    Observation:  Pt received seated in bedside chair. Objective Measures:  Telemetry, HR 64    Treatment, including education:  Self Care Training:   Cues were given for safety, sequence, UE/LE placement, visual cues, and balance. Activities performed today included dressing, toileting, hand hygiene, and grooming. Grooming: Sup seated to perform hair care and wash face  Bathing: Min A for thorough hygiene of buttocks and for steady while pt washed front paulino area, no assist seated to wash UB. UB Dressing: Min A for back of head    Therapeutic Activity Training:   Therapeutic activity training was instructed today. Cues were given for safety, sequence, UE/LE placement, awareness, and balance. Activities performed today included bed mobility training, sup-sit, sit-stand, SPT. Pt required CGA for sit<>stands, stand step transfer c RW + min vc's for safe body positioning. Functional Mobility: CGA c RW + occasional cues for keeping walker close. Sit to supine: SBA  Scooting: SBA  No supine to sit performed. All therapeutic intervention performed c emphasis on dynamic balance / standing tolerance to inc strength, endurance and act tolerance for inc Indep c ADL tasks, func transfers / mobility. Safety  Patient safely in bed + alarm at end of session, with call light/phone in reach, and nursing aware. Gait belt was used for func transfers / mobility. Assessment / Impression:        Patient's tolerance of treatment:  Well   Adverse Reaction: None  Significant change in status and impact:  None  Barriers to improvement:  Decreased strength, decreased endurance    Plan for Next Session:    Continue per OT POC c plan to address [place] distances to bathroom for toileting ADL. Time in:  1409  Time out:  1440  Timed treatment minutes:  31  Total treatment time:  31    Electronically signed by:    RUBEN Pollock  8/4/2020, 2:23 PM    Previously filed values:       Goals:  1. Pt will complete all aspects of bed mobility for EOB/OOB ADLs with Kiran. 2. Pt will complete UB/LB bathing with CGA and AE as needed. 3. Pt will complete all aspects of LB dressing with SBAA and AE as needed. 4. Pt will complete all functional transfers to and from bed, chair, toilet, shower chair with Kiran and RW.   5. Pt will ambulate HH distance to bathroom for toileting with SBA and RW. 6. Pt will complete all aspects of toileting task with Kiran. 7. Pt will complete oral hygiene/grooming routine in standing at sink with CGA demo good dynamic standing balance for approx 8 minutes. 8. Pt will complete ther ex/ther act with focus on UB strengthening.

## 2020-08-05 VITALS
HEART RATE: 64 BPM | WEIGHT: 224.2 LBS | OXYGEN SATURATION: 91 % | RESPIRATION RATE: 16 BRPM | DIASTOLIC BLOOD PRESSURE: 77 MMHG | HEIGHT: 69 IN | BODY MASS INDEX: 33.21 KG/M2 | SYSTOLIC BLOOD PRESSURE: 106 MMHG | TEMPERATURE: 97.8 F

## 2020-08-05 PROCEDURE — G0378 HOSPITAL OBSERVATION PER HR: HCPCS

## 2020-08-05 PROCEDURE — 6370000000 HC RX 637 (ALT 250 FOR IP): Performed by: INTERNAL MEDICINE

## 2020-08-05 PROCEDURE — 84484 ASSAY OF TROPONIN QUANT: CPT

## 2020-08-05 PROCEDURE — 6370000000 HC RX 637 (ALT 250 FOR IP): Performed by: NURSE PRACTITIONER

## 2020-08-05 PROCEDURE — APPSS15 APP SPLIT SHARED TIME 0-15 MINUTES: Performed by: NURSE PRACTITIONER

## 2020-08-05 PROCEDURE — 2580000003 HC RX 258: Performed by: NURSE PRACTITIONER

## 2020-08-05 PROCEDURE — 6360000002 HC RX W HCPCS: Performed by: NURSE PRACTITIONER

## 2020-08-05 PROCEDURE — 94664 DEMO&/EVAL PT USE INHALER: CPT

## 2020-08-05 PROCEDURE — 94761 N-INVAS EAR/PLS OXIMETRY MLT: CPT

## 2020-08-05 PROCEDURE — 94640 AIRWAY INHALATION TREATMENT: CPT

## 2020-08-05 PROCEDURE — 99214 OFFICE O/P EST MOD 30 MIN: CPT | Performed by: INTERNAL MEDICINE

## 2020-08-05 RX ORDER — OXYCODONE AND ACETAMINOPHEN 7.5; 325 MG/1; MG/1
1 TABLET ORAL EVERY 6 HOURS PRN
Qty: 20 TABLET | Refills: 0 | Status: SHIPPED | OUTPATIENT
Start: 2020-08-05 | End: 2020-08-10

## 2020-08-05 RX ORDER — DILTIAZEM HYDROCHLORIDE 120 MG/1
120 CAPSULE, COATED, EXTENDED RELEASE ORAL DAILY
Status: ON HOLD | DISCHARGE
Start: 2020-08-05 | End: 2021-01-25 | Stop reason: HOSPADM

## 2020-08-05 RX ORDER — ASPIRIN 81 MG/1
324 TABLET, CHEWABLE ORAL ONCE
Qty: 30 TABLET | Refills: 3 | Status: ON HOLD | DISCHARGE
Start: 2020-08-05 | End: 2021-01-25 | Stop reason: HOSPADM

## 2020-08-05 RX ADMIN — FAMOTIDINE 10 MG: 20 TABLET ORAL at 08:49

## 2020-08-05 RX ADMIN — RISPERIDONE 1 MG: 0.5 TABLET, FILM COATED ORAL at 08:50

## 2020-08-05 RX ADMIN — DILTIAZEM HYDROCHLORIDE 30 MG: 30 TABLET, FILM COATED ORAL at 02:50

## 2020-08-05 RX ADMIN — BUDESONIDE AND FORMOTEROL FUMARATE DIHYDRATE 2 PUFF: 160; 4.5 AEROSOL RESPIRATORY (INHALATION) at 08:54

## 2020-08-05 RX ADMIN — Medication 1 CAPSULE: at 08:51

## 2020-08-05 RX ADMIN — SODIUM CHLORIDE, PRESERVATIVE FREE 10 ML: 5 INJECTION INTRAVENOUS at 08:51

## 2020-08-05 RX ADMIN — FERROUS SULFATE TAB 325 MG (65 MG ELEMENTAL FE) 325 MG: 325 (65 FE) TAB at 08:50

## 2020-08-05 RX ADMIN — ASPIRIN 81 MG CHEWABLE TABLET 81 MG: 81 TABLET CHEWABLE at 08:50

## 2020-08-05 RX ADMIN — GABAPENTIN 600 MG: 300 CAPSULE ORAL at 14:04

## 2020-08-05 RX ADMIN — GABAPENTIN 600 MG: 300 CAPSULE ORAL at 08:50

## 2020-08-05 RX ADMIN — DOCUSATE SODIUM 100 MG: 100 CAPSULE, LIQUID FILLED ORAL at 08:50

## 2020-08-05 RX ADMIN — DILTIAZEM HYDROCHLORIDE 30 MG: 30 TABLET, FILM COATED ORAL at 08:51

## 2020-08-05 RX ADMIN — ESCITALOPRAM OXALATE 10 MG: 10 TABLET ORAL at 08:49

## 2020-08-05 RX ADMIN — ENOXAPARIN SODIUM 30 MG: 30 INJECTION SUBCUTANEOUS at 18:12

## 2020-08-05 RX ADMIN — DILTIAZEM HYDROCHLORIDE 30 MG: 30 TABLET, FILM COATED ORAL at 14:04

## 2020-08-05 RX ADMIN — BUSPIRONE HYDROCHLORIDE 10 MG: 10 TABLET ORAL at 14:04

## 2020-08-05 RX ADMIN — FOLIC ACID 1 MG: 1 TABLET ORAL at 08:51

## 2020-08-05 RX ADMIN — FUROSEMIDE 20 MG: 20 TABLET ORAL at 08:50

## 2020-08-05 RX ADMIN — BUSPIRONE HYDROCHLORIDE 10 MG: 10 TABLET ORAL at 08:50

## 2020-08-05 ASSESSMENT — PAIN DESCRIPTION - ORIENTATION: ORIENTATION: LEFT;RIGHT

## 2020-08-05 ASSESSMENT — PAIN DESCRIPTION - LOCATION: LOCATION: NECK

## 2020-08-05 ASSESSMENT — PAIN DESCRIPTION - PAIN TYPE: TYPE: ACUTE PAIN

## 2020-08-05 ASSESSMENT — PAIN SCALES - GENERAL: PAINLEVEL_OUTOF10: 4

## 2020-08-05 NOTE — CARE COORDINATION
Chart reviewed. COVID neg on 8/1. Mary at [address] called and this patient's authorization was approved. PS sent to the doctor to update. Hens was done online by coworker. .Pt new to [facility] at discharge. Please call report to [phone] and include AVS, and prescriptions in packet which is in soft chart. Attempted to call [facility] [phone] to update and left VM. No preference in transportation company noted, called [employer] and ETA 6:15pm. Nurse updated, [facility] was updated.

## 2020-08-05 NOTE — PROGRESS NOTES
Cardiology Note    Admit Date:  7/30/2020    Admission diagnosis / Complaint: chest pain      Subjective:  Mr. Nellie Guo is resting in bed. Denies cardiac complaints. Per tele, no further episodes of SVT noted. Assessment and Plan:    1. Chest pain: Troponins are chronically elevated. He is at baseline. EKG reviewed. Will hold on left heart cath for now as patient denies further chest pain    2. Hypertension stable continue current medical management    3. Hyperlipidemia continue statins    4. Holter reviewed: Sinus rhythm noted with few episode of SVT. Patient started on calcium channel blocker. No bradycardia arrhythmias noted. Will transition Cardizem to extended release. 5.  Carotid stenosis per CTS    Patient is stable for discharge at this time with follow-up in the office in 1 week    Objective:   /76   Pulse 64   Temp 97.6 °F (36.4 °C) (Oral)   Resp 16   Ht 5' 9\" (1.753 m)   Wt 224 lb 3.2 oz (101.7 kg)   SpO2 91%   BMI 33.11 kg/m²       Intake/Output Summary (Last 24 hours) at 8/5/2020 1104  Last data filed at 8/5/2020 0248  Gross per 24 hour   Intake 360 ml   Output 400 ml   Net -40 ml       TELEMETRY: Sinus    has a past medical history of Anemia, Arthritis, Back pain, chronic, Bipolar 1 disorder (HCC), CAD (coronary artery disease), Cerebral artery occlusion with cerebral infarction ([address] Chronic kidney disease (CKD), stage III (moderate) ([address] CKD (chronic kidney disease), COPD (chronic obstructive pulmonary disease) ([address] Diabetes mellitus, type 2 ([address] Diabetic peripheral neuropathy ([address] Diastolic CHF ([address] Gastric ulcer, H/O cardiovascular stress test, Heart murmur, Hiatal hernia, History of cardiac cath, Hx of migraines, HX OTHER MEDICAL, Hyperlipidemia, Hypertension, Lumbar radiculopathy, Overlapping malignant neoplasm of colon ([address] Panic attack, Pleural effusion, S/P thoracentesis, Sleep apnea, SOBOE (shortness of breath on exertion), and Spinal stenosis. has a past surgical history that includes Neck surgery (1998); Carpal tunnel release (Bilateral, 1989); knee surgery (Bilateral); thoracentesis (Left, 12/20/2013); Elbow surgery (Left, 2000's); Thoracentesis (4/25/2016); Tonsillectomy; Thoracentesis (Left, 11/15/2016); thoracotomy (Left, 03/18/2019); thoracotomy (Left, 3/18/2019); Upper gastrointestinal endoscopy (N/A, 5/12/2020); Colonoscopy (N/A, 5/12/2020); and Small intestine surgery (N/A, 5/21/2020). Physical Exam:  General:  Awake, alert, NAD  Skin:  Warm and dry  Neck:  JVD not appreciated  Chest:  Clear to auscultation, respiration easy  Cardiovascular:  RRR S1S2  Abdomen:  Soft nontender  Extremities:  No edema appreciated    Medications:    dilTIAZem  30 mg Oral 4 times per day    aspirin  324 mg Oral Once    aspirin  81 mg Oral Daily    atorvastatin  80 mg Oral Nightly    busPIRone  10 mg Oral TID    divalproex  1,000 mg Oral Nightly    docusate sodium  100 mg Oral BID    enoxaparin  30 mg Subcutaneous Daily    escitalopram  10 mg Oral Daily    famotidine  10 mg Oral BID    ferrous sulfate  325 mg Oral BID    folic acid  1 mg Oral Daily    furosemide  20 mg Oral Daily    traZODone  50 mg Oral Nightly    risperiDONE  1 mg Oral Daily    lactobacillus  1 capsule Oral Daily    gabapentin  600 mg Oral TID    sodium chloride flush  10 mL Intravenous 2 times per day    budesonide-formoterol  2 puff Inhalation BID      diltiazem (CARDIZEM) 100 mg in dextrose 5% 100 mL (ADD-[place]) Stopped (08/04/20 0350)     nitroGLYCERIN, acetaminophen, acetaminophen, ALPRAZolam, tiZANidine, sodium chloride flush, polyethylene glycol, promethazine **OR** ondansetron, labetalol, oxyCODONE-acetaminophen    Lab Data:  CBC: No results for input(s): WBC, HGB, HCT, MCV, PLT in the last 72 hours. BMP: No results for input(s): NA, K, CL, CO2, PHOS, BUN, CREATININE in the last 72 hours.     Invalid input(s): CA  LIVER PROFILE: No results for input(s): AST, ALT, LIPASE, BILIDIR, BILITOT, ALKPHOS in the last 72 hours. Invalid input(s): AMYLASE,  ALB  PT/INR: No results for input(s): PROTIME, INR in the last 72 hours. APTT: No results for input(s): APTT in the last 72 hours. BNP:  No results for input(s): BNP in the last 72 hours. TROPONIN: @TROPONINI:3@      LABS AND CONSULTS [address] [place] [address], APRN-[facility] 8/5/2020 11:04 AM     I have seen ,spoken to  and examined this patient personally, independently of the nurse practitioner. I have reviewed the hospital care given to date and reviewed all pertinent labs and imaging. The plan was developed mutually at the time of the visit with the patient,  NP  and myself. I have spoken with patient, nursing staff and provided written and verbal instructions . The above note has been reviewed and I agree with the assessment, diagnosis, and treatment plan with changes made by me as follows     CARDIOLOGY ATTENDING ADDENDUM    HPI:  I have reviewed the above HPI  And agree with above   Lilly Hammans is a 79 y. o.year old who and presents with had concerns including Fatigue and Aphasia.   Chief Complaint   Patient presents with    Fatigue    Aphasia     Interval history:  No CP    Physical Exam:  General:  Awake, alert, NAD  Head:normal  Eye:normal  Neck:  No JVD   Chest:  Clear to auscultation, respiration easy  Cardiovascular:  RRR S1S2  Abdomen:   nontender  Extremities:  tr edema  Pulses; palpable  Neuro: grossly normal      MEDICAL DECISION MAKING;    I agree with the above plan, which was planned by myself and discussed with NP.  CAD  Stable  No SVT   CPM        Tim Saldana MD [facility]

## 2020-08-05 NOTE — PROGRESS NOTES
Lovenox, []  Heparin, [] SCDs, [] Ambulation   GI Prophylaxis [] PPI,  [] H2 Blocker,  [] Carafate,  [] Diet/Tube Feeds   Code Status Full Code   Disposition  SNF placement plan underway. Pre-CERT pending. COVID-19 negative. Medically stable for discharge. MDM [] Low, [x] Moderate,[]  High     History of Present Illness:   Patient seen and examined. Denies chest pain. Holter monitor confirms paroxysmal SVT. Awaiting SNF placement. Ten point ROS reviewed negative, unless as noted above    Objective: Intake/Output Summary (Last 24 hours) at 8/5/2020 1028  Last data filed at 8/5/2020 0248  Gross per 24 hour   Intake 360 ml   Output 400 ml   Net -40 ml      Vitals:   Vitals:    08/05/20 0854   BP:    Pulse:    Resp: 16   Temp:    SpO2: 91%     Physical Exam:   GEN Awake male, sitting upright in bed. RESP breathing comfortably on room air  CARDIO/VASC S1/S2 auscultated. Regular rate without appreciable murmurs. Peripheral pulses equal bilaterally and palpable. No peripheral edema. GI Abdomen is soft without significant tenderness, masses, or guarding. Bowel sounds are normoactive. MSK No gross joint deformities. SKIN Normal coloration, warm, dry. NEURO normal speech, no lateralizing weakness. PSYCH Awake, alert, oriented x 3.     Medications:   Medications:    dilTIAZem  30 mg Oral 4 times per day    aspirin  324 mg Oral Once    aspirin  81 mg Oral Daily    atorvastatin  80 mg Oral Nightly    busPIRone  10 mg Oral TID    divalproex  1,000 mg Oral Nightly    docusate sodium  100 mg Oral BID    enoxaparin  30 mg Subcutaneous Daily    escitalopram  10 mg Oral Daily    famotidine  10 mg Oral BID    ferrous sulfate  325 mg Oral BID    folic acid  1 mg Oral Daily    furosemide  20 mg Oral Daily    traZODone  50 mg Oral Nightly    risperiDONE  1 mg Oral Daily    lactobacillus  1 capsule Oral Daily    gabapentin  600 mg Oral TID    sodium chloride flush  10 mL Intravenous 2 times per

## 2020-08-05 NOTE — DISCHARGE SUMMARY
Discharge Summary    Name:  Yaniv Holder. /Age/Sex: 1950  (79 y.o. male)   MRN & CSN:  [id number] & [id number] Admission Date/Time: 2020  1:35 PM   Attending:  Shadi Wilson MD Discharging Physician: Shadi Wilson MD     Hospital Course:   Yaniv Jackson is a 79 y.o.  male  who presents with Acute left-sided weakness.      -- Bilateral leg weakness  --Fall at home  Apparently patient reported left-sided weakness on admission. No acute stroke on MRI brain. CT head negative for acute abnormality  No acute abnormality of cervical and thoracic spine  No acute abnormality of the shoulder or pelvis. He was discharged to SNF for physical rehabilitation     --Chest pain -resolved  Occurred in the setting of SVT. Chest pain resolved after SVT was controlled. Troponin chronically elevated and was at baseline. Cardiology recommended just medical mgt of CAD.      --- Paroxysmal SVT  Occurred few times during his hospital stay and was confirmed on Holter monitor. Heart rate now control was achieved with Cardizem drip and maintained with  p.o. Cardizem 30 mg every 6 hours. Cardiology was on board. Outpatient cardiology follow-up recommended at discharge.     --Bilateral carotid stenoses  75% stenosis at origin of the rt ICA  60% stenosis at the origin of the left ICA  Plan  Vascular surgery consulted. I discussed with the vascular surgeon [Dr. Alfred Pa on 2020 and he recommends patient be discharged to SNF and then follow-up with him outpatient to plan further treatment of his stenoses.   On ASA 81 mg + statin      -- Bilateral subdural hygromas- stable.     Other diagnoses          - CAD - on ASA, Lipitor.         - diastolic CHF - stable, no edema, no SOB, on oral Lasix         - anemia - cont Ferrous sulfate         - gastric ulcer - on Protonix         - hyperlipidemia         - bipolar 1 d/o         - MRSA - start contact isolation         - seizure - c/w Depakote ER, tablet by mouth 2 times daily             fluticasone-umeclidin-vilant (TRELEGY ELLIPTA) 100-62.5-25 MCG/INH AEPB  Inhale 1 puff into the lungs daily             folic acid (FOLVITE) 1 MG tablet  Take 1 tablet by mouth daily             furosemide (LASIX) 20 MG tablet  Take 1 tablet by mouth daily             gabapentin (NEURONTIN) 300 MG capsule  Take 2 capsules by mouth 3 times daily for 180 days. insulin lispro (HUMALOG) 100 UNIT/ML injection vial  **Low Dose Correction Algorithm**Insulin lispro (HUMALOG)  3 TIMES DAILY WITH MEALSGlucose: Dose: No [id number] 1 Qhnl816-205 2 Mpgdd231-469 3 Ganbn778-750 4 Bicsu501-064 5 Vxtps052 and above 6 Units             Lactobacillus (ACIDOPHILUS) 0.5 MG TABS  Take 1 capsule by mouth             oxyCODONE-acetaminophen (PERCOCET) 7.5-325 MG per tablet  Take 1 tablet by mouth every 6 hours as needed for Pain for up to 5 days. risperiDONE (RISPERDAL) 1 MG tablet  Take 1 tablet by mouth daily             tiZANidine (ZANAFLEX) 4 MG tablet  Take 1 tablet by mouth every 6 hours as needed (muscle spasms in neck)             traZODone (DESYREL) 50 MG tablet  Take 1 tablet by mouth nightly                 Objective Findings at Discharge:   /76   Pulse 64   Temp 97.6 °F (36.4 °C) (Oral)   Resp 16   Ht 5' 9\" (1.753 m)   Wt 224 lb 3.2 oz (101.7 kg)   SpO2 91%   BMI 33.11 kg/m²            PHYSICAL EXAM  GEN    Awake male, sitting upright in bed. RESP  breathing comfortably on room air  CARDIO/VASC           S1/S2 auscultated. Regular rate without appreciable murmurs. Peripheral pulses equal bilaterally and palpable. No peripheral edema. GI        Abdomen is soft without significant tenderness, masses, or guarding. Bowel sounds are normoactive. MSK    No gross joint deformities. SKIN    Normal coloration, warm, dry. NEURO           normal speech, no lateralizing weakness. PSYCH            Awake, alert, oriented x 3.     BMP/CBC  No results for input(s): NA, K, CL, CO2, BUN, CREATININE, WBC, HEMOGLOBIN, HCT, PLT in the last 72 hours.     Invalid input(s): GLU    IMAGING:  As above    Discharge Time of 35 minutes    Electronically signed by Gagandeep Julian MD on 8/5/2020 at 11:01 AM

## 2020-08-05 NOTE — PLAN OF CARE
Problem: Falls - Risk of:  Goal: Will remain free from falls  Description: Will remain free from falls  Outcome: Ongoing  Goal: Absence of physical injury  Description: Absence of physical injury  Outcome: Ongoing     Problem: Pain:  Goal: Pain level will decrease  Description: Pain level will decrease  Outcome: Ongoing  Goal: Control of acute pain  Description: Control of acute pain  Outcome: Ongoing  Goal: Control of chronic pain  Description: Control of chronic pain  Outcome: Ongoing     Problem: Skin Integrity:  Goal: Will show no infection signs and symptoms  Description: Will show no infection signs and symptoms  Outcome: Ongoing  Goal: Absence of new skin breakdown  Description: Absence of new skin breakdown  Outcome: Ongoing     Problem: Cardiac Output - Decreased:  Goal: Hemodynamic stability will improve  Description: Hemodynamic stability will improve  Outcome: Ongoing     Problem:  Activity:  Goal: Capacity to carry out activities will improve  Description: Capacity to carry out activities will improve  Outcome: Ongoing  Goal: Will verbalize the importance of balancing activity with adequate rest periods  Description: Will verbalize the importance of balancing activity with adequate rest periods  Outcome: Ongoing     Problem: Cardiac:  Goal: Hemodynamic stability will improve  Description: Hemodynamic stability will improve  Outcome: Ongoing  Goal: Ability to maintain an adequate cardiac output will improve  Description: Ability to maintain an adequate cardiac output will improve  Outcome: Ongoing     Problem: Coping:  Goal: Verbalizations of decreased anxiety will decrease  Description: Verbalizations of decreased anxiety will decrease  Outcome: Ongoing     Problem: Fluid Volume:  Goal: Risk for excess fluid volume will decrease  Description: Risk for excess fluid volume will decrease  Outcome: Ongoing  Goal: Maintenance of adequate hydration will improve  Description: Maintenance of adequate hydration will improve  Outcome: Ongoing  Goal: Will show no signs and symptoms of electrolyte imbalance  Description: Will show no signs and symptoms of electrolyte imbalance  Outcome: Ongoing     Problem: Health Behavior:  Goal: Ability to manage health-related needs will improve  Description: Ability to manage health-related needs will improve  Outcome: Ongoing  Goal: Ability to seek appropriate health care will improve  Description: Ability to seek appropriate health care will improve  Outcome: Ongoing  Goal: Compliance with treatment plan for underlying cause of condition will improve  Description: Compliance with treatment plan for underlying cause of condition will improve  Outcome: Ongoing  Goal: Identification of resources available to assist in meeting health care needs will improve  Description: Identification of resources available to assist in meeting health care needs will improve  Outcome: Ongoing     Problem: Nutritional:  Goal: Maintenance of adequate nutrition will improve  Description: Maintenance of adequate nutrition will improve  Outcome: Ongoing     Problem: Physical Regulation:  Goal: Complications related to the disease process, condition or treatment will be avoided or minimized  Description: Complications related to the disease process, condition or treatment will be avoided or minimized  Outcome: Ongoing     Problem: Respiratory:  Goal: Ability to maintain adequate ventilation will improve  Description: Ability to maintain adequate ventilation will improve  Outcome: Ongoing  Goal: Respiratory status will improve  Description: Respiratory status will improve  Outcome: Ongoing     Problem: Urinary Elimination:  Goal: Skin integrity will improve  Description: Skin integrity will improve  Outcome: Ongoing  Goal: Ability to recognize the need to void and respond appropriately will improve  Description: Ability to recognize the need to void and respond appropriately will improve  Outcome: Ongoing  Goal: Will remain free from infection  Description: Will remain free from infection  Outcome: Ongoing  Goal: Incidence of incontinence will decrease  Description: Incidence of incontinence will decrease  Outcome: Ongoing     Problem: Discharge Planning:  Goal: Discharged to appropriate level of care  Description: Discharged to appropriate level of care  Outcome: Ongoing     Problem:  Activity Intolerance:  Goal: Ability to tolerate increased activity will improve  Description: Ability to tolerate increased activity will improve  Outcome: Ongoing     Problem: Airway Clearance - Ineffective:  Goal: Ability to maintain a clear airway will improve  Description: Ability to maintain a clear airway will improve  Outcome: Ongoing     Problem: Breathing Pattern - Ineffective:  Goal: Ability to achieve and maintain a regular respiratory rate will improve  Description: Ability to achieve and maintain a regular respiratory rate will improve  Outcome: Ongoing     Problem: Gas Exchange - Impaired:  Goal: Levels of oxygenation will improve  Description: Levels of oxygenation will improve  Outcome: Ongoing     Problem: HEMODYNAMIC STATUS  Goal: Patient has stable vital signs and fluid balance  Outcome: Ongoing     Problem: ACTIVITY INTOLERANCE/IMPAIRED MOBILITY  Goal: Mobility/activity is maintained at optimum level for patient  Outcome: Ongoing     Problem: COMMUNICATION IMPAIRMENT  Goal: Ability to express needs and understand communication  Outcome: Ongoing

## 2020-08-06 LAB
EKG ATRIAL RATE: 50 BPM
EKG DIAGNOSIS: NORMAL
EKG P AXIS: 6 DEGREES
EKG P-R INTERVAL: 160 MS
EKG Q-T INTERVAL: 502 MS
EKG QRS DURATION: 108 MS
EKG QTC CALCULATION (BAZETT): 457 MS
EKG R AXIS: -10 DEGREES
EKG T AXIS: 29 DEGREES
EKG VENTRICULAR RATE: 50 BPM

## 2020-08-07 ENCOUNTER — HOSPITAL ENCOUNTER (OUTPATIENT)
Age: 70
Setting detail: SPECIMEN
Discharge: HOME | End: 2020-08-07
Payer: MEDICARE

## 2020-08-07 LAB
ALBUMIN SERPL-MCNC: 3.4 GM/DL (ref 3.4–5)
ALP BLD-CCNC: 70 IU/L (ref 40–128)
ALT SERPL-CCNC: 6 U/L (ref 10–40)
ANION GAP SERPL CALCULATED.3IONS-SCNC: 14 MMOL/L (ref 4–16)
AST SERPL-CCNC: 11 IU/L (ref 15–37)
BASOPHILS ABSOLUTE: 0 K/CU MM
BASOPHILS RELATIVE PERCENT: 0.3 % (ref 0–1)
BILIRUB SERPL-MCNC: 0.3 MG/DL (ref 0–1)
BUN BLDV-MCNC: 25 MG/DL (ref 6–23)
CALCIUM SERPL-MCNC: 8.6 MG/DL (ref 8.3–10.6)
CHLORIDE BLD-SCNC: 102 MMOL/L (ref 99–110)
CO2: 25 MMOL/L (ref 21–32)
CREAT SERPL-MCNC: 2 MG/DL (ref 0.9–1.3)
DIFFERENTIAL TYPE: ABNORMAL
EOSINOPHILS ABSOLUTE: 0.2 K/CU MM
EOSINOPHILS RELATIVE PERCENT: 3.1 % (ref 0–3)
GFR AFRICAN AMERICAN: 40 ML/MIN/1.73M2
GFR NON-AFRICAN AMERICAN: 33 ML/MIN/1.73M2
GLUCOSE BLD-MCNC: 106 MG/DL (ref 70–99)
HCT VFR BLD CALC: 37.6 % (ref 42–52)
HEMOGLOBIN: 11.1 GM/DL (ref 13.5–18)
IMMATURE NEUTROPHIL %: 0.9 % (ref 0–0.43)
LYMPHOCYTES ABSOLUTE: 1.3 K/CU MM
LYMPHOCYTES RELATIVE PERCENT: 18.8 % (ref 24–44)
MCH RBC QN AUTO: 25.2 PG (ref 27–31)
MCHC RBC AUTO-ENTMCNC: 29.5 % (ref 32–36)
MCV RBC AUTO: 85.3 FL (ref 78–100)
MONOCYTES ABSOLUTE: 0.7 K/CU MM
MONOCYTES RELATIVE PERCENT: 10.7 % (ref 0–4)
NUCLEATED RBC %: 0 %
PDW BLD-RTO: 18.8 % (ref 11.7–14.9)
PLATELET # BLD: 163 K/CU MM (ref 140–440)
PMV BLD AUTO: 9.8 FL (ref 7.5–11.1)
POTASSIUM SERPL-SCNC: 3.9 MMOL/L (ref 3.5–5.1)
RBC # BLD: 4.41 M/CU MM (ref 4.6–6.2)
SEGMENTED NEUTROPHILS ABSOLUTE COUNT: 4.4 K/CU MM
SEGMENTED NEUTROPHILS RELATIVE PERCENT: 66.2 % (ref 36–66)
SODIUM BLD-SCNC: 141 MMOL/L (ref 135–145)
TOTAL IMMATURE NEUTOROPHIL: 0.06 K/CU MM
TOTAL NUCLEATED RBC: 0 K/CU MM
TOTAL PROTEIN: 6 GM/DL (ref 6.4–8.2)
WBC # BLD: 6.7 K/CU MM (ref 4–10.5)

## 2020-08-07 PROCEDURE — 85025 COMPLETE CBC W/AUTO DIFF WBC: CPT

## 2020-08-07 PROCEDURE — 80053 COMPREHEN METABOLIC PANEL: CPT

## 2020-08-07 PROCEDURE — 36415 COLL VENOUS BLD VENIPUNCTURE: CPT

## 2020-08-11 ENCOUNTER — TELEPHONE (OUTPATIENT)
Dept: CARDIOLOGY CLINIC | Age: 70
End: 2020-08-11

## 2020-08-11 NOTE — TELEPHONE ENCOUNTER
Per Dr Devante Luna in work que was to schedule f/u srmc with [facility]. Pt is in rehab at a nursing home. They are not bringing pt out. Pt will have nurse call office to schedule.

## 2020-08-12 ENCOUNTER — HOSPITAL ENCOUNTER (OUTPATIENT)
Age: 70
Setting detail: SPECIMEN
Discharge: HOME | End: 2020-08-12

## 2020-08-12 PROCEDURE — 82570 ASSAY OF URINE CREATININE: CPT

## 2020-08-12 PROCEDURE — 84156 ASSAY OF PROTEIN URINE: CPT

## 2020-08-13 ENCOUNTER — HOSPITAL ENCOUNTER (OUTPATIENT)
Age: 70
Setting detail: SPECIMEN
Discharge: HOME | End: 2020-08-13

## 2020-08-13 LAB
ALBUMIN SERPL-MCNC: 3.7 GM/DL (ref 3.4–5)
ALP BLD-CCNC: 70 IU/L (ref 40–128)
ALT SERPL-CCNC: <5 U/L (ref 10–40)
ANION GAP SERPL CALCULATED.3IONS-SCNC: 11 MMOL/L (ref 4–16)
AST SERPL-CCNC: 11 IU/L (ref 15–37)
BILIRUB SERPL-MCNC: 0.3 MG/DL (ref 0–1)
BUN BLDV-MCNC: 17 MG/DL (ref 6–23)
CALCIUM SERPL-MCNC: 8.8 MG/DL (ref 8.3–10.6)
CHLORIDE BLD-SCNC: 108 MMOL/L (ref 99–110)
CO2: 25 MMOL/L (ref 21–32)
CREAT SERPL-MCNC: 2 MG/DL (ref 0.9–1.3)
CREATININE URINE: 38.3 MG/DL (ref 39–259)
GFR AFRICAN AMERICAN: 40 ML/MIN/1.73M2
GFR NON-AFRICAN AMERICAN: 33 ML/MIN/1.73M2
GLUCOSE BLD-MCNC: 81 MG/DL (ref 70–99)
HCT VFR BLD CALC: 41 % (ref 42–52)
HEMOGLOBIN: 11.6 GM/DL (ref 13.5–18)
MCH RBC QN AUTO: 25.3 PG (ref 27–31)
MCHC RBC AUTO-ENTMCNC: 28.3 % (ref 32–36)
MCV RBC AUTO: 89.3 FL (ref 78–100)
PDW BLD-RTO: 19.5 % (ref 11.7–14.9)
PLATELET # BLD: 153 K/CU MM (ref 140–440)
PMV BLD AUTO: 9.6 FL (ref 7.5–11.1)
POTASSIUM SERPL-SCNC: 4.2 MMOL/L (ref 3.5–5.1)
PROT/CREAT RATIO, UR: 0.1
RBC # BLD: 4.59 M/CU MM (ref 4.6–6.2)
SODIUM BLD-SCNC: 144 MMOL/L (ref 135–145)
TOTAL PROTEIN: 6.3 GM/DL (ref 6.4–8.2)
URINE TOTAL PROTEIN: 4 MG/DL
WBC # BLD: 5.9 K/CU MM (ref 4–10.5)

## 2020-08-13 PROCEDURE — 36415 COLL VENOUS BLD VENIPUNCTURE: CPT

## 2020-08-13 PROCEDURE — 85027 COMPLETE CBC AUTOMATED: CPT

## 2020-08-13 PROCEDURE — 80053 COMPREHEN METABOLIC PANEL: CPT

## 2020-08-24 RX ORDER — TIZANIDINE 4 MG/1
TABLET ORAL
Qty: 30 TABLET | Refills: 11 | OUTPATIENT
Start: 2020-08-24

## 2020-08-25 ENCOUNTER — VIRTUAL VISIT (OUTPATIENT)
Dept: FAMILY MEDICINE CLINIC | Age: 70
End: 2020-08-25
Payer: MEDICARE

## 2020-08-25 PROCEDURE — 99441 PR PHYS/QHP TELEPHONE EVALUATION 5-10 MIN: CPT | Performed by: FAMILY MEDICINE
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
## 2020-08-28 ENCOUNTER — TELEPHONE (OUTPATIENT)
Dept: FAMILY MEDICINE CLINIC | Age: 70
End: 2020-08-28

## 2020-08-28 NOTE — TELEPHONE ENCOUNTER
[phone]---Phone   (LIYAH Gan 39)    [phone]---Fax#   (LIYAH Gan 39)        Please fax over an order for [address] for patient---to [address] [facility],[address], [place], and PT.     Patient was in hospital pneumonia and COPD

## 2020-09-01 ENCOUNTER — APPOINTMENT (OUTPATIENT)
Dept: CT IMAGING | Age: 70
End: 2020-09-01
Payer: MEDICARE

## 2020-09-01 ENCOUNTER — APPOINTMENT (OUTPATIENT)
Dept: GENERAL RADIOLOGY | Age: 70
End: 2020-09-01
Payer: MEDICARE

## 2020-09-01 ENCOUNTER — HOSPITAL ENCOUNTER (OUTPATIENT)
Age: 70
Setting detail: OBSERVATION
Discharge: HOME | End: 2020-09-03
Attending: INTERNAL MEDICINE | Admitting: INTERNAL MEDICINE
Payer: MEDICARE

## 2020-09-01 ENCOUNTER — TELEPHONE (OUTPATIENT)
Dept: FAMILY MEDICINE CLINIC | Age: 70
End: 2020-09-01
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
## 2020-09-01 LAB
ANION GAP SERPL CALCULATED.3IONS-SCNC: 13 MMOL/L (ref 4–16)
BASOPHILS ABSOLUTE: 0 K/CU MM
BASOPHILS RELATIVE PERCENT: 0.5 % (ref 0–1)
BUN BLDV-MCNC: 25 MG/DL (ref 6–23)
CALCIUM SERPL-MCNC: 8.7 MG/DL (ref 8.3–10.6)
CHLORIDE BLD-SCNC: 105 MMOL/L (ref 99–110)
CO2: 23 MMOL/L (ref 21–32)
CREAT SERPL-MCNC: 1.8 MG/DL (ref 0.9–1.3)
DIFFERENTIAL TYPE: ABNORMAL
EOSINOPHILS ABSOLUTE: 0.3 K/CU MM
EOSINOPHILS RELATIVE PERCENT: 4.2 % (ref 0–3)
GFR AFRICAN AMERICAN: 45 ML/MIN/1.73M2
GFR NON-AFRICAN AMERICAN: 37 ML/MIN/1.73M2
GLUCOSE BLD-MCNC: 103 MG/DL (ref 70–99)
GLUCOSE BLD-MCNC: 92 MG/DL (ref 70–99)
HCT VFR BLD CALC: 40.3 % (ref 42–52)
HEMOGLOBIN: 12.4 GM/DL (ref 13.5–18)
IMMATURE NEUTROPHIL %: 0.4 % (ref 0–0.43)
LYMPHOCYTES ABSOLUTE: 1.6 K/CU MM
LYMPHOCYTES RELATIVE PERCENT: 19.7 % (ref 24–44)
MCH RBC QN AUTO: 26.3 PG (ref 27–31)
MCHC RBC AUTO-ENTMCNC: 30.8 % (ref 32–36)
MCV RBC AUTO: 85.4 FL (ref 78–100)
MONOCYTES ABSOLUTE: 0.8 K/CU MM
MONOCYTES RELATIVE PERCENT: 9.5 % (ref 0–4)
NUCLEATED RBC %: 0 %
PDW BLD-RTO: 19.9 % (ref 11.7–14.9)
PLATELET # BLD: 171 K/CU MM (ref 140–440)
PMV BLD AUTO: 9.4 FL (ref 7.5–11.1)
POTASSIUM SERPL-SCNC: 4 MMOL/L (ref 3.5–5.1)
RBC # BLD: 4.72 M/CU MM (ref 4.6–6.2)
SEGMENTED NEUTROPHILS ABSOLUTE COUNT: 5.3 K/CU MM
SEGMENTED NEUTROPHILS RELATIVE PERCENT: 65.7 % (ref 36–66)
SODIUM BLD-SCNC: 141 MMOL/L (ref 135–145)
TOTAL IMMATURE NEUTOROPHIL: 0.03 K/CU MM
TOTAL NUCLEATED RBC: 0 K/CU MM
WBC # BLD: 8 K/CU MM (ref 4–10.5)

## 2020-09-01 PROCEDURE — 70450 CT HEAD/BRAIN W/O DYE: CPT

## 2020-09-01 PROCEDURE — G0378 HOSPITAL OBSERVATION PER HR: HCPCS

## 2020-09-01 PROCEDURE — 6370000000 HC RX 637 (ALT 250 FOR IP): Performed by: PHYSICIAN ASSISTANT

## 2020-09-01 PROCEDURE — 73560 X-RAY EXAM OF KNEE 1 OR 2: CPT

## 2020-09-01 PROCEDURE — 72125 CT NECK SPINE W/O DYE: CPT

## 2020-09-01 PROCEDURE — 85025 COMPLETE CBC W/AUTO DIFF WBC: CPT

## 2020-09-01 PROCEDURE — 94761 N-INVAS EAR/PLS OXIMETRY MLT: CPT

## 2020-09-01 PROCEDURE — 80048 BASIC METABOLIC PNL TOTAL CA: CPT

## 2020-09-01 PROCEDURE — 36415 COLL VENOUS BLD VENIPUNCTURE: CPT

## 2020-09-01 PROCEDURE — 99285 EMERGENCY DEPT VISIT HI MDM: CPT

## 2020-09-01 PROCEDURE — 82962 GLUCOSE BLOOD TEST: CPT

## 2020-09-01 RX ORDER — DIVALPROEX SODIUM 500 MG/1
1000 TABLET, EXTENDED RELEASE ORAL NIGHTLY
Status: DISCONTINUED | OUTPATIENT
Start: 2020-09-02 | End: 2020-09-03 | Stop reason: HOSPADM

## 2020-09-01 RX ORDER — ACETAMINOPHEN 650 MG/1
650 SUPPOSITORY RECTAL EVERY 6 HOURS PRN
Status: DISCONTINUED | OUTPATIENT
Start: 2020-09-01 | End: 2020-09-03 | Stop reason: HOSPADM

## 2020-09-01 RX ORDER — DEXTROSE MONOHYDRATE 50 MG/ML
100 INJECTION, SOLUTION INTRAVENOUS PRN
Status: DISCONTINUED | OUTPATIENT
Start: 2020-09-01 | End: 2020-09-03 | Stop reason: HOSPADM

## 2020-09-01 RX ORDER — SODIUM CHLORIDE 0.9 % (FLUSH) 0.9 %
10 SYRINGE (ML) INJECTION PRN
Status: DISCONTINUED | OUTPATIENT
Start: 2020-09-01 | End: 2020-09-03 | Stop reason: HOSPADM

## 2020-09-01 RX ORDER — LIDOCAINE 4 G/G
1 PATCH TOPICAL ONCE
Status: COMPLETED | OUTPATIENT
Start: 2020-09-01 | End: 2020-09-02

## 2020-09-01 RX ORDER — OXYCODONE HYDROCHLORIDE AND ACETAMINOPHEN 5; 325 MG/1; MG/1
1 TABLET ORAL EVERY 8 HOURS PRN
Status: DISCONTINUED | OUTPATIENT
Start: 2020-09-01 | End: 2020-09-02

## 2020-09-01 RX ORDER — OXYCODONE AND ACETAMINOPHEN 7.5; 325 MG/1; MG/1
1 TABLET ORAL ONCE
Status: COMPLETED | OUTPATIENT
Start: 2020-09-01 | End: 2020-09-01

## 2020-09-01 RX ORDER — FAMOTIDINE 20 MG/1
10 TABLET, FILM COATED ORAL 2 TIMES DAILY
Status: DISCONTINUED | OUTPATIENT
Start: 2020-09-02 | End: 2020-09-03 | Stop reason: HOSPADM

## 2020-09-01 RX ORDER — PROMETHAZINE HYDROCHLORIDE 25 MG/1
12.5 TABLET ORAL EVERY 6 HOURS PRN
Status: DISCONTINUED | OUTPATIENT
Start: 2020-09-01 | End: 2020-09-03 | Stop reason: HOSPADM

## 2020-09-01 RX ORDER — DOCUSATE SODIUM 100 MG/1
100 CAPSULE, LIQUID FILLED ORAL 2 TIMES DAILY
Status: DISCONTINUED | OUTPATIENT
Start: 2020-09-02 | End: 2020-09-03 | Stop reason: HOSPADM

## 2020-09-01 RX ORDER — ONDANSETRON 2 MG/ML
4 INJECTION INTRAMUSCULAR; INTRAVENOUS EVERY 6 HOURS PRN
Status: DISCONTINUED | OUTPATIENT
Start: 2020-09-01 | End: 2020-09-03 | Stop reason: HOSPADM

## 2020-09-01 RX ORDER — FERROUS SULFATE 325(65) MG
325 TABLET ORAL 2 TIMES DAILY WITH MEALS
Status: DISCONTINUED | OUTPATIENT
Start: 2020-09-02 | End: 2020-09-03 | Stop reason: HOSPADM

## 2020-09-01 RX ORDER — RISPERIDONE 0.5 MG/1
1 TABLET, FILM COATED ORAL DAILY
Status: DISCONTINUED | OUTPATIENT
Start: 2020-09-02 | End: 2020-09-03 | Stop reason: HOSPADM

## 2020-09-01 RX ORDER — BUSPIRONE HYDROCHLORIDE 10 MG/1
10 TABLET ORAL 3 TIMES DAILY
Status: DISCONTINUED | OUTPATIENT
Start: 2020-09-02 | End: 2020-09-03 | Stop reason: HOSPADM

## 2020-09-01 RX ORDER — ACETAMINOPHEN 325 MG/1
650 TABLET ORAL EVERY 6 HOURS PRN
Status: DISCONTINUED | OUTPATIENT
Start: 2020-09-01 | End: 2020-09-03 | Stop reason: HOSPADM

## 2020-09-01 RX ORDER — NICOTINE POLACRILEX 4 MG
15 LOZENGE BUCCAL PRN
Status: DISCONTINUED | OUTPATIENT
Start: 2020-09-01 | End: 2020-09-03 | Stop reason: HOSPADM

## 2020-09-01 RX ORDER — ATORVASTATIN CALCIUM 80 MG/1
80 TABLET, FILM COATED ORAL DAILY
Status: DISCONTINUED | OUTPATIENT
Start: 2020-09-02 | End: 2020-09-03 | Stop reason: HOSPADM

## 2020-09-01 RX ORDER — ASPIRIN 81 MG/1
81 TABLET, CHEWABLE ORAL DAILY
Status: DISCONTINUED | OUTPATIENT
Start: 2020-09-02 | End: 2020-09-03 | Stop reason: HOSPADM

## 2020-09-01 RX ORDER — ESCITALOPRAM OXALATE 10 MG/1
10 TABLET ORAL DAILY
Status: DISCONTINUED | OUTPATIENT
Start: 2020-09-02 | End: 2020-09-03 | Stop reason: HOSPADM

## 2020-09-01 RX ORDER — DEXTROSE MONOHYDRATE 25 G/50ML
12.5 INJECTION, SOLUTION INTRAVENOUS PRN
Status: DISCONTINUED | OUTPATIENT
Start: 2020-09-01 | End: 2020-09-03 | Stop reason: HOSPADM

## 2020-09-01 RX ORDER — DILTIAZEM HYDROCHLORIDE 120 MG/1
120 CAPSULE, COATED, EXTENDED RELEASE ORAL DAILY
Status: DISCONTINUED | OUTPATIENT
Start: 2020-09-02 | End: 2020-09-03 | Stop reason: HOSPADM

## 2020-09-01 RX ORDER — BENZTROPINE MESYLATE 1 MG/1
1 TABLET ORAL DAILY
Status: DISCONTINUED | OUTPATIENT
Start: 2020-09-02 | End: 2020-09-03 | Stop reason: HOSPADM

## 2020-09-01 RX ORDER — FUROSEMIDE 20 MG/1
20 TABLET ORAL DAILY
Status: DISCONTINUED | OUTPATIENT
Start: 2020-09-02 | End: 2020-09-03 | Stop reason: HOSPADM

## 2020-09-01 RX ORDER — POLYETHYLENE GLYCOL 3350 17 G/17G
17 POWDER, FOR SOLUTION ORAL DAILY PRN
Status: DISCONTINUED | OUTPATIENT
Start: 2020-09-01 | End: 2020-09-03 | Stop reason: HOSPADM

## 2020-09-01 RX ORDER — GABAPENTIN 300 MG/1
600 CAPSULE ORAL 3 TIMES DAILY
Status: DISCONTINUED | OUTPATIENT
Start: 2020-09-02 | End: 2020-09-03 | Stop reason: HOSPADM

## 2020-09-01 RX ORDER — FOLIC ACID 1 MG/1
1 TABLET ORAL DAILY
Status: DISCONTINUED | OUTPATIENT
Start: 2020-09-02 | End: 2020-09-03 | Stop reason: HOSPADM

## 2020-09-01 RX ORDER — TRAZODONE HYDROCHLORIDE 50 MG/1
50 TABLET ORAL NIGHTLY PRN
Status: DISCONTINUED | OUTPATIENT
Start: 2020-09-02 | End: 2020-09-03 | Stop reason: HOSPADM

## 2020-09-01 RX ORDER — SODIUM CHLORIDE 0.9 % (FLUSH) 0.9 %
10 SYRINGE (ML) INJECTION EVERY 12 HOURS SCHEDULED
Status: DISCONTINUED | OUTPATIENT
Start: 2020-09-02 | End: 2020-09-03 | Stop reason: HOSPADM

## 2020-09-01 RX ADMIN — OXYCODONE HYDROCHLORIDE AND ACETAMINOPHEN 1 TABLET: 7.5; 325 TABLET ORAL at 20:23

## 2020-09-01 ASSESSMENT — PAIN DESCRIPTION - LOCATION: LOCATION: KNEE

## 2020-09-01 ASSESSMENT — PAIN SCALES - GENERAL
PAINLEVEL_OUTOF10: 9
PAINLEVEL_OUTOF10: 6
PAINLEVEL_OUTOF10: 8
PAINLEVEL_OUTOF10: 6

## 2020-09-01 ASSESSMENT — PAIN DESCRIPTION - PAIN TYPE: TYPE: ACUTE PAIN

## 2020-09-01 ASSESSMENT — PAIN DESCRIPTION - ORIENTATION: ORIENTATION: LEFT

## 2020-09-01 NOTE — TELEPHONE ENCOUNTER
Pt took a bad fall this morning-- he fell on the floor and struggled to get up-- the landlord helped him--- he hit his left knee and he is complaining of stiffness and being sore

## 2020-09-02 ENCOUNTER — APPOINTMENT (OUTPATIENT)
Dept: CT IMAGING | Age: 70
End: 2020-09-02
Payer: MEDICARE

## 2020-09-02 LAB
ANION GAP SERPL CALCULATED.3IONS-SCNC: 12 MMOL/L (ref 4–16)
BASOPHILS ABSOLUTE: 0 K/CU MM
BASOPHILS RELATIVE PERCENT: 0.7 % (ref 0–1)
BUN BLDV-MCNC: 22 MG/DL (ref 6–23)
CALCIUM SERPL-MCNC: 8.5 MG/DL (ref 8.3–10.6)
CHLORIDE BLD-SCNC: 106 MMOL/L (ref 99–110)
CO2: 23 MMOL/L (ref 21–32)
CREAT SERPL-MCNC: 1.8 MG/DL (ref 0.9–1.3)
DIFFERENTIAL TYPE: ABNORMAL
EOSINOPHILS ABSOLUTE: 0.4 K/CU MM
EOSINOPHILS RELATIVE PERCENT: 6.4 % (ref 0–3)
GFR AFRICAN AMERICAN: 45 ML/MIN/1.73M2
GFR NON-AFRICAN AMERICAN: 37 ML/MIN/1.73M2
GLUCOSE BLD-MCNC: 100 MG/DL (ref 70–99)
GLUCOSE BLD-MCNC: 113 MG/DL (ref 70–99)
GLUCOSE BLD-MCNC: 143 MG/DL (ref 70–99)
GLUCOSE BLD-MCNC: 86 MG/DL (ref 70–99)
GLUCOSE BLD-MCNC: 92 MG/DL (ref 70–99)
HCT VFR BLD CALC: 41.7 % (ref 42–52)
HEMOGLOBIN: 11.9 GM/DL (ref 13.5–18)
IMMATURE NEUTROPHIL %: 0.4 % (ref 0–0.43)
LYMPHOCYTES ABSOLUTE: 1.6 K/CU MM
LYMPHOCYTES RELATIVE PERCENT: 29.1 % (ref 24–44)
MCH RBC QN AUTO: 26.6 PG (ref 27–31)
MCHC RBC AUTO-ENTMCNC: 28.5 % (ref 32–36)
MCV RBC AUTO: 93.1 FL (ref 78–100)
MONOCYTES ABSOLUTE: 0.6 K/CU MM
MONOCYTES RELATIVE PERCENT: 11.3 % (ref 0–4)
NUCLEATED RBC %: 0 %
PDW BLD-RTO: 19.9 % (ref 11.7–14.9)
PLATELET # BLD: 147 K/CU MM (ref 140–440)
PMV BLD AUTO: 9.1 FL (ref 7.5–11.1)
POTASSIUM SERPL-SCNC: 3.9 MMOL/L (ref 3.5–5.1)
RBC # BLD: 4.48 M/CU MM (ref 4.6–6.2)
SARS-COV-2, NAAT: NOT DETECTED
SEGMENTED NEUTROPHILS ABSOLUTE COUNT: 2.9 K/CU MM
SEGMENTED NEUTROPHILS RELATIVE PERCENT: 52.1 % (ref 36–66)
SODIUM BLD-SCNC: 141 MMOL/L (ref 135–145)
TOTAL IMMATURE NEUTOROPHIL: 0.02 K/CU MM
TOTAL NUCLEATED RBC: 0 K/CU MM
WBC # BLD: 5.6 K/CU MM (ref 4–10.5)

## 2020-09-02 PROCEDURE — 97535 SELF CARE MNGMENT TRAINING: CPT

## 2020-09-02 PROCEDURE — 94664 DEMO&/EVAL PT USE INHALER: CPT

## 2020-09-02 PROCEDURE — 6370000000 HC RX 637 (ALT 250 FOR IP): Performed by: INTERNAL MEDICINE

## 2020-09-02 PROCEDURE — G0378 HOSPITAL OBSERVATION PER HR: HCPCS

## 2020-09-02 PROCEDURE — 97162 PT EVAL MOD COMPLEX 30 MIN: CPT

## 2020-09-02 PROCEDURE — 36415 COLL VENOUS BLD VENIPUNCTURE: CPT

## 2020-09-02 PROCEDURE — 94761 N-INVAS EAR/PLS OXIMETRY MLT: CPT

## 2020-09-02 PROCEDURE — 97166 OT EVAL MOD COMPLEX 45 MIN: CPT

## 2020-09-02 PROCEDURE — 6370000000 HC RX 637 (ALT 250 FOR IP): Performed by: HOSPITALIST

## 2020-09-02 PROCEDURE — 6360000002 HC RX W HCPCS: Performed by: INTERNAL MEDICINE

## 2020-09-02 PROCEDURE — 73700 CT LOWER EXTREMITY W/O DYE: CPT

## 2020-09-02 PROCEDURE — 96372 THER/PROPH/DIAG INJ SC/IM: CPT

## 2020-09-02 PROCEDURE — 85025 COMPLETE CBC W/AUTO DIFF WBC: CPT

## 2020-09-02 PROCEDURE — 80048 BASIC METABOLIC PNL TOTAL CA: CPT

## 2020-09-02 PROCEDURE — U0002 COVID-19 LAB TEST NON-CDC: HCPCS

## 2020-09-02 PROCEDURE — 94640 AIRWAY INHALATION TREATMENT: CPT

## 2020-09-02 PROCEDURE — 82962 GLUCOSE BLOOD TEST: CPT

## 2020-09-02 PROCEDURE — 2580000003 HC RX 258: Performed by: INTERNAL MEDICINE

## 2020-09-02 PROCEDURE — 97530 THERAPEUTIC ACTIVITIES: CPT

## 2020-09-02 RX ORDER — OXYCODONE HYDROCHLORIDE AND ACETAMINOPHEN 5; 325 MG/1; MG/1
1 TABLET ORAL EVERY 4 HOURS PRN
Status: DISCONTINUED | OUTPATIENT
Start: 2020-09-02 | End: 2020-09-03 | Stop reason: HOSPADM

## 2020-09-02 RX ORDER — BUDESONIDE AND FORMOTEROL FUMARATE DIHYDRATE 80; 4.5 UG/1; UG/1
2 AEROSOL RESPIRATORY (INHALATION) 2 TIMES DAILY
Status: DISCONTINUED | OUTPATIENT
Start: 2020-09-02 | End: 2020-09-03 | Stop reason: HOSPADM

## 2020-09-02 RX ORDER — HEPARIN SODIUM 5000 [USP'U]/ML
5000 INJECTION, SOLUTION INTRAVENOUS; SUBCUTANEOUS EVERY 8 HOURS SCHEDULED
Status: DISCONTINUED | OUTPATIENT
Start: 2020-09-02 | End: 2020-09-03 | Stop reason: HOSPADM

## 2020-09-02 RX ADMIN — GABAPENTIN 600 MG: 300 CAPSULE ORAL at 13:37

## 2020-09-02 RX ADMIN — GABAPENTIN 600 MG: 300 CAPSULE ORAL at 09:13

## 2020-09-02 RX ADMIN — BUSPIRONE HYDROCHLORIDE 10 MG: 10 TABLET ORAL at 13:37

## 2020-09-02 RX ADMIN — FUROSEMIDE 20 MG: 20 TABLET ORAL at 09:13

## 2020-09-02 RX ADMIN — BUDESONIDE AND FORMOTEROL FUMARATE DIHYDRATE 2 PUFF: 80; 4.5 AEROSOL RESPIRATORY (INHALATION) at 08:31

## 2020-09-02 RX ADMIN — OXYCODONE HYDROCHLORIDE AND ACETAMINOPHEN 1 TABLET: 5; 325 TABLET ORAL at 09:13

## 2020-09-02 RX ADMIN — TIOTROPIUM BROMIDE INHALATION SPRAY 2 PUFF: 3.12 SPRAY, METERED RESPIRATORY (INHALATION) at 08:30

## 2020-09-02 RX ADMIN — TRAZODONE HYDROCHLORIDE 50 MG: 50 TABLET ORAL at 01:03

## 2020-09-02 RX ADMIN — FAMOTIDINE 10 MG: 20 TABLET ORAL at 09:13

## 2020-09-02 RX ADMIN — DIVALPROEX SODIUM 1000 MG: 500 TABLET, FILM COATED, EXTENDED RELEASE ORAL at 22:10

## 2020-09-02 RX ADMIN — HEPARIN SODIUM 5000 UNITS: 5000 INJECTION, SOLUTION INTRAVENOUS; SUBCUTANEOUS at 05:53

## 2020-09-02 RX ADMIN — GABAPENTIN 600 MG: 300 CAPSULE ORAL at 01:03

## 2020-09-02 RX ADMIN — DOCUSATE SODIUM 100 MG: 100 CAPSULE, LIQUID FILLED ORAL at 01:04

## 2020-09-02 RX ADMIN — FAMOTIDINE 10 MG: 20 TABLET ORAL at 01:03

## 2020-09-02 RX ADMIN — OXYCODONE HYDROCHLORIDE AND ACETAMINOPHEN 1 TABLET: 5; 325 TABLET ORAL at 22:10

## 2020-09-02 RX ADMIN — DIVALPROEX SODIUM 1000 MG: 500 TABLET, FILM COATED, EXTENDED RELEASE ORAL at 01:07

## 2020-09-02 RX ADMIN — SODIUM CHLORIDE, PRESERVATIVE FREE 10 ML: 5 INJECTION INTRAVENOUS at 09:14

## 2020-09-02 RX ADMIN — OXYCODONE HYDROCHLORIDE AND ACETAMINOPHEN 1 TABLET: 5; 325 TABLET ORAL at 01:03

## 2020-09-02 RX ADMIN — TRAZODONE HYDROCHLORIDE 50 MG: 50 TABLET ORAL at 22:10

## 2020-09-02 RX ADMIN — GABAPENTIN 600 MG: 300 CAPSULE ORAL at 22:09

## 2020-09-02 RX ADMIN — FOLIC ACID 1 MG: 1 TABLET ORAL at 09:14

## 2020-09-02 RX ADMIN — FAMOTIDINE 10 MG: 20 TABLET ORAL at 22:10

## 2020-09-02 RX ADMIN — SODIUM CHLORIDE, PRESERVATIVE FREE 10 ML: 5 INJECTION INTRAVENOUS at 22:11

## 2020-09-02 RX ADMIN — ATORVASTATIN CALCIUM 80 MG: 80 TABLET, FILM COATED ORAL at 09:13

## 2020-09-02 RX ADMIN — HEPARIN SODIUM 5000 UNITS: 5000 INJECTION, SOLUTION INTRAVENOUS; SUBCUTANEOUS at 13:38

## 2020-09-02 RX ADMIN — DOCUSATE SODIUM 100 MG: 100 CAPSULE, LIQUID FILLED ORAL at 09:13

## 2020-09-02 RX ADMIN — INSULIN LISPRO 1 UNITS: 100 INJECTION, SOLUTION INTRAVENOUS; SUBCUTANEOUS at 17:10

## 2020-09-02 RX ADMIN — BUSPIRONE HYDROCHLORIDE 10 MG: 10 TABLET ORAL at 01:03

## 2020-09-02 RX ADMIN — ASPIRIN 81 MG CHEWABLE TABLET 81 MG: 81 TABLET CHEWABLE at 09:13

## 2020-09-02 RX ADMIN — BUSPIRONE HYDROCHLORIDE 10 MG: 10 TABLET ORAL at 22:10

## 2020-09-02 RX ADMIN — RISPERIDONE 1 MG: 0.5 TABLET, FILM COATED ORAL at 09:13

## 2020-09-02 RX ADMIN — DOCUSATE SODIUM 100 MG: 100 CAPSULE, LIQUID FILLED ORAL at 22:10

## 2020-09-02 RX ADMIN — DILTIAZEM HYDROCHLORIDE 120 MG: 120 CAPSULE, COATED, EXTENDED RELEASE ORAL at 09:14

## 2020-09-02 RX ADMIN — BENZTROPINE MESYLATE 1 MG: 1 TABLET ORAL at 09:13

## 2020-09-02 RX ADMIN — FERROUS SULFATE TAB 325 MG (65 MG ELEMENTAL FE) 325 MG: 325 (65 FE) TAB at 09:13

## 2020-09-02 RX ADMIN — ESCITALOPRAM OXALATE 10 MG: 10 TABLET ORAL at 09:14

## 2020-09-02 RX ADMIN — OXYCODONE HYDROCHLORIDE AND ACETAMINOPHEN 1 TABLET: 5; 325 TABLET ORAL at 14:34

## 2020-09-02 RX ADMIN — FERROUS SULFATE TAB 325 MG (65 MG ELEMENTAL FE) 325 MG: 325 (65 FE) TAB at 17:11

## 2020-09-02 RX ADMIN — HEPARIN SODIUM 5000 UNITS: 5000 INJECTION, SOLUTION INTRAVENOUS; SUBCUTANEOUS at 22:11

## 2020-09-02 RX ADMIN — BUSPIRONE HYDROCHLORIDE 10 MG: 10 TABLET ORAL at 09:13

## 2020-09-02 ASSESSMENT — PAIN DESCRIPTION - ONSET: ONSET: ON-GOING

## 2020-09-02 ASSESSMENT — PAIN SCALES - GENERAL
PAINLEVEL_OUTOF10: 7
PAINLEVEL_OUTOF10: 10
PAINLEVEL_OUTOF10: 0
PAINLEVEL_OUTOF10: 8
PAINLEVEL_OUTOF10: 4
PAINLEVEL_OUTOF10: 9
PAINLEVEL_OUTOF10: 8

## 2020-09-02 ASSESSMENT — PAIN DESCRIPTION - PROGRESSION: CLINICAL_PROGRESSION: NOT CHANGED

## 2020-09-02 ASSESSMENT — PAIN DESCRIPTION - FREQUENCY: FREQUENCY: CONTINUOUS

## 2020-09-02 ASSESSMENT — PAIN DESCRIPTION - LOCATION
LOCATION: KNEE;LEG
LOCATION: KNEE

## 2020-09-02 ASSESSMENT — PAIN DESCRIPTION - DESCRIPTORS: DESCRIPTORS: ACHING

## 2020-09-02 ASSESSMENT — PAIN DESCRIPTION - ORIENTATION
ORIENTATION: LEFT
ORIENTATION: LEFT

## 2020-09-02 ASSESSMENT — PAIN - FUNCTIONAL ASSESSMENT: PAIN_FUNCTIONAL_ASSESSMENT: PREVENTS OR INTERFERES WITH ALL ACTIVE AND SOME PASSIVE ACTIVITIES

## 2020-09-02 ASSESSMENT — PAIN DESCRIPTION - PAIN TYPE
TYPE: ACUTE PAIN
TYPE: ACUTE PAIN

## 2020-09-02 NOTE — CONSULTS
[address] 1950, 1106/1106-A, 9/2/2020    Discharge Recommendation: See Joe      History:  Pauma:  The primary encounter diagnosis was Acute pain of left knee. Diagnoses of Fall, initial encounter and Knee effusion, left were also pertinent to this visit. Subjective:  Patient states: Agreeable to therapy. Pain: 9/10 L knee  Communication with other providers: PT, RN  Restrictions: General Precautions, Fall Risk, Pt given immobilizer for LLE, unsure of wear instructions, however did utilize to perform STS/SPT. Home Setup/Prior level of function:    Social/Functional History  Lives With: Alone  Type of Home: House  Home Layout: Two level, Able to Live on Main level with bedroom/bathroom  Home Access: Stairs to enter with rails  Entrance Stairs - Number of Steps: 7 steps  Bathroom Shower/Tub: Walk-in shower (has been sponge bathing recently)   Bathroom Toilet: Standard  Bathroom Equipment: Shower chair (too big for [employer] he doesn't have money to buy a new one)   DME: reacher, raised toilet  Receives Help From: Family  ADL Assistance: Independent (not doing well with bathing lately. Takes a very long time to dress and sponge bathe due to back starting to hurt)   Homemaking Assistance: Needs assistance(Neighbor does laundry. Pt cooks (sometimes). Neighbor drives patient to grocery store and pt uses the grocery store scooter to get around while shopping.)  Homemaking Responsibilities: Yes  Ambulation Assistance: Independent (No AD. Back pain limits standing and ambulation tolerance. Uses a motorized cart at grocery store)   Transfer Assistance: Independent  Active : Yes(Has license, does not drive Neighbor drives to store/appointments.)  Additional comment: 2 recent falls.  Pt [facility] got out of [facility]     Examination:  · Observation: Supine in bed upon arrival  · Vision: [facility]  · Hearing: WFL  · Vitals: Stable vitals throughout session    Body Systems and functions:  · ROM:  · AROM approx 100 degrees  · PROM full range  · Strength: 4-/5 BUE shoulder flexion, symmetrical  strength  · Sensation: Reports numbness and tingling in B feet  · Tone: Normal  · Coordination: WFL  · Perception: WNL    Activities of Daily Living (ADLs):  · Feeding: Onelia (setup and increased time)  · Grooming: Onelia (in seated, pt completed hair brushing with setup and increased time, recommend pt complete in seated while experiencing increased LLE pain, impacting ability to tolerate standing for prolonged periods of time). · UB bathing: SBA (pt reports he has been taking a sponge bath at the sink as of recently, anticipate pt could bathe UB in shower chair within shower with SBA). · LB bathing: ModA (recommend pt complete in seated with assist to wash distally). · UB dressing: SBA (recommend pt complete in seated). · LB dressing: MaxA (pt sat EOB and attempted to twila socks, however was unable. Pt attempted to twila R sock utilizing figure 4 formation, however was unable to rest RLE on LLE d/t pain). · Toileting: ModA (anticipate assist with toilet transfers, paulino care, and LB clothing manipulation in standing). Cognitive and Psychosocial Functioning:  · Overall cognitive status: WFL (self, time, location)  · Affect: Normal     Balance:   · Sitting: Supervision (pt sat EOB demo good dynamic sitting balance)  · Standing: Kiran (stood with RW, VC to WB through BUE on RW, demo decreased upright posture, forward flexion, initiation at hips to maintain standing upright). Functional Mobility:  · Bed Mobility: ModA (supine to seated bed mobility, seated to supine bed mobility). · Transfers: Kiran (STS from EOB with RW, VC throughout). ModA (SPT to chair, VC throughout).    · Ambulation: NT this date      AM-PAC 6 click short form for inpatient daily activity:   How much help from another person does the patient currently need. .. Unable  Dep A Lot  Max A A Lot   Mod A A Little  Min A A Little   CGA  SBA None   Mod I  Indep  Sup   1. Putting on and taking off regular lower body clothing? [] 1    [x] 2   [] 2   [] 3   [] 3   [] 4      2. Bathing (including washing, rinsing, drying)? [] 1   [] 2   [] 2 [x] 3 [] 3 [] 4   3. Toileting, which includes using toilet, bedpan, or urinal? [] 1    [] 2   [x] 2   [] 3   [] 3   [] 4     4. Putting on and taking off regular upper body clothing? [] 1   [] 2   [] 2   [] 3   [x] 3    [] 4      5. Taking care of personal grooming such as brushing teeth? [] 1   [] 2    [] 2 [] 3    [] 3   [x] 4      6. Eating meals? [] 1   [] 2   [] 2   [] 3   [] 3   [x] 4      Raw Score:  18     [24=0% impaired(CH), 23=1-19%(CI), 20-22=20-39%(CJ), 15-19=40-59%(CK), 10-14=60-79%(CL), 7-9=80-99%(CM), 6=100%(CN)]     Treatment:  Self Care Training:   Cues were given for safety, sequence, UE/LE placement, visual cues, and balance. Activities performed today included dressing and grooming. Therapeutic Activity Training:   Therapeutic activity training was instructed today. Cues were given for safety, sequence, UE/LE placement, awareness, and balance. Activities performed today included bed mobility training, sup-sit, sit-stand, SPT. Safety Measures: Gait belt used, Left in chair, Alarm in place    Assessment:  Assessment  Performance deficits / Impairments: Decreased functional mobility , Decreased ROM, Decreased balance, Decreased high-level IADLs, Decreased ADL status, Decreased strength, Decreased posture  Treatment Diagnosis: Fall at home  Prognosis: Good  Decision Making: Medium Complexity  REQUIRES OT FOLLOW UP: Yes  Discharge Recommendations: [address]     Pt is a 79year old M admitted for fall at home resulting in L knee pain. Pt reports that prior to admission he was Onelia (increased time) in ADLs, IADLs, and functional mobility.  This date pt is experiencing increased pain in LLE, impacting ability to engage in mobility and ADLs. Pt is currently functioning below baseline and would benefit from skilled OT services at SNF. Plan:  Plan  Times per week: 2+  Times per day: Daily      Goals:  1. Pt will complete all aspects of bed mobility for EOB/OOB ADLs with Kiran. 2. Pt will complete UB/LB bathing with CGA and AE as needed. 3. Pt will complete all aspects of LB dressing with Kiran and AE as needed. 4. Pt will complete all functional transfers to and from bed, chair, toilet, shower chair with SBA and RW.   5. Pt will ambulate HH distance to bathroom for toileting with CGA and RW. 6. Pt will complete all aspects of toileting task with Kiran. 7. Pt will complete oral hygiene/grooming routine in standing at sink with Kiran demo good dynamic standing balance for approx 8 minutes. 8. Pt will complete ther ex/ther act with focus on UB strengthening. Time:   Time in: 1008  Time out: 1044  Timed treatment minutes: 26  Total time: 36      Electronically signed by:       [address] [place], [place] [address]ACE/L, [place], .[id number]'

## 2020-09-02 NOTE — CARE COORDINATION
CM consult per Derek Monahan for discharge planning for this pt who lives at home alone, felling at home, unable to ambulate due to pain and stiffness. Malachi NORMAN/CM visited pt for discharge plan. Malachi Levy PA-C states pt has no DME and may benefit from a wheeled walker. Order placed for wheeled walker, order has not been faxed to DME affter hour fax due to pt telling Malachi NORMAN/GRETEL that he can not go home and take care of self because he can not ambulate. CM to contact [address] [place] [address] DME for possible delivery of walker to pt prior to discharge. .    Pt recently discharged from [facility], not doing well at home alone, unsafe, falling, willing to return to SNF for additional rehab if recommended by PT/OT and if more insurance days available. See ESTER Aguirre/GRETEL notes.  ALYSSA,RN/CM

## 2020-09-02 NOTE — CARE COORDINATION
CM received a consult on patient. CM went to room and introduced self to patient. CM into see patient to initiate a discharge plan. CM into see, introduced self and explained role of CM. Pt. Is kind, alert and oriented. Patient currently lives by self in a two-story residence. Patient reported that patient 7 or 8 concrete step straight up a grass hill in order to get up to his house. Patient reported that he had a very difficult time getting down steps with paramedics and it hurt very bad after patients' fall inside patients' home. Patient reported that he fell twice today in house and twice the other day. Patient just came home from [facility] last Monday. Patient has a Primary Care Physician. Patient can afford his medication once patient returns home via patients insurance: [employer] [place]. Patient does not have transportation or transportation assistance at this time. Patient does need Durable Medical Equipment: Order was written/placed but order not faxed to Judy (can Judy complete order for walker and deliver walker to patients room.) Patient does not have any DME equipment for home use and needs all equipment upon discharge from SNF. OT/PT order completed and requested--- for possible SNF placement for [facility]. Facesheet faxed to SNF. Patient has not used Durable Medical Equipment in the past.  Patient does not have support at home upon discharge besides some help from neighbors as patients sons work a lot and are unable to visit patient. Patient has used [facility] in past and if patient should be discharged home at some point; patient would like to use them again. Patient is agreeable to SNF placement b/c he is aware that he needs rehab to be able to walk and is in fear of falling at his home. CM provided name and encouraged patient to call for any needs or concerns. CM is available if any needs arise.

## 2020-09-02 NOTE — PROGRESS NOTES
[address] [place] [address]  HOSPITALIST PROGRESS NOTE                       Name:  Bisi Carvajal. /Age/Sex: 1950  (69 y.o. male)   MRN & CSN:  [id number] & [id number] Admission Date/Time: 2020  6:10 PM   Location:  [facility]1106-A Attending:  Shaquille Paez MD                                                  HPI  Bisi Carvajal. is a 79 y.o. male who presents with fall    SUBJECTIVE  -complains of left knee and left foot pain. Reports he tripped and fell. No chest pain/shortness of breath. 10 point review of systems reviewed and negative unless noted above. ALLERGIES:   Allergies   Allergen Reactions    Nsaids      Renal        PCP: Esvin Casas MD    PAST MEDICAL HISTORY, SURGICAL HISTORY, SOCIAL HISTORY and  HOME MEDICATIONS all reviewed. OBJECTIVE  Vitals:    20 0402 20 0835 20 0900 20 0947   BP: 132/77  (!) 167/82    Pulse: 50  76 71   Resp: 20  18    Temp: 98 °F (36.7 °C)  98.3 °F (36.8 °C)    TempSrc: Oral  Oral    SpO2: 97% 91% 95%    Weight: 234 lb 9.6 oz (106.4 kg)      Height:           PHYSICAL EXAM   GEN Awake male, sitting upright in bed in no apparent distress. EYES Pupils are equally round. No scleral erythema, discharge, or conjunctivitis. HENT Mucous membranes are moist. Oral pharynx without exudates, no evidence of thrush. NECK Supple, no apparent thyromegaly or masses. RESP Clear to auscultation, no wheezes, rales or rhonchi. Symmetric chest movement  CARDIO/VASC S1/S2 auscultated. Regular rate without appreciable murmurs, rubs, or gallops. No JVD or carotid bruits. Peripheral pulses equal bilaterally and palpable. No peripheral edema. GI Abdomen is soft without significant tenderness, masses, or guarding. Bowel sounds are normoactive. Rectal exam deferred.  No costovertebral angle tenderness. Normal appearing external genitalia. Zimmer catheter is not present. HEME/LYMPH No palpable cervical lymphadenopathy and no hepatosplenomegaly. No petechiae or ecchymoses. MSK Limited ROM of LLE  SKIN Normal coloration, warm, dry. NEURO Cranial nerves appear grossly intact, normal speech, no lateralizing weakness. PSYCH Awake, alert, oriented x 4. Affect appropriate. INTAKE: In: 240 [P.O.:240]  Out: 825   OUTPUT: In: 240   Out: 825 [Urine:825]    LABS  Recent Labs     09/01/20 2125 09/02/20  0532   WBC 8.0 5.6   HGB 12.4* 11.9*   HCT 40.3* 41.7*    147      Recent Labs     09/01/20 2125 09/02/20  0532    141   K 4.0 3.9    106   CO2 23 23   BUN 25* 22   CREATININE 1.8* 1.8*     No results for input(s): AST, ALT, ALB, BILIDIR, BILITOT, ALKPHOS in the last 72 hours. No results for input(s): INR in the last 72 hours. No results for input(s): CKTOTAL, CKMB, CKMBINDEX, TROPONINT in the last 72 hours.        Abnormal labs for today noted      Imaging:     ECHO:    Microbiology:  Blood culture:    Urine culture:    Sputum culture:    Procedures done this admission:    MEDS  Scheduled Meds:   budesonide-formoterol  2 puff Inhalation BID    tiotropium  2 puff Inhalation Daily    heparin (porcine)  5,000 Units Subcutaneous 3 times per day    sodium chloride flush  10 mL Intravenous 2 times per day    atorvastatin  80 mg Oral Daily    busPIRone  10 mg Oral TID    dilTIAZem  120 mg Oral Daily    divalproex  1,000 mg Oral Nightly    docusate sodium  100 mg Oral BID    escitalopram  10 mg Oral Daily    famotidine  10 mg Oral BID    ferrous sulfate  325 mg Oral BID WC    folic acid  1 mg Oral Daily    furosemide  20 mg Oral Daily    gabapentin  600 mg Oral TID    risperiDONE  1 mg Oral Daily    aspirin  81 mg Oral Daily    insulin lispro  0-6 Units Subcutaneous TID WC    insulin lispro  0-3 Units Subcutaneous Nightly    benztropine  1 mg Oral Daily    influenza virus vaccine  0.5 mL Intramuscular Once     Continuous Infusions:   dextrose       PRN Meds:sodium chloride flush, acetaminophen **OR** acetaminophen, polyethylene glycol, promethazine **OR** ondansetron, traZODone, glucose, dextrose, glucagon (rDNA), dextrose, oxyCODONE-acetaminophen        ASSESSMENT and [facility] Day: 2    1-Failure to thrive at home with recurrent falls-  Reports a mechanical fall at home- with left knee pain and swelling- will get CT and consult ortho for input. Otherwise needs placement- PT/OT    Other chronic issues as below         - CAD - on ASA, Lipitor.         - diastolic CHF - stable, no edema, no SOB, on oral Lasix         - anemia - cont Ferrous sulfate         - gastric ulcer - on Protonix         - hyperlipidemia         - bipolar 1 d/o         - seizure - c/w Depakote ER, seizure precautions         -CKD 3 -stable.         -COPD-stable.          -PAF- not on AC as an outpatient         -will get COVID for placement     Disp:     Diet DIET CARDIAC;   DVT Prophylaxis [] Lovenox, []  Heparin, [] SCDs, [] Ambulation   GI Prophylaxis [] PPI,  [] H2 Blocker,  [] Carafate,  [] Diet/Tube Feeds   Code Status Full Code   Disposition Patient requires continued admission due to debility   CMS Level of Risk [] Low, [x] Moderate,[]  High  Patient's risk as above due to debility     ELBERT MCGEE MD 9/2/2020 10:05 AM

## 2020-09-02 NOTE — H&P
HISTORY AND PHYSICAL  (Hospitalist, Internal Medicine)  IDENTIFYING INFORMATION   PATIENT:  Caitlin Berrios MRN:  [id number]  ADMIT DATE: 9/1/2020      CHIEF COMPLAINT   Fall    HISTORY OF PRESENT ILLNESS   Caitlin Berrios is a 79 y.o. male with HTN, DM type 2, anxiety, depression, CKD stage III, A fib not on anticoagulation due to GI bleed, bilateral carotid stenosis, bilateral subdural hygromas, bipolar disorder, peptic ulcer disease, history of colon adenocarcinoma status post right hemicolectomy -3/5809, chronic diastolic congestive heart failure, h/o empyema of left lung s/p VATS with pleural decortication 3/2019, presented to ED after having a fall at home. Patient reported that he was trying to get out of the bed, tangled in his comforters and hit hard on the floor. Patient denied hitting his head or any loss of consciousness. Patient's landlord helped him to get up. Patient was complaining of left knee pain and called EMS. Patient denied any other complaints no chest pain, shortness of breath, fever, chills, cough, denied any urinary complaints, denied any constipation or diarrhea. At presentation patient was noted to have /77, HR 69, RR 18, temperature 98, saturating 97% on room air. Lab work significant for BUN 25, creatinine 1.8, anion gap 13, hemoglobin 12.4, platelets 841. CT head, CT C-spine-no acute process. X-ray left knee-medium sized suprapatellar effusion. Patient was given Percocet in the ER. Case management was consulted in the ER.     Past medical history:  HTN, DM type 2, anxiety, depression, CKD stage III, A fib not on anticoagulation due to GI bleed, bilateral carotid stenosis, bilateral subdural hygromas, bipolar disorder, peptic ulcer disease, history of colon adenocarcinoma status post right hemicolectomy -7/9017, chronic diastolic congestive heart failure, h/o empyema of left lung s/p VATS with pleural decortication 3/2019    Past surgical history:  Right peripheral neuropathy (HCC)     Diastolic CHF ([facility] 2/60/7112    Gastric ulcer     H/O cardiovascular stress test 5/26/14    EF 68% mild ischemia anterior wall    Heart murmur     \"see Dr Malena Muniz- last echo 2014\" pt states\"I think they said I have a murmur but no chest pain or palpitations\"    Hiatal hernia     History of cardiac cath 6/11/14    MODERATE  CAD      Hx of migraines     HX OTHER MEDICAL     \"have thinning of kidney walls- they found I had that 15 yrs ago\" see Dr Chantel Jimenez"    Hyperlipidemia     Hypertension     Lumbar radiculopathy     Overlapping malignant neoplasm of colon ([facility] [address] 5/27/2020    Panic attack     'anything new gives me anxiety\"    Pleural effusion     scheduled for thoracentesis 7/28/2014, 8/17/2016    S/P thoracentesis     left side( per old chart had thora done 4/2016 and one done 2014)    Sleep apnea     sleep study x 2 - last one 4-5 yrs ago- uses c-pap\"    SOBOE (shortness of breath on exertion)     Spinal stenosis      Past Surgical History:   Procedure Laterality Date    CARPAL TUNNEL RELEASE Bilateral 1989    COLONOSCOPY N/A 5/12/2020    COLONOSCOPY POLYPECTOMY SNARE/COLD BIOPSY WITH SPOT INK TATTOO performed by Nikunj Medellin [facility] at [facility] Left 2000's    KNEE SURGERY Bilateral     right knee x 2( scope)/ left knee- 7-8 yr ago   [address] fusion   [address]  N/A 5/21/2020    BOWEL RESECTION EXTENDED RIGHT HEMICOLECTOMY performed by Te Cowan MD at [address] [place] [address] Left 12/20/2013    THORACENTESIS  4/25/2016         THORACENTESIS Left 11/15/2016    Dr. Vick Jump 250mlsremoved     THORACOTOMY Left 03/18/2019    with Lysis of adhesions    THORACOTOMY Left 3/18/2019    THORACOSCOPY CONVERTED TO THORACOTOMY WITH LYSIS OF ADHESIONS performed by Jackie Abrams MD at [facility]      as a kid    UPPER GASTROINTESTINAL ENDOSCOPY N/A 5/12/2020    EGD BIOPSY performed by Nikunj Medellin, MD at [facility] ENDOSCOPY     Family History   Problem Relation Age of Onset    Heart Disease Mother         heart attack    High Blood Pressure Father      Family Hx of HTN  Family Hx as reviewed above, otherwise non-contributory  Social History     Socioeconomic History    Marital status:      Spouse name: None    Number of children: None    Years of education: None    Highest education level: None   Occupational History    Occupation: [employer], retired     Employer: [employer]   Social Needs    Financial resource strain: None    Food insecurity     Worry: None     Inability: None    Transportation needs     Medical: None     Non-medical: None   Tobacco Use    Smoking status: Former Smoker     Packs/day: 1.50     Years: 30.00     Pack years: 45.00     Types: Cigarettes     Last attempt to quit: 7/24/2010     Years since quitting: 10.1    Smokeless tobacco: Never Used   Substance and Sexual Activity    Alcohol use: Not Currently    Drug use: Not Currently     Types: Marijuana    Sexual activity: Not Currently     Partners: Female   Lifestyle    Physical activity     Days per week: None     Minutes per session: None    Stress: None   Relationships    Social connections     Talks on phone: None     Gets together: None     Attends [employer] service: None     Active member of club or organization: None     Attends meetings of clubs or organizations: None     Relationship status: None    Intimate partner violence     Fear of current or ex partner: None     Emotionally abused: None     Physically abused: None     Forced sexual activity: None   Other Topics Concern    None   Social History Narrative    None       MEDICATIONS   Medications Prior to Admission  Not in a hospital admission.     Current Medications  Current Facility-Administered Medications   Medication Dose Route Frequency Provider Last Rate Last Dose    lidocaine 4 % external patch 1 patch  1 patch Topical Once Monster Kapoor KIET   1 patch at 09/01/20 2024     Current Outpatient Medications   Medication Sig Dispense Refill    aspirin 81 MG chewable tablet Take 4 tablets by mouth once for 1 dose 30 tablet 3    dilTIAZem (CARDIZEM CD) 120 MG extended release capsule Take 1 capsule by mouth daily      atorvastatin (LIPITOR) 80 MG tablet Take 1 tablet by mouth daily 30 tablet 5    busPIRone (BUSPAR) 10 MG tablet Take 1 tablet by mouth 3 times daily 90 tablet 5    divalproex (DEPAKOTE ER) 500 MG extended release tablet Take 2 tablets by mouth nightly 60 tablet 5    escitalopram (LEXAPRO) 10 MG tablet Take 1 tablet by mouth daily 30 tablet 5    famotidine (PEPCID) 10 MG tablet Take 1 tablet by mouth 2 times daily 60 tablet 5    ferrous sulfate (IRON 325) 325 (65 Fe) MG tablet Take 1 tablet by mouth 2 times daily 60 tablet 5    furosemide (LASIX) 20 MG tablet Take 1 tablet by mouth daily 30 tablet 5    folic acid (FOLVITE) 1 MG tablet Take 1 tablet by mouth daily 30 tablet 5    gabapentin (NEURONTIN) 300 MG capsule Take 2 capsules by mouth 3 times daily for 180 days.  180 capsule 5    risperiDONE (RISPERDAL) 1 MG tablet Take 1 tablet by mouth daily 30 tablet 5    traZODone (DESYREL) 50 MG tablet Take 1 tablet by mouth nightly 30 tablet 5    insulin lispro (HUMALOG) 100 UNIT/ML injection vial **Low Dose Correction Algorithm**Insulin lispro (HUMALOG)  3 TIMES DAILY WITH MEALSGlucose: Dose: No [id number] 1 [id number] 2 [id number] 3 [id number] 4 [id number] 5 [id number] and above 6 Units 1 vial 3    enoxaparin (LOVENOX) 30 MG/0.3ML injection Inject 0.3 mLs into the skin daily  0    docusate sodium (COLACE) 100 MG capsule Take 1 capsule by mouth 2 times daily 20 capsule 3    fluticasone-umeclidin-vilant (TRELEGY ELLIPTA) 100-62.5-25 MCG/INH AEPB Inhale 1 puff into the lungs daily 1 each 3    Compression Stockings MISC by Does not apply route Duration of Treatment:  3 Months  # of pairs:  2    Compression Class Level - 20-30 mmHg*    Style - Knee High 2 each 0    Lactobacillus (ACIDOPHILUS) 0.5 MG TABS Take 1 capsule by mouth           Allergies  Allergies   Allergen Reactions    Nsaids      Renal        REVIEW OF SYSTEMS   Within above limitations. 14 point review of systems reviewed. Pertinent positive or negative as per HPI or otherwise negative per 14 point systems review. PHYSICAL EXAM     Wt Readings from Last 3 Encounters:   08/05/20 224 lb 3.2 oz (101.7 kg)   07/28/20 227 lb (103 kg)   07/22/20 227 lb 12.8 oz (103.3 kg)       Blood pressure 135/77, pulse 69, temperature 98 °F (36.7 °C), resp. rate 18, SpO2 97 %. GEN  -Awake, alert, NAD.   EYES   -PERRL. HENT  -MM are moist.   RESP  -LS CTA equal bilat, no wheezes, rales or rhonchi. Symmetric chest movement. No respiratory distress noted. C/V  -S1/S2 auscultated. RRR . No peripheral edema. No reproducible chest wall tenderness. GI  -Abdomen is soft, non-distended, no significant tenderness.  + BS in all quadrants.   -No CVA tenderness. Zimmer catheter is not present. MS  -unable to flex at the left knee joint-no bruising, no swelling noted   SKIN  -Normal coloration, warm, dry. NEURO  -CN 2-12 appear grossly intact, normal speech, no lateralizing weakness. PSYC  -Awake, alert, oriented x 4. Appropriate affect. LABS AND IMAGING     Results for Ed Hicks (MRN [id number]) as of 9/2/2020 04:30   Ref.  Range 9/1/2020 21:25   Sodium Latest Ref Range: 135 - 145 MMOL/L 141   Potassium Latest Ref Range: 3.5 - 5.1 MMOL/L 4.0   Chloride Latest Ref Range: 99 - 110 mMol/L 105   CO2 Latest Ref Range: 21 - 32 MMOL/L 23   BUN Latest Ref Range: 6 - 23 MG/DL 25 (H)   Creatinine Latest Ref Range: 0.9 - 1.3 MG/DL 1.8 (H)   Anion Gap Latest Ref Range: 4 - 16  13   GFR Non- Latest Ref Range: >60 mL/min/1.73m2 37 (L)   GFR  Latest Ref Range: >60 mL/min/1.73m2 45 (L)   Glucose Latest Ref Range: 70 - 99 MG/ (H)   Calcium Reason for Exam: fall   Acuity: Acute   Type of Exam: Initial     FINDINGS:   BRAIN/VENTRICLES: The cerebral and cerebellar parenchyma demonstrate volume   loss with areas of arachnoid scarring, unchanged.  There are no areas of   acute hemorrhage, mass, or midline shift.  The ventricles are proportional to   the cerebral sulci.  Gray-white differentiation is maintained without   evidence of an acute infarct. ORBITS: The visualized portion of the orbits demonstrate no acute abnormality. SINUSES: The visualized paranasal sinuses and mastoid air cells demonstrate   no acute abnormality. SOFT TISSUES/SKULL:  The calvarium is intact.  No appreciable scalp soft   tissue swelling.       Impression:         1. Cerebral parenchymal volume loss, stable. 2. No acute intracranial abnormality.       CT CERVICAL SPINE WO CONTRAST [[id number]]  Collected: 09/01/20 1926       Order Status: Completed  Updated: 09/01/20 1957       Narrative:         EXAMINATION:   CT OF THE CERVICAL SPINE WITHOUT CONTRAST 9/1/2020 6:51 pm     TECHNIQUE:   CT of the cervical spine was performed without the administration of   intravenous contrast. Multiplanar reformatted images are provided for review. Dose modulation, iterative reconstruction, and/or weight based adjustment of   the mA/kV was utilized to reduce the radiation dose to as low as reasonably   achievable. COMPARISON:   7/30/2020     HISTORY:   ORDERING SYSTEM PROVIDED HISTORY: fall   TECHNOLOGIST PROVIDED HISTORY:   Reason for exam:->fall   Reason for Exam: fall   Acuity: Acute   Type of Exam: Initial     FINDINGS:   BONES/ALIGNMENT: There is normal alignment of the cervical spine. Postsurgical changes are noted from an anterior C5-C6 fusion.  Solid bone   bridges the disc space at this level. Radha Friday is no evidence of an acute   fracture.      DEGENERATIVE CHANGES: Asymmetric right-sided uncovertebral spurring and   foraminal narrowing at C3-C4.  Asymmetric right-sided uncovertebral spurring   and foraminal narrowing at C4-C5.  Bilateral foraminal narrowing at C5-C6. Mild central spinal canal narrowing is noted at C3-C4 and C4-C5. SOFT TISSUES: Atherosclerotic plaque is noted along the aorta and its branch   vessels.  The thyroid gland is normal in appearance.  Vascular calcifications   are noted in the proximal internal carotid arteries.  No other paraspinal   masses are identified.  Emphysematous changes are noted in the lung apices.       Impression:         1. No evidence of an acute fracture in the cervical spine. 2. Sequela of an anterior C5-C6 fusion without complication, stable.       XR KNEE LEFT (1-2 VIEWS) [[id number]]  Collected: 09/01/20 1919       Order Status: Completed  Updated: 09/01/20 1926       Narrative:         EXAMINATION:   TWO XRAY VIEWS OF THE LEFT KNEE     9/1/2020 6:38 pm     COMPARISON:   None. HISTORY:   ORDERING SYSTEM PROVIDED HISTORY: Injury   TECHNOLOGIST PROVIDED HISTORY:   PORTABLE   Reason for exam:->Injury   Reason for Exam: left knee pain   Acuity: Acute   Type of Exam: Initial   Mechanism of Injury: fall   Relevant Medical/Surgical History: na     FINDINGS:   No acute fracture or dislocation is identified. Lily Dale Richmond is a medium sized   suprapatellar effusion.  Vascular calcifications are noted within the lower   extremity.       Impression:         Medium-sized suprapatellar effusion.       XR KNEE LEFT (3 VIEWS) [[id number]]  Updated: 09/01/20 1838       Order Status: Canceled                  Relevant labs and imaging reviewed    ASSESSMENT AND PLAN     #. Fall at home, initial encounter:  -CT head, CT C-spine-no acute process  -X-ray left knee-suprapatellar effusion  -PT/OT evaluation  -Patient lives alone, interested in going to rehab.  -Case management was consulted in the ER. #. HTN-  -patient is on Cardizem 120 mg daily.     #.  DM type 2  -IiM2o-9.3-5/2020  -Continue with insulin sliding scale and hypoglycemia protocol     #.  Diabetic neuropathy :  -Continue gabapentin     #. Anxiety/depression/bipolar disorder  -Patient is on risperidone, benztropine, Lexapro, divalproex, buspirone    #. CKD stage III    #. Paroxysmal A fib not on anticoagulation due to GI bleed  -Continue Cardizem    #. bilateral carotid stenosis  -To follow-up with cardiovascular surgery as outpatient    #. bilateral subdural hygromas    #. peptic ulcer disease    #. history of colon adenocarcinoma status post right hemicolectomy -5/2020    #. chronic diastolic congestive heart failure  -Does not appear to be in exacerbation  -Continue Lasix 20 mg daily    #.  h/o empyema of left lung s/p VATS with pleural decortication 3/16367. empyema of left lung s/p VATS with pleural decortication 3/2019  -Chronically trapped lung.     #. Chronic anemia:  -Monitor H&H. Continue ferrous sulfate     #. Hyperlipidemia: Continue atorvastatin    #. COPD: Patient is on Trelegy Ellipta     DVT Prophylaxis: Heparin  GI Prophylaxis: Pepcid  Code Status: FULL.       Case d/w ED physician    Myles Antunez MD  Hospitalist, Internal Medicine  9/1/2020 at 10:03 PM

## 2020-09-02 NOTE — ED PROVIDER NOTES
EMERGENCY DEPARTMENT ENCOUNTER      PCP: Rico Hashimoto, MD    [facility]    Chief Complaint   Patient presents with    Knee Pain     left       This patient was not evaluated by the attending physician. I have independently evaluated this patient. HPI    Philippe Elizalde. is a 79 y.o. male who presents with Left knee pain. Onset was shortly PTA. The context is patient reports his left foot got caught in his bedspread while he was standing next to his bed causing him to fall. He did hit his head on the ground but denies loss of consciousness. Pain is localized to front of left knee. Duration of pain has been constant since onset. The pain does not radiate. The pain is aggravated by trying to ambulate and knee movement. The patient has no associated other injuries. Patient reports he has seen Ortho in the past but does not wish to see the same orthopedist as he did not enjoy the orthopedist that he saw. Patient denies numbness, weakness, tingling, redness, fever, chills, and functional/motor deficits. Denies neck or back pain. REVIEW OF SYSTEMS    General: Denies fever or chills  Cardiac: Denies chest pain  Pulmonary: Denies shortness of breath  GI: Denies abdominal pain, vomiting, or diarrhea  : No dysuria or hematuria  Musculoskeletal: See HPI; Denies any other musculoskeletal injuries, including chest wall and back.     All other review of systems are negative  See HPI and nursing notes for additional information     PAST MEDICAL & SURGICAL HISTORY    Past Medical History:   Diagnosis Date    Anemia     per old chart    Arthritis     Back pain, chronic     \"have pain in lumbar mainly- have 5 bulging discs\"\"in my neck and my lumbar have herniated discs\"    [facility]     CAD (coronary artery disease) 1/27/2016    does not follow with a cardiologist    Cerebral artery occlusion with cerebral infarction [facility])     \"had mini stroke in Jan 2016- fast heart rate when I would stand up - smile drooping on one side- lased just the one day\"    Chronic kidney disease (CKD), stage III (moderate) (HCC)     CKD (chronic kidney disease)     per old chart- consult with Dr Dean Rachel with admission 4/2016\"have low kidney function\"    COPD (chronic obstructive pulmonary disease) ([facility]     follow with Dr Harley Nixon Diabetes mellitus, type 2 ([facility] 1/3/2017    Diabetic peripheral neuropathy (HCC)     Diastolic CHF ([facility] 7/04/6081    Gastric ulcer     H/O cardiovascular stress test 5/26/14    EF 68% mild ischemia anterior wall    Heart murmur     \"see Dr Bairon Bell- last echo 2014\" pt states\"I think they said I have a murmur but no chest pain or palpitations\"    Hiatal hernia     History of cardiac cath 6/11/14    MODERATE  CAD      Hx of migraines     HX OTHER MEDICAL     \"have thinning of kidney walls- they found I had that 15 yrs ago\" see Dr Milo Lindsey"    Hyperlipidemia     Hypertension     Lumbar radiculopathy     Overlapping malignant neoplasm of colon ([facility] 5/27/2020    Panic attack     'anything new gives me anxiety\"    Pleural effusion     scheduled for thoracentesis 7/28/2014, 8/17/2016    S/P thoracentesis     left side( per old chart had thora done 4/2016 and one done 2014)    Sleep apnea     sleep study x 2 - last one 4-5 yrs ago- uses c-pap\"    SOBOE (shortness of breath on exertion)     Spinal stenosis      Past Surgical History:   Procedure Laterality Date    CARPAL TUNNEL RELEASE Bilateral 1989    COLONOSCOPY N/A 5/12/2020    COLONOSCOPY POLYPECTOMY SNARE/COLD BIOPSY WITH SPOT INK TATTOO performed by Eden Garrido MD at [facility] Left 2000's    KNEE SURGERY Bilateral     right knee x 2( scope)/ left knee- 7-8 yr ago   [address] fusion   [address]  N/A 5/21/2020    BOWEL RESECTION EXTENDED RIGHT HEMICOLECTOMY performed by Katya Brewster MD at [address] [place] [address] 12/20/2013    THORACENTESIS  4/25/2016         THORACENTESIS Left 11/15/2016    Dr. Cheikh Bose 250mlsremoved     THORACOTOMY Left 03/18/2019    with Lysis of adhesions    THORACOTOMY Left 3/18/2019    THORACOSCOPY CONVERTED TO THORACOTOMY WITH LYSIS OF ADHESIONS performed by Lea Lam MD at [address] [place] [address]      as a kid    UPPER GASTROINTESTINAL ENDOSCOPY N/A 5/12/2020    EGD BIOPSY performed by Itz Presley MD at [facility]    Allergies   Allergen Reactions    Nsaids      Renal        SOCIAL & FAMILY HISTORY    Social History     Socioeconomic History    Marital status:      Spouse name: None    Number of children: None    Years of education: None    Highest education level: None   Occupational History    Occupation: [employer], retired     Employer: [email or web address]   Social Needs    Financial resource strain: None    Food insecurity     Worry: None     Inability: None    Transportation needs     Medical: None     Non-medical: None   Tobacco Use    Smoking status: Former Smoker     Packs/day: 1.50     Years: 30.00     Pack years: 45.00     Types: Cigarettes     Last attempt to quit: 7/24/2010     Years since quitting: 10.1    Smokeless tobacco: Never Used   Substance and Sexual Activity    Alcohol use: Not Currently    Drug use: Not Currently     Types: Marijuana    Sexual activity: Not Currently     Partners: Female   Lifestyle    Physical activity     Days per week: None     Minutes per session: None    Stress: None   Relationships    Social connections     Talks on phone: None     Gets together: None     Attends Latter day service: None     Active member of club or organization: None     Attends meetings of clubs or organizations: None     Relationship status: None    Intimate partner violence     Fear of current or ex partner: None     Emotionally abused: None     Physically abused: None     Forced sexual activity: None   Other Topics Concern    1. No evidence of an acute fracture in the cervical spine. 2. Sequela of an anterior C5-C6 fusion without complication, stable. CT HEAD WO CONTRAST   Final Result   1. Cerebral parenchymal volume loss, stable. 2. No acute intracranial abnormality. PROCEDURES   SPLINT PLACEMENT     A left knee immobilizer splint was applied by the emergency department nurse. The splint is in good position. The patient's extremity is neurovascularly intact after the splint placement. ED COURSE & MEDICAL DECISION MAKING       Vital signs and nursing notes reviewed during ED course. I have independently evaluated this patient. Supervising MD present in the Emergency Department, available for consultation, throughout entirety of patient care. History and exam is consistent with musculoskeletal pain of left knee after mechanical fall but as patient is unable to bear weight enough to manage steps and care for self at home I did consult case management. Case management did see and evaluate patient reports patient requires wheel walker and SNF placement. I discussed the possibility of internal derangement of knee with patient. While in the ED today, left knee xray obtained and reveals a medium sized suprapatellar effusion but no acute osseous abnormality. CT of the head obtained reveals no acute intracranial abnormality. CT cervical spine reveals no evidence of acute fracture. Patient neurologically intact on exam without neuro symptoms. Pt requires the assistance of a wheeled walker to successfully complete daily living task of: toileting, feeding,bathing,dressing and grooming. A wheeled walker is necessary due to the patient's unsteady gait, inability to  an ambulation device and can ambulate only by pushing a walker instead of a lesser assistive device such as a can,crutch or standard walker. While in the ED, patient received left knee immobilizer for comfort.  Affected extremity is distally neurovascularly intact. I have low clinical suspicion for septic joint, nerve injury, vascular injury. Did treat patient with topical lidocaine patch and Percocet in the ED for pain. Patient had some improvement in pain after these medications. As patient is being admitted CBC and BMP added on to patient's work-up today. I consulted hospitalist this patient will require admission for SNF placement. Hospitalist, Dr. Liya Luna, and I discussed the patient's case. She agrees to admit the patient at this time and follow-up on CBC and BMP. All pertinent Lab data and radiographic results reviewed with patient at bedside. The patient and/or the family were informed of the results of any tests/labs/imaging, the treatment plan, and time was allotted to answer questions. Diagnosis, disposition, and plan discussed in detail with patient and/or family who understands and agrees. Patient agrees to admission at this time for further evaluation and care. In consideration of current COVID19 pandemic, with effort to minimize unnecessary provider exposure, this patient was seen at bedside by me independently. However, in compliance with current hospital SHAJI/ED protocol, prior to admission I did discuss this patient case with emergency department physician, Dr. Andrew Mercado. Of note, this Pt was NOT admitted to the ICU. Clinical  IMPRESSION    1. Acute pain of left knee    2. Fall, initial encounter    3. Knee effusion, left          Disposition:  Admission    Comment: Please note this report has been produced using speech recognition software and may contain errors related to that system including errors in grammar, punctuation, and spelling, as well as words and phrases that may be inappropriate. If there are any questions or concerns please feel free to contact the dictating provider for clarification.           Rani Stewart PA-C  09/02/20 5882

## 2020-09-02 NOTE — PROGRESS NOTES
Physical Therapy  [facility] ACUTE CARE PHYSICAL THERAPY EVALUATION  Dayo Durand., 1950, 1106/1106-A, 9/2/2020    History  [place]:  The primary encounter diagnosis was Acute pain of left knee. Diagnoses of Fall, initial encounter and Knee effusion, left were also pertinent to this visit. Patient  has a past medical history of Anemia, Arthritis, Back pain, chronic, Bipolar 1 disorder ([address] CAD (coronary artery disease), Cerebral artery occlusion with cerebral infarction ([address] Chronic kidney disease (CKD), stage III (moderate) ([address] CKD (chronic kidney disease), COPD (chronic obstructive pulmonary disease) ([address] Diabetes mellitus, type 2 ([address] Diabetic peripheral neuropathy ([address] Diastolic CHF ([address] Gastric ulcer, H/O cardiovascular stress test, Heart murmur, Hiatal hernia, History of cardiac cath, Hx of migraines, HX OTHER MEDICAL, Hyperlipidemia, Hypertension, Lumbar radiculopathy, Overlapping malignant neoplasm of colon ([address] Panic attack, Pleural effusion, S/P thoracentesis, Sleep apnea, SOBOE (shortness of breath on exertion), and Spinal stenosis. Patient  has a past surgical history that includes Neck surgery (1998); Carpal tunnel release (Bilateral, 1989); knee surgery (Bilateral); thoracentesis (Left, 12/20/2013); Elbow surgery (Left, 2000's); Thoracentesis (4/25/2016); Tonsillectomy; Thoracentesis (Left, 11/15/2016); thoracotomy (Left, 03/18/2019); thoracotomy (Left, 3/18/2019); Upper gastrointestinal endoscopy (N/A, 5/12/2020); Colonoscopy (N/A, 5/12/2020); and Small intestine surgery (N/A, 5/21/2020).     Subjective:  Patient states:  \"I just can't move this left leg\"   Pain: 8/10 left leg   Communication with other providers:  co-eval with OT, [facility] [place]   Restrictions: general precautions, falls, left knee immobilizer in his room (no instructions for consistent use)      Home Setup/Prior level of function  Social/Functional History  Lives With: Alone  Type of Home: [facility] Layout: Two level, Able to Live on Main level with bedroom/bathroom  Home Access: Stairs to enter with rails  Entrance Stairs - Number of Steps: 7 steps  Bathroom Shower/Tub: Walk-in shower (has been sponge bathing recently)   Bathroom Toilet: raised toilet seat   Bathroom Equipment: Shower chair (too big for shower-states he doesn't have money to buy a new one)   DME: reacher  Receives Help From: Family  ADL Assistance: Independent (not doing well with bathing lately. Takes a very long time to dress and sponge bathe due to back starting to hurt)   Homemaking Assistance: Needs assistance(Neighbor does laundry. Pt cooks (sometimes). Neighbor drives patient to grocery store and pt uses the grocery store scooter to get around while shopping.)  Homemaking Responsibilities: Yes  Ambulation Assistance: Independent (No AD. Back pain limits standing and ambulation tolerance. Uses a motorized cart at grocery store)   Transfer Assistance: Independent  Active : Yes(Has license, does not drive Neighbor drives to store/appointments.)  Additional comment: 2 recent falls. Pt recently got out of [facility]     Examination of body systems (includes body structures/functions, activity/participation limitations):  · Observation:  Supine in bed upon arrival. Cooperative with therapy. Assisted with donning knee immobilizer. Pt very uncomfortable in it. Inc time for mobility with slow pace due to left LE pain. · Vision:  WFL, glasses  · Hearing:  Slightly [place]    · Cardiopulmonary:  Stable vitals on room air   · Orientation: oriented to person, place, and time     Musculoskeletal  · ROM R/L:  WFL RLE, L knee grossly 5-80 deg. Limited with pain. · Strength R/L:  RLE 4/5, LLE 3-/5. Moderate strength impairments observed in function and endurance. Mobility/treatment:   · Rolling L/R:  NT   · Supine to sit:  modA for LLE guidance to EOB and uprighting trunk.  Cues for sequencing LEs and use of bed rail, HOB elevated ~45 deg surfaces CGA with RW  Short term goal 4: Pt will ambulate 30ft with RW CGA  Short term goal 5: Pt will complete LE HEP x 10 reps SBA       Treatment plan:  Bed mobility, transfers, balance, gait, TA, TX, stairs     Recommendations for NURSING mobility: stand step pivot with RW (to the right)     Time:   Time in: 1008  Time out: 1046  Timed treatment minutes: 23  Total time: 38    Electronically signed by:    Bailey Salgado [id number]  9/2/2020, 1:06 PM

## 2020-09-03 VITALS
HEART RATE: 53 BPM | DIASTOLIC BLOOD PRESSURE: 77 MMHG | WEIGHT: 231.3 LBS | TEMPERATURE: 97.8 F | RESPIRATION RATE: 16 BRPM | SYSTOLIC BLOOD PRESSURE: 154 MMHG | HEIGHT: 69 IN | OXYGEN SATURATION: 98 % | BODY MASS INDEX: 34.26 KG/M2

## 2020-09-03 LAB
GLUCOSE BLD-MCNC: 139 MG/DL (ref 70–99)
GLUCOSE BLD-MCNC: 162 MG/DL (ref 70–99)
GLUCOSE BLD-MCNC: 258 MG/DL (ref 70–99)

## 2020-09-03 PROCEDURE — G0378 HOSPITAL OBSERVATION PER HR: HCPCS

## 2020-09-03 PROCEDURE — 6370000000 HC RX 637 (ALT 250 FOR IP): Performed by: INTERNAL MEDICINE

## 2020-09-03 PROCEDURE — 6360000002 HC RX W HCPCS: Performed by: INTERNAL MEDICINE

## 2020-09-03 PROCEDURE — 2580000003 HC RX 258: Performed by: INTERNAL MEDICINE

## 2020-09-03 PROCEDURE — 82962 GLUCOSE BLOOD TEST: CPT

## 2020-09-03 PROCEDURE — 94640 AIRWAY INHALATION TREATMENT: CPT

## 2020-09-03 PROCEDURE — 94761 N-INVAS EAR/PLS OXIMETRY MLT: CPT

## 2020-09-03 PROCEDURE — 96372 THER/PROPH/DIAG INJ SC/IM: CPT

## 2020-09-03 PROCEDURE — 6370000000 HC RX 637 (ALT 250 FOR IP): Performed by: HOSPITALIST

## 2020-09-03 RX ORDER — BENZTROPINE MESYLATE 1 MG/1
1 TABLET ORAL DAILY
Qty: 60 TABLET | Refills: 3 | Status: ON HOLD | OUTPATIENT
Start: 2020-09-04 | End: 2021-01-25 | Stop reason: HOSPADM

## 2020-09-03 RX ORDER — OXYCODONE HYDROCHLORIDE AND ACETAMINOPHEN 5; 325 MG/1; MG/1
1 TABLET ORAL EVERY 4 HOURS PRN
Qty: 14 TABLET | Refills: 0 | Status: SHIPPED | OUTPATIENT
Start: 2020-09-03 | End: 2020-09-06

## 2020-09-03 RX ORDER — POLYETHYLENE GLYCOL 3350 17 G/17G
17 POWDER, FOR SOLUTION ORAL DAILY PRN
Qty: 527 G | Refills: 1 | Status: SHIPPED | OUTPATIENT
Start: 2020-09-03 | End: 2020-10-03

## 2020-09-03 RX ADMIN — INSULIN LISPRO 3 UNITS: 100 INJECTION, SOLUTION INTRAVENOUS; SUBCUTANEOUS at 09:23

## 2020-09-03 RX ADMIN — FOLIC ACID 1 MG: 1 TABLET ORAL at 09:21

## 2020-09-03 RX ADMIN — OXYCODONE HYDROCHLORIDE AND ACETAMINOPHEN 1 TABLET: 5; 325 TABLET ORAL at 06:29

## 2020-09-03 RX ADMIN — BUSPIRONE HYDROCHLORIDE 10 MG: 10 TABLET ORAL at 15:10

## 2020-09-03 RX ADMIN — INSULIN LISPRO 1 UNITS: 100 INJECTION, SOLUTION INTRAVENOUS; SUBCUTANEOUS at 17:47

## 2020-09-03 RX ADMIN — BENZTROPINE MESYLATE 1 MG: 1 TABLET ORAL at 09:21

## 2020-09-03 RX ADMIN — ASPIRIN 81 MG CHEWABLE TABLET 81 MG: 81 TABLET CHEWABLE at 09:16

## 2020-09-03 RX ADMIN — FUROSEMIDE 20 MG: 20 TABLET ORAL at 09:19

## 2020-09-03 RX ADMIN — SODIUM CHLORIDE, PRESERVATIVE FREE 10 ML: 5 INJECTION INTRAVENOUS at 09:22

## 2020-09-03 RX ADMIN — BUSPIRONE HYDROCHLORIDE 10 MG: 10 TABLET ORAL at 09:19

## 2020-09-03 RX ADMIN — GABAPENTIN 600 MG: 300 CAPSULE ORAL at 09:19

## 2020-09-03 RX ADMIN — ATORVASTATIN CALCIUM 80 MG: 80 TABLET, FILM COATED ORAL at 09:19

## 2020-09-03 RX ADMIN — DOCUSATE SODIUM 100 MG: 100 CAPSULE, LIQUID FILLED ORAL at 09:21

## 2020-09-03 RX ADMIN — FERROUS SULFATE TAB 325 MG (65 MG ELEMENTAL FE) 325 MG: 325 (65 FE) TAB at 17:48

## 2020-09-03 RX ADMIN — DILTIAZEM HYDROCHLORIDE 120 MG: 120 CAPSULE, COATED, EXTENDED RELEASE ORAL at 09:21

## 2020-09-03 RX ADMIN — GABAPENTIN 600 MG: 300 CAPSULE ORAL at 15:10

## 2020-09-03 RX ADMIN — TIOTROPIUM BROMIDE INHALATION SPRAY 2 PUFF: 3.12 SPRAY, METERED RESPIRATORY (INHALATION) at 08:08

## 2020-09-03 RX ADMIN — ESCITALOPRAM OXALATE 10 MG: 10 TABLET ORAL at 09:21

## 2020-09-03 RX ADMIN — HEPARIN SODIUM 5000 UNITS: 5000 INJECTION, SOLUTION INTRAVENOUS; SUBCUTANEOUS at 06:29

## 2020-09-03 RX ADMIN — BUDESONIDE AND FORMOTEROL FUMARATE DIHYDRATE 2 PUFF: 80; 4.5 AEROSOL RESPIRATORY (INHALATION) at 08:08

## 2020-09-03 RX ADMIN — FERROUS SULFATE TAB 325 MG (65 MG ELEMENTAL FE) 325 MG: 325 (65 FE) TAB at 09:16

## 2020-09-03 RX ADMIN — FAMOTIDINE 10 MG: 20 TABLET ORAL at 09:20

## 2020-09-03 ASSESSMENT — PAIN DESCRIPTION - DESCRIPTORS: DESCRIPTORS: ACHING

## 2020-09-03 ASSESSMENT — PAIN SCALES - GENERAL: PAINLEVEL_OUTOF10: 7

## 2020-09-03 ASSESSMENT — PAIN DESCRIPTION - LOCATION: LOCATION: BACK

## 2020-09-03 NOTE — PLAN OF CARE
Problem: Falls - Risk of:  Goal: Will remain free from falls  Description: Will remain free from falls  9/2/2020 2238 by Rico Clancy RN  Outcome: Ongoing  9/2/2020 1237 by Douglas Ojeda RN  Outcome: Ongoing  Goal: Absence of physical injury  Description: Absence of physical injury  9/2/2020 2238 by Rico Clancy RN  Outcome: Ongoing  9/2/2020 1237 by Douglas jOeda RN  Outcome: Ongoing     Problem: Pain:  Goal: Pain level will decrease  Description: Pain level will decrease  9/2/2020 2238 by Rico Clancy RN  Outcome: Ongoing  9/2/2020 1237 by Douglas Ojeda RN  Outcome: Ongoing  Goal: Control of acute pain  Description: Control of acute pain  9/2/2020 2238 by Rico Clancy RN  Outcome: Ongoing  9/2/2020 1237 by Douglas Oejda RN  Outcome: Ongoing  Goal: Control of chronic pain  Description: Control of chronic pain  9/2/2020 2238 by Rico Clancy RN  Outcome: Ongoing  9/2/2020 1237 by Douglas Ojeda RN  Outcome: Ongoing

## 2020-09-03 NOTE — DISCHARGE SUMMARY
Discharge Summary    Name:  Yaniv Holder. /Age/Sex: 1950  (69 y.o. male)   MRN & CSN:  [id number] & [id number] Admission Date/Time: 2020  6:10 PM   Attending:  Betsy Espinosa MD Discharging Physician: Dev Vo MD     HPI and Hospital Course:   Yaniv Jackson is a 79 y.o.  male  who presents with Knee Pain (left)        HPI- as per H and [place]  1-Failure to thrive at home with recurrent falls-  Reports a mechanical fall at home- with left knee pain and swelling- CT left knee with no fracture- pain likely related to OA with likely soft tissue injury post fall- to follow up with ortho as an outpatient  2-Debility with recurrent falls- going to [facility]     Other chronic issues as below         - CAD - on ASA, Lipitor.         - diastolic CHF - stable, no edema, no SOB, on oral Lasix         - anemia - cont Ferrous sulfate         - gastric ulcer - on Protonix         - hyperlipidemia         - bipolar 1 d/o         - seizure -         -CKD 3 -stable.         -COPD-stable. -PAF- not on AC as an outpatient       The patient expressed appropriate understanding of and agreement with the discharge recommendations, medications, and plan. Consults this admission:  IP CONSULT TO CASE MANAGEMENT  IP CONSULT TO HOSPITALIST  IP CONSULT TO ORTHOPEDIC SURGERY    Discharge Instruction:   Follow up appointments:   Primary care physician:  within 2 weeks    Diet:  General/cardiac/ADA/as tolerated  Activity: {discharge activity: as tolerated  Disposition: Discharged to:   []Home, []C, [x]SNF, []Acute Rehab, []Hospice   Condition on discharge: Stable    Discharge Medications:      Toney Heller.    Home Medication Instructions ISF:[id number]    Printed on:20 4336   Medication Information                      aspirin 81 MG chewable tablet  Take 4 tablets by mouth once for 1 dose             atorvastatin (LIPITOR) 80 MG tablet  Take 1 tablet by mouth daily benztropine (COGENTIN) 1 MG tablet  Take 1 tablet by mouth daily             busPIRone (BUSPAR) 10 MG tablet  Take 1 tablet by mouth 3 times daily             Compression Stockings MISC  by Does not apply route Duration of Treatment:  3 Months  # of pairs:  2    Compression Class Level - 20-30 mmHg*    Style - Knee High             dilTIAZem (CARDIZEM CD) 120 MG extended release capsule  Take 1 capsule by mouth daily             divalproex (DEPAKOTE ER) 500 MG extended release tablet  Take 2 tablets by mouth nightly             docusate sodium (COLACE) 100 MG capsule  Take 1 capsule by mouth 2 times daily             escitalopram (LEXAPRO) 10 MG tablet  Take 1 tablet by mouth daily             famotidine (PEPCID) 10 MG tablet  Take 1 tablet by mouth 2 times daily             ferrous sulfate (IRON 325) 325 (65 Fe) MG tablet  Take 1 tablet by mouth 2 times daily             fluticasone-umeclidin-vilant (TRELEGY ELLIPTA) 100-62.5-25 MCG/INH AEPB  Inhale 1 puff into the lungs daily             folic acid (FOLVITE) 1 MG tablet  Take 1 tablet by mouth daily             furosemide (LASIX) 20 MG tablet  Take 1 tablet by mouth daily             gabapentin (NEURONTIN) 300 MG capsule  Take 2 capsules by mouth 3 times daily for 180 days. insulin lispro (HUMALOG) 100 UNIT/ML injection vial  **Low Dose Correction Algorithm**Insulin lispro (HUMALOG)  3 TIMES DAILY WITH MEALSGlucose: Dose: No [id number] 1 [id number] 2 [id number] 3 [id number] 4 [id number] 5 Npcxs774 and above 6 Units             Lactobacillus (ACIDOPHILUS) 0.5 MG TABS  Take 1 capsule by mouth             oxyCODONE-acetaminophen (PERCOCET) 5-325 MG per tablet  Take 1 tablet by mouth every 4 hours as needed for Pain for up to 3 days.              polyethylene glycol (GLYCOLAX) 17 g packet  Take 17 g by mouth daily as needed for Constipation             risperiDONE (RISPERDAL) 1 MG tablet  Take 1 tablet by mouth daily

## 2020-09-03 NOTE — DISCHARGE INSTR - COC
ENDOSCOPY N/A 5/12/2020    EGD BIOPSY performed by Nallely Cardozo MD at [facility] ENDOSCOPY       Immunization History:   Immunization History   Administered Date(s) Administered    Influenza A (L1O2-13) Vaccine PF IM 04/07/2010    Influenza Vaccine, unspecified formulation 10/14/2016    Influenza Virus Vaccine 10/03/2013, 10/13/2014, 01/29/2016, 10/14/2016    Influenza, High Dose (Fluzone 65 yrs and older) 11/11/2018    Influenza, Quadv, IM, PF (6 mo and older Fluzone, Flulaval, Fluarix, and 3 yrs and older Afluria) 10/09/2019    Pneumococcal Conjugate 13-valent (Cxwgbbu19) 10/17/2016    Pneumococcal Polysaccharide (Bphmzavro60) 01/01/2009, 10/03/2013    Tdap (Boostrix, Adacel) 06/10/2013       Active Problems:  Patient Active Problem List   Diagnosis Code    COPD (chronic obstructive pulmonary disease) ([facility] J44.9    Pleural effusion, left J90    SAMANTHA on CPAP G47.33, Z99.89    Chronic obstructive pulmonary disease (HCC) J44.9    TIA (transient ischemic attack) G45.9    Bipolar 1 disorder ([facility] F31.9    Coronary artery disease involving native coronary artery of native heart without angina pectoris I25.10    Essential hypertension I10    REYES (dyspnea on exertion) R06.09    Anemia, chronic disease D63.8    Diuretic-induced hypokalemia E87.6, A94.2J5Z    Diastolic CHF ([facility] J77.46    Acute respiratory failure (HCC) J96.00    Pneumonia due to gram-negative bacteria ([facility]) J15.6    Hyperkalemia E87.5    Adrenal mass ([facility]) E27.8    Diabetes mellitus, type 2 ([facility]) E11.9    Hyponatremia E87.1    Pneumonia due to organism J18.9    PAF (paroxysmal atrial fibrillation) ([facility]) I48.0    Empyema of left pleural space ([facility]) J86.9    Hospital-acquired bacterial pneumonia J15.9    Hyperlipidemia E78.5    Lumbar radiculopathy M54.16    CKD (chronic kidney disease) N18.9    Diabetic peripheral neuropathy ([facility]) E11.42    Chronic kidney disease (CKD), stage III (moderate) ([facility]) N18.3    Cervical stenosis of (0-10 scale): Pain Level: 7  Last Weight:   Wt Readings from Last 1 Encounters:   09/03/20 231 lb 4.8 oz (104.9 kg)     Mental Status:  alert    IV Access:  - None    Nursing Mobility/ADLs:  Walking   Assisted  Transfer  Assisted  Bathing  Assisted  Dressing  Assisted  Toileting  Assisted  Feeding  Independent  Med Admin  Assisted  Med Delivery   whole    Wound Care Documentation and Therapy:        Elimination:  Continence:   · Bowel: Yes  · Bladder: Yes  Urinary Catheter: None   Colostomy/Ileostomy/Ileal Conduit: No       Date of Last BM: ***    Intake/Output Summary (Last 24 hours) at 9/3/2020 0929  Last data filed at 9/3/2020 5134  Gross per 24 hour   Intake 1090 ml   Output 900 ml   Net 190 ml     I/O last 3 completed shifts: In: 1080 [P.O.:1080]  Out: 1400 [Urine:1400]    Safety Concerns:     History of Falls (last 30 days)    Impairments/Disabilities:      None      Patient's personal belongings (please select all that are sent with patient):  [employer]    RN SIGNATURE:  Electronically signed by Inrgid Eddy RN on 9/3/20 at 5:34 PM EDT    CASE MANAGEMENT/SOCIAL WORK SECTION    Inpatient Status Date: ***    Readmission Risk Assessment Score:  Readmission Risk              Risk of Unplanned Readmission:        0           Discharging to Facility/ Agency   · Name:   · Address:  · Phone:  · Fax:    Dialysis Facility (if applicable)   · Name:  · Address:  · Dialysis Schedule:  · Phone:  · Fax:    / signature: {Esignature:[id number]}        PHYSICIAN SECTION      Nutrition Therapy:  Current Nutrition Therapy:   - Oral Diet:  Carb Control 4 carbs/meal (1800kcals/day)    Routes of Feeding: Oral  Liquids: No Restrictions  Daily Fluid Restriction: no  Last Modified Barium Swallow with Video (Video Swallowing Test): not done    Treatments at the Time of Hospital Discharge:   Respiratory Treatments:   Oxygen Therapy:  is not on home oxygen therapy.   Ventilator:    - No ventilator support    Rehab Therapies: Physical Therapy and Occupational Therapy  Weight Bearing Status/Restrictions: No weight bearing restirctions  Other Medical Equipment (for information only, NOT a DME order): Other Treatments:     Prognosis: Good    Condition at Discharge: Stable    Rehab Potential (if transferring to Rehab): Good    Recommended Labs or Other Treatments After Discharge:     Physician Certification: I certify the above information and [facility]  is necessary for the continuing treatment of the diagnosis listed and that he requires [facility] for greater 30 days.      Update Admission H&P: No change in H&P    PHYSICIAN SIGNATURE:  Electronically signed by Thedora Galeazzi, MD on 9/3/20 at 2:15 PM EDT

## 2020-09-03 NOTE — CARE COORDINATION
Spoke with Mary at [facility] and precert pending, she will update once received. Electronic PAS completed      Received approval for SNF plcmt from Mary at [facility], sent PS to Dr. Tracey Gonzalez to update. Packet prepared. Discharge order in placed/ set up with [facility] for 6:30pm.  Notified pt at bedside.

## 2020-09-03 NOTE — PROGRESS NOTES
[address] [place] [address]  HOSPITALIST PROGRESS NOTE                       Name:  Santiago Francis /Age/Sex: 1950  (69 y.o. male)   MRN & CSN:  [id number] & [id number] Admission Date/Time: 2020  6:10 PM   Location:  [address] [place], [place] [address]- Attending:  Jacquie Brock MD                                                  HPI  Santiago Francis is a 79 y.o. male who presents with fall    SUBJECTIVE  -still with ongoing left knee pain. No shortness of breath    10 point review of systems reviewed and negative unless noted above. ALLERGIES:   Allergies   Allergen Reactions    Nsaids      Renal        PCP: Uzma Rajput MD    PAST MEDICAL HISTORY, SURGICAL HISTORY, SOCIAL HISTORY and  HOME MEDICATIONS all reviewed. OBJECTIVE  Vitals:    20 1830 20 2145 20 0430 20 0813   BP: (!) 151/68 133/63 (!) 154/77    Pulse: 68  53    Resp: 18 18 16    Temp: 97.7 °F (36.5 °C) 97.9 °F (36.6 °C) 97.8 °F (36.6 °C)    TempSrc: Oral Oral Oral    SpO2: 96% 98% 98% 98%   Weight:   231 lb 4.8 oz (104.9 kg)    Height:           PHYSICAL EXAM   GEN Awake male, sitting upright in bed in no apparent distress. EYES Pupils are equally round. No scleral erythema, discharge, or conjunctivitis. HENT Mucous membranes are moist. Oral pharynx without exudates, no evidence of thrush. NECK Supple, no apparent thyromegaly or masses. RESP Clear to auscultation, no wheezes, rales or rhonchi. Symmetric chest movement  CARDIO/VASC S1/S2 auscultated. Regular rate without appreciable murmurs, rubs, or gallops. No JVD or carotid bruits. Peripheral pulses equal bilaterally and palpable. No peripheral edema. GI Abdomen is soft without significant tenderness, masses, or guarding. Bowel sounds are normoactive. Rectal exam deferred.  No costovertebral angle tenderness. Normal appearing external genitalia. Zimmer catheter is not present. HEME/LYMPH No palpable cervical lymphadenopathy and no hepatosplenomegaly.  No petechiae or ecchymoses. MSK Limited ROM of LLE  SKIN Normal coloration, warm, dry. NEURO Cranial nerves appear grossly intact, normal speech, no lateralizing weakness. PSYCH Awake, alert, oriented x 4. Affect appropriate. INTAKE: In: 740 [P.O.:720; I.V.:20]  Out: 900   OUTPUT: In: 740   Out: 900 [Urine:900]    LABS  Recent Labs     09/01/20 2125 09/02/20  0532   WBC 8.0 5.6   HGB 12.4* 11.9*   HCT 40.3* 41.7*    147      Recent Labs     09/01/20 2125 09/02/20  0532    141   K 4.0 3.9    106   CO2 23 23   BUN 25* 22   CREATININE 1.8* 1.8*     No results for input(s): AST, ALT, ALB, BILIDIR, BILITOT, ALKPHOS in the last 72 hours. No results for input(s): INR in the last 72 hours. No results for input(s): CKTOTAL, CKMB, CKMBINDEX, TROPONINT in the last 72 hours.        Abnormal labs for today noted      Imaging:     ECHO:    Microbiology:  Blood culture:    Urine culture:    Sputum culture:    Procedures done this admission:    MEDS  Scheduled Meds:   budesonide-formoterol  2 puff Inhalation BID    tiotropium  2 puff Inhalation Daily    heparin (porcine)  5,000 Units Subcutaneous 3 times per day    sodium chloride flush  10 mL Intravenous 2 times per day    atorvastatin  80 mg Oral Daily    busPIRone  10 mg Oral TID    dilTIAZem  120 mg Oral Daily    divalproex  1,000 mg Oral Nightly    docusate sodium  100 mg Oral BID    escitalopram  10 mg Oral Daily    famotidine  10 mg Oral BID    ferrous sulfate  325 mg Oral BID WC    folic acid  1 mg Oral Daily    furosemide  20 mg Oral Daily    gabapentin  600 mg Oral TID    risperiDONE  1 mg Oral Daily    aspirin  81 mg Oral Daily    insulin lispro  0-6 Units Subcutaneous TID WC    insulin lispro  0-3 Units Subcutaneous Nightly    benztropine  1 mg Oral Daily    influenza virus vaccine  0.5 mL Intramuscular Once     Continuous Infusions:   dextrose       PRN Meds:oxyCODONE-acetaminophen, sodium chloride flush, acetaminophen **OR**

## 2020-09-08 ENCOUNTER — HOSPITAL ENCOUNTER (OUTPATIENT)
Age: 70
Setting detail: SPECIMEN
Discharge: HOME | End: 2020-09-08

## 2020-09-08 LAB
ALBUMIN SERPL-MCNC: 3.7 GM/DL (ref 3.4–5)
ALP BLD-CCNC: 77 IU/L (ref 40–128)
ALT SERPL-CCNC: 8 U/L (ref 10–40)
ANION GAP SERPL CALCULATED.3IONS-SCNC: 12 MMOL/L (ref 4–16)
AST SERPL-CCNC: 11 IU/L (ref 15–37)
BASOPHILS ABSOLUTE: 0 K/CU MM
BASOPHILS RELATIVE PERCENT: 0.6 % (ref 0–1)
BILIRUB SERPL-MCNC: 0.3 MG/DL (ref 0–1)
BUN BLDV-MCNC: 26 MG/DL (ref 6–23)
CALCIUM SERPL-MCNC: 8.5 MG/DL (ref 8.3–10.6)
CHLORIDE BLD-SCNC: 104 MMOL/L (ref 99–110)
CO2: 24 MMOL/L (ref 21–32)
CREAT SERPL-MCNC: 2 MG/DL (ref 0.9–1.3)
DIFFERENTIAL TYPE: ABNORMAL
EOSINOPHILS ABSOLUTE: 0.6 K/CU MM
EOSINOPHILS RELATIVE PERCENT: 9.3 % (ref 0–3)
GFR AFRICAN AMERICAN: 40 ML/MIN/1.73M2
GFR NON-AFRICAN AMERICAN: 33 ML/MIN/1.73M2
GLUCOSE BLD-MCNC: 145 MG/DL (ref 70–99)
HCT VFR BLD CALC: 39 % (ref 42–52)
HEMOGLOBIN: 12 GM/DL (ref 13.5–18)
IMMATURE NEUTROPHIL %: 0.6 % (ref 0–0.43)
LYMPHOCYTES ABSOLUTE: 1.4 K/CU MM
LYMPHOCYTES RELATIVE PERCENT: 21.1 % (ref 24–44)
MCH RBC QN AUTO: 26.8 PG (ref 27–31)
MCHC RBC AUTO-ENTMCNC: 30.8 % (ref 32–36)
MCV RBC AUTO: 87.2 FL (ref 78–100)
MONOCYTES ABSOLUTE: 0.5 K/CU MM
MONOCYTES RELATIVE PERCENT: 8.2 % (ref 0–4)
NUCLEATED RBC %: 0 %
PDW BLD-RTO: 19.5 % (ref 11.7–14.9)
PLATELET # BLD: 151 K/CU MM (ref 140–440)
PMV BLD AUTO: 9.8 FL (ref 7.5–11.1)
POTASSIUM SERPL-SCNC: 4 MMOL/L (ref 3.5–5.1)
RBC # BLD: 4.47 M/CU MM (ref 4.6–6.2)
SEGMENTED NEUTROPHILS ABSOLUTE COUNT: 3.9 K/CU MM
SEGMENTED NEUTROPHILS RELATIVE PERCENT: 60.2 % (ref 36–66)
SODIUM BLD-SCNC: 140 MMOL/L (ref 135–145)
TOTAL IMMATURE NEUTOROPHIL: 0.04 K/CU MM
TOTAL NUCLEATED RBC: 0 K/CU MM
TOTAL PROTEIN: 6.5 GM/DL (ref 6.4–8.2)
WBC # BLD: 6.5 K/CU MM (ref 4–10.5)

## 2020-09-08 PROCEDURE — 85025 COMPLETE CBC W/AUTO DIFF WBC: CPT

## 2020-09-08 PROCEDURE — 36415 COLL VENOUS BLD VENIPUNCTURE: CPT

## 2020-09-08 PROCEDURE — 80053 COMPREHEN METABOLIC PANEL: CPT

## 2020-09-25 ENCOUNTER — TELEPHONE (OUTPATIENT)
Dept: FAMILY MEDICINE CLINIC | Age: 70
End: 2020-09-25

## 2020-09-25 NOTE — TELEPHONE ENCOUNTER
09/25/2020 Voice mailbox not set up yet and unable to request to reschedule missed appointment.   [facility]

## 2020-10-05 ENCOUNTER — VIRTUAL VISIT (OUTPATIENT)
Dept: FAMILY MEDICINE CLINIC | Age: 70
End: 2020-10-05
Payer: COMMERCIAL

## 2020-10-05 PROBLEM — M25.511 CHRONIC PAIN OF BOTH SHOULDERS: Chronic | Status: ACTIVE | Noted: 2020-10-05

## 2020-10-05 PROBLEM — R55 SYNCOPE AND COLLAPSE: Status: RESOLVED | Noted: 2019-11-06 | Resolved: 2020-10-05

## 2020-10-05 PROBLEM — M25.512 CHRONIC PAIN OF BOTH SHOULDERS: Chronic | Status: ACTIVE | Noted: 2020-10-05

## 2020-10-05 PROBLEM — J15.9 HOSPITAL-ACQUIRED BACTERIAL PNEUMONIA: Status: RESOLVED | Noted: 2019-04-21 | Resolved: 2020-10-05

## 2020-10-05 PROBLEM — J18.9 PNEUMONIA: Status: RESOLVED | Noted: 2020-06-01 | Resolved: 2020-10-05

## 2020-10-05 PROBLEM — J18.9 PNEUMONIA DUE TO ORGANISM: Status: RESOLVED | Noted: 2019-03-08 | Resolved: 2020-10-05

## 2020-10-05 PROBLEM — G89.29 CHRONIC PAIN OF BOTH SHOULDERS: Chronic | Status: ACTIVE | Noted: 2020-10-05

## 2020-10-05 PROCEDURE — 99441 PR PHYS/QHP TELEPHONE EVALUATION 5-10 MIN: CPT | Performed by: FAMILY MEDICINE

## 2020-10-05 RX ORDER — ALPRAZOLAM 1 MG/1
1 TABLET ORAL 3 TIMES DAILY PRN
Qty: 90 TABLET | Refills: 0 | Status: CANCELLED | OUTPATIENT
Start: 2020-10-05 | End: 2020-11-04
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
## 2020-10-12 ENCOUNTER — TELEPHONE (OUTPATIENT)
Dept: FAMILY MEDICINE CLINIC | Age: 70
End: 2020-10-12

## 2020-10-14 ENCOUNTER — TELEPHONE (OUTPATIENT)
Dept: CARDIOLOGY CLINIC | Age: 70
End: 2020-10-14

## 2020-10-14 NOTE — TELEPHONE ENCOUNTER
Patient would like a new referral to pain management, he was going to Dr Arline Pascual office and he had been seeing the Dr that took over, they are not returning patient's calls.

## 2020-10-20 ENCOUNTER — TELEPHONE (OUTPATIENT)
Dept: FAMILY MEDICINE CLINIC | Age: 70
End: 2020-10-20

## 2020-10-21 ENCOUNTER — TELEPHONE (OUTPATIENT)
Dept: FAMILY MEDICINE CLINIC | Age: 70
End: 2020-10-21

## 2020-10-21 NOTE — TELEPHONE ENCOUNTER
Pt stated he is having high blood pressure reading again-- when on metoprolol he did not have any high readings and now he is off of it and is having  High blood pressure readings again

## 2020-10-21 NOTE — TELEPHONE ENCOUNTER
Please get all the blood pressure readings he has from the past 7-14 days for [facility] to review with the pulse readings as well

## 2020-10-28 NOTE — TELEPHONE ENCOUNTER
Spoke to patient, he has no way to check BP, he has someone check it every once in a while, he doesn't know what kind of #'s they have been, I asked that he get some readings and have them called here or faxed. He hasn't been on Metoprolol for many months. He will try to get these readings.

## 2020-10-29 ENCOUNTER — TELEPHONE (OUTPATIENT)
Dept: FAMILY MEDICINE CLINIC | Age: 70
End: 2020-10-29

## 2020-10-29 NOTE — TELEPHONE ENCOUNTER
1 order for pt sent from [address] [place] [address]-- do we have these and has it been done?  Goes to [facility]

## 2020-11-03 PROBLEM — R07.9 CHEST PAIN: Status: RESOLVED | Noted: 2020-05-19 | Resolved: 2020-11-03

## 2020-11-03 PROBLEM — R42 DIZZINESS: Status: RESOLVED | Noted: 2019-11-15 | Resolved: 2020-11-03

## 2020-11-10 ENCOUNTER — HOSPITAL ENCOUNTER (OUTPATIENT)
Dept: GENERAL RADIOLOGY | Age: 70
Discharge: HOME | End: 2020-11-10
Payer: MEDICARE

## 2020-11-10 ENCOUNTER — HOSPITAL ENCOUNTER (OUTPATIENT)
Age: 70
Discharge: HOME | End: 2020-11-10
Payer: MEDICARE

## 2020-11-10 PROCEDURE — 73030 X-RAY EXAM OF SHOULDER: CPT

## 2020-11-23 PROBLEM — R60.0 LOCALIZED EDEMA: Status: ACTIVE | Noted: 2020-11-23

## 2020-11-23 PROBLEM — I13.11: Status: ACTIVE | Noted: 2020-11-23

## 2020-11-23 PROBLEM — G51.0 BELL'S PALSY: Status: ACTIVE | Noted: 2020-11-23

## 2020-11-23 PROBLEM — E09.65 DRUG OR CHEMICAL INDUCED DIABETES MELLITUS WITH HYPERGLYCEMIA (HCC): Status: ACTIVE | Noted: 2020-11-23

## 2020-11-23 PROBLEM — N18.5: Status: ACTIVE | Noted: 2020-11-23

## 2020-12-19 ENCOUNTER — APPOINTMENT (OUTPATIENT)
Dept: CT IMAGING | Age: 70
End: 2020-12-19
Payer: MEDICARE

## 2020-12-19 ENCOUNTER — APPOINTMENT (OUTPATIENT)
Dept: GENERAL RADIOLOGY | Age: 70
End: 2020-12-19
Payer: MEDICARE

## 2020-12-19 ENCOUNTER — HOSPITAL ENCOUNTER (EMERGENCY)
Age: 70
Discharge: ANOTHER HOSPITAL | End: 2020-12-20
Attending: EMERGENCY MEDICINE
Payer: MEDICARE

## 2020-12-19 LAB
ALBUMIN SERPL-MCNC: 4 GM/DL (ref 3.4–5)
ALP BLD-CCNC: 82 IU/L (ref 40–129)
ALT SERPL-CCNC: 10 U/L (ref 10–40)
ANION GAP SERPL CALCULATED.3IONS-SCNC: 9 MMOL/L (ref 4–16)
APTT: 24.3 SECONDS (ref 25.1–37.1)
AST SERPL-CCNC: 15 IU/L (ref 15–37)
BASOPHILS ABSOLUTE: 0 K/CU MM
BASOPHILS RELATIVE PERCENT: 0.2 % (ref 0–1)
BILIRUB SERPL-MCNC: 0.6 MG/DL (ref 0–1)
BUN BLDV-MCNC: 14 MG/DL (ref 6–23)
CALCIUM SERPL-MCNC: 9 MG/DL (ref 8.3–10.6)
CHLORIDE BLD-SCNC: 98 MMOL/L (ref 99–110)
CO2: 24 MMOL/L (ref 21–32)
CREAT SERPL-MCNC: 1.7 MG/DL (ref 0.9–1.3)
DIFFERENTIAL TYPE: ABNORMAL
EOSINOPHILS ABSOLUTE: 0.1 K/CU MM
EOSINOPHILS RELATIVE PERCENT: 0.9 % (ref 0–3)
GFR AFRICAN AMERICAN: 48 ML/MIN/1.73M2
GFR NON-AFRICAN AMERICAN: 40 ML/MIN/1.73M2
GLUCOSE BLD-MCNC: 132 MG/DL (ref 70–99)
HCT VFR BLD CALC: 48.6 % (ref 42–52)
HEMOGLOBIN: 15.1 GM/DL (ref 13.5–18)
IMMATURE NEUTROPHIL %: 0.6 % (ref 0–0.43)
INR BLD: 0.84 INDEX
LYMPHOCYTES ABSOLUTE: 1.2 K/CU MM
LYMPHOCYTES RELATIVE PERCENT: 13.9 % (ref 24–44)
MAGNESIUM: 2.3 MG/DL (ref 1.8–2.4)
MCH RBC QN AUTO: 29.3 PG (ref 27–31)
MCHC RBC AUTO-ENTMCNC: 31.1 % (ref 32–36)
MCV RBC AUTO: 94.4 FL (ref 78–100)
MONOCYTES ABSOLUTE: 0.5 K/CU MM
MONOCYTES RELATIVE PERCENT: 5.1 % (ref 0–4)
NUCLEATED RBC %: 0 %
PDW BLD-RTO: 13.5 % (ref 11.7–14.9)
PLATELET # BLD: 160 K/CU MM (ref 140–440)
PMV BLD AUTO: 9.6 FL (ref 7.5–11.1)
POTASSIUM SERPL-SCNC: 3.6 MMOL/L (ref 3.5–5.1)
PROTHROMBIN TIME: 10.1 SECONDS (ref 11.7–14.5)
RBC # BLD: 5.15 M/CU MM (ref 4.6–6.2)
SEGMENTED NEUTROPHILS ABSOLUTE COUNT: 7 K/CU MM
SEGMENTED NEUTROPHILS RELATIVE PERCENT: 79.3 % (ref 36–66)
SODIUM BLD-SCNC: 131 MMOL/L (ref 135–145)
TOTAL IMMATURE NEUTOROPHIL: 0.05 K/CU MM
TOTAL NUCLEATED RBC: 0 K/CU MM
TOTAL PROTEIN: 7.8 GM/DL (ref 6.4–8.2)
TROPONIN T: 0.01 NG/ML
TSH HIGH SENSITIVITY: 1.79 UIU/ML (ref 0.27–4.2)
WBC # BLD: 8.9 K/CU MM (ref 4–10.5)

## 2020-12-19 PROCEDURE — 2580000003 HC RX 258: Performed by: EMERGENCY MEDICINE

## 2020-12-19 PROCEDURE — 83880 ASSAY OF NATRIURETIC PEPTIDE: CPT

## 2020-12-19 PROCEDURE — 93005 ELECTROCARDIOGRAM TRACING: CPT | Performed by: EMERGENCY MEDICINE

## 2020-12-19 PROCEDURE — 84443 ASSAY THYROID STIM HORMONE: CPT

## 2020-12-19 PROCEDURE — 85730 THROMBOPLASTIN TIME PARTIAL: CPT

## 2020-12-19 PROCEDURE — 85610 PROTHROMBIN TIME: CPT

## 2020-12-19 PROCEDURE — 70450 CT HEAD/BRAIN W/O DYE: CPT

## 2020-12-19 PROCEDURE — 85025 COMPLETE CBC W/AUTO DIFF WBC: CPT

## 2020-12-19 PROCEDURE — 83735 ASSAY OF MAGNESIUM: CPT

## 2020-12-19 PROCEDURE — 84484 ASSAY OF TROPONIN QUANT: CPT

## 2020-12-19 PROCEDURE — 82550 ASSAY OF CK (CPK): CPT

## 2020-12-19 PROCEDURE — 2500000003 HC RX 250 WO HCPCS: Performed by: EMERGENCY MEDICINE

## 2020-12-19 PROCEDURE — 80053 COMPREHEN METABOLIC PANEL: CPT

## 2020-12-19 PROCEDURE — 96375 TX/PRO/DX INJ NEW DRUG ADDON: CPT

## 2020-12-19 PROCEDURE — 80048 BASIC METABOLIC PNL TOTAL CA: CPT

## 2020-12-19 PROCEDURE — 99285 EMERGENCY DEPT VISIT HI MDM: CPT

## 2020-12-19 PROCEDURE — 71045 X-RAY EXAM CHEST 1 VIEW: CPT

## 2020-12-19 PROCEDURE — 6370000000 HC RX 637 (ALT 250 FOR IP): Performed by: EMERGENCY MEDICINE

## 2020-12-19 PROCEDURE — 96368 THER/DIAG CONCURRENT INF: CPT

## 2020-12-19 PROCEDURE — 36415 COLL VENOUS BLD VENIPUNCTURE: CPT

## 2020-12-19 PROCEDURE — 96365 THER/PROPH/DIAG IV INF INIT: CPT

## 2020-12-19 PROCEDURE — 6360000002 HC RX W HCPCS: Performed by: EMERGENCY MEDICINE

## 2020-12-19 PROCEDURE — C9132 KCENTRA, PER I.U.: HCPCS | Performed by: EMERGENCY MEDICINE

## 2020-12-19 RX ORDER — METOCLOPRAMIDE HYDROCHLORIDE 5 MG/ML
10 INJECTION INTRAMUSCULAR; INTRAVENOUS ONCE
Status: COMPLETED | OUTPATIENT
Start: 2020-12-19 | End: 2020-12-19

## 2020-12-19 RX ORDER — DIPHENHYDRAMINE HYDROCHLORIDE 50 MG/ML
50 INJECTION INTRAMUSCULAR; INTRAVENOUS ONCE
Status: COMPLETED | OUTPATIENT
Start: 2020-12-19 | End: 2020-12-19

## 2020-12-19 RX ORDER — ACETAMINOPHEN 500 MG
1000 TABLET ORAL ONCE
Status: COMPLETED | OUTPATIENT
Start: 2020-12-19 | End: 2020-12-19

## 2020-12-19 RX ORDER — SODIUM CHLORIDE 9 MG/ML
50 INJECTION, SOLUTION INTRAVENOUS ONCE
Status: COMPLETED | OUTPATIENT
Start: 2020-12-19 | End: 2020-12-20

## 2020-12-19 RX ORDER — HYDRALAZINE HYDROCHLORIDE 20 MG/ML
10 INJECTION INTRAMUSCULAR; INTRAVENOUS ONCE
Status: DISCONTINUED | OUTPATIENT
Start: 2020-12-19 | End: 2020-12-20 | Stop reason: HOSPADM

## 2020-12-19 RX ADMIN — METOCLOPRAMIDE 10 MG: 5 INJECTION, SOLUTION INTRAMUSCULAR; INTRAVENOUS at 21:48

## 2020-12-19 RX ADMIN — DIPHENHYDRAMINE HYDROCHLORIDE 50 MG: 50 INJECTION INTRAMUSCULAR; INTRAVENOUS at 21:48

## 2020-12-19 RX ADMIN — DEXTROSE MONOHYDRATE 5 MG/HR: 50 INJECTION, SOLUTION INTRAVENOUS at 23:49

## 2020-12-19 RX ADMIN — PROTHROMBIN, COAGULATION FACTOR VII HUMAN, COAGULATION FACTOR IX HUMAN, COAGULATION FACTOR X HUMAN, PROTEIN C, PROTEIN S HUMAN, AND WATER 5000 UNITS: KIT at 23:50

## 2020-12-19 RX ADMIN — ACETAMINOPHEN 1000 MG: 500 TABLET ORAL at 21:48

## 2020-12-19 ASSESSMENT — ENCOUNTER SYMPTOMS
ALLERGIC/IMMUNOLOGIC NEGATIVE: 1
GASTROINTESTINAL NEGATIVE: 1
PHOTOPHOBIA: 1
RESPIRATORY NEGATIVE: 1

## 2020-12-19 ASSESSMENT — PAIN DESCRIPTION - PAIN TYPE: TYPE: ACUTE PAIN

## 2020-12-19 ASSESSMENT — PAIN SCALES - GENERAL
PAINLEVEL_OUTOF10: 10
PAINLEVEL_OUTOF10: 10

## 2020-12-19 ASSESSMENT — PAIN DESCRIPTION - LOCATION: LOCATION: HEAD

## 2020-12-20 VITALS
TEMPERATURE: 99.2 F | BODY MASS INDEX: 39.25 KG/M2 | HEART RATE: 81 BPM | WEIGHT: 265 LBS | SYSTOLIC BLOOD PRESSURE: 175 MMHG | DIASTOLIC BLOOD PRESSURE: 70 MMHG | OXYGEN SATURATION: 98 % | HEIGHT: 69 IN | RESPIRATION RATE: 17 BRPM

## 2020-12-20 LAB
PRO-BNP: 937.8 PG/ML
TOTAL CK: 82 IU/L (ref 38–174)

## 2020-12-20 PROCEDURE — 93010 ELECTROCARDIOGRAM REPORT: CPT | Performed by: INTERNAL MEDICINE

## 2020-12-20 PROCEDURE — 2580000003 HC RX 258: Performed by: EMERGENCY MEDICINE

## 2020-12-20 RX ADMIN — SODIUM CHLORIDE 50 ML: 9 INJECTION, SOLUTION INTRAVENOUS at 00:31

## 2020-12-20 NOTE — ED NOTES
Narrative   EXAMINATION:   CT OF THE HEAD WITHOUT CONTRAST  12/19/2020 11:07 pm       TECHNIQUE:   CT of the head was performed without the administration of intravenous   contrast. Dose modulation, iterative reconstruction, and/or weight based   adjustment of the mA/kV was utilized to reduce the radiation dose to as low   as reasonably achievable.       COMPARISON:   September 1, 2020.       HISTORY:   ORDERING SYSTEM PROVIDED HISTORY: HTN/headache   TECHNOLOGIST PROVIDED HISTORY:   Reason for exam:->HTN/headache   Reason for exam:->no   Has a \"code stroke\" or \"stroke alert\" been called? ->No   Reason for Exam: HTN/headache   Acuity: Acute   Type of Exam: Initial       FINDINGS:   BRAIN/VENTRICLES: Large acute 6.6 x 4.0 x 3.0 cm intraparenchymal hematoma   centered in the right posterior temporal and occipital lobes with surrounding   vasogenic edema and mild mass effect.  There is intraventricular extension of   hemorrhage with acute hyperdense blood throughout the ventricular system. Minimal right to left midline shift measures 2 mm at the level of the lateral   ventricles.  Generalized cerebral and cerebellar volume loss.  Mild   ill-defined periventricular white matter hypoattenuation.  No large acute   territorial infarct.  No chavez hydrocephalus.  The basal cisterns are patent. Atherosclerosis.       ORBITS: The visualized portion of the orbits demonstrate no acute abnormality.       SINUSES: The visualized paranasal sinuses and mastoid air cells demonstrate   no acute abnormality.       SOFT TISSUES/SKULL:  No acute abnormality of the visualized skull or soft   tissues.           Impression   Large acute 6.6 cm intraparenchymal hematoma centered in the right posterior   temporal and occipital lobes.  Intraventricular extension of hemorrhage.    Minimal right left midline shift measures 2 mm.  No chavez hydrocephalus.       Critical results were called by Dr. Carl Jimenez to [facility] on 12/19/2020 at 22:43.           Suleman Hilliard, YESENIA  12/19/20 [id number]

## 2020-12-20 NOTE — ED NOTES
Report called to [facility] RN for bed 17449, Report to [facility] prior to pt departure.       Suleman Hilliard RN  12/20/20 [id number]

## 2020-12-20 NOTE — ED NOTES
Dr. Ashley Chirinos at bedside to do exam; pt is alert and able to answer all questions; pt states that the headache has been a gradual onset a few days ago; during assessment pt is unable to hold legs up in the air however pt is able to bend the knee up and hold position; Lexi Eid, [facility]  12/19/20 [id number]

## 2020-12-20 NOTE — ED TRIAGE NOTES
Pt presents to the ER via EMS from home for complaints of headache and high blood pressure; pt states that he has had a headache for the past 2 days; pt states that a doctor had told him to stop taking his metoprolol and did not put him on any medication to replace the metoprolol; pt states that he has an upcoming appointment at the [facility] [place] with one of the doctors there; pt states that he is going to ask them about the medication at that time; pt is alert and oriented at this time and shows no signs of distress;

## 2020-12-20 NOTE — ED PROVIDER NOTES
[facility]      TRIAGE CHIEF COMPLAINT:   Headache and Hypertension      [place]:  Fausto Darden is a 79 y.o. male that presents with complaint of headache, high blood pressure. Patient states for the last 2 days he had a gradual onset of headache fairly constant diffuse head photophobia phonophobia. Took some Tylenol with no relief. Denies any fevers nausea vomiting chest pain shortness of breath trauma loss of consciousness no history of brain tumor or aneurysm denies any weakness numbness tingling no slurred speech no facial droop no vision changes no other questions or concerns states he was taken off his metoprolol about a month ago does not know why has not talked to his doctors about this. History of hypertension. REVIEW OF SYSTEMS:  At least 10 systems reviewed and otherwise acutely negative except as in the [address] Review of Systems   Constitutional: Negative. HENT: Negative. Eyes: Positive for photophobia. Respiratory: Negative. Cardiovascular: Negative. Gastrointestinal: Negative. Endocrine: Negative. Genitourinary: Negative. Musculoskeletal: Negative. Skin: Negative. Allergic/Immunologic: Negative. Neurological: Positive for headaches. Hematological: Negative. Psychiatric/Behavioral: Negative. All other systems reviewed and are negative.       Past Medical History:   Diagnosis Date    Anemia     per old chart    Arthritis     Back pain, chronic     \"have pain in lumbar mainly- have 5 bulging discs\"\"in my neck and my lumbar have herniated discs\"    Bipolar 1 disorder ([facility]     CAD (coronary artery disease) 1/27/2016    does not follow with a cardiologist    Cerebral artery occlusion with cerebral infarction ([facility]     \"had mini stroke in Jan 2016- fast heart rate when I would stand up - smile drooping on one side- lased just the one day\"    Chronic kidney disease (CKD), stage III (moderate)     CKD (chronic kidney disease) per old chart- consult with Dr Sharyn Joseph with admission 4/2016\"have low kidney function\"    COPD (chronic obstructive pulmonary disease) ([facility]     follow with Dr Madhu Oakes Diabetes mellitus, type 2 ([facility] 1/3/2017    Diabetic peripheral neuropathy (HCC)     Di[facility] CHF ([facility] 1/35/3132    Gastric ulcer     H/O cardiovascular stress test 5/26/14    EF 68% mild ischemia anterior wall    Heart murmur     \"see Dr Jodi Finney- last echo 2014\" pt states\"I think they said I have a murmur but no chest pain or palpitations\"    Hiatal hernia     History of cardiac cath 6/11/14    MODERATE  CAD      Hx of migraines     HX OTHER MEDICAL     \"have thinning of kidney walls- they found I had that 15 yrs ago\" see Dr Radha Serna"    Hyperlipidemia     Hypertension     Lumbar radiculopathy     Overlapping malignant neoplasm of colon ([facility] 5/27/2020    Panic attack     'anything new gives me anxiety\"    Pleural effusion     scheduled for thoracentesis 7/28/2014, 8/17/2016    S/P thoracentesis     left side( per old chart had thora done 4/2016 and one done 2014)    Sleep apnea     sleep study x 2 - last one 4-5 yrs ago- uses c-pap\"    SOBOE (shortness of breath on exertion)     Spinal stenosis      Past Surgical History:   Procedure Laterality Date    CARPAL TUNNEL RELEASE Bilateral 1989    COLONOSCOPY N/A 5/12/2020    COLONOSCOPY POLYPECTOMY SNARE/COLD BIOPSY WITH SPOT INK TATTOO performed by Olive Hinds MD at [facility] Left 2000's    KNEE SURGERY Bilateral     right knee x 2( scope)/ left knee- 7-8 yr ago   [address] fusion   [address]  N/A 5/21/2020    BOWEL RESECTION EXTENDED RIGHT HEMICOLECTOMY performed by Eugenio Vasquez MD at [address] [place] [address] Left 12/20/2013    THORACENTESIS  4/25/2016         THORACENTESIS Left 11/15/2016    Dr. Mcelroy Batch 250mlsremoved     THORACOTOMY Left 03/18/2019    with Lysis of adhesions    THORACOTOMY Left 3/18/2019    THORACOSCOPY CONVERTED TO THORACOTOMY WITH LYSIS OF ADHESIONS performed by Mayelin Pollack MD at [address] [place] [address]      as a kid    UPPER GASTROINTESTINAL ENDOSCOPY N/A 5/12/2020    EGD BIOPSY performed by Gayla Arnold MD at [facility] ENDOSCOPY     Family History   Problem Relation Age of Onset    Heart Disease Mother         heart attack    High Blood Pressure Father      Social History     Socioeconomic History    Marital status:      Spouse name: Not on file    Number of children: Not on file    Years of education: Not on file    Highest education level: Not on file   Occupational History    Occupation: [employer], retired     Employer: [employer] resource strain: Not on file    Food insecurity     Worry: Not on file     Inability: Not on file   Hstry needs     Medical: Not on file     Non-medical: Not on file   Tobacco Use    Smoking status: Former Smoker     Packs/day: 1.50     Years: 30.00     Pack years: 45.00     Types: Cigarettes     Quit date: 7/24/2010     Years since quitting: 10.4    Smokeless tobacco: Never Used   Substance and Sexual Activity    Alcohol use: Not Currently    Drug use: Not Currently     Types: Marijuana    Sexual activity: Not Currently     Partners: Female   Lifestyle    Physical activity     Days per week: Not on file     Minutes per session: Not on file    Stress: Not on file   Relationships    Social connections     Talks on phone: Not on file     Gets together: Not on file     Attends Alevism service: Not on file     Active member of club or organization: Not on file     Attends meetings of clubs or organizations: Not on file     Relationship status: Not on file    Intimate partner violence     Fear of current or ex partner: Not on file     Emotionally abused: Not on file     Physically abused: Not on file     Forced sexual activity: Not on file   Other Topics Concern    Not on file   Social History Narrative    Not on file     Current Facility-Administered Medications   Medication Dose Route Frequency Provider Last Rate Last Admin    hydrALAZINE (APRESOLINE) injection 10 mg  10 mg Intravenous Once HipSwap, DO        niCARdipine (CARDENE) 50 mg in dextrose 5 % 250 mL infusion  5 mg/hr Intravenous Continuous Eben Mulligan, DO 25 mL/hr at 12/19/20 2349 5 mg/hr at 12/19/20 2349    0.9 % sodium chloride infusion  50 mL Intravenous Once Visteon ZeroDesktop, DO         Current Outpatient Medications   Medication Sig Dispense Refill    rivaroxaban (XARELTO) 10 MG TABS tablet Take 10 mg by mouth      carvedilol (COREG) 12.5 MG tablet Take 12.5 mg by mouth See Admin Instructions TAKE 1 TABLET BY MOUTH IN THE MORNING AND 1/2 TABLET IN THE EVENING      pantoprazole (PROTONIX) 40 MG tablet Take 40 mg by mouth 2 times daily      linagliptin (TRADJENTA) 5 MG tablet Take 5 mg by mouth daily      fluticasone (FLONASE) 50 MCG/ACT nasal spray 1 spray by Each Nostril route daily      nitroGLYCERIN (NITROSTAT) 0.4 MG SL tablet Place 0.4 mg under the tongue every 5 minutes as needed for Chest pain up to max of 3 total doses. If no relief after 1 dose, call 911.       albuterol sulfate HFA (VENTOLIN HFA) 108 (90 Base) MCG/ACT inhaler Inhale 2 puffs into the lungs every 6 hours as needed for Wheezing      benztropine (COGENTIN) 1 MG tablet Take 1 tablet by mouth daily 60 tablet 3    aspirin 81 MG chewable tablet Take 4 tablets by mouth once for 1 dose 30 tablet 3    dilTIAZem (CARDIZEM CD) 120 MG extended release capsule Take 1 capsule by mouth daily      atorvastatin (LIPITOR) 80 MG tablet Take 1 tablet by mouth daily 30 tablet 5    busPIRone (BUSPAR) 10 MG tablet Take 1 tablet by mouth 3 times daily 90 tablet 5    divalproex (DEPAKOTE ER) 500 MG extended release tablet Take 2 tablets by mouth nightly 60 tablet 5    escitalopram (LEXAPRO) 10 MG tablet Take 1 tablet by mouth daily (Patient taking differently: Take 20 mg by mouth daily ) 30 tablet 5    famotidine (PEPCID) 10 MG tablet Take 1 tablet by mouth 2 times daily 60 tablet 5    ferrous sulfate (IRON 325) 325 (65 Fe) MG tablet Take 1 tablet by mouth 2 times daily 60 tablet 5    furosemide (LASIX) 20 MG tablet Take 1 tablet by mouth daily 30 tablet 5    folic acid (FOLVITE) 1 MG tablet Take 1 tablet by mouth daily 30 tablet 5    gabapentin (NEURONTIN) 300 MG capsule Take 2 capsules by mouth 3 times daily for 180 days.  180 capsule 5    risperiDONE (RISPERDAL) 1 MG tablet Take 1 tablet by mouth daily 30 tablet 5    traZODone (DESYREL) 50 MG tablet Take 1 tablet by mouth nightly 30 tablet 5    insulin lispro (HUMALOG) 100 UNIT/ML injection vial **Low Dose Correction Algorithm**Insulin lispro (HUMALOG)  3 TIMES DAILY WITH MEALSGlucose: Dose: No [id number] 1 [id number] 2 [id number] 3 [id number] 4 Lkphc284-189 5 Lvflx545 and above 6 Units 1 vial 3    docusate sodium (COLACE) 100 MG capsule Take 1 capsule by mouth 2 times daily 20 capsule 3    fluticasone-umeclidin-vilant (TRELEGY ELLIPTA) 100-62.5-25 MCG/INH AEPB Inhale 1 puff into the lungs daily 1 each 3    Compression Stockings MISC by Does not apply route Duration of Treatment:  3 Months  # of pairs:  2    Compression Class Level - 20-30 mmHg*    Style - Knee High 2 each 0    Lactobacillus (ACIDOPHILUS) 0.5 MG TABS Take 1 capsule by mouth        Allergies   Allergen Reactions    Nsaids      Renal      Current Facility-Administered Medications   Medication Dose Route Frequency Provider Last Rate Last Admin    hydrALAZINE (APRESOLINE) injection 10 mg  10 mg Intravenous Once Mostro, DO        niCARdipine (CARDENE) 50 mg in dextrose 5 % 250 mL infusion  5 mg/hr Intravenous Continuous Eben Mulligan DO 25 mL/hr at 12/19/20 2349 5 mg/hr at 12/19/20 2349    0.9 % sodium chloride infusion  50 mL Intravenous Once Mostro, DO         Current Outpatient Medications   Medication Sig Dispense Refill    rivaroxaban (XARELTO) 10 MG TABS tablet Take 10 mg by mouth      carvedilol (COREG) 12.5 MG tablet Take 12.5 mg by mouth See Admin Instructions TAKE 1 TABLET BY MOUTH IN THE MORNING AND 1/2 TABLET IN THE EVENING      pantoprazole (PROTONIX) 40 MG tablet Take 40 mg by mouth 2 times daily      linagliptin (TRADJENTA) 5 MG tablet Take 5 mg by mouth daily      fluticasone (FLONASE) 50 MCG/ACT nasal spray 1 spray by Each Nostril route daily      nitroGLYCERIN (NITROSTAT) 0.4 MG SL tablet Place 0.4 mg under the tongue every 5 minutes as needed for Chest pain up to max of 3 total doses. If no relief after 1 dose, call 911.  albuterol sulfate HFA (VENTOLIN HFA) 108 (90 Base) MCG/ACT inhaler Inhale 2 puffs into the lungs every 6 hours as needed for Wheezing      benztropine (COGENTIN) 1 MG tablet Take 1 tablet by mouth daily 60 tablet 3    aspirin 81 MG chewable tablet Take 4 tablets by mouth once for 1 dose 30 tablet 3    dilTIAZem (CARDIZEM CD) 120 MG extended release capsule Take 1 capsule by mouth daily      atorvastatin (LIPITOR) 80 MG tablet Take 1 tablet by mouth daily 30 tablet 5    busPIRone (BUSPAR) 10 MG tablet Take 1 tablet by mouth 3 times daily 90 tablet 5    divalproex (DEPAKOTE ER) 500 MG extended release tablet Take 2 tablets by mouth nightly 60 tablet 5    escitalopram (LEXAPRO) 10 MG tablet Take 1 tablet by mouth daily (Patient taking differently: Take 20 mg by mouth daily ) 30 tablet 5    famotidine (PEPCID) 10 MG tablet Take 1 tablet by mouth 2 times daily 60 tablet 5    ferrous sulfate (IRON 325) 325 (65 Fe) MG tablet Take 1 tablet by mouth 2 times daily 60 tablet 5    furosemide (LASIX) 20 MG tablet Take 1 tablet by mouth daily 30 tablet 5    folic acid (FOLVITE) 1 MG tablet Take 1 tablet by mouth daily 30 tablet 5    gabapentin (NEURONTIN) 300 MG capsule Take 2 capsules by mouth 3 times daily for 180 days.  180 capsule 5    risperiDONE (RISPERDAL) 1 MG tablet Take 1 tablet by mouth daily 30 tablet 5    traZODone (DESYREL) 50 MG tablet Take 1 tablet by mouth nightly 30 tablet 5    insulin lispro (HUMALOG) 100 UNIT/ML injection vial **Low Dose Correction Algorithm**Insulin lispro (HUMALOG)  3 TIMES DAILY WITH MEALSGlucose: Dose: No [id number] 1 [id number] 2 [id number] 3 [id number] 4 [id number] 5 Pefdj240 and above 6 Units 1 vial 3    docusate sodium (COLACE) 100 MG capsule Take 1 capsule by mouth 2 times daily 20 capsule 3    fluticasone-umeclidin-vilant (TRELEGY ELLIPTA) 100-62.5-25 MCG/INH AEPB Inhale 1 puff into the lungs daily 1 each 3    Compression Stockings MISC by Does not apply route Duration of Treatment:  3 Months  # of pairs:  2    Compression Class Level - 20-30 mmHg*    Style - Knee High 2 each 0    Lactobacillus (ACIDOPHILUS) 0.5 MG TABS Take 1 capsule by mouth         Nursing Notes Reviewed    VITAL SIGNS:  ED Triage Vitals   Enc Vitals Group      BP       Pulse       Resp       Temp       Temp src       SpO2       Weight       Height       Head Circumference       Peak Flow       Pain Score       Pain Loc       Pain Edu? Excl. in [address]? PHYSICAL EXAM:  Physical Exam  Vitals signs and nursing note reviewed. Constitutional:       General: He is not in acute distress. Appearance: Normal appearance. He is well-developed and well-groomed. He is not ill-appearing, toxic-appearing or diaphoretic. HENT:      Head: Normocephalic and atraumatic. Right Ear: External ear normal.      Left Ear: External ear normal.      Nose: No congestion or rhinorrhea. Eyes:      General: No scleral icterus. Right eye: No discharge. Left eye: No discharge. Extraocular Movements: Extraocular movements intact. Conjunctiva/sclera: Conjunctivae normal.      Pupils: Pupils are equal, round, and reactive to light.    Neck:      Musculoskeletal: Full passive range of motion without pain and normal range of motion. Normal range of motion. No edema, erythema, neck rigidity, crepitus, injury, pain with movement or torticollis. Vascular: No JVD. Trachea: Phonation normal.   Cardiovascular:      Rate and Rhythm: Normal rate and regular rhythm. Pulses: Normal pulses. Heart sounds: Normal heart sounds. No murmur. No friction rub. No gallop. Pulmonary:      Effort: Pulmonary effort is normal. No respiratory distress. Breath sounds: Normal breath sounds. No stridor. No wheezing, rhonchi or rales. Abdominal:      General: Bowel sounds are normal. There is no distension. Palpations: There is no mass. Tenderness: There is no abdominal tenderness. There is no guarding or rebound. Negative signs include Artis's sign, Rovsing's sign and McBurney's sign. Hernia: No hernia is present. Musculoskeletal:         General: No swelling, tenderness, deformity or signs of injury. Right lower leg: No edema. Left lower leg: No edema. Skin:     General: Skin is warm. Coloration: Skin is not jaundiced or pale. Findings: No bruising, erythema, lesion or rash. Neurological:      General: No focal deficit present. Mental Status: He is alert. He is disoriented and confused. GCS: GCS eye subscore is 4. GCS verbal subscore is 4. GCS motor subscore is 6. Cranial Nerves: Cranial nerves are intact. No cranial nerve deficit, dysarthria or facial asymmetry. Sensory: Sensation is intact. No sensory deficit. Motor: Motor function is intact. No weakness, tremor, atrophy, abnormal muscle tone or seizure activity. Coordination: Coordination is intact. Coordination normal. Finger-Nose-Finger Test normal.   Psychiatric:         Mood and Affect: Mood normal.         Behavior: Behavior normal. Behavior is cooperative. Thought Content:  Thought content normal.         Judgment: Judgment normal.           I have reviewed andinterpreted all of the currently available lab results from this visit (if applicable):    Results for orders placed or performed during the hospital encounter of 12/19/20   CBC Auto Differential   Result Value Ref Range    WBC 8.9 4.0 - 10.5 K/CU MM    RBC 5.15 4.6 - 6.2 M/CU MM    Hemoglobin 15.1 13.5 - 18.0 GM/DL    Hematocrit 48.6 42 - 52 %    MCV 94.4 78 - 100 FL    MCH 29.3 27 - 31 PG    MCHC 31.1 (L) 32.0 - 36.0 %    RDW 13.5 11.7 - 14.9 %    Platelets 058 681 - 977 K/CU MM    MPV 9.6 7.5 - 11.1 FL    Differential Type AUTOMATED DIFFERENTIAL     Segs Relative 79.3 (H) 36 - 66 %    Lymphocytes % 13.9 (L) 24 - 44 %    Monocytes % 5.1 (H) 0 - 4 %    Eosinophils % 0.9 0 - 3 %    Basophils % 0.2 0 - 1 %    Segs Absolute 7.0 K/CU MM    Lymphocytes Absolute 1.2 K/CU MM    Monocytes Absolute 0.5 K/CU MM    Eosinophils Absolute 0.1 K/CU MM    Basophils Absolute 0.0 K/CU MM    Nucleated RBC % 0.0 %    Total Nucleated RBC 0.0 K/CU MM    Total Immature Neutrophil 0.05 K/CU MM    Immature Neutrophil % 0.6 (H) 0 - 0.43 %   CMP   Result Value Ref Range    Sodium 131 (L) 135 - 145 MMOL/L    Potassium 3.6 3.5 - 5.1 MMOL/L    Chloride 98 (L) 99 - 110 mMol/L    CO2 24 21 - 32 MMOL/L    BUN 14 6 - 23 MG/DL    CREATININE 1.7 (H) 0.9 - 1.3 MG/DL    Glucose 132 (H) 70 - 99 MG/DL    Calcium 9.0 8.3 - 10.6 MG/DL    Alb 4.0 3.4 - 5.0 GM/DL    Total Protein 7.8 6.4 - 8.2 GM/DL    Total Bilirubin 0.6 0.0 - 1.0 MG/DL    ALT 10 10 - 40 U/L    AST 15 15 - 37 IU/L    Alkaline Phosphatase 82 40 - 129 IU/L    GFR Non- 40 (L) >60 mL/min/1.73m2    GFR  48 (L) >60 mL/min/1.73m2    Anion Gap 9 4 - 16   Protime/INR & PTT   Result Value Ref Range    Protime 10.1 (L) 11.7 - 14.5 SECONDS    INR 0.84 INDEX    aPTT 24.3 (L) 25.1 - 37.1 SECONDS   Magnesium   Result Value Ref Range    Magnesium 2.3 1.8 - 2.4 mg/dl   TSH without Reflex   Result Value Ref Range    TSH, High Sensitivity 1.790 0.270 - 4.20 uIu/ml   Troponin   Result Value Ref Range landmarks with ultrasound guidance was used to locate the central vein. Local anesthesia with 4ml of 1% lidocaine was injected. Seldinger technique was used for placement of the CVC in a non-pulsatile vasculature. All ports kurt and flushed. The patient tolerated the procedure well and no acute complications occurred. MDM:    Patient with complaint of headache, high blood pressure. He has a history of hypertension he states he was taken off his metoprolol about a month ago by his doctor he does not know why has not called them back. States for 2 days he had a gradual onset of a generalized headache associated with photophobia phonophobia. Constant in nature making gradual onset he took some Tylenol no relief. He denies any fevers nausea vomiting chest pain shortness of breath weakness numbness tingling vision changes slurred speech history of brain tumor or aneurysm. Denies any blood thinners. He appears well he is hypertensive otherwise vital signs are stable his neuro exam is otherwise normal except for mild generalized weakness. Patient given migraine cocktail and will get labs imaging EKG I do not see any obvious signs of stroke at this time. He has no fever no nuchal rigidity. Cranial nerves intact, normal finger to nose bilaterally. I was called by radiology patient does have a intracranial bleed and intraventricular bleed he is hypertensive he appears to be on Xarelto GCS of 14 he is confused on my repeat examination, is protecting airway I did talk to neurosurgery at [place] will start Kcentra, nicardipine transfer we will try airflight initially it is raining if not Med flight ground.   Patient stable but critically ill will transfer to [place].  EMS arrived to pick patient up around 11:50 PM unfortunately my nurse only inserted a IV in his thumb I was not aware of this patient needed additional access he was difficult IV access I did have to emergently put in a central line in his right groin due to need for life-saving ministration of medication including nicardipine, Kcentra patient left EMS, med flight at 12:07 AM. Patient tolerated procedure well. CLINICAL IMPRESSION:  Final diagnoses:   Hypertension, unspecified type   Nonintractable headache, unspecified chronicity pattern, unspecified headache type   Intracranial bleed ([facility]       (Please note that portions of this note may have been completed with a voice recognition program. Efforts were made to edit the dictations but occasionally words aremis-transcribed.)    DISPOSITION REFERRAL (if applicable):  No follow-up provider specified.     DISPOSITION MEDICATIONS (if applicable):  New Prescriptions    No medications on file          Yesika Redd, DO Yesika Redd, DO  12/19/20 [address], DO  12/20/20 5465

## 2020-12-20 NOTE — ED NOTES
Pt back from CT scan; Dr. Brynn Kathleen in room talking to pt regarding transferring pt to another hospital due to a brain bleed; Lexi Bowers [facility] [place], [facility]  12/19/20 5315

## 2020-12-20 NOTE — ED NOTES
Pt has a slight tremor noted while placing the IV; pt states that he has noticed that he has had the tremors and feels like they might be getting worse; the tremors are uncontrollable        Lexi Langley  12/19/20 1328

## 2020-12-20 NOTE — ED NOTES
Dr. Menezes Coil at bedside for emergent R Fem central line due to no access on pt after multiple RN attempts at peripheral IV access.  Huma Pierson RN, Nel Shepard RN, Yolanda Su RN and Carolina Gorman RN  12/20/20 0000

## 2020-12-20 NOTE — ED NOTES
Bed: H-08  Expected date:   Expected time:   Means of arrival:   Comments:  Ems       Lexi Ybarra, [facility]  12/19/20 2055

## 2020-12-22 LAB
EKG ATRIAL RATE: 62 BPM
EKG DIAGNOSIS: NORMAL
EKG P AXIS: 35 DEGREES
EKG P-R INTERVAL: 166 MS
EKG Q-T INTERVAL: 456 MS
EKG QRS DURATION: 106 MS
EKG QTC CALCULATION (BAZETT): 462 MS
EKG R AXIS: -20 DEGREES
EKG T AXIS: 21 DEGREES
EKG VENTRICULAR RATE: 62 BPM

## 2020-12-24 ENCOUNTER — TELEPHONE (OUTPATIENT)
Dept: CARDIOLOGY CLINIC | Age: 70
End: 2020-12-24

## 2020-12-24 NOTE — TELEPHONE ENCOUNTER
I talked to [facility] and she wanting to know if the patient on Blood thinner. Patient's chart states he is on Xarelto. [facility] was okay. And said thank you.

## 2020-12-24 NOTE — TELEPHONE ENCOUNTER
José Miguel Means, Pharmacist from [facility] calling to speak with Nursing staff to go over medications for patient call transferred to [place].

## 2021-01-04 ENCOUNTER — HOSPITAL ENCOUNTER (INPATIENT)
Age: 71
LOS: 25 days | Discharge: HOME HEALTH | DRG: 057 | End: 2021-01-29
Attending: PHYSICAL MEDICINE & REHABILITATION | Admitting: PHYSICAL MEDICINE & REHABILITATION
Payer: MEDICARE

## 2021-01-04 PROBLEM — I62.9 INTRACRANIAL HEMORRHAGE (HCC): Status: ACTIVE | Noted: 2021-01-04

## 2021-01-04 LAB
GLUCOSE BLD-MCNC: 113 MG/DL (ref 70–99)
GLUCOSE BLD-MCNC: 98 MG/DL (ref 70–99)

## 2021-01-04 PROCEDURE — 99223 1ST HOSP IP/OBS HIGH 75: CPT | Performed by: PHYSICAL MEDICINE & REHABILITATION

## 2021-01-04 PROCEDURE — 94761 N-INVAS EAR/PLS OXIMETRY MLT: CPT

## 2021-01-04 PROCEDURE — 82962 GLUCOSE BLOOD TEST: CPT

## 2021-01-04 PROCEDURE — 6370000000 HC RX 637 (ALT 250 FOR IP): Performed by: PHYSICAL MEDICINE & REHABILITATION

## 2021-01-04 PROCEDURE — 1280000000 HC REHAB R&B

## 2021-01-04 RX ORDER — GABAPENTIN 300 MG/1
600 CAPSULE ORAL 3 TIMES DAILY
Status: DISCONTINUED | OUTPATIENT
Start: 2021-01-04 | End: 2021-01-12

## 2021-01-04 RX ORDER — FAMOTIDINE 20 MG/1
20 TABLET, FILM COATED ORAL DAILY
Status: DISCONTINUED | OUTPATIENT
Start: 2021-01-04 | End: 2021-01-29 | Stop reason: HOSPADM

## 2021-01-04 RX ORDER — AMLODIPINE BESYLATE 10 MG/1
10 TABLET ORAL DAILY
Status: DISCONTINUED | OUTPATIENT
Start: 2021-01-05 | End: 2021-01-29 | Stop reason: HOSPADM

## 2021-01-04 RX ORDER — DOCUSATE SODIUM 100 MG/1
100 CAPSULE, LIQUID FILLED ORAL 2 TIMES DAILY
Status: DISCONTINUED | OUTPATIENT
Start: 2021-01-04 | End: 2021-01-29 | Stop reason: HOSPADM

## 2021-01-04 RX ORDER — LISINOPRIL 20 MG/1
40 TABLET ORAL DAILY
Status: DISCONTINUED | OUTPATIENT
Start: 2021-01-05 | End: 2021-01-29 | Stop reason: HOSPADM

## 2021-01-04 RX ORDER — CEPHALEXIN 250 MG/1
250 CAPSULE ORAL EVERY 6 HOURS SCHEDULED
Status: COMPLETED | OUTPATIENT
Start: 2021-01-04 | End: 2021-01-08

## 2021-01-04 RX ORDER — BENZTROPINE MESYLATE 1 MG/1
0.5 TABLET ORAL 2 TIMES DAILY
Status: DISCONTINUED | OUTPATIENT
Start: 2021-01-04 | End: 2021-01-29 | Stop reason: HOSPADM

## 2021-01-04 RX ORDER — ACETAMINOPHEN 325 MG/1
650 TABLET ORAL EVERY 4 HOURS PRN
Status: DISCONTINUED | OUTPATIENT
Start: 2021-01-04 | End: 2021-01-29 | Stop reason: HOSPADM

## 2021-01-04 RX ORDER — ONDANSETRON 4 MG/1
4 TABLET, ORALLY DISINTEGRATING ORAL 4 TIMES DAILY PRN
Status: DISCONTINUED | OUTPATIENT
Start: 2021-01-04 | End: 2021-01-29 | Stop reason: HOSPADM

## 2021-01-04 RX ORDER — DEXTROSE MONOHYDRATE 25 G/50ML
12.5 INJECTION, SOLUTION INTRAVENOUS PRN
Status: DISCONTINUED | OUTPATIENT
Start: 2021-01-04 | End: 2021-01-29 | Stop reason: HOSPADM

## 2021-01-04 RX ORDER — DEXTROSE MONOHYDRATE 50 MG/ML
100 INJECTION, SOLUTION INTRAVENOUS PRN
Status: DISCONTINUED | OUTPATIENT
Start: 2021-01-04 | End: 2021-01-29 | Stop reason: HOSPADM

## 2021-01-04 RX ORDER — ESCITALOPRAM OXALATE 10 MG/1
10 TABLET ORAL DAILY
Status: DISCONTINUED | OUTPATIENT
Start: 2021-01-05 | End: 2021-01-29 | Stop reason: HOSPADM

## 2021-01-04 RX ORDER — DIVALPROEX SODIUM 500 MG/1
1000 TABLET, EXTENDED RELEASE ORAL NIGHTLY
Status: DISCONTINUED | OUTPATIENT
Start: 2021-01-04 | End: 2021-01-29 | Stop reason: HOSPADM

## 2021-01-04 RX ORDER — DOXAZOSIN MESYLATE 4 MG/1
2 TABLET ORAL EVERY 12 HOURS SCHEDULED
Status: DISCONTINUED | OUTPATIENT
Start: 2021-01-04 | End: 2021-01-29 | Stop reason: HOSPADM

## 2021-01-04 RX ORDER — ATORVASTATIN CALCIUM 40 MG/1
80 TABLET, FILM COATED ORAL NIGHTLY
Status: DISCONTINUED | OUTPATIENT
Start: 2021-01-04 | End: 2021-01-29 | Stop reason: HOSPADM

## 2021-01-04 RX ORDER — TRAZODONE HYDROCHLORIDE 50 MG/1
50 TABLET ORAL NIGHTLY
Status: DISCONTINUED | OUTPATIENT
Start: 2021-01-04 | End: 2021-01-12

## 2021-01-04 RX ORDER — HEPARIN SODIUM 5000 [USP'U]/ML
5000 INJECTION, SOLUTION INTRAVENOUS; SUBCUTANEOUS EVERY 8 HOURS SCHEDULED
Status: DISCONTINUED | OUTPATIENT
Start: 2021-01-04 | End: 2021-01-08

## 2021-01-04 RX ORDER — BUSPIRONE HYDROCHLORIDE 5 MG/1
10 TABLET ORAL 3 TIMES DAILY
Status: DISCONTINUED | OUTPATIENT
Start: 2021-01-04 | End: 2021-01-12

## 2021-01-04 RX ORDER — SENNA PLUS 8.6 MG/1
1 TABLET ORAL NIGHTLY
Status: DISCONTINUED | OUTPATIENT
Start: 2021-01-04 | End: 2021-01-29 | Stop reason: HOSPADM

## 2021-01-04 RX ORDER — RISPERIDONE 0.5 MG/1
1 TABLET, FILM COATED ORAL DAILY
Status: DISCONTINUED | OUTPATIENT
Start: 2021-01-05 | End: 2021-01-12

## 2021-01-04 RX ORDER — NICOTINE POLACRILEX 4 MG
15 LOZENGE BUCCAL PRN
Status: DISCONTINUED | OUTPATIENT
Start: 2021-01-04 | End: 2021-01-29 | Stop reason: HOSPADM

## 2021-01-04 RX ORDER — CLONIDINE HYDROCHLORIDE 0.2 MG/1
0.2 TABLET ORAL 3 TIMES DAILY
Status: DISCONTINUED | OUTPATIENT
Start: 2021-01-04 | End: 2021-01-05

## 2021-01-04 RX ORDER — BISACODYL 10 MG
10 SUPPOSITORY, RECTAL RECTAL DAILY PRN
Status: DISCONTINUED | OUTPATIENT
Start: 2021-01-04 | End: 2021-01-29 | Stop reason: HOSPADM

## 2021-01-04 RX ADMIN — DIVALPROEX SODIUM 1000 MG: 500 TABLET, FILM COATED, EXTENDED RELEASE ORAL at 22:18

## 2021-01-04 RX ADMIN — CLONIDINE HYDROCHLORIDE 0.2 MG: 0.2 TABLET ORAL at 22:18

## 2021-01-04 RX ADMIN — DOXAZOSIN 2 MG: 4 TABLET ORAL at 22:18

## 2021-01-04 RX ADMIN — GABAPENTIN 600 MG: 300 CAPSULE ORAL at 22:17

## 2021-01-04 RX ADMIN — DILTIAZEM HYDROCHLORIDE 30 MG: 30 TABLET, FILM COATED ORAL at 22:18

## 2021-01-04 RX ADMIN — DOCUSATE SODIUM 100 MG: 100 CAPSULE, LIQUID FILLED ORAL at 22:19

## 2021-01-04 RX ADMIN — SENNOSIDES 8.6 MG: 8.6 TABLET, FILM COATED ORAL at 22:18

## 2021-01-04 RX ADMIN — ATORVASTATIN CALCIUM 80 MG: 40 TABLET, FILM COATED ORAL at 22:17

## 2021-01-04 RX ADMIN — TRAZODONE HYDROCHLORIDE 50 MG: 50 TABLET ORAL at 22:18

## 2021-01-04 RX ADMIN — BENZTROPINE MESYLATE 0.5 MG: 1 TABLET ORAL at 22:17

## 2021-01-04 RX ADMIN — CEPHALEXIN 250 MG: 250 CAPSULE ORAL at 19:11

## 2021-01-04 ASSESSMENT — PAIN DESCRIPTION - PAIN TYPE: TYPE: CHRONIC PAIN

## 2021-01-04 ASSESSMENT — PAIN DESCRIPTION - DESCRIPTORS: DESCRIPTORS: ACHING

## 2021-01-04 ASSESSMENT — PAIN DESCRIPTION - LOCATION: LOCATION: NECK

## 2021-01-04 NOTE — PROGRESS NOTES
Two person admission skin assessment completed by this RN and Zeny Mccain RN. Redness noted on buttocks; red firm area noted left axillary; left heel soft, not blanching well, floated heels. See skin assessment charting.

## 2021-01-04 NOTE — H&P
home yard work but assist some with home upkeep. Occasionally cooks food. His girlfriend does a lot of homemaking. He has a tub shower combination for bathing and standard height commode. He drives locally. He reports that he quit smoking about 10 years ago. His smoking use included cigarettes. He has a 45.00 pack-year smoking history. He has never used smokeless tobacco. He reports previous alcohol use. He reports previous drug use. Drug: Marijuana. Prior (baseline) level of function: Modified independent with mobility and self-care. Current level of function: Maximum physical assistance needed for mobility and self-care.     Allergies:  Nsaids    Past Medical History:   Past Medical History:   Diagnosis Date    Anemia     per old chart    Arthritis     Back pain, chronic     \"have pain in lumbar mainly- have 5 bulging discs\"\"in my neck and my lumbar have herniated discs\"    Bipolar 1 disorder ([facility]     CAD (coronary artery disease) 1/27/2016    does not follow with a cardiologist    Cerebral artery occlusion with cerebral infarction ([facility]     \"had mini stroke in Jan 2016- fast heart rate when I would stand up - smile drooping on one side- lased just the one day\"    Chronic kidney disease (CKD), stage III (moderate)     CKD (chronic kidney disease)     per old chart- consult with Dr Glory Venegas with admission 4/2016\"have low kidney function\"    COPD (chronic obstructive pulmonary disease) ([facility]     follow with Dr Cruz Diabetes mellitus, type 2 ([facility] 1/3/2017    Diabetic peripheral neuropathy (HCC)     Diastolic CHF ([facility] 9/18/9644    Gastric ulcer     H/O cardiovascular stress test 5/26/14    EF 68% mild ischemia anterior wall    Heart murmur     \"see Dr Estrada Basilio- last echo 2014\" pt states\"I think they said I have a murmur but no chest pain or palpitations\"    Hiatal hernia     History of cardiac cath 6/11/14    MODERATE  CAD      Hx of migraines     HX OTHER MEDICAL     [place] thinning of kidney walls- they found I had that 15 yrs ago\" see Dr Jeffrey Waller"    Hyperlipidemia     Hypertension     Lumbar radiculopathy     Overlapping malignant neoplasm of colon ([address] 5/27/2020    Panic attack     'anything new gives me anxiety\"    Pleural effusion     scheduled for thoracentesis 7/28/2014, 8/17/2016    S/P thoracentesis     left side( per old chart had thora done 4/2016 and one done 2014)    Sleep apnea     sleep study x 2 - last one 4-5 yrs ago- uses c-pap\"    SOBOE (shortness of breath on exertion)     Spinal stenosis         Past Surgical History:     Past Surgical History:   Procedure Laterality Date    CARPAL TUNNEL RELEASE Bilateral 1989    COLONOSCOPY N/A 5/12/2020    COLONOSCOPY POLYPECTOMY SNARE/COLD BIOPSY WITH SPOT INK TATTOO performed by Jose Harrison MD at [facility] Left 2000's    KNEE SURGERY Bilateral     right knee x 2( scope)/ left knee- 7-8 yr ago   [address] fusion   [address]  N/A 5/21/2020    BOWEL RESECTION EXTENDED RIGHT HEMICOLECTOMY performed by Anabel Stratton MD at [address] [place] [address] Left 12/20/2013    THORACENTESIS  4/25/2016         THORACENTESIS Left 11/15/2016    Dr. Deshaun Duran 250mlsremoved     THORACOTOMY Left 03/18/2019    with Lysis of adhesions    THORACOTOMY Left 3/18/2019    THORACOSCOPY CONVERTED TO THORACOTOMY WITH LYSIS OF ADHESIONS performed by Daniel Son MD at [address] [place] [address]      as a kid    UPPER GASTROINTESTINAL ENDOSCOPY N/A 5/12/2020    EGD BIOPSY performed by Jose Harrison MD at [facility] ENDOSCOPY       Current Medications:     Current Facility-Administered Medications:     heparin (porcine) injection 5,000 Units, 5,000 Units, Subcutaneous, 3 times per day, MARIA DEL CARMEN Juarez MD    cloNIDine (CATAPRES) tablet 0.2 mg, 0.2 mg, Oral, TID, MARIA DEL CARMEN Juarez MD  [facility] ON 1/5/2021] amLODIPine (NORVASC) tablet 10 mg, 10 mg, Oral, Daily, MARIA DEL CARMEN Juarez MD  [facility] insulin lispro (HUMALOG) injection vial 0-6 Units, 0-6 Units, Subcutaneous, TID WC, C Cyril Fleischer, MD    insulin lispro (HUMALOG) injection vial 0-3 Units, 0-3 Units, Subcutaneous, Nightly, C Cyril Fleischer, MD Brenna Officer  [facility] ON 1/5/2021] lisinopril (PRINIVIL;ZESTRIL) tablet 40 mg, 40 mg, Oral, Daily, C Cyril Fleischer, MD    doxazosin (CARDURA) tablet 2 mg, 2 mg, Oral, 2 times per day, C Cyril Fleischer, MD    dilTIAZem (CARDIZEM) tablet 30 mg, 30 mg, Oral, 2 times per day, C Cyril Fleischer, MD    atorvastatin (LIPITOR) tablet 80 mg, 80 mg, Oral, Nightly, C Cyril Fleischer, MD    benztropine (COGENTIN) tablet 0.5 mg, 0.5 mg, Oral, BID, C Cyril Fleischer, MD    busPIRone (BUSPAR) tablet 10 mg, 10 mg, Oral, TID, C Cyril Fleischer, MD    divalproex (DEPAKOTE ER) extended release tablet 1,000 mg, 1,000 mg, Oral, Nightly, C Cyril Fleischer, MD Brenna Officer  [facility] ON 1/5/2021] escitalopram (LEXAPRO) tablet 10 mg, 10 mg, Oral, Daily, C Cyril Fleischer, MD    traZODone (DESYREL) tablet 50 mg, 50 mg, Oral, Nightly, C Cyril Fleischer, MD Brenna Officer  [facility] ON 1/5/2021] risperiDONE (RISPERDAL) tablet 1 mg, 1 mg, Oral, Daily, C Cyril Fleischer, MD    gabapentin (NEURONTIN) capsule 600 mg, 600 mg, Oral, TID, C Cyril Fleischer, MD    cephALEXin [facility]) capsule 250 mg, 250 mg, Oral, 4 times per day, C Cyril Fleischer, MD    acetaminophen (TYLENOL) tablet 650 mg, 650 mg, Oral, Q4H PRN, C Cyril Fleischer, MD    bisacodyl (DULCOLAX) suppository 10 mg, 10 mg, Rectal, Daily PRN, C Cyril Fleischer, MD    famotidine (PEPCID) tablet 20 mg, 20 mg, Oral, BID, C Cyril Fleischer, MD    [facility]) tablet 8.6 mg, 1 tablet, Oral, Nightly, C Cyril Fleischer, MD    docusate sodium (COLACE) capsule 100 mg, 100 mg, Oral, BID, C Cyril Fleischer, MD    ondansetron (ZOFRAN-ODT) disintegrating tablet 4 mg, 4 mg, Oral, 4x Daily PRN, C Cyril Fleischer, MD    glucose (GLUTOSE) 40 % oral gel 15 g, 15 g, Oral, PRN, C Cyril Fleischer, MD    dextrose 50 % IV solution, 12.5 g, Intravenous, PRN, MARIA DEL CARMEN Freeman MD    glucagon (rDNA) injection 1 mg, 1 mg, Intramuscular, PRN, MARIA DEL CARMEN Freeman MD    dextrose 5 % solution, 100 mL/hr, Intravenous, PRN, MARIA DEL CARMEN Freeman MD    Family History:   Family History   Problem Relation Age of Onset    Heart Disease Mother         heart attack    High Blood Pressure Father        Exam:    There were no vitals taken for this visit. General: Patient was observed semiupright in bed. He was alert. Soft-spoken. Mild dysarthria. Attends the right and left visual field with conjugate eye movements. No signs of trauma to his head or neck. HEENT: Visual fields full. Neck supple. No adenopathy or JVD.  MMM. Mild dysarthria. Pulmonary: Symmetric air exchange. Blunted breath sounds in the bases. No coughing. Cardiac: Occasional premature beat. The rate is controlled. Mild systolic murmur. Abdomen: Patient's abdomen was soft and nondistended. Bowel sounds were present throughout. There was no rebound, guarding or masses noted. Spinal exam: The skin over his spine is intact. Trunk rotation is compromised by his girth and weakness. Upper extremities: Increased tone across the left shoulder and elbow. Weak left . Poor dexterity. Trace edema throughout the left upper limb. Lower extremities: Increased tone across the left knee and ankle. No reflexes in the right lower limb. Trace edema. Calves are soft. Heels are clear.  3 -/5 strength at the left knee and ankle. Sitting balance was fair-.  Standing balance was poor.     Lab Results   Component Value Date    WBC 8.9 12/19/2020    HGB 15.1 12/19/2020    HCT 48.6 12/19/2020    MCV 94.4 12/19/2020     12/19/2020     Lab Results   Component Value Date    INR 0.84 12/19/2020    INR 1.01 06/03/2020    INR 0.90 06/02/2020    PROTIME 10.1 (L) 12/19/2020    PROTIME 12.2 06/03/2020    PROTIME 10.9 (L) 06/02/2020     Lab Results   Component Value Date CREATININE 1.7 (H) 12/19/2020    BUN 14 12/19/2020     (L) 12/19/2020    K 3.6 12/19/2020    CL 98 (L) 12/19/2020    CO2 24 12/19/2020     Lab Results   Component Value Date    ALT 10 12/19/2020    AST 15 12/19/2020    ALKPHOS 82 12/19/2020    BILITOT 0.6 12/19/2020         Impression: 72-year-old male with atrial fibrillation (on anticoagulation), hypertension, diabetes, COPD, bipolar disorder and CHF who was suffered an intraparenchymal hemorrhage in the right cerebral cortex. He has left hemiparesis, some language impairments and dysarthria. Strengths for the patient: Some experience with adaptive equipment, a significant other in a reasonably accessible home. Limitations/barriers for the patient: His age, his multiple medical comorbidities and his need for anticoagulation following an intracranial hemorrhage. Recommendation: Acute inpatient rehabilitation with occupational and physical therapy 180 minutes 5 out of every 7 days. Will address basic and  advancing mobility with self-care instruction and adaptive equipment training. Caregiver education will be offered. Expected length of stay  prior to a supervised level of function for discharge home with a walker and [facility] OT/PT is 2-1/2 weeks. Additional recommendation:    1. Right intracranial hemorrhage with left hemiparesis: Patient requires daily occupational and physical therapy with speech-language pathology. His Xarelto is being held for at least 2 weeks following his acute hemorrhage. He requires anticonvulsants, blood pressure correction and blood sugar treatment. We must emphasize pulmonary hygiene, DVT prophylaxis and nutritional support. Bowel and bladder retraining. Caregiver education with his significant other. Adaptive equipment training. 2. DVT prophylaxis: He comes to with with orders for heparin 5000 units every 8 hours. I must monitor his hemoglobin and platelet count periodically while on this medication. Weightbearing activities are pursued daily. GI prophylaxis offered. 3. Paroxysmal atrial fibrillation with anticoagulation: Is Xarelto is on hold through at least the middle of this week. He is on Cardizem and lisinopril for rate control. Daily weights to detect any decompensation of CHF. Minimize stimulants. 4. Uncontrolled diabetes type 2 with peripheral neuropathy: Patient requires a diet modified for carbohydrates. He is on Neurontin for peripheral neuropathy symptoms. Blood sugars are checked at mealtime and bedtime. Sliding scale Humalog for now with reintroduction of oral agents should his oral intake stabilize. 5. Essential hypertension: Patient requires multiple medications for blood pressure regulation. Target systolic blood pressure is 120-140. Vital signs are checked at rest and with activity. 6. COPD: Aggressive pulmonary hygiene. Monitoring O2 saturations at rest and with activity. 7. Bipolar disorder: BuSpar, Depakote, Lexapro and risperidone. Treating on a calm and consistent environment. Providing written and verbal instructions when possible to assist with information retention. I personally performed a history and physical on this patient within 24 hours of admission to the rehab unit. I have reviewed the preadmission screening and concur with its findings without change. A detailed plan of care will be established by hospital day 4 and I attest the patient is appropriate for inpatient rehabilitation at this time. I have compared the patient's current functional status noted during my history and physical with that of the preadmission screen and I have found no significant differences.

## 2021-01-05 LAB
GLUCOSE BLD-MCNC: 102 MG/DL (ref 70–99)
GLUCOSE BLD-MCNC: 88 MG/DL (ref 70–99)
GLUCOSE BLD-MCNC: 94 MG/DL (ref 70–99)
GLUCOSE BLD-MCNC: 97 MG/DL (ref 70–99)

## 2021-01-05 PROCEDURE — 6370000000 HC RX 637 (ALT 250 FOR IP): Performed by: PHYSICAL MEDICINE & REHABILITATION

## 2021-01-05 PROCEDURE — 97530 THERAPEUTIC ACTIVITIES: CPT

## 2021-01-05 PROCEDURE — 94664 DEMO&/EVAL PT USE INHALER: CPT

## 2021-01-05 PROCEDURE — 1280000000 HC REHAB R&B

## 2021-01-05 PROCEDURE — 92610 EVALUATE SWALLOWING FUNCTION: CPT

## 2021-01-05 PROCEDURE — 92523 SPEECH SOUND LANG COMPREHEN: CPT

## 2021-01-05 PROCEDURE — 99232 SBSQ HOSP IP/OBS MODERATE 35: CPT | Performed by: PHYSICAL MEDICINE & REHABILITATION

## 2021-01-05 PROCEDURE — 97535 SELF CARE MNGMENT TRAINING: CPT

## 2021-01-05 PROCEDURE — 97167 OT EVAL HIGH COMPLEX 60 MIN: CPT

## 2021-01-05 PROCEDURE — 94150 VITAL CAPACITY TEST: CPT

## 2021-01-05 PROCEDURE — 94761 N-INVAS EAR/PLS OXIMETRY MLT: CPT

## 2021-01-05 PROCEDURE — 6360000002 HC RX W HCPCS: Performed by: PHYSICAL MEDICINE & REHABILITATION

## 2021-01-05 PROCEDURE — 97163 PT EVAL HIGH COMPLEX 45 MIN: CPT

## 2021-01-05 PROCEDURE — 82962 GLUCOSE BLOOD TEST: CPT

## 2021-01-05 RX ADMIN — ACETAMINOPHEN 650 MG: 325 TABLET ORAL at 13:01

## 2021-01-05 RX ADMIN — ESCITALOPRAM OXALATE 10 MG: 10 TABLET, FILM COATED ORAL at 10:29

## 2021-01-05 RX ADMIN — DILTIAZEM HYDROCHLORIDE 30 MG: 30 TABLET, FILM COATED ORAL at 21:22

## 2021-01-05 RX ADMIN — BUSPIRONE HYDROCHLORIDE 10 MG: 5 TABLET ORAL at 10:23

## 2021-01-05 RX ADMIN — DILTIAZEM HYDROCHLORIDE 30 MG: 30 TABLET, FILM COATED ORAL at 13:02

## 2021-01-05 RX ADMIN — CEPHALEXIN 250 MG: 250 CAPSULE ORAL at 06:08

## 2021-01-05 RX ADMIN — CEPHALEXIN 250 MG: 250 CAPSULE ORAL at 00:25

## 2021-01-05 RX ADMIN — LISINOPRIL 40 MG: 20 TABLET ORAL at 13:02

## 2021-01-05 RX ADMIN — CEPHALEXIN 250 MG: 250 CAPSULE ORAL at 17:31

## 2021-01-05 RX ADMIN — DIVALPROEX SODIUM 1000 MG: 500 TABLET, FILM COATED, EXTENDED RELEASE ORAL at 21:22

## 2021-01-05 RX ADMIN — BENZTROPINE MESYLATE 0.5 MG: 1 TABLET ORAL at 10:22

## 2021-01-05 RX ADMIN — BUSPIRONE HYDROCHLORIDE 10 MG: 5 TABLET ORAL at 14:53

## 2021-01-05 RX ADMIN — GABAPENTIN 600 MG: 300 CAPSULE ORAL at 10:22

## 2021-01-05 RX ADMIN — GABAPENTIN 600 MG: 300 CAPSULE ORAL at 14:53

## 2021-01-05 RX ADMIN — BENZTROPINE MESYLATE 0.5 MG: 1 TABLET ORAL at 21:20

## 2021-01-05 RX ADMIN — RISPERIDONE 1 MG: 0.5 TABLET, FILM COATED ORAL at 10:22

## 2021-01-05 RX ADMIN — TRAZODONE HYDROCHLORIDE 50 MG: 50 TABLET ORAL at 21:21

## 2021-01-05 RX ADMIN — HEPARIN SODIUM 5000 UNITS: 5000 INJECTION INTRAVENOUS; SUBCUTANEOUS at 21:22

## 2021-01-05 RX ADMIN — ATORVASTATIN CALCIUM 80 MG: 40 TABLET, FILM COATED ORAL at 21:22

## 2021-01-05 RX ADMIN — BUSPIRONE HYDROCHLORIDE 10 MG: 5 TABLET ORAL at 21:22

## 2021-01-05 RX ADMIN — DOXAZOSIN 2 MG: 4 TABLET ORAL at 13:02

## 2021-01-05 RX ADMIN — DOCUSATE SODIUM 100 MG: 100 CAPSULE, LIQUID FILLED ORAL at 21:22

## 2021-01-05 RX ADMIN — GABAPENTIN 600 MG: 300 CAPSULE ORAL at 21:22

## 2021-01-05 RX ADMIN — CEPHALEXIN 250 MG: 250 CAPSULE ORAL at 13:01

## 2021-01-05 RX ADMIN — AMLODIPINE BESYLATE 10 MG: 10 TABLET ORAL at 13:02

## 2021-01-05 RX ADMIN — DOCUSATE SODIUM 100 MG: 100 CAPSULE, LIQUID FILLED ORAL at 10:22

## 2021-01-05 RX ADMIN — FAMOTIDINE 20 MG: 20 TABLET, FILM COATED ORAL at 10:22

## 2021-01-05 RX ADMIN — SENNOSIDES 8.6 MG: 8.6 TABLET, FILM COATED ORAL at 21:22

## 2021-01-05 NOTE — CARE COORDINATION
Per [employer],  [phone], this pts plan is a medicare supplement and if medicare would approve services, then this plan will approve services. There is no answer in hospital registration, [phone], at this time. I will try to call later for verification as there is no scanned card or medicare number on file in this pts chart at this time.

## 2021-01-05 NOTE — PROGRESS NOTES
Facility/Department: [address] [place] [address]  Initial Speech/Language/Cognitive Assessment    NAME: Yesy Dixon. : 1950   MRN: [id number]  ADMISSION DATE: 2021  ADMITTING DIAGNOSIS: has Pleural effusion, left; SAMANTHA on CPAP; Chronic obstructive pulmonary disease ([address] TIA (transient ischemic attack); Bipolar 1 disorder ([address] Coronary artery disease involving native coronary artery of native heart without angina pectoris; Essential hypertension; REYES (dyspnea on exertion); Anemia, chronic disease; Diuretic-induced hypokalemia; Diastolic CHF ([address] Acute respiratory failure ([address] Hyperkalemia; Adrenal mass ([address] Diabetes mellitus, type 2 ([address] Hyponatremia; Paroxysmal atrial fibrillation ([address] Empyema of left pleural space ([address] Hyperlipidemia; Lumbar radiculopathy; CKD (chronic kidney disease); Uncontrolled type 2 diabetes mellitus with peripheral neuropathy ([address] Chronic kidney disease (CKD), stage III (moderate); Cervical stenosis of spine; Spinal stenosis of lumbar region without neurogenic claudication; Somatic dysfunction of back; Somatic dysfunction of cervical region; Somatic dysfunction of pelvis region; Somatic dysfunction of both lower extremities; Empyema lung ([address] Acute kidney injury superimposed on CKD ([address] Chronic diastolic heart failure ([address] Hypotensive episode; Bradycardia on ECG; Lightheadedness; Acute on chronic respiratory failure with hypoxia ([address] Severe mixed bipolar 1 disorder without psychosis ([address] Acute chest pain; Atrial tachycardia ([address] Overlapping malignant neoplasm of colon ([address] Colon adenocarcinoma ([address] Acute left-sided weakness; Fall at home; Fall at home, initial encounter; Chronic pain of both shoulders; Hypertensive heart disease with stage 5 chronic kidney disease, not on chronic dialysis ([address] Bell's palsy; 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 ([address] Localized edema; Intracranial hemorrhage ([address] Left hemiparesis ([address]  Dysarthria due to and not concurrent with spontaneous intracerebral hemorrhage; Gait disturbance; and Bipolar disorder, in partial remission, most recent episode depressed ([address] on their problem list.  DATE ONSET: 12/20/2020    Date of Eval: 1/5/2021   Evaluating Therapist: Hola Givens SLP    RECENT RESULTS  CT OF HEAD/MRI: Radiology Findings  Acute Right Temporo-occipital intraparenchymal hemorrhage 6.6 x 4.0 x 3.0cm with intraventricular extension, vol 30 ml    Primary Complaint: I think I'm doing fine; just tired    Pain:  Pain Assessment  Pain Assessment: 8 shoulder; NSG Frida advised and biofreeze applied    Assessment:  Per PAS: History of Present Illness  80 yo male, adm 12/20/20. c/o headache progressively getting worse over previous two days. Found to have large R temporal occipital IPH with intra ventricular extension and local mass effect. At this time he exhibits a moderate L visual field cut. He was Independent prior to and lived with his girlfriend in a two story home 9-ste. pt states the rail \"is not set\". He stays on first floor. Hx of CKD 3-4. Currently requires supervision to min A for grooming and eating. Max A for all other adls and mobility. Verbal, visual and tactile cues needed for safety etc. he has made minimal progress in last 3 days, is still unable to ambulate or support standing weight. he is motivated and cooperative with therapy. Covid negative 12/20/2020  Medical/Surgical History  CKD hld htn dm cva    Cognitive Diagnosis: Ongoing assessment with L neglect and R inattn observed, slow processing and initiation, reduced sequencing and problem solving, and STM deficits. Judged to be moderate during swallow and initiated speech/language eval. Continued assesment needed. Communication Diagnosis: Pt with slow initiation and word retrieval (mild to moderate), compehension impaired for higher level tasks, reduced ability to respond to wh?'s but able to communicate basic needs clearly.  Ongoing assessment needed as pt required significant time for all tasks. Recommendations:  Requires SLP Intervention: Yes  Duration/Frequency of Treatment: No swallow tx however would benefit from 3x/week x3 weeks 30mins min for cognitive communication issues  D/C Recommendations: To be determined  Referral To: Dietician    Plan:   Goals:  Short-term Goals  Timeframe for Short-term Goals: 3x/week x3 weeks 30 mins min  LTG: Pt will improve cognitive linguistic abilities for safe return home and clearly communicate care needs. Goal 1: Continued receptive/expressive assessment by 1/6/2021 with additional goals to be added as appropriate. Goal 2: Pt will respond to wh?'s with 90% acc given extra time. Goal 3: Pt will follow 2-3 step tasks with min cues and 75% acc  Goal 4: Pt will attend to therapist on R with eye contact x5 in 5 mins. Goal 5: Pt will improve L visual scanning for locating requested items with mod cues and 60% acc. Patient/family involved in developing goals and treatment plan: Pt in agreement    Subjective:   Previous level of function and limitations: Shari Mitchell was independent with activities of daily living prior to admit. General  Chart Reviewed: Yes  Family / Caregiver Present: No  Subjective  Subjective: Alert and cooperative  Social/Functional History  Education: 12th grade  Occupation: Retired  Type of occupation: Pt built trucks for 33 years; [employer]  Leisure & Hobbies: TV, limited reading, girlfriend has a dog. Pt has meds that come in pill packs. Bills are managed over the phone. IADL Comments: Lakesha Lyles assists c most IADLs and pt report hiring outside help c [employer]ork. Additional Comments: Pt reports using rolator in the house. Pt reports 1 fall this year that occured in the house while ambulating in the home.   Vision  Vision: Impaired  Vision Exceptions: Wears glasses for reading  Hearing  Hearing: Within functional limits           Objective:     Oral/Motor  Oral Motor: Within functional limits    Auditory Comprehension  Comprehension: Exceptions  Yes/No Questions: [facility])  Basic Questions: (WFL with processing delays)  Complex Questions: (Ongoing assessment)  One Step Basic Commands: Mild(Slow initiation and processing)  Two Step Basic Commands: (Ongoing assessment due to extra time needed for all tasks and slow response time)  Multistep Basic Commands: Moderate  Pictures: Mild(visual scanning issues bilaterally; R inattn and L neglect)  L/R Discrimination: (ongoing assessment)  Conversation: Mild(Processing delays and slow initiation)  Interfering Components: Attention - selective; Attention - divided; Attention - sustained; Attention to detail;Processing speed; Working memory  Effective Techniques: Extra processing time;Pausing;Repetition;Visual/Gestural cues    Reading Comprehension  Reading Status: Exceptions to [facility]  Scanning/Tracking Impairment Severity: Moderate  Phrases Impairment Severity: Mild   Sentence Impairment Severity: Severe(verbal and tactile cues needed for scanning)  Interfering Components: Attention; Attention to detail; Left neglect;Processing time  Effective Techniques: Large print;Prescription glasses/contact lenses; Tactile/visual cueing    Expression  Primary Mode of Expression: Verbal    Verbal Expression  Verbal Expression: Exceptions to functional limits  Initiation: Mild   Automatic Speech: (WFL)  Responsive: Mild(Word retrieval delays)  Conversation: Moderate(Pt able to communicate personal needs and wants but unable to respond consistently to wh?'s regarding home and interest.)  Interfering Components: Attention; Impaired thought organization; Left neglect; Limited error awareness  Effective Techniques: Provide extra time; Word retrived strategies    Written Expression  Dominant Hand: Right(Pt with reduced awareness that he was holding phone--was able to dial a number upon request.)  Written Expression: Unable to assess    Motor Speech  Motor Speech:  Within Functional Limits    Pragmatics/Social Functioning  Pragmatics: Exceptions to [facility]  Affect: Mild(flat)  Eye Contact: Moderate(R inattn and L neglect)    Cognition:      Orientation  Overall Orientation Status: Impaired  Attention  Attention: Exceptions to [facility]  Divided Attention: Moderate  Selective Attention: Mild  Sustained Attention: Mild  Memory  Memory: Unable to assess(Ongoing assessment; unable to provide reliable hx)  Problem Solving  Problem Solving: Unable to assess(Continued assessment)  Safety/Judgement  Safety/Judgement: Exceptions to [facility]  Unable to Self-monitor and Self-correct Consistently: Moderate  Insight: Moderate    Flexibility of Thought: Moderate    Additional Assessments:  Swallow eval completed             Prognosis:  Speech Therapy Prognosis  Prognosis: Fair  Prognosis Considerations: Previous Level of Function;Severity of Impairments; Other (Comment)  Additional Comments: reduced awareness and insight  Individuals consulted  Consulted and agree with results and recommendations: Patient(consulted with PT/OT as well; they are continuing their assessment due to similar observations and additional time needed)    Education:  Patient Education: Discussed diet options and pt requested dental soft since he is without his teeth along with POC for communication and cognition  Patient Education Response: Needs reinforcement  Safety Devices in place: Yes  Type of devices: Bed alarm in place;Call light within reach    Therapy Time:   Individual Concurrent Group Co-treatment   Time In 1130         Time Out 1200         Minutes [address], [address]  2021 6:42 PM         Facility/Department: [facility]   CLINICAL BEDSIDE SWALLOW EVALUATION    NAME: [address] [place] [address] : 1950  MRN: [id number]    ADMISSION DATE: 2021  ADMITTING DIAGNOSIS: has Pleural effusion, left; SAMANTHA on CPAP; Chronic obstructive pulmonary disease ([facility] TIA (transient ischemic attack);  Bipolar 1 disorder ([address] Coronary artery disease involving native coronary artery of native heart without angina pectoris; Essential hypertension; REYES (dyspnea on exertion); Anemia, chronic disease; Diuretic-induced hypokalemia; Diastolic CHF ([address] Acute respiratory failure ([address] Hyperkalemia; Adrenal mass ([address] Diabetes mellitus, type 2 ([address] Hyponatremia; Paroxysmal atrial fibrillation ([address] Empyema of left pleural space ([address] Hyperlipidemia; Lumbar radiculopathy; CKD (chronic kidney disease); Uncontrolled type 2 diabetes mellitus with peripheral neuropathy ([address] Chronic kidney disease (CKD), stage III (moderate); Cervical stenosis of spine; Spinal stenosis of lumbar region without neurogenic claudication; Somatic dysfunction of back; Somatic dysfunction of cervical region; Somatic dysfunction of pelvis region; Somatic dysfunction of both lower extremities; Empyema lung ([address] Acute kidney injury superimposed on CKD ([address] Chronic diastolic heart failure ([address] Hypotensive episode; Bradycardia on ECG; Lightheadedness; Acute on chronic respiratory failure with hypoxia ([address] Severe mixed bipolar 1 disorder without psychosis ([address] Acute chest pain; Atrial tachycardia ([address] Overlapping malignant neoplasm of colon ([address] Colon adenocarcinoma ([address] Acute left-sided weakness; Fall at home; Fall at home, initial encounter; Chronic pain of both shoulders; Hypertensive heart disease with stage 5 chronic kidney disease, not on chronic dialysis ([address] Bell's palsy; 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 ([address] Localized edema; Intracranial hemorrhage ([address] Left hemiparesis ([address]  Dysarthria due to and not concurrent with spontaneous intracerebral hemorrhage; Gait disturbance; and Bipolar disorder, in partial remission, most recent episode depressed ([address] on their problem list.  ONSET DATE: 12/20/2020    Recent Chest Xray/CT of Chest:       FINDINGS:   Marginal inspiration is present.  Blunting of the left CP angle is again   visualized, consistent with chronic effusion/scarring, and left basilar   atelectasis.  Cardiomegaly is present.  Mild vascular congestion is noted. Anterior fixation plate is present within the lower cervical spine.           Impression   1. Cardiomegaly, with mild vascular congestion   2. Chronic left pleural effusion and left basilar atelectasis/scarring,   unchanged       Date of Eval: 1/5/2021  Evaluating Therapist: Charo Vora    Current Diet level:  Current Diet : Regular  Current Liquid Diet : Thin      Primary Complaint  Patient Complaint: I lost my dentures a few weeks ago in another hospital.  Some of the food is hard. Pain:  Pain Assessment  Pain Assessment: 0-10  Pain Level: 3  Patient's Stated Pain Goal: No pain  Pain Type: Chronic pain  Pain Location: Neck  Pain Descriptors: Aching  Non-Pharmaceutical Pain Intervention(s): Repositioned    Reason for Referral  Ilana Queen was referred for a bedside swallow evaluation to assess the efficiency of his swallow function, identify signs and symptoms of aspiration and make recommendations regarding safe dietary consistencies, effective compensatory strategies, and safe eating environment. Impression  Dysphagia Diagnosis: Swallow function appears grossly intact  Dysphagia Outcome Severity Scale: Level 6: Within functional limits/Modified independence     Treatment Plan  Requires SLP Intervention: Yes  Duration/Frequency of Treatment: No swallow tx however would benefit from 3x/week x3 weeks 30mins min  D/C Recommendations: To be determined  Referral To: Dietician    Recommended Diet and Intervention  Diet Solids Recommendation: Dental Soft(per pt request since no dentures)  Liquid Consistency Recommendation: Thin  Recommended Form of Meds: Whole with water          Compensatory Swallowing Strategies  Compensatory Swallowing Strategies: Alternate solids and liquids; Check for pocketing of food on the Right;Small bites/sips;Upright as possible for all oral intake;Eat/Feed slowly    Treatment/Goals  Long-term Goals  Timeframe for Long-term Goals: No swallow tx    General  Chart Reviewed: Yes  Behavior/Cognition: Alert; Cooperative  Respiratory Status: Room air  O2 Device: None (Room air)  Communication Observation: Functional  Follows Directions: Simple  Dentition: Edentulous  Patient Positioning: Upright in bed  Baseline Vocal Quality: Normal  Volitional Cough: Strong  Prior Dysphagia History: None reported  Consistencies Administered: Reg solid; Dysphagia Pureed (Dysphagia I); Thin - straw           Vision/Hearing  Vision  Vision: Impaired  Vision Exceptions: Wears glasses for reading  Hearing  Hearing: Within functional limits    Oral Motor Deficits  Oral/Motor  Oral Motor:  Within functional limits    Oral Phase Dysfunction  Oral Phase  Oral Phase: WFL(Slow mastication with solids)     Indicators of Pharyngeal Phase Dysfunction   Pharyngeal Phase  Pharyngeal Phase: WFL    Prognosis  Prognosis  Prognosis for safe diet advancement: good  Individuals consulted  Consulted and agree with results and recommendations: Patient(consulted with PT/OT as well; they are continuing their assessment due to similar observations and additional time needed)    Education  Patient Education: Discussed diet options and pt requested dental soft since he is without his teeth along with POC for communication and cognition  Patient Education Response: Needs reinforcement  Safety Devices in place: Yes  Type of devices: Bed alarm in place;Call light within reach       Therapy Time  SLP Individual Minutes  Time In: 1100  Time Out: [id number]  Minutes: [address], [address] [place], CCC-SLP  1/5/2021 6:42 PM

## 2021-01-05 NOTE — PLAN OF CARE
ARU Interdisciplinary Plan of Care [facility]  [place], [address]  [phone]  Fax: [phone]        [address] : 1950  Acct #: [de-identified]  MRN: [id number]   PHYSICIAN:  David Rodriguez MD  Primary Active Problems:   Active Hospital Problems    Diagnosis Date Noted    Left hemiparesis ([address] [G81.94]     Dysarthria due to and not concurrent with spontaneous intracerebral hemorrhage [I69.122]     Gait disturbance [R26.9]     Bipolar disorder, in partial remission, most recent episode depressed ([address] [F31.75]     Intracranial hemorrhage ([address] [I62.9] 2021    Uncontrolled type 2 diabetes mellitus with peripheral neuropathy ([address] [E11.42, E11.65]     Paroxysmal atrial fibrillation (HCC) [I48.0]        Rehabilitation Diagnosis:     Cerebral infarction, unspecified [I63.9]  Intracranial hemorrhage ([address] [I62.9]       ADMIT DATE:2021   CARE PLAN     N[address]SIN [address] while on this unit will:      Bowel and Bladder   [x] Be continent of bowel and bladder      [] Have an adequate number of bowel movements   [] Urinate with no urinary retention >300ml in bladder   [] Bladder Scan: (details)   [] Complete bladder protocol with see removal   [] Initiate Bladder Program to toilet every ___ hours   [] Initiate Bowel Program to toilet every ___hours   [] Bladder training    [] Bowel training  Pulmonary   [x] Maintain O2 SATs at 92% or greater  Pain Management   [x] Have pain managed while on ARU        [] Be pain free by discharge    [x] Medication Management and Education  Maintenance of Skin Integrity/Wound Management   [x] Have no skin breakdown while on ARU   [x] Have improved skin integrity via wound measurements   [x] Have no signs/symptoms of infection via infection protection and monitoring at the          wound site  Fall Prevention   [x] Be free from injury during hospitalization via fall prevention measures     [x] Disease management and Education  Precautions   [] Weight Bearing Precautions   [] Swallowing Precautions   [] Monitoring of Risks of Complications   [x] DVT Prophylaxis    [x] Fluid/electrolyte/Nutrition Management    [x] Complete education with patient/family with understanding demonstrated for          in-room safety with transfers to bed, toilet, wheelchair, shower as well as                bathroom activities and hygiene. [] Adjustment   [x] Other:   Nursing interventions may include bowel/bladder training, education for medical assistive devices, medication education, O2 saturation management, energy conservation, stress management techniques, fall prevention, alarms protocol, seating and positioning, skin/wound care, pressure relief instruction,dressing changes,  infection protection, DVT prophylaxis, and/or assistance with in room safety with transfers to bed, toilet, wheelchair, shower as well as bathroom activities and hygiene. Patient/caregiver education for:   [] Disease/sustained injury/management      [] Medication Use   [] Surgical intervention   [] Safety/Precautions   [] Body mechanics and or joint protection   [] Health maintenance         PHYSICAL THERAPY:  Goals:                  Short term goals  Time Frame for Short term goals: 14-21 tx days:  Short term goal 1: Pt will complete bed mobility tasks and sup<->sit with SBA-Sup. Short term goal 2: Pt will complete OOB transfers using RW with Mod A. Short term goal 3: Pt will ambulate 10 ft over level and carpeted surfaces using RW with Min A. Short term goal 4: Pt will propel manual w/c 150 ft with turns with SBA-Sup. Short term goal 5: Caregiver will be able to safely assist pt with functional mobility tasks. These goals were reviewed with this patient at the time of assessment and Mancelona Posto7. is in agreement.      Plan of Care: Pt to be seen 5 days per week for a Mobility Training, Safety Education & Training, Cognitive/Perceptual Training         cognitive training, home management, energy conservation training, community reintegration, splint fabrication, patient/caregiver education and training, animal assisted therapy, and concurrent and/or group therapy. SPEECH THERAPY: (If ordered)  Plan of Care and Goals:   LTG    Timeframe for Long-term Goals: No swallow tx                    Short-term Goals  Timeframe for Short-term Goals: 3x/week x3 weeks 30 mins min  LTG: Pt will improve cognitive linguistic abilities for safe return home and clearly communicate care needs. Goal 1: Continued receptive/expressive assessment by 1/6/2021 with additional goals to be added as appropriate. Goal 2: Pt will respond to wh?'s with 90% acc given extra time. Goal 3: Pt will follow 2-3 step tasks with min cues and 75% acc  Goal 4: Pt will attend to therapist on R with eye contact x5 in 5 mins. Goal 5: Pt will improve L visual scanning for locating requested items with mod cues and 60% acc. Timeframe for Short-term Goals: 3x/week x3 weeks 30 mins min  LTG: Pt will improve cognitive linguistic abilities for safe return home and clearly communicate care needs. LTG:                           Treatments may include speech/language/communication therapy, cognitive training, animal assisted therapy, group therapy, education, and/or dysphagia therapy based on the above goals. Co-treats where appropriate with PT or OT to facilitate patient goals in functional tasks. These goals were reviewed with this patient at the time of assessment and [employer]. is in agreement. CASE MANAGEMENT:  Goals:   Assist patient/family with discharge planning, patient/family counseling, assistance in obtaining recommended equipment and other services, and coordination with insurance during ARU stay.   Patient Goals:   Return to maximum level of independent function. Nutrition goal: Patient will consume at least 75% at meals during stay       PT IRF-NOHEMY scores and goals for initial assessment:   Roll Left and Right  Assistance Needed: Dependent  Comment: pt with poor motor initiation, sequencing, and attention to required task (eyes were closed during task despite verbal prompts)  CARE Score: 1  Discharge Goal: Supervision or touching assistance  Sit to Lying  Assistance Needed: Dependent  Comment: required x2-person assist to complete proper positioning in supine  CARE Score: 1  Discharge Goal: Supervision or touching assistance  Sit to Stand  Comment: pt with poor trunk control in sitting (increased posterior and R lateral trunk lean; poor reactive response noted despite verbal, visual, and tactile cues)  Reason if not Attempted: Not attempted due to medical condition or safety concerns  CARE Score: 88  Discharge Goal: Partial/moderate assistance  Chair/Bed-to-Chair Transfer  Comment: pt with poor trunk control in sitting  Reason if not Attempted: Not attempted due to medical condition or safety concerns  CARE Score: 88  Discharge Goal: Partial/moderate assistance  Toilet Transfer  Comment: not safe to attempt  Reason if not Attempted: Not attempted due to medical condition or safety concerns  CARE Score: 88  Discharge Goal: Partial/moderate assistance  Car Transfer  Reason if not Attempted: Not attempted due to medical condition or safety concerns  CARE Score: 88  Discharge Goal: Partial/moderate assistance  Walk 10 Feet?   Walk 10 Feet?: No  1 Step  1 Step?: Yes  Picking Up Object  Reason if not Attempted: Not attempted due to medical condition or safety concerns  CARE Score: 88  Discharge Goal: Partial/moderate assistance  Wheelchair Ability  Uses a Wheelchair and/or Scooter?: Yes  Wheel 50 Feet with Two Turns  Comment: pt unable to safely be transferred to w/c today due to poor level of alertness and poor trunk control in sitting at EOB today  Reason if not Attempted: Not attempted due to medical condition or safety concerns  CARE Score: 88  Discharge Goal: Supervision or touching assistance  Wheel 150 Feet  Reason if not Attempted: Not attempted due to medical condition or safety concerns  CARE Score: 88  Discharge Goal: Supervision or touching assistance         1 Step (Curb)  Reason if not Attempted: Not attempted due to medical condition or safety concerns  CARE Score: 88  Discharge Goal: Not Attempted   4 Steps  Reason if not Attempted: Not attempted due to medical condition or safety concerns  CARE Score: 88  Discharge Goal: Not Attempted   12 Steps  Reason if not Attempted: Not applicable  CARE Score: 9  Discharge Goal: Not Applicable    OT IRF-NOHEMY scores and goals for initial assessment:    Eating  Assistance Needed: Setup or clean-up assistance  CARE Score: 5  Discharge Goal: Independent  Oral Hygiene  Comment: Will continue to assess, pt required extensive amount of time to complete toileting in bed, pt very lethargic, slow motor movements, and required constant cues (v/c/tactile) to initiate tasks  Reason if not Attempted: Not attempted due to medical condition or safety concerns  CARE Score: 88  Discharge Goal: Independent  Toileting Hygiene  Assistance Needed: Dependent  Comment: staff did hygiene  CARE Score: 1  Discharge Goal: Partial/moderate assistance  Shower/Bathe Self  Comment: Will continue to assess, pt required extensive amount of time to complete toileting in bed, pt very lethargic, slow motor movements, and required constant cues (v/c/tactile) to initiate tasks  Reason if not Attempted: Not attempted due to medical condition or safety concerns  CARE Score: 88  Discharge Goal: Supervision or touching assistance  Upper Body Dressing  Comment: Will continue to assess, pt required extensive amount of time to complete toileting in bed, pt very lethargic, slow motor movements, and required constant cues (v/c/tactile) to initiate tasks  Reason if not Attempted: Not attempted due to medical condition or safety concerns  CARE Score: 88  Discharge Goal: Supervision or touching assistance  Lower Body Dressing  Comment: Will continue to assess, pt required extensive amount of time to complete toileting in bed, pt very lethargic, slow motor movements, and required constant cues (v/c/tactile) to initiate tasks  Reason if not Attempted: Not attempted due to medical condition or safety concerns  CARE Score: 88  Discharge Goal: Partial/moderate assistance  Putting On/Taking Off Footwear  Assistance Needed: Dependent  CARE Score: 1  Discharge Goal: Set-up or clean-up assistance    Activities Prior to Admit:   Homemaking Responsibilities: Yes  Active : No  Mode of Transportation: Car, mobicanvas  Occupation: Retired  Leisure & Hobbies: TV, limited reading, girlfriend has a dog. Pt has meds that come in pill packs. Bills are managed over the phone. Intensity of Therapy  [facility]. will be seen a minimum of 3 hours of therapy per day/a minimum of 5 out of 7 days per week. [] In this rare instance due to the nature of this patient's medical involvement, this patient will be seen 15 hours per week (900 minutes within a 7 day period). Treatments may include therapeutic exercises, gait training, neuromuscular re-ed, transfer training, community reintegration, bed mobility, w/c mobility and training, self care, home mgmt, cognitive training, energy conservation,dysphagia tx, speech/language/communication therapy, group therapy, and patient/family education. In addition, dietician/nutritionist may monitor calorie count as well as intake and collaboratively work with SLP on dietary upgrades. Neuropsychology/Psychology may evaluate and provide necessary support.   Group therapy as appropriate to facilitate improved endurance, STR, COORD, function, safety, transfers, awareness and insight into deficits, problem solving, memory, and social interaction and engagement. Medical issues being managed closely and that require 24 hour availability of a physician:   [x] Swallowing Precautions                                     [x] Weight bearing precautions   [] Wound Care                             [x] Infection Prevention   [x] DVT Prophylaxis/assessment              [x] Monitoring for complications    [x] Fall Precautions/Prevention                         [x] Fluid/Electrolyte/Nutrition Balance   [x] Voice Protection                           [x] Medication Management   [x] Respiratory                   [x] Pain Mgmt   [x] Bowel/Bladder Fx    Medical Prognosis: [] Good  [x] Fair    [] Guarded   Total expected IRF days 21                                            Physician anticipated functional outcomes:  FWW and HHC OT/PT and supervision. Rehab Goals:   [] Return to premorbid function of_______________________________.    [] Independent   [] Mod I  [x] Supervision  [] CGA   [] Min A   [] Mod A  Level for ambulation []without assistive device  [x] with assistive device        [] Independent   [] Mod I  [x] Supervision [] CGA   [] Min A   [] Mod A  Level for transfers []without assistive device  [x] with assistive device         [] Independent   [] Mod I  [x] Supervision [] CGA   [] Min A   [] Mod A Level with ADL's []without assistive device   [x] with assistive device     ___________________     Level with cognitive skills requiring [] No assist [x] Supervision  [] Active Assist/Cues     [] Maximize level of mobility and ADL's to decrease burden on caregiver    IPOC brief synthesis of Preadmission Screen, Post-Admission Evaluation, and Therapy Evaluations:  Acute inpatient rehabilitation with occupational and physical therapy 180 minutes 5 out of every 7 days. Will address basic and  advancing mobility with self-care instruction and adaptive equipment training. Caregiver education will be offered.  Expected length of stay  prior to a supervised level of function for discharge home with a walker and [facility] OT/PT is 2-1/2 weeks.     Additional recommendation:     1. Right intracranial hemorrhage with left hemiparesis: Patient requires daily occupational and physical therapy with speech-language pathology. His Xarelto is being held for at least 2 weeks following his acute hemorrhage. He requires anticonvulsants, blood pressure correction and blood sugar treatment. We must emphasize pulmonary hygiene, DVT prophylaxis and nutritional support. Bowel and bladder retraining. Caregiver education with his significant other. Adaptive equipment training. 2. DVT prophylaxis: He comes to with with orders for heparin 5000 units every 8 hours. I must monitor his hemoglobin and platelet count periodically while on this medication. Weightbearing activities are pursued daily. GI prophylaxis offered. 3. Paroxysmal atrial fibrillation with anticoagulation: Is Xarelto is on hold through at least the middle of this week. He is on Cardizem and lisinopril for rate control. Daily weights to detect any decompensation of CHF. Minimize stimulants. 4. Uncontrolled diabetes type 2 with peripheral neuropathy: Patient requires a diet modified for carbohydrates. He is on Neurontin for peripheral neuropathy symptoms. Blood sugars are checked at mealtime and bedtime. Sliding scale Humalog for now with reintroduction of oral agents should his oral intake stabilize. 5. Essential hypertension: Patient requires multiple medications for blood pressure regulation. Target systolic blood pressure is 120-140. Vital signs are checked at rest and with activity. 6. COPD: Aggressive pulmonary hygiene. Monitoring O2 saturations at rest and with activity. 7. Bipolar disorder: BuSpar, Depakote, Lexapro and risperidone. Treating on a calm and consistent environment.   Providing written and verbal instructions when possible to assist with information retention.     Anticipated discharge destination:    [] Home Independently   [x] Home with supervision    []SNF     [] Other       This plan has been reviewed with me in a language I understand. I have had the opportunity to include my input with my therapy team.    ________________________________________________   ______________________  Patient/Significant Other      Date    I have reviewed this initial plan of care and agree with its contents:    Title   Name    Date    Time    Physician: Gudelia Marin 1/7/2021 8:46 AM  Case Mgmt:  Beatrice Matthews 1/6/2021 1129    OT: Ronny Almonte, OTR/L 1/5/2021 1639    PT: Tona Holstein, KAREN    01/05/2020   17:08    RN: Chloe Johnson RN 1/04/2021 1902    ST:Kesha OZUNA [place], [place], [address] [place] [address] 1/5/2021  6:45 PM    Dietician: SHAE Garcia 01/05/2021   16:14

## 2021-01-05 NOTE — PROGRESS NOTES
Occupational Therapy                              [facility] ARU OCCUPATIONAL THERAPY EVALUATION    Chart Review:  Past Medical History:   Diagnosis Date    Anemia     per old chart    Arthritis     Back pain, chronic     \"have pain in lumbar mainly- have 5 bulging discs\"\"in my neck and my lumbar have herniated discs\"    [facility] ([facility]     CAD (coronary artery disease) 1/27/2016    does not follow with a cardiologist    Cerebral artery occlusion with cerebral infarction ([facility]     \"had mini stroke in Jan 2016- fast heart rate when I would stand up - smile drooping on one side- lased just the one day\"    Chronic kidney disease (CKD), stage III (moderate)     CKD (chronic kidney disease)     per old chart- consult with Dr Kenyatta Monae with admission 4/2016\"have low kidney function\"    COPD (chronic obstructive pulmonary disease) ([facility]     follow with Dr Angie Espinosa Diabetes mellitus, type 2 ([facility] 1/3/2017    Diabetic peripheral neuropathy (HCC)     Diastolic CHF ([facility] 1/26/9459    Gastric ulcer     H/O cardiovascular stress test 5/26/14    EF 68% mild ischemia anterior wall    Heart murmur     \"see Dr Eren Wayne- last echo 2014\" pt states\"I think they said I have a murmur but no chest pain or palpitations\"    Hiatal hernia     History of cardiac cath 6/11/14    MODERATE  CAD      Hx of migraines     HX OTHER MEDICAL     \"have thinning of kidney walls- they found I had that 15 yrs ago\" see Dr Analilia Berg"    Hyperlipidemia     Hypertension     Lumbar radiculopathy     Overlapping malignant neoplasm of colon ([facility] 5/27/2020    Panic attack     'anything new gives me anxiety\"    Pleural effusion     scheduled for thoracentesis 7/28/2014, 8/17/2016    S/P thoracentesis     left side( per old chart had thora done 4/2016 and one done 2014)    Sleep apnea     sleep study x 2 - last one 4-5 yrs ago- uses c-pap\"    SOBOE (shortness of breath on exertion)     Spinal stenosis      Past Surgical History: Responsibility: Secondary  Health Care Management: Primary  Ambulation Assistance: Independent(Occasional use of walker)  Transfer Assistance: Needs assistance(Pt requires assistance c sit> the living room)  Active : No  Patient's  Info: Liliam Carrington is primary , and transport company  Mode of Transportation: Car, Van  Education: 12th grade  Occupation: Retired  Type of occupation: Pt built trucks for 33 years; [employer]  Leisure & Hobbies: TV, limited reading, girlfriend has a dog. Pt has meds that come in pill packs. Bills are managed over the phone. IADL Comments: Gayathri Shove assists c most IADLs and pt report hiring outside help c yardork. Additional Comments: Pt reports using rolator in the house. Pt reports 1 fall this year that occured in the house while ambulating in the home; sleeps in regular, flat bed; R hand dominant    Restrictions:  Restrictions/Precautions  Restrictions/Precautions: General Precautions, Fall Risk(L hemiparesis, chronic R shoulder limitations)        Position Activity Restriction  Other position/activity restrictions: see catheter         Pain Level: 3  Pain Location: Neck    Objective:  Observation/Palpation  Observation: Pt in high fowlers upon arrival. Pt with flat affect and slow motor movements and responses to v/c. Pt appears to demo delayed processing and requires cues for task initiation. Noted pt requested to have BM, \"I don't know if I can hold it, when I move it feels like I'm Suellyn Narrow go\". Pt required Max A rolling to L by rehab aid while therapist placed bedpan for pt. Pt require vc x2 for initiation of BM, pt stated, \"I need to go\" when prompted that pt had bedpan already, pt stated, \"Oh you did? \"    Orientation  Overall Orientation Status: Impaired  Orientation Level: Oriented to person, Disoriented to place, Disoriented to situation(Requires prompts for place)        Vision  Vision: Impaired(pt unable to accurately read time on the wall clock today but was able to identify the shape)  Vision Exceptions: Wears glasses for reading  Vision - Basic Assessment  Prior Vision: Wears glasses all the time  Visual History: No significant visual history  Patient Visual Report: No visual complaint reported. Oculo Motor Control: Impaired  Impairments: ROM  Vision Comments: Will further assess for neglect vs field cut. Pt asked to identify time on clock (9:26) and reported \"10 after 5\"  Hearing  Hearing: Within functional limits    ROM:      LUE AROM (degrees)  LUE AROM : Exceptions  LUE General AROM: Poor AROM noted  L Shoulder Flexion 0-180: less than 90*     Left Hand AROM (degrees)  Left Hand AROM: WFL     RUE AROM (degrees)  RUE AROM : Exceptions  RUE General AROM: Poor AROM noted pt reports recent shld injury and pain during ROM  R Shoulder Flexion 0-180: noted- less than 90*  Right Hand PROM (degrees)  Right Hand PROM: WFL       Strength:    LUE Strength  L Hand General: 3+/5  LUE Strength Comment: Decreased BUE strength, poor AROM noted  RUE Strength  R Hand General: 3+/5  RUE Strength Comment: Decreased BUE strength, limited by pain pt reports pain in bicep    Quality of Movement:   Tone RUE  RUE Tone: Normotonic  Tone LUE  LUE Tone: Normotonic  Coordination  Movements Are Fluid And Coordinated: No  Coordination and Movement description: Fine motor impairments, Decreased speed, Decreased accuracy       Sensation:    Sensation  Overall Sensation Status: Impaired(pt has hx of neuropathy, however pt denies changes in sensation, appears to have poor insight)     ADLs:  Eating: Eating  Assistance Needed: Setup or clean-up assistance  CARE Score: 5  Discharge Goal: Independent       Oral Hygiene: Oral Hygiene  Comment: Will continue to assess, pt required extensive amount of time to complete toileting in bed, pt very lethargic, slow motor movements, and required constant cues (v/c/tactile) to initiate tasks  Reason if not Attempted: Not attempted due to medical condition or safety concerns  CARE Score: 88  Discharge Goal: Independent    UB/LB Bathing: Shower/Bathe Self  Comment: Will continue to assess, pt required extensive amount of time to complete toileting in bed, pt very lethargic, slow motor movements, and required constant cues (v/c/tactile) to initiate tasks  Reason if not Attempted: Not attempted due to medical condition or safety concerns  CARE Score: 88  Discharge Goal: Supervision or touching assistance    UB Dressing: Upper Body Dressing  Comment: Will continue to assess, pt required extensive amount of time to complete toileting in bed, pt very lethargic, slow motor movements, and required constant cues (v/c/tactile) to initiate tasks  Reason if not Attempted: Not attempted due to medical condition or safety concerns  CARE Score: 88  Discharge Goal: Supervision or touching assistance         LB Dressing:   Lower Body Dressing  Comment: Will continue to assess, pt required extensive amount of time to complete toileting in bed, pt very lethargic, slow motor movements, and required constant cues (v/c/tactile) to initiate tasks  Reason if not Attempted: Not attempted due to medical condition or safety concerns  CARE Score: 88  Discharge Goal: Partial/moderate assistance    Donning and [place] Footwear: Putting On/Taking Off Footwear  Assistance Needed: Dependent  CARE Score: 1  Discharge Goal: Set-up or clean-up assistance      Toileting: Toileting Hygiene  Assistance Needed: Substantial/maximal assistance  Comment: Pt required Max A for completion of overall toileting (CM and Hygiene) completed in bed c bed pan, pt attempted to demo paulino-hygiene c RUE, however requires assist for thoroughness. CARE Score: 2  Discharge Goal: Partial/moderate assistance      Bed Mobility:    Bed mobility  Rolling to Left: Maximum assistance(Pt c/o BUE pain in shoulders, more on R)  Rolling to Right: Maximum assistance    Transfers:                 Toilet Transfer  Comment: Attempted OOB transfers, however pt demo increased lethargy, c/o dizziness, and required assistance back to supine. Reason if not Attempted: Not attempted due to medical condition or safety concerns  CARE Score: 88  Discharge Goal: Partial/moderate assistance    Functional Mobility:                Cognition:  Cognition  Overall Cognitive Status: Exceptions  Arousal/Alertness: Delayed responses to stimuli  Following Commands: Follows one step commands with increased time  Attention Span: Attends with cues to redirect, Difficulty dividing attention  Memory: Decreased recall of recent events  Safety Judgement: Decreased awareness of need for safety, Decreased awareness of need for assistance  Insights: Not aware of deficits  Initiation: Requires cues for some  Cognition Comment: Will further assess next session    Perception:  Perception  Overall Perceptual Status: WFL      Assessment:     Performance deficits / Impairments: Decreased functional mobility , Decreased cognition, Decreased high-level IADLs, Decreased ADL status, Decreased endurance, Decreased fine motor control, Decreased ROM, Decreased safe awareness, Decreased strength, Decreased balance, Decreased vision/visual deficit, Decreased coordination    The patient is a 70year old male admitted onto ARU after hospitalization for intracranial hemorrhage. Per pt chart review, pt presented to ED on 12/20/2020 with 2-day history of headache, photophobia and phonophobia. There is been no chills, cough or trauma to his head. He was mildly hemiparetic and ataxic in that visit. Brain imaging identified a significant left intraparenchymal hemorrhage. Per pt report, pt required assistance from significant other to complete ADLs and IADLs. Today pt required Max-Total A c toileting and bed mobility this date. Pt demo poor awareness/sensation of BM or urination.  Pt also presented with increased BUE pain, limited ROM, increased lethargy, poor trunk control, appears to demo cognitive deficits, and demo poor command following resulting in need for continued assessment/evaluation. Pt presented c c/o dizziness while seated EOB and with above deficits, pt's ability to safely complete OOB transfers was impacted at this time. Will continue to further assess pt for accurate goal setting. Anticipating pt will require Min-Mod A for completion of ADLs, as pt's PLOF required assistance. The QI, MMT, and ROM standardized assessments were used this date to determine the above performance deficits, which compromise pt's ability to safely complete ADLs/IADLs/mobility. Pt will benefit from ARU OT services to increase functional performance, safety, and achieve the highest level of independence with ADLs. Decision Making: High Complexity  Clinical Presentation:  unpredictable characteristics  History:  See \"Social/Functional\" sections for complete Occupational Profile. Pt's co-morbidities include essential hypertension, uncontrolled diabetes type 2 with peripheral neuropathy, COPD, bipolar disorder, diastolic congestive heart failure which all impact function and ability to progress through plan of care. Assistance/Modification:  Use of DME, providing verbal cues, providing physical assistance for mobility and ADL's, providing set-up of supplies during ADL's  Patient education:   ARU procotol, Role of O.T., O.T. plan of care,   []   Patient goal was established and reviewed in Rehab[facility]er with patient and/or family this date.   Treatment Initiated:  Patient education, ADL re-training,   Barriers to Improvement:  Cognition, hemiparesis, neglect  REQUIRES OT FOLLOW UP: Yes  Discharge Recommendations:  Anticipating home c family c 24/7 SUP and [facility] OT  Equipment Recommendations:  TBD    Goals:  Patient Goals   Patient goals : Return home as independent as possible  Short term goals  Time Frame for Short term goals: STG=LTG  Long term goals  Time Frame for Long term goals : 12-14 days or until d/c  Long term goal 1: Pt will complete feeding/grooming/oral care task c MOD I by d/c  Long term goal 2: Pt will complete total body bathing c MIN A by d/c  Long term goal 3: Pt will complete UB dressing c SUP by d/c  Long term goal 4: Pt will complete LB dressing c MOD A by d/c  Long term goal 5: Pt will don/doff footwear c SETUP by d/c  Long term goals 6: Pt will complete toileting c MOD A by d/c  Long term goal 7: Pt will complete functional transfer (toilet, tub, shower) c MOD A by d/c. Long term goal 8: Pt will perform therex/therax to facilitate increased strength/endurance/ax tolerance (c emphasis on dynamic standing balance/tolerance >10 mins and BUE endurance) c Min A in order to complete ADLs.     Plan:    Pt will be seen at least 60 minutes per day for a minimum of 5 days per week, plus group therapy as appropriate  Current Treatment Recommendations: Strengthening, Endurance Training, Neuromuscular Re-education, Patient/Caregiver Education & Training, ROM, Equipment Evaluation, Education, & procurement, Self-Care / ADL, Balance Training, Functional Mobility Training, Safety Education & Training, Cognitive/Perceptual Training    OT Individual Minutes  Time In: 0830  Time Out: 1010  Minutes: 100                Number of Minutes/Billable Intervention      OT Evaluation 30   Therapeutic Exercise    ADL Self-care 45   Neuro Re-Ed    Therapeutic Activity 25   Group    Other:    TOTAL 100     Electronically signed by:    David Berger, [address], OTR/L  License # [id number]    1/5/2021, 3:45 PM

## 2021-01-05 NOTE — PROGRESS NOTES
[address] [place] : 1950  Acct #: [de-identified]  MRN: [id number]              PM&R Progress Note      Admitting diagnosis: Intracranial hemorrhage ([address])     Comorbid diagnoses impacting rehabilitation: Left hemiparesis, dysarthria, paroxysmal atrial fibrillation, essential hypertension, uncontrolled diabetes type 2 with peripheral neuropathy, COPD, bipolar disorder, diastolic congestive heart failure    Chief complaint: General fatigue and itching in the left axilla. Prior (baseline) level of function: Independent. Current level of function:         Current  IRF-NOHEMY and Goals:           Eating  Assistance Needed: Setup or clean-up assistance  CARE Score: 5  Discharge Goal: Independent  Oral Hygiene  Comment: Will continue to assess, pt required extensive amount of time to complete toileting in bed, pt very lethargic, slow motor movements, and required constant cues (v/c/tactile) to initiate tasks  [address] Needed: Substantial/maximal assistance  Comment: Pt required Max A for completion of overall toileting (CM and Hygiene) completed in bed c bed pan, pt attempted to demo paulino-hygiene c RUE, however requires assist for thoroughness.   CARE Score: 2  Discharge Goal: Partial/moderate assistance  Shower/Bathe Self  Comment: Will continue to assess, pt required extensive amount of time to complete toileting in bed, pt very lethargic, slow motor movements, and required constant cues (v/c/tactile) to initiate tasks  Upper Body Dressing  Comment: Will continue to assess, pt required extensive amount of time to complete toileting in bed, pt very lethargic, slow motor movements, and required constant cues (v/c/tactile) to initiate tasks  Lower Body Dressing  Comment: Will continue to assess, pt required extensive amount of time to complete toileting in bed, pt very lethargic, slow motor movements, and required constant cues (v/c/tactile) to initiate tasks  Putting On/Taking Off Footwear  Assistance Needed: Dependent  CARE Score: 1  Discharge Goal: Set-up or clean-up assistance                Toilet Transfer  Comment: Attempted OOB transfers, however pt demo increased lethargy, c/o dizziness, and required assistance back to supine. Reason if not Attempted: Not attempted due to medical condition or safety concerns  CARE Score: 80                                  I      Exam:    Blood pressure 123/60, pulse 69, temperature 97.6 °F (36.4 °C), temperature source Oral, resp. rate 18, height 5' 9\" (1.753 m), weight 229 lb (103.9 kg), SpO2 95 %. General: Sitting up in bed. Slow and deliberate with speech. Delayed responses to verbal instructions in conversation. In no distress. HEENT: Left facial droop. Mucous membranes are dry. Neck supple. Pulmonary: Shallow respirations with symmetric air exchange. Cardiac: Rate controlled. Occasional premature beat. Abdomen: Patient's abdomen is soft and nondistended. Bowel sounds were present throughout. There was no rebound, guarding or masses noted. Upper extremities: Reddened rash in the left axilla without nodules or vesicles. Weak left . Lower extremities: Clumsy left leg movements. No signs of DVT. Sitting balance was fair-.  Standing balance was poor.     Lab Results   Component Value Date    WBC 8.9 12/19/2020    HGB 15.1 12/19/2020    HCT 48.6 12/19/2020    MCV 94.4 12/19/2020     12/19/2020     Lab Results   Component Value Date    INR 0.84 12/19/2020    INR 1.01 06/03/2020    INR 0.90 06/02/2020    PROTIME 10.1 (L) 12/19/2020    PROTIME 12.2 06/03/2020    PROTIME 10.9 (L) 06/02/2020     Lab Results   Component Value Date    CREATININE 1.7 (H) 12/19/2020    BUN 14 12/19/2020     (L) 12/19/2020    K 3.6 12/19/2020    CL 98 (L) 12/19/2020    CO2 24 12/19/2020     Lab Results   Component Value Date    ALT 10 12/19/2020    AST 15 12/19/2020    ALKPHOS 82 12/19/2020    BILITOT 0.6 12/19/2020 Expected length of stay  prior to a supervised level of function for discharge home with a walker and C OT/PT is 2-1/2 weeks. Recommendations:    1. Right intracranial hemorrhage with left hemiparesis: Developing the routine for his daily occupational and physical therapy with speech-language pathology. The Xarelto is being held for at least 2 weeks following his acute hemorrhage. I may contact cardiology regarding its resumption. He requires anticonvulsants, blood pressure correction and blood sugar treatment. Emphasizing pulmonary hygiene, DVT prophylaxis and nutritional support. Bowel and bladder retraining. Caregiver education with his significant other. Adaptive equipment training. Working on sitting at the edge of the bed today. 2. DVT prophylaxis: Heparin 5000 units every 8 hours until Xarelto is restarted. I must monitor his hemoglobin and platelet count periodically while on this medication. Weightbearing activities are pursued daily. GI prophylaxis offered. No clinical signs of acute blood loss. 3. Paroxysmal atrial fibrillation with anticoagulation: The Xarelto is on hold through at least the middle of this week. He is on Cardizem and lisinopril for rate control. Daily weights do not show any decompensation of CHF. Minimize stimulants. 4. Uncontrolled diabetes type 2 with peripheral neuropathy: Patient requires a diet modified for carbohydrates. He is on Neurontin for peripheral neuropathy symptoms. Blood sugars are checked at mealtime and bedtime. Sliding scale Humalog for now with reintroduction of oral agents should his oral intake stabilize. 5. Essential hypertension: Patient requires multiple medications for blood pressure regulation. Target systolic blood pressure is 120-140. Vital signs are checked at rest and with activity. 6. COPD: Aggressive pulmonary hygiene. Monitoring O2 saturations at rest and with activity. 7. Bipolar disorder:  BuSpar, Depakote, Lexapro and risperidone. Treating in a calm and consistent environment. Providing written and verbal instructions when possible to assist with information retention.

## 2021-01-05 NOTE — PROGRESS NOTES
Comprehensive Nutrition Assessment    Type and Reason for Visit:  Initial, Consult(oral nutrition supplements)    Nutrition Recommendations/Plan:   Continue carb controlled diet with dental soft   Will offer low calorie, high protein oral nutrition supplement    Nutrition Assessment:  Rehab admit with hx intracranial hemorrhage. Currently on carb controlled diet with hx DM. Dental soft diet added today. Recent meal intake varying 25-75%. Moderate nutrition risk at this time with intake not yet consistent. Malnutrition Assessment:  Malnutrition Status:  Insufficient data    Context:  Acute Illness       Estimated Daily Nutrient Needs:  Energy (kcal):  9113-9228; Weight Used for Energy Requirements:  Current     Protein (g):  72-87 (1-1.2 g/kg); Weight Used for Protein Requirements:  Ideal        Fluid (ml/day):  2000; Method Used for Fluid Requirements:  1 ml/kcal      Nutrition Related Findings:  asleep in bed on visit, hx DM, CKD, CAD      Wounds:  None       Current Nutrition Therapies:    DIET CARB CONTROL; Dental Soft    Anthropometric Measures:  · Height: 5' 9\" (175.3 cm)  · Current Body Weight: 229 lb (103.9 kg)   · Admission Body Weight: 225 lb 5 oz (102.2 kg)    · Usual Body Weight: 214 lb (97.1 kg)(per hx)     · Ideal Body Weight: 160 lbs; % Ideal Body Weight 143.1 %   · BMI: 33.8  · Adjusted Body Weight:  ; No Adjustment   · BMI Categories: Obese Class 1 (BMI 30.0-34. 9)       Nutrition Diagnosis:   · Predicted inadequate energy intake related to other (comment)(reduced appetite in illness) as evidenced by other (comment)(inconsistent intake so far)      Nutrition Interventions:   Food and/or Nutrient Delivery:  Continue Current Diet, Start Oral Nutrition Supplement  Nutrition Education/Counseling:  No recommendation at this time   Coordination of Nutrition Care:  Continue to monitor while inpatient    Goals:  Patient will consume at least 75% at meals during stay       Nutrition Monitoring and Evaluation:   Behavioral-Environmental Outcomes:  None Identified   Food/Nutrient Intake Outcomes:  Diet Advancement/Tolerance, Food and Nutrient Intake, Supplement Intake  Physical Signs/Symptoms Outcomes:  Biochemical Data, Meal Time Behavior, Skin, Weight     Discharge Planning:    Continue current diet     Electronically signed by Joy Mendez RD, LD on 1/5/21 at 4:12 PM EST    Contact: [phone]

## 2021-01-05 NOTE — CARE COORDINATION
I contacted [facility] this morning to update them on this pts admission to our unit 1/4/21, and to get contact information for future updates. Per Shin Lipscomb [phone], this plan is a Medicare supplement plan and if Medicare would approve services, then this plan would also approve services. There is no answer in the hospital registration office [phone] at this time, to verify. I will continue to try the office to get verification.

## 2021-01-05 NOTE — PROGRESS NOTES
Physical Therapy    [facility] ARU PHYSICAL THERAPY EVALUATION    Chart Review:  Past Medical History:   Diagnosis Date    Anemia     per old chart    Arthritis     Back pain, chronic     \"have pain in lumbar mainly- have 5 bulging discs\"\"in my neck and my lumbar have herniated discs\"    Bipolar 1 disorder ([facility] [address]     CAD (coronary artery disease) 1/27/2016    does not follow with a cardiologist    Cerebral artery occlusion with cerebral infarction ([facility]     \"had mini stroke in Jan 2016- fast heart rate when I would stand up - smile drooping on one side- lased just the one day\"    Chronic kidney disease (CKD), stage III (moderate)     CKD (chronic kidney disease)     per old chart- consult with Dr Neris Pritchard with admission 4/2016\"have low kidney function\"    COPD (chronic obstructive pulmonary disease) ([facility]     follow with Dr Brenda Asencio Diabetes mellitus, type 2 ([facility] 1/3/2017    Diabetic peripheral neuropathy (HCC)     Diastolic CHF ([facility] 3/27/8039    Gastric ulcer     H/O cardiovascular stress test 5/26/14    EF 68% mild ischemia anterior wall    Heart murmur     \"see Dr Aneta Bautista- last echo 2014\" pt states\"I think they said I have a murmur but no chest pain or palpitations\"    Hiatal hernia     History of cardiac cath 6/11/14    MODERATE  CAD      Hx of migraines     HX OTHER MEDICAL     \"have thinning of kidney walls- they found I had that 15 yrs ago\" see Dr Caitie Begum"    Hyperlipidemia     Hypertension     Lumbar radiculopathy     Overlapping malignant neoplasm of colon ([facility] 5/27/2020    Panic attack     'anything new gives me anxiety\"    Pleural effusion     scheduled for thoracentesis 7/28/2014, 8/17/2016    S/P thoracentesis     left side( per old chart had thora done 4/2016 and one done 2014)    Sleep apnea     sleep study x 2 - last one 4-5 yrs ago- uses c-pap\"    SOBOE (shortness of breath on exertion)     Spinal stenosis      Past Surgical History:   Procedure Laterality Date    CARPAL TUNNEL RELEASE Bilateral 1989    COLONOSCOPY N/A 5/12/2020    COLONOSCOPY POLYPECTOMY SNARE/COLD BIOPSY WITH SPOT INK TATTOO performed by Michael Márquez MD at [facility] Left 2000's    KNEE SURGERY Bilateral     right knee x 2( scope)/ left knee- 7-8 yr ago   [address] fusion    SMALL INTESTINE SURGERY N/A 5/21/2020    BOWEL RESECTION EXTENDED RIGHT HEMICOLECTOMY performed by Aubrey Burks MD at [facility] Left 12/20/2013    THORACENTESIS  4/25/2016         THORACENTESIS Left 11/15/2016    Dr. Cierra Mckeon 250mlsremoved     THORACOTOMY Left 03/18/2019    with Lysis of adhesions    THORACOTOMY Left 3/18/2019    THORACOSCOPY CONVERTED TO THORACOTOMY WITH LYSIS OF ADHESIONS performed by Jeovanny Buitrago MD at [address] [place] [address]      as a kid    UPPER GASTROINTESTINAL ENDOSCOPY N/A 5/12/2020    EGD BIOPSY performed by Michael M[facility]quez MD at [facility] ENDOSCOPY     Fall History: 1  Social History:  Social/Functional History  Lives With: Significant other(girlfriend [place] Knife)  Type of Home: House  Home Layout: Two level, Performs ADL's on one level(3 bedroom double)  Home Access: Stairs to enter with rails  Entrance Stairs - Number of Steps: 4  Entrance Stairs - Rails: Both  Bathroom Shower/Tub: Walk-in shower  Bathroom Toilet: Standard  Bathroom Accessibility: Accessible  Home Equipment: Rolling walker, Wheelchair-manual, Reacher  ADL Assistance: Needs assistance(Requires, assistance from [place] Knife c bathing, dressing (UB/LB), feeding (s/u c cutting food).  Pt reports dificulty c toileting however does not ask for assistance d/t feelings of embarrassment)  Homemaking Responsibilities: Yes  Meal Prep Responsibility: Secondary  Laundry Responsibility: No  Cleaning Responsibility: Secondary  Bill Paying/Finance Responsibility: Primary  Shopping Responsibility: Secondary(Pt reports requiring hired help to drive to grocery store)  [address]: LLE (degrees)  LLE General AROM: pt with poor command following due to increased lethargy; initiated minimal movement at bed level              RLE PROM: WFL  Strength:    Strength RLE  Comment: attempted to assess at bed level, however pt with poor command following due to increased lethargy when AROM was attempted  Strength LLE  Comment: attempted to assess at bed level, however pt with poor command following due to increased lethargy when AROM was attempted              Bed Mobility:   Lying to Sitting on Side of Bed  Assistance Needed: Dependent  Comment: required x 2-person assist due to poor trunk control, motor sequencing, and attention to task (eyes were closed most of the time and opened eyes intermittently with verbal and tactile prompts)  CARE Score: 1  Discharge Goal: Supervision or touching assistance  Roll Left and Right  Assistance Needed: Dependent  Comment: pt with poor motor initiation, sequencing, and attention to required task (eyes were closed during task despite verbal prompts)  CARE Score: 1  Discharge Goal: Supervision or touching assistance  Sit to Lying  Assistance Needed: Dependent  Comment: required x2-person assist to complete proper positioning in supine  CARE Score: 1  Discharge Goal: Supervision or touching assistance    Transfers:    Sit to Stand  Comment: pt with poor trunk control in sitting (increased posterior and R lateral trunk lean; poor reactive response noted despite verbal, visual, and tactile cues)  Reason if not Attempted: Not attempted due to medical condition or safety concerns  CARE Score: 88  Discharge Goal: Partial/moderate assistance  Chair/Bed-to-Chair Transfer  Comment: pt with poor trunk control in sitting  Reason if not Attempted: Not attempted due to medical condition or safety concerns  CARE Score: 88  Discharge Goal: Partial/moderate assistance     Car Transfer  Reason if not Attempted: Not attempted due to medical condition or safety concerns  CARE Score: Score: 88  Discharge Goal: Partial/moderate assistance    Assessment:   The patient is a 70year old male admitted onto ARU after hospitalization for headache, photophobia, phonophobia, L hemiparesis, and ataxia. Imaging revealed R temporal occipital IPH with intraventricular extension and local mass effect. Pt with Hx of DM with neuropathy, bipolar disorder, HTN, COPD, smoking, alcohol use,  marijuana use, and chronic R shoulder ROM limitation and pain. Today, pt presented with poor attention to tasks, impaired cognition, impaired vision, possible neglect that will require ongoing assessment as this witll significantly impact his functional mobility, poor trunk control (R lateral and posterior trunk lean observed today when assisted to sit at EOB), decreased active movements on BLE but uncertain whether it is due to his decreased level of alertness that affected his attention and command following versus neurologic weakness or a combination of the said factors. Pt is expected to make progress in his mobility with skilled interventions to address trunk control and neurore-ed that would be essential for motor skill learning and improvement in his cognition for pt to learn compensatory strategies.         Body structures, Functions, Activity limitations: Decreased functional mobility , Decreased safe awareness, Decreased balance, Decreased coordination, Decreased ADL status, Decreased cognition, Decreased vision/visual deficit, Decreased ROM, Decreased endurance, Decreased high-level IADLs, Increased pain, Decreased strength, Decreased sensation, Decreased fine motor control, Decreased posture     Prognosis: Fair, Guarded  Decision Making: High Complexity  Clinical Presentation: unstable/unpredictable characteristics      Patient education:   ARU schedule, ARU expectations for participation, plan of care  Treatment Initiated:  Functional mobility training, patient education  Barriers to Improvement:  Chronic R shoulder

## 2021-01-06 LAB
GLUCOSE BLD-MCNC: 107 MG/DL (ref 70–99)
GLUCOSE BLD-MCNC: 108 MG/DL (ref 70–99)
GLUCOSE BLD-MCNC: 113 MG/DL (ref 70–99)
GLUCOSE BLD-MCNC: 96 MG/DL (ref 70–99)

## 2021-01-06 PROCEDURE — 99232 SBSQ HOSP IP/OBS MODERATE 35: CPT | Performed by: PHYSICAL MEDICINE & REHABILITATION

## 2021-01-06 PROCEDURE — 99223 1ST HOSP IP/OBS HIGH 75: CPT | Performed by: INTERNAL MEDICINE

## 2021-01-06 PROCEDURE — 6360000002 HC RX W HCPCS: Performed by: PHYSICAL MEDICINE & REHABILITATION

## 2021-01-06 PROCEDURE — 6370000000 HC RX 637 (ALT 250 FOR IP): Performed by: NURSE PRACTITIONER

## 2021-01-06 PROCEDURE — 6370000000 HC RX 637 (ALT 250 FOR IP): Performed by: PHYSICAL MEDICINE & REHABILITATION

## 2021-01-06 PROCEDURE — 97110 THERAPEUTIC EXERCISES: CPT

## 2021-01-06 PROCEDURE — 82962 GLUCOSE BLOOD TEST: CPT

## 2021-01-06 PROCEDURE — 97530 THERAPEUTIC ACTIVITIES: CPT

## 2021-01-06 PROCEDURE — 97535 SELF CARE MNGMENT TRAINING: CPT

## 2021-01-06 PROCEDURE — 1280000000 HC REHAB R&B

## 2021-01-06 PROCEDURE — 94150 VITAL CAPACITY TEST: CPT

## 2021-01-06 PROCEDURE — APPNB180 APP NON BILLABLE TIME > 60 MINS: Performed by: NURSE PRACTITIONER

## 2021-01-06 PROCEDURE — 93005 ELECTROCARDIOGRAM TRACING: CPT | Performed by: NURSE PRACTITIONER

## 2021-01-06 PROCEDURE — 92507 TX SP LANG VOICE COMM INDIV: CPT

## 2021-01-06 PROCEDURE — 2500000003 HC RX 250 WO HCPCS: Performed by: PHYSICAL MEDICINE & REHABILITATION

## 2021-01-06 PROCEDURE — 94761 N-INVAS EAR/PLS OXIMETRY MLT: CPT

## 2021-01-06 RX ORDER — DIGOXIN 125 MCG
250 TABLET ORAL EVERY 8 HOURS
Status: COMPLETED | OUTPATIENT
Start: 2021-01-06 | End: 2021-01-07

## 2021-01-06 RX ORDER — DIGOXIN 125 MCG
125 TABLET ORAL DAILY
Status: DISCONTINUED | OUTPATIENT
Start: 2021-01-07 | End: 2021-01-29 | Stop reason: HOSPADM

## 2021-01-06 RX ADMIN — DIVALPROEX SODIUM 1000 MG: 500 TABLET, FILM COATED, EXTENDED RELEASE ORAL at 20:55

## 2021-01-06 RX ADMIN — BUSPIRONE HYDROCHLORIDE 10 MG: 5 TABLET ORAL at 08:26

## 2021-01-06 RX ADMIN — BENZTROPINE MESYLATE 0.5 MG: 1 TABLET ORAL at 20:56

## 2021-01-06 RX ADMIN — TRAZODONE HYDROCHLORIDE 50 MG: 50 TABLET ORAL at 20:56

## 2021-01-06 RX ADMIN — DOCUSATE SODIUM 100 MG: 100 CAPSULE, LIQUID FILLED ORAL at 20:56

## 2021-01-06 RX ADMIN — LISINOPRIL 40 MG: 20 TABLET ORAL at 08:25

## 2021-01-06 RX ADMIN — DOCUSATE SODIUM 100 MG: 100 CAPSULE, LIQUID FILLED ORAL at 08:26

## 2021-01-06 RX ADMIN — CEPHALEXIN 250 MG: 250 CAPSULE ORAL at 12:00

## 2021-01-06 RX ADMIN — AMLODIPINE BESYLATE 10 MG: 10 TABLET ORAL at 08:26

## 2021-01-06 RX ADMIN — GABAPENTIN 600 MG: 300 CAPSULE ORAL at 08:26

## 2021-01-06 RX ADMIN — BUSPIRONE HYDROCHLORIDE 10 MG: 5 TABLET ORAL at 20:55

## 2021-01-06 RX ADMIN — BUSPIRONE HYDROCHLORIDE 10 MG: 5 TABLET ORAL at 13:49

## 2021-01-06 RX ADMIN — RISPERIDONE 1 MG: 0.5 TABLET, FILM COATED ORAL at 08:26

## 2021-01-06 RX ADMIN — CEPHALEXIN 250 MG: 250 CAPSULE ORAL at 05:12

## 2021-01-06 RX ADMIN — CEPHALEXIN 250 MG: 250 CAPSULE ORAL at 00:04

## 2021-01-06 RX ADMIN — MICONAZOLE NITRATE: 20 POWDER TOPICAL at 16:11

## 2021-01-06 RX ADMIN — DIGOXIN 250 MCG: 125 TABLET ORAL at 18:31

## 2021-01-06 RX ADMIN — ATORVASTATIN CALCIUM 80 MG: 40 TABLET, FILM COATED ORAL at 20:55

## 2021-01-06 RX ADMIN — GABAPENTIN 600 MG: 300 CAPSULE ORAL at 20:55

## 2021-01-06 RX ADMIN — GABAPENTIN 600 MG: 300 CAPSULE ORAL at 13:49

## 2021-01-06 RX ADMIN — HEPARIN SODIUM 5000 UNITS: 5000 INJECTION INTRAVENOUS; SUBCUTANEOUS at 20:58

## 2021-01-06 RX ADMIN — DOXAZOSIN 2 MG: 4 TABLET ORAL at 08:27

## 2021-01-06 RX ADMIN — METOPROLOL TARTRATE 25 MG: 25 TABLET, FILM COATED ORAL at 20:57

## 2021-01-06 RX ADMIN — DILTIAZEM HYDROCHLORIDE 30 MG: 30 TABLET, FILM COATED ORAL at 08:26

## 2021-01-06 RX ADMIN — ESCITALOPRAM OXALATE 10 MG: 10 TABLET, FILM COATED ORAL at 08:26

## 2021-01-06 RX ADMIN — HEPARIN SODIUM 5000 UNITS: 5000 INJECTION INTRAVENOUS; SUBCUTANEOUS at 13:49

## 2021-01-06 RX ADMIN — DILTIAZEM HYDROCHLORIDE 30 MG: 30 TABLET, FILM COATED ORAL at 20:56

## 2021-01-06 RX ADMIN — CEPHALEXIN 250 MG: 250 CAPSULE ORAL at 16:53

## 2021-01-06 RX ADMIN — ACETAMINOPHEN 650 MG: 325 TABLET ORAL at 08:26

## 2021-01-06 RX ADMIN — FAMOTIDINE 20 MG: 20 TABLET, FILM COATED ORAL at 08:27

## 2021-01-06 RX ADMIN — SENNOSIDES 8.6 MG: 8.6 TABLET, FILM COATED ORAL at 20:55

## 2021-01-06 RX ADMIN — BENZTROPINE MESYLATE 0.5 MG: 1 TABLET ORAL at 08:34

## 2021-01-06 ASSESSMENT — PAIN SCALES - GENERAL
PAINLEVEL_OUTOF10: 9
PAINLEVEL_OUTOF10: 9
PAINLEVEL_OUTOF10: 4

## 2021-01-06 ASSESSMENT — PAIN DESCRIPTION - LOCATION: LOCATION: ARM

## 2021-01-06 NOTE — PROGRESS NOTES
Occupational Therapy    Physical Rehabilitation: OCCUPATIONAL THERAPY     [x] daily progress note       [] discharge       Patient Name:  Tamara Go. :  1950 MRN: [id number]  Room:  [address] [place] [address] Date of Admission: 2021  Rehabilitation Diagnosis:   Cerebral infarction, unspecified [I63.9]  Intracranial hemorrhage ([facility] [address] [I62.9]       Date 2021       Day of ARU Week:  3   Time IN/OUT [id number]   Individual Tx Minutes    Group Tx Minutes    Co-Treat Minutes 55+25 A cotreat was completed this date with  [x] PT    [] OT   [] ST      This pt requires simultaneous intervention of 2 skilled therapists to safely complete tasks to address complexity of deficits defined in the goals including:  [x]Attention   [x] Safety    [x]Problem Solving    []Sequencing     []Initiation    [x]Processing      []Recall of learned tasks  [] Communication  [] Self Feeding   [x]ADL's    [x]UE Function    [x]Motor planning   [x]Balance  []Energy Conservation   [x]Verbal cues   [x] Tactile Cues  []Barriers     [x]Transfers   []Device Use  Pt's response to cotreat: good  Progress toward goals: ongoing     Concurrent Tx Minutes    TOTAL Tx Time Mins 80   Variance Time +5   Variance Time []   Refusal due to:     []   Medical hold/reason:    []   Illness   []   Off Unit for test/procedure  []   Extra time needed to complete task  [x]   Therapeutic need  []   Other (specify):   Restrictions Restrictions/Precautions: General Precautions, Fall Risk(L hemiparesis, chronic R shoulder limitations)         Communication with other providers: [x]   OK to see per nursing:     []   Spoke with team member regarding:      Subjective observations and cognitive status: Pt in high fowlers upon arrival. Tray table with half of meal touched. Pt attempted to continue eating breakfast, however demo poor coordination with utensils and dropped food, may appear to be a result of lethargy and poor attention.     Pt seen in afternoon c increased alertness, ability to follow simple commands, and maintain eyes opened throughout ax. However pt continues to requires,    Pain level/location:    /10       Location: denies   Discharge recommendations  Anticipated discharge date:  TBD  Destination: []home alone   []home alone w assist prn   [] home w/ family    [x] Continuous supervision       []SNF    [] Assisted living     [] Other:   Continued therapy: []HHC OT  []OUTPATIENT  OT   [] No Further OT  Equipment needs: TBD       ADLs:    Eating: Eating  Assistance Needed: Supervision or touching assistance  Comment: After further assessment, pt will require SUP feeds d/t pt's lethargy, poor task initiation, and cognition to complete task safely. CARE Score: 4  Discharge Goal: Independent       Oral Hygiene: Oral Hygiene  Comment: Will continue to assess, pt required extensive amount of time to complete toileting in bed, pt very lethargic, slow motor movements, and required constant cues (v/c/tactile) to initiate tasks  Reason if not Attempted: Not attempted due to medical condition or safety concerns  CARE Score: 88  Discharge Goal: Independent    UB/LB Bathing: Shower/Bathe Self  Assistance Needed: Dependent  Comment: After further assessment, pt required Total A to complete full body bathing, 1st person Max A to maintain upright posture when seated EOB, 2nd person Max A for total body bathing d/t pt's lethargy and cognition required to complete task safely. Reason if not Attempted: Not attempted due to medical condition or safety concerns  CARE Score: 1  Discharge Goal: Supervision or touching assistance    UB Dressing: Upper Body Dressing  Comment: After further assessment, pt required Max A for don/doff d/t pt's lethargy and cognition required to complete task safely. Demo poor task initiation, sequencing, and problem solving.   CARE Score: 2  Discharge Goal: Supervision or touching assistance         LB Dressing: Lower Body Dressing  Assistance Needed: Dependent  Comment: After further assessment, pt required Total A, 1st person Max A to maintain lateral position when rolled to L/R when donning over hips, 2nd person Max A for threading of BLE and donning over hips d/t pt's lethargy and cognition required to complete task. Reason if not Attempted: Not attempted due to medical condition or safety concerns  CARE Score: 1  Discharge Goal: Partial/moderate assistance    Donning and [place] Footwear: Putting On/Taking Off Footwear  Assistance Needed: Dependent  CARE Score: 1  Discharge Goal: Set-up or clean-up assistance      Toileting: Toileting Hygiene  Assistance Needed: Substantial/maximal assistance  Comment: Pt required Max A for completion of overall toileting (CM and Hygiene) completed in bed c bed pan, pt attempted to demo paulino-hygiene c RUE, however requires assist for thoroughness. CARE Score: 2  Discharge Goal: Partial/moderate assistance      Toilet Transfers: Toilet Transfer  Comment: not safe to attempt  Reason if not Attempted: Not attempted due to medical condition or safety concerns  CARE Score: 88  Discharge Goal: Partial/moderate assistance  Device Used:    []   Standard Toilet         []   Grab Bars           []  Bedside Commode       []   Elevated Toilet          []   Other:        Bed Mobility:           [x]   Pt received out of bed   SEE PT NOTES FOR DETAILS  Transfers: Additional Therapeutic activities/exercises completed this date:     [x]   ADL Training   [x]   Balance/Postural training     []   Bed/Transfer Training   []   Endurance Training   []   Neuromuscular Re-ed   []   Nu-step:  Time:        Level:         #Steps:       []   Rebounder:    []  Seated     []  Standing        []   Supine Ther Ex (reps/sets):     [x]   Seated Ther Ex (reps/sets):  Long sitting in high fowlers position; fine motor ax, visual scanning, and meal prep task.   Demo opening bottle caps, sugar packets, however required assist c coffee creamer Equipment: Rolling walker, [employer], Reacher  ADL Assistance: Needs assistance(Requires, assistance from Edmund Castro c bathing, dressing (UB/LB), feeding (s/u c cutting food). Pt reports dificulty c toileting however does not ask for assistance d/t feelings of embarrassment)  Homemaking Responsibilities: Yes  Meal Prep Responsibility: Secondary  Laundry Responsibility: No  Cleaning Responsibility: Secondary  Bill Paying/Finance Responsibility: Primary  Shopping Responsibility: Secondary(Pt reports requiring hired help to drive to grocery store)  Dependent Care Responsibility: Secondary  Health Care Management: Primary  Ambulation Assistance: Independent(Occasional use of walker)  Transfer Assistance: Needs assistance(Pt requires assistance c sit> the living room)  Active : No  Patient's  Info: Neighbor is primary , and transport company  Mode of Transportation: [employer] [place]  Education: 12th grade  Occupation: Retired  Type of occupation: Pt built trucks for 33 years; [employer]  Leisure & Hobbies: TV, limited reading, girlfriend has a dog. Pt has meds that come in pill packs. Bills are managed over the phone. IADL Comments: Edmund Castro assists c most IADLs and pt report hiring outside help c yardork. Additional Comments: Pt reports using rolator in the house.  Pt reports 1 fall this year that occured in the house while ambulating in the home; sleeps in regular, flat bed; R hand dominant    Objective                                                                                    Goals:  (Update in navigator)  Short term goals  Time Frame for Short term goals: STG=LTG:  Long term goals  Time Frame for Long term goals : 12-14 days or until d/c  Long term goal 1: Pt will complete feeding/grooming/oral care task c MOD I by d/c  Long term goal 2: Pt will complete total body bathing c MIN A by d/c  Long term goal 3: Pt will complete UB dressing c SUP by d/c  Long term goal 4: Pt will complete LB dressing c MOD A by d/c  Long term goal 5: Pt will don/doff footwear c SETUP by d/c  Long term goals 6: Pt will complete toileting c MOD A by d/c  Long term goal 7: Pt will complete functional transfer (toilet, tub, shower) c MOD A by d/c. Long term goal 8: Pt will perform therex/therax to facilitate increased strength/endurance/ax tolerance (c emphasis on dynamic standing balance/tolerance >10 mins and BUE endurance) c Min A in order to complete ADLs. :        Plan of Care                                                                              Times per week: 5 days per week for a minimum of 60 minutes/day plus group as appropriate for 60 minutes.   Treatment to include Plan  Times per day: Daily  Current Treatment Recommendations: Strengthening, Endurance Training, Neuromuscular Re-education, Patient/Caregiver Education & Training, ROM, Equipment Evaluation, Education, & procurement, Self-Care / ADL, Balance Training, Functional Mobility Training, Safety Education & Training, Cognitive/Perceptual Training    Electronically signed by   EMIL Rodas OTR/L  License # [id number]    1/6/2021, 11:00 AM

## 2021-01-06 NOTE — PROGRESS NOTES
[facility] UNIT  SPEECH/LANGUAGE PATHOLOGY      [x] Daily           [] Discharge    Patient:Felipe Tucker. LCI:[birth year]  [facility]:[id number]  Rehab Dx/Hx: Cerebral infarction, unspecified [I63.9]  Intracranial hemorrhage ([address] [I62.9]   Allergies   Allergen Reactions    Nsaids      Renal      Precautions:  Restrictions/Precautions: General Precautions, Fall Risk(L hemiparesis, chronic R shoulder limitations)          Home Situation/IADL:   Social/Functional History  Lives With: Significant other  Type of Home: House  Home Layout: One level  Home Access: Stairs to enter with rails  Entrance Stairs - Number of Steps: 5+3  Entrance Stairs - Rails: Both  Bathroom Shower/Tub: Walk-in shower, Shower chair with back(shower chair doesn't fit in shower)  Bathroom Toilet: Standard  Bathroom Equipment: Toilet raiser, Shower chair  Bathroom Accessibility: Accessible  Home Equipment: 4 wheeled walker, [address] Help From: Family, Neighbor  ADL Assistance: Needs assistance  Bath: Minimal assistance  Dressing: Minimal assistance  Grooming: Independent  Feeding: Independent  Toileting: Independent  Homemaking Assistance: Independent  Homemaking Responsibilities: No  Meal Prep Responsibility: No  Laundry Responsibility: No  Cleaning Responsibility: No  Bill Paying/Finance Responsibility: No  Shopping Responsibility: No  Dependent Care Responsibility: No  Health Care Management: No  Ambulation Assistance: Independent  Transfer Assistance: Needs assistance  Active : No  Patient's  Info: Ja Aguiar is primary , and transport company  Mode of Transportation: Car, Family, Friends  Education: 12th grade  Occupation: Retired  Type of occupation: Pt built trucks for 33 years; [employer]  Leisure & Hobbies: TV, limited reading, girlfriend has a dog. Pt has meds that come in pill packs. Bills are managed over the phone.   IADL Comments: Pollie Bumpers assists c most IADLs and pt report hiring outside help c michela. Additional Comments: Pt reports using rolator in the house. Pt reports 1 fall this year that occured in the house while ambulating in the home; sleeps in regular, flat bed; R hand dominant      Date of Admit: 1/4/2021  Room #: 1010/1010-A     ST Number of Minutes/Billable Intervention  Cog/Memory Deficits     Aphasia/Language 35    Dysarthria/Speech     Apraxia/Speech     Dysphagia/Swallowing     Group     Other    TOTAL Minutes Billed  35    Variance          Date: 1/6/2021  Day of ARU Week:  3       SLP Individual Minutes  Time In: 1115  Time Out: 1150  Minutes: 35     Variance/Reason:  [] Refusal due to   [] Medical hold/reason  [] Illness   [] Off Unit for test/procedure  [] Extra time needed to complete task  [] Other (specify)    Activity completed: Pt seen for completion of assessment this date for language    Pain: denies  Current Diet: DIET CARB CONTROL; Dental Soft  Dietary Nutrition Supplements: Low Calorie High Protein Supplement  Subjective: alert and cooperative; much more engaged today. CM talked with s/o Janey Palafox and reported she was assisting him with med reminders and small tasks. Goals and POC: Co-treats where appropriate with PT or OT to facilitate patient goals in functional tasks. LTG      Timeframe for Long-term Goals: No swallow tx                    Short-term Goals  Timeframe for Short-term Goals: 3x/week x3 weeks 30 mins min  LTG: Pt will improve cognitive linguistic abilities for safe return home and clearly communicate care needs. Goal 1: Continued receptive/expressive assessment by 1/6/2021 with additional goals to be added as appropriate. Goal 2: Pt will respond to wh?'s with 90% acc given extra time. Word retrieval difficulties are observed with significant processing delays. Wh?'s were 70% acc with leading questions. Goal 3: Pt will follow 2-3 step tasks with min cues and 75% acc  Goal met this date for 3 step directions at bedside. Suspect with processing delays and L neglect in fx additional cues will be needed. Goal 4: Pt will attend to therapist on R with eye contact x5 in 5 mins. Improved eye contact this date with 2 cues during session. Goal 5: Pt will improve L visual scanning for locating requested items with mod cues and 60% acc. Max cues which improved to mod cues using finger scanning with 40% acc improved to 50%. [place] Diagnostic Aphasia Exam (BDAE) was administered 1/6/2021  with the following results: Auditory comprehension  Word discrimination for objects, letters, colors,  100% once visual marker given in FO6  Body Part Id and R/L discrim 100%  Command following for 1-3 step commands 100% this date at bedside. In fx may show breakdown due to [facility] and L neglect  Complex material for y/n ?'s and y/n ?'s based on a short story 75%  Oral Expression  OME was [employer]  Verbal agility was reduced with slow retrieval and processing  Automatic Sequences were intact  Repetition of phrases were intact  Word reading was intact but reduced for phrases due to L neglect  Responsive naming was reduced with slow processing and initiation  Visual confrontation naming was contact  Animal naming was 3 items in 60 secs w pt id how difficult that was for him. Oral sentence reading was reduced due to visual neglect  Description of pictured stim was reduced with word retrieval delays  Ability to provide background information, social hx was improved this date but some STM delays and deficits  Understanding written language  Word picture matching was reduced due to L neglect  Reading sentences and paragraphs were unable to be completed at this time even with finger scanning. Writing was disjointed with more L neglect evident however spelling and sentence formulation was intact. Overall severity was mild expressive aphasia, moderate to severe L neglect and mild to mod cognitive memory deficits  Deficits include:  Word retrieval delays, anomia, STM deficits were observed during conversation with s/o on phone where pt couldn't recall info just provided to give her over the phone regarding discharge planning and therapy sessions completed. Pt disoriented to month and year (Dec, 2002)  Improvements today were attn and alertness, some awareness of limitations regarding language. Goals updated with a new STM goal to improve recall of learned tasks with min cues with 70% acc. Alarm placed: [x]bed []chair   []other:   [] activated        Barriers to progress:   [] Fatigue        [] Cognitive Deficits   [x] Memory Deficits   [] Reduced Attn   [] Self Limiting Behaviors    [] Reduced insight/awareness     [] Visual Deficits   [] Premorbid Conditions  [] Impulsivity     [x] Other Language retrieval       Education/Interventions used this date: POC following assessment    []   Progress was updated and reviewed in Rehabtracker with patient and/or family this         date. [] Attending Care Conference for pt this date. See Team Patient Care Conference Note for updates.     Interventions used this date:  [x] Speech/Language Treatment    [] Instruction in HEP    Group   [] Dysphagia Treatment   [] Cognitive Skill Dc    Other:         Assessment / Impression                                                          Treatment/Activity Tolerance:   [x] Tolerated Treatment well:     [] Patient limited by fatigue/pain:       [] Patient limited by medical complications:    [] Adverse Reaction to Tx:   [] Significant change in status:      Electronically Signed by  Althea Travis Runkelen    1/6/2021  2:14 PM

## 2021-01-06 NOTE — CARE COORDINATION
Case Management Admission Note      Patient:Felipe Heller Jr.       RCB:[birth year]  YXN:[id number]  Rehab Dx/Hx: Cerebral infarction, unspecified [I63.9]  Intracranial hemorrhage ([facility] [I62.9]    Chief Complaint:   Past Medical History:   Diagnosis Date    Anemia     per old chart    Arthritis     Back pain, chronic     \"have pain in lumbar mainly- have 5 bulging discs\"\"in my neck and my lumbar have herniated discs\"    Bipolar 1 disorder ([facility]     CAD (coronary artery disease) 1/27/2016    does not follow with a cardiologist    Cerebral artery occlusion with cerebral infarction ([facility]     \"had mini stroke in Jan 2016- fast heart rate when I would stand up - smile drooping on one side- lased just the one day\"    Chronic kidney disease (CKD), stage III (moderate)     CKD (chronic kidney disease)     per old chart- consult with Dr Glory Venegas with admission 4/2016\"have low kidney function\"    COPD (chronic obstructive pulmonary disease) ([facility]     follow with Dr Mandi Leal Diabetes mellitus, type 2 ([facility] 1/3/2017    Diabetic peripheral neuropathy (HCC)     Diastolic CHF ([facility] 7/48/8267    Gastric ulcer     H/O cardiovascular stress test 5/26/14    EF 68% mild ischemia anterior wall    Heart murmur     \"see Dr Estrada Basilio- last echo 2014\" pt states\"I think they said I have a murmur but no chest pain or palpitations\"    Hiatal hernia     History of cardiac cath 6/11/14    MODERATE  CAD      Hx of migraines     HX OTHER MEDICAL     \"have thinning of kidney walls- they found I had that 15 yrs ago\" see Dr Maddison Gibson"    Hyperlipidemia     Hypertension     Lumbar radiculopathy     Overlapping malignant neoplasm of colon ([facility] 5/27/2020    Panic attack     'anything new gives me anxiety\"    Pleural effusion     scheduled for thoracentesis 7/28/2014, 8/17/2016    S/P thoracentesis     left side( per old chart had thora done 4/2016 and one done 2014)    Sleep apnea     sleep study x 2 - last one 4-5 yrs ago- uses c-pap\"    SOBOE (shortness of breath on exertion)     Spinal stenosis      Past Surgical History:   Procedure Laterality Date    CARPAL TUNNEL RELEASE Bilateral 1989    COLONOSCOPY N/A 5/12/2020    COLONOSCOPY POLYPECTOMY SNARE/COLD BIOPSY WITH SPOT INK TATTOO performed by Sander Garcia MD at [facility] Left 2000's    KNEE SURGERY Bilateral     right knee x 2( scope)/ left knee- 7-8 yr ago   [address] fusion    SMALL INTESTINE SURGERY N/A 5/21/2020    BOWEL RESECTION EXTENDED RIGHT HEMICOLECTOMY performed by Ventura Lau MD at [facility] Left 12/20/2013    THORACENTESIS  4/25/2016         THORACENTESIS Left 11/15/2016    Dr. Cristina Davis 250mlsremoved     THORACOTOMY Left 03/18/2019    with Lysis of adhesions    THORACOTOMY Left 3/18/2019    THORACOSCOPY CONVERTED TO THORACOTOMY WITH LYSIS OF ADHESIONS performed by Pedro Jefferson MD at [facility]      as a kid    UPPER GASTROINTESTINAL ENDOSCOPY N/A 5/12/2020    EGD BIOPSY performed by Sander Garcia MD at [facility] ENDOSCOPY     Allergies   Allergen Reactions    Nsaids      Renal      Precautions: falls    Date of Admit: 1/4/2021  Room #: 1010/1010-A      Current functional status at time of admit:        Home Living/DME Available:      Type of Home: House  Home Access: Stairs to enter with rails  Bathroom Shower/Tub: Walk-in shower  Bathroom Toilet: Standard     Home Equipment: Rolling walker, Wheelchair-manual, Reacher       IADL Hx:   Homemaking Responsibilities: Yes  Active : No  Mode of Transportation: Car, Social [employer]  Occupation: Retired  Leisure & Hobbies: TV, limited reading, girlfriend has a dog. Pt has meds that come in pill packs. Bills are managed over the phone. Spouse: sig other Ruben Chi  Family:  Local children    Comments:  Patient plans d/c home with Ruben Chi. Case mgt will clarify home setup and PLOF directly with analisa as there are discrepancies.   Patient participated appropriately in conversation about discharge planning. Patient did get lost in longer statements. Whiteboard updated. Patient became very frustrated with [facility]. Staff assessment completed. Rehabtracker was presented to patient/family and a brochure, including contact information, was provided. He says to ask [place]. Permission given by patient for case mgt to discuss d/c planning with [place].     Felipa Salgado, 1/6/2021, 11:30 AM

## 2021-01-06 NOTE — PROGRESS NOTES
Physical Therapy    [x] daily progress note       [] discharge       Patient Name:  Liz Oro.    :  1950 MRN: [id number]  Room:  [address] [place] [address] Date of Admission: 2021  Rehabilitation Diagnosis:   Cerebral infarction, unspecified [I63.9]  Intracranial hemorrhage ([facility] [I62.9]       Date 2021       Day of ARU Week:  3   Time IN//1045  1300/1325   Individual Tx Minutes    Group Tx Minutes    Co-Treat Minutes 55+25  A cotreat was completed this date with  [] PT    [x] OT   [] ST      This pt requires simultaneous intervention of 2 skilled therapists to safely complete tasks to address complexity of deficits defined in the goals including:  [x]Attention   [] Safety    []Problem Solving    [x]Sequencing     [x]Initiation    [x]Processing      []Recall of learned tasks  [] Communication  [] Self Feeding   [x]ADL's    [x]UE Function    [x]Motor planning   [x]Balance  []Energy Conservation   [x]Verbal cues   [x] Tactile Cues  []Barriers     [x]Transfers   []Device Use   Concurrent Tx Minutes    TOTAL Tx Time Mins 80   Variance Time +5   Variance Time []   Refusal due to:     []   Medical hold/reason:    []   Illness   []   Off Unit for test/procedure  [x]   Extra time needed to complete tasks  []   Therapeutic need  []   Other (specify):   Restrictions Restrictions/Precautions  Restrictions/Precautions: General Precautions, Fall Risk(L hemiparesis, chronic R shoulder limitations)  Position Activity Restriction  Other position/activity restrictions: see catheter   Interdisciplinary communication [x]   Cleared for therapy per nursing     []   RN notified about issues during session  []   RN updated on pt performance  []   Spoke with   []   Spoke with OT  []   Spoke with MD  []   Other:    Subjective observations and cognitive status: (AM) Pt in bed asleep with L hand holding cup of coffee, breakfast meal partly consumed, aware of being in the hospital but unaware of reason why he is here. Pt remains to be lethargic and has slowed processing even of simple instructions. (PM) Pt in bed, alert, watching TV, aware of current political issue being shown in the TV, expressed need for sweetener and creamer in his coffee, used humor during conversation. Pain level/location: 8 /10       Location: R shoulder    Discharge recommendations  Anticipated discharge date:  TBD   Destination: []home alone   []home alone with assist PRN     [] home w/ family      [] Continuous supervision  []SNF    [] Assisted living     [] Other:  Continued therapy: []HHC PT  []OUTPATIENT  PT   [] No Further PT  []SNF PT  Caregiver training recommended: []Yes  [] No   Equipment needs: TBD     Bed Mobility:           []   Pt received out of bed   Rolling R/L:  Total A  Scooting: Total A x 2   Lying --> Sit:  Total A (Max A x 2; pt initiated small, active movements on RLE with constant verbal and tactile cues provided)   Sit --> lying: Total A x 2   Bed features used: [x] Yes  [] No     Additional Therapeutic activities/exercises completed this date:     []   Nu-step:  Time:        Level:         #Steps:       []   Rebounder:    []  Seated     []  Standing        [x]   Balance training: Static sitting at EOB with BUE support with Mod A usually and  intermittent SBA; dynamic sitting at EOB as OT actively engaged pt in ADL tasks. Pt required Max A usually due to increased posterior and R lateral trunk lean initially and then eventually L lateral trunk lean as tx session progressed.           [x]   Postural training: visual cues (use of postural mirror and use of PT's hand to approximate pt's chest towards it), verbal cues, and manual cues provided on neck, chest, and back to facilitate neck and trunk extension to increase attention and sitting tolerance for ADL tasks x ~25 min.      []   Supine ther ex (reps/sets):     []   Seated ther ex (reps/sets):     []   Standing ther ex (reps/sets):     []   Picking up object from floor (standing):                   []   Reacher used   []   Other: Toileting activity completed with    [x]   Other: cognitive reorientation to situation (reason of why he is at the hospital), pt positioning in high go's position to facilitate safe self-feeding and to correct excessive L lateral trunk lean; visual scanning and simple command following as OT actively engaged pt in feeding task and opening of bottles required for ADL. Comments:      Patient/Caregiver Education and Training:   []   Role of PT  []   Education about Dx  [x]   Use of call light for assist   []   HEP provided and explained   []   Treatment plan reviewed  []   Home safety  []   Wheelchair mobility/management   []   Body mechanics  [x]   Bed Mobility/Transfer technique  []   Gait technique/sequencing  []   Proper use of assistive device/adaptive equipment  []   Stair training/Advanced mobility safety and technique  []   Reinforced patient's precautions/mobility while maintaining precautions  [x]   Postural awareness  []   Family/caregiver training  []   Progress was updated and reviewed in Rehabtracker with patient and/or family this date. []   Other:    Treatment Plan for Next Session: bed mobility and trunk control/strengthening at seated level      Assessment: (AM) Pt required constant verbal and tactile cues due to cognitive deficits, apraxia, pain, and decreased level of alertness  (PM) Pt was more alert during this tx session that he was more receptive to verbal cues during bed level activity. He was able to attend to the L/R side of the hospital table appropriately as instructed, used his R hand more during tasks despite R shoulder pain but had decreased problem solving and attention to required tasks requiring constant prompting/redirection.      Treatment/Activity Tolerance:   [] Tolerated treatment with no adverse effects    [x] Patient limited by fatigue and increased lethargy   [x] Patient limited by pain   [] Patient limited Info: Faiza Kenney is primary , and transport company  Mode of Transportation: Matthew Alcocer  Education: 12th grade  Occupation: Retired  Type of occupation: Pt built trucks for 33 years; [employer]  Leisure & Hobbies: TV, limited reading, girlfriend has a dog. Pt has meds that come in pill packs. Bills are managed over the phone. IADL Comments: Bonifacio Lemon assists c most IADLs and pt report hiring outside help c [employer]. Additional Comments: Pt reports using rolator in the house. Pt reports 1 fall this year that occured in the house while ambulating in the home; sleeps in regular, flat bed; R hand dominant    Objective                                                                                    Goals:  (Update in navigator)  Short term goals  Time Frame for Short term goals: 14-21 tx days:  Short term goal 1: Pt will complete bed mobility tasks and sup<->sit with SBA-Sup. Short term goal 2: Pt will complete OOB transfers using RW with Mod A. Short term goal 3: Pt will ambulate 10 ft over level and carpeted surfaces using RW with Min A. Short term goal 4: Pt will propel manual w/c 150 ft with turns with SBA-Sup. Short term goal 5: Caregiver will be able to safely assist pt with functional mobility tasks. :   :        Plan of Care                                                                              Times per week: 5 days per week for a minimum of 60 minutes/day plus group as appropriate for 60 minutes.   Treatment to include Current Treatment Recommendations: Strengthening, Functional Mobility Training, Wheelchair Mobility Training, Neuromuscular Re-education, Home Exercise Program, Equipment Evaluation, Education, & procurement, ROM, Transfer Training, Cognitive/Perceptual Training, Gait Training, Cognitive Reorientation, Safety Education & Training, Balance Training, Endurance Training, Pain Management, Patient/Caregiver Education & Training, Positioning    Electronically signed by   Cosmo Brewster, PT 1/6/2021, 8:10 AM

## 2021-01-06 NOTE — PROGRESS NOTES
CARDIOLOGY CONSULT NOTE     Reason for consultation:  Anticoagulation therapy recommendation    Referring physician:  Derick Baker MD     Primary care physician: Kevin Tobar MD      Dear  Dr. Derick Baker MD   Thanks for the consult. Chief Complaints :No chief complaint on file. History of present illness:Felipe is a 70 y. o.year old who presents to Acute rehab at [facility] for rehab post acute intraparenchymal hemorrhage 12/20/2020. He has a significant history of CAD, CVA, CKD, COPD, DM type 2, HFpEF, HLD, HTN, SAMANTHA, Spinal stenosis, lumbar radiculopathy, and atrial fibrillation. Patient reports having a head ache when the hemorrhage was found. He denies falling. Neurology believes he had bleed due to HTN and Xarelto use. Patient was taken off xarelto. He was sent to [facility] for rehab. Cardiology was consulted to determine if anticogulation needs to be restarted due to atrial fibrillation. Patient LHC in 2014 showed small OM2 with 80% disease and OM 1 with 30% disease. Echo 5/11/2020 showed EF of 50-55%, moderate LVH. NM Stress test 5/2020 did not show ischemia. Past medical history:    has a past medical history of Anemia, Arthritis, Back pain, chronic, Bipolar 1 disorder ([address] CAD (coronary artery disease), Cerebral artery occlusion with cerebral infarction ([address] Chronic kidney disease (CKD), stage III (moderate), CKD (chronic kidney disease), COPD (chronic obstructive pulmonary disease) ([address] Diabetes mellitus, type 2 ([address] Diabetic peripheral neuropathy ([address] Diastolic CHF ([address] Gastric ulcer, H/O cardiovascular stress test, Heart murmur, Hiatal hernia, History of cardiac cath, Hx of migraines, HX OTHER MEDICAL, Hyperlipidemia, Hypertension, Lumbar radiculopathy, Overlapping malignant neoplasm of colon ([address] Panic attack, Pleural effusion, S/P thoracentesis, Sleep apnea, SOBOE (shortness of breath on exertion), and Spinal stenosis.   Past surgical history:   has a past surgical history that includes Neck surgery (1998); Carpal tunnel release (Bilateral, 1989); knee surgery (Bilateral); thoracentesis (Left, 12/20/2013); Elbow surgery (Left, 2000's); Thoracentesis (4/25/2016); Tonsillectomy; Thoracentesis (Left, 11/15/2016); thoracotomy (Left, 03/18/2019); thoracotomy (Left, 3/18/2019); Upper gastrointestinal endoscopy (N/A, 5/12/2020); Colonoscopy (N/A, 5/12/2020); and Small intestine surgery (N/A, 5/21/2020). Social History:   reports that he quit smoking about 10 years ago. His smoking use included cigarettes. He has a 45.00 pack-year smoking history. He has never used smokeless tobacco. He reports previous alcohol use. He reports current drug use. Frequency: 7.00 times per week. Drug: Marijuana.   Family history:  Family History   Problem Relation Age of Onset    Heart Disease Mother         heart attack    High Blood Pressure Father        Allergies   Allergen Reactions    Nsaids      Renal            miconazole (MICOTIN) 2 % powder, BID      heparin (porcine) injection 5,000 Units, 3 times per day      amLODIPine (NORVASC) tablet 10 mg, Daily      insulin lispro (HUMALOG) injection vial 0-6 Units, TID WC      insulin lispro (HUMALOG) injection vial 0-3 Units, Nightly      lisinopril (PRINIVIL;ZESTRIL) tablet 40 mg, Daily      doxazosin (CARDURA) tablet 2 mg, 2 times per day      dilTIAZem (CARDIZEM) tablet 30 mg, 2 times per day      atorvastatin (LIPITOR) tablet 80 mg, Nightly      benztropine (COGENTIN) tablet 0.5 mg, BID      busPIRone (BUSPAR) tablet 10 mg, TID      divalproex (DEPAKOTE ER) extended release tablet 1,000 mg, Nightly      escitalopram (LEXAPRO) tablet 10 mg, Daily      traZODone (DESYREL) tablet 50 mg, Nightly      risperiDONE (RISPERDAL) tablet 1 mg, Daily      gabapentin (NEURONTIN) capsule 600 mg, TID      cephALEXin (KEFLEX) capsule 250 mg, 4 times per day      acetaminophen (TYLENOL) tablet 650 mg, Q4H PRN      bisacodyl (DULCOLAX) suppository 10 mg, Daily PRN      famotidine (PEPCID) tablet 20 mg, Daily      senna (SENOKOT) tablet 8.6 mg, Nightly      docusate sodium (COLACE) capsule 100 mg, BID      ondansetron (ZOFRAN-ODT) disintegrating tablet 4 mg, 4x Daily PRN      glucose (GLUTOSE) 40 % oral gel 15 g, PRN      dextrose 50 % IV solution, PRN      glucagon (rDNA) injection 1 mg, PRN      dextrose 5 % solution, PRN      Current Facility-Administered Medications   Medication Dose Route Frequency Provider Last Rate Last Admin    miconazole (MICOTIN) 2 % powder   Topical BID C Mynor Olea MD   Given at 01/06/21 1611    heparin (porcine) injection 5,000 Units  5,000 Units Subcutaneous 3 times per day Brisa Cedeno MD   5,000 Units at 01/06/21 1349    amLODIPine (NORVASC) tablet 10 mg  10 mg Oral Daily MARIA DEL CARMEN Olea MD   10 mg at 01/06/21 0826    insulin lispro (HUMALOG) injection vial 0-6 Units  0-6 Units Subcutaneous TID WC MARIA DEL CARMEN Olea MD        insulin lispro (HUMALOG) injection vial 0-3 Units  0-3 Units Subcutaneous Nightly MARIA DEL CARMEN Olea MD        lisinopril (PRINIVIL;ZESTRIL) tablet 40 mg  40 mg Oral Daily C Mynor Olea MD   40 mg at 01/06/21 0825    doxazosin (CARDURA) tablet 2 mg  2 mg Oral 2 times per day Brisa Cedeno MD   2 mg at 01/06/21 0827    dilTIAZem (CARDIZEM) tablet 30 mg  30 mg Oral 2 times per day Brisa Cedeno MD   30 mg at 01/06/21 5297    atorvastatin (LIPITOR) tablet 80 mg  80 mg Oral Nightly MARIA DEL CARMEN Olea MD   80 mg at 01/05/21 2122    benztropine (COGENTIN) tablet 0.5 mg  0.5 mg Oral BID C Mynor Olea MD   0.5 mg at 01/06/21 3572    busPIRone (BUSPAR) tablet 10 mg  10 mg Oral TID Brisa Cedeno MD   10 mg at 01/06/21 1349    divalproex (DEPAKOTE ER) extended release tablet 1,000 mg  1,000 mg Oral Nightly C Mynor Olea MD   1,000 mg at 01/05/21 2122    escitalopram (LEXAPRO) tablet 10 mg  10 mg Oral Daily C Mynor Olea MD   10 mg at 01/06/21 3194  traZODone (DESYREL) tablet 50 mg  50 mg Oral Nightly C Jerad Danielson MD   50 mg at 01/05/21 2121    risperiDONE (RISPERDAL) tablet 1 mg  1 mg Oral Daily MARIA DEL CARMEN Danielson MD   1 mg at 01/06/21 9772    gabapentin (NEURONTIN) capsule 600 mg  600 mg Oral TID Shaneka Jules MD   600 mg at 01/06/21 1349    cephALEXin (KEFLEX) capsule 250 mg  250 mg Oral 4 times per day Shaneka Jules MD   250 mg at 01/06/21 1200    acetaminophen (TYLENOL) tablet 650 mg  650 mg Oral Q4H PRN C Jerad Danielson MD   650 mg at 01/06/21 7014    bisacodyl (DULCOLAX) suppository 10 mg  10 mg Rectal Daily PRN MARIA DEL CARMEN Danielson MD        famotidine (PEPCID) tablet 20 mg  20 mg Oral Daily MARIA DEL CARMEN Danielson MD   20 mg at 01/06/21 0827    senna (SENOKOT) tablet 8.6 mg  1 tablet Oral Nightly MARIA DEL CARMEN Danielson MD   8.6 mg at 01/05/21 2122    docusate sodium (COLACE) capsule 100 mg  100 mg Oral BID MARIA DEL CARMEN Danielson MD   100 mg at 01/06/21 0091    ondansetron (ZOFRAN-ODT) disintegrating tablet 4 mg  4 mg Oral 4x Daily PRN MARIA DEL CARMEN Danielson MD        glucose (GLUTOSE) 40 % oral gel 15 g  15 g Oral PRN MARIA DEL CARMEN Danielson MD        dextrose 50 % IV solution  12.5 g Intravenous PRN MARIA DEL CARMEN Danielson MD        glucagon (rDNA) injection 1 mg  1 mg Intramuscular PRN MARIA DEL CARMEN Danielson MD        dextrose 5 % solution  100 mL/hr Intravenous PRN MARIA DEL CARMEN Danielson MD         Review of Systems:   · Constitutional: No Fever or Weight Loss   · Eyes: No Decreased Vision  · ENT: No Headaches, Hearing Loss or Vertigo  · Cardiovascular: As per HPI  · Respiratory: As per HPI  · Gastrointestinal: No abdominal pain, appetite loss, blood in stools, constipation, diarrhea or heartburn  · Genitourinary: No dysuria, trouble voiding, or hematuria  · Musculoskeletal:  No gait disturbance, weakness or joint complaints  · Integumentary: No rash or pruritis  · Neurological: No TIA or stroke symptoms  · Psychiatric: No anxiety or depression  · Endocrine: No malaise, fatigue or temperature intolerance  · Hematologic/Lymphatic: No bleeding problems, blood clots or swollen lymph nodes  · Allergic/Immunologic: No nasal congestion or hives  All systems negative except as marked. Physical Examination:    Vitals:    01/05/21 2015 01/06/21 0336 01/06/21 0815 01/06/21 1506   BP: 113/61 115/64 (!) 154/74 110/60   Pulse: 57 69 60 130   Resp: 16 16  16   Temp:  98 °F (36.7 °C)  98.3 °F (36.8 °C)   TempSrc:  Oral  Oral   SpO2: 95% 95%  96%   Weight:  224 lb (101.6 kg)     Height:           General Appearance:  No distress, conversant  Constitutional:  Well developed, Well nourished, No acute distress, Non-toxic appearance. HENT:  Normocephalic, Atraumatic, Bilateral external ears normal, Oropharynx moist, No oral exudates, Nose normal.   Neck- trachea is midline    Lymphatics: no palpable lymph nodes  Eyes:  PERRL, Conjunctiva normal, No discharge. Respiratory:  Normal breath sounds, No respiratory distress, No wheezing, No chest tenderness, no use of accessory muscles  Cardiovascular: (PMI):  RRR, S1 and S2 audible, No murmur appreciated, JVD not noted,  apex non displaced, no lifts, no thrills, edema to lower legs Absent   Abdomen /GI: Soft, No tenderness, No masses, No gross visceromegaly   :  No costovertebral angle tenderness   Musculoskeletal:  no edema, no tenderness, no deformities. Integument:  Well hydrated, no rash   Lymphatic:  No lymphadenopathy noted   Neurologic:  Alert & oriented x 3, CN 2-12 grossly normal      Medical decision making and Data review:    Lab Review   No results for input(s): WBC, HGB, HCT, PLT in the last 72 hours. No results for input(s): NA, K, CL, CO2, PHOS, BUN, CREATININE in the last 72 hours. Invalid input(s): CA  No results for input(s): AST, ALT, ALB, BILIDIR, BILITOT, ALKPHOS in the last 72 hours. No results for input(s): TROPONINT in the last 72 hours. No results for input(s): PROBNP in the last 72 hours.   Lab Results   Component Value Date    INR 0.84 12/19/2020    PROTIME 10.1 (L) 12/19/2020       EKG:    ECHO:5/2020Summary   Limited study due to patients body habitus. Patient was unable to lie on   side. Was unable to press on left chest area due to soreness from a recent   lung surgery. Left ventricular function is low normal, EF is estimated at 50-55%. Moderate left ventricular hypertrophy. No significant valvular disease noted. No evidence of pericardial effusion. Chest Xray:  Ct Head Wo Contrast    Result Date: 12/19/2020  EXAMINATION: CT OF THE HEAD WITHOUT CONTRAST  12/19/2020 11:07 pm TECHNIQUE: CT of the head was performed without the administration of intravenous contrast. Dose modulation, iterative reconstruction, and/or weight based adjustment of the mA/kV was utilized to reduce the radiation dose to as low as reasonably achievable. COMPARISON: September 1, 2020. HISTORY: ORDERING SYSTEM PROVIDED HISTORY: HTN/headache TECHNOLOGIST PROVIDED HISTORY: Reason for exam:->HTN/headache Reason for exam:->no Has a \"code stroke\" or \"stroke alert\" been called? ->No Reason for Exam: HTN/headache Acuity: Acute Type of Exam: Initial FINDINGS: BRAIN/VENTRICLES: Large acute 6.6 x 4.0 x 3.0 cm intraparenchymal hematoma centered in the right posterior temporal and occipital lobes with surrounding vasogenic edema and mild mass effect. There is intraventricular extension of hemorrhage with acute hyperdense blood throughout the ventricular system. Minimal right to left midline shift measures 2 mm at the level of the lateral ventricles. Generalized cerebral and cerebellar volume loss. Mild ill-defined periventricular white matter hypoattenuation. No large acute territorial infarct. No chavez hydrocephalus. The basal cisterns are patent. Atherosclerosis. ORBITS: The visualized portion of the orbits demonstrate no acute abnormality.  SINUSES: The visualized paranasal sinuses and mastoid air cells demonstrate no acute abnormality. SOFT TISSUES/SKULL:  No acute abnormality of the visualized skull or soft tissues. Large acute 6.6 cm intraparenchymal hematoma centered in the right posterior temporal and occipital lobes. Intraventricular extension of hemorrhage. Minimal right left midline shift measures 2 mm. No chavez hydrocephalus. Critical results were called by Dr. Kwame Jimenez to [facility] on 12/19/2020 at 22:43. Ct Head Wo Contrast    Result Date: 12/19/2020  Radiology exam is complete. No Radiologist dictation. Please follow up with ordering provider. Xr Chest Portable    Result Date: 12/19/2020  EXAMINATION: ONE XRAY VIEW OF THE CHEST 12/19/2020 8:03 pm COMPARISON: August 2, 2020 HISTORY: ORDERING SYSTEM PROVIDED HISTORY: htn/headache TECHNOLOGIST PROVIDED HISTORY: Reason for exam:->htn/headache Reason for Exam: htn/headache Acuity: Acute Type of Exam: Initial FINDINGS: Marginal inspiration is present. Blunting of the left CP angle is again visualized, consistent with chronic effusion/scarring, and left basilar atelectasis. Cardiomegaly is present. Mild vascular congestion is noted. Anterior fixation plate is present within the lower cervical spine. 1. Cardiomegaly, with mild vascular congestion 2. Chronic left pleural effusion and left basilar atelectasis/scarring, unchanged       All labs, medications and tests reviewed by myself including data  from outside source , patient and available family . Continue all other medications of all above medical condition listed as is.      Impression:  Principal Problem:    Intracranial hemorrhage (HCC)  Active Problems:    Paroxysmal atrial fibrillation (HCC)    Uncontrolled type 2 diabetes mellitus with peripheral neuropathy (HCC)    Left hemiparesis (HCC)    Dysarthria due to and not concurrent with spontaneous intracerebral hemorrhage    Gait disturbance    Bipolar disorder, in partial remission, most recent episode depressed ([facility] [address] [facility]  Resolved Problems: * No resolved hospital problems. *      Assessment: 70 y. o.year old with PMH of  has a past medical history of Anemia, Arthritis, Back pain, chronic, Bipolar 1 disorder ([address] CAD (coronary artery disease), Cerebral artery occlusion with cerebral infarction ([address] Chronic kidney disease (CKD), stage III (moderate), CKD (chronic kidney disease), COPD (chronic obstructive pulmonary disease) ([address] Diabetes mellitus, type 2 ([address] Diabetic peripheral neuropathy ([address] Diastolic CHF ([address] Gastric ulcer, H/O cardiovascular stress test, Heart murmur, Hiatal hernia, History of cardiac cath, Hx of migraines, HX OTHER MEDICAL, Hyperlipidemia, Hypertension, Lumbar radiculopathy, Overlapping malignant neoplasm of colon ([address] Panic attack, Pleural effusion, S/P thoracentesis, Sleep apnea, SOBOE (shortness of breath on exertion), and Spinal stenosis. Plan and Recommendations:    1. Atrial fibrillation: rate is controlled. Sounds fairly regular. Will check EKG to confirm not in atrial fibrillation. Chads vasc score is 6. He is high risk for additional brain bleeds. Recommend patient be worked up as an outpatient for iRewind device. Continue Cardizem   2. CHF: ChronicDiastolic heart failure. Appears to be euvolemic. Monitor closely. 3. HTN: stable, continue present medications   4. Dyslipidemia: continue statins   5. DVT prophylaxis if not contraindicated while in the hospital.        Thank you  much for consult and giving us the opportunity in contributing in the care of this patient. Please feel free to call me for any questions.        JULEE Wagner - CNP, 1/6/2021 4:24 PM

## 2021-01-06 NOTE — PATIENT CARE CONFERENCE
ACUTE REHAB TEAM CONFERENCE SUMMARY   [facility]    NAME: Bhavana Arora : 1950 ADMIT DATE: 2021    Rehab Admitting Dx:Cerebral infarction, unspecified [I63.9]  Intracranial hemorrhage ([address] [I62.9]  Patient Comorbid Conditions: Active Hospital Problems    Diagnosis Date Noted    Left hemiparesis ([address] [G81.94]     Dysarthria due to and not concurrent with spontaneous intracerebral hemorrhage [I69.122]     Gait disturbance [R26.9]     Bipolar disorder, in partial remission, most recent episode depressed ([address] [F31.75]     Intracranial hemorrhage ([address] [I62.9] 2021    Uncontrolled type 2 diabetes mellitus with peripheral neuropathy ([address] [E11.42, E11.65]     Paroxysmal atrial fibrillation ([address] [I48.0]      Date: 2021    CASE MANAGEMENT  Current issues/needs regarding patient and family discharge status:   Patient plans d/c home with sig other. She can perform min-mod assist at discharge. Patient must complete own toileting hyg.  5+3 SUKHJINDER. Helpful upstairs neighbor. PHYSICAL THERAPY   Short term goals  Time Frame for Short term goals: 14-21 tx days:  Short term goal 1: Pt will complete bed mobility tasks and sup<->sit with SBA-Sup. Short term goal 2: Pt will complete OOB transfers using RW with Mod A. Short term goal 3: Pt will ambulate 10 ft over level and carpeted surfaces using RW with Min A. Short term goal 4: Pt will propel manual w/c 150 ft with turns with SBA-Sup. Short term goal 5: Caregiver will be able to safely assist pt with functional mobility tasks. Impairments/deficits, barriers:     Body structures, Functions, Activity limitations: Decreased functional mobility , Decreased safe awareness, Decreased balance, Decreased coordination, Decreased ADL status, Decreased cognition, Decreased vision/visual deficit, Decreased ROM, Decreased endurance, Decreased high-level IADLs, Increased pain, Decreased strength, Decreased sensation, Decreased fine motor control, Decreased posture     Prognosis: Fair, Guarded  Decision Making: High Complexity  Clinical Presentation: unstable/unpredictable characteristics  Equipment needed at discharge : TBD       PT IRF-NOHEMY scores and goals since initial assessment:   Roll Left and Right  Assistance Needed: Dependent  Comment: pt with poor motor initiation, sequencing, and attention to required task (eyes were closed during task despite verbal prompts)  CARE Score: 1  Discharge Goal: Supervision or touching assistance  Sit to Lying  Assistance Needed: Dependent  Comment: required x2-person assist to complete proper positioning in supine  CARE Score: 1  Discharge Goal: Supervision or touching assistance  Lying to Sitting on Side of Bed  Assistance Needed: Dependent  Comment: required x 2-person assist due to poor trunk control, motor sequencing, and attention to task (eyes were closed most of the time and opened eyes intermittently with verbal and tactile prompts)  CARE Score: 1  Discharge Goal: Supervision or touching assistance  Sit to Stand  Assistance Needed: Dependent  Comment: x2-PA using [employer] today  Reason if not Attempted: Not attempted due to medical condition or safety concerns  CARE Score: 1  Discharge Goal: Partial/moderate assistance  Chair/Bed-to-Chair Transfer  Comment: pt with poor trunk control in sitting  Reason if not Attempted: Not attempted due to medical condition or safety concerns  CARE Score: 88  Discharge Goal: Partial/moderate assistance  Toilet Transfer  Comment: not safe to attempt  Reason if not Attempted: Not attempted due to medical condition or safety concerns  CARE Score: 88  Discharge Goal: Partial/moderate assistance  Car Transfer  Reason if not Attempted: Not attempted due to medical condition or safety concerns  CARE Score: 88  Discharge Goal: Partial/moderate assistance  Walk 10 Feet?   Walk 10 Feet?: No  1 Step  1 Step?: Yes  Picking Up Object  Reason if not Attempted: Not attempted due to medical condition or safety concerns  CARE Score: 88  Discharge Goal: Partial/moderate assistance  Wheelchair Ability  Uses a Wheelchair and/or Scooter?: Yes  Wheel 50 Feet with Two Turns  Comment: pt unable to safely be transferred to w/c today due to poor level of alertness and poor trunk control in sitting at EOB today  Reason if not Attempted: Not attempted due to medical condition or safety concerns  CARE Score: 88  Discharge Goal: Supervision or touching assistance  Wheel 150 Feet  Reason if not Attempted: Not attempted due to medical condition or safety concerns  CARE Score: 88  Discharge Goal: Supervision or touching assistance        1 Step (Curb)  Reason if not Attempted: Not attempted due to medical condition or safety concerns  CARE Score: 88  Discharge Goal: Not Attempted   4 Steps  Reason if not Attempted: Not attempted due to medical condition or safety concerns  CARE Score: 88  Discharge Goal: Not Attempted   12 Steps  Reason if not Attempted: Not applicable  CARE Score: 9  Discharge Goal: Not Applicable    Fall Risk: []  Yes  []  No    OCCUPATIONAL THERAPY   Short term goals  Time Frame for Short term goals: STG=LTG :   Long term goals  Time Frame for Long term goals : 12-14 days or until d/c  Long term goal 1: Pt will complete feeding/grooming/oral care task c MOD I by d/c  Long term goal 2: Pt will complete total body bathing c MIN A by d/c  Long term goal 3: Pt will complete UB dressing c SUP by d/c  Long term goal 4: Pt will complete LB dressing c MOD A by d/c  Long term goal 5: Pt will don/doff footwear c SETUP by d/c  Long term goals 6: Pt will complete toileting c MOD A by d/c  Long term goal 7: Pt will complete functional transfer (toilet, tub, shower) c MOD A by d/c.   Long term goal 8: Pt will perform therex/therax to facilitate increased strength/endurance/ax tolerance (c emphasis on dynamic standing balance/tolerance >10 mins and BUE endurance) c Min A in order to complete ADLs. :                                       OT IRF-NOHEMY scores and goals since initial assessment:    Eating  Assistance Needed: Supervision or touching assistance  Comment: 4(deemed usual performance per nursing)  CARE Score: 4  Discharge Goal: Independent  Oral Hygiene  Assistance Needed: Setup or clean-up assistance  Comment: x  Reason if not Attempted: Not attempted due to medical condition or safety concerns  CARE Score: 5  Discharge Goal: Independent  Toileting Hygiene  Assistance Needed: Dependent  Comment: 2(deemed usual performance per team huddle)  CARE Score: 1  Discharge Goal: Partial/moderate assistance  Shower/Bathe Self  Assistance Needed: Dependent  Comment: After further assessment, pt required Total A to complete full body bathing, 1st person Max A to maintain upright posture when seated EOB, 2nd person Max A for total body bathing d/t pt's lethargy and cognition required to complete task safely. Reason if not Attempted: Not attempted due to medical condition or safety concerns  CARE Score: 1  Discharge Goal: Supervision or touching assistance  Upper Body Dressing  Assistance Needed: Substantial/maximal assistance  Comment: After further assessment, pt required Max A for don/doff d/t pt's lethargy and cognition required to complete task safely. Demo poor task initiation, sequencing, and problem solving. Reason if not Attempted: Not attempted due to medical condition or safety concerns  CARE Score: 2  Discharge Goal: Supervision or touching assistance  Lower Body Dressing  Assistance Needed: Dependent  Comment: After further assessment, pt required Total A, 1st person Max A to maintain lateral position when rolled to L/R when donning over hips, 2nd person Max A for threading of BLE and donning over hips d/t pt's lethargy and cognition required to complete task.   Reason if not Attempted: Not attempted due to medical condition or safety concerns  CARE Score: 1  Discharge Goal: Partial/moderate assistance  Putting On/Taking Off Footwear  Assistance Needed: Dependent  CARE Score: 1  Discharge Goal: Set-up or clean-up assistance      Impairments/deficits, barriers:  Cognitive, fatigue, lethargy, poor attention/alterness  Assessment  Performance deficits / Impairments: Decreased functional mobility , Decreased cognition, Decreased high-level IADLs, Decreased ADL status, Decreased endurance, Decreased fine motor control, Decreased ROM, Decreased safe awareness, Decreased strength, Decreased balance, Decreased vision/visual deficit, Decreased coordination  Decision Making: High Complexity  REQUIRES OT FOLLOW UP: Yes  Equipment needed at discharge:BSC      COGNITIVE FUNCTION/SPEECH THERAPY (AS INDICATED)  LTG                         Short-term Goals  STM deficits were observed during conversation with s/o on phone where pt couldn't recall info just provided to give her over the phone regarding discharge planning and therapy sessions completed. Pt disoriented to month and year (Dec, 2002)  Improvements today were attn and alertness, some awareness of limitations regarding language. Goals updated with a new STM goal to improve recall of learned tasks with min cues with 70% acc. Timeframe for Short-term Goals: 3x/week x3 weeks 30 mins min  LTG: Pt will improve cognitive linguistic abilities for safe return home and clearly communicate care needs. Goal 1: Continued receptive/expressive assessment by 1/6/2021 with additional goals to be added as appropriate. Goal 2: Pt will respond to wh?'s with 90% acc given extra time. Partially met, continue with extra time and anomia/retrieval delays  Goal 3: Pt will follow 2-3 step tasks with min cues and 75% acc Meeting goal at bedside however apraxia in fx with PT/TO--continue  Goal 4: Pt will attend to therapist on R with eye contact x5 in 5 mins.  Improved with min cues  Goal 5: Pt will improve L visual scanning for locating requested items with mod cues and 60% acc. Not met, active cues needed--continue  Goal 6: Improve recall of learned tasks with min cues with 70% acc   New goal added                      Ongoing impairments or deficits or barriers: expressive aphasia and memory deficits  Areas where progress has been made:responds to cues  Strategies that improve performance: visual and tactile cues    Nursing Current Medical Status:   [x] Is continent of bladder with a see catheter     [x] Is incontinent of bowel    [x] Has had an adequate number of bowel movements   [] Urinates with no urinary retention >300ml in bladder   [] Targeting bladder protocol with see removal   [x] Maintaining O2 SATs at 92% or greater   [x] Has pain managed while on ARU         [x] Has had no skin breakdown while on ARU   [] Has improved skin integrity via wound measurements   [] Has no signs/symptoms of infection at the wound site   [] Pressure wounds Stage/Location:    [] Arrived on unit with pressure wound  [x] Has been free from injury during hospitalization   [] Has experienced a fall during hospitalization  [x] Ongoing education with patient/family with understanding demonstrated for:  [] Receives IV Fluids  [] Other:        NUTRITION  Weight: 217 lb 8 oz (98.7 kg) / Body mass index is 32.12 kg/m². Current diet: DIET CARB CONTROL; Dental Soft  Dietary Nutrition Supplements: Low Calorie High Protein Supplement  Intake: Meal intake improving, % intake recent meals. Will drink some oral nutrition supplement        Medical improvements/barriers: supervised feeds, R shoulder pain, L neglect, fatigues easily, cotreat at this time, SVT, stimulants will be reviewed        Team goals for next treatment period/Intervention for current barriers:   [x] Pt will increase activity tolerance for daily tasks.   [] Pt will improve bed mobility with reduced assist.  [] Pt will improve safety in fx tasks with reduced cues/assist  [x] Pt will improve transfers with reduced assist  [x] Pt will improve toileting with reduced assist  [x] Pt will improve ADL's with use of adaptive equipment with reduced assist  [] Pt will improve pain mgmt for maximum participation in tx program  [x] Pt will improve communication to get basic needs met on unit  [x] Pt will improve swallowing for safe diet advancement with use of strategies  []  Plan for discharge to home. Patient Strengths: Cooperative    Justification for Continued Stay  Based on my medical assessment of the patient and review of information from the interdisciplinary team as part of this weekly team conference, the patient continues to meet the following criteria for IRF level of care:   The patient requires active and ongoing intervention of multiple therapy disciplines   The patient requires and intensive rehabilitation therapy program   The patient requires continued physician supervision by a rehabilitation physician   The patient requires 24 hours rehab nursing care   The patient requires an intensive and coordinated interdisciplinary team approach to the delivery of rehabilitative care.      Assessment/Plan   [x]  The patient is making good progression towards their long term goals and is actively participating in and has a reasonable expectation to continue to benefit from the intensive rehabilitation therapy program   []  The estimated discharge date has been changed from initial team conference due to:   []  The estimated discharge destination has been changed from initial team conference due to:         Ongoing tx following discharge: [x]C PT/OT/ST/NSG/Aide    []OUTPATIENT     [] No Further Treatment     [] Family/Caregiver Training  []  Transitional Living Arrangement   [] Home Assessment (date  )     [] Family Conference   []  Therapeutic Pass       []  Other: (specify)    Estimated Discharge Date: 1/26/2021    Estimated Discharge Destination: []home alone   []home alone with assist prn  []Continuous supervision

## 2021-01-06 NOTE — CONSULTS
CARDIOLOGY CONSULT NOTE   Reason for consultation: Anticoagulation management    Referring physician:  Radha Villanueva MD     Primary care physician: Myriam Beltrán MD      Dear  Dr. Radha Villanueva MD   Thanks for the consult. Chief Complaints : A. fib flutter    History of present illness:Felipe is a 70 y. o.year old who is currently in rehab after a fall in mid December leading to intracranial hemorrhage with intraventricular extension he was on Xarelto due to history of A. fib and a chads vas score of 6 including history of strokes  He does have history of coronary artery disease and CKD. He was having headaches which led to ED evaluation and a CT scan revealing occipital intra parenchymal bleed was transferred to [facility] on discharge she was advised by neurosurgery to hold off on any antiplatelet until evaluated by neurosurgery with repeat CT coagulation was also advised to be held    Past medical history:    has a past medical history of Anemia, Arthritis, Back pain, chronic, Bipolar 1 disorder ([address] CAD (coronary artery disease), Cerebral artery occlusion with cerebral infarction ([address] Chronic kidney disease (CKD), stage III (moderate), CKD (chronic kidney disease), COPD (chronic obstructive pulmonary disease) ([address] Diabetes mellitus, type 2 ([address] Diabetic peripheral neuropathy ([address] Diastolic CHF ([address] Gastric ulcer, H/O cardiovascular stress test, Heart murmur, Hiatal hernia, History of cardiac cath, Hx of migraines, HX OTHER MEDICAL, Hyperlipidemia, Hypertension, Lumbar radiculopathy, Overlapping malignant neoplasm of colon ([address] Panic attack, Pleural effusion, S/P thoracentesis, Sleep apnea, SOBOE (shortness of breath on exertion), and Spinal stenosis. Past surgical history:   has a past surgical history that includes Neck surgery (1998); Carpal tunnel release (Bilateral, 1989); knee surgery (Bilateral); thoracentesis (Left, 12/20/2013);  Elbow surgery (Left, 2000's); Thoracentesis (4/25/2016); Tonsillectomy; Thoracentesis (Left, 11/15/2016); thoracotomy (Left, 03/18/2019); thoracotomy (Left, 3/18/2019); Upper gastrointestinal endoscopy (N/A, 5/12/2020); Colonoscopy (N/A, 5/12/2020); and Small intestine surgery (N/A, 5/21/2020). Social History:   reports that he quit smoking about 10 years ago. His smoking use included cigarettes. He has a 45.00 pack-year smoking history. He has never used smokeless tobacco. He reports previous alcohol use. He reports current drug use. Frequency: 7.00 times per week. Drug: Marijuana.   Family history:   no family history of CAD, STROKE of DM at early age    Allergies   Allergen Reactions    Nsaids      Renal            miconazole (MICOTIN) 2 % powder, BID      metoprolol tartrate (LOPRESSOR) tablet 25 mg, BID      digoxin (LANOXIN) tablet 250 mcg, Q8H      [START ON 1/7/2021] digoxin (LANOXIN) tablet 125 mcg, Daily      heparin (porcine) injection 5,000 Units, 3 times per day      amLODIPine (NORVASC) tablet 10 mg, Daily      insulin lispro (HUMALOG) injection vial 0-6 Units, TID WC      insulin lispro (HUMALOG) injection vial 0-3 Units, Nightly      lisinopril (PRINIVIL;ZESTRIL) tablet 40 mg, Daily      doxazosin (CARDURA) tablet 2 mg, 2 times per day      dilTIAZem (CARDIZEM) tablet 30 mg, 2 times per day      atorvastatin (LIPITOR) tablet 80 mg, Nightly      benztropine (COGENTIN) tablet 0.5 mg, BID      busPIRone (BUSPAR) tablet 10 mg, TID      divalproex (DEPAKOTE ER) extended release tablet 1,000 mg, Nightly      escitalopram (LEXAPRO) tablet 10 mg, Daily      traZODone (DESYREL) tablet 50 mg, Nightly      risperiDONE (RISPERDAL) tablet 1 mg, Daily      gabapentin (NEURONTIN) capsule 600 mg, TID      cephALEXin (KEFLEX) capsule 250 mg, 4 times per day      acetaminophen (TYLENOL) tablet 650 mg, Q4H PRN      bisacodyl (DULCOLAX) suppository 10 mg, Daily PRN      famotidine (PEPCID) tablet 20 mg, Daily      senna (SENOKOT) tablet 8.6 mg, Nightly      docusate sodium (COLACE) capsule 100 mg, BID      ondansetron (ZOFRAN-ODT) disintegrating tablet 4 mg, 4x Daily PRN      glucose (GLUTOSE) 40 % oral gel 15 g, PRN      dextrose 50 % IV solution, PRN      glucagon (rDNA) injection 1 mg, PRN      dextrose 5 % solution, PRN      Current Facility-Administered Medications   Medication Dose Route Frequency Provider Last Rate Last Admin    miconazole (MICOTIN) 2 % powder   Topical BID MARIA DEL CARMEN Joiner MD   Given at 01/06/21 1611    metoprolol tartrate (LOPRESSOR) tablet 25 mg  25 mg Oral BID JULEE Luciano - CNP        digoxin (LANOXIN) tablet 250 mcg  250 mcg Oral Q8H JULEE Luciano - CNP   250 mcg at 01/06/21 1831    [START ON 1/7/2021] digoxin (LANOXIN) tablet 125 mcg  125 mcg Oral Daily JULEE Kent - CNP        heparin (porcine) injection 5,000 Units  5,000 Units Subcutaneous 3 times per day Alexandra Garay MD   5,000 Units at 01/06/21 1349    amLODIPine (NORVASC) tablet 10 mg  10 mg Oral Daily MARIA DEL CARMEN Joiner MD   10 mg at 01/06/21 0826    insulin lispro (HUMALOG) injection vial 0-6 Units  0-6 Units Subcutaneous TID WC MARIA DEL CARMEN Joiner MD        insulin lispro (HUMALOG) injection vial 0-3 Units  0-3 Units Subcutaneous Nightly MARIA DEL CARMEN Joiner MD        lisinopril (PRINIVIL;ZESTRIL) tablet 40 mg  40 mg Oral Daily MARIA DEL CARMEN Joiner MD   40 mg at 01/06/21 0825    doxazosin (CARDURA) tablet 2 mg  2 mg Oral 2 times per day Alexandra Garay MD   2 mg at 01/06/21 0827    dilTIAZem (CARDIZEM) tablet 30 mg  30 mg Oral 2 times per day Alexandra Garay MD   30 mg at 01/06/21 3813    atorvastatin (LIPITOR) tablet 80 mg  80 mg Oral Nightly MARIA DEL CARMEN Joiner MD   80 mg at 01/05/21 2122    benztropine (COGENTIN) tablet 0.5 mg  0.5 mg Oral BID MARIA DEL CARMEN Joiner MD   0.5 mg at 01/06/21 0864    busPIRone (BUSPAR) tablet 10 mg  10 mg Oral TID Alexandra Garay MD   10 mg at 01/06/21 3809    divalproex (DEPAKOTE ER) extended release tablet 1,000 mg  1,000 mg Oral Nightly MARIA DEL CARMEN Hart MD   1,000 mg at 01/05/21 2122    escitalopram (LEXAPRO) tablet 10 mg  10 mg Oral Daily MARIA DEL CARMEN Hart MD   10 mg at 01/06/21 9702    traZODone (DESYREL) tablet 50 mg  50 mg Oral Nightly MARIA DEL CARMEN Hart MD   50 mg at 01/05/21 2121    risperiDONE (RISPERDAL) tablet 1 mg  1 mg Oral Daily MARIA DEL CARMEN Hart MD   1 mg at 01/06/21 8250    gabapentin (NEURONTIN) capsule 600 mg  600 mg Oral TID Zoe Hull MD   600 mg at 01/06/21 1349    cephALEXin (KEFLEX) capsule 250 mg  250 mg Oral 4 times per day Zoe Hull MD   250 mg at 01/06/21 1653    acetaminophen (TYLENOL) tablet 650 mg  650 mg Oral Q4H PRN MARIA DEL CARMEN Hart MD   650 mg at 01/06/21 1381    bisacodyl (DULCOLAX) suppository 10 mg  10 mg Rectal Daily PRN MARIA DEL CARMEN Hart MD        famotidine (PEPCID) tablet 20 mg  20 mg Oral Daily MARIA DEL CARMEN Hart MD   20 mg at 01/06/21 0827    senna (SENOKOT) tablet 8.6 mg  1 tablet Oral Nightly MARIA DEL CARMEN Hart MD   8.6 mg at 01/05/21 2122    docusate sodium (COLACE) capsule 100 mg  100 mg Oral BID MARIA DEL CARMEN Hart MD   100 mg at 01/06/21 9937    ondansetron (ZOFRAN-ODT) disintegrating tablet 4 mg  4 mg Oral 4x Daily PRN MARIA DEL CARMEN Hart MD        glucose (GLUTOSE) 40 % oral gel 15 g  15 g Oral PRN MARIA DEL CARMEN Hart MD        dextrose 50 % IV solution  12.5 g Intravenous PRN MARIA DEL CARMEN Hart MD        glucagon (rDNA) injection 1 mg  1 mg Intramuscular MAXINEN MARIA DEL CARMEN Hart MD        dextrose 5 % solution  100 mL/hr Intravenous PRN MARIA DEL CARMEN Hart MD         Review of Systems:   · Constitutional: No Fever or Weight Loss   · Eyes: No Decreased Vision  · ENT: No Headaches, Hearing Loss or Vertigo  · Cardiovascular: As per HPI  · Respiratory: As per HPI  · Gastrointestinal: No abdominal pain, appetite loss, blood in stools, constipation, diarrhea or heartburn  · Genitourinary: No dysuria, trouble voiding, or hematuria  · Musculoskeletal:  No gait disturbance, weakness or joint complaints  · Integumentary: No rash or pruritis  · Neurological: No TIA or stroke symptoms  · Psychiatric: No anxiety or depression  · Endocrine: No malaise, fatigue or temperature intolerance  · Hematologic/Lymphatic: No bleeding problems, blood clots or swollen lymph nodes  · Allergic/Immunologic: No nasal congestion or hives  All systems negative except as marked. Physical Examination:    Vitals:    01/06/21 0336 01/06/21 0815 01/06/21 1506 01/06/21 1808   BP: 115/64 (!) 154/74 110/60 113/72   Pulse: 69 60 130 135   Resp: 16  16 18   Temp: 98 °F (36.7 °C)  98.3 °F (36.8 °C) 97.8 °F (36.6 °C)   TempSrc: Oral  Oral Oral   SpO2: 95%  96% 97%   Weight: 224 lb (101.6 kg)      Height:           General Appearance:  No distress, conversant    Constitutional:  Well developed, Well nourished, No acute distress, Non-toxic appearance. HENT:  Normocephalic, Atraumatic, Bilateral external ears normal, Oropharynx moist, No oral exudates, Nose normal. Neck- Normal range of motion, No tenderness, Supple, No stridor,no apical-carotid delay  Lymphatics : no palpable lymph nodes  Eyes:  PERRL, EOMI, Conjunctiva normal, No discharge. Respiratory:  Normal breath sounds, No respiratory distress, No wheezing, No chest tenderness. ,no use of accessory muscles, crackles Absent   Cardiovascular: (PMI) apex non displaced,no lifts no thrills, ankle swelling Absent  , 1+, s1 and s2 audible,Murmur. Absent , JVD not noted    Abdomen /GI:  Bowel sounds normal, Soft, No tenderness, No masses, No gross visceromegaly   :  No costovertebral angle tenderness   Musculoskeletal:  No edema, no tenderness, no deformities.  Back- no tenderness  Integument:  Well hydrated, no rash   Lymphatic:  No lymphadenopathy noted   Neurologic:  Alert & oriented x 3, CN 2-12 normal, normal motor function, normal sensory function, no focal deficits noted           Medical decision making and Data review:    Lab Review   No results for input(s): WBC, HGB, HCT, PLT in the last 72 hours. No results for input(s): NA, K, CL, CO2, PHOS, BUN, CREATININE in the last 72 hours. Invalid input(s): CA  No results for input(s): AST, ALT, ALB, BILIDIR, BILITOT, ALKPHOS in the last 72 hours. No results for input(s): TROPONINT in the last 72 hours. No results for input(s): PROBNP in the last 72 hours. Lab Results   Component Value Date    INR 0.84 12/19/2020    PROTIME 10.1 (L) 12/19/2020       EKG: (reviewed by myself)    ECHO:(reviewed by myself)    Chest Xray:(reviewed by myself)  Ct Head Wo Contrast    Result Date: 12/19/2020  EXAMINATION: CT OF THE HEAD WITHOUT CONTRAST  12/19/2020 11:07 pm TECHNIQUE: CT of the head was performed without the administration of intravenous contrast. Dose modulation, iterative reconstruction, and/or weight based adjustment of the mA/kV was utilized to reduce the radiation dose to as low as reasonably achievable. COMPARISON: September 1, 2020. HISTORY: ORDERING SYSTEM PROVIDED HISTORY: HTN/headache TECHNOLOGIST PROVIDED HISTORY: Reason for exam:->HTN/headache Reason for exam:->no Has a \"code stroke\" or \"stroke alert\" been called? ->No Reason for Exam: HTN/headache Acuity: Acute Type of Exam: Initial FINDINGS: BRAIN/VENTRICLES: Large acute 6.6 x 4.0 x 3.0 cm intraparenchymal hematoma centered in the right posterior temporal and occipital lobes with surrounding vasogenic edema and mild mass effect. There is intraventricular extension of hemorrhage with acute hyperdense blood throughout the ventricular system. Minimal right to left midline shift measures 2 mm at the level of the lateral ventricles. Generalized cerebral and cerebellar volume loss. Mild ill-defined periventricular white matter hypoattenuation. No large acute territorial infarct. No chavez hydrocephalus. The basal cisterns are patent. Atherosclerosis.  ORBITS: The visualized portion of the orbits demonstrate no acute abnormality. SINUSES: The visualized paranasal sinuses and mastoid air cells demonstrate no acute abnormality. SOFT TISSUES/SKULL:  No acute abnormality of the visualized skull or soft tissues. Large acute 6.6 cm intraparenchymal hematoma centered in the right posterior temporal and occipital lobes. Intraventricular extension of hemorrhage. Minimal right left midline shift measures 2 mm. No chavez hydrocephalus. Critical results were called by Dr. Carl Jimenez to [facility] on 12/19/2020 at 22:43. Ct Head Wo Contrast    Result Date: 12/19/2020  Radiology exam is complete. No Radiologist dictation. Please follow up with ordering provider. Xr Chest Portable    Result Date: 12/19/2020  EXAMINATION: ONE XRAY VIEW OF THE CHEST 12/19/2020 8:03 pm COMPARISON: August 2, 2020 HISTORY: ORDERING SYSTEM PROVIDED HISTORY: htn/headache TECHNOLOGIST PROVIDED HISTORY: Reason for exam:->htn/headache Reason for Exam: htn/headache Acuity: Acute Type of Exam: Initial FINDINGS: Marginal inspiration is present. Blunting of the left CP angle is again visualized, consistent with chronic effusion/scarring, and left basilar atelectasis. Cardiomegaly is present. Mild vascular congestion is noted. Anterior fixation plate is present within the lower cervical spine. 1. Cardiomegaly, with mild vascular congestion 2. Chronic left pleural effusion and left basilar atelectasis/scarring, unchanged       All labs, medications and tests reviewed by myself including data  from outside source , patient and available family . Continue all other medications of all above medical condition listed as is.      Impression:  Principal Problem:    Intracranial hemorrhage (HCC)  Active Problems:    Paroxysmal atrial fibrillation (HCC)    Uncontrolled type 2 diabetes mellitus with peripheral neuropathy (HCC)    Left hemiparesis ([facility]    Dysarthria due to and not concurrent with spontaneous intracerebral hemorrhage    Gait disturbance    Bipolar disorder, in partial remission, most recent episode depressed ([address]  Resolved Problems:    * No resolved hospital problems. *      Assessment: 70 y. o.year old with PMH of  has a past medical history of Anemia, Arthritis, Back pain, chronic, Bipolar 1 disorder ([address] CAD (coronary artery disease), Cerebral artery occlusion with cerebral infarction ([address] Chronic kidney disease (CKD), stage III (moderate), CKD (chronic kidney disease), COPD (chronic obstructive pulmonary disease) ([address] Diabetes mellitus, type 2 ([address] Diabetic peripheral neuropathy ([address] Diastolic CHF ([address] Gastric ulcer, H/O cardiovascular stress test, Heart murmur, Hiatal hernia, History of cardiac cath, Hx of migraines, HX OTHER MEDICAL, Hyperlipidemia, Hypertension, Lumbar radiculopathy, Overlapping malignant neoplasm of colon ([address] Panic attack, Pleural effusion, S/P thoracentesis, Sleep apnea, SOBOE (shortness of breath on exertion), and Spinal stenosis. Plan and Recommendations:    Hold anticoagulation due to concerns for intraparenchymal bleed with intermittent intraventricular extension,  Will plan outpatient watchman device  Atrial Fibrillation: He was noted to be tachycardic appears to be in A. fib or atrial tach on EKG  At the junction restart Cardizem continue metoprolol titrate up for heart rate control  Coronary artery disease: Stable stress test in May showed no ischemia  DVT prophylaxis if no contraindication  6. Dyslipidemia: continue statins           Thank you  much for consult and giving us the opportunity in contributing in the care of this patient. Please feel free to call me for any questions.        Rachana Hernandez MD, 1/6/2021 6:56 PM

## 2021-01-06 NOTE — CARE COORDINATION
Case t spoke with patient's sig other Jessica Hannah on phone. They live in a 2-level rental home. They live on the 1st floor. Another couple live on the 2nd floor. The upstairs neighbors assist with ambulation and transport whenever needed. There are 5+3 SUKHJINDER with bilateral rails at the front door. There are 6 SUKHJINDER from the back, but the steps aren't safe. Jessica Hannah is an experienced caregiver having cared for her son until April 2020. She can provide min A, maybe mod A. Prior to admission patient often needed min A to get out of a chair, but not always. At times patient used a rollator, but sometimes no device. Home has a rollator, WC, raised toilet seat and a shower chair, but the shower chair to big for the shower. There is a walk in shower. Jessica Hannah doesn't drive so patient may need d/c transport. Margret Paez neighbor will assist patient into the home. PTA patient needed help getting his shirt on. Managed his own pants and footwear. Needed some bathing assist for thoroughness. Patient completed own toileting and hygiene. Jessica Hannah doesn't want to assist with toilet hygiene. She would appreciate a [facility] referral at discharge.

## 2021-01-06 NOTE — CARE COORDINATION
I spoke with Jose Mcgregor at [address] [place] [address] [phone]. I initiated a precert with [facility] for inpt stay for this pt. The pending auth number is [id number]. A nurse will contact me with the fax number to send clinical information.

## 2021-01-07 LAB
EKG ATRIAL RATE: 143 BPM
EKG ATRIAL RATE: 54 BPM
EKG DIAGNOSIS: NORMAL
EKG DIAGNOSIS: NORMAL
EKG P AXIS: 48 DEGREES
EKG P-R INTERVAL: 152 MS
EKG Q-T INTERVAL: 376 MS
EKG Q-T INTERVAL: 486 MS
EKG QRS DURATION: 104 MS
EKG QRS DURATION: 110 MS
EKG QTC CALCULATION (BAZETT): 460 MS
EKG QTC CALCULATION (BAZETT): 580 MS
EKG R AXIS: -11 DEGREES
EKG R AXIS: -24 DEGREES
EKG T AXIS: 49 DEGREES
EKG T AXIS: 72 DEGREES
EKG VENTRICULAR RATE: 143 BPM
EKG VENTRICULAR RATE: 54 BPM
GLUCOSE BLD-MCNC: 118 MG/DL (ref 70–99)
GLUCOSE BLD-MCNC: 130 MG/DL (ref 70–99)
GLUCOSE BLD-MCNC: 140 MG/DL (ref 70–99)
GLUCOSE BLD-MCNC: 91 MG/DL (ref 70–99)

## 2021-01-07 PROCEDURE — 6360000002 HC RX W HCPCS: Performed by: PHYSICAL MEDICINE & REHABILITATION

## 2021-01-07 PROCEDURE — 99233 SBSQ HOSP IP/OBS HIGH 50: CPT | Performed by: INTERNAL MEDICINE

## 2021-01-07 PROCEDURE — 1280000000 HC REHAB R&B

## 2021-01-07 PROCEDURE — 99233 SBSQ HOSP IP/OBS HIGH 50: CPT | Performed by: PHYSICAL MEDICINE & REHABILITATION

## 2021-01-07 PROCEDURE — 97535 SELF CARE MNGMENT TRAINING: CPT

## 2021-01-07 PROCEDURE — 92507 TX SP LANG VOICE COMM INDIV: CPT

## 2021-01-07 PROCEDURE — APPSS45 APP SPLIT SHARED TIME 31-45 MINUTES: Performed by: NURSE PRACTITIONER

## 2021-01-07 PROCEDURE — 97130 THER IVNTJ EA ADDL 15 MIN: CPT

## 2021-01-07 PROCEDURE — 94761 N-INVAS EAR/PLS OXIMETRY MLT: CPT

## 2021-01-07 PROCEDURE — 6370000000 HC RX 637 (ALT 250 FOR IP): Performed by: NURSE PRACTITIONER

## 2021-01-07 PROCEDURE — 6370000000 HC RX 637 (ALT 250 FOR IP): Performed by: PHYSICAL MEDICINE & REHABILITATION

## 2021-01-07 PROCEDURE — 93010 ELECTROCARDIOGRAM REPORT: CPT | Performed by: INTERNAL MEDICINE

## 2021-01-07 PROCEDURE — 82962 GLUCOSE BLOOD TEST: CPT

## 2021-01-07 PROCEDURE — 97129 THER IVNTJ 1ST 15 MIN: CPT

## 2021-01-07 PROCEDURE — 97530 THERAPEUTIC ACTIVITIES: CPT

## 2021-01-07 PROCEDURE — 93005 ELECTROCARDIOGRAM TRACING: CPT | Performed by: NURSE PRACTITIONER

## 2021-01-07 RX ADMIN — DOCUSATE SODIUM 100 MG: 100 CAPSULE, LIQUID FILLED ORAL at 09:37

## 2021-01-07 RX ADMIN — ESCITALOPRAM OXALATE 10 MG: 10 TABLET, FILM COATED ORAL at 09:37

## 2021-01-07 RX ADMIN — LISINOPRIL 40 MG: 20 TABLET ORAL at 09:37

## 2021-01-07 RX ADMIN — BENZTROPINE MESYLATE 0.5 MG: 1 TABLET ORAL at 20:47

## 2021-01-07 RX ADMIN — METOPROLOL TARTRATE 25 MG: 25 TABLET, FILM COATED ORAL at 20:46

## 2021-01-07 RX ADMIN — BUSPIRONE HYDROCHLORIDE 10 MG: 5 TABLET ORAL at 14:23

## 2021-01-07 RX ADMIN — ACETAMINOPHEN 650 MG: 325 TABLET ORAL at 05:33

## 2021-01-07 RX ADMIN — ACETAMINOPHEN 650 MG: 325 TABLET ORAL at 11:46

## 2021-01-07 RX ADMIN — MICONAZOLE NITRATE: 20 POWDER TOPICAL at 21:17

## 2021-01-07 RX ADMIN — CEPHALEXIN 250 MG: 250 CAPSULE ORAL at 17:41

## 2021-01-07 RX ADMIN — BUSPIRONE HYDROCHLORIDE 10 MG: 5 TABLET ORAL at 20:46

## 2021-01-07 RX ADMIN — GABAPENTIN 600 MG: 300 CAPSULE ORAL at 20:46

## 2021-01-07 RX ADMIN — DIGOXIN 125 MCG: 125 TABLET ORAL at 09:37

## 2021-01-07 RX ADMIN — HEPARIN SODIUM 5000 UNITS: 5000 INJECTION INTRAVENOUS; SUBCUTANEOUS at 20:47

## 2021-01-07 RX ADMIN — GABAPENTIN 600 MG: 300 CAPSULE ORAL at 14:23

## 2021-01-07 RX ADMIN — RISPERIDONE 1 MG: 0.5 TABLET, FILM COATED ORAL at 09:37

## 2021-01-07 RX ADMIN — AMLODIPINE BESYLATE 10 MG: 10 TABLET ORAL at 09:37

## 2021-01-07 RX ADMIN — SENNOSIDES 8.6 MG: 8.6 TABLET, FILM COATED ORAL at 20:46

## 2021-01-07 RX ADMIN — METOPROLOL TARTRATE 25 MG: 25 TABLET, FILM COATED ORAL at 09:37

## 2021-01-07 RX ADMIN — TRAZODONE HYDROCHLORIDE 50 MG: 50 TABLET ORAL at 20:46

## 2021-01-07 RX ADMIN — DOCUSATE SODIUM 100 MG: 100 CAPSULE, LIQUID FILLED ORAL at 20:46

## 2021-01-07 RX ADMIN — GABAPENTIN 600 MG: 300 CAPSULE ORAL at 09:37

## 2021-01-07 RX ADMIN — MICONAZOLE NITRATE: 20 POWDER TOPICAL at 10:20

## 2021-01-07 RX ADMIN — ATORVASTATIN CALCIUM 80 MG: 40 TABLET, FILM COATED ORAL at 20:46

## 2021-01-07 RX ADMIN — DILTIAZEM HYDROCHLORIDE 30 MG: 30 TABLET, FILM COATED ORAL at 09:37

## 2021-01-07 RX ADMIN — DOXAZOSIN 2 MG: 4 TABLET ORAL at 09:36

## 2021-01-07 RX ADMIN — CEPHALEXIN 250 MG: 250 CAPSULE ORAL at 00:22

## 2021-01-07 RX ADMIN — CEPHALEXIN 250 MG: 250 CAPSULE ORAL at 12:13

## 2021-01-07 RX ADMIN — DIVALPROEX SODIUM 1000 MG: 500 TABLET, FILM COATED, EXTENDED RELEASE ORAL at 20:46

## 2021-01-07 RX ADMIN — HEPARIN SODIUM 5000 UNITS: 5000 INJECTION INTRAVENOUS; SUBCUTANEOUS at 05:33

## 2021-01-07 RX ADMIN — BUSPIRONE HYDROCHLORIDE 10 MG: 5 TABLET ORAL at 09:36

## 2021-01-07 RX ADMIN — DIGOXIN 250 MCG: 125 TABLET ORAL at 02:54

## 2021-01-07 RX ADMIN — CEPHALEXIN 250 MG: 250 CAPSULE ORAL at 05:33

## 2021-01-07 RX ADMIN — BENZTROPINE MESYLATE 0.5 MG: 1 TABLET ORAL at 09:36

## 2021-01-07 RX ADMIN — FAMOTIDINE 20 MG: 20 TABLET, FILM COATED ORAL at 09:37

## 2021-01-07 RX ADMIN — HEPARIN SODIUM 5000 UNITS: 5000 INJECTION INTRAVENOUS; SUBCUTANEOUS at 14:21

## 2021-01-07 ASSESSMENT — PAIN DESCRIPTION - FREQUENCY: FREQUENCY: INTERMITTENT

## 2021-01-07 ASSESSMENT — PAIN DESCRIPTION - ORIENTATION: ORIENTATION: RIGHT

## 2021-01-07 ASSESSMENT — PAIN DESCRIPTION - PAIN TYPE: TYPE: CHRONIC PAIN

## 2021-01-07 NOTE — PROGRESS NOTES
Physical Therapy    [x] daily progress note       [] discharge       Patient Name:  Joseline Alanis :  1950 MRN: [id number]  Room:  [address] [place] [address] Date of Admission: 2021  Rehabilitation Diagnosis:   Cerebral infarction, unspecified [I63.9]  Intracranial hemorrhage ([facility] [address] [I62.9]       Date 2021       Day of ARU Week:  4   Time IN//1020  1255/1325   Individual Tx Minutes    Group Tx Minutes    Co-Treat Minutes 45+30  A cotreat was completed this date with  [] PT    [x] VALENZUELA   [] ST      This pt requires simultaneous intervention of 2 skilled therapists to safely complete tasks to address complexity of deficits defined in the goals including:  [x]Attention   [x] Safety    [x]Problem Solving    [x]Sequencing     [x]Initiation    [x]Processing      []Recall of learned tasks  [] Communication  [] Self Feeding   [x]ADL's    [x]UE Function    [x]Motor planning   [x]Balance  []Energy Conservation   [x]Verbal cues   [] Tactile Cues  []Barriers     [x]Transfers   []Device Use   Concurrent Tx Minutes    TOTAL Tx Time Mins 75   Variance Time    Variance Time []   Refusal due to:     []   Medical hold/reason:    []   Illness   []   Off Unit for test/procedure  []   Extra time needed to complete task  []   Therapeutic need  []   Other (specify):   Restrictions Restrictions/Precautions  Restrictions/Precautions: General Precautions, Fall Risk(L hemiparesis, chronic R shoulder limitations)  Position Activity Restriction  Other position/activity restrictions: see catheter   Interdisciplinary communication [x]   Cleared for therapy per nursing     [x]   RN notified about issues during session (pt's diarrhea and impaired skin integrity in bilat groin and scotum)  []   RN updated on pt performance  []   Spoke with   []   Spoke with OT  []   Spoke with MD  []   Other:    Subjective observations and cognitive status: (AM) Pt in bed, alert, easily distracted requiring redirection, talkative.    (PM) Seated ther ex (reps/sets):     [x]   Standing ther ex (reps/sets): Facilitated partial stands using Claude Shack x 4 reps for quads, hip extensors, and core muscles activation    []   Picking up object from floor (standing):                   []   Reacher used   []   Other: Toileting activity completed with    [x]   Other: cognitive reorientation (situation), simple command following, pt positioning in bed for proper body alignment and pressure relief due to impaired skin integrity in perineal area. Comments:      Patient/Caregiver Education and Training:   []   Role of PT  []   Education about Dx  [x]   Use of call light for assist   []   HEP provided and explained   []   Treatment plan reviewed  []   Home safety  []   Wheelchair mobility/management   []   Body mechanics  [x]   Bed Mobility/Transfer technique  []   Gait technique/sequencing  []   Proper use of assistive device/adaptive equipment  []   Stair training/Advanced mobility safety and technique  []   Reinforced patient's precautions/mobility while maintaining precautions  [x]   Postural awareness  []   Family/caregiver training  []   Progress was updated and reviewed in Rehabtracker with patient and/or family this date. []   Other:    Treatment Plan for Next Session: transfer to w/c using Any barker with 2-person assist, initiate w/c mobility training     Assessment: (AM) Pt was more alert today, however still kept eyes closed during tabletop activities at bed side requiring constant cues and had poor awareness of his sitting posture/balance. Pt was able to progress to sit->stand transfers using Claude Shack today but still had L lateral trunk lean requiring x2-person assist to safely complete it at bedside. He had increased fatigue with repeated trials of the activity. Pt was not fully receptive to use of visual cues for external feedback to correct posture. (PM) Pt with bowel incontinence requiring x 2-person assist for perineal hygiene at bed level. Pt continues to have poor motor initiation and sequencing to roll L/R despite verbal and tactile cues provided.       Treatment/Activity Tolerance:   [] Tolerated treatment with no adverse effects    [x] Patient limited by fatigue  [x] Patient limited by pain   [] Patient limited by medical complications:    [] Adverse reaction to Tx:   [] Significant change in status    Barrier/s to progress/learning:   []   None  [x]   Cognition  []   Hearing deficit  []   Pre-morbid mental/psychological status   []   Motivation  []   Communication  []   Anxiety  []   Vision deficit  [x]   Attention  [x]   Other: neglect?, R shoulder pain/limitation, chronic neck/trunk deformity       Safety:       [x]  bed alarm set    []  chair alarm set    []  Pt refused alarms                []  Telesitter activated      [x]  Gait belt used during tx session      []other:         Number of Minutes/Billable Intervention  Gait Training    Therapeutic Exercise    Neuro Re-Ed    Therapeutic Activity 75   Wheelchair Propulsion    Group    Other:    TOTAL 75         Social History  Social/Functional History  Lives With: Significant other  Type of Home: House  Home Layout: One level  Home Access: Stairs to enter with rails  Entrance Stairs - Number of Steps: 5+3  Entrance Stairs - Rails: Both  Bathroom Shower/Tub: Walk-in shower, Shower chair with back(shower chair doesn't fit in shower)  Bathroom Toilet: Standard  Bathroom Equipment: Toilet raiser, Shower chair  Bathroom Accessibility: Accessible  Home Equipment: 4 wheeled walker, [address] Help From: Family, Neighbor  ADL Assistance: Needs assistance  Bath: Minimal assistance  Dressing: Minimal assistance  Grooming: Independent  Feeding: Independent  Toileting: Independent  Homemaking Assistance: Independent  Homemaking Responsibilities: No  Meal Prep Responsibility: No  Laundry Responsibility: No  Cleaning Responsibility: No  Bill Paying/Finance Responsibility: No  Shopping Responsibility: No  Dependent Care Responsibility: No  Health Care Management: No  Ambulation Assistance: Independent  Transfer Assistance: Needs assistance  Active : No  Patient's  Info: Oniel Marcus is primary , and transport company  Mode of Transportation: Car, Family, Friends  Education: 12th grade  Occupation: Retired  Type of occupation: Pt built trucks for 33 years; [employer]  Leisure & Hobbies: TV, limited reading, girlfriend has a dog. Pt has meds that come in pill packs. Bills are managed over the phone. IADL Comments: Tammi Phoenix assists c most IADLs and pt report hiring outside help c yardork. Additional Comments: Pt reports using rolator in the house. Pt reports 1 fall this year that occured in the house while ambulating in the home; sleeps in regular, flat bed; R hand dominant    Objective                                                                                    Goals:  (Update in navigator)  Short term goals  Time Frame for Short term goals: 14-21 tx days:  Short term goal 1: Pt will complete bed mobility tasks and sup<->sit with SBA-Sup. Short term goal 2: Pt will complete OOB transfers using RW with Mod A. Short term goal 3: Pt will ambulate 10 ft over level and carpeted surfaces using RW with Min A. Short term goal 4: Pt will propel manual w/c 150 ft with turns with SBA-Sup. Short term goal 5: Caregiver will be able to safely assist pt with functional mobility tasks. :   :        Plan of Care                                                                              Times per week: 5 days per week for a minimum of 60 minutes/day plus group as appropriate for 60 minutes.   Treatment to include Current Treatment Recommendations: Strengthening, Functional Mobility Training, Wheelchair Mobility Training, Neuromuscular Re-education, Home Exercise Program, Equipment Evaluation, Education, & procurement, ROM, Transfer Training, Cognitive/Perceptual Training, Gait Training,

## 2021-01-07 NOTE — PROGRESS NOTES
[facility] PHYSICAL [facility]  [address], [address] [facility] [facility],Third [address]  Phone: [phone]    Fax [phone]                  Camille Pedraza MD, Rochelle Gooden MD, Martha Damico MD, MD Desmond Dyson MD Larwence Ramming, MD Lorne Dauphin, MD Vita Lemmings, JULEE Pascal, JULEE  [facility], JULEE Cordero, APRN    Cardiology Progress Note     Today's Plan: Continue to monitor heart rate and blood pressure. Admit Date:  1/4/2021    Consult reason/ Seen today for: Anticoagulation evaluation    Subjective and  Overnight Events: Patient states that he has been doing well overnight. He appears slightly more confused today about historical events. Patient denies any chest pain, palpitations, or shortness of breath. Nurse reports patient has not had any more tachycardia. Assessment / Plan / Recommendation:     1. Hold anticoagulation due to concerns for intraparenchymal bleed with intermittent intraventricular extension. Will plan outpatient watchman device  2. He is supposed to have outpatient follow-up with neurosurgery with repeat CT head in about 1 to 2 weeks  3. CT surgery evaluation before starting any antiplatelet  4. Atrial Fibrillation:  EKG this morning shows sinus bradycardia. Continue metoprolol Cardizem and digoxin. We will need to monitor patient closely for dig toxicity due to renal dysfunction. May need to decrease digoxin to 62.5 mg daily if patient develops nausea dizziness visual disturbances or has significant bradycardia. Recheck EKG in morning  5. Coronary artery disease: Stable stress test in May showed no ischemia  6. DVT prophylaxis if no contraindication  6. Dyslipidemia: continue statins       History of Presenting Illness:    Chief complain on admission : 70 y. o.year old who is admitted for acute rehab status post intraparenchymal bleed.     Past medical history:    has a past medical history of Anemia, Arthritis, Back pain, chronic, Bipolar 1 disorder ([facility] [address] CAD (coronary artery disease), Cerebral artery occlusion with cerebral infarction ([facility] [address] Chronic kidney disease (CKD), stage III (moderate), CKD (chronic kidney disease), COPD (chronic obstructive pulmonary disease) ([facility] [address] Diabetes mellitus, type 2 ([facility] [address] Diabetic peripheral neuropathy ([facility] [address] Diastolic CHF ([facility] [address] Gastric ulcer, H/O cardiovascular stress test, Heart murmur, Hiatal hernia, History of cardiac cath, Hx of migraines, HX OTHER MEDICAL, Hyperlipidemia, Hypertension, Lumbar radiculopathy, Overlapping malignant neoplasm of colon ([facility] [address] Panic attack, Pleural effusion, S/P thoracentesis, Sleep apnea, SOBOE (shortness of breath on exertion), and Spinal stenosis. Past surgical history:   has a past surgical history that includes Neck surgery (1998); Carpal tunnel release (Bilateral, 1989); knee surgery (Bilateral); thoracentesis (Left, 12/20/2013); Elbow surgery (Left, 2000's); Thoracentesis (4/25/2016); Tonsillectomy; Thoracentesis (Left, 11/15/2016); thoracotomy (Left, 03/18/2019); thoracotomy (Left, 3/18/2019); Upper gastrointestinal endoscopy (N/A, 5/12/2020); Colonoscopy (N/A, 5/12/2020); and Small intestine surgery (N/A, 5/21/2020). Social History:   reports that he quit smoking about 10 years ago. His smoking use included cigarettes. He has a 45.00 pack-year smoking history. He has never used smokeless tobacco. He reports previous alcohol use. He reports current drug use. Frequency: 7.00 times per week. Drug: Marijuana. Family history:  family history includes Heart Disease in his mother; High Blood Pressure in his father.     Allergies   Allergen Reactions    Nsaids      Renal          Review of Systems:    All 14 systems were reviewed and are negative  Except for the positive findings  which as documented     /72   Pulse 120   Temp 97.9 °F (36.6 °C) (Oral)   Resp 18   Ht 5' 9\" (1.753 m)   Wt 217 lb 8 oz (98.7 kg)   SpO2 96%   BMI 32.12 kg/m²       Intake/Output Summary (Last 24 hours) at 1/7/2021 1431  Last data filed at 1/7/2021 1324  Gross per 24 hour   Intake 760 ml   Output 1900 ml   Net -1140 ml       Physical Exam  Vitals signs and nursing note reviewed. Constitutional:       General: He is not in acute distress. Appearance: Normal appearance. He is obese. He is not ill-appearing. HENT:      Head: Normocephalic and atraumatic. Eyes:      Conjunctiva/sclera: Conjunctivae normal.      Pupils: Pupils are equal, round, and reactive to light. Neck:      Musculoskeletal: Neck supple. No muscular tenderness. Vascular: No carotid bruit. Cardiovascular:      Rate and Rhythm: Normal rate and regular rhythm. Pulses: Normal pulses. Heart sounds: Normal heart sounds. No murmur. Pulmonary:      Effort: Pulmonary effort is normal. No respiratory distress. Breath sounds: Normal breath sounds. Musculoskeletal:         General: No swelling or deformity. Skin:     General: Skin is warm and dry. Capillary Refill: Capillary refill takes less than 2 seconds. Neurological:      Mental Status: He is alert and oriented to person, place, and time. Psychiatric:         Mood and Affect: Mood normal.         Behavior: Behavior normal.         Thought Content: Thought content normal.         Cognition and Memory: Memory is impaired.          Judgment: Judgment normal.         Telemetry Reviewed:   Not on telemetry    Medications:    miconazole   Topical BID    metoprolol tartrate  25 mg Oral BID    digoxin  125 mcg Oral Daily    heparin (porcine)  5,000 Units Subcutaneous 3 times per day    amLODIPine  10 mg Oral Daily    insulin lispro  0-6 Units Subcutaneous TID     insulin lispro  0-3 Units Subcutaneous Nightly    lisinopril  40 mg Oral Daily    doxazosin  2 mg Oral 2 times per day    dilTIAZem  30 mg Oral 2 times per day    atorvastatin  80 mg Oral Nightly    benztropine  0.5 mg Oral BID    busPIRone  10 mg Oral TID    divalproex  1,000 mg Oral Nightly    escitalopram  10 mg Oral Daily    traZODone  50 mg Oral Nightly    risperiDONE  1 mg Oral Daily    gabapentin  600 mg Oral TID    cephALEXin  250 mg Oral 4 times per day    famotidine  20 mg Oral Daily    senna  1 tablet Oral Nightly    docusate sodium  100 mg Oral BID      dextrose       acetaminophen, bisacodyl, ondansetron, glucose, dextrose, glucagon (rDNA), dextrose    Lab Data:  CBC: No results for input(s): WBC, HGB, HCT, MCV, PLT in the last 72 hours. BMP: No results for input(s): NA, K, CL, CO2, PHOS, BUN, CREATININE in the last 72 hours. Invalid input(s): CA  PT/INR: No results for input(s): PROTIME, INR in the last 72 hours. BNP:  No results for input(s): PROBNP in the last 72 hours. TROPONIN: No results for input(s): TROPONINT in the last 72 hours. ECHO :   echocardiogram       All labs, medications and tests reviewed by myself , continue all other medications of all above medical condition listed as is except for changes mentioned above. Thank you very much for consult , please call with questions. Electronically signed by JULEE Dangelo CNP on 1/7/2021 at 2:31 PM      CARDIOLOGY ATTENDING ADDENDUM    I have seen ,spoken to  and examined this patient personally, independently of the nurse practitioner. I have reviewed the hospital care given to date and reviewed all pertinent labs and imaging. The plan was developed mutually at the time of the visit with the patient,  NP   and myself. I have spoken with patient, nursing staff and provided written and verbal instructions . The above note has been reviewed and I agree with the assessment, diagnosis, and treatment plan with changes made by me as follows     HPI:  I have reviewed the above HPI  And agree with above   Buck George is a 70 y. o.year old who and presents with had no chief complaint listed for this encounter. No chief complaint on file.     Please review addendum/changes made to note above   Interval history: Wants to go home girlfriend is waiting for him    Physical Exam:  General:  Awake, alert, NAD  Head:normal  Eye:normal  Neck:  No JVD   Chest:  Clear to auscultation, respiration easy  Cardiovascular:  S1 and S2 audible, No added heart sounds, No signs of ankle edema, or volume overload, No evidence of JVD, No crackles  Abdomen:   nontender  Extremities:  No  edema  Pulses; palpable  Neuro: grossly normal      MEDICAL DECISION MAKING;    I agree with the above plan, which was planned by myself and discussed with NP.   Stop Cardizem  Continue metoprolol and digoxin  No anticoagulation for now  Repeat CT head in 2 weeks before follow-up with neurosurgery  Follow-up in office will plan outpatient Kinjal Sheffield MD [facility] 01/07/21

## 2021-01-07 NOTE — CARE COORDINATION
Approved thru [facility]. Papito Fontana #[id number], send clinical update by 1/14/2021 to Dileep Lemos at [phone], ext [phone], or fax to [phone].

## 2021-01-07 NOTE — PROGRESS NOTES
Physical Rehabilitation: OCCUPATIONAL THERAPY     [x] daily progress note       [] discharge       Patient Name:  Maureen Whitaker. :  1950 MRN: [id number]  Room:  [address] [place], [place] [address] Date of Admission: 2021  Rehabilitation Diagnosis:   Cerebral infarction, unspecified [I63.9]  Intracranial hemorrhage ([facility] [address] [I62.9]       Date 2021       Day of ARU Week:  4   Time IN//1020; 1255/1325   Individual Tx Minutes    Group Tx Minutes    Co-Treat Minutes   39 +  30     A cotreat was completed this date with  [x]? PT    []? OT   []? ST      This pt requires simultaneous intervention of 2 skilled therapists to safely complete tasks to address complexity of deficits defined in the goals including:  [x]? Attention   [x]? Safety    [x]? Problem Solving    []? Sequencing     []? Initiation    [x]? Processing      []? Recall of learned tasks  []? Communication  []? Self Feeding   [x]? ADL's    [x]? UE Function    [x]? Motor planning   [x]? Balance  []? Energy Conservation   [x]? Verbal cues   [x]? Tactile Cues  []? Barriers     [x]? Transfers   []? Device Use  Pt's response to cotreat: good  Progress toward goals: ongoing   Concurrent Tx Minutes    TOTAL Tx Time Mins 75   Variance Time    Variance Time []   Refusal due to:     []   Medical hold/reason:    []   Illness   []   Off Unit for test/procedure  []   Extra time needed to complete task  []   Therapeutic need  []   Other (specify):   Restrictions Restrictions/Precautions: General Precautions, Fall Risk(L hemiparesis, chronic R shoulder limitations)         Communication with other providers: [x]   OK to see per nursing:     []   Spoke with team member regarding:      Subjective observations and cognitive status: Patient supine pleasant and agreeable to therapy      Pain level/location:    /10       Location: R shoulder    Discharge recommendations  Anticipated discharge date:  TBD  Destination: []home alone   []home alone w assist prn   [] home w/ family    [] Continuous supervision       []SNF    [] Assisted living     [] Other:   Continued therapy: []HHC OT  []OUTPATIENT  OT   [] No Further OT  Equipment needs: TBD     Grooming tasks at EOB: Min A with moderate vc to soak fingernails in hot water and clean hands, dry off with washcloth focus on ADL, following direction , distinguishing L from R side with 75% accuracy   Washes glasses SBA with tactile and verbal cues to follow direction        Toileting:   NA       Toilet Transfers:   NA  Device Used:    []   Standard Toilet         []   Grab Bars           []  Bedside Commode       []   Elevated Toilet          []   Other:        Bed Mobility:   SEE PT NOTES        []   Pt received out of bed   Rolling R/L:    Scooting:    Supine --> Sit:    Sit --> Supine:      Transfers:    Sit--> Stand: total with sera steady Lira completes with PT, Lira effort Minimal assist    Stand --> Sit:     Stand-Pivot:     Other:    Assistive device required for transfer:             Additional Therapeutic activities/exercises completed this date:     [x]   ADL Training   []   Balance/Postural training     []   Bed/Transfer Training   [x]   Endurance Training Atyivity tolerance sitting EOB, standing in sera steady, requires assist of 2 therapists, Lira effort CGA-Mod A increasing with fatigue patient sits EOB for approx 30-35 minutes, encouraged to self correct during ADL tasks at EOB with lateral lean to L side using mirror to assist    []   Neuromuscular Re-ed   []   Nu-step:  Time:        Level:         #Steps:       []   Rebounder:    []  Seated     []  Standing        []   Supine Ther Ex (reps/sets):     []   Seated Ther Ex (reps/sets):     []   Standing Ther Ex (reps/sets):     []   Other:      Comments:  (PM SESSION) patient with BM in undergarments this afternoon session, patient c loose bowels and red swollen scrotal/groin area, nursing made aware   Patient assist clean up of BM with side lying and holding in side lying while therapist to clean and dry and apply ointment to  patients buttocks and scrotal and groin area fully to reduce redness abnd swelling and reduce p[ain , patient holds in side lying for several minutes each side to assist and follows direction given when patient is required to move L LE or R UE  Patient assist [facility] gown as was soiled by BM, patient requiring heavy vc to follow direction and min A to assist pulling clothing fully over arms to shoulder on each side     Patient/Caregiver Education and Training:   [x]   Adaptive Equipment Use  [x]   Bed Mobility/Transfer Technique/Safety  [x]   Energy Conservation Tips  []   Family training  [x]   Postural Awareness  [x]   Safety During Functional Activities  []   Reinforced Patient's Precautions   []   Progress was updated and reviewed in Rehabtracker with patient and/or family this         date.     Treatment Plan for Next Session: POC to continue as tolerated     Assessment:        Treatment/Activity Tolerance:   [] Tolerated treatment with no adverse effects    [x] Patient limited by fatigue  [] Patient limited by pain   [] Patient limited by medical complications:    [] Adverse reaction to Tx:   [] Significant change in status    Safety:       [x]  bed alarm set    []  chair alarm set    []  Pt refused alarms                []  Telesitter activated      [x]  Gait belt used during tx session      []other:       Number of Minutes/Billable Intervention  Therapeutic Exercise    ADL Self-care 20   Neuro Re-Ed    Therapeutic Activity 25 + 30   Group    Other:    TOTAL 75       Social History  Social/Functional History  Lives With: Significant other  Type of Home: House  Home Layout: One level  Home Access: Stairs to enter with rails  Entrance Stairs - Number of Steps: 5+3  Entrance Stairs - Rails: Both  Bathroom Shower/Tub: Walk-in shower, Shower chair with back(shower chair doesn't fit in shower)  Bathroom Toilet: Standard  Bathroom Equipment: Toilet raiser, Shower chair  Bathroom Accessibility: Accessible  Home Equipment: 4 wheeled walker, [address] Help From: Family, Neighbor  ADL Assistance: Needs assistance  Bath: Minimal assistance  Dressing: Minimal assistance  Grooming: Independent  Feeding: Independent  Toileting: Independent  Homemaking Assistance: Independent  Homemaking Responsibilities: No  Meal Prep Responsibility: No  Laundry Responsibility: No  Cleaning Responsibility: No  Bill Paying/Finance Responsibility: No  Shopping Responsibility: No  Dependent Care Responsibility: No  Health Care Management: No  Ambulation Assistance: Independent  Transfer Assistance: Needs assistance  Active : No  Patient's  Info: Leon Rogers is primary , and transport company  Mode of Transportation: Car, Family, Friends  Education: 12th grade  Occupation: Retired  Type of occupation: Pt built trucks for 33 years; [employer]  Leisure & Hobbies: TV, limited reading, girlfriend has a dog. Pt has meds that come in pill packs. Bills are managed over the phone. IADL Comments: Santanaa Friend assists c most IADLs and pt report hiring outside help c [employer]. Additional Comments: Pt reports using rolator in the house.  Pt reports 1 fall this year that occured in the house while ambulating in the home; sleeps in regular, flat bed; R hand dominant    Objective                                                                                    Goals:  (Update in navigator)  Short term goals  Time Frame for Short term goals: STG=LTG:  Long term goals  Time Frame for Long term goals : 12-14 days or until d/c  Long term goal 1: Pt will complete feeding/grooming/oral care task c MOD I by d/c  Long term goal 2: Pt will complete total body bathing c MIN A by d/c  Long term goal 3: Pt will complete UB dressing c SUP by d/c  Long term goal 4: Pt will complete LB dressing c MOD A by d/c  Long term goal 5: Pt will don/doff footwear c SETUP by d/c  Long term goals 6: Pt will complete toileting c MOD A by d/c  Long term goal 7: Pt will complete functional transfer (toilet, tub, shower) c MOD A by d/c. Long term goal 8: Pt will perform therex/therax to facilitate increased strength/endurance/ax tolerance (c emphasis on dynamic standing balance/tolerance >10 mins and BUE endurance) c Min A in order to complete ADLs. :        Plan of Care                                                                              Times per week: 5 days per week for a minimum of 60 minutes/day plus group as appropriate for 60 minutes.   Treatment to include Plan  Times per day: Daily  Current Treatment Recommendations: Strengthening, Endurance Training, Neuromuscular Re-education, Patient/Caregiver Education & Training, ROM, Equipment Evaluation, Education, & procurement, Self-Care / ADL, Balance Training, Functional Mobility Training, Safety Education & Training, Cognitive/Perceptual Training    Electronically signed by   RUBEN Soriano  1/7/2021, 7:46 AM

## 2021-01-07 NOTE — PROGRESS NOTES
[facility] UNIT  SPEECH/LANGUAGE PATHOLOGY      [x] Daily           [] Discharge    Patient:Felipe Arzola. REJI:[birth year]  MLI:[id number]  Rehab Dx/Hx: Cerebral infarction, unspecified [I63.9]  Intracranial hemorrhage ([facility] [address] [I62.9]   Allergies   Allergen Reactions    Nsaids      Renal      Precautions:  Restrictions/Precautions: General Precautions, Fall Risk(L hemiparesis, chronic R shoulder limitations)          Home Situation/IADL:   Social/Functional History  Lives With: Significant other  Type of Home: House  Home Layout: One level  Home Access: Stairs to enter with rails  Entrance Stairs - Number of Steps: 5+3  Entrance Stairs - Rails: Both  Bathroom Shower/Tub: Walk-in shower, Shower chair with back(shower chair doesn't fit in shower)  Bathroom Toilet: Standard  Bathroom Equipment: Toilet raiser, Shower chair  Bathroom Accessibility: Accessible  Home Equipment: 4 wheeled walker, [address] Help From: Family, Neighbor  ADL Assistance: Needs assistance  Bath: Minimal assistance  Dressing: Minimal assistance  Grooming: Independent  Feeding: Independent  Toileting: Independent  Homemaking Assistance: Independent  Homemaking Responsibilities: No  Meal Prep Responsibility: No  Laundry Responsibility: No  Cleaning Responsibility: No  Bill Paying/Finance Responsibility: No  Shopping Responsibility: No  Dependent Care Responsibility: No  Health Care Management: No  Ambulation Assistance: Independent  Transfer Assistance: Needs assistance  Active : No  Patient's  Info: Kenya Winter is primary , and transport company  Mode of Transportation: Car, Family, Friends  Education: 12th grade  Occupation: Retired  Type of occupation: Pt built trucks for 33 years; [employer]  Leisure & Hobbies: TV, limited reading, girlfriend has a dog. Pt has meds that come in pill packs. Bills are managed over the phone.   IADL Comments: Jorge Enciso assists c most IADLs and pt report hiring outside help lukas abernathy. Additional Comments: Pt reports using rolator in the house. Pt reports 1 fall this year that occured in the house while ambulating in the home; sleeps in regular, flat bed; R hand dominant      Date of Admit: 1/4/2021  Room #: 1010/1010-A     ST Number of Minutes/Billable Intervention  Cog/Memory Deficits 15    Aphasia/Language  15   Dysarthria/Speech     Apraxia/Speech     Dysphagia/Swallowing     Group     Other    TOTAL Minutes Billed  30    Variance          Date: 1/7/2021  Day of ARU Week:  4       SLP Individual Minutes  Time In: 1330  Time Out: 1400  Minutes: 30     Variance/Reason:  [] Refusal due to   [] Medical hold/reason  [] Illness   [] Off Unit for test/procedure  [] Extra time needed to complete task  [] Other (specify)    Activity completed: Pt seen for cognition and language this date. Pt received late meal tray; targeted visual scanning for items on tray, STM and orientation, and language retrieval for basic questions related to items. Pain: denies  Current Diet: DIET CARB CONTROL; Dental Soft  Dietary Nutrition Supplements: Low Calorie High Protein Supplement  Subjective: Pt alert; more confused this date and disoriented. Goals and POC: Co-treats where appropriate with PT or OT to facilitate patient goals in functional tasks. LTG      Timeframe for Long-term Goals: No swallow tx                    Short-term Goals  Timeframe for Short-term Goals: 3x/week x3 weeks 30 mins min  LTG: Pt will improve cognitive linguistic abilities for safe return home and clearly communicate care needs. Goal 1: Continued receptive/expressive assessment by 1/6/2021 with additional goals to be added as appropriate. Completed 1/6/2021  Goal 2: Pt will respond to wh?'s with 90% acc given extra time. Moderate cues with vague responses, tangential utterances given extra time. Stories were very confused with pt saying he was \"walking on the deck of the pool today. \"  \"I don't know what time Gayathri Mccullough will be here today. \"  (Reminded pt no visitors--pt disoriented to hospital or that he's had a stroke). Unable to answer questions on what he had just done with PT/OT. Stated, \"they kept me busy\". Even with leading questions unable to specify. Naming items on tray 2/5 this date (roll was a chicken breast that he was holding in his hand) with extra time needed for responses. Attn was fleeting and pt would use vague responses such as \"things, those\" or not respond at all. Goal 3: Pt will follow 2-3 step tasks with min cues and 75% acc  Manipulating items on meal tray demonstrated apraxia with delayed initiation and processing. Basic commands this date required mod to max cues which is different from yesterday. 30% acc for 1 step directions. Pt would have his eyes open but stare blankly at times and then reengage. Goal 4: Pt will attend to therapist on R with eye contact x5 in 5 mins. Poor eye contact and attn overall this date. Goal 5: Pt will improve L visual scanning for locating requested items with mod cues and 60% acc. Mod cues using visual and auditory stim for locating items on tray table. 30% acc. Goal 6: Improve recall of learned tasks with min cues with 70% acc  Pt unable to recall therapists PT/OT just being in room. Unable to recall foods he had just eaten 1/5 were recalled even with leading questions. Pt disoriented to place and did not acknowledge stroke for reason being in the hospital.  Utilized spaced retrieval for 5 mins every 20 secs for items on plate and when asked to recall unable to move past 1/5. Pt behavior and responsiveness has been different every day so far. No consistent pattern. At times pt will be engaged and responding appropriately and other times he's very confused like he is today with tangential utterances and vague non-cohesive thoughts.                                     Alarm placed: [x]bed []chair   []other:   [] activated        Barriers to progress:   [] Fatigue        [x] Cognitive Deficits   [x] Memory Deficits   [x] Reduced Attn   [] Self Limiting Behaviors    [] Reduced insight/awareness     [x] Visual Deficits   [] Premorbid Conditions  [] Impulsivity     [x] Other  Expressive aphasia  Education/Interventions used this date: spaced retrieval with limited improvement observed    []   Progress was updated and reviewed in Rehabtracker with patient and/or family this         date. [x] Attending Care Conference for pt this date. See Team Patient Care Conference Note for updates.     Interventions used this date:  [x] Speech/Language Treatment    [] Instruction in HEP    Group   [] Dysphagia Treatment   [x] Cognitive Skill Dc    Other:         Assessment / Impression                                                          Treatment/Activity Tolerance:   [x] Tolerated Treatment well:     [] Patient limited by fatigue/pain:       [] Patient limited by medical complications:    [] Adverse Reaction to Tx:   [] Significant change in status:      Electronically Signed by  Shalonda Powell [place], [id number] [facility]    1/7/2021  4:11 PM

## 2021-01-07 NOTE — PROGRESS NOTES
[address] [place] : 1950  Acct #: [de-identified]  MRN: [id number]              PM&R Progress Note      Admitting diagnosis: Intracranial hemorrhage ([address] [place] [address])     Comorbid diagnoses impacting rehabilitation: Left hemiparesis, dysarthria, paroxysmal atrial fibrillation, essential hypertension, uncontrolled diabetes type 2 with peripheral neuropathy, COPD, bipolar disorder, diastolic congestive heart failure     Chief complaint: Tired after attempting morning therapies. Prior (baseline) level of function: Independent. Current level of function:         Current  IRF-NOHEMY and Goals:   Hearing, Speech, and Vision  Expression of Ideas and Wants: Some difficulty  Understanding Verbal and Non-Verbal Content: Usually understands  Prior Functioning: Everyday Activities  Self Care: Needed some help  Indoor Mobility (Ambulation): Needed some help  Stairs: Needed some help  Functional Cognition: Independent  Prior Device Use: (Rollator)    Eating  Assistance Needed: Supervision or touching assistance  Comment: 4(deemed usual performance per nursing)  CARE Score: 4  Discharge Goal: Independent  Oral Hygiene  Comment: Will continue to assess, pt required extensive amount of time to complete toileting in bed, pt very lethargic, slow motor movements, and required constant cues (v/c/tactile) to initiate tasks  Reason if not Attempted: Not attempted due to medical condition or safety concerns  CARE Score: 88  Discharge Goal: Independent  Toileting Hygiene  Assistance Needed: Dependent  Comment: 2(deemed usual performance per team huddle)  CARE Score: 1  Discharge Goal: Partial/moderate assistance  Shower/Bathe Self  Assistance Needed: Dependent  Comment: After further assessment, pt required Total A to complete full body bathing, 1st person Max A to maintain upright posture when seated EOB, 2nd person Max A for total body bathing d/t pt's lethargy and cognition required to complete task safely.   Reason if not Attempted: Not attempted due to medical condition or safety concerns  CARE Score: 1  Discharge Goal: Supervision or touching assistance  Upper Body Dressing  Comment: After further assessment, pt required Max A for don/doff d/t pt's lethargy and cognition required to complete task safely. Demo poor task initiation, sequencing, and problem solving. Reason if not Attempted: Not attempted due to medical condition or safety concerns  CARE Score: 88  Discharge Goal: Supervision or touching assistance  Lower Body Dressing  Assistance Needed: Dependent  Comment: After further assessment, pt required Total A, 1st person Max A to maintain lateral position when rolled to L/R when donning over hips, 2nd person Max A for threading of BLE and donning over hips d/t pt's lethargy and cognition required to complete task.   Reason if not Attempted: Not attempted due to medical condition or safety concerns  CARE Score: 1  Discharge Goal: Partial/moderate assistance  Putting On/Taking Off Footwear  Assistance Needed: Dependent  CARE Score: 1  Discharge Goal: Set-up or clean-up assistance    Roll Left and Right  Assistance Needed: Dependent  Comment: pt with poor motor initiation, sequencing, and attention to required task (eyes were closed during task despite verbal prompts)  CARE Score: 1  Discharge Goal: Supervision or touching assistance  Sit to Lying  Assistance Needed: Dependent  Comment: required x2-person assist to complete proper positioning in supine  CARE Score: 1  Discharge Goal: Supervision or touching assistance  Sit to Stand  Comment: pt with poor trunk control in sitting (increased posterior and R lateral trunk lean; poor reactive response noted despite verbal, visual, and tactile cues)  Reason if not Attempted: Not attempted due to medical condition or safety concerns  CARE Score: 88  Discharge Goal: Partial/moderate assistance  Chair/Bed-to-Chair Transfer  Comment: pt with poor trunk control in sitting  Reason if not Attempted: Not attempted due to medical condition or safety concerns  CARE Score: 88  Discharge Goal: Partial/moderate assistance  Toilet Transfer  Comment: not safe to attempt  Reason if not Attempted: Not attempted due to medical condition or safety concerns  CARE Score: 88  Discharge Goal: Partial/moderate assistance  Car Transfer  Reason if not Attempted: Not attempted due to medical condition or safety concerns  CARE Score: 88  Discharge Goal: Partial/moderate assistance   Walk 10 Feet? Walk 10 Feet?: No  1 Step  1 Step?: Yes  Picking Up Object  Reason if not Attempted: Not attempted due to medical condition or safety concerns  CARE Score: 88  Discharge Goal: Partial/moderate assistance  Wheelchair Ability  Uses a Wheelchair and/or Scooter?: Yes  Wheel 50 Feet with Two Turns  Comment: pt unable to safely be transferred to w/c today due to poor level of alertness and poor trunk control in sitting at EOB today  Reason if not Attempted: Not attempted due to medical condition or safety concerns  CARE Score: 88  Discharge Goal: Supervision or touching assistance  Wheel 150 Feet  Reason if not Attempted: Not attempted due to medical condition or safety concerns  CARE Score: 88  Discharge Goal: Supervision or touching assistance            I      Exam:    Blood pressure (!) 109/56, pulse 125, temperature 98.2 °F (36.8 °C), temperature source Oral, resp. rate 16, height 5' 9\" (1.753 m), weight 224 lb (101.6 kg), SpO2 96 %. General: Lying back in bed. Opens his eyes to my questions. Soft speech. HEENT: Neck supple without JVD. Pulmonary: No rales or rhonchi. Cardiac: Heart rate trending up at rest.  Irregular rhythm. Abdomen: Patient's abdomen is soft and nondistended. Bowel sounds were present throughout. There was no rebound, guarding or masses noted. Upper extremities: No new bruising or swelling. Able to bring his hands together in the midline.     Lower extremities: Clumsy leg movements without signs of DVT.    Sitting balance was fair. Standing balance was poor. Lab Results   Component Value Date    WBC 8.9 12/19/2020    HGB 15.1 12/19/2020    HCT 48.6 12/19/2020    MCV 94.4 12/19/2020     12/19/2020     Lab Results   Component Value Date    INR 0.84 12/19/2020    INR 1.01 06/03/2020    INR 0.90 06/02/2020    PROTIME 10.1 (L) 12/19/2020    PROTIME 12.2 06/03/2020    PROTIME 10.9 (L) 06/02/2020     Lab Results   Component Value Date    CREATININE 1.7 (H) 12/19/2020    BUN 14 12/19/2020     (L) 12/19/2020    K 3.6 12/19/2020    CL 98 (L) 12/19/2020    CO2 24 12/19/2020     Lab Results   Component Value Date    ALT 10 12/19/2020    AST 15 12/19/2020    ALKPHOS 82 12/19/2020    BILITOT 0.6 12/19/2020       Expected length of stay  prior to a supervised level of function for discharge home with a walker and [facility] OT/PT is 3 weeks. Recommendations:    1. Right intracranial hemorrhage with left hemiparesis:  Requiring significant cueing to stay engaged in his daily occupational and physical therapy with speech-language pathology.  Consulting cardiology to advise us on the use of anticoagulation. His heart rate is also elevated today which they can help address. Providing anticonvulsants, blood pressure correction and blood sugar treatment.      Ongoing pulmonary hygiene, DVT prophylaxis and nutritional support.  Bowel and bladder retraining.  Caregiver education with his significant other.  Adaptive equipment training.    Verbal cues and physical assist to sit at the edge of the bed. 2. DVT prophylaxis: Heparin 5000 units every 8 hours.  I must monitor his hemoglobin and platelet count periodically while on this medication.  Weightbearing activities are limited at this time.  GI prophylaxis offered.  No clinical signs of blood loss. 3. Paroxysmal atrial fibrillation with anticoagulation: Asking cardiology about restarting Xarelto. They can evaluate his rate issues today as well.    Daily weights do not show any decompensation of CHF.  Minimize stimulants. 4. Uncontrolled diabetes type 2 with peripheral neuropathy: Patient requires a diet modified for carbohydrates.  He is on Neurontin for peripheral neuropathy symptoms.  Blood sugars are checked at mealtime and bedtime.  Sliding scale Humalog for now with reintroduction of oral agents once his oral intake stabilize. 5. Essential hypertension: Patient requires multiple medications for blood pressure regulation.  Target systolic blood pressure is 120-140.  Vital signs are checked at rest and with activity. 6. COPD: Aggressive pulmonary hygiene.  Monitoring O2 saturations at rest and with activity. 7. Bipolar disorder:  BuSpar, Depakote, Lexapro and risperidone.  Treating in a calm and consistent environment.  Providing written and verbal instructions when possible to assist with information retention.

## 2021-01-07 NOTE — PLAN OF CARE
Problem: Pain:  Goal: Pain level will decrease  Description: Pain level will decrease  Outcome: Ongoing  Goal: Control of acute pain  Description: Control of acute pain  Outcome: Ongoing  Goal: Control of chronic pain  Description: Control of chronic pain  Outcome: Ongoing     Problem: Skin Integrity:  Goal: Will show no infection signs and symptoms  Description: Will show no infection signs and symptoms  Outcome: Ongoing  Goal: Absence of new skin breakdown  Description: Absence of new skin breakdown  Outcome: Ongoing     Problem: Falls - Risk of:  Goal: Will remain free from falls  Description: Will remain free from falls  Outcome: Ongoing  Goal: Absence of physical injury  Description: Absence of physical injury  Outcome: Ongoing     Problem: Pain:  Goal: Pain level will decrease  Description: Pain level will decrease  Outcome: Ongoing     Problem: Pain:  Goal: Pain level will decrease  Description: Pain level will decrease  Outcome: Ongoing  Goal: Control of acute pain  Description: Control of acute pain  Outcome: Ongoing  Goal: Control of chronic pain  Description: Control of chronic pain  Outcome: Ongoing     Problem: Skin Integrity:  Goal: Will show no infection signs and symptoms  Description: Will show no infection signs and symptoms  Outcome: Ongoing  Goal: Absence of new skin breakdown  Description: Absence of new skin breakdown  Outcome: Ongoing     Problem: Falls - Risk of:  Goal: Will remain free from falls  Description: Will remain free from falls  Outcome: Ongoing  Goal: Absence of physical injury  Description: Absence of physical injury  Outcome: Ongoing

## 2021-01-08 LAB
EKG ATRIAL RATE: 56 BPM
EKG DIAGNOSIS: NORMAL
EKG P AXIS: 27 DEGREES
EKG P-R INTERVAL: 134 MS
EKG Q-T INTERVAL: 426 MS
EKG QRS DURATION: 102 MS
EKG QTC CALCULATION (BAZETT): 411 MS
EKG R AXIS: -13 DEGREES
EKG T AXIS: 41 DEGREES
EKG VENTRICULAR RATE: 56 BPM
GLUCOSE BLD-MCNC: 102 MG/DL (ref 70–99)
GLUCOSE BLD-MCNC: 88 MG/DL (ref 70–99)
GLUCOSE BLD-MCNC: 93 MG/DL (ref 70–99)
GLUCOSE BLD-MCNC: 93 MG/DL (ref 70–99)

## 2021-01-08 PROCEDURE — 1280000000 HC REHAB R&B

## 2021-01-08 PROCEDURE — 6370000000 HC RX 637 (ALT 250 FOR IP): Performed by: PHYSICAL MEDICINE & REHABILITATION

## 2021-01-08 PROCEDURE — 97129 THER IVNTJ 1ST 15 MIN: CPT

## 2021-01-08 PROCEDURE — 94150 VITAL CAPACITY TEST: CPT

## 2021-01-08 PROCEDURE — 6370000000 HC RX 637 (ALT 250 FOR IP): Performed by: NURSE PRACTITIONER

## 2021-01-08 PROCEDURE — 94761 N-INVAS EAR/PLS OXIMETRY MLT: CPT

## 2021-01-08 PROCEDURE — 82962 GLUCOSE BLOOD TEST: CPT

## 2021-01-08 PROCEDURE — 93010 ELECTROCARDIOGRAM REPORT: CPT | Performed by: INTERNAL MEDICINE

## 2021-01-08 PROCEDURE — 97530 THERAPEUTIC ACTIVITIES: CPT

## 2021-01-08 PROCEDURE — 92507 TX SP LANG VOICE COMM INDIV: CPT

## 2021-01-08 PROCEDURE — 97535 SELF CARE MNGMENT TRAINING: CPT

## 2021-01-08 PROCEDURE — 99232 SBSQ HOSP IP/OBS MODERATE 35: CPT | Performed by: PHYSICAL MEDICINE & REHABILITATION

## 2021-01-08 PROCEDURE — 93005 ELECTROCARDIOGRAM TRACING: CPT | Performed by: NURSE PRACTITIONER

## 2021-01-08 PROCEDURE — 6360000002 HC RX W HCPCS: Performed by: INTERNAL MEDICINE

## 2021-01-08 PROCEDURE — 97542 WHEELCHAIR MNGMENT TRAINING: CPT

## 2021-01-08 RX ADMIN — ESCITALOPRAM OXALATE 10 MG: 10 TABLET, FILM COATED ORAL at 08:48

## 2021-01-08 RX ADMIN — GABAPENTIN 600 MG: 300 CAPSULE ORAL at 13:45

## 2021-01-08 RX ADMIN — BUSPIRONE HYDROCHLORIDE 10 MG: 5 TABLET ORAL at 08:48

## 2021-01-08 RX ADMIN — ATORVASTATIN CALCIUM 80 MG: 40 TABLET, FILM COATED ORAL at 20:56

## 2021-01-08 RX ADMIN — CEPHALEXIN 250 MG: 250 CAPSULE ORAL at 12:02

## 2021-01-08 RX ADMIN — ENOXAPARIN SODIUM 40 MG: 100 INJECTION SUBCUTANEOUS at 09:01

## 2021-01-08 RX ADMIN — BENZTROPINE MESYLATE 0.5 MG: 1 TABLET ORAL at 08:49

## 2021-01-08 RX ADMIN — DOCUSATE SODIUM 100 MG: 100 CAPSULE, LIQUID FILLED ORAL at 08:48

## 2021-01-08 RX ADMIN — GABAPENTIN 600 MG: 300 CAPSULE ORAL at 08:48

## 2021-01-08 RX ADMIN — DOXAZOSIN 2 MG: 4 TABLET ORAL at 20:57

## 2021-01-08 RX ADMIN — BUSPIRONE HYDROCHLORIDE 10 MG: 5 TABLET ORAL at 13:45

## 2021-01-08 RX ADMIN — RISPERIDONE 1 MG: 0.5 TABLET, FILM COATED ORAL at 08:49

## 2021-01-08 RX ADMIN — FAMOTIDINE 20 MG: 20 TABLET, FILM COATED ORAL at 08:44

## 2021-01-08 RX ADMIN — CEPHALEXIN 250 MG: 250 CAPSULE ORAL at 05:56

## 2021-01-08 RX ADMIN — SENNOSIDES 8.6 MG: 8.6 TABLET, FILM COATED ORAL at 20:56

## 2021-01-08 RX ADMIN — GABAPENTIN 600 MG: 300 CAPSULE ORAL at 20:57

## 2021-01-08 RX ADMIN — TRAZODONE HYDROCHLORIDE 50 MG: 50 TABLET ORAL at 20:56

## 2021-01-08 RX ADMIN — BENZTROPINE MESYLATE 0.5 MG: 1 TABLET ORAL at 20:56

## 2021-01-08 RX ADMIN — CEPHALEXIN 250 MG: 250 CAPSULE ORAL at 00:11

## 2021-01-08 RX ADMIN — METOPROLOL TARTRATE 25 MG: 25 TABLET, FILM COATED ORAL at 20:56

## 2021-01-08 RX ADMIN — BUSPIRONE HYDROCHLORIDE 10 MG: 5 TABLET ORAL at 20:56

## 2021-01-08 RX ADMIN — DOCUSATE SODIUM 100 MG: 100 CAPSULE, LIQUID FILLED ORAL at 20:56

## 2021-01-08 RX ADMIN — MICONAZOLE NITRATE: 20 POWDER TOPICAL at 08:55

## 2021-01-08 RX ADMIN — DIVALPROEX SODIUM 1000 MG: 500 TABLET, FILM COATED, EXTENDED RELEASE ORAL at 20:55

## 2021-01-08 RX ADMIN — MICONAZOLE NITRATE: 20 POWDER TOPICAL at 20:55

## 2021-01-08 ASSESSMENT — PAIN DESCRIPTION - LOCATION: LOCATION: NECK;SHOULDER;BACK

## 2021-01-08 ASSESSMENT — PAIN SCALES - GENERAL
PAINLEVEL_OUTOF10: 0
PAINLEVEL_OUTOF10: 0
PAINLEVEL_OUTOF10: 8

## 2021-01-08 NOTE — PROGRESS NOTES
Physical Therapy    [x] daily progress note       [] discharge       Patient Name:  Ghazal Ibrahim.    :  1950 MRN: [id number]  Room:  [address] [place], [place] [address]A Date of Admission: 2021  Rehabilitation Diagnosis:   Cerebral infarction, unspecified [I63.9]  Intracranial hemorrhage ([facility] [address] [I62.9]       Date 2021       Day of ARU Week:  5   Time IN//1030  1300/1325   Individual Tx Minutes    Group Tx Minutes    Co-Treat Minutes 55+25  A cotreat was completed this date with  [] PT    [x] VALENZUELA   [] ST      This pt requires simultaneous intervention of 2 skilled therapists to safely complete tasks to address complexity of deficits defined in the goals including:  [x]Attention   [x] Safety    [x]Problem Solving    [x]Sequencing     [x]Initiation    [x]Processing      []Recall of learned tasks  [] Communication  [] Self Feeding   []ADL's    [x]UE Function    [x]Motor planning   [x]Balance  []Energy Conservation   [x]Verbal cues   [x] Tactile Cues  []Barriers     [x]Transfers   []Device Use   Concurrent Tx Minutes    TOTAL Tx Time Mins 80   Variance Time +5   Variance Time []   Refusal due to:     []   Medical hold/reason:    []   Illness   []   Off Unit for test/procedure  [x]   Extra time needed to complete tasks  []   Therapeutic need  []   Other (specify):   Restrictions Restrictions/Precautions  Restrictions/Precautions: General Precautions, Fall Risk(L hemiparesis, chronic R shoulder limitations)  Position Activity Restriction  Other position/activity restrictions: see catheter   Interdisciplinary communication [x]   Cleared for therapy per nursing     [x]   RN and nurse manager notified about pt's physical and cognitive deficits and that pt will benefit from being transferred to a room closer to the nurse's station [phone]) for more direct supervision   []   RN updated on pt performance  []   Spoke with   []   Spoke with OT  []   Spoke with MD  []   Other:    Subjective observations and cognitive lethargy and poor motor initiation requiring Total A    []   Supine ther ex (reps/sets):     []   Seated ther ex (reps/sets):     []   Standing ther ex (reps/sets):     []   Picking up object from floor (standing):                   []   Reacher used   []   Other: Toileting activity completed with    [x]   Other: cognitive reorientation to situation, hand-eye coordination training and simple command following with pt performing trunk rotation to perform diagonal reach across midline and be able to reach visual target on each bed rail as preparatory movements required for rolling L/R. Pt required manual cues to use appropriate UE for task as he was not receptive to verbal cues only and significant physical assist to rotate trunk due to limited ROM and problem solving. Pt positioning in bed for proper body alignment and to promote increased awareness to maintain trunk in midline that would be necessary for safe feeding and pulmonary function. Comments:      Patient/Caregiver Education and Training:   []   Role of PT  []   Education about Dx  [x]   Use of call light for assist   []   HEP provided and explained   []   Treatment plan reviewed  []   Home safety  [x]   Wheelchair mobility/management   []   Body mechanics  []   Bed Mobility/Transfer technique  []   Gait technique/sequencing  []   Proper use of assistive device/adaptive equipment  []   Stair training/Advanced mobility safety and technique  []   Reinforced patient's precautions/mobility while maintaining precautions  [x]   Postural awareness  []   Family/caregiver training  []   Progress was updated and reviewed in Rehabtracker with patient and/or family this date. []   Other:    Treatment Plan for Next Session: bed mobility training, sitting balance/trunk/postural control training     Assessment: Pt was cue-dependent on all tasks due to persistent lethargy, cognitive deficits, and neuro deficits on L side of pt's body.  He had poor carry over of motor retraining at bed level and during w/c mobility training due to his poor level of alertness. He also appeared to be more confused during PM session requiring constant redirection, however was oriented to his name, birthday, and place and was able to count from 1 to 5 and count backwards from 10. Despite being transferred to the w/c for the 1st time today since ARU admission, he is not safe to be left unattended OOB due to his poor level of alertness, poor awareness of his poor trunk control, and poor initiation to use call light. Treatment/Activity Tolerance:   [] Tolerated treatment with no adverse effects    [x] Patient limited by fatigue  [x] Patient limited by pain   [x] Patient limited by medical complications: increased lethargy   [] Adverse reaction to Tx:   [] Significant change in status    Barrier/s to progress/learning:   []   None  [x]   Cognition  []   Hearing deficit  []   Pre-morbid mental/psychological status   []   Motivation  []   Communication  []   Anxiety  [x]   Vision deficit  [x]   Attention  [x]   Other: L neglect?  R shoulder pain/limitation, chronic neck/trunk deformity       Safety:       [x]  bed alarm set    []  chair alarm set    []  Pt refused alarms                []  Telesitter activated      [x]  Gait belt used during tx session      []other:         Number of Minutes/Billable Intervention  Gait Training    Therapeutic Exercise    Neuro Re-Ed    Therapeutic Activity 50   Wheelchair Propulsion 30   Group    Other:    TOTAL 80         Social History  Social/Functional History  Lives With: Significant other  Type of Home: House  Home Layout: One level  Home Access: Stairs to enter with rails  Entrance Stairs - Number of Steps: 5+3  Entrance Stairs - Rails: Both  Bathroom Shower/Tub: Walk-in shower, Shower chair with back(shower chair doesn't fit in shower)  Bathroom Toilet: Standard  Bathroom Equipment: Toilet raiser, Shower chair  Bathroom Accessibility: Accessible  Home

## 2021-01-08 NOTE — PROGRESS NOTES
Physical Rehabilitation: OCCUPATIONAL THERAPY     [x] daily progress note       [] discharge       Patient Name:  Tamara Go. :  1950 MRN: [id number]  Room:  [address] [place], [place] [address] Date of Admission: 2021  Rehabilitation Diagnosis:   Cerebral infarction, unspecified [I63.9]  Intracranial hemorrhage ([facility] [address] [I62.9]       Date 2021       Day of ARU Week:  5   Time IN//1055; 1300/1325   Individual Tx Minutes    Group Tx Minutes    Co-Treat Minutes 935/1030; [id number]   Concurrent Tx Minutes 54 + 25  A cotreat was completed this date with  [x]? ? PT    []? ? OT   []? ? ST      This pt requires simultaneous intervention of 2 skilled therapists to safely complete tasks to address complexity of deficits defined in the goals including:  [x]? ? Attention   [x]? ? Safety    [x]? ?Problem Solving    []? ? Sequencing     []? ?Initiation    [x]? ? Processing      []? ? Recall of learned tasks  []? ? Communication  []? ? Self Feeding   [x]? ?ADL's    [x]? ?UE Function    [x]? ? Motor planning   [x]? ? Balance  []? ?Energy Conservation   [x]? ? Verbal cues   [x]? ? Tactile Cues  []? ? Barriers     [x]? ? Transfers   []? ? Device Use  Pt's response to cotreat: good  Progress toward goals: ongoing   TOTAL Tx Time Mins 80   Variance Time    Variance Time []   Refusal due to:     []   Medical hold/reason:    []   Illness   []   Off Unit for test/procedure  []   Extra time needed to complete task  []   Therapeutic need  []   Other (specify):   Restrictions Restrictions/Precautions: General Precautions, Fall Risk(L hemiparesis, chronic R shoulder limitations)         Communication with other providers: [x]   OK to see per nursing:     []   Spoke with team member regarding:      Subjective observations and cognitive status: Patient supine in bed sleeping upon therapy arrival      Pain level/location:    /10       Location: L shoulder  And neck    Discharge recommendations  Anticipated discharge date:    Destination: []home alone   []home alone w assist prn   [] home w/ family    [] Continuous supervision       []SNF    [] Assisted living     [] Other:   Continued therapy: []HHC OT  []OUTPATIENT  OT   [] No Further OT  Equipment needs: TBD         Bed Mobility: SEE OT NOTES FOR ASSIST           []   Pt received out of bed   Rolling R/L:    Scooting:    Supine --> Sit:    Sit --> Supine:      Transfers:  SEE PT NOTES FOR ASSIST   Sit--> Stand: sera steady, CGA/Min  for VALENZUELA OOB , Mod/Max for VALENZUELA from wc at end of session   Stand --> Sit:   CGA for VALENZUELA   Stand-Pivot:     Other:  Patient fatigues quickly and assist varies with fatigue level   Assistive device required for transfer:   Sera Steady       Functional Mobility:  Down hallway in ARU   Assistance:   Skyler Pruitt to assist L UE with loud repetitive vc for focus and attention to propel wc   Device:   []   Rolling Walker     []   Standard Walker [x]   Wheelchair        []   [facility]       []   4-Wheeled Danielle Earl         []   Cardiac Walker       []   Other:          Additional Therapeutic activities/exercises completed this date:     [x]   ADL Training patient educated on side lying with hold and pull to each side to assist with LB dressing    [x]   Balance/Postural training     [x]   Bed/Transfer Training   [x]   Endurance Training patient completes functional mobility in wheelchair requiring max A for increased activity tolerance and increase ability and length of time  to stay awake with focus    []   Neuromuscular Re-ed   []   Nu-step:  Time:        Level:         #Steps:       []   Rebounder:    []  Seated     []  Standing        []   Supine Ther Ex (reps/sets):     []   Seated Ther Ex (reps/sets):     []   Standing Ther Ex (reps/sets):     [x]   Other: accessing ability to scan and locate red theraband tied to bed rail, on both r and l side, patient cued to reach with opposite hand to cross midline to grab red theraband, patient requires min/mod A to assist reach totally to either side, constricting opposite hand, and max vc to locate band and hold for 5-10 seconds      Comments:  PT spoke with nursing staff earlier after AM session to request changing patient to room 5, secondary to requiring intermittent supervision for incontinence level with patient not cognitively able to tell if having or have had BM, posing skin breakdown issues, requiring supervision durng meals, inability to initiate call to nurse with call light if requiring assistance, and requiring assistance to correct lateral lean to L, PT and VALENZUELA again spoke with nursing at PM session with nursing voing into room to assess patient cognitive abilities as therapists explain more of patients lunch was in lap than eaten as patient continuously falls asleep during meals,  Nursing subsequently found VALENZUELA later on that afternoon to state that patient will be moved to room 1005     Patient/Caregiver Education and Training:   [x]   Adaptive Equipment Use  [x]   Bed Mobility/Transfer Technique/Safety  [x]   Energy Conservation Tips  []   Family training  [x]   Postural Awareness patient requires loud clear and short vc with max A to decrease latera l lean to L side and max A to adjust posture when lean begins   [x]   Safety During Functional Activities  [x]   Reinforced Patient's Precautions   []   Progress was updated and reviewed in Rehabtracker with patient and/or family this         date.     Treatment Plan for Next Session: POC to continue as tolerated       Assessment:        Treatment/Activity Tolerance:   [x] Tolerated treatment with no adverse effects    [] Patient limited by fatigue  [] Patient limited by pain   [] Patient limited by medical complications:    [] Adverse reaction to Tx:   [] Significant change in status    Safety:       [x]  bed alarm set    []  chair alarm set    []  Pt refused alarms                []  Telesitter activated      [x]  Gait belt used during tx session      []other:       Number of Minutes/Billable Intervention  Therapeutic Exercise    ADL Self-care 15   Neuro Re-Ed    Therapeutic Activity 40 + 25   Group    Other:    TOTAL 80       Social History  Social/Functional History  Lives With: Significant other  Type of Home: House  Home Layout: One level  Home Access: Stairs to enter with rails  Entrance Stairs - Number of Steps: 5+3  Entrance Stairs - Rails: Both  Bathroom Shower/Tub: Walk-in shower, Shower chair with back(shower chair doesn't fit in shower)  Bathroom Toilet: Standard  Bathroom Equipment: Toilet raiser, Shower chair  Bathroom Accessibility: Accessible  Home Equipment: 4 wheeled walkerRoom 21 Media Help From: Family, Neighbor  ADL Assistance: Needs assistance  Bath: Minimal assistance  Dressing: Minimal assistance  Grooming: Independent  Feeding: Independent  Toileting: Independent  Homemaking Assistance: Independent  Homemaking Responsibilities: No  Meal Prep Responsibility: No  Laundry Responsibility: No  Cleaning Responsibility: No  Bill Paying/Finance Responsibility: No  Shopping Responsibility: No  Dependent Care Responsibility: No  Health Care Management: No  Ambulation Assistance: Independent  Transfer Assistance: Needs assistance  Active : No  Patient's  Info: Marguerite Mack is primary , and transport company  Mode of Transportation: Car, Family, Friends  Education: 12th grade  Occupation: Retired  Type of occupation: Pt built trucks for 33 years; [employer]  Leisure & Hobbies: TV, limited reading, girlfriend has a dog. Pt has meds that come in pill packs. Bills are managed over the phone. IADL Comments: [facility] assists c most IADLs and pt report hiring outside help c yardork. Additional Comments: Pt reports using rolator in the house.  Pt reports 1 fall this year that occured in the house while ambulating in the home; sleeps in regular, flat bed; R hand dominant    Objective                                                                                    Goals: (Update in navigator)  Short term goals  Time Frame for Short term goals: STG=LTG:  Long term goals  Time Frame for Long term goals : 12-14 days or until d/c  Long term goal 1: Pt will complete feeding/grooming/oral care task c MOD I by d/c  Long term goal 2: Pt will complete total body bathing c MIN A by d/c  Long term goal 3: Pt will complete UB dressing c SUP by d/c  Long term goal 4: Pt will complete LB dressing c MOD A by d/c  Long term goal 5: Pt will don/doff footwear c SETUP by d/c  Long term goals 6: Pt will complete toileting c MOD A by d/c  Long term goal 7: Pt will complete functional transfer (toilet, tub, shower) c MOD A by d/c. Long term goal 8: Pt will perform therex/therax to facilitate increased strength/endurance/ax tolerance (c emphasis on dynamic standing balance/tolerance >10 mins and BUE endurance) c Min A in order to complete ADLs. :        Plan of Care                                                                              Times per week: 5 days per week for a minimum of 60 minutes/day plus group as appropriate for 60 minutes.   Treatment to include Plan  Times per day: Daily  Current Treatment Recommendations: Strengthening, Endurance Training, Neuromuscular Re-education, Patient/Caregiver Education & Training, ROM, Equipment Evaluation, Education, & procurement, Self-Care / ADL, Balance Training, Functional Mobility Training, Safety Education & Training, Cognitive/Perceptual Training    Electronically signed by   RUBEN Mcgarry  1/8/2021, 7:42 AM

## 2021-01-08 NOTE — CARE COORDINATION
Patient reviewed at today's care conference. Patient will d/c home with sig other [facility]. She is an experienced caregiver. C pt/ot/RN/HHA. KATHARINE MONTAGUE. D/c 1/26.

## 2021-01-08 NOTE — PROGRESS NOTES
[address] [place] : 1950  Acct #: [de-identified]  MRN: [id number]              PM&R Progress Note      Admitting diagnosis: Intracranial hemorrhage ( [facility])     Comorbid diagnoses impacting rehabilitation: Left hemiparesis, dysarthria, paroxysmal atrial fibrillation, essential hypertension, uncontrolled diabetes type 2 with peripheral neuropathy, COPD, bipolar disorder, diastolic congestive heart failure     Chief complaint: No verbalized complaints. Falls asleep during my encounter. Prior (baseline) level of function: Independent. Current level of function:         Current  IRF-NOHEMY and Goals:   Occupational Therapy:    Short term goals  Time Frame for Short term goals: STG=LTG :   Long term goals  Time Frame for Long term goals : 12-14 days or until d/c  Long term goal 1: Pt will complete feeding/grooming/oral care task c MOD I by d/c  Long term goal 2: Pt will complete total body bathing c MIN A by d/c  Long term goal 3: Pt will complete UB dressing c SUP by d/c  Long term goal 4: Pt will complete LB dressing c MOD A by d/c  Long term goal 5: Pt will don/doff footwear c SETUP by d/c  Long term goals 6: Pt will complete toileting c MOD A by d/c  Long term goal 7: Pt will complete functional transfer (toilet, tub, shower) c MOD A by d/c. Long term goal 8: Pt will perform therex/therax to facilitate increased strength/endurance/ax tolerance (c emphasis on dynamic standing balance/tolerance >10 mins and BUE endurance) c Min A in order to complete ADLs. :                                       Eating: Eating  Assistance Needed: Supervision or touching assistance  Comment: After further assessment, pt will require SUP feeds d/t pt's lethargy, poor task initiation, and cognition to complete task safely.   CARE Score: 4  Discharge Goal: Independent       Oral Hygiene: Oral Hygiene  Assistance Needed: Setup or clean-up assistance  Comment: x  Reason if not Attempted: Not attempted due to medical condition or safety concerns  CARE Score: 5  Discharge Goal: Independent    UB/LB Bathing: Shower/Bathe Self  Assistance Needed: Dependent  Comment: After further assessment, pt required Total A to complete full body bathing, 1st person Max A to maintain upright posture when seated EOB, 2nd person Max A for total body bathing d/t pt's lethargy and cognition required to complete task safely. Reason if not Attempted: Not attempted due to medical condition or safety concerns  CARE Score: 1  Discharge Goal: Supervision or touching assistance    UB Dressing: Upper Body Dressing  Assistance Needed: Substantial/maximal assistance  Comment: After further assessment, pt required Max A for don/doff d/t pt's lethargy and cognition required to complete task safely. Demo poor task initiation, sequencing, and problem solving. Reason if not Attempted: Not attempted due to medical condition or safety concerns  CARE Score: 2  Discharge Goal: Supervision or touching assistance         LB Dressing: Lower Body Dressing  Assistance Needed: Dependent  Comment: After further assessment, pt required Total A, 1st person Max A to maintain lateral position when rolled to L/R when donning over hips, 2nd person Max A for threading of BLE and donning over hips d/t pt's lethargy and cognition required to complete task. Reason if not Attempted: Not attempted due to medical condition or safety concerns  CARE Score: 1  Discharge Goal: Partial/moderate assistance    Donning and [place] Footwear: Putting On/Taking Off Footwear  Assistance Needed: Dependent  CARE Score: 1  Discharge Goal: Set-up or clean-up assistance      Toileting: Toileting Hygiene  Assistance Needed: Substantial/maximal assistance  Comment: Pt required Max A for completion of overall toileting (CM and Hygiene) completed in bed c bed pan, pt attempted to demo paulino-hygiene c RUE, however requires assist for thoroughness.   CARE Score: 2  Discharge Goal: Partial/moderate assistance Toilet Transfers: Toilet Transfer  Comment: not safe to attempt  Reason if not Attempted: Not attempted due to medical condition or safety concerns  CARE Score: 88  Discharge Goal: Partial/moderate assistance    Physical Therapy:   Short term goals  Time Frame for Short term goals: 14-21 tx days:  Short term goal 1: Pt will complete bed mobility tasks and sup<->sit with SBA-Sup. Short term goal 2: Pt will complete OOB transfers using RW with Mod A. Short term goal 3: Pt will ambulate 10 ft over level and carpeted surfaces using RW with Min A. Short term goal 4: Pt will propel manual w/c 150 ft with turns with SBA-Sup. Short term goal 5: Caregiver will be able to safely assist pt with functional mobility tasks.             Bed Mobility:   Sit to Lying  Assistance Needed: Dependent  Comment: required x2-person assist to complete proper positioning in supine  CARE Score: 1  Discharge Goal: Supervision or touching assistance  Roll Left and Right  Assistance Needed: Dependent  Comment: pt with poor motor initiation, sequencing, and attention to required task (eyes were closed during task despite verbal prompts)  CARE Score: 1  Discharge Goal: Supervision or touching assistance  Sit to Lying  Assistance Needed: Dependent  Comment: required x2-person assist to complete proper positioning in supine  CARE Score: 1  Discharge Goal: Supervision or touching assistance    Transfers:    Sit to Stand  Assistance Needed: Dependent  Comment: x2-PA using [employer] today  Reason if not Attempted: Not attempted due to medical condition or safety concerns  CARE Score: 1  Discharge Goal: Partial/moderate assistance  Chair/Bed-to-Chair Transfer  Comment: pt with poor trunk control in sitting  Reason if not Attempted: Not attempted due to medical condition or safety concerns  CARE Score: 88  Discharge Goal: Partial/moderate assistance     Car Transfer  Reason if not Attempted: Not attempted due to medical condition or safety concerns  CARE Score: 88  Discharge Goal: Partial/moderate assistance    Ambulation:    Walking Ability  Does the Patient Walk?: Yes     Walk 10 Feet  Comment: pt unable to stand due to poor sitting balance and trunk control  Reason if not Attempted: Not attempted due to medical condition or safety concerns  CARE Score: 88  Discharge Goal: Partial/moderate assistance     Walk 50 Feet with Two Turns  Reason if not Attempted: Not attempted due to medical condition or safety concerns  CARE Score: 88  Discharge Goal: Not Attempted     Walk 150 Feet  Reason if not Attempted: Not applicable  CARE Score: 9  Discharge Goal: Not Applicable     Walking 10 Feet on Uneven Surfaces  Reason if not Attempted: Not attempted due to medical condition or safety concerns  CARE Score: 88  Discharge Goal: Partial/moderate assistance     1 Step (Curb)  Reason if not Attempted: Not attempted due to medical condition or safety concerns  CARE Score: 88  Discharge Goal: Not Attempted     4 Steps  Reason if not Attempted: Not attempted due to medical condition or safety concerns  CARE Score: 88  Discharge Goal: Not Attempted     12 Steps  Reason if not Attempted: Not applicable  CARE Score: 9  Discharge Goal: Not Applicable       Wheelchair:  w/c Ability: Wheelchair Ability  Uses a Wheelchair and/or Scooter?: Yes  Wheel 50 Feet with Two Turns  Comment: pt unable to safely be transferred to w/c today due to poor level of alertness and poor trunk control in sitting at EOB today  Reason if not Attempted: Not attempted due to medical condition or safety concerns  CARE Score: 88  Discharge Goal: Supervision or touching assistance  Wheel 150 Feet  Reason if not Attempted: Not attempted due to medical condition or safety concerns  CARE Score: 88  Discharge Goal: Supervision or touching assistance          Balance:        Object: Picking Up Object  Reason if not Attempted: Not attempted due to medical condition or safety concerns  CARE Score: 88  Discharge Goal: Partial/moderate assistance    I      Exam:    Blood pressure 109/60, pulse 59, temperature 98.2 °F (36.8 °C), temperature source Oral, resp. rate 16, height 5' 9\" (1.753 m), weight 217 lb 8 oz (98.7 kg), SpO2 95 %. General: Lying back in bed. Resting quietly. Some dysarthric verbalized phrases to answer my questions initially, but then he falls asleep. HEENT: Mucous membranes are moist.  Mild left facial droop without drooling. Neck supple. Pulmonary: Symmetric air exchange with faint rhonchi in the upper lung fields. Cardiac: Distant heart sounds with occasional premature beat. Rate controlled. Abdomen: Patient's abdomen is soft and nondistended. Bowel sounds were present throughout. There was no rebound, guarding or masses noted. Upper extremities: Clumsy movements bilaterally with weaker  on the left. Grimaces with palpation about the right shoulder. Lower extremities: Paucity of leg movements while in bed. Trace lower limb edema. Give way with MMT on the left. Sitting balance was poor. Standing balance was poor. Lab Results   Component Value Date    WBC 8.9 12/19/2020    HGB 15.1 12/19/2020    HCT 48.6 12/19/2020    MCV 94.4 12/19/2020     12/19/2020     Lab Results   Component Value Date    INR 0.84 12/19/2020    INR 1.01 06/03/2020    INR 0.90 06/02/2020    PROTIME 10.1 (L) 12/19/2020    PROTIME 12.2 06/03/2020    PROTIME 10.9 (L) 06/02/2020     Lab Results   Component Value Date    CREATININE 1.7 (H) 12/19/2020    BUN 14 12/19/2020     (L) 12/19/2020    K 3.6 12/19/2020    CL 98 (L) 12/19/2020    CO2 24 12/19/2020     Lab Results   Component Value Date    ALT 10 12/19/2020    AST 15 12/19/2020    ALKPHOS 82 12/19/2020    BILITOT 0.6 12/19/2020       Expected length of stay  prior to a supervised level of function for discharge home with a walker and [address] OT/PT is 1/26/2021. Recommendations:    1.  Right intracranial hemorrhage with left hemiparesis: Still having difficulty keeping his attention during the daily occupational and physical therapy with speech-language pathology.    Cardiology is recommending holding off on the DOAC due to his IPH. Heart rate better at rest. Providing anticonvulsants, blood pressure correction and blood sugar treatment.      Ongoing pulmonary hygiene, DVT prophylaxis and nutritional support.  Bowel and bladder retraining.  Caregiver education with his significant other.  Adaptive equipment training.    Verbal cues and physical assist to sit at the edge of the bed.  Seems like stimulants would be contraindicated with his recent IPH. 2. DVT prophylaxis: Heparin 5000 units every 8 hours.  I must monitor his hemoglobin and platelet count periodically while on this medication.  Weightbearing activities are limited at this time.  GI prophylaxis offered.  No new bruising or swelling. 3. Paroxysmal atrial fibrillation with anticoagulation: Cardiology recommends not using Xarelto. Rate fluctuates but is better today. Daily weights do not show any decompensation of CHF.  Minimize stimulants. 4. Uncontrolled diabetes type 2 with peripheral neuropathy: Patient requires a diet modified for carbohydrates.  He is on Neurontin for peripheral neuropathy symptoms.  Blood sugars are checked at mealtime and bedtime.  Sliding scale Humalog for now with reintroduction of oral agents once his oral intake stabilize. 5. Essential hypertension: Patient requires multiple medications for blood pressure regulation.  Target systolic blood pressure is 120-140.  Vital signs are checked at rest and with activity. 6. COPD: Aggressive pulmonary hygiene.  Monitoring O2 saturations at rest and with activity. 7. Bipolar disorder: BuSpar, Depakote, Lexapro and risperidone.  Treating in a calm and consistent environment.  Providing written and verbal instructions when possible to assist with information retention.   Daytime lethargy does not seem to reflect poor sleep at night.     Counseling and Coordination of Care: In care conference today I met with the patient's OT, PT, RN and . We discussed the patient's problems, progress and prognosis. Disposition issues were clarified and plans were established for ongoing rehabilitation efforts beyond the ARU stay. I reviewed this information with the patient during a second distinct visit with the patient.  More than half of the total time of 34 minutes spent with the patient involved counseling and coordination of care.

## 2021-01-08 NOTE — CARE COORDINATION
Case mgt met with patient in room. Patient on phone with Rachelle Drain but keeps dozing off during the conversation. Patient agreeable to d/c date and shared it with Rachelle Drain. Patient fell asleep again. Whiteboard updated.

## 2021-01-08 NOTE — PROGRESS NOTES
and pt report hiring outside help lukas yardlauro. Additional Comments: Pt reports using rolator in the house. Pt reports 1 fall this year that occured in the house while ambulating in the home; sleeps in regular, flat bed; R hand dominant      Date of Admit: 1/4/2021  Room #: 1010/1010-A     ST Number of Minutes/Billable Intervention  Cog/Memory Deficits 15   Aphasia/Language 15    Dysarthria/Speech     Apraxia/Speech     Dysphagia/Swallowing     Group     Other    TOTAL Minutes Billed  30    Variance          Date: 1/8/2021  Day of ARU Week:  5       SLP Individual Minutes  Time In: 1100  Time Out: 1130  Minutes: 30     Variance/Reason:  [] Refusal due to   [] Medical hold/reason  [] Illness   [] Off Unit for test/procedure  [] Extra time needed to complete task  [] Other (specify)    Activity completed: Pt seen for cognition and language this date at bedside. Pain: R shoulder; Biofreeze applied with reported relief  Current Diet: DIET CARB CONTROL; Dental Soft  Dietary Nutrition Supplements: Low Calorie High Protein Supplement  Subjective: Pt with fluctuating alertness. Remains confused and tangential.  Assimilating comments and tasks in to conversation as though he's there or having done something. . Dietary called for breakfast order for tomorrow and he thought he was at a restaurant. Utilized a picture of a birthday scene and he began talking about the people that just came to his party. Spoke as if they had just been there. Reoriented to situation. Attn poor this date but remained interactive during the session although not oriented. Goals and POC: Co-treats where appropriate with PT or OT to facilitate patient goals in functional tasks. LTG      Timeframe for Long-term Goals: No swallow tx                    Short-term Goals  Timeframe for Short-term Goals: 3x/week x3 weeks 30 mins min  LTG: Pt will improve cognitive linguistic abilities for safe return home and clearly communicate care needs.   Goal 1: Continued receptive/expressive assessment by 1/6/2021 with additional goals to be added as appropriate. Goal met 1/6/21  Goal 2: Pt will respond to wh?'s with 90% acc given extra time. Specific wh?s to the picture for naming and fx pt was 100%. When asked general wh?'s regarding self and situations at home pt became tangential and disoriented. Responses became vague and confused and somewhat unrelated to the topic at hand. Poor awareness and poor attn to topic despite numerous redirects. Non-concrete wh?'s were impeded by STM deficits and disorientation. Max cues with 30% acc. Dietary called for breakfast order tomorrow and pt required significant prompting and choices to just give meal order. Tangential vague utterances occurred with reduced attn and alertness. Goal 3: Pt will follow 2-3 step tasks with min cues and 75% acc  1-2 step commands required verbal and tactile cues this date. Processing and initiation delays were present. Attn was reduced with frequent redirect needed. 50% acc this date. Goal 4: Pt will attend to therapist on R with eye contact x5 in 5 mins. Max cues for eye contact otherwise pt would keep eyes closed or stare at wall. Achieved 5/5 with verbal and tactile cues. Goal 5: Pt will improve L visual scanning for locating requested items with mod cues and 60% acc. Pictured stim required max finger scanning and visual and auditory cues. 30%  Goal 6: Improve recall of learned tasks with min cues with 70% acc  STM recall of learned tasks via pictured stim took a turn into confabulations and tangential thoughts that were somewhat related but off task. Pt had pictured stim the entire time to review with this therapist.  Recall of 3 pieces of info with spaced retrieval every 30 secs across 5 mins was 1/3. Almost question based on pt responses and thoughts if he is having some hallucinations. Will d/w Dr Ten Sanchez.    Pt did not recall working with therapy this date or any tasks

## 2021-01-09 LAB
CREAT SERPL-MCNC: 1.6 MG/DL (ref 0.9–1.3)
DIGOXIN LEVEL: 0.6 NG/ML (ref 0.8–2)
DOSE AMOUNT: ABNORMAL
DOSE TIME: ABNORMAL
GFR AFRICAN AMERICAN: 52 ML/MIN/1.73M2
GFR NON-AFRICAN AMERICAN: 43 ML/MIN/1.73M2
GLUCOSE BLD-MCNC: 103 MG/DL (ref 70–99)
GLUCOSE BLD-MCNC: 129 MG/DL (ref 70–99)
GLUCOSE BLD-MCNC: 92 MG/DL (ref 70–99)
GLUCOSE BLD-MCNC: 94 MG/DL (ref 70–99)

## 2021-01-09 PROCEDURE — 6370000000 HC RX 637 (ALT 250 FOR IP): Performed by: NURSE PRACTITIONER

## 2021-01-09 PROCEDURE — 97112 NEUROMUSCULAR REEDUCATION: CPT

## 2021-01-09 PROCEDURE — 97110 THERAPEUTIC EXERCISES: CPT

## 2021-01-09 PROCEDURE — 36415 COLL VENOUS BLD VENIPUNCTURE: CPT

## 2021-01-09 PROCEDURE — 6360000002 HC RX W HCPCS: Performed by: INTERNAL MEDICINE

## 2021-01-09 PROCEDURE — 80162 ASSAY OF DIGOXIN TOTAL: CPT

## 2021-01-09 PROCEDURE — 6370000000 HC RX 637 (ALT 250 FOR IP): Performed by: PHYSICAL MEDICINE & REHABILITATION

## 2021-01-09 PROCEDURE — 94150 VITAL CAPACITY TEST: CPT

## 2021-01-09 PROCEDURE — 82962 GLUCOSE BLOOD TEST: CPT

## 2021-01-09 PROCEDURE — 82565 ASSAY OF CREATININE: CPT

## 2021-01-09 PROCEDURE — 1280000000 HC REHAB R&B

## 2021-01-09 PROCEDURE — 94761 N-INVAS EAR/PLS OXIMETRY MLT: CPT

## 2021-01-09 PROCEDURE — 97530 THERAPEUTIC ACTIVITIES: CPT

## 2021-01-09 RX ADMIN — GABAPENTIN 600 MG: 300 CAPSULE ORAL at 10:24

## 2021-01-09 RX ADMIN — AMLODIPINE BESYLATE 10 MG: 10 TABLET ORAL at 10:26

## 2021-01-09 RX ADMIN — SENNOSIDES 8.6 MG: 8.6 TABLET, FILM COATED ORAL at 19:53

## 2021-01-09 RX ADMIN — DOXAZOSIN 2 MG: 4 TABLET ORAL at 10:26

## 2021-01-09 RX ADMIN — METOPROLOL TARTRATE 25 MG: 25 TABLET, FILM COATED ORAL at 10:23

## 2021-01-09 RX ADMIN — DIGOXIN 125 MCG: 125 TABLET ORAL at 10:25

## 2021-01-09 RX ADMIN — BUSPIRONE HYDROCHLORIDE 10 MG: 5 TABLET ORAL at 15:25

## 2021-01-09 RX ADMIN — DOXAZOSIN 2 MG: 4 TABLET ORAL at 19:54

## 2021-01-09 RX ADMIN — DIVALPROEX SODIUM 1000 MG: 500 TABLET, FILM COATED, EXTENDED RELEASE ORAL at 19:53

## 2021-01-09 RX ADMIN — METOPROLOL TARTRATE 25 MG: 25 TABLET, FILM COATED ORAL at 19:55

## 2021-01-09 RX ADMIN — GABAPENTIN 600 MG: 300 CAPSULE ORAL at 19:54

## 2021-01-09 RX ADMIN — BUSPIRONE HYDROCHLORIDE 10 MG: 5 TABLET ORAL at 19:54

## 2021-01-09 RX ADMIN — LISINOPRIL 40 MG: 20 TABLET ORAL at 10:23

## 2021-01-09 RX ADMIN — BUSPIRONE HYDROCHLORIDE 10 MG: 5 TABLET ORAL at 10:26

## 2021-01-09 RX ADMIN — FAMOTIDINE 20 MG: 20 TABLET, FILM COATED ORAL at 10:25

## 2021-01-09 RX ADMIN — ATORVASTATIN CALCIUM 80 MG: 40 TABLET, FILM COATED ORAL at 19:53

## 2021-01-09 RX ADMIN — RISPERIDONE 1 MG: 0.5 TABLET, FILM COATED ORAL at 10:23

## 2021-01-09 RX ADMIN — ENOXAPARIN SODIUM 40 MG: 100 INJECTION SUBCUTANEOUS at 10:26

## 2021-01-09 RX ADMIN — ESCITALOPRAM OXALATE 10 MG: 10 TABLET, FILM COATED ORAL at 10:26

## 2021-01-09 RX ADMIN — BENZTROPINE MESYLATE 0.5 MG: 1 TABLET ORAL at 10:22

## 2021-01-09 RX ADMIN — DOCUSATE SODIUM 100 MG: 100 CAPSULE, LIQUID FILLED ORAL at 19:54

## 2021-01-09 RX ADMIN — MICONAZOLE NITRATE: 20 POWDER TOPICAL at 19:52

## 2021-01-09 RX ADMIN — TRAZODONE HYDROCHLORIDE 50 MG: 50 TABLET ORAL at 19:53

## 2021-01-09 RX ADMIN — BENZTROPINE MESYLATE 0.5 MG: 1 TABLET ORAL at 19:54

## 2021-01-09 RX ADMIN — MICONAZOLE NITRATE: 20 POWDER TOPICAL at 09:00

## 2021-01-09 RX ADMIN — DOCUSATE SODIUM 100 MG: 100 CAPSULE, LIQUID FILLED ORAL at 10:25

## 2021-01-09 RX ADMIN — GABAPENTIN 600 MG: 300 CAPSULE ORAL at 15:25

## 2021-01-09 ASSESSMENT — PAIN SCALES - GENERAL: PAINLEVEL_OUTOF10: 0

## 2021-01-09 NOTE — FLOWSHEET NOTE
[x] daily progress note       [] discharge       Patient Name:  Naomy Pena :  1950 MRN: [id number]  Room:  [address] [place] [address] Date of Admission: 2021  Rehabilitation Diagnosis:   Cerebral infarction, unspecified [I63.9]  Intracranial hemorrhage ([facility] [address] [I62.9]       Date 2021       Day of ARU Week:  6   Time IN/-1040  [id number]   Co-Treat Minutes A cotreat was completed this date with  [] PT    [x] OT   [] ST      This pt requires simultaneous intervention of 2 skilled therapists to safely complete tasks to address complexity of deficits defined in the goals including:  [x]Attention   [x] Safety    [x]Problem Solving    []Sequencing     [x]Initiation    []Processing      []Recall of learned tasks  [] Communication  [] Self Feeding   []ADL's    []UE Function    []Motor planning   []Balance  []Energy Conservation   [x]Verbal cues   [x] Tactile Cues  []Barriers     []Transfers   []Device Use  70 + 30   TOTAL Tx Time Mins 90   Variance Time    Variance Time []   Refusal due to:     []   Medical hold/reason:    []   Illness   []   Off Unit for test/procedure  [x]   Extra time needed to complete task  []   Therapeutic need  []   Other (specify):   Restrictions Restrictions/Precautions  Restrictions/Precautions: General Precautions, Fall Risk(L hemiparesis, chronic R shoulder limitations)  Position Activity Restriction  Other position/activity restrictions: see catheter   Communication with other providers: [x]   OK to see per nursing:     []   Spoke with team member regarding:      Subjective observations and cognitive status: (AM and PM):  Pt semifowler in bed while leaning to the left      Pain level/location:    /10       Location: denied pain   Discharge recommendations  Anticipated discharge date:  2021  Destination: []??home alone   []? ?home alone with assist PRN     [x]? ? home w/ family      [x]? ? Continuous supervision  []? ?SNF    []? ? Assisted living     []? ? Other:  Continued therapy: [x]? ?[facility] [address] PT  []??OUTPATIENT  PT   []? ? No Further PT  []? ?SNF PT  Caregiver training recommended: [x]? ? Yes  []? ? No   Equipment needs: TBD         Bed Mobility:           []   Pt received out of bed   Rolling R/L:  Total A (Max A x2)  Scooting: Total A (Max A x 2)  Lying --> Sit:  Max A  Sit --> lying: Max A    Transfers:    Sit--> Stand: Max A x 2  Stand --> Sit:   Max A x 2   Assistive device required for transfer:   PM: Standing trials x 2 ;Blocking both knees and verbal cues to look up; trunk flexed and neck flexed; tactile cues at posterior hips to try and correct posture. Additional Therapeutic activities/exercises completed this date:     []   Nu-step:  Time:        Level:         #Steps:       []   Rebounder:    []  Seated     []  Standing        [x]   Balance training sitting EOB to gain trunk control and better sitting balance        [x]   Postural training: during EOB and standing trials; verbal and tactile cues to get pt to look up and to the Right.  Tried a scanning task, see VALENZUELA note for this task.     []   Supine ther ex (reps/sets):     [x]   Seated ther ex (reps/sets):  Sitting EOB: LAQ x 5 reps each with MAX tactile and verbal cues to keep pt on task and engaged in activity at hand.   []   Standing ther ex (reps/sets):     []   Picking up object from floor (standing):                   []   Reacher used   [x]   Other: sitting EOB x 40 mins with occasional CGA for up to 5 mins and mod tactile cues to correct lateral L lean; placed a gait belt to the R of pt connected to bed to cue pt to pull himself to the R to correct L lateral lean with min tactile cues- maintaining for 90 seconds each trial.   [x]   Other: positioning in bed with pillows under L shoulder and torso to encourage more of a R sided lean after both treatments  Comments:      Patient/Caregiver Education and Training:   [x]   Bed Mobility/Transfer technique/safety  []   Gait technique/sequencing  []   Proper use of assistive device  []   Advanced mobility safety and technique  []   Reinforced patient's precautions/mobility while maintaining precautions  []   Postural awareness  []   Family training  []   Progress was updated and reviewed in Rehabtracker with patient and/or family this date.     Treatment Plan for Next Session: sitting balance, EOB exercises      Assessment:  Pt easily distracted, pleasant to work with; fatigued quickly    Treatment/Activity Tolerance:   [] Tolerated treatment with no adverse effects    [x] Patient limited by fatigue  [] Patient limited by pain   [] Patient limited by medical complications:    [] Adverse reaction to Tx:   [] Significant change in status    Safety:       [x]  bed alarm set    []  chair alarm set    []  Pt refused alarms                []  Telesitter activated      [x]  Gait belt used during tx session      []other:         Number of Minutes/Billable Intervention  Gait Training    Therapeutic Exercise 15   Neuro Re-Ed 30   Therapeutic Activity 45   Wheelchair Propulsion    Group    Other:    TOTAL 90         Social History  Social/Functional History  Lives With: Significant other  Type of Home: House  Home Layout: One level  Home Access: Stairs to enter with rails  Entrance Stairs - Number of Steps: 5+3  Entrance Stairs - Rails: Both  Bathroom Shower/Tub: Walk-in shower, Shower chair with back(shower chair doesn't fit in shower)  Bathroom Toilet: Standard  Bathroom Equipment: Toilet raiser, Shower chair  Bathroom Accessibility: Accessible  Home Equipment: 4 wheeled walker, [address] Help From: Family, Neighbor  ADL Assistance: Needs assistance  Bath: Minimal assistance  Dressing: Minimal assistance  Grooming: Independent  Feeding: Independent  Toileting: Independent  Homemaking Assistance: Independent  Homemaking Responsibilities: No  Meal Prep Responsibility: No  Laundry Responsibility: No  Cleaning Responsibility: No  Bill Paying/Finance Responsibility: No  Shopping Responsibility: No  Dependent Care Responsibility: No  Health Care Management: No  Ambulation Assistance: Independent  Transfer Assistance: Needs assistance  Active : No  Patient's  Info: Brandi Griffiths is primary , and transport company  Mode of Transportation: Car, Family, Friends  Education: 12th grade  Occupation: Retired  Type of occupation: Pt built trucks for 33 years; [employer]  Leisure & Hobbies: TV, limited reading, girlfriend has a dog. Pt has meds that come in pill packs. Bills are managed over the phone. IADL Comments: Yajaira Tobias assists c most IADLs and pt report hiring outside help c [employer]. Additional Comments: Pt reports using rolator in the house. Pt reports 1 fall this year that occured in the house while ambulating in the home; sleeps in regular, flat bed; R hand dominant    Objective                                                                                    Goals:  (Update in navigator)  Short term goals  Time Frame for Short term goals: 14-21 tx days:  Short term goal 1: Pt will complete bed mobility tasks and sup<->sit with SBA-Sup. Short term goal 2: Pt will complete OOB transfers using RW with Mod A. Short term goal 3: Pt will ambulate 10 ft over level and carpeted surfaces using RW with Min A. Short term goal 4: Pt will propel manual w/c 150 ft with turns with SBA-Sup. Short term goal 5: Caregiver will be able to safely assist pt with functional mobility tasks. :   :        Plan of Care                                                                              Times per week: 5 days per week for a minimum of 60 minutes/day plus group as appropriate for 60 minutes.   Treatment to include Current Treatment Recommendations: Strengthening, Functional Mobility Training, Wheelchair Mobility Training, Neuromuscular Re-education, Home Exercise Program, Equipment Evaluation, Education, & procurement, ROM, Transfer Training, Cognitive/Perceptual Training, Gait Training, Cognitive Reorientation, Safety Education & Training, Balance Training, Endurance Training, Pain Management, Patient/Caregiver Education & Training, Positioning    Electronically signed by   Shaquille Rodriguez, PTA [id number]  1/9/2021, 8:20 AM

## 2021-01-09 NOTE — PROGRESS NOTES
Physical Rehabilitation: OCCUPATIONAL THERAPY     [x] daily progress note       [] discharge       Patient Name:  Joseline Alanis    :  1950 MRN: [id number]  Room:  [address] [place] [address] Date of Admission: 2021  Rehabilitation Diagnosis:   Cerebral infarction, unspecified [I63.9]  Intracranial hemorrhage ([facility] [address] [I62.9]       Date 2021       Day of ARU Week:  6   Time IN/OUT 0930 - 1040  1300 - 1320   Individual Tx Minutes x   Group Tx Minutes x   Co-Treat Minutes 80 A cotreat was completed this date with  [x] PT    [] OT   [] ST      This pt requires simultaneous intervention of 2 skilled therapists to safely complete tasks to address complexity of deficits defined in the goals including:  [x]Attention   [x] Safety    []Problem Solving    []Sequencing     [x]Initiation    []Processing      []Recall of learned tasks  [] Communication  [] Self Feeding   []ADL's    [x]UE Function    [x]Motor planning   [x]Balance  []Energy Conservation   []Verbal cues   [] Tactile Cues  []Barriers     []Transfers   []Device Use       Concurrent Tx Minutes x   TOTAL Tx Time Mins 90   Variance Time x   Variance Time []   Refusal due to:     []   Medical hold/reason:    []   Illness   []   Off Unit for test/procedure  []   Extra time needed to complete task  []   Therapeutic need  []   Other (specify):   Restrictions Restrictions/Precautions  Restrictions/Precautions: General Precautions, Fall Risk(L hemiparesis, chronic R shoulder limitations)  Position Activity Restriction  Other position/activity restrictions: see catheter   Communication with other providers: [x]   OK to see per nursing:     []   Spoke with team member regarding:      Subjective observations and cognitive status: Pt requires firm one step commands to increase success with tasks and to stay focused / patient becomes tangent at times       Pain level/location:    /10       Location:    Discharge recommendations  Anticipated discharge date:  TBD  Destination: []home alone   []home alone w assist prn [] home w/ family    [] Continuous supervision       []SNF            [] Assisted living     [] Other:   Continued therapy: []HHC OT  []OUTPATIENT  OT   [] No Further OT  Equipment needs: Patient would benefit from new glasses / or getting glasses fixed / they are bent / very dirty upon arrival / need cleanted often / nurse is notified to see if patient has friend or family to help assist with new pair or fixing /  could help possibly    (HIT F2 to transition between stars)          Bed Mobility:           []   Pt received out of bed   Rolling R/L:  Max x1 - patient requires [place] assist to initiate reaching towards rails   Scooting:  Max x2  Supine --> Sit:  Max x2  Sit --> Supine:  Max x2    Transfers:      Sit--> Stand:  Max x2 - Pt stood / 2 trials of 2 minutes each / with focus on NDT to facilitate weight on both legs / moderate vcuing for posture with flexed knees and neck / patient used LUE on side rail with therapist using gait belt to assist  Stand --> Sit:   Max x2  Pivot - Max x2        Additional Therapeutic activities/exercises completed this date:     []   ADL Training   []   Balance/Postural training     []   Bed/Transfer Training   []   Endurance Training   []   Neuromuscular Re-ed   []   Nu-step:  Time:        Level:         #Steps:       []   Rebounder:    []  Seated     []  Standing        []   Supine Ther Ex (reps/sets):  Pt instructed to scan room for red theraband with max vcuing to locate (see discharge planning note about glasses/vision)  pt completed 1 set of 5 reps of elbow flexion with BUE to increase strength / pt requirs firm one step commands and loud counting to stay focused    []   Seated Ther Ex (reps/sets):  Pt sat edge of bed x 40 mins with occasional CGA approx every 3-5 minutes with occasional Mod assist with tactile cuing to correct lateral lean to the left     []   Standing Ther Ex (reps/sets):     []   Other:      Comments: Patient/Caregiver Education and Training:   []   [employer] Equipment Use  []   Bed Mobility/Transfer Technique/Safety  []   Energy Conservation Tips  []   Family training  []   Postural Awareness  []   Safety During Functional Activities  []   Reinforced Patient's Precautions   []   Progress was updated and reviewed in Rehabtracker with patient and/or family this  date.      Treatment Plan for Next Session: Continue POC      Assessment:        Treatment/Activity Tolerance:   [x] Tolerated treatment with no adverse effects    [] Patient limited by fatigue  [x] Patient limited by pain   [] Patient limited by medical complications:    [] Adverse reaction to Tx:   [] Significant change in status    Safety:       []  bed alarm set    []  chair alarm set    []  Pt refused alarms                []  Telesitter activated      []  Gait belt used during tx session      []other:       Number of Minutes/Billable Intervention  Therapeutic Exercise 30   ADL Self-care    Neuro Re-Ed 15   Therapeutic Activity 45   Group    Other:    TOTAL 90     Cotx smartphrase included by ACE Muniz/L 1/14/21  Social History  Social/Functional History  Lives With: Significant other  Type of Home: House  Home Layout: One level  Home Access: Stairs to enter with rails  Entrance Stairs - Number of Steps: 5+3  Entrance Stairs - Rails: Both  Bathroom Shower/Tub: Walk-in shower, Shower chair with back(shower chair doesn't fit in shower)  Bathroom Toilet: Standard  Bathroom Equipment: Toilet raiser, Shower chair  Bathroom Accessibility: Accessible  Home Equipment: 4 wheeled walker, [address] Help From: Family, Neighbor  ADL Assistance: Needs assistance  Bath: Minimal assistance  Dressing: Minimal assistance  Grooming: Independent  Feeding: Independent  Toileting: Independent  Homemaking Assistance: Independent  Homemaking Responsibilities: No  Meal Prep Responsibility: No  Laundry Responsibility: No  Cleaning Times per week: 5 days per week for a minimum of 60 minutes/day plus group as appropriate for 60 minutes.   Treatment to include Plan  Times per day: Daily  Current Treatment Recommendations: Strengthening, Endurance Training, Neuromuscular Re-education, Patient/Caregiver Education & Training, ROM, Equipment Evaluation, Education, & procurement, Self-Care / ADL, Balance Training, Functional Mobility Training, Safety Education & Training, Cognitive/Perceptual Training    Electronically signed by   RUBEN Contreras  1/9/2021, 8:30 AM

## 2021-01-09 NOTE — PROGRESS NOTES
[address] : 1950  Acct #: [de-identified]  MRN: [id number]              PM&R Progress Note      Admitting diagnosis: Intracranial hemorrhage ( [address])     Comorbid diagnoses impacting rehabilitation: Left hemiparesis, dysarthria, paroxysmal atrial fibrillation, essential hypertension, uncontrolled diabetes type 2 with peripheral neuropathy, COPD, bipolar disorder, diastolic congestive heart failure     Chief complaint: Right shoulder pain. Fatigues very quickly and treatment. Prior (baseline) level of function: Independent. Current level of function:         Current  IRF-NOHEMY and Goals:   Occupational Therapy:    Short term goals  Time Frame for Short term goals: STG=LTG :   Long term goals  Time Frame for Long term goals : 12-14 days or until d/c  Long term goal 1: Pt will complete feeding/grooming/oral care task c MOD I by d/c  Long term goal 2: Pt will complete total body bathing c MIN A by d/c  Long term goal 3: Pt will complete UB dressing c SUP by d/c  Long term goal 4: Pt will complete LB dressing c MOD A by d/c  Long term goal 5: Pt will don/doff footwear c SETUP by d/c  Long term goals 6: Pt will complete toileting c MOD A by d/c  Long term goal 7: Pt will complete functional transfer (toilet, tub, shower) c MOD A by d/c. Long term goal 8: Pt will perform therex/therax to facilitate increased strength/endurance/ax tolerance (c emphasis on dynamic standing balance/tolerance >10 mins and BUE endurance) c Min A in order to complete ADLs. :                                       Eating: Eating  Assistance Needed: Supervision or touching assistance  Comment: After further assessment, pt will require SUP feeds d/t pt's lethargy, poor task initiation, and cognition to complete task safely.   CARE Score: 4  Discharge Goal: Independent       Oral Hygiene: Oral Hygiene  Assistance Needed: Setup or clean-up assistance  Comment: x  Reason if not Attempted: Not attempted due to medical condition or safety concerns  CARE Score: 5  Discharge Goal: Independent    UB/LB Bathing: Shower/Bathe Self  Assistance Needed: Dependent  Comment: After further assessment, pt required Total A to complete full body bathing, 1st person Max A to maintain upright posture when seated EOB, 2nd person Max A for total body bathing d/t pt's lethargy and cognition required to complete task safely. Reason if not Attempted: Not attempted due to medical condition or safety concerns  CARE Score: 1  Discharge Goal: Supervision or touching assistance    UB Dressing: Upper Body Dressing  Assistance Needed: Substantial/maximal assistance  Comment: After further assessment, pt required Max A for don/doff d/t pt's lethargy and cognition required to complete task safely. Demo poor task initiation, sequencing, and problem solving. Reason if not Attempted: Not attempted due to medical condition or safety concerns  CARE Score: 2  Discharge Goal: Supervision or touching assistance         LB Dressing: Lower Body Dressing  Assistance Needed: Dependent  Comment: After further assessment, pt required Total A, 1st person Max A to maintain lateral position when rolled to L/R when donning over hips, 2nd person Max A for threading of BLE and donning over hips d/t pt's lethargy and cognition required to complete task. Reason if not Attempted: Not attempted due to medical condition or safety concerns  CARE Score: 1  Discharge Goal: Partial/moderate assistance    Donning and [place]os Footwear: Putting On/Taking Off Footwear  Assistance Needed: Dependent  CARE Score: 1  Discharge Goal: Set-up or clean-up assistance      Toileting: Toileting Hygiene  Assistance Needed: Substantial/maximal assistance  Comment: Pt required Max A for completion of overall toileting (CM and Hygiene) completed in bed c bed pan, pt attempted to demo paulino-hygiene c RUE, however requires assist for thoroughness.   CARE Score: 2  Discharge Goal: Partial/moderate assistance Toilet Transfers: Toilet Transfer  Comment: not safe to attempt  Reason if not Attempted: Not attempted due to medical condition or safety concerns  CARE Score: 88  Discharge Goal: Partial/moderate assistance    Physical Therapy:   Short term goals  Time Frame for Short term goals: 14-21 tx days:  Short term goal 1: Pt will complete bed mobility tasks and sup<->sit with SBA-Sup. Short term goal 2: Pt will complete OOB transfers using RW with Mod A. Short term goal 3: Pt will ambulate 10 ft over level and carpeted surfaces using RW with Min A. Short term goal 4: Pt will propel manual w/c 150 ft with turns with SBA-Sup. Short term goal 5: Caregiver will be able to safely assist pt with functional mobility tasks.             Bed Mobility:   Sit to Lying  Assistance Needed: Dependent  Comment: required x2-person assist to complete proper positioning in supine  CARE Score: 1  Discharge Goal: Supervision or touching assistance  Roll Left and Right  Assistance Needed: Dependent  Comment: pt with poor motor initiation, sequencing, and attention to required task (eyes were closed during task despite verbal prompts)  CARE Score: 1  Discharge Goal: Supervision or touching assistance  Sit to Lying  Assistance Needed: Dependent  Comment: required x2-person assist to complete proper positioning in supine  CARE Score: 1  Discharge Goal: Supervision or touching assistance    Transfers:    Sit to Stand  Assistance Needed: Dependent  Comment: x2-PA using Daiva Mode today  Reason if not Attempted: Not attempted due to medical condition or safety concerns  CARE Score: 1  Discharge Goal: Partial/moderate assistance  Chair/Bed-to-Chair Transfer  Comment: pt with poor trunk control in sitting  Reason if not Attempted: Not attempted due to medical condition or safety concerns  CARE Score: 88  Discharge Goal: Partial/moderate assistance     Car Transfer  Reason if not Attempted: Not attempted due to medical condition or safety concerns  CARE Score: 88  Discharge Goal: Partial/moderate assistance    Ambulation:    Walking Ability  Does the Patient Walk?: Yes     Walk 10 Feet  Comment: pt unable to stand due to poor sitting balance and trunk control  Reason if not Attempted: Not attempted due to medical condition or safety concerns  CARE Score: 88  Discharge Goal: Partial/moderate assistance     Walk 50 Feet with Two Turns  Reason if not Attempted: Not attempted due to medical condition or safety concerns  CARE Score: 88  Discharge Goal: Not Attempted     Walk 150 Feet  Reason if not Attempted: Not applicable  CARE Score: 9  Discharge Goal: Not Applicable     Walking 10 Feet on Uneven Surfaces  Reason if not Attempted: Not attempted due to medical condition or safety concerns  CARE Score: 88  Discharge Goal: Partial/moderate assistance     1 Step (Curb)  Reason if not Attempted: Not attempted due to medical condition or safety concerns  CARE Score: 88  Discharge Goal: Not Attempted     4 Steps  Reason if not Attempted: Not attempted due to medical condition or safety concerns  CARE Score: 88  Discharge Goal: Not Attempted     12 Steps  Reason if not Attempted: Not applicable  CARE Score: 9  Discharge Goal: Not Applicable       Wheelchair:  w/c Ability: Wheelchair Ability  Uses a Wheelchair and/or Scooter?: Yes  Wheel 50 Feet with Two Turns  Comment: pt unable to safely be transferred to w/c today due to poor level of alertness and poor trunk control in sitting at EOB today  Reason if not Attempted: Not attempted due to medical condition or safety concerns  CARE Score: 88  Discharge Goal: Supervision or touching assistance  Wheel 150 Feet  Reason if not Attempted: Not attempted due to medical condition or safety concerns  CARE Score: 88  Discharge Goal: Supervision or touching assistance          Balance:        Object: Picking Up Object  Reason if not Attempted: Not attempted due to medical condition or safety concerns  CARE Score:

## 2021-01-10 LAB
GLUCOSE BLD-MCNC: 106 MG/DL (ref 70–99)
GLUCOSE BLD-MCNC: 84 MG/DL (ref 70–99)
GLUCOSE BLD-MCNC: 92 MG/DL (ref 70–99)
GLUCOSE BLD-MCNC: 98 MG/DL (ref 70–99)

## 2021-01-10 PROCEDURE — 94761 N-INVAS EAR/PLS OXIMETRY MLT: CPT

## 2021-01-10 PROCEDURE — 94150 VITAL CAPACITY TEST: CPT

## 2021-01-10 PROCEDURE — 6370000000 HC RX 637 (ALT 250 FOR IP): Performed by: NURSE PRACTITIONER

## 2021-01-10 PROCEDURE — 82962 GLUCOSE BLOOD TEST: CPT

## 2021-01-10 PROCEDURE — 94664 DEMO&/EVAL PT USE INHALER: CPT

## 2021-01-10 PROCEDURE — 1280000000 HC REHAB R&B

## 2021-01-10 PROCEDURE — 6370000000 HC RX 637 (ALT 250 FOR IP): Performed by: PHYSICAL MEDICINE & REHABILITATION

## 2021-01-10 PROCEDURE — 6360000002 HC RX W HCPCS: Performed by: INTERNAL MEDICINE

## 2021-01-10 RX ADMIN — METOPROLOL TARTRATE 25 MG: 25 TABLET, FILM COATED ORAL at 10:28

## 2021-01-10 RX ADMIN — MICONAZOLE NITRATE: 20 POWDER TOPICAL at 10:26

## 2021-01-10 RX ADMIN — DOXAZOSIN 2 MG: 4 TABLET ORAL at 22:03

## 2021-01-10 RX ADMIN — GABAPENTIN 600 MG: 300 CAPSULE ORAL at 10:26

## 2021-01-10 RX ADMIN — LISINOPRIL 40 MG: 20 TABLET ORAL at 10:27

## 2021-01-10 RX ADMIN — TRAZODONE HYDROCHLORIDE 50 MG: 50 TABLET ORAL at 22:04

## 2021-01-10 RX ADMIN — DOCUSATE SODIUM 100 MG: 100 CAPSULE, LIQUID FILLED ORAL at 22:04

## 2021-01-10 RX ADMIN — MICONAZOLE NITRATE: 20 POWDER TOPICAL at 22:01

## 2021-01-10 RX ADMIN — ESCITALOPRAM OXALATE 10 MG: 10 TABLET, FILM COATED ORAL at 10:27

## 2021-01-10 RX ADMIN — BUSPIRONE HYDROCHLORIDE 10 MG: 5 TABLET ORAL at 22:02

## 2021-01-10 RX ADMIN — GABAPENTIN 600 MG: 300 CAPSULE ORAL at 17:01

## 2021-01-10 RX ADMIN — RISPERIDONE 1 MG: 0.5 TABLET, FILM COATED ORAL at 10:27

## 2021-01-10 RX ADMIN — ATORVASTATIN CALCIUM 80 MG: 40 TABLET, FILM COATED ORAL at 22:02

## 2021-01-10 RX ADMIN — DOXAZOSIN 2 MG: 4 TABLET ORAL at 10:26

## 2021-01-10 RX ADMIN — BENZTROPINE MESYLATE 0.5 MG: 1 TABLET ORAL at 10:28

## 2021-01-10 RX ADMIN — ENOXAPARIN SODIUM 40 MG: 100 INJECTION SUBCUTANEOUS at 10:28

## 2021-01-10 RX ADMIN — FAMOTIDINE 20 MG: 20 TABLET, FILM COATED ORAL at 10:27

## 2021-01-10 RX ADMIN — DOCUSATE SODIUM 100 MG: 100 CAPSULE, LIQUID FILLED ORAL at 10:27

## 2021-01-10 RX ADMIN — AMLODIPINE BESYLATE 10 MG: 10 TABLET ORAL at 10:27

## 2021-01-10 RX ADMIN — BENZTROPINE MESYLATE 0.5 MG: 1 TABLET ORAL at 22:04

## 2021-01-10 RX ADMIN — BUSPIRONE HYDROCHLORIDE 10 MG: 5 TABLET ORAL at 17:02

## 2021-01-10 RX ADMIN — DIVALPROEX SODIUM 1000 MG: 500 TABLET, FILM COATED, EXTENDED RELEASE ORAL at 22:03

## 2021-01-10 RX ADMIN — GABAPENTIN 600 MG: 300 CAPSULE ORAL at 22:02

## 2021-01-10 RX ADMIN — BUSPIRONE HYDROCHLORIDE 10 MG: 5 TABLET ORAL at 09:00

## 2021-01-10 RX ADMIN — DIGOXIN 125 MCG: 125 TABLET ORAL at 10:28

## 2021-01-10 RX ADMIN — METOPROLOL TARTRATE 25 MG: 25 TABLET, FILM COATED ORAL at 22:02

## 2021-01-10 RX ADMIN — SENNOSIDES 8.6 MG: 8.6 TABLET, FILM COATED ORAL at 22:04

## 2021-01-11 LAB
GLUCOSE BLD-MCNC: 125 MG/DL (ref 70–99)
GLUCOSE BLD-MCNC: 128 MG/DL (ref 70–99)
GLUCOSE BLD-MCNC: 131 MG/DL (ref 70–99)
GLUCOSE BLD-MCNC: 85 MG/DL (ref 70–99)

## 2021-01-11 PROCEDURE — 94150 VITAL CAPACITY TEST: CPT

## 2021-01-11 PROCEDURE — 97530 THERAPEUTIC ACTIVITIES: CPT

## 2021-01-11 PROCEDURE — 6360000002 HC RX W HCPCS: Performed by: INTERNAL MEDICINE

## 2021-01-11 PROCEDURE — 92507 TX SP LANG VOICE COMM INDIV: CPT

## 2021-01-11 PROCEDURE — 82962 GLUCOSE BLOOD TEST: CPT

## 2021-01-11 PROCEDURE — 97535 SELF CARE MNGMENT TRAINING: CPT

## 2021-01-11 PROCEDURE — 97542 WHEELCHAIR MNGMENT TRAINING: CPT

## 2021-01-11 PROCEDURE — 6370000000 HC RX 637 (ALT 250 FOR IP): Performed by: PHYSICAL MEDICINE & REHABILITATION

## 2021-01-11 PROCEDURE — 1280000000 HC REHAB R&B

## 2021-01-11 PROCEDURE — 99232 SBSQ HOSP IP/OBS MODERATE 35: CPT | Performed by: PHYSICAL MEDICINE & REHABILITATION

## 2021-01-11 PROCEDURE — 6370000000 HC RX 637 (ALT 250 FOR IP): Performed by: NURSE PRACTITIONER

## 2021-01-11 PROCEDURE — 94761 N-INVAS EAR/PLS OXIMETRY MLT: CPT

## 2021-01-11 PROCEDURE — 97112 NEUROMUSCULAR REEDUCATION: CPT

## 2021-01-11 PROCEDURE — 97129 THER IVNTJ 1ST 15 MIN: CPT

## 2021-01-11 RX ADMIN — BUSPIRONE HYDROCHLORIDE 10 MG: 5 TABLET ORAL at 20:16

## 2021-01-11 RX ADMIN — DIVALPROEX SODIUM 1000 MG: 500 TABLET, FILM COATED, EXTENDED RELEASE ORAL at 20:16

## 2021-01-11 RX ADMIN — MICONAZOLE NITRATE: 20 POWDER TOPICAL at 20:21

## 2021-01-11 RX ADMIN — ACETAMINOPHEN 650 MG: 325 TABLET ORAL at 12:28

## 2021-01-11 RX ADMIN — ATORVASTATIN CALCIUM 80 MG: 40 TABLET, FILM COATED ORAL at 20:17

## 2021-01-11 RX ADMIN — GABAPENTIN 600 MG: 300 CAPSULE ORAL at 20:16

## 2021-01-11 RX ADMIN — AMLODIPINE BESYLATE 10 MG: 10 TABLET ORAL at 08:34

## 2021-01-11 RX ADMIN — DOCUSATE SODIUM 100 MG: 100 CAPSULE, LIQUID FILLED ORAL at 20:16

## 2021-01-11 RX ADMIN — MICONAZOLE NITRATE: 20 POWDER TOPICAL at 12:32

## 2021-01-11 RX ADMIN — GABAPENTIN 600 MG: 300 CAPSULE ORAL at 08:33

## 2021-01-11 RX ADMIN — DOXAZOSIN 2 MG: 4 TABLET ORAL at 08:34

## 2021-01-11 RX ADMIN — DIGOXIN 125 MCG: 125 TABLET ORAL at 08:33

## 2021-01-11 RX ADMIN — BUSPIRONE HYDROCHLORIDE 10 MG: 5 TABLET ORAL at 08:33

## 2021-01-11 RX ADMIN — BUSPIRONE HYDROCHLORIDE 10 MG: 5 TABLET ORAL at 15:50

## 2021-01-11 RX ADMIN — DOCUSATE SODIUM 100 MG: 100 CAPSULE, LIQUID FILLED ORAL at 08:34

## 2021-01-11 RX ADMIN — FAMOTIDINE 20 MG: 20 TABLET, FILM COATED ORAL at 08:33

## 2021-01-11 RX ADMIN — METOPROLOL TARTRATE 25 MG: 25 TABLET, FILM COATED ORAL at 20:17

## 2021-01-11 RX ADMIN — BENZTROPINE MESYLATE 0.5 MG: 1 TABLET ORAL at 08:53

## 2021-01-11 RX ADMIN — GABAPENTIN 600 MG: 300 CAPSULE ORAL at 15:50

## 2021-01-11 RX ADMIN — ESCITALOPRAM OXALATE 10 MG: 10 TABLET, FILM COATED ORAL at 08:34

## 2021-01-11 RX ADMIN — SENNOSIDES 8.6 MG: 8.6 TABLET, FILM COATED ORAL at 20:16

## 2021-01-11 RX ADMIN — BENZTROPINE MESYLATE 0.5 MG: 1 TABLET ORAL at 20:17

## 2021-01-11 RX ADMIN — LISINOPRIL 40 MG: 20 TABLET ORAL at 08:33

## 2021-01-11 RX ADMIN — RISPERIDONE 1 MG: 0.5 TABLET, FILM COATED ORAL at 08:34

## 2021-01-11 RX ADMIN — TRAZODONE HYDROCHLORIDE 50 MG: 50 TABLET ORAL at 20:16

## 2021-01-11 RX ADMIN — DOXAZOSIN 2 MG: 4 TABLET ORAL at 20:16

## 2021-01-11 RX ADMIN — METOPROLOL TARTRATE 25 MG: 25 TABLET, FILM COATED ORAL at 08:33

## 2021-01-11 RX ADMIN — ENOXAPARIN SODIUM 40 MG: 100 INJECTION SUBCUTANEOUS at 08:34

## 2021-01-11 ASSESSMENT — PAIN DESCRIPTION - FREQUENCY: FREQUENCY: INTERMITTENT

## 2021-01-11 ASSESSMENT — PAIN DESCRIPTION - ONSET: ONSET: ON-GOING

## 2021-01-11 ASSESSMENT — PAIN DESCRIPTION - DIRECTION: RADIATING_TOWARDS: SHOULDER

## 2021-01-11 ASSESSMENT — PAIN DESCRIPTION - ORIENTATION: ORIENTATION: RIGHT

## 2021-01-11 ASSESSMENT — PAIN DESCRIPTION - PAIN TYPE: TYPE: ACUTE PAIN;CHRONIC PAIN

## 2021-01-11 ASSESSMENT — PAIN DESCRIPTION - LOCATION: LOCATION: NECK

## 2021-01-11 NOTE — FLOWSHEET NOTE
A for balance (d/t left sided lean) using mirror for reflective feedback to achieve midline posture. Pt performed ADLs (brushing teeth) with max A x 1 for balance d/t left sided lean. []   Postural training    []   Supine ther ex (reps/sets):     []   Seated ther ex (reps/sets):     []   Standing ther ex (reps/sets):     []   Picking up object from floor (standing):                   []   Reacher used   []   Other:   []   Other:    Patient/Caregiver Education and Training:   [x]   Bed Mobility/Transfer technique/safety  []   Gait technique/sequencing  [x]   Proper use of assistive device  []   Advanced mobility safety and technique  []   Reinforced patient's precautions/mobility while maintaining precautions  []   Postural awareness  []   Family training  [x]   Progress was updated in Rehabtracker this date. Treatment Plan for Next Session: gait; transfers; advanced gait; stair training    Assessment:    Treatment/Activity Tolerance:   [] Tolerated treatment with no adverse effects    [x] Patient limited by fatigue  [] Patient limited by pain   [] Patient limited by medical complications:    [] Adverse reaction to Tx:   [] Significant change in status    Safety:       [x]  bed alarm set    []  chair alarm set    []  Pt refused alarms                []  Telesitter activated      [x]  Gait belt used during tx session      [x]other:  Pt left in semi-go's position in bed with call light at end of treatment.        Number of Minutes/Billable Intervention  Gait Training    Therapeutic Exercise    Neuro Re-Ed 19   Therapeutic Activity 45   Wheelchair Propulsion 15   Group    Other:    TOTAL 79         Social History  Social/Functional History  Lives With: Significant other  Type of Home: House  Home Layout: One level  Home Access: Stairs to enter with rails  Entrance Stairs - Number of Steps: 5+3  Entrance Stairs - Rails: Both  Bathroom Shower/Tub: Walk-in shower, Shower chair with back(shower chair doesn't fit in shower)  Bathroom Toilet: Standard  Bathroom Equipment: Toilet raiser, Shower chair  Bathroom Accessibility: Accessible  Home Equipment: 4 wheeled walker, [place] Help From: Family, Neighbor  ADL Assistance: Needs assistance  Bath: Minimal assistance  Dressing: Minimal assistance  Grooming: Independent  Feeding: Independent  Toileting: Independent  Homemaking Assistance: Independent  Homemaking Responsibilities: No  Meal Prep Responsibility: No  Laundry Responsibility: No  Cleaning Responsibility: No  Bill Paying/Finance Responsibility: No  Shopping Responsibility: No  Dependent Care Responsibility: No  Health Care Management: No  Ambulation Assistance: Independent  Transfer Assistance: Needs assistance  Active : No  Patient's  Info: Neighbor is primary , and transport company  Mode of Transportation: Car, Family, Friends  Education: 12th grade  Occupation: Retired  Type of occupation: Pt built trucks for 33 years; [employer]  Leisure & Hobbies: TV, limited reading, girlfriend has a dog. Pt has meds that come in pill packs. Bills are managed over the phone. IADL Comments: Sami Simental assists c most IADLs and pt report hiring outside help c [employer]ork. Additional Comments: Pt reports using rolator in the house. Pt reports 1 fall this year that occured in the house while ambulating in the home; sleeps in regular, flat bed; R hand dominant    Objective                                                                                    Goals:  (Update in navigator)  Short term goals  Time Frame for Short term goals: 14-21 tx days:  Short term goal 1: Pt will complete bed mobility tasks and sup<->sit with SBA-Sup. Short term goal 2: Pt will complete OOB transfers using RW with Mod A. Short term goal 3: Pt will ambulate 10 ft over level and carpeted surfaces using RW with Min A. Short term goal 4: Pt will propel manual w/c 150 ft with turns with SBA-Sup.   Short term goal 5: Caregiver will be able to safely assist pt with functional mobility tasks. :   :        Plan of Care                                                                              Times per week: 5 days per week for a minimum of 60 minutes/day plus group as appropriate for 60 minutes.   Treatment to include Current Treatment Recommendations: Strengthening, Functional Mobility Training, Wheelchair Mobility Training, Neuromuscular Re-education, Home Exercise Program, Equipment Evaluation, Education, & procurement, ROM, Transfer Training, Cognitive/Perceptual Training, Gait Training, Cognitive Reorientation, Safety Education & Training, Balance Training, Endurance Training, Pain Management, Patient/Caregiver Education & Training, Positioning    Electronically signed by   Deshaun Mcclain [id number]  1/11/2021, 3:04 PM

## 2021-01-11 NOTE — PROGRESS NOTES
[facility] - [facility] UNIT  SPEECH/LANGUAGE PATHOLOGY      [x] Daily           [] Discharge    Patient:Felipe Cano AFS:[birth year]  SEPULVEDA:[id number]  Rehab Dx/Hx: Cerebral infarction, unspecified [I63.9]  Intracranial hemorrhage ([facility] [address] [I62.9]   Allergies   Allergen Reactions    Nsaids      Renal      Precautions:  Restrictions/Precautions: General Precautions, Fall Risk(L hemiparesis, chronic R shoulder limitations)          Home Situation/IADL:   Social/Functional History  Lives With: Significant other  Type of Home: House  Home Layout: One level  Home Access: Stairs to enter with rails  Entrance Stairs - Number of Steps: 5+3  Entrance Stairs - Rails: Both  Bathroom Shower/Tub: Walk-in shower, Shower chair with back(shower chair doesn't fit in shower)  Bathroom Toilet: Standard  Bathroom Equipment: Toilet raiser, Shower chair  Bathroom Accessibility: Accessible  Home Equipment: 4 wheeled walker, [address] Help From: Family, Neighbor  ADL Assistance: Needs assistance  Bath: Minimal assistance  Dressing: Minimal assistance  Grooming: Independent  Feeding: Independent  Toileting: Independent  Homemaking Assistance: Independent  Homemaking Responsibilities: No  Meal Prep Responsibility: No  Laundry Responsibility: No  Cleaning Responsibility: No  Bill Paying/Finance Responsibility: No  Shopping Responsibility: No  Dependent Care Responsibility: No  Health Care Management: No  Ambulation Assistance: Independent  Transfer Assistance: Needs assistance  Active : No  Patient's  Info: Ebonie Cordero is primary , and transport company  Mode of Transportation: Car, Family, Friends  Education: 12th grade  Occupation: Retired  Type of occupation: Pt built trucks for 33 years; [employer]  Leisure & Hobbies: TV, limited reading, girlfriend has a dog. Pt has meds that come in pill packs. Bills are managed over the phone.   IADL Comments: Edmund Castro assists c most IADLs and pt report hiring outside help lukas yardlauro. Additional Comments: Pt reports using rolator in the house. Pt reports 1 fall this year that occured in the house while ambulating in the home; sleeps in regular, flat bed; R hand dominant      Date of Admit: 1/4/2021  Room #: 1005/1005-A     ST Number of Minutes/Billable Intervention  Cog/Memory Deficits 11    Aphasia/Language  15   Dysarthria/Speech     Apraxia/Speech     Dysphagia/Swallowing     Group     Other    TOTAL Minutes Billed  26    Variance          Date: 1/11/2021  Day of ARU Week:  1       SLP Individual Minutes  Time In: 1011  Time Out: 1037  Minutes: 26     Variance/Reason:  [] Refusal due to   [] Medical hold/reason  [] Illness   [] Off Unit for test/procedure  [] Extra time needed to complete task  [] Other (specify)    Activity completed: Pt seen for language and cognition. Pt seen at bedside following PT/OT cotreat. Pain: 8 for neck and shoulder on R. Biofreeze provided. Current Diet: DIET CARB CONTROL; Dental Soft  Dietary Nutrition Supplements: Low Calorie High Protein Supplement  Subjective: Pt remains lethargic, inattentive and tangential. Lots of redirect and cues needed. Goals and POC: Co-treats where appropriate with PT or OT to facilitate patient goals in functional tasks. LTG      Timeframe for Long-term Goals: No swallow tx                    Short-term Goals  Timeframe for Short-term Goals: 3x/week x3 weeks 30 mins min  LTG: Pt will improve cognitive linguistic abilities for safe return home and clearly communicate care needs. Goal 1: Continued receptive/expressive assessment by 1/6/2021 with additional goals to be added as appropriate. Goal met 1/6/21  Goal 2: Pt will respond to wh?'s with 90% acc given extra time. 40% acc this date for pictured stim for WHATS WRONG picture. Pt would give vague responses and tangential utterances. Discussion had nothing to do most times with topic at hand. Redirected to picture.   Attn waned and pt would also close his eyes and nod off for short periods of time. Pt remains disoriented with poor insight and awareness into limitations. Goal 3: Pt will follow 2-3 step tasks with min cues and 75% acc  Single step directions are requiring mod to max cues mainly due to fatigue and difficulty remaining alert. Some motor planning difficulty as well observed. 50% acc. Goal 4: Pt will attend to therapist on R with eye contact x5 in 5 mins. Max cues to attend. Pt likes to joke with staff when awake. Will attend with cues otherwise stares straight ahead or keeps eyes closed. Goal 5: Pt will improve L visual scanning for locating requested items with mod cues and 60% acc. Max cues this date using finger scanning and auditory cues. 40% acc. Poor awareness and insight. Goal 6: Improve recall of learned tasks with min cues with 70% acc  0% acc this date. Pt did not recall he's in a hospital or that he had just been up in a w/c with PT/OT attempting to wheel down the harman. Pt does not recall faces or names. Attn and recall is fleeting. Alarm placed: [x]bed []chair   []other:   [] activated        Barriers to progress:   [x] Fatigue        [x] Cognitive Deficits   [x] Memory Deficits   [x] Reduced Attn   [] Self Limiting Behaviors    [x] Reduced insight/awareness     [x] Visual Deficits   [] Premorbid Conditions  [] Impulsivity     [] Other      Education/Interventions used this date: Verbal, tactile, and auditory cues remain necessary for all tasks. []   Progress was updated and reviewed in Rehabtracker with patient and/or family this         date. [] Attending Care Conference for pt this date. See Team Patient Care Conference Note for updates.     Interventions used this date:  [x] Speech/Language Treatment    [] Instruction in HEP    Group   [] Dysphagia Treatment   [x] Cognitive Skill Dc    Other:         Assessment / Impression Treatment/Activity Tolerance:   [] Tolerated Treatment well:     [] Patient limited by fatigue/pain:       [] Patient limited by medical complications:    [] Adverse Reaction to Tx:   [] Significant change in status:      Electronically [place], [id number]    1/11/2021  10:52 AM

## 2021-01-11 NOTE — PROGRESS NOTES
Physical Rehabilitation: OCCUPATIONAL THERAPY     [x] daily progress note       [] discharge       Patient Name:  Pranav Craig :  1950 MRN: [id number]  Room:  [address] [place], [place] [address] Date of Admission: 2021  Rehabilitation Diagnosis:   Cerebral infarction, unspecified [I63.9]  Intracranial hemorrhage ([facility] [address] [I62.9]       Date 2021       Day of ARU Week:  1   Time IN//945; 1037/1111   Individual Tx Minutes    Group Tx Minutes    Co-Treat Minutes 39 + 34 A cotreat was completed this date with  [x]? ?? PT    []??? OT   []??? ST      This pt requires simultaneous intervention of 2 skilled therapists to safely complete tasks to address complexity of deficits defined in the goals including:  [x]? ? ? Attention   [x]? ?? Safety    [x]? ? ?Problem Solving    []? ? ? Sequencing     []? ? ? Initiation    [x]? ? ? Processing      []? ? ? Recall of learned tasks  []??? Communication  []??? Self Feeding   [x]? ??ADL's    [x]? ??UE Function    [x]? ? ? Motor planning   [x]? ? ? Balance  []? ?? Energy Conservation   [x]? ? ?Verbal cues   [x]? ?? Tactile Cues  []? ??Barriers     [x]? ? ? Transfers   []? ? ? Device Use  Pt's response to cotreat: good  Progress toward goals: ongoing   Concurrent Tx Minutes    TOTAL Tx Time Mins 79   Variance Time +4   Variance Time []   Refusal due to:     []   Medical hold/reason:    []   Illness   []   Off Unit for test/procedure  []   Extra time needed to complete task  []   Therapeutic need  [x]   Other (specify): handed off from [address] [place] [address] Dr therapy who had 4 minutes less than 30    Restrictions Restrictions/Precautions  Restrictions/Precautions: General Precautions, Fall Risk(L hemiparesis, chronic R shoulder limitations)  Position Activity Restriction  Other position/activity restrictions: see catheter   Communication with other providers: [x]   OK to see per nursing:     []   Spoke with team member regarding:      Subjective observations and cognitive status: Patient supine in bed pleasant and agreeable to therapy 3rd 40 seconds       [x]   Bed/Transfer Training   []   Endurance Training   []   Neuromuscular Re-ed   []   Nu-step:  Time:        Level:         #Steps:       []   Rebounder:    []  Seated     []  Standing        []   Supine Ther Ex (reps/sets):     []   Seated Ther Ex (reps/sets):     []   Standing Ther Ex (reps/sets):     []   Other:      Comments:  Patient lethargic throughout treatment session, patient requiring vc and tactile cues throughout session for attention to task, initiation of task and staying on task, cues for keeping eyes open, patient easily distracted and difficult to redirect at times can recall with yes or no questions, other times patient is pleasantly confused and perhaps hallucinating perseverating on holding something in hand that is not there and reaching for items that are not there. Patient/Caregiver Education and Training:   [x]   Adaptive Equipment Use  [x]   Bed Mobility/Transfer Technique/Safety  [x]   Energy Conservation Tips  []   Family training  []   Postural Awareness  [x]   Safety During Functional Activities  []   Reinforced Patient's Precautions   []   Progress was updated and reviewed in Rehabtracker with patient and/or family this         date.      Treatment Plan for Next Session: POC to continue as tolerated      Assessment:        Treatment/Activity Tolerance:   [x] Tolerated treatment with no adverse effects    [] Patient limited by fatigue  [] Patient limited by pain   [] Patient limited by medical complications:    [] Adverse reaction to Tx:   [] Significant change in status    Safety:       [x]  bed alarm set    []  chair alarm set    []  Pt refused alarms                []  Telesitter activated      [x]  Gait belt used during tx session      []other:       Number of Minutes/Billable Intervention  Therapeutic Exercise    ADL Self-care 15 + 15   Neuro Re-Ed    Therapeutic Activity 30 + 19   Group    Other:    TOTAL 79 Social History  Social/Functional History  Lives With: Significant other  Type of Home: House  Home Layout: One level  Home Access: Stairs to enter with rails  Entrance Stairs - Number of Steps: 5+3  Entrance Stairs - Rails: Both  Bathroom Shower/Tub: Walk-in shower, Shower chair with back(shower chair doesn't fit in shower)  Bathroom Toilet: Standard  Bathroom Equipment: Toilet raiser, Shower chair  Bathroom Accessibility: Accessible  Home Equipment: 4 wheeled walker, [address] Help From: Family, Neighbor  ADL Assistance: Needs assistance  Bath: Minimal assistance  Dressing: Minimal assistance  Grooming: Independent  Feeding: Independent  Toileting: Independent  Homemaking Assistance: Independent  Homemaking Responsibilities: No  Meal Prep Responsibility: No  Laundry Responsibility: No  Cleaning Responsibility: No  Bill Paying/Finance Responsibility: No  Shopping Responsibility: No  Dependent Care Responsibility: No  Health Care Management: No  Ambulation Assistance: Independent  Transfer Assistance: Needs assistance  Active : No  Patient's  Info: Neighbor is primary , and transport company  Mode of Transportation: Car, Family, Friends  Education: 12th grade  Occupation: Retired  Type of occupation: Pt built trucks for 33 years; [email or web address]  Leisure & Hobbies: TV, limited reading, girlfriend has a dog. Pt has meds that come in pill packs. Bills are managed over the phone. IADL Comments: Gayathri Shovashti assists c most IADLs and pt report hiring outside help c yardork. Additional Comments: Pt reports using rolator in the house.  Pt reports 1 fall this year that occured in the house while ambulating in the home; sleeps in regular, flat bed; R hand dominant    Objective                                                                                    Goals:  (Update in navigator)  Short term goals  Time Frame for Short term goals: STG=LTG:  Long term goals  Time Frame for Long term goals : 12-14 days or until d/c  Long term goal 1: Pt will complete feeding/grooming/oral care task c MOD I by d/c  Long term goal 2: Pt will complete total body bathing c MIN A by d/c  Long term goal 3: Pt will complete UB dressing c SUP by d/c  Long term goal 4: Pt will complete LB dressing c MOD A by d/c  Long term goal 5: Pt will don/doff footwear c SETUP by d/c  Long term goals 6: Pt will complete toileting c MOD A by d/c  Long term goal 7: Pt will complete functional transfer (toilet, tub, shower) c MOD A by d/c. Long term goal 8: Pt will perform therex/therax to facilitate increased strength/endurance/ax tolerance (c emphasis on dynamic standing balance/tolerance >10 mins and BUE endurance) c Min A in order to complete ADLs. :        Plan of Care                                                                              Times per week: 5 days per week for a minimum of 60 minutes/day plus group as appropriate for 60 minutes.   Treatment to include Plan  Times per day: Daily  Current Treatment Recommendations: Strengthening, Endurance Training, Neuromuscular Re-education, Patient/Caregiver Education & Training, ROM, Equipment Evaluation, Education, & procurement, Self-Care / ADL, Balance Training, Functional Mobility Training, Safety Education & Training, Cognitive/Perceptual Training    Electronically signed by   RUBEN Schroeder  1/11/2021, 7:42 AM

## 2021-01-11 NOTE — PROGRESS NOTES
Comprehensive Nutrition Assessment    Type and Reason for Visit:  Reassess    Nutrition Recommendations/Plan:   Continue current diet-dental soft with carb controlled  Continue to offer oral nutrition supplement as patient will accept  Encourage consistent intake     Nutrition Assessment:  Remains on dental soft diet, carb controlled. Recent decline in meal intake, refusing some due to c/o not being hungry. Offer low calorie, high protein supplement will drink some 25-50%. Remains moderate nutrition risk at this time with inconsistent intake, needs encouragement to eat meals, drink supplement. Malnutrition Assessment:  Malnutrition Status:  Insufficient data    Context:  Acute Illness       Estimated Daily Nutrient Needs:  Energy (kcal):  0744-1435; Weight Used for Energy Requirements:  Current     Protein (g):  72-87 (1-1.2 g/kg); Weight Used for Protein Requirements:  Ideal        Fluid (ml/day):  2000; Method Used for Fluid Requirements:  1 ml/kcal      Nutrition Related Findings:  not avialable on visit      Wounds:  None       Current Nutrition Therapies:    DIET CARB CONTROL; Dental Soft  Dietary Nutrition Supplements: Low Calorie High Protein Supplement    Anthropometric Measures:  · Height: 5' 9\" (175.3 cm)  · Current Body Weight: 220 lb 3.8 oz (99.9 kg)   · Admission Body Weight: 225 lb 5 oz (102.2 kg)    · Usual Body Weight: 214 lb (97.1 kg)(per hx)     · Ideal Body Weight: 160 lbs; % Ideal Body Weight 143.1 %   · BMI: 32.5  · Adjusted Body Weight:  ; No Adjustment   · BMI Categories: Obese Class 1 (BMI 30.0-34. 9)       Nutrition Diagnosis:   · Predicted inadequate energy intake related to other (comment)(reduced appetite in illness) as evidenced by other (comment)(inconsistent intake so far)      Nutrition Interventions:   Food and/or Nutrient Delivery:  Continue Current Diet, Continue Oral Nutrition Supplement  Nutrition Education/Counseling:  No recommendation at this time   Coordination of Nutrition Care:  Continue to monitor while inpatient, Feeding Assistance/Environment Change    Goals:  Patient will consume at least 75% at meals during stay       Nutrition Monitoring and Evaluation:   Behavioral-Environmental Outcomes:  None Identified   Food/Nutrient Intake Outcomes:  Diet Advancement/Tolerance, Food and Nutrient Intake  Physical Signs/Symptoms Outcomes:  Biochemical Data, Meal Time Behavior, Skin, Weight     Discharge Planning:    Continue current diet     Electronically signed by Corrine Fernando RD, LD on 1/11/21 at 12:01 PM EST    Contact: [phone]

## 2021-01-12 LAB
ANION GAP SERPL CALCULATED.3IONS-SCNC: 10 MMOL/L (ref 4–16)
BACTERIA: NEGATIVE /HPF
BILIRUBIN URINE: NEGATIVE MG/DL
BLOOD, URINE: ABNORMAL
BUN BLDV-MCNC: 33 MG/DL (ref 6–23)
CALCIUM SERPL-MCNC: 8.6 MG/DL (ref 8.3–10.6)
CHLORIDE BLD-SCNC: 104 MMOL/L (ref 99–110)
CLARITY: ABNORMAL
CO2: 22 MMOL/L (ref 21–32)
COLOR: YELLOW
CREAT SERPL-MCNC: 1.8 MG/DL (ref 0.9–1.3)
DOSE AMOUNT: NORMAL
DOSE TIME: NORMAL
GFR AFRICAN AMERICAN: 45 ML/MIN/1.73M2
GFR NON-AFRICAN AMERICAN: 37 ML/MIN/1.73M2
GLUCOSE BLD-MCNC: 102 MG/DL (ref 70–99)
GLUCOSE BLD-MCNC: 125 MG/DL (ref 70–99)
GLUCOSE BLD-MCNC: 135 MG/DL (ref 70–99)
GLUCOSE BLD-MCNC: 93 MG/DL (ref 70–99)
GLUCOSE BLD-MCNC: 94 MG/DL (ref 70–99)
GLUCOSE, URINE: NEGATIVE MG/DL
HCT VFR BLD CALC: 41.2 % (ref 42–52)
HEMOGLOBIN: 12.7 GM/DL (ref 13.5–18)
KETONES, URINE: NEGATIVE MG/DL
LEUKOCYTE ESTERASE, URINE: ABNORMAL
MCH RBC QN AUTO: 28.7 PG (ref 27–31)
MCHC RBC AUTO-ENTMCNC: 30.8 % (ref 32–36)
MCV RBC AUTO: 93.2 FL (ref 78–100)
NITRITE URINE, QUANTITATIVE: POSITIVE
PDW BLD-RTO: 14.8 % (ref 11.7–14.9)
PH, URINE: 6 (ref 5–8)
PLATELET # BLD: 164 K/CU MM (ref 140–440)
PMV BLD AUTO: 9.5 FL (ref 7.5–11.1)
POTASSIUM SERPL-SCNC: 4.6 MMOL/L (ref 3.5–5.1)
PROTEIN UA: 100 MG/DL
RBC # BLD: 4.42 M/CU MM (ref 4.6–6.2)
RBC URINE: 1043 /HPF (ref 0–3)
SODIUM BLD-SCNC: 136 MMOL/L (ref 135–145)
SPECIFIC GRAVITY UA: 1.02 (ref 1–1.03)
TRICHOMONAS: ABNORMAL /HPF
UROBILINOGEN, URINE: NORMAL MG/DL (ref 0.2–1)
VALPROIC ACID LEVEL: 65 UG/ML (ref 50–100)
WBC # BLD: 8.6 K/CU MM (ref 4–10.5)
WBC CLUMP: ABNORMAL /HPF
WBC UA: 1605 /HPF (ref 0–2)

## 2021-01-12 PROCEDURE — 94761 N-INVAS EAR/PLS OXIMETRY MLT: CPT

## 2021-01-12 PROCEDURE — 87186 SC STD MICRODIL/AGAR DIL: CPT

## 2021-01-12 PROCEDURE — 87077 CULTURE AEROBIC IDENTIFY: CPT

## 2021-01-12 PROCEDURE — 92507 TX SP LANG VOICE COMM INDIV: CPT

## 2021-01-12 PROCEDURE — 6370000000 HC RX 637 (ALT 250 FOR IP): Performed by: PHYSICAL MEDICINE & REHABILITATION

## 2021-01-12 PROCEDURE — 82962 GLUCOSE BLOOD TEST: CPT

## 2021-01-12 PROCEDURE — 97535 SELF CARE MNGMENT TRAINING: CPT

## 2021-01-12 PROCEDURE — 97530 THERAPEUTIC ACTIVITIES: CPT

## 2021-01-12 PROCEDURE — 97112 NEUROMUSCULAR REEDUCATION: CPT

## 2021-01-12 PROCEDURE — 6370000000 HC RX 637 (ALT 250 FOR IP): Performed by: NURSE PRACTITIONER

## 2021-01-12 PROCEDURE — 85027 COMPLETE CBC AUTOMATED: CPT

## 2021-01-12 PROCEDURE — 87086 URINE CULTURE/COLONY COUNT: CPT

## 2021-01-12 PROCEDURE — 99232 SBSQ HOSP IP/OBS MODERATE 35: CPT | Performed by: PHYSICAL MEDICINE & REHABILITATION

## 2021-01-12 PROCEDURE — 80164 ASSAY DIPROPYLACETIC ACD TOT: CPT

## 2021-01-12 PROCEDURE — 1280000000 HC REHAB R&B

## 2021-01-12 PROCEDURE — 6360000002 HC RX W HCPCS: Performed by: INTERNAL MEDICINE

## 2021-01-12 PROCEDURE — 80048 BASIC METABOLIC PNL TOTAL CA: CPT

## 2021-01-12 PROCEDURE — 81001 URINALYSIS AUTO W/SCOPE: CPT

## 2021-01-12 RX ORDER — GABAPENTIN 100 MG/1
200 CAPSULE ORAL 3 TIMES DAILY
Status: DISCONTINUED | OUTPATIENT
Start: 2021-01-12 | End: 2021-01-14

## 2021-01-12 RX ORDER — BUSPIRONE HYDROCHLORIDE 5 MG/1
5 TABLET ORAL 3 TIMES DAILY
Status: DISCONTINUED | OUTPATIENT
Start: 2021-01-12 | End: 2021-01-14

## 2021-01-12 RX ADMIN — DOCUSATE SODIUM 100 MG: 100 CAPSULE, LIQUID FILLED ORAL at 22:19

## 2021-01-12 RX ADMIN — MICONAZOLE NITRATE: 20 POWDER TOPICAL at 22:20

## 2021-01-12 RX ADMIN — MICONAZOLE NITRATE: 20 POWDER TOPICAL at 08:05

## 2021-01-12 RX ADMIN — ENOXAPARIN SODIUM 40 MG: 100 INJECTION SUBCUTANEOUS at 08:06

## 2021-01-12 RX ADMIN — GABAPENTIN 200 MG: 100 CAPSULE ORAL at 22:17

## 2021-01-12 RX ADMIN — DIVALPROEX SODIUM 1000 MG: 500 TABLET, FILM COATED, EXTENDED RELEASE ORAL at 22:17

## 2021-01-12 RX ADMIN — METOPROLOL TARTRATE 25 MG: 25 TABLET, FILM COATED ORAL at 08:03

## 2021-01-12 RX ADMIN — BENZTROPINE MESYLATE 0.5 MG: 1 TABLET ORAL at 08:05

## 2021-01-12 RX ADMIN — DOXAZOSIN 2 MG: 4 TABLET ORAL at 08:04

## 2021-01-12 RX ADMIN — METOPROLOL TARTRATE 25 MG: 25 TABLET, FILM COATED ORAL at 22:18

## 2021-01-12 RX ADMIN — RISPERIDONE 1 MG: 0.5 TABLET, FILM COATED ORAL at 08:03

## 2021-01-12 RX ADMIN — GABAPENTIN 600 MG: 300 CAPSULE ORAL at 08:03

## 2021-01-12 RX ADMIN — BUSPIRONE HYDROCHLORIDE 5 MG: 5 TABLET ORAL at 22:18

## 2021-01-12 RX ADMIN — BENZTROPINE MESYLATE 0.5 MG: 1 TABLET ORAL at 22:18

## 2021-01-12 RX ADMIN — FAMOTIDINE 20 MG: 20 TABLET, FILM COATED ORAL at 08:05

## 2021-01-12 RX ADMIN — BUSPIRONE HYDROCHLORIDE 5 MG: 5 TABLET ORAL at 14:29

## 2021-01-12 RX ADMIN — SENNOSIDES 8.6 MG: 8.6 TABLET, FILM COATED ORAL at 22:18

## 2021-01-12 RX ADMIN — DOCUSATE SODIUM 100 MG: 100 CAPSULE, LIQUID FILLED ORAL at 08:04

## 2021-01-12 RX ADMIN — GABAPENTIN 200 MG: 100 CAPSULE ORAL at 14:29

## 2021-01-12 RX ADMIN — ATORVASTATIN CALCIUM 80 MG: 40 TABLET, FILM COATED ORAL at 22:18

## 2021-01-12 RX ADMIN — AMLODIPINE BESYLATE 10 MG: 10 TABLET ORAL at 08:05

## 2021-01-12 RX ADMIN — LISINOPRIL 40 MG: 20 TABLET ORAL at 08:04

## 2021-01-12 RX ADMIN — BUSPIRONE HYDROCHLORIDE 10 MG: 5 TABLET ORAL at 08:23

## 2021-01-12 RX ADMIN — ESCITALOPRAM OXALATE 10 MG: 10 TABLET, FILM COATED ORAL at 08:03

## 2021-01-12 ASSESSMENT — PAIN DESCRIPTION - FREQUENCY: FREQUENCY: INTERMITTENT

## 2021-01-12 ASSESSMENT — PAIN SCALES - GENERAL
PAINLEVEL_OUTOF10: 8
PAINLEVEL_OUTOF10: 0

## 2021-01-12 ASSESSMENT — PAIN - FUNCTIONAL ASSESSMENT: PAIN_FUNCTIONAL_ASSESSMENT: PREVENTS OR INTERFERES SOME ACTIVE ACTIVITIES AND ADLS

## 2021-01-12 ASSESSMENT — PAIN DESCRIPTION - PROGRESSION: CLINICAL_PROGRESSION: GRADUALLY WORSENING

## 2021-01-12 ASSESSMENT — PAIN DESCRIPTION - ONSET: ONSET: ON-GOING

## 2021-01-12 ASSESSMENT — PAIN DESCRIPTION - PAIN TYPE: TYPE: ACUTE PAIN

## 2021-01-12 NOTE — PROGRESS NOTES
[address] : 1950  Acct #: [de-identified]  MRN: [id number]              PM&R Progress Note      Admitting diagnosis: Intracranial hemorrhage ([address])     Comorbid diagnoses impacting rehabilitation: Left hemiparesis, dysarthria, paroxysmal atrial fibrillation, essential hypertension, uncontrolled diabetes type 2 with peripheral neuropathy, COPD, bipolar disorder, diastolic congestive heart failure     Chief complaint: Vague complaints of a stiff neck and right shoulder pain. He reports it predated his time in the hospital.    Prior (baseline) level of function: Independent. Current level of function:         Current  IRF-NOHEMY and Goals:   Occupational Therapy:    Short term goals  Time Frame for Short term goals: STG=LTG :   Long term goals  Time Frame for Long term goals : 12-14 days or until d/c  Long term goal 1: Pt will complete feeding/grooming/oral care task c MOD I by d/c  Long term goal 2: Pt will complete total body bathing c MIN A by d/c  Long term goal 3: Pt will complete UB dressing c SUP by d/c  Long term goal 4: Pt will complete LB dressing c MOD A by d/c  Long term goal 5: Pt will don/doff footwear c SETUP by d/c  Long term goals 6: Pt will complete toileting c MOD A by d/c  Long term goal 7: Pt will complete functional transfer (toilet, tub, shower) c MOD A by d/c. Long term goal 8: Pt will perform therex/therax to facilitate increased strength/endurance/ax tolerance (c emphasis on dynamic standing balance/tolerance >10 mins and BUE endurance) c Min A in order to complete ADLs. :                                       Eating: Eating  Assistance Needed: Supervision or touching assistance  Comment: After further assessment, pt will require SUP feeds d/t pt's lethargy, poor task initiation, and cognition to complete task safely.   CARE Score: 4  Discharge Goal: Independent       Oral Hygiene: Oral Hygiene  Assistance Needed: Setup or clean-up assistance  Comment: x  Reason if not Attempted: Not attempted due to medical condition or safety concerns  CARE Score: 5  Discharge Goal: Independent    UB/LB Bathing: Shower/Bathe Self  Assistance Needed: Dependent  Comment: After further assessment, pt required Total A to complete full body bathing, 1st person Max A to maintain upright posture when seated EOB, 2nd person Max A for total body bathing d/t pt's lethargy and cognition required to complete task safely. Reason if not Attempted: Not attempted due to medical condition or safety concerns  CARE Score: 1  Discharge Goal: Supervision or touching assistance    UB Dressing: Upper Body Dressing  Assistance Needed: Substantial/maximal assistance  Comment: After further assessment, pt required Max A for don/doff d/t pt's lethargy and cognition required to complete task safely. Demo poor task initiation, sequencing, and problem solving. Reason if not Attempted: Not attempted due to medical condition or safety concerns  CARE Score: 2  Discharge Goal: Supervision or touching assistance         LB Dressing: Lower Body Dressing  Assistance Needed: Dependent  Comment: After further assessment, pt required Total A, 1st person Max A to maintain lateral position when rolled to L/R when donning over hips, 2nd person Max A for threading of BLE and donning over hips d/t pt's lethargy and cognition required to complete task. Reason if not Attempted: Not attempted due to medical condition or safety concerns  CARE Score: 1  Discharge Goal: Partial/moderate assistance    Donning and Epes Footwear: Putting On/Taking Off Footwear  Assistance Needed: Dependent  CARE Score: 1  Discharge Goal: Set-up or clean-up assistance      Toileting: Toileting Hygiene  Assistance Needed: Substantial/maximal assistance  Comment: Pt required Max A for completion of overall toileting (CM and Hygiene) completed in bed c bed pan, pt attempted to demo paulino-hygiene c RUE, however requires assist for thoroughness.   CARE Score: 2  Discharge Goal: Partial/moderate assistance      Toilet Transfers: Toilet Transfer  Comment: not safe to attempt  Reason if not Attempted: Not attempted due to medical condition or safety concerns  CARE Score: 88  Discharge Goal: Partial/moderate assistance    Physical Therapy:   Short term goals  Time Frame for Short term goals: 14-21 tx days:  Short term goal 1: Pt will complete bed mobility tasks and sup<->sit with SBA-Sup. Short term goal 2: Pt will complete OOB transfers using RW with Mod A. Short term goal 3: Pt will ambulate 10 ft over level and carpeted surfaces using RW with Min A. Short term goal 4: Pt will propel manual w/c 150 ft with turns with SBA-Sup. Short term goal 5: Caregiver will be able to safely assist pt with functional mobility tasks.             Bed Mobility:   Sit to Lying  Assistance Needed: Dependent  Comment: required x2-person assist to complete proper positioning in supine  CARE Score: 1  Discharge Goal: Supervision or touching assistance  Roll Left and Right  Assistance Needed: Dependent  Comment: pt with poor motor initiation, sequencing, and attention to required task (eyes were closed during task despite verbal prompts)  CARE Score: 1  Discharge Goal: Supervision or touching assistance  Sit to Lying  Assistance Needed: Dependent  Comment: required x2-person assist to complete proper positioning in supine  CARE Score: 1  Discharge Goal: Supervision or touching assistance    Transfers:    Sit to Stand  Assistance Needed: Dependent  Comment: x2-PA using Pavithra Jackie today  Reason if not Attempted: Not attempted due to medical condition or safety concerns  CARE Score: 1  Discharge Goal: Partial/moderate assistance  Chair/Bed-to-Chair Transfer  Comment: pt with poor trunk control in sitting  Reason if not Attempted: Not attempted due to medical condition or safety concerns  CARE Score: 88  Discharge Goal: Partial/moderate assistance     Car Transfer  Reason if not Attempted: Not attempted due to medical condition or safety concerns  CARE Score: 88  Discharge Goal: Partial/moderate assistance    Ambulation:    Walking Ability  Does the Patient Walk?: Yes     Walk 10 Feet  Comment: pt unable to stand due to poor sitting balance and trunk control  Reason if not Attempted: Not attempted due to medical condition or safety concerns  CARE Score: 88  Discharge Goal: Partial/moderate assistance     Walk 50 Feet with Two Turns  Reason if not Attempted: Not attempted due to medical condition or safety concerns  CARE Score: 88  Discharge Goal: Not Attempted     Walk 150 Feet  Reason if not Attempted: Not applicable  CARE Score: 9  Discharge Goal: Not Applicable     Walking 10 Feet on Uneven Surfaces  Reason if not Attempted: Not attempted due to medical condition or safety concerns  CARE Score: 88  Discharge Goal: Partial/moderate assistance     1 Step (Curb)  Reason if not Attempted: Not attempted due to medical condition or safety concerns  CARE Score: 88  Discharge Goal: Not Attempted     4 Steps  Reason if not Attempted: Not attempted due to medical condition or safety concerns  CARE Score: 88  Discharge Goal: Not Attempted     12 Steps  Reason if not Attempted: Not applicable  CARE Score: 9  Discharge Goal: Not Applicable       Wheelchair:  w/c Ability: Wheelchair Ability  Uses a Wheelchair and/or Scooter?: Yes  Wheel 50 Feet with Two Turns  Comment: pt unable to safely be transferred to w/c today due to poor level of alertness and poor trunk control in sitting at EOB today  Reason if not Attempted: Not attempted due to medical condition or safety concerns  CARE Score: 88  Discharge Goal: Supervision or touching assistance  Wheel 150 Feet  Reason if not Attempted: Not attempted due to medical condition or safety concerns  CARE Score: 88  Discharge Goal: Supervision or touching assistance          Balance:        Object: Picking Up Object  Reason if not Attempted: Not attempted due to medical condition or safety concerns  CARE Score: 88  Discharge Goal: Partial/moderate assistance    I      Exam:    Blood pressure 134/60, pulse 55, temperature 97.3 °F (36.3 °C), temperature source Oral, resp. rate 20, height 5' 9\" (1.753 m), weight 250 lb (113.4 kg), SpO2 94 %. General: Semiupright in bed. Heavy eyelids. Speech is a little slurred. Intermittently lucid. HEENT: Mucous membranes are little dry. Neck supple. No JVD. Pulmonary: Symmetric air exchange with shallow respirations. Cardiac: Controlled rate with occasional premature beats. Abdomen: Patient's abdomen is soft and nondistended. Bowel sounds were present throughout. There was no rebound, guarding or masses noted. Upper extremities: Guarded right shoulder movement. Symmetric  strength (mildly weak). Lower extremities: Clumsy lower limb movements. Give way with MMT. Trace edema at the ankles. No signs of DVT. Sitting balance was poor. Standing balance was poor. Lab Results   Component Value Date    WBC 8.9 12/19/2020    HGB 15.1 12/19/2020    HCT 48.6 12/19/2020    MCV 94.4 12/19/2020     12/19/2020     Lab Results   Component Value Date    INR 0.84 12/19/2020    INR 1.01 06/03/2020    INR 0.90 06/02/2020    PROTIME 10.1 (L) 12/19/2020    PROTIME 12.2 06/03/2020    PROTIME 10.9 (L) 06/02/2020     Lab Results   Component Value Date    CREATININE 1.6 (H) 01/09/2021    BUN 14 12/19/2020     (L) 12/19/2020    K 3.6 12/19/2020    CL 98 (L) 12/19/2020    CO2 24 12/19/2020     Lab Results   Component Value Date    ALT 10 12/19/2020    AST 15 12/19/2020    ALKPHOS 82 12/19/2020    BILITOT 0.6 12/19/2020       Expected length of stay  prior to a supervised level of function for discharge home with a walker and [address] OT/PT is 1/26/2021. Recommendations:    1. Right intracranial hemorrhage with left hemiparesis:  Overall he is struggling to endure a full schedule of therapies.   He is falling asleep in the middle of sessions. Intermittently lucid, but somewhat tangential in his conversation. Requiring maximum verbal cues and some contact cueing for the occupational and physical therapy with speech-language pathology.     Cardiology consultation recommends holding anticoagulation at this point. His sleepiness is impacting his oral intake. I will stop his Risperdal and reduce his BuSpar and gabapentin. We will also decrease his dose of Ritalin check his chemistries and blood counts. Also checking a Depakote level. Monitoring his use of anticonvulsants. Ongoing pulmonary hygiene, DVT prophylaxis and nutritional support.  Bowel and bladder retraining.  Caregiver education with his significant other eventually.  Adaptive equipment training. Working on sitting at the edge of the bed again today. 2. DVT prophylaxis: Heparin 5000 units every 8 hours.  I must monitor his hemoglobin and platelet count periodically while on this medication.  Weightbearing activities are pursued daily.  GI prophylaxis offered. No new bruising or swelling. 3. Paroxysmal atrial fibrillation with prior anticoagulation: Cardiology is asking us to hold Xarelto. Elevated rate required titrating metoprolol and restarting Cardizem. EKG reviewed by cardiology. Marcelino Broderick weights do not show any decompensation of CHF.  Minimize stimulants. 4. Uncontrolled diabetes type 2 with peripheral neuropathy: Patient requires a diet modified for carbohydrates.  He is on Neurontin for peripheral neuropathy symptoms.  Blood sugars are checked at mealtime and bedtime.  Sliding scale Humalog for now with reintroduction of oral agents should his oral intake stabilize. Blood sugars generally less than 150.  5. Essential hypertension: Patient requires multiple medications for blood pressure regulation.  Target systolic blood pressure is 120-140.  Vital signs are checked at rest and with activity. Blood pressure within target range today.   6. COPD: Aggressive pulmonary hygiene.  Monitoring O2 saturations at rest and with activity.   7. Bipolar disorder: Check a Depakote level and cut back on BuSpar and Risperdal due to his waxing and waning alertness.    Treating in a calm and consistent environment.  Providing written and verbal instructions when possible to assist with information retention.

## 2021-01-12 NOTE — PROGRESS NOTES
Physical Therapy    [x] daily progress note       [] discharge       Patient Name:  Bakari Morales. :  1950 MRN: [id number]  Room:  [address] [place], [place] [address] Date of Admission: 2021  Rehabilitation Diagnosis:   Cerebral infarction, unspecified [I63.9]  Intracranial hemorrhage ([facility] [address] [I62.9]       Date 2021       Day of ARU Week:  2   Time IN//905   Individual Tx Minutes    Group Tx Minutes    Co-Treat Minutes 39  A cotreat was completed this date with  [] PT    [x] VALENZUELA   [] ST      This pt requires simultaneous intervention of 2 skilled therapists to safely complete tasks to address complexity of deficits defined in the goals including:  [x]Attention   [] Safety    [x]Problem Solving    [x]Sequencing     [x]Initiation    [x]Processing      []Recall of learned tasks  [] Communication  [x] Self Feeding   []ADL's    [x]UE Function    [x]Motor planning   [x]Balance  []Energy Conservation   [x]Verbal cues   [x] Tactile Cues  []Barriers     [x]Transfers   []Device Use   Concurrent Tx Minutes    TOTAL Tx Time Mins 45   Variance Time    Variance Time []   Refusal due to:     []   Medical hold/reason:    []   Illness   []   Off Unit for test/procedure  []   Extra time needed to complete task  []   Therapeutic need  []   Other (specify):   Restrictions Restrictions/Precautions  Restrictions/Precautions: General Precautions, Fall Risk(L hemiparesis, chronic R shoulder limitations)  Position Activity Restriction  Other position/activity restrictions: see catheter   Interdisciplinary communication [x]   Cleared for therapy per nursing     []   RN notified about issues during session  []   RN updated on pt performance  []   Spoke with   []   Spoke with OT  []   Spoke with MD  []   Other:    Subjective observations and cognitive status: Pt in bed, initially alert and conversing as he was trying to eat breakfast, required choices to be oriented to place and situation.      Pain level/location: Location: gluteal area and R shoulder    Discharge recommendations  Anticipated discharge date:  01/26/2021  Destination: []???home alone   []? ??home alone with assist PRN     [x]? ?? home w/ family      [x]? ?? Continuous supervision  []? ??SNF    []??? Assisted living     []? ?? Other:  Continued therapy: [x]? ? ? HHC PT  []???OUTPATIENT  PT   []??? No Further PT  []???SNF PT  Caregiver training recommended: [x]? ? ? Yes  []??? No   Equipment needs: TBD     Bed Mobility:           []   Pt received out of bed   Rolling R/L:  Total A  Scooting: Total A x 2  Lying --> Sit:  Total A x 2  Sit --> lying: Total A x 2  Bed features used: [x] Yes  [] No     Additional Therapeutic activities/exercises completed this date:     []   Nu-step:  Time:        Level:         #Steps:       []   Rebounder:    []  Seated     []  Standing        [x]   Balance training: Static sitting at EOB as therapists attempted to actively engage pt in increasing pt's level of alertness, simple command following, postural re-training, self-feeding (drinking coffee), hand-eye coordination training with visual target (ball, therapist's hand) with Total A x 22 min        [x]   Postural training: verbal, visual, and heavy tactile cues provided to facilitate neck/trunk midline positioning in static sitting    []   Supine ther ex (reps/sets):     []   Seated ther ex (reps/sets):     []   Standing ther ex (reps/sets):     []   Picking up object from floor (standing):                   []   Reacher used   []   Other:  Toileting activity completed with    []   Other:    Comments:      Patient/Caregiver Education and Training:   []   Role of PT  []   Education about Dx  []   Use of call light for assist   []   HEP provided and explained   []   Treatment plan reviewed  []   Home safety  []   Wheelchair mobility/management   []   Body mechanics  []   Bed Mobility/Transfer technique  []   Gait technique/sequencing  []   Proper use of assistive device/adaptive equipment  [] Stair training/Advanced mobility safety and technique  []   Reinforced patient's precautions/mobility while maintaining precautions  [x]   Postural awareness  []   Family/caregiver training  []   Progress was updated and reviewed in Rehabtracker with patient and/or family this date. [x]   Other: cognitive reorientation to place and situation, importance of staying alert during activities     Treatment Plan for Next Session: bed mobility training, simple command following      Assessment: Pt had a drastic change of level of alertness as he became increasingly lethargic when bed level and EOB activities were attempted today. He also exhibited intermittent, muscle jerks while seated at EOB. He had poor proprioception of his trunk,  reactive response, motor initiation, and level of alertness that he required constant verbal and heavy tactile cues. Pt was unable to keep eyes open >5sec at a time significantly impairing his attention and problem solving even to simple tasks.  Pt's increased lethargy is preventing pt from making any progress in his mobility and continues to require Total A posing a significant concern to realistically go home with only the significant other to assist.     Treatment/Activity Tolerance:   [] Tolerated treatment with no adverse effects    [] Patient limited by fatigue  [x] Patient limited by pain   [x] Patient limited by medical complications: increased lethargy     [] Adverse reaction to Tx  [] Significant change in status    Barrier/s to progress/learning:   []   None  [x]   Cognition  []   Hearing deficit  []   Pre-morbid mental/psychological status   []   Motivation  []   Communication  []   Anxiety  [x]   Vision deficit  [x]   Attention  [x]   Other: R shoulder pain/LOM      Safety:       [x]  bed alarm set    []  chair alarm set    []  Pt refused alarms                []  Telesitter activated      []  Gait belt used during tx session      []other:         Number of Minutes/Billable Intervention  Gait Training    Therapeutic Exercise    Neuro Re-Ed    Therapeutic Activity 45   Wheelchair Propulsion    Group    Other:    TOTAL 45         Social History  Social/Functional History  Lives With: Significant other  Type of Home: House  Home Layout: One level  Home Access: Stairs to enter with rails  Entrance Stairs - Number of Steps: 5+3  Entrance Stairs - Rails: Both  Bathroom Shower/Tub: Walk-in shower, Shower chair with back(shower chair doesn't fit in shower)  Bathroom Toilet: Standard  Bathroom Equipment: Toilet raiser, Shower chair  Bathroom Accessibility: Accessible  Home Equipment: 4 wheeled walker, [address] Help From: Family, Neighbor  ADL Assistance: Needs assistance  Bath: Minimal assistance  Dressing: Minimal assistance  Grooming: Independent  Feeding: Independent  Toileting: Independent  Homemaking Assistance: Independent  Homemaking Responsibilities: No  Meal Prep Responsibility: No  Laundry Responsibility: No  Cleaning Responsibility: No  Bill Paying/Finance Responsibility: No  Shopping Responsibility: No  Dependent Care Responsibility: No  Health Care Management: No  Ambulation Assistance: Independent  Transfer Assistance: Needs assistance  Active : No  Patient's  Info: Neighbor is primary , and transport company  Mode of Transportation: Car, Family, Friends  Education: 12th grade  Occupation: Retired  Type of occupation: Pt built trucks for 33 years; [employer]  Leisure & Hobbies: TV, limited reading, girlfriend has a dog. Pt has meds that come in pill packs. Bills are managed over the phone. IADL Comments: [place] assists c most IADLs and pt report hiring outside help c yardork. Additional Comments: Pt reports using rolator in the house.  Pt reports 1 fall this year that occured in the house while ambulating in the home; sleeps in regular, flat bed; R hand dominant    Objective Goals:  (Update in navigator)  Short term goals  Time Frame for Short term goals: 14-21 tx days:  Short term goal 1: Pt will complete bed mobility tasks and sup<->sit with SBA-Sup. Short term goal 2: Pt will complete OOB transfers using RW with Mod A. Short term goal 3: Pt will ambulate 10 ft over level and carpeted surfaces using RW with Min A. Short term goal 4: Pt will propel manual w/c 150 ft with turns with SBA-Sup. Short term goal 5: Caregiver will be able to safely assist pt with functional mobility tasks. :   :        Plan of Care                                                                              Times per week: 5 days per week for a minimum of 60 minutes/day plus group as appropriate for 60 minutes.   Treatment to include Current Treatment Recommendations: Strengthening, Functional Mobility Training, Wheelchair Mobility Training, Neuromuscular Re-education, Home Exercise Program, Equipment Evaluation, Education, & procurement, ROM, Transfer Training, Cognitive/Perceptual Training, Gait Training, Cognitive Reorientation, Safety Education & Training, Balance Training, Endurance Training, Pain Management, Patient/Caregiver Education & Training, Positioning    Electronically signed by   Kitty Velasquez PT   1/12/2021, 8:20 AM

## 2021-01-12 NOTE — PROGRESS NOTES
Pain level/location:  unable to rate pain   /10       Location: pain in R shoulder    Discharge recommendations  Anticipated discharge date:  1/26  Destination: []home alone   []home alone w assist prn [] home w/ family    [] Continuous supervision       []SNF            [] Assisted living     [] Other:   Continued therapy: []HHC OT  []OUTPATIENT  OT   [] No Further OT  Equipment needs: TBD   (HIT F2 to transition between stars)    Eating/Feeding:  PM session patient sits at edge of bed with pta behind for balance as Max A with tray table in from with patient attempting feeding self, patient completes 7 bites with CGA and heavy vc and tactile cues to scan identify and place for to food then to mouth, patient last three bites with Min A with therapist placing food to fork, and hand over hand bringing food to mouth then patient able to complete last step to place food into mouth   Extremity Used:        [x]    R UE []    L UE         Dentures: []   N/A    []    Partial []    Full  Adaptive Equipment Used:        Grooming:   Min A Washing face seated EOB heavy verbal and tactile cues to initiate and stay on task, patient c decreased proprioception at times, and falling asleep during task, patient requires max A and heavy verbal and tactile cues for trunk control and to correct lateral lean to left    Oral Hygiene:             Bed Mobility: SEE PT NOTES           []   Pt received out of bed   Rolling R/L:    Scooting:    Supine --> Sit:    Sit --> Supine:      Transfers:    Sit--> Stand:    Stand --> Sit:     Stand-Pivot:     Other:    Assistive device required for transfer:         Functional Mobility:  Did not attempt this date, patient with decreased alertness and awareness to environment   Assistance:   Device:   []   EchoStar     []   Standard Walker []   Wheelchair        []   Asa Pall       []   4-Wheeled Dulcie Pila         []   Cardiac Walker       []   Other:          Additional Therapeutic activities/exercises completed this date:     [x]   ADL Training   [x]   Balance/Postural training     []   Bed/Transfer Training   [x]   Endurance Training patient seated at edge of bed for approx 20 minutes, requiring Max A to facilitate posture and trunk control, unable to complete simple task c one step direction, requiring vc for patient to open eyes, and to wake up. []   Neuromuscular Re-ed   []   Nu-step:  Time:        Level:         #Steps:       []   Rebounder:    []  Seated     []  Standing        []   Supine Ther Ex (reps/sets):     []   Seated Ther Ex (reps/sets):     []   Standing Ther Ex (reps/sets):     [x]   Other: therapist instructs patient to find red therabnd tied to bed rails to attempt to assist patient with bed mobility including rolling right and left and scooting and sitting EOB with patient requiring total assist and vc throughout movements       Comments:  Patient alertness and inability to stay alert and aware of surroundings, noticeable apraxia when seated EOB and attempting to complete a task. Patient/Caregiver Education and Training:   [x]   Adaptive Equipment Use  [x]   Bed Mobility/Transfer Technique/Safety  [x]   Energy Conservation Tips  []   Family training  [x]   Postural Awareness  []   Safety During Functional Activities  []   Reinforced Patient's Precautions   []   Progress was updated and reviewed in Rehabtracker with patient and/or family this         date.      Treatment Plan for Next Session: POC to continue as tolerated       Assessment:        Treatment/Activity Tolerance:   [] Tolerated treatment with no adverse effects    [] Patient limited by fatigue  [] Patient limited by pain   [x] Patient limited by medical complications:    [] Adverse reaction to Tx:   [] Significant change in status    Safety:       [x]  bed alarm set    []  chair alarm set    []  Pt refused alarms                []  Telesitter activated      []  Gait belt used during tx session      []other:       Number of Minutes/Billable Intervention  Therapeutic Exercise    ADL Self-care 15 +30    Neuro Re-Ed    Therapeutic Activity 30   Group    Other:    TOTAL 75       Social History  Social/Functional History  Lives With: Significant other  Type of Home: House  Home Layout: One level  Home Access: Stairs to enter with rails  Entrance Stairs - Number of Steps: 5+3  Entrance Stairs - Rails: Both  Bathroom Shower/Tub: Walk-in shower, Shower chair with back(shower chair doesn't fit in shower)  Bathroom Toilet: Standard  Bathroom Equipment: Toilet raiser, Shower chair  Bathroom Accessibility: Accessible  Home Equipment: 4 wheeled walker, [place] Help From: Family, Neighbor  ADL Assistance: Needs assistance  Bath: Minimal assistance  Dressing: Minimal assistance  Grooming: Independent  Feeding: Independent  Toileting: Independent  Homemaking Assistance: Independent  Homemaking Responsibilities: No  Meal Prep Responsibility: No  Laundry Responsibility: No  Cleaning Responsibility: No  Bill Paying/Finance Responsibility: No  Shopping Responsibility: No  Dependent Care Responsibility: No  Health Care Management: No  Ambulation Assistance: Independent  Transfer Assistance: Needs assistance  Active : No  Patient's  Info: Jeffrey Ledezma is primary , and transport company  Mode of Transportation: Car, Family, Friends  Education: 12th grade  Occupation: Retired  Type of occupation: Pt built trucks for 33 years; [employer]  Leisure & Hobbies: TV, limited reading, girlfriend has a dog. Pt has meds that come in pill packs. Bills are managed over the phone. IADL Comments: [place] assists c most IADLs and pt report hiring outside help c [employer]ork. Additional Comments: Pt reports using rolator in the house.  Pt reports 1 fall this year that occured in the house while ambulating in the home; sleeps in regular, flat bed; R hand dominant    Objective

## 2021-01-12 NOTE — FLOWSHEET NOTE
[x] daily progress note       [] discharge       Patient Name:  Tamara Go. :  1950 MRN: [id number]  Room:  [address] [place] [address] Date of Admission: 2021  Rehabilitation Diagnosis:   Cerebral infarction, unspecified [I63.9]  Intracranial hemorrhage ([facility] [address] [I62.9]       Date 2021       Day of ARU Week:  2   Time IN/OUT 8033-3885 (co-treat)   Co-Treat Minutes 30    TOTAL Tx Time Mins 30   Restrictions Restrictions/Precautions  Restrictions/Precautions: General Precautions, Fall Risk(L hemiparesis, chronic R shoulder limitations)  Position Activity Restriction  Other position/activity restrictions: see catheter   Communication with other providers: [x]   OK to see per nursing:     []   Spoke with team member regarding:      Subjective observations and cognitive status: Pt seen in semi-go's position in bed with call light at beginning of treatment. Pt attempting to eat lunch, but was unsuccessful with decreased attention and falling asleep. Pt agreeable to sitting EOB and participating in eating. Pain level/location: 0/10        Discharge recommendations  Anticipated discharge date:  2021  Destination: []? ???home alone   []? ???home alone with assist PRN     [x]???? home w/ family      [x]? ??? Continuous supervision  []????SNF    []???? Assisted living     []???? Other:  Continued therapy: [x]????[facility] PT  []????OUTPATIENT  PT   []???? No Further PT  []????SNF PT  Caregiver training recommended: [x]?? ??Yes  []???? No   Equipment needs: TBD   A cotreat was completed this date with  [x] PT    [] OT   [] ST      This pt requires simultaneous intervention of 2 skilled therapists to safely complete tasks to address complexity of deficits defined in the goals including:  []Attention   [] Safety    []Problem Solving    []Sequencing     []Initiation    []Processing      []Recall of learned tasks  [] Communication  [] Self Feeding   [x]ADL's    []UE Function    [x]Activity tolerance [x]Balance  []Energy Conservation   []Verbal cues   [] Tactile Cues  []Barriers     [x]Transfers   []Device Use  Pt's response to cotreat: Fair   Progress toward goals: Ongoing    Bed Mobility:           Rolling R/L:  Max A x 2   Scooting: Max A x 2   Lying --> Sit:  Max A x 2   Sit --> lying: Max A x 2   Use of bed rails and bed features. Additional Therapeutic activities/exercises completed this date:     []   Nu-step:  Time:        Level:         #Steps:       []   Rebounder:    []  Seated     []  Standing        [x]   Balance training and trunk strengthening: pt sat EOB with CGA-Max A for balance (Max A was usual performance for sitting balance). See VALENZUELA note for eating details. Max redirection for focusing on task and patient with posterior LOB a majority of the time. []   Postural training    []   Supine ther ex (reps/sets):     []   Seated ther ex (reps/sets):     []   Standing ther ex (reps/sets):     []   Picking up object from floor (standing):                   []   Reacher used   []   Other:   []   Other:    Patient/Caregiver Education and Training:   [x]   Bed Mobility/Transfer technique/safety  []   Gait technique/sequencing  []   Proper use of assistive device  []   Advanced mobility safety and technique  []   Reinforced patient's precautions/mobility while maintaining precautions  []   Postural awareness  []   Family training  [x]   Progress was updated in Rehabtracker this date. Treatment Plan for Next Session: transfers; sitting balance; exercises; trunk strengthening; w/c mobility       Assessment:  Pt required max redirection d/t poor attention to task and falling asleep at times.    Treatment/Activity Tolerance:   [] Tolerated treatment with no adverse effects    [x] Patient limited by fatigue and decreased attention  [] Patient limited by pain   [] Patient limited by medical complications:    [] Adverse reaction to Tx:   [] Significant change in status    Safety:       [x]  bed alarm set    []  chair alarm set    []  Pt refused alarms                []  Telesitter activated      [x]  Gait belt used during tx session      [x]other: pt left in semi-go's position in bed with call light at end of treatment. Number of Minutes/Billable Intervention  Gait Training    Therapeutic Exercise    Neuro Re-Ed 15   Therapeutic Activity 15   Wheelchair Propulsion    Group    Other:    TOTAL 30         Social History  Social/Functional History  Lives With: Significant other  Type of Home: House  Home Layout: One level  Home Access: Stairs to enter with rails  Entrance Stairs - Number of Steps: 5+3  Entrance Stairs - Rails: Both  Bathroom Shower/Tub: Walk-in shower, Shower chair with back(shower chair doesn't fit in shower)  Bathroom Toilet: Standard  Bathroom Equipment: Toilet raiser, Shower chair  Bathroom Accessibility: Accessible  Home Equipment: 4 wheeled walker, [address] Help From: Family, Neighbor  ADL Assistance: Needs assistance  Bath: Minimal assistance  Dressing: Minimal assistance  Grooming: Independent  Feeding: Independent  Toileting: Independent  Homemaking Assistance: Independent  Homemaking Responsibilities: No  Meal Prep Responsibility: No  Laundry Responsibility: No  Cleaning Responsibility: No  Bill Paying/Finance Responsibility: No  Shopping Responsibility: No  Dependent Care Responsibility: No  Health Care Management: No  Ambulation Assistance: Independent  Transfer Assistance: Needs assistance  Active : No  Patient's  Info: Neighbor is primary , and transport company  Mode of Transportation: Car, Family, Friends  Education: 12th grade  Occupation: Retired  Type of occupation: Pt built trucks for 33 years; [employer]  Leisure & Hobbies: TV, limited reading, girlfriend has a dog. Pt has meds that come in pill packs. Bills are managed over the phone.   IADL Comments: [place] assists c most IADLs and pt report hiring outside help c michela. Additional Comments: Pt reports using rolator in the house. Pt reports 1 fall this year that occured in the house while ambulating in the home; sleeps in regular, flat bed; R hand dominant    Objective                                                                                    Goals:  (Update in navigator)  Short term goals  Time Frame for Short term goals: 14-21 tx days:  Short term goal 1: Pt will complete bed mobility tasks and sup<->sit with SBA-Sup. Short term goal 2: Pt will complete OOB transfers using RW with Mod A. Short term goal 3: Pt will ambulate 10 ft over level and carpeted surfaces using RW with Min A. Short term goal 4: Pt will propel manual w/c 150 ft with turns with SBA-Sup. Short term goal 5: Caregiver will be able to safely assist pt with functional mobility tasks. :   :        Plan of Care                                                                              Times per week: 5 days per week for a minimum of 60 minutes/day plus group as appropriate for 60 minutes.   Treatment to include Current Treatment Recommendations: Strengthening, Functional Mobility Training, Wheelchair Mobility Training, Neuromuscular Re-education, Home Exercise Program, Equipment Evaluation, Education, & procurement, ROM, Transfer Training, Cognitive/Perceptual Training, Gait Training, Cognitive Reorientation, Safety Education & Training, Balance Training, Endurance Training, Pain Management, Patient/Caregiver Education & Training, Positioning    Electronically signed by   Mary Jo Solis, [id number]  1/12/2021, 3:02 PM

## 2021-01-12 NOTE — PROGRESS NOTES
[address] [place] : 1950  Acct #: [de-identified]  MRN: [id number]              PM&R Progress Note      Admitting diagnosis: Intracranial hemorrhage ( [place] [address])     Comorbid diagnoses impacting rehabilitation: Left hemiparesis, dysarthria, paroxysmal atrial fibrillation, essential hypertension, uncontrolled diabetes type 2 with peripheral neuropathy, COPD, bipolar disorder, diastolic congestive heart failure    Chief complaint: Right shoulder pain. No chills or cough. Prior (baseline) level of function: Independent. Current level of function:         Current  IRF-NOHEMY and Goals:   Occupational Therapy:    Short term goals  Time Frame for Short term goals: STG=LTG :   Long term goals  Time Frame for Long term goals : 12-14 days or until d/c  Long term goal 1: Pt will complete feeding/grooming/oral care task c MOD I by d/c  Long term goal 2: Pt will complete total body bathing c MIN A by d/c  Long term goal 3: Pt will complete UB dressing c SUP by d/c  Long term goal 4: Pt will complete LB dressing c MOD A by d/c  Long term goal 5: Pt will don/doff footwear c SETUP by d/c  Long term goals 6: Pt will complete toileting c MOD A by d/c  Long term goal 7: Pt will complete functional transfer (toilet, tub, shower) c MOD A by d/c. Long term goal 8: Pt will perform therex/therax to facilitate increased strength/endurance/ax tolerance (c emphasis on dynamic standing balance/tolerance >10 mins and BUE endurance) c Min A in order to complete ADLs. :                                       Eating: Eating  Assistance Needed: Supervision or touching assistance  Comment: After further assessment, pt will require SUP feeds d/t pt's lethargy, poor task initiation, and cognition to complete task safely.   CARE Score: 4  Discharge Goal: Independent       Oral Hygiene: Oral Hygiene  Assistance Needed: Setup or clean-up assistance  Comment: x  Reason if not Attempted: Not attempted due to medical condition or safety concerns  CARE Score: 5  Discharge Goal: Independent    UB/LB Bathing: Shower/Bathe Self  Assistance Needed: Dependent  Comment: After further assessment, pt required Total A to complete full body bathing, 1st person Max A to maintain upright posture when seated EOB, 2nd person Max A for total body bathing d/t pt's lethargy and cognition required to complete task safely. Reason if not Attempted: Not attempted due to medical condition or safety concerns  CARE Score: 1  Discharge Goal: Supervision or touching assistance    UB Dressing: Upper Body Dressing  Assistance Needed: Substantial/maximal assistance  Comment: After further assessment, pt required Max A for don/doff d/t pt's lethargy and cognition required to complete task safely. Demo poor task initiation, sequencing, and problem solving. Reason if not Attempted: Not attempted due to medical condition or safety concerns  CARE Score: 2  Discharge Goal: Supervision or touching assistance         LB Dressing: Lower Body Dressing  Assistance Needed: Dependent  Comment: After further assessment, pt required Total A, 1st person Max A to maintain lateral position when rolled to L/R when donning over hips, 2nd person Max A for threading of BLE and donning over hips d/t pt's lethargy and cognition required to complete task. Reason if not Attempted: Not attempted due to medical condition or safety concerns  CARE Score: 1  Discharge Goal: Partial/moderate assistance    Donning and [place] Footwear: Putting On/Taking Off Footwear  Assistance Needed: Dependent  CARE Score: 1  Discharge Goal: Set-up or clean-up assistance      Toileting: Toileting Hygiene  Assistance Needed: Substantial/maximal assistance  Comment: Pt required Max A for completion of overall toileting (CM and Hygiene) completed in bed c bed pan, pt attempted to demo paulino-hygiene c RUE, however requires assist for thoroughness.   CARE Score: 2  Discharge Goal: Partial/moderate assistance      Toilet Transfers: Toilet Transfer  Comment: not safe to attempt  Reason if not Attempted: Not attempted due to medical condition or safety concerns  CARE Score: 88  Discharge Goal: Partial/moderate assistance    Physical Therapy:   Short term goals  Time Frame for Short term goals: 14-21 tx days:  Short term goal 1: Pt will complete bed mobility tasks and sup<->sit with SBA-Sup. Short term goal 2: Pt will complete OOB transfers using RW with Mod A. Short term goal 3: Pt will ambulate 10 ft over level and carpeted surfaces using RW with Min A. Short term goal 4: Pt will propel manual w/c 150 ft with turns with SBA-Sup. Short term goal 5: Caregiver will be able to safely assist pt with functional mobility tasks.             Bed Mobility:   Sit to Lying  Assistance Needed: Dependent  Comment: required x2-person assist to complete proper positioning in supine  CARE Score: 1  Discharge Goal: Supervision or touching assistance  Roll Left and Right  Assistance Needed: Dependent  Comment: pt with poor motor initiation, sequencing, and attention to required task (eyes were closed during task despite verbal prompts)  CARE Score: 1  Discharge Goal: Supervision or touching assistance  Sit to Lying  Assistance Needed: Dependent  Comment: required x2-person assist to complete proper positioning in supine  CARE Score: 1  Discharge Goal: Supervision or touching assistance    Transfers:    Sit to Stand  Assistance Needed: Dependent  Comment: x2-PA using tJ lombardo  Reason if not Attempted: Not attempted due to medical condition or safety concerns  CARE Score: 1  Discharge Goal: Partial/moderate assistance  Chair/Bed-to-Chair Transfer  Comment: pt with poor trunk control in sitting  Reason if not Attempted: Not attempted due to medical condition or safety concerns  CARE Score: 88  Discharge Goal: Partial/moderate assistance     Car Transfer  Reason if not Attempted: Not attempted due to medical condition or safety concerns  CARE Score: 88  Discharge Goal: Partial/moderate assistance    Ambulation:    Walking Ability  Does the Patient Walk?: Yes     Walk 10 Feet  Comment: pt unable to stand due to poor sitting balance and trunk control  Reason if not Attempted: Not attempted due to medical condition or safety concerns  CARE Score: 88  Discharge Goal: Partial/moderate assistance     Walk 50 Feet with Two Turns  Reason if not Attempted: Not attempted due to medical condition or safety concerns  CARE Score: 88  Discharge Goal: Not Attempted     Walk 150 Feet  Reason if not Attempted: Not applicable  CARE Score: 9  Discharge Goal: Not Applicable     Walking 10 Feet on Uneven Surfaces  Reason if not Attempted: Not attempted due to medical condition or safety concerns  CARE Score: 88  Discharge Goal: Partial/moderate assistance     1 Step (Curb)  Reason if not Attempted: Not attempted due to medical condition or safety concerns  CARE Score: 88  Discharge Goal: Not Attempted     4 Steps  Reason if not Attempted: Not attempted due to medical condition or safety concerns  CARE Score: 88  Discharge Goal: Not Attempted     12 Steps  Reason if not Attempted: Not applicable  CARE Score: 9  Discharge Goal: Not Applicable       Wheelchair:  w/c Ability: Wheelchair Ability  Uses a Wheelchair and/or Scooter?: Yes  Wheel 50 Feet with Two Turns  Comment: pt unable to safely be transferred to w/c today due to poor level of alertness and poor trunk control in sitting at EOB today  Reason if not Attempted: Not attempted due to medical condition or safety concerns  CARE Score: 88  Discharge Goal: Supervision or touching assistance  Wheel 150 Feet  Reason if not Attempted: Not attempted due to medical condition or safety concerns  CARE Score: 88  Discharge Goal: Supervision or touching assistance          Balance:        Object: Picking Up Object  Reason if not Attempted: Not attempted due to medical condition or safety concerns  CARE Score: 88  Discharge Goal: Partial/moderate assistance    I      Exam:    Blood pressure (!) 116/56, pulse 62, temperature 97.7 °F (36.5 °C), resp. rate 16, height 5' 9\" (1.753 m), weight 220 lb 3.2 oz (99.9 kg), SpO2 94 %. General: Semiupright in bed. Inconsistent with following directions. Sleepy again. HEENT: Mouth dry. Left facial droop. Neck supple. Pulmonary: Shallow without rales or rhonchi. Cardiac: Occasional premature beat but the rate is controlled today. Abdomen: Patient's abdomen is soft and nondistended. Bowel sounds were present throughout. There was no rebound, guarding or masses noted. Upper extremities: Clumsy movements bilaterally. Weak  on the left. Unable to raise that arm overhead. Right shoulder pain with active movements. Lower extremities: Trace edema. Heels clear. Weaker on the left. Sitting balance was poor. Standing balance was poor. Lab Results   Component Value Date    WBC 8.9 12/19/2020    HGB 15.1 12/19/2020    HCT 48.6 12/19/2020    MCV 94.4 12/19/2020     12/19/2020     Lab Results   Component Value Date    INR 0.84 12/19/2020    INR 1.01 06/03/2020    INR 0.90 06/02/2020    PROTIME 10.1 (L) 12/19/2020    PROTIME 12.2 06/03/2020    PROTIME 10.9 (L) 06/02/2020     Lab Results   Component Value Date    CREATININE 1.6 (H) 01/09/2021    BUN 14 12/19/2020     (L) 12/19/2020    K 3.6 12/19/2020    CL 98 (L) 12/19/2020    CO2 24 12/19/2020     Lab Results   Component Value Date    ALT 10 12/19/2020    AST 15 12/19/2020    ALKPHOS 82 12/19/2020    BILITOT 0.6 12/19/2020       Expected length of stay  prior to a supervised level of function for discharge home with a walker and [facility] OT/PT is 1/26/2021. Recommendations:    1.  Right intracranial hemorrhage with left hemiparesis:   Having to offer fragmented sessions of treatment to keep him alert during the daily occupational and physical therapy with speech-language pathology.     Xarelto on hold at

## 2021-01-12 NOTE — PROGRESS NOTES
[facility] UNIT  SPEECH/LANGUAGE PATHOLOGY      [x] Daily           [] Discharge    Patient:Felipe Dixon. LWZ:[birth year]  YNF:[id number]  Rehab Dx/Hx: Cerebral infarction, unspecified [I63.9]  Intracranial hemorrhage (HCC) [I62.9]   Allergies   Allergen Reactions    Nsaids      Renal      Precautions: ; Restrictions/Precautions: General Precautions, Fall Risk(L hemiparesis, chronic R shoulder limitations)          Home Situation/IADL:   Social/Functional History  Lives With: Significant other  Type of Home: House  Home Layout: One level  Home Access: Stairs to enter with rails  Entrance Stairs - Number of Steps: 5+3  Entrance Stairs - Rails: Both  Bathroom Shower/Tub: Walk-in shower, Shower chair with back(shower chair doesn't fit in shower)  Bathroom Toilet: Standard  Bathroom Equipment: Toilet raiser, Shower chair  Bathroom Accessibility: Accessible  Home Equipment: 4 wheeled walker, [address] Help From: Family, Neighbor  ADL Assistance: Needs assistance  Bath: Minimal assistance  Dressing: Minimal assistance  Grooming: Independent  Feeding: Independent  Toileting: Independent  Homemaking Assistance: Independent  Homemaking Responsibilities: No  Meal Prep Responsibility: No  Laundry Responsibility: No  Cleaning Responsibility: No  Bill Paying/Finance Responsibility: No  Shopping Responsibility: No  Dependent Care Responsibility: No  Health Care Management: No  Ambulation Assistance: Independent  Transfer Assistance: Needs assistance  Active : No  Patient's  Info: Izzy Shirley is primary , and transport company  Mode of Transportation: Car, Family, Friends  Education: 12th grade  Occupation: Retired  Type of occupation: Pt built trucks for 33 years; [employer]  Leisure & Hobbies: TV, limited reading, girlfriend has a dog. Pt has meds that come in pill packs. Bills are managed over the phone.   IADL Comments: Emigdio Prado assists c most IADLs and pt report hiring outside help lukas abernathy. Additional Comments: Pt reports using rolator in the house. Pt reports 1 fall this year that occured in the house while ambulating in the home; sleeps in regular, flat bed; R hand dominant      Date of Admit: 1/4/2021  Room #: 1005/1005-A     ST Number of Minutes/Billable Intervention  Cog/Memory Deficits     Aphasia/Language  15   Dysarthria/Speech     Apraxia/Speech     Dysphagia/Swallowing     Group     Other    TOTAL Minutes Billed  15    Variance    15 mins; unable to remain alert despite 3 attempts today     Date: 1/12/2021  Day of ARU Week:  2       SLP Individual Minutes  Time In: 0930  Time Out: 0945  Minutes: 15     Variance/Reason:  [] Refusal due to   [] Medical hold/reason  [] Illness   [] Off Unit for test/procedure  [] Extra time needed to complete task  [x] Other (specify)  Unable to remain alert; spoke with Dr Albert Cushing and meds have had some adjustments that hopefully we will see some improvements over the next few days. Activity completed: Pt seen for cognitive linguistic tasks. Pt continues to be lethargic with max encouragement this date. Short session as pt unable to remain alert. Pt had meal tray in room and attempted to work on that for locating items, self feeding, and responding to questions. 2 other attempts to see for rest of time with poor alertness. Pain: Does not indicate pain  Current Diet: DIET CARB CONTROL; Dental Soft  Dietary Nutrition Supplements: Low Calorie High Protein Supplement  Subjective: very lethargic and lots of stim and verbal cues needed. Will d/w Dr Albert Cushing regarding concerns with lack of progress, alertness, and engagment. Goals and POC: Co-treats where appropriate with PT or OT to facilitate patient goals in functional tasks.   LTG      Timeframe for Long-term Goals: No swallow tx                    Short-term Goals  Timeframe for Short-term Goals: 3x/week x3 weeks 30 mins min  LTG: Pt will improve cognitive linguistic abilities for safe return home and clearly communicate care needs. Goal 1: Continued receptive/expressive assessment by 1/6/2021 with additional goals to be added as appropriate. Goal met 1/6/21  Goal 2: Pt will respond to wh?'s with 90% acc given extra time. Max cues this date for basic responses. Goal 3: Pt will follow 2-3 step tasks with min cues and 75% acc  Goal not met--reduce to pt will follow 1 step commands with mod cues and 50% acc. Basic commands for id of items on tray and following feeding commands required max cues this date. Tactile cues to initiate. 30% acc  Goal 4: Pt will attend to therapist on R with eye contact x5 in 5 mins. Pt kept eyes closed throughout session. Goal 5: Pt will improve L visual scanning for locating requested items with mod cues and 60% acc. Pt unable to locate items on tray upon request.   Goal 6: Improve recall of learned tasks with min cues with 70% acc   Poor orientation and awareness. Alarm placed: [x]bed []chair   []other:   []JUNIOR activated        Barriers to progress:   [x] Fatigue        [x] Cognitive Deficits   [x] Memory Deficits   [x] Reduced Attn   [] Self Limiting Behaviors    [x] Reduced insight/awareness     [x] Visual Deficits   [] Premorbid Conditions  [] Impulsivity     [] Other      Education/Interventions used this date:     []   Progress was updated and reviewed in [facility] with patient and/or family this         date. [] Attending Care Conference for pt this date. See Team Patient Care Conference Note for updates.     Interventions used this date:  [x] Speech/Language Treatment    [] Instruction in HEP    Group   [] Dysphagia Treatment   [] Cognitive Skill Dc    Other:         Assessment / Impression                                                          Treatment/Activity Tolerance:   [] Tolerated Treatment well:     [x] Patient limited by fatigue/pain:       [] Patient limited by medical complications:    [] Adverse Reaction to Tx:   [] Significant change in status:      Electronically Signed by  Charo Vora MA, CCC-SLP    1/12/2021  2:24 PM

## 2021-01-13 LAB
GLUCOSE BLD-MCNC: 109 MG/DL (ref 70–99)
GLUCOSE BLD-MCNC: 151 MG/DL (ref 70–99)
GLUCOSE BLD-MCNC: 88 MG/DL (ref 70–99)
GLUCOSE BLD-MCNC: 88 MG/DL (ref 70–99)
GLUCOSE BLD-MCNC: 94 MG/DL (ref 70–99)

## 2021-01-13 PROCEDURE — 6360000002 HC RX W HCPCS: Performed by: INTERNAL MEDICINE

## 2021-01-13 PROCEDURE — 6370000000 HC RX 637 (ALT 250 FOR IP): Performed by: NURSE PRACTITIONER

## 2021-01-13 PROCEDURE — 94761 N-INVAS EAR/PLS OXIMETRY MLT: CPT

## 2021-01-13 PROCEDURE — 97116 GAIT TRAINING THERAPY: CPT

## 2021-01-13 PROCEDURE — 97530 THERAPEUTIC ACTIVITIES: CPT

## 2021-01-13 PROCEDURE — 97535 SELF CARE MNGMENT TRAINING: CPT

## 2021-01-13 PROCEDURE — 82962 GLUCOSE BLOOD TEST: CPT

## 2021-01-13 PROCEDURE — 99232 SBSQ HOSP IP/OBS MODERATE 35: CPT | Performed by: PHYSICAL MEDICINE & REHABILITATION

## 2021-01-13 PROCEDURE — 1280000000 HC REHAB R&B

## 2021-01-13 PROCEDURE — 6370000000 HC RX 637 (ALT 250 FOR IP): Performed by: PHYSICAL MEDICINE & REHABILITATION

## 2021-01-13 RX ADMIN — GABAPENTIN 200 MG: 100 CAPSULE ORAL at 22:40

## 2021-01-13 RX ADMIN — ATORVASTATIN CALCIUM 80 MG: 40 TABLET, FILM COATED ORAL at 22:38

## 2021-01-13 RX ADMIN — MICONAZOLE NITRATE: 20 POWDER TOPICAL at 22:40

## 2021-01-13 RX ADMIN — GABAPENTIN 200 MG: 100 CAPSULE ORAL at 13:00

## 2021-01-13 RX ADMIN — DOXAZOSIN 2 MG: 4 TABLET ORAL at 08:02

## 2021-01-13 RX ADMIN — SENNOSIDES 8.6 MG: 8.6 TABLET, FILM COATED ORAL at 22:40

## 2021-01-13 RX ADMIN — MICONAZOLE NITRATE: 20 POWDER TOPICAL at 12:51

## 2021-01-13 RX ADMIN — BENZTROPINE MESYLATE 0.5 MG: 1 TABLET ORAL at 08:03

## 2021-01-13 RX ADMIN — BUSPIRONE HYDROCHLORIDE 5 MG: 5 TABLET ORAL at 08:02

## 2021-01-13 RX ADMIN — FAMOTIDINE 20 MG: 20 TABLET, FILM COATED ORAL at 08:03

## 2021-01-13 RX ADMIN — ACETAMINOPHEN 650 MG: 325 TABLET ORAL at 13:00

## 2021-01-13 RX ADMIN — AMLODIPINE BESYLATE 10 MG: 10 TABLET ORAL at 08:02

## 2021-01-13 RX ADMIN — METOPROLOL TARTRATE 25 MG: 25 TABLET, FILM COATED ORAL at 22:39

## 2021-01-13 RX ADMIN — LISINOPRIL 40 MG: 20 TABLET ORAL at 08:03

## 2021-01-13 RX ADMIN — GABAPENTIN 200 MG: 100 CAPSULE ORAL at 08:01

## 2021-01-13 RX ADMIN — BENZTROPINE MESYLATE 0.5 MG: 1 TABLET ORAL at 22:41

## 2021-01-13 RX ADMIN — DOXAZOSIN 2 MG: 4 TABLET ORAL at 22:38

## 2021-01-13 RX ADMIN — BUSPIRONE HYDROCHLORIDE 5 MG: 5 TABLET ORAL at 22:39

## 2021-01-13 RX ADMIN — DIVALPROEX SODIUM 1000 MG: 500 TABLET, FILM COATED, EXTENDED RELEASE ORAL at 22:39

## 2021-01-13 RX ADMIN — ENOXAPARIN SODIUM 40 MG: 100 INJECTION SUBCUTANEOUS at 08:03

## 2021-01-13 RX ADMIN — ESCITALOPRAM OXALATE 10 MG: 10 TABLET, FILM COATED ORAL at 08:02

## 2021-01-13 RX ADMIN — DIGOXIN 125 MCG: 125 TABLET ORAL at 08:02

## 2021-01-13 RX ADMIN — INSULIN LISPRO 1 UNITS: 100 INJECTION, SOLUTION INTRAVENOUS; SUBCUTANEOUS at 12:28

## 2021-01-13 RX ADMIN — DOCUSATE SODIUM 100 MG: 100 CAPSULE, LIQUID FILLED ORAL at 08:02

## 2021-01-13 RX ADMIN — METOPROLOL TARTRATE 25 MG: 25 TABLET, FILM COATED ORAL at 08:02

## 2021-01-13 RX ADMIN — DOCUSATE SODIUM 100 MG: 100 CAPSULE, LIQUID FILLED ORAL at 22:40

## 2021-01-13 RX ADMIN — BUSPIRONE HYDROCHLORIDE 5 MG: 5 TABLET ORAL at 13:00

## 2021-01-13 ASSESSMENT — PAIN SCALES - GENERAL
PAINLEVEL_OUTOF10: 10
PAINLEVEL_OUTOF10: 0
PAINLEVEL_OUTOF10: 0

## 2021-01-13 ASSESSMENT — PAIN DESCRIPTION - DIRECTION: RADIATING_TOWARDS: RT SHOULDER

## 2021-01-13 ASSESSMENT — PAIN - FUNCTIONAL ASSESSMENT: PAIN_FUNCTIONAL_ASSESSMENT: ACTIVITIES ARE NOT PREVENTED

## 2021-01-13 ASSESSMENT — PAIN DESCRIPTION - ORIENTATION: ORIENTATION: LEFT;RIGHT

## 2021-01-13 ASSESSMENT — PAIN DESCRIPTION - LOCATION: LOCATION: NECK

## 2021-01-13 NOTE — PATIENT CARE CONFERENCE
ACUTE REHAB TEAM CONFERENCE SUMMARY   [facility]    NAME: Ghazal Ibrahim. : 1950 ADMIT DATE: 2021    Rehab Admitting Dx:Cerebral infarction, unspecified [I63.9]  Intracranial hemorrhage ([address] [I62.9]  Patient Comorbid Conditions: Active Hospital Problems    Diagnosis Date Noted    Left hemiparesis ([address] [G81.94]     Dysarthria due to and not concurrent with spontaneous intracerebral hemorrhage [I69.122]     Gait disturbance [R26.9]     Bipolar disorder, in partial remission, most recent episode depressed ([address] [F31.75]     Intracranial hemorrhage ([address] [I62.9] 2021    Uncontrolled type 2 diabetes mellitus with peripheral neuropathy ([address] [E11.42, E11.65]     Paroxysmal atrial fibrillation ([address] [I48.0]      Date: 2021    CASE MANAGEMENT  Current issues/needs regarding patient and family discharge status: Patient lives with Nara Orta in bottom level of a 2 level home. Wife Roslyn Shah, children, and upstairs neighbors are all sources of support. Patient has a rollator, WC, toilet raiser, and SC. Current recommnedation is for DME TBC, PT, OT, Nursing, and aide. Discharge transport may be needed and patient to discharge home with Roslyn Alyssa. PHYSICAL THERAPY (Updated in QI)  Short term goals  Time Frame for Short term goals: 14-21 tx days:  Short term goal 1: Pt will complete bed mobility tasks and sup<->sit with SBA-Sup. Short term goal 2: Pt will complete OOB transfers using RW with Mod A. Short term goal 3: Pt will ambulate 10 ft over level and carpeted surfaces using RW with Min A. Short term goal 4: Pt will propel manual w/c 150 ft with turns with SBA-Sup. Short term goal 5: Caregiver will be able to safely assist pt with functional mobility tasks. Impairments/deficits, barriers:     Body structures, Functions, Activity limitations: Decreased functional mobility , Decreased safe awareness, Decreased balance, Decreased coordination, Decreased ADL status, Decreased cognition, Decreased vision/visual deficit, Decreased ROM, Decreased endurance, Decreased high-level IADLs, Increased pain, Decreased strength, Decreased sensation, Decreased fine motor control, Decreased posture     Prognosis: Fair, Guarded  Decision Making: High Complexity  Clinical Presentation: unstable/unpredictable characteristics  Equipment needed at discharge:      PT IRF-NOHEMY scores since initial assessment  Bed Mobility:   Sit to Lying  Assistance Needed: Dependent  Comment: required x2-person assist to complete proper positioning in supine  CARE Score: 1  Discharge Goal: Supervision or touching assistance  Roll Left and Right  Assistance Needed: Dependent  Comment: pt with poor motor initiation, sequencing, and attention to required task (eyes were closed during task despite verbal prompts)  CARE Score: 1  Discharge Goal: Supervision or touching assistance  Sit to Lying  Assistance Needed: Dependent  Comment: required x2-person assist to complete proper positioning in supine  CARE Score: 1  Discharge Goal: Supervision or touching assistance    Transfers:    Sit to Stand  Assistance Needed: Dependent  Comment: x2-PA using [employer] on 01/11/2021  Reason if not Attempted: Not attempted due to medical condition or safety concerns  CARE Score: 1  Discharge Goal: Partial/moderate assistance  Chair/Bed-to-Chair Transfer  Assistance Needed: Dependent  Comment: x2-PA using [employer] on 01/11/2021  Reason if not Attempted: Not attempted due to medical condition or safety concerns  CARE Score: 1  Discharge Goal: Partial/moderate assistance     Car Transfer  Reason if not Attempted: Not attempted due to medical condition or safety concerns  CARE Score: 88  Discharge Goal: Partial/moderate assistance    Ambulation:    Walking Ability  Does the Patient Walk?: Yes     Walk 10 Feet  Comment: pt unable to stand due to poor sitting balance and trunk control  Reason if not Attempted: Not attempted due to medical condition or safety concerns  CARE Score: 88  Discharge Goal: Partial/moderate assistance     Walk 50 Feet with Two Turns  Reason if not Attempted: Not attempted due to medical condition or safety concerns  CARE Score: 88  Discharge Goal: Not Attempted     Walk 150 Feet  Reason if not Attempted: Not applicable  CARE Score: 9  Discharge Goal: Not Applicable     Walking 10 Feet on Uneven Surfaces  Reason if not Attempted: Not attempted due to medical condition or safety concerns  CARE Score: 88  Discharge Goal: Partial/moderate assistance     1 Step (Curb)  Reason if not Attempted: Not attempted due to medical condition or safety concerns  CARE Score: 88  Discharge Goal: Not Attempted     4 Steps  Reason if not Attempted: Not attempted due to medical condition or safety concerns  CARE Score: 88  Discharge Goal: Not Attempted     12 Steps  Reason if not Attempted: Not applicable  CARE Score: 9  Discharge Goal: Not Applicable    Gait Deviations: []None []Slow estella  [] Increased GAGAN  [] Staggers []Deviated Path  [] Decreased step length  [] Decreased step height  []Decreased arm swing  [] Shuffles  [] Decreased head and trunk rotation  []other:        Wheelchair:  w/c Ability: Wheelchair Ability  Uses a Wheelchair and/or Scooter?: Yes  Wheel 50 Feet with Two Turns  Assistance Needed: Dependent  Comment: 20 ft Max A x 2 on 01/11/2021  Reason if not Attempted: Not attempted due to medical condition or safety concerns  CARE Score: 1  Discharge Goal: Supervision or touching assistance  Wheel 150 Feet  Reason if not Attempted: Not attempted due to medical condition or safety concerns  CARE Score: 88  Discharge Goal: Supervision or touching assistance          Balance:        Object: Picking Up Object  Reason if not Attempted: Not attempted due to medical condition or safety concerns  CARE Score: 88  Discharge Goal: Partial/moderate assistance    Fall Risk: []  Yes  []  No    OCCUPATIONAL THERAPY  (Updated in QI)  Short term goals  Time Frame for Short term goals: STG=LTG :   Long term goals  Time Frame for Long term goals : 12-14 days or until d/c  Long term goal 1: Pt will complete feeding/grooming/oral care task c MOD I by d/c  Long term goal 2: Pt will complete total body bathing c MIN A by d/c  Long term goal 3: Pt will complete UB dressing c SUP by d/c  Long term goal 4: Pt will complete LB dressing c MOD A by d/c  Long term goal 5: Pt will don/doff footwear c SETUP by d/c  Long term goals 6: Pt will complete toileting c MOD A by d/c  Long term goal 7: Pt will complete functional transfer (toilet, tub, shower) c MOD A by d/c. Long term goal 8: Pt will perform therex/therax to facilitate increased strength/endurance/ax tolerance (c emphasis on dynamic standing balance/tolerance >10 mins and BUE endurance) c Min A in order to complete ADLs.  :                                       OT IRF-NOHEMY scores and goals since initial assessment:    ADLs:    Eating: Eating  Assistance Needed: Substantial/maximal assistance  Comment: patient able to being fork to mouth fully approx 10% of meal before falling asleep, patient requires assist to keep him awake and bringing fork to mouth for one/two bites and then falls asleep again  CARE Score: 2  Discharge Goal: Independent       Oral Hygiene: Oral Hygiene  Assistance Needed: Dependent  Comment: X  Reason if not Attempted: Not attempted due to medical condition or safety concerns  CARE Score: 1  Discharge Goal: Independent    UB/LB Bathing: Shower/Bathe Self  Assistance Needed: Dependent  Comment: X  Reason if not Attempted: Not attempted due to medical condition or safety concerns  CARE Score: 1  Discharge Goal: Supervision or touching assistance    UB Dressing: Upper Body Dressing  Assistance Needed: Dependent  Comment: X  Reason if not Attempted: Not attempted due to medical condition or safety concerns  CARE Score: 1  Discharge Goal: Supervision or touching assistance         LB Dressing: Lower Body Dressing  Assistance Needed: Dependent  Comment: X  Reason if not Attempted: Not attempted due to medical condition or safety concerns  CARE Score: 1  Discharge Goal: Partial/moderate assistance    Donning and [place] Footwear: Putting On/Taking Off Footwear  Assistance Needed: Dependent  Comment: x  CARE Score: 1  Discharge Goal: Set-up or clean-up assistance      Toileting: Toileting Hygiene  Assistance Needed: Dependent  Comment: X  CARE Score: 1  Discharge Goal: Partial/moderate assistance      Toilet Transfers: Toilet Transfer  Comment: not safe to attempt  Reason if not Attempted: Not attempted due to medical condition or safety concerns  CARE Score: 88  Discharge Goal: Partial/moderate assistance      Impairments/deficits, barriers:  Lethargy, poor alertness, cognition  Assessment  Performance deficits / Impairments: Decreased functional mobility , Decreased cognition, Decreased high-level IADLs, Decreased ADL status, Decreased endurance, Decreased fine motor control, Decreased ROM, Decreased safe awareness, Decreased strength, Decreased balance, Decreased vision/visual deficit, Decreased coordination  Decision Making: High Complexity  REQUIRES OT FOLLOW UP: Yes  Equipment needed at discharge:TBD      COGNITIVE FUNCTION/SPEECH THERAPY (AS INDICATED)  LTG                         Short-term Goals Pt not meeting goals--difficulty remaining alert and actively participating for max benefits of therapy. Now requiring assist with feeding due to this. Pt remains confused and disoriented, tangential in utterances, and with poor attn and visual scanning. Memory shows no carryover and awareness and insight are poor. Goal downgraded to follow 1 step commands. Meds have been adjusted but no significant change has been observed at this time. Diet downgraded to pureed with thins due to poor intake and prolonged oral manipulation and falling asleep with food in his mouth.     Timeframe for Short-term Goals: 3x/week x3 weeks 30 mins min  LTG: Pt will improve cognitive linguistic abilities for safe return home and clearly communicate care needs. Goal 1: Continued receptive/expressive assessment by 1/6/2021 with additional goals to be added as appropriate. Goal met 1/6/21  Goal 2: Pt will respond to wh?'s with 90% acc given extra time. Goal 3: Pt will follow 1 step tasks with mod cues and 50% acc  Goal 4: Pt will attend to therapist on R with eye contact x5 in 5 mins. Goal 5: Pt will improve L visual scanning for locating requested items with mod cues and 60% acc. Goal 6: Improve recall of learned tasks with min cues with 70% acc                        Ongoing impairments or deficits or barriers: fatigue, confusion  Areas where progress has been made:Pt has declined  Strategies that improve performance: Pt remains cue dependent for all tasks    Nursing Current Medical Status:   [] Is continent of bladder with See Catheter    [x] Is incontinent of bowel   [x] Has had an adequate number of bowel movements   [] Urinates with no urinary retention >300ml in bladder   [] Targeting bladder protocol with see removal   [x] Maintaining O2 SATs at 92% or greater   [x] Has pain managed while on ARU         [] Has had no skin breakdown while on ARU   [] Has improved skin integrity via wound measurements   [] Has no signs/symptoms of infection at the wound site   [] Pressure wounds Stage/Location:    [] Arrived on unit with pressure wound  [x] Has been free from injury during hospitalization   [] Has experienced a fall during hospitalization  [] Ongoing education with patient/family with understanding demonstrated for:  [] Receives IV Fluids  [] Other:        NUTRITION  Weight: 218 lb (98.9 kg) / Body mass index is 32.19 kg/m². Current diet: Dietary Nutrition Supplements: Low Calorie High Protein Supplement  DIET CARB CONTROL;  Dysphagia Pureed  Intake: Decline in intake with increased lethargy, recent meal intake 25% at best.      Medical improvements/barriers: Pt now having difficulty meeting mins due to significant lethargy, meds have been adjusted, pt requiring assist with self feeding due to lethargy. Poor recall, insight and awareness, L neglect, requiring 2 person assist for most tasks. Diet downgraded to pureed due to poor alertness, oral holding, HR fluctuations, diagnostic work up. Team goals for next treatment period/Intervention for current barriers:   [x] Pt will increase activity tolerance for daily tasks. [] Pt will improve bed mobility with reduced assist.  [x] Pt will improve safety in fx tasks with reduced cues/assist  [x] Pt will improve transfers with reduced assist  [x] Pt will improve toileting with reduced assist  [x] Pt will improve ADL's with use of adaptive equipment with reduced assist  [] Pt will improve pain mgmt for maximum participation in tx program  [x] Pt will improve communication to get basic needs met on unit  [] Pt will improve swallowing for safe diet advancement with use of strategies  []  Plan for discharge to home. Patient Strengths:     Justification for Continued Stay  Based on my medical assessment of the patient and review of information from the interdisciplinary team as part of this weekly team conference, the patient continues to meet the following criteria for IRF level of care:   The patient requires active and ongoing intervention of multiple therapy disciplines   The patient requires and intensive rehabilitation therapy program   The patient requires continued physician supervision by a rehabilitation physician   The patient requires 24 hours rehab nursing care   The patient requires an intensive and coordinated interdisciplinary team approach to the delivery of rehabilitative care.      Assessment/Plan   []  The patient is making good progression towards their long term goals and is actively participating in and has a reasonable expectation to continue to benefit from the intensive rehabilitation therapy program   []  The estimated discharge date has been changed from initial team conference due to:   [x]  The estimated discharge destination has been changed from initial team conference due to: lethargy and poor performance. Ongoing tx following discharge: []HHC     []OUTPATIENT     [] No Further Treatment     [] Family/Caregiver Training  []  Transitional Living Arrangement   [] Home Assessment (date  )     [] Family Conference   []  Therapeutic Pass       []  Other: (specify)    Estimated Discharge Date: 1/26/21    Estimated Discharge Destination: []home alone   []home alone with assist prn  []Continuous supervision []Return home with spouse/family   [] Assisted living  [x]SNF     Team members participating in today's conference. [x] Antonia Herman, Medical Director  [x] Wade Jenkins,   [x] Sage Velazquez, Nurse Mgr     [x]  Melia Ingram, PT   [x] Jayne Leroy, OT   []  Abelardo Kapoor, PT  [] Conor Jaimes, OT      [x] Brianna Marino, SLP     [x]  Michael Kern, ASIA     [x] Amina Zelaya,     [x]Vinita Hartley,    [x]  Joe Jensen, YESENIA    [] Contreras Fuchs RN  [] Duncan Pride RN    [] Saundra Campbell RN [] Delicia Stallworth RN        I have led this Team Conference and agree with the plan, Venu Juarez MD, 1/14/2021, 12:37 PM  Goals have been updated to reflect recent status.     Team conference note transcribed this date by: Gabriella Garcia MA, [id number] [facility], Therapy Coordinator

## 2021-01-13 NOTE — PLAN OF CARE
Problem: Pain:  Goal: Pain level will decrease  Description: Pain level will decrease  1/13/2021 0241 by Hunter Galaviz RN  Outcome: Ongoing  1/13/2021 0239 by Hunter Galaviz RN  Outcome: Ongoing  Goal: Control of acute pain  Description: Control of acute pain  1/13/2021 0241 by Hunter Galaviz RN  Outcome: Ongoing  1/13/2021 0239 by Hunter Galaviz RN  Outcome: Ongoing  Goal: Control of chronic pain  Description: Control of chronic pain  1/13/2021 0241 by Hunter Galaviz RN  Outcome: Ongoing  1/13/2021 0239 by Hunter Galaviz RN  Outcome: Ongoing     Problem: Skin Integrity:  Goal: Will show no infection signs and symptoms  Description: Will show no infection signs and symptoms  1/13/2021 0241 by Hunter Galaviz RN  Outcome: Ongoing  1/13/2021 0239 by Hunter Galaviz RN  Outcome: Ongoing  Goal: Absence of new skin breakdown  Description: Absence of new skin breakdown  1/13/2021 0241 by Hunter Galaviz RN  Outcome: Ongoing  1/13/2021 0239 by Hunter Galaviz RN  Outcome: Ongoing     Problem: Falls - Risk of:  Goal: Will remain free from falls  Description: Will remain free from falls  1/13/2021 0241 by Hunter Galaviz RN  Outcome: Ongoing  1/13/2021 0239 by Hunter Galaviz RN  Outcome: Ongoing  Goal: Absence of physical injury  Description: Absence of physical injury  1/13/2021 0241 by Hunter Galaviz RN  Outcome: Ongoing  1/13/2021 0239 by Hunter Galaviz RN  Outcome: Ongoing

## 2021-01-13 NOTE — FLOWSHEET NOTE
[x] daily progress note       [] discharge       Patient Name:  Cyril Hernandez. :  1950 MRN: [id number]  Room:  [address] [place] [address] Date of Admission: 2021  Rehabilitation Diagnosis:   Cerebral infarction, unspecified [I63.9]  Intracranial hemorrhage ([facility] [address] [I62.9]       Date 2021       Day of ARU Week:  3   Time IN/-855  1345-x   Individual Tx Minutes 25   TOTAL Tx Time Mins 25   Variance Time -75 minutes   Variance Time []   Refusal due to:     []   Medical hold/reason:    []   Illness   []   Off Unit for test/procedure  []   Extra time needed to complete task  []   Therapeutic need  [x]   Other (specify): pt with increased lethargy and confusion and unable to participate in therapy session. Restrictions Restrictions/Precautions  Restrictions/Precautions: General Precautions, Fall Risk(L hemiparesis, chronic R shoulder limitations)  Position Activity Restriction  Other position/activity restrictions: see catheter   Communication with other providers: [x]   OK to see per nursing:     []   Spoke with team member regarding:      Subjective observations and cognitive status: AM session: pt seen in semi-go's position in bed at beginning of treatment. Pt was in/out of lethargy and still had his breakfast in front of him. Pt became more lethargic and falling asleep multiple times while seated EOB with max vcs to stay awake and eat his breakfast. BP was 97/63; HR was 132 bpm. BP while supine in bed was 106/71.  bpm. Pt was unable to attend to tasks d/t lethargy, so remainder of treatment session was deferred at this time. PM session: pt seen in semi-go's position in bed upon entering room. Pt still very lethargic and confused. Checked BP and it was 85/52.  Notified nsg Marshall Moreland   Pain level/location:   0 /10           Discharge recommendations  Anticipated discharge date:  2021  Destination: []?????home alone   []?????home alone with assist PRN     [x]????? home w/ family      [x]????? Continuous supervision  []??? ??SNF    []????? Assisted living     []????? Other:  Continued therapy: [x]??? ? ?[facility] PT  []?????OUTPATIENT  PT   []????? No Further PT  []??? ??SNF PT  Caregiver training recommended: [x]??? ? ? Yes  []????? No   Equipment needs: TBD     A cotreat was completed this date with  [] PT    [x] OT   [] ST      This pt requires simultaneous intervention of 2 skilled therapists to safely complete tasks to address complexity of deficits defined in the goals including:  [x]Attention   [x] Safety    []Problem Solving    []Sequencing     [x]Initiation    []Processing      []Recall of learned tasks  [] Communication  [] Self Feeding   [x]ADL's    []UE Function    [x]Activity tolerance   [x]Balance  [x]Energy Conservation   [x]Verbal cues   [] Tactile Cues  []Barriers     [x]Transfers   []Device Use  Pt's response to cotreat: Fair   Progress toward goals: Ongoing    Bed Mobility:              Rolling R/L:  Max A x 2   Scooting: Max A x 2   Lying --> Sit:  Mod A x 2   Sit --> lying: Max A x 2  Use of bed features and bed rails     Additional Therapeutic activities/exercises completed this date:     []   Nu-step:  Time:        Level:         #Steps:       []   Rebounder:    []  Seated     []  Standing        [x]   Balance training: AM session: While performing ADL task (eating) (See VALENZUELA note for details), pt sat EOB with max A-SBA (usual Max A for sitting balance d/t left sided lean and occasional posterior lean. Mirror was used for reflective feedback, though patient required max vcs to look into mirror to correct self with no carryover. Max vcs to open eyes and attend to task.           []   Postural training    []   Supine ther ex (reps/sets):     []   Seated ther ex (reps/sets):     []   Standing ther ex (reps/sets):     []   Picking up object from floor (standing):                   []   Reacher used   []   Other:   []   Other:    Patient/Caregiver Education and Training:   [x]   Bed Mobility/Transfer technique/safety  []   Gait technique/sequencing  []   Proper use of assistive device  []   Advanced mobility safety and technique  []   Reinforced patient's precautions/mobility while maintaining precautions  []   Postural awareness  []   Family training  [x]   Progress was updated in Rehabtracker this date. Treatment Plan for Next Session:  Trunk strengthening; sit EOB; w/c mobility; transfers; standing tolerance     Assessment:    Treatment/Activity Tolerance:   [] Tolerated treatment with no adverse effects    [x] Patient limited by fatigue and confusion. [] Patient limited by pain   [] Patient limited by medical complications:    [] Adverse reaction to Tx:   [] Significant change in status    Safety:       [x]  bed alarm set    []  chair alarm set    []  Pt refused alarms                []  Telesitter activated      [x]  Gait belt used during tx session      [x]other: pt left in semi-go's position in bed with call light at end of treatment.         Number of Minutes/Billable Intervention  Gait Training    Therapeutic Exercise    Neuro Re-Ed    Therapeutic Activity 25   Wheelchair Propulsion    Group    Other:    TOTAL 25         Social History  Social/Functional History  Lives With: Significant other  Type of Home: House  Home Layout: One level  Home Access: Stairs to enter with rails  Entrance Stairs - Number of Steps: 5+3  Entrance Stairs - Rails: Both  Bathroom Shower/Tub: Walk-in shower, Shower chair with back(shower chair doesn't fit in shower)  Bathroom Toilet: Standard  Bathroom Equipment: Toilet raiser, Shower chair  Bathroom Accessibility: Accessible  Home Equipment: 4 wheeled walker, [address] Help From: Family, Neighbor  ADL Assistance: Needs assistance  Bath: Minimal assistance  Dressing: Minimal assistance  Grooming: Independent  Feeding: Independent  Toileting: Independent  Homemaking Assistance: Independent  Homemaking Responsibilities: No  Meal Prep Responsibility: No  Laundry Responsibility: No  Cleaning Responsibility: No  Bill Paying/Finance Responsibility: No  Shopping Responsibility: No  Dependent Care Responsibility: No  Health Care Management: No  Ambulation Assistance: Independent  Transfer Assistance: Needs assistance  Active : No  Patient's  Info: Jannie Cardozo is primary , and transport company  Mode of Transportation: Car, Family, Friends  Education: 12th grade  Occupation: Retired  Type of occupation: Pt built trucks for 33 years; [employer]  Leisure & Hobbies: TV, limited reading, girlfriend has a dog. Pt has meds that come in pill packs. Bills are managed over the phone. IADL Comments: Roslyn Shah assists c most IADLs and pt report hiring outside help c yardork. Additional Comments: Pt reports using rolator in the house. Pt reports 1 fall this year that occured in the house while ambulating in the home; sleeps in regular, flat bed; R hand dominant    Objective                                                                                    Goals:  (Update in navigator)  Short term goals  Time Frame for Short term goals: 14-21 tx days:  Short term goal 1: Pt will complete bed mobility tasks and sup<->sit with SBA-Sup. Short term goal 2: Pt will complete OOB transfers using RW with Mod A. Short term goal 3: Pt will ambulate 10 ft over level and carpeted surfaces using RW with Min A. Short term goal 4: Pt will propel manual w/c 150 ft with turns with SBA-Sup. Short term goal 5: Caregiver will be able to safely assist pt with functional mobility tasks. :   :        Plan of Care                                                                              Times per week: 5 days per week for a minimum of 60 minutes/day plus group as appropriate for 60 minutes.   Treatment to include Current Treatment Recommendations: Strengthening, Functional Mobility Training, Wheelchair Mobility Training, Neuromuscular Re-education, Home Exercise Program, Equipment Evaluation, Education, & procurement, ROM, Transfer Training, Cognitive/Perceptual Training, Gait Training, Cognitive Reorientation, Safety Education & Training, Balance Training, Endurance Training, Pain Management, Patient/Caregiver Education & Training, Positioning    Electronically signed by   Miguel Henriquez, [id number]  1/13/2021, 9:11 AM

## 2021-01-13 NOTE — PROGRESS NOTES
observations and cognitive status: Patient Long sitting in bed with breakfast tray in front of him, patient continue to be lethargic and not responding to one step direction. Therapists sits patient up at EOB patient unable to wake up and eat  See comments for more detail .      Pain level/location:    Could not ascertain /34       Location: neck area, from facial expression when lying supine    Discharge recommendations  Anticipated discharge date:  1/26  Destination: []home alone   []home alone w assist prn   [] home w/ family    [] Continuous supervision       [x]SNF    [] Assisted living     [] Other:   Continued therapy: []C OT  []OUTPATIENT  OT   [] No Further OT  Equipment needs: TBD       ADLs:    Eating: Eating  Assistance Needed: Substantial/maximal assistance  Comment: patient able to being fork to mouth fully approx 10% of meal before falling asleep, patient requires assist to keep him awake and bringing fork to mouth for one/two bites and then falls asleep again  CARE Score: 2  Discharge Goal: Independent       Oral Hygiene: Oral Hygiene  Assistance Needed: Dependent  Comment: X  Reason if not Attempted: Not attempted due to medical condition or safety concerns  CARE Score: 1  Discharge Goal: Independent    UB/LB Bathing: Shower/Bathe Self  Assistance Needed: Dependent  Comment: X  Reason if not Attempted: Not attempted due to medical condition or safety concerns  CARE Score: 1  Discharge Goal: Supervision or touching assistance    UB Dressing: Upper Body Dressing  Assistance Needed: Dependent  Comment: X  Reason if not Attempted: Not attempted due to medical condition or safety concerns  CARE Score: 1  Discharge Goal: Supervision or touching assistance         LB Dressing: Lower Body Dressing  Assistance Needed: Dependent  Comment: X  Reason if not Attempted: Not attempted due to medical condition or safety concerns  CARE Score: 1  Discharge Goal: Partial/moderate assistance    Donning and [place] Footwear: Putting On/Taking Off Footwear  Assistance Needed: Dependent  Comment: x  CARE Score: 1  Discharge Goal: Set-up or clean-up assistance      Toileting: Toileting Hygiene  Assistance Needed: Dependent  Comment: X  CARE Score: 1  Discharge Goal: Partial/moderate assistance      Toilet Transfers: Toilet Transfer  Comment: not safe to attempt  Reason if not Attempted: Not attempted due to medical condition or safety concerns  CARE Score: 88  Discharge Goal: Partial/moderate assistance  Device Used:    []   Standard Toilet         []   Grab Bars           []  Bedside Commode       []   Elevated Toilet          []   Other:        Bed Mobility:SEE PT DETAILS            []   Pt received out of bed   Rolling R/L:    Scooting:    Supine --> Sit:    Sit --> Supine:      Transfers:    Sit--> Stand:    Stand --> Sit:     Stand-Pivot:     Other:    Assistive device required for transfer:         Functional Mobility:    Assistance:    Device:   []   Perico Pain     []   Standard Walker []   Wheelchair        []   [employer]       []   Delories Cook         []   Cardiac Walker       []   Other:        Homemaking Tasks:          Additional Therapeutic activities/exercises completed this date:     [x]   ADL Training washes face with washcloth to attempt to arouse patient while seated edge of bed, attempts feeding task at edge of bed however patient falling asleep througbhout   []   Balance/Postural training     []   Bed/Transfer Training   []   Endurance Training   []   Neuromuscular Re-ed   []   Nu-step:  Time:        Level:         #Steps:       []   Rebounder:    []  Seated     []  Standing        []   Supine Ther Ex (reps/sets):     []   Seated Ther Ex (reps/sets):     []   Standing Ther Ex (reps/sets):     []   Other:      Comments:   VALENZUELA and PTA spoke at length with nursing about lethargy in patient, nursing and Dr bush aware and have adjusted medication to promote alertness and increase in participation in therapy    Patient/Caregiver Education and Training:   []   Adaptive Equipment Use  []   Bed Mobility/Transfer Technique/Safety  []   Energy Conservation Tips  []   Family training  [x]   Postural Awareness  [x]   Safety During Functional Activities  []   Reinforced Patient's Precautions   []   Progress was updated and reviewed in Rehabtracker with patient and/or family this         date.     Treatment Plan for Next Session: POC to continue as tolerated; bed mobility, trunk and postural control to prep for transfers       Assessment:        Treatment/Activity Tolerance:   [] Tolerated treatment with no adverse effects    [] Patient limited by fatigue  [] Patient limited by pain   [] Patient limited by medical complications:    [] Adverse reaction to Tx:   [] Significant change in status    Safety:       []  bed alarm set    []  chair alarm set    []  Pt refused alarms                []  Telesitter activated      []  Gait belt used during tx session      []other:       Number of Minutes/Billable Intervention  Therapeutic Exercise    ADL Self-care 25   Neuro Re-Ed    Therapeutic Activity    Group    Other:    TOTAL 25       Social History  Social/Functional History  Lives With: Significant other  Type of Home: House  Home Layout: One level  Home Access: Stairs to enter with rails  Entrance Stairs - Number of Steps: 5+3  Entrance Stairs - Rails: Both  Bathroom Shower/Tub: Walk-in shower, Shower chair with back(shower chair doesn't fit in shower)  Bathroom Toilet: Standard  Bathroom Equipment: Toilet raiser, Shower chair  Bathroom Accessibility: Accessible  Home Equipment: 4 wheeled walker, [address] Help From: Family, Neighbor  ADL Assistance: Needs assistance  Bath: Minimal assistance  Dressing: Minimal assistance  Grooming: Independent  Feeding: Independent  Toileting: Independent  Homemaking Assistance: Independent  Homemaking Responsibilities: No  Meal Prep Responsibility: No  Laundry Responsibility: No  Cleaning Responsibility: No  Bill Paying/Finance Responsibility: No  Shopping Responsibility: No  Dependent Care Responsibility: No  Health Care Management: No  Ambulation Assistance: Independent  Transfer Assistance: Needs assistance  Active : No  Patient's  Info: Demetri Peralta is primary , and transport company  Mode of Transportation: Car, Family, Friends  Education: 12th grade  Occupation: Retired  Type of occupation: Pt built trucks for 33 years; [employer]  Leisure & Hobbies: TV, limited reading, girlfriend has a dog. Pt has meds that come in pill packs. Bills are managed over the phone. IADL Comments: Rosana Rosemary assists c most IADLs and pt report hiring outside help c yardork. Additional Comments: Pt reports using rolator in the house. Pt reports 1 fall this year that occured in the house while ambulating in the home; sleeps in regular, flat bed; R hand dominant    Objective                                                                                    Goals:  (Update in navigator)  Short term goals  Time Frame for Short term goals: STG=LTG:  Long term goals  Time Frame for Long term goals : 12-14 days or until d/c  Long term goal 1: Pt will complete feeding/grooming/oral care task c MOD I by d/c  Long term goal 2: Pt will complete total body bathing c MIN A by d/c  Long term goal 3: Pt will complete UB dressing c SUP by d/c  Long term goal 4: Pt will complete LB dressing c MOD A by d/c  Long term goal 5: Pt will don/doff footwear c SETUP by d/c  Long term goals 6: Pt will complete toileting c MOD A by d/c  Long term goal 7: Pt will complete functional transfer (toilet, tub, shower) c MOD A by d/c. Long term goal 8: Pt will perform therex/therax to facilitate increased strength/endurance/ax tolerance (c emphasis on dynamic standing balance/tolerance >10 mins and BUE endurance) c Min A in order to complete ADLs. :        Plan of Care Times per week: 5 days per week for a minimum of 60 minutes/day plus group as appropriate for 60 minutes.   Treatment to include Plan  Times per day: Daily  Current Treatment Recommendations: Strengthening, Endurance Training, Neuromuscular Re-education, Patient/Caregiver Education & Training, ROM, Equipment Evaluation, Education, & procurement, Self-Care / ADL, Balance Training, Functional Mobility Training, Safety Education & Training, Cognitive/Perceptual Training    Electronically signed by   RUBEN Graham  1/13/2021, 10:05 AM

## 2021-01-13 NOTE — PLAN OF CARE
Problem: Pain:  Goal: Pain level will decrease  Description: Pain level will decrease  1/13/2021 1124 by Manjula Garrison RN  Outcome: Ongoing  1/13/2021 0241 by Yasmin Figueroa RN  Outcome: Ongoing  1/13/2021 0239 by Yasmin Figueroa RN  Outcome: Ongoing  Goal: Control of acute pain  Description: Control of acute pain  1/13/2021 1124 by Manjula Garrison RN  Outcome: Ongoing  1/13/2021 0241 by Yasmin Figueroa RN  Outcome: Ongoing  1/13/2021 0239 by Yasmin Figueroa RN  Outcome: Ongoing  Goal: Control of chronic pain  Description: Control of chronic pain  1/13/2021 1124 by Manjula Garrison RN  Outcome: Ongoing  1/13/2021 0241 by Yasmin Figueroa RN  Outcome: Ongoing  1/13/2021 0239 by Yasmin Figueroa RN  Outcome: Ongoing     Problem: Skin Integrity:  Goal: Will show no infection signs and symptoms  Description: Will show no infection signs and symptoms  1/13/2021 1124 by Manjula Garrison RN  Outcome: Ongoing  1/13/2021 0241 by Yasmin Figueroa RN  Outcome: Ongoing  1/13/2021 0239 by Yasmin Figueroa RN  Outcome: Ongoing  Goal: Absence of new skin breakdown  Description: Absence of new skin breakdown  1/13/2021 1124 by Manjula Garrison RN  Outcome: Ongoing  1/13/2021 0241 by Yasmin Figueroa RN  Outcome: Ongoing  1/13/2021 0239 by Yasmin Figueroa RN  Outcome: Ongoing

## 2021-01-14 ENCOUNTER — APPOINTMENT (OUTPATIENT)
Dept: CT IMAGING | Age: 71
DRG: 057 | End: 2021-01-14
Attending: PHYSICAL MEDICINE & REHABILITATION
Payer: MEDICARE

## 2021-01-14 LAB
GLUCOSE BLD-MCNC: 105 MG/DL (ref 70–99)
GLUCOSE BLD-MCNC: 113 MG/DL (ref 70–99)
GLUCOSE BLD-MCNC: 115 MG/DL (ref 70–99)
GLUCOSE BLD-MCNC: 125 MG/DL (ref 70–99)

## 2021-01-14 PROCEDURE — 6370000000 HC RX 637 (ALT 250 FOR IP): Performed by: PHYSICAL MEDICINE & REHABILITATION

## 2021-01-14 PROCEDURE — 97535 SELF CARE MNGMENT TRAINING: CPT

## 2021-01-14 PROCEDURE — 97112 NEUROMUSCULAR REEDUCATION: CPT

## 2021-01-14 PROCEDURE — 97130 THER IVNTJ EA ADDL 15 MIN: CPT

## 2021-01-14 PROCEDURE — 97530 THERAPEUTIC ACTIVITIES: CPT

## 2021-01-14 PROCEDURE — 6370000000 HC RX 637 (ALT 250 FOR IP): Performed by: NURSE PRACTITIONER

## 2021-01-14 PROCEDURE — 1280000000 HC REHAB R&B

## 2021-01-14 PROCEDURE — 6360000002 HC RX W HCPCS: Performed by: INTERNAL MEDICINE

## 2021-01-14 PROCEDURE — 97129 THER IVNTJ 1ST 15 MIN: CPT

## 2021-01-14 PROCEDURE — 94761 N-INVAS EAR/PLS OXIMETRY MLT: CPT

## 2021-01-14 PROCEDURE — 99233 SBSQ HOSP IP/OBS HIGH 50: CPT | Performed by: PHYSICAL MEDICINE & REHABILITATION

## 2021-01-14 PROCEDURE — 82962 GLUCOSE BLOOD TEST: CPT

## 2021-01-14 PROCEDURE — 2580000003 HC RX 258: Performed by: PHYSICAL MEDICINE & REHABILITATION

## 2021-01-14 PROCEDURE — 70450 CT HEAD/BRAIN W/O DYE: CPT

## 2021-01-14 RX ORDER — 0.9 % SODIUM CHLORIDE 0.9 %
1000 INTRAVENOUS SOLUTION INTRAVENOUS ONCE
Status: COMPLETED | OUTPATIENT
Start: 2021-01-14 | End: 2021-01-15

## 2021-01-14 RX ADMIN — DIGOXIN 125 MCG: 125 TABLET ORAL at 08:49

## 2021-01-14 RX ADMIN — DOXAZOSIN 2 MG: 4 TABLET ORAL at 22:21

## 2021-01-14 RX ADMIN — METOPROLOL TARTRATE 25 MG: 25 TABLET, FILM COATED ORAL at 22:20

## 2021-01-14 RX ADMIN — ENOXAPARIN SODIUM 40 MG: 100 INJECTION SUBCUTANEOUS at 08:49

## 2021-01-14 RX ADMIN — MICONAZOLE NITRATE: 20 POWDER TOPICAL at 08:55

## 2021-01-14 RX ADMIN — LISINOPRIL 40 MG: 20 TABLET ORAL at 08:49

## 2021-01-14 RX ADMIN — MICONAZOLE NITRATE: 20 POWDER TOPICAL at 22:41

## 2021-01-14 RX ADMIN — ESCITALOPRAM OXALATE 10 MG: 10 TABLET, FILM COATED ORAL at 08:46

## 2021-01-14 RX ADMIN — DOXAZOSIN 2 MG: 4 TABLET ORAL at 08:38

## 2021-01-14 RX ADMIN — ATORVASTATIN CALCIUM 80 MG: 40 TABLET, FILM COATED ORAL at 22:20

## 2021-01-14 RX ADMIN — FAMOTIDINE 20 MG: 20 TABLET, FILM COATED ORAL at 08:49

## 2021-01-14 RX ADMIN — BENZTROPINE MESYLATE 0.5 MG: 1 TABLET ORAL at 22:22

## 2021-01-14 RX ADMIN — BENZTROPINE MESYLATE 0.5 MG: 1 TABLET ORAL at 08:45

## 2021-01-14 RX ADMIN — AMLODIPINE BESYLATE 10 MG: 10 TABLET ORAL at 08:46

## 2021-01-14 RX ADMIN — SENNOSIDES 8.6 MG: 8.6 TABLET, FILM COATED ORAL at 22:20

## 2021-01-14 RX ADMIN — GABAPENTIN 200 MG: 100 CAPSULE ORAL at 08:46

## 2021-01-14 RX ADMIN — ACETAMINOPHEN 650 MG: 325 TABLET ORAL at 04:37

## 2021-01-14 RX ADMIN — BUSPIRONE HYDROCHLORIDE 5 MG: 5 TABLET ORAL at 08:38

## 2021-01-14 RX ADMIN — DOCUSATE SODIUM 100 MG: 100 CAPSULE, LIQUID FILLED ORAL at 22:21

## 2021-01-14 RX ADMIN — SODIUM CHLORIDE 1000 ML: 9 INJECTION, SOLUTION INTRAVENOUS at 22:34

## 2021-01-14 RX ADMIN — METOPROLOL TARTRATE 25 MG: 25 TABLET, FILM COATED ORAL at 08:46

## 2021-01-14 RX ADMIN — DIVALPROEX SODIUM 1000 MG: 500 TABLET, FILM COATED, EXTENDED RELEASE ORAL at 22:20

## 2021-01-14 ASSESSMENT — PAIN DESCRIPTION - ORIENTATION: ORIENTATION: MID

## 2021-01-14 ASSESSMENT — PAIN DESCRIPTION - PROGRESSION: CLINICAL_PROGRESSION: GRADUALLY IMPROVING

## 2021-01-14 ASSESSMENT — PAIN SCALES - GENERAL: PAINLEVEL_OUTOF10: 4

## 2021-01-14 ASSESSMENT — PAIN DESCRIPTION - DESCRIPTORS: DESCRIPTORS: ACHING

## 2021-01-14 ASSESSMENT — PAIN DESCRIPTION - LOCATION: LOCATION: NECK

## 2021-01-14 ASSESSMENT — PAIN DESCRIPTION - FREQUENCY: FREQUENCY: CONTINUOUS

## 2021-01-14 NOTE — PROGRESS NOTES
[address] : 1950  Acct #: [de-identified]  MRN: [id number]              PM&R Progress Note      Admitting diagnosis: Intracranial hemorrhage ( [address])     Comorbid diagnoses impacting rehabilitation: Left hemiparesis, dysarthria, paroxysmal atrial fibrillation, essential hypertension, uncontrolled diabetes type 2 with peripheral neuropathy, COPD, bipolar disorder, diastolic congestive heart failure    Chief complaint: Remains very sleepy. Trouble answering direct questions. Prior (baseline) level of function: Independent. Current level of function:         Current  IRF-NOHEMY and Goals:   Occupational Therapy:    Short term goals  Time Frame for Short term goals: STG=LTG :   Long term goals  Time Frame for Long term goals : 12-14 days or until d/c  Long term goal 1: Pt will complete feeding/grooming/oral care task c MOD I by d/c  Long term goal 2: Pt will complete total body bathing c MIN A by d/c  Long term goal 3: Pt will complete UB dressing c SUP by d/c  Long term goal 4: Pt will complete LB dressing c MOD A by d/c  Long term goal 5: Pt will don/doff footwear c SETUP by d/c  Long term goals 6: Pt will complete toileting c MOD A by d/c  Long term goal 7: Pt will complete functional transfer (toilet, tub, shower) c MOD A by d/c. Long term goal 8: Pt will perform therex/therax to facilitate increased strength/endurance/ax tolerance (c emphasis on dynamic standing balance/tolerance >10 mins and BUE endurance) c Min A in order to complete ADLs.  :                                       Eating: Eating  Assistance Needed: Substantial/maximal assistance  Comment: patient able to being fork to mouth fully approx 10% of meal before falling asleep, patient requires assist to keep him awake and bringing fork to mouth for one/two bites and then falls asleep again  CARE Score: 2  Discharge Goal: Independent       Oral Hygiene: Oral Hygiene  Assistance Needed: Dependent  Comment: X  Reason if not Attempted: Not attempted due to medical condition or safety concerns  CARE Score: 1  Discharge Goal: Independent    UB/LB Bathing: Shower/Bathe Self  Assistance Needed: Dependent  Comment: X  Reason if not Attempted: Not attempted due to medical condition or safety concerns  CARE Score: 1  Discharge Goal: Supervision or touching assistance    UB Dressing: Upper Body Dressing  Assistance Needed: Dependent  Comment: X  Reason if not Attempted: Not attempted due to medical condition or safety concerns  CARE Score: 1  Discharge Goal: Supervision or touching assistance         LB Dressing: Lower Body Dressing  Assistance Needed: Dependent  Comment: X  Reason if not Attempted: Not attempted due to medical condition or safety concerns  CARE Score: 1  Discharge Goal: Partial/moderate assistance    Donning and [place] Footwear: Putting On/Taking Off Footwear  Assistance Needed: Dependent  Comment: x  CARE Score: 1  Discharge Goal: Set-up or clean-up assistance      Toileting: Toileting Hygiene  Assistance Needed: Dependent  Comment: X  CARE Score: 1  Discharge Goal: Partial/moderate assistance      Toilet Transfers: Toilet Transfer  Comment: not safe to attempt  Reason if not Attempted: Not attempted due to medical condition or safety concerns  CARE Score: 88  Discharge Goal: Partial/moderate assistance    Physical Therapy:   Short term goals  Time Frame for Short term goals: 14-21 tx days:  Short term goal 1: Pt will complete bed mobility tasks and sup<->sit with SBA-Sup. Short term goal 2: Pt will complete OOB transfers using RW with Mod A. Short term goal 3: Pt will ambulate 10 ft over level and carpeted surfaces using RW with Min A. Short term goal 4: Pt will propel manual w/c 150 ft with turns with SBA-Sup. Short term goal 5: Caregiver will be able to safely assist pt with functional mobility tasks.             Bed Mobility:   Sit to Lying  Assistance Needed: Dependent  Comment: required x2-person assist to complete proper positioning in supine  CARE Score: 1  Discharge Goal: Supervision or touching assistance  Roll Left and Right  Assistance Needed: Dependent  Comment: pt with poor motor initiation, sequencing, and attention to required task (eyes were closed during task despite verbal prompts)  CARE Score: 1  Discharge Goal: Supervision or touching assistance  Sit to Lying  Assistance Needed: Dependent  Comment: required x2-person assist to complete proper positioning in supine  CARE Score: 1  Discharge Goal: Supervision or touching assistance    Transfers:    Sit to Stand  Assistance Needed: Dependent  Comment: x2-PA using Rhina Lever on 01/11/2021  Reason if not Attempted: Not attempted due to medical condition or safety concerns  CARE Score: 1  Discharge Goal: Partial/moderate assistance  Chair/Bed-to-Chair Transfer  Assistance Needed: Dependent  Comment: x2-PA using Rhina Lever on 01/11/2021  Reason if not Attempted: Not attempted due to medical condition or safety concerns  CARE Score: 1  Discharge Goal: Partial/moderate assistance     Car Transfer  Reason if not Attempted: Not attempted due to medical condition or safety concerns  CARE Score: 88  Discharge Goal: Partial/moderate assistance    Ambulation:    Walking Ability  Does the Patient Walk?: Yes     Walk 10 Feet  Comment: pt unable to stand due to poor sitting balance and trunk control  Reason if not Attempted: Not attempted due to medical condition or safety concerns  CARE Score: 88  Discharge Goal: Partial/moderate assistance     Walk 50 Feet with Two Turns  Reason if not Attempted: Not attempted due to medical condition or safety concerns  CARE Score: 88  Discharge Goal: Not Attempted     Walk 150 Feet  Reason if not Attempted: Not applicable  CARE Score: 9  Discharge Goal: Not Applicable     Walking 10 Feet on Uneven Surfaces  Reason if not Attempted: Not attempted due to medical condition or safety concerns  CARE Score: 88  Discharge Goal: Partial/moderate assistance     1 Step (Curb)  Reason if not Attempted: Not attempted due to medical condition or safety concerns  CARE Score: 88  Discharge Goal: Not Attempted     4 Steps  Reason if not Attempted: Not attempted due to medical condition or safety concerns  CARE Score: 88  Discharge Goal: Not Attempted     12 Steps  Reason if not Attempted: Not applicable  CARE Score: 9  Discharge Goal: Not Applicable       Wheelchair:  w/c Ability: Wheelchair Ability  Uses a Wheelchair and/or Scooter?: Yes  Wheel 50 Feet with Two Turns  Assistance Needed: Dependent  Comment: 20 ft Max A x 2 on 01/11/2021  Reason if not Attempted: Not attempted due to medical condition or safety concerns  CARE Score: 1  Discharge Goal: Supervision or touching assistance  Wheel 150 Feet  Reason if not Attempted: Not attempted due to medical condition or safety concerns  CARE Score: 88  Discharge Goal: Supervision or touching assistance          Balance:        Object: Picking Up Object  Reason if not Attempted: Not attempted due to medical condition or safety concerns  CARE Score: 88  Discharge Goal: Partial/moderate assistance    I      Exam:    Blood pressure 137/65, pulse 65, temperature 98.1 °F (36.7 °C), temperature source Oral, resp. rate 16, height 5' 9\" (1.753 m), weight 218 lb (98.9 kg), SpO2 93 %. General: Lying back in bed. Calm. Poor eye contact. HEENT: MMM. Neck supple. No adenopathy. Will open his eyes to command usually. Pulmonary: Unlabored with blunted breath sounds in the bases. No coughing. Cardiac: Occasional premature beat with controlled rate. Abdomen: Patient's abdomen is soft and nondistended. Bowel sounds were present throughout. There was no rebound, guarding or masses noted. Upper extremities: Normal tone. Weak . Poor dexterity bilaterally. Lower extremities: No signs of DVT. No spasticity or bruising. Give way with MMT. Sitting balance was poor.   Standing balance was poor.    Lab Results   Component Value Date    WBC 8.6 01/12/2021    HGB 12.7 (L) 01/12/2021    HCT 41.2 (L) 01/12/2021    MCV 93.2 01/12/2021     01/12/2021     Lab Results   Component Value Date    INR 0.84 12/19/2020    INR 1.01 06/03/2020    INR 0.90 06/02/2020    PROTIME 10.1 (L) 12/19/2020    PROTIME 12.2 06/03/2020    PROTIME 10.9 (L) 06/02/2020     Lab Results   Component Value Date    CREATININE 1.8 (H) 01/12/2021    BUN 33 (H) 01/12/2021     01/12/2021    K 4.6 01/12/2021     01/12/2021    CO2 22 01/12/2021     Lab Results   Component Value Date    ALT 10 12/19/2020    AST 15 12/19/2020    ALKPHOS 82 12/19/2020    BILITOT 0.6 12/19/2020       Expected length of stay  prior to a supervised level of function for discharge home with a wheelchair and [facility] OT/PT is 1/26/2020. Recommendations:    1. Right intracranial hemorrhage with left hemiparesis: Minimal purposeful interaction with the therapy staff today. I have consulted neurology and will order a 6 head CT without contrast.  He did not respond much to the reduction of depressive medications. No obvious seizure activity noted. Occasionally will follow directions and answer direct questions. Still requiring maximum verbal cues and some contact cueing for the occupational and physical therapy with speech-language pathology.      Cardiology consultation continues to hold the Eliquis. His sleepiness is impacting his oral intake. I will offer some IV fluids tonight. I have stopped his Risperdal and Desyrel. Reduced his BuSpar and gabapentin. I must monitor a clinical response now. Reviewed his chemistries and blood counts.    Depakote level was acceptable.    Ongoing pulmonary hygiene, DVT prophylaxis and nutritional support.  Bowel and bladder retraining.  Caregiver education with his significant other eventually.  Adaptive equipment training.  Trying to get him sitting up more again today.     2. DVT prophylaxis: Heparin 5000 units every 8 hours.  I must monitor his hemoglobin and platelet count periodically while on this medication.  Weightbearing activities are pursued daily.  GI prophylaxis offered.  No clinical signs of blood loss. 3. Paroxysmal atrial fibrillation with prior anticoagulation: Cardiology is asking us to hold Xarelto.  Elevated rate required titrating metoprolol and restarting Cardizem.  EKG reviewed by cardiology.  Daily weights do not show any decompensation of CHF.  Minimize stimulants. 4. Uncontrolled diabetes type 2 with peripheral neuropathy: Patient requires a diet modified for carbohydrates.  He is on Neurontin for peripheral neuropathy symptoms.  Blood sugars are checked at mealtime and bedtime.  Sliding scale Humalog for now with reintroduction of oral agents should his oral intake stabilize.  Blood sugars generally less than 150.  5. Essential hypertension: Patient requires multiple medications for blood pressure regulation.  Target systolic blood pressure is 120-140.  Vital signs are checked at rest and with activity.  Blood pressure within target range today. 6. COPD: Aggressive pulmonary hygiene.  Monitoring O2 saturations at rest and with activity. 7. Bipolar disorder: Check a Depakote level and cut back on BuSpar and Risperdal due to his waxing and waning alertness.    Treating in a calm and consistent environment.  Providing written and verbal instructions when possible to assist with information retention.             Counseling and Coordination of Care: In care conference today I met with the patient's OT, PT, RN and . We discussed the patient's problems, progress and prognosis. Disposition issues were clarified and plans were established for ongoing rehabilitation efforts beyond the ARU stay. I reviewed this information with the patient during a second distinct visit with the patient.  More than half of the total time of 36 minutes spent with the patient involved counseling and coordination of care.

## 2021-01-14 NOTE — PROGRESS NOTES
Occupational Therapy  Physical Rehabilitation: OCCUPATIONAL THERAPY     [x] daily progress note       [] discharge       Patient Name:  Ilana Badillo. :  1950 MRN: [id number]  Room:  [address] [place] [address] Date of Admission: 2021  Rehabilitation Diagnosis:   Cerebral infarction, unspecified [I63.9]  Intracranial hemorrhage ([facility] [address] [I62.9]       Date 2021       Day of ARU Week:  4   Time IN/OUT 7891-7501   Individual Tx Minutes    Group Tx Minutes    Co-Treat Minutes 48 A cotreat was completed this date with  [x] PT    [] OT   [] ST      This pt requires simultaneous intervention of 2 skilled therapists to safely complete tasks to address complexity of deficits defined in the goals including:  [x]Attention   [x] Safety    []Problem Solving    []Sequencing     [x]Initiation    []Processing      []Recall of learned tasks  [] Communication  [] Self Feeding   [x]ADL's    [x]UE Function    [x]Motor planning   [x]Balance  [x]Energy Conservation   []Verbal cues   [] Tactile Cues  []Barriers     [x]Transfers   []Device Use  Pt's response to cotreat: poor  Progress toward goals: poor     Concurrent Tx Minutes    TOTAL Tx Time Mins 50   Variance Time    Variance Time []   Refusal due to:     []   Medical hold/reason:    []   Illness   []   Off Unit for test/procedure  []   Extra time needed to complete task  []   Therapeutic need  []   Other (specify):   Restrictions Restrictions/Precautions: General Precautions, Fall Risk(L hemiparesis, chronic R shoulder limitations)         Communication with other providers: [x]   OK to see per nursing:     []   Spoke with team member regarding:      Subjective observations and cognitive status:  Pt with increased alertness once therapists enter room, however with increased v/c for engagement pt continued to demo increased lethargy, poor spatial body awareness, and increased assistance to maintain upright posture.      Pain level/location:    10       Location:    Discharge mouth excursions, and visual scanning skills required for feeding and now requires total A c feeding as compared to previous supervision/min A. Pt continues to require co-tx sessions from two skilled therapists to assist pt in increasing functional performance and highest level of independence. Pt is unsafe to attempt ADL independently and will require 2 person assist for EOB ax.     Treatment/Activity Tolerance:   [x] Tolerated treatment with no adverse effects    [] Patient limited by fatigue  [] Patient limited by pain   [] Patient limited by medical complications:    [] Adverse reaction to Tx:   [] Significant change in status    Safety:       [x]  bed alarm set    []  chair alarm set    []  Pt refused alarms                []  Telesitter activated      []  Gait belt used during tx session      []other:       Number of Minutes/Billable Intervention  Therapeutic Exercise    ADL Self-care 20   Neuro Re-Ed 15   Therapeutic Activity 15   Group    Other:    TOTAL 50       Social History  Social/Functional History  Lives With: Significant other  Type of Home: House  Home Layout: One level  Home Access: Stairs to enter with rails  Entrance Stairs - Number of Steps: 5+3  Entrance Stairs - Rails: Both  Bathroom Shower/Tub: Walk-in shower, Shower chair with back(shower chair doesn't fit in shower)  Bathroom Toilet: Standard  Bathroom Equipment: Toilet raiser, Shower chair  Bathroom Accessibility: Accessible  Home Equipment: 4 wheeled walker, [address] Help From: Family, Neighbor  ADL Assistance: Needs assistance  Bath: Minimal assistance  Dressing: Minimal assistance  Grooming: Independent  Feeding: Independent  Toileting: Independent  Homemaking Assistance: Independent  Homemaking Responsibilities: No  Meal Prep Responsibility: No  Laundry Responsibility: No  Cleaning Responsibility: No  Bill Paying/Finance Responsibility: No  Shopping Responsibility: No  Dependent Care Responsibility: No  Health Care Plan  Times per day: Daily  Current Treatment Recommendations: Strengthening, Endurance Training, Neuromuscular Re-education, Patient/Caregiver Education & Training, ROM, Equipment Evaluation, Education, & procurement, Self-Care / ADL, Balance Training, Functional Mobility Training, Safety Education & Training, Cognitive/Perceptual Training    Electronically signed by   EMIL Celeste, OTR/L  License # [id number]    1/14/2021, 3:00 PM

## 2021-01-14 NOTE — FLOWSHEET NOTE
[x] daily progress note       [] discharge       Patient Name:  Yash Watson. :  1950 MRN: [id number]  Room:  [address] [place], [place] [address] Date of Admission: 2021  Rehabilitation Diagnosis:   Cerebral infarction, unspecified [I63.9]  Intracranial hemorrhage ([facility] [address] [I62.9]       Date 2021       Day of ARU Week:  4   Time IN/OUT 1100-x  0356-1816   Co-Treat Minutes 50    TOTAL Tx Time Mins 50   Variance Minutes -40 minutes   Variance Reason Increased lethargy, not following commands or attending to tasks. Restrictions Restrictions/Precautions  Restrictions/Precautions: General Precautions, Fall Risk(L hemiparesis, chronic R shoulder limitations)  Position Activity Restriction  Other position/activity restrictions: see catheter   Communication with other providers: [x]   OK to see per nursing:     [x]   Notified nsg of soreness around paulino-area and wrote on communication that patient should be checked and turned each hour to prevent further skin breakdown. Subjective observations and cognitive status: AM session: pt seen in semi-go's position in bed upon entering room with BIANCA Swan. Pt appeared very lethargic and confused. Pt also pointing to objects that were not there. Pt pointed towards w/c and stated \"Look what I have in my satchel over there. \" pt keep falling asleep while these therapists attempted to wake up patient. Treatment was deferred d/t pt's increased lethargy. PM session: pt seen in semi-go's position in bed upon entering room with OT (Erica). Pt still very lethargic and confused. Assisted patient to EOB and attempted to have patient eat his lunch.    Pain level/location:   Pt did not rate   Discharge recommendations  Anticipated discharge date:  2021  Destination: []??????home alone   []??????home alone with assist PRN     [x]?????? home w/ family      [x]?????? Continuous supervision  []??????SNF    []?????? Assisted living     []?????? Other:  Continued therapy: [x]?? ? ???[facility] PT  []??????OUTPATIENT  PT   []?????? No Further PT  []??????SNF PT  Caregiver training recommended: [x]???? ? ? Yes  []?????? No   Equipment needs: TBD     A cotreat was completed this date with  [] PT    [x] OT   [] ST      This pt requires simultaneous intervention of 2 skilled therapists to safely complete tasks to address complexity of deficits defined in the goals including:  [x]Attention   [x] Safety    []Problem Solving    []Sequencing     []Initiation    []Processing      []Recall of learned tasks  [] Communication  [x] Self Feeding   [x]ADL's    []UE Function    [x]activity tolerance   [x]Balance  [x]Energy Conservation   [x]Verbal cues   [] Tactile Cues  []Barriers     [x]Transfers   []Device Use  Pt's response to cotreat: fair   Progress toward goals: ongoing       Bed Mobility:           [x]   Pt received out of bed   Rolling R/L:  Max A x 2 (several trials during BM cleanup)   Scooting: Max A x 2   Lying --> Sit:  Max A x 2   Sit --> lying: Max A x 2     Transfers:    Sit--> Stand:  Not attempted d/t safety concerns   Stand --> Sit:   Not attempted d/t safety concerns   Chair-->Bed/Bed --> Chair: Not attempted d/t safety concerns     Wheelchair Propulsion:  Not attempted d/t safety concerns     Additional Therapeutic activities/exercises completed this date:     []   Nu-step:  Time:        Level:         #Steps:       []   Rebounder:    []  Seated     []  Standing        [x]   Balance training and trunk strengthening: pt sat EOB x 30 minutes to attempt to have patient eat lunch. See OT note for details on attempted eating task. Pt sat EOB with max A x 1-2 d/t increased left sided lean. Pt also placed in right sidelying position to counteract left sided lean. Pt required max vcs to wake up and concentrate on task.         []   Postural training    []   Supine ther ex (reps/sets):     []   Seated ther ex (reps/sets):     []   Standing ther ex (reps/sets):     []   Picking up object from floor (standing):                   []   Reacher used       []   Other:    []   Other:     Patient/Caregiver Education and Training:   [x]   Bed Mobility/Transfer technique/safety  []   Gait technique/sequencing  []   Proper use of assistive device   []   Advanced mobility safety and technique  []   Reinforced patient's precautions/mobility while maintaining precautions  []   Postural awareness  []   Family training  [x]   Progress was updated in Rehabtracker this date. Treatment Plan for Next Session: transfers; sitting EOB balance    Assessment:    Treatment/Activity Tolerance:   [] Tolerated treatment with no adverse effects    [x] Patient limited by fatigue and confusion   [] Patient limited by pain   [] Patient limited by medical complications:    [] Adverse reaction to Tx:   [] Significant change in status    Safety:       [x]  bed alarm set    []  chair alarm set    []  Pt refused alarms                []  Telesitter activated      [x]  Gait belt used during tx session      [x]other: pt left in semi-go's position in bed with call light at end of treatment.         Number of Minutes/Billable Intervention  Gait Training    Therapeutic Exercise    Neuro Re-Ed    Therapeutic Activity 50   Wheelchair Propulsion    Group    Other:    TOTAL 50         Social History  Social/Functional History  Lives With: Significant other  Type of Home: House  Home Layout: One level  Home Access: Stairs to enter with rails  Entrance Stairs - Number of Steps: 5+3  Entrance Stairs - Rails: Both  Bathroom Shower/Tub: Walk-in shower, Shower chair with back(shower chair doesn't fit in shower)  Bathroom Toilet: Standard  Bathroom Equipment: Toilet raiser, Shower chair  Bathroom Accessibility: Accessible  Home Equipment: 4 wheeled walker, [address] Help From: Family, Neighbor  ADL Assistance: Needs assistance  Bath: Minimal assistance  Dressing: Minimal assistance  Grooming: Independent  Feeding: Independent  Toileting: Independent  Homemaking Assistance: Independent  Homemaking Responsibilities: No  Meal Prep Responsibility: No  Laundry Responsibility: No  Cleaning Responsibility: No  Bill Paying/Finance Responsibility: No  Shopping Responsibility: No  Dependent Care Responsibility: No  Health Care Management: No  Ambulation Assistance: Independent  Transfer Assistance: Needs assistance  Active : No  Patient's  Info: Evette Marcano is primary , and transport company  Mode of Transportation: Car, Family, Friends  Education: 12th grade  Occupation: Retired  Type of occupation: Pt built trucks for 33 years; [employer]  Leisure & Hobbies: TV, limited reading, girlfriend has a dog. Pt has meds that come in pill packs. Bills are managed over the phone. IADL Comments: Aroldo Jones assists c most IADLs and pt report hiring outside help c yardork. Additional Comments: Pt reports using rolator in the house. Pt reports 1 fall this year that occured in the house while ambulating in the home; sleeps in regular, flat bed; R hand dominant    Objective                                                                                    Goals:  (Update in navigator)  Short term goals  Time Frame for Short term goals: 14-21 tx days:  Short term goal 1: Pt will complete bed mobility tasks and sup<->sit with SBA-Sup. Short term goal 2: Pt will complete OOB transfers using RW with Mod A. Short term goal 3: Pt will ambulate 10 ft over level and carpeted surfaces using RW with Min A. Short term goal 4: Pt will propel manual w/c 150 ft with turns with SBA-Sup. Short term goal 5: Caregiver will be able to safely assist pt with functional mobility tasks. :   :        Plan of Care                                                                              Times per week: 5 days per week for a minimum of 60 minutes/day plus group as appropriate for 60 minutes.   Treatment to include Current Treatment Recommendations: Strengthening, Functional Mobility Training, Wheelchair Mobility Training, Neuromuscular Re-education, Home Exercise Program, Equipment Evaluation, Education, & procurement, [employer], Transfer Training, Cognitive/Perceptual Training, Gait Training, Cognitive Reorientation, Safety Education & Training, Balance Training, Endurance Training, Pain Management, Patient/Caregiver Education & Training, Positioning    Electronically signed by   Duncan Day, [id number]  1/14/2021, 11:10 AM

## 2021-01-14 NOTE — PROGRESS NOTES
due to medical condition or safety concerns  CARE Score: 1  Discharge Goal: Independent    UB/LB Bathing: Shower/Bathe Self  Assistance Needed: Dependent  Comment: X  Reason if not Attempted: Not attempted due to medical condition or safety concerns  CARE Score: 1  Discharge Goal: Supervision or touching assistance    UB Dressing: Upper Body Dressing  Assistance Needed: Dependent  Comment: X  Reason if not Attempted: Not attempted due to medical condition or safety concerns  CARE Score: 1  Discharge Goal: Supervision or touching assistance         LB Dressing: Lower Body Dressing  Assistance Needed: Dependent  Comment: X  Reason if not Attempted: Not attempted due to medical condition or safety concerns  CARE Score: 1  Discharge Goal: Partial/moderate assistance    Donning and [place] Footwear: Putting On/Taking Off Footwear  Assistance Needed: Dependent  Comment: x  CARE Score: 1  Discharge Goal: Set-up or clean-up assistance      Toileting: Toileting Hygiene  Assistance Needed: Dependent  Comment: X  CARE Score: 1  Discharge Goal: Partial/moderate assistance      Toilet Transfers: Toilet Transfer  Comment: not safe to attempt  Reason if not Attempted: Not attempted due to medical condition or safety concerns  CARE Score: 88  Discharge Goal: Partial/moderate assistance    Physical Therapy:   Short term goals  Time Frame for Short term goals: 14-21 tx days:  Short term goal 1: Pt will complete bed mobility tasks and sup<->sit with SBA-Sup. Short term goal 2: Pt will complete OOB transfers using RW with Mod A. Short term goal 3: Pt will ambulate 10 ft over level and carpeted surfaces using RW with Min A. Short term goal 4: Pt will propel manual w/c 150 ft with turns with SBA-Sup. Short term goal 5: Caregiver will be able to safely assist pt with functional mobility tasks.             Bed Mobility:   Sit to Lying  Assistance Needed: Dependent  Comment: required x2-person assist to complete proper positioning in attempted due to medical condition or safety concerns  CARE Score: 88  Discharge Goal: Not Attempted     4 Steps  Reason if not Attempted: Not attempted due to medical condition or safety concerns  CARE Score: 88  Discharge Goal: Not Attempted     12 Steps  Reason if not Attempted: Not applicable  CARE Score: 9  Discharge Goal: Not Applicable       Wheelchair:  w/c Ability: Wheelchair Ability  Uses a Wheelchair and/or Scooter?: Yes  Wheel 50 Feet with Two Turns  Comment: pt unable to safely be transferred to w/c today due to poor level of alertness and poor trunk control in sitting at EOB today  Reason if not Attempted: Not attempted due to medical condition or safety concerns  CARE Score: 88  Discharge Goal: Supervision or touching assistance  Wheel 150 Feet  Reason if not Attempted: Not attempted due to medical condition or safety concerns  CARE Score: 88  Discharge Goal: Supervision or touching assistance          Balance:        Object: Picking Up Object  Reason if not Attempted: Not attempted due to medical condition or safety concerns  CARE Score: 88  Discharge Goal: Partial/moderate assistance    I      Exam:    Blood pressure 116/68, pulse 61, temperature 98.3 °F (36.8 °C), temperature source Oral, resp. rate 18, height 5' 9\" (1.753 m), weight 214 lb 6.4 oz (97.3 kg), SpO2 92 %. General: Reclined in bed. Opening eyes intermittently. No obvious visual field cut. HEENT: MMM. Neck supple. Mumbling speech at times. Pulmonary: Shallow respirations without wheezes or rales. Cardiac: The rate is controlled but he has occasional premature beats. Abdomen: Patient's abdomen is soft and nondistended. Bowel sounds were present throughout. There was no rebound, guarding or masses noted. Upper extremities: Slow and deliberate arm movements. Weak  bilaterally. Normal tone. Lower extremities: No significant edema. Heels clear.     Sitting balance was fair-.  Standing balance was poor.    Lab Results   Component Value Date    WBC 8.6 01/12/2021    HGB 12.7 (L) 01/12/2021    HCT 41.2 (L) 01/12/2021    MCV 93.2 01/12/2021     01/12/2021     Lab Results   Component Value Date    INR 0.84 12/19/2020    INR 1.01 06/03/2020    INR 0.90 06/02/2020    PROTIME 10.1 (L) 12/19/2020    PROTIME 12.2 06/03/2020    PROTIME 10.9 (L) 06/02/2020     Lab Results   Component Value Date    CREATININE 1.8 (H) 01/12/2021    BUN 33 (H) 01/12/2021     01/12/2021    K 4.6 01/12/2021     01/12/2021    CO2 22 01/12/2021     Lab Results   Component Value Date    ALT 10 12/19/2020    AST 15 12/19/2020    ALKPHOS 82 12/19/2020    BILITOT 0.6 12/19/2020       Expected length of stay  prior to a supervised level of function for discharge home with a walker and [address] OT/PT is 1/26/2021. Recommendations:    1. Right intracranial hemorrhage with left hemiparesis:   He continues to struggle with somnolence/lethargy. No seizure activity noted. Occasionally will follow directions and answer direct questions. Still requiring maximum verbal cues and some contact cueing for the occupational and physical therapy with speech-language pathology.     Cardiology consultation recommends holding anticoagulation at this point. His sleepiness is impacting his oral intake. I have stopped his Risperdal and Desyrel. Reduced his BuSpar and gabapentin. I must monitor a clinical response now. Reviewed his chemistries and blood counts. Depakote level was acceptable. May need neurology consultation and brain imaging. Monitoring his use of anticonvulsants. Ongoing pulmonary hygiene, DVT prophylaxis and nutritional support.  Bowel and bladder retraining.  Caregiver education with his significant other eventually.  Adaptive equipment training.  Trying to get him sitting up more today.     2. DVT prophylaxis: Heparin 5000 units every 8 hours.  I must monitor his hemoglobin and platelet count periodically while on this medication.  Weightbearing activities are pursued daily.  GI prophylaxis offered.  No new bruising or swelling. 3. Paroxysmal atrial fibrillation with prior anticoagulation: Cardiology is asking us to hold Xarelto. Elevated rate required titrating metoprolol and restarting Cardizem. EKG reviewed by cardiology. Jun Treadwell not show any decompensation of CHF.  Minimize stimulants. 4. Uncontrolled diabetes type 2 with peripheral neuropathy: Patient requires a diet modified for carbohydrates.  He is on Neurontin for peripheral neuropathy symptoms.  Blood sugars are checked at mealtime and bedtime.  Sliding scale Humalog for now with reintroduction of oral agents should his oral intake stabilize. Blood sugars generally less than 150.  5. Essential hypertension: Patient requires multiple medications for blood pressure regulation.  Target systolic blood pressure is 120-140.  Vital signs are checked at rest and with activity. Blood pressure within target range today. 6. COPD: Aggressive pulmonary hygiene.  Monitoring O2 saturations at rest and with activity. 7. Bipolar disorder: Check a Depakote level and cut back on BuSpar and Risperdal due to his waxing and waning alertness.    Treating in a calm and consistent environment.  Providing written and verbal instructions when possible to assist with information retention.

## 2021-01-14 NOTE — PROGRESS NOTES
Physical Rehabilitation: OCCUPATIONAL THERAPY     [x] daily progress note       [] discharge       Patient Name:  Joseline Alanis :  1950 MRN: [id number]  Room:  [address] [place] [address] Date of Admission: 2021  Rehabilitation Diagnosis:   Cerebral infarction, unspecified [I63.9]  Intracranial hemorrhage ([facility] [address] [I62.9]       Date 2021       Day of ARU Week:  4   Time IN/OUT 11:00 attempted therapy; patient lethargic and unable to wake up, increased dangers of attempting to sit EOB or complete any  Therapy tasks    Individual Tx Minutes    Group Tx Minutes    Co-Treat Minutes    Concurrent Tx Minutes    TOTAL Tx Time Mins 0   Variance Time    Variance Time []   Refusal due to:     []   Medical hold/reason:    []   Illness   []   Off Unit for test/procedure  []   Extra time needed to complete task  []   Therapeutic need  []   Other (specify):   Restrictions Restrictions/Precautions: General Precautions, Fall Risk(L hemiparesis, chronic R shoulder limitations)         Communication with other providers: [x]   OK to see per nursing:     []   Spoke with team member regarding:      Subjective observations and cognitive status:       Pain level/location:    /10       Location:    Discharge recommendations  Anticipated discharge date:  ? Destination: []home alone   []home alone w assist prn   [] home w/ family    [] Continuous supervision       []SNF    [] Assisted living     [] Other:   Continued therapy: []C OT  []OUTPATIENT  OT   [] No Further OT  Equipment needs: None? Possible SNF? Toileting:          Toilet Transfers:     Device Used:    []   Standard Toilet         []   Grab Bars           []  Bedside Commode       []   Elevated Toilet          []   Other:        Bed Mobility:           []   Pt received out of bed   Rolling R/L:    Scooting:    Supine --> Sit:    Sit --> Supine:      Transfers:    Sit--> Stand:    Stand --> Sit:     Stand-Pivot:     Other:    Assistive device required for transfer: Functional Mobility:    Assistance:    Device:   []   Angie Bruce     []   Standard Walker []   Wheelchair        []   [address] [facility]       []   Alex veechinedu         []   Cardiac Colvin Mohs       []   Other:        Homemaking Tasks: Additional Therapeutic activities/exercises completed this date:     []   ADL Training   []   Balance/Postural training     []   Bed/Transfer Training   []   Endurance Training   []   Neuromuscular Re-ed   []   Nu-step:  Time:        Level:         #Steps:       []   Rebounder:    []  Seated     []  Standing        []   Supine Ther Ex (reps/sets):     []   Seated Ther Ex (reps/sets):     []   Standing Ther Ex (reps/sets):     []   Other:      Comments:  Pt confusion and lethargy barrier to progress towards goals at this time     Patient/Caregiver Education and Training:   []   Adaptive Equipment Use  []   Bed Mobility/Transfer Technique/Safety  0   Energy Conservation Tips  []   Family training  []   Postural Awareness  []   Safety During Functional Activities  []   Reinforced Patient's Precautions   []   Progress was updated and reviewed in Rehabtracker with patient and/or family this         date.     Treatment Plan for Next Session: POC to continue as tolerated      Assessment:        Treatment/Activity Tolerance:   [] Tolerated treatment with no adverse effects    [] Patient limited by fatigue  [] Patient limited by pain   [] Patient limited by medical complications:    [] Adverse reaction to Tx:   [] Significant change in status    Safety:       []  bed alarm set    []  chair alarm set    []  Pt refused alarms                []  Telesitter activated      []  Gait belt used during tx session      []other:       Number of Minutes/Billable Intervention  Therapeutic Exercise    ADL Self-care    Neuro Re-Ed    Therapeutic Activity    Group    Other:    TOTAL 0       Social History  Social/Functional History  Lives With: Significant other  Type of Home: House  Home Layout: One level  Home Access: Stairs to enter with rails  Entrance Stairs - Number of Steps: 5+3  Entrance Stairs - Rails: Both  Bathroom Shower/Tub: Walk-in shower, Shower chair with back(shower chair doesn't fit in shower)  Bathroom Toilet: Standard  Bathroom Equipment: Toilet raiser, Shower chair  Bathroom Accessibility: Accessible  Home Equipment: 4 wheeled walker, [address] Help From: Family, Neighbor  ADL Assistance: Needs assistance  Bath: Minimal assistance  Dressing: Minimal assistance  Grooming: Independent  Feeding: Independent  Toileting: Independent  Homemaking Assistance: Independent  Homemaking Responsibilities: No  Meal Prep Responsibility: No  Laundry Responsibility: No  Cleaning Responsibility: No  Bill Paying/Finance Responsibility: No  Shopping Responsibility: No  Dependent Care Responsibility: No  Health Care Management: No  Ambulation Assistance: Independent  Transfer Assistance: Needs assistance  Active : No  Patient's  Info: Kashif Quintanilla is primary , and transport company  Mode of Transportation: Car, Family, Friends  Education: 12th grade  Occupation: Retired  Type of occupation: Pt built trucks for 33 years; [employer]  Leisure & Hobbies: TV, limited reading, girlfriend has a dog. Pt has meds that come in pill packs. Bills are managed over the phone. IADL Comments: Ruben Chi assists c most IADLs and pt report hiring outside help c michela. Additional Comments: Pt reports using rolator in the house.  Pt reports 1 fall this year that occured in the house while ambulating in the home; sleeps in regular, flat bed; R hand dominant    Objective                                                                                    Goals:  (Update in navigator)  Short term goals  Time Frame for Short term goals: STG=LTG:  Long term goals  Time Frame for Long term goals : 12-14 days or until d/c  Long term goal 1: Pt will complete feeding/grooming/oral care task c MOD I by d/c  Long term goal 2: Pt will complete total body bathing c MIN A by d/c  Long term goal 3: Pt will complete UB dressing c SUP by d/c  Long term goal 4: Pt will complete LB dressing c MOD A by d/c  Long term goal 5: Pt will don/doff footwear c SETUP by d/c  Long term goals 6: Pt will complete toileting c MOD A by d/c  Long term goal 7: Pt will complete functional transfer (toilet, tub, shower) c MOD A by d/c. Long term goal 8: Pt will perform therex/therax to facilitate increased strength/endurance/ax tolerance (c emphasis on dynamic standing balance/tolerance >10 mins and BUE endurance) c Min A in order to complete ADLs. :        Plan of Care                                                                              Times per week: 5 days per week for a minimum of 60 minutes/day plus group as appropriate for 60 minutes.   Treatment to include Plan  Times per day: Daily  Current Treatment Recommendations: Strengthening, Endurance Training, Neuromuscular Re-education, Patient/Caregiver Education & Training, ROM, Equipment Evaluation, Education, & procurement, Self-Care / ADL, Balance Training, Functional Mobility Training, Safety Education & Training, Cognitive/Perceptual Training    Electronically signed by   RUBEN Meléndez  1/14/2021, 7:51 AM

## 2021-01-14 NOTE — PROGRESS NOTES
grade  Occupation: Retired  Type of occupation: Pt built trucks for 33 years; [employer]  Leisure & Hobbies: TV, limited reading, girlfriend has a dog. Pt has meds that come in pill packs. Bills are managed over the phone. IADL Comments: Gayathri Shove assists c most IADLs and pt report hiring outside help c yardork. Additional Comments: Pt reports using rolator in the house. Pt reports 1 fall this year that occured in the house while ambulating in the home; sleeps in regular, flat bed; R hand dominant      Date of Admit: 1/4/2021  Room #: 1005/1005-A     ST Number of Minutes/Billable Intervention  Cog/Memory Deficits  24   Aphasia/Language     Dysarthria/Speech     Apraxia/Speech     Dysphagia/Swallowing     Group     Other    TOTAL Minutes Billed  24    Variance    -6 unable to remain alert despite verbal and tactile cues      Date: 1/14/2021  Day of ARU Week:  4       SLP Individual Minutes  Time In: 0930  Time Out: 5520  Minutes: 24     Variance/Reason:  [] Refusal due to   [] Medical hold/reason  [] Illness   [] Off Unit for test/procedure  [] Extra time needed to complete task  [x] Other (specify)  Unable to remain alert; tactile and verbal cues, sternal rub  Activity completed: attempted to complete block sorting for L regard, following directions and attn. Pain: denies  Current Diet: DIET CARB CONTROL; Dental Soft  Dietary Nutrition Supplements: Low Calorie High Protein Supplement  Subjective: arrived to find pt slightly alert and with persistent chewing on a piece of fruit that after 5 mins he finally spit out. Intake has been poor. Will downgrade diet in hopes of improved intake to pureed with thins. Goals and POC: Co-treats where appropriate with PT or OT to facilitate patient goals in functional tasks.   LTG      Timeframe for Long-term Goals: No swallow tx                    Short-term Goals  Timeframe for Short-term Goals: 3x/week x3 weeks 30 mins min  LTG: Pt will improve cognitive linguistic abilities for safe return home and clearly communicate care needs. Goal 1: Continued receptive/expressive assessment by 1/6/2021 with additional goals to be added as appropriate. Goal met 1/6/21  Goal 2: Pt will respond to wh?'s with 90% acc given extra time. Max cues--pt unable to remain on topic and will fall asleep or make a strange response not consistent with question. 30% acc. Goal 3: Pt will follow 1 step tasks with mod cues and 50% acc  Max cues with hand over hand for block sorting and attn. 20% Pt sorted 8 blocks in 20 mins with max cues. Goal 4: Pt will attend to therapist on R with eye contact x5 in 5 mins. Pt kept eye closed majority of session and would briefly open. Goal 5: Pt will improve L visual scanning for locating requested items with mod cues and 60% acc. Max cues with L regard x2 in 24 mins. Goal 6: Improve recall of learned tasks with min cues with 70% acc  Pt observed with swallowing and continues to decline in intake due to poor alertness and responsiveness. Downgrade diet to pureed in hopes of increased intake for nutritional needs. Pt remains disoriented and cue dependent for all tasks. Meds have been adjusted with limited improvement observed. Likely will require SNF for discharge. COMPARISON:   CT brain 12/19/2020       HISTORY:   ORDERING SYSTEM PROVIDED HISTORY: monitor ICH in lethargic pt   TECHNOLOGIST PROVIDED HISTORY:   Reason for exam:->monitor ICH in lethargic pt   Has a \"code stroke\" or \"stroke alert\" been called? ->No   Reason for Exam: monitor ICH in lethargic pt   Acuity: Acute   Type of Exam: Initial       FINDINGS:   BRAIN/VENTRICLES: There has been interval decrease in size and density of the   patient's large intraparenchymal hemorrhage centered within the right   occipital lobe since the previous exam.  Residual blood products remain.    There is no new hemorrhage or acute infarct.  There is no mass effect or   midline shift. Edie Willett is no ventriculomegaly.  Intraventricular hemorrhage   has resolved.       ORBITS: Limited evaluation of the orbits is unremarkable.       SINUSES: The paranasal sinuses and mastoid air cells are clear.       SOFT TISSUES/SKULL:  No lytic or blastic osseous lesions are identified.           Impression   1. Interval decrease in size and density of the patient's known subacute   intraparenchymal hemorrhage within the right occipital lobe. 2. No acute infarct, new hemorrhage or mass effect. Alarm placed: [x]bed []chair   []other:   []JUNIOR activated        Barriers to progress:   [x] Fatigue        [x] Cognitive Deficits   [x] Memory Deficits   [] Reduced Attn   [] Self Limiting Behaviors    [] Reduced insight/awareness     [] Visual Deficits   [] Premorbid Conditions  [] Impulsivity     [] Other      Education/Interventions used this date: Spoke with NSG regarding diet downgrade. []   Progress was updated and reviewed in [facility] with patient and/or family this         date. [x] Attending Care Conference for pt this date. See Team Patient Care Conference Note for updates.     Interventions used this date:  [] Speech/Language Treatment    [] Instruction in HEP    Group   [] Dysphagia Treatment   [x] Cognitive Skill Dc    Other:         Assessment / Impression                                                          Treatment/Activity Tolerance:   [] Tolerated Treatment well:     [x] Patient limited by fatigue/pain:       [] Patient limited by medical complications:    [] Adverse Reaction to Tx:   [] Significant change in status:      Electronically Signed by  Hola Givens MA, CCC-SLP    1/14/2021  16:00 PM

## 2021-01-15 LAB
AMMONIA: 24 UMOL/L (ref 16–60)
ANION GAP SERPL CALCULATED.3IONS-SCNC: 9 MMOL/L (ref 4–16)
BUN BLDV-MCNC: 22 MG/DL (ref 6–23)
CALCIUM SERPL-MCNC: 8.6 MG/DL (ref 8.3–10.6)
CHLORIDE BLD-SCNC: 107 MMOL/L (ref 99–110)
CO2: 24 MMOL/L (ref 21–32)
CREAT SERPL-MCNC: 1.5 MG/DL (ref 0.9–1.3)
CULTURE: ABNORMAL
CULTURE: ABNORMAL
DOSE AMOUNT: NORMAL
DOSE TIME: NORMAL
GFR AFRICAN AMERICAN: 56 ML/MIN/1.73M2
GFR NON-AFRICAN AMERICAN: 46 ML/MIN/1.73M2
GLUCOSE BLD-MCNC: 103 MG/DL (ref 70–99)
GLUCOSE BLD-MCNC: 107 MG/DL (ref 70–99)
GLUCOSE BLD-MCNC: 110 MG/DL (ref 70–99)
GLUCOSE BLD-MCNC: 113 MG/DL (ref 70–99)
GLUCOSE BLD-MCNC: 93 MG/DL (ref 70–99)
HCT VFR BLD CALC: 40.9 % (ref 42–52)
HEMOGLOBIN: 13 GM/DL (ref 13.5–18)
Lab: ABNORMAL
MCH RBC QN AUTO: 28.5 PG (ref 27–31)
MCHC RBC AUTO-ENTMCNC: 31.8 % (ref 32–36)
MCV RBC AUTO: 89.7 FL (ref 78–100)
PDW BLD-RTO: 14.5 % (ref 11.7–14.9)
PLATELET # BLD: 166 K/CU MM (ref 140–440)
PMV BLD AUTO: 9.4 FL (ref 7.5–11.1)
POTASSIUM SERPL-SCNC: 4.4 MMOL/L (ref 3.5–5.1)
RBC # BLD: 4.56 M/CU MM (ref 4.6–6.2)
SODIUM BLD-SCNC: 140 MMOL/L (ref 135–145)
SPECIMEN: ABNORMAL
TSH HIGH SENSITIVITY: 3.15 UIU/ML (ref 0.27–4.2)
VALPROIC ACID LEVEL: 79.9 UG/ML (ref 50–100)
WBC # BLD: 7.6 K/CU MM (ref 4–10.5)

## 2021-01-15 PROCEDURE — 85027 COMPLETE CBC AUTOMATED: CPT

## 2021-01-15 PROCEDURE — 94761 N-INVAS EAR/PLS OXIMETRY MLT: CPT

## 2021-01-15 PROCEDURE — 99223 1ST HOSP IP/OBS HIGH 75: CPT | Performed by: NURSE PRACTITIONER

## 2021-01-15 PROCEDURE — 97110 THERAPEUTIC EXERCISES: CPT

## 2021-01-15 PROCEDURE — 99211 OFF/OP EST MAY X REQ PHY/QHP: CPT

## 2021-01-15 PROCEDURE — 6370000000 HC RX 637 (ALT 250 FOR IP): Performed by: PHYSICAL MEDICINE & REHABILITATION

## 2021-01-15 PROCEDURE — 99232 SBSQ HOSP IP/OBS MODERATE 35: CPT | Performed by: PHYSICAL MEDICINE & REHABILITATION

## 2021-01-15 PROCEDURE — 97530 THERAPEUTIC ACTIVITIES: CPT

## 2021-01-15 PROCEDURE — 80164 ASSAY DIPROPYLACETIC ACD TOT: CPT

## 2021-01-15 PROCEDURE — 6370000000 HC RX 637 (ALT 250 FOR IP): Performed by: NURSE PRACTITIONER

## 2021-01-15 PROCEDURE — 2580000003 HC RX 258: Performed by: PHYSICAL MEDICINE & REHABILITATION

## 2021-01-15 PROCEDURE — 1280000000 HC REHAB R&B

## 2021-01-15 PROCEDURE — 80048 BASIC METABOLIC PNL TOTAL CA: CPT

## 2021-01-15 PROCEDURE — 82962 GLUCOSE BLOOD TEST: CPT

## 2021-01-15 PROCEDURE — 36415 COLL VENOUS BLD VENIPUNCTURE: CPT

## 2021-01-15 PROCEDURE — 97535 SELF CARE MNGMENT TRAINING: CPT

## 2021-01-15 PROCEDURE — 6360000002 HC RX W HCPCS: Performed by: PHYSICAL MEDICINE & REHABILITATION

## 2021-01-15 PROCEDURE — 84443 ASSAY THYROID STIM HORMONE: CPT

## 2021-01-15 PROCEDURE — 82140 ASSAY OF AMMONIA: CPT

## 2021-01-15 PROCEDURE — 6360000002 HC RX W HCPCS: Performed by: INTERNAL MEDICINE

## 2021-01-15 RX ADMIN — DOCUSATE SODIUM 100 MG: 100 CAPSULE, LIQUID FILLED ORAL at 09:10

## 2021-01-15 RX ADMIN — AMLODIPINE BESYLATE 10 MG: 10 TABLET ORAL at 09:11

## 2021-01-15 RX ADMIN — DIGOXIN 125 MCG: 125 TABLET ORAL at 09:11

## 2021-01-15 RX ADMIN — METOPROLOL TARTRATE 25 MG: 25 TABLET, FILM COATED ORAL at 21:11

## 2021-01-15 RX ADMIN — BENZTROPINE MESYLATE 0.5 MG: 1 TABLET ORAL at 21:12

## 2021-01-15 RX ADMIN — LISINOPRIL 40 MG: 20 TABLET ORAL at 09:12

## 2021-01-15 RX ADMIN — ATORVASTATIN CALCIUM 80 MG: 40 TABLET, FILM COATED ORAL at 21:12

## 2021-01-15 RX ADMIN — BENZTROPINE MESYLATE 0.5 MG: 1 TABLET ORAL at 09:13

## 2021-01-15 RX ADMIN — SENNOSIDES 8.6 MG: 8.6 TABLET, FILM COATED ORAL at 21:11

## 2021-01-15 RX ADMIN — MICONAZOLE NITRATE: 20 POWDER TOPICAL at 21:11

## 2021-01-15 RX ADMIN — METOPROLOL TARTRATE 25 MG: 25 TABLET, FILM COATED ORAL at 09:12

## 2021-01-15 RX ADMIN — FAMOTIDINE 20 MG: 20 TABLET, FILM COATED ORAL at 09:10

## 2021-01-15 RX ADMIN — MICONAZOLE NITRATE: 20 POWDER TOPICAL at 09:14

## 2021-01-15 RX ADMIN — CEFEPIME 2 G: 2 INJECTION, POWDER, FOR SOLUTION INTRAVENOUS at 14:28

## 2021-01-15 RX ADMIN — DIVALPROEX SODIUM 1000 MG: 500 TABLET, FILM COATED, EXTENDED RELEASE ORAL at 21:12

## 2021-01-15 RX ADMIN — DOXAZOSIN 2 MG: 4 TABLET ORAL at 09:10

## 2021-01-15 RX ADMIN — ESCITALOPRAM OXALATE 10 MG: 10 TABLET, FILM COATED ORAL at 09:11

## 2021-01-15 RX ADMIN — DOCUSATE SODIUM 100 MG: 100 CAPSULE, LIQUID FILLED ORAL at 21:12

## 2021-01-15 RX ADMIN — DOXAZOSIN 2 MG: 4 TABLET ORAL at 21:11

## 2021-01-15 RX ADMIN — ENOXAPARIN SODIUM 40 MG: 100 INJECTION SUBCUTANEOUS at 09:12

## 2021-01-15 ASSESSMENT — PAIN SCALES - GENERAL: PAINLEVEL_OUTOF10: 0

## 2021-01-15 NOTE — PROGRESS NOTES
Physical Therapy    [x] daily progress note       [] discharge       Patient Name:  Phillip Mathur.    :  1950 MRN: [id number]  Room:  [address] [place] [address] Date of Admission: 2021  Rehabilitation Diagnosis:   Cerebral infarction, unspecified [I63.9]  Intracranial hemorrhage ([facility] [I62.9]       Date 1/15/2021       Day of ARU Week:  5   Time IN/OUT 1110/1210   Individual Tx Minutes    Group Tx Minutes    Co-Treat Minutes 60  A cotreat was completed this date with  [] PT    [x] OT   [] ST      This pt requires simultaneous intervention of 2 skilled therapists to safely complete tasks to address complexity of deficits defined in the goals including:  [x]Attention   [] Safety    []Problem Solving    []Sequencing     [x]Initiation    []Processing      []Recall of learned tasks  [] Communication  [x] Self Feeding   [x]ADL's    [x]UE Function    [x]Motor planning   []Balance  []Energy Conservation   [x]Verbal cues   [x] Tactile Cues  []Barriers     []Transfers   []Device Use   Concurrent Tx Minutes    TOTAL Tx Time Mins 60   Variance Time    Variance Time []   Refusal due to:     []   Medical hold/reason:    []   Illness   []   Off Unit for test/procedure  []   Extra time needed to complete task  []   Therapeutic need  []   Other (specify):   Restrictions Restrictions/Precautions  Restrictions/Precautions: General Precautions, Fall Risk(L hemiparesis, chronic R shoulder limitations)  Position Activity Restriction  Other position/activity restrictions: see catheter   Interdisciplinary communication [x]   Cleared for therapy per nursing     [x]   RN notified about issues during session (worsening wounds in perineal area)   []   RN updated on pt performance  []   Spoke with   []   Spoke with OT  []   Spoke with MD  []   Other:    Subjective observations and cognitive status: Pt in bed, had poor awareness of bowel incontinence, confused and disoriented, eyes were closed most of the time but would respond Shower chair with back(shower chair doesn't fit in shower)  Bathroom Toilet: Standard  Bathroom Equipment: Toilet raiser, Shower chair  Bathroom Accessibility: Accessible  Home Equipment: 4 wheeled walker, [address] Help From: Family, Neighbor  ADL Assistance: Needs assistance  Bath: Minimal assistance  Dressing: Minimal assistance  Grooming: Independent  Feeding: Independent  Toileting: Independent  Homemaking Assistance: Independent  Homemaking Responsibilities: No  Meal Prep Responsibility: No  Laundry Responsibility: No  Cleaning Responsibility: No  Bill Paying/Finance Responsibility: No  Shopping Responsibility: No  Dependent Care Responsibility: No  Health Care Management: No  Ambulation Assistance: Independent  Transfer Assistance: Needs assistance  Active : No  Patient's  Info: Neighbor is primary , and transport company  Mode of Transportation: Car, Family, Friends  Education: 12th grade  Occupation: Retired  Type of occupation: Pt built trucks for 33 years; [employer]  Leisure & Hobbies: TV, limited reading, girlfriend has a dog. Pt has meds that come in pill packs. Bills are managed over the phone. IADL Comments: Reba Rose assists c most IADLs and pt report hiring outside help c yardork. Additional Comments: Pt reports using rolator in the house. Pt reports 1 fall this year that occured in the house while ambulating in the home; sleeps in regular, flat bed; R hand dominant    Objective                                                                                    Goals:  (Update in navigator)  Short term goals  Time Frame for Short term goals: 14-21 tx days:  Short term goal 1: Pt will complete bed mobility tasks and sup<->sit with SBA-Sup. Short term goal 2: Pt will complete OOB transfers using RW with Mod A. Short term goal 3: Pt will ambulate 10 ft over level and carpeted surfaces using RW with Min A.   Short term goal 4: Pt will propel manual w/c 150 ft with turns

## 2021-01-15 NOTE — CONSULTS
[address] [id number] Continence Nurse  Consult Note       [address]. AGE: 70 y.o.    GENDER: male  : 1950  TODAY'S DATE:  1/15/2021    Subjective:     Reason for  Evaluation and Assessment: skin assessment      [employer]. is a 70 y.o. male referred by:   [x] Physician  [] Nursing  [] Other:     Wound Identification:  Wound Type: possible candidiasis  Contributing Factors: diabetes, chronic pressure, decreased mobility and incontinence of stool        PAST MEDICAL HISTORY        Diagnosis Date    Anemia     per old chart    Arthritis     Back pain, chronic     \"have pain in lumbar mainly- have 5 bulging discs\"\"in my neck and my lumbar have herniated discs\"    Bipolar 1 disorder ([facility]     CAD (coronary artery disease) 2016    does not follow with a cardiologist    Cerebral artery occlusion with cerebral infarction ([facility]     \"had mini stroke in 2016- fast heart rate when I would stand up - smile drooping on one side- lased just the one day\"    Chronic kidney disease (CKD), stage III (moderate)     CKD (chronic kidney disease)     per old chart- consult with Dr Lolly Hsu with admission 2016\"have low kidney function\"    COPD (chronic obstructive pulmonary disease) ([facility]     follow with Dr Tal Loving Diabetes mellitus, type 2 ([facility] 1/3/2017    Diabetic peripheral neuropathy (HCC)     Diastolic CHF ([facility]     Gastric ulcer     H/O cardiovascular stress test 14    EF 68% mild ischemia anterior wall    Heart murmur     \"see Dr Bethea [place]- last echo \" pt states\"I think they said I have a murmur but no chest pain or palpitations\"    Hiatal hernia     History of cardiac cath 14    MODERATE  CAD      Hx of migraines     HX OTHER MEDICAL     \"have thinning of kidney walls- they found I had that 15 yrs ago\" see Dr Sridevi Velasquez"    Hyperlipidemia     Hypertension     Lumbar radiculopathy     Overlapping malignant neoplasm of colon ([facility] 2020    Panic attack     'anything new gives me anxiety\"    Pleural effusion     scheduled for thoracentesis 7/28/2014, 8/17/2016    S/P thoracentesis     left side( per old chart had thora done 4/2016 and one done 2014)    Sleep apnea     sleep study x 2 - last one 4-5 yrs ago- uses c-pap\"    SOBOE (shortness of breath on exertion)     Spinal stenosis        PAST SURGICAL HISTORY    Past Surgical History:   Procedure Laterality Date    CARPAL TUNNEL RELEASE Bilateral 1989    COLONOSCOPY N/A 5/12/2020    COLONOSCOPY POLYPECTOMY SNARE/COLD BIOPSY WITH SPOT INK TATTOO performed [facility]ad Gonsalves [facility] Left 2000's    KNEE SURGERY Bilateral     right knee x 2( scope)/ left knee- 7-8 yr ago   [address] fusion   [address]  N/A 5/21/2020    BOWEL RESECTION EXTENDED RIGHT HEMICOLECTOMY performed by Cheryle Martin MD at [facility] Left 12/20/2013    THORACENTESIS  4/25/2016         THORACENTESIS Left 11/15/2016    Dr. Selene Gallagher 250mlsremoved     THORACOTOMY Left 03/18/2019    with Lysis of adhesions    THORACOTOMY Left 3/18/2019    THORACOSCOPY CONVERTED TO THORACOTOMY WITH LYSIS OF ADHESIONS performed by Judah Denver, MD at [facility]      as a kid    UPPER GASTROINTESTINAL ENDOSCOPY N/A 5/12/2020    EGD BIOPSY performed by Eyad Gonsalves MD at [facility]    Family History   Problem Relation Age of Onset    Heart Disease Mother         heart attack    High Blood Pressure Father        SOCIAL HISTORY    Social History     Tobacco Use    Smoking status: Former Smoker     Packs/day: 1.50     Years: 30.00     Pack years: 45.00     Types: Cigarettes     Quit date: 7/24/2010     Years since quitting: 10.4    Smokeless tobacco: Never Used   Substance Use Topics    Alcohol use: Not Currently    Drug use: Yes     Frequency: 7.0 times per week     Types: Marijuana       ALLERGIES    Allergies   Allergen Reactions    Nsaids      Renal        MEDICATIONS    No current facility-administered medications on file prior to encounter. Current Outpatient Medications on File Prior to Encounter   Medication Sig Dispense Refill    nitroGLYCERIN (NITROSTAT) 0.4 MG SL tablet Place 0.4 mg under the tongue every 5 minutes as needed for Chest pain up to max of 3 total doses. If no relief after 1 dose, call 911.       busPIRone (BUSPAR) 10 MG tablet Take 1 tablet by mouth 3 times daily 90 tablet 5    rivaroxaban (XARELTO) 10 MG TABS tablet Take 10 mg by mouth      carvedilol (COREG) 12.5 MG tablet Take 12.5 mg by mouth See Admin Instructions TAKE 1 TABLET BY MOUTH IN THE MORNING AND 1/2 TABLET IN THE EVENING      pantoprazole (PROTONIX) 40 MG tablet Take 40 mg by mouth 2 times daily      linagliptin (TRADJENTA) 5 MG tablet Take 5 mg by mouth daily      fluticasone (FLONASE) 50 MCG/ACT nasal spray 1 spray by Each Nostril route daily      albuterol sulfate HFA (VENTOLIN HFA) 108 (90 Base) MCG/ACT inhaler Inhale 2 puffs into the lungs every 6 hours as needed for Wheezing      benztropine (COGENTIN) 1 MG tablet Take 1 tablet by mouth daily 60 tablet 3    aspirin 81 MG chewable tablet Take 4 tablets by mouth once for 1 dose 30 tablet 3    dilTIAZem (CARDIZEM CD) 120 MG extended release capsule Take 1 capsule by mouth daily      atorvastatin (LIPITOR) 80 MG tablet Take 1 tablet by mouth daily 30 tablet 5    divalproex (DEPAKOTE ER) 500 MG extended release tablet Take 2 tablets by mouth nightly 60 tablet 5    escitalopram (LEXAPRO) 10 MG tablet Take 1 tablet by mouth daily (Patient taking differently: Take 20 mg by mouth daily ) 30 tablet 5    famotidine (PEPCID) 10 MG tablet Take 1 tablet by mouth 2 times daily 60 tablet 5    ferrous sulfate (IRON 325) 325 (65 Fe) MG tablet Take 1 tablet by mouth 2 times daily 60 tablet 5    furosemide (LASIX) 20 MG tablet Take 1 tablet by mouth daily 30 tablet 5    folic acid (FOLVITE) 1 MG tablet Take 1 tablet by mouth daily 30 tablet 5    gabapentin (NEURONTIN) 300 MG capsule Take 2 capsules by mouth 3 times daily for 180 days.  180 capsule 5    risperiDONE (RISPERDAL) 1 MG tablet Take 1 tablet by mouth daily 30 tablet 5    traZODone (DESYREL) 50 MG tablet Take 1 tablet by mouth nightly 30 tablet 5    insulin lispro (HUMALOG) 100 UNIT/ML injection vial **Low Dose Correction Algorithm**Insulin lispro (HUMALOG)  3 TIMES DAILY WITH MEALSGlucose: Dose: No [id number] 1 [id number] 2 [id number] 3 [id number] 4 [id number] 5 [id number] and above 6 Units 1 vial 3    docusate sodium (COLACE) 100 MG capsule Take 1 capsule by mouth 2 times daily 20 capsule 3    fluticasone-umeclidin-vilant (TRELEGY ELLIPTA) 100-62.5-25 MCG/INH AEPB Inhale 1 puff into the lungs daily 1 each 3    Compression Stockings MISC by Does not apply route Duration of Treatment:  3 Months  # of pairs:  2    Compression Class Level - 20-30 mmHg*    Style - Knee High 2 each 0    Lactobacillus (ACIDOPHILUS) 0.5 MG TABS Take 1 capsule by mouth           Objective:      /70   Pulse 65   Temp 98.8 °F (37.1 °C) (Oral)   Resp 19   Ht 5' 9\" (1.753 m)   Wt 225 lb (102.1 kg)   SpO2 94%   BMI 33.23 kg/m²   Monster Risk Score: Monster Scale Score: 15    LABS    CBC:   Lab Results   Component Value Date    WBC 7.6 01/15/2021    RBC 4.56 01/15/2021    HGB 13.0 01/15/2021    HCT 40.9 01/15/2021    MCV 89.7 01/15/2021    MCH 28.5 01/15/2021    MCHC 31.8 01/15/2021    RDW 14.5 01/15/2021     01/15/2021    MPV 9.4 01/15/2021     CMP:    Lab Results   Component Value Date     01/15/2021    K 4.4 01/15/2021     01/15/2021    CO2 24 01/15/2021    BUN 22 01/15/2021    CREATININE 1.5 01/15/2021    GFRAA 56 01/15/2021    AGRATIO 1.4 07/24/2020    LABGLOM 46 01/15/2021    GLUCOSE 103 01/15/2021    PROT 7.8 12/19/2020    PROT 6.6 10/24/2011    LABALBU 4.0 12/19/2020    CALCIUM 8.6 01/15/2021    BILITOT 0.6 12/19/2020    ALKPHOS 82 12/19/2020    AST 15 12/19/2020    ALT 10 12/19/2020     Albumin:    Lab Results   Component Value Date    LABALBU 4.0 12/19/2020     PT/INR:    Lab Results   Component Value Date    PROTIME 10.1 12/19/2020    PROTIME 9.6 02/10/2011    INR 0.84 12/19/2020     HgBA1c:    Lab Results   Component Value Date    LABA1C 5.3 05/24/2020         Assessment:     Patient Active Problem List   Diagnosis    Pleural effusion, left    SAMANTHA on CPAP    Chronic obstructive pulmonary disease (HCC)    TIA (transient ischemic attack)    Bipolar 1 disorder ([address]    Coronary artery disease involving native coronary artery of native heart without angina pectoris    Essential hypertension    REYES (dyspnea on exertion)    Anemia, chronic disease    Diuretic-induced hypokalemia    Diastolic CHF ([address]    Acute respiratory failure (HCC)    Hyperkalemia    Adrenal mass ([address]    Diabetes mellitus, type 2 (HCC)    Hyponatremia    Paroxysmal atrial fibrillation (HCC)    Empyema of left pleural space (HCC)    Hyperlipidemia    Lumbar radiculopathy    CKD (chronic kidney disease)    Uncontrolled type 2 diabetes mellitus with peripheral neuropathy (HCC)    Chronic kidney disease (CKD), stage III (moderate)    Cervical stenosis of spine    Spinal stenosis of lumbar region without neurogenic claudication    Somatic dysfunction of back    Somatic dysfunction of cervical region    Somatic dysfunction of pelvis region    Somatic dysfunction of both lower extremities    Empyema lung ([address]    Acute kidney injury superimposed on CKD ([address]    Chronic diastolic heart failure (HCC)    Hypotensive episode    Bradycardia on ECG    Lightheadedness    Acute on chronic respiratory failure with hypoxia (HCC)    Severe mixed bipolar 1 disorder without psychosis ([address]    Acute chest pain    Atrial tachycardia ([address]    Overlapping malignant neoplasm of colon ([address]    Colon adenocarcinoma ([address]    Acute left-sided weakness    Fall at home    Fall at home, initial encounter    Chronic pain of both shoulders    Hypertensive heart disease with stage 5 chronic kidney disease, not on chronic dialysis ([address]    Bell's palsy    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 ([address]    Localized edema    Intracranial hemorrhage (HCC)    Left hemiparesis (HCC)    Dysarthria due to and not concurrent with spontaneous intracerebral hemorrhage    Gait disturbance    Bipolar disorder, in partial remission, most recent episode depressed ([address]       Measurements:       Response to treatment:  Well tolerated by patient. Pain Assessment:  Severity:  none  Quality of pain: na  Wound Pain Timing/Severity: na  Premedicated: no    Plan:     Plan of Care:       Pt in bed. Agreeable to skin assessment. Heels intact. Erythema with papular rash appearing to be candidiasis noted to bilateral groin and left axilla. Micotin powder already ordered to axilla. Recommend adding to groin. Informed Dr. Bong Everett. Sacrum with blanchable erythema. Mepilex added for prevention. Atmos air pump applied to mattress. Positioned to right side with heels floated with pillow support. Pt is at mild risk for skin breakdown AEB Monster. Follow Monster orders. Specialty Bed Required : yes  [] Low Air Loss   [x] Pressure Redistribution  [] Fluid Immersion  [] Bariatric  [] Total Pressure Relief  [] Other:     Discharge Plan:  Placement for patient upon discharge: tbd  Hospice Care: no  Patient appropriate for Outpatient [facility]: no    Patient/Caregiver Teaching:  Level of patient/caregiver understanding able to:   No evidence of learning.         Electronically signed by Justin Tellez RN,  on 1/15/2021 at 3:45 PM

## 2021-01-15 NOTE — CONSULTS
Neurology Service Consult Note  [facility]  Patient Name: Jada Ward. : 1950        Subjective:   Reason for consult: more confused than when he came in  70 y.o. right-handed male with extensive past medical history listed below presenting to [facility] after patient transferred from [place] on  as patient suffered from acute intraparenchymal bleed in 2020. Reviewing notes from [facility], patient was confused there. There was no seizure activity noted, he did have an EEG and he was on Depakote 1000mg at night. There was no acute surgical treatment on for his bleed. Dr. Prema Mancuso asked me to see the patient as when patient came in on  he was alert, but Dr. Prema Mancuso felt he has been getting more confused since being here and more \"lethargic\" Dr. Prema Mancuso attempted to remove CNS altering medications, including Trazodone, Risperdal, Buspar, and Gabapentin. On chart review it appears patient has abnormal urine. Nursing staff has not reported any seizure activity  His neck is supple  He shows no signs of HA or neck pain.    Hemodynamically stable   No fever       Past Medical History:   Diagnosis Date    Anemia     per old chart    Arthritis     Back pain, chronic     \"have pain in lumbar mainly- have 5 bulging discs\"\"in my neck and my lumbar have herniated discs\"    [facility]ipolar 1 disorder [facility]     CAD (coronary artery disease) 2016    does not follow with a cardiologist    Cerebral artery occlusion with cerebral infarction ([facility]     \"had mini stroke in 2016- fast heart rate when I would stand up - smile drooping on one side- lased just the one day\"    Chronic kidney disease (CKD), stage III (moderate)     CKD (chronic kidney disease)     per old chart- consult with Dr Yoana Ibrahim with admission 2016\"have low kidney function\"    COPD (chronic obstructive pulmonary disease) ([facility]     follow with Dr Yamilet Simental Diabetes mellitus, type 2 ([facility] 1/3/2017    Diabetic peripheral neuropathy ([facility]     Diastolic CHF ([facility] 8/17/3866    Gastric ulcer     H/O cardiovascular stress test 5/26/14    EF 68% mild ischemia anterior wall    Heart murmur     \"see Dr Karen Grant- last echo 2014\" pt states\"I think they said I have a murmur but no chest pain or palpitations\"    Hiatal hernia     History of cardiac cath 6/11/14    MODERATE  CAD      Hx of migraines     HX OTHER MEDICAL     \"have thinning of kidney walls- they found I had that 15 yrs ago\" see Dr Shira Shah"    Hyperlipidemia     Hypertension     Lumbar radiculopathy     Overlapping malignant neoplasm of colon ([facility] 5/27/2020    Panic attack     'anything new gives me anxiety\"    Pleural effusion     scheduled for thoracentesis 7/28/2014, 8/17/2016    S/P thoracentesis     left side( per old chart had thora done 4/2016 and one done 2014)    Sleep apnea     sleep study x 2 - last one 4-5 yrs ago- uses c-pap\"    SOBOE (shortness of breath on exertion)     Spinal stenosis     :   Past Surgical History:   Procedure Laterality Date    CARPAL TUNNEL RELEASE Bilateral 1989    COLONOSCOPY N/A 5/12/2020    COLONOSCOPY POLYPECTOMY SNARE/COLD BIOPSY WITH SPOT INK TATTOO performed by Bailey Carty MD at [facility] Left 2000's    KNEE SURGERY Bilateral     right knee x 2( scope)/ left knee- 7-8 yr ago   [address] fusion   [address]  N/A 5/21/2020    BOWEL RESECTION EXTENDED RIGHT HEMICOLECTOMY performed by Tony Ordaz MD at [address] [place] [address] Left 12/20/2013    THORACENTESIS  4/25/2016         THORACENTESIS Left 11/15/2016    Dr. Perez Thomason 250mlsremoved     THORACOTOMY Left 03/18/2019    with Lysis of adhesions    THORACOTOMY Left 3/18/2019    THORACOSCOPY CONVERTED TO THORACOTOMY WITH LYSIS OF ADHESIONS performed by Erick Leos MD at [place] [id number]      as a kid    UPPER GASTROINTESTINAL ENDOSCOPY N/A 5/12/2020    EGD BIOPSY performed by Bailey Carty MD at [facility]     Medications:  Scheduled Meds:   enoxaparin  40 mg Subcutaneous Daily    miconazole   Topical BID    metoprolol tartrate  25 mg Oral BID    digoxin  125 mcg Oral Daily    amLODIPine  10 mg Oral Daily    insulin lispro  0-6 Units Subcutaneous TID WC    insulin lispro  0-3 Units Subcutaneous Nightly    lisinopril  40 mg Oral Daily    doxazosin  2 mg Oral 2 times per day    atorvastatin  80 mg Oral Nightly    benztropine  0.5 mg Oral BID    divalproex  1,000 mg Oral Nightly    escitalopram  10 mg Oral Daily    famotidine  20 mg Oral Daily    senna  1 tablet Oral Nightly    docusate sodium  100 mg Oral BID     Continuous Infusions:   dextrose       PRN Meds:.acetaminophen, bisacodyl, ondansetron, glucose, dextrose, glucagon (rDNA), dextrose    Allergies   Allergen Reactions    Nsaids      Renal      Social History     Socioeconomic History    Marital status: Single     Spouse name: Not on file    Number of children: Not on file    Years of education: Not on file    Highest education level: Not on file   Occupational History    Occupation: nookeds, retired     Employer: [employer]   Social Needs    Financial resource strain: Not on file    Food insecurity     Worry: Not on file     Inability: Not on file   ADMETA needs     Medical: Not on file     Non-medical: Not on file   Tobacco Use    Smoking status: Former Smoker     Packs/day: 1.50     Years: 30.00     Pack years: 45.00     Types: Cigarettes     Quit date: 7/24/2010     Years since quitting: 10.4    Smokeless tobacco: Never Used   Substance and Sexual Activity    Alcohol use: Not Currently    Drug use: Yes     Frequency: 7.0 times per week     Types: Marijuana    Sexual activity: Not Currently     Partners: Female   Lifestyle    Physical activity     Days per week: Not on file     Minutes per session: Not on file    Stress: Not on file   Relationships    Social connections     Talks on phone: Not on file     Gets together: Not on file     Attends Sabianist service: Not on file     Active member of club or organization: Not on file     Attends meetings of clubs or organizations: Not on file     Relationship status: Not on file    Intimate partner violence     Fear of current or ex partner: Not on file     Emotionally abused: Not on file     Physically abused: Not on file     Forced sexual activity: Not on file   Other Topics Concern    Not on file   Social History Narrative    Not on file      Family History   Problem Relation Age of Onset    Heart Disease Mother         heart attack    High Blood Pressure Father        Review of Symptoms:    Due to current state of encephalopathy patient cannot answer review of system questions    Physical Exam:           Gen: Alert and awake but disoriented  HEENT: NC/AT, EOMI, PERRL, mmm, neck supple, no meningeal signs; Heart: regular   Lungs: no distress  Ext: no edema, no calf tenderness b/l  Psych: poor eye contact   Skin: no rashes or lesions    NEUROLOGIC EXAM:    Mental Status: Patient is alert to self only, he is easily distracted, he attempts to follow commands but needs help and reassurance, he speaks out the context    Cranial Nerve Exam:   CN II-XII:  PERRL, VFF, no nystagmus, no gaze paresis, sensation V1-V3 intact b/l, muscles of facial expression symmetric; hearing intact to conversational tone, palate elevates symmetrically, shoulder elevation symmetric and tongue protrudes midline with movement side to side.     Motor Exam:       Antigravity x4 do not see any specific drift spasticity or rigidity    Deep Tendon Reflexes: 1/4 biceps, triceps, brachioradialis, patellar, and achilles b/l; flexor plantar responses b/l    Sensation: Intact light touch  UE's/LE's b/l    Coordination/Cerebellum:       Tremors--none      Gait and stance:      Gait: deferred      LABS:     Recent Labs     01/12/21  1258 01/15/21  0744   WBC 8.6 7.6    140   K 4.6 4.4   CL 104 107   CO2 22 24   BUN 33* 22   CREATININE 1.8* 1.5*   GLUCOSE 125* 103*   G  Results for Stephon Walden (MRN [id number]) as of 1/15/2021 11:42   Ref. Range 1/12/2021 22:44   Color, UA Latest Ref Range: YELLOW  YELLOW   Clarity, UA Latest Ref Range: CLEAR  SLIGHTLY CLOUDY (A)   Bilirubin, Urine Latest Ref Range: NEGATIVE MG/DL NEGATIVE   Ketones, Urine Latest Ref Range: NEGATIVE MG/DL NEGATIVE   Specific Gravity, UA Latest Ref Range: 1.001 - 1.035  1.019   Blood, Urine Latest Ref Range: NEGATIVE  LARGE (A)   Protein, UA Latest Ref Range: NEGATIVE MG/ (A)   Urobilinogen, Urine Latest Ref Range: 0.2 - 1.0 MG/DL NORMAL   Leukocyte Esterase, Urine Latest Ref Range: NEGATIVE  LARGE (A)   Glucose, Urine Latest Ref Range: NEGATIVE MG/DL NEGATIVE   Nitrite Urine, Quantitative Latest Ref Range: NEGATIVE  POSITIVE (A)   pH, Urine Latest Ref Range: 5.0 - 8.0  6.0   WBC, UA Latest Ref Range: 0 - 2 /HPF 1605 (H)   WBC Clumps, UA Latest Units: /HPF MANY   RBC, UA Latest Ref Range: 0 - 3 /HPF 1,043 (H)   Bacteria, UA Latest Ref Range: NEGATIVE /HPF NEGATIVE   Trichomonas, UA Latest Ref Range: NONE SEEN /HPF NONE SEEN       IMAGING:    CT Head:  1. Interval decrease in size and density of the patient's known subacute   intraparenchymal hemorrhage within the right occipital lobe. 2. No acute infarct, new hemorrhage or mass effect. ASSESSMENT/PLAN:     3 70year old male with AMS secondary to TME superimposed on acute UTI with possible polypharmacy with hx of recent intraparenchymal bleed. Bleed stable. Low suspicion for seizure. Plan of care as follows:  1. Neuro exam:  1. Disoriented generally nonfocal  2. Neurodiagnostics:  1. CT has as above  2. UA as above  3. Medications:  1. For urine treatment to PMR  2. Holding BuSpar, trazodone, Risperdal and gabapentin  3. Continue Depakote 1000 mg nightly  4. PT/OT/ST:  1. Per their recommendations  5. Follow-up:  1.  Pending course of ARU admission and

## 2021-01-15 NOTE — PROGRESS NOTES
Comprehensive Nutrition Assessment    Type and Reason for Visit:  Reassess    Nutrition Recommendations/Plan:  Continue diet consistency as per speech therapy   Assist with meals  Encourage meal and oral nutrition supplement    Nutrition Assessment:  Decreased meal intake with increased lethargy this week. Diet downgraded to pureed diet per speech therapy. Meal intake 25-50%, today one meal %. Receiving supplement, drinks some. High nutrition risk at this time with decline in intake, inadequate. Malnutrition Assessment:  Malnutrition Status:  Insufficient data    Context:  Acute Illness       Estimated Daily Nutrient Needs:  Energy (kcal):  6274-2963; Weight Used for Energy Requirements:  Current     Protein (g):  72-87 (1-1.2 g/kg); Weight Used for Protein Requirements:  Ideal        Fluid (ml/day):  2000; Method Used for Fluid Requirements:  1 ml/kcal      Nutrition Related Findings:  up in bed being fed lunch      Wounds:  None       Current Nutrition Therapies:    Dietary Nutrition Supplements: Low Calorie High Protein Supplement  DIET CARB CONTROL; Dysphagia Pureed    Anthropometric Measures:  · Height: 5' 9\" (175.3 cm)  · Current Body Weight: 225 lb 1.4 oz (102.1 kg)   · Admission Body Weight: 225 lb 5 oz (102.2 kg)    · Usual Body Weight: 214 lb (97.1 kg)(per hx)     · Ideal Body Weight: 160 lbs; % Ideal Body Weight 143.1 %   · BMI: 33.2  · Adjusted Body Weight:  ; No Adjustment   · Adjusted BMI:      · BMI Categories: Obese Class 1 (BMI 30.0-34. 9)       Nutrition Diagnosis:   · Predicted inadequate energy intake related to other (comment)(reduced appetite in illness) as evidenced by other (comment)(inconsistent intake so far)      Nutrition Interventions:   Food and/or Nutrient Delivery:  Continue Current Diet, Continue Oral Nutrition Supplement  Nutrition Education/Counseling:  No recommendation at this time   Coordination of Nutrition Care:  Continue to monitor while inpatient, Feeding Assistance/Environment Change    Goals:  Patient will consume at least 75% at meals during stay       Nutrition Monitoring and Evaluation:   Behavioral-Environmental Outcomes:  None Identified   Food/Nutrient Intake Outcomes:  Diet Advancement/Tolerance, Food and Nutrient Intake, Supplement Intake  Physical Signs/Symptoms Outcomes:  Biochemical Data, Chewing or Swallowing, Meal Time Behavior, Skin, Weight     Discharge Planning:     Too soon to determine     Electronically signed by Panfilo Schreiber RD, LD on 1/15/21 at 2:18 PM EST    Contact: [phone]

## 2021-01-15 NOTE — PROGRESS NOTES
notified. P.M. session, pt with increased alertness and conversing with both therapists. However pt demo spontaneous and inappropriate verbiage to female therapist. Pt able to redirect in beginning of session however later demo decreased alertness and increased lethargy once engaging in therapeutic ax/ex. Pain level/location:    /10       Location:    Discharge recommendations  Anticipated discharge date:  1/26  Destination: []home alone   []home alone w assist prn   [] home w/ family    [] Continuous supervision       [x]SNF    [] Assisted living     [] Other:   Continued therapy: []HHC OT  []OUTPATIENT  OT   [] No Further OT  Equipment needs: none     Toileting: Total A       Bed Mobility:           []   Pt received out of bed   SEE PT NOTES FOR DETAILS      Additional Therapeutic activities/exercises completed this date:     [x]   ADL Training: FEEDING- pt demo spontaneous hand<>mouth excursions, required Min A to load spoon and initial active assist to initiate feeding task. Pt demo slow movements, appears to demo slow processing. []   Balance/Postural training     []   Bed/Transfer Training   []   Endurance Training   []   Neuromuscular Re-ed   []   Nu-step:  Time:        Level:         #Steps:       []   Rebounder:    []  Seated     []  Standing        [x]   Supine Ther Ex (reps/sets):  BUE AROM/REACHING to facilitate bed mobility, AROM of BUE to have UE open to air to prevent further redness and skin breakdown. [x]   Seated Ther Ex (reps/sets): Completed visual scanning ex c Max v/c and auditory cues to attend to target, pt demo tossing red ball to target areas, unable to fully extend BUE and demo protraction of scapula. Poor ROM noted as well as poor protective reflexes. []   Standing Ther Ex (reps/sets):     [x]   Other: Cognitive reorientation: pt provided with prompts and cues to increase level of orientation, pt also assessed for ability to demo use of call-light.  Pt unable to identify call light button required for nsg staff assistance. NSG staff made aware of pt's cognitive deficits. Comments:      Patient/Caregiver Education and Training:   []   Adaptive Equipment Use  []   Bed Mobility/Transfer Technique/Safety  []   Energy Conservation Tips  []   Family training  []   Postural Awareness  []   Safety During Functional Activities  []   Reinforced Patient's Precautions   []   Progress was updated and reviewed in Rehabtracker with patient and/or family this         date. Treatment Plan for Next Session: increase orientation, level of alertness, BUE AROM, bed mobility, sitting balance. Assessment:  Pt demo improved level of alertness this date. However pt has demo inappropriate verbiage and behavior during therapy as well as reports from nsg staff. Pt is able to be redirected, which shows good potential in pt's ability to complete and participate in activities. Pt does show increased fatigue c increased level of excursion, resulting in lethargy.       Treatment/Activity Tolerance:   [x] Tolerated treatment with no adverse effects    [x] Patient limited by fatigue  [x] Patient limited by pain   [] Patient limited by medical complications:    [] Adverse reaction to Tx:   [] Significant change in status    Safety:       [x]  bed alarm set    []  chair alarm set    []  Pt refused alarms                []  Telesitter activated      []  Gait belt used during tx session      []other:       Number of Minutes/Billable Intervention  Therapeutic Exercise 30   ADL Self-care 45   Neuro Re-Ed    Therapeutic Activity 15   Group    Other:    TOTAL 90       Social History  Social/Functional History  Lives With: Significant other  Type of Home: House  Home Layout: One level  Home Access: Stairs to enter with rails  Entrance Stairs - Number of Steps: 5+3  Entrance Stairs - Rails: Both  Bathroom Shower/Tub: Walk-in shower, Shower chair with back(shower chair doesn't fit in shower)  Bathroom Toilet: Standard  Bathroom Equipment: Toilet raiser, Shower chair  Bathroom Accessibility: Accessible  Home Equipment: 4 wheeled walker, [address] Help From: Family, Neighbor  ADL Assistance: Needs assistance  Bath: Minimal assistance  Dressing: Minimal assistance  Grooming: Independent  Feeding: Independent  Toileting: Independent  Homemaking Assistance: Independent  Homemaking Responsibilities: No  Meal Prep Responsibility: No  Laundry Responsibility: No  Cleaning Responsibility: No  Bill Paying/Finance Responsibility: No  Shopping Responsibility: No  Dependent Care Responsibility: No  Health Care Management: No  Ambulation Assistance: Independent  Transfer Assistance: Needs assistance  Active : No  Patient's  Info: Fausto Lau is primary , and transport company  Mode of Transportation: Car, Family, Friends  Education: 12th grade  Occupation: Retired  Type of occupation: Pt built trucks for 33 years; [employer]  Leisure & Hobbies: TV, limited reading, girlfriend has a dog. Pt has meds that come in pill packs. Bills are managed over the phone. IADL Comments: Reba Rose assists c most IADLs and pt report hiring outside help c michela. Additional Comments: Pt reports using rolator in the house.  Pt reports 1 fall this year that occured in the house while ambulating in the home; sleeps in regular, flat bed; R hand dominant    Objective                                                                                    Goals:  (Update in navigator)  Short term goals  Time Frame for Short term goals: STG=LTG:  Long term goals  Time Frame for Long term goals : 12-14 days or until d/c  Long term goal 1: Pt will complete feeding/grooming/oral care task c MOD I by d/c  Long term goal 2: Pt will complete total body bathing c MIN A by d/c  Long term goal 3: Pt will complete UB dressing c SUP by d/c  Long term goal 4: Pt will complete LB dressing c MOD A by d/c  Long term goal 5: Pt will don/doff footwear c SETUP by d/c  Long term goals 6: Pt will complete toileting c MOD A by d/c  Long term goal 7: Pt will complete functional transfer (toilet, tub, shower) c MOD A by d/c. Long term goal 8: Pt will perform therex/therax to facilitate increased strength/endurance/ax tolerance (c emphasis on dynamic standing balance/tolerance >10 mins and BUE endurance) c Min A in order to complete ADLs. :        Plan of Care                                                                              Times per week: 5 days per week for a minimum of 60 minutes/day plus group as appropriate for 60 minutes.   Treatment to include Plan  Times per day: Daily  Current Treatment Recommendations: Strengthening, Endurance Training, Neuromuscular Re-education, Patient/Caregiver Education & Training, ROM, Equipment Evaluation, Education, & procurement, Self-Care / ADL, Balance Training, Functional Mobility Training, Safety Education & Training, Cognitive/Perceptual Training    Electronically signed by   EMIL Little OTR/L  License # [id number]    1/15/2021, 4:45 PM

## 2021-01-15 NOTE — FLOWSHEET NOTE
[x] daily progress note       [] discharge       Patient Name:  Bakari Morales. :  1950 MRN: [id number]  Room:  [address] [place], [place] [address] Date of Admission: 2021  Rehabilitation Diagnosis:   Cerebral infarction, unspecified [I63.9]  Intracranial hemorrhage ([facility] [address] [I62.9]       Date 1/15/2021       Day of ARU Week:  5   Time IN/OUT [id number]   Co-Treat Minutes 30    TOTAL Tx Time Mins 30   Restrictions Restrictions/Precautions  Restrictions/Precautions: General Precautions, Fall Risk(L hemiparesis, chronic R shoulder limitations)  Position Activity Restriction  Other position/activity restrictions: see catheter   Communication with other providers: [x]   OK to see per nursing:     []   Spoke with team member regarding:      Subjective observations and cognitive status: Pt seen in semi-go's position in bed at beginning of treatment. Pt was initially awake, but then became quickly lethargic. Pt still required max vcs to stay awake and stay on task. Pt rarely following commands. Pt inappropriate at times and required redirection. Pain level/location: 0/10       Discharge recommendations  Anticipated discharge date:  2021  Destination: []???????home alone   []???????home alone with assist PRN     [x]??????? home w/ family      [x]??????? Continuous supervision  []???????SNF    []??????? Assisted living     []??????? Other:  Continued therapy: [x]???????[facility] PT  []???????OUTPATIENT  PT   []??????? No Further PT  []???????SNF PT  Caregiver training recommended: [x]????? ? ? Yes  []??????? No   Equipment needs: TBD     A cotreat was completed this date with  [] PT    [x] OT   [] ST      This pt requires simultaneous intervention of 2 skilled therapists to safely complete tasks to address complexity of deficits defined in the goals including:  [x]Attention   [x] Safety    [x]Problem Solving    []Sequencing     [x]Initiation    []Processing      []Recall of learned tasks  [] Communication  [] Self Feeding []ADL's    []UE Function    [x]Motor planning   []Balance  [x]Energy Conservation   [x]Verbal cues   [] Tactile Cues  []Barriers     [x]Transfers   []Device Use  Pt's response to cotreat: Fair   Progress toward goals: Ongoing       Bed Mobility:         Rolling R/L:  Max A x 1-2 (several trials of rolling for practice) Max vcs for proper technique. Pt used rail. Pt required hand-over-hand to initiate grabbing the railing)  Scooting: Total A x 2     Additional Therapeutic activities/exercises completed this date:     []   Nu-step:  Time:        Level:         #Steps:       []   Rebounder:    []  Seated     []  Standing        []   Balance training         []   Postural training    []   Supine ther ex (reps/sets):     []   Seated ther ex (reps/sets):     []   Standing ther ex (reps/sets):     []   Picking up object from floor (standing):                   []   Reacher used   [x]   Other:  Trunk control and strengthening: pt sat upright in bed with back supported by bed and performed toss/catch activity with minimal carryover and max vcs to stay on task. []   Other:     Patient/Caregiver Education and Training:   [x]   Bed Mobility   []   Gait technique/sequencing  []   Proper use of assistive device  []   Advanced mobility safety and technique  []   Reinforced patient's precautions/mobility while maintaining precautions  []   Postural awareness  []   Family training  [x]   Progress was updated in Rehabtracker this date.     Treatment Plan for Next Session: sit EOB; trunk strengthening; seated balance training; exercises      Assessment:    Treatment/Activity Tolerance:   [] Tolerated treatment with no adverse effects    [x] Patient limited by fatigue/lethargy  [] Patient limited by pain   [] Patient limited by medical complications:    [] Adverse reaction to Tx:   [] Significant change in status    Safety:       [x]  bed alarm set    []  chair alarm set    []  Pt refused alarms                []  Telesitter occured in the house while ambulating in the home; sleeps in regular, flat bed; R hand dominant    Objective                                                                                    Goals:  (Update in navigator)  Short term goals  Time Frame for Short term goals: 14-21 tx days:  Short term goal 1: Pt will complete bed mobility tasks and sup<->sit with SBA-Sup. Short term goal 2: Pt will complete OOB transfers using RW with Mod A. Short term goal 3: Pt will ambulate 10 ft over level and carpeted surfaces using RW with Min A. Short term goal 4: Pt will propel manual w/c 150 ft with turns with SBA-Sup. Short term goal 5: Caregiver will be able to safely assist pt with functional mobility tasks. :   :        Plan of Care                                                                              Times per week: 5 days per week for a minimum of 60 minutes/day plus group as appropriate for 60 minutes.   Treatment to include Current Treatment Recommendations: Strengthening, Functional Mobility Training, Wheelchair Mobility Training, Neuromuscular Re-education, Home Exercise Program, Equipment Evaluation, Education, & procurement, ROM, Transfer Training, Cognitive/Perceptual Training, Gait Training, Cognitive Reorientation, Safety Education & Training, Balance Training, Endurance Training, Pain Management, Patient/Caregiver Education & Training, Positioning    Electronically signed by   Prachi Dominguez, [id number]  1/15/2021, 3:31 PM

## 2021-01-16 LAB
GLUCOSE BLD-MCNC: 109 MG/DL (ref 70–99)
GLUCOSE BLD-MCNC: 111 MG/DL (ref 70–99)
GLUCOSE BLD-MCNC: 130 MG/DL (ref 70–99)
GLUCOSE BLD-MCNC: 133 MG/DL (ref 70–99)

## 2021-01-16 PROCEDURE — 97129 THER IVNTJ 1ST 15 MIN: CPT

## 2021-01-16 PROCEDURE — 82962 GLUCOSE BLOOD TEST: CPT

## 2021-01-16 PROCEDURE — 2580000003 HC RX 258: Performed by: PHYSICAL MEDICINE & REHABILITATION

## 2021-01-16 PROCEDURE — 97110 THERAPEUTIC EXERCISES: CPT

## 2021-01-16 PROCEDURE — 97130 THER IVNTJ EA ADDL 15 MIN: CPT

## 2021-01-16 PROCEDURE — 6360000002 HC RX W HCPCS: Performed by: INTERNAL MEDICINE

## 2021-01-16 PROCEDURE — 97530 THERAPEUTIC ACTIVITIES: CPT

## 2021-01-16 PROCEDURE — 92526 ORAL FUNCTION THERAPY: CPT

## 2021-01-16 PROCEDURE — 97112 NEUROMUSCULAR REEDUCATION: CPT

## 2021-01-16 PROCEDURE — 94761 N-INVAS EAR/PLS OXIMETRY MLT: CPT

## 2021-01-16 PROCEDURE — 6370000000 HC RX 637 (ALT 250 FOR IP): Performed by: PHYSICAL MEDICINE & REHABILITATION

## 2021-01-16 PROCEDURE — 6370000000 HC RX 637 (ALT 250 FOR IP): Performed by: NURSE PRACTITIONER

## 2021-01-16 PROCEDURE — 1280000000 HC REHAB R&B

## 2021-01-16 PROCEDURE — 97535 SELF CARE MNGMENT TRAINING: CPT

## 2021-01-16 PROCEDURE — 6360000002 HC RX W HCPCS: Performed by: PHYSICAL MEDICINE & REHABILITATION

## 2021-01-16 RX ADMIN — ENOXAPARIN SODIUM 40 MG: 100 INJECTION SUBCUTANEOUS at 10:16

## 2021-01-16 RX ADMIN — DOXAZOSIN 2 MG: 4 TABLET ORAL at 20:47

## 2021-01-16 RX ADMIN — DOCUSATE SODIUM 100 MG: 100 CAPSULE, LIQUID FILLED ORAL at 20:47

## 2021-01-16 RX ADMIN — ACETAMINOPHEN 650 MG: 325 TABLET ORAL at 20:46

## 2021-01-16 RX ADMIN — CEFEPIME 2 G: 2 INJECTION, POWDER, FOR SOLUTION INTRAVENOUS at 23:47

## 2021-01-16 RX ADMIN — MICONAZOLE NITRATE: 20 POWDER TOPICAL at 10:14

## 2021-01-16 RX ADMIN — DIGOXIN 125 MCG: 125 TABLET ORAL at 10:16

## 2021-01-16 RX ADMIN — FAMOTIDINE 20 MG: 20 TABLET, FILM COATED ORAL at 10:16

## 2021-01-16 RX ADMIN — CEFEPIME 2 G: 2 INJECTION, POWDER, FOR SOLUTION INTRAVENOUS at 00:36

## 2021-01-16 RX ADMIN — LISINOPRIL 40 MG: 20 TABLET ORAL at 10:16

## 2021-01-16 RX ADMIN — SENNOSIDES 8.6 MG: 8.6 TABLET, FILM COATED ORAL at 20:47

## 2021-01-16 RX ADMIN — AMLODIPINE BESYLATE 10 MG: 10 TABLET ORAL at 10:16

## 2021-01-16 RX ADMIN — DIVALPROEX SODIUM 1000 MG: 500 TABLET, FILM COATED, EXTENDED RELEASE ORAL at 20:48

## 2021-01-16 RX ADMIN — ESCITALOPRAM OXALATE 10 MG: 10 TABLET, FILM COATED ORAL at 10:16

## 2021-01-16 RX ADMIN — DOXAZOSIN 2 MG: 4 TABLET ORAL at 10:16

## 2021-01-16 RX ADMIN — DOCUSATE SODIUM 100 MG: 100 CAPSULE, LIQUID FILLED ORAL at 10:15

## 2021-01-16 RX ADMIN — MICONAZOLE NITRATE: 20 POWDER TOPICAL at 20:59

## 2021-01-16 RX ADMIN — BENZTROPINE MESYLATE 0.5 MG: 1 TABLET ORAL at 10:15

## 2021-01-16 RX ADMIN — ATORVASTATIN CALCIUM 80 MG: 40 TABLET, FILM COATED ORAL at 20:48

## 2021-01-16 RX ADMIN — BENZTROPINE MESYLATE 0.5 MG: 1 TABLET ORAL at 20:48

## 2021-01-16 RX ADMIN — METOPROLOL TARTRATE 25 MG: 25 TABLET, FILM COATED ORAL at 10:17

## 2021-01-16 RX ADMIN — METOPROLOL TARTRATE 25 MG: 25 TABLET, FILM COATED ORAL at 20:47

## 2021-01-16 RX ADMIN — CEFEPIME 2 G: 2 INJECTION, POWDER, FOR SOLUTION INTRAVENOUS at 12:14

## 2021-01-16 ASSESSMENT — PAIN DESCRIPTION - PAIN TYPE: TYPE: CHRONIC PAIN

## 2021-01-16 ASSESSMENT — PAIN DESCRIPTION - DESCRIPTORS: DESCRIPTORS: ACHING

## 2021-01-16 ASSESSMENT — PAIN DESCRIPTION - LOCATION: LOCATION: NECK

## 2021-01-16 ASSESSMENT — PAIN SCALES - GENERAL: PAINLEVEL_OUTOF10: 3

## 2021-01-16 NOTE — PROGRESS NOTES
address complexity of deficits defined in the goals including:  [x]Attention   [x] Safety    [x]Problem Solving    [x]Sequencing     [x]Initiation    [x]Processing      []Recall of learned tasks  [] Communication  [] Self Feeding   [x]ADL's    []UE Function    [x]Motor planning   [x]Balance  []Energy Conservation   [x]Verbal cues   [x] Tactile Cues  []Barriers     [x]Transfers   [x]Device Use  Pt's response to cotreat: fair  Progress toward goals: ongoing      Bed Mobility:           []   Pt received out of bed   Rolling R/L:  Max A x 1 with several trials   Scooting: Total A x 2  Lying --> Sit:  Max A x 1  Sit --> lying: Max A x 1    Transfers:    Sit--> Stand:  AM: Max A x 1-2   PM: min A x 2 (2 trials)  Stand --> Sit:   Max A x 1-2  Assistive device required for transfer:   No AD and FWW    Gait:    Side stepping along EOB with max A x 2 with FWW requiring verbal and tactile cues to perform including weight shifting. Pt was able to take steps forward and backward with max cues and weight shifting. Additional Therapeutic activities/exercises completed this date:     []   Nu-step:  Time:        Level:         #Steps:       []   Rebounder:    []  Seated     []  Standing        [x]   Balance training: AM: Unsupported sitting balance activities on EOB with CGA, pt able to sit EOB for ADLs with CGA (see OT note for ADLs) but required max initiation. Pt sat EOB with reaching activities with UEs with CGA and max encouragement. PM: Standing balance training with single UE support reaching forward and No UE support with forward flexion. Standing at [facility] with weight shifting.      []   Postural training    []   Supine ther ex (reps/sets):     []   Seated ther ex (reps/sets):     []   Standing ther ex (reps/sets):     []   Picking up object from floor (standing):                   []   Reacher used   []   Other:   []   Other:    Comments:      Patient/Caregiver Education and Training:   [x]   Bed Mobility/Transfer technique/safety  [x]   Gait technique/sequencing  [x]   Proper use of assistive device  []   Advanced mobility safety and technique  []   Reinforced patient's precautions/mobility while maintaining precautions  []   Postural awareness  []   Family training  []   Progress was updated and reviewed in Rehabtracker with patient and/or family this date. Treatment Plan for Next Session: continue to progress transfers and gait as pt can tolerate and follow.        Assessment:      Treatment/Activity Tolerance:   [] Tolerated treatment with no adverse effects    [x] Patient limited by fatigue  [x] Patient limited by pain   [] Patient limited by medical complications:    [] Adverse reaction to Tx:   [] Significant change in status    Safety:       [x]  bed alarm set    []  chair alarm set    []  Pt refused alarms                []  Telesitter activated      [x]  Gait belt used during tx session      []other:         Number of Minutes/Billable Intervention  Gait Training    Therapeutic Exercise    Neuro Re-Ed    Therapeutic Activity 90   Wheelchair Propulsion    Group    Other:    TOTAL 90         Social History  Social/Functional History  Lives With: Significant other  Type of Home: House  Home Layout: One level  Home Access: Stairs to enter with rails  Entrance Stairs - Number of Steps: 5+3  Entrance Stairs - Rails: Both  Bathroom Shower/Tub: Walk-in shower, Shower chair with back(shower chair doesn't fit in shower)  Bathroom Toilet: Standard  Bathroom Equipment: Toilet raiser, Shower chair  Bathroom Accessibility: Accessible  Home Equipment: 4 wheeled walker, [employer]  Receives Help From: Family, Neighbor  ADL Assistance: Needs assistance  Bath: Minimal assistance  Dressing: Minimal assistance  Grooming: Independent  Feeding: Independent  Toileting: Independent  Homemaking Assistance: Independent  Homemaking Responsibilities: No  Meal Prep Responsibility: No  Laundry Responsibility: procurement, ROM, Transfer Training, Cognitive/Perceptual Training, Gait Training, Cognitive Reorientation, Safety Education & Training, Balance Training, Endurance Training, Pain Management, Patient/Caregiver Education & Training, Positioning    Electronically signed by   Lester Cosby, PTA [id number]  1/16/2021, 8:25 AM

## 2021-01-16 NOTE — PROGRESS NOTES
[address] : 1950  Acct #: [de-identified]  MRN: [id number]              PM&R Progress Note      Admitting diagnosis: Intracranial hemorrhage ( [address])     Comorbid diagnoses impacting rehabilitation: Left hemiparesis, dysarthria, paroxysmal atrial fibrillation, essential hypertension, uncontrolled diabetes type 2 with peripheral neuropathy, COPD, bipolar disorder, diastolic congestive heart failure    Chief complaint: Sleep more than a week now. No specific complaints other than right shoulder pain which is not new. Prior (baseline) level of function: Independent. Current level of function:         Current  IRF-NOHEMY and Goals:   Occupational Therapy:    Short term goals  Time Frame for Short term goals: STG=LTG :   Long term goals  Time Frame for Long term goals : 12-14 days or until d/c  Long term goal 1: Pt will complete feeding/grooming/oral care task c MOD I by d/c  Long term goal 2: Pt will complete total body bathing c MIN A by d/c  Long term goal 3: Pt will complete UB dressing c SUP by d/c  Long term goal 4: Pt will complete LB dressing c MOD A by d/c  Long term goal 5: Pt will don/doff footwear c SETUP by d/c  Long term goals 6: Pt will complete toileting c MOD A by d/c  Long term goal 7: Pt will complete functional transfer (toilet, tub, shower) c MOD A by d/c. Long term goal 8: Pt will perform therex/therax to facilitate increased strength/endurance/ax tolerance (c emphasis on dynamic standing balance/tolerance >10 mins and BUE endurance) c Min A in order to complete ADLs.  :                                       Eating: Eating  Assistance Needed: Substantial/maximal assistance  Comment: patient able to being fork to mouth fully approx 10% of meal before falling asleep, patient requires assist to keep him awake and bringing fork to mouth for one/two bites and then falls asleep again  CARE Score: 2  Discharge Goal: Independent       Oral Hygiene: Oral Hygiene  Assistance Needed: Dependent  Comment: X  Reason if not Attempted: Not attempted due to medical condition or safety concerns  CARE Score: 1  Discharge Goal: Independent    UB/LB Bathing: Shower/Bathe Self  Assistance Needed: Dependent  Comment: X  Reason if not Attempted: Not attempted due to medical condition or safety concerns  CARE Score: 1  Discharge Goal: Supervision or touching assistance    UB Dressing: Upper Body Dressing  Assistance Needed: Dependent  Comment: X  Reason if not Attempted: Not attempted due to medical condition or safety concerns  CARE Score: 1  Discharge Goal: Supervision or touching assistance         LB Dressing: Lower Body Dressing  Assistance Needed: Dependent  Comment: X  Reason if not Attempted: Not attempted due to medical condition or safety concerns  CARE Score: 1  Discharge Goal: Partial/moderate assistance    Donning and [place] Footwear: Putting On/Taking Off Footwear  Assistance Needed: Dependent  Comment: x  CARE Score: 1  Discharge Goal: Set-up or clean-up assistance      Toileting: Toileting Hygiene  Assistance Needed: Dependent  Comment: X  CARE Score: 1  Discharge Goal: Partial/moderate assistance      Toilet Transfers: Toilet Transfer  Comment: not safe to attempt  Reason if not Attempted: Not attempted due to medical condition or safety concerns  CARE Score: 88  Discharge Goal: Partial/moderate assistance    Physical Therapy:   Short term goals  Time Frame for Short term goals: 14-21 tx days:  Short term goal 1: Pt will complete bed mobility tasks and sup<->sit with SBA-Sup. Short term goal 2: Pt will complete OOB transfers using RW with Mod A. Short term goal 3: Pt will ambulate 10 ft over level and carpeted surfaces using RW with Min A. Short term goal 4: Pt will propel manual w/c 150 ft with turns with SBA-Sup. Short term goal 5: Caregiver will be able to safely assist pt with functional mobility tasks.             Bed Mobility:   Sit to Lying  Assistance Needed: Dependent  Comment: required x2-person assist to complete proper positioning in supine  CARE Score: 1  Discharge Goal: Supervision or touching assistance  Roll Left and Right  Assistance Needed: Dependent  Comment: pt with poor motor initiation, sequencing, and attention to required task (eyes were closed during task despite verbal prompts)  CARE Score: 1  Discharge Goal: Supervision or touching assistance  Sit to Lying  Assistance Needed: Dependent  Comment: required x2-person assist to complete proper positioning in supine  CARE Score: 1  Discharge Goal: Supervision or touching assistance    Transfers:    Sit to Stand  Assistance Needed: Dependent  Comment: x2-PA using Danna Cocks on 01/11/2021  Reason if not Attempted: Not attempted due to medical condition or safety concerns  CARE Score: 1  Discharge Goal: Partial/moderate assistance  Chair/Bed-to-Chair Transfer  Assistance Needed: Dependent  Comment: x2-PA using [place] Cocks on 01/11/2021  Reason if not Attempted: Not attempted due to medical condition or safety concerns  CARE Score: 1  Discharge Goal: Partial/moderate assistance     Car Transfer  Reason if not Attempted: Not attempted due to medical condition or safety concerns  CARE Score: 88  Discharge Goal: Partial/moderate assistance    Ambulation:    Walking Ability  Does the Patient Walk?: Yes     Walk 10 Feet  Comment: pt unable to stand due to poor sitting balance and trunk control  Reason if not Attempted: Not attempted due to medical condition or safety concerns  CARE Score: 88  Discharge Goal: Partial/moderate assistance     Walk 50 Feet with Two Turns  Reason if not Attempted: Not attempted due to medical condition or safety concerns  CARE Score: 88  Discharge Goal: Not Attempted     Walk 150 Feet  Reason if not Attempted: Not applicable  CARE Score: 9  Discharge Goal: Not Applicable     Walking 10 Feet on Uneven Surfaces  Reason if not Attempted: Not attempted due to medical condition or safety concerns  CARE Score: 88  Discharge Goal: Partial/moderate assistance     1 Step (Curb)  Reason if not Attempted: Not attempted due to medical condition or safety concerns  CARE Score: 88  Discharge Goal: Not Attempted     4 Steps  Reason if not Attempted: Not attempted due to medical condition or safety concerns  CARE Score: 88  Discharge Goal: Not Attempted     12 Steps  Reason if not Attempted: Not applicable  CARE Score: 9  Discharge Goal: Not Applicable       Wheelchair:  w/c Ability: Wheelchair Ability  Uses a Wheelchair and/or Scooter?: Yes  Wheel 50 Feet with Two Turns  Assistance Needed: Dependent  Comment: 20 ft Max A x 2 on 01/11/2021  Reason if not Attempted: Not attempted due to medical condition or safety concerns  CARE Score: 1  Discharge Goal: Supervision or touching assistance  Wheel 150 Feet  Reason if not Attempted: Not attempted due to medical condition or safety concerns  CARE Score: 88  Discharge Goal: Supervision or touching assistance          Balance:        Object: Picking Up Object  Reason if not Attempted: Not attempted due to medical condition or safety concerns  CARE Score: 88  Discharge Goal: Partial/moderate assistance    I      Exam:    Blood pressure (!) 168/80, pulse 68, temperature 98.8 °F (37.1 °C), temperature source Oral, resp. rate 19, height 5' 9\" (1.753 m), weight 225 lb (102.1 kg), SpO2 94 %. General: Lying back in bed. Eyes closed. Will arouse to touch and voice. HEENT: Mild left facial droop. Orlin membranes are dry. Neck supple. Pulmonary: Shallow without rhonchi or rales. Cardiac: Occasional premature beat. Rate controlled. Abdomen: Patient's abdomen is soft and nondistended. Bowel sounds were present throughout. There was no rebound, guarding or masses noted. Upper extremities: Paucity of movement. No new bruising or swelling. Lower extremities: No signs of DVT. Sitting balance was poor. Standing balance was poor.     Lab Results   Component Value Date WBC 7.6 01/15/2021    HGB 13.0 (L) 01/15/2021    HCT 40.9 (L) 01/15/2021    MCV 89.7 01/15/2021     01/15/2021     Lab Results   Component Value Date    INR 0.84 12/19/2020    INR 1.01 06/03/2020    INR 0.90 06/02/2020    PROTIME 10.1 (L) 12/19/2020    PROTIME 12.2 06/03/2020    PROTIME 10.9 (L) 06/02/2020     Lab Results   Component Value Date    CREATININE 1.5 (H) 01/15/2021    BUN 22 01/15/2021     01/15/2021    K 4.4 01/15/2021     01/15/2021    CO2 24 01/15/2021     Lab Results   Component Value Date    ALT 10 12/19/2020    AST 15 12/19/2020    ALKPHOS 82 12/19/2020    BILITOT 0.6 12/19/2020       Expected length of stay  prior to a supervised level of function for discharge home with a walker and [address] OT/PT is 1/26/2021. Recommendations:    1. Right intracranial hemorrhage with left hemiparesis: Minimal purposeful interaction with the therapy staff again today. The head CT ordered by neurology was unremarkable for new bleeding, mass or edema. No new area of infarction. Decreasing size of hemorrhage. Still has not responded much to the reduction of cognitively depressive medications. No obvious seizure activity noted. Occasionally will follow directions and answer direct questions.  Still requiring maximum verbal cues and some contact cueing for the occupational and physical therapy with speech-language pathology.      Cardiology consultation continues to hold the Eliquis.  His sleepiness is impacting his oral intake.     Intermittent IV fluids required. I have stopped his Risperdal and Desyrel.  I need to stop the BuSpar and gabapentin.  I must monitor a clinical response now.  Reviewed his chemistries and blood counts.    Depakote level was acceptable.    Ongoing pulmonary hygiene, DVT prophylaxis and nutritional support.  Bowel and bladder retraining.  Caregiver education with his significant other eventually.  Adaptive equipment training.  Trying to get him sitting up more again today.  Urinary tract infection was identified and cefepime will be offered for 7 days. 2. DVT prophylaxis: Heparin 5000 units every 8 hours.  I must monitor his hemoglobin and platelet count periodically while on this medication.  Weightbearing activities are pursued daily.  GI prophylaxis offered.  No new bruising or swelling. 3. Paroxysmal atrial fibrillation with prior anticoagulation: Cardiology is asking us to hold Xarelto.  Elevated rate required titrating metoprolol and restarting Cardizem.  EKG reviewed by cardiology.  Daily weights do not show any decompensation of CHF.  Minimize stimulants. 4. Uncontrolled diabetes type 2 with peripheral neuropathy: Patient requires a diet modified for carbohydrates.  He is on Neurontin for peripheral neuropathy symptoms.  Blood sugars are checked at mealtime and bedtime.  Sliding scale Humalog for now with reintroduction of oral agents should his oral intake stabilize.  Blood sugars generally less than 150.  5. Essential hypertension: Patient requires multiple medications for blood pressure regulation.  Target systolic blood pressure is 120-140.  Vital signs are checked at rest and with activity.  Blood pressure within target range today. 6. COPD: Aggressive pulmonary hygiene.  Monitoring O2 saturations at rest and with activity.   7. Bipolar disorder: Depakote level was therapeutic we may need to stop his other sedating medications due to his waxing and waning alertness.    Treating in a calm and consistent environment.  Providing written and verbal instructions when possible to assist with information retention.

## 2021-01-16 NOTE — PROGRESS NOTES
[place] - [facility] UNIT  SPEECH/LANGUAGE PATHOLOGY      [x] Daily           [] Discharge    Patient:Felipe Mcdaniel Records. RDM:3/3/1100  FEU:[id number]  Rehab Dx/Hx: Cerebral infarction, unspecified [I63.9]  Intracranial hemorrhage ([facility] [address] [I62.9]   Allergies   Allergen Reactions    Nsaids      Renal      Precautions: Sit up for all meals and thereafter for 30 minutes, Eat with small bites (1/2 tsp; 1 tsp) and Check mouth for food residue after snacks and meals;  Restrictions/Precautions: General Precautions, Fall Risk(L hemiparesis, chronic R shoulder limitations)          Home Situation/IADL:   Social/Functional History  Lives With: Significant other  Type of Home: House  Home Layout: One level  Home Access: Stairs to enter with rails  Entrance Stairs - Number of Steps: 5+3  Entrance Stairs - Rails: Both  Bathroom Shower/Tub: Walk-in shower, Shower chair with back(shower chair doesn't fit in shower)  Bathroom Toilet: Standard  Bathroom Equipment: Toilet raiser, Shower chair  Bathroom Accessibility: Accessible  Home Equipment: 4 wheeled walker, [address] Help From: Family, Neighbor  ADL Assistance: Needs assistance  Bath: Minimal assistance  Dressing: Minimal assistance  Grooming: Independent  Feeding: Independent  Toileting: Independent  Homemaking Assistance: Independent  Homemaking Responsibilities: No  Meal Prep Responsibility: No  Laundry Responsibility: No  Cleaning Responsibility: No  Bill Paying/Finance Responsibility: No  Shopping Responsibility: No  Dependent Care Responsibility: No  Health Care Management: No  Ambulation Assistance: Independent  Transfer Assistance: Needs assistance  Active : No  Patient's  Info: [employer]ung [employer] is primary , and transport company  Mode of Transportation: Car, Family, Friends  Education: 12th grade  Occupation: Retired  Type of occupation: Pt built trucks for 33 years; [employer]  Leisure & Hobbies: TV, limited reading, girlfriend has a dog. Pt has meds that come in pill packs. Bills are managed over the phone. IADL Comments: Yuni Her assists c most IADLs and pt report hiring outside help c michela. Additional Comments: Pt reports using rolator in the house. Pt reports 1 fall this year that occured in the house while ambulating in the home; sleeps in regular, flat bed; R hand dominant      Date of Admit: 1/4/2021  Room #: 1005/1005-A     ST Number of Minutes/Billable Intervention  Cog/Memory Deficits 36    Aphasia/Language     Dysarthria/Speech     Apraxia/Speech     Dysphagia/Swallowing     Group     Other    TOTAL Minutes Billed  36    Variance          Date: 1/16/2021  Day of ARU Week:  6       SLP Individual Minutes  Time In: 0946  Time Out: 1022  Minutes: 36       Variance/Reason:  [] Refusal due to   [] Medical hold/reason  [] Illness   [] Off Unit for test/procedure  [] Extra time needed to complete task  [] Other (specify)    Activity completed: Pt completed oral manipulation and attention tasks for functional ADL based activity. Pain: none reported, some wincing/delayed retractive responses  Current Diet: Dietary Nutrition Supplements: Low Calorie High Protein Supplement  DIET CARB CONTROL; Dysphagia Pureed  Subjective: Pt with need for multiple tactile/verbal cues for attention to task, pleasant when attentive to task. Goals and POC: Co-treats where appropriate with PT or OT to facilitate patient goals in functional tasks. LTG      Timeframe for Long-term Goals: No swallow tx                    Short-term Goals  Timeframe for Short-term Goals: 3x/week x3 weeks 30 mins min  LTG: Pt will improve cognitive linguistic abilities for safe return home and clearly communicate care needs. Goal 1: Continued receptive/expressive assessment by 1/6/2021 with additional goals to be added as appropriate. Goal met 1/6/21  Goal 2: Pt will respond to wh?'s with 90% acc given extra time.   Pt responding to biographical

## 2021-01-16 NOTE — PROGRESS NOTES
Occupational Therapy  Physical Rehabilitation: OCCUPATIONAL THERAPY     [x] daily progress note       [] discharge       Patient Name:  Ghazal Ibrahim. :  1950 MRN: [id number]  Room:  [address] [place] [address] Date of Admission: 2021  Rehabilitation Diagnosis:   Cerebral infarction, unspecified [I63.9]  Intracranial hemorrhage ([facility] [address] [I62.9]       Date 2021       Day of ARU Week:  6   Time IN/OUT 5053-9738  [id number]   Individual Tx Minutes    Group Tx Minutes    Co-Treat Minutes 60+30  A cotreat was completed this date with  [x]? PT    []? OT   []? ST      This pt requires simultaneous intervention of 2 skilled therapists to safely complete tasks to address complexity of deficits defined in the goals including:  [x]? Attention   [x]? Safety    []? Problem Solving    []? Sequencing     [x]? Initiation    []? Processing      [x]? Recall of learned tasks  []? Communication  []? Self Feeding   [x]? ADL's    [x]? UE Function    [x]? Motor planning   []? Balance  []? Energy Conservation   []? Verbal cues   []? Tactile Cues  []? Barriers     [x]? Transfers   []? Device Use   Concurrent Tx Minutes    TOTAL Tx Time Mins 90   Variance Time    Variance Time []   Refusal due to:     []   Medical hold/reason:    []   Illness   []   Off Unit for test/procedure  []   Extra time needed to complete task  []   Therapeutic need  []   Other (specify):   Restrictions Restrictions/Precautions  Restrictions/Precautions: General Precautions, Fall Risk(L hemiparesis, chronic R shoulder limitations)  Position Activity Restriction  Other position/activity restrictions: see catheter   Communication with other providers: [x]   OK to see per nursing:     []   Spoke with team member regarding:      Subjective observations and cognitive status: Pt in semi go's positon upon arrival. Pt had excessive BM upon arrival. Pt agreeable to therapy.       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 transition between stars)      Grooming:  Max A with Total dep to initiate    Oral Hygiene:  Refused      Bathing: Total Dep in supine       Dressing:      Upper Body Dressing:  Max  Lower Body Dressing:  NA due to bed sores in paulino care  Footwear: Total dep with donning/doffing socks    Toileting: Total Dep in supine after a BM       Toilet Transfers:   Denied  Device Used:    []   Standard Toilet         []   Elena Bald           []  Bedside Commode       []   Elevated Toilet          []   Other:        Tub/Shower Transfer:   NA  Device Used:    []   Shower Bench      []   Shower Chair      []   Tub Transfer Bench           []   Bathtub    []   Shower         []   Other:         Bed Mobility: See PT Notes          []   Pt received out of bed       Transfers:  See PT Notes      Functional Mobility:  See PT Notes  Assistance:    Device:   []   Aly Marr     []   Standard Walker []   Wheelchair        []   Mervin Beady       []   Hermila Collar         []   Cardiac Walker       []   Other:        Homemaking Tasks:   NA      Additional Therapeutic activities/exercises completed this date:     [x]   ADL Training   [x]   Balance/Postural training     []   Bed/Transfer Training   []   Endurance Training   [x]   Neuromuscular Re-ed Focus on static standing weight shifting with therapist and RW x5 sets for 3-4 mins, Inconsistent with motor planning with hand/body positioning prior to standing. Require mod/max to initiate. Engaged in EOB static sitting balance with emphasis on posture/head control in order to improve ADL performance task.    []   Nu-step:  Time:        Level:         #Steps:       [] Rebounder:    []  Seated     []  Standing        []   Supine Ther Ex (reps/sets):     [x]   Seated Ther Ex (reps/sets):   Engaged in BUE strengthening ex utilizing restorator with no resistance x7 mins with mod/max verbal/tactile cues to complete task.    []   Standing Ther Ex (reps/sets):     []   Other:     Comments:      Patient/Caregiver Education and Training:   [x]   [employer] Equipment Use  [x]   Bed Mobility/Transfer Technique/Safety  []   Energy Conservation Tips  []   Family training  [x]   Postural Awareness  []   Safety During Functional Activities  []   Reinforced Patient's Precautions   []   Progress was updated and reviewed in Rehabtracker with patient and/or family this         date.      Treatment Plan for Next Session: Continue OT POC      Assessment:   Requires Max verbal/visual/tactile cues to initiate and intermittent tactile cues to complete task with morning ADL routine with dressing, grooming and oral hygiene        Treatment/Activity Tolerance:   [] Tolerated treatment with no adverse effects    [x] Patient limited by fatigue  [x] Patient limited by pain   [] Patient limited by medical complications:    [] Adverse reaction to Tx:   [] Significant change in status    Safety:       [x]  bed alarm set    []  chair alarm set    []  Pt refused alarms                []  Telesitter activated      [x]  Gait belt used during tx session      []other:       Number of Minutes/Billable Intervention  Therapeutic Exercise 10   ADL Self-care 35   Neuro Re-Ed 20   Therapeutic Activity 25   Group    Other:    TOTAL 90       Social History  Social/Functional History  Lives With: Significant other  Type of Home: House  Home Layout: One level  Home Access: Stairs to enter with rails  Entrance Stairs - Number of Steps: 5+3  Entrance Stairs - Rails: Both  Bathroom Shower/Tub: Walk-in shower, Shower chair with back(shower chair doesn't fit in shower)  Bathroom Toilet: Standard  Bathroom Equipment: Toilet raiser, Shower chair  Bathroom Accessibility: Accessible  Home Equipment: 4 wheeled walker, [address] Help From: Family, Neighbor  ADL Assistance: Needs assistance  Bath: Minimal assistance  Dressing: Minimal assistance  Grooming: Independent  Feeding: Independent  Toileting: Independent  Homemaking Assistance: Independent  Homemaking Responsibilities: No  Meal Prep Responsibility: No  Laundry Responsibility: No  Cleaning Responsibility: No  Bill Paying/Finance Responsibility: No  Shopping Responsibility: No  Dependent Care Responsibility: No  Health Care Management: No  Ambulation Assistance: Independent  Transfer Assistance: Needs assistance  Active : No  Patient's  Info: Panfilo Hickman is primary , and transport company  Mode of Transportation: Car, Family, Friends  Education: 12th grade  Occupation: Retired  Type of occupation: Pt built trucks for 33 years; [employer]  Leisure & Hobbies: TV, limited reading, girlfriend has a dog. Pt has meds that come in pill packs. Bills are managed over the phone. IADL Comments: Avelina Crenshaw assists c most IADLs and pt report hiring outside help c [employer]. Additional Comments: Pt reports using rolator in the house.  Pt reports 1 fall this year that occured in the house while ambulating in the home; sleeps in regular, flat bed; R hand dominant    Objective                                                                                    Goals:  (Update in navigator)  Short term goals  Time Frame for Short term goals: STG=LTG:  Long term goals  Time Frame for Long term goals : 12-14 days or until d/c  Long term goal 1: Pt will complete feeding/grooming/oral care task c MOD I by d/c  Long term goal 2: Pt will complete total body bathing c MIN A by d/c  Long term goal 3: Pt will complete UB dressing c SUP by d/c  Long term goal 4: Pt will complete LB dressing c MOD A by d/c  Long term goal 5: Pt will don/doff footwear c SETUP by d/c  Long term goals 6: Pt will complete toileting c MOD A by d/c  Long term goal 7: Pt will complete functional transfer (toilet, tub, shower) c MOD A by d/c. Long term goal 8: Pt will perform therex/therax to facilitate increased strength/endurance/ax tolerance (c emphasis on dynamic standing balance/tolerance >10 mins and BUE endurance) c Min A in order to complete ADLs. :        Plan of Care                                                                              Times per week: 5 days per week for a minimum of 60 minutes/day plus group as appropriate for 60 minutes.   Treatment to include Plan  Times per day: Daily  Current Treatment Recommendations: Strengthening, Endurance Training, Neuromuscular Re-education, Patient/Caregiver Education & Training, ROM, Equipment Evaluation, Education, & procurement, Self-Care / ADL, Balance Training, Functional Mobility Training, Safety Education & Training, Cognitive/Perceptual Training    Electronically signed by   RUBEN Samaniego  1/16/2021, 8:11 AM

## 2021-01-17 LAB
GLUCOSE BLD-MCNC: 104 MG/DL (ref 70–99)
GLUCOSE BLD-MCNC: 115 MG/DL (ref 70–99)
GLUCOSE BLD-MCNC: 128 MG/DL (ref 70–99)
GLUCOSE BLD-MCNC: 186 MG/DL (ref 70–99)

## 2021-01-17 PROCEDURE — 94761 N-INVAS EAR/PLS OXIMETRY MLT: CPT

## 2021-01-17 PROCEDURE — 99233 SBSQ HOSP IP/OBS HIGH 50: CPT | Performed by: NURSE PRACTITIONER

## 2021-01-17 PROCEDURE — 6370000000 HC RX 637 (ALT 250 FOR IP): Performed by: PHYSICAL MEDICINE & REHABILITATION

## 2021-01-17 PROCEDURE — 6360000002 HC RX W HCPCS: Performed by: INTERNAL MEDICINE

## 2021-01-17 PROCEDURE — 6360000002 HC RX W HCPCS: Performed by: PHYSICAL MEDICINE & REHABILITATION

## 2021-01-17 PROCEDURE — 6370000000 HC RX 637 (ALT 250 FOR IP): Performed by: NURSE PRACTITIONER

## 2021-01-17 PROCEDURE — 1280000000 HC REHAB R&B

## 2021-01-17 PROCEDURE — 2580000003 HC RX 258: Performed by: PHYSICAL MEDICINE & REHABILITATION

## 2021-01-17 PROCEDURE — 82962 GLUCOSE BLOOD TEST: CPT

## 2021-01-17 RX ADMIN — ESCITALOPRAM OXALATE 10 MG: 10 TABLET, FILM COATED ORAL at 10:16

## 2021-01-17 RX ADMIN — MICONAZOLE NITRATE: 20 POWDER TOPICAL at 10:17

## 2021-01-17 RX ADMIN — BENZTROPINE MESYLATE 0.5 MG: 1 TABLET ORAL at 21:23

## 2021-01-17 RX ADMIN — DOXAZOSIN 2 MG: 4 TABLET ORAL at 10:15

## 2021-01-17 RX ADMIN — DOCUSATE SODIUM 100 MG: 100 CAPSULE, LIQUID FILLED ORAL at 21:22

## 2021-01-17 RX ADMIN — ATORVASTATIN CALCIUM 80 MG: 40 TABLET, FILM COATED ORAL at 21:23

## 2021-01-17 RX ADMIN — ACETAMINOPHEN 650 MG: 325 TABLET ORAL at 18:59

## 2021-01-17 RX ADMIN — FAMOTIDINE 20 MG: 20 TABLET, FILM COATED ORAL at 10:16

## 2021-01-17 RX ADMIN — SENNOSIDES 8.6 MG: 8.6 TABLET, FILM COATED ORAL at 21:23

## 2021-01-17 RX ADMIN — BENZTROPINE MESYLATE 0.5 MG: 1 TABLET ORAL at 10:16

## 2021-01-17 RX ADMIN — ENOXAPARIN SODIUM 40 MG: 100 INJECTION SUBCUTANEOUS at 10:18

## 2021-01-17 RX ADMIN — DIVALPROEX SODIUM 1000 MG: 500 TABLET, FILM COATED, EXTENDED RELEASE ORAL at 21:23

## 2021-01-17 RX ADMIN — METOPROLOL TARTRATE 25 MG: 25 TABLET, FILM COATED ORAL at 21:23

## 2021-01-17 RX ADMIN — DOXAZOSIN 2 MG: 4 TABLET ORAL at 21:22

## 2021-01-17 RX ADMIN — AMLODIPINE BESYLATE 10 MG: 10 TABLET ORAL at 10:15

## 2021-01-17 RX ADMIN — DIGOXIN 125 MCG: 125 TABLET ORAL at 10:17

## 2021-01-17 RX ADMIN — LISINOPRIL 40 MG: 20 TABLET ORAL at 10:14

## 2021-01-17 RX ADMIN — DOCUSATE SODIUM 100 MG: 100 CAPSULE, LIQUID FILLED ORAL at 10:15

## 2021-01-17 RX ADMIN — CEFEPIME 2 G: 2 INJECTION, POWDER, FOR SOLUTION INTRAVENOUS at 13:24

## 2021-01-17 RX ADMIN — MICONAZOLE NITRATE: 20 POWDER TOPICAL at 21:24

## 2021-01-17 RX ADMIN — METOPROLOL TARTRATE 25 MG: 25 TABLET, FILM COATED ORAL at 10:15

## 2021-01-17 ASSESSMENT — PAIN SCALES - GENERAL: PAINLEVEL_OUTOF10: 0

## 2021-01-18 LAB
GLUCOSE BLD-MCNC: 112 MG/DL (ref 70–99)
GLUCOSE BLD-MCNC: 115 MG/DL (ref 70–99)
GLUCOSE BLD-MCNC: 118 MG/DL (ref 70–99)
GLUCOSE BLD-MCNC: 90 MG/DL (ref 70–99)

## 2021-01-18 PROCEDURE — 1280000000 HC REHAB R&B

## 2021-01-18 PROCEDURE — 6370000000 HC RX 637 (ALT 250 FOR IP): Performed by: NURSE PRACTITIONER

## 2021-01-18 PROCEDURE — 2500000003 HC RX 250 WO HCPCS: Performed by: PHYSICAL MEDICINE & REHABILITATION

## 2021-01-18 PROCEDURE — 82962 GLUCOSE BLOOD TEST: CPT

## 2021-01-18 PROCEDURE — 99232 SBSQ HOSP IP/OBS MODERATE 35: CPT | Performed by: PHYSICAL MEDICINE & REHABILITATION

## 2021-01-18 PROCEDURE — 97530 THERAPEUTIC ACTIVITIES: CPT

## 2021-01-18 PROCEDURE — 2580000003 HC RX 258: Performed by: PHYSICAL MEDICINE & REHABILITATION

## 2021-01-18 PROCEDURE — 94761 N-INVAS EAR/PLS OXIMETRY MLT: CPT

## 2021-01-18 PROCEDURE — 97535 SELF CARE MNGMENT TRAINING: CPT

## 2021-01-18 PROCEDURE — 6360000002 HC RX W HCPCS: Performed by: PHYSICAL MEDICINE & REHABILITATION

## 2021-01-18 PROCEDURE — 6370000000 HC RX 637 (ALT 250 FOR IP): Performed by: PHYSICAL MEDICINE & REHABILITATION

## 2021-01-18 PROCEDURE — 92507 TX SP LANG VOICE COMM INDIV: CPT

## 2021-01-18 PROCEDURE — 6360000002 HC RX W HCPCS: Performed by: INTERNAL MEDICINE

## 2021-01-18 PROCEDURE — 97116 GAIT TRAINING THERAPY: CPT

## 2021-01-18 RX ADMIN — SENNOSIDES 8.6 MG: 8.6 TABLET, FILM COATED ORAL at 23:39

## 2021-01-18 RX ADMIN — METOPROLOL TARTRATE 25 MG: 25 TABLET, FILM COATED ORAL at 23:41

## 2021-01-18 RX ADMIN — ESCITALOPRAM OXALATE 10 MG: 10 TABLET, FILM COATED ORAL at 10:23

## 2021-01-18 RX ADMIN — FAMOTIDINE 20 MG: 20 TABLET, FILM COATED ORAL at 10:25

## 2021-01-18 RX ADMIN — DOXAZOSIN 2 MG: 4 TABLET ORAL at 23:39

## 2021-01-18 RX ADMIN — ENOXAPARIN SODIUM 40 MG: 100 INJECTION SUBCUTANEOUS at 10:25

## 2021-01-18 RX ADMIN — DOCUSATE SODIUM 100 MG: 100 CAPSULE, LIQUID FILLED ORAL at 10:23

## 2021-01-18 RX ADMIN — CEFEPIME 2 G: 2 INJECTION, POWDER, FOR SOLUTION INTRAVENOUS at 23:54

## 2021-01-18 RX ADMIN — CEFEPIME 2 G: 2 INJECTION, POWDER, FOR SOLUTION INTRAVENOUS at 12:45

## 2021-01-18 RX ADMIN — AMLODIPINE BESYLATE 10 MG: 10 TABLET ORAL at 10:23

## 2021-01-18 RX ADMIN — MICONAZOLE NITRATE: 2 POWDER TOPICAL at 11:00

## 2021-01-18 RX ADMIN — LISINOPRIL 40 MG: 20 TABLET ORAL at 10:25

## 2021-01-18 RX ADMIN — DIVALPROEX SODIUM 1000 MG: 500 TABLET, FILM COATED, EXTENDED RELEASE ORAL at 23:39

## 2021-01-18 RX ADMIN — CEFEPIME 2 G: 2 INJECTION, POWDER, FOR SOLUTION INTRAVENOUS at 00:38

## 2021-01-18 RX ADMIN — MICONAZOLE NITRATE: 20 POWDER TOPICAL at 10:26

## 2021-01-18 RX ADMIN — METOPROLOL TARTRATE 25 MG: 25 TABLET, FILM COATED ORAL at 10:23

## 2021-01-18 RX ADMIN — DIGOXIN 125 MCG: 125 TABLET ORAL at 10:25

## 2021-01-18 RX ADMIN — ATORVASTATIN CALCIUM 80 MG: 40 TABLET, FILM COATED ORAL at 23:40

## 2021-01-18 RX ADMIN — DOXAZOSIN 2 MG: 4 TABLET ORAL at 10:24

## 2021-01-18 RX ADMIN — DOCUSATE SODIUM 100 MG: 100 CAPSULE, LIQUID FILLED ORAL at 23:41

## 2021-01-18 RX ADMIN — BENZTROPINE MESYLATE 0.5 MG: 1 TABLET ORAL at 23:40

## 2021-01-18 RX ADMIN — BENZTROPINE MESYLATE 0.5 MG: 1 TABLET ORAL at 10:25

## 2021-01-18 RX ADMIN — MICONAZOLE NITRATE: 20 POWDER TOPICAL at 23:42

## 2021-01-18 RX ADMIN — MICONAZOLE NITRATE: 2 POWDER TOPICAL at 23:43

## 2021-01-18 ASSESSMENT — PAIN SCALES - GENERAL: PAINLEVEL_OUTOF10: 7

## 2021-01-18 ASSESSMENT — PAIN DESCRIPTION - PAIN TYPE: TYPE: CHRONIC PAIN

## 2021-01-18 ASSESSMENT — PAIN DESCRIPTION - PROGRESSION: CLINICAL_PROGRESSION: GRADUALLY WORSENING

## 2021-01-18 ASSESSMENT — PAIN DESCRIPTION - LOCATION: LOCATION: SHOULDER

## 2021-01-18 ASSESSMENT — PAIN DESCRIPTION - DIRECTION: RADIATING_TOWARDS: R/L SHOULDER

## 2021-01-18 ASSESSMENT — PAIN DESCRIPTION - ONSET: ONSET: ON-GOING

## 2021-01-18 NOTE — PROGRESS NOTES
Physical Rehabilitation: OCCUPATIONAL THERAPY     [x] daily progress note       [] discharge       Patient Name:  Marcelina Runner. :  1950 MRN: [id number]  Room:  [address] [place] [address] Date of Admission: 2021  Rehabilitation Diagnosis:   Cerebral infarction, unspecified [I63.9]  Intracranial hemorrhage ([facility] [address] [I62.9]       Date 2021       Day of ARU Week:  1   Time IN//950;1300/1330   Individual Tx Minutes    Group Tx Minutes    Co-Treat Minutes 48 +  30A cotreat was completed this date with  [x]? ? PT    []? ? OT   []? ? ST      This pt requires simultaneous intervention of 2 skilled therapists to safely complete tasks to address complexity of deficits defined in the goals including:  [x]? ? Attention   [x]? ? Safety    []? ?Problem Solving    []? ? Sequencing     [x]? ?Initiation    []? ? Processing      [x]? ? Recall of learned tasks  []? ? Communication  []? ? Self Feeding   [x]? ?ADL's    [x]? ?UE Function    [x]? ? Motor planning   []? ? Balance  []? ?Energy Conservation   []? ? Verbal cues   []? ? Tactile Cues  []? ? Barriers     [x]? ? Transfers   []? ? Device Use   Concurrent Tx Minutes    TOTAL Tx Time Mins 80   Variance Time +5   Variance Time []   Refusal due to:     []   Medical hold/reason:    []   Illness   []   Off Unit for test/procedure  [x]   Extra time needed to complete task  []   Therapeutic need  []   Other (specify):   Restrictions Restrictions/Precautions  Restrictions/Precautions: General Precautions, Fall Risk(L hemiparesis, chronic R shoulder limitations)  Position Activity Restriction  Other position/activity restrictions: see catheter   Communication with other providers: [x]   OK to see per nursing:     []   Spoke with team member regarding:      Subjective observations and cognitive status:  patient supine in bed pleasant and somewhat agreeable to therapy required min encouragement to participate   PM SESSION: patient is supine in bed; pleasant and agreeable to therapy      Pain level/location: /10       Location:    Discharge recommendations  Anticipated discharge date:  1/26  Destination: []home alone   []home alone w assist prn [] home w/ family    [] Continuous supervision       []SNF            [] Assisted living     [] Other:   Continued therapy: []HHC OT  []OUTPATIENT  OT   [] No Further OT  Equipment needs: TBD   (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 gown   Lower Body Dressing:  Max A/Total A          Bed Mobility: SEE PT NOTES           []   Pt received out of bed   Rolling R/L:    Scooting:    Supine --> Sit:    Sit --> Supine:      Transfers:  SEE PT NOTES   Sit--> Stand:   Mod A 2nd person   Stand --> Sit:     Stand-Pivot:     Other:    Assistive device required for transfer:         Functional Mobility:  Completes three shorts walks required max vc to continue, max vc for scanning environment, max vc and Min A to move RW away from wall - L neglect noted at times; several moments of freezing, tremors, and shuffling feet secondary to parkinsons disease diagnosis, PM SESSION  Patient with more lethargy this afternoon, able to walk down hallway to second room down hallway   Assistance:  [facility] 2nd person ; Min 2nd person   Device:   [x]   Rolling Walker     []   Standard Veleria Darío []   Wheelchair        []   Joyce beach       []   4-Wheeled Veleria Darío         []   Cardiac Veleria Darío       []   Other:            Additional Therapeutic activities/exercises completed this date:     [x]   ADL Training   [x]   Balance/Postural training     [x]   Bed/Transfer Training   [x]   Endurance Training patient completed transfers and mobility task to increase activity tolerance for facilitation of ADL tasks, patient stands/walks for approx 3-4 min times three before requiring seated rest break, patient has notiable tremors when fatigued    []   Neuromuscular Re-ed   []   Nu-step:  Time:        Level:         #Steps:       []   Rebounder:    []  Seated     []  Standing Neighbor  ADL Assistance: Needs assistance  Bath: Minimal assistance  Dressing: Minimal assistance  Grooming: Independent  Feeding: Independent  Toileting: Independent  Homemaking Assistance: Independent  Homemaking Responsibilities: No  Meal Prep Responsibility: No  Laundry Responsibility: No  Cleaning Responsibility: No  Bill Paying/Finance Responsibility: No  Shopping Responsibility: No  Dependent Care Responsibility: No  Health Care Management: No  Ambulation Assistance: Independent  Transfer Assistance: Needs assistance  Active : No  Patient's  Info: Jannie Cardozo is primary , and transport company  Mode of Transportation: Car, Family, Friends  Education: 12th grade  Occupation: Retired  Type of occupation: Pt built trucks for 33 years; [employer]  Leisure & Hobbies: TV, limited reading, girlfriend has a dog. Pt has meds that come in pill packs. Bills are managed over the phone. IADL Comments: Roslyn Shah assists c most IADLs and pt report hiring outside help c [employer]ork. Additional Comments: Pt reports using rolator in the house. Pt reports 1 fall this year that occured in the house while ambulating in the home; sleeps in regular, flat bed; R hand dominant    Objective                                                                                    Goals:  (Update in navigator)  Short term goals  Time Frame for Short term goals: STG=LTG:  Long term goals  Time Frame for Long term goals : 12-14 days or until d/c  Long term goal 1: Pt will complete feeding/grooming/oral care task c MOD I by d/c  Long term goal 2: Pt will complete total body bathing c MIN A by d/c  Long term goal 3: Pt will complete UB dressing c SUP by d/c  Long term goal 4: Pt will complete LB dressing c MOD A by d/c  Long term goal 5: Pt will don/doff footwear c SETUP by d/c  Long term goals 6: Pt will complete toileting c MOD A by d/c  Long term goal 7: Pt will complete functional transfer (toilet, tub, shower) c MOD A by d/c.   Long

## 2021-01-18 NOTE — PROGRESS NOTES
[address] : 1950  Acct #: [de-identified]  MRN: [id number]              PM&R Progress Note      Admitting diagnosis: Intracranial hemorrhage ( [place] [address])     Comorbid diagnoses impacting rehabilitation: Left hemiparesis, dysarthria, paroxysmal atrial fibrillation, essential hypertension, uncontrolled diabetes type 2 with peripheral neuropathy, COPD, bipolar disorder, diastolic congestive heart failure     Chief complaint: He does not complain of anything. Prior (baseline) level of function: Independent. Current level of function:         Current  IRF-NOHEMY and Goals:   Occupational Therapy:    Short term goals  Time Frame for Short term goals: STG=LTG :   Long term goals  Time Frame for Long term goals : 12-14 days or until d/c  Long term goal 1: Pt will complete feeding/grooming/oral care task c MOD I by d/c  Long term goal 2: Pt will complete total body bathing c MIN A by d/c  Long term goal 3: Pt will complete UB dressing c SUP by d/c  Long term goal 4: Pt will complete LB dressing c MOD A by d/c  Long term goal 5: Pt will don/doff footwear c SETUP by d/c  Long term goals 6: Pt will complete toileting c MOD A by d/c  Long term goal 7: Pt will complete functional transfer (toilet, tub, shower) c MOD A by d/c. Long term goal 8: Pt will perform therex/therax to facilitate increased strength/endurance/ax tolerance (c emphasis on dynamic standing balance/tolerance >10 mins and BUE endurance) c Min A in order to complete ADLs.  :                                       Eating: Eating  Assistance Needed: Substantial/maximal assistance  Comment: patient able to being fork to mouth fully approx 10% of meal before falling asleep, patient requires assist to keep him awake and bringing fork to mouth for one/two bites and then falls asleep again  CARE Score: 2  Discharge Goal: Independent       Oral Hygiene: Oral Hygiene  Assistance Needed: Dependent  Comment: X  Reason if not Attempted: Not attempted due to medical condition or safety concerns  CARE Score: 1  Discharge Goal: Independent    UB/LB Bathing: Shower/Bathe Self  Assistance Needed: Dependent  Comment: X  Reason if not Attempted: Not attempted due to medical condition or safety concerns  CARE Score: 1  Discharge Goal: Supervision or touching assistance    UB Dressing: Upper Body Dressing  Assistance Needed: Dependent  Comment: X  Reason if not Attempted: Not attempted due to medical condition or safety concerns  CARE Score: 1  Discharge Goal: Supervision or touching assistance         LB Dressing: Lower Body Dressing  Assistance Needed: Dependent  Comment: X  Reason if not Attempted: Not attempted due to medical condition or safety concerns  CARE Score: 1  Discharge Goal: Partial/moderate assistance    Donning and [place] Footwear: Putting On/Taking Off Footwear  Assistance Needed: Dependent  Comment: x  CARE Score: 1  Discharge Goal: Set-up or clean-up assistance      Toileting: Toileting Hygiene  Assistance Needed: Dependent  Comment: X  CARE Score: 1  Discharge Goal: Partial/moderate assistance      Toilet Transfers: Toilet Transfer  Comment: not safe to attempt  Reason if not Attempted: Not attempted due to medical condition or safety concerns  CARE Score: 88  Discharge Goal: Partial/moderate assistance    Physical Therapy:   Short term goals  Time Frame for Short term goals: 14-21 tx days:  Short term goal 1: Pt will complete bed mobility tasks and sup<->sit with SBA-Sup. Short term goal 2: Pt will complete OOB transfers using RW with Mod A. Short term goal 3: Pt will ambulate 10 ft over level and carpeted surfaces using RW with Min A. Short term goal 4: Pt will propel manual w/c 150 ft with turns with SBA-Sup. Short term goal 5: Caregiver will be able to safely assist pt with functional mobility tasks.             Bed Mobility:   Sit to Lying  Assistance Needed: Dependent  Comment: required x2-person assist to complete proper positioning in supine  CARE Score: 1  Discharge Goal: Supervision or touching assistance  Roll Left and Right  Assistance Needed: Dependent  Comment: pt with poor motor initiation, sequencing, and attention to required task (eyes were closed during task despite verbal prompts)  CARE Score: 1  Discharge Goal: Supervision or touching assistance  Sit to Lying  Assistance Needed: Dependent  Comment: required x2-person assist to complete proper positioning in supine  CARE Score: 1  Discharge Goal: Supervision or touching assistance    Transfers:    Sit to Stand  Assistance Needed: Dependent  Comment: x2-PA using Raz Clap on 01/11/2021  Reason if not Attempted: Not attempted due to medical condition or safety concerns  CARE Score: 1  Discharge Goal: Partial/moderate assistance  Chair/Bed-to-Chair Transfer  Assistance Needed: Dependent  Comment: x2-PA using Raz Clap on 01/11/2021  Reason if not Attempted: Not attempted due to medical condition or safety concerns  CARE Score: 1  Discharge Goal: Partial/moderate assistance     Car Transfer  Reason if not Attempted: Not attempted due to medical condition or safety concerns  CARE Score: 88  Discharge Goal: Partial/moderate assistance    Ambulation:    Walking Ability  Does the Patient Walk?: Yes     Walk 10 Feet  Comment: pt unable to stand due to poor sitting balance and trunk control  Reason if not Attempted: Not attempted due to medical condition or safety concerns  CARE Score: 88  Discharge Goal: Partial/moderate assistance     Walk 50 Feet with Two Turns  Reason if not Attempted: Not attempted due to medical condition or safety concerns  CARE Score: 88  Discharge Goal: Not Attempted     Walk 150 Feet  Reason if not Attempted: Not applicable  CARE Score: 9  Discharge Goal: Not Applicable     Walking 10 Feet on Uneven Surfaces  Reason if not Attempted: Not attempted due to medical condition or safety concerns  CARE Score: 88  Discharge Goal: Partial/moderate assistance     1 Step (Curb)  Reason if not Attempted: Not attempted due to medical condition or safety concerns  CARE Score: 88  Discharge Goal: Not Attempted     4 Steps  Reason if not Attempted: Not attempted due to medical condition or safety concerns  CARE Score: 88  Discharge Goal: Not Attempted     12 Steps  Reason if not Attempted: Not applicable  CARE Score: 9  Discharge Goal: Not Applicable       Wheelchair:  w/c Ability: Wheelchair Ability  Uses a Wheelchair and/or Scooter?: Yes  Wheel 50 Feet with Two Turns  Assistance Needed: Dependent  Comment: 20 ft Max A x 2 on 01/11/2021  Reason if not Attempted: Not attempted due to medical condition or safety concerns  CARE Score: 1  Discharge Goal: Supervision or touching assistance  Wheel 150 Feet  Reason if not Attempted: Not attempted due to medical condition or safety concerns  CARE Score: 88  Discharge Goal: Supervision or touching assistance          Balance:        Object: Picking Up Object  Reason if not Attempted: Not attempted due to medical condition or safety concerns  CARE Score: 88  Discharge Goal: Partial/moderate assistance    I      Exam:    Blood pressure (!) 121/58, pulse 60, temperature 97.6 °F (36.4 °C), temperature source Oral, resp. rate 18, height 5' 9\" (1.753 m), weight 222 lb 11.2 oz (101 kg), SpO2 97 %. General: Pleasant and talkative but shows very poor insight and is confused. Much more animated today. HEENT: Attends the right and left visual field. MMM. Neck supple. Pulmonary: Blunted breath sounds in bases but no wheezes or rales noted. Cardiac: Controlled rate with occasional premature beat. Abdomen: Patient's abdomen is soft and nondistended. Bowel sounds were present throughout. There was no rebound, guarding or masses noted. Upper extremities: Clumsiness bilaterally. Able to raise his hands up overhead. Lower extremities: No peripheral edema. Calf soft. No signs of DVT. Sitting balance was fair.   Standing balance was poor. Lab Results   Component Value Date    WBC 7.6 01/15/2021    HGB 13.0 (L) 01/15/2021    HCT 40.9 (L) 01/15/2021    MCV 89.7 01/15/2021     01/15/2021     Lab Results   Component Value Date    INR 0.84 12/19/2020    INR 1.01 06/03/2020    INR 0.90 06/02/2020    PROTIME 10.1 (L) 12/19/2020    PROTIME 12.2 06/03/2020    PROTIME 10.9 (L) 06/02/2020     Lab Results   Component Value Date    CREATININE 1.5 (H) 01/15/2021    BUN 22 01/15/2021     01/15/2021    K 4.4 01/15/2021     01/15/2021    CO2 24 01/15/2021     Lab Results   Component Value Date    ALT 10 12/19/2020    AST 15 12/19/2020    ALKPHOS 82 12/19/2020    BILITOT 0.6 12/19/2020       Expected length of stay  prior to a supervised level of function for discharge home with a walker and [address] OT/PT is 1/26/2021. Recommendations:    1. Right intracranial hemorrhage with left hemiparesis: Much more animated today and pleasant but shows poor insight. Requires maximum verbal cues to participate in the daily occupational and physical therapy.   Cardiology recommends no anticoagulation at this point. Better oral intake today. He is demonstrating some hypomania off of the Risperdal, does rule, BuSpar and gabapentin. Hopefully he will respond to the UTI treatment and become more appropriate and retentive. Reviewed his chemistries and blood counts.    Depakote level was acceptable.    Ongoing pulmonary hygiene, DVT prophylaxis and nutritional support.  Bowel and bladder retraining.  Caregiver education with his significant other eventually.  Adaptive equipment training.  We will work on pivot transfers today. 2. DVT prophylaxis: Heparin 5000 units every 8 hours.  I must monitor his hemoglobin and platelet count periodically while on this medication.  Weightbearing activities have been limited recently. GI prophylaxis offered.  No  new bruising or swelling.       3. Paroxysmal atrial fibrillation with prior anticoagulation: Cardiology has stopped the Xarelto.  Elevated rate required titrating metoprolol and restarting Cardizem.  EKG reviewed by cardiology.  Daily weights do not show any decompensation of CHF.  Minimize stimulants. 4. Uncontrolled diabetes type 2 with peripheral neuropathy: Patient requires a diet modified for carbohydrates.  He is on Neurontin for peripheral neuropathy symptoms.  Blood sugars are checked at mealtime and bedtime.  Sliding scale Humalog for now with reintroduction of oral agents should his oral intake stabilize.  Blood sugars generally less than 150.  5. Essential hypertension: Patient requires multiple medications for blood pressure regulation.  Target systolic blood pressure is 120-140.  Vital signs are checked at rest and with activity.  Blood pressure within target range today. 6. COPD: Aggressive pulmonary hygiene.  Monitoring O2 saturations at rest and with activity. 7. Bipolar disorder: Depakote levels appropriate. Off of the BuSpar, Risperdal, goserelin and gabapentin he has become a bit more animated/hypomanic. We will see how further treatment of his UTI impacts his behavior and cognition. Treating in a calm and consistent environment.  Providing written and verbal instructions when possible to assist with information retention. 8. Urinary tract infection. Pseudomonas aeruginosa sensitive to the cefepime.   7 days of IV antibiotics planned in total.

## 2021-01-18 NOTE — PROGRESS NOTES
[facility] UNIT  SPEECH/LANGUAGE PATHOLOGY      [x] Daily           [] Discharge    Patient:Felipe Moreno IJN:[birth year]  [facility]:[id number]  Rehab Dx/Hx: Cerebral infarction, unspecified [I63.9]  Intracranial hemorrhage ([facility] [address] [I62.9]   Allergies   Allergen Reactions    Nsaids      Renal      Precautions: Sit up for all meals and thereafter for 30 minutes, Eat with small bites (1/2 tsp; 1 tsp), Drink from a cup only with small sips and Check mouth for food residue after snacks and meals;  Restrictions/Precautions: General Precautions, Fall Risk(L hemiparesis, chronic R shoulder limitations)          Home Situation/IADL:   Social/Functional History  Lives With: Significant other  Type of Home: House  Home Layout: One level  Home Access: Stairs to enter with rails  Entrance Stairs - Number of Steps: 5+3  Entrance Stairs - Rails: Both  Bathroom Shower/Tub: Walk-in shower, Shower chair with back(shower chair doesn't fit in shower)  Bathroom Toilet: Standard  Bathroom Equipment: Toilet raiser, Shower chair  Bathroom Accessibility: Accessible  Home Equipment: 4 wheeled walker, [address] Help From: Family, Neighbor  ADL Assistance: Needs assistance  Bath: Minimal assistance  Dressing: Minimal assistance  Grooming: Independent  Feeding: Independent  Toileting: Independent  Homemaking Assistance: Independent  Homemaking Responsibilities: No  Meal Prep Responsibility: No  Laundry Responsibility: No  Cleaning Responsibility: No  Bill Paying/Finance Responsibility: No  Shopping Responsibility: No  Dependent Care Responsibility: No  Health Care Management: No  Ambulation Assistance: Independent  Transfer Assistance: Needs assistance  Active : No  Patient's  Info: Fausto Lau is primary , and transport company  Mode of Transportation: Car, Family, Friends  Education: 12th grade  Occupation: Retired  Type of occupation: Pt built trucks for 33 years; [employer]  Leisure & Hobbies: TV, limited reading, girlfriend has a dog. Pt has meds that come in pill packs. Bills are managed over the phone. IADL Comments: Aroldo Jones assists c most IADLs and pt report hiring outside help c michela. Additional Comments: Pt reports using rolator in the house. Pt reports 1 fall this year that occured in the house while ambulating in the home; sleeps in regular, flat bed; R hand dominant      Date of Admit: 1/4/2021  Room #: 1005/1005-A     ST Number of Minutes/Billable Intervention  Cog/Memory Deficits     Aphasia/Language  30   Dysarthria/Speech     Apraxia/Speech     Dysphagia/Swallowing     Group     Other    TOTAL Minutes Billed  30    Variance          Date: 1/18/2021  Day of ARU Week:  1       SLP Individual Minutes  Time In: 6146  Time Out: 1115  Minutes: 30     Variance/Reason:  [] Refusal due to   [] Medical hold/reason  [] Illness   [] Off Unit for test/procedure  [] Extra time needed to complete task  [] Other (specify)    Activity completed: Pt seen at bedside for language and orientation. Used pictured stim for language retrieval, call button orientation and practice, and general wh?'s for self and environment. Pain: denies  Current Diet: Dietary Nutrition Supplements: Low Calorie High Protein Supplement  DIET CARB CONTROL; Dysphagia Pureed  Subjective: Pt with fluctuating alertness, very vague responses with active cues needed. Pt being treated for UTI which may explain improved alertness and responsiveness. Goals and POC: Co-treats where appropriate with PT or OT to facilitate patient goals in functional tasks. LTG      Timeframe for Long-term Goals: No swallow tx                    Short-term Goals  Timeframe for Short-term Goals: 3x/week x3 weeks 30 mins min  LTG: Pt will improve cognitive linguistic abilities for safe return home and clearly communicate care needs.   Goal 1: Continued receptive/expressive assessment by 1/6/2021 with additional goals to be added as appropriate. Goal met 21  Goal 2: Pt will respond to wh?'s with 90% acc given extra time. Max cues with leading questions 30% acc. Very vague responses. Some confabulations. Slight agitation but able to redirect. Pt will say \"It's right there. \" When prompted to say where 'there' is pt unable to give additional info. Looking at pictures pt would state \"the lady is doing something. \"  When given a picture of a woman watering flowers pt stated \"She's pitching to a bunch of trees. \"  When given a picture of a supermarket pt read sign that said \"[employer]\" but was unable to give location. When oriented to picture and asked where pt shopped and given 3 choices pt just kept repeating \"there\". Choice cues are not successful at this time in assisting pt in providing a response due to reduced attn and fatigue where pt will close his eyes and need redirected. Goal 3: Pt will follow 1 step tasks with mod cues and 50% ac  Worked on call button use with 30% acc across 10 minutes. 3/10 attempts  Goal 4: Pt will attend to therapist on R with eye contact x5 in 5 mins. Max cues for entire session. Goal 5: Pt will improve L visual scanning for locating requested items with mod cues and 60% acc. Finger scanning with pictured stim--max cues. 20%  Goal 6: Improve recall of learned tasks with min cues with 70% acc     Orientation to place--poor, situation poor--no awareness or insight into limitations. Pt able to give , address when prompted with house number. When asked about significant other pt unable to recall Vivi Din or relationship. Pt watching MASH on TV but unable to recall any details about the program or it's title. Goal to recall NSG name as pt very fond of Erik Coad. Unable to recall after 10 secs x3. Attn remains poor. Alertness fluctuates.                                    Alarm placed: [x]bed []chair   []other:   []JUNIOR activated        Barriers to progress:   [x] Fatigue        [x] Cognitive Deficits   [x] Memory Deficits   [x] Reduced Attn   [] Self Limiting Behaviors    [x] Reduced insight/awareness     [x] Visual Deficits   [] Premorbid Conditions  [] Impulsivity     [] Other      Education/Interventions used this date: Reoriented pt    []   Progress was updated and reviewed in Rehab[facility]er with patient and/or family this         date. [] Attending Care Conference for pt this date. See Team Patient Care Conference Note for updates.     Interventions used this date:  [x] Speech/Language Treatment    [] Instruction in HEP    Group   [] Dysphagia Treatment   [] Cognitive Skill Dc    Other:         Assessment / Impression                                                          Treatment/Activity Tolerance:   [x] Tolerated Treatment well:     [] Patient limited by fatigue/pain:       [] Patient limited by medical complications:    [] Adverse Reaction to Tx:   [] Significant change in status:      Electronically Signed by  Althea Jacobson, [id number] [facility]    1/18/2021  11:26 AM

## 2021-01-18 NOTE — FLOWSHEET NOTE
[x] daily progress note       [] discharge       Patient Name:  Eryn Rodriguez. :  1950 MRN: [id number]  Room:  [address] [place] [address] Date of Admission: 2021  Rehabilitation Diagnosis:   Cerebral infarction, unspecified [I63.9]  Intracranial hemorrhage ([facility] [address] [I62.9]       Date 2021       Day of ARU Week:  1   Time IN/-950 (co-treat)  [id number] (co-treat)   Co-Treat Minutes 80   TOTAL Tx Time Mins 80   Variance Time +5 minutes   Variance Time []   Refusal due to:     []   Medical hold/reason:    []   Illness   []   Off Unit for test/procedure  [x]   Extra time needed to complete task  []   Therapeutic need  []   Other (specify):   Restrictions Restrictions/Precautions  Restrictions/Precautions: General Precautions, Fall Risk(L hemiparesis, chronic R shoulder limitations)  Position Activity Restriction  Other position/activity restrictions: see catheter   Communication with other providers: [x]   OK to see per nursing:     []   Spoke with team member regarding:      Subjective observations and cognitive status: AM session: pt seen in semi-go's position in bed at beginning of treatment. Pt more alert today, but still had difficulty with following instructions. Pt also agitated at times and acting juvenile. Pt had Ensure spilled all over his pants and patient refusing to remove his pants to replace with hospital gown. Pt gripping onto his dirty pants and refusing to remove the pants and replace with clean gown. Pt kept shouting \"No!\"   PM session: pt seen in semi-go's position in bed at beginning of treatment. Pt pleasant this afternoon, but still had difficulty with initiating task.     Pain level/location:    0/10          Discharge recommendations  Anticipated discharge date:  2021  Destination: []????????home alone   []????????home alone with assist PRN     [x]???????? home w/ family      [x]???????? Continuous supervision  []????????SNF    []???????? Assisted living     []???????? Other:  Continued therapy: [x]????????HHC PT  []????????OUTPATIENT  PT   []???????? No Further PT  []????????SNF PT  Caregiver training recommended: [x]?????? ? ? Yes  []???????? No   Equipment needs: TBD     A cotreat was completed this date with  [] PT    [x] OT   [] ST      This pt requires simultaneous intervention of 2 skilled therapists to safely complete tasks to address complexity of deficits defined in the goals including:  [x]Attention   [x] Safety    [x]Problem Solving    []Sequencing     [x]Initiation    [x]Processing      []Recall of learned tasks  [] Communication  [] Self Feeding   [x]ADL's    []UE Function    [x]Motor planning   [x]Balance  []Energy Conservation   [x]Verbal cues   [] Tactile Cues   []Barriers     [x]Transfers   []Device Use  Pt's response to cotreat: Fair   Progress toward goals: Ongoing     Bed Mobility:             Scooting: Max A x 2   Lying --> Sit: Max A x 2  Sit --> lying: Max A x 2   Use of bed rails and bed features    Transfers:    Sit--> Stand: Max A x 2   Stand --> Sit:   CGA-SBA x 2   Chair-->Bed: Max A x 2   Assistive device required for transfer:  RW  Max vcs for proper hand placement. Gait:    Distance:  AM session: 24'+22'+46'; PM session: 45'   Assistance:  2-person assist: CGA-min A x 2 for walker management + w/c follow  Device:  RW  Gait Quality:  Short stride length; vcs to increase stride length. Vcs for obstacles on left side. Pt required min A for management of walker around obstacles on left side and for turning. Pt became agitated when walker was assisted with management.       Patient/Caregiver Education and Training:   [x]   Bed Mobility/Transfer technique/safety  [x]   Gait technique/sequencing  [x]   Proper use of assistive device  []   Advanced mobility safety and technique  []   Reinforced patient's precautions/mobility while maintaining precautions  []   Postural awareness  []   Family training  [x]   Progress was updated in Rehabtracker this date. Treatment Plan for Next Session: gait; transfers; exercises; standing balance. Assessment:    Treatment/Activity Tolerance:   [x] Tolerated treatment with no adverse effects    [] Patient limited by fatigue  [] Patient limited by pain   [] Patient limited by medical complications:    [] Adverse reaction to Tx:   [] Significant change in status    Safety:       [x]  bed alarm set    []  chair alarm set    []  Pt refused alarms                []  Telesitter activated      [x]  Gait belt used during tx session      [x]other: pt left in semi-go's position in bed with call light at end of treatment.         Number of Minutes/Billable Intervention  Gait Training 45   Therapeutic Exercise    Neuro Re-Ed    Therapeutic Activity 35   Wheelchair Propulsion    Group    Other:    TOTAL 80         Social History  Social/Functional History  Lives With: Significant other  Type of Home: House  Home Layout: One level  Home Access: Stairs to enter with rails  Entrance Stairs - Number of Steps: 5+3  Entrance Stairs - Rails: Both  Bathroom Shower/Tub: Walk-in shower, Shower chair with back(shower chair doesn't fit in shower)  Bathroom Toilet: Standard  Bathroom Equipment: Toilet raiser, Shower chair  Bathroom Accessibility: Accessible  Home Equipment: 4 wheeled walker, [address] Help From: Family, Neighbor  ADL Assistance: Needs assistance  Bath: Minimal assistance  Dressing: Minimal assistance  Grooming: Independent  Feeding: Independent  Toileting: Independent  Homemaking Assistance: Independent  Homemaking Responsibilities: No  Meal Prep Responsibility: No  Laundry Responsibility: No  Cleaning Responsibility: No  Bill Paying/Finance Responsibility: No  Shopping Responsibility: No  Dependent Care Responsibility: No  Health Care Management: No  Ambulation Assistance: Independent  Transfer Assistance: Needs assistance  Active : No  Patient's  Info: Neighbor is primary , and transport company  Mode of Transportation: Car, Family, Friends  Education: 12th grade  Occupation: Retired  Type of occupation: Pt built trucks for 33 years; [employer]  Leisure & Hobbies: TV, limited reading, girlfriend has a dog. Pt has meds that come in pill packs. Bills are managed over the phone. IADL Comments: Aroldo Jones assists c most IADLs and pt report hiring outside help c yardork. Additional Comments: Pt reports using rolator in the house. Pt reports 1 fall this year that occured in the house while ambulating in the home; sleeps in regular, flat bed; R hand dominant    Objective                                                                                    Goals:  (Update in navigator)  Short term goals  Time Frame for Short term goals: 14-21 tx days:  Short term goal 1: Pt will complete bed mobility tasks and sup<->sit with SBA-Sup. Short term goal 2: Pt will complete OOB transfers using RW with Mod A. Short term goal 3: Pt will ambulate 10 ft over level and carpeted surfaces using RW with Min A. Short term goal 4: Pt will propel manual w/c 150 ft with turns with SBA-Sup. Short term goal 5: Caregiver will be able to safely assist pt with functional mobility tasks. :   :        Plan of Care                                                                              Times per week: 5 days per week for a minimum of 60 minutes/day plus group as appropriate for 60 minutes.   Treatment to include Current Treatment Recommendations: Strengthening, Functional Mobility Training, Wheelchair Mobility Training, Neuromuscular Re-education, Home Exercise Program, Equipment Evaluation, Education, & procurement, ROM, Transfer Training, Cognitive/Perceptual Training, Gait Training, Cognitive Reorientation, Safety Education & Training, Balance Training, Endurance Training, Pain Management, Patient/Caregiver Education & Training, Positioning    Electronically signed by   Amauri Costello, [id number]  1/18/2021, 9:51 AM

## 2021-01-18 NOTE — PROGRESS NOTES
Neurology Service Progress Note   [facility]  Patient Name: Steff Mike. : 1950        Subjective:   Patient seen and examined today around 1000. Patient is sleeping but wakes easily, he is tired appearing sleepy or lethargic, he is alert once awake and tries to tell me that he is at [facility] but I correct him and he tells me \"oh yes I know I am at [place]\" I just get the two confused. He follows all commands, he is talking about sexual things today and is following commands, speaking to context. He denies CP and SOB. Nursing staff think he is more alert today than yesterday. We discuss staying off CNS altering medications and continuing to monitor for infectious reasons. Patient has no fever, neck or back pain.      Past Medical History:   Diagnosis Date    Anemia     per old chart    Arthritis     Back pain, chronic     \"have pain in lumbar mainly- have 5 bulging discs\"\"in my neck and my lumbar have herniated discs\"    Bipolar 1 disorder ([facility] [address]     CAD (coronary artery disease) 2016    does not follow with a cardiologist    Cerebral artery occlusion with cerebral infarction ([facility] [address]     \"had mini stroke in 2016- fast heart rate when I would stand up - smile drooping on one side- lased just the one day\"    Chronic kidney disease (CKD), stage III (moderate)     CKD (chronic kidney disease)     per old chart- consult with Dr Kenyatta Monae with admission 2016\"have low kidney function\"    COPD (chronic obstructive pulmonary disease) ([facility] [address]     follow with Dr Angie Espinosa Diabetes mellitus, type 2 ([facility] [address] 2017    Diabetic peripheral neuropathy (HCC)     Diastolic CHF ([facility] [address]     Gastric ulcer     H/O cardiovascular stress test 2014    EF 68% mild ischemia anterior wall    Heart murmur     \"see Dr Eren Wayne- last echo \" pt states\"I think they said I have a murmur but no chest pain or palpitations\"    Hiatal hernia     History of cardiac cath 2014 Subcutaneous TID WC    insulin lispro  0-3 Units Subcutaneous Nightly    lisinopril  40 mg Oral Daily    doxazosin  2 mg Oral 2 times per day    atorvastatin  80 mg Oral Nightly    benztropine  0.5 mg Oral BID    divalproex  1,000 mg Oral Nightly    escitalopram  10 mg Oral Daily    famotidine  20 mg Oral Daily    senna  1 tablet Oral Nightly    docusate sodium  100 mg Oral BID     Continuous Infusions:   dextrose       PRN Meds:.acetaminophen, bisacodyl, ondansetron, glucose, dextrose, glucagon (rDNA), dextrose    Allergies   Allergen Reactions    Nsaids      Renal      Social History     Socioeconomic History    Marital status: Single     Spouse name: Not on file    Number of children: Not on file    Years of education: Not on file    Highest education level: Not on file   Occupational History    Occupation: [employer], retired     Employer: [employer]   Social Needs    Financial resource strain: Not on file    Food insecurity     Worry: Not on file     Inability: Not on file   [employer] needs     Medical: Not on file     Non-medical: Not on file   Tobacco Use    Smoking status: Former Smoker     Packs/day: 1.50     Years: 30.00     Pack years: 45.00     Types: Cigarettes     Quit date: 7/24/2010     Years since quitting: 10.4    Smokeless tobacco: Never Used   Substance and Sexual Activity    Alcohol use: Not Currently    Drug use: Yes     Frequency: 7.0 times per week     Types: Marijuana    Sexual activity: Not Currently     Partners: Female   Lifestyle    Physical activity     Days per week: Not on file     Minutes per session: Not on file    Stress: Not on file   Relationships    Social connections     Talks on phone: Not on file     Gets together: Not on file     Attends [employer] service: Not on file     Active member of club or organization: Not on file     Attends meetings of clubs or organizations: Not on file     Relationship status: Not on file    Intimate partner violence     Fear of current or ex partner: Not on file     Emotionally abused: Not on file     Physically abused: Not on file     Forced sexual activity: Not on file   Other Topics Concern    Not on file   Social History Narrative    Not on file      Family History   Problem Relation Age of Onset    Heart Disease Mother         heart attack    High Blood Pressure Father        Review of Symptoms:    All 14 groups reviewed unless mentioned above     Physical Exam:           Gen: Alert and awake once spoken too, oriented to person, situation and place, follow commands   HEENT: NC/AT, EOMI, PERRL, mmm, neck supple, no meningeal signs; Heart: regular   Lungs: no distress  Ext: no edema, no calf tenderness b/l  Psych: poor eye contact   Skin: no rashes or lesions    NEUROLOGIC EXAM:    Mental Status: Alert and awake once spoken too, oriented to person, situation and place, follow commands , names and repeats, speech clear and fluent. Cranial Nerve Exam:   CN II-XII:  PERRL, VFF, no nystagmus, no gaze paresis, sensation V1-V3 intact b/l, muscles of facial expression asymmetric with a mild left facial droop; hearing intact to conversational tone, palate elevates symmetrically, shoulder elevation symmetric and tongue protrudes midline with movement side to side. Motor Exam:       Antigravity x4 do not see any specific drift spasticity or rigidity    Deep Tendon Reflexes: 1/4 biceps, triceps, brachioradialis, patellar, and achilles b/l; flexor plantar responses b/l    Sensation: Intact light touch  UE's/LE's b/l    Coordination/Cerebellum:       Tremors--none      Gait and stance:      Gait: deferred      LABS:     Recent Labs     01/15/21  0744   WBC 7.6      K 4.4      CO2 24   BUN 22   CREATININE 1.5*   GLUCOSE 103*   G  Results for Laura Sharpe (MRN [id number]) as of 1/15/2021 11:42   Ref.  Range 1/12/2021 22:44   Color, UA Latest Ref Range: YELLOW  YELLOW   Clarity, UA Latest Ref Range: CLEAR  SLIGHTLY CLOUDY (A)   Bilirubin, Urine Latest Ref Range: NEGATIVE MG/DL NEGATIVE   Ketones, Urine Latest Ref Range: NEGATIVE MG/DL NEGATIVE   Specific Gravity, UA Latest Ref Range: 1.001 - 1.035  1.019   Blood, Urine Latest Ref Range: NEGATIVE  LARGE (A)   Protein, UA Latest Ref Range: NEGATIVE MG/ (A)   Urobilinogen, Urine Latest Ref Range: 0.2 - 1.0 MG/DL NORMAL   Leukocyte Esterase, Urine Latest Ref Range: NEGATIVE  LARGE (A)   Glucose, Urine Latest Ref Range: NEGATIVE MG/DL NEGATIVE   Nitrite Urine, Quantitative Latest Ref Range: NEGATIVE  POSITIVE (A)   pH, Urine Latest Ref Range: 5.0 - 8.0  6.0   WBC, UA Latest Ref Range: 0 - 2 /HPF 1605 (H)   WBC Clumps, UA Latest Units: /HPF MANY   RBC, UA Latest Ref Range: 0 - 3 /HPF 1,043 (H)   Bacteria, UA Latest Ref Range: NEGATIVE /HPF NEGATIVE   Trichomonas, UA Latest Ref Range: NONE SEEN /HPF NONE SEEN       IMAGING:    CT Head:  1. Interval decrease in size and density of the patient's known subacute   intraparenchymal hemorrhage within the right occipital lobe. 2. No acute infarct, new hemorrhage or mass effect. ASSESSMENT/PLAN:     3 70year old male with AMS secondary to TME superimposed on acute UTI with possible polypharmacy with hx of recent intraparenchymal bleed. Bleed stable. Low suspicion for seizure. Plan of care as follows:  1. Neuro exam:  1. nonfocal  2. Neurodiagnostics:  1. CT has as above  2. UA as above  3. Medications:  1. For urine treatment defer to PMR  2. Holding BuSpar, trazodone, Risperdal and gabapentin, restart at PCP discretion outpatient  3. Continue Depakote 1000 mg nightly  4. PT/OT/ST:  1. Per their recommendations  5. Follow-up:  1. No further neurological recommendations, we will sign off         Thank you for allowing us to participate in the care of your patient. If there are any questions regarding evaluation please feel free to contact us.      JULEE Nguyen - CNP, 1/17/2021

## 2021-01-19 LAB
GLUCOSE BLD-MCNC: 103 MG/DL (ref 70–99)
GLUCOSE BLD-MCNC: 103 MG/DL (ref 70–99)
GLUCOSE BLD-MCNC: 110 MG/DL (ref 70–99)
GLUCOSE BLD-MCNC: 134 MG/DL (ref 70–99)
GLUCOSE BLD-MCNC: 150 MG/DL (ref 70–99)

## 2021-01-19 PROCEDURE — 97116 GAIT TRAINING THERAPY: CPT

## 2021-01-19 PROCEDURE — 6370000000 HC RX 637 (ALT 250 FOR IP): Performed by: PHYSICAL MEDICINE & REHABILITATION

## 2021-01-19 PROCEDURE — 82962 GLUCOSE BLOOD TEST: CPT

## 2021-01-19 PROCEDURE — 2580000003 HC RX 258: Performed by: PHYSICAL MEDICINE & REHABILITATION

## 2021-01-19 PROCEDURE — 6360000002 HC RX W HCPCS: Performed by: PHYSICAL MEDICINE & REHABILITATION

## 2021-01-19 PROCEDURE — 6360000002 HC RX W HCPCS: Performed by: INTERNAL MEDICINE

## 2021-01-19 PROCEDURE — 1280000000 HC REHAB R&B

## 2021-01-19 PROCEDURE — 97530 THERAPEUTIC ACTIVITIES: CPT

## 2021-01-19 PROCEDURE — 6370000000 HC RX 637 (ALT 250 FOR IP): Performed by: NURSE PRACTITIONER

## 2021-01-19 PROCEDURE — 94761 N-INVAS EAR/PLS OXIMETRY MLT: CPT

## 2021-01-19 PROCEDURE — 97535 SELF CARE MNGMENT TRAINING: CPT

## 2021-01-19 RX ADMIN — MICONAZOLE NITRATE: 2 POWDER TOPICAL at 22:28

## 2021-01-19 RX ADMIN — DOCUSATE SODIUM 100 MG: 100 CAPSULE, LIQUID FILLED ORAL at 08:56

## 2021-01-19 RX ADMIN — DIGOXIN 125 MCG: 125 TABLET ORAL at 08:58

## 2021-01-19 RX ADMIN — METOPROLOL TARTRATE 25 MG: 25 TABLET, FILM COATED ORAL at 08:57

## 2021-01-19 RX ADMIN — SENNOSIDES 8.6 MG: 8.6 TABLET, FILM COATED ORAL at 22:25

## 2021-01-19 RX ADMIN — MICONAZOLE NITRATE: 20 POWDER TOPICAL at 08:58

## 2021-01-19 RX ADMIN — ACETAMINOPHEN 650 MG: 325 TABLET ORAL at 16:49

## 2021-01-19 RX ADMIN — CEFEPIME 2 G: 2 INJECTION, POWDER, FOR SOLUTION INTRAVENOUS at 23:42

## 2021-01-19 RX ADMIN — LISINOPRIL 40 MG: 20 TABLET ORAL at 08:57

## 2021-01-19 RX ADMIN — DOXAZOSIN 2 MG: 4 TABLET ORAL at 22:25

## 2021-01-19 RX ADMIN — AMLODIPINE BESYLATE 10 MG: 10 TABLET ORAL at 08:56

## 2021-01-19 RX ADMIN — MICONAZOLE NITRATE: 2 POWDER TOPICAL at 09:05

## 2021-01-19 RX ADMIN — ACETAMINOPHEN 650 MG: 325 TABLET ORAL at 00:41

## 2021-01-19 RX ADMIN — CEFEPIME 2 G: 2 INJECTION, POWDER, FOR SOLUTION INTRAVENOUS at 12:15

## 2021-01-19 RX ADMIN — ESCITALOPRAM OXALATE 10 MG: 10 TABLET, FILM COATED ORAL at 08:57

## 2021-01-19 RX ADMIN — DOXAZOSIN 2 MG: 4 TABLET ORAL at 08:55

## 2021-01-19 RX ADMIN — ATORVASTATIN CALCIUM 80 MG: 40 TABLET, FILM COATED ORAL at 22:24

## 2021-01-19 RX ADMIN — DIVALPROEX SODIUM 1000 MG: 500 TABLET, FILM COATED, EXTENDED RELEASE ORAL at 22:24

## 2021-01-19 RX ADMIN — DOCUSATE SODIUM 100 MG: 100 CAPSULE, LIQUID FILLED ORAL at 22:30

## 2021-01-19 RX ADMIN — FAMOTIDINE 20 MG: 20 TABLET, FILM COATED ORAL at 08:55

## 2021-01-19 RX ADMIN — BENZTROPINE MESYLATE 0.5 MG: 1 TABLET ORAL at 08:56

## 2021-01-19 RX ADMIN — ENOXAPARIN SODIUM 40 MG: 100 INJECTION SUBCUTANEOUS at 08:59

## 2021-01-19 RX ADMIN — MICONAZOLE NITRATE: 20 POWDER TOPICAL at 22:25

## 2021-01-19 RX ADMIN — BENZTROPINE MESYLATE 0.5 MG: 1 TABLET ORAL at 22:24

## 2021-01-19 RX ADMIN — METOPROLOL TARTRATE 25 MG: 25 TABLET, FILM COATED ORAL at 22:25

## 2021-01-19 ASSESSMENT — PAIN SCALES - GENERAL
PAINLEVEL_OUTOF10: 0
PAINLEVEL_OUTOF10: 5
PAINLEVEL_OUTOF10: 0
PAINLEVEL_OUTOF10: 2

## 2021-01-19 ASSESSMENT — PAIN DESCRIPTION - PROGRESSION: CLINICAL_PROGRESSION: GRADUALLY WORSENING

## 2021-01-19 ASSESSMENT — PAIN DESCRIPTION - DESCRIPTORS: DESCRIPTORS: ACHING

## 2021-01-19 NOTE — PROGRESS NOTES
Physical Rehabilitation: OCCUPATIONAL THERAPY     [x] daily progress note       [] discharge       Patient Name:  Liz Oro. :  1950 MRN: [id number]  Room:  [address] [place], [place] [address] Date of Admission: 2021  Rehabilitation Diagnosis:   Cerebral infarction, unspecified [I63.9]  Intracranial hemorrhage ([facility] [address] [I62.9]       Date 2021       Day of ARU Week:  2   Time IN//1020; 1300/1340   Individual Tx Minutes    Group Tx Minutes    Co-Treat Minutes 48 + 40 A cotreat was completed this date with  [x]? ?? PT    []??? OT   []??? ST      This pt requires simultaneous intervention of 2 skilled therapists to safely complete tasks to address complexity of deficits defined in the goals including:  [x]? ? ? Attention   [x]? ?? Safety    []? ? ?Problem Solving    []? ? ? Sequencing     [x]? ? ? Initiation    []? ? ? Processing      [x]? ? ? Recall of learned tasks  []??? Communication  []??? Self Feeding   [x]? ??ADL's    [x]? ??UE Function    [x]? ? ? Motor planning   []? ??Balance  []? ?? Energy Conservation   []? ? ?Verbal cues   []? ?? Tactile Cues  []? ??Barriers     [x]? ? ? Transfers   []? ? ? Device Use   Concurrent Tx Minutes    TOTAL Tx Time Mins 90   Variance Time    Variance Time []   Refusal due to:     []   Medical hold/reason:    []   Illness   []   Off Unit for test/procedure  []   Extra time needed to complete task  []   Therapeutic need  []   Other (specify):   Restrictions Restrictions/Precautions: General Precautions, Fall Risk(L hemiparesis, chronic R shoulder limitations)         Communication with other providers: [x]   OK to see per nursing:     []   Spoke with team member regarding:      Subjective observations and cognitive status:  patient supine in bed sleeping, awakens and is agreeable to therapy      Pain level/location:    /10       Location: patient did not c/o pain this date    Discharge recommendations  Anticipated discharge date:    Destination: []home alone   []home alone w assist prn   [] home w/ family [] Continuous supervision       []SNF    [] Assisted living     [] Other:   Continued therapy: []HHC OT  []OUTPATIENT  OT   [] No Further OT  Equipment needs: TBD       ADLs:    Eating: Eating  Assistance Needed: Partial/moderate assistance  Comment: per nursing Min/Mod A  CARE Score: 3  Discharge Goal: Independent       Oral Hygiene: Oral Hygiene  Assistance Needed: Partial/moderate assistance  Comment: Min A vc and tactile cues  to stay alert and oriented to task with intermittent hand over hand to assist  Reason if not Attempted: Not attempted due to medical condition or safety concerns  CARE Score: 3  Discharge Goal: Independent    UB/LB Bathing: Shower/Bathe Self  Assistance Needed: Dependent  Comment: Max A/ (total) max vc and tactile cues to intiate and stay on task  Reason if not Attempted: Not attempted due to medical condition or safety concerns  CARE Score: 1  Discharge Goal: Supervision or touching assistance    UB Dressing: Upper Body Dressing  Assistance Needed: Dependent  Comment: Max times two Total requires assist of PT for sitting balance at EOB and and Ot to assist threading arms and head appropriately  Reason if not Attempted: Not attempted due to medical condition or safety concerns  CARE Score: 1  Discharge Goal: Supervision or touching assistance         LB Dressing:     Donning and [place] Footwear: Putting On/Taking Off Footwear  Assistance Needed: Dependent  Comment: x  CARE Score: 1  Discharge Goal: Set-up or clean-up assistance          Toilet Transfers: Toilet Transfer  Assistance Needed: Dependent  Comment:  Mod times two total  Reason if not Attempted: Not attempted due to medical condition or safety concerns  CARE Score: 1  Discharge Goal: Partial/moderate assistance  Device Used:    [x]   Standard Toilet         [x]   Grab Bars           []  Bedside Commode       []   Elevated Toilet          []   Other:        Bed Mobility: SEE PT NOTE           []   Pt received out of bed Rolling R/L:    Scooting:    Supine --> Sit:    Sit --> Supine:      Transfers: SEE PT NOTE   Sit--> Stand:    Stand --> Sit:     Stand-Pivot:     Other:    Assistive device required for transfer:         Functional Mobility: to and from bathroom/bed    Assistance: Mod times two PM SESSION Mod times two   Device:   [x]   Rolling Walker     []   Standard Walker []   Wheelchair        []   [employer]       []   4-Wheeled [place]         []   Cardiac Walker       []   Other:      Additional Therapeutic activities/exercises completed this date:     [x]   ADL Training   [x]   Balance/Postural training     [x]   Bed/Transfer Training   []   Endurance Training   []   Neuromuscular Re-ed   []   Nu-step:  Time:        Level:         #Steps:       []   Rebounder:    []  Seated     []  Standing        []   Supine Ther Ex (reps/sets):     []   Seated Ther Ex (reps/sets):     []   Standing Ther Ex (reps/sets):     []   Other:      Comments:      Patient/Caregiver Education and Training:   [x]   [employer] Equipment Use  [x]   Bed Mobility/Transfer Technique/Safety  [x]   Energy Conservation Tips  []   Family training  [x]   Postural Awareness  [x]   Safety During Functional Activities  []   Reinforced Patient's Precautions   []   Progress was updated and reviewed in Rehabtracker with patient and/or family this         date.     Treatment Plan for Next Session: POC to continue as tolerated       Assessment:        Treatment/Activity Tolerance:   [] Tolerated treatment with no adverse effects    [] Patient limited by fatigue  [] Patient limited by pain   [] Patient limited by medical complications:    [] Adverse reaction to Tx:   [] Significant change in status    Safety:       []  bed alarm set    []  chair alarm set    []  Pt refused alarms                []  Telesitter activated      []  Gait belt used during tx session      []other:       Number of Minutes/Billable Intervention  Therapeutic Exercise    ADL Self-care 50   Neuro Re-Ed Therapeutic Activity 40   Group    Other:    TOTAL 90       Social History  Social/Functional History  Lives With: Significant other  Type of Home: House  Home Layout: One level  Home Access: Stairs to enter with rails  Entrance Stairs - Number of Steps: 5+3  Entrance Stairs - Rails: Both  Bathroom Shower/Tub: Walk-in shower, Shower chair with back(shower chair doesn't fit in shower)  Bathroom Toilet: Standard  Bathroom Equipment: Toilet raiser, Shower chair  Bathroom Accessibility: Accessible  Home Equipment: 4 wheeled walker, [address] Help From: Family, Neighbor  ADL Assistance: Needs assistance  Bath: Minimal assistance  Dressing: Minimal assistance  Grooming: Independent  Feeding: Independent  Toileting: Independent  Homemaking Assistance: Independent  Homemaking Responsibilities: No  Meal Prep Responsibility: No  Laundry Responsibility: No  Cleaning Responsibility: No  Bill Paying/Finance Responsibility: No  Shopping Responsibility: No  Dependent Care Responsibility: No  Health Care Management: No  Ambulation Assistance: Independent  Transfer Assistance: Needs assistance  Active : No  Patient's  Info: Neighbor is primary , and transport company  Mode of Transportation: Car, Family, Friends  Education: 12th grade  Occupation: Retired  Type of occupation: Pt built trucks for 33 years; [address]: TV, limited reading, girlfriend has a dog. Pt has meds that come in pill packs. Bills are managed over the phone. IADL Comments: Romana Jasper assists c most IADLs and pt report hiring outside help c [employer]. Additional Comments: Pt reports using rolator in the house.  Pt reports 1 fall this year that occured in the house while ambulating in the home; sleeps in regular, flat bed; R hand dominant    Objective                                                                                    Goals:  (Update in navigator)  Short term goals  Time Frame for Short term goals: STG=LTG:  Long term goals  Time Frame for Long term goals : 12-14 days or until d/c  Long term goal 1: Pt will complete feeding/grooming/oral care task c MOD I by d/c  Long term goal 2: Pt will complete total body bathing c MIN A by d/c  Long term goal 3: Pt will complete UB dressing c SUP by d/c  Long term goal 4: Pt will complete LB dressing c MOD A by d/c  Long term goal 5: Pt will don/doff footwear c SETUP by d/c  Long term goals 6: Pt will complete toileting c MOD A by d/c  Long term goal 7: Pt will complete functional transfer (toilet, tub, shower) c MOD A by d/c. Long term goal 8: Pt will perform therex/therax to facilitate increased strength/endurance/ax tolerance (c emphasis on dynamic standing balance/tolerance >10 mins and BUE endurance) c Min A in order to complete ADLs. :        Plan of Care                                                                              Times per week: 5 days per week for a minimum of 60 minutes/day plus group as appropriate for 60 minutes.   Treatment to include Plan  Times per day: Daily  Current Treatment Recommendations: Strengthening, Endurance Training, Neuromuscular Re-education, Patient/Caregiver Education & Training, ROM, Equipment Evaluation, Education, & procurement, Self-Care / ADL, Balance Training, Functional Mobility Training, Safety Education & Training, Cognitive/Perceptual Training    Electronically signed by   RUBEN Mcgill  1/19/2021, 1:57 PM

## 2021-01-19 NOTE — PROGRESS NOTES
Comprehensive Nutrition Assessment    Type and Reason for Visit:  Reassess    Nutrition Recommendations/Plan:   Continue carb controlled diet, consistency as per speech therapy   Continue to offer oral nutrition supplement, may trial higher calorie as needed   Assist with meals, encourage intake   Will continue to follow up during stay    Nutrition Assessment:  Remains on pureed diet, carb controlled with high protein supplement offered. Meal intake remains 25-50% at most recent meals, drinks some supplement. Needs assist with meals, encourage intake. Slow progress with improving intake, remains high nutrition risk at this time. Malnutrition Assessment:  Malnutrition Status:  Insufficient data    Context:  Acute Illness       Estimated Daily Nutrient Needs:  Energy (kcal):  3172-7567; Weight Used for Energy Requirements:  Current     Protein (g):  72-87 (1-1.2 g/kg); Weight Used for Protein Requirements:  Ideal        Fluid (ml/day):  2000; Method Used for Fluid Requirements:  1 ml/kcal      Nutrition Related Findings:  in bed for lunch with assist, glucose POCT most under 150 mg/dL      Wounds:  None       Current Nutrition Therapies:    Dietary Nutrition Supplements: Low Calorie High Protein Supplement  DIET CARB CONTROL; Dysphagia Pureed    Anthropometric Measures:  · Height: 5' 9\" (175.3 cm)  · Current Body Weight: 220 lb 10.9 oz (100.1 kg)   · Admission Body Weight: 225 lb 5 oz (102.2 kg)    · Usual Body Weight: 214 lb (97.1 kg)(per hx)     · Ideal Body Weight: 160 lbs; % Ideal Body Weight 143.1 %   · BMI: 32.6  · Adjusted Body Weight:  ; No Adjustment   · BMI Categories: Obese Class 1 (BMI 30.0-34. 9)       Nutrition Diagnosis:   · Predicted inadequate energy intake related to other (comment)(reduced appetite in illness) as evidenced by other (comment)(inconsistent intake so far)      Nutrition Interventions:   Food and/or Nutrient Delivery:  Continue Current Diet, Continue Oral Nutrition

## 2021-01-19 NOTE — FLOWSHEET NOTE
[x] daily progress note       [] discharge       Patient Name:  Libby Pandey. :  1950 MRN: [id number]  Room:  [address] [place], [place] [address] Date of Admission: 2021  Rehabilitation Diagnosis:   Cerebral infarction, unspecified [I63.9]  Intracranial hemorrhage ([facility] [address] [I62.9]       Date 2021       Day of ARU Week:  2   Time IN/-1020 (co-treat)  5111-2624 (co-treat)   Co-Treat Minutes 90   TOTAL Tx Time Mins 90   Restrictions Restrictions/Precautions  Restrictions/Precautions: General Precautions, Fall Risk(L hemiparesis, chronic R shoulder limitations)  Position Activity Restriction  Other position/activity restrictions: see catheter   Communication with other providers: [x]   OK to see per nursing:     []   Spoke with team member regarding:      Subjective observations and cognitive status: AM session: pt seen in semi-go's position in bed at beginning of treatment. Agreeable to therapy. Pt still lethargic at times and falling asleep. Pt requiring max vcs to stay awake and attend to tasks. PM session: pt seen in semi-og's position in bed at beginning of treatment. Agreeable to therapy. Pain level/location: 0/10          Discharge recommendations  Anticipated discharge date:  2021  Destination: []?????????home alone   []?????????home alone with assist PRN     [x]????????? home w/ family      [x]????????? Continuous supervision  []?????????SNF    []????????? Assisted living     []????????? Other:  Continued therapy: [x]?????????HHC PT  []?????????OUTPATIENT  PT   []????????? No Further PT  []?????????SNF PT  Caregiver training recommended: [x]??????? ? ? Yes  []????????? No   Equipment needs: TBD         Bed Mobility:           [x]   Pt received out of bed   Scooting: Max A x 2   Lying --> Sit:  Max A x 2   Sit --> lying:   Max A x 2     Transfers:    Sit--> Stand:  2-person assist: Min A x 1+ mod A x 1   Stand --> Sit:   2-person assist: CGA x 2   Chair-->Bed/Bed --> Chair:  2-person assists c most IADLs and pt report hiring outside help c michela. Additional Comments: Pt reports using rolator in the house. Pt reports 1 fall this year that occured in the house while ambulating in the home; sleeps in regular, flat bed; R hand dominant    Objective                                                                                    Goals:  (Update in navigator)  Short term goals  Time Frame for Short term goals: 14-21 tx days:  Short term goal 1: Pt will complete bed mobility tasks and sup<->sit with SBA-Sup. Short term goal 2: Pt will complete OOB transfers using RW with Mod A. Short term goal 3: Pt will ambulate 10 ft over level and carpeted surfaces using RW with Min A. Short term goal 4: Pt will propel manual w/c 150 ft with turns with SBA-Sup. Short term goal 5: Caregiver will be able to safely assist pt with functional mobility tasks. :   :        Plan of Care                                                                              Times per week: 5 days per week for a minimum of 60 minutes/day plus group as appropriate for 60 minutes.   Treatment to include Current Treatment Recommendations: Strengthening, Functional Mobility Training, Wheelchair Mobility Training, Neuromuscular Re-education, Home Exercise Program, Equipment Evaluation, Education, & procurement, ROM, Transfer Training, Cognitive/Perceptual Training, Gait Training, Cognitive Reorientation, Safety Education & Training, Balance Training, Endurance Training, Pain Management, Patient/Caregiver Education & Training, Positioning    Electronically signed by   Prachi Dominguez, [id number]  1/19/2021, 10:37 AM

## 2021-01-20 LAB
GLUCOSE BLD-MCNC: 106 MG/DL (ref 70–99)
GLUCOSE BLD-MCNC: 109 MG/DL (ref 70–99)
GLUCOSE BLD-MCNC: 114 MG/DL (ref 70–99)
GLUCOSE BLD-MCNC: 119 MG/DL (ref 70–99)

## 2021-01-20 PROCEDURE — 82962 GLUCOSE BLOOD TEST: CPT

## 2021-01-20 PROCEDURE — 6370000000 HC RX 637 (ALT 250 FOR IP): Performed by: NURSE PRACTITIONER

## 2021-01-20 PROCEDURE — 97530 THERAPEUTIC ACTIVITIES: CPT

## 2021-01-20 PROCEDURE — 99232 SBSQ HOSP IP/OBS MODERATE 35: CPT | Performed by: PHYSICAL MEDICINE & REHABILITATION

## 2021-01-20 PROCEDURE — 94761 N-INVAS EAR/PLS OXIMETRY MLT: CPT

## 2021-01-20 PROCEDURE — 2500000003 HC RX 250 WO HCPCS: Performed by: PHYSICAL MEDICINE & REHABILITATION

## 2021-01-20 PROCEDURE — 97116 GAIT TRAINING THERAPY: CPT

## 2021-01-20 PROCEDURE — 6370000000 HC RX 637 (ALT 250 FOR IP): Performed by: PHYSICAL MEDICINE & REHABILITATION

## 2021-01-20 PROCEDURE — 1280000000 HC REHAB R&B

## 2021-01-20 PROCEDURE — 97535 SELF CARE MNGMENT TRAINING: CPT

## 2021-01-20 PROCEDURE — 97110 THERAPEUTIC EXERCISES: CPT

## 2021-01-20 PROCEDURE — 92507 TX SP LANG VOICE COMM INDIV: CPT

## 2021-01-20 PROCEDURE — 6360000002 HC RX W HCPCS: Performed by: INTERNAL MEDICINE

## 2021-01-20 PROCEDURE — 6360000002 HC RX W HCPCS: Performed by: PHYSICAL MEDICINE & REHABILITATION

## 2021-01-20 PROCEDURE — 2580000003 HC RX 258: Performed by: PHYSICAL MEDICINE & REHABILITATION

## 2021-01-20 RX ORDER — 0.9 % SODIUM CHLORIDE 0.9 %
500 INTRAVENOUS SOLUTION INTRAVENOUS ONCE
Status: COMPLETED | OUTPATIENT
Start: 2021-01-20 | End: 2021-01-20

## 2021-01-20 RX ORDER — MEGESTROL ACETATE 40 MG/ML
200 SUSPENSION ORAL DAILY
Status: DISCONTINUED | OUTPATIENT
Start: 2021-01-20 | End: 2021-01-29 | Stop reason: HOSPADM

## 2021-01-20 RX ADMIN — DOCUSATE SODIUM 100 MG: 100 CAPSULE, LIQUID FILLED ORAL at 22:18

## 2021-01-20 RX ADMIN — DOXAZOSIN 2 MG: 4 TABLET ORAL at 22:17

## 2021-01-20 RX ADMIN — ATORVASTATIN CALCIUM 80 MG: 40 TABLET, FILM COATED ORAL at 22:18

## 2021-01-20 RX ADMIN — BENZTROPINE MESYLATE 0.5 MG: 1 TABLET ORAL at 22:18

## 2021-01-20 RX ADMIN — DOXAZOSIN 2 MG: 4 TABLET ORAL at 08:39

## 2021-01-20 RX ADMIN — AMLODIPINE BESYLATE 10 MG: 10 TABLET ORAL at 08:37

## 2021-01-20 RX ADMIN — SENNOSIDES 8.6 MG: 8.6 TABLET, FILM COATED ORAL at 22:18

## 2021-01-20 RX ADMIN — MICONAZOLE NITRATE: 20 POWDER TOPICAL at 22:16

## 2021-01-20 RX ADMIN — METOPROLOL TARTRATE 25 MG: 25 TABLET, FILM COATED ORAL at 08:39

## 2021-01-20 RX ADMIN — SODIUM CHLORIDE 500 ML: 9 INJECTION, SOLUTION INTRAVENOUS at 15:13

## 2021-01-20 RX ADMIN — FAMOTIDINE 20 MG: 20 TABLET, FILM COATED ORAL at 08:39

## 2021-01-20 RX ADMIN — MEGESTROL ACETATE 200 MG: 40 SUSPENSION ORAL at 23:34

## 2021-01-20 RX ADMIN — CEFEPIME 2 G: 2 INJECTION, POWDER, FOR SOLUTION INTRAVENOUS at 12:10

## 2021-01-20 RX ADMIN — MICONAZOLE NITRATE: 2 POWDER TOPICAL at 22:29

## 2021-01-20 RX ADMIN — ENOXAPARIN SODIUM 40 MG: 100 INJECTION SUBCUTANEOUS at 08:40

## 2021-01-20 RX ADMIN — MICONAZOLE NITRATE: 2 POWDER TOPICAL at 08:42

## 2021-01-20 RX ADMIN — METOPROLOL TARTRATE 25 MG: 25 TABLET, FILM COATED ORAL at 22:17

## 2021-01-20 RX ADMIN — BENZTROPINE MESYLATE 0.5 MG: 1 TABLET ORAL at 08:38

## 2021-01-20 RX ADMIN — LISINOPRIL 40 MG: 20 TABLET ORAL at 08:39

## 2021-01-20 RX ADMIN — DIVALPROEX SODIUM 1000 MG: 500 TABLET, FILM COATED, EXTENDED RELEASE ORAL at 22:18

## 2021-01-20 RX ADMIN — MICONAZOLE NITRATE: 20 POWDER TOPICAL at 08:41

## 2021-01-20 RX ADMIN — DOCUSATE SODIUM 100 MG: 100 CAPSULE, LIQUID FILLED ORAL at 08:39

## 2021-01-20 RX ADMIN — DIGOXIN 125 MCG: 125 TABLET ORAL at 08:39

## 2021-01-20 RX ADMIN — ESCITALOPRAM OXALATE 10 MG: 10 TABLET, FILM COATED ORAL at 08:39

## 2021-01-20 NOTE — PROGRESS NOTES
[employer]  Leisure & Hobbies: TV, limited reading, girlfriend has a dog. Pt has meds that come in pill packs. Bills are managed over the phone. IADL Comments: Pollie Bumpers assists c most IADLs and pt report hiring outside help c michela. Additional Comments: Pt reports using rolator in the house. Pt reports 1 fall this year that occured in the house while ambulating in the home; sleeps in regular, flat bed; R hand dominant      Date of Admit: 1/4/2021  Room #: 1005/1005-A     ST Number of Minutes/Billable Intervention  Cog/Memory Deficits     Aphasia/Language 15    Dysarthria/Speech     Apraxia/Speech     Dysphagia/Swallowing     Group     Other    TOTAL Minutes Billed  15    Variance    15--unable to remain alert to participate      Date: 1/20/2021  Day of ARU Week:  3       SLP Individual Minutes  Time In: 1100  Time Out: 1115  Minutes: 15     Variance/Reason:  [] Refusal due to   [] Medical hold/reason  [] Illness   [] Off Unit for test/procedure  [] Extra time needed to complete task  [] Other (specify)    Activity completed: pt seen for language tx. Multiple modalities attempted via pictured stim, activity engagment, Wh?'s with poor response. Pain: denies  Current Diet: Dietary Nutrition Supplements: Low Calorie High Protein Supplement  DIET CARB CONTROL; Dysphagia Pureed  Subjective: Lethargic, confused, poor attn and alertness      Goals and POC: Co-treats where appropriate with PT or OT to facilitate patient goals in functional tasks. LTG      Timeframe for Long-term Goals: No swallow tx                    Short-term Goals  Timeframe for Short-term Goals: 3x/week x3 weeks 30 mins min  LTG: Pt will improve cognitive linguistic abilities for safe return home and clearly communicate care needs. Not met--discontinue due to poor progress and decline in status and alertness. Goal 1: Continued receptive/expressive assessment by 1/6/2021 with additional goals to be added as appropriate.  Goal met 1/6/21  Goal 2: Pt will respond to wh?'s with 90% acc given extra time. Max cues. Pt able to express needs but direct questions have showed decline with vague responses or disoriented responses. Pt when greeted this morning and asked how he's doing responded, \"They were all downstairs in the meeting room. \"  When asked basic questions pt would show delayed responses and require active cues. Pt would close his eyes and fall asleep. Tactile cues to open eyes and respond to questions. Pt would respond \"I don't know. Whatever you want. \" \"You do it. \"  Goal not met. Goal 3: Pt will follow 1 step tasks with mod cues and 50% acc  Max cues for all tasks. Initiation delays, processing delays, poor attn, poor alertness. 20% acc  Goal 4: Pt will attend to therapist on R with eye contact x5 in 5 mins. Max cues x1 throughout session. Goal 5: Pt will improve L visual scanning for locating requested items with mod cues and 60% acc. Pt closed his eyes and wouldn't participate in visual scanning tasks. Opened his eyes once to this therapist on his L and said \"Oh wow--what are you doing here? \"  Looked out window on request but would not respond to quesitons about the weather and closed his eyes and wouldn't re-open. Goal 6: Improve recall of learned tasks with min cues with 70% acc Pt oriented to hospital and that he's had a stroke with leading questions. Unable to recall having just worked with PT/OT. Discharge from [address] [place] [address] due to lack of progress and decline in fx and performance overall. Neuro has followed and pt treated also for UTI with an improvement in alertness somewhat since the last week however cognition and language remains poor. Pt is able to express needs and wants but also shows confabulations, vague responses, disorientation, poor initiation/sequencing/processing/attn/problem solving. Memory recall is poor for tasks within a session.   L neglect and R inattn persist.  There is also a behavior component present with some self limiting behaviors. Prognosis for improvement is guarded based on performance and motivation during time on this unit. Recommend SNF post discharge due to the complex needs of this patient. Alarm placed: [x]bed []chair   []other:   [] activated        Barriers to progress:   [x] Fatigue        [x] Cognitive Deficits   [x] Memory Deficits   [x] Reduced Attn   [x] Self Limiting Behaviors    [x] Reduced insight/awareness     [x] Visual Deficits   [] Premorbid Conditions  [] Impulsivity     [] Other      Education/Interventions used this date:     []   Progress was updated and reviewed in Rehabtracker with patient and/or family this         date. [] Attending Care Conference for pt this date. See Team Patient Care Conference Note for updates.     Interventions used this date:  [x] Speech/Language Treatment    [] Instruction in HEP    Group   [] Dysphagia Treatment   [] Cognitive Skill Dc    Other:         Assessment / Impression                                                          Treatment/Activity Tolerance:   [] Tolerated Treatment well:     [x] Patient limited by fatigue/pain:       [] Patient limited by medical complications:    [] Adverse Reaction to Tx:   [] Significant change in status:      Electronically Signed by  Fabienne Faulkner, [facility], [address] [place] [address]    1/20/2021  11:21 AM

## 2021-01-20 NOTE — PROGRESS NOTES
Occupational Therapy  Physical Rehabilitation: OCCUPATIONAL THERAPY     [x] daily progress note       [] discharge       Patient Name:  Ilana Badillo. :  1950 MRN: [id number]  Room:  [address] [place] [address] Date of Admission: 2021  Rehabilitation Diagnosis:   Cerebral infarction, unspecified [I63.9]  Intracranial hemorrhage ([facility] [I62.9]       Date 2021       Day of ARU Week:  3   Time IN/OUT [id number]   Individual Tx Minutes    Group Tx Minutes    Co-Treat Minutes 53+27   Concurrent Tx Minutes    TOTAL Tx Time Mins 80   Variance Time    Variance Time []   Refusal due to:     []   Medical hold/reason:    []   Illness   []   Off Unit for test/procedure  []   Extra time needed to complete task  []   Therapeutic need  []   Other (specify):   Restrictions Restrictions/Precautions: General Precautions, Fall Risk(L hemiparesis, chronic R shoulder limitations)         Communication with other providers: [x]   OK to see per nursing:     []   Spoke with team member regarding:      Subjective observations and cognitive status: Pt in semi-fowlers upon arrival. Pt demo inconsistent level of alertness. Morning session pt demo lethargic behavior, eyes closed, poor command follow. Afternoon session pt seen with eyes wide open and alert, however when cued to perform motor movements required for bed transfers, pt requires physical assistance. Pt becomes lethargic and requires max v/c and tactile cues to initiate task.    Pain level/location:    /10       Location:    Discharge recommendations  Anticipated discharge date:    Destination: []home alone   []home alone w assist prn   [] home w/ family    [] Continuous supervision       [x]SNF    [] Assisted living     [] Other:   Continued therapy: []HHC OT  []OUTPATIENT  OT   [] No Further OT  Equipment needs: none     Bed Mobility +Transfers :           [x]   Pt received out of bed   TOTAL A X2- see PT notes for details    Functional Mobility:    Assistance: therapeutic ax. Pt shows spontaneous motor functions, however it is inconsistent. Pt will continue to require co-tx sessions d/t pt's poor safety awareness, required level of physical assist, physical and cognitive deficits.       Treatment/Activity Tolerance:   [x] Tolerated treatment with no adverse effects    [] Patient limited by fatigue  [] Patient limited by pain   [] Patient limited by medical complications:    [] Adverse reaction to Tx:   [] Significant change in status    Safety:       [x]  bed alarm set    [x]  chair alarm set    []  Pt refused alarms                []  Telesitter activated      [x]  Gait belt used during tx session      []other:       Number of Minutes/Billable Intervention  Therapeutic Exercise 27   ADL Self-care 20   Neuro Re-Ed    Therapeutic Activity 33   Group    Other:    TOTAL 80       Social History  Social/Functional History  Lives With: Significant other  Type of Home: House  Home Layout: One level  Home Access: Stairs to enter with rails  Entrance Stairs - Number of Steps: 5+3  Entrance Stairs - Rails: Both  Bathroom Shower/Tub: Walk-in shower, Shower chair with back(shower chair doesn't fit in shower)  Bathroom Toilet: Standard  Bathroom Equipment: Toilet raiser, Shower chair  Bathroom Accessibility: Accessible  Home Equipment: 4 wheeled walker, [address] Help From: Family, Neighbor  ADL Assistance: Needs assistance  Bath: Minimal assistance  Dressing: Minimal assistance  Grooming: Independent  Feeding: Independent  Toileting: Independent  Homemaking Assistance: Independent  Homemaking Responsibilities: No  Meal Prep Responsibility: No  Laundry Responsibility: No  Cleaning Responsibility: No  Bill Paying/Finance Responsibility: No  Shopping Responsibility: No  Dependent Care Responsibility: No  Health Care Management: No  Ambulation Assistance: Independent  Transfer Assistance: Needs assistance  Active : No  Patient's  Info: Oniel Marcus is primary , and transport company  Mode of Transportation: Car, Family, Friends  Education: 12th grade  Occupation: Retired  Type of occupation: Pt built trucks for 33 years; [employer]  Leisure & Hobbies: TV, limited reading, girlfriend has a dog. Pt has meds that come in pill packs. Bills are managed over the phone. IADL Comments: Aroldo Jones assists c most IADLs and pt report hiring outside help c yardork. Additional Comments: Pt reports using rolator in the house. Pt reports 1 fall this year that occured in the house while ambulating in the home; sleeps in regular, flat bed; R hand dominant    Objective                                                                                    Goals:  (Update in navigator)  Short term goals  Time Frame for Short term goals: STG=LTG:  Long term goals  Time Frame for Long term goals : 12-14 days or until d/c  Long term goal 1: Pt will complete feeding/grooming/oral care task c MOD I by d/c  Long term goal 2: Pt will complete total body bathing c MIN A by d/c  Long term goal 3: Pt will complete UB dressing c SUP by d/c  Long term goal 4: Pt will complete LB dressing c MOD A by d/c  Long term goal 5: Pt will don/doff footwear c SETUP by d/c  Long term goals 6: Pt will complete toileting c MOD A by d/c  Long term goal 7: Pt will complete functional transfer (toilet, tub, shower) c MOD A by d/c. Long term goal 8: Pt will perform therex/therax to facilitate increased strength/endurance/ax tolerance (c emphasis on dynamic standing balance/tolerance >10 mins and BUE endurance) c Min A in order to complete ADLs. :        Plan of Care                                                                              Times per week: 5 days per week for a minimum of 60 minutes/day plus group as appropriate for 60 minutes.   Treatment to include Plan  Times per day: Daily  Current Treatment Recommendations: Strengthening, Endurance Training, Neuromuscular Re-education, Patient/Caregiver Education & Training, ROM, Equipment Evaluation, Education, & procurement, Self-Care / ADL, Balance Training, Functional Mobility Training, Safety Education & Training, Cognitive/Perceptual Training    Electronically signed by   Hunter Oro, [address], OTR/L  License # [id number]    1/20/2021, 3:57 PM

## 2021-01-20 NOTE — PATIENT CARE CONFERENCE
ACUTE REHAB TEAM CONFERENCE SUMMARY   [facility]    NAME: Phillip Mathur. : 1950 ADMIT DATE: 2021    Rehab Admitting Dx:Cerebral infarction, unspecified [I63.9]  Intracranial hemorrhage ([address] [I62.9]  Patient Comorbid Conditions: Active Hospital Problems    Diagnosis Date Noted    Intraparenchymal hematoma of brain ([address] [S06.360A]     Left hemiparesis ([address] [G81.94]     Dysarthria due to and not concurrent with spontaneous intracerebral hemorrhage [I69.122]     Gait disturbance [R26.9]     Bipolar disorder, in partial remission, most recent episode depressed ([address] [F31.75]     Intracranial hemorrhage ([address] [I62.9] 2021    Uncontrolled type 2 diabetes mellitus with peripheral neuropathy ([address] [E11.42, E11.65]     Paroxysmal atrial fibrillation ([address] [I48.0]      Date: 2021    CASE MANAGEMENT  Current issues/needs regarding patient and family discharge status: Patient lives at home with significant other Jessica Pod. Additional support includes grown children. Current recommendations include BSC, PT, OT, RN, and aide. Plan is to discharge home with Jessica Pod if possible as she is an experienced caregiver (would like patient independent in toileting if returning home). CGT needed? PHYSICAL THERAPY (Updated in QI)  Short term goals  Time Frame for Short term goals: 14-21 tx days:  Short term goal 1: Pt will complete bed mobility tasks and sup<->sit with SBA-Sup. Short term goal 2: Pt will complete OOB transfers using RW with Mod A. Short term goal 3: Pt will ambulate 10 ft over level and carpeted surfaces using RW with Min A. Short term goal 4: Pt will propel manual w/c 150 ft with turns with SBA-Sup. Short term goal 5: Caregiver will be able to safely assist pt with functional mobility tasks. Impairments/deficits, barriers:     Body structures, Functions, Activity limitations: Decreased functional mobility , Decreased safe awareness, Decreased balance, Decreased coordination, Decreased ADL status, Decreased cognition, Decreased vision/visual deficit, Decreased ROM, Decreased endurance, Decreased high-level IADLs, Increased pain, Decreased strength, Decreased sensation, Decreased fine motor control, Decreased posture     Prognosis: Fair, Guarded  Decision Making: High Complexity  Clinical Presentation: unstable/unpredictable characteristics  Equipment needed at discharge:      PT IRF-NOHEMY scores since initial assessment  Bed Mobility:   Sit to Lying  Assistance Needed: Dependent  Comment: Total A x 2  CARE Score: 1  Discharge Goal: Supervision or touching assistance  Roll Left and Right  Assistance Needed: Dependent  Comment: pt with poor motor initiation, sequencing, and attention to required task (eyes were closed during task despite verbal prompts)  CARE Score: 1  Discharge Goal: Supervision or touching assistance  Lying to Sitting on Side of Bed  Assistance Needed: Dependent  Comment: Total A x 2  CARE Score: 1  Discharge Goal: Supervision or touching assistance    Transfers:    Sit to Stand  Assistance Needed: Dependent  Comment:  Mod A + SBA of 2nd person  Reason if not Attempted: Not attempted due to medical condition or safety concerns  CARE Score: 1  Discharge Goal: Partial/moderate assistance  Chair/Bed-to-Chair Transfer  Assistance Needed: Dependent  Comment: x2-person assist using RW  Reason if not Attempted: Not attempted due to medical condition or safety concerns  CARE Score: 1  Discharge Goal: Partial/moderate assistance     Car Transfer  Reason if not Attempted: Not attempted due to medical condition or safety concerns  CARE Score: 88  Discharge Goal: Partial/moderate assistance    Ambulation:    Walking Ability  Does the Patient Walk?: Yes     Walk 10 Feet  Assistance Needed: Dependent  Comment: Min A + assist of 2nd person due to pt's poor problem solving, attention to task, and environmental scanning/navigation  Reason if not Attempted: Not attempted due to medical condition or safety concerns  CARE Score: 1  Discharge Goal: Partial/moderate assistance     Walk 50 Feet with Two Turns  Reason if not Attempted: Not attempted due to medical condition or safety concerns  CARE Score: 88  Discharge Goal: Not Attempted     Walk 150 Feet  Reason if not Attempted: Not applicable  CARE Score: 9  Discharge Goal: Not Applicable     Walking 10 Feet on Uneven Surfaces  Reason if not Attempted: Not attempted due to medical condition or safety concerns  CARE Score: 88  Discharge Goal: Partial/moderate assistance     1 Step (Curb)  Reason if not Attempted: Not attempted due to medical condition or safety concerns  CARE Score: 88  Discharge Goal: Not Attempted     4 Steps  Reason if not Attempted: Not attempted due to medical condition or safety concerns  CARE Score: 88  Discharge Goal: Not Attempted     12 Steps  Reason if not Attempted: Not applicable  CARE Score: 9  Discharge Goal: Not Applicable    Gait Deviations: []None []Slow estella  [] Increased GAGAN  [] Staggers []Deviated Path  [] Decreased step length  [] Decreased step height  []Decreased arm swing  [] Shuffles  [] Decreased head and trunk rotation  []other:        Wheelchair:  w/c Ability: Wheelchair Ability  Uses a Wheelchair and/or Scooter?: Yes  Wheel 50 Feet with Two Turns  Assistance Needed: Dependent  Comment: 20 ft Max A x 2 on 01/11/2021  Reason if not Attempted: Not attempted due to medical condition or safety concerns  CARE Score: 1  Discharge Goal: Supervision or touching assistance  Wheel 150 Feet  Reason if not Attempted: Not attempted due to medical condition or safety concerns  CARE Score: 88  Discharge Goal: Supervision or touching assistance          Balance:        Object: Picking Up Object  Reason if not Attempted: Not attempted due to medical condition or safety concerns  CARE Score: 88  Discharge Goal: Partial/moderate assistance    Fall Risk: []  Yes  []  No    OCCUPATIONAL THERAPY  (Updated in QI)  Short term goals  Time Frame for Short term goals: STG=LTG :   Long term goals  Time Frame for Long term goals : 12-14 days or until d/c  Long term goal 1: Pt will complete feeding/grooming/oral care task c MOD I by d/c  Long term goal 2: Pt will complete total body bathing c MIN A by d/c  Long term goal 3: Pt will complete UB dressing c SUP by d/c  Long term goal 4: Pt will complete LB dressing c MOD A by d/c  Long term goal 5: Pt will don/doff footwear c SETUP by d/c  Long term goals 6: Pt will complete toileting c MOD A by d/c  Long term goal 7: Pt will complete functional transfer (toilet, tub, shower) c MOD A by d/c. Long term goal 8: Pt will perform therex/therax to facilitate increased strength/endurance/ax tolerance (c emphasis on dynamic standing balance/tolerance >10 mins and BUE endurance) c Min A in order to complete ADLs. :                                       OT IRF-NOHEMY scores and goals since initial assessment:    ADLs:    Eating: Eating  Assistance Needed: Supervision or touching assistance  Comment: Pt demo inconsistency c self-feeding. Pt requires Min-Total A depending on level of alertness. Required v/c for task initiation 1/21 during OT session.   CARE Score: 4  Discharge Goal: Independent       Oral Hygiene: Oral Hygiene  Assistance Needed: Partial/moderate assistance  Comment: Min A vc and tactile cues  to stay alert and oriented to task with intermittent hand over hand to assist  Reason if not Attempted: Not attempted due to medical condition or safety concerns  CARE Score: 3  Discharge Goal: Independent    UB/LB Bathing: Shower/Bathe Self  Assistance Needed: Dependent  Comment: Max A/ (total) max vc and tactile cues to intiate and stay on task  Reason if not Attempted: Not attempted due to medical condition or safety concerns  CARE Score: 1  Discharge Goal: Supervision or touching assistance    UB Dressing: Upper Body Dressing  Assistance Needed: Partial/moderate assistance  Comment: Requires task initiation, orientation, and tactile cues for sequencing. Reason if not Attempted: Not attempted due to medical condition or safety concerns  CARE Score: 3  Discharge Goal: Supervision or touching assistance         LB Dressing: Lower Body Dressing  Assistance Needed: Substantial/maximal assistance  Comment: Max A for threading BLE and donning over hips. Pt attempted to assist c donning over hips. Reason if not Attempted: Not attempted due to medical condition or safety concerns  CARE Score: 2  Discharge Goal: Partial/moderate assistance    Donning and [place] Footwear: Putting On/Taking Off Footwear  Assistance Needed: Dependent  Comment: x  CARE Score: 1  Discharge Goal: Set-up or clean-up assistance      Toileting: Toileting Hygiene  Assistance Needed: Dependent  Comment: TOTAL- 2 Person A required for safety, Total x1 for toileting and CM, Min A x1 while in standing  CARE Score: 1  Discharge Goal: Partial/moderate assistance      Toilet Transfers: Toilet Transfer  Assistance Needed: Dependent  Comment: MOD X1 + SBA X1- Requires 2 person assist for safety. Reason if not Attempted: Not attempted due to medical condition or safety concerns  CARE Score: 1  Discharge Goal: Partial/moderate assistance      Impairments/deficits, barriers:  Cognitive deficits, self-limiting behavior, level of alertness.   Assessment  Performance deficits / Impairments: Decreased functional mobility , Decreased cognition, Decreased high-level IADLs, Decreased ADL status, Decreased endurance, Decreased fine motor control, Decreased ROM, Decreased safe awareness, Decreased strength, Decreased balance, Decreased vision/visual deficit, Decreased coordination  Decision Making: High Complexity  REQUIRES OT FOLLOW UP: Yes  Equipment needed at discharge: [facility]      COGNITIVE FUNCTION/SPEECH THERAPY (AS INDICATED)  LTG                         Short-term Goals Pt has not met any goals but has declined overall. Discharge from [address] [place] [address] due to lack of progress and decline in fx and performance overall. Neuro has followed and pt treated also for UTI with an improvement in alertness somewhat since the last week however cognition and language remains poor. Pt is able to express needs and wants but also shows confabulations, vague responses, disorientation, poor initiation/sequencing/processing/attn/problem solving. Memory recall is poor for tasks within a session. L neglect and R inattn persist.  There is also a behavior component present with some self limiting behaviors. Prognosis for improvement is guarded based on performance and motivation during time on this unit. Recommend SNF post discharge due to the complex needs of this patient. Timeframe for Short-term Goals: 3x/week x3 weeks 30 mins min  LTG: Pt will improve cognitive linguistic abilities for safe return home and clearly communicate care needs. Goal 1: Continued receptive/expressive assessment by 1/6/2021 with additional goals to be added as appropriate. Goal met 1/6/21  Goal 2: Pt will respond to wh?'s with 90% acc given extra time. Goal 3: Pt will follow 1 step tasks with mod cues and 50% acc  Goal 4: Pt will attend to therapist on R with eye contact x5 in 5 mins. Goal 5: Pt will improve L visual scanning for locating requested items with mod cues and 60% acc. Goal 6: Improve recall of learned tasks with min cues with 70% acc                        Ongoing impairments or deficits or barriers: cogntion, memory, language  Areas where progress has been made:no progress except a slight improvement in alertness  Strategies that improve performance: No strategies have been found to be successful. Max cues and hand over hand assist, visual tactile cues, auditory cues, reorientation. Nursing Current Medical Status:        [x]? Is incontinent of bowel and bladder    [x]? Has had an adequate number of bowel movements   [x]?  Urinates with no urinary retention >300ml in bladder   []? Targeting bladder protocol with see removal   [x]? Maintaining O2 SATs at 92% or greater   [x]? Has pain managed while on ARU               [x]? Has had no skin breakdown while on ARU   []? Has improved skin integrity via wound measurements   []? Has no signs/symptoms of infection at the wound site   []? Pressure wounds Stage/Location:    []? Arrived on unit with pressure wound  [x]? Has been free from injury during hospitalization     []? Has experienced a fall during hospitalization  [x]? Ongoing education with patient/family with understanding demonstrated for:  []? Receives IV Fluids  []? Other:    NUTRITION  Weight: 224 lb 9.6 oz (101.9 kg) / Body mass index is 33.17 kg/m². Current diet: Dietary Nutrition Supplements: Low Calorie High Protein Supplement  DIET GENERAL; Intake: Meal intake remains overall poor, 25%, drinks some oral nutrition supplement      Medical improvements/barriers: Poor ST progress--unable to actively participate in session. Poor attn, visual neglect, slow processing and initiation, poor memory, some self limiting behaviors, sit to stand improvements, poor command following--tactile, verbal cues needed with 2nd person, poor awareness and insight. Total assist for ADL's with a 2nd person needed for safety. Diet fluctuations due to lethargy modified with alertness back to regular but pt still refusing foods. Team goals for next treatment period/Intervention for current barriers:   [x] Pt will increase activity tolerance for daily tasks.   [] Pt will improve bed mobility with reduced assist.  [x] Pt will improve safety in fx tasks with reduced cues/assist  [x] Pt will improve transfers with reduced assist  [x] Pt will improve toileting with reduced assist  [x] Pt will improve ADL's with use of adaptive equipment with reduced assist  [] Pt will improve pain mgmt for maximum participation in tx program  [] Pt will improve communication to get basic needs met on unit  [] Pt will improve swallowing for safe diet advancement with use of strategies  []  Plan for discharge to home. Patient Strengths:     Justification for Continued Stay  Based on my medical assessment of the patient and review of information from the interdisciplinary team as part of this weekly team conference, the patient continues to meet the following criteria for IRF level of care:   The patient requires active and ongoing intervention of multiple therapy disciplines   The patient requires and intensive rehabilitation therapy program   The patient requires continued physician supervision by a rehabilitation physician   The patient requires 24 hours rehab nursing care   The patient requires an intensive and coordinated interdisciplinary team approach to the delivery of rehabilitative care. Assessment/Plan   []  The patient is making good progression towards their long term goals and is actively participating in and has a reasonable expectation to continue to benefit from the intensive rehabilitation therapy program   []  The estimated discharge date has been changed from initial team conference due to:   [x]  The estimated discharge destination has been changed from initial team conference due to: care needs and limited progress         Ongoing tx following discharge: []HHC     []OUTPATIENT     [] No Further Treatment     [] Family/Caregiver Training  []  Transitional Living Arrangement   [] Home Assessment (date  )     [] Family Conference   []  Therapeutic Pass       []  Other: (specify)    Estimated Discharge Date: 1/23/21 or as SNF becomes available    Estimated Discharge Destination: []home alone   []home alone with assist prn  []Continuous supervision []Return home with spouse/family   [] Assisted living  [x]SNF     Team members participating in today's conference.     [x] MARIA DEL CARMEN Tovar, Medical Director  [x] Dilcia Mcclain,   [x] Kevin Ortega, Nurse Mgr [x]  Vy Little, PT   [x] Janee Luis, OT   []  Kemal Salmeron, PT  [] Clarence Denver, OT      [x] Ricki Walker, SLP     []  Prieto Matthews, RD     [x] Alexandre Thomason,     [x]Vinita Saba,    [x]  Slade Hanks RN    [] Yudy Beyer RN  [] Emmanuel Peralta RN    [] Kevon Arguello RN [] Oleg Alonso RN        I have led this Team Conference and agree with the plan, Mat Forde MD, 1/21/2021, 12:32 PM  Goals have been updated to reflect recent status.     Team conference note transcribed this date by: Aleksander Joe MA, [id number] [facility], Therapy Coordinator

## 2021-01-20 NOTE — PROGRESS NOTES
Physical Therapy    [x] daily progress note       [] discharge       Patient Name:  Libby Pandey. :  1950 MRN: [id number]  Room:  [address] [place] [address] Date of Admission: 2021  Rehabilitation Diagnosis:   Cerebral infarction, unspecified [I63.9]  Intracranial hemorrhage ([facility] [address] [I62.9]       Date 2021       Day of ARU Week:  3   Time IN/OUT 1000/1053  1312/1339   Individual Tx Minutes    Group Tx Minutes    Co-Treat Minutes 53+27  A cotreat was completed this date with  [] PT    [x] OT   [] ST      This pt requires simultaneous intervention of 2 skilled therapists to safely complete tasks to address complexity of deficits defined in the goals including:  [x]Attention   [x] Safety    [x]Problem Solving    [x]Sequencing     [x]Initiation    [x]Processing      []Recall of learned tasks  [] Communication  [] Self Feeding   [x]ADL's    [x]UE Function    [x]Motor planning   [x]Balance  []Energy Conservation   [x]Verbal cues   [x] Tactile Cues  []Barriers     [x]Transfers   [x]Device Use   Concurrent Tx Minutes    TOTAL Tx Time Mins 80   Variance Time +5   Variance Time []   Refusal due to:     []   Medical hold/reason:    []   Illness   []   Off Unit for test/procedure  []   Extra time needed to complete task  []   Therapeutic need  []   Other (specify):   Restrictions Restrictions/Precautions  Restrictions/Precautions: General Precautions, Fall Risk(L hemiparesis, chronic R shoulder limitations)  Position Activity Restriction  Other position/activity restrictions: see catheter   Interdisciplinary communication [x]   Cleared for therapy per nursing     [x]   PCT notified about pt to be assisted back to bed after sitting up in the recliner for 30 minutes today.   []   RN updated on pt performance  []   Spoke with   []   Spoke with OT  []   Spoke with MD  []   Other:    Subjective observations and cognitive status: (AM) Pt in bed, asleep and difficult to awaken despite constant verbal cues and sternal rubs. (PM) Pt in bed, alert initially but had poor motor initiation and decreased level of alertness when asked to sit up at EOB. Pain level/location: Location: neck and shoulders    Discharge recommendations  Anticipated discharge date:   01/26/2021  Destination: []?home alone   []?home alone with assist PRN     []? home w/ family      []? Continuous supervision  [x]? SNF    []? Assisted living     []? Other:  Continued therapy: []?C PT  []? OUTPATIENT PT   []? No Further PT  [x]? SNF PT  Caregiver training recommended: []? Yes  []? No   Equipment needs: none      Bed Mobility:           []   Pt received out of bed    Rolling R/L:  Total A  Scooting: Total A x 2  Lying --> Sit:  Total A x 2   Sit --> lying: Total A x 2  Bed features used: [x] Yes  [] No     Transfers:    Sit--> Stand: Total A (Mod A + SBA of 2nd person)  Stand --> Sit:   Total A (Mod A + Min A of 2nd person to assist with releasing L hand and to reach for sitting surface and to manage AD when turning due to motor apraxia and poor command following)   Assistive device used for transfer:  RW    Gait:    Distance:  ~15 ft with 3 turns  Assistance: Total A (Min A + SBA-Min A of 2nd person to assist with AD management and environmental scanning/navigation due to pt's cognitive and attention deficits)   Device:  RW  Gait Quality:  Step-to, decreased step length and step height bilat, interrupted cycles of stepping      Additional Therapeutic activities/exercises completed this date:     []   Nu-step:  Time:        Level:         #Steps:       []   Rebounder:    []  Seated     []  Standing        [x]   Balance training: static sitting at EOB with SBA as PT and OT addressed improving pt's level of alertness in preparation for sit<->stand transferstraining with RW; dynamic sitting balance with SBA as OT actively engaged pt in self-feeding (drinking from a cup) and grooming (combing of hair).         [x]   Postural training: verbal cues to facilitate increased trunk extension upon immediate standing to increase standing tolerance as OT actively engaged pt to take part in LB dressing and in preparation for gait training using RW    []   Supine ther ex (reps/sets):     []   Seated ther ex (reps/sets):     []   Standing ther ex (reps/sets):     []   Picking up object from floor (standing):                   []   Reacher used   []   Other: Toileting activity completed with    []   Other:     Comments:      Patient/Caregiver Education and Training:   []   Role of PT  []   Education about Dx  []   Use of call light for assist   []   HEP provided and explained   []   Treatment plan reviewed  []   Home safety  []   Wheelchair mobility/management   []   Body mechanics  [x]   Bed Mobility/Transfer technique  [x]   Gait technique/sequencing  []   Proper use of assistive device/adaptive equipment  []   Stair training/Advanced mobility safety and technique  []   Reinforced patient's precautions/mobility while maintaining precautions  []   Postural awareness  []   Family/caregiver training  []   Progress was updated and reviewed in Rehabtracker with patient and/or family this date. [x]   Other: cognitive reorientation     Treatment Plan for Next Session: transfers and gait training, simple command following, environmental scanning activity       Assessment:  Pt continues to require Total A for bed mobility tasks despite use of tactile, verbal, and visual cues required for motor learning due to his poor level of alertness. Much of the time was devoted on efforts to keep pt awake and focused on required tasks during the therapy sessions and so his active participation to accomplish purposeful and/or functional to address his deficits is very minimal/limited. Progress remains to be slow due to his physical and cognitive deficits and likely self-limiting behavior as well.  Pt is able to ambulate but requires 2-person assist to safely complete due to his poor attention and problem solving to manage turns, obstacles, and AD requiring tactile and verbal cues. Treatment/Activity Tolerance:   [] Tolerated treatment with no adverse effects    [] Patient limited by fatigue  [] Patient limited by pain   [x] Patient limited by medical complications: increased lethargy during activities     [] Adverse reaction to Tx:   [] Significant change in status    Barrier/s to progress/learning:   []   None  [x]   Cognition  []   Hearing deficit  []   Pre-morbid mental/psychological status   [x]   Motivation  []   Communication  []   Anxiety  [x]   Vision deficit versus neglect?   [x]   Attention  []   Other:    Safety:       [x]  (AM) bed alarm set    [x]  (PM) chair alarm set    []  Pt refused alarms                []  Telesitter activated      [x]  Gait belt used during tx session      []other:       Number of Minutes/Billable Intervention  Gait Training 20   Therapeutic Exercise    Neuro Re-Ed    Therapeutic Activity 60   Wheelchair Propulsion    Group    Other:    TOTAL 80       Social History  Social/Functional History  Lives With: Significant other  Type of Home: House  Home Layout: One level  Home Access: Stairs to enter with rails  Entrance Stairs - Number of Steps: 5+3  Entrance Stairs - Rails: Both  Bathroom Shower/Tub: Walk-in shower, Shower chair with back(shower chair doesn't fit in shower)  Bathroom Toilet: Standard  Bathroom Equipment: Toilet raiser, Shower chair  Bathroom Accessibility: Accessible  Home Equipment: 4 wheeled walker, [address] Help From: Family, Neighbor  ADL Assistance: Needs assistance  Bath: Minimal assistance  Dressing: Minimal assistance  Grooming: Independent  Feeding: Independent  Toileting: Independent  Homemaking Assistance: Independent  Homemaking Responsibilities: No  Meal Prep Responsibility: No  Laundry Responsibility: No  Cleaning Responsibility: No  Bill Paying/Finance Responsibility: No  Shopping Responsibility: No  Dependent Care Responsibility: No  Health Care Management: No  Ambulation Assistance: Independent  Transfer Assistance: Needs assistance  Active : No  Patient's  Info: Edna Jensen is primary , and transport company  Mode of Transportation: Car, Family, Friends  Education: 12th grade  Occupation: Retired  Type of occupation: Pt built trucks for 33 years; [employer]  Leisure & Hobbies: TV, limited reading, girlfriend has a dog. Pt has meds that come in pill packs. Bills are managed over the phone. IADL Comments: Rachelle Sharma assists c most IADLs and pt report hiring outside help c yardork. Additional Comments: Pt reports using rolator in the house. Pt reports 1 fall this year that occured in the house while ambulating in the home; sleeps in regular, flat bed; R hand dominant    Objective                                                                                    Goals:  (Update in navigator)  Short term goals  Time Frame for Short term goals: 14-21 tx days:  Short term goal 1: Pt will complete bed mobility tasks and sup<->sit with SBA-Sup. Short term goal 2: Pt will complete OOB transfers using RW with Mod A. Short term goal 3: Pt will ambulate 10 ft over level and carpeted surfaces using RW with Min A. Short term goal 4: Pt will propel manual w/c 150 ft with turns with SBA-Sup. Short term goal 5: Caregiver will be able to safely assist pt with functional mobility tasks. :   :        Plan of Care                                                                              Times per week: 5 days per week for a minimum of 60 minutes/day plus group as appropriate for 60 minutes.   Treatment to include Current Treatment Recommendations: Strengthening, Functional Mobility Training, Wheelchair Mobility Training, Neuromuscular Re-education, Home Exercise Program, Equipment Evaluation, Education, & procurement, ROM, Transfer Training, Cognitive/Perceptual Training, Gait Training, Cognitive Reorientation, Safety Education & Training, Balance Training, Endurance Training, Pain Management, Patient/Caregiver Education & Training, Positioning    Electronically signed by   Junior Morrell, PT  1/20/2021, 10:03 AM

## 2021-01-21 LAB
ANION GAP SERPL CALCULATED.3IONS-SCNC: 12 MMOL/L (ref 4–16)
BUN BLDV-MCNC: 15 MG/DL (ref 6–23)
CALCIUM SERPL-MCNC: 9 MG/DL (ref 8.3–10.6)
CHLORIDE BLD-SCNC: 102 MMOL/L (ref 99–110)
CO2: 23 MMOL/L (ref 21–32)
CREAT SERPL-MCNC: 1.2 MG/DL (ref 0.9–1.3)
DIGOXIN LEVEL: 1.4 NG/ML (ref 0.8–2)
DOSE AMOUNT: NORMAL
DOSE TIME: NORMAL
GFR AFRICAN AMERICAN: >60 ML/MIN/1.73M2
GFR NON-AFRICAN AMERICAN: 60 ML/MIN/1.73M2
GLUCOSE BLD-MCNC: 103 MG/DL (ref 70–99)
GLUCOSE BLD-MCNC: 138 MG/DL (ref 70–99)
GLUCOSE BLD-MCNC: 170 MG/DL (ref 70–99)
GLUCOSE BLD-MCNC: 172 MG/DL (ref 70–99)
GLUCOSE BLD-MCNC: 94 MG/DL (ref 70–99)
HCT VFR BLD CALC: 42.4 % (ref 42–52)
HEMOGLOBIN: 12.9 GM/DL (ref 13.5–18)
MCH RBC QN AUTO: 28.8 PG (ref 27–31)
MCHC RBC AUTO-ENTMCNC: 30.4 % (ref 32–36)
MCV RBC AUTO: 94.6 FL (ref 78–100)
PDW BLD-RTO: 13.8 % (ref 11.7–14.9)
PLATELET # BLD: 143 K/CU MM (ref 140–440)
PMV BLD AUTO: 9 FL (ref 7.5–11.1)
POTASSIUM SERPL-SCNC: 3.8 MMOL/L (ref 3.5–5.1)
RBC # BLD: 4.48 M/CU MM (ref 4.6–6.2)
SODIUM BLD-SCNC: 137 MMOL/L (ref 135–145)
WBC # BLD: 6.9 K/CU MM (ref 4–10.5)

## 2021-01-21 PROCEDURE — 97535 SELF CARE MNGMENT TRAINING: CPT

## 2021-01-21 PROCEDURE — 97110 THERAPEUTIC EXERCISES: CPT

## 2021-01-21 PROCEDURE — 97530 THERAPEUTIC ACTIVITIES: CPT

## 2021-01-21 PROCEDURE — 97116 GAIT TRAINING THERAPY: CPT

## 2021-01-21 PROCEDURE — 6370000000 HC RX 637 (ALT 250 FOR IP): Performed by: PHYSICAL MEDICINE & REHABILITATION

## 2021-01-21 PROCEDURE — 82962 GLUCOSE BLOOD TEST: CPT

## 2021-01-21 PROCEDURE — 85027 COMPLETE CBC AUTOMATED: CPT

## 2021-01-21 PROCEDURE — 36415 COLL VENOUS BLD VENIPUNCTURE: CPT

## 2021-01-21 PROCEDURE — 2580000003 HC RX 258: Performed by: PHYSICAL MEDICINE & REHABILITATION

## 2021-01-21 PROCEDURE — 1280000000 HC REHAB R&B

## 2021-01-21 PROCEDURE — 80048 BASIC METABOLIC PNL TOTAL CA: CPT

## 2021-01-21 PROCEDURE — 94761 N-INVAS EAR/PLS OXIMETRY MLT: CPT

## 2021-01-21 PROCEDURE — 94150 VITAL CAPACITY TEST: CPT

## 2021-01-21 PROCEDURE — 6360000002 HC RX W HCPCS: Performed by: PHYSICAL MEDICINE & REHABILITATION

## 2021-01-21 PROCEDURE — 99233 SBSQ HOSP IP/OBS HIGH 50: CPT | Performed by: PHYSICAL MEDICINE & REHABILITATION

## 2021-01-21 PROCEDURE — 80162 ASSAY OF DIGOXIN TOTAL: CPT

## 2021-01-21 PROCEDURE — 6360000002 HC RX W HCPCS: Performed by: INTERNAL MEDICINE

## 2021-01-21 PROCEDURE — 6370000000 HC RX 637 (ALT 250 FOR IP): Performed by: NURSE PRACTITIONER

## 2021-01-21 RX ADMIN — CEFEPIME 2 G: 2 INJECTION, POWDER, FOR SOLUTION INTRAVENOUS at 12:11

## 2021-01-21 RX ADMIN — MICONAZOLE NITRATE: 20 POWDER TOPICAL at 09:43

## 2021-01-21 RX ADMIN — MEGESTROL ACETATE 200 MG: 40 SUSPENSION ORAL at 09:41

## 2021-01-21 RX ADMIN — DIGOXIN 125 MCG: 125 TABLET ORAL at 09:41

## 2021-01-21 RX ADMIN — DOCUSATE SODIUM 100 MG: 100 CAPSULE, LIQUID FILLED ORAL at 09:42

## 2021-01-21 RX ADMIN — INSULIN LISPRO 1 UNITS: 100 INJECTION, SOLUTION INTRAVENOUS; SUBCUTANEOUS at 12:10

## 2021-01-21 RX ADMIN — CEFEPIME 2 G: 2 INJECTION, POWDER, FOR SOLUTION INTRAVENOUS at 01:03

## 2021-01-21 RX ADMIN — AMLODIPINE BESYLATE 10 MG: 10 TABLET ORAL at 09:41

## 2021-01-21 RX ADMIN — MICONAZOLE NITRATE: 20 POWDER TOPICAL at 21:09

## 2021-01-21 RX ADMIN — ESCITALOPRAM OXALATE 10 MG: 10 TABLET, FILM COATED ORAL at 09:41

## 2021-01-21 RX ADMIN — BENZTROPINE MESYLATE 0.5 MG: 1 TABLET ORAL at 21:17

## 2021-01-21 RX ADMIN — SENNOSIDES 8.6 MG: 8.6 TABLET, FILM COATED ORAL at 21:07

## 2021-01-21 RX ADMIN — LISINOPRIL 40 MG: 20 TABLET ORAL at 09:40

## 2021-01-21 RX ADMIN — ACETAMINOPHEN 650 MG: 325 TABLET ORAL at 12:10

## 2021-01-21 RX ADMIN — ATORVASTATIN CALCIUM 80 MG: 40 TABLET, FILM COATED ORAL at 21:07

## 2021-01-21 RX ADMIN — DOXAZOSIN 2 MG: 4 TABLET ORAL at 21:08

## 2021-01-21 RX ADMIN — CEFEPIME 2 G: 2 INJECTION, POWDER, FOR SOLUTION INTRAVENOUS at 23:47

## 2021-01-21 RX ADMIN — DOCUSATE SODIUM 100 MG: 100 CAPSULE, LIQUID FILLED ORAL at 21:07

## 2021-01-21 RX ADMIN — MICONAZOLE NITRATE: 2 POWDER TOPICAL at 09:43

## 2021-01-21 RX ADMIN — METOPROLOL TARTRATE 25 MG: 25 TABLET, FILM COATED ORAL at 09:41

## 2021-01-21 RX ADMIN — ENOXAPARIN SODIUM 40 MG: 100 INJECTION SUBCUTANEOUS at 09:40

## 2021-01-21 RX ADMIN — BENZTROPINE MESYLATE 0.5 MG: 1 TABLET ORAL at 09:41

## 2021-01-21 RX ADMIN — METOPROLOL TARTRATE 25 MG: 25 TABLET, FILM COATED ORAL at 11:00

## 2021-01-21 RX ADMIN — DIVALPROEX SODIUM 1000 MG: 500 TABLET, FILM COATED, EXTENDED RELEASE ORAL at 21:07

## 2021-01-21 RX ADMIN — FAMOTIDINE 20 MG: 20 TABLET, FILM COATED ORAL at 09:41

## 2021-01-21 RX ADMIN — METOPROLOL TARTRATE 25 MG: 25 TABLET, FILM COATED ORAL at 21:07

## 2021-01-21 RX ADMIN — DOXAZOSIN 2 MG: 4 TABLET ORAL at 09:40

## 2021-01-21 ASSESSMENT — PAIN SCALES - GENERAL: PAINLEVEL_OUTOF10: 3

## 2021-01-21 NOTE — CARE COORDINATION
LSW called patient's son Pako ([phone]) to discuss discharge plan. Pako is in agreement for patient to go to a SNF. His first choice is [facility] as patient has been there before. Alternate choices would be [facility] and [facility]. LSW called Mary with Admissions at [facility] ([phone]) and referral made. LSW awaits determination.

## 2021-01-21 NOTE — PROGRESS NOTES
Physical Therapy    [x] daily progress note       [] discharge       Patient Name:  Babiat Lopez. :  1950 MRN: [id number]  Room:  [address] [place] [address] Date of Admission: 2021  Rehabilitation Diagnosis:   Cerebral infarction, unspecified [I63.9]  Intracranial hemorrhage ([facility] [address] [I62.9]       Date 2021       Day of ARU Week:  4   Time IN//1030  1305/1338  1539/0344   Individual Tx Minutes    Group Tx Minutes    Co-Treat Minutes 46+15+71 A cotreat was completed this date with  [] PT    [x] OT   [] ST      This pt requires simultaneous intervention of 2 skilled therapists to safely complete tasks to address complexity of deficits defined in the goals including:  [x]Attention   [x] Safety    [x]Problem Solving    [x]Sequencing     [x]Initiation    [x]Processing      []Recall of learned tasks  [] Communication  [] Self Feeding   [x]ADL's    [x]UE Function    [x]Motor planning   [x]Balance  []Energy Conservation   [x]Verbal cues   [] Tactile Cues  []Barriers     [x]Transfers   []Device Use   Concurrent Tx Minutes    TOTAL Tx Time Mins 92   Variance Time +2   Variance Time []   Refusal due to:     []   Medical hold/reason:    []   Illness   []   Off Unit for test/procedure  [x]   Extra time needed to complete task  []   Therapeutic need  []   Other (specify):   Restrictions Restrictions/Precautions  Restrictions/Precautions: General Precautions, Fall Risk(L hemiparesis, chronic R shoulder limitations)  Position Activity Restriction  Other position/activity restrictions: see catheter   Interdisciplinary communication [x]   Cleared for therapy per nursing     []   RN notified about issues during session  []   RN updated on pt performance  []   Spoke with   []   Spoke with OT  []   Spoke with MD  []   Other:    Subjective observations and cognitive status: (AM) Pt in bed, alert and receiving meds with RN assisting with bringing spoon with apple sauce to his mouth.  Pt was able to express need to use the bathroom. (PM1) Pt seated in recliner, appeared to have self-initiated to bring his feet down to the floor from an elevated position when assisted at the end of AM session with decreased awareness of see catheter line, appeared to be lethargic. (PM2) Pt in bed, alert and willing to be assisted to recliner to eat dinner. Pain level/location: Location:  R shoulder with movement    Discharge recommendations  Anticipated discharge date:  01/23/2021  Destination: []home alone   []home alone with assist PRN     [] home w/ family      [] Continuous supervision  [x]SNF    [] Assisted living     [] Other:  Continued therapy: []C PT  []OUTPATIENT PT   [] No Further PT  [x]SNF PT  Caregiver training recommended: []Yes  [] No   Equipment needs: none      Bed Mobility:           []   Pt received out of bed   Scooting: Total A x 2  Lying --> Sit:  Total A (no motor initiation despite verbal and tactile cues provided, resistance noted on BLE when passively moved by PT)  Sit --> lying: Total A x 2   Bed features used: [x] Yes  [] No     Transfers:    Sit--> Stand:  (AM) Total A (Mod A + SBA of 2nd person)   (PM) Total A (Mod A + Min A of 2nd person); required tactile cues on both hands to position appropriately and trunk momentum to facilitate anterior weight shifting required for standing. Stand --> Sit:   (AM) Total A (Mod A + SBA of 2nd person)   (PM) Total A (Mod A + Min A of 2nd person); required tactile cues on L hand to transition from RW to armrest of chair; does not transition R hand that may be likely due to chronic R shoulder pain)   Chair-->Bed/Bed --> Chair:  (PM) Total A (Mod A + Min A of 2nd person)  Toilet Transfer (if applicable): (AM)  Total A (Mod A + SBA of 2nd person) with L grab bar  Assistive device used for transfer:   RW  Pt required physical assist to turn AD due to impaired motor sequencing and problem solving with every transfer that required turning.       Gait:    Distance: (AM) 12 ft with 2 turns + (PM1) <10 ft with turn to transfer to recliner/manual w/c x 2 trials (ambulation tolerance was limited by fatigue and decreased level of alertness) (PM2) ~15 ft with turns   Assistance: Total A (Min A + assist of 2nd person to assist with AD management and navigation of pathway)  Device:  RW and w/c follow  Gait Quality:  Interrupted cycles of step-to pattern due to impaired motor sequencing and poor attention to required task, slow estella, visible shaking of LUE and BLE during physical exertion. Additional Therapeutic activities/exercises completed this date:     []   Nu-step:  Time:        Level:         #Steps:       []   Rebounder:    []  Seated     []  Standing        [x]   Balance training: dynamic sitting balance with SBA while on toilet as OT actively engaged pt in UB and LB dressing. [x]   Postural training: verbal and tactile cues provided to facilitate increased trunk extension upon immediate standing and prior to ambulation and during ADL tasks    []   Supine ther ex (reps/sets):     []   Seated ther ex (reps/sets):     []   Standing ther ex (reps/sets):     []   Picking up object from floor (standing):                   []   Reacher used   [x]   Other:  Toileting activity completed with Total A (required x2-person assist due to impaired balance and cognition)      []   Other:    Comments:      Patient/Caregiver Education and Training:   []   Role of PT  []   Education about Dx  []   Use of call light for assist   []   HEP provided and explained   []   Treatment plan reviewed  []   Home safety  []   Wheelchair mobility/management   []   Body mechanics  [x]   Bed Mobility/Transfer technique  [x]   Gait technique/sequencing  []   Proper use of assistive device/adaptive equipment  []   Stair training/Advanced mobility safety and technique  []   Reinforced patient's precautions/mobility while maintaining precautions  [x]   Postural awareness  []   Family/caregiver training  []   Progress was updated and reviewed in Rehabtracker with patient and/or family this date. [x]   Other: simple command following     Treatment Plan for Next Session: discharge QI tasks      Assessment: Pt remains to have poor motor initiation, motor sequencing, problem solving, and  processing even of simple commands. He did not initiate any movement when direct, verbal cues were provided during transfers/mobility training requiring constant use of tactile cues to initiate motor response, to facilitate required motor sequencing, and to stay on required task. (PM1) Pt with decreased active engagement due to the regression of his level of alertness and poor command following even to simple commands that he was only able to perform a few steps forward and was not safe to proceed with further gait training.      Treatment/Activity Tolerance:   [] Tolerated treatment with no adverse effects    [x] Patient limited by fatigue  [x] Patient limited by pain   [] Patient limited by medical complications:    [] Adverse reaction to Tx:   [] Significant change in status    Barrier/s to progress/learning:   []   None  [x]   Cognition  []   Hearing deficit  []   Pre-morbid mental/psychological status   [x]   Motivation  []   Communication  []   Anxiety  []   Vision deficit  [x]   Attention/neglect  [x]   Other: variable level of alertness, motor apraxia     Safety:       [x]  (PM) bed alarm set    [x] (AM/PM2) chair alarm set    []  Pt refused alarms                []  Telesitter activated      [x]  Gait belt used during tx session      []other:       Number of Minutes/Billable Intervention  Gait Training 29   Therapeutic Exercise    Neuro Re-Ed    Therapeutic Activity 63   Wheelchair Propulsion    Group    Other:    TOTAL 92       Social History  Social/Functional History  Lives With: Significant other  Type of Home: House  Home Layout: One level  Home Access: Stairs to enter with rails  Entrance Stairs - Number of Steps: 5+3  Entrance Stairs - Rails: Both  Bathroom Shower/Tub: Walk-in shower, Shower chair with back(shower chair doesn't fit in shower)  Bathroom Toilet: Standard  Bathroom Equipment: Toilet raiser, Shower chair  Bathroom Accessibility: Accessible  Home Equipment: 4 wheeled walker, [address] Help From: Family, Neighbor  ADL Assistance: Needs assistance  Bath: Minimal assistance  Dressing: Minimal assistance  Grooming: Independent  Feeding: Independent  Toileting: Independent  Homemaking Assistance: Independent  Homemaking Responsibilities: No  Meal Prep Responsibility: No  Laundry Responsibility: No  Cleaning Responsibility: No  Bill Paying/Finance Responsibility: No  Shopping Responsibility: No  Dependent Care Responsibility: No  Health Care Management: No  Ambulation Assistance: Independent  Transfer Assistance: Needs assistance  Active : No  Patient's  Info: Leon Rogers is primary , and transport company  Mode of Transportation: Car, Family, Friends  Education: 12th grade  Occupation: Retired  Type of occupation: Pt built trucks for 33 years; [employer]  Leisure & Hobbies: TV, limited reading, girlfriend has a dog. Pt has meds that come in pill packs. Bills are managed over the phone. IADL Comments: [place] Friend assists c most IADLs and pt report hiring outside help c yardork. Additional Comments: Pt reports using rolator in the house. Pt reports 1 fall this year that occured in the house while ambulating in the home; sleeps in regular, flat bed; R hand dominant    Objective                                                                                    Goals:  (Update in navigator)  Short term goals  Time Frame for Short term goals: 14-21 tx days:  Short term goal 1: Pt will complete bed mobility tasks and sup<->sit with SBA-Sup. Short term goal 2: Pt will complete OOB transfers using RW with Mod A.   Short term goal 3: Pt will ambulate 10 ft over level and carpeted surfaces

## 2021-01-21 NOTE — PROGRESS NOTES
[address] : 1950  Acct #: [de-identified]  MRN: [id number]              PM&R Progress Note      Admitting diagnosis: Intracranial hemorrhage ( [place] [address])     Comorbid diagnoses impacting rehabilitation: Left hemiparesis, dysarthria, paroxysmal atrial fibrillation, essential hypertension, uncontrolled diabetes type 2 with peripheral neuropathy, COPD, bipolar disorder, diastolic congestive heart failure     Chief complaint: Little more interactive. Speech therapy is advancing to regular texture diet now. That pleases him. Still not very hungry. Prior (baseline) level of function: Independent. Current level of function:         Current  [facility]F-[facility] and Goals:   Occupational Therapy:    Short term goals  Time Frame for Short term goals: STG=LTG :   Long term goals  Time Frame for Long term goals : 12-14 days or until d/c  Long term goal 1: Pt will complete feeding/grooming/oral care task c MOD I by d/c  Long term goal 2: Pt will complete total body bathing c MIN A by d/c  Long term goal 3: Pt will complete UB dressing c SUP by d/c  Long term goal 4: Pt will complete LB dressing c MOD A by d/c  Long term goal 5: Pt will don/doff footwear c SETUP by d/c  Long term goals 6: Pt will complete toileting c MOD A by d/c  Long term goal 7: Pt will complete functional transfer (toilet, tub, shower) c MOD A by d/c. Long term goal 8: Pt will perform therex/therax to facilitate increased strength/endurance/ax tolerance (c emphasis on dynamic standing balance/tolerance >10 mins and BUE endurance) c Min A in order to complete ADLs.  :                                       Eating: Eating  Assistance Needed: Partial/moderate assistance  Comment: per nursing Min/Mod A  CARE Score: 3  Discharge Goal: Independent       Oral Hygiene: Oral Hygiene  Assistance Needed: Partial/moderate assistance  Comment: Min A vc and tactile cues  to stay alert and oriented to task with intermittent hand over hand to assist  Reason if not transfers using RW with Mod A. Short term goal 3: Pt will ambulate 10 ft over level and carpeted surfaces using RW with Min A. Short term goal 4: Pt will propel manual w/c 150 ft with turns with SBA-Sup. Short term goal 5: Caregiver will be able to safely assist pt with functional mobility tasks. Bed Mobility:   Sit to Lying  Assistance Needed: Dependent  Comment: Total A x 2  CARE Score: 1  Discharge Goal: Supervision or touching assistance  Roll Left and Right  Assistance Needed: Dependent  Comment: pt with poor motor initiation, sequencing, and attention to required task (eyes were closed during task despite verbal prompts)  CARE Score: 1  Discharge Goal: Supervision or touching assistance  Lying to Sitting on Side of Bed  Assistance Needed: Dependent  Comment: Total A x 2  CARE Score: 1  Discharge Goal: Supervision or touching assistance    Transfers:    Sit to Stand  Assistance Needed: Dependent  Comment:  Mod A + SBA of 2nd person  Reason if not Attempted: Not attempted due to medical condition or safety concerns  CARE Score: 1  Discharge Goal: Partial/moderate assistance  Chair/Bed-to-Chair Transfer  Assistance Needed: Dependent  Comment: x2-person assist using RW  Reason if not Attempted: Not attempted due to medical condition or safety concerns  CARE Score: 1  Discharge Goal: Partial/moderate assistance     Car Transfer  Reason if not Attempted: Not attempted due to medical condition or safety concerns  CARE Score: 88  Discharge Goal: Partial/moderate assistance    Ambulation:    Walking Ability  Does the Patient Walk?: Yes     Walk 10 Feet  Assistance Needed: Dependent  Comment: Min A + assist of 2nd person due to pt's poor problem solving, attention to task, and environmental scanning/navigation  Reason if not Attempted: Not attempted due to medical condition or safety concerns  CARE Score: 1  Discharge Goal: Partial/moderate assistance     Walk 50 Feet with Two Turns  Reason if not premature beats noted. Abdomen: Patient's abdomen is soft and nondistended. Bowel sounds were present throughout. There was no rebound, guarding or masses noted. Upper extremities: Guards right shoulder movement with pain. Strong  on the right. Weak  on the left. Trace edema in the hands. Lower extremities: No signs of DVT. Heels clear. Sitting balance was fair. Standing balance was poor. Lab Results   Component Value Date    WBC 7.6 01/15/2021    HGB 13.0 (L) 01/15/2021    HCT 40.9 (L) 01/15/2021    MCV 89.7 01/15/2021     01/15/2021     Lab Results   Component Value Date    INR 0.84 12/19/2020    INR 1.01 06/03/2020    INR 0.90 06/02/2020    PROTIME 10.1 (L) 12/19/2020    PROTIME 12.2 06/03/2020    PROTIME 10.9 (L) 06/02/2020     Lab Results   Component Value Date    CREATININE 1.5 (H) 01/15/2021    BUN 22 01/15/2021     01/15/2021    K 4.4 01/15/2021     01/15/2021    CO2 24 01/15/2021     Lab Results   Component Value Date    ALT 10 12/19/2020    AST 15 12/19/2020    ALKPHOS 82 12/19/2020    BILITOT 0.6 12/19/2020       Expected length of stay  prior to a minimum physical assist level functional discharge to a nursing home for ongoing therapies is 1/25/2021. Recommendations:    1. Right intracranial hemorrhage with left hemiparesis: Much more alert and interactive but shows no carryover of instruction during therapies. Requires maximum tactile and verbal cues to participate in the daily occupational and physical therapy.   Cardiology recommends no anticoagulation at this point. Inconsistent oral intake recently. Alertness is more consistent and not as manic today. Hopefully he will respond to the UTI treatment and become more appropriate and retentive.  Reviewed his chemistries and blood counts.    Depakote level was acceptable.    Ongoing pulmonary hygiene, DVT prophylaxis and nutritional support.  Bowel and bladder retraining. [address] education with his significant other eventually.  Adaptive equipment training.  Continue working on on pivot transfers today. 2. DVT prophylaxis: Heparin 5000 units every 8 hours.  I must monitor his hemoglobin and platelet count periodically while on this medication.  Weightbearing activities have been limited recently. GI prophylaxis offered.  No clinical signs of blood loss. .      3. Paroxysmal atrial fibrillation with prior anticoagulation: Cardiology has stopped the Xarelto.  Elevated rate required titrating metoprolol and restarting Cardizem.  EKG reviewed by cardiology.  Daily weights do not show any decompensation of CHF.  Minimize stimulants. 4. Uncontrolled diabetes type 2 with peripheral neuropathy: Patient requires a diet modified for carbohydrates.  He is on Neurontin for peripheral neuropathy symptoms.  Blood sugars are checked at mealtime and bedtime.  Sliding scale Humalog for now with reintroduction of oral agents should his oral intake stabilize.  Blood sugars generally less than 150.  5. Essential hypertension: Patient requires multiple medications for blood pressure regulation.  Target systolic blood pressure is 120-140.  Vital signs are checked at rest and with activity.  Blood pressure within target range today. 6. COPD: Aggressive pulmonary hygiene.  Monitoring O2 saturations at rest and with activity. 7. Bipolar disorder: Depakote levels appropriate. Off of the BuSpar, Risperdal, goserelin and gabapentin he has become a bit more animated/hypomanic. We will see how further treatment of his UTI impacts his behavior and cognition. Treating in a calm and consistent environment.  Providing written and verbal instructions when possible to assist with information retention. 8. Urinary tract infection. Pseudomonas aeruginosa sensitive to the cefepime.   7 days of IV antibiotics planned in total.  Labs in a.m.

## 2021-01-21 NOTE — PROGRESS NOTES
family    [] Continuous supervision       [x]SNF    [] Assisted living     [] Other:   Continued therapy: []HHC OT  []OUTPATIENT  OT   [] No Further OT  Equipment needs: none       ADLs:    Eating: Eating  Assistance Needed: Supervision or touching assistance  Comment: Pt demo inconsistency c self-feeding. Pt requires Min-Total A depending on level of alertness. Required v/c for task initiation 1/21 during OT session. CARE Score: 4  Discharge Goal: Independent       Oral Hygiene: Oral Hygiene  Assistance Needed: Partial/moderate assistance  Comment: Min A vc and tactile cues  to stay alert and oriented to task with intermittent hand over hand to assist  Reason if not Attempted: Not attempted due to medical condition or safety concerns  CARE Score: 3  Discharge Goal: Independent    UB/LB Bathing: Shower/Bathe Self  Assistance Needed: Dependent  Comment: Max A/ (total) max vc and tactile cues to intiate and stay on task  Reason if not Attempted: Not attempted due to medical condition or safety concerns  CARE Score: 1  Discharge Goal: Supervision or touching assistance    UB Dressing: Upper Body Dressing  Assistance Needed: Partial/moderate assistance  Comment: Mod A for threading of BUE and over head: Requires task initiation, orientation, and tactile cues for sequencing. Reason if not Attempted: Not attempted due to medical condition or safety concerns  CARE Score: 3  Discharge Goal: Supervision or touching assistance         LB Dressing: Lower Body Dressing  Assistance Needed: Substantial/maximal assistance  Comment: Max A for threading BLE and donning over hips. Pt attempted to assist c donning over hips.   Reason if not Attempted: Not attempted due to medical condition or safety concerns  CARE Score: 2  Discharge Goal: Partial/moderate assistance    Donning and [place] Footwear: Putting On/Taking Off Footwear  Assistance Needed: Dependent  Comment: x  CARE Score: 1  Discharge Goal: Set-up or clean-up assistance Toileting: Toileting Hygiene  Assistance Needed: Dependent  Comment: TOTAL- 2 Person A required for safety, Total x1 for toileting and CM, Min A x1 while in standing  CARE Score: 1  Discharge Goal: Partial/moderate assistance      Toilet Transfers: Toilet Transfer  Assistance Needed: Dependent  Comment: MOD X1 + SBA X1- Requires 2 person assist for safety. Reason if not Attempted: Not attempted due to medical condition or safety concerns  CARE Score: 1  Discharge Goal: Partial/moderate assistance  Device Used:    []   Standard Toilet         []   Grab Bars           []  Bedside Commode       []   Elevated Toilet          []   Other:        Bed Mobility:           [x]   Pt received out of bed   SEE PT NOTES FOR DETAILS   TOTAL A  Transfers:    TOTAL A      Functional Mobility:    Assistance:  SEE PT notes for details  Device:   []   Rolling Walker     []   Standard Walker []   Wheelchair        []   [place]       []   4-Wheeled Veleria Darío         []   Cardiac Walker       []   Other:        Additional Therapeutic activities/exercises completed this date:     [x]   ADL Training   [x]   Balance/Postural training     []   Bed/Transfer Training   []   Endurance Training   []   Neuromuscular Re-ed   []   Nu-step:  Time:        Level:         #Steps:       []   Rebounder:    []  Seated     []  Standing        []   Supine Ther Ex (reps/sets):     []   Seated Ther Ex (reps/sets):     []   Standing Ther Ex (reps/sets):     [x]   Other:P.M. session visual scanning: requires max v/c, auditory, and visual cues to attend. Comments:      Patient/Caregiver Education and Training:   []   Adaptive Equipment Use  []   Bed Mobility/Transfer Technique/Safety  []   Energy Conservation Tips  []   Family training  []   Postural Awareness  [x]   Safety During Functional Activities  []   Reinforced Patient's Precautions   []   Progress was updated and reviewed in Rehabtracker with patient and/or family this         date.     Treatment Plan for Next Session: increase functional performance c ADLs and task initiation , requires oral care and footwear QI updated scores     Assessment:  Pt continues to demo poor spontaneous task initiation, automatic movements, and improved functional performance of ADLs. Pt requires total A for ADLs and continues to benefit from co-tx sessions d/t safety and physical deficits.       Treatment/Activity Tolerance:   [x] Tolerated treatment with no adverse effects    [] Patient limited by fatigue  [] Patient limited by pain   [] Patient limited by medical complications:    [] Adverse reaction to Tx:   [] Significant change in status    Safety:       [x]  bed alarm set    [x]  chair alarm set    []  Pt refused alarms                []  Telesitter activated      []  Gait belt used during tx session      []other:       Number of Minutes/Billable Intervention  Therapeutic Exercise 14   ADL Self-care 45   Neuro Re-Ed    Therapeutic Activity 33   Group    Other:    TOTAL 92       Social History  Social/Functional History  Lives With: Significant other  Type of Home: House  Home Layout: One level  Home Access: Stairs to enter with rails  Entrance Stairs - Number of Steps: 5+3  Entrance Stairs - Rails: Both  Bathroom Shower/Tub: Walk-in shower, Shower chair with back(shower chair doesn't fit in shower)  Bathroom Toilet: Standard  Bathroom Equipment: Toilet raiser, Shower chair  Bathroom Accessibility: Accessible  Home Equipment: 4 wheeled walkerEmily 41 Help From: Family, Neighbor  ADL Assistance: Needs assistance  Bath: Minimal assistance  Dressing: Minimal assistance  Grooming: Independent  Feeding: Independent  Toileting: Independent  Homemaking Assistance: Independent  Homemaking Responsibilities: No  Meal Prep Responsibility: No  Laundry Responsibility: No  Cleaning Responsibility: No  Bill Paying/Finance Responsibility: No  Shopping Responsibility: No  Dependent Care Responsibility: No  Health Care Management: No  Ambulation Assistance: Independent  Transfer Assistance: Needs assistance  Active : No  Patient's  Info: Jannie Cardozo is primary , and transport company  Mode of Transportation: Car, Family, Friends  Education: 12th grade  Occupation: Retired  Type of occupation: Pt built trucks for 33 years; [employer]  Leisure & Hobbies: TV, limited reading, girlfriend has a dog. Pt has meds that come in pill packs. Bills are managed over the phone. IADL Comments: Roslyn Shah assists c most IADLs and pt report hiring outside help c yardork. Additional Comments: Pt reports using rolator in the house. Pt reports 1 fall this year that occured in the house while ambulating in the home; sleeps in regular, flat bed; R hand dominant    Objective                                                                                    Goals:  (Update in navigator)  Short term goals  Time Frame for Short term goals: STG=LTG:  Long term goals  Time Frame for Long term goals : 12-14 days or until d/c  Long term goal 1: Pt will complete feeding/grooming/oral care task c MOD I by d/c  Long term goal 2: Pt will complete total body bathing c MIN A by d/c  Long term goal 3: Pt will complete UB dressing c SUP by d/c  Long term goal 4: Pt will complete LB dressing c MOD A by d/c  Long term goal 5: Pt will don/doff footwear c SETUP by d/c  Long term goals 6: Pt will complete toileting c MOD A by d/c  Long term goal 7: Pt will complete functional transfer (toilet, tub, shower) c MOD A by d/c. Long term goal 8: Pt will perform therex/therax to facilitate increased strength/endurance/ax tolerance (c emphasis on dynamic standing balance/tolerance >10 mins and BUE endurance) c Min A in order to complete ADLs. :        Plan of Care                                                                              Times per week: 5 days per week for a minimum of 60 minutes/day plus group as appropriate for 60 minutes.   Treatment to include Plan  Times per day: Daily  Current Treatment Recommendations: Strengthening, Endurance Training, Neuromuscular Re-education, Patient/Caregiver Education & Training, ROM, Equipment Evaluation, Education, & procurement, Self-Care / ADL, Balance Training, Functional Mobility Training, Safety Education & Training, Cognitive/Perceptual Training    Electronically signed by   [place], [address] Ali Annabi, OTR/L  License # [id number]    1/21/2021, 3:38 PM

## 2021-01-21 NOTE — CARE COORDINATION
LSW met with patient following Care Conference. LSW also spoke with patient's son Levon Timmons. LSW informed patient of recommendations for SNF stay to gain more strength for independent living.   Patient verbalized understanding and is agreeable to SNF stay.     D/C Plan:  Date:  Jan. 26th  DME:  SNF  HHC:  SNF  To:  SNF

## 2021-01-22 LAB
GLUCOSE BLD-MCNC: 113 MG/DL (ref 70–99)
GLUCOSE BLD-MCNC: 118 MG/DL (ref 70–99)
GLUCOSE BLD-MCNC: 144 MG/DL (ref 70–99)
GLUCOSE BLD-MCNC: 97 MG/DL (ref 70–99)

## 2021-01-22 PROCEDURE — 2500000003 HC RX 250 WO HCPCS: Performed by: PHYSICAL MEDICINE & REHABILITATION

## 2021-01-22 PROCEDURE — 82962 GLUCOSE BLOOD TEST: CPT

## 2021-01-22 PROCEDURE — 6360000002 HC RX W HCPCS: Performed by: PHYSICAL MEDICINE & REHABILITATION

## 2021-01-22 PROCEDURE — 97530 THERAPEUTIC ACTIVITIES: CPT

## 2021-01-22 PROCEDURE — 97116 GAIT TRAINING THERAPY: CPT

## 2021-01-22 PROCEDURE — 1280000000 HC REHAB R&B

## 2021-01-22 PROCEDURE — 97535 SELF CARE MNGMENT TRAINING: CPT

## 2021-01-22 PROCEDURE — 6370000000 HC RX 637 (ALT 250 FOR IP): Performed by: PHYSICAL MEDICINE & REHABILITATION

## 2021-01-22 PROCEDURE — 6360000002 HC RX W HCPCS: Performed by: INTERNAL MEDICINE

## 2021-01-22 PROCEDURE — 6370000000 HC RX 637 (ALT 250 FOR IP): Performed by: NURSE PRACTITIONER

## 2021-01-22 PROCEDURE — 2580000003 HC RX 258: Performed by: PHYSICAL MEDICINE & REHABILITATION

## 2021-01-22 PROCEDURE — 99232 SBSQ HOSP IP/OBS MODERATE 35: CPT | Performed by: PHYSICAL MEDICINE & REHABILITATION

## 2021-01-22 RX ADMIN — DOCUSATE SODIUM 100 MG: 100 CAPSULE, LIQUID FILLED ORAL at 20:35

## 2021-01-22 RX ADMIN — BENZTROPINE MESYLATE 0.5 MG: 1 TABLET ORAL at 10:00

## 2021-01-22 RX ADMIN — SENNOSIDES 8.6 MG: 8.6 TABLET, FILM COATED ORAL at 20:34

## 2021-01-22 RX ADMIN — DIVALPROEX SODIUM 1000 MG: 500 TABLET, FILM COATED, EXTENDED RELEASE ORAL at 20:35

## 2021-01-22 RX ADMIN — MICONAZOLE NITRATE: 20 POWDER TOPICAL at 20:40

## 2021-01-22 RX ADMIN — FAMOTIDINE 20 MG: 20 TABLET, FILM COATED ORAL at 10:14

## 2021-01-22 RX ADMIN — DOXAZOSIN 2 MG: 4 TABLET ORAL at 20:35

## 2021-01-22 RX ADMIN — CEFEPIME 2 G: 2 INJECTION, POWDER, FOR SOLUTION INTRAVENOUS at 12:36

## 2021-01-22 RX ADMIN — METOPROLOL TARTRATE 25 MG: 25 TABLET, FILM COATED ORAL at 10:13

## 2021-01-22 RX ADMIN — DOCUSATE SODIUM 100 MG: 100 CAPSULE, LIQUID FILLED ORAL at 10:13

## 2021-01-22 RX ADMIN — ATORVASTATIN CALCIUM 80 MG: 40 TABLET, FILM COATED ORAL at 20:35

## 2021-01-22 RX ADMIN — LISINOPRIL 40 MG: 20 TABLET ORAL at 10:14

## 2021-01-22 RX ADMIN — MEGESTROL ACETATE 200 MG: 40 SUSPENSION ORAL at 10:13

## 2021-01-22 RX ADMIN — MICONAZOLE NITRATE: 2 POWDER TOPICAL at 20:41

## 2021-01-22 RX ADMIN — METOPROLOL TARTRATE 25 MG: 25 TABLET, FILM COATED ORAL at 20:34

## 2021-01-22 RX ADMIN — DIGOXIN 125 MCG: 125 TABLET ORAL at 10:13

## 2021-01-22 RX ADMIN — AMLODIPINE BESYLATE 10 MG: 10 TABLET ORAL at 10:14

## 2021-01-22 RX ADMIN — MICONAZOLE NITRATE: 20 POWDER TOPICAL at 09:00

## 2021-01-22 RX ADMIN — DOXAZOSIN 2 MG: 4 TABLET ORAL at 10:13

## 2021-01-22 RX ADMIN — BENZTROPINE MESYLATE 0.5 MG: 1 TABLET ORAL at 20:35

## 2021-01-22 RX ADMIN — ESCITALOPRAM OXALATE 10 MG: 10 TABLET, FILM COATED ORAL at 10:13

## 2021-01-22 RX ADMIN — MICONAZOLE NITRATE: 2 POWDER TOPICAL at 09:00

## 2021-01-22 RX ADMIN — ENOXAPARIN SODIUM 40 MG: 100 INJECTION SUBCUTANEOUS at 10:15

## 2021-01-22 NOTE — PROGRESS NOTES
[address] : 1950  Acct #: [de-identified]  MRN: [id number]              PM&R Progress Note      Admitting diagnosis: Intracranial hemorrhage ( [address])     Comorbid diagnoses impacting rehabilitation: Left hemiparesis, dysarthria, paroxysmal atrial fibrillation, essential hypertension, uncontrolled diabetes type 2 with peripheral neuropathy, COPD, bipolar disorder, diastolic congestive heart failure    Chief complaint: Not very hungry. Some shoulder pain. No other specific complaints. Prior (baseline) level of function: Independent. Current level of function:         Current  [facility] and Goals:   Occupational Therapy:    Short term goals  Time Frame for Short term goals: STG=LTG :   Long term goals  Time Frame for Long term goals : 12-14 days or until d/c  Long term goal 1: Pt will complete feeding/grooming/oral care task c MOD I by d/c  Long term goal 2: Pt will complete total body bathing c MIN A by d/c  Long term goal 3: Pt will complete UB dressing c SUP by d/c  Long term goal 4: Pt will complete LB dressing c MOD A by d/c  Long term goal 5: Pt will don/doff footwear c SETUP by d/c  Long term goals 6: Pt will complete toileting c MOD A by d/c  Long term goal 7: Pt will complete functional transfer (toilet, tub, shower) c MOD A by d/c. Long term goal 8: Pt will perform therex/therax to facilitate increased strength/endurance/ax tolerance (c emphasis on dynamic standing balance/tolerance >10 mins and BUE endurance) c Min A in order to complete ADLs. :                                       Eating: Eating  Assistance Needed: Supervision or touching assistance  Comment: Pt demo inconsistency c self-feeding. Pt requires Min-Total A depending on level of alertness. Required v/c for task initiation  during OT session.   CARE Score: 4  Discharge Goal: Independent       Oral Hygiene: Oral Hygiene  Assistance Needed: Partial/moderate assistance  Comment: Min A vc and tactile cues  to stay alert days:  Short term goal 1: Pt will complete bed mobility tasks and sup<->sit with SBA-Sup. Short term goal 2: Pt will complete OOB transfers using RW with Mod A. Short term goal 3: Pt will ambulate 10 ft over level and carpeted surfaces using RW with Min A. Short term goal 4: Pt will propel manual w/c 150 ft with turns with SBA-Sup. Short term goal 5: Caregiver will be able to safely assist pt with functional mobility tasks. Bed Mobility:   Sit to Lying  Assistance Needed: Dependent  Comment: Total A x 2  CARE Score: 1  Discharge Goal: Supervision or touching assistance  Roll Left and Right  Assistance Needed: Dependent  Comment: pt with poor motor initiation, sequencing, and attention to required task (eyes were closed during task despite verbal prompts)  CARE Score: 1  Discharge Goal: Supervision or touching assistance  Lying to Sitting on Side of Bed  Assistance Needed: Dependent  Comment: Total A x 2  CARE Score: 1  Discharge Goal: Supervision or touching assistance    Transfers:    Sit to Stand  Assistance Needed: Dependent  Comment:  Mod A + SBA of 2nd person  Reason if not Attempted: Not attempted due to medical condition or safety concerns  CARE Score: 1  Discharge Goal: Partial/moderate assistance  Chair/Bed-to-Chair Transfer  Assistance Needed: Dependent  Comment: x2-person assist using RW  Reason if not Attempted: Not attempted due to medical condition or safety concerns  CARE Score: 1  Discharge Goal: Partial/moderate assistance     Car Transfer  Reason if not Attempted: Not attempted due to medical condition or safety concerns  CARE Score: 88  Discharge Goal: Partial/moderate assistance    Ambulation:    Walking Ability  Does the Patient Walk?: Yes     Walk 10 Feet  Assistance Needed: Dependent  Comment: Min A + assist of 2nd person due to pt's poor problem solving, attention to task, and environmental scanning/navigation  Reason if not Attempted: Not attempted due to medical condition or safety concerns  CARE Score: 1  Discharge Goal: Partial/moderate assistance     Walk 50 Feet with Two Turns  Reason if not Attempted: Not attempted due to medical condition or safety concerns  CARE Score: 88  Discharge Goal: Not Attempted     Walk 150 Feet  Reason if not Attempted: Not applicable  CARE Score: 9  Discharge Goal: Not Applicable     Walking 10 Feet on Uneven Surfaces  Reason if not Attempted: Not attempted due to medical condition or safety concerns  CARE Score: 88  Discharge Goal: Partial/moderate assistance     1 Step (Curb)  Reason if not Attempted: Not attempted due to medical condition or safety concerns  CARE Score: 88  Discharge Goal: Not Attempted     4 Steps  Reason if not Attempted: Not attempted due to medical condition or safety concerns  CARE Score: 88  Discharge Goal: Not Attempted     12 Steps  Reason if not Attempted: Not applicable  CARE Score: 9  Discharge Goal: Not Applicable       Wheelchair:  w/c Ability: Wheelchair Ability  Uses a Wheelchair and/or Scooter?: Yes  Wheel 50 Feet with Two Turns  Assistance Needed: Dependent  Comment: 20 ft Max A x 2 on 01/11/2021  Reason if not Attempted: Not attempted due to medical condition or safety concerns  CARE Score: 1  Discharge Goal: Supervision or touching assistance  Wheel 150 Feet  Reason if not Attempted: Not attempted due to medical condition or safety concerns  CARE Score: 88  Discharge Goal: Supervision or touching assistance          Balance:        Object: Picking Up Object  Reason if not Attempted: Not attempted due to medical condition or safety concerns  CARE Score: 88  Discharge Goal: Partial/moderate assistance    I      Exam:    Blood pressure (!) 153/72, pulse 55, temperature 97.3 °F (36.3 °C), temperature source Oral, resp. rate 18, height 5' 9\" (1.753 m), weight 224 lb 9.6 oz (101.9 kg), SpO2 96 %. General: Semiupright in bed. Looking about the room. Easily distracted.     HEENT: Mild left facial droop with dysarthria. No drooling. Neck supple without adenopathy. Pulmonary: No coughing or labored breathing. Cardiac: Controlled rate. Occasional premature beat. Abdomen: Patient's abdomen is soft and nondistended. Bowel sounds were present throughout. There was no rebound, guarding or masses noted. Upper extremities: Using both hands to manipulate the bed clothing. Lower extremities: No signs of DVT. Sitting balance was good. Standing balance was poor. Lab Results   Component Value Date    WBC 6.9 01/21/2021    HGB 12.9 (L) 01/21/2021    HCT 42.4 01/21/2021    MCV 94.6 01/21/2021     01/21/2021     Lab Results   Component Value Date    INR 0.84 12/19/2020    INR 1.01 06/03/2020    INR 0.90 06/02/2020    PROTIME 10.1 (L) 12/19/2020    PROTIME 12.2 06/03/2020    PROTIME 10.9 (L) 06/02/2020     Lab Results   Component Value Date    CREATININE 1.2 01/21/2021    BUN 15 01/21/2021     01/21/2021    K 3.8 01/21/2021     01/21/2021    CO2 23 01/21/2021     Lab Results   Component Value Date    ALT 10 12/19/2020    AST 15 12/19/2020    ALKPHOS 82 12/19/2020    BILITOT 0.6 12/19/2020       Expected length of stay  prior to a moderate physical assist level of function for discharge to a local nursing home for ongoing therapies is 1/25/2021. Recommendations:    1. Right intracranial hemorrhage with left hemiparesis: Staying awake better but struggling with attention and retention. Requires maximum tactile and verbal cues to participate in the daily occupational and physical therapy.   Cardiology recommends no anticoagulation at this point. Inconsistent oral intake recently.    Alertness is more consistent and not as manic today. Hopefully he will respond to the UTI treatment and become more appropriate and retentive.  Reviewed his chemistries and blood counts.    Depakote level was acceptable.    Ongoing pulmonary hygiene, DVT prophylaxis and nutritional support.  Bowel and bladder retraining.  Caregiver education with his significant other eventually.  Adaptive equipment training.  Continue working on on pivot transfers today. 2. DVT prophylaxis: Heparin 5000 units every 8 hours.  I must monitor his hemoglobin and platelet count periodically while on this medication.  Weightbearing activities have been limited recently.  GI prophylaxis offered.  No clinical signs of blood loss. .      3. Paroxysmal atrial fibrillation with prior anticoagulation: Cardiology has stopped the Xarelto.  Elevated rate required titrating metoprolol and restarting Cardizem.  EKG reviewed by cardiology.  Daily weights do not show any decompensation of CHF.  Minimize stimulants. 4. Uncontrolled diabetes type 2 with peripheral neuropathy: Patient requires a diet modified for carbohydrates.  He is on Neurontin for peripheral neuropathy symptoms.  Blood sugars are checked at mealtime and bedtime.  Sliding scale Humalog for now with reintroduction of oral agents should his oral intake stabilize.  Blood sugars generally less than 150.  5. Essential hypertension: Patient requires multiple medications for blood pressure regulation.  Target systolic blood pressure is 120-140.  Vital signs are checked at rest and with activity.  Blood pressure within target range today. 6. COPD: Aggressive pulmonary hygiene.  Monitoring O2 saturations at rest and with activity. 7. Bipolar disorder: Depakote levels appropriate.  Off of the BuSpar, Risperdal, goserelin and gabapentin he has become a bit more animated/hypomanic.  We will see how further treatment of his UTI impacts his behavior and cognition.  Treating in a calm and consistent environment.  Providing written and verbal instructions when possible to assist with information retention. 8. Urinary tract infection.  Pseudomonas aeruginosa sensitive to the cefepime.  7 days of IV antibiotics planned in total.  Labs unremarkable.       Counseling and Coordination of Care:     In care conference today I met with the patient's OT, PT, RN and . We discussed the patient's problems, progress and prognosis. Disposition issues were clarified and plans were established for ongoing rehabilitation efforts beyond the ARU stay. I reviewed this information with the patient during a second distinct visit with the patient. More than half of the total time of 37 minutes spent with the patient involved counseling and coordination of care.

## 2021-01-22 NOTE — CARE COORDINATION
Left message for Mary @ [facility] requesting return call re: referral status. Patient approved for [facility]. precert is needed and will be started today. Mary expects to have auth Monday 1/25 and patient can admit at that time. Team updated. Dr Melissa Stafford aware that rapid covid is acceptable.

## 2021-01-22 NOTE — PROGRESS NOTES
[address] : 1950  Acct #: [de-identified]  MRN: [id number]              PM&R Progress Note      Admitting diagnosis: Intracranial hemorrhage ( [address] [address])     Comorbid diagnoses impacting rehabilitation: Left hemiparesis, dysarthria, paroxysmal atrial fibrillation, essential hypertension, uncontrolled diabetes type 2 with peripheral neuropathy, COPD, bipolar disorder, diastolic congestive heart failure     Chief complaint: Talkative with nonspecific complaints. Prior (baseline) level of function: Independent. Current level of function:         Current  [facility] and Goals:   Occupational Therapy:    Short term goals  Time Frame for Short term goals: STG=LTG :   Long term goals  Time Frame for Long term goals : 12-14 days or until d/c  Long term goal 1: Pt will complete feeding/grooming/oral care task c MOD I by d/c  Long term goal 2: Pt will complete total body bathing c MIN A by d/c  Long term goal 3: Pt will complete UB dressing c SUP by d/c  Long term goal 4: Pt will complete LB dressing c MOD A by d/c  Long term goal 5: Pt will don/doff footwear c SETUP by d/c  Long term goals 6: Pt will complete toileting c MOD A by d/c  Long term goal 7: Pt will complete functional transfer (toilet, tub, shower) c MOD A by d/c. Long term goal 8: Pt will perform therex/therax to facilitate increased strength/endurance/ax tolerance (c emphasis on dynamic standing balance/tolerance >10 mins and BUE endurance) c Min A in order to complete ADLs. :                                       Eating: Eating  Assistance Needed: Supervision or touching assistance  Comment: Pt demo inconsistency c self-feeding. Pt requires Min-Total A depending on level of alertness. Required v/c for task initiation  during OT session.   CARE Score: 4  Discharge Goal: Independent       Oral Hygiene: Oral Hygiene  Assistance Needed: Partial/moderate assistance  Comment: Min A vc and tactile cues  to stay alert and oriented to task with intermittent hand over hand to assist  Reason if not Attempted: Not attempted due to medical condition or safety concerns  CARE Score: 3  Discharge Goal: Independent    UB/LB Bathing: Shower/Bathe Self  Assistance Needed: Dependent  Comment: Max A/ (total) max vc and tactile cues to intiate and stay on task  Reason if not Attempted: Not attempted due to medical condition or safety concerns  CARE Score: 1  Discharge Goal: Supervision or touching assistance    UB Dressing: Upper Body Dressing  Assistance Needed: Partial/moderate assistance  Comment: Mod A for threading of BUE and over head: Requires task initiation, orientation, and tactile cues for sequencing. Reason if not Attempted: Not attempted due to medical condition or safety concerns  CARE Score: 3  Discharge Goal: Supervision or touching assistance         LB Dressing: Lower Body Dressing  Assistance Needed: Substantial/maximal assistance  Comment: Max A for threading BLE and donning over hips. Pt attempted to assist c donning over hips. Reason if not Attempted: Not attempted due to medical condition or safety concerns  CARE Score: 2  Discharge Goal: Partial/moderate assistance    Donning and [place] Footwear: Putting On/Taking Off Footwear  Assistance Needed: Dependent  Comment: x  CARE Score: 1  Discharge Goal: Set-up or clean-up assistance      Toileting: Toileting Hygiene  Assistance Needed: Dependent  Comment: TOTAL- 2 Person A required for safety, Total x1 for toileting and CM, Min A x1 while in standing  CARE Score: 1  Discharge Goal: Partial/moderate assistance      Toilet Transfers: Toilet Transfer  Assistance Needed: Dependent  Comment: MOD X1 + SBA X1- Requires 2 person assist for safety.   Reason if not Attempted: Not attempted due to medical condition or safety concerns  CARE Score: 1  Discharge Goal: Partial/moderate assistance    Physical Therapy:   Short term goals  Time Frame for Short term goals: 14-21 tx days:  Short term goal 1: Pt will complete bed mobility tasks and sup<->sit with SBA-Sup. Short term goal 2: Pt will complete OOB transfers using RW with Mod A. Short term goal 3: Pt will ambulate 10 ft over level and carpeted surfaces using RW with Min A. Short term goal 4: Pt will propel manual w/c 150 ft with turns with SBA-Sup. Short term goal 5: Caregiver will be able to safely assist pt with functional mobility tasks. Bed Mobility:   Sit to Lying  Assistance Needed: Dependent  Comment: Total A x 2  CARE Score: 1  Discharge Goal: Supervision or touching assistance  Roll Left and Right  Assistance Needed: Dependent  Comment: Total A x 2  CARE Score: 1  Discharge Goal: Supervision or touching assistance  Lying to Sitting on Side of Bed  Assistance Needed: Dependent  Comment: Total A x 2  CARE Score: 1  Discharge Goal: Supervision or touching assistance    Transfers:    Sit to Stand  Assistance Needed: Dependent  Comment: min A x 2  Reason if not Attempted: Not attempted due to medical condition or safety concerns  CARE Score: 1  Discharge Goal: Partial/moderate assistance  Chair/Bed-to-Chair Transfer  Assistance Needed: Dependent  Comment: min A x 2  Reason if not Attempted: Not attempted due to medical condition or safety concerns  CARE Score: 1  Discharge Goal: Partial/moderate assistance     Car Transfer  Comment: pt required max redirection, Very agitated today, with poor initiation, and decreased level of alertness at other times.   Reason if not Attempted: Not attempted due to medical condition or safety concerns  CARE Score: 88  Discharge Goal: Partial/moderate assistance    Ambulation:    Walking Ability  Does the Patient Walk?: Yes     Walk 10 Feet  Assistance Needed: Dependent  Comment: 2-person assist: CGA x 1 + min A x 1 (for walker management) with w/c follow for safety  Reason if not Attempted: Not attempted due to medical condition or safety concerns  CARE Score: 1  Discharge Goal: Partial/moderate assistance     Walk 48 Feet with Two Turns  Assistance Needed: Dependent  Comment: 2-person assist: CGA x 1 + min A x 1 (for walker management) with w/c follow for safety  Reason if not Attempted: Not attempted due to medical condition or safety concerns  CARE Score: 1  Discharge Goal: Not Attempted     Walk 150 Feet  Reason if not Attempted: Not applicable  CARE Score: 9  Discharge Goal: Not Applicable     Walking 10 Feet on Uneven Surfaces  Reason if not Attempted: Not attempted due to medical condition or safety concerns  CARE Score: 88  Discharge Goal: Partial/moderate assistance     1 Step (Curb)  Reason if not Attempted: Not attempted due to medical condition or safety concerns  CARE Score: 88  Discharge Goal: Not Attempted     4 Steps  Reason if not Attempted: Not attempted due to medical condition or safety concerns  CARE Score: 88  Discharge Goal: Not Attempted     12 Steps  Reason if not Attempted: Not applicable  CARE Score: 9  Discharge Goal: Not Applicable       Wheelchair:  w/c Ability: Wheelchair Ability  Uses a Wheelchair and/or Scooter?: Yes  Wheel 50 Feet with Two Turns  Assistance Needed: Dependent  Comment: Decreased initiation to participate in activity. Pt inconsistent with decreased level of alertness and increased level of agitation at other times. Reason if not Attempted: Not attempted due to medical condition or safety concerns  CARE Score: 1  Discharge Goal: Supervision or touching assistance  Wheel 150 Feet  Assistance Needed: Dependent  Comment: Decreased initiation to participate in activity. Pt inconsistent with decreased level of alertness and increased level of agitation at other times.   Reason if not Attempted: Not attempted due to medical condition or safety concerns  CARE Score: 1  Discharge Goal: Supervision or touching assistance          Balance:        Object: Picking Up Object  Reason if not Attempted: Not attempted due to medical condition or safety concerns  CARE Score: 88  Discharge Goal: Partial/moderate assistance    I      Exam:    Blood pressure 136/68, pulse 54, temperature 98.1 °F (36.7 °C), temperature source Oral, resp. rate 16, height 5' 9\" (1.753 m), weight 217 lb (98.4 kg), SpO2 95 %. General: Semiupright in bed. Talkative. Tangential.  Does follow one-step commands but is easily distracted. HEENT: Mild dysarthria. MMM. Neck supple. Pulmonary: Shallow respirations without labored breathing. Cardiac: Regular rate and rhythm. Abdomen: Patient's abdomen is soft and nondistended. Bowel sounds were present throughout. There was no rebound, guarding or masses noted. Upper extremities: Clumsy movements of both upper limbs. Able to hold things in both hands. Lower extremities: No peripheral edema or signs of DVT. Clumsy leg movements. Poor cooperation with MMT. Sitting balance was fair. Standing balance was poor. Lab Results   Component Value Date    WBC 6.9 01/21/2021    HGB 12.9 (L) 01/21/2021    HCT 42.4 01/21/2021    MCV 94.6 01/21/2021     01/21/2021     Lab Results   Component Value Date    INR 0.84 12/19/2020    INR 1.01 06/03/2020    INR 0.90 06/02/2020    PROTIME 10.1 (L) 12/19/2020    PROTIME 12.2 06/03/2020    PROTIME 10.9 (L) 06/02/2020     Lab Results   Component Value Date    CREATININE 1.2 01/21/2021    BUN 15 01/21/2021     01/21/2021    K 3.8 01/21/2021     01/21/2021    CO2 23 01/21/2021     Lab Results   Component Value Date    ALT 10 12/19/2020    AST 15 12/19/2020    ALKPHOS 82 12/19/2020    BILITOT 0.6 12/19/2020       Expected length of stay  prior to a minimum physical assist level of function for discharge to a local nursing home home with a walker for OT/PT is possibly 1/25/2021. Recommendations:    1. Right intracranial hemorrhage with left hemiparesis: Alert but showing very little retention of her prior instruction.   Still requires maximum verbal cues to participate in the daily occupational and physical therapy.   Cardiology recommends no anticoagulation at this point. Better oral intake recently. Remaining off the Risperdal, Desyrel, BuSpar and gabapentin. He is nearing completion of the UTI treatment without the great change in his behavior/participation/retention. Ongoing pulmonary hygiene, DVT prophylaxis and nutritional support.  Bowel and bladder retraining. [facility] is unable to meet his needs in the home setting at this point in a skilled nursing facility will be chosen soon. Again worked on pivot transfers today. 2. DVT prophylaxis: Heparin 5000 units every 8 hours.  I must monitor his hemoglobin and platelet count periodically while on this medication.  Weightbearing activities  remains limited. GI prophylaxis offered.  No clinical signs of bleeding.      3. Paroxysmal atrial fibrillation with prior anticoagulation: Cardiology has stopped the Xarelto.  Elevated rate required titrating metoprolol and restarting Cardizem.  EKG reviewed by cardiology.  Daily weights do not show any decompensation of CHF.  Minimize stimulants. 4. Uncontrolled diabetes type 2 with peripheral neuropathy: Patient requires a diet modified for carbohydrates.  He is on Neurontin for peripheral neuropathy symptoms.  Blood sugars are checked at mealtime and bedtime.  Sliding scale Humalog for now with reintroduction of oral agents should his oral intake stabilize.  Blood sugars generally less than 150.  5. Essential hypertension: Patient requires multiple medications for blood pressure regulation.  Target systolic blood pressure is 120-140.  Vital signs are checked at rest and with activity.  Blood pressure within target range today. 6. COPD: Aggressive pulmonary hygiene.  Monitoring O2 saturations at rest and with activity. 7. Bipolar disorder: Depakote levels appropriate. Off of the BuSpar, Risperdal, goserelin and gabapentin he has become a bit more animated/hypomanic.     Treatment of the UTI has not greatly impacted his behavior and cognition. Treating in a calm and consistent environment.  Providing written and verbal instructions when possible to assist with information retention. 8. Urinary tract infection. Pseudomonas aeruginosa sensitive to the cefepime.   7 days of IV antibiotics to be completed today.

## 2021-01-22 NOTE — FLOWSHEET NOTE
[x] daily progress note       [x] discharge       Patient Name:  Iris Hammonds. :  1950 MRN: [id number]  Room:  [address] [place], [place] [address] Date of Admission: 2021  Rehabilitation Diagnosis:   Cerebral infarction, unspecified [I63.9]  Intracranial hemorrhage ([facility] [address] [I62.9]       Date 2021       Day of ARU Week:  5   Time IN/-1005 (co-treat)   Co-Treat Minutes 90   TOTAL Tx Time Mins 90   Restrictions Restrictions/Precautions  Restrictions/Precautions: General Precautions, Fall Risk(L hemiparesis, chronic R shoulder limitations)  Position Activity Restriction  Other position/activity restrictions: see catheter   Communication with other providers: [x]   OK to see per nursing:     [x]   Spoke with RN Sandhya Palacios) about patient's agitation and defiance. Discussed patient complaining of chest pain of 8/10. Vitals:  bpm; BP: 106/66; O2 sats 97%   Subjective observations and cognitive status: Pt seen in semi-go's position in bed at beginning of treatment. Pt required max encouragement to participate. Pt very agitated at times threatening physical harm recurrently throughout session. While attempting to have patient sit on the EOB, pt yelled \"You better watch out, or I'll kick you in the balls! \" pt also threatening to hit both VALENZUELA and this PTA with his hands. Pt making a fist at times. Pt constantly refusing most aspects of the treatment session. Pt not following through with commands. Pt falling asleep at times as well. Pt exhibited more alertness and pleasantness after returning to bed and therapists walking out of the room at end of session and pt stated \"Have a good day, chetan. Thank you. \"    Pain level/location: 8/10  (pt expressed towards end of treatment)     Location: chest pain (notified nsg)    Discharge recommendations  Anticipated discharge date:  2021  Destination: []?home alone   []?home alone with assist PRN     []? home w/ family      []? Continuous supervision  [x]? SNF    []? Assisted living     []? Other:  Continued therapy: []?[facility] PT  []? OUTPATIENT PT   []? No Further PT  [x]? SNF PT  Caregiver training recommended: []? Yes  []? No   Equipment needs: none      A cotreat was completed this date with  [] PT    [x] OT   [] ST      This pt requires simultaneous intervention of 2 skilled therapists to safely complete tasks to address complexity of deficits defined in the goals including:  [x]Attention   [x] Safety    [x]Problem Solving    [x]Sequencing     [x]Initiation    [x]Processing      []Recall of learned tasks  [] Communication  [] Self Feeding   [x]ADL's    []UE Function    [x]Motor planning   [x]Balance  []Energy Conservation   [x]Verbal cues   [x] Tactile Cues  []Barriers     [x]Transfers   [x]Device Use  Pt's response to cotreat: Fair   Progress toward goals: Ongoing     Bed Mobility:        Rolling R/L:  Max A x 2   Scooting: Max A x 2   Lying --> Sit:  Max A x 2   Sit --> lying: Max A x 2     Transfers:    Sit--> Stand:  Min A x 2   Stand --> Sit:   CGA x 2   Chair-->Bed/Bed --> Chair:  Min A x 2   Car Transfers:  Not attempted d/t safety concerns. Pt very agitated and max redirection was required at times. Toilet Transfer (if applicable): Attempted; however, pt refused despite max encouragement. Assistive device required for transfer:  RW  Max vcs for proper hand placement. Gait:    Walk 10 feet:  2-person assist: CGA x 1 + min A x 1 (for walker management) with w/c follow  Walk 50 feet with 2 turns: 2-person assist:  CGA x 1 + min A x 1 (for walker management) with w/c follow  Walk 150 feet:  Not attempted d/t safety concerns   Other distance:  48'   Device:  RW  Gait Quality:  Reciprocal pattern; slow estella; short stride length; vcs for obstacles especially on left side. Pt had \"freezing\" episodes at times. Min A for walker management to avoid obstacles.       Wheelchair Propulsion:  Wheel 50 feet with 2 turns: Dependent (Decreased initiation to participate in activity. Pt inconsistent with decreased level of alertness and increased level of agitation at other times.)  Wheel 150 feet: Decreased initiation to participate in activity. Pt inconsistent with decreased level of alertness and increased level of agitation at other times.)  Type:    [x]  Manual        []  Electric    Stairs   1 step or curb: Not attempted d/t safety concerns   4 steps:  Not attempted d/t safety concerns   12 steps:  Not applicable     Uneven Surfaces:       Not attempted d/t safety concerns     Picking Up Object From Floor (must be standing position):  Not attempted d/t safety concerns     Additional Therapeutic activities/exercises completed this date:     []   Nu-step:  Time:        Level:         #Steps:       []   Rebounder:    []  Seated     []  Standing        [x]   Balance training and Endurance training: pt stood for 20 minutes with RW with CGA-SBA for balance. []   Postural training    []   Supine ther ex (reps/sets):     []   Seated ther ex (reps/sets):     []   Standing ther ex (reps/sets):     []   Other:   []   Other:   []   Other:    Patient/Caregiver Education and Training:   [x]   Bed Mobility/Transfer technique/safety  [x]   Gait technique/sequencing  [x]   Proper use of assistive device  []   Advanced mobility safety and technique  []   Reinforced patient's precautions/mobility while maintaining precautions  []   Postural awareness  []   Family training  [x]   Progress was updated in Rehabtracker this date.     Treatment Plan for Next Session: transfers; gait; exercises; balance training; attempt car transfer       Assessment:    Treatment/Activity Tolerance:   [] Tolerated treatment with no adverse effects    [x] Patient limited by fatigue, lethargy, and agitation  [] Patient limited by pain   [] Patient limited by medical complications:    [] Adverse reaction to Tx:   [] Significant change in status    Safety:       [x]  bed alarm set    []  chair alarm set    []  Pt refused alarms                []  Telesitter activated      [x]  Gait belt used during tx session      [x]other: pt left in semi-go's position in bed with call light at end of treatment. Number of Minutes/Billable Intervention  Gait Training 30   Therapeutic Exercise    Neuro Re-Ed    Therapeutic Activity 60   Wheelchair Propulsion    Group    Other:    TOTAL 90         Social History  Social/Functional History  Lives With: Significant other  Type of Home: House  Home Layout: One level  Home Access: Stairs to enter with rails  Entrance Stairs - Number of Steps: 5+3  Entrance Stairs - Rails: Both  Bathroom Shower/Tub: Walk-in shower, Shower chair with back(shower chair doesn't fit in shower)  Bathroom Toilet: Standard  Bathroom Equipment: Toilet raiser, Shower chair  Bathroom Accessibility: Accessible  Home Equipment: 4 wheeled walker, [address] Help From: Family, Neighbor  ADL Assistance: Needs assistance  Bath: Minimal assistance  Dressing: Minimal assistance  Grooming: Independent  Feeding: Independent  Toileting: Independent  Homemaking Assistance: Independent  Homemaking Responsibilities: No  Meal Prep Responsibility: No  Laundry Responsibility: No  Cleaning Responsibility: No  Bill Paying/Finance Responsibility: No  Shopping Responsibility: No  Dependent Care Responsibility: No  Health Care Management: No  Ambulation Assistance: Independent  Transfer Assistance: Needs assistance  Active : No  Patient's  Info: Neighbor is primary , and transport company  Mode of Transportation: Car, Family, Friends  Education: 12th grade  Occupation: Retired  Type of occupation: Pt built trucks for 33 years; [employer]  Leisure & Hobbies: TV, limited reading, girlfriend has a dog. Pt has meds that come in pill packs. Bills are managed over the phone. IADL Comments: Binford Knife assists c most IADLs and pt report hiring outside help c yardork.   Additional Comments: Pt reports using rolator in the house. Pt reports 1 fall this year that occured in the house while ambulating in the home; sleeps in regular, flat bed; R hand dominant    Objective                                                                                    Goals:  (Update in navigator)  Short term goals  Time Frame for Short term goals: 14-21 tx days:  Short term goal 1: Pt will complete bed mobility tasks and sup<->sit with SBA-Sup. Short term goal 2: Pt will complete OOB transfers using RW with Mod A. Short term goal 3: Pt will ambulate 10 ft over level and carpeted surfaces using RW with Min A. Short term goal 4: Pt will propel manual w/c 150 ft with turns with SBA-Sup. Short term goal 5: Caregiver will be able to safely assist pt with functional mobility tasks. :   :        Plan of Care                                                                              Times per week: 5 days per week for a minimum of 60 minutes/day plus group as appropriate for 60 minutes.   Treatment to include Current Treatment Recommendations: Strengthening, Functional Mobility Training, Wheelchair Mobility Training, Neuromuscular Re-education, Home Exercise Program, Equipment Evaluation, Education, & procurement, ROM, Transfer Training, Cognitive/Perceptual Training, Gait Training, Cognitive Reorientation, Safety Education & Training, Balance Training, Endurance Training, Pain Management, Patient/Caregiver Education & Training, Positioning    Electronically signed by   Bryn Tobar, [id number]  1/22/2021, 10:58 AM

## 2021-01-22 NOTE — PROGRESS NOTES
Comprehensive Nutrition Assessment    Type and Reason for Visit:  Reassess    Nutrition Recommendations/Plan:   Continue diet as tolerates   Continue to offer oral nutrition supplement  Assist with meals, encourage intake   If unable to improve intake to meet estimated needs, consider tube feeding  Will continue to follow up during stay    Nutrition Assessment:  General diet now, no longer on pureed diet. Meal intake remains poor, 25% at best. Drinks supplment at times but has several bottles saved in room. Megace started this week. If patient not able to improve intake soon will likely need to consider tube feeding to provide consistent source of nutrition. Remains high nutrition risk. Malnutrition Assessment:  Malnutrition Status:  Insufficient data    Context:  Acute Illness       Estimated Daily Nutrient Needs:  Energy (kcal):  5212-6236; Weight Used for Energy Requirements:  Current     Protein (g):  72-87 (1-1.2 g/kg); Weight Used for Protein Requirements:  Ideal        Fluid (ml/day):  2000; Method Used for Fluid Requirements:  1 ml/kcal      Nutrition Related Findings:  asleep in bed, lunch tray in room mostly untouched, bottles of ensure saved in room      Wounds:  None       Current Nutrition Therapies:    Dietary Nutrition Supplements: Low Calorie High Protein Supplement  DIET GENERAL; Anthropometric Measures:  · Height: 5' 9\" (175.3 cm)  · Current Body Weight: 216 lb 14.9 oz (98.4 kg)   · Admission Body Weight: 225 lb 5 oz (102.2 kg)    · Usual Body Weight: 214 lb (97.1 kg)(per hx)     · Ideal Body Weight: 160 lbs; % Ideal Body Weight 143.1 %   · BMI: 32  · Adjusted Body Weight:  ; No Adjustment   · BMI Categories: Obese Class 1 (BMI 30.0-34. 9)       Nutrition Diagnosis:   · Predicted inadequate energy intake related to other (comment)(reduced appetite in illness) as evidenced by other (comment)(inconsistent intake so far)      Nutrition Interventions:   Food and/or Nutrient Delivery:  Continue Current Diet, Continue Oral Nutrition Supplement  Nutrition Education/Counseling:  Education not appropriate   Coordination of Nutrition Care:  Continue to monitor while inpatient, Feeding Assistance/Environment Change    Goals:  Patient will consume at least 75% at meals during stay       Nutrition Monitoring and Evaluation:   Behavioral-Environmental Outcomes:  None Identified   Food/Nutrient Intake Outcomes:  Diet Advancement/Tolerance, Food and Nutrient Intake, Supplement Intake  Physical Signs/Symptoms Outcomes:  Biochemical Data, Meal Time Behavior, Skin, Weight     Discharge Planning:     Too soon to determine     Electronically signed by Abelino Severin, RD, LD on 1/22/21 at 2:07 PM EST    Contact: [phone]

## 2021-01-22 NOTE — PROGRESS NOTES
Physical Rehabilitation: OCCUPATIONAL THERAPY     [x] daily progress note       [x] discharge       Patient Name:  Naty Benson. :  1950 MRN: [id number]  Room:  [address] [place] [address] Date of Admission: 2021  Rehabilitation Diagnosis:   Cerebral infarction, unspecified [I63.9]  Intracranial hemorrhage ([facility] [address] [I62.9]       Date 2021       Day of ARU Week:  5   Time IN//1005   Individual Tx Minutes    Group Tx Minutes    Co-Treat Minutes 90 (A cotreat was completed this date with  [x]? ??? PT    []???? OT   []???? ST      This pt requires simultaneous intervention of 2 skilled therapists to safely complete tasks to address complexity of deficits defined in the goals including:  [x]???? Attention   [x]? ??? Safety    []????Problem Solving    []?? ??Sequencing     [x]?? ??Initiation    []????Processing      [x]????Recall of learned tasks  []???? Communication  []???? Self Feeding   [x]????ADL's    [x]? ???UE Function    [x]? ???Motor planning   []????Balance  []?? ??Energy Conservation   []?? ??Verbal cues   []???? Tactile Cues  []????Barriers     [x]????Transfers   []????Device Use   Concurrent Tx Minutes    TOTAL Tx Time Mins 90   Variance Time    Variance Time []   Refusal due to:     []   Medical hold/reason:    []   Illness   []   Off Unit for test/procedure  []   Extra time needed to complete task  []   Therapeutic need  []   Other (specify):   Restrictions Restrictions/Precautions: General Precautions, Fall Risk(L hemiparesis, chronic R shoulder limitations)         Communication with other providers: [x]   OK to see per nursing:     []   Spoke with team member regarding:      Subjective observations and cognitive status: Patient in bed upon therapists arrival patient agreeable to therapy until therapist attempts to have patient doff LB clothing to wash LB, patient becomes agitated and combative and attempts to Punch this therapist twice, patient given room and a few minutes to gather himself therapist then attempt to have patient sit EOB, patient then states \"he is going to kick PTA in the balls\" while therapists are moving patient. Patient sits EDGE gathers himself and patient encouraged by both therapist to attempt ambulation and toileting tasks      Pain level/location:    /10       Location: neck, patient unable to give number or discern severity of pain        Discharge recommendations  Anticipated discharge date:  TBD  Destination: []home alone   []home alone w assist prn   [] home w/ family    [] Continuous supervision       [x]SNF    [] Assisted living     [] Other:   Continued therapy: []C OT  []OUTPATIENT  OT   [] No Further OT  Equipment needs: None      Patient has ADL this date however patient agitated and combative at times  Throughout therapy session and completed the following ADLs and refused the following ADL/s     Patient refuses to doff pants to complete LB dressing or washing  Task, also refuses to allow therapist to assist to initiate task     After max encouragement from both therapists, patient completed UB dressing with Min/Mod A this date with max vc  To initiate task and  stay on task     Max encouragement patient ambulates to toilet, patient stands in from of toilet approx 20 minutes refusing to doff pants and refusing to allow this therapist to assist with donning pants, finally both therapists has patient walk back to  in room. to rest     Patient completes oral care with Sup and max vc to bring cup to mouth, swish and spit (patient swallows some and spits most)         Comments:      Patient/Caregiver Education and Training:   [x]   Adaptive Equipment Use  [x]   Bed Mobility/Transfer Technique/Safety  [x]   Energy Conservation Tips  []   Family training  [x]   Postural Awareness  [x]   Safety During Functional Activities  []   Reinforced Patient's Precautions   []   Progress was updated and reviewed in Rehabtracker with patient and/or family this date.    Treatment Plan for Next Session: POC to continue as tolerated       Assessment:  Self limiting and agitated/combative behaviors barriers to reaching goals       Treatment/Activity Tolerance:   [x] Tolerated treatment with no adverse effects    [] Patient limited by fatigue  [] Patient limited by pain   [] Patient limited by medical complications:    [] Adverse reaction to Tx:   [] Significant change in status    Safety:       [x]  bed alarm set    []  chair alarm set    []  Pt refused alarms                []  Telesitter activated      [x]  Gait belt used during tx session      []other:       Number of Minutes/Billable Intervention  Therapeutic Exercise    ADL Self-care 50   Neuro Re-Ed    Therapeutic Activity 40   Group    Other:    TOTAL 90       Social History  Social/Functional History  Lives With: Significant other  Type of Home: House  Home Layout: One level  Home Access: Stairs to enter with rails  Entrance Stairs - Number of Steps: 5+3  Entrance Stairs - Rails: Both  Bathroom Shower/Tub: Walk-in shower, Shower chair with back(shower chair doesn't fit in shower)  Bathroom Toilet: Standard  Bathroom Equipment: Toilet raiser, Shower chair  Bathroom Accessibility: Accessible  Home Equipment: 4 wheeled walker, [address] Help From: Family, Neighbor  ADL Assistance: Needs assistance  Bath: Minimal assistance  Dressing: Minimal assistance  Grooming: Independent  Feeding: Independent  Toileting: Independent  Homemaking Assistance: Independent  Homemaking Responsibilities: No  Meal Prep Responsibility: No  Laundry Responsibility: No  Cleaning Responsibility: No  Bill Paying/Finance Responsibility: No  Shopping Responsibility: No  Dependent Care Responsibility: No  Health Care Management: No  Ambulation Assistance: Independent  Transfer Assistance: Needs assistance  Active : No  Patient's  Info: Neighbor is primary , and transport company  Mode of Transportation: Car, Family, Friends  Education: 12th grade  Occupation: Retired  Type of occupation: Pt built trucks for 33 years; [employer]  Leisure & Hobbies: TV, limited reading, girlfriend has a dog. Pt has meds that come in pill packs. Bills are managed over the phone. IADL Comments: Rafat Hoskins assists c most IADLs and pt report hiring outside help c yardork. Additional Comments: Pt reports using rolator in the house. Pt reports 1 fall this year that occured in the house while ambulating in the home; sleeps in regular, flat bed; R hand dominant    Objective                                                                                    Goals:  (Update in navigator)  Short term goals  Time Frame for Short term goals: STG=LTG:  Long term goals  Time Frame for Long term goals : 12-14 days or until d/c  Long term goal 1: Pt will complete feeding/grooming/oral care task c MOD I by d/c  Long term goal 2: Pt will complete total body bathing c MIN A by d/c  Long term goal 3: Pt will complete UB dressing c SUP by d/c  Long term goal 4: Pt will complete LB dressing c MOD A by d/c  Long term goal 5: Pt will don/doff footwear c SETUP by d/c  Long term goals 6: Pt will complete toileting c MOD A by d/c  Long term goal 7: Pt will complete functional transfer (toilet, tub, shower) c MOD A by d/c. Long term goal 8: Pt will perform therex/therax to facilitate increased strength/endurance/ax tolerance (c emphasis on dynamic standing balance/tolerance >10 mins and BUE endurance) c Min A in order to complete ADLs. :        Plan of Care                                                                              Times per week: 5 days per week for a minimum of 60 minutes/day plus group as appropriate for 60 minutes.   Treatment to include Plan  Times per day: Daily  Current Treatment Recommendations: Strengthening, Endurance Training, Neuromuscular Re-education, Patient/Caregiver Education & Training, ROM, Equipment Evaluation, Education, &

## 2021-01-23 LAB
GLUCOSE BLD-MCNC: 100 MG/DL (ref 70–99)
GLUCOSE BLD-MCNC: 108 MG/DL (ref 70–99)
GLUCOSE BLD-MCNC: 169 MG/DL (ref 70–99)
GLUCOSE BLD-MCNC: 92 MG/DL (ref 70–99)

## 2021-01-23 PROCEDURE — 97530 THERAPEUTIC ACTIVITIES: CPT

## 2021-01-23 PROCEDURE — 2580000003 HC RX 258: Performed by: PHYSICAL MEDICINE & REHABILITATION

## 2021-01-23 PROCEDURE — 94761 N-INVAS EAR/PLS OXIMETRY MLT: CPT

## 2021-01-23 PROCEDURE — 1280000000 HC REHAB R&B

## 2021-01-23 PROCEDURE — 6360000002 HC RX W HCPCS: Performed by: INTERNAL MEDICINE

## 2021-01-23 PROCEDURE — 6370000000 HC RX 637 (ALT 250 FOR IP): Performed by: NURSE PRACTITIONER

## 2021-01-23 PROCEDURE — 6370000000 HC RX 637 (ALT 250 FOR IP): Performed by: PHYSICAL MEDICINE & REHABILITATION

## 2021-01-23 PROCEDURE — 2500000003 HC RX 250 WO HCPCS: Performed by: PHYSICAL MEDICINE & REHABILITATION

## 2021-01-23 PROCEDURE — 6360000002 HC RX W HCPCS: Performed by: PHYSICAL MEDICINE & REHABILITATION

## 2021-01-23 PROCEDURE — 97535 SELF CARE MNGMENT TRAINING: CPT

## 2021-01-23 PROCEDURE — 82962 GLUCOSE BLOOD TEST: CPT

## 2021-01-23 RX ADMIN — CEFEPIME 2 G: 2 INJECTION, POWDER, FOR SOLUTION INTRAVENOUS at 00:25

## 2021-01-23 RX ADMIN — DOCUSATE SODIUM 100 MG: 100 CAPSULE, LIQUID FILLED ORAL at 08:57

## 2021-01-23 RX ADMIN — INSULIN LISPRO 1 UNITS: 100 INJECTION, SOLUTION INTRAVENOUS; SUBCUTANEOUS at 12:50

## 2021-01-23 RX ADMIN — MICONAZOLE NITRATE: 2 POWDER TOPICAL at 09:56

## 2021-01-23 RX ADMIN — METOPROLOL TARTRATE 25 MG: 25 TABLET, FILM COATED ORAL at 09:00

## 2021-01-23 RX ADMIN — DOCUSATE SODIUM 100 MG: 100 CAPSULE, LIQUID FILLED ORAL at 20:50

## 2021-01-23 RX ADMIN — METOPROLOL TARTRATE 25 MG: 25 TABLET, FILM COATED ORAL at 20:51

## 2021-01-23 RX ADMIN — ATORVASTATIN CALCIUM 80 MG: 40 TABLET, FILM COATED ORAL at 20:50

## 2021-01-23 RX ADMIN — MICONAZOLE NITRATE: 20 POWDER TOPICAL at 09:52

## 2021-01-23 RX ADMIN — MEGESTROL ACETATE 200 MG: 40 SUSPENSION ORAL at 08:59

## 2021-01-23 RX ADMIN — ESCITALOPRAM OXALATE 10 MG: 10 TABLET, FILM COATED ORAL at 09:00

## 2021-01-23 RX ADMIN — MICONAZOLE NITRATE: 20 POWDER TOPICAL at 20:51

## 2021-01-23 RX ADMIN — DIGOXIN 125 MCG: 125 TABLET ORAL at 09:01

## 2021-01-23 RX ADMIN — ENOXAPARIN SODIUM 40 MG: 100 INJECTION SUBCUTANEOUS at 09:03

## 2021-01-23 RX ADMIN — DOXAZOSIN 2 MG: 4 TABLET ORAL at 20:50

## 2021-01-23 RX ADMIN — BENZTROPINE MESYLATE 0.5 MG: 1 TABLET ORAL at 20:50

## 2021-01-23 RX ADMIN — MICONAZOLE NITRATE: 2 POWDER TOPICAL at 20:52

## 2021-01-23 RX ADMIN — SENNOSIDES 8.6 MG: 8.6 TABLET, FILM COATED ORAL at 20:50

## 2021-01-23 RX ADMIN — BENZTROPINE MESYLATE 0.5 MG: 1 TABLET ORAL at 09:49

## 2021-01-23 RX ADMIN — DIVALPROEX SODIUM 1000 MG: 500 TABLET, FILM COATED, EXTENDED RELEASE ORAL at 20:51

## 2021-01-23 RX ADMIN — FAMOTIDINE 20 MG: 20 TABLET, FILM COATED ORAL at 09:00

## 2021-01-23 ASSESSMENT — PAIN DESCRIPTION - PROGRESSION: CLINICAL_PROGRESSION: GRADUALLY WORSENING

## 2021-01-24 LAB
GLUCOSE BLD-MCNC: 112 MG/DL (ref 70–99)
GLUCOSE BLD-MCNC: 155 MG/DL (ref 70–99)
GLUCOSE BLD-MCNC: 96 MG/DL (ref 70–99)
GLUCOSE BLD-MCNC: 99 MG/DL (ref 70–99)

## 2021-01-24 PROCEDURE — 6370000000 HC RX 637 (ALT 250 FOR IP): Performed by: PHYSICAL MEDICINE & REHABILITATION

## 2021-01-24 PROCEDURE — 6370000000 HC RX 637 (ALT 250 FOR IP): Performed by: NURSE PRACTITIONER

## 2021-01-24 PROCEDURE — 94761 N-INVAS EAR/PLS OXIMETRY MLT: CPT

## 2021-01-24 PROCEDURE — 82962 GLUCOSE BLOOD TEST: CPT

## 2021-01-24 PROCEDURE — 6360000002 HC RX W HCPCS: Performed by: INTERNAL MEDICINE

## 2021-01-24 PROCEDURE — 1280000000 HC REHAB R&B

## 2021-01-24 RX ADMIN — MEGESTROL ACETATE 200 MG: 40 SUSPENSION ORAL at 09:40

## 2021-01-24 RX ADMIN — ESCITALOPRAM OXALATE 10 MG: 10 TABLET, FILM COATED ORAL at 09:41

## 2021-01-24 RX ADMIN — DOCUSATE SODIUM 100 MG: 100 CAPSULE, LIQUID FILLED ORAL at 21:14

## 2021-01-24 RX ADMIN — LISINOPRIL 40 MG: 20 TABLET ORAL at 09:41

## 2021-01-24 RX ADMIN — METOPROLOL TARTRATE 25 MG: 25 TABLET, FILM COATED ORAL at 21:17

## 2021-01-24 RX ADMIN — DIVALPROEX SODIUM 1000 MG: 500 TABLET, FILM COATED, EXTENDED RELEASE ORAL at 21:14

## 2021-01-24 RX ADMIN — BENZTROPINE MESYLATE 0.5 MG: 1 TABLET ORAL at 10:12

## 2021-01-24 RX ADMIN — MICONAZOLE NITRATE: 2 POWDER TOPICAL at 10:15

## 2021-01-24 RX ADMIN — BENZTROPINE MESYLATE 0.5 MG: 1 TABLET ORAL at 21:15

## 2021-01-24 RX ADMIN — MICONAZOLE NITRATE: 20 POWDER TOPICAL at 10:13

## 2021-01-24 RX ADMIN — DOCUSATE SODIUM 100 MG: 100 CAPSULE, LIQUID FILLED ORAL at 09:41

## 2021-01-24 RX ADMIN — SENNOSIDES 8.6 MG: 8.6 TABLET, FILM COATED ORAL at 21:14

## 2021-01-24 RX ADMIN — AMLODIPINE BESYLATE 10 MG: 10 TABLET ORAL at 09:40

## 2021-01-24 RX ADMIN — DOXAZOSIN 2 MG: 4 TABLET ORAL at 09:40

## 2021-01-24 RX ADMIN — DIGOXIN 125 MCG: 125 TABLET ORAL at 09:41

## 2021-01-24 RX ADMIN — INSULIN LISPRO 1 UNITS: 100 INJECTION, SOLUTION INTRAVENOUS; SUBCUTANEOUS at 17:17

## 2021-01-24 RX ADMIN — MICONAZOLE NITRATE: 20 POWDER TOPICAL at 21:15

## 2021-01-24 RX ADMIN — FAMOTIDINE 20 MG: 20 TABLET, FILM COATED ORAL at 09:41

## 2021-01-24 RX ADMIN — DOXAZOSIN 2 MG: 4 TABLET ORAL at 21:14

## 2021-01-24 RX ADMIN — ACETAMINOPHEN 650 MG: 325 TABLET ORAL at 21:23

## 2021-01-24 RX ADMIN — ENOXAPARIN SODIUM 40 MG: 100 INJECTION SUBCUTANEOUS at 09:43

## 2021-01-24 RX ADMIN — ATORVASTATIN CALCIUM 80 MG: 40 TABLET, FILM COATED ORAL at 21:14

## 2021-01-24 RX ADMIN — MICONAZOLE NITRATE: 2 POWDER TOPICAL at 21:19

## 2021-01-24 RX ADMIN — METOPROLOL TARTRATE 25 MG: 25 TABLET, FILM COATED ORAL at 09:41

## 2021-01-24 ASSESSMENT — PAIN DESCRIPTION - ONSET: ONSET: ON-GOING

## 2021-01-24 ASSESSMENT — PAIN DESCRIPTION - PAIN TYPE: TYPE: CHRONIC PAIN

## 2021-01-24 ASSESSMENT — PAIN - FUNCTIONAL ASSESSMENT: PAIN_FUNCTIONAL_ASSESSMENT: PREVENTS OR INTERFERES SOME ACTIVE ACTIVITIES AND ADLS

## 2021-01-24 ASSESSMENT — PAIN DESCRIPTION - FREQUENCY: FREQUENCY: CONTINUOUS

## 2021-01-24 ASSESSMENT — PAIN DESCRIPTION - DESCRIPTORS: DESCRIPTORS: ACHING

## 2021-01-24 ASSESSMENT — PAIN DESCRIPTION - LOCATION: LOCATION: NECK

## 2021-01-24 ASSESSMENT — PAIN DESCRIPTION - ORIENTATION: ORIENTATION: MID

## 2021-01-25 LAB
GLUCOSE BLD-MCNC: 100 MG/DL (ref 70–99)
GLUCOSE BLD-MCNC: 105 MG/DL (ref 70–99)
GLUCOSE BLD-MCNC: 126 MG/DL (ref 70–99)
GLUCOSE BLD-MCNC: 129 MG/DL (ref 70–99)
SARS-COV-2, NAAT: NOT DETECTED

## 2021-01-25 PROCEDURE — 6370000000 HC RX 637 (ALT 250 FOR IP): Performed by: NURSE PRACTITIONER

## 2021-01-25 PROCEDURE — 6360000002 HC RX W HCPCS: Performed by: INTERNAL MEDICINE

## 2021-01-25 PROCEDURE — 97530 THERAPEUTIC ACTIVITIES: CPT

## 2021-01-25 PROCEDURE — 6370000000 HC RX 637 (ALT 250 FOR IP): Performed by: PHYSICAL MEDICINE & REHABILITATION

## 2021-01-25 PROCEDURE — 97116 GAIT TRAINING THERAPY: CPT

## 2021-01-25 PROCEDURE — 1280000000 HC REHAB R&B

## 2021-01-25 PROCEDURE — U0002 COVID-19 LAB TEST NON-CDC: HCPCS

## 2021-01-25 PROCEDURE — 82962 GLUCOSE BLOOD TEST: CPT

## 2021-01-25 PROCEDURE — 94761 N-INVAS EAR/PLS OXIMETRY MLT: CPT

## 2021-01-25 PROCEDURE — 99232 SBSQ HOSP IP/OBS MODERATE 35: CPT | Performed by: PHYSICAL MEDICINE & REHABILITATION

## 2021-01-25 PROCEDURE — 97535 SELF CARE MNGMENT TRAINING: CPT

## 2021-01-25 RX ORDER — BENZTROPINE MESYLATE 0.5 MG/1
0.5 TABLET ORAL 2 TIMES DAILY
Qty: 60 TABLET | Refills: 0
Start: 2021-01-25 | End: 2021-01-29

## 2021-01-25 RX ORDER — LISINOPRIL 40 MG/1
40 TABLET ORAL DAILY
Qty: 30 TABLET | Refills: 0
Start: 2021-01-26 | End: 2021-01-29

## 2021-01-25 RX ORDER — DOXAZOSIN 2 MG/1
2 TABLET ORAL EVERY 12 HOURS SCHEDULED
Qty: 60 TABLET | Refills: 0
Start: 2021-01-25 | End: 2021-01-29

## 2021-01-25 RX ORDER — MEGESTROL ACETATE 40 MG/ML
200 SUSPENSION ORAL DAILY
Qty: 240 ML | Refills: 0
Start: 2021-01-26 | End: 2021-01-29 | Stop reason: HOSPADM

## 2021-01-25 RX ORDER — AMLODIPINE BESYLATE 10 MG/1
10 TABLET ORAL DAILY
Qty: 30 TABLET | Refills: 0
Start: 2021-01-25 | End: 2021-01-29

## 2021-01-25 RX ORDER — SENNA PLUS 8.6 MG/1
1 TABLET ORAL NIGHTLY
Qty: 30 TABLET | Refills: 0 | Status: ON HOLD | COMMUNITY
Start: 2021-01-25 | End: 2021-02-26 | Stop reason: HOSPADM

## 2021-01-25 RX ORDER — DIGOXIN 125 MCG
125 TABLET ORAL DAILY
Qty: 30 TABLET | Refills: 0 | COMMUNITY
Start: 2021-01-26 | End: 2021-01-29

## 2021-01-25 RX ADMIN — MICONAZOLE NITRATE: 2 POWDER TOPICAL at 20:58

## 2021-01-25 RX ADMIN — ATORVASTATIN CALCIUM 80 MG: 40 TABLET, FILM COATED ORAL at 20:46

## 2021-01-25 RX ADMIN — SENNOSIDES 8.6 MG: 8.6 TABLET, FILM COATED ORAL at 20:46

## 2021-01-25 RX ADMIN — AMLODIPINE BESYLATE 10 MG: 10 TABLET ORAL at 14:02

## 2021-01-25 RX ADMIN — LISINOPRIL 40 MG: 20 TABLET ORAL at 10:31

## 2021-01-25 RX ADMIN — MEGESTROL ACETATE 200 MG: 40 SUSPENSION ORAL at 10:30

## 2021-01-25 RX ADMIN — DOCUSATE SODIUM 100 MG: 100 CAPSULE, LIQUID FILLED ORAL at 20:46

## 2021-01-25 RX ADMIN — DOXAZOSIN 2 MG: 4 TABLET ORAL at 10:31

## 2021-01-25 RX ADMIN — DOCUSATE SODIUM 100 MG: 100 CAPSULE, LIQUID FILLED ORAL at 10:30

## 2021-01-25 RX ADMIN — ESCITALOPRAM OXALATE 10 MG: 10 TABLET, FILM COATED ORAL at 10:30

## 2021-01-25 RX ADMIN — BENZTROPINE MESYLATE 0.5 MG: 1 TABLET ORAL at 20:46

## 2021-01-25 RX ADMIN — ENOXAPARIN SODIUM 40 MG: 100 INJECTION SUBCUTANEOUS at 10:32

## 2021-01-25 RX ADMIN — BENZTROPINE MESYLATE 0.5 MG: 1 TABLET ORAL at 10:34

## 2021-01-25 RX ADMIN — DIVALPROEX SODIUM 1000 MG: 500 TABLET, FILM COATED, EXTENDED RELEASE ORAL at 20:46

## 2021-01-25 RX ADMIN — METOPROLOL TARTRATE 25 MG: 25 TABLET, FILM COATED ORAL at 20:46

## 2021-01-25 RX ADMIN — DIGOXIN 125 MCG: 125 TABLET ORAL at 10:31

## 2021-01-25 RX ADMIN — DOXAZOSIN 2 MG: 4 TABLET ORAL at 20:46

## 2021-01-25 RX ADMIN — ACETAMINOPHEN 650 MG: 325 TABLET ORAL at 20:51

## 2021-01-25 RX ADMIN — FAMOTIDINE 20 MG: 20 TABLET, FILM COATED ORAL at 10:30

## 2021-01-25 RX ADMIN — MICONAZOLE NITRATE: 20 POWDER TOPICAL at 20:54

## 2021-01-25 RX ADMIN — METOPROLOL TARTRATE 25 MG: 25 TABLET, FILM COATED ORAL at 10:31

## 2021-01-25 ASSESSMENT — PAIN DESCRIPTION - FREQUENCY: FREQUENCY: CONTINUOUS

## 2021-01-25 ASSESSMENT — PAIN DESCRIPTION - DIRECTION: RADIATING_TOWARDS: BIL SHOULDERS

## 2021-01-25 ASSESSMENT — PAIN DESCRIPTION - PROGRESSION: CLINICAL_PROGRESSION: GRADUALLY WORSENING

## 2021-01-25 NOTE — FLOWSHEET NOTE
[x] daily progress note       [x] discharge       Patient Name:  Marcelina Runner. :  1950 MRN: [id number]  Room:  [address] [place] [address] Date of Admission: 2021  Rehabilitation Diagnosis:   Cerebral infarction, unspecified [I63.9]  Intracranial hemorrhage ([facility] [address] [I62.9]       Date 2021       Day of ARU Week:  1   Time IN/-942 (co-treat)  4230-6975 (individual)   Individual Tx Minutes 60   Co-Treat Minutes 47    TOTAL Tx Time Mins 107   Restrictions Restrictions/Precautions  Restrictions/Precautions: General Precautions, Fall Risk(L hemiparesis, chronic R shoulder limitations)  Position Activity Restriction  Other position/activity restrictions: see catheter   Communication with other providers: [x]   OK to see per nursing:     []   Spoke with team member regarding:      Subjective observations and cognitive status: AM session: pt seen in semi-go's position in bed at beginning of treatment. Agreeable to therapy. Pt minimally agitated today. Pt responded well to calm, brief commands. PM session: pt seen in semi-go's position in bed at beginning of treatment. Agreeable to therapy. Pt initially alert, but then became lethargic senior living through session with increased fatigue and \"freezing\" moments. Pain level/location: 0/10           Discharge recommendations  Anticipated discharge date:  2021  Destination: []??home alone   []? ?home alone with assist PRN     []? ? home w/ family      []? ? Continuous supervision  [x]? ?SNF    []? ? Assisted living     []? ? Other:  Continued therapy: []??C PT  []??OUTPATIENT PT   []? ? No Further PT  [x]? ?SNF PT  Caregiver training recommended: []? ?Yes  []? ? No   Equipment needs: none      A cotreat was completed this date with  [x] PT    [] OT   [] ST      This pt requires simultaneous intervention of 2 skilled therapists to safely complete tasks to address complexity of deficits defined in the goals including: [x]Attention   [x] Safety    [x]Problem Solving    [x]Sequencing     [x]Initiation    [x]Processing      [x]Recall of learned tasks  [] Communication  [] Self Feeding   [x]ADL's    []UE Function    [x]Motor planning   [x]Balance  [x]Energy Conservation   []Verbal cues   [] Tactile Cues  []Barriers     [x]Transfers   []Device Use  Pt's response to cotreat: Fair   Progress toward goals: Ongoing     Bed Mobility:           [x]   Pt received out of bed   Rolling R/L:  Max A   Scooting: Max A x 2   Lying --> Sit:  Mod A x 1   Sit --> lying: Max A x 2     Transfers:    Sit--> Stand: 2-person assist: min A x 2   Stand --> Sit:   2-person assist: CGA x 2   Chair-->Bed/Bed --> Chair:  2-person assist: min A x 2   Car Transfers:  Not attempted d/t safety concerns (pt very lethargic). Toilet Transfer: See OT note for details. Assistive device required for transfer:  RW    Gait:    Walk 10 feet:  CGA  Walk 50 feet with 2 turns:  CGA-min A to assist with RW with turning  Walk 150 feet:  Not attempted d/t safety concerns   Device:  RW  Gait Quality:  Inconsistent stride length; slow estella; periods of \"freezing\" moments. Wheelchair Propulsion:   Wheel 50 feet with 2 turns: Not attempted d/t safety concerns (pt very lethargic)  Wheel 150 feet: Not attempted d/t safety concerns (pt very lethargic)  Type:    [x]  Manual        []  Electric    Stairs   1 Steps (Curb):  Not attempted d/t safety concerns   4 steps:  Not attempted d/t safety concerns   12 steps:  Not applicable     Uneven Surfaces:       Not attempted d/t safety concerns     Picking Up Object From Floor (must be standing position):  Not attempted d/t safety concerns     Additional Therapeutic activities/exercises completed this date:     []   Nu-step:  Time:        Level:         #Steps:       []   Rebounder:    []  Seated     []  Standing [x]   Balance training: AM session: pt stood and performed pull-up and pull-down of pants and briefs with CGA for balance. PM: pt stood at toilet with SBA-CGA for balance. pt unable to inititate turning to sit down on toilet with 10 minutes of verbal cuing and tactile cuing with multiple attempts of hand-over-hand to place UE on grab bar; however, pt resisting this PTA at this time, and patient had a bowel accident on the bathroom floor and all over his pants. Pt attempting to sit on toilet facing bwds and attempting to straddle toilet with max vcs to not do this. Placed w/c behind patient to sit down.          []   Postural training    []   Supine ther ex (reps/sets):     []   Seated ther ex (reps/sets):     []   Standing ther ex (reps/sets):     []   Other:   []   Other:   []   Other:    Patient/Caregiver Education and Training:   [x]   Bed Mobility/Transfer technique/safety  [x]   Gait technique/sequencing  [x]   Proper use of assistive device  []   Advanced mobility safety and technique  []   Reinforced patient's precautions/mobility while maintaining precautions  []   Postural awareness    []   Family training  [x]   Progress was updated in Rehabtracker this date. Treatment Plan for Next Session: QI       Assessment:    Treatment/Activity Tolerance:   [] Tolerated treatment with no adverse effects    [x] Patient limited by fatigue  [] Patient limited by pain   [] Patient limited by medical complications:    [] Adverse reaction to Tx:   [] Significant change in status    Safety:       [x]  bed alarm set    []  chair alarm set    []  Pt refused alarms                []  Telesitter activated      [x]  Gait belt used during tx session      [x]other: pt left in semi-go's position in bed with call light at end of treatment.          Number of Minutes/Billable Intervention  Gait Training 30   Therapeutic Exercise    Neuro Re-Ed    Therapeutic Activity 77   Wheelchair Propulsion    Group    Other:    TOTAL 107 Short term goal 1: Pt will complete bed mobility tasks and sup<->sit with SBA-Sup. Short term goal 2: Pt will complete OOB transfers using RW with Mod A. Short term goal 3: Pt will ambulate 10 ft over level and carpeted surfaces using RW with Min A. Short term goal 4: Pt will propel manual w/c 150 ft with turns with SBA-Sup. Short term goal 5: Caregiver will be able to safely assist pt with functional mobility tasks. :   :        Plan of Care                                                                              Times per week: 5 days per week for a minimum of 60 minutes/day plus group as appropriate for 60 minutes.   Treatment to include Current Treatment Recommendations: Strengthening, Functional Mobility Training, Wheelchair Mobility Training, Neuromuscular Re-education, Home Exercise Program, Equipment Evaluation, Education, & procurement, ROM, Transfer Training, Cognitive/Perceptual Training, Gait Training, Cognitive Reorientation, Safety Education & Training, Balance Training, Endurance Training, Pain Management, Patient/Caregiver Education & Training, Positioning    Electronically signed by   Mian Horton, [id number]  1/25/2021, 10:10 AM

## 2021-01-25 NOTE — DISCHARGE INSTR - COC
Continuity of Care Form    Patient Name: Sloan Cullen. :  1950  MRN:  [id number]    Admit date:  2021  Discharge date:  ***    Code Status Order: Full Code   Advance Directives:   Advance Care Flowsheet Documentation     Date/Time Healthcare Directive Type of Healthcare Directive Copy in [address] Agent's Name Healthcare Agent's Phone Number    21 2041  No, patient does not have an advance directive for healthcare treatment  Other (Comment) None at this time  Other (Comment) Does not have HPOA   N/A  n/a  n/a          Admitting Physician:  Chris Mejia MD  PCP: Woody Lakhani MD    Discharging Nurse: [facility] Unit/Room#: 1005/1005-A  Discharging Unit Phone Number: ***    Emergency Contact:   Extended Emergency Contact Information  [facility] Contact[facility]  Phone: [phone]  Mobile Phone: [phone]  Relation: Child   needed?  No  Secondary Emergency Contact: Alyce [facility] Phone: [phone]  Mobile Phone: [phone]  Relation: Child    Past Surgical History:  Past Surgical History:   Procedure Laterality Date    CARPAL TUNNEL RELEASE Bilateral     COLONOSCOPY N/A 2020    COLONOSCOPY POLYPECTOMY SNARE/COLD BIOPSY WITH SPOT INK TATTOO performed by Edwin Olsen [facility] at [facility] Left 's    KNEE SURGERY Bilateral     right knee x 2( scope)/ left knee- 7-8 yr ago   [address] fusion   [address]  N/A 2020    BOWEL RESECTION EXTENDED RIGHT HEMICOLECTOMY performed by Dilip Cheng MD at [address] [place] [address] Left 2013    THORACENTESIS  2016         THORACENTESIS Left 11/15/2016    Dr. Mary Pierce 250mlsremoved     THORACOTOMY Left 2019    with Lysis of adhesions    THORACOTOMY Left 3/18/2019 THORACOSCOPY CONVERTED TO THORACOTOMY WITH LYSIS OF ADHESIONS performed by Alfonso Zuleta MD at [address] [place] [address]      as a kid    UPPER GASTROINTESTINAL ENDOSCOPY N/A 5/12/2020    EGD BIOPSY performed by Abelino Novak MD at [facility] ENDOSCOPY       Immunization History:   Immunization History   Administered Date(s) Administered    Influenza A (C4U6-33) Vaccine PF IM 04/07/2010    Influenza Vaccine, unspecified formulation 10/14/2016    Influenza Virus Vaccine 10/03/2013, 10/13/2014, 01/29/2016, 10/14/2016    Influenza, High Dose (Fluzone 65 yrs and older) 11/11/2018    Influenza, Ashley Monte, IM, PF (6 mo and older Fluzone, Flulaval, Fluarix, and 3 yrs and older Afluria) 10/09/2019    Pneumococcal Conjugate 13-valent (Bhpwzhv27) 10/17/2016    Pneumococcal Polysaccharide (Jhvvvhswy42) 01/01/2009, 10/03/2013    Tdap (Boostrix, Adacel) 06/10/2013       Active Problems:  Patient Active Problem List   Diagnosis Code    Pleural effusion, left J90    SAMANTHA on CPAP G47.33, Z99.89    Chronic obstructive pulmonary disease (HCC) J44.9    TIA (transient ischemic attack) G45.9    Bipolar 1 disorder ([address] F31.9    Coronary artery disease involving native coronary artery of native heart without angina pectoris I25.10    Essential hypertension I10    REYES (dyspnea on exertion) R06.00    Anemia, chronic disease D63.8    Diuretic-induced hypokalemia E87.6, A19.3R8A    Diastolic CHF ([address] B64.38    Acute respiratory failure (HCC) J96.00    Hyperkalemia E87.5    Adrenal mass (HCC) E27.8    Diabetes mellitus, type 2 (HCC) E11.9    Hyponatremia E87.1    Paroxysmal atrial fibrillation (HCC) I48.0    Empyema of left pleural space (HCC) J86.9    Hyperlipidemia E78.5    Lumbar radiculopathy M54.16    CKD (chronic kidney disease) N18.9    Uncontrolled type 2 diabetes mellitus with peripheral neuropathy (HCC) E11.42, E11.65    Chronic kidney disease (CKD), stage III (moderate) N18.30  Cervical stenosis of spine M48.02    Spinal stenosis of lumbar region without neurogenic claudication M48.061    Somatic dysfunction of back M99.09    Somatic dysfunction of cervical region M99.01    Somatic dysfunction of pelvis region M99.05    Somatic dysfunction of both lower extremities M99.06    Empyema lung (HCC) J86.9    Acute kidney injury superimposed on CKD (HCC) N17.9, N18.9    Chronic diastolic heart failure ([facility]) I50.32    Hypotensive episode I95.9    Bradycardia on ECG R00.1    Lightheadedness R42    Acute on chronic respiratory failure with hypoxia ([facility]) J96.21    Severe mixed bipolar 1 disorder without psychosis ([facility]) F31.63    Acute chest pain R07.9    Atrial tachycardia ([facility]) I47.1    Overlapping malignant neoplasm of colon ([facility]) C18.8    Colon adenocarcinoma (HCC) C18.9    Acute left-sided weakness R53.1    Fall at home [address]19. Tatianna Thrasher, Y92.009    Fall at home, initial encounter [address] [address]. Tatianna Thrasher, Y92.009    Chronic pain of both shoulders M25.511, G89.29, M25.512    Hypertensive heart disease with stage 5 chronic kidney disease, not on chronic dialysis ([facility]) I13.11, N18.5    Bell's palsy G51.0    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 ([facility]) E09.65    Localized edema R60.0    Intracranial hemorrhage ([facility]) I62.9    Left hemiparesis ([facility]) G81.94    Dysarthria due to and not concurrent with spontaneous intracerebral hemorrhage I69.122    Gait disturbance R26.9    Bipolar disorder, in partial remission, most recent episode depressed ([facility]) F31.75    Intraparenchymal hematoma of brain ([facility] [address] S06.360A       Isolation/Infection:   Isolation          No Isolation        Patient Infection Status     Infection Onset Added Last Indicated Last Indicated By Review Planned Expiration Resolved Resolved By    None active    Resolved    COVID-19 Rule Out 09/02/20 09/02/20 09/02/20 COVID-19 (Ordered)   09/02/20 Rule-Out Test Resulted Liquids: {Slp liquid thickness:20627}  Daily Fluid Restriction: {CHP DME Yes amt example:178066311}  Last Modified Barium Swallow with Video (Video Swallowing Test): {Done Not Done [facility]:122852300}    Treatments at the Time of Hospital Discharge:   Respiratory Treatments: ***  Oxygen Therapy:  {Therapy; copd oxygen:40781}  Ventilator:    { CC Vent DIVE:514300766}    Rehab Therapies: {THERAPEUTIC INTERVENTION:4427133214}  Weight Bearing Status/Restrictions: [address] CC Weight Bearin}  Other Medical Equipment (for information only, NOT a DME order):  {EQUIPMENT:546411907}  Other Treatments: ***    Patient's personal belongings (please select all that are sent with patient):  {[facility] DME Belongings:852624414}    RN SIGNATURE:  {Esignature:825747816}    CASE MANAGEMENT/SOCIAL WORK SECTION    Inpatient Status Date: 2021    Readmission Risk Assessment Score:  Readmission Risk              Risk of Unplanned Readmission:        31         Discharging to Facility/ Agency   · Name: [facility]  · Address: [address] [facility]  · Phone: [phone]  · Fax: [phone]    / signature: Electronically signed by NADEEN Garcia LSW on 21 at 9:11 AM EST    PHYSICIAN SECTION    Prognosis: {Prognosis:1184394529}    Condition at Discharge: [address] Patient Condition:426601554}    Rehab Potential (if transferring to Rehab): {Prognosis:8945351890}    Recommended Labs or Other Treatments After Discharge: ***    Physician Certification: I certify the above information and transfer of Rickey Nails.  is necessary for the continuing treatment of the diagnosis listed and that he requires {Admit to Appropriate Level of Care:69703} for {GREATER/LESS:902972998} 30 days.      Update Admission H&P: {[facility]516[facility]6462}    PHYSICIAN SIGNATURE:  {Esignature:241546905}

## 2021-01-25 NOTE — PROGRESS NOTES
Comprehensive Nutrition Assessment    Type and Reason for Visit:  Reassess    Nutrition Recommendations/Plan:   · Continue current diet and oral nutrition supplement, between meals  · Total assist with meals/supplements    Nutrition Assessment:  Pt states he's eating a little more than half of meals and drinking some of oral supplement. Consuming less than half of meals per Flowsheets. Encouraged po intake and oral supplements in between meals. pt is a total assist with meals. Will continue to follow as moderate nutrition risk. Malnutrition Assessment:  Malnutrition Status:  No malnutrition    Context:  Acute Illness       Estimated Daily Nutrient Needs:  Energy (kcal):  0852-2791; Weight Used for Energy Requirements:  Current     Protein (g):  72-87 (1-1.2 g/kg); Weight Used for Protein Requirements:  Ideal        Fluid (ml/day):  2000; Method Used for Fluid Requirements:  1 ml/kcal      Wounds:  (redness)       Current Nutrition Therapies:    Dietary Nutrition Supplements: Low Calorie High Protein Supplement  DIET GENERAL    Anthropometric Measures:  · Height: 5' 9\" (175.3 cm)  · Current Body Weight: 216 lb (98 kg)   · Admission Body Weight: 225 lb 5 oz (102.2 kg)    · Usual Body Weight: 214 lb (97.1 kg)(per hx)     · Ideal Body Weight: 160 lbs; % Ideal Body Weight 143.1 %   · BMI: 31.9  · BMI Categories: Obese Class 1 (BMI 30.0-34. 9)       Nutrition Diagnosis:   · Predicted inadequate energy intake related to other (reduced appetite in illness) as evidenced by other (suboptimal intake so far)    Nutrition Interventions:   Food and/or Nutrient Delivery:  Continue Current Diet, Continue Oral Nutrition Supplement  Nutrition Education/Counseling:  No recommendation at this time   Coordination of Nutrition Care:  Continue to monitor while inpatient, Feeding Assistance/Environment Change    Goals:  Patient will consume at least 75% at meals during stay       Nutrition Monitoring and Evaluation: Behavioral-Environmental Outcomes:  None Identified   Food/Nutrient Intake Outcomes:  Food and Nutrient Intake, Supplement Intake  Physical Signs/Symptoms Outcomes:  Biochemical Data, Meal Time Behavior, Skin, Weight     Discharge Planning:     Too soon to determine     Electronically signed by Alex Yost RD, LD on 1/25/21 at 2:42 PM EST    Contact: [phone]

## 2021-01-25 NOTE — PROGRESS NOTES
Occupational Therapy    Physical Rehabilitation: OCCUPATIONAL THERAPY     [x] daily progress note       [] discharge       Patient Name:  Tamara Go.    :  1950 MRN: [id number]  Room:  [address] [place] [address] Date of Admission: 2021  Rehabilitation Diagnosis:   Cerebral infarction, unspecified [I63.9]  Intracranial hemorrhage ([facility] [address] [I62.9]       Date 2021       Day of ARU Week:  1   Time IN/OUT [phone]   Individual Tx Minutes 25   Group Tx Minutes    Co-Treat Minutes 52 (A cotreat was completed this date with  [x] PT    [] OT   [] ST      This pt requires simultaneous intervention of 2 skilled therapists to safely complete tasks to address complexity of deficits defined in the goals including:  [x]Attention   [x] Safety    [x]Problem Solving    [x]Sequencing     [x]Initiation    []Processing      []Recall of learned tasks  [] Communication  [x] Self Feeding   [x]ADL's    [x]UE Function    []Motor planning   [x]Balance  []Energy Conservation   []Verbal cues   [] Tactile Cues  []Barriers     [x]Transfers   []Device Use     Concurrent Tx Minutes    TOTAL Tx Time Mins 72   Variance Time +23   Variance Time []   Refusal due to:     []   Medical hold/reason:    []   Illness   []   Off Unit for test/procedure  [x]   Extra time needed to complete task  [x]   Therapeutic need  []   Other (specify):   Restrictions Restrictions/Precautions: General Precautions, Fall Risk(L hemiparesis, chronic R shoulder limitations)         Communication with other providers: [x]   OK to see per nursing:     []   Spoke with team member regarding: Subjective observations and cognitive status: Pt seen in high-fowlers upon arrival. Pt with increased level of alertness, eyes wide open and conversing well with therapist. Pt agreeable to therapy session. During ADL task, pt demo sudden change in behavior, pt demo attempting to drink from cup while toothbrush was in mouth and was not aware, pt's eyes were closed and therapist provided pt with awareness of demo and reported, \"Would you back the f**k off and stop insulting me, I know what I'm doing. \" Pt redirected and made aware of mistake with use of therapeutic use of self and pt demo decreased agitation. Pain level/location:    /10       Location:    Discharge recommendations  Anticipated discharge date:  1/25  Destination: []home alone   []home alone w assist prn   [] home w/ family    [] Continuous supervision       [x]SNF    [] Assisted living     [] Other:   Continued therapy: []HHC OT  []OUTPATIENT  OT   [] No Further OT  Equipment needs: none       ADLs:    Eating: Eating  Assistance Needed: Supervision or touching assistance  Comment: Pt demo inconsistency c self-feeding. Pt requires Min-Total A depending on level of alertness. Required v/c for task initiation 1/21 during OT session. CARE Score: 4  Discharge Goal: Independent       Oral Hygiene: Oral Hygiene  Assistance Needed: Partial/moderate assistance  Comment: Min A vc and tactile cues  to stay alert and oriented to task with intermittent hand over hand to assist  Reason if not Attempted: Not attempted due to medical condition or safety concerns  CARE Score: 3  Discharge Goal: Independent    UB/LB Bathing: Shower/Bathe Self  Assistance Needed: Dependent  Comment: per last report pt required total A to initiate and stay on task.   Reason if not Attempted: Not attempted due to medical condition or safety concerns  CARE Score: 1  Discharge Goal: Supervision or touching assistance    UB Dressing: Upper Body Dressing Assistance Needed: Partial/moderate assistance  Comment: Mod A as of 1/21: Mod A for threading of BUE and over head: Requires task initiation, orientation, and tactile cues for sequencing  Reason if not Attempted: Not attempted due to medical condition or safety concerns  CARE Score: 3  Discharge Goal: Supervision or touching assistance         LB Dressing: Lower Body Dressing  Assistance Needed: Substantial/maximal assistance  Comment: Max A for threading BLE and donning over hips. Pt attempted to assist c donning over hips. Reason if not Attempted: Not attempted due to medical condition or safety concerns  CARE Score: 2  Discharge Goal: Partial/moderate assistance    Donning and [place] Footwear: Putting On/Taking Off Footwear  Assistance Needed: Dependent  Comment: x  CARE Score: 1  Discharge Goal: Set-up or clean-up assistance      Toileting: Toileting Hygiene  Assistance Needed: Dependent  Comment: 2 person assist required for satey, CGA x1 during CM and, Max A x1 for paulino-hygiene. CARE Score: 1  Discharge Goal: Partial/moderate assistance      Toilet Transfers: Toilet Transfer  Assistance Needed: Dependent  Comment: CGA X1- SBA X1 for safety.   Reason if not Attempted: Not attempted due to medical condition or safety concerns  CARE Score: 1  Discharge Goal: Partial/moderate assistance  Device Used:    []   Standard Toilet         []   Mary Brisa           []  Bedside Commode       []   Elevated Toilet          []   Other:        Bed Mobility Transfers: Functional Mobility:    :           [x]   Pt received out of bed   SEE PT NOTES FOR DETAILS        Additional Therapeutic activities/exercises completed this date:     [x]   ADL Training   []   Balance/Postural training     []   Bed/Transfer Training   []   Endurance Training   []   Neuromuscular Re-ed   []   Nu-step:  Time:        Level:         #Steps:       []   Rebounder:    []  Seated     []  Standing        []   Supine Ther Ex (reps/sets): []   Seated Ther Ex (reps/sets):     []   Standing Ther Ex (reps/sets):     []   Other:      Comments:      Patient/Caregiver Education and Training:   []   Adaptive Equipment Use  []   Bed Mobility/Transfer Technique/Safety  []   Energy Conservation Tips  []   Family training  []   Postural Awareness  []   Safety During Functional Activities  []   Reinforced Patient's Precautions   []   Progress was updated and reviewed in Rehabtracker with patient and/or family this         date. Treatment Plan for Next Session: Increase task initiation, ADL performance. Assessment:  Pt demo inconsistent functional performances on a daily basis and presents with limited true typical performance abilities. Pt's level of alertness, behavior, and motivation play a big role in pt's therapy performance and participation. Pt's inconsistency c functional tasks and transfers require continued co-tx services for safety.       Treatment/Activity Tolerance:   [x] Tolerated treatment with no adverse effects    [] Patient limited by fatigue  [] Patient limited by pain   [] Patient limited by medical complications:    [] Adverse reaction to Tx:   [] Significant change in status    Safety:       [x]  bed alarm set    []  chair alarm set    []  Pt refused alarms                []  Telesitter activated      []  Gait belt used during tx session      []other:       Number of Minutes/Billable Intervention  Therapeutic Exercise    ADL Self-care 72   Neuro Re-Ed    Therapeutic Activity    Group    Other:    TOTAL 72       Social History  Social/Functional History  Lives With: Significant other  Type of Home: House  Home Layout: One level  Home Access: Stairs to enter with rails  Entrance Stairs - Number of Steps: 5+3  Entrance Stairs - Rails: Both  Bathroom Shower/Tub: Walk-in shower, Shower chair with back(shower chair doesn't fit in shower)  Bathroom Toilet: Standard  Bathroom Equipment: Toilet raiser, Shower chair Bathroom Accessibility: Accessible  Home Equipment: 4 wheeled walker, [address] Help From: Family, Neighbor  ADL Assistance: Needs assistance  Bath: Minimal assistance  Dressing: Minimal assistance  Grooming: Independent  Feeding: Independent  Toileting: Independent  Homemaking Assistance: Independent  Homemaking Responsibilities: No  Meal Prep Responsibility: No  Laundry Responsibility: No  Cleaning Responsibility: No  Bill Paying/Finance Responsibility: No  Shopping Responsibility: No  Dependent Care Responsibility: No  Health Care Management: No  Ambulation Assistance: Independent  Transfer Assistance: Needs assistance  Active : No  Patient's  Info: Arvind Ingram is primary , and transport company  Mode of Transportation: Car, Family, Friends  Education: 12th grade  Occupation: Retired  Type of occupation: Pt built trucks for 33 years; [employer]  Leisure & Hobbies: TV, limited reading, girlfriend has a dog. Pt has meds that come in pill packs. Bills are managed over the phone. IADL Comments: Tiffanie Bills assists c most IADLs and pt report hiring outside help c yardork. Additional Comments: Pt reports using rolator in the house.  Pt reports 1 fall this year that occured in the house while ambulating in the home; sleeps in regular, flat bed; R hand dominant    Objective                                                                                    Goals:  (Update in navigator)  Short term goals  Time Frame for Short term goals: STG=LTG:  Long term goals  Time Frame for Long term goals : 12-14 days or until d/c  Long term goal 1: Pt will complete feeding/grooming/oral care task c MOD I by d/c  Long term goal 2: Pt will complete total body bathing c MIN A by d/c  Long term goal 3: Pt will complete UB dressing c SUP by d/c  Long term goal 4: Pt will complete LB dressing c MOD A by d/c  Long term goal 5: Pt will don/doff footwear c SETUP by d/c Long term goals 6: Pt will complete toileting c MOD A by d/c  Long term goal 7: Pt will complete functional transfer (toilet, tub, shower) c MOD A by d/c. Long term goal 8: Pt will perform therex/therax to facilitate increased strength/endurance/ax tolerance (c emphasis on dynamic standing balance/tolerance >10 mins and BUE endurance) c Min A in order to complete ADLs. :        Plan of Care                                                                              Times per week: 5 days per week for a minimum of 60 minutes/day plus group as appropriate for 60 minutes.   Treatment to include Plan  Times per day: Daily  Current Treatment Recommendations: Strengthening, Endurance Training, Neuromuscular Re-education, Patient/Caregiver Education & Training, ROM, Equipment Evaluation, Education, & procurement, Self-Care / ADL, Balance Training, Functional Mobility Training, Safety Education & Training, Cognitive/Perceptual Training    Electronically signed by   Robert Chery, [address], OTR/L  License # [id number]    1/25/2021, 3:25 PM

## 2021-01-25 NOTE — PROGRESS NOTES
[address] : 1950  Acct #: [de-identified]  MRN: [id number]              PM&R Progress Note      Admitting diagnosis: Intracranial hemorrhage ([address])     Comorbid diagnoses impacting rehabilitation: Left hemiparesis, dysarthria, paroxysmal atrial fibrillation, essential hypertension, uncontrolled diabetes type 2 with peripheral neuropathy, COPD, bipolar disorder, diastolic congestive heart failure    Chief complaint: Patient does not offer any complaints. Prior (baseline) level of function: Independent. Current level of function:         Current  IRF-NOHEMY and Goals:   Occupational Therapy:    Short term goals  Time Frame for Short term goals: STG=LTG :   Long term goals  Time Frame for Long term goals : 12-14 days or until d/c  Long term goal 1: Pt will complete feeding/grooming/oral care task c MOD I by d/c  Long term goal 2: Pt will complete total body bathing c MIN A by d/c  Long term goal 3: Pt will complete UB dressing c SUP by d/c  Long term goal 4: Pt will complete LB dressing c MOD A by d/c  Long term goal 5: Pt will don/doff footwear c SETUP by d/c  Long term goals 6: Pt will complete toileting c MOD A by d/c  Long term goal 7: Pt will complete functional transfer (toilet, tub, shower) c MOD A by d/c. Long term goal 8: Pt will perform therex/therax to facilitate increased strength/endurance/ax tolerance (c emphasis on dynamic standing balance/tolerance >10 mins and BUE endurance) c Min A in order to complete ADLs. :                                       Eating: Eating  Assistance Needed: Supervision or touching assistance  Comment: Pt demo inconsistency c self-feeding. Pt requires Min-Total A depending on level of alertness. Required v/c for task initiation  during OT session.   CARE Score: 4  Discharge Goal: Independent       Oral Hygiene: Oral Hygiene  Assistance Needed: Partial/moderate assistance Comment: Min A c [place] techniques and v/c and tactile cues to initiate and attend to task , pt demo difficulty c manipulating toothpaste. Reason if not Attempted: Not attempted due to medical condition or safety concerns  CARE Score: 3  Discharge Goal: Independent    UB/LB Bathing: Shower/Bathe Self  Assistance Needed: Dependent  Comment: Max A/ (total) max vc and tactile cues to intiate and stay on task  Reason if not Attempted: Not attempted due to medical condition or safety concerns  CARE Score: 1  Discharge Goal: Supervision or touching assistance    UB Dressing: Upper Body Dressing  Assistance Needed: Partial/moderate assistance  Comment: Mod A for threading of BUE and over head: Requires task initiation, orientation, and tactile cues for sequencing. Reason if not Attempted: Not attempted due to medical condition or safety concerns  CARE Score: 3  Discharge Goal: Supervision or touching assistance         LB Dressing: Lower Body Dressing  Assistance Needed: Substantial/maximal assistance  Comment: Max A for threading BLE and donning over hips. Pt attempted to assist c donning over hips. Reason if not Attempted: Not attempted due to medical condition or safety concerns  CARE Score: 2  Discharge Goal: Partial/moderate assistance    Donning and [place] Footwear: Putting On/Taking Off Footwear  Assistance Needed: Dependent  Comment: x  CARE Score: 1  Discharge Goal: Set-up or clean-up assistance      Toileting: Toileting Hygiene  Assistance Needed: Dependent  Comment: 2 person assist required for satey, CGA x1 during CM and, Max A x1 for paulino-hygiene. CARE Score: 1  Discharge Goal: Partial/moderate assistance      Toilet Transfers: Toilet Transfer  Assistance Needed: Dependent  Comment: CGA X1- SBA X1 for safety.   Reason if not Attempted: Not attempted due to medical condition or safety concerns  CARE Score: 1  Discharge Goal: Partial/moderate assistance    Physical Therapy:   Short term goals Time Frame for Short term goals: 14-21 tx days:  Short term goal 1: Pt will complete bed mobility tasks and sup<->sit with SBA-Sup. Short term goal 2: Pt will complete OOB transfers using RW with Mod A. Short term goal 3: Pt will ambulate 10 ft over level and carpeted surfaces using RW with Min A. Short term goal 4: Pt will propel manual w/c 150 ft with turns with SBA-Sup. Short term goal 5: Caregiver will be able to safely assist pt with functional mobility tasks. Bed Mobility:   Sit to Lying  Assistance Needed: Dependent  Comment: Total A x 2  CARE Score: 1  Discharge Goal: Supervision or touching assistance  Roll Left and Right  Assistance Needed: Dependent  Comment: Total A x 2  CARE Score: 1  Discharge Goal: Supervision or touching assistance  Lying to Sitting on Side of Bed  Assistance Needed: Dependent  Comment: Total A x 2  CARE Score: 1  Discharge Goal: Supervision or touching assistance    Transfers:    Sit to Stand  Assistance Needed: Dependent  Comment: min A x 2  Reason if not Attempted: Not attempted due to medical condition or safety concerns  CARE Score: 1  Discharge Goal: Partial/moderate assistance  Chair/Bed-to-Chair Transfer  Assistance Needed: Dependent  Comment: min A x 2  Reason if not Attempted: Not attempted due to medical condition or safety concerns  CARE Score: 1  Discharge Goal: Partial/moderate assistance     Car Transfer  Comment: pt required max redirection, Very agitated today, with poor initiation, and decreased level of alertness at other times.   Reason if not Attempted: Not attempted due to medical condition or safety concerns  CARE Score: 88  Discharge Goal: Partial/moderate assistance    Ambulation:    Walking Ability  Does the Patient Walk?: Yes     Walk 10 Feet  Assistance Needed: Dependent  Comment: 2-person assist: CGA x 1 + min A x 1 (for walker management) with w/c follow for safety Reason if not Attempted: Not attempted due to medical condition or safety concerns  CARE Score: 1  Discharge Goal: Partial/moderate assistance     Walk 50 Feet with Two Turns  Assistance Needed: Dependent  Comment: 2-person assist: CGA x 1 + min A x 1 (for walker management) with w/c follow for safety  Reason if not Attempted: Not attempted due to medical condition or safety concerns  CARE Score: 1  Discharge Goal: Not Attempted     Walk 150 Feet  Reason if not Attempted: Not applicable  CARE Score: 9  Discharge Goal: Not Applicable     Walking 10 Feet on Uneven Surfaces  Reason if not Attempted: Not attempted due to medical condition or safety concerns  CARE Score: 88  Discharge Goal: Partial/moderate assistance     1 Step (Curb)  Reason if not Attempted: Not attempted due to medical condition or safety concerns  CARE Score: 88  Discharge Goal: Not Attempted     4 Steps  Reason if not Attempted: Not attempted due to medical condition or safety concerns  CARE Score: 88  Discharge Goal: Not Attempted     12 Steps  Reason if not Attempted: Not applicable  CARE Score: 9  Discharge Goal: Not Applicable       Wheelchair:  w/c Ability: Wheelchair Ability  Uses a Wheelchair and/or Scooter?: Yes  Wheel 50 Feet with Two Turns  Assistance Needed: Dependent  Comment: Decreased initiation to participate in activity. Pt inconsistent with decreased level of alertness and increased level of agitation at other times. Reason if not Attempted: Not attempted due to medical condition or safety concerns  CARE Score: 1  Discharge Goal: Supervision or touching assistance  Wheel 150 Feet  Assistance Needed: Dependent  Comment: Decreased initiation to participate in activity. Pt inconsistent with decreased level of alertness and increased level of agitation at other times.   Reason if not Attempted: Not attempted due to medical condition or safety concerns  CARE Score: 1  Discharge Goal: Supervision or touching assistance Balance:        Object: Picking Up Object  Reason if not Attempted: Not attempted due to medical condition or safety concerns  CARE Score: 88  Discharge Goal: Partial/moderate assistance    I      Exam:    Blood pressure (!) 144/73, pulse 68, temperature 97.3 °F (36.3 °C), temperature source Oral, resp. rate 16, height 5' 9\" (1.753 m), weight 216 lb (98 kg), SpO2 98 %. General: Observed standing on a front wheeled walker ambulating with PT. HEENT: Speaks with a strong voice. MMM. Neck supple. Pulmonary: Unlabored breathing without coughing. Cardiac: Occasional skipped beat with peripheral palpation. Abdomen: Patient's abdomen is soft and nondistended. Bowel sounds were present throughout. There was no rebound, guarding or masses noted. Upper extremities: Able to  the walker with both hands. Lower extremities: Calves are soft no new swelling. Wide base of support with stance. Sitting balance was good. Standing balance was fair-.    Lab Results   Component Value Date    WBC 6.9 01/21/2021    HGB 12.9 (L) 01/21/2021    HCT 42.4 01/21/2021    MCV 94.6 01/21/2021     01/21/2021     Lab Results   Component Value Date    INR 0.84 12/19/2020    INR 1.01 06/03/2020    INR 0.90 06/02/2020    PROTIME 10.1 (L) 12/19/2020    PROTIME 12.2 06/03/2020    PROTIME 10.9 (L) 06/02/2020     Lab Results   Component Value Date    CREATININE 1.2 01/21/2021    BUN 15 01/21/2021     01/21/2021    K 3.8 01/21/2021     01/21/2021    CO2 23 01/21/2021     Lab Results   Component Value Date    ALT 10 12/19/2020    AST 15 12/19/2020    ALKPHOS 82 12/19/2020    BILITOT 0.6 12/19/2020       Expected length of stay  prior to a contact-guard level of assistance at a nursing home with ongoing therapies is any day now.       Recommendations: 1. Right intracranial hemorrhage with left hemiparesis: Alert and walking on the unit with a front wheeled walker with PT. Required contact-guard assist to stand and to maintain his balance. This is a distinct improvement from before the weekend. Unfortunately, his engagement with therapy is somewhat sporadic.   Cardiology recommends no anticoagulation at this point.  Better oral intake recently.     For now keeping him off the Risperdal, Desyrel, BuSpar and gabapentin.   Completed antibiotic therapy for the UTI. Ongoing pulmonary hygiene, DVT prophylaxis and nutritional support.  Incontinent only if he does not notify as he has to go. [facility] is unable to meet his needs in the home setting at this point in a skilled nursing facility will be chosen soon. Evidently at noon on a Saturday the insurance left a message that there was 48 hours to do a peer to peer call to respond to their denial of his skilled nursing transfer. Of course inpatient rehab does not have  on the weekends it was this morning before the message was taken off. I was given the request to do a peer to peer around lunchtime today. I just called and was told, \"you missed the deadline\". Now formal appeal of the decision has to be made. 2. DVT prophylaxis: Heparin 5000 units every 8 hours.  I must monitor his hemoglobin and platelet count periodically while on this medication.  Weightbearing activities  remains limited.  GI prophylaxis offered.  No new bruising or swelling.      3. Paroxysmal atrial fibrillation with prior anticoagulation: Cardiology has stopped the Xarelto.  Elevated rate required titrating metoprolol and restarting Cardizem.  EKG reviewed by cardiology.  Daily weights do not show any decompensation of CHF. 4. Uncontrolled diabetes type 2 with peripheral neuropathy: Patient requires a diet modified for carbohydrates.  He is on Neurontin for peripheral neuropathy symptoms.  Blood sugars are checked at mealtime and bedtime.  Sliding scale Humalog for now with reintroduction of oral agents should his oral intake stabilize.  Blood sugars generally less than 150.  5. Essential hypertension: Patient requires multiple medications for blood pressure regulation.  Target systolic blood pressure is 120140.  Vital signs are checked at rest and with activity.  Blood pressure within target range today. 6. COPD: Aggressive pulmonary hygiene.  Monitoring O2 saturations at rest and with activity. 7. Bipolar disorder: Depakote levels appropriate.  Off of the BuSpar, Risperdal, goserelin and gabapentin he has become a bit more animated/hypomanic.    Treatment of the UTI has not greatly impacted his behavior and cognition.  Treating in a calm and consistent environment.  Providing written and verbal instructions when possible to assist with information retention. 8. Urinary tract infection.  Pseudomonas aeruginosa sensitive to the cefepime.  7 days of IV antibiotics have been completed.

## 2021-01-25 NOTE — CARE COORDINATION
LSW faxed med list and negative COVID to [facility] at [phone]. Discharge summary will be sent to same fax when available. LSW just awaits confirmation of received pre-cert from facility and transport will be set for discharge. Nursing:  Call report to [phone].    11:30 AM  LSW received call from Mary with [facility]. Patient's insurance has denied need for SNF stay. Information for P2P received and given to Dr. Candido Willett. LSW awaits determination of P2P.    2:30 PM  LSW informed by Dr. Candido Willett that calling for P2P was denied due to being outside of P2P window. LSW called [employer] ([phone]) and escalated claim to fast appeals. LSW then called [employer] Appeals line at [phone] and completed appeal.  LSW awaits determination. Appeals case # [id number]. Additional clinicals, if needed, to be faxed to [phone].

## 2021-01-26 LAB
GLUCOSE BLD-MCNC: 114 MG/DL (ref 70–99)
GLUCOSE BLD-MCNC: 126 MG/DL (ref 70–99)
GLUCOSE BLD-MCNC: 131 MG/DL (ref 70–99)
GLUCOSE BLD-MCNC: 95 MG/DL (ref 70–99)

## 2021-01-26 PROCEDURE — 82962 GLUCOSE BLOOD TEST: CPT

## 2021-01-26 PROCEDURE — 97535 SELF CARE MNGMENT TRAINING: CPT

## 2021-01-26 PROCEDURE — 6370000000 HC RX 637 (ALT 250 FOR IP): Performed by: NURSE PRACTITIONER

## 2021-01-26 PROCEDURE — 94761 N-INVAS EAR/PLS OXIMETRY MLT: CPT

## 2021-01-26 PROCEDURE — 6360000002 HC RX W HCPCS: Performed by: INTERNAL MEDICINE

## 2021-01-26 PROCEDURE — 97110 THERAPEUTIC EXERCISES: CPT

## 2021-01-26 PROCEDURE — 97116 GAIT TRAINING THERAPY: CPT

## 2021-01-26 PROCEDURE — 97530 THERAPEUTIC ACTIVITIES: CPT

## 2021-01-26 PROCEDURE — 6370000000 HC RX 637 (ALT 250 FOR IP): Performed by: PHYSICAL MEDICINE & REHABILITATION

## 2021-01-26 PROCEDURE — 99232 SBSQ HOSP IP/OBS MODERATE 35: CPT | Performed by: PHYSICAL MEDICINE & REHABILITATION

## 2021-01-26 PROCEDURE — 1280000000 HC REHAB R&B

## 2021-01-26 RX ADMIN — DOCUSATE SODIUM 100 MG: 100 CAPSULE, LIQUID FILLED ORAL at 11:07

## 2021-01-26 RX ADMIN — METOPROLOL TARTRATE 25 MG: 25 TABLET, FILM COATED ORAL at 11:07

## 2021-01-26 RX ADMIN — DOXAZOSIN 2 MG: 4 TABLET ORAL at 19:56

## 2021-01-26 RX ADMIN — ESCITALOPRAM OXALATE 10 MG: 10 TABLET, FILM COATED ORAL at 11:08

## 2021-01-26 RX ADMIN — MICONAZOLE NITRATE: 20 POWDER TOPICAL at 11:10

## 2021-01-26 RX ADMIN — ATORVASTATIN CALCIUM 80 MG: 40 TABLET, FILM COATED ORAL at 19:56

## 2021-01-26 RX ADMIN — LISINOPRIL 40 MG: 20 TABLET ORAL at 11:08

## 2021-01-26 RX ADMIN — METOPROLOL TARTRATE 25 MG: 25 TABLET, FILM COATED ORAL at 20:01

## 2021-01-26 RX ADMIN — DIGOXIN 125 MCG: 125 TABLET ORAL at 11:08

## 2021-01-26 RX ADMIN — MICONAZOLE NITRATE: 20 POWDER TOPICAL at 20:02

## 2021-01-26 RX ADMIN — DIVALPROEX SODIUM 1000 MG: 500 TABLET, FILM COATED, EXTENDED RELEASE ORAL at 19:56

## 2021-01-26 RX ADMIN — MICONAZOLE NITRATE: 20 POWDER TOPICAL at 19:55

## 2021-01-26 RX ADMIN — FAMOTIDINE 20 MG: 20 TABLET, FILM COATED ORAL at 11:08

## 2021-01-26 RX ADMIN — ENOXAPARIN SODIUM 40 MG: 100 INJECTION SUBCUTANEOUS at 11:09

## 2021-01-26 RX ADMIN — MICONAZOLE NITRATE: 2 POWDER TOPICAL at 20:03

## 2021-01-26 RX ADMIN — BENZTROPINE MESYLATE 0.5 MG: 1 TABLET ORAL at 11:07

## 2021-01-26 RX ADMIN — MEGESTROL ACETATE 200 MG: 40 SUSPENSION ORAL at 11:09

## 2021-01-26 RX ADMIN — BENZTROPINE MESYLATE 0.5 MG: 1 TABLET ORAL at 20:48

## 2021-01-26 RX ADMIN — DOCUSATE SODIUM 100 MG: 100 CAPSULE, LIQUID FILLED ORAL at 19:55

## 2021-01-26 RX ADMIN — DOXAZOSIN 2 MG: 4 TABLET ORAL at 11:07

## 2021-01-26 RX ADMIN — AMLODIPINE BESYLATE 10 MG: 10 TABLET ORAL at 11:08

## 2021-01-26 RX ADMIN — SENNOSIDES 8.6 MG: 8.6 TABLET, FILM COATED ORAL at 19:56

## 2021-01-26 RX ADMIN — MICONAZOLE NITRATE: 2 POWDER TOPICAL at 11:10

## 2021-01-26 ASSESSMENT — PAIN SCALES - GENERAL: PAINLEVEL_OUTOF10: 0

## 2021-01-26 NOTE — FLOWSHEET NOTE
[x] daily progress note       [] discharge       Patient Name:  Emory Delgado. :  1950 MRN: [id number]  Room:  [address] [place], [place] [address] Date of Admission: 2021  Rehabilitation Diagnosis:   Cerebral infarction, unspecified [I63.9]  Intracranial hemorrhage ([facility] [address] [I62.9]       Date 2021       Day of ARU Week:  2   Time IN/OUT 6655-5516 (co-treat)    Co-Treat Minutes 45   TOTAL Tx Time Mins 45   Restrictions Restrictions/Precautions  Restrictions/Precautions: General Precautions, Fall Risk(L hemiparesis, chronic R shoulder limitations)  Position Activity Restriction  Other position/activity restrictions: see catheter   Communication with other providers: [x]   OK to see per nursing:     []   Spoke with team member regarding:      Subjective observations and cognitive status: Pt seen sitting up in recliner at beginning of treatment. Agreeable to therapy. Pt alert initially and then became more fatigued towards end of session. Pain level/location: 0 /10          Discharge recommendations  Anticipated discharge date:  2021  Destination: []???home alone   []? ??home alone with assist PRN     []? ?? home w/ family      []? ?? Continuous supervision  [x]? ?? SNF    []??? Assisted living     []? ?? Other:  Continued therapy: []???[facility] PT  []???OUTPATIENT PT   []??? No Further PT  [x]? ??SNF PT  Caregiver training recommended: []? ? ? Yes  []??? No   Equipment needs: none       [x]   Pt received out of bed         A cotreat was completed this date with  [] PT    [x] OT   [] ST      This pt requires simultaneous intervention of 2 skilled therapists to safely complete tasks to address complexity of deficits defined in the goals including:  [x]Attention   [x] Safety    [x]Problem Solving    [x]Sequencing     [x]Initiation    [x]Processing      []Recall of learned tasks  [x] Communication  [] Self Feeding   []ADL's    [x]UE Function    [x]Motor planning   [x]Balance [] Adverse reaction to Tx:   [] Significant change in status    Safety:       []  bed alarm set    [x]  chair alarm set    []  Pt refused alarms                []  Telesitter activated      [x]  Gait belt used during tx session      [x]other: pt left sitting up in recliner with call light at end of treatment. Number of Minutes/Billable Intervention  Gait Training 15   Therapeutic Exercise    Neuro Re-Ed    Therapeutic Activity 30   Wheelchair Propulsion    Group    Other:    TOTAL 45         Social History  Social/Functional History  Lives With: Significant other  Type of Home: House  Home Layout: One level  Home Access: Stairs to enter with rails  Entrance Stairs - Number of Steps: 5+3  Entrance Stairs - Rails: Both  Bathroom Shower/Tub: Walk-in shower, Shower chair with back(shower chair doesn't fit in shower)  Bathroom Toilet: Standard  Bathroom Equipment: Toilet raiser, Shower chair  Bathroom Accessibility: Accessible  Home Equipment: 4 wheeled walker, [address] Help From: Family, Neighbor  ADL Assistance: Needs assistance  Bath: Minimal assistance  Dressing: Minimal assistance  Grooming: Independent  Feeding: Independent  Toileting: Independent  Homemaking Assistance: Independent  Homemaking Responsibilities: No  Meal Prep Responsibility: No  Laundry Responsibility: No  Cleaning Responsibility: No  Bill Paying/Finance Responsibility: No  Shopping Responsibility: No  Dependent Care Responsibility: No  Health Care Management: No  Ambulation Assistance: Independent  Transfer Assistance: Needs assistance  Active : No  Patient's  Info: Neighbor is primary , and transport company  Mode of Transportation: Car, Family, Friends  Education: 12th grade  Occupation: Retired  Type of occupation: Pt built trucks for 33 years; [employer]  Leisure & Hobbies: TV, limited reading, girlfriend has a dog. Pt has meds that come in pill packs. Bills are managed over the phone. History of appendectomy continue home meds for now as documented above

## 2021-01-26 NOTE — CARE COORDINATION
LSW called Mary with [employer] [facility] ([phone]) to update on status of patient. Mary is to inform LSW if patient's insurance calls them with determination of expedited appeal and vice versa. 2:00 PM  LSW called [employer] ([phone]) for update on status of expedited appeal.  LSW transferred to [facility] ([phone]), then to the [employer] ([phone]), and then back to [facility]. LSW informed that determination has not been made yet and they have until the 28th to do so. LSW will continue calling to check status. Treatment team updated. 3:10 PM  Updated PT and OT notes sent to [facility] with [facility] at [phone].

## 2021-01-26 NOTE — PROGRESS NOTES
Physical Therapy    [x] daily progress note       [] discharge       Patient Name:  Babita Lopez. :  1950 MRN: [id number]  Room:  [address] [place] [address] Date of Admission: 2021  Rehabilitation Diagnosis:   Cerebral infarction, unspecified [I63.9]  Intracranial hemorrhage ([facility] [address] [I62.9]       Date 2021       Day of ARU Week:  2   Time IN/OUT 1345/1430   Individual Tx Minutes    Group Tx Minutes    Co-Treat Minutes 39  A cotreat was completed this date with  [] PT    [x] OT   [] ST      This pt requires simultaneous intervention of 2 skilled therapists to safely complete tasks to address complexity of deficits defined in the goals including:  [x]Attention   [x] Safety    [x]Problem Solving    [x]Sequencing     [x]Initiation    [x]Processing      [x]Recall of learned tasks  [] Communication  [] Self Feeding   []ADL's    [x]UE Function    [x]Motor planning   []Balance  []Energy Conservation   [x]Verbal cues   [x] Tactile Cues  []Barriers     [x]Transfers   [x]Device Use   Concurrent Tx Minutes    TOTAL Tx Time Mins 45   Variance Time    Variance Time []   Refusal due to:     []   Medical hold/reason:    []   Illness   []   Off Unit for test/procedure  []   Extra time needed to complete task  []   Therapeutic need  []   Other (specify):   Restrictions Restrictions/Precautions  Restrictions/Precautions: General Precautions, Fall Risk(L hemiparesis, chronic R shoulder limitations)  Position Activity Restriction  Other position/activity restrictions: see catheter   Interdisciplinary communication [x]   Cleared for therapy per nursing     []   RN notified about issues during session  []   RN updated on pt performance  []   Spoke with   []   Spoke with OT  []   Spoke with MD  []   Other:    Subjective observations and cognitive status: Pt in bed, alert and pleasant initially, had poor motor initiation and delayed processing requiring constant cues and redirection. Pain level/location: Location: neck (pain not rated)    Discharge recommendations  Anticipated discharge date:  Pending at this time   Destination: []home alone   []home alone with assist PRN     [] home w/ family      [] Continuous supervision  [x]SNF    [] Assisted living     [] Other:  Continued therapy: []HHC PT  []OUTPATIENT PT   [] No Further PT  [x]SNF PT  Caregiver training recommended: []Yes  [] No   Equipment needs: none      Bed Mobility:           []   Pt received out of bed   Lying --> Sit:  Mod A (required assist on BLE due to poor motor initiation despite use of verbal and tactile cues)  Bed features used: [x] Yes  [] No     Transfers:    Sit--> Stand: Total A (Min A + SBA of 2nd person and then Mod A x 2 as tx session progressed; required constant tactile cues on proper hand positioning as pt was not receptive to VCs)  Stand --> Sit:   Total A (Mod A x 2; does not transition at least 1 hand with verbal prompt requiring physical assist)   Chair-->Bed/Bed --> Chair:  Total A (Min A + assist of 2nd person to assist with AD management due to pt's impaired problem solving and motor sequencing)  Other: Attempted w/c->recliner with stand pivot without use of RW, however pt unable to complete due to increased agitation and confusion  Assistive device used for transfer: RW     Uneven Surfaces:       Assistance:    Total A (Min A + assist of 2nd person to facilitate required motor planning/sequencing and attention to required task)  Device:    RW and close w/c follow   Surfaces Completed:   []  Green mat with bean bags beneath      []  Throw rugs       []  Ramp       []  Outdoor pavements        []  Grass             []  Loose gravel        [x]  Other:  carpet/transitional surface     Patient/Caregiver Education and Training:   []   Role of PT  []   Education about Dx  []   Use of call light for assist   []   HEP provided and explained   []   Treatment plan reviewed  []   Home safety []   Wheelchair mobility/management   []   Body mechanics  [x]   Bed Mobility/Transfer technique  []   Gait technique/sequencing  []   Proper use of assistive device/adaptive equipment  [x]   Advanced mobility safety and technique  []   Reinforced patient's precautions/mobility while maintaining precautions  []   Postural awareness  []   Family/caregiver training  []   Progress was updated and reviewed in Rehabtracker with patient and/or family this date. []   Other:    Treatment Plan for Next Session: transfers and increase ambulation distance      Assessment:  Pt's mood changed from being pleasant initially and became agitated and more confused towards the end of this tx session that he was not redirectable to safely complete the stand pivot transfer from w/c->recliner. He remains to be cue-dependent on all tasks due to his cognitive and physical deficits. He was not fully receptive to use of verbal cues requiring increased use of manual/tactile cues to facilitate mobility training.      Treatment/Activity Tolerance:   [] Tolerated treatment with no adverse effects    [x] Patient limited by fatigue  [] Patient limited by pain   [] Patient limited by medical complications:    [] Adverse reaction to Tx:   [] Significant change in status    Barrier/s to progress/learning:   []   None  [x]   Cognition  []   Hearing deficit  []   Pre-morbid mental/psychological status   [x]   Motivation  []   Communication  []   Anxiety  []   Vision deficit  [x]   Attention/neglect  []   Other:    Safety:       []  bed alarm set    []  chair alarm set    []  Pt refused alarms                []  Telesitter activated      [x]  Gait belt used during tx session      [x]other: pt left in w/c under PCT's care        Number of Minutes/Billable Intervention  Gait Training 15   Therapeutic Exercise    Neuro Re-Ed    Therapeutic Activity 30   Wheelchair Propulsion    Group    Other:    TOTAL 45       Social History  Social/Functional History Short term goal 2: Pt will complete OOB transfers using RW with Mod A. Short term goal 3: Pt will ambulate 10 ft over level and carpeted surfaces using RW with Min A. Short term goal 4: Pt will propel manual w/c 150 ft with turns with SBA-Sup. Short term goal 5: Caregiver will be able to safely assist pt with functional mobility tasks. :   :        Plan of Care                                                                              Times per week: 5 days per week for a minimum of 60 minutes/day plus group as appropriate for 60 minutes.   Treatment to include Current Treatment Recommendations: Strengthening, Functional Mobility Training, Wheelchair Mobility Training, Neuromuscular Re-education, Home Exercise Program, Equipment Evaluation, Education, & procurement, ROM, Transfer Training, Cognitive/Perceptual Training, Gait Training, Cognitive Reorientation, Safety Education & Training, Balance Training, Endurance Training, Pain Management, Patient/Caregiver Education & Training, Positioning    Electronically signed by   Laura Maza PT  1/26/2021, 2:48 PM

## 2021-01-26 NOTE — PROGRESS NOTES
Physical Rehabilitation: OCCUPATIONAL THERAPY     [x] daily progress note       [] discharge       Patient Name:  Jhony Dee    :  1950 MRN: [id number]  Room:  [address] [place] [address] Date of Admission: 2021  Rehabilitation Diagnosis:   Cerebral infarction, unspecified [I63.9]  Intracranial hemorrhage ([facility] [address] [I62.9]       Date 2021       Day of ARU Week:  2   Time IN/OUT 1115/1200   Individual Tx Minutes    Group Tx Minutes    Co-Treat Minutes 39 A cotreat was completed this date with  [x] PT    [] OT   [] ST      This pt requires simultaneous intervention of 2 skilled therapists to safely complete tasks to address complexity of deficits defined in the goals including:  [x]Attention   [x] Safety    [x]Problem Solving    []Sequencing     []Initiation    []Processing      []Recall of learned tasks  [] Communication  [] Self Feeding   [x]ADL's    []UE Function    []Motor planning   [x]Balance  []Energy Conservation   [x]Verbal cues   [] Tactile Cues  []Barriers     [x]Transfers   []Device Use  Pt's response to cotreat: Good this date   Progress toward goals: fluctuates    Concurrent Tx Minutes    TOTAL Tx Time Mins 45   Variance Time    Variance Time []   Refusal due to:     []   Medical hold/reason:    []   Illness   []   Off Unit for test/procedure  []   Extra time needed to complete task  []   Therapeutic need  []   Other (specify):   Restrictions Restrictions/Precautions: General Precautions, Fall Risk(L hemiparesis, chronic R shoulder limitations)         Communication with other providers: [x]   OK to see per nursing:     []   Spoke with team member regarding:      Subjective observations and cognitive status:  patient up in recliner with nurse, pleasant and agreeable to therapy session, patient stating \"my butt is numb im ready to get up\"     Pain level/location:    /10       Location: did not c/o pain or have any notiocable facial expressions revealing ain [x]   Energy Conservation Tips  []   Family training  [x]   Postural Awareness  [x]   Safety During Functional Activities  []   Reinforced Patient's Precautions   []   Progress was updated and reviewed in Rehabtracker with patient and/or family this         date.     Treatment Plan for Next Session: POC to continue as tolerated       Assessment:        Treatment/Activity Tolerance:   [x] Tolerated treatment with no adverse effects    [] Patient limited by fatigue  [] Patient limited by pain   [] Patient limited by medical complications:    [] Adverse reaction to Tx:   [] Significant change in status    Safety:       []  bed alarm set    []  chair alarm set    []  Pt refused alarms                []  Telesitter activated      []  Gait belt used during tx session      [x]other:  Left with nursing      Number of Minutes/Billable Intervention  Therapeutic Exercise    ADL Self-care 15   Neuro Re-Ed    Therapeutic Activity 30   Group    Other:    TOTAL 45       Social History  Social/Functional History  Lives With: Significant other  Type of Home: House  Home Layout: One level  Home Access: Stairs to enter with rails  Entrance Stairs - Number of Steps: 5+3  Entrance Stairs - Rails: Both  Bathroom Shower/Tub: Walk-in shower, Shower chair with back(shower chair doesn't fit in shower)  Bathroom Toilet: Standard  Bathroom Equipment: Toilet raiser, Shower chair  Bathroom Accessibility: Accessible  Home Equipment: 4 wheeled walker, [address] Help From: Family, Neighbor  ADL Assistance: Needs assistance  Bath: Minimal assistance  Dressing: Minimal assistance  Grooming: Independent  Feeding: Independent  Toileting: Independent  Homemaking Assistance: Independent  Homemaking Responsibilities: No  Meal Prep Responsibility: No  Laundry Responsibility: No  Cleaning Responsibility: No  Bill Paying/Finance Responsibility: No  Shopping Responsibility: No  Dependent Care Responsibility: No  Health Care Management: No Ambulation Assistance: Independent  Transfer Assistance: Needs assistance  Active : No  Patient's  Info: [employer] is primary , and transport company  Mode of Transportation: Car, Family, Friends  Education: 12th grade  Occupation: Retired  Type of occupation: Pt built trucks for 33 years; [email or web address]  Leisure & Hobbies: TV, limited reading, girlfriend has a dog. Pt has meds that come in pill packs. Bills are managed over the phone. IADL Comments: Pollie Bumpers assists c most IADLs and pt report hiring outside help c yardork. Additional Comments: Pt reports using rolator in the house. Pt reports 1 fall this year that occured in the house while ambulating in the home; sleeps in regular, flat bed; R hand dominant    Objective                                                                                    Goals:  (Update in navigator)  Short term goals  Time Frame for Short term goals: STG=LTG:  Long term goals  Time Frame for Long term goals : 12-14 days or until d/c  Long term goal 1: Pt will complete feeding/grooming/oral care task c MOD I by d/c  Long term goal 2: Pt will complete total body bathing c MIN A by d/c  Long term goal 3: Pt will complete UB dressing c SUP by d/c  Long term goal 4: Pt will complete LB dressing c MOD A by d/c  Long term goal 5: Pt will don/doff footwear c SETUP by d/c  Long term goals 6: Pt will complete toileting c MOD A by d/c  Long term goal 7: Pt will complete functional transfer (toilet, tub, shower) c MOD A by d/c. Long term goal 8: Pt will perform therex/therax to facilitate increased strength/endurance/ax tolerance (c emphasis on dynamic standing balance/tolerance >10 mins and BUE endurance) c Min A in order to complete ADLs. :        Plan of Care                                                                              Times per week: 5 days per week for a minimum of 60 minutes/day plus group as appropriate for 60 minutes.   Treatment to include Plan

## 2021-01-26 NOTE — PROGRESS NOTES
Occupational Therapy  Physical Rehabilitation: OCCUPATIONAL THERAPY     [x] daily progress note       [] discharge       Patient Name:  Liz [place]. :  1950 MRN: [id number]  Room:  [address] [place] [address] Date of Admission: 2021  Rehabilitation Diagnosis:   Cerebral infarction, unspecified [I63.9]  Intracranial hemorrhage ([facility] [address] [I62.9]       Date 2021       Day of ARU Week:  2   Time IN/OUT [id number]   Individual Tx Minutes    Group Tx Minutes    Co-Treat Minutes 39 A cotreat was completed this date with  [x] PT    [] OT   [] ST      This pt requires simultaneous intervention of 2 skilled therapists to safely complete tasks to address complexity of deficits defined in the goals including:  [x]Attention   [x] Safety    [x]Problem Solving    [x]Sequencing     [x]Initiation    [x]Processing      []Recall of learned tasks  [] Communication  [] Self Feeding   []ADL's    [x]UE Function    [x]Motor planning   [x]Balance  []Energy Conservation   []Verbal cues   [] Tactile Cues  []Barriers     [x]Transfers   []Device Use     Concurrent Tx Minutes    TOTAL Tx Time Mins 45   Variance Time    Variance Time []   Refusal due to:     []   Medical hold/reason:    []   Illness   []   Off Unit for test/procedure  []   Extra time needed to complete task  []   Therapeutic need  []   Other (specify):   Restrictions Restrictions/Precautions: General Precautions, Fall Risk(L hemiparesis, chronic R shoulder limitations)         Communication with other providers: [x]   OK to see per nursing:     []   Spoke with team member regarding:      Subjective observations and cognitive status: Pt alert upon arrival and agreeable to OT session. However pt continues to demo poor problem solving, sequencing, task initiation, and safety awareness during functional tasks. Pain level/location:    /10       Location: neck and shld area R, unable to give pain rate.     Discharge recommendations  Anticipated discharge date:   Destination: []home alone   []home alone w assist prn   [] home w/ family    [] Continuous supervision       [x]SNF    [] Assisted living     [] Other:   Continued therapy: [x]HHC OT  []OUTPATIENT  OT   [] No Further OT   Equipment needs: tbd    Bed Mobility Transfers:  2 person assist required for safety:           [x]   Pt received out of bed   See PT notes for details    Additional Therapeutic activities/exercises completed this date:     []   ADL Training   [x]   Balance/Postural training Pt demo good static sitting balance while seated EOB, required support from BUE and SBA for safety. []   Bed/Transfer Training   []   Endurance Training   []   Neuromuscular Re-ed   []   Nu-step:  Time:        Level:         #Steps:       []   Rebounder:    []  Seated     []  Standing        []   Supine Ther Ex (reps/sets):     [x]   Seated Ther Ex (reps/sets): w/c pushups and EOB hip clearance exercises to increase BUE strength and motor planning required for functional transfers   []   Standing Ther Ex (reps/sets):     [x]   Other: Sequencing of FWW management and motor planning required for functional transfers, Max v/c and Elim IRA assistance required for FWW management, hand placement, and body placement. Applied biofreeze for pain management along with gentle sustained pressure into the Myofascial connective tissue restrictions to eliminate pain and restore motion in shoulders, neck, and scapular area. Comments:      Patient/Caregiver Education and Training:   []   [employer] Equipment Use  []   Bed Mobility/Transfer Technique/Safety  [x]   Energy Conservation Tips  []   Family training  []   Postural Awareness  []   Safety During Functional Activities  []   Reinforced Patient's Precautions   []   Progress was updated and reviewed in Rehabtracker with patient and/or family this         date. Treatment Plan for Next Session: increase visual scanning, functional transfers, and ADL performance. Assessment:  Pt continues to demo poor safety awareness and major barrier continues to be a result of cognitive deficits. Poor task initiation, sequencing, motor planning, problem solving noted. Requires continued co-tx sessions for safe functional transfers and completion of OOB ax. Treatment/Activity Tolerance:   [x] Tolerated treatment with no adverse effects    [] Patient limited by fatigue  [] Patient limited by pain   [] Patient limited by medical complications:    [] Adverse reaction to Tx:   [] Significant change in status    Safety:       []  bed alarm set    []  chair alarm set    []  Pt refused alarms                []  Telesitter activated      [x]  Gait belt used during tx session      [x]other:   Pt handoff with [facility] staff to assist pt back to recliner post therapy session.     Number of Minutes/Billable Intervention  Therapeutic Exercise 45   ADL Self-care    Neuro Re-Ed    Therapeutic Activity    Group    Other:    TOTAL 45       Social History  Social/Functional History  Lives With: Significant other  Type of Home: House  Home Layout: One level  Home Access: Stairs to enter with rails  Entrance Stairs - Number of Steps: 5+3  Entrance Stairs - Rails: Both  Bathroom Shower/Tub: Walk-in shower, Shower chair with back(shower chair doesn't fit in shower)  Bathroom Toilet: Standard  Bathroom Equipment: Toilet raiser, Shower chair  Bathroom Accessibility: Accessible  Home Equipment: 4 wheeled walker, [address] Help From: Family, Neighbor  ADL Assistance: Needs assistance  Bath: Minimal assistance  Dressing: Minimal assistance  Grooming: Independent  Feeding: Independent  Toileting: Independent  Homemaking Assistance: Independent  Homemaking Responsibilities: No  Meal Prep Responsibility: No  Laundry Responsibility: No  Cleaning Responsibility: No  Bill Paying/Finance Responsibility: No  Shopping Responsibility: No  Dependent Care Responsibility: No  Health Care Management: No Ambulation Assistance: Independent  Transfer Assistance: Needs assistance  Active : No  Patient's  Info: Demetri Peralta is primary , and transport company  Mode of Transportation: Car, Family, Friends  Education: 12th grade  Occupation: Retired  Type of occupation: Pt built trucks for 33 years; [employer]  Leisure & Hobbies: TV, limited reading, girlfriend has a dog. Pt has meds that come in pill packs. Bills are managed over the phone. IADL Comments: [place] assists c most IADLs and pt report hiring outside help c yardork. Additional Comments: Pt reports using rolator in the house. Pt reports 1 fall this year that occured in the house while ambulating in the home; sleeps in regular, flat bed; R hand dominant    Objective                                                                                    Goals:  (Update in navigator)  Short term goals  Time Frame for Short term goals: STG=LTG:  Long term goals  Time Frame for Long term goals : 12-14 days or until d/c  Long term goal 1: Pt will complete feeding/grooming/oral care task c MOD I by d/c  Long term goal 2: Pt will complete total body bathing c MIN A by d/c  Long term goal 3: Pt will complete UB dressing c SUP by d/c  Long term goal 4: Pt will complete LB dressing c MOD A by d/c  Long term goal 5: Pt will don/doff footwear c SETUP by d/c  Long term goals 6: Pt will complete toileting c MOD A by d/c  Long term goal 7: Pt will complete functional transfer (toilet, tub, shower) c MOD A by d/c. Long term goal 8: Pt will perform therex/therax to facilitate increased strength/endurance/ax tolerance (c emphasis on dynamic standing balance/tolerance >10 mins and BUE endurance) c Min A in order to complete ADLs. :        Plan of Care                                                                              Times per week: 5 days per week for a minimum of 60 minutes/day plus group as appropriate for 60 minutes.   Treatment to include Plan Times per day: Daily  Current Treatment Recommendations: Strengthening, Endurance Training, Neuromuscular Re-education, Patient/Caregiver Education & Training, ROM, Equipment Evaluation, Education, & procurement, Self-Care / ADL, Balance Training, Functional Mobility Training, Safety Education & Training, Cognitive/Perceptual Training    Electronically signed by   Fidencio Ayon, [address], OTR/L  License # [id number]    1/26/2021, 4:03 PM

## 2021-01-27 LAB
GLUCOSE BLD-MCNC: 119 MG/DL (ref 70–99)
GLUCOSE BLD-MCNC: 136 MG/DL (ref 70–99)
GLUCOSE BLD-MCNC: 181 MG/DL (ref 70–99)
GLUCOSE BLD-MCNC: 97 MG/DL (ref 70–99)

## 2021-01-27 PROCEDURE — 97530 THERAPEUTIC ACTIVITIES: CPT

## 2021-01-27 PROCEDURE — 97535 SELF CARE MNGMENT TRAINING: CPT

## 2021-01-27 PROCEDURE — 1280000000 HC REHAB R&B

## 2021-01-27 PROCEDURE — 82962 GLUCOSE BLOOD TEST: CPT

## 2021-01-27 PROCEDURE — 6360000002 HC RX W HCPCS: Performed by: INTERNAL MEDICINE

## 2021-01-27 PROCEDURE — 94761 N-INVAS EAR/PLS OXIMETRY MLT: CPT

## 2021-01-27 PROCEDURE — 6370000000 HC RX 637 (ALT 250 FOR IP): Performed by: PHYSICAL MEDICINE & REHABILITATION

## 2021-01-27 PROCEDURE — 99232 SBSQ HOSP IP/OBS MODERATE 35: CPT | Performed by: PHYSICAL MEDICINE & REHABILITATION

## 2021-01-27 PROCEDURE — 6370000000 HC RX 637 (ALT 250 FOR IP): Performed by: NURSE PRACTITIONER

## 2021-01-27 PROCEDURE — 97116 GAIT TRAINING THERAPY: CPT

## 2021-01-27 RX ADMIN — FAMOTIDINE 20 MG: 20 TABLET, FILM COATED ORAL at 11:09

## 2021-01-27 RX ADMIN — DOXAZOSIN 2 MG: 4 TABLET ORAL at 21:56

## 2021-01-27 RX ADMIN — DOCUSATE SODIUM 100 MG: 100 CAPSULE, LIQUID FILLED ORAL at 21:56

## 2021-01-27 RX ADMIN — METOPROLOL TARTRATE 25 MG: 25 TABLET, FILM COATED ORAL at 11:09

## 2021-01-27 RX ADMIN — SENNOSIDES 8.6 MG: 8.6 TABLET, FILM COATED ORAL at 21:55

## 2021-01-27 RX ADMIN — MEGESTROL ACETATE 200 MG: 40 SUSPENSION ORAL at 11:11

## 2021-01-27 RX ADMIN — MICONAZOLE NITRATE: 2 POWDER TOPICAL at 11:22

## 2021-01-27 RX ADMIN — BENZTROPINE MESYLATE 0.5 MG: 1 TABLET ORAL at 21:58

## 2021-01-27 RX ADMIN — MICONAZOLE NITRATE: 2 POWDER TOPICAL at 22:00

## 2021-01-27 RX ADMIN — BENZTROPINE MESYLATE 0.5 MG: 1 TABLET ORAL at 11:11

## 2021-01-27 RX ADMIN — DOXAZOSIN 2 MG: 4 TABLET ORAL at 11:09

## 2021-01-27 RX ADMIN — MICONAZOLE NITRATE: 20 POWDER TOPICAL at 11:22

## 2021-01-27 RX ADMIN — ATORVASTATIN CALCIUM 80 MG: 40 TABLET, FILM COATED ORAL at 21:56

## 2021-01-27 RX ADMIN — DOCUSATE SODIUM 100 MG: 100 CAPSULE, LIQUID FILLED ORAL at 11:09

## 2021-01-27 RX ADMIN — MICONAZOLE NITRATE: 20 POWDER TOPICAL at 22:00

## 2021-01-27 RX ADMIN — METOPROLOL TARTRATE 25 MG: 25 TABLET, FILM COATED ORAL at 21:56

## 2021-01-27 RX ADMIN — ESCITALOPRAM OXALATE 10 MG: 10 TABLET, FILM COATED ORAL at 11:09

## 2021-01-27 RX ADMIN — DIVALPROEX SODIUM 1000 MG: 500 TABLET, FILM COATED, EXTENDED RELEASE ORAL at 21:56

## 2021-01-27 RX ADMIN — DIGOXIN 125 MCG: 125 TABLET ORAL at 11:09

## 2021-01-27 RX ADMIN — ENOXAPARIN SODIUM 40 MG: 100 INJECTION SUBCUTANEOUS at 11:10

## 2021-01-27 ASSESSMENT — PAIN SCALES - GENERAL: PAINLEVEL_OUTOF10: 3

## 2021-01-27 NOTE — PROGRESS NOTES
[address] [place] : 1950  Acct #: [de-identified]  MRN: [id number]              PM&R Progress Note      Admitting diagnosis: Intracranial hemorrhage ( [facility] [address])     Comorbid diagnoses impacting rehabilitation: Left hemiparesis, dysarthria, paroxysmal atrial fibrillation, essential hypertension, uncontrolled diabetes type 2 with peripheral neuropathy, COPD, bipolar disorder, diastolic congestive heart failure     Chief complaint: Pleasant and talkative but demonstrates poor insight. Prior (baseline) level of function: Independent. Current level of function:         Current  IRF-NO[facility]MY and Goals:   Occupational Therapy:    Short term goals  Time Frame for Short term goals: STG=LTG :   Long term goals  Time Frame for Long term goals : 12-14 days or until d/c  Long term goal 1: Pt will complete feeding/grooming/oral care task c MOD I by d/c  Long term goal 2: Pt will complete total body bathing c MIN A by d/c  Long term goal 3: Pt will complete UB dressing c SUP by d/c  Long term goal 4: Pt will complete LB dressing c MOD A by d/c  Long term goal 5: Pt will don/doff footwear c SETUP by d/c  Long term goals 6: Pt will complete toileting c MOD A by d/c  Long term goal 7: Pt will complete functional transfer (toilet, tub, shower) c MOD A by d/c. Long term goal 8: Pt will perform therex/therax to facilitate increased strength/endurance/ax tolerance (c emphasis on dynamic standing balance/tolerance >10 mins and BUE endurance) c Min A in order to complete ADLs. :                                       Eating: Eating  Assistance Needed: Supervision or touching assistance  Comment: Pt demo inconsistency c self-feeding. Pt requires Min-Total A depending on level of alertness. Required v/c for task initiation  during OT session.   CARE Score: 4  Discharge Goal: Independent       Oral Hygiene: Oral Hygiene  Assistance Needed: Partial/moderate assistance Comment: Min A c [place] techniques and v/c and tactile cues to initiate and attend to task , pt demo difficulty c manipulating toothpaste. Reason if not Attempted: Not attempted due to medical condition or safety concerns  CARE Score: 3  Discharge Goal: Independent    UB/LB Bathing: Shower/Bathe Self  Assistance Needed: Dependent  Comment: per last report pt required total A to initiate and stay on task. Reason if not Attempted: Not attempted due to medical condition or safety concerns  CARE Score: 1  Discharge Goal: Supervision or touching assistance    UB Dressing: Upper Body Dressing  Assistance Needed: Partial/moderate assistance  Comment: Mod A as of 1/21: Mod A for threading of BUE and over head: Requires task initiation, orientation, and tactile cues for sequencing  Reason if not Attempted: Not attempted due to medical condition or safety concerns  CARE Score: 3  Discharge Goal: Supervision or touching assistance         LB Dressing: Lower Body Dressing  Assistance Needed: Substantial/maximal assistance  Comment: Max A for threading BLE and donning over hips. Pt attempted to assist c donning over hips. Reason if not Attempted: Not attempted due to medical condition or safety concerns  CARE Score: 2  Discharge Goal: Partial/moderate assistance    Donning and [place] Footwear: Putting On/Taking Off Footwear  Assistance Needed: Dependent  Comment: x  CARE Score: 1  Discharge Goal: Set-up or clean-up assistance      Toileting: Toileting Hygiene  Assistance Needed: Dependent  Comment: 2 person assist required for satey, CGA x1 during CM and, Max A x1 for paulino-hygiene. CARE Score: 1  Discharge Goal: Partial/moderate assistance      Toilet Transfers: Toilet Transfer  Assistance Needed: Dependent  Comment: CGA X1- SBA X1 for safety.   Reason if not Attempted: Not attempted due to medical condition or safety concerns  CARE Score: 1  Discharge Goal: Partial/moderate assistance    Physical Therapy:   Short term goals Time Frame for Short term goals: 14-21 tx days:  Short term goal 1: Pt will complete bed mobility tasks and sup<->sit with SBA-Sup. Short term goal 2: Pt will complete OOB transfers using RW with Mod A. Short term goal 3: Pt will ambulate 10 ft over level and carpeted surfaces using RW with Min A. Short term goal 4: Pt will propel manual w/c 150 ft with turns with SBA-Sup. Short term goal 5: Caregiver will be able to safely assist pt with functional mobility tasks. Bed Mobility:   Sit to Lying  Assistance Needed: Dependent  Comment: Total A x 2  CARE Score: 1  Discharge Goal: Supervision or touching assistance  Roll Left and Right  Assistance Needed: Substantial/maximal assistance  Comment: Max A x 1  CARE Score: 2  Discharge Goal: Supervision or touching assistance  Lying to Sitting on Side of Bed  Assistance Needed: Partial/moderate assistance  Comment:  Mod A with bed features (assist on BLE due to poor motor initiation/sequencing and problem solving)  CARE Score: 3  Discharge Goal: Supervision or touching assistance    Transfers:    Sit to Stand  Assistance Needed: Dependent  Comment: required 2-person assist due to impaired cognition and balance  Reason if not Attempted: Not attempted due to medical condition or safety concerns  CARE Score: 1  Discharge Goal: Partial/moderate assistance  Chair/Bed-to-Chair Transfer  Assistance Needed: Dependent  Comment: required 2-person assist due to impaired sequencing, balance, and problem solving  Reason if not Attempted: Not attempted due to medical condition or safety concerns  CARE Score: 1  Discharge Goal: Partial/moderate assistance     Car Transfer  Assistance Needed: Dependent  Comment: min A x 2; mod A x 1 for assisting BLEs up into car  Reason if not Attempted: Not attempted due to medical condition or safety concerns  CARE Score: 1  Discharge Goal: Partial/moderate assistance    Ambulation:    Walking Ability  Does the Patient Walk?: Yes Walk 10 Feet  Assistance Needed: Dependent  Comment: 2-person assist: CGA x 1 + min A x 1 (for walker management) with w/c follow for safety  Reason if not Attempted: Not attempted due to medical condition or safety concerns  CARE Score: 1  Discharge Goal: Partial/moderate assistance     Walk 50 Feet with Two Turns  Assistance Needed: Dependent  Comment: 2-person assist: CGA x 1 + min A x 1 (for walker management) with w/c follow for safety  Reason if not Attempted: Not attempted due to medical condition or safety concerns  CARE Score: 1  Discharge Goal: Not Attempted     Walk 150 Feet  Reason if not Attempted: Not applicable  CARE Score: 9  Discharge Goal: Not Applicable     Walking 10 Feet on Uneven Surfaces  Assistance Needed: Dependent  Comment: pt required x2-person assist to ambulate over carpeted/transitional surface with RW  Reason if not Attempted: Not attempted due to medical condition or safety concerns  CARE Score: 1  Discharge Goal: Partial/moderate assistance     1 Step (Curb)  Reason if not Attempted: Not attempted due to medical condition or safety concerns  CARE Score: 88  Discharge Goal: Not Attempted     4 Steps  Reason if not Attempted: Not attempted due to medical condition or safety concerns  CARE Score: 88  Discharge Goal: Not Attempted     12 Steps  Reason if not Attempted: Not applicable  CARE Score: 9  Discharge Goal: Not Applicable       Wheelchair:  w/c Ability: Wheelchair Ability  Uses a Wheelchair and/or Scooter?: Yes  Wheel 50 Feet with Two Turns  Assistance Needed: Dependent  Comment: Decreased initiation to participate in activity. Pt inconsistent with decreased level of alertness and increased level of agitation at other times.   Reason if not Attempted: Not attempted due to medical condition or safety concerns  CARE Score: 1  Discharge Goal: Supervision or touching assistance  Wheel 150 Feet  Assistance Needed: Dependent Comment: Decreased initiation to participate in activity. Pt inconsistent with decreased level of alertness and increased level of agitation at other times. Reason if not Attempted: Not attempted due to medical condition or safety concerns  CARE Score: 1  Discharge Goal: Supervision or touching assistance          Balance:        Object: Picking Up Object  Reason if not Attempted: Not attempted due to medical condition or safety concerns  CARE Score: 88  Discharge Goal: Partial/moderate assistance    I      Exam:    Blood pressure 121/60, pulse 57, temperature 97.6 °F (36.4 °C), resp. rate 17, height 5' 9\" (1.753 m), weight 213 lb 11.2 oz (96.9 kg), SpO2 95 %. General: Sitting upright in bed. Allowing staff to feed him his lunch. With cueing and set up he did feed himself a bite with his right hand. HEENT: MMM. Clear speech. No JVD or adenopathy. Pulmonary: No coughing. No labored breathing. Cardiac: Heart rate varying from . Irregular. Abdomen: Patient's abdomen is soft and nondistended. Bowel sounds were present throughout. There was no rebound, guarding or masses noted. Upper extremities: Clumsy awkward movements of both upper limbs. No bruising or swelling. Lower extremities: Clumsy weakness across both knees and ankles. Calves are soft. No signs of DVT. Sitting balance was fair. Standing balance was poor.     Lab Results   Component Value Date    WBC 6.9 01/21/2021    HGB 12.9 (L) 01/21/2021    HCT 42.4 01/21/2021    MCV 94.6 01/21/2021     01/21/2021     Lab Results   Component Value Date    INR 0.84 12/19/2020    INR 1.01 06/03/2020    INR 0.90 06/02/2020    PROTIME 10.1 (L) 12/19/2020    PROTIME 12.2 06/03/2020    PROTIME 10.9 (L) 06/02/2020     Lab Results   Component Value Date    CREATININE 1.2 01/21/2021    BUN 15 01/21/2021     01/21/2021    K 3.8 01/21/2021     01/21/2021    CO2 23 01/21/2021     Lab Results   Component Value Date ALT 10 12/19/2020    AST 15 12/19/2020    ALKPHOS 82 12/19/2020    BILITOT 0.6 12/19/2020       Expected length of stay  prior to a moderate physical assistance level functional discharge to nursing home for ongoing therapies is any day now. We are appealing a denial of insurance prior authorization for going to a skilled nursing facility. Recommendations:    1. Right intracranial hemorrhage with left hemiparesis: Alert but demonstrating very poor insight. Inconsistent response to verbal cueing for self-care mobility task. Required contact-guard assist to stand and to maintain his balance. This is a distinct improvement from before the weekend. Unfortunately, his engagement with therapy is somewhat sporadic.   Cardiology recommends no anticoagulation at this point.  Better oral intake recently but he often \"insists\" that staff feed him.       He has been taken off of Risperdal, Desyrel, BuSpar and gabapentin.   Completed antibiotic therapy for the UTI. Ongoing pulmonary hygiene, DVT prophylaxis and nutritional support.  Incontinent when he does not notify as he has to go.  Caregiver is unable to meet his needs in the home setting at this point in a skilled nursing facility would be appropriate to continue his recovery. 2. DVT prophylaxis: Heparin 5000 units every 8 hours.  I must monitor his hemoglobin and platelet count periodically while on this medication.  Weightbearing activities  remains limited.  GI prophylaxis offered.  No clinical evidence of blood loss.      3. Paroxysmal atrial fibrillation with prior anticoagulation: Cardiology has stopped the Xarelto.  Elevated rate required titrating metoprolol and restarting Cardizem.  EKG reviewed by cardiology.  Daily weights do not show any decompensation of CHF. Periodic and transient rate elevations. These seem asymptomatic. 4. Uncontrolled diabetes type 2 with peripheral neuropathy: Patient requires a diet modified for carbohydrates.  He is on Neurontin for peripheral neuropathy symptoms.  Blood sugars are checked at mealtime and bedtime.  Sliding scale Humalog for now with reintroduction of oral agents should his oral intake stabilize.  Blood sugars generally less than 150.  5. Essential hypertension: Patient requires multiple medications for blood pressure regulation.  Target systolic blood pressure is 120140.  Vital signs are checked at rest and with activity.  Blood pressure within target range today. 6. COPD: Aggressive pulmonary hygiene.  Monitoring O2 saturations at rest and with activity. 7. Bipolar disorder: Depakote levels appropriate.  Off of the BuSpar, Risperdal, goserelin and gabapentin he has become a bit more animated/hypomanic.    Treatment of the UTI has not greatly impacted his behavior and cognition.  Treating in a calm and consistent environment.  Providing written and verbal instructions when possible to assist with information retention.   8. Urinary tract infection.  Pseudomonas aeruginosa sensitive to the cefepime.  7 days of IV antibiotics have been completed.

## 2021-01-27 NOTE — FLOWSHEET NOTE
[x] daily progress note       [x] discharge       Patient Name:  Eryn Rodriguez. :  1950 MRN: [id number]  Room:  [address] [place], [place] [address]A Date of Admission: 2021  Rehabilitation Diagnosis:   Cerebral infarction, unspecified [I63.9]  Intracranial hemorrhage ([facility] [address] [I62.9]       Date 2021       Day of ARU Week:  3   Time IN/-1030   Co-Treat Minutes 90   TOTAL Tx Time Mins 90   Restrictions Restrictions/Precautions  Restrictions/Precautions: General Precautions, Fall Risk(L hemiparesis, chronic R shoulder limitations)  Position Activity Restriction  Other position/activity restrictions: see catheter   Communication with other providers: [x]   OK to see per nursing:     []   Spoke with team member regarding:      Subjective observations and cognitive status: Pt seen up in recliner at beginning of treatment. Agreeable to therapy. Pt still alert, lethargic, pleasant, and agitated inconsistently throughout session. Pain level/location:  Cervical pain (pt did not rate)    Discharge recommendations  Anticipated discharge date:  2021  Destination: []? ???home alone   []? ???home alone with assist PRN     []???? home w/ family      []???? Continuous supervision  [x]? ???SNF    []???? Assisted living     []???? Other:  Continued therapy: []????[facility] PT  []????OUTPATIENT PT   []???? No Further PT  [x]? ???SNF PT  Caregiver training recommended: []?? ??Yes  []???? No   Equipment needs: none      A cotreat was completed this date with  [x] PT    [] OT   [] ST      This pt requires simultaneous intervention of 2 skilled therapists to safely complete tasks to address complexity of deficits defined in the goals including:  []Attention   [x] Safety    [x]Problem Solving    [x]Sequencing     [x]Initiation    [x]Processing      []Recall of learned tasks  [] Communication  [] Self Feeding   [x]ADL's    []UE Function    [x]Motor planning   [x]Balance  [x]Energy Conservation   [x]Verbal cues   [] Tactile Cues []Barriers     [x]Transfers   []Device Use  Pt's response to cotreat: Fair  Progress toward goals: Ongoing    Bed Mobility:           [x]   Pt received out of bed   Rolling R/L:  Max A   Scooting: Max A   Lying --> Sit:  Max A x 2   Sit --> lying: Max A x 2     Transfers:    Sit--> Stand:  Min A x 2   Stand --> Sit:   Min A x 2   Chair-->Bed/Bed --> Chair:   Min A x 2   Car Transfers:  Min A x 2 (mod A for assisting BLEs)  Assistive device required for transfer:   RW  Max vcs for proper hand placement. Gait:    Walk 10 feet:  2-person assist: CGA-min A (for management of walker) + SBA x 1 for safety with w/c follow. Walk 50 feet with 2 turns:   2-person assist: CGA-min A (for management of walker) + SBA x 1 for safety with w/c follow. Walk 519 feet: Not Applicable   Device: RW  Gait Quality:  Reciprocal pattern; inconsistent stride length; \"freezing\" moments at times. Requires min A for managing walker around obstacles and for turning. Wheelchair Propulsion:  Wheel 50 feet with 2 turns: Max A   Wheel 150 feet: Max A   Extremities Used:   BUEs  Type:    [x]  Manual        []  Electric  Pt only able to accomplish propelling w/c 10' d/t fatigue. Stairs   Curb: Not attempted d/t safety concerns   4 steps:  Not attempted d/t safety concerns   12 steps:  Not applicable     Uneven Surfaces:       Assistance:  Not attempted d/t safety concerns     Picking Up Object From Floor (must be standing position):  Assistance:  Not attempted d/t safety concerns     Additional Therapeutic activities/exercises completed this date:     []   Nu-step:  Time:        Level:         #Steps:       []   Rebounder:    []  Seated     []  Standing        [x]   Balance training: pt stood at sink and performed washing of paulino-area with CGA for balance.          []   Postural training    []   Supine ther ex (reps/sets):     []   Seated ther ex (reps/sets):     []   Standing ther ex (reps/sets): Feeding: Independent  Toileting: Independent  Homemaking Assistance: Independent  Homemaking Responsibilities: No  Meal Prep Responsibility: No  Laundry Responsibility: No  Cleaning Responsibility: No  Bill Paying/Finance Responsibility: No  Shopping Responsibility: No  Dependent Care Responsibility: No  Health Care Management: No  Ambulation Assistance: Independent  Transfer Assistance: Needs assistance  Active : No  Patient's  Info: Zaida Villalobos is primary , and transport company  Mode of Transportation: Car, Family, Friends  Education: 12th grade  Occupation: Retired  Type of occupation: Pt built trucks for 33 years; [employer]  Leisure & Hobbies: TV, limited reading, girlfriend has a dog. Pt has meds that come in pill packs. Bills are managed over the phone. IADL Comments: Lakesha Lyles assists c most IADLs and pt report hiring outside help c yardork. Additional Comments: Pt reports using rolator in the house. Pt reports 1 fall this year that occured in the house while ambulating in the home; sleeps in regular, flat bed; R hand dominant    Objective                                                                                    Goals:  (Update in navigator)  Short term goals  Time Frame for Short term goals: 14-21 tx days:  Short term goal 1: Pt will complete bed mobility tasks and sup<->sit with SBA-Sup. Short term goal 2: Pt will complete OOB transfers using RW with Mod A. Short term goal 3: Pt will ambulate 10 ft over level and carpeted surfaces using RW with Min A. Short term goal 4: Pt will propel manual w/c 150 ft with turns with SBA-Sup. Short term goal 5: Caregiver will be able to safely assist pt with functional mobility tasks. :   :        Plan of Care                                                                              Times per week: 5 days per week for a minimum of 60 minutes/day plus group as appropriate for 60 minutes. Treatment to include Current Treatment Recommendations: Strengthening, Functional Mobility Training, Wheelchair Mobility Training, Neuromuscular Re-education, Home Exercise Program, Equipment Evaluation, Education, & procurement, ROM, Transfer Training, Cognitive/Perceptual Training, Gait Training, Cognitive Reorientation, Safety Education & Training, Balance Training, Endurance Training, Pain Management, Patient/Caregiver Education & Training, Positioning    Electronically signed by   Ronald Hicks, [id number]  1/27/2021, 10:44 AM

## 2021-01-27 NOTE — PATIENT CARE CONFERENCE
ACUTE REHAB TEAM CONFERENCE SUMMARY   [address] [facility]    NAME: Isai Franco. : 1950 ADMIT DATE: 2021    Rehab Admitting Dx:Cerebral infarction, unspecified [I63.9]  Intracranial hemorrhage ([address] [I62.9]  Patient Comorbid Conditions: Active Hospital Problems    Diagnosis Date Noted    Intraparenchymal hematoma of brain ([address] [S06.360A]     Left hemiparesis ([address] [G81.94]     Dysarthria due to and not concurrent with spontaneous intracerebral hemorrhage [I69.122]     Gait disturbance [R26.9]     Bipolar disorder, in partial remission, most recent episode depressed ([address] [F31.75]     Intracranial hemorrhage ([address] [I62.9] 2021    Uncontrolled type 2 diabetes mellitus with peripheral neuropathy ([address] [E11.42, E11.65]     Paroxysmal atrial fibrillation ([address] [I48.0]      Date: 2021    CASE MANAGEMENT  Current issues/needs regarding patient and family discharge status: Patient lives with significant other Rosana Riley in a 2L, 5+3 SUKHJINDER home. Additional support includes grown children and upstairs neighbors. Patient has a R and WC. Current recommendations were for a RW. Plan is to discharge home with Rosana Riley. PHYSICAL THERAPY (Updated in QI)  Short term goals  Time Frame for Short term goals: 14-21 tx days:  Short term goal 1: Pt will complete bed mobility tasks and sup<->sit with SBA-Sup. Short term goal 2: Pt will complete OOB transfers using RW with Mod A. Short term goal 3: Pt will ambulate 10 ft over level and carpeted surfaces using RW with Min A. Short term goal 4: Pt will propel manual w/c 150 ft with turns with SBA-Sup. Short term goal 5: Caregiver will be able to safely assist pt with functional mobility tasks.           Impairments/deficits, barriers: Body structures, Functions, Activity limitations: Decreased functional mobility , Decreased safe awareness, Decreased balance, Decreased coordination, Decreased ADL status, Decreased cognition, Decreased vision/visual deficit, Decreased ROM, Decreased endurance, Decreased high-level IADLs, Increased pain, Decreased strength, Decreased sensation, Decreased fine motor control, Decreased posture     Prognosis: Fair, Guarded  Decision Making: High Complexity  Clinical Presentation: unstable/unpredictable characteristics  Equipment needed at discharge:      PT IRF-NOHEMY scores since initial assessment  Bed Mobility:   Sit to Lying  Assistance Needed: Dependent  Comment: Max A x 2  CARE Score: 1  Discharge Goal: Supervision or touching assistance  Roll Left and Right  Assistance Needed: Substantial/maximal assistance  Comment: Max A x 1  CARE Score: 2  Discharge Goal: Supervision or touching assistance  Lying to Sitting on Side of Bed  Assistance Needed: Dependent  Comment: Max A x 2 (pt fatigued and lethargic)  CARE Score: 1  Discharge Goal: Supervision or touching assistance    Transfers:    Sit to Stand  Assistance Needed: Dependent  Comment: min A x 2 d/t increased lethargy and fatigue  Reason if not Attempted: Not attempted due to medical condition or safety concerns  CARE Score: 1  Discharge Goal: Partial/moderate assistance  Chair/Bed-to-Chair Transfer  Assistance Needed: Dependent  Comment: min A x 2 d/t increased lethargy and fatigue  Reason if not Attempted: Not attempted due to medical condition or safety concerns  CARE Score: 1  Discharge Goal: Partial/moderate assistance     Car Transfer  Assistance Needed: Dependent  Comment: min A x 2  Reason if not Attempted: Not attempted due to medical condition or safety concerns  CARE Score: 1  Discharge Goal: Partial/moderate assistance    Ambulation:    Walking Ability  Does the Patient Walk?: Yes     Walk 10 Feet  Assistance Needed: Dependent Comment: 2-person assist: CGA x 1 + min A x 1 (for walker management)) with w/c follow for safety  Reason if not Attempted: Not attempted due to medical condition or safety concerns  CARE Score: 1  Discharge Goal: Partial/moderate assistance     Walk 50 Feet with Two Turns  Assistance Needed: Dependent  Comment: 2-person assist: CGA x 1 + min A x 1 (for walker management) with w/c follow for safety  Reason if not Attempted: Not attempted due to medical condition or safety concerns  CARE Score: 1  Discharge Goal: Not Attempted     Walk 150 Feet  Reason if not Attempted: Not applicable  CARE Score: 9  Discharge Goal: Not Applicable     Walking 10 Feet on Uneven Surfaces  Assistance Needed: Dependent  Comment: pt required x2-person assist to ambulate over carpeted/transitional surface with RW  Reason if not Attempted: Not attempted due to medical condition or safety concerns  CARE Score: 1  Discharge Goal: Partial/moderate assistance     1 Step (Curb)  Reason if not Attempted: Not attempted due to medical condition or safety concerns  CARE Score: 88  Discharge Goal: Not Attempted     4 Steps  Reason if not Attempted: Not attempted due to medical condition or safety concerns  CARE Score: 88  Discharge Goal: Not Attempted     12 Steps  Reason if not Attempted: Not applicable  CARE Score: 9  Discharge Goal: Not Applicable    Gait Deviations: []None []Slow estella  [] Increased GAGAN  [] Staggers []Deviated Path  [] Decreased step length  [] Decreased step height  []Decreased arm swing  [] Shuffles  [] Decreased head and trunk rotation  []other:        Wheelchair:  w/c Ability: Wheelchair Ability  Uses a Wheelchair and/or Scooter?: Yes  Wheel 50 Feet with Two Turns  Assistance Needed: Substantial/maximal assistance  Comment: Max A d/t increased lethargy.  pt only able to propel 10'  Reason if not Attempted: Not attempted due to medical condition or safety concerns  CARE Score: 2 Discharge Goal: Supervision or touching assistance  Wheel 150 Feet  Assistance Needed: Substantial/maximal assistance  Comment: Max A d/t increased lethargy. pt only able to propel 10'  Reason if not Attempted: Not attempted due to medical condition or safety concerns  CARE Score: 2  Discharge Goal: Supervision or touching assistance          Balance:        Object: Picking Up Object  Reason if not Attempted: Not attempted due to medical condition or safety concerns  CARE Score: 88  Discharge Goal: Partial/moderate assistance    Fall Risk: []  Yes  []  No    OCCUPATIONAL THERAPY  (Updated in QI)  Short term goals  Time Frame for Short term goals: STG=LTG :   Long term goals  Time Frame for Long term goals : 12-14 days or until d/c  Long term goal 1: Pt will complete feeding/grooming/oral care task c MOD I by d/c  Long term goal 2: Pt will complete total body bathing c MIN A by d/c  Long term goal 3: Pt will complete UB dressing c SUP by d/c  Long term goal 4: Pt will complete LB dressing c MOD A by d/c  Long term goal 5: Pt will don/doff footwear c SETUP by d/c  Long term goals 6: Pt will complete toileting c MOD A by d/c  Long term goal 7: Pt will complete functional transfer (toilet, tub, shower) c MOD A by d/c. Long term goal 8: Pt will perform therex/therax to facilitate increased strength/endurance/ax tolerance (c emphasis on dynamic standing balance/tolerance >10 mins and BUE endurance) c Min A in order to complete ADLs.  :                                       OT IRF-NOHEMY scores and goals since initial assessment:    ADLs:    Eating: Eating  Assistance Needed: Supervision or touching assistance  Comment: PAtient requires Sup-total feed secondary to level of alertness, lethergy, initiatian and staying on task, and behaviours; patient does complete with decreased assistance getting utensil to mouth when seated in recliner with feet on floor  CARE Score: 4  Discharge Goal: Independent Oral Hygiene: Oral Hygiene  Assistance Needed: Supervision or touching assistance  Comment: Sup to initiate and stay on task and complete with verbal cues with 1-2 step directions and proccessing time  Reason if not Attempted: Not attempted due to medical condition or safety concerns  CARE Score: 4  Discharge Goal: Independent    UB/LB Bathing: Shower/Bathe Self  Assistance Needed: Substantial/maximal assistance  Comment: patient requires max A  when in stand to complete paulino care and wshing buttocks and assist for thoroughness  Reason if not Attempted: Not attempted due to medical condition or safety concerns  CARE Score: 2  Discharge Goal: Supervision or touching assistance    UB Dressing: Upper Body Dressing  Assistance Needed: Partial/moderate assistance  Comment: Mod A for orienting shirt threading arms and head through shirt correctly, max vc to stay on task  Reason if not Attempted: Not attempted due to medical condition or safety concerns  CARE Score: 3  Discharge Goal: Supervision or touching assistance         LB Dressing: Lower Body Dressing  Assistance Needed: Dependent  Comment: Max A times two  for threading feet and cathter through undergarment and pants and assist managing pants fuilly over hips ion stand  Reason if not Attempted: Not attempted due to medical condition or safety concerns  CARE Score: 1  Discharge Goal: Partial/moderate assistance    Donning and [place] Footwear: Putting On/Taking Off Footwear  Assistance Needed: Dependent  Comment: X  CARE Score: 1  Discharge Goal: Set-up or clean-up assistance      Toileting: Toileting Hygiene  Assistance Needed: Partial/moderate assistance  Comment: MIn/Total fluctuating with behaviours, state of alertness  CARE Score: 3  Discharge Goal: Partial/moderate assistance      Toilet Transfers:     Toilet Transfer   Assistance Needed: Dependent Medical improvements/barriers: SNF appeals have been denied. Pt will be going home with s/o. Plan for discharge. Total assist with 2 person. Requiring lots of cues. Self limiting behaviors, poor attn, assist feeds,         Team goals for next treatment period/Intervention for current barriers:   [x] Pt will increase activity tolerance for daily tasks. [] Pt will improve bed mobility with reduced assist.  [x] Pt will improve safety in fx tasks with reduced cues/assist  [x] Pt will improve transfers with reduced assist  [x] Pt will improve toileting with reduced assist  [x] Pt will improve ADL's with use of adaptive equipment with reduced assist  [] Pt will improve pain mgmt for maximum participation in tx program  [] Pt will improve communication to get basic needs met on unit  [] Pt will improve swallowing for safe diet advancement with use of strategies  []  Plan for discharge to home. Patient Strengths:     Justification for Continued Stay  Based on my medical assessment of the patient and review of information from the interdisciplinary team as part of this weekly team conference, the patient continues to meet the following criteria for IRF level of care:  ? The patient requires active and ongoing intervention of multiple therapy disciplines  ? The patient requires and intensive rehabilitation therapy program  ? The patient requires continued physician supervision by a rehabilitation physician  ? The patient requires 24 hours rehab nursing care  ? The patient requires an intensive and coordinated interdisciplinary team approach to the delivery of rehabilitative care.      Assessment/Plan   []  The patient is making good progression towards their long term goals and is actively participating in and has a reasonable expectation to continue to benefit from the intensive rehabilitation therapy program   []  The estimated discharge date has been changed from initial team conference due to: [x]  The estimated discharge destination has been changed from initial team conference due to: denial for SNF         Ongoing tx following discharge: [x]HHC OT/PT    []OUTPATIENT     [] No Further Treatment     [x] Family/Caregiver Training  []  Transitional Living Arrangement   [] Home Assessment (date  )     [] Family Conference   []  Therapeutic Pass       []  Other: (specify)    Estimated Discharge Date: 1/30/21    Estimated Discharge Destination: []home alone   []home alone with assist prn  [x]Continuous supervision []Return home with spouse/family   [] Assisted living  []SNF     Team members participating in today's conference. [x] Andreia Elliott, Medical Director  [x] Ryan Suggs,   [x] Antionette Yanes, Nurse Mgr     [x]  Chris Mckeon, PT   [x] Dyana Acuña, OT   []  Elisha Palacios, PT  [] Jordan Nina, OT      [x] Arnoldo Rogers, SLP     []  Sonja Regan, RD     [x] Xena Malin,     [x]Vinita Hogan,    []  Tommy Francis RN    [] Memo Viveros RN  [] Sacha Mckeon RN    [] Ashlie Garcia RN [] Cb Hankins, YESENIA        I have led this Team Conference and agree with the plan, Jonathon Dao MD, 1/28/2021, 12:32 PM  Goals have been updated to reflect recent status.     Team conference note transcribed this date by: Chantale Zuleta MA, [id number] [facility], Therapy Coordinator

## 2021-01-27 NOTE — PLAN OF CARE
Problem: Pain:  Goal: Pain level will decrease  Description: Pain level will decrease  1/26/2021 2008 by Jessica Billings LPN  Outcome: Ongoing  1/26/2021 1421 by Ivette Meng. Ade Murray RN  Outcome: Ongoing  Goal: Control of acute pain  Description: Control of acute pain  1/26/2021 2008 by Jessica Billings LPN  Outcome: Ongoing  1/26/2021 1421 by Ivette Meng. Ade Murray RN  Outcome: Ongoing  Goal: Control of chronic pain  Description: Control of chronic pain  1/26/2021 2008 by Jessica Billings LPN  Outcome: Ongoing  1/26/2021 1421 by Ivette Meng.  Ade Murray RN  Outcome: Ongoing

## 2021-01-27 NOTE — PROGRESS NOTES
[address] : 1950  Acct #: [de-identified]  MRN: [id number]              PM&R Progress Note      Admitting diagnosis: Intracranial hemorrhage ( [place] [address])     Comorbid diagnoses impacting rehabilitation: Left hemiparesis, dysarthria, paroxysmal atrial fibrillation, essential hypertension, uncontrolled diabetes type 2 with peripheral neuropathy, COPD, bipolar disorder, diastolic congestive heart failure     Chief complaint: He offers no specific complaints. He is talkative and in good spirits. Prior (baseline) level of function: Independent. Current level of function:         Current  IRF-[facility]MY and Goals:   Occupational Therapy:    Short term goals  Time Frame for Short term goals: STG=LTG :   Long term goals  Time Frame for Long term goals : 12-14 days or until d/c  Long term goal 1: Pt will complete feeding/grooming/oral care task c MOD I by d/c  Long term goal 2: Pt will complete total body bathing c MIN A by d/c  Long term goal 3: Pt will complete UB dressing c SUP by d/c  Long term goal 4: Pt will complete LB dressing c MOD A by d/c  Long term goal 5: Pt will don/doff footwear c SETUP by d/c  Long term goals 6: Pt will complete toileting c MOD A by d/c  Long term goal 7: Pt will complete functional transfer (toilet, tub, shower) c MOD A by d/c. Long term goal 8: Pt will perform therex/therax to facilitate increased strength/endurance/ax tolerance (c emphasis on dynamic standing balance/tolerance >10 mins and BUE endurance) c Min A in order to complete ADLs.  :                                       Eating: Eating  Assistance Needed: Supervision or touching assistance  Comment: PAtient requires Sup-total feed secondary to level of alertness, lethergy, initiatian and staying on task, and behaviours; patient does complete with decreased assistance getting utensil to mouth when seated in recliner with feet on floor  CARE Score: 4  Discharge Goal: Independent       Oral Hygiene: Oral Hygiene Assistance Needed: Supervision or touching assistance  Comment: Sup to initiate and stay on task and complete with verbal cues with 1-2 step directions and proccessing time  Reason if not Attempted: Not attempted due to medical condition or safety concerns  CARE Score: 4  Discharge Goal: Independent    UB/LB Bathing: Shower/Bathe Self  Assistance Needed: Substantial/maximal assistance  Comment: patient requires max A  when in stand to complete paulino care and wshing buttocks and assist for thoroughness  Reason if not Attempted: Not attempted due to medical condition or safety concerns  CARE Score: 2  Discharge Goal: Supervision or touching assistance    UB Dressing: Upper Body Dressing  Assistance Needed: Partial/moderate assistance  Comment: Mod A for orienting shirt threading arms and head through shirt correctly, max vc to stay on task  Reason if not Attempted: Not attempted due to medical condition or safety concerns  CARE Score: 3  Discharge Goal: Supervision or touching assistance         LB Dressing: Lower Body Dressing  Assistance Needed: Dependent  Comment: Max A times two  for threading feet and cathter through undergarment and pants and assist managing pants fuilly over hips ion stand  Reason if not Attempted: Not attempted due to medical condition or safety concerns  CARE Score: 1  Discharge Goal: Partial/moderate assistance    Donning and [place] Footwear: Putting On/Taking Off Footwear  Assistance Needed: Dependent  Comment: X  CARE Score: 1  Discharge Goal: Set-up or clean-up assistance      Toileting: Toileting Hygiene  Assistance Needed: Partial/moderate assistance  Comment: MIn/Total fluctuating with behaviours, state of alertness  CARE Score: 3  Discharge Goal: Partial/moderate assistance      Toilet Transfers:   Toilet Transfer  Assistance Needed: Partial/moderate assistance  Comment: Min A times two with max vec during directional changes; PT assisting c maneuvering RW during all turns Reason if not Attempted: Not attempted due to medical condition or safety concerns  CARE Score: 3  Discharge Goal: Partial/moderate assistance    Physical Therapy:   Short term goals  Time Frame for Short term goals: 14-21 tx days:  Short term goal 1: Pt will complete bed mobility tasks and sup<->sit with SBA-Sup. Short term goal 2: Pt will complete OOB transfers using RW with Mod A. Short term goal 3: Pt will ambulate 10 ft over level and carpeted surfaces using RW with Min A. Short term goal 4: Pt will propel manual w/c 150 ft with turns with SBA-Sup. Short term goal 5: Caregiver will be able to safely assist pt with functional mobility tasks.             Bed Mobility:   Sit to Lying  Assistance Needed: Dependent  Comment: Max A x 2  CARE Score: 1  Discharge Goal: Supervision or touching assistance  Roll Left and Right  Assistance Needed: Substantial/maximal assistance  Comment: Max A x 1  CARE Score: 2  Discharge Goal: Supervision or touching assistance  Lying to Sitting on Side of Bed  Assistance Needed: Dependent  Comment: Max A x 2 (pt fatigued and lethargic)  CARE Score: 1  Discharge Goal: Supervision or touching assistance    Transfers:    Sit to Stand  Assistance Needed: Dependent  Comment: min A x 2 d/t increased lethargy and fatigue  Reason if not Attempted: Not attempted due to medical condition or safety concerns  CARE Score: 1  Discharge Goal: Partial/moderate assistance  Chair/Bed-to-Chair Transfer  Assistance Needed: Dependent  Comment: min A x 2 d/t increased lethargy and fatigue  Reason if not Attempted: Not attempted due to medical condition or safety concerns  CARE Score: 1  Discharge Goal: Partial/moderate assistance     Car Transfer  Assistance Needed: Dependent  Comment: min A x 2  Reason if not Attempted: Not attempted due to medical condition or safety concerns  CARE Score: 1  Discharge Goal: Partial/moderate assistance    Ambulation:    Walking Ability  Does the Patient Walk?: Yes Reason if not Attempted: Not attempted due to medical condition or safety concerns  CARE Score: 2  Discharge Goal: Supervision or touching assistance          Balance:        Object: Picking Up Object  Reason if not Attempted: Not attempted due to medical condition or safety concerns  CARE Score: 88  Discharge Goal: Partial/moderate assistance    I      Exam:    Blood pressure 123/64, pulse 59, temperature 98.2 °F (36.8 °C), temperature source Oral, resp. rate 16, height 5' 9\" (1.753 m), weight 213 lb 14.4 oz (97 kg), SpO2 95 %. General: Up in a wheelchair. Poor insight but responsive to verbal instructions. HEENT: MMM. Neck supple. Pulmonary: Unlabored breathing without coughing. Cardiac: Controlled rate. Occasional premature beat. Abdomen: Patient's abdomen is soft and nondistended. Bowel sounds were present throughout. There was no rebound, guarding or masses noted. Upper extremities: Clumsy movements bilaterally but stronger  noted. Unable to sustain his arms up overhead. Lower extremities: No signs of DVT. Trace edema. Sitting balance was good. Standing balance was poor.     Lab Results   Component Value Date    WBC 6.9 01/21/2021    HGB 12.9 (L) 01/21/2021    HCT 42.4 01/21/2021    MCV 94.6 01/21/2021     01/21/2021     Lab Results   Component Value Date    INR 0.84 12/19/2020    INR 1.01 06/03/2020    INR 0.90 06/02/2020    PROTIME 10.1 (L) 12/19/2020    PROTIME 12.2 06/03/2020    PROTIME 10.9 (L) 06/02/2020     Lab Results   Component Value Date    CREATININE 1.2 01/21/2021    BUN 15 01/21/2021     01/21/2021    K 3.8 01/21/2021     01/21/2021    CO2 23 01/21/2021     Lab Results   Component Value Date    ALT 10 12/19/2020    AST 15 12/19/2020    ALKPHOS 82 12/19/2020    BILITOT 0.6 12/19/2020       Expected length of stay  prior to a moderate physical assistance level functional discharge to nursing home is any day pending our insurance appeal. Recommendations:    1. Right intracranial hemorrhage with left hemiparesis: Interacting with staff but showing poor carryover of instruction. Poor insight. No agitation. Requires contact-guard assist to stand and to maintain his balance.  This is a distinct improvement from before the weekend.  Unfortunately, his engagement with therapy remains somewhat sporadic.   Cardiology recommends no anticoagulation at this point.  Better oral intake recently but he often \"insists\" that staff feed him.       He has been taken off of Risperdal, Desyrel, BuSpar and gabapentin.   Completed antibiotic therapy for the UTI.  Ongoing pulmonary hygiene, DVT prophylaxis and nutritional support.  Incontinent when he does not notify as he has to go.  Caregiver is unable to meet his needs in the home setting at this point in a skilled nursing facility would be appropriate to continue his recovery. We are pending an initial denial for skilled coverage from the insurance company. 2. DVT prophylaxis: Heparin 5000 units every 8 hours.  I must monitor his hemoglobin and platelet count periodically while on this medication.  Weightbearing activities are picking up and he may not need this much longer. GI prophylaxis offered.  No new bruising or swelling.      3. Paroxysmal atrial fibrillation with prior anticoagulation: Cardiology has stopped the Xarelto.  Elevated rate required titrating metoprolol and restarting Cardizem.  EKG reviewed by cardiology.  Daily weights do not show any decompensation of CHF. Periodic and transient rate elevations. These seem asymptomatic. Outpatient follow-up with cardiology. 4. Uncontrolled diabetes type 2 with peripheral neuropathy: Patient requires a diet modified for carbohydrates.  He is on Neurontin for peripheral neuropathy symptoms.  Blood sugars are checked at mealtime and bedtime.  Sliding scale Humalog for now with reintroduction of oral agents should his oral intake stabilize.  Blood sugars generally less than 150.  5. Essential hypertension: Patient requires multiple medications for blood pressure regulation.  Target systolic blood pressure is 120140.  Vital signs are checked at rest and with activity.  Blood pressure within target range today. 6. COPD: Aggressive pulmonary hygiene.  Monitoring O2 saturations at rest and with activity. 7. Bipolar disorder: Depakote levels appropriate.  Off of the BuSpar, Risperdal, goserelin and gabapentin he has become a bit more animated/hypomanic.    Treatment of the UTI has not greatly impacted his behavior and cognition.  Treating in a calm and consistent environment.  Providing written and verbal instructions when possible to assist with information retention.   8. Urinary tract infection.  Pseudomonas aeruginosa sensitive to the cefepime.  7 days of IV antibiotics have been completed.

## 2021-01-27 NOTE — PROGRESS NOTES
Physical Rehabilitation: OCCUPATIONAL THERAPY     [x] daily progress note       [] discharge       Patient Name:  Libby Pandey. :  1950 MRN: [id number]  Room:  [address] [place], [place] [address] Date of Admission: 2021  Rehabilitation Diagnosis:   Cerebral infarction, unspecified [I63.9]  Intracranial hemorrhage ([facility] [address] [I62.9]       Date 2021       Day of ARU Week:  3   Time IN//1030   Individual Tx Minutes    Group Tx Minutes    Co-Treat Minutes 90  A cotreat was completed this date with  [x]? PT    []? OT   []? ST      This pt requires simultaneous intervention of 2 skilled therapists to safely complete tasks to address complexity of deficits defined in the goals including:  [x]? Attention   [x]? Safety    [x]? Problem Solving    []? Sequencing     []? Initiation    []? Processing      []? Recall of learned tasks  []? Communication  []? Self Feeding   [x]? ADL's    []? UE Function    []? Motor planning   [x]? Balance  []? Energy Conservation   [x]? Verbal cues   []? Tactile Cues  []? Barriers     [x]? Transfers   []? Device Use  Pt's response to cotreat: Good this date, encouragement to initiate, stay on, and complete task; moderate behaviors this date   Progress toward goals: fluctuates    Concurrent Tx Minutes    TOTAL Tx Time Mins 90   Variance Time    Variance Time []   Refusal due to:     []   Medical hold/reason:    []   Illness   []   Off Unit for test/procedure  []   Extra time needed to complete task  []   Therapeutic need  []   Other (specify):   Restrictions Restrictions/Precautions: General Precautions, Fall Risk(L hemiparesis, chronic R shoulder limitations)         Communication with other providers: [x]   OK to see per nursing:     []   Spoke with team member regarding:      Subjective observations and cognitive status:   Patient in recliner sitting up finishing breakfast upon entry, pleasant and agreeable to therapy Pain level/location:   Could not discern numerical amount  /10       Location: Neck    Discharge recommendations  Anticipated discharge date:  TBD  Destination: []home alone   []home alone w assist prn   [] home w/ family    [] Continuous supervision       [x]SNF    [] Assisted living     [] Other:   Continued therapy: []HHC OT  []OUTPATIENT  OT   [] No Further OT  Equipment needs: TBD       ADLs:    Eating: Eating  Assistance Needed: Supervision or touching assistance  Comment: PAtient requires Sup-total feed secondary to level of alertness, lethergy, initiatian and staying on task, and behaviours; patient does complete with decreased assistance getting utensil to mouth when seated in recliner with feet on floor  CARE Score: 4  Discharge Goal: Independent       Oral Hygiene: Oral Hygiene  Assistance Needed: Supervision or touching assistance  Comment: Sup to initiate and stay on task and complete with verbal cues with 1-2 step directions and proccessing time  Reason if not Attempted: Not attempted due to medical condition or safety concerns  CARE Score: 4  Discharge Goal: Independent    UB/LB Bathing: Shower/Bathe Self  Assistance Needed: Substantial/maximal assistance  Comment: patient requires max A  when in stand to complete paulino care and wshing buttocks and assist for thoroughness  Reason if not Attempted: Not attempted due to medical condition or safety concerns  CARE Score: 2  Discharge Goal: Supervision or touching assistance    UB Dressing: Upper Body Dressing  Assistance Needed: Partial/moderate assistance  Comment:  Mod A for orienting shirt threading arms and head through shirt correctly, max vc to stay on task  Reason if not Attempted: Not attempted due to medical condition or safety concerns  CARE Score: 3  Discharge Goal: Supervision or touching assistance         LB Dressing: Lower Body Dressing  Assistance Needed: Dependent Comment: Max A times two  for threading feet and cathter through undergarment and pants and assist managing pants fuilly over hips ion stand  Reason if not Attempted: Not attempted due to medical condition or safety concerns  CARE Score: 1  Discharge Goal: Partial/moderate assistance    Donning and [place] Footwear: Putting On/Taking Off Footwear  Assistance Needed: Dependent  Comment: X  CARE Score: 1  Discharge Goal: Set-up or clean-up assistance      Toileting: Toileting Hygiene  Assistance Needed: Partial/moderate assistance  Comment: MIn/Total fluctuating with behaviours, state of alertness  CARE Score: 3  Discharge Goal: Partial/moderate assistance      Toilet Transfers:   Toilet Transfer  Assistance Needed: Partial/moderate assistance  Comment: Min A times two with max vec during directional changes; PT assisting c maneuvering RW during all turns  Reason if not Attempted: Not attempted due to medical condition or safety concerns  CARE Score: 3  Discharge Goal: Partial/moderate assistance  Device Used:    []   Standard Toilet         []   Grab Bars           []  Bedside Commode       []   Elevated Toilet          []   Other:        Bed MobilitySEE PT NOTES FOR ASSISTANCE :           []   Pt received out of bed   Rolling R/L:    Scooting:    Supine --> Sit:    Sit --> Supine:      Transfers: SEE PT NOTES FOR ASSISTANCE    Sit--> Stand: second person Min A   Stand --> Sit:   Second person min a   Stand-Pivot:  Second person Min A    Other:  Patient requires Mod A from second person at end of treatment session secondary to fatigue   Assistive device required for transfer:  RW        Functional Mobility:    Assistance:  Second Person SBA/CGA  Device:   [x]   Rolling Walker     []   Standard [employer] []   Wheelchair        []   [employer]       []   4-Wheeled [employer]         []   Cardiac Walker       []   Other:        Additional Therapeutic activities/exercises completed this date:     [x]   ADL Training [x]   Balance/Postural training     [x]   Bed/Transfer Training   [x]   Endurance Training   []   Neuromuscular Re-ed   []   Nu-step:  Time:        Level:         #Steps:       []   Rebounder:    []  Seated     []  Standing        []   Supine Ther Ex (reps/sets):     []   Seated Ther Ex (reps/sets):     []   Standing Ther Ex (reps/sets):     []   Other:      Comments:      Patient/Caregiver Education and Training:   [x]   [employer] Equipment Use  [x]   Bed Mobility/Transfer Technique/Safety  [x]   Energy Conservation Tips  []   Family training  [x]   Postural Awareness  [x]   Safety During Functional Activities  []   Reinforced Patient's Precautions   []   Progress was updated and reviewed in Rehabtracker with patient and/or family this         date.     Treatment Plan for Next Session: POC to continue as tolerated       Assessment:        Treatment/Activity Tolerance:   [] Tolerated treatment with no adverse effects    [x] Patient limited by fatigue , and behaviours   [] Patient limited by pain   [] Patient limited by medical complications:    [] Adverse reaction to Tx:   [] Significant change in status    Safety:       [x]  bed alarm set    []  chair alarm set    []  Pt refused alarms                []  Telesitter activated      [x]  Gait belt used during tx session      []other:       Number of Minutes/Billable Intervention  Therapeutic Exercise    ADL Self-care 60   Neuro Re-Ed    Therapeutic Activity 30   Group    Other:    TOTAL 90       Social History  Social/Functional History  Lives With: Significant other  Type of Home: House  Home Layout: One level  Home Access: Stairs to enter with rails  Entrance Stairs - Number of Steps: 5+3  Entrance Stairs - Rails: Both  Bathroom Shower/Tub: Walk-in shower, Shower chair with back(shower chair doesn't fit in shower)  Bathroom Toilet: Standard  Bathroom Equipment: Toilet raiser, Shower chair  Bathroom Accessibility: Accessible Home Equipment: 4 wheeled walker, [address] Help From: Family, Neighbor  ADL Assistance: Needs assistance  Bath: Minimal assistance  Dressing: Minimal assistance  Grooming: Independent  Feeding: Independent  Toileting: Independent  Homemaking Assistance: Independent  Homemaking Responsibilities: No  Meal Prep Responsibility: No  Laundry Responsibility: No  Cleaning Responsibility: No  Bill Paying/Finance Responsibility: No  Shopping Responsibility: No  Dependent Care Responsibility: No  Health Care Management: No  Ambulation Assistance: Independent  Transfer Assistance: Needs assistance  Active : No  Patient's  Info: Uday Pantoja is primary , and transport company  Mode of Transportation: Car, Family, Friends  Education: 12th grade  Occupation: Retired  Type of occupation: Pt built trucks for 33 years; [employer]  Leisure & Hobbies: TV, limited reading, girlfriend has a dog. Pt has meds that come in pill packs. Bills are managed over the phone. IADL Comments: Travis Calle assists c most IADLs and pt report hiring outside help c yardork. Additional Comments: Pt reports using rolator in the house.  Pt reports 1 fall this year that occured in the house while ambulating in the home; sleeps in regular, flat bed; R hand dominant    Objective                                                                                    Goals:  (Update in navigator)  Short term goals  Time Frame for Short term goals: STG=LTG:  Long term goals  Time Frame for Long term goals : 12-14 days or until d/c  Long term goal 1: Pt will complete feeding/grooming/oral care task c MOD I by d/c  Long term goal 2: Pt will complete total body bathing c MIN A by d/c  Long term goal 3: Pt will complete UB dressing c SUP by d/c  Long term goal 4: Pt will complete LB dressing c MOD A by d/c  Long term goal 5: Pt will don/doff footwear c SETUP by d/c  Long term goals 6: Pt will complete toileting c MOD A by d/c Long term goal 7: Pt will complete functional transfer (toilet, tub, shower) c MOD A by d/c. Long term goal 8: Pt will perform therex/therax to facilitate increased strength/endurance/ax tolerance (c emphasis on dynamic standing balance/tolerance >10 mins and BUE endurance) c Min A in order to complete ADLs. :        Plan of Care                                                                              Times per week: 5 days per week for a minimum of 60 minutes/day plus group as appropriate for 60 minutes.   Treatment to include Plan  Times per day: Daily  Current Treatment Recommendations: Strengthening, Endurance Training, Neuromuscular Re-education, Patient/Caregiver Education & Training, ROM, Equipment Evaluation, Education, & procurement, Self-Care / ADL, Balance Training, Functional Mobility Training, Safety Education & Training, Cognitive/Perceptual Training    Electronically signed by   RUBEN Meléndez  1/27/2021, 12:13 PM

## 2021-01-28 LAB
GLUCOSE BLD-MCNC: 112 MG/DL (ref 70–99)
GLUCOSE BLD-MCNC: 120 MG/DL (ref 70–99)
GLUCOSE BLD-MCNC: 127 MG/DL (ref 70–99)
GLUCOSE BLD-MCNC: 99 MG/DL (ref 70–99)

## 2021-01-28 PROCEDURE — 97530 THERAPEUTIC ACTIVITIES: CPT

## 2021-01-28 PROCEDURE — 94761 N-INVAS EAR/PLS OXIMETRY MLT: CPT

## 2021-01-28 PROCEDURE — 97535 SELF CARE MNGMENT TRAINING: CPT

## 2021-01-28 PROCEDURE — 82962 GLUCOSE BLOOD TEST: CPT

## 2021-01-28 PROCEDURE — 6370000000 HC RX 637 (ALT 250 FOR IP): Performed by: PHYSICAL MEDICINE & REHABILITATION

## 2021-01-28 PROCEDURE — 99233 SBSQ HOSP IP/OBS HIGH 50: CPT | Performed by: PHYSICAL MEDICINE & REHABILITATION

## 2021-01-28 PROCEDURE — 6370000000 HC RX 637 (ALT 250 FOR IP): Performed by: NURSE PRACTITIONER

## 2021-01-28 PROCEDURE — 1280000000 HC REHAB R&B

## 2021-01-28 PROCEDURE — 6360000002 HC RX W HCPCS: Performed by: INTERNAL MEDICINE

## 2021-01-28 RX ADMIN — MICONAZOLE NITRATE: 2 POWDER TOPICAL at 20:59

## 2021-01-28 RX ADMIN — DIGOXIN 125 MCG: 125 TABLET ORAL at 08:36

## 2021-01-28 RX ADMIN — DOXAZOSIN 2 MG: 4 TABLET ORAL at 20:57

## 2021-01-28 RX ADMIN — ACETAMINOPHEN 650 MG: 325 TABLET ORAL at 08:36

## 2021-01-28 RX ADMIN — ACETAMINOPHEN 650 MG: 325 TABLET ORAL at 12:38

## 2021-01-28 RX ADMIN — ESCITALOPRAM OXALATE 10 MG: 10 TABLET, FILM COATED ORAL at 08:35

## 2021-01-28 RX ADMIN — MEGESTROL ACETATE 200 MG: 40 SUSPENSION ORAL at 08:36

## 2021-01-28 RX ADMIN — LISINOPRIL 40 MG: 20 TABLET ORAL at 08:35

## 2021-01-28 RX ADMIN — MICONAZOLE NITRATE: 20 POWDER TOPICAL at 08:36

## 2021-01-28 RX ADMIN — DIVALPROEX SODIUM 1000 MG: 500 TABLET, FILM COATED, EXTENDED RELEASE ORAL at 20:57

## 2021-01-28 RX ADMIN — MICONAZOLE NITRATE: 2 POWDER TOPICAL at 08:47

## 2021-01-28 RX ADMIN — AMLODIPINE BESYLATE 10 MG: 10 TABLET ORAL at 08:35

## 2021-01-28 RX ADMIN — BENZTROPINE MESYLATE 0.5 MG: 1 TABLET ORAL at 20:57

## 2021-01-28 RX ADMIN — BENZTROPINE MESYLATE 0.5 MG: 1 TABLET ORAL at 08:35

## 2021-01-28 RX ADMIN — ENOXAPARIN SODIUM 40 MG: 100 INJECTION SUBCUTANEOUS at 08:44

## 2021-01-28 RX ADMIN — DOCUSATE SODIUM 100 MG: 100 CAPSULE, LIQUID FILLED ORAL at 08:35

## 2021-01-28 RX ADMIN — ATORVASTATIN CALCIUM 80 MG: 40 TABLET, FILM COATED ORAL at 20:56

## 2021-01-28 RX ADMIN — SENNOSIDES 8.6 MG: 8.6 TABLET, FILM COATED ORAL at 20:56

## 2021-01-28 RX ADMIN — METOPROLOL TARTRATE 25 MG: 25 TABLET, FILM COATED ORAL at 08:35

## 2021-01-28 RX ADMIN — MICONAZOLE NITRATE: 20 POWDER TOPICAL at 20:59

## 2021-01-28 RX ADMIN — FAMOTIDINE 20 MG: 20 TABLET, FILM COATED ORAL at 08:35

## 2021-01-28 RX ADMIN — ACETAMINOPHEN 650 MG: 325 TABLET ORAL at 17:31

## 2021-01-28 RX ADMIN — DOCUSATE SODIUM 100 MG: 100 CAPSULE, LIQUID FILLED ORAL at 20:57

## 2021-01-28 RX ADMIN — DOXAZOSIN 2 MG: 4 TABLET ORAL at 08:35

## 2021-01-28 RX ADMIN — METOPROLOL TARTRATE 25 MG: 25 TABLET, FILM COATED ORAL at 20:57

## 2021-01-28 ASSESSMENT — PAIN SCALES - GENERAL
PAINLEVEL_OUTOF10: 5
PAINLEVEL_OUTOF10: 9
PAINLEVEL_OUTOF10: 9

## 2021-01-28 ASSESSMENT — PAIN DESCRIPTION - LOCATION
LOCATION: BACK
LOCATION: HEAD

## 2021-01-28 NOTE — PLAN OF CARE
Problem: Pain:  Goal: Pain level will decrease  Description: Pain level will decrease  1/28/2021 1010 by Clovis Nassar RN  Outcome: Ongoing  1/27/2021 2242 by Janneth Carlos LPN  Outcome: Ongoing  Goal: Control of acute pain  Description: Control of acute pain  1/27/2021 2242 by Janneth Carlos LPN  Outcome: Ongoing  Goal: Control of chronic pain  Description: Control of chronic pain  1/27/2021 2242 by Janneth Carlos LPN  Outcome: Ongoing

## 2021-01-28 NOTE — CARE COORDINATION
Received call from Charly Ferrari, [id number] [facility] [place] for help with a RW. Patients insurance will not cover as he was provided with a rollator last year. I asked for explanation as to why the patient can not use the rollator before I can get approval.  Generally when insurance denies coverage due to already providing item or similar item we must have documentation as to why patient is not able to use the equipment they already have. Waiting for update.

## 2021-01-28 NOTE — PLAN OF CARE
Problem: Pain:  Goal: Pain level will decrease  Description: Pain level will decrease  1/27/2021 2242 by Mena Moyer LPN  Outcome: Ongoing  1/27/2021 1447 by Emilio Mcdaniel. Estela Arauz RN  Outcome: Ongoing  Goal: Control of acute pain  Description: Control of acute pain  1/27/2021 2242 by Mena Moyer LPN  Outcome: Ongoing  1/27/2021 1447 by Emilio Mcdaniel. Estela Arauz RN  Outcome: Ongoing  Goal: Control of chronic pain  Description: Control of chronic pain  1/27/2021 2242 by Mena Moyer LPN  Outcome: Ongoing  1/27/2021 1447 by Emilio Mcdaniel.  Estela Arauz RN  Outcome: Ongoing

## 2021-01-28 NOTE — PROGRESS NOTES
Physical Rehabilitation: OCCUPATIONAL THERAPY     [x] daily progress note       [x] discharge       Patient Name:  Steff Mike.    :  1950 MRN: [id number]  Room:  [address] [place] [address] Date of Admission: 2021  Rehabilitation Diagnosis:   Cerebral infarction, unspecified [I63.9]  Intracranial hemorrhage ([facility] [address] [I62.9]       Date 2021       Day of ARU Week:  4   Time IN//1015   Individual Tx Minutes    Group Tx Minutes    Co-Treat Minutes 75   Concurrent Tx Minutes    TOTAL Tx Time Mins    Variance Time -15   Variance Time []   Refusal due to:     []   Medical hold/reason:    []   Illness   []   Off Unit for test/procedure  []   Extra time needed to complete task  []   Therapeutic need  [x]   Other (specify): patient lethargic, unable to keep alert after toileting task and complains of 9/10 neck pain at end of tx    Restrictions Restrictions/Precautions: General Precautions, Fall Risk(L hemiparesis, chronic R shoulder limitations)         Communication with other providers: [x]   OK to see per nursing:     [x]   Spoke with team member regarding:      Subjective observations and cognitive status: Patient being fed by Nurse aid this morning upon therapists entry, therapist encourages patient to sit at EOB and feed himself       Pain level/location:   9 /10       Location: back and neck    Discharge recommendations  Anticipated discharge date:  TBD  Destination: []home alone   []home alone w assist prn   [x] home w/ family    [] Continuous supervision       []SNF    [] Assisted living     [] Other:   Continued therapy: []HHC OT  []OUTPATIENT  OT   [] No Further OT Equipment needs: Sloan Herrera requires the assistance of a tub transfer bench to successfully and safely complete bathing activities necessary due to reduced balance, endurance, need for ADL assist, and LE weakness and reduced ROM. These tasks cannot be safely completed without this device and would place Sloan Herrera at greater risk of falls. Sloan Herrera requires a 3 in 1 bedside commode due to being unable to use the toilet within the home  And confined to a single room    . dme           Patient sits EOB working on sitting balance and tolerance to keep alert during self feeding tasks patient requires SBA with max vc to stay alert, max vc to initiate and stay on task,           LB Mod/Max times two requiring assistance   Grooming tasks: Mod A seated at sink for thoroughness and max encouragement to clean and rinse and max vc to stay on tasks      Toileting: Mod times two to manage pants over hips with second assist for standing while this therapist assists managing clothing fully over hips       Toilet Transfers: Mod A times 2 with max vc for hand placement and directional changes and ability to take steps back to meet legs to toilet Max A to sit on toilet this date patient does not take direction for hand placement to grab bars, two therapists to lower patient down to toilet   Device Used:    [x]   Standard Toilet         [x]   Grab Bars           []  Bedside Commode       []   Elevated Toilet          []   Other:        Bed Mobilitysee pt notes :           []   Pt received out of bed   Rolling R/L:    Scooting:    Supine --> Sit:    Sit --> Supine:      Transfers: see pt notes    Sit--> Stand:   Stand --> Sit:     Stand-Pivot:     Other:    Assistive device required for transfer:                  Functional Mobility:  From bed to bathroom   Assistance:  Min A times two  Max vc for directional change or managing RW towards direction patient is going. Device:   [x]   Rolling Walker     []   Standard [place] []   Wheelchair        []   [employer]       []   4-Wheeled [place]         []   Cardiac [place]       []   Other:      Additional Therapeutic activities/exercises completed this date:     [x]   ADL Training   [x]   Balance/Postural training     [x]   Bed/Transfer Training   []   Endurance Training   []   Neuromuscular Re-ed   []   Nu-step:  Time:        Level:         #Steps:       []   Rebounder:    []  Seated     []  Standing        []   Supine Ther Ex (reps/sets):     []   Seated Ther Ex (reps/sets):     []   Standing Ther Ex (reps/sets):     []   Other:      Comments:      Patient/Caregiver Education and Training:   [x]   [employer] Equipment Use  [x]   Bed Mobility/Transfer Technique/Safety  [x]   Energy Conservation Tips  []   Family training  [x]   Postural Awareness  [x]   Safety During Functional Activities  []   Reinforced Patient's Precautions   []   Progress was updated and reviewed in Rehabtracker with patient and/or family this         date.     Treatment Plan for Next Session: POC to continue as tolerated       Assessment:        Treatment/Activity Tolerance:   [] Tolerated treatment with no adverse effects    [x] Patient limited by fatigue  [x] Patient limited by pain   [] Patient limited by medical complications:    [] Adverse reaction to Tx:   [] Significant change in status    Safety:       [x]  bed alarm set    []  chair alarm set    []  Pt refused alarms                []  Telesitter activated      [x]  Gait belt used during tx session      []other:       Number of Minutes/Billable Intervention  Therapeutic Exercise    ADL Self-care 45   Neuro Re-Ed    Therapeutic Activity 30   Group    Other:    TOTAL 75       Social History  Social/Functional History  Lives With: Significant other  Type of Home: House  Home Layout: One level  Home Access: Stairs to enter with rails  Entrance Stairs - Number of Steps: 5+3  Entrance Stairs - Rails: Both Bathroom Shower/Tub: Walk-in shower, Shower chair with back(shower chair doesn't fit in shower)  Bathroom Toilet: Standard  Bathroom Equipment: Toilet raiser, Shower chair  Bathroom Accessibility: Accessible  Home Equipment: 4 wheeled walker, [address] Help From: Family, Neighbor  ADL Assistance: Needs assistance  Bath: Minimal assistance  Dressing: Minimal assistance  Grooming: Independent  Feeding: Independent  Toileting: Independent  Homemaking Assistance: Independent  Homemaking Responsibilities: No  Meal Prep Responsibility: No  Laundry Responsibility: No  Cleaning Responsibility: No  Bill Paying/Finance Responsibility: No  Shopping Responsibility: No  Dependent Care Responsibility: No  Health Care Management: No  Ambulation Assistance: Independent  Transfer Assistance: Needs assistance  Active : No  Patient's  Info: Neighbor is primary , and transport company  Mode of Transportation: Car, Family, Friends  Education: 12th grade  Occupation: Retired  Type of occupation: Pt built trucks for 33 years; [employer]  Leisure & Hobbies: TV, limited reading, girlfriend has a dog. Pt has meds that come in pill packs. Bills are managed over the phone. IADL Comments: Reba Rose assists c most IADLs and pt report hiring outside help c yardork. Additional Comments: Pt reports using rolator in the house.  Pt reports 1 fall this year that occured in the house while ambulating in the home; sleeps in regular, flat bed; R hand dominant    Objective                                                                                    Goals:  (Update in navigator)  Short term goals  Time Frame for Short term goals: STG=LTG:  Long term goals  Time Frame for Long term goals : 12-14 days or until d/c  Long term goal 1: Pt will complete feeding/grooming/oral care task c MOD I by d/c  Long term goal 2: Pt will complete total body bathing c MIN A by d/c Long term goal 3: Pt will complete UB dressing c SUP by d/c  Long term goal 4: Pt will complete LB dressing c MOD A by d/c  Long term goal 5: Pt will don/doff footwear c SETUP by d/c  Long term goals 6: Pt will complete toileting c MOD A by d/c  Long term goal 7: Pt will complete functional transfer (toilet, tub, shower) c MOD A by d/c. Long term goal 8: Pt will perform therex/therax to facilitate increased strength/endurance/ax tolerance (c emphasis on dynamic standing balance/tolerance >10 mins and BUE endurance) c Min A in order to complete ADLs. :        Plan of Care                                                                              Times per week: 5 days per week for a minimum of 60 minutes/day plus group as appropriate for 60 minutes.   Treatment to include Plan  Times per day: Daily  Current Treatment Recommendations: Strengthening, Endurance Training, Neuromuscular Re-education, Patient/Caregiver Education & Training, ROM, Equipment Evaluation, Education, & procurement, Self-Care / ADL, Balance Training, Functional Mobility Training, Safety Education & Training, Cognitive/Perceptual Training    Electronically signed by   RUBEN Garcia  1/28/2021, 12:21 PM

## 2021-01-28 NOTE — CARE COORDINATION
LSW called [employer] ([phone]) to check status of Expedited Appeal.  Viral Gallardo was informed that appeal has been denied and denial is being upheld due to not being medically necessary. LSW asked what next step is and was told that all appeal efforts have been exhausted at this point. To clarify, LSW asked if this meant patient could not go to a SNF on his [facility] and representative verbalized agreement (Call reference # [id number]). Treatment team and Mary edgar Chaudhari updated at this time. 9:30 AM  LSW called patient's son Paul Michele ([phone]) to inform of new discharge plan. Voicemail left and LSW awaits call back. LSW then called Ruben Chi ([phone]). Ruben Chi verbalized understanding with patient returning home. Ruben Chi states patient has nearly everything DME that is recommended, except for the RW (patient has a rollator purchased with his insurance about a year ago). LSW contacted [address]az with [employer] to see if they could cover RW for patient and LSW awaits determination. Ruben Chi states patient will require transport home. LSW to check with Dr. Uzma Saeed on discharge. 2:00 PM  LSW called Ruben Chi to update on current discharge plan. Ruben Chi is to come in for CGT tomorrow at [address] notified. LSW also called back to [facility] to get further explanation of denial.  Maia Hill reported that notes state patient has made improvements to \"stand-by assist with RW\" and \"more alert and showing distinct improvement with stand and balance\". LSW asked if there was anything else that could be done to fight determination. Representative has now sent case to [facility]. LSW can check status by calling [phone]. The fax for [facility] is [phone]. LSW awaits official letter of denial from aMia Hill before [facility] # [id number].

## 2021-01-28 NOTE — CARE COORDINATION
Cyril Hernandez. requires the assistance of a wheeled walker to successfully ambulate from room to room at home to allow completion of daily living tasks such as: bathing, toileting, dressing and grooming. A wheeled walker is necessary due to the patient's unsteady gait, upper body weakness, inability to  a standard walker. This patient can ambulate only by pushing a walker instead of using a lesser assistive device such as a cane or crutch    Patient has a 4WW at home that was paid for with his insurance. Therapy believes patient would have a safer discharge with a standard 2WW as opposed to the 4WW. This is due to the fact that a 4WW does not have fixed front wheels; patient is still a heavy assist and balance issues could cause the 4WW to roll away, resulting in loss of balance. A 2WW is more conducive to patient's current level of functioning and mobility.

## 2021-01-28 NOTE — FLOWSHEET NOTE
[x] daily progress note       [] discharge       Patient Name:  Marcelina Runner. :  1950 MRN: [id number]  Room:  [address] [place], [place] [address] Date of Admission: 2021  Rehabilitation Diagnosis:   Cerebral infarction, unspecified [I63.9]  Intracranial hemorrhage ([facility] [address] [I62.9]       Date 2021       Day of ARU Week:  4   Time IN/-1015   Co-Treat Minutes 75   TOTAL Tx Time Mins 75   Variance Time -15 minutes   Variance Reason Increased lethargy   Restrictions Restrictions/Precautions  Restrictions/Precautions: General Precautions, Fall Risk(L hemiparesis, chronic R shoulder limitations)  Position Activity Restriction  Other position/activity restrictions: see catheter   Communication with other providers: [x]   OK to see per nursing:     [x]    Bindu Hess) communicated to these therapists that patient's only option is to return home. Discussed with  that patient is not safe to return home and currently requires 2-person assist for most tasks. Subjective observations and cognitive status: Pt seen in semi-go's position in bed with food in front of him. Pt falling asleep at times, agitated at times, and alert at other times. Pain level/location:    0/10          Discharge recommendations  Anticipated discharge date:  2021  Destination: []?????home alone   []?????home alone with assist PRN     []????? home w/ family      []????? Continuous supervision  [x]? ?? ? ?SNF    []????? Assisted living     []????? Other:  Continued therapy: []??? ? ?C PT  []?????OUTPATIENT PT   []????? No Further PT  [x]? ?? ? ?SNF PT  Caregiver training recommended: []??? ? ? Yes  []????? No Equipment needs: [place]. requires the assistance of a wheeled walker to successfully ambulate from room to room at home to allow completion of daily living tasks such as: bathing, toileting, dressing and grooming. A wheeled walker is necessary due to the patient's unsteady gait, upper body weakness, inability to  a standard walker. This patient can ambulate only by pushing a walker instead of using a lesser assistive device such as a cane or crutch. Maureen Howell requires a standard wheelchair with anti-tippers because he requires this to successfully complete daily living tasks of eating, bathing, toileting, personal cares, ambulating and grooming.  A standard wheelchair with anti-tippers is necessary due to the patient's impaired ambulation and mobility restrictions.  The patient would not be able to resolve these daily living tasks using a cane or walker.  The patient can self-propel a standard wheelchair with anti-tippers safely in their home and can maneuver within their home with adequate access.  The need for this equipment was discussed with the patient and he understands, is in agreement, and has not expressed an unwillingness to use the wheelchair. There is a caregiver available to provide necessary assistance. Expected length of need is lifetime. Maureen Howell does not have any infectious diagnoses.      A cotreat was completed this date with  [] PT    [x] OT   [] ST      This pt requires simultaneous intervention of 2 skilled therapists to safely complete tasks to address complexity of deficits defined in the goals including:  [x]Attention   [x] Safety    [x]Problem Solving    [x]Sequencing     [x]Initiation    [x]Processing      []Recall of learned tasks  [] Communication  [] Self Feeding   [x]ADL's    []UE Function    [x]Motor planning   [x]Balance  [x]Energy Conservation   [x]Verbal cues   [] Tactile Cues  []Barriers     [x]Transfers   [x]Device Use Pt's response to cotreat: Fair   Progress toward goals: Ongoing    Bed Mobility:              Lying --> Sit:  Mod A   Sit --> lying: Max A x 2     Transfers:    Sit--> Stand:  Min A-mod A x 2   Stand --> Sit:   Min A x 2 (max vcs for proper positioning of body close to chair prior to sitting down)   Stand pivot transfer:  Min A-mod A x 2   Toilet Transfer: See VALENZUELA note for details. Assistive device required for transfer:   RW    Gait:    Distance:  20'  Assistance: 2-person assist: CGA-min A x 1 (to manage walker around obstacles) + SBA x 1 for safety with w/c follow. Device: RW  Gait Quality:  Reciprocal pattern     Additional Therapeutic activities/exercises completed this date:     []   Nu-step:  Time:        Level:         #Steps:       []   Rebounder:    []  Seated     []  Standing        [x]   Balance training: pt sat EOB and performed eating task with SBA for balance. Pt required max vcs to maintain neutral posture as patient had left sided lean. Used mirror for reflective feedback, but patient refusing to correct even after cued both verbally and visually. Pt also complaining of cervical pain d/t increased cervical flexion and poor posture. Pt refused to correct cervical flexion even after cued to do so. Pt sat in w/c at sink and performed hand washing with max cues to attend to task. Pt required max vcs to increase upright trunk extension posture. []   Postural training    []   Supine ther ex (reps/sets):     []   Seated ther ex (reps/sets):     []   Standing ther ex (reps/sets):     []   Picking up object from floor (standing):                   []   Reacher used   [x]   Other: [employer] topical cream to patient's cervical region to help relieve pain.     []   Other:    Patient/Caregiver Education and Training:   [x]   Bed Mobility/Transfer technique/safety  [x]   Gait technique/sequencing  [x]   Proper use of assistive device  []   Advanced mobility safety and technique []   Reinforced patient's precautions/mobility while maintaining precautions  []   Postural awareness  []   Family training  [x]   Progress was updated in Rehabtracker this date. Treatment Plan for Next Session: gait, transfers, car transfer, uneven surfaces, bed mobility, w/c mobility     Assessment:    Treatment/Activity Tolerance:   [] Tolerated treatment with no adverse effects    [x] Patient limited by fatigue  [] Patient limited by pain   [] Patient limited by medical complications:    [] Adverse reaction to Tx:   [] Significant change in status    Safety:       [x]  bed alarm set    []  chair alarm set    []  Pt refused alarms                []  Telesitter activated      [x]  Gait belt used during tx session      [x]other: pt left in semi-go's position in bed with call light at end of treatment.         Number of Minutes/Billable Intervention  Gait Training    Therapeutic Exercise    Neuro Re-Ed    Therapeutic Activity 75   Wheelchair Propulsion    Group    Other:    TOTAL 75         Social History  Social/Functional History  Lives With: Significant other  Type of Home: House  Home Layout: One level  Home Access: Stairs to enter with rails  Entrance Stairs - Number of Steps: 5+3  Entrance Stairs - Rails: Both  Bathroom Shower/Tub: Walk-in shower, Shower chair with back(shower chair doesn't fit in shower)  Bathroom Toilet: Standard  Bathroom Equipment: Toilet raiser, Shower chair  Bathroom Accessibility: Accessible  Home Equipment: 4 wheeled walker, [address] Help From: Family, Neighbor  ADL Assistance: Needs assistance  Bath: Minimal assistance  Dressing: Minimal assistance  Grooming: Independent  Feeding: Independent  Toileting: Independent  Homemaking Assistance: Independent  Homemaking Responsibilities: No  Meal Prep Responsibility: No  Laundry Responsibility: No  Cleaning Responsibility: No  Bill Paying/Finance Responsibility: No  Shopping Responsibility: No Treatment to include Current Treatment Recommendations: Strengthening, Functional Mobility Training, Wheelchair Mobility Training, Neuromuscular Re-education, Home Exercise Program, Equipment Evaluation, Education, & procurement, ROM, Transfer Training, Cognitive/Perceptual Training, Gait Training, Cognitive Reorientation, Safety Education & Training, Balance Training, Endurance Training, Pain Management, Patient/Caregiver Education & Training, Positioning    Electronically signed by   Mian Horton, [id number]  1/28/2021, 9:20 AM

## 2021-01-28 NOTE — CARE COORDINATION
LSW met with patient following Care Conference. LSW also spoke with significant other Avelina Tan. LSW reminded patient of recommendations for a RW. Patient still in agreement for this and it will be ordered from [facility]. LSW then reminded of recommendation for [facility] PT, OT, Nursing, and Aide. Patient agrees with recommendation and referral to [facility]. D/C Plan:  Date:  Jan. 29th  DME:  [facility])  HHC:  PT, OT, Nursing, Aide ([facility])  To:   Home with significant other (transport needed)

## 2021-01-29 VITALS
WEIGHT: 214 LBS | BODY MASS INDEX: 31.7 KG/M2 | SYSTOLIC BLOOD PRESSURE: 111 MMHG | TEMPERATURE: 98.1 F | RESPIRATION RATE: 16 BRPM | DIASTOLIC BLOOD PRESSURE: 70 MMHG | HEIGHT: 69 IN | OXYGEN SATURATION: 97 % | HEART RATE: 140 BPM

## 2021-01-29 LAB
GLUCOSE BLD-MCNC: 125 MG/DL (ref 70–99)
GLUCOSE BLD-MCNC: 99 MG/DL (ref 70–99)

## 2021-01-29 PROCEDURE — 94761 N-INVAS EAR/PLS OXIMETRY MLT: CPT

## 2021-01-29 PROCEDURE — 6360000002 HC RX W HCPCS: Performed by: INTERNAL MEDICINE

## 2021-01-29 PROCEDURE — 97530 THERAPEUTIC ACTIVITIES: CPT

## 2021-01-29 PROCEDURE — 6370000000 HC RX 637 (ALT 250 FOR IP): Performed by: PHYSICAL MEDICINE & REHABILITATION

## 2021-01-29 PROCEDURE — 97116 GAIT TRAINING THERAPY: CPT

## 2021-01-29 PROCEDURE — 97535 SELF CARE MNGMENT TRAINING: CPT

## 2021-01-29 PROCEDURE — 99239 HOSP IP/OBS DSCHRG MGMT >30: CPT | Performed by: PHYSICAL MEDICINE & REHABILITATION

## 2021-01-29 PROCEDURE — 82962 GLUCOSE BLOOD TEST: CPT

## 2021-01-29 PROCEDURE — 6370000000 HC RX 637 (ALT 250 FOR IP): Performed by: NURSE PRACTITIONER

## 2021-01-29 RX ORDER — DOXAZOSIN 2 MG/1
2 TABLET ORAL EVERY 12 HOURS SCHEDULED
Qty: 60 TABLET | Refills: 0 | Status: ON HOLD | OUTPATIENT
Start: 2021-01-29 | End: 2021-02-26 | Stop reason: HOSPADM

## 2021-01-29 RX ORDER — BENZTROPINE MESYLATE 0.5 MG/1
0.5 TABLET ORAL 2 TIMES DAILY
Qty: 60 TABLET | Refills: 0 | Status: SHIPPED | OUTPATIENT
Start: 2021-01-29

## 2021-01-29 RX ORDER — DIGOXIN 125 MCG
125 TABLET ORAL DAILY
Qty: 30 TABLET | Refills: 0 | Status: ON HOLD | OUTPATIENT
Start: 2021-01-29 | End: 2022-02-24 | Stop reason: HOSPADM

## 2021-01-29 RX ORDER — AMLODIPINE BESYLATE 10 MG/1
10 TABLET ORAL DAILY
Qty: 30 TABLET | Refills: 0 | Status: ON HOLD | OUTPATIENT
Start: 2021-01-29 | End: 2021-02-26 | Stop reason: HOSPADM

## 2021-01-29 RX ORDER — LISINOPRIL 40 MG/1
40 TABLET ORAL DAILY
Qty: 30 TABLET | Refills: 0 | Status: ON HOLD | OUTPATIENT
Start: 2021-01-29 | End: 2021-02-26 | Stop reason: HOSPADM

## 2021-01-29 RX ADMIN — MEGESTROL ACETATE 200 MG: 40 SUSPENSION ORAL at 10:43

## 2021-01-29 RX ADMIN — ACETAMINOPHEN 650 MG: 325 TABLET ORAL at 10:30

## 2021-01-29 RX ADMIN — DOCUSATE SODIUM 100 MG: 100 CAPSULE, LIQUID FILLED ORAL at 10:30

## 2021-01-29 RX ADMIN — MICONAZOLE NITRATE: 2 POWDER TOPICAL at 10:42

## 2021-01-29 RX ADMIN — BENZTROPINE MESYLATE 0.5 MG: 1 TABLET ORAL at 10:31

## 2021-01-29 RX ADMIN — ESCITALOPRAM OXALATE 10 MG: 10 TABLET, FILM COATED ORAL at 10:29

## 2021-01-29 RX ADMIN — MICONAZOLE NITRATE: 20 POWDER TOPICAL at 10:43

## 2021-01-29 RX ADMIN — ENOXAPARIN SODIUM 40 MG: 100 INJECTION SUBCUTANEOUS at 10:41

## 2021-01-29 RX ADMIN — METOPROLOL TARTRATE 25 MG: 25 TABLET, FILM COATED ORAL at 10:30

## 2021-01-29 RX ADMIN — FAMOTIDINE 20 MG: 20 TABLET, FILM COATED ORAL at 10:30

## 2021-01-29 RX ADMIN — DIGOXIN 125 MCG: 125 TABLET ORAL at 10:29

## 2021-01-29 RX ADMIN — ACETAMINOPHEN 650 MG: 325 TABLET ORAL at 04:40

## 2021-01-29 ASSESSMENT — PAIN DESCRIPTION - PAIN TYPE: TYPE: CHRONIC PAIN

## 2021-01-29 ASSESSMENT — PAIN SCALES - GENERAL: PAINLEVEL_OUTOF10: 9

## 2021-01-29 NOTE — PROGRESS NOTES
Physical Rehabilitation: OCCUPATIONAL THERAPY     [x] daily progress note       [x] discharge       Patient Name:  Libby Pandey. :  1950 MRN: [id number]  Room:  [address] [place] [address]-A Date of Admission: 2021  Rehabilitation Diagnosis:   Cerebral infarction, unspecified [I63.9]  Intracranial hemorrhage ([facility] [address] [I62.9]       Date 2021       Day of ARU Week:  5   Time IN//1105   Individual Tx Minutes    Group Tx Minutes    Co-Treat Minutes   95 A cotreat was completed this date with  [x]? ? PT    []? ? OT   []? ? ST      This pt requires simultaneous intervention of 2 skilled therapists to safely complete tasks to address complexity of deficits defined in the goals including:  [x]? ? Attention   [x]? ? Safety    [x]? ?Problem Solving    []? ? Sequencing     []? ?Initiation    []? ? Processing      []? ? Recall of learned tasks  []? ? Communication  []? ? Self Feeding   [x]? ?ADL's    []? ?UE Function    []? ? Motor planning   [x]? ? Balance  []? ?Energy Conservation   [x]? ? Verbal cues   []? ? Tactile Cues  []? ? Barriers     [x]? ? Transfers   []? ? Device Use  Pt's response to cotreat: Good this date, encouragement to initiate, stay on, and complete task; moderate behaviors this date   Progress toward goals fair    Concurrent Tx Minutes    TOTAL Tx Time Mins 95   Variance Time    Variance Time []   Refusal due to:     []   Medical hold/reason:    []   Illness   []   Off Unit for test/procedure  []   Extra time needed to complete task  []   Therapeutic need  []   Other (specify):   Restrictions Restrictions/Precautions: General Precautions, Fall Risk(L hemiparesis, chronic R shoulder limitations)         Communication with other providers: [x]   OK to see per nursing:     []   Spoke with team member regarding:      Subjective observations and cognitive status: Patient supine in bed agreeable to therapy      Pain level/location:    /10       Location: patient complains of pain in buttocks area    Discharge recommendations Anticipated discharge date:  1/30  Destination: []home alone   []home alone w assist prn   [] home w/ family    [] Continuous supervision       []SNF    [] Assisted living     [] Other:   Continued therapy: []HHC OT  []OUTPATIENT  OT   [] No Further OT  Equipment needs: TBD       ADLs:    Eating: Eating  Assistance Needed: Supervision or touching assistance  Comment: Sup  CARE Score: 4  Discharge Goal: Independent       Oral Hygiene: Oral Hygiene  Assistance Needed: Supervision or touching assistance  Comment: vc to intiate and stay on task  Reason if not Attempted: Not attempted due to medical condition or safety concerns  CARE Score: 4  Discharge Goal: Independent    UB/LB Bathing: Shower/Bathe Self  Assistance Needed: Substantial/maximal assistance  Comment: Patient adamnetly refused to attempt washing up getting increasingly agitated  Reason if not Attempted: Patient refused  CARE Score: 7  Discharge Goal: Supervision or touching assistance    UB Dressing: Upper Body Dressing  Assistance Needed: Supervision or touching assistance  Comment: vc for initiation of task  Reason if not Attempted: Not attempted due to medical condition or safety concerns  CARE Score: 4  Discharge Goal: Supervision or touching assistance         LB Dressing: Lower Body Dressing  Assistance Needed: Partial/moderate assistance  Comment: Mod Aassist required to thrwead feet completely through pants, assist required to being pants over hips completely  Reason if not Attempted: Not attempted due to medical condition or safety concerns  CARE Score: 3  Discharge Goal: Partial/moderate assistance    Donning and [place] Footwear: Putting On/Taking Off Footwear  Assistance Needed: Dependent  Comment: patient ujnable to complete tasks, unable to use sock aide secondary to cognition deficits  CARE Score: 1  Discharge Goal: Set-up or clean-up assistance      Toileting:  Toileting Hygiene  Assistance Needed: Substantial/maximal assistance, Dependent  Comment: Max times two therapist required to assist with hygiene after BM and PTA requireed to steady in stand  CARE Score: 3  Discharge Goal: Partial/moderate assistance      Toilet Transfers: Toilet Transfer  Assistance Needed: Partial/moderate assistance  Comment: Mod A times two usual performance  Reason if not Attempted: Not attempted due to medical condition or safety concerns  CARE Score: 3  Discharge Goal: Partial/moderate assistance  Device Used:    [x]   Standard Toilet         [x]   Grab Bars           []  Bedside Commode       []   Elevated Toilet          []   Other:        Bed MobilitySEE PT NOTES :           []   Pt received out of bed   Rolling R/L:    Scooting:    Supine --> Sit:    Sit --> Supine:      Transfers:  SEE PT NOTES   Sit--> Stand:    Stand --> Sit:     Stand-Pivot:     Other:    Assistive device required for transfer:         Functional Mobility:SEE PT NOTES ]\  Assistance:   Device:   []   Meena Flax     []   Standard Walker []   Wheelchair        []   [employer]       []   4-Wheeled Dulcie Pila         []   Cardiac Walker       []   Other:        Homemaking Tasks:          Additional Therapeutic activities/exercises completed this date:     [x]   ADL Training   [x]   Balance/Postural training     [x]   Bed/Transfer Training   []   Endurance Training   []   Neuromuscular Re-ed   []   Nu-step:  Time:        Level:         #Steps:       []   Rebounder:    []  Seated     []  Standing        []   Supine Ther Ex (reps/sets):     []   Seated Ther Ex (reps/sets):     []   Standing Ther Ex (reps/sets):     []   Other:      Comments:   Caregiver training with Reba Rsoe this date with a clearer picture of Hayden Ying stating she assisted with showers, LB dressing, and sometimes to orient Shirt but that patient put on shirt independently, Reba Rose also stating he got in and out of bed by himself and  Could transfer without touching assistance, caregiver was shown how patient is ambulating, transferring, this therapist spoke to [facility] about incontinent of bowel and not always knowing when he has soiled hisself Avelina Crenshaw made aware of increased needs as dfar as ADL/s. Jess Alonso has been in contact with son, son states he will assist as much as he can. Avelina Crenshaw states they have upstairs neighbor that also is willing to assist when needed     Patient/Caregiver Education and Training:   [x]   Adaptive Equipment Use  [x]   Bed Mobility/Transfer Technique/Safety  [x]   Energy Conservation Tips  [x]   Family training  [x]   Postural Awareness  [x]   Safety During Functional Activities  []   Reinforced Patient's Precautions   []   Progress was updated and reviewed in Rehabtracker with patient and/or family this         date.     Treatment Plan for Next Session: POC to continue as tolerated       Assessment:        Treatment/Activity Tolerance:   [x] Tolerated treatment with no adverse effects    [] Patient limited by fatigue  [] Patient limited by pain   [] Patient limited by medical complications:    [] Adverse reaction to Tx:   [] Significant change in status    Safety:       [x]  bed alarm set    []  chair alarm set    []  Pt refused alarms                []  Telesitter activated      []  Gait belt used during tx session      []other:       Number of Minutes/Billable Intervention  Therapeutic Exercise    ADL Self-care 60   Neuro Re-Ed    Therapeutic Activity 35   Group    Other:    TOTAL 95       Social History  Social/Functional History  Lives With: Significant other  Type of Home: House  Home Layout: One level  Home Access: Stairs to enter with rails  Entrance Stairs - Number of Steps: 5+3  Entrance Stairs - Rails: Both  Bathroom Shower/Tub: Walk-in shower, Shower chair with back(shower chair doesn't fit in shower)  Bathroom Toilet: Standard  Bathroom Equipment: Toilet raiser, Shower chair  Bathroom Accessibility: Accessible  Home Equipment: 4 wheeled walker, [employer] Help From: Family, Neighbor  ADL Assistance: Needs assistance  Bath: Minimal assistance  Dressing: Minimal assistance  Grooming: Independent  Feeding: Independent  Toileting: Independent  Homemaking Assistance: Independent  Homemaking Responsibilities: No  Meal Prep Responsibility: No  Laundry Responsibility: No  Cleaning Responsibility: No  Bill Paying/Finance Responsibility: No  Shopping Responsibility: No  Dependent Care Responsibility: No  Health Care Management: No  Ambulation Assistance: Independent  Transfer Assistance: Needs assistance  Active : No  Patient's  Info: Fausto Lau is primary , and transport company  Mode of Transportation: Car, Family, Friends  Education: 12th grade  Occupation: Retired  Type of occupation: Pt built trucks for 33 years; [employer]  Leisure & Hobbies: TV, limited reading, girlfriend has a dog. Pt has meds that come in pill packs. Bills are managed over the phone. IADL Comments: Reba Rose assists c most IADLs and pt report hiring outside help c yardork. Additional Comments: Pt reports using rolator in the house. Pt reports 1 fall this year that occured in the house while ambulating in the home; sleeps in regular, flat bed; R hand dominant    Objective                                                                                    Goals:  (Update in navigator)  Short term goals  Time Frame for Short term goals: STG=LTG:  Long term goals  Time Frame for Long term goals : 12-14 days or until d/c  Long term goal 1: Pt will complete feeding/grooming/oral care task c MOD I by d/c  Long term goal 2: Pt will complete total body bathing c MIN A by d/c  Long term goal 3: Pt will complete UB dressing c SUP by d/c  Long term goal 4: Pt will complete LB dressing c MOD A by d/c  Long term goal 5: Pt will don/doff footwear c SETUP by d/c  Long term goals 6: Pt will complete toileting c MOD A by d/c  Long term goal 7: Pt will complete functional transfer (toilet, tub, shower) c MOD A by d/c.   Long term goal 8: Pt will perform therex/therax to facilitate increased strength/endurance/ax tolerance (c emphasis on dynamic standing balance/tolerance >10 mins and BUE endurance) c Min A in order to complete ADLs. :        Plan of Care                                                                              Times per week: 5 days per week for a minimum of 60 minutes/day plus group as appropriate for 60 minutes.   Treatment to include Plan  Times per day: Daily  Current Treatment Recommendations: Strengthening, Endurance Training, Neuromuscular Re-education, Patient/Caregiver Education & Training, ROM, Equipment Evaluation, Education, & procurement, Self-Care / ADL, Balance Training, Functional Mobility Training, Safety Education & Training, Cognitive/Perceptual Training    Electronically signed by   RUBEN Freire  1/29/2021, 12:46 PM

## 2021-01-29 NOTE — DISCHARGE SUMMARY
Patient Name: Naty Benson. 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 There is been no chills, cough or trauma to his head. He was mildly hemiparetic and ataxic in that visit. Brain imaging identified a significant left intraparenchymal hemorrhage. The patient was urgently taken to [place] for neurosurgical care. He did not require intubation or surgery. He did have fluctuating blood pressures and level of alertness. He had anticonvulsant started. Blood pressures fluctuated significantly and trended up. He was taken off his anticoagulation for his atrial fibrillation. He required fluid resuscitation and monitoring of heart failure. He had persistent impaired language, left-sided weakness and gait. Unfortunately, during the course of his rehabilitation stay he has had waxing and waning alertness and ability to engage in the therapy sessions. He developed significant withdrawal when a urinary tract infection flared up. He has not had full recovery from this.   Despite removal of multiple sedating medications, he frequently closes his eyes and withdraws from engagement. He was becoming more alert and engaging more in therapies in this last week of rehab. Unfortunately, insurance company has denied him a skilled nursing stay for ongoing daily therapies. He will be discharged home with his significant other who may very quickly become overwhelmed with his care needs. Prior (baseline) level of function: Independent. Current level of function:         Current  IRF-NOHEMY and Goals:   Occupational Therapy:    Short term goals  Time Frame for Short term goals: STG=LTG :   Long term goals  Time Frame for Long term goals : 12-14 days or until d/c  Long term goal 1: Pt will complete feeding/grooming/oral care task c MOD I by d/c  Long term goal 2: Pt will complete total body bathing c MIN A by d/c  Long term goal 3: Pt will complete UB dressing c SUP by d/c  Long term goal 4: Pt will complete LB dressing c MOD A by d/c  Long term goal 5: Pt will don/doff footwear c SETUP by d/c  Long term goals 6: Pt will complete toileting c MOD A by d/c  Long term goal 7: Pt will complete functional transfer (toilet, tub, shower) c MOD A by d/c. Long term goal 8: Pt will perform therex/therax to facilitate increased strength/endurance/ax tolerance (c emphasis on dynamic standing balance/tolerance >10 mins and BUE endurance) c Min A in order to complete ADLs.  :                                       Eating: Eating  Assistance Needed: Supervision or touching assistance  Comment: PAtient requires Sup-total feed secondary to level of alertness, lethergy, initiatian and staying on task, and behaviours; patient does complete with decreased assistance getting utensil to mouth when seated in recliner with feet on floor  CARE Score: 4  Discharge Goal: Independent       Oral Hygiene: Oral Hygiene  Assistance Needed: Supervision or touching assistance  Comment: Sup to initiate and stay on task and complete with verbal cues with 1-2 step directions and proccessing time  Reason if not Attempted: Not attempted due to medical condition or safety concerns  CARE Score: 4  Discharge Goal: Independent    UB/LB Bathing: Shower/Bathe Self  Assistance Needed: Substantial/maximal assistance  Comment: patient requires max A  when in stand to complete paulino care and wshing buttocks and assist for thoroughness  Reason if not Attempted: Not attempted due to medical condition or safety concerns  CARE Score: 2  Discharge Goal: Supervision or touching assistance    UB Dressing: Upper Body Dressing  Assistance Needed: Partial/moderate assistance  Comment: Mod A for orienting shirt threading arms and head through shirt correctly, max vc to stay on task  Reason if not Attempted: Not attempted due to medical condition or safety concerns  CARE Score: 3  Discharge Goal: Supervision or touching assistance         LB Dressing: Lower Body Dressing  Assistance Needed: Dependent  Comment: Max A times two  for threading feet and cathter through undergarment and pants and assist managing pants fuilly over hips ion stand  Reason if not Attempted: Not attempted due to medical condition or safety concerns  CARE Score: 1  Discharge Goal: Partial/moderate assistance    Donning and [place] Footwear: Putting On/Taking Off Footwear  Assistance Needed: Dependent  Comment: X  CARE Score: 1  Discharge Goal: Set-up or clean-up assistance      Toileting: Toileting Hygiene  Assistance Needed: Partial/moderate assistance  Comment: MIn/Total fluctuating with behaviours, state of alertness  CARE Score: 3  Discharge Goal: Partial/moderate assistance      Toilet Transfers: Toilet Transfer  Assistance Needed: Dependent  Comment:  Mod times two  Reason if not Attempted: Not attempted due to medical condition or safety concerns  CARE Score: 1  Discharge Goal: Partial/moderate assistance    Physical Therapy:   Short term goals  Time Frame for Short term goals: 14-21 tx days:  Short term goal 1: Pt will complete bed mobility tasks and sup<->sit with SBA-Sup. Short term goal 2: Pt will complete OOB transfers using RW with Mod A. Short term goal 3: Pt will ambulate 10 ft over level and carpeted surfaces using RW with Min A. Short term goal 4: Pt will propel manual w/c 150 ft with turns with SBA-Sup. Short term goal 5: Caregiver will be able to safely assist pt with functional mobility tasks.             Bed Mobility:   Sit to Lying  Assistance Needed: Dependent  Comment: Max A x 2  CARE Score: 1  Discharge Goal: Supervision or touching assistance  Roll Left and Right  Assistance Needed: Substantial/maximal assistance  Comment: Max A x 1  CARE Score: 2  Discharge Goal: Supervision or touching assistance  Lying to Sitting on Side of Bed  Assistance Needed: Dependent  Comment: Max A x 2 (pt fatigued and lethargic)  CARE Score: 1  Discharge Goal: Supervision or touching assistance    Transfers:    Sit to Stand  Assistance Needed: Dependent  Comment: min A x 2 d/t increased lethargy and fatigue  Reason if not Attempted: Not attempted due to medical condition or safety concerns  CARE Score: 1  Discharge Goal: Partial/moderate assistance  Chair/Bed-to-Chair Transfer  Assistance Needed: Dependent  Comment: min A x 2 d/t increased lethargy and fatigue  Reason if not Attempted: Not attempted due to medical condition or safety concerns  CARE Score: 1  Discharge Goal: Partial/moderate assistance     Car Transfer  Assistance Needed: Dependent  Comment: min A x 2  Reason if not Attempted: Not attempted due to medical condition or safety concerns  CARE Score: 1  Discharge Goal: Partial/moderate assistance    Ambulation:    Walking Ability  Does the Patient Walk?: Yes     Walk 10 Feet  Assistance Needed: Dependent  Comment: 2-person assist: CGA x 1 + min A x 1 (for walker management)) with w/c follow for safety  Reason if not Attempted: Not attempted due to medical condition or safety concerns  CARE Score: 1  Discharge Goal: Partial/moderate assistance     Walk 50 Feet with Two Turns  Assistance Needed: Dependent  Comment: 2-person assist: CGA x 1 + min A x 1 (for walker management) with w/c follow for safety  Reason if not Attempted: Not attempted due to medical condition or safety concerns  CARE Score: 1  Discharge Goal: Not Attempted     Walk 150 Feet  Reason if not Attempted: Not applicable  CARE Score: 9  Discharge Goal: Not Applicable     Walking 10 Feet on Uneven Surfaces  Assistance Needed: Dependent  Comment: pt required x2-person assist to ambulate over carpeted/transitional surface with RW  Reason if not Attempted: Not attempted due to medical condition or safety concerns  CARE Score: 1  Discharge Goal: Partial/moderate assistance     1 Step (Curb)  Reason if not Attempted: Not attempted due to medical condition or safety concerns  CARE Score: 88  Discharge Goal: Not Attempted     4 Steps  Reason if not Attempted: Not attempted due to medical condition or safety concerns  CARE Score: 88  Discharge Goal: Not Attempted     12 Steps  Reason if not Attempted: Not applicable  CARE Score: 9  Discharge Goal: Not Applicable       Wheelchair:  w/c Ability: Wheelchair Ability  Uses a Wheelchair and/or Scooter?: Yes  Wheel 50 Feet with Two Turns  Assistance Needed: Substantial/maximal assistance  Comment: Max A d/t increased lethargy. pt only able to propel 10'  Reason if not Attempted: Not attempted due to medical condition or safety concerns  CARE Score: 2  Discharge Goal: Supervision or touching assistance  Wheel 150 Feet  Assistance Needed: Substantial/maximal assistance  Comment: Max A d/t increased lethargy.  pt only able to propel 10'  Reason if not Attempted: Not attempted due to medical condition or safety concerns  CARE Score: 2  Discharge Goal: Supervision or touching assistance          Balance:        Object: Picking Up Object  Reason if not Attempted: Not attempted due to medical condition or safety concerns  CARE Score: 88  Discharge Goal: Partial/moderate assistance    I      Exam:    Blood pressure 111/70, pulse 140, temperature 98.1 °F (36.7 °C), temperature source Oral, resp. rate 16, height 5' 9\" (1.753 m), weight 214 lb (97.1 kg), SpO2 97 %. General: Up in a bedside chair. Talkative. Alert. Poor insight and reasoning. HEENT: MMM. Neck supple. Intelligible speech. No JVD. Pulmonary: Symmetric air exchange without coughing. Cardiac: Occasional premature beat. The rate is controlled. Abdomen: Patient's abdomen is soft and nondistended. Bowel sounds were present throughout. There was no rebound, guarding or masses noted. Upper extremities: Able to bring both hands together in the midline. Clumsy  but functional strength. Lower extremities: No signs of DVT. Clumsy movements. 4/5 strength at the knees and ankles. Sitting balance was good. Standing balance was poor. Lab Results   Component Value Date    WBC 6.9 01/21/2021    HGB 12.9 (L) 01/21/2021    HCT 42.4 01/21/2021    MCV 94.6 01/21/2021     01/21/2021     Lab Results   Component Value Date    INR 0.84 12/19/2020    INR 1.01 06/03/2020    INR 0.90 06/02/2020    PROTIME 10.1 (L) 12/19/2020    PROTIME 12.2 06/03/2020    PROTIME 10.9 (L) 06/02/2020     Lab Results   Component Value Date    CREATININE 1.2 01/21/2021    BUN 15 01/21/2021     01/21/2021    K 3.8 01/21/2021     01/21/2021    CO2 23 01/21/2021     Lab Results   Component Value Date    ALT 10 12/19/2020    AST 15 12/19/2020    ALKPHOS 82 12/19/2020    BILITOT 0.6 12/19/2020           The patient presented to the ARU with the above history requiring a multidisciplinary treatment plan including close medical supervision by the Gregorio Paz Director. The patient participated in the prescribed therapy treatment plan with inconsistent compliance and limited tolerance. They avoided significant medical complications.     By the time of discharge the patient had become minimally safer with adaptive equipment to transfer and toilet with a wheelchair with the physical assistance of family/caregivers. The patient was tolerating an oral diet without choking/coughing and demonstrated intermittent incontinence of bowel and bladder. Discharge instructions were reviewed with the patient and family. The patient is to follow up with the PCP in 1 week and cardiology in 2 weeks. No driving. [facility] PT/OT/RN will be arranged. Micaela Funk.    Home Medication Instructions NYT:[id number]    Printed on:01/29/21 1147   Medication Information                      amLODIPine (NORVASC) 10 MG tablet  Take 1 tablet by mouth daily             atorvastatin (LIPITOR) 80 MG tablet  Take 1 tablet by mouth daily             benztropine (COGENTIN) 0.5 MG tablet  Take 1 tablet by mouth 2 times daily             Compression Stockings MISC  by Does not apply route Duration of Treatment:  3 Months  # of pairs:  2    Compression Class Level - 20-30 mmHg*    Style - Knee High             digoxin (LANOXIN) 125 MCG tablet  Take 1 tablet by mouth daily             divalproex (DEPAKOTE ER) 500 MG extended release tablet  Take 2 tablets by mouth nightly             docusate sodium (COLACE) 100 MG capsule  Take 1 capsule by mouth 2 times daily             doxazosin (CARDURA) 2 MG tablet  Take 1 tablet by mouth every 12 hours             escitalopram (LEXAPRO) 10 MG tablet  Take 1 tablet by mouth daily             famotidine (PEPCID) 10 MG tablet  Take 1 tablet by mouth 2 times daily             insulin lispro (HUMALOG) 100 UNIT/ML injection vial  **Low Dose Correction Algorithm**Insulin lispro (HUMALOG)  3 TIMES DAILY WITH MEALSGlucose: Dose: No [id number] 1 [id number] 2 [id number] 3 [id number] 4 [id number] 5 Wjabe517 and above 6 Units             lisinopril (PRINIVIL;ZESTRIL) 40 MG tablet  Take 1 tablet by mouth daily             megestrol (MEGACE) 40 MG/ML suspension  Take 5 mLs by mouth daily             metoprolol tartrate (LOPRESSOR) 25 MG tablet  Take 1 tablet by mouth 2 times daily             miconazole (MICOTIN) 2 % powder  Apply topically 2 times daily. senna (SENOKOT) 8.6 MG tablet  Take 1 tablet by mouth nightly                 CONDITION ON DISCHARGE: Stable. The prognosis is fair- for further improvements in ADL's and safety with adapted gait/transfers. Record review, patient exam, discharge instructions, medication reconciliation and summary for this discharge visit took more than 30 minutes.

## 2021-01-29 NOTE — FLOWSHEET NOTE
[x] daily progress note       [x] discharge       Patient Name:  Maureen Whitaker. :  1950 MRN: [id number]  Room:  [address] [place], [place] [address]A Date of Admission: 2021  Rehabilitation Diagnosis:   Cerebral infarction, unspecified [I63.9]  Intracranial hemorrhage ([facility] [address] [I62.9]       Date 2021       Day of ARU Week:  5   Time IN/-1105   Co-Treat Minutes 95   TOTAL Tx Time Mins 95   Variance Time +5 minutes   Variance Time []   Refusal due to:     []   Medical hold/reason:    []   Illness   []   Off Unit for test/procedure  [x]   Extra time needed to complete task  []   Therapeutic need  []   Other (specify):   Restrictions Restrictions/Precautions  Restrictions/Precautions: General Precautions, Fall Risk(L hemiparesis, chronic R shoulder limitations)  Position Activity Restriction  Other position/activity restrictions: see catheter   Communication with other providers: [x]   OK to see per nursing:     []   Spoke with team member regarding:      Subjective observations and cognitive status: Pt seen in semi-go's position in bed at beginning of treatment. Pt lethargic, agitated, slow processing with decreased initiation (usual performance). Pain level/location:  Pain in buttocks area (unrated)    Discharge recommendations  Anticipated discharge date:  2021  Destination: []??????home alone   []??????home alone with assist PRN     []?????? home w/ family      []?????? Continuous supervision  [x]??????SNF    []?????? Assisted living     []?????? Other:  Continued therapy: []??????C PT  []??????OUTPATIENT PT   []?????? No Further PT  [x]??????SNF PT  Caregiver training recommended: [x]???? ? ? Yes  []?????? No Pt's response to cotreat: Fair   Progress toward goals: Ongoing    Bed Mobility:             Rolling R/L:  Max A x 1  Scooting: Max A x 2   Lying --> Sit: Max A x 2    Sit --> lying: Max A x 2     Transfers:    Sit--> Stand:  Min A-mod A x 2   Stand --> Sit:   Min A x 2   Chair-->Bed/Bed --> Chair:  Min A-mod A x 2   Car Transfers:  Min A x 2 (mod A x 1 for assisting BLEs into car); max vcs to use walker correctly. Pt neglecting walker to enter car. Gait:    Walk 10 feet: 2-person assist: CGA-min A x 1 (requires min A for walker management) + SBA x 1 for w/c follow   Walk 50 feet with 2 turns:  Not attempted d/t safety concerns. Pt's usual from past encounter is 2-person assist: CGA-min A x 1 (requrires min A for walker management) + SBA x 1 for w/c follow. Walk 596 feet: Not applicable  Other distance:  10' (2nd trial)  Device:  RW  Gait Quality:  Reciprocal pattern; Vcs to stay closer to walker. Wheelchair Propulsion:  Wheel 50 feet with 2 turns: Max A (pt's max potential is 10')   Wheel 150 feet: Max A (pt's max potential is 10')   Extremities Used:  BUEs  Type:    [x]  Manual        []  Electric    Stairs   1 step: CGA-min A x 2   Supportive Device:  2 rails   Height:   6\"    4 steps: CGA-min A x 2   12 steps: N/A  Supportive Device:  2 rails  Max vcs for proper sequencing and management of UEs on rails. Vcs to keep UEs placed forward on rails.        Uneven Surfaces:       Assistance:  2-person assist: CGA-min A x 1 (for management of RW) + SBA x 1 for w/c follow  Device:   RW  Surfaces Completed:   [x]  Carpeted Surface     []  Throw rugs       []  Ramp       []  Outdoor pavements        []  Grass             []  Loose gravel        []  Other:       Picking Up Object From Floor (must be standing position):  Not attempted d/t safety concerns    Additional Therapeutic activities/exercises completed this date:     []   Nu-step:  Time:        Level:         #Steps: Home Access: Stairs to enter with rails  Entrance Stairs - Number of Steps: 5+3  Entrance Stairs - Rails: Both  Bathroom Shower/Tub: Walk-in shower, Shower chair with back(shower chair doesn't fit in shower)  Bathroom Toilet: Standard  Bathroom Equipment: Toilet raiser, Shower chair  Bathroom Accessibility: Accessible  Home Equipment: 4 wheeled walker, [address] Help From: Family, Neighbor  ADL Assistance: Needs assistance  Bath: Minimal assistance  Dressing: Minimal assistance  Grooming: Independent  Feeding: Independent  Toileting: Independent  Homemaking Assistance: Independent  Homemaking Responsibilities: No  Meal Prep Responsibility: No  Laundry Responsibility: No  Cleaning Responsibility: No  Bill Paying/Finance Responsibility: No  Shopping Responsibility: No  Dependent Care Responsibility: No  Health Care Management: No  Ambulation Assistance: Independent  Transfer Assistance: Needs assistance  Active : No  Patient's  Info: Jeffrey Ledezma is primary , and transport company  Mode of Transportation: Car, Family, Friends  Education: 12th grade  Occupation: Retired  Type of occupation: Pt built trucks for 33 years; [employer]  Leisure & Hobbies: TV, limited reading, girlfriend has a dog. Pt has meds that come in pill packs. Bills are managed over the phone. IADL Comments: Shabana Cuevas assists c most IADLs and pt report hiring outside help c yardork. Additional Comments: Pt reports using rolator in the house. Pt reports 1 fall this year that occured in the house while ambulating in the home; sleeps in regular, flat bed; R hand dominant    Objective                                                                                    Goals:  (Update in navigator)  Short term goals  Time Frame for Short term goals: 14-21 tx days:  Short term goal 1: Pt will complete bed mobility tasks and sup<->sit with SBA-Sup. Short term goal 2: Pt will complete OOB transfers using RW with Mod A. Short term goal 3: Pt will ambulate 10 ft over level and carpeted surfaces using RW with Min A. Short term goal 4: Pt will propel manual w/c 150 ft with turns with SBA-Sup. Short term goal 5: Caregiver will be able to safely assist pt with functional mobility tasks. :   :        Plan of Care                                                                              Times per week: 5 days per week for a minimum of 60 minutes/day plus group as appropriate for 60 minutes.   Treatment to include Current Treatment Recommendations: Strengthening, Functional Mobility Training, Wheelchair Mobility Training, Neuromuscular Re-education, Home Exercise Program, Equipment Evaluation, Education, & procurement, ROM, Transfer Training, Cognitive/Perceptual Training, Gait Training, Cognitive Reorientation, Safety Education & Training, Balance Training, Endurance Training, Pain Management, Patient/Caregiver Education & Training, Positioning    Electronically signed by   GERARDO [id number]  1/29/2021, 12:28 PM

## 2021-01-29 NOTE — PROGRESS NOTES
Pt discharged to home with documented belongings and home health care. Pt is unable to have discharge instructions explained and questions answered. Pt's girlfriend was in for CGT earlier this day as part of therapy. Questions were asked of her as to who would be administering the meds. She stated that \"they will need to figure that out\". [facility] to  at [phone].

## 2021-01-29 NOTE — PROGRESS NOTES
[address] : 1950  Acct #: [de-identified]  MRN: [id number]              PM&R Progress Note      Admitting diagnosis: Intracranial hemorrhage ( [address] [address])     Comorbid diagnoses impacting rehabilitation: Left hemiparesis, dysarthria, paroxysmal atrial fibrillation, essential hypertension, uncontrolled diabetes type 2 with peripheral neuropathy, COPD, bipolar disorder, diastolic congestive heart failure    Chief complaint: Appeared sleepy this morning. Did not complain of anything to me. Prior (baseline) level of function: Independent. Current level of function:         Current  IRF-NOHEMY and Goals:   Occupational Therapy:    Short term goals  Time Frame for Short term goals: STG=LTG :   Long term goals  Time Frame for Long term goals : 12-14 days or until d/c  Long term goal 1: Pt will complete feeding/grooming/oral care task c MOD I by d/c  Long term goal 2: Pt will complete total body bathing c MIN A by d/c  Long term goal 3: Pt will complete UB dressing c SUP by d/c  Long term goal 4: Pt will complete LB dressing c MOD A by d/c  Long term goal 5: Pt will don/doff footwear c SETUP by d/c  Long term goals 6: Pt will complete toileting c MOD A by d/c  Long term goal 7: Pt will complete functional transfer (toilet, tub, shower) c MOD A by d/c. Long term goal 8: Pt will perform therex/therax to facilitate increased strength/endurance/ax tolerance (c emphasis on dynamic standing balance/tolerance >10 mins and BUE endurance) c Min A in order to complete ADLs.  :                                       Eating: Eating  Assistance Needed: Supervision or touching assistance  Comment: PAtient requires Sup-total feed secondary to level of alertness, lethergy, initiatian and staying on task, and behaviours; patient does complete with decreased assistance getting utensil to mouth when seated in recliner with feet on floor  CARE Score: 4  Discharge Goal: Independent       Oral Hygiene: Oral Hygiene Assistance Needed: Supervision or touching assistance  Comment: Sup to initiate and stay on task and complete with verbal cues with 1-2 step directions and proccessing time  Reason if not Attempted: Not attempted due to medical condition or safety concerns  CARE Score: 4  Discharge Goal: Independent    UB/LB Bathing: Shower/Bathe Self  Assistance Needed: Substantial/maximal assistance  Comment: patient requires max A  when in stand to complete paulino care and wshing buttocks and assist for thoroughness  Reason if not Attempted: Not attempted due to medical condition or safety concerns  CARE Score: 2  Discharge Goal: Supervision or touching assistance    UB Dressing: Upper Body Dressing  Assistance Needed: Partial/moderate assistance  Comment: Mod A for orienting shirt threading arms and head through shirt correctly, max vc to stay on task  Reason if not Attempted: Not attempted due to medical condition or safety concerns  CARE Score: 3  Discharge Goal: Supervision or touching assistance         LB Dressing: Lower Body Dressing  Assistance Needed: Dependent  Comment: Max A times two  for threading feet and cathter through undergarment and pants and assist managing pants fuilly over hips ion stand  Reason if not Attempted: Not attempted due to medical condition or safety concerns  CARE Score: 1  Discharge Goal: Partial/moderate assistance    Donning and [employer] Footwear: Putting On/Taking Off Footwear  Assistance Needed: Dependent  Comment: X  CARE Score: 1  Discharge Goal: Set-up or clean-up assistance      Toileting: Toileting Hygiene  Assistance Needed: Partial/moderate assistance  Comment: MIn/Total fluctuating with behaviours, state of alertness  CARE Score: 3  Discharge Goal: Partial/moderate assistance      Toilet Transfers: Toilet Transfer  Assistance Needed: Dependent  Comment:  Mod times two  Reason if not Attempted: Not attempted due to medical condition or safety concerns  CARE Score: 1 Discharge Goal: Partial/moderate assistance    Physical Therapy:   Short term goals  Time Frame for Short term goals: 14-21 tx days:  Short term goal 1: Pt will complete bed mobility tasks and sup<->sit with SBA-Sup. Short term goal 2: Pt will complete OOB transfers using RW with Mod A. Short term goal 3: Pt will ambulate 10 ft over level and carpeted surfaces using RW with Min A. Short term goal 4: Pt will propel manual w/c 150 ft with turns with SBA-Sup. Short term goal 5: Caregiver will be able to safely assist pt with functional mobility tasks.             Bed Mobility:   Sit to Lying  Assistance Needed: Dependent  Comment: Max A x 2  CARE Score: 1  Discharge Goal: Supervision or touching assistance  Roll Left and Right  Assistance Needed: Substantial/maximal assistance  Comment: Max A x 1  CARE Score: 2  Discharge Goal: Supervision or touching assistance  Lying to Sitting on Side of Bed  Assistance Needed: Dependent  Comment: Max A x 2 (pt fatigued and lethargic)  CARE Score: 1  Discharge Goal: Supervision or touching assistance    Transfers:    Sit to Stand  Assistance Needed: Dependent  Comment: min A x 2 d/t increased lethargy and fatigue  Reason if not Attempted: Not attempted due to medical condition or safety concerns  CARE Score: 1  Discharge Goal: Partial/moderate assistance  Chair/Bed-to-Chair Transfer  Assistance Needed: Dependent  Comment: min A x 2 d/t increased lethargy and fatigue  Reason if not Attempted: Not attempted due to medical condition or safety concerns  CARE Score: 1  Discharge Goal: Partial/moderate assistance     Car Transfer  Assistance Needed: Dependent  Comment: min A x 2  Reason if not Attempted: Not attempted due to medical condition or safety concerns  CARE Score: 1  Discharge Goal: Partial/moderate assistance    Ambulation:    Walking Ability  Does the Patient Walk?: Yes     Walk 10 Feet  Assistance Needed: Dependent CARE Score: 2  Discharge Goal: Supervision or touching assistance          Balance:        Object: Picking Up Object  Reason if not Attempted: Not attempted due to medical condition or safety concerns  CARE Score: 88  Discharge Goal: Partial/moderate assistance    I      Exam:    Blood pressure 110/64, pulse 51, temperature 97.9 °F (36.6 °C), temperature source Oral, resp. rate 18, height 5' 9\" (1.753 m), weight 214 lb 9.6 oz (97.3 kg), SpO2 99 %. General: Lying back in bed. Quiet but engaging. HEENT: MMM. Neck supple. Pulmonary: Unlabored without coughing. Cardiac: Occasional premature beat. The rate is controlled. Abdomen: Patient's abdomen is soft and nondistended. Bowel sounds were present throughout. There was no rebound, guarding or masses noted. Upper extremities: Able to bring both hands together in the midline. Clumsy  but functional strength. Lower extremities: No signs of DVT. Sitting balance was good. Standing balance was poor. Lab Results   Component Value Date    WBC 6.9 01/21/2021    HGB 12.9 (L) 01/21/2021    HCT 42.4 01/21/2021    MCV 94.6 01/21/2021     01/21/2021     Lab Results   Component Value Date    INR 0.84 12/19/2020    INR 1.01 06/03/2020    INR 0.90 06/02/2020    PROTIME 10.1 (L) 12/19/2020    PROTIME 12.2 06/03/2020    PROTIME 10.9 (L) 06/02/2020     Lab Results   Component Value Date    CREATININE 1.2 01/21/2021    BUN 15 01/21/2021     01/21/2021    K 3.8 01/21/2021     01/21/2021    CO2 23 01/21/2021     Lab Results   Component Value Date    ALT 10 12/19/2020    AST 15 12/19/2020    ALKPHOS 82 12/19/2020    BILITOT 0.6 12/19/2020       Expected length of stay  prior to a minimum physical assist level functional discharge to home with a wheelchair and 24-hour supervision (insurance denied the appeal for skilled nursing benefits) is any day now.       Recommendations: 1. Right intracranial hemorrhage with left hemiparesis: Fairly consistent sleep. Better alertness through the day but there are still periods where he \"shuts down\". The insurance company denied our appeal of their original denial of authorization for using skilled nursing benefits at this time. The patient is engaging in therapy and has potential for improvements with treatment. Unfortunately he will have to go home where it would be unsafe if he is left alone at all. We will invite his significant other and to observe some caregiver training in the hopes that she will take him home and assist him the best she can. We will recommend ongoing occupational and physical therapy, nursing assistance and an aide. Follow-up with PCP in 1 week. Follow-up with cardiology in 2 weeks.   Cardiology recommends no anticoagulation at this point.  Better oral intake recently but he often \"insists\" that staff feed him.       He has been taken off of Risperdal, Desyrel, BuSpar and gabapentin.   Completed antibiotic therapy for the UTI.  Ongoing pulmonary hygiene, DVT prophylaxis and nutritional support.  Incontinent when he does not notify as he has to go.   2. DVT prophylaxis: Heparin 5000 units every 8 hours.  I must monitor his hemoglobin and platelet count periodically while on this medication.  Weightbearing activities are picking up and he may not need this much longer. GI prophylaxis offered.  I will stop the heparin at discharge.        3. Paroxysmal atrial fibrillation with prior anticoagulation: Cardiology has stopped the Xarelto.  Elevated rate required titrating metoprolol and restarting Cardizem.  EKG reviewed by cardiology.  Daily weights do not show any decompensation of CHF.  Periodic and transient rate elevations.  These seem asymptomatic. Outpatient follow-up with cardiology. 4. Uncontrolled diabetes type 2 with peripheral neuropathy: Patient requires a diet modified for carbohydrates.  He is on Neurontin for peripheral neuropathy symptoms.  Blood sugars are checked at mealtime and bedtime.  Sliding scale Humalog for now with reintroduction of oral agents should his oral intake stabilize.  Blood sugars generally less than 150.  5. Essential hypertension: Patient requires multiple medications for blood pressure regulation.  Target systolic blood pressure is 120140.  Vital signs are checked at rest and with activity.  Blood pressure within target range today. 6. COPD: Aggressive pulmonary hygiene.  Monitoring O2 saturations at rest and with activity. 7. Bipolar disorder: Depakote levels appropriate.  Off of the BuSpar, Risperdal, trazodone and gabapentin.    Treatment of the UTI has not greatly impacted his behavior and cognition.  Treating in a calm and consistent environment.  Providing written and verbal instructions when possible to assist with information retention. 8. Urinary tract infection.  Pseudomonas aeruginosa sensitive to the cefepime.  7 days of IV antibiotics have been completed.               Counseling and Coordination of Care: In care conference today I met with the patient's OT, PT, RN and . We discussed the patient's problems, progress and prognosis. Disposition issues were clarified and plans were established for ongoing rehabilitation efforts beyond the ARU stay. I reviewed this information with the patient during a second distinct visit with the patient. More than half of the total time of 35 minutes spent with the patient involved counseling and coordination of care.

## 2021-01-29 NOTE — CARE COORDINATION
DME order for RW sent to [facility] at this time. 9:30 AM  LSW received call from Elizabeth and his mother Bettina Mullen. They wanted a status update on patient. LSW informed of denial for SNF services and informed of current discharge plan with DME and HHC. LSW is to touch base today after a discharge date is known and after CGT. 11:15 AM  LSW was informed that patient declined by [facility]. Referral will be made to [facility]. LSW met with patient and significant other Klarissa Gonzalez. They are prepared for patient to go home today, but would like stretcher transport set. LSW called [facility] and they will be here at 5:15 PM to get patient. LSW called [facility] to check time for RW delivery. LSW given time of between 1 and 4 PM, so transport time works. 11:30 AM  LSW informed that [facility] will start on Monday, but they will follow patient over the weekend. Contact number added to patient discharge instructions to contact with any and all needs over the weekend. 12:15 PM  Referral and discharge summary sent to [facility] for PT, OT, Nursing, and Aide at [phone].    12:30 PM  Patient follow-up set with Dr. Mariposa Carrera for 02/02/2021 at [address],[place] (he will see Dr. Cara Tran) and a follow-up with Dr. Manuel Vasquez is set for 02/12/2021 at 10:15 AM.    Patient follow-ups set. DME order sent to [facility] and DME will be delivered to patient room. HHC order sent to [facility] and discharge summary has been sent. [facility] to provide stretcher transport home at 5:15 PM.     Patient is set for discharge from case management standpoint. 3:45 PM  LSW received call from patient's son Elizabeth. LSW answered questions concerning patient's discharge and gave contact information for Kishore orta.

## 2021-01-29 NOTE — CARE COORDINATION
Received verification from Kashmir Downey, Laurie2 Rosita De León that PT states patient will be better served with a RW vs his rollator. Approved 1x voucher for RW only and faxed to [facility]. Patient has been placed on our flagged list and is not eligible for any further assistance.

## 2021-01-30 ENCOUNTER — APPOINTMENT (OUTPATIENT)
Dept: CT IMAGING | Age: 71
DRG: 309 | End: 2021-01-30
Payer: MEDICARE

## 2021-01-30 ENCOUNTER — APPOINTMENT (OUTPATIENT)
Dept: GENERAL RADIOLOGY | Age: 71
DRG: 309 | End: 2021-01-30
Payer: MEDICARE

## 2021-01-30 ENCOUNTER — NURSE TRIAGE (OUTPATIENT)
Dept: OTHER | Facility: CLINIC | Age: 71
End: 2021-01-30

## 2021-01-30 ENCOUNTER — HOSPITAL ENCOUNTER (INPATIENT)
Age: 71
LOS: 4 days | Discharge: NURSING, REHAB OR LONG-TERM CARE | DRG: 309 | End: 2021-02-03
Attending: EMERGENCY MEDICINE | Admitting: STUDENT IN AN ORGANIZED HEALTH CARE EDUCATION/TRAINING PROGRAM
Payer: MEDICARE

## 2021-01-30 DIAGNOSIS — K21.9 GASTROESOPHAGEAL REFLUX DISEASE: ICD-10-CM

## 2021-01-30 DIAGNOSIS — N39.0 URINARY TRACT INFECTION WITHOUT HEMATURIA, SITE UNSPECIFIED: ICD-10-CM

## 2021-01-30 DIAGNOSIS — R77.8 TROPONIN LEVEL ELEVATED: ICD-10-CM

## 2021-01-30 DIAGNOSIS — F31.9 BIPOLAR 1 DISORDER (HCC): ICD-10-CM

## 2021-01-30 DIAGNOSIS — E78.5 HYPERLIPIDEMIA, UNSPECIFIED HYPERLIPIDEMIA TYPE: ICD-10-CM

## 2021-01-30 DIAGNOSIS — J90 PLEURAL EFFUSION: ICD-10-CM

## 2021-01-30 DIAGNOSIS — R52 BODY ACHES: Primary | ICD-10-CM

## 2021-01-30 DIAGNOSIS — F41.9 ANXIETY: ICD-10-CM

## 2021-01-30 DIAGNOSIS — R94.31 EKG ABNORMALITIES: ICD-10-CM

## 2021-01-30 DIAGNOSIS — R00.0 TACHYCARDIA: ICD-10-CM

## 2021-01-30 PROBLEM — I49.5 SSS (SICK SINUS SYNDROME) (HCC): Status: ACTIVE | Noted: 2021-01-30

## 2021-01-30 LAB
ALBUMIN SERPL-MCNC: 3.6 GM/DL (ref 3.4–5)
ALP BLD-CCNC: 55 IU/L (ref 40–128)
ALT SERPL-CCNC: 10 U/L (ref 10–40)
ANION GAP SERPL CALCULATED.3IONS-SCNC: 14 MMOL/L (ref 4–16)
AST SERPL-CCNC: 13 IU/L (ref 15–37)
BACTERIA: ABNORMAL /HPF
BASOPHILS ABSOLUTE: 0 K/CU MM
BASOPHILS RELATIVE PERCENT: 0.3 % (ref 0–1)
BILIRUB SERPL-MCNC: 0.4 MG/DL (ref 0–1)
BILIRUBIN URINE: NEGATIVE MG/DL
BLOOD, URINE: ABNORMAL
BUN BLDV-MCNC: 15 MG/DL (ref 6–23)
CALCIUM SERPL-MCNC: 8.3 MG/DL (ref 8.3–10.6)
CHLORIDE BLD-SCNC: 104 MMOL/L (ref 99–110)
CHP ED QC CHECK: YES
CLARITY: ABNORMAL
CO2: 19 MMOL/L (ref 21–32)
COLOR: ABNORMAL
CREAT SERPL-MCNC: 1.3 MG/DL (ref 0.9–1.3)
DIFFERENTIAL TYPE: ABNORMAL
DIGOXIN LEVEL: 0.7 NG/ML (ref 0.8–2)
DOSE AMOUNT: ABNORMAL
DOSE TIME: ABNORMAL
EKG ATRIAL RATE: 131 BPM
EKG ATRIAL RATE: 150 BPM
EKG DIAGNOSIS: NORMAL
EKG DIAGNOSIS: NORMAL
EKG P AXIS: 0 DEGREES
EKG P AXIS: 3 DEGREES
EKG P-R INTERVAL: 120 MS
EKG P-R INTERVAL: 128 MS
EKG Q-T INTERVAL: 300 MS
EKG Q-T INTERVAL: 304 MS
EKG QRS DURATION: 102 MS
EKG QRS DURATION: 98 MS
EKG QTC CALCULATION (BAZETT): 443 MS
EKG QTC CALCULATION (BAZETT): 485 MS
EKG R AXIS: -14 DEGREES
EKG R AXIS: -6 DEGREES
EKG T AXIS: -44 DEGREES
EKG T AXIS: 56 DEGREES
EKG VENTRICULAR RATE: 131 BPM
EKG VENTRICULAR RATE: 153 BPM
EOSINOPHILS ABSOLUTE: 0.1 K/CU MM
EOSINOPHILS RELATIVE PERCENT: 0.8 % (ref 0–3)
GFR AFRICAN AMERICAN: >60 ML/MIN/1.73M2
GFR NON-AFRICAN AMERICAN: 54 ML/MIN/1.73M2
GLUCOSE BLD-MCNC: 127 MG/DL
GLUCOSE BLD-MCNC: 127 MG/DL (ref 70–99)
GLUCOSE BLD-MCNC: 158 MG/DL (ref 70–99)
GLUCOSE, URINE: NEGATIVE MG/DL
HCT VFR BLD CALC: 37.9 % (ref 42–52)
HEMOGLOBIN: 12.3 GM/DL (ref 13.5–18)
IMMATURE NEUTROPHIL %: 0.6 % (ref 0–0.43)
KETONES, URINE: NEGATIVE MG/DL
LEUKOCYTE ESTERASE, URINE: ABNORMAL
LYMPHOCYTES ABSOLUTE: 1.2 K/CU MM
LYMPHOCYTES RELATIVE PERCENT: 11.8 % (ref 24–44)
MAGNESIUM: 1.9 MG/DL (ref 1.8–2.4)
MCH RBC QN AUTO: 29 PG (ref 27–31)
MCHC RBC AUTO-ENTMCNC: 32.5 % (ref 32–36)
MCV RBC AUTO: 89.4 FL (ref 78–100)
MONOCYTES ABSOLUTE: 0.7 K/CU MM
MONOCYTES RELATIVE PERCENT: 7.2 % (ref 0–4)
MUCUS: ABNORMAL HPF
NITRITE URINE, QUANTITATIVE: POSITIVE
NUCLEATED RBC %: 0 %
PDW BLD-RTO: 14.9 % (ref 11.7–14.9)
PH, URINE: 6 (ref 5–8)
PLATELET # BLD: 166 K/CU MM (ref 140–440)
PMV BLD AUTO: 9.6 FL (ref 7.5–11.1)
POTASSIUM SERPL-SCNC: 3.7 MMOL/L (ref 3.5–5.1)
PRO-BNP: 393 PG/ML
PROTEIN UA: 100 MG/DL
RBC # BLD: 4.24 M/CU MM (ref 4.6–6.2)
RBC URINE: 36 /HPF (ref 0–3)
SEGMENTED NEUTROPHILS ABSOLUTE COUNT: 8 K/CU MM
SEGMENTED NEUTROPHILS RELATIVE PERCENT: 79.3 % (ref 36–66)
SODIUM BLD-SCNC: 137 MMOL/L (ref 135–145)
SPECIFIC GRAVITY UA: 1.02 (ref 1–1.03)
TOTAL IMMATURE NEUTOROPHIL: 0.06 K/CU MM
TOTAL NUCLEATED RBC: 0 K/CU MM
TOTAL PROTEIN: 6.1 GM/DL (ref 6.4–8.2)
TRICHOMONAS: ABNORMAL /HPF
TROPONIN T: 0.04 NG/ML
TSH HIGH SENSITIVITY: 4.02 UIU/ML (ref 0.27–4.2)
UROBILINOGEN, URINE: 1 MG/DL (ref 0.2–1)
WBC # BLD: 10 K/CU MM (ref 4–10.5)
WBC CLUMP: ABNORMAL /HPF
WBC UA: 598 /HPF (ref 0–2)
YEAST: ABNORMAL /HPF

## 2021-01-30 PROCEDURE — 87040 BLOOD CULTURE FOR BACTERIA: CPT

## 2021-01-30 PROCEDURE — 81001 URINALYSIS AUTO W/SCOPE: CPT

## 2021-01-30 PROCEDURE — 1200000000 HC SEMI PRIVATE

## 2021-01-30 PROCEDURE — 83880 ASSAY OF NATRIURETIC PEPTIDE: CPT

## 2021-01-30 PROCEDURE — G0378 HOSPITAL OBSERVATION PER HR: HCPCS

## 2021-01-30 PROCEDURE — 87086 URINE CULTURE/COLONY COUNT: CPT

## 2021-01-30 PROCEDURE — 70450 CT HEAD/BRAIN W/O DYE: CPT

## 2021-01-30 PROCEDURE — 36415 COLL VENOUS BLD VENIPUNCTURE: CPT

## 2021-01-30 PROCEDURE — 83735 ASSAY OF MAGNESIUM: CPT

## 2021-01-30 PROCEDURE — 93010 ELECTROCARDIOGRAM REPORT: CPT | Performed by: INTERNAL MEDICINE

## 2021-01-30 PROCEDURE — 2580000003 HC RX 258: Performed by: EMERGENCY MEDICINE

## 2021-01-30 PROCEDURE — 71275 CT ANGIOGRAPHY CHEST: CPT

## 2021-01-30 PROCEDURE — 6360000004 HC RX CONTRAST MEDICATION: Performed by: EMERGENCY MEDICINE

## 2021-01-30 PROCEDURE — 87186 SC STD MICRODIL/AGAR DIL: CPT

## 2021-01-30 PROCEDURE — 85025 COMPLETE CBC W/AUTO DIFF WBC: CPT

## 2021-01-30 PROCEDURE — 99285 EMERGENCY DEPT VISIT HI MDM: CPT

## 2021-01-30 PROCEDURE — 82962 GLUCOSE BLOOD TEST: CPT

## 2021-01-30 PROCEDURE — 84484 ASSAY OF TROPONIN QUANT: CPT

## 2021-01-30 PROCEDURE — 93005 ELECTROCARDIOGRAM TRACING: CPT | Performed by: EMERGENCY MEDICINE

## 2021-01-30 PROCEDURE — 80053 COMPREHEN METABOLIC PANEL: CPT

## 2021-01-30 PROCEDURE — 6360000002 HC RX W HCPCS: Performed by: STUDENT IN AN ORGANIZED HEALTH CARE EDUCATION/TRAINING PROGRAM

## 2021-01-30 PROCEDURE — 2140000000 HC CCU INTERMEDIATE R&B

## 2021-01-30 PROCEDURE — 71045 X-RAY EXAM CHEST 1 VIEW: CPT

## 2021-01-30 PROCEDURE — 80162 ASSAY OF DIGOXIN TOTAL: CPT

## 2021-01-30 PROCEDURE — 6370000000 HC RX 637 (ALT 250 FOR IP): Performed by: STUDENT IN AN ORGANIZED HEALTH CARE EDUCATION/TRAINING PROGRAM

## 2021-01-30 PROCEDURE — 87077 CULTURE AEROBIC IDENTIFY: CPT

## 2021-01-30 PROCEDURE — 94761 N-INVAS EAR/PLS OXIMETRY MLT: CPT

## 2021-01-30 PROCEDURE — 84443 ASSAY THYROID STIM HORMONE: CPT

## 2021-01-30 PROCEDURE — 6360000002 HC RX W HCPCS: Performed by: EMERGENCY MEDICINE

## 2021-01-30 PROCEDURE — 2580000003 HC RX 258: Performed by: STUDENT IN AN ORGANIZED HEALTH CARE EDUCATION/TRAINING PROGRAM

## 2021-01-30 RX ORDER — ACETAMINOPHEN 650 MG/1
650 SUPPOSITORY RECTAL EVERY 6 HOURS PRN
Status: DISCONTINUED | OUTPATIENT
Start: 2021-01-30 | End: 2021-02-03 | Stop reason: HOSPADM

## 2021-01-30 RX ORDER — 0.9 % SODIUM CHLORIDE 0.9 %
1000 INTRAVENOUS SOLUTION INTRAVENOUS ONCE
Status: COMPLETED | OUTPATIENT
Start: 2021-01-30 | End: 2021-01-30

## 2021-01-30 RX ORDER — DOXAZOSIN MESYLATE 1 MG/1
2 TABLET ORAL EVERY 12 HOURS SCHEDULED
Status: DISCONTINUED | OUTPATIENT
Start: 2021-01-30 | End: 2021-02-03 | Stop reason: HOSPADM

## 2021-01-30 RX ORDER — ACETAMINOPHEN 325 MG/1
650 TABLET ORAL EVERY 6 HOURS PRN
Status: DISCONTINUED | OUTPATIENT
Start: 2021-01-30 | End: 2021-02-03 | Stop reason: HOSPADM

## 2021-01-30 RX ORDER — SENNA PLUS 8.6 MG/1
1 TABLET ORAL NIGHTLY
Status: DISCONTINUED | OUTPATIENT
Start: 2021-01-30 | End: 2021-02-03 | Stop reason: HOSPADM

## 2021-01-30 RX ORDER — SODIUM CHLORIDE 0.9 % (FLUSH) 0.9 %
10 SYRINGE (ML) INJECTION PRN
Status: CANCELLED | OUTPATIENT
Start: 2021-01-30

## 2021-01-30 RX ORDER — AMLODIPINE BESYLATE 10 MG/1
10 TABLET ORAL DAILY
Status: DISCONTINUED | OUTPATIENT
Start: 2021-01-30 | End: 2021-02-03 | Stop reason: HOSPADM

## 2021-01-30 RX ORDER — PROMETHAZINE HYDROCHLORIDE 25 MG/1
12.5 TABLET ORAL EVERY 6 HOURS PRN
Status: DISCONTINUED | OUTPATIENT
Start: 2021-01-30 | End: 2021-02-03 | Stop reason: HOSPADM

## 2021-01-30 RX ORDER — ONDANSETRON 2 MG/ML
4 INJECTION INTRAMUSCULAR; INTRAVENOUS EVERY 6 HOURS PRN
Status: DISCONTINUED | OUTPATIENT
Start: 2021-01-30 | End: 2021-02-03 | Stop reason: HOSPADM

## 2021-01-30 RX ORDER — BENZTROPINE MESYLATE 1 MG/1
0.5 TABLET ORAL 2 TIMES DAILY
Status: DISCONTINUED | OUTPATIENT
Start: 2021-01-30 | End: 2021-02-03 | Stop reason: HOSPADM

## 2021-01-30 RX ORDER — LISINOPRIL 40 MG/1
40 TABLET ORAL DAILY
Status: DISCONTINUED | OUTPATIENT
Start: 2021-01-30 | End: 2021-02-03 | Stop reason: HOSPADM

## 2021-01-30 RX ORDER — SODIUM CHLORIDE 0.9 % (FLUSH) 0.9 %
10 SYRINGE (ML) INJECTION PRN
Status: DISCONTINUED | OUTPATIENT
Start: 2021-01-30 | End: 2021-02-03 | Stop reason: HOSPADM

## 2021-01-30 RX ORDER — SODIUM CHLORIDE 0.9 % (FLUSH) 0.9 %
10 SYRINGE (ML) INJECTION EVERY 12 HOURS SCHEDULED
Status: DISCONTINUED | OUTPATIENT
Start: 2021-01-30 | End: 2021-02-03 | Stop reason: HOSPADM

## 2021-01-30 RX ORDER — SODIUM CHLORIDE 0.9 % (FLUSH) 0.9 %
10 SYRINGE (ML) INJECTION EVERY 12 HOURS SCHEDULED
Status: CANCELLED | OUTPATIENT
Start: 2021-01-30

## 2021-01-30 RX ORDER — DIGOXIN 125 MCG
125 TABLET ORAL DAILY
Status: DISCONTINUED | OUTPATIENT
Start: 2021-01-30 | End: 2021-02-03 | Stop reason: HOSPADM

## 2021-01-30 RX ADMIN — IOPAMIDOL 75 ML: 755 INJECTION, SOLUTION INTRAVENOUS at 09:00

## 2021-01-30 RX ADMIN — SODIUM CHLORIDE 1000 ML: 9 INJECTION, SOLUTION INTRAVENOUS at 08:31

## 2021-01-30 RX ADMIN — SODIUM CHLORIDE, PRESERVATIVE FREE 10 ML: 5 INJECTION INTRAVENOUS at 21:30

## 2021-01-30 RX ADMIN — METOPROLOL TARTRATE 25 MG: 25 TABLET, FILM COATED ORAL at 21:29

## 2021-01-30 RX ADMIN — BENZTROPINE MESYLATE 0.5 MG: 1 TABLET ORAL at 21:29

## 2021-01-30 RX ADMIN — SENNOSIDES 8.6 MG: 8.6 TABLET, FILM COATED ORAL at 21:33

## 2021-01-30 RX ADMIN — LISINOPRIL 40 MG: 40 TABLET ORAL at 15:00

## 2021-01-30 RX ADMIN — AMLODIPINE BESYLATE 10 MG: 10 TABLET ORAL at 15:01

## 2021-01-30 RX ADMIN — CEFTRIAXONE SODIUM 1000 MG: 1 INJECTION, POWDER, FOR SOLUTION INTRAMUSCULAR; INTRAVENOUS at 08:31

## 2021-01-30 RX ADMIN — SODIUM CHLORIDE 1000 ML: 9 INJECTION, SOLUTION INTRAVENOUS at 07:12

## 2021-01-30 RX ADMIN — METOPROLOL TARTRATE 25 MG: 25 TABLET, FILM COATED ORAL at 15:01

## 2021-01-30 RX ADMIN — ACETAMINOPHEN 650 MG: 325 TABLET ORAL at 15:47

## 2021-01-30 RX ADMIN — SODIUM CHLORIDE 1000 ML: 9 INJECTION, SOLUTION INTRAVENOUS at 08:32

## 2021-01-30 RX ADMIN — DOXAZOSIN 2 MG: 1 TABLET ORAL at 21:29

## 2021-01-30 RX ADMIN — DIGOXIN 125 MCG: 125 TABLET ORAL at 15:00

## 2021-01-30 RX ADMIN — ENOXAPARIN SODIUM 40 MG: 40 INJECTION SUBCUTANEOUS at 15:02

## 2021-01-30 RX ADMIN — BENZTROPINE MESYLATE 0.5 MG: 1 TABLET ORAL at 15:01

## 2021-01-30 ASSESSMENT — PAIN SCALES - GENERAL
PAINLEVEL_OUTOF10: 0
PAINLEVEL_OUTOF10: 3
PAINLEVEL_OUTOF10: 4
PAINLEVEL_OUTOF10: 8

## 2021-01-30 ASSESSMENT — PAIN DESCRIPTION - FREQUENCY: FREQUENCY: INTERMITTENT

## 2021-01-30 NOTE — PROGRESS NOTES
Known to me from previous hospitalization history of atrial fibrillation currently tachycardic was discharged home yesterday from rehab after failing to cooperate during rehab.   He has history of atrial fibrillation but due to intracranial hemorrhage advised not to be on anticoagulation he was last seen by me 2 weeks ago for heart rate control currently not taking meds  Restart home meds  hOld off on anticoagulation  Indication for pacemaker at this time  Resume diet

## 2021-01-30 NOTE — ED PROVIDER NOTES
01/30/21liliana Craig was checked out to me by Dr. Tena Castelan. Please see his/her initial documentation for details of the patient's ED presentation, physical exam and completed studies. In brief, Pranav Craig is a 70 y.o. male that presents with generalized body aches, ekg abnormal, awaiting labs/imaging, likely needs admission.     I have reviewed and interpreted all of the currently available lab results from this visit (if applicable):  Results for orders placed or performed during the hospital encounter of 01/30/21   CBC Auto Differential   Result Value Ref Range    WBC 10.0 4.0 - 10.5 K/CU MM    RBC 4.24 (L) 4.6 - 6.2 M/CU MM    Hemoglobin 12.3 (L) 13.5 - 18.0 GM/DL    Hematocrit 37.9 (L) 42 - 52 %    MCV 89.4 78 - 100 FL    MCH 29.0 27 - 31 PG    MCHC 32.5 32.0 - 36.0 %    RDW 14.9 11.7 - 14.9 %    Platelets 228 192 - 274 K/CU MM    MPV 9.6 7.5 - 11.1 FL    Differential Type AUTOMATED DIFFERENTIAL     Segs Relative 79.3 (H) 36 - 66 %    Lymphocytes % 11.8 (L) 24 - 44 %    Monocytes % 7.2 (H) 0 - 4 %    Eosinophils % 0.8 0 - 3 %    Basophils % 0.3 0 - 1 %    Segs Absolute 8.0 K/CU MM    Lymphocytes Absolute 1.2 K/CU MM    Monocytes Absolute 0.7 K/CU MM    Eosinophils Absolute 0.1 K/CU MM    Basophils Absolute 0.0 K/CU MM    Nucleated RBC % 0.0 %    Total Nucleated RBC 0.0 K/CU MM    Total Immature Neutrophil 0.06 K/CU MM    Immature Neutrophil % 0.6 (H) 0 - 0.43 %   Comprehensive Metabolic Panel w/ Reflex to MG   Result Value Ref Range    Sodium 137 135 - 145 MMOL/L    Potassium 3.7 3.5 - 5.1 MMOL/L    Chloride 104 99 - 110 mMol/L    CO2 19 (L) 21 - 32 MMOL/L    BUN 15 6 - 23 MG/DL    CREATININE 1.3 0.9 - 1.3 MG/DL    Glucose 158 (H) 70 - 99 MG/DL    Calcium 8.3 8.3 - 10.6 MG/DL    Albumin 3.6 3.4 - 5.0 GM/DL    Total Protein 6.1 (L) 6.4 - 8.2 GM/DL    Total Bilirubin 0.4 0.0 - 1.0 MG/DL    ALT 10 10 - 40 U/L    AST 13 (L) 15 - 37 IU/L    Alkaline Phosphatase 55 40 - 128 IU/L    GFR Non- 54 (L) >60 mL/min/1.73m2    GFR African American >60 >60 mL/min/1.73m2    Anion Gap 14 4 - 16   TSH without Reflex   Result Value Ref Range    TSH, High Sensitivity 4.020 0.270 - 4.20 uIu/ml   Troponin   Result Value Ref Range    Troponin T 0.037 (H) <0.01 NG/ML   Urinalysis Reflex to Culture    Specimen: Urine   Result Value Ref Range    Color, UA MARIAH (A) YELLOW    Clarity, UA SLIGHTLY CLOUDY (A) CLEAR    Glucose, Urine NEGATIVE NEGATIVE MG/DL    Bilirubin Urine NEGATIVE NEGATIVE MG/DL    Ketones, Urine NEGATIVE NEGATIVE MG/DL    Specific Gravity, UA 1.024 1.001 - 1.035    Blood, Urine SMALL (A) NEGATIVE    pH, Urine 6.0 5.0 - 8.0    Protein,  (A) NEGATIVE MG/DL    Urobilinogen, Urine 1 0.2 - 1.0 MG/DL    Nitrite Urine, Quantitative POSITIVE (A) NEGATIVE    Leukocyte Esterase, Urine LARGE (A) NEGATIVE    RBC, UA 36 (H) 0 - 3 /HPF    WBC,  (H) 0 - 2 /HPF    Bacteria, UA OCCASIONAL (A) NEGATIVE /HPF    WBC Clumps, UA MANY /HPF    Yeast, UA MANY /HPF    Mucus, UA FEW (A) NEGATIVE HPF    Trichomonas, UA NONE SEEN NONE SEEN /HPF   Digoxin Level   Result Value Ref Range    Digoxin Lvl 0.7 (L) 0.8 - 2.0 ng/mL    DOSE AMOUNT DOSE AMT. GIVEN - UNKNOWN     DOSE TIME DOSE TIME GIVEN - UNKNOWN    POCT Glucose   Result Value Ref Range    Glucose 127 mg/dL    QC OK?  yes    POCT Glucose   Result Value Ref Range    POC Glucose 127 (H) 70 - 99 MG/DL   EKG 12 Lead   Result Value Ref Range    Ventricular Rate 131 BPM    Atrial Rate 131 BPM    P-R Interval 120 ms    QRS Duration 102 ms    Q-T Interval 300 ms    QTc Calculation (Bazett) 443 ms    P Axis 0 degrees    R Axis -14 degrees    T Axis 56 degrees    Diagnosis       Sinus tachycardia  Incomplete right bundle branch block  ST & T wave abnormality, consider anterior ischemia  Abnormal ECG  When compared with ECG of 30-JAN-2021 04:44,  Previous ECG has undetermined rhythm, needs review         MDM:    Patient presents with generalized body aches, ekg abnormal, history of head bleed awaiting labs/imaging, likely needs admission. Patient found to have a UTI we will give him antibiotics he does have intermittent tachycardia in the 130s and then converts to regular normal sinus rhythm. I did order a PE scan given tachycardia. We will get cultures I did admit patient to hospital medicine pending CT results I gave him Rocephin for UTI. Final Impression:  1. Body aches    2. EKG abnormalities    3. Troponin level elevated    4. Pleural effusion    5. Tachycardia    6.  Urinary tract infection without hematuria, site unspecified        (Please note that portions of this note may have been completed with a voice recognition program. Efforts were made to edit the dictations but occasionally words are mis-transcribed.)    Yohannes Marsh [address], DO  01/30/21 [phone], DO  01/30/21 1056

## 2021-01-30 NOTE — TELEPHONE ENCOUNTER
Reason for Disposition   [1] SEVERE weakness (i.e., unable to walk or barely able to walk, requires support) AND [2] new onset or worsening    Answer Assessment - Initial Assessment Questions  1. DESCRIPTION: \"Describe how you are feeling. \"      Too weak to sit up without assistance. Nobivanessa Lainez does not have strength to help. He has been in bed since discharged yesterday. 2. SEVERITY: \"How bad is it? \"  \"Can you stand and walk? \"    - MILD - Feels weak or tired, but does not interfere with work, school or normal activities    - [facility] to stand and walk; weakness interferes with work, school, or normal activities    - SEVERE - Unable to stand or walk         Severe - can't stand and walk utilizing a walker. 3. ONSET:  \"When did the weakness begin? \"      Ongoing - in hospital for a month+    4. CAUSE: \"What do you think is causing the weakness? \"      In hospital for over a month. ..    5. MEDICINES: \"Have you recently started a new medicine or had a change in the amount of a medicine? \"      N/A    6. OTHER SYMPTOMS: \"Do you have any other symptoms? \" (e.g., chest pain, fever, cough, SOB, vomiting, diarrhea, bleeding, other areas of pain)       Has a catheter, but states he would not be able to get to and have a BM. \"Not sure when therapy people are coming. \"    Protocols used: WEAKNESS (GENERALIZED) AND FATIGUE-ADULT-[facility] Employee calling for her significant other who has just been discharged from hospital.    She doesn't have the strength to help him up from the bed. He states he had a 2 person assist at the hospital.  Insurance appears to have declined inpatient rehab. He states he does not have the strength to get up and use the walker by himself. Recommendation is to call 911 for assistance, assess and possible return to hospital if too weak to perform ADL's. Caller without bed side commode. Only home aide at this time is a walker.    Isidro Thomson has difficulty transferring from a laying position, to sitting position.

## 2021-01-30 NOTE — ED TRIAGE NOTES
Pt presents to the ED via EMS for generalized weakness. Pt was discharged here at 1700. States he was not feeling this bad earlier. Pt does have a hx of strokes, was negative on the stroke scale by EMS. Pt is alert and oriented, rates pain 8/10.

## 2021-01-30 NOTE — H&P
History and Physical      Name:  Jackie Monday. /Age/Sex: 1950  (70 y.o. male)   MRN & CSN:  [id number] & [id number] Admission Date/Time: 2021  4:37 AM   Location:  [facility] PCP: Jaimee Trinidad MD       Hospital Day: 1    Assessment and Plan:   Jackie Monday. is a 70 y.o.  male  who presents with:     Sick sinus syndrome with irregular HR not consistent with Afib RVR   DM type 2, uncontrolled, complicated by peripheral neuropathy. Long term insulin dependency   Chronic respiratory failure/ COPD   C HFpEF   Atrial fibrillation, not in RVR   Ess HTN   Hx intracranial hemorrhage with left hemiparesis, dysarthria      Tele  Consult cardiology  Pt/ot/slp for placement    Diet No diet orders on file   DVT Prophylaxis [] Lovenox, []  Heparin, [] SCDs, [] No VTE prophylaxis, patient ambulating   GI Prophylaxis [] PPI, [] H2 Blocker, [] No GI prophylaxis, patient is receiving diet/Tube Feeds   Code Status Prior   Disposition Patient requires continued admission due to medication stabilization and placement to SNF   MDM [] Low, [x] Moderate,[]  High  Patient's risk as above due to     [] One or more chronic illnesses with mild exacerbation progression    [x] Two or more stable chronic illnesses    [] Undiagnosed new problem with uncertain prognosis    [] Elective major surgery    []Prescription drug management     History of Present Illness:     Chief Complaint:   Jackie Monday. is a 70 y.o.  male  who presents from home for irregular heart rate. Pt S&E  And he is not able to give many detail about the circumstances that brought him to the hospital. HPI per ED staff and chart review. He was recently released from ARU s/p stroke to home. He has been home a day and was brought in with the abnormal heart rate. Wife also tells ED he is aggressive and is requiring more support than she can safely give. He does not complain of CP or ha while we are talking.  He cannot tell me how long ago he was released from rehab.      10-14 point ROS reviewed negative, unless as noted above    Objective:   No intake or output data in the 24 hours ending 01/30/21 0754   Vitals:   Vitals:    01/30/21 0731   BP: 128/74   Pulse: 129   Resp: 17   Temp:    SpO2:      Physical Exam:    GEN Awake male, laying in bed in no apparent distress. Appears given age. EYES Pupils are equally round. No scleral discharge  HENT Atraumatic and symmetric head  NECK No apparent thyromegaly  RESP Symmetric chest movement while on room air. CARDIO/VASC Peripheral pulses equal bilaterally and palpable. No peripheral edema. GI Abdomen is not distended. Rectal exam deferred.  Zimmer catheter is not present. HEME/LYMPH No petechiae or ecchymoses. MSK Spontaneous movement of BL upper extremities  SKIN Normal coloration, warm, dry. NEURO Cranial nerves appear grossly intact  PSYCH Awake, alert. Oriented to self and location. Past Medical History:    PMHx:   Past Medical History:   Diagnosis Date    Anemia     per old chart    Arthritis     Back pain, chronic     \"have pain in lumbar mainly- have 5 bulging discs\"\"in my neck and my lumbar have herniated discs\"    B[facility] 1 disorder [facility]     CAD (coronary artery disease) 01/27/2016    does not follow with a cardiologist    Cerebral artery occlusion with cerebral infarction ([facility]     \"had mini stroke in Jan 2016- fast heart rate when I would stand up - smile drooping on one side- lased just the one day\"    Chronic kidney disease (CKD), stage III (moderate)     CKD (chronic kidney disease)     per old chart- consult with Dr Bertrand Talley with admission 4/2016\"have low kidney function\"    COPD (chronic obstructive pulmonary disease) ([facility]     follow with Dr Arthur Lyons Diabetes mellitus, type 2 ([facility] [facility] 01/03/2017    Diabetic peripheral neuropathy (HCC)     Diastolic CHF ([facility] 51/82/5454    Gastric ulcer     H/O cardiovascular stress test 05/26/2014    EF 68% mild ischemia Marcus San MD   amLODIPine (NORVASC) 10 MG tablet Take 1 tablet by mouth daily 1/29/21   MARIA DEL CARMEN San MD   digoxin [facility]) 125 MCG tablet Take 1 tablet by mouth daily 1/29/21   MARIA DEL CARMEN San MD   miconazole (MICOTIN) 2 % powder Apply topically 2 times daily. 1/25/21   Capri Monahan MD   senna (SENOKOT) 8.6 MG tablet Take 1 tablet by mouth nightly 1/25/21 2/24/21  MARIA DEL CARMEN San MD   atorvastatin (LIPITOR) 80 MG tablet Take 1 tablet by mouth daily 7/24/20 1/20/21  Jeromy Galvez PA-C   divalproex (DEPAKOTE ER) 500 MG extended release tablet Take 2 tablets by mouth nightly 7/24/20 1/20/21  Jeromy Galvez PA-C   escitalopram (LEXAPRO) 10 MG tablet Take 1 tablet by mouth daily  Patient taking differently: Take 20 mg by mouth daily  7/24/20 1/20/21  Jeromy Galvez PA-C   famotidine (PEPCID) 10 MG tablet Take 1 tablet by mouth 2 times daily 7/24/20 1/20/21  Jeromy Galvez PA-C   docusate sodium (COLACE) 100 MG capsule Take 1 capsule by mouth 2 times daily 4/16/20   Simón Ferreira MD   Compression Stockings MISC by Does not apply route Duration of Treatment:  3 Months  # of pairs:  2    Compression Class Level - 20-30 mmHg*    Style - Knee High 10/31/19   Samir Almanza, APRN - CNP     FHx: family history includes Heart Disease in his mother; High Blood Pressure in his father.   SHx:   Social History     Socioeconomic History    Marital status: Single     Spouse name: None    Number of children: None    Years of education: None    Highest education level: None   Occupational History    Occupation: built trucks, retired     Employer: [address] [place] [address] Needs    Financial resource strain: None    Food insecurity     Worry: None     Inability: None    Transportation needs     Medical: None     Non-medical: None   Tobacco Use    Smoking status: Former Smoker     Packs/day: 1.50     Years: 30.00     Pack years: 45.00     Types: Cigarettes     Quit date: 7/24/2010     Years since quitting:

## 2021-01-31 LAB
ANION GAP SERPL CALCULATED.3IONS-SCNC: 11 MMOL/L (ref 4–16)
BASOPHILS ABSOLUTE: 0 K/CU MM
BASOPHILS RELATIVE PERCENT: 0.3 % (ref 0–1)
BUN BLDV-MCNC: 8 MG/DL (ref 6–23)
CALCIUM SERPL-MCNC: 8.7 MG/DL (ref 8.3–10.6)
CHLORIDE BLD-SCNC: 109 MMOL/L (ref 99–110)
CO2: 20 MMOL/L (ref 21–32)
CREAT SERPL-MCNC: 1.2 MG/DL (ref 0.9–1.3)
DIFFERENTIAL TYPE: ABNORMAL
EOSINOPHILS ABSOLUTE: 0.1 K/CU MM
EOSINOPHILS RELATIVE PERCENT: 1.2 % (ref 0–3)
GFR AFRICAN AMERICAN: >60 ML/MIN/1.73M2
GFR NON-AFRICAN AMERICAN: 60 ML/MIN/1.73M2
GLUCOSE BLD-MCNC: 106 MG/DL (ref 70–99)
GLUCOSE BLD-MCNC: 113 MG/DL (ref 70–99)
GLUCOSE BLD-MCNC: 135 MG/DL (ref 70–99)
HCT VFR BLD CALC: 39.4 % (ref 42–52)
HEMOGLOBIN: 12.5 GM/DL (ref 13.5–18)
IMMATURE NEUTROPHIL %: 0.4 % (ref 0–0.43)
LYMPHOCYTES ABSOLUTE: 0.6 K/CU MM
LYMPHOCYTES RELATIVE PERCENT: 7.7 % (ref 24–44)
MCH RBC QN AUTO: 28.8 PG (ref 27–31)
MCHC RBC AUTO-ENTMCNC: 31.7 % (ref 32–36)
MCV RBC AUTO: 90.8 FL (ref 78–100)
MONOCYTES ABSOLUTE: 0.5 K/CU MM
MONOCYTES RELATIVE PERCENT: 6.8 % (ref 0–4)
NUCLEATED RBC %: 0 %
PDW BLD-RTO: 15 % (ref 11.7–14.9)
PLATELET # BLD: 157 K/CU MM (ref 140–440)
PMV BLD AUTO: 9.4 FL (ref 7.5–11.1)
POTASSIUM SERPL-SCNC: 4.5 MMOL/L (ref 3.5–5.1)
RBC # BLD: 4.34 M/CU MM (ref 4.6–6.2)
SEGMENTED NEUTROPHILS ABSOLUTE COUNT: 6.5 K/CU MM
SEGMENTED NEUTROPHILS RELATIVE PERCENT: 83.6 % (ref 36–66)
SODIUM BLD-SCNC: 140 MMOL/L (ref 135–145)
TOTAL IMMATURE NEUTOROPHIL: 0.03 K/CU MM
TOTAL NUCLEATED RBC: 0 K/CU MM
WBC # BLD: 7.8 K/CU MM (ref 4–10.5)

## 2021-01-31 PROCEDURE — 94761 N-INVAS EAR/PLS OXIMETRY MLT: CPT

## 2021-01-31 PROCEDURE — G0378 HOSPITAL OBSERVATION PER HR: HCPCS

## 2021-01-31 PROCEDURE — 2580000003 HC RX 258: Performed by: STUDENT IN AN ORGANIZED HEALTH CARE EDUCATION/TRAINING PROGRAM

## 2021-01-31 PROCEDURE — 1200000000 HC SEMI PRIVATE

## 2021-01-31 PROCEDURE — 85025 COMPLETE CBC W/AUTO DIFF WBC: CPT

## 2021-01-31 PROCEDURE — 2140000000 HC CCU INTERMEDIATE R&B

## 2021-01-31 PROCEDURE — 82962 GLUCOSE BLOOD TEST: CPT

## 2021-01-31 PROCEDURE — 80048 BASIC METABOLIC PNL TOTAL CA: CPT

## 2021-01-31 PROCEDURE — 6370000000 HC RX 637 (ALT 250 FOR IP): Performed by: INTERNAL MEDICINE

## 2021-01-31 PROCEDURE — 87040 BLOOD CULTURE FOR BACTERIA: CPT

## 2021-01-31 PROCEDURE — 99223 1ST HOSP IP/OBS HIGH 75: CPT | Performed by: INTERNAL MEDICINE

## 2021-01-31 PROCEDURE — 6370000000 HC RX 637 (ALT 250 FOR IP): Performed by: STUDENT IN AN ORGANIZED HEALTH CARE EDUCATION/TRAINING PROGRAM

## 2021-01-31 PROCEDURE — 2500000003 HC RX 250 WO HCPCS: Performed by: STUDENT IN AN ORGANIZED HEALTH CARE EDUCATION/TRAINING PROGRAM

## 2021-01-31 PROCEDURE — 6360000002 HC RX W HCPCS: Performed by: STUDENT IN AN ORGANIZED HEALTH CARE EDUCATION/TRAINING PROGRAM

## 2021-01-31 RX ORDER — DONEPEZIL HYDROCHLORIDE 5 MG/1
5 TABLET, FILM COATED ORAL NIGHTLY
Status: DISCONTINUED | OUTPATIENT
Start: 2021-01-31 | End: 2021-02-03 | Stop reason: HOSPADM

## 2021-01-31 RX ORDER — ATORVASTATIN CALCIUM 80 MG/1
80 TABLET, FILM COATED ORAL DAILY
Status: DISCONTINUED | OUTPATIENT
Start: 2021-01-31 | End: 2021-02-03 | Stop reason: HOSPADM

## 2021-01-31 RX ORDER — DIVALPROEX SODIUM 500 MG/1
1000 TABLET, EXTENDED RELEASE ORAL NIGHTLY
Status: DISCONTINUED | OUTPATIENT
Start: 2021-01-31 | End: 2021-02-03 | Stop reason: HOSPADM

## 2021-01-31 RX ORDER — ESCITALOPRAM OXALATE 10 MG/1
20 TABLET ORAL DAILY
Status: DISCONTINUED | OUTPATIENT
Start: 2021-01-31 | End: 2021-02-03 | Stop reason: HOSPADM

## 2021-01-31 RX ORDER — AMIODARONE HYDROCHLORIDE 200 MG/1
400 TABLET ORAL 2 TIMES DAILY
Status: DISCONTINUED | OUTPATIENT
Start: 2021-01-31 | End: 2021-02-03 | Stop reason: HOSPADM

## 2021-01-31 RX ADMIN — BENZTROPINE MESYLATE 0.5 MG: 1 TABLET ORAL at 11:16

## 2021-01-31 RX ADMIN — BENZTROPINE MESYLATE 0.5 MG: 1 TABLET ORAL at 22:22

## 2021-01-31 RX ADMIN — SODIUM CHLORIDE, PRESERVATIVE FREE 10 ML: 5 INJECTION INTRAVENOUS at 22:22

## 2021-01-31 RX ADMIN — MICONAZOLE NITRATE: 2 POWDER TOPICAL at 22:55

## 2021-01-31 RX ADMIN — METOPROLOL TARTRATE 25 MG: 25 TABLET, FILM COATED ORAL at 22:22

## 2021-01-31 RX ADMIN — DOXAZOSIN 2 MG: 1 TABLET ORAL at 11:14

## 2021-01-31 RX ADMIN — METOPROLOL TARTRATE 25 MG: 25 TABLET, FILM COATED ORAL at 11:15

## 2021-01-31 RX ADMIN — DOXAZOSIN 2 MG: 1 TABLET ORAL at 22:18

## 2021-01-31 RX ADMIN — SODIUM CHLORIDE, PRESERVATIVE FREE 10 ML: 5 INJECTION INTRAVENOUS at 11:22

## 2021-01-31 RX ADMIN — SENNOSIDES 8.6 MG: 8.6 TABLET, FILM COATED ORAL at 22:21

## 2021-01-31 RX ADMIN — AMIODARONE HYDROCHLORIDE 400 MG: 200 TABLET ORAL at 12:47

## 2021-01-31 RX ADMIN — ACETAMINOPHEN 650 MG: 325 TABLET ORAL at 11:15

## 2021-01-31 RX ADMIN — ATORVASTATIN CALCIUM 80 MG: 80 TABLET, FILM COATED ORAL at 17:23

## 2021-01-31 RX ADMIN — ESCITALOPRAM OXALATE 20 MG: 10 TABLET ORAL at 17:23

## 2021-01-31 RX ADMIN — DIGOXIN 125 MCG: 125 TABLET ORAL at 11:15

## 2021-01-31 RX ADMIN — DONEPEZIL HYDROCHLORIDE 5 MG: 5 TABLET, FILM COATED ORAL at 22:22

## 2021-01-31 RX ADMIN — AMIODARONE HYDROCHLORIDE 400 MG: 200 TABLET ORAL at 22:21

## 2021-01-31 RX ADMIN — ENOXAPARIN SODIUM 40 MG: 40 INJECTION SUBCUTANEOUS at 11:16

## 2021-01-31 RX ADMIN — DIVALPROEX SODIUM 1000 MG: 500 TABLET, FILM COATED, EXTENDED RELEASE ORAL at 22:21

## 2021-01-31 ASSESSMENT — PAIN SCALES - GENERAL
PAINLEVEL_OUTOF10: 9
PAINLEVEL_OUTOF10: 0
PAINLEVEL_OUTOF10: 0

## 2021-01-31 ASSESSMENT — PAIN DESCRIPTION - DESCRIPTORS: DESCRIPTORS: ACHING

## 2021-01-31 ASSESSMENT — PAIN DESCRIPTION - LOCATION: LOCATION: BACK;NECK;SHOULDER

## 2021-01-31 ASSESSMENT — PAIN DESCRIPTION - ORIENTATION: ORIENTATION: LOWER

## 2021-01-31 ASSESSMENT — PAIN DESCRIPTION - ONSET: ONSET: ON-GOING

## 2021-01-31 ASSESSMENT — PAIN DESCRIPTION - PAIN TYPE: TYPE: CHRONIC PAIN

## 2021-01-31 ASSESSMENT — PAIN DESCRIPTION - FREQUENCY: FREQUENCY: INTERMITTENT

## 2021-01-31 NOTE — CONSULTS
CARDIOLOGY CONSULT NOTE   Reason for consultation:  afib    Referring physician:  Ophelia Kayser, DO     Primary care physician: Janina Nazario MD      Dear  Dr. Ophelia Kayser, DO   Thanks for the consult. Chief Complaints :  Chief Complaint   Patient presents with    Generalized Body Aches     overall weakness, hx of strokes        History of present illness:Felipe is a 70 y. o.year old who presents from home after being discharged from rehab facility as he was unable to take care of himself extremely tired fatigue cardiology consulted to evaluate for A. fib with rapid heart rate. Patient known to me from admission in rehab 3 weeks ago. He has history of intracranial hemorrhage with intraventricular extension in mid December 2020 at that time he was on Xarelto. Anticoagulation was stopped due to concerns for intracranial bleed he does have history of coronary artery disease and chronic kidney disease.   He was continued on rate control strategy due to contraindication to anticoagulation  Heart rate has been ranging anywhere from 50s to 130s if he is in or out of sinus rhythm going into A. fib      Past medical history:    has a past medical history of Anemia, Arthritis, Back pain, chronic, Bipolar 1 disorder ([address] CAD (coronary artery disease), Cerebral artery occlusion with cerebral infarction ([address] Chronic kidney disease (CKD), stage III (moderate), CKD (chronic kidney disease), COPD (chronic obstructive pulmonary disease) ([address] Diabetes mellitus, type 2 ([address] Diabetic peripheral neuropathy ([address] Diastolic CHF ([address] Gastric ulcer, H/O cardiovascular stress test, Heart murmur, Hiatal hernia, History of cardiac cath, Hx of migraines, HX OTHER MEDICAL, Hyperlipidemia, Hypertension, Intraparenchymal hematoma of brain ([address] Lumbar radiculopathy, Overlapping malignant neoplasm of colon ([address] Panic attack, Pleural effusion, S/P thoracentesis, Sleep apnea, SOBOE (shortness of breath on exertion), and Spinal stenosis. Past surgical history:   has a past surgical history that includes Neck surgery (1998); Carpal tunnel release (Bilateral, 1989); knee surgery (Bilateral); thoracentesis (Left, 12/20/2013); Elbow surgery (Left, 2000's); Thoracentesis (4/25/2016); Tonsillectomy; Thoracentesis (Left, 11/15/2016); thoracotomy (Left, 03/18/2019); thoracotomy (Left, 3/18/2019); Upper gastrointestinal endoscopy (N/A, 5/12/2020); Colonoscopy (N/A, 5/12/2020); and Small intestine surgery (N/A, 5/21/2020). Social History:   reports that he quit smoking about 10 years ago. His smoking use included cigarettes. He has a 45.00 pack-year smoking history. He has never used smokeless tobacco. He reports previous alcohol use. He reports current drug use. Frequency: 7.00 times per week. Drug: Marijuana.   Family history:   no family history of CAD, STROKE of DM at early age    Allergies   Allergen Reactions    Nsaids      Renal            amLODIPine (NORVASC) tablet 10 mg, Daily      benztropine (COGENTIN) tablet 0.5 mg, BID      digoxin (LANOXIN) tablet 125 mcg, Daily      doxazosin (CARDURA) tablet 2 mg, 2 times per day      lisinopril (PRINIVIL;ZESTRIL) tablet 40 mg, Daily      metoprolol tartrate (LOPRESSOR) tablet 25 mg, BID      senna (SENOKOT) tablet 8.6 mg, Nightly      sodium chloride flush 0.9 % injection 10 mL, 2 times per day      sodium chloride flush 0.9 % injection 10 mL, PRN      enoxaparin (LOVENOX) injection 40 mg, Daily      promethazine (PHENERGAN) tablet 12.5 mg, Q6H PRN    Or      ondansetron (ZOFRAN) injection 4 mg, Q6H PRN      acetaminophen (TYLENOL) tablet 650 mg, Q6H PRN    Or      acetaminophen (TYLENOL) suppository 650 mg, Q6H PRN      magnesium hydroxide (MILK OF MAGNESIA) 400 MG/5ML suspension 30 mL, Daily PRN      Current Facility-Administered Medications   Medication Dose Route Frequency Provider Last Rate Last Admin    amLODIPine (NORVASC) tablet 10 mg  10 mg Oral Daily [employer] Leo Khan, DO   10 mg at 01/30/21 1501    benztropine (COGENTIN) tablet 0.5 mg  0.5 mg Oral BID [employer] [facility], DO   0.5 mg at 01/31/21 1116    digoxin (LANOXIN) tablet 125 mcg  125 mcg Oral Daily [employer], DO   125 mcg at 01/31/21 1115    doxazosin (CARDURA) tablet 2 mg  2 mg Oral 2 times per day [employer] [facility], DO   2 mg at 01/31/21 1114    lisinopril (PRINIVIL;ZESTRIL) tablet 40 mg  40 mg Oral Daily [employer] [facility], DO   40 mg at 01/30/21 1500    metoprolol tartrate (LOPRESSOR) tablet 25 mg  25 mg Oral BID [employer] [facility], DO   25 mg at 01/31/21 1115    senna (SENOKOT) tablet 8.6 mg  1 tablet Oral Nightly Arleth Ashford, DO   8.6 mg at 01/30/21 2133    sodium chloride flush 0.9 % injection 10 mL  10 mL Intravenous 2 times per day [employer] [facility], DO   10 mL at 01/31/21 1122    sodium chloride flush 0.9 % injection 10 mL  10 mL Intravenous PRN [employer], DO        enoxaparin (LOVENOX) injection 40 mg  40 mg Subcutaneous Daily Arleth Ashford, DO   40 mg at 01/31/21 1116    promethazine (PHENERGAN) tablet 12.5 mg  12.5 mg Oral Q6H PRN Arleth Ashford DO        Or    ondansetron (ZOFRAN) injection 4 mg  4 mg Intravenous Q6H PRN [employer] [facility], DO        acetaminophen (TYLENOL) tablet 650 mg  650 mg Oral Q6H PRN [employer], DO   650 mg at 01/31/21 1115    Or    acetaminophen (TYLENOL) suppository 650 mg  650 mg Rectal Q6H PRN [employer] [facility], DO        magnesium hydroxide (MILK OF MAGNESIA) 400 MG/5ML suspension 30 mL  30 mL Oral Daily PRN [employer], DO         Review of Systems:   · Constitutional: No Fever or Weight Loss   · Eyes: No Decreased Vision  · ENT: No Headaches, Hearing Loss or Vertigo  · Cardiovascular: As per HPI  · Respiratory: As per HPI  · Gastrointestinal: No abdominal pain, appetite loss, blood in stools, constipation, diarrhea or heartburn  · Genitourinary: No dysuria, trouble voiding, or hematuria  · Musculoskeletal:  No gait disturbance, weakness or joint complaints  · Integumentary: No rash or pruritis  · Neurological: No TIA or stroke symptoms  · Psychiatric: No anxiety or depression  · Endocrine: No malaise, fatigue or temperature intolerance  · Hematologic/Lymphatic: No bleeding problems, blood clots or swollen lymph nodes  · Allergic/Immunologic: No nasal congestion or hives  All systems negative except as marked. Physical Examination:    Vitals:    01/30/21 1553 01/30/21 1619 01/30/21 2130 01/31/21 0730   BP:  (!) 163/71  (!) 152/97  Comment: rn notified   Pulse: 72 58  131   Resp: 24 28 20   Temp:  97.8 °F (36.6 °C) 97.7 °F (36.5 °C) 98.8 °F (37.1 °C)   TempSrc:  Oral Oral Oral   SpO2:    97%   Weight:       Height:           General Appearance:  No distress, conversant    Constitutional:  Well developed, Well nourished, No acute distress, Non-toxic appearance. HENT:  Normocephalic, Atraumatic, Bilateral external ears normal, Oropharynx moist, No oral exudates, Nose normal. Neck- Normal range of motion, No tenderness, Supple, No stridor,no apical-carotid delay  Lymphatics : no palpable lymph nodes  Eyes:  PERRL, EOMI, Conjunctiva normal, No discharge. Respiratory:  Normal breath sounds, No respiratory distress, No wheezing, No chest tenderness. ,no use of accessory muscles, crackles Absent   Cardiovascular: (PMI) apex non displaced,no lifts no thrills, ankle swelling Absent  , 1+, s1 and s2 audible,Murmur. Present, JVD not noted    Abdomen /GI:  Bowel sounds normal, Soft, No tenderness, No masses, No gross visceromegaly   :  No costovertebral angle tenderness   Musculoskeletal:  No edema, no tenderness, no deformities.  Back- no tenderness  Integument:  Well hydrated, no rash   Lymphatic:  No lymphadenopathy noted   Neurologic:  Alert & oriented x 3, CN 2-12 normal, normal motor function, normal sensory function, no focal deficits noted           Medical decision making and Data review:    Lab Review   Recent Labs 01/31/21  0600   WBC 7.8   HGB 12.5*   HCT 39.4*         Recent Labs     01/31/21  0600      K 4.5      CO2 20*   BUN 8   CREATININE 1.2     Recent Labs     01/30/21  0510   AST 13*   ALT 10   BILITOT 0.4   ALKPHOS 55     Recent Labs     01/30/21  0510   TROPONINT 0.037*       Recent Labs     01/30/21  0510   PROBNP 393.0*     Lab Results   Component Value Date    INR 0.84 12/19/2020    PROTIME 10.1 (L) 12/19/2020       EKG: (reviewed by myself)    ECHO:(reviewed by myself)    Chest Xray:(reviewed by myself)  Ct Head Wo Contrast    Result Date: 1/30/2021  EXAMINATION: CT OF THE HEAD WITHOUT CONTRAST  1/30/2021 6:32 am TECHNIQUE: CT of the head was performed without the administration of intravenous contrast. Dose modulation, iterative reconstruction, and/or weight based adjustment of the mA/kV was utilized to reduce the radiation dose to as low as reasonably achievable. COMPARISON: 01/14/2021 HISTORY: ORDERING SYSTEM PROVIDED HISTORY: hx CVA TECHNOLOGIST PROVIDED HISTORY: Reason for exam:->hx CVA Has a \"code stroke\" or \"stroke alert\" been called? ->No Decision Support Exception->Emergency Medical Condition (MA) Reason for Exam: hx CVA Acuity: Acute Type of Exam: Initial FINDINGS: BRAIN/VENTRICLES: Further interval decrease in size of known right occipital lobe subacute hematoma. Slight decrease in attenuation compared to prior study. Local mass effect with sulcal effacement and partial effacement of the right lateral ventricle. No midline shift. There is intraventricular hemorrhage layering in the left occipital horn. No new intracranial hemorrhage. Senescent changes with parenchymal volume loss enlargement of the ventricles and cerebral sulci. There are nonspecific areas of hypoattenuation within the periventricular and subcortical white matter, which likely represent chronic microvascular ischemic change. Intracranial atherosclerotic disease.  ORBITS: The visualized portion of the orbits demonstrate no acute abnormality. SINUSES: The visualized paranasal sinuses and mastoid air cells demonstrate no acute abnormality. SOFT TISSUES/SKULL: No acute abnormality of the visualized skull or soft tissues. 1. Further interval decrease in size of right occipital lobe intraparenchymal subacute hemorrhage. 2. No new intracranial hemorrhage or infarction. No midline shift. Ct Head Wo Contrast    Result Date: 1/14/2021  EXAMINATION: CT OF THE HEAD WITHOUT CONTRAST  1/14/2021 3:00 pm TECHNIQUE: CT of the head was performed without the administration of intravenous contrast. Dose modulation, iterative reconstruction, and/or weight based adjustment of the mA/kV was utilized to reduce the radiation dose to as low as reasonably achievable. COMPARISON: CT brain 12/19/2020 HISTORY: ORDERING SYSTEM PROVIDED HISTORY: monitor ICH in lethargic pt TECHNOLOGIST PROVIDED HISTORY: Reason for exam:->monitor ICH in lethargic pt Has a \"code stroke\" or \"stroke alert\" been called? ->No Reason for Exam: monitor ICH in lethargic pt Acuity: Acute Type of Exam: Initial FINDINGS: BRAIN/VENTRICLES: There has been interval decrease in size and density of the patient's large intraparenchymal hemorrhage centered within the right occipital lobe since the previous exam.  Residual blood products remain. There is no new hemorrhage or acute infarct. There is no mass effect or midline shift. There is no ventriculomegaly. Intraventricular hemorrhage has resolved. ORBITS: Limited evaluation of the orbits is unremarkable. SINUSES: The paranasal sinuses and mastoid air cells are clear. SOFT TISSUES/SKULL:  No lytic or blastic osseous lesions are identified. 1. Interval decrease in size and density of the patient's known subacute intraparenchymal hemorrhage within the right occipital lobe. 2. No acute infarct, new hemorrhage or mass effect.      Xr Chest Portable    Result Date: 1/30/2021  EXAMINATION: ONE XRAY VIEW OF THE CHEST 1/30/2021 4:51 am COMPARISON: Chest radiograph dated December 19, 2020. HISTORY: ORDERING SYSTEM PROVIDED HISTORY: cough TECHNOLOGIST PROVIDED HISTORY: Reason for exam:->cough Reason for Exam: cough Acuity: Acute Type of Exam: Initial FINDINGS: The cardiomediastinal silhouette is stable. There is a small to moderate-sized left pleural effusion with left basilar opacity. There is no evidence of a pneumothorax. There is no evidence of edema. Small to moderate-sized left pleural effusion with left basilar opacity, likely representing atelectasis/infiltrates. This is noted on multiple prior exams. Cta Pulmonary W Contrast    Result Date: 1/30/2021  EXAMINATION: CTA OF THE CHEST 1/30/2021 9:09 am TECHNIQUE: CTA of the chest was performed after the administration of intravenous contrast.  Multiplanar reformatted images are provided for review. MIP images are provided for review. Dose modulation, iterative reconstruction, and/or weight based adjustment of the mA/kV was utilized to reduce the radiation dose to as low as reasonably achievable. COMPARISON: 12/15/2019. HISTORY: ORDERING SYSTEM PROVIDED HISTORY: tachycardia TECHNOLOGIST PROVIDED HISTORY: Reason for exam:->tachycardia Decision Support Exception->Emergency Medical Condition (MA) Reason for Exam: Tachy Acuity: Acute Type of Exam: Initial FINDINGS: Pulmonary Arteries: Respiratory motion limits evaluation of the pulmonary arteries. No evidence of a pulmonary embolus to the level of the lobar pulmonary arteries. Evaluation more distally is somewhat limited due to motion. The main pulmonary trunk appears normal in caliber. Mediastinum: No acute abnormality is seen of the thoracic aorta. No bulky mediastinal or hilar adenopathy. Coronary artery calcifications are noted. There is leftward shift of mediastinum, which is unchanged. The heart is normal in size. No pericardial effusion. Lungs/pleura: The central tracheobronchial tree appears grossly patent.  There is thickening of the pleura on the left with a loculated fluid collection, which appears minimally decreased in size when compared the prior exam.  There Is persistent opacification within the lingula and left lower lobe. Minimal dependent changes are seen in the right lower lobe. Otherwise, no focal consolidation the right lung. No evidence of a right pleural effusion. No pneumothorax. Upper Abdomen: Limited evaluation of the upper abdomen demonstrates no acute abnormality. Soft Tissues/Bones: Postsurgical changes are seen from a left-sided thoracotomy. No acute osseous abnormality. 1. Respiratory motion limits evaluation. 2. No convincing pulmonary embolus to the level of the lobar pulmonary arteries. Evaluation more distally is limited. 3. Chronic left-sided empyema, which appears minimally decreased in size when compared the prior exam. 4. There is persistent opacification involving the lingula and left lower lobe. 5. Persistent left-sided volume loss with leftward shift of the mediastinum. All labs, medications and tests reviewed by myself including data  from outside source , patient and available family . Continue all other medications of all above medical condition listed as is. Impression:  Active Problems:    SSS (sick sinus syndrome) (HCC)  Resolved Problems:    * No resolved hospital problems. *      Assessment: 70 y. o.year old with PMH of  has a past medical history of Anemia, Arthritis, Back pain, chronic, Bipolar 1 disorder ([address] CAD (coronary artery disease), Cerebral artery occlusion with cerebral infarction ([address] Chronic kidney disease (CKD), stage III (moderate), CKD (chronic kidney disease), COPD (chronic obstructive pulmonary disease) ([address] Diabetes mellitus, type 2 ([address] Diabetic peripheral neuropathy ([address] Diastolic CHF ([address] Gastric ulcer, H/O cardiovascular stress test, Heart murmur, Hiatal hernia, History of cardiac cath, Hx of migraines, HX OTHER MEDICAL, Hyperlipidemia, Hypertension, Intraparenchymal hematoma of brain (HCC), Lumbar radiculopathy, Overlapping malignant neoplasm of colon ([address] Panic attack, Pleural effusion, S/P thoracentesis, Sleep apnea, SOBOE (shortness of breath on exertion), and Spinal stenosis. Plan and Recommendations:    Atrial fibrillation: No anticoagulation due to concerns for recent intracranial hemorrhage intraventricular bleed will start amiodarone to try and attempt and keep him in sinus rhythm  Continue rate control strategy  HTN: stable, continue present medications   History of coronary artery disease which is stable stress test in May 2020 showed no ischemia  DVT prophylaxis if no contraindication  6. Dyslipidemia: continue statins           Thank you  much for consult and giving us the opportunity in contributing in the care of this patient. Please feel free to call me for any questions.        Rachana Hernandez MD, 1/31/2021 11:49 AM

## 2021-01-31 NOTE — PROGRESS NOTES
Hospitalist Progress Note      Name:  Janine Young /Age/Sex: 1950  (70 y.o. male)   MRN & CSN:  [id number] & [id number] Admission Date/Time: 2021  4:37 AM   Location:  [facility]5/3115-A PCP: Tanya Cruz MD       Hospital Day: 2    Hospital Course:   Janine Young is a 70 y.o. male who presented from home with irregular heart rate. Recently discharged from [facility] due to his inability to participate with therapies. He was released home and was brought to the ED due to his heart rate and aggression. Assessment and Plan:     · Sick sinus syndrome with irregular HR not consistent with Afib RVR  · DM type 2, uncontrolled, complicated by peripheral neuropathy. Long term insulin dependency  · Chronic respiratory failure/ COPD  · C HFpEF  · Atrial fibrillation, not in RVR  · Ess HTN  · Hx intracranial hemorrhage with left hemiparesis, dysarthria     Pt/ot/slp consult for placement recommendations  Cardio consult for medication review  Added aricept     Diet DIET CARDIAC; Carb Control: 4 carb choices (60 gms)/meal   DVT Prophylaxis [] Lovenox, []  Heparin, [] SCDs, []No VTE prophylaxis, patient ambulating   GI Prophylaxis [] PPI, [] H2 Blocker, [] No GI prophylaxis, patient is receiving diet/Tube Feeds   Code Status Full Code   Disposition Dc to SNF   MDM [] Low, [] Moderate,[]  High  Patient's risk as above due to     [] One or more chronic illnesses with mild exacerbation progression    [x] Two or more stable chronic illnesses    [] Undiagnosed new problem with uncertain prognosis    [] Elective major surgery    []Prescription drug management     Subjective:     Pt S&E. Sitting up in bed in NAD. He knows his name but not the year or location. Denies cp sob or ha  No problems overnight     10-14 point ROS reviewed negative, unless as noted above    Objective:        Intake/Output Summary (Last 24 hours) at 2021 1519  Last data filed at 2021 1553  Gross per 24 hour   Intake --   Output 1000 ml   Net -1000 ml      Vitals:   Vitals:    01/31/21 1223   BP: 115/62   Pulse:    Resp:    Temp:    SpO2:      Physical Exam:    GEN Awake male, laying in bed in no apparent distress. Appears given age. EYES Pupils are equally round. No scleral discharge  HENT Atraumatic and symmetric head  NECK No apparent thyromegaly  RESP Symmetric chest movement while on room air. CARDIO/VASC Peripheral pulses equal bilaterally and palpable. No peripheral edema. GI Abdomen is not distended. Rectal exam deferred.  Zimmer catheter is not present. HEME/LYMPH No petechiae or ecchymoses. MSK Spontaneous movement of BL upper extremities  SKIN Normal coloration, warm, dry. NEURO Cranial nerves appear grossly intact  PSYCH Awake, alert.      Medications:   Medications:    amiodarone  400 mg Oral BID    escitalopram  20 mg Oral Daily    divalproex  1,000 mg Oral Nightly    atorvastatin  80 mg Oral Daily    donepezil  5 mg Oral Nightly    amLODIPine  10 mg Oral Daily    benztropine  0.5 mg Oral BID    digoxin  125 mcg Oral Daily    doxazosin  2 mg Oral 2 times per day    lisinopril  40 mg Oral Daily    metoprolol tartrate  25 mg Oral BID    senna  1 tablet Oral Nightly    sodium chloride flush  10 mL Intravenous 2 times per day    enoxaparin  40 mg Subcutaneous Daily      Infusions:   PRN Meds:     sodium chloride flush, 10 mL, PRN      promethazine, 12.5 mg, Q6H PRN    Or      ondansetron, 4 mg, Q6H PRN      acetaminophen, 650 mg, Q6H PRN    Or      acetaminophen, 650 mg, Q6H PRN      magnesium hydroxide, 30 mL, Daily PRN          Electronically signed by Cecelia Bardales DO on 1/31/2021 at 3:19 PM

## 2021-01-31 NOTE — PROGRESS NOTES
Pt with low blood pressure this morning, concerned about administering amlodipine and lisinopril. Confirmed with Dr. Charity Kumar over [facility] to hold medications at this time as pt's blood pressure just taken was at 115/62.

## 2021-01-31 NOTE — PROGRESS NOTES
Ambulated pt short distance in hallway. Pt became short of breath. 's with ambulation. Lives alone, states she drove here and doesn't have anyone that can drive her home or pick her up? Pt also uses Albuterol inhaler prn at home, not ordered here. [facility] Dr. Mendez Pelletier and he responded with new orders. Discharge cancelled for today.

## 2021-02-01 LAB
ANION GAP SERPL CALCULATED.3IONS-SCNC: 10 MMOL/L (ref 4–16)
BASOPHILS ABSOLUTE: 0 K/CU MM
BASOPHILS RELATIVE PERCENT: 0.3 % (ref 0–1)
BUN BLDV-MCNC: 8 MG/DL (ref 6–23)
CALCIUM SERPL-MCNC: 8.7 MG/DL (ref 8.3–10.6)
CHLORIDE BLD-SCNC: 108 MMOL/L (ref 99–110)
CO2: 22 MMOL/L (ref 21–32)
CREAT SERPL-MCNC: 1.2 MG/DL (ref 0.9–1.3)
CULTURE: ABNORMAL
CULTURE: ABNORMAL
DIFFERENTIAL TYPE: ABNORMAL
EOSINOPHILS ABSOLUTE: 0.1 K/CU MM
EOSINOPHILS RELATIVE PERCENT: 2.2 % (ref 0–3)
GFR AFRICAN AMERICAN: >60 ML/MIN/1.73M2
GFR NON-AFRICAN AMERICAN: 60 ML/MIN/1.73M2
GLUCOSE BLD-MCNC: 101 MG/DL (ref 70–99)
HCT VFR BLD CALC: 37.5 % (ref 42–52)
HEMOGLOBIN: 12.2 GM/DL (ref 13.5–18)
IMMATURE NEUTROPHIL %: 0.5 % (ref 0–0.43)
LYMPHOCYTES ABSOLUTE: 0.9 K/CU MM
LYMPHOCYTES RELATIVE PERCENT: 15.9 % (ref 24–44)
Lab: ABNORMAL
MCH RBC QN AUTO: 28.6 PG (ref 27–31)
MCHC RBC AUTO-ENTMCNC: 32.5 % (ref 32–36)
MCV RBC AUTO: 87.8 FL (ref 78–100)
MONOCYTES ABSOLUTE: 0.5 K/CU MM
MONOCYTES RELATIVE PERCENT: 8.7 % (ref 0–4)
NUCLEATED RBC %: 0 %
PDW BLD-RTO: 15 % (ref 11.7–14.9)
PLATELET # BLD: 144 K/CU MM (ref 140–440)
PMV BLD AUTO: 8.9 FL (ref 7.5–11.1)
POTASSIUM SERPL-SCNC: 3.8 MMOL/L (ref 3.5–5.1)
RBC # BLD: 4.27 M/CU MM (ref 4.6–6.2)
SEGMENTED NEUTROPHILS ABSOLUTE COUNT: 4.2 K/CU MM
SEGMENTED NEUTROPHILS RELATIVE PERCENT: 72.4 % (ref 36–66)
SODIUM BLD-SCNC: 140 MMOL/L (ref 135–145)
SPECIMEN: ABNORMAL
TOTAL IMMATURE NEUTOROPHIL: 0.03 K/CU MM
TOTAL NUCLEATED RBC: 0 K/CU MM
WBC # BLD: 5.8 K/CU MM (ref 4–10.5)

## 2021-02-01 PROCEDURE — 1200000000 HC SEMI PRIVATE

## 2021-02-01 PROCEDURE — APPSS45 APP SPLIT SHARED TIME 31-45 MINUTES: Performed by: NURSE PRACTITIONER

## 2021-02-01 PROCEDURE — 94761 N-INVAS EAR/PLS OXIMETRY MLT: CPT

## 2021-02-01 PROCEDURE — G0378 HOSPITAL OBSERVATION PER HR: HCPCS

## 2021-02-01 PROCEDURE — 97530 THERAPEUTIC ACTIVITIES: CPT

## 2021-02-01 PROCEDURE — 97166 OT EVAL MOD COMPLEX 45 MIN: CPT

## 2021-02-01 PROCEDURE — 97162 PT EVAL MOD COMPLEX 30 MIN: CPT

## 2021-02-01 PROCEDURE — 92610 EVALUATE SWALLOWING FUNCTION: CPT | Performed by: SPEECH-LANGUAGE PATHOLOGIST

## 2021-02-01 PROCEDURE — 85025 COMPLETE CBC W/AUTO DIFF WBC: CPT

## 2021-02-01 PROCEDURE — 80048 BASIC METABOLIC PNL TOTAL CA: CPT

## 2021-02-01 PROCEDURE — 36415 COLL VENOUS BLD VENIPUNCTURE: CPT

## 2021-02-01 PROCEDURE — 97116 GAIT TRAINING THERAPY: CPT

## 2021-02-01 PROCEDURE — 2580000003 HC RX 258: Performed by: STUDENT IN AN ORGANIZED HEALTH CARE EDUCATION/TRAINING PROGRAM

## 2021-02-01 PROCEDURE — 6360000002 HC RX W HCPCS: Performed by: STUDENT IN AN ORGANIZED HEALTH CARE EDUCATION/TRAINING PROGRAM

## 2021-02-01 PROCEDURE — 99233 SBSQ HOSP IP/OBS HIGH 50: CPT | Performed by: INTERNAL MEDICINE

## 2021-02-01 PROCEDURE — 2500000003 HC RX 250 WO HCPCS: Performed by: STUDENT IN AN ORGANIZED HEALTH CARE EDUCATION/TRAINING PROGRAM

## 2021-02-01 PROCEDURE — 6370000000 HC RX 637 (ALT 250 FOR IP): Performed by: INTERNAL MEDICINE

## 2021-02-01 PROCEDURE — 6370000000 HC RX 637 (ALT 250 FOR IP): Performed by: STUDENT IN AN ORGANIZED HEALTH CARE EDUCATION/TRAINING PROGRAM

## 2021-02-01 RX ADMIN — MICONAZOLE NITRATE: 2 POWDER TOPICAL at 20:59

## 2021-02-01 RX ADMIN — BENZTROPINE MESYLATE 0.5 MG: 1 TABLET ORAL at 20:58

## 2021-02-01 RX ADMIN — DIGOXIN 125 MCG: 125 TABLET ORAL at 11:07

## 2021-02-01 RX ADMIN — SODIUM CHLORIDE, PRESERVATIVE FREE 10 ML: 5 INJECTION INTRAVENOUS at 20:59

## 2021-02-01 RX ADMIN — AMIODARONE HYDROCHLORIDE 400 MG: 200 TABLET ORAL at 11:06

## 2021-02-01 RX ADMIN — METOPROLOL TARTRATE 25 MG: 25 TABLET, FILM COATED ORAL at 20:58

## 2021-02-01 RX ADMIN — DOXAZOSIN 2 MG: 1 TABLET ORAL at 11:11

## 2021-02-01 RX ADMIN — LISINOPRIL 40 MG: 40 TABLET ORAL at 11:08

## 2021-02-01 RX ADMIN — SENNOSIDES 8.6 MG: 8.6 TABLET, FILM COATED ORAL at 20:58

## 2021-02-01 RX ADMIN — MICONAZOLE NITRATE: 2 POWDER TOPICAL at 11:13

## 2021-02-01 RX ADMIN — ATORVASTATIN CALCIUM 80 MG: 80 TABLET, FILM COATED ORAL at 11:07

## 2021-02-01 RX ADMIN — ENOXAPARIN SODIUM 40 MG: 40 INJECTION SUBCUTANEOUS at 11:12

## 2021-02-01 RX ADMIN — METOPROLOL TARTRATE 25 MG: 25 TABLET, FILM COATED ORAL at 11:07

## 2021-02-01 RX ADMIN — DONEPEZIL HYDROCHLORIDE 5 MG: 5 TABLET, FILM COATED ORAL at 21:00

## 2021-02-01 RX ADMIN — DOXAZOSIN 2 MG: 1 TABLET ORAL at 20:58

## 2021-02-01 RX ADMIN — AMLODIPINE BESYLATE 10 MG: 10 TABLET ORAL at 11:07

## 2021-02-01 RX ADMIN — BENZTROPINE MESYLATE 0.5 MG: 1 TABLET ORAL at 11:06

## 2021-02-01 RX ADMIN — ESCITALOPRAM OXALATE 20 MG: 10 TABLET ORAL at 11:11

## 2021-02-01 RX ADMIN — DIVALPROEX SODIUM 1000 MG: 500 TABLET, FILM COATED, EXTENDED RELEASE ORAL at 20:58

## 2021-02-01 RX ADMIN — AMIODARONE HYDROCHLORIDE 400 MG: 200 TABLET ORAL at 20:58

## 2021-02-01 ASSESSMENT — PAIN SCALES - GENERAL
PAINLEVEL_OUTOF10: 0
PAINLEVEL_OUTOF10: 0

## 2021-02-01 NOTE — CONSULTS
Kay Cyr [address] 1950, [id number], 2/1/2021    History  [place]:  The primary encounter diagnosis was Body aches. Diagnoses of EKG abnormalities, Troponin level elevated, Pleural effusion, Tachycardia, and Urinary tract infection without hematuria, site unspecified were also pertinent to this visit. Patient  has a past medical history of Anemia, Arthritis, Back pain, chronic, Bipolar 1 disorder ([address] CAD (coronary artery disease), Cerebral artery occlusion with cerebral infarction ([address] Chronic kidney disease (CKD), stage III (moderate), CKD (chronic kidney disease), COPD (chronic obstructive pulmonary disease) ([address] Diabetes mellitus, type 2 ([address] Diabetic peripheral neuropathy ([address] Diastolic CHF ([address] Gastric ulcer, H/O cardiovascular stress test, Heart murmur, Hiatal hernia, History of cardiac cath, Hx of migraines, HX OTHER MEDICAL, Hyperlipidemia, Hypertension, Intraparenchymal hematoma of brain ([address] Lumbar radiculopathy, Overlapping malignant neoplasm of colon ([address] Panic attack, Pleural effusion, S/P thoracentesis, Sleep apnea, SOBOE (shortness of breath on exertion), and Spinal stenosis. Patient  has a past surgical history that includes Neck surgery (1998); Carpal tunnel release (Bilateral, 1989); knee surgery (Bilateral); thoracentesis (Left, 12/20/2013); Elbow surgery (Left, 2000's); Thoracentesis (4/25/2016); Tonsillectomy; Thoracentesis (Left, 11/15/2016); thoracotomy (Left, 03/18/2019); thoracotomy (Left, 3/18/2019); Upper gastrointestinal endoscopy (N/A, 5/12/2020); Colonoscopy (N/A, 5/12/2020); and Small intestine surgery (N/A, 5/21/2020). Subjective:  Patient states: \"That was a pretty good walk for my first time. \"    Pain:  States \"a lot\" of pain in neck, does not formally rate.     Communication with other providers:  Handoff to RN, OT  Restrictions: general precautions, fall risk    Home Setup/Prior level of function    Lives With: Significant other (girlfriend Vivi Din)  Type of Home: House  Home Layout: Two level, Performs ADL's on one level (3 bedroom double)  Home Access: Stairs to enter with rails  Entrance Stairs - Number of Steps: 4  Entrance Stairs - Rails: Both  Bathroom Shower/Tub: Walk-in shower  Bathroom Toilet: Standard  Bathroom Accessibility: Accessible  Home Equipment: Rolling walker, Wheelchair-manual, Reacher  ADL Assistance: Needs assistance (Requires assistance from Vivi Rehman c bathing, dressing (UB/LB), feeding (s/u c cutting food). Pt reports difficulty c toileting however does not ask for assistance d/t feelings of embarrassment)  Homemaking Responsibilities: Yes Taylor Wang performs household IADLs, pt manages bills and medications)  Ambulation Assistance: Independent (mod I with RW)  Transfer Assistance: Needs assistance (Pt requires assistance c sit> the living room)  Active : No  Patient's  Info: Neighbor   Occupation: Retired  Type of occupation: [employer]    Examination of body systems (includes body structures/functions, activity/participation limitations):  · Observation:  Pt supine in bed upon arrival and agreeable to therapy  · Vision:  Wears glasses  · Hearing:  WFL  · Cardiopulmonary:  No O2 needs  · Cognition: appears impaired, see OT/SLP note for further evaluation. Musculoskeletal  · ROM R/L:  WFL. · Strength R/L:  4/5, moderate impairment in function and endurance. · Neuro:  Appears WFL      Mobility:  · Rolling L/R:  Mod A with cues for sequencing and hand over hand placement on bedrails  · Supine to sit:  Pt transferred supine to sit max A with assist at trunk and LEs  · Transfers: Pt completed STS from bed CGA and to/from chair min A with cues for sequencing  · Sitting balance:  good. · Standing balance:  Fair+.     · Gait: Pt ambulated 30' + 20' with RW CGA with chair follow up to min A with slightly increased SOB which resolved with standing rest breaks,

## 2021-02-01 NOTE — PROGRESS NOTES
Pt cleaned up upon request. Bath given 2/1/21. Gown and bed pad changed. Pt repositioned and left in bed with alarm on, two side rails up, and JUNIOR at bedside.

## 2021-02-01 NOTE — PROGRESS NOTES
Occupational [address] [place] [address] ACUTE CARE OCCUPATIONAL THERAPY EVALUATION    Forrest Martinez., 1950, [id number], 2/1/2021    Discharge Recommendation: See Diaz (pt likely appropriate for transition to AL after rehab stay)      History:  [place]:  The primary encounter diagnosis was Body aches. Diagnoses of EKG abnormalities, Troponin level elevated, Pleural effusion, Tachycardia, and Urinary tract infection without hematuria, site unspecified were also pertinent to this visit. Subjective:  Patient states: \"I'll get up if I can have some hot coffee. \"  Pain: Pt reported \"a lot\" of chronic pain in neck, cognitively unable to quantify  Communication with other providers: PT YESENIA Mancera  Restrictions: General Precautions, Fall Risk, JUNIOR, Telemetry, Pulse Ox, BP cuff, Bed/chair alarm    Home Setup/Prior level of function:  Lives With: Significant other (girlfriend Rafat Hoskins)  Type of Home: House  Home Layout: Two level, Performs ADL's on one level (3 bedroom double)  Home Access: Stairs to enter with rails  Entrance Stairs - Number of Steps: 4  Entrance Stairs - Rails: Both  Bathroom Shower/Tub: Walk-in shower  Bathroom Toilet: Standard  Bathroom Accessibility: Accessible  Home Equipment: Rolling walker, Wheelchair-manual, Reacher  ADL Assistance: Needs assistance (Requires assistance from Rafat Hoskins c bathing, dressing (UB/LB), feeding (s/u c cutting food).  Pt reports difficulty c toileting however does not ask for assistance d/t feelings of embarrassment)  Homemaking Responsibilities: Yes Ashby Claude performs household IADLs, pt manages bills and medications)  Ambulation Assistance: Independent (mod I with RW)  Transfer Assistance: Needs assistance (Pt requires assistance c sit> the living room)  Active : No  Patient's  Info: Neighbor   Occupation: Retired  Type of occupation: [employer]    Examination:  · Observation: Supine in bed upon arrival. Pleasantly confused but activity:   How much help from another person does the patient currently need. .. Unable  Dep A Lot  Max A A Lot   Mod A A Little  Min A A Little   CGA  SBA None   Mod I  Indep  Sup   1. Putting on and taking off regular lower body clothing? [x] 1    [] 2   [] 2   [] 3   [] 3   [] 4      2. Bathing (including washing, rinsing, drying)? [] 1   [] 2   [x] 2 [] 3 [] 3 [] 4   3. Toileting, which includes using toilet, bedpan, or urinal? [] 1    [x] 2   [] 2   [] 3   [] 3   [] 4     4. Putting on and taking off regular upper body clothing? [] 1   [] 2   [] 2   [] 3   [x] 3    [] 4      5. Taking care of personal grooming such as brushing teeth? [] 1   [] 2    [] 2 [] 3    [x] 3   [] 4      6. Eating meals? [] 1   [] 2   [] 2   [] 3   [x] 3   [] 4      Raw Score:  14    [24=0% impaired(CH), 23=1-19%(CI), 20-22=20-39%(CJ), 15-19=40-59%(CK), 10-14=60-79%(CL), 7-9=80-99%(CM), 6=100%(CN)]     Treatment:  Therapeutic Activity Training:   Therapeutic activity training was instructed today. Cues were given for safety, sequence, UE/LE placement, awareness, and balance.     Activities performed today included bed mobility training, sitting balance/tolerance, transfer training, HH mobility with RW, self-feeding, education on role of OT, POC, importance of OOB activity, d/c planning      Safety Measures: Gait belt used, Left in Chair, Alarm in place    Assessment:  Pt is a 70year old male with a past medical history of Anemia, Arthritis, Back pain, chronic, Bipolar 1 disorder ([facility] CAD (coronary artery disease), Cerebral artery occlusion with cerebral infarction ([facility] Chronic kidney disease (CKD), stage III (moderate), CKD (chronic kidney disease), COPD (chronic obstructive pulmonary disease) ([facility] Diabetes mellitus, type 2 ([facility] Diabetic peripheral neuropathy ([facility] Diastolic CHF [facility] Gastric ulcer, H/O cardiovascular stress test, Heart murmur, Hiatal hernia, History of cardiac cath, Hx of migraines, HX OTHER MEDICAL, Hyperlipidemia, Hypertension, Intraparenchymal hematoma of brain (HCC), Lumbar radiculopathy, Overlapping malignant neoplasm of colon ([address] Panic attack, Pleural effusion, S/P thoracentesis, Sleep apnea, SOBOE (shortness of breath on exertion), and Spinal stenosis. Pt admitted with generalized weakness and diagnosed with A fib. Pt being followed by cardiology. Pt lives at home with his significant other and PLOF is as above. Pt currently presents with the above impairments, and will benefit from continued OT services in SNF at discharge. Pt appropriate for transition to assisted living after rehab stay. Complexity: Moderate  Prognosis: Good  Plan: 3x/week      Goals:  1. Pt will complete all aspects of bed mobility for EOB/OOB ADLs min A  2. Pt will complete UB/LB bathing min A with setup using long handled sponge PRN  3. Pt will complete all aspects of LB dressing mod A with setup using AE PRN  4. Pt will complete all functional transfers to and from bed, chair, toilet, shower chair SBA/good safety awareness  5. Pt will ambulate HH distance to bathroom for toileting SBA with RW  6. Pt will complete all aspects of toileting task CGA  7. Pt will complete oral hygiene/grooming routine in standing at sink SBA with setup/no seated rest breaks  8.  Pt will complete ther ex/ther act with focus on UE strengthening, functional standing tolerance >10 minutes, cognitive re-orientation      Time:   Time in: 919  Time out: 948  Timed treatment minutes: 14  Total time: 29      Electronically signed by:    Gerhardt Schmid, OTR/L, [address] [id number]

## 2021-02-01 NOTE — PROGRESS NOTES
Night Shift RN Notes:   Safety  Tele Sitter  (video monitor) was placed and continued for additional safety. Hourly rounding done to assure patient's safety. Side rails up x2, fall kit applied, yellow gripper socks on , yellow blanket, fall jewels, low bed, bed alarm at zone 2 and was on the whole night, call light and phone within reach. Side table with reach. Education on safety was provided. Family Update Call:  ANITA [employer], (Patient's son-primary decision maker) left a message on patient's current health status and the call back phone number and the am shift RN's name if they have other questions.

## 2021-02-01 NOTE — ED PROVIDER NOTES
Emergency Department Encounter    Patient: Liz Mathis MRN: [id number]  : 1950  Date of Evaluation: 2021  ED Provider:  Flaco Kimball    Triage Chief Complaint:   Generalized Body Aches (overall weakness, hx of strokes)      [place]:  Liz Mathis is a 70 y.o. male that presents to the emergency department for evaluation of generalized weakness. Patient presents via EMS reports initially provided by paramedics who state that they were dispatched to the patient secondary to weakness. Patient was recently admitted to the hospital on 2021 for acute intracranial hemorrhage and was discharged on 2021 to acute rehab nit. Patient notes that at discharge from the hospital, he was feeling okay. At rehab, he did not participate with care providers. He was subsequently discharged. At home, patient had difficulty getting around. Wife has difficulty taking care of the patient due to immobility. EMS was called for transport to the hospital.  No falls, head trauma, neck trauma. No syncope, dizziness, lightheadedness. No chest pain or pleuritic pain. No fevers, chills, diaphoresis. No nausea, vomiting, diarrhea, constipation, hematochezia, melena. No known precipitating, modifying, alleviating features. ROS - see HPI, below listed is current ROS at time of my eval:  A complete 14 point review of systems was performed and is as dictated above, otherwise negative.      Past Medical History:   Diagnosis Date    Anemia     per old chart    Arthritis     Back pain, chronic     \"have pain in lumbar mainly- have 5 bulging discs\"\"in my neck and my lumbar have herniated discs\"    Bipolar 1 disorder ([facility]     CAD (coronary artery disease) 2016    does not follow with a cardiologist    Cerebral artery occlusion with cerebral infarction ([facility]     \"had mini stroke in 2016- fast heart rate when I would stand up - smile drooping on one side- lased just the one day\"    Chronic kidney disease (CKD), stage III (moderate)     CKD (chronic kidney disease)     per old chart- consult with Dr Jennifer Hayward with admission 4/2016\"have low kidney function\"    COPD (chronic obstructive pulmonary disease) ([facility]     follow with Dr Lo Luz Diabetes mellitus, type 2 ([facility] 01/03/2017    Diabetic peripheral neuropathy (HCC)     Diastolic CHF ([facility] 01/45/4021    Gastric ulcer     H/O cardiovascular stress test 05/26/2014    EF 68% mild ischemia anterior wall    Heart murmur     \"see Dr Garett Malin- last echo 2014\" pt states\"I think they said I have a murmur but no chest pain or palpitations\"    Hiatal hernia     History of cardiac cath 06/11/2014    MODERATE  CAD      Hx of migraines     HX OTHER MEDICAL     \"have thinning of kidney walls- they found I had that 15 yrs ago\" see Dr Jase Esparza"    Hyperlipidemia     Hypertension     Intraparenchymal hematoma of brain ([facility]     Lumbar radiculopathy     Overlapping malignant neoplasm of colon ([facility] 05/27/2020    Panic attack     'anything new gives me anxiety\"    Pleural effusion     scheduled for thoracentesis 7/28/2014, 8/17/2016    S/P thoracentesis     left side( per old chart had thora done 4/2016 and one done 2014)    Sleep apnea     sleep study x 2 - last one 4-5 yrs ago- uses c-pap\"    SOBOE (shortness of breath on exertion)     Spinal stenosis        Past Surgical History:   Procedure Laterality Date    CARPAL TUNNEL RELEASE Bilateral 1989    COLONOSCOPY N/A 5/12/2020    COLONOSCOPY POLYPECTOMY SNARE/COLD BIOPSY WITH SPOT INK TATTOO performed by Rebeca Valderrama MD at [facility] Left 2000's    KNEE SURGERY Bilateral     right knee x 2( scope)/ left knee- 7-8 yr ago   [address] fusion   [address]  N/A 5/21/2020    BOWEL RESECTION EXTENDED RIGHT HEMICOLECTOMY performed by Chel Lopez MD at [address] [place] [address] Left 12/20/2013    THORACENTESIS  4/25/2016         THORACENTESIS Left 11/15/2016    Dr. Monica Villanueva 250mlsremoved     THORACOTOMY Left 03/18/2019    with Lysis of adhesions    THORACOTOMY Left 3/18/2019    THORACOSCOPY CONVERTED TO THORACOTOMY WITH LYSIS OF ADHESIONS performed by Yury Zhou MD at [address] [place] [address]      as a kid    UPPER GASTROINTESTINAL ENDOSCOPY N/A 5/12/2020    EGD BIOPSY performed by Sahara Yousif MD at [facility]COPY       Family History   Problem Relation Age of Onset    Heart Disease Mother         heart attack    High Blood Pressure Father        Social History     Socioeconomic History    Marital status: Single     Spouse name: Not on file    Number of children: Not on file    Years of education: Not on file    Highest education level: Not on file   Occupational History    Occupation: built trucks, retired     Employer: [employer] resource strain: Not on file    Food insecurity     Worry: Not on file     Inability: Not on file   Raising IT needs     Medical: Not on file     Non-medical: Not on file   Tobacco Use    Smoking status: Former Smoker     Packs/day: 1.50     Years: 30.00     Pack years: 45.00     Types: Cigarettes     Quit date: 7/24/2010     Years since quitting: 10.5    Smokeless tobacco: Never Used   Substance and Sexual Activity    Alcohol use: Not Currently    Drug use: Yes     Frequency: 7.0 times per week     Types: Marijuana    Sexual activity: Not Currently     Partners: Female   Lifestyle    Physical activity     Days per week: Not on file     Minutes per session: Not on file    Stress: Not on file   Relationships    Social connections     Talks on phone: Not on file     Gets together: Not on file     Attends [employer] service: Not on file     Active member of club or organization: Not on file     Attends meetings of clubs or organizations: Not on file     Relationship status: Not on file    Intimate partner violence     Fear of current or ex partner: Not on file Emotionally abused: Not on file     Physically abused: Not on file     Forced sexual activity: Not on file   Other Topics Concern    Not on file   Social History Narrative    Not on file       Current Facility-Administered Medications   Medication Dose Route Frequency Provider Last Rate Last Admin    amiodarone (CORDARONE) tablet 400 mg  400 mg Oral BID Rohini Proctor MD   400 mg at 01/31/21 2221    escitalopram (LEXAPRO) tablet 20 mg  20 mg Oral Daily Arleth Ashford, DO   20 mg at 01/31/21 1723    divalproex (DEPAKOTE ER) extended release tablet 1,000 mg  1,000 mg Oral Nightly Arleth Ashford, DO   1,000 mg at 01/31/21 2221    atorvastatin (LIPITOR) tablet 80 mg  80 mg Oral Daily [employer], DO   80 mg at 01/31/21 1723    donepezil (ARICEPT) tablet 5 mg  5 mg Oral Nightly Arleth Ashford, DO   5 mg at 01/31/21 2222    miconazole (MICOTIN) 2 % powder   Topical BID [employer], DO   Given at 01/31/21 2255    amLODIPine (NORVASC) tablet 10 mg  10 mg Oral Daily Arleth Ashford, DO   Stopped at 01/31/21 1325    benztropine (COGENTIN) tablet 0.5 mg  0.5 mg Oral BID Arleth Ashford, DO   0.5 mg at 01/31/21 2222    digoxin (LANOXIN) tablet 125 mcg  125 mcg Oral Daily Arleth Ashford, DO   125 mcg at 01/31/21 1115    doxazosin (CARDURA) tablet 2 mg  2 mg Oral 2 times per day [employer], DO   2 mg at 01/31/21 2218    lisinopril (PRINIVIL;ZESTRIL) tablet 40 mg  40 mg Oral Daily Arleth Ashford, DO   Stopped at 01/31/21 1325    metoprolol tartrate (LOPRESSOR) tablet 25 mg  25 mg Oral BID Arleth Ashford, DO   25 mg at 01/31/21 2222    senna (SENOKOT) tablet 8.6 mg  1 tablet Oral Nightly Arleth Ashford, DO   8.6 mg at 01/31/21 2221    sodium chloride flush 0.9 % injection 10 mL  10 mL Intravenous 2 times per day [employer], DO   10 mL at 01/31/21 2222    sodium chloride flush 0.9 % injection 10 mL  10 mL Intravenous PRN Arleth Ashford DO        enoxaparin (LOVENOX) injection 40 mg  40 mg Subcutaneous Daily Arleth Ashford DO   40 mg at 01/31/21 1116    promethazine (PHENERGAN) tablet 12.5 mg  12.5 mg Oral Q6H PRN Amanda Starkweather, DO        Or    ondansetron (ZOFRAN) injection 4 mg  4 mg Intravenous Q6H PRN Amanda Starkweather, DO        acetaminophen (TYLENOL) tablet 650 mg  650 mg Oral Q6H PRN Amanda Starkweather, DO   650 mg at 01/31/21 1115    Or    acetaminophen (TYLENOL) suppository 650 mg  650 mg Rectal Q6H PRN Amanda Starkweather, DO        magnesium hydroxide (MILK OF MAGNESIA) 400 MG/5ML suspension 30 mL  30 mL Oral Daily PRN Amanda Starkweather, DO           Allergies   Allergen Reactions    Nsaids      Renal        Nursing Notes Reviewed    Physical Exam:  Triage VS:    ED Triage Vitals   Enc Vitals Group      BP 01/30/21 0459 120/82      Pulse 01/30/21 0459 80      Resp 01/30/21 0459 16      Temp 01/30/21 0650 97.7 °F (36.5 °C)      Temp Source 01/30/21 0650 Oral      SpO2 01/30/21 0459 98 %      Weight 01/30/21 0459 214 lb (97.1 kg)      Height 01/30/21 0459 5' 9\" (1.753 m)     My pulse ox interpretation is -  WNL    General appearance:  Patient is awake, alert. Following commands and answering questions. GCS is 15. Non-toxic in appearance. Skin:  Warm. Dry. Intact. No rashes, petechiae, purpura. Eye:  Pupils are equal, round, reactive. Extraocular movements intact. No nystagmus. No gaze deviation. Head, ears, nose, mouth and throat:  Head is normocephalic, atraumatic. No external masses or lesions. No nasal drainage. Pharynx is clear, non-erythematous. Airway is patent. Mucous membranes are moist  Neck:  Supple. No nuchal rigidity. Trachea midline. No masses, thyromegaly or lymphadenopathy. No JVD. No carotid thrills or bruits. Extremity:  No clubbing, cyanosis, or edema. No joint swelling. Normal muscle tone. Heart: On telemetry monitoring, tachycardic rate and irregular rhythm. Audible S1 & S2. No audible murmurs, rubs, or gallops.    Perfusion: Symmetric peripheral pulses. Brisk capillary refill. Respiratory:  Lungs clear to auscultation bilaterally. No rales, rhonchi or wheezes. Respirations nonlabored. Abdominal:  Soft. Nontender. Non distended. Normal bowel sounds. No midline pulsatile abdominal masses, thrills or bruits. Back:  No CVA tenderness to palpation. No midline tenderness to palpation. Neurological:  Alert and oriented. Cranial nerves II through XII appear grossly intact. No focal or lateralizing neurological deficits. Psychiatric:  Cooperative.        *I have reviewed and interpreted all of the currently available results from this visit*    Labs  Results for orders placed or performed during the hospital encounter of 01/30/21   CBC Auto Differential   Result Value Ref Range    WBC 10.0 4.0 - 10.5 K/CU MM    RBC 4.24 (L) 4.6 - 6.2 M/CU MM    Hemoglobin 12.3 (L) 13.5 - 18.0 GM/DL    Hematocrit 37.9 (L) 42 - 52 %    MCV 89.4 78 - 100 FL    MCH 29.0 27 - 31 PG    MCHC 32.5 32.0 - 36.0 %    RDW 14.9 11.7 - 14.9 %    Platelets 556 077 - 171 K/CU MM    MPV 9.6 7.5 - 11.1 FL    Differential Type AUTOMATED DIFFERENTIAL     Segs Relative 79.3 (H) 36 - 66 %    Lymphocytes % 11.8 (L) 24 - 44 %    Monocytes % 7.2 (H) 0 - 4 %    Eosinophils % 0.8 0 - 3 %    Basophils % 0.3 0 - 1 %    Segs Absolute 8.0 K/CU MM    Lymphocytes Absolute 1.2 K/CU MM    Monocytes Absolute 0.7 K/CU MM    Eosinophils Absolute 0.1 K/CU MM    Basophils Absolute 0.0 K/CU MM    Nucleated RBC % 0.0 %    Total Nucleated RBC 0.0 K/CU MM    Total Immature Neutrophil 0.06 K/CU MM    Immature Neutrophil % 0.6 (H) 0 - 0.43 %   Comprehensive Metabolic Panel w/ Reflex to MG   Result Value Ref Range    Sodium 137 135 - 145 MMOL/L    Potassium 3.7 3.5 - 5.1 MMOL/L    Chloride 104 99 - 110 mMol/L    CO2 19 (L) 21 - 32 MMOL/L    BUN 15 6 - 23 MG/DL    CREATININE 1.3 0.9 - 1.3 MG/DL    Glucose 158 (H) 70 - 99 MG/DL    Calcium 8.3 8.3 - 10.6 MG/DL    Albumin 3.6 3.4 - 5.0 GM/DL    Total Protein 6.1 (L) 6.4 - 8.2 GM/DL    Total Bilirubin 0.4 0.0 - 1.0 MG/DL    ALT 10 10 - 40 U/L    AST 13 (L) 15 - 37 IU/L    Alkaline Phosphatase 55 40 - 128 IU/L    GFR Non- 54 (L) >60 mL/min/1.73m2    GFR African American >60 >60 mL/min/1.73m2    Anion Gap 14 4 - 16   TSH without Reflex   Result Value Ref Range    TSH, High Sensitivity 4.020 0.270 - 4.20 uIu/ml   Troponin   Result Value Ref Range    Troponin T 0.037 (H) <0.01 NG/ML   Urinalysis Reflex to Culture    Specimen: Urine   Result Value Ref Range    Color, UA MARIAH (A) YELLOW    Clarity, UA SLIGHTLY CLOUDY (A) CLEAR    Glucose, Urine NEGATIVE NEGATIVE MG/DL    Bilirubin Urine NEGATIVE NEGATIVE MG/DL    Ketones, Urine NEGATIVE NEGATIVE MG/DL    Specific Gravity, UA 1.024 1.001 - 1.035    Blood, Urine SMALL (A) NEGATIVE    pH, Urine 6.0 5.0 - 8.0    Protein,  (A) NEGATIVE MG/DL    Urobilinogen, Urine 1 0.2 - 1.0 MG/DL    Nitrite Urine, Quantitative POSITIVE (A) NEGATIVE    Leukocyte Esterase, Urine LARGE (A) NEGATIVE    RBC, UA 36 (H) 0 - 3 /HPF    WBC,  (H) 0 - 2 /HPF    Bacteria, UA OCCASIONAL (A) NEGATIVE /HPF    WBC Clumps, UA MANY /HPF    Yeast, UA MANY /HPF    Mucus, UA FEW (A) NEGATIVE HPF    Trichomonas, UA NONE SEEN NONE SEEN /HPF   Digoxin Level   Result Value Ref Range    Digoxin Lvl 0.7 (L) 0.8 - 2.0 ng/mL    DOSE AMOUNT DOSE AMT. GIVEN - UNKNOWN     DOSE TIME DOSE TIME GIVEN - UNKNOWN    Brain Natriuretic Peptide   Result Value Ref Range    Pro-.0 (H) <300 PG/ML   Magnesium   Result Value Ref Range    Magnesium 1.9 1.8 - 2.4 mg/dl   POCT Glucose   Result Value Ref Range    Glucose 127 mg/dL    QC OK?  yes    POCT Glucose   Result Value Ref Range    POC Glucose 127 (H) 70 - 99 MG/DL   POCT Glucose   Result Value Ref Range    POC Glucose 135 (H) 70 - 99 MG/DL   POCT Glucose   Result Value Ref Range    POC Glucose 113 (H) 70 - 99 MG/DL   EKG 12 Lead   Result Value Ref Range    Ventricular Rate 131 BPM    Atrial Rate 131 BPM    P-R Interval 120 ms    QRS Duration 102 ms    Q-T Interval 300 ms    QTc Calculation (Bazett) 443 ms    P Axis 0 degrees    R Axis -14 degrees    T Axis 56 degrees    Diagnosis       Atrial flutter  Incomplete right bundle branch block  ST & T wave abnormality, consider anterior ischemia  Abnormal ECG  When compared with ECG of 30-JAN-2021 04:44,  Previous ECG has undetermined rhythm, needs review  Confirmed by MARION Taylor ([id number]) on 1/30/2021 2:06:24 PM     EKG 12 Lead   Result Value Ref Range    Ventricular Rate 153 BPM    Atrial Rate 150 BPM    P-R Interval 128 ms    QRS Duration 98 ms    Q-T Interval 304 ms    QTc Calculation (Bazett) 485 ms    P Axis 3 degrees    R Axis -6 degrees    T Axis -44 degrees    Diagnosis       Atrial flutter with 2 to 1 block  RSR' or QR pattern in V1 suggests right ventricular conduction delay  ST & T wave abnormality, consider anterior ischemia  Abnormal ECG  When compared with ECG of 08-JAN-2021 06:48,  Current undetermined rhythm precludes rhythm comparison, needs review  ST now depressed in Inferior leads  ST now depressed in Anterior leads  Inverted T waves have replaced nonspecific T wave abnormality in Anterior leads  Confirmed by MARION Taylor ([id number]) on 1/30/2021 2:05:37 PM          Radiographs:  Xr Chest Portable    Result Date: 1/30/2021  EXAMINATION: ONE XRAY VIEW OF THE CHEST 1/30/2021 4:51 am COMPARISON: Chest radiograph dated December 19, 2020. HISTORY: ORDERING SYSTEM PROVIDED HISTORY: cough TECHNOLOGIST PROVIDED HISTORY: Reason for exam:->cough Reason for Exam: cough Acuity: Acute Type of Exam: Initial FINDINGS: The cardiomediastinal silhouette is stable. There is a small to moderate-sized left pleural effusion with left basilar opacity. There is no evidence of a pneumothorax. There is no evidence of edema.      Small to moderate-sized left pleural effusion with left basilar opacity, likely representing atelectasis/infiltrates. This is noted on multiple prior exams. CT Brain - pending  CTA Pulm - pending      EKG: (All EKG's are interpreted by myself in the absence of a cardiologist). EKG performed on 1/30/2021 at 4:44 AM demonstrates ventricular rate of 153 bpm undetermined rhythm. Possible right ventricular conduction delay. Nonspecific ST and T wave changes in the anterior leads. Repeat EKG performed on 1/30/2021 at 6:10 AM demonstrates ventricular rate of 131 bpm sinus tachycardia. Incomplete right bundle branch block pattern. Nonspecific ST-T wave changes in the anterior leads. MDM:  Patient was seen and evaluated in the emergency department by myself. A thorough history and physical exam were performed, prior medical records were reviewed. Upon arrival to the emergency department, patient's vital signs were noted. No tachycardia, tachypnea, hypoxia. Blood pressure within normal limits. Patient is afebrile. . Patient was connected to continuous cardiopulmonary monitoring. Rhythm strip interpreted by myself demonstrating tachycardic rate, sinus vs afib. EKG was performed, interpreted by myself, as detailed above. Differential diagnoses and treatment plan were discussed. IV access was established. Pertinent labs were drawn and radiographic studies were performed, once results are available, they are reviewed by myself. Labs demonstrate no leukocytosis. Patient does have normocytic anemia hemoglobin 12.3. No thrombocytopenia. Electrolytes are within normal limits apart from bicarb which is 19. No signs of kidney injury. Glucose 158. AST ALT unremarkable. TSH 4.02. Magnesium 1.9.  proBNP 393.0. Digoxin 0.7. Troponin 0.037. Radiographic studies are currently pending. Case signed out to Dr. Narcisa Lau on 01/30/2021 at 0600 pending radiographic imaging and final disposition.        ED Provider Disposition:  DISPOSITION Admitted 01/30/2021 07:52:02 AM      Comment: Please note this report has been produced using speech recognition software and may contain errors related to that system including errors in grammar, punctuation, and spelling, as well as words and phrases that may be inappropriate. Efforts were made to edit the dictations.        [address] [facility],   02/01/21 1819

## 2021-02-01 NOTE — PROGRESS NOTES
Speech Language Pathology  [facility]   CLINICAL BEDSIDE SWALLOW EVALUATION    NAME: Odilon Mcdaniel Records. : 1950  MRN: [id number]    Impression  Dysphagia Diagnosis: Swallow function appears grossly intact  Dysphagia Outcome Severity Scale: Level 6: Within functional limits/Modified independence     Odilon Mcdaniel Records. was seen for a clinical bedside swallow evaluation following admission for sick sinus syndrome and chronic respiratory failure. H/o left hemiparesis 2/2 right occipital intracranial hemorrhage, colon CA, and uncontrolled DM2. Chest imaging reports indicated persistent left basilar opacity. Pt was alert and confused vs baseline cognitive deficit and followed all commands for oral motor exam which was without focal weakness. PO trials of regular and pureed solids and thins by straw completed with reduced mastication for regular solids. Pt expressed pain in his gums due to no dentition. Adequate a-p transit and clearance.  0 s/s aspiration across all trials. Swallow timing and laryngeal elevation appeared [place]. Recommend:  Dysphagia 3/Thins. No needs identified at this time. ADMISSION DATE: 2021  ADMITTING DIAGNOSIS: has Pleural effusion, left; SAMANTHA on CPAP; Chronic obstructive pulmonary disease ([address] TIA (transient ischemic attack); Bipolar 1 disorder ([address] Coronary artery disease involving native coronary artery of native heart without angina pectoris; Essential hypertension; REYES (dyspnea on exertion); Anemia, chronic disease; Diuretic-induced hypokalemia; Diastolic CHF ([address] Acute respiratory failure ([address] Hyperkalemia; Adrenal mass ([address] Diabetes mellitus, type 2 ([address] Hyponatremia; Paroxysmal atrial fibrillation ([address] Empyema of left pleural space ([address] Hyperlipidemia; Lumbar radiculopathy; CKD (chronic kidney disease); Uncontrolled type 2 diabetes mellitus with peripheral neuropathy ([address]  Chronic kidney disease (CKD), stage III (moderate); Cervical stenosis of spine; Spinal stenosis of lumbar region without neurogenic claudication; Somatic dysfunction of back; Somatic dysfunction of cervical region; Somatic dysfunction of pelvis region; Somatic dysfunction of both lower extremities; Empyema lung ([address] Acute kidney injury superimposed on CKD ([address] Chronic diastolic heart failure ([address] Hypotensive episode; Bradycardia on ECG; Lightheadedness; Acute on chronic respiratory failure with hypoxia ([address] Severe mixed bipolar 1 disorder without psychosis ([address] Acute chest pain; Atrial tachycardia ([address] Overlapping malignant neoplasm of colon ([address] Colon adenocarcinoma ([address] Acute left-sided weakness; Fall at home; Fall at home, initial encounter; Chronic pain of both shoulders; Hypertensive heart disease with stage 5 chronic kidney disease, not on chronic dialysis ([address] Bell's palsy; 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 ([address] Localized edema; Intracranial hemorrhage ([address] Left hemiparesis ([address] Dysarthria due to and not concurrent with spontaneous intracerebral hemorrhage; Gait disturbance; Bipolar disorder, in partial remission, most recent episode depressed ([address]  Intraparenchymal hematoma of brain ([address] and SSS (sick sinus syndrome) ([address] on their problem list.     ONSET DATE: 1/30/2021     Recent Chest Xray/CT of Chest:    Narrative   EXAMINATION:   ONE XRAY VIEW OF THE CHEST       1/30/2021 4:51 am       COMPARISON:   Chest radiograph dated December 19, 2020.       HISTORY:   ORDERING SYSTEM PROVIDED HISTORY: cough   TECHNOLOGIST PROVIDED HISTORY:   Reason for exam:->cough   Reason for Exam: cough   Acuity: Acute   Type of Exam: Initial       FINDINGS:   The cardiomediastinal silhouette is stable.  There is a small to   moderate-sized left pleural effusion with left basilar opacity.  There is no   evidence of a pneumothorax.  There is no evidence of edema.           Impression   Small to moderate-sized left pleural effusion with left basilar opacity,   likely representing atelectasis/infiltrates.  This is noted on multiple prior   exams. Date of Eval: 2/1/2021  Evaluating Therapist: Basim Dey    Current Diet level:  Current Diet : Regular  Current Liquid Diet : Thin    Primary Complaint  Patient Complaint: denies dysphagia    Pain:  Pain Assessment  Pain Assessment: 0-10  Pain Level: 0  Patient's Stated Pain Goal: No pain  Pain Type: Chronic pain  Pain Location: Back, Neck  Pain Orientation: Lower  Pain Descriptors: Aching, Discomfort  Pain Frequency: Intermittent  Pain Onset: On-going  Response to Pain Intervention: Patient Satisfied    Reason for Referral  [place]. was referred for a bedside swallow evaluation to assess the efficiency of his swallow function, identify signs and symptoms of aspiration and make recommendations regarding safe dietary consistencies, effective compensatory strategies, and safe eating environment. Treatment Plan  Requires SLP Intervention: No  Duration/Frequency of Treatment: n/a  D/C Recommendations: To be determined     Recommended Diet and Intervention  Diet Solids Recommendation: Dysphagia Soft and Bite-Sized (Dysphagia III)  Liquid Consistency Recommendation: Thin  Recommended Form of Meds: PO  Recommendations: Self feed     Compensatory Swallowing Strategies:  n/a     Treatment/Goals  Short-term Goals  Timeframe for Short-term Goals: n/a  Long-term Goals  Timeframe for Long-term Goals: n/a    General  Chart Reviewed: Yes  Behavior/Cognition: Alert; Cooperative;Confused  Respiratory Status: Room air  O2 Device: None (Room air)  Communication Observation: Functional  Follows Directions: Complex  Dentition: Edentulous  Patient Positioning: Upright in chair  Baseline Vocal Quality: Normal  Volitional Cough: Strong  Prior Dysphagia History: none charted  Consistencies Administered: Reg solid; Thin - straw    Vision/Hearing  Hearing  Hearing: Within functional limits    Oral Motor Deficits  Oral/Motor  Oral Motor:  Within functional limits    Oral Phase Dysfunction  Oral Phase  Oral Phase: Exceptions  Oral Phase Dysfunction  Impaired Mastication: Reg Solid     Indicators of Pharyngeal Phase Dysfunction   Pharyngeal Phase  Pharyngeal Phase: WNL    Prognosis  Prognosis  Prognosis for safe diet advancement: good  Barriers to reach goals: cognitive deficits  Individuals consulted  Consulted and agree with results and recommendations: Patient    Education  Patient Education: results and recommendations  Patient Education Response: Verbalizes understanding;Needs reinforcement  Safety Devices in place: Yes  Type of devices: Left in chair       Therapy Time  SLP Individual Minutes  Time In: 1130  Time Out: 1200  Minutes: Felisa Mallory[address] [place] [address]  2/1/2021 12:49 PM

## 2021-02-01 NOTE — PROGRESS NOTES
[facility]  Sapna Dozier  Phone: [phone]    Fax [phone]                  Lee Ann Taylor MD, Mariusz Lopez MD, Sri Hoskins MD, MD Anna Lam MD Alcario Moose, MD Arthur Lady, MD Marcy Habermann, APRN      Leidy Mamadou, APRN  Jeremiah Palm, [address] Martinez, APRN    Cardiology Progress Note     Today's Plan: continue home medications    Admit Date:  1/30/2021    Consult reason/ Seen today for: atrial fibrillation    Subjective and  Overnight Events: Patient states that he is feeling fairly well. He was very confused yesterday evening. He states that he does not remember going home. He currently denies any chest pain or shortness of breath. He is going in and out of A. fib    Assessment / Plan / Recommendation:     1. Atrial fibrillation: No anticoagulation due to concerns for recent intracranial hemorrhage and intraventricular bleed. Will attempt to rate control/rhythm control with amiodarone. 2. Hypertension: Stable. Continue current medications  3. History of coronary artery disease: Stress test May 2020 showed no ischemia. 4. History of heart failure with preserved ejection fraction. Echocardiogram of 5/11/2020 showed an ejection fraction of 55%. Patient to continue guideline directed therapy with metoprolol 25 mg twice daily, digoxin 125 mcg daily Cardura 2 mg twice daily, and Norvasc 10 mg daily. 5. Dyslipidemia: Continue statin therapy   6. DVT prophylaxis if not contraindicated while in the hospital.    7. Patient is high risk for readmission. He has issues with adhering to medical recommendations including follow-up, medications, and treatments. History of Presenting Illness:    Chief complain on admission : 70 y. o.year old who is admitted for  Chief Complaint   Patient presents with    Generalized Body Aches     overall weakness, hx of strokes        Past medical history:    has a past medical history of Anemia, Arthritis, Back pain, chronic, Bipolar 1 disorder ([address] CAD (coronary artery disease), Cerebral artery occlusion with cerebral infarction ([address] Chronic kidney disease (CKD), stage III (moderate), CKD (chronic kidney disease), COPD (chronic obstructive pulmonary disease) ([address] Diabetes mellitus, type 2 ([address] Diabetic peripheral neuropathy ([address] Diastolic CHF ([address] Gastric ulcer, H/O cardiovascular stress test, Heart murmur, Hiatal hernia, History of cardiac cath, Hx of migraines, HX OTHER MEDICAL, Hyperlipidemia, Hypertension, Intraparenchymal hematoma of brain ([address] Lumbar radiculopathy, Overlapping malignant neoplasm of colon ([address] Panic attack, Pleural effusion, S/P thoracentesis, Sleep apnea, SOBOE (shortness of breath on exertion), and Spinal stenosis. Past surgical history:   has a past surgical history that includes Neck surgery (1998); Carpal tunnel release (Bilateral, 1989); knee surgery (Bilateral); thoracentesis (Left, 12/20/2013); Elbow surgery (Left, 2000's); Thoracentesis (4/25/2016); Tonsillectomy; Thoracentesis (Left, 11/15/2016); thoracotomy (Left, 03/18/2019); thoracotomy (Left, 3/18/2019); Upper gastrointestinal endoscopy (N/A, 5/12/2020); Colonoscopy (N/A, 5/12/2020); and Small intestine surgery (N/A, 5/21/2020). Social History:   reports that he quit smoking about 10 years ago. His smoking use included cigarettes. He has a 45.00 pack-year smoking history. He has never used smokeless tobacco. He reports previous alcohol use. He reports current drug use. Frequency: 7.00 times per week. Drug: Marijuana. Family history:  family history includes Heart Disease in his mother; High Blood Pressure in his father.     Allergies   Allergen Reactions    Nsaids      Renal      Review of Systems:    All 14 systems were reviewed and are negative  Except for the positive findings  which as documented     /72   Pulse 64   Temp 97.8 °F (36.6 °C) (Oral)   Resp 24   Ht 5' 9\" (1.753 m)   Wt 211 lb (95.7 kg)   SpO2 94%   BMI 31.16 kg/m²       Intake/Output Summary (Last 24 hours) at 2/1/2021 1436  Last data filed at 2/1/2021 0429  Gross per 24 hour   Intake 400 ml   Output 360 ml   Net 40 ml       Physical Exam  Vitals signs reviewed. Constitutional:       General: He is not in acute distress. Appearance: Normal appearance. He is obese. He is not ill-appearing. Neck:      Musculoskeletal: Neck supple. No muscular tenderness. Vascular: No carotid bruit. Cardiovascular:      Rate and Rhythm: Normal rate and regular rhythm. Pulses: Normal pulses. Heart sounds: Normal heart sounds. No murmur. Pulmonary:      Effort: Pulmonary effort is normal. No respiratory distress. Breath sounds: Normal breath sounds. Musculoskeletal:         General: No swelling or deformity. Neurological:      Mental Status: He is alert.          Telemetry Reviewed:   Sinus rhythm    Medications:    amiodarone  400 mg Oral BID    escitalopram  20 mg Oral Daily    divalproex  1,000 mg Oral Nightly    atorvastatin  80 mg Oral Daily    donepezil  5 mg Oral Nightly    miconazole   Topical BID    amLODIPine  10 mg Oral Daily    benztropine  0.5 mg Oral BID    digoxin  125 mcg Oral Daily    doxazosin  2 mg Oral 2 times per day    lisinopril  40 mg Oral Daily    metoprolol tartrate  25 mg Oral BID    senna  1 tablet Oral Nightly    sodium chloride flush  10 mL Intravenous 2 times per day    enoxaparin  40 mg Subcutaneous Daily       sodium chloride flush, promethazine **OR** ondansetron, acetaminophen **OR** acetaminophen, magnesium hydroxide    Lab Data:  CBC:   Recent Labs     01/30/21  0510 01/31/21  0600 02/01/21  0757   WBC 10.0 7.8 5.8   HGB 12.3* 12.5* 12.2*   HCT 37.9* 39.4* 37.5*   MCV 89.4 90.8 87.8    157 144     BMP:   Recent Labs     01/30/21  0510 01/31/21  0600 02/01/21  0757    140 140   K 3.7 4.5 3.8    109 108 CO2 19* 20* 22   BUN 15 8 8   CREATININE 1.3 1.2 1.2     PT/INR: No results for input(s): PROTIME, INR in the last 72 hours. BNP:    Recent Labs     01/30/21  0510   PROBNP 393.0*     TROPONIN:   Recent Labs     01/30/21  0510   TROPONINT 0.037*        ECHO :   echocardiogram 5/11/2020  Summary   Limited study due to patients body habitus. Patient was unable to lie on side. Was unable to press on left chest area due to soreness from a recent lung surgery. Left ventricular function is low normal, EF is estimated at 50-55%. Moderate left ventricular hypertrophy. No significant valvular disease noted. No evidence of pericardial effusion. All labs, medications and tests reviewed by myself , continue all other medications of all above medical condition listed as is except for changes mentioned above. Thank you very much for consult , please call with questions. Electronically signed by JULEE Tafoya CNP on 2/1/2021 at 2:36 PM      [facility]    I have seen ,spoken to  and examined this patient personally, independently of the nurse practitioner. I have reviewed the hospital care given to date and reviewed all pertinent labs and imaging. The plan was developed mutually at the time of the visit with the patient,  NP   and myself. I have spoken with patient, nursing staff and provided written and verbal instructions . The above note has been reviewed and I agree with the assessment, diagnosis, and treatment plan with changes made by me as follows     HPI:  I have reviewed the above HPI  And agree with above   Colletta Charnley is a 70 y. o.year old who and presents with had concerns including Generalized Body Aches (overall weakness, hx of strokes). Chief Complaint   Patient presents with    Generalized Body Aches     overall weakness, hx of strokes     Please review addendum/changes made to note above   Interval history: Overall still confused going in and out of A. fib    Physical Exam:  General:  Awake, alert, NAD  Head:normal  Eye:normal  Neck:  No JVD   Chest:  Clear to auscultation, respiration easy  Cardiovascular:  S1 and S2 audible, No added heart sounds, No signs of ankle edema, or volume overload, No evidence of JVD, No crackles  Abdomen:   nontender  Extremities:  no* edema  Pulses; palpable  Neuro: grossly normal      MEDICAL DECISION MAKING;    I agree with the above plan, which was planned by myself and discussed with NP. Start amiodarone therapy try and keep him in rhythm  No anticoagulation due to bleeding  Will need placement  Watchman device?       Iggy Wu MD [address] [facility] 02/01/21

## 2021-02-02 LAB
BASOPHILS ABSOLUTE: 0 K/CU MM
BASOPHILS RELATIVE PERCENT: 0.3 % (ref 0–1)
DIFFERENTIAL TYPE: ABNORMAL
EOSINOPHILS ABSOLUTE: 0.2 K/CU MM
EOSINOPHILS RELATIVE PERCENT: 2.5 % (ref 0–3)
HCT VFR BLD CALC: 35.8 % (ref 42–52)
HEMOGLOBIN: 11.5 GM/DL (ref 13.5–18)
IMMATURE NEUTROPHIL %: 0.8 % (ref 0–0.43)
LYMPHOCYTES ABSOLUTE: 1.3 K/CU MM
LYMPHOCYTES RELATIVE PERCENT: 17.2 % (ref 24–44)
MCH RBC QN AUTO: 28.6 PG (ref 27–31)
MCHC RBC AUTO-ENTMCNC: 32.1 % (ref 32–36)
MCV RBC AUTO: 89.1 FL (ref 78–100)
MONOCYTES ABSOLUTE: 0.7 K/CU MM
MONOCYTES RELATIVE PERCENT: 9.7 % (ref 0–4)
NUCLEATED RBC %: 0 %
PDW BLD-RTO: 15.1 % (ref 11.7–14.9)
PLATELET # BLD: 158 K/CU MM (ref 140–440)
PMV BLD AUTO: 9.1 FL (ref 7.5–11.1)
RBC # BLD: 4.02 M/CU MM (ref 4.6–6.2)
SEGMENTED NEUTROPHILS ABSOLUTE COUNT: 5.1 K/CU MM
SEGMENTED NEUTROPHILS RELATIVE PERCENT: 69.5 % (ref 36–66)
TOTAL IMMATURE NEUTOROPHIL: 0.06 K/CU MM
TOTAL NUCLEATED RBC: 0 K/CU MM
WBC # BLD: 7.3 K/CU MM (ref 4–10.5)

## 2021-02-02 PROCEDURE — 85025 COMPLETE CBC W/AUTO DIFF WBC: CPT

## 2021-02-02 PROCEDURE — 97535 SELF CARE MNGMENT TRAINING: CPT

## 2021-02-02 PROCEDURE — 6370000000 HC RX 637 (ALT 250 FOR IP): Performed by: STUDENT IN AN ORGANIZED HEALTH CARE EDUCATION/TRAINING PROGRAM

## 2021-02-02 PROCEDURE — 6370000000 HC RX 637 (ALT 250 FOR IP): Performed by: INTERNAL MEDICINE

## 2021-02-02 PROCEDURE — APPSS45 APP SPLIT SHARED TIME 31-45 MINUTES: Performed by: NURSE PRACTITIONER

## 2021-02-02 PROCEDURE — 94761 N-INVAS EAR/PLS OXIMETRY MLT: CPT

## 2021-02-02 PROCEDURE — G0378 HOSPITAL OBSERVATION PER HR: HCPCS

## 2021-02-02 PROCEDURE — 2580000003 HC RX 258: Performed by: STUDENT IN AN ORGANIZED HEALTH CARE EDUCATION/TRAINING PROGRAM

## 2021-02-02 PROCEDURE — 97530 THERAPEUTIC ACTIVITIES: CPT

## 2021-02-02 PROCEDURE — 99232 SBSQ HOSP IP/OBS MODERATE 35: CPT | Performed by: INTERNAL MEDICINE

## 2021-02-02 PROCEDURE — 6360000002 HC RX W HCPCS: Performed by: STUDENT IN AN ORGANIZED HEALTH CARE EDUCATION/TRAINING PROGRAM

## 2021-02-02 PROCEDURE — 1200000000 HC SEMI PRIVATE

## 2021-02-02 PROCEDURE — 2500000003 HC RX 250 WO HCPCS: Performed by: STUDENT IN AN ORGANIZED HEALTH CARE EDUCATION/TRAINING PROGRAM

## 2021-02-02 PROCEDURE — 36415 COLL VENOUS BLD VENIPUNCTURE: CPT

## 2021-02-02 RX ADMIN — MICONAZOLE NITRATE: 2 POWDER TOPICAL at 09:16

## 2021-02-02 RX ADMIN — AMLODIPINE BESYLATE 10 MG: 10 TABLET ORAL at 09:12

## 2021-02-02 RX ADMIN — AMIODARONE HYDROCHLORIDE 400 MG: 200 TABLET ORAL at 09:14

## 2021-02-02 RX ADMIN — DIVALPROEX SODIUM 1000 MG: 500 TABLET, FILM COATED, EXTENDED RELEASE ORAL at 19:54

## 2021-02-02 RX ADMIN — SENNOSIDES 8.6 MG: 8.6 TABLET, FILM COATED ORAL at 19:55

## 2021-02-02 RX ADMIN — AMIODARONE HYDROCHLORIDE 400 MG: 200 TABLET ORAL at 19:54

## 2021-02-02 RX ADMIN — ATORVASTATIN CALCIUM 80 MG: 80 TABLET, FILM COATED ORAL at 09:13

## 2021-02-02 RX ADMIN — DOXAZOSIN 2 MG: 1 TABLET ORAL at 19:54

## 2021-02-02 RX ADMIN — DONEPEZIL HYDROCHLORIDE 5 MG: 5 TABLET, FILM COATED ORAL at 19:55

## 2021-02-02 RX ADMIN — METOPROLOL TARTRATE 25 MG: 25 TABLET, FILM COATED ORAL at 19:55

## 2021-02-02 RX ADMIN — DOXAZOSIN 2 MG: 1 TABLET ORAL at 09:12

## 2021-02-02 RX ADMIN — SODIUM CHLORIDE, PRESERVATIVE FREE 10 ML: 5 INJECTION INTRAVENOUS at 19:54

## 2021-02-02 RX ADMIN — ENOXAPARIN SODIUM 40 MG: 40 INJECTION SUBCUTANEOUS at 09:14

## 2021-02-02 RX ADMIN — METOPROLOL TARTRATE 25 MG: 25 TABLET, FILM COATED ORAL at 09:14

## 2021-02-02 RX ADMIN — DIGOXIN 125 MCG: 125 TABLET ORAL at 09:14

## 2021-02-02 RX ADMIN — ESCITALOPRAM OXALATE 20 MG: 10 TABLET ORAL at 09:14

## 2021-02-02 RX ADMIN — BENZTROPINE MESYLATE 0.5 MG: 1 TABLET ORAL at 09:13

## 2021-02-02 RX ADMIN — MICONAZOLE NITRATE: 2 POWDER TOPICAL at 19:59

## 2021-02-02 RX ADMIN — LISINOPRIL 40 MG: 40 TABLET ORAL at 09:13

## 2021-02-02 RX ADMIN — SODIUM CHLORIDE, PRESERVATIVE FREE 10 ML: 5 INJECTION INTRAVENOUS at 09:15

## 2021-02-02 RX ADMIN — BENZTROPINE MESYLATE 0.5 MG: 1 TABLET ORAL at 19:54

## 2021-02-02 NOTE — PROGRESS NOTES
Hospitalist Progress Note      Name:  Blake Wilson. /Age/Sex: 1950  (70 y.o. male)   MRN & CSN:  [id number] & [id number] Admission Date/Time: 2021  4:37 AM   [facility]  [facility] PCP: John Enciso MD         Hospital Day: 4    Assessment and Plan:   Blake Wilson. is a 70 y.o.  male  who presents with atrial fibrillation.    -Atrial fibrillation/flutter with RVR  -Possible sick sinus syndrome. /min on admission. Heart rate ranges from 50-150s. There is concern for sick sinus syndrome  Off anticoagulation since mid 2020 due to  [address] diagnosed 2020. Heart rate currently controlled with amiodarone. He is currently on oral loading dose-400 mg twice daily for 7 days then 200 mg once a day after that. Plan  Continue amiodarone, digoxin and metoprolol. Cardiology input appreciated.     --HTN: stable. Continue amlodipine, doxazosin. --CAD: Stress test May 2020 showed no ischemia.  --HFpEF: well compensated. --Dyslipidemia: statin therapy. -HLD: statin  --Physical deconditioning - ECF placement at discharge. Diet DIET CARDIAC; Carb Control: 4 carb choices (60 gms)/meal; Dysphagia Soft and Bite-Sized   DVT Prophylaxis [x] Lovenox, []  Heparin, [] SCDs, [] Ambulation   GI Prophylaxis [] PPI,  [] H2 Blocker,  [] Carafate,  [] Diet/Tube Feeds   Code Status Full Code   Disposition  ECF   [facility]      History of Present Illness:   Patient seen and examined. No acute issues overnight. Does not appear confused today. Heart rate is controlled this morning. He denies chest pain or shortness of breath. Ten point ROS reviewed negative, unless as noted above    Objective:        Intake/Output Summary (Last 24 hours) at 2021 1019  Last data filed at 2021 0511  Gross per 24 hour   Intake --   Output 1350 ml   Net -1350 ml      Vitals:   Vitals:    21 0912   BP: 111/72   Pulse: 114   Resp:    Temp:    SpO2:      Physical Exam:   GEN Awake male, sitting upright in bed. RESP breathing comfortably on room air. CARDIO/VASC S1/S2 auscultated. Regular rate without appreciable murmurs. No peripheral edema. GI Abdomen is soft without significant tenderness, masses, or guarding. Bowel sounds are normoactive. MSK No gross joint deformities. SKIN Normal coloration, warm, dry. NEURO normal speech, no lateralizing weakness.   PSYCH Awake, alert, oriented x3    Medications:   Medications:    amiodarone  400 mg Oral BID    escitalopram  20 mg Oral Daily    divalproex  1,000 mg Oral Nightly    atorvastatin  80 mg Oral Daily    donepezil  5 mg Oral Nightly    miconazole   Topical BID    amLODIPine  10 mg Oral Daily    benztropine  0.5 mg Oral BID    digoxin  125 mcg Oral Daily    doxazosin  2 mg Oral 2 times per day    lisinopril  40 mg Oral Daily    metoprolol tartrate  25 mg Oral BID    senna  1 tablet Oral Nightly    sodium chloride flush  10 mL Intravenous 2 times per day    enoxaparin  40 mg Subcutaneous Daily      Infusions:   PRN Meds:     sodium chloride flush, 10 mL, PRN      promethazine, 12.5 mg, Q6H PRN    Or      ondansetron, 4 mg, Q6H PRN      acetaminophen, 650 mg, Q6H PRN    Or      acetaminophen, 650 mg, Q6H PRN      magnesium hydroxide, 30 mL, Daily PRN        CBC   Recent Labs     01/31/21  0600 02/01/21  0757 02/02/21  0323   WBC 7.8 5.8 7.3   HGB 12.5* 12.2* 11.5*   HCT 39.4* 37.5* 35.8*    144 158      BMP   Recent Labs     01/31/21  0600 02/01/21  0757    140   K 4.5 3.8    108   CO2 20* 22   BUN 8 8   CREATININE 1.2 1.2       Radiology report reviewed     Electronically signed by Sharif Petersen MD on 2/2/2021 at 10:19 AM

## 2021-02-02 NOTE — PROGRESS NOTES
[facility] ([facility] PHYSICAL [facility]  Sapna Dozier  Phone: [phone]    Fax [phone]                  Logan Barboza MD, Agustin Oliveira MD, Mindy Caba MD, MD Hamida Marshall MD Barba Baker, MD Joelyn Pesa, MD Duard Spar, APRN      Jose Prince, APRN  Mordechai Duane, [address], JULEE    Cardiology Progress Note     Today's Plan: sign off    Admit Date:  1/30/2021    Consult reason/ Seen today for: atrial fibrillation    Subjective and  Overnight Events: Patient sates that he is feling very well. He denies any chest pain or shortness of breath. He is slightly confused this afternoon to sequence of events. Assessment / Plan / Recommendation:     1. Atrial fibrillation: No anticoagulation due to concerns for recent intracranial hemorrhage and intraventricular bleed. Will attempt to rate control/rhythm control with amiodarone. Will debate watchman device as an outpatient  2. Hypertension: Stable. Continue current medications  3. History of coronary artery disease: Stress test May 2020 showed no ischemia. 4. History of heart failure with preserved ejection fraction. Appears euvolemic. Echocardiogram of 5/11/2020 showed an ejection fraction of 55%. Patient to continue guideline directed therapy with metoprolol 25 mg twice daily, digoxin 125 mcg daily Cardura 2 mg twice daily, and Norvasc 10 mg daily. 5. Dyslipidemia: Continue statin therapy   6. DVT prophylaxis if not contraindicated while in the hospital.    7. Patient is high risk for readmission. He has issues with adhering to medical recommendations including follow-up, medications, and treatments. 8. Will sign off. Please re consult if additional cardiology recommendations are needed. History of Presenting Illness:    Chief complain on admission : 70 y. o.year old who is admitted for  Chief Complaint   Patient presents with    Generalized Body Aches overall weakness, hx of strokes        Past medical history:    has a past medical history of Anemia, Arthritis, Back pain, chronic, Bipolar 1 disorder ([address] CAD (coronary artery disease), Cerebral artery occlusion with cerebral infarction ([address] Chronic kidney disease (CKD), stage III (moderate), CKD (chronic kidney disease), COPD (chronic obstructive pulmonary disease) ([address] Diabetes mellitus, type 2 ([address] Diabetic peripheral neuropathy ([address] Diastolic CHF ([address] Gastric ulcer, H/O cardiovascular stress test, Heart murmur, Hiatal hernia, History of cardiac cath, Hx of migraines, HX OTHER MEDICAL, Hyperlipidemia, Hypertension, Intraparenchymal hematoma of brain ([address] Lumbar radiculopathy, Overlapping malignant neoplasm of colon ([address] Panic attack, Pleural effusion, S/P thoracentesis, Sleep apnea, SOBOE (shortness of breath on exertion), and Spinal stenosis. Past surgical history:   has a past surgical history that includes Neck surgery (1998); Carpal tunnel release (Bilateral, 1989); knee surgery (Bilateral); thoracentesis (Left, 12/20/2013); Elbow surgery (Left, 2000's); Thoracentesis (4/25/2016); Tonsillectomy; Thoracentesis (Left, 11/15/2016); thoracotomy (Left, 03/18/2019); thoracotomy (Left, 3/18/2019); Upper gastrointestinal endoscopy (N/A, 5/12/2020); Colonoscopy (N/A, 5/12/2020); and Small intestine surgery (N/A, 5/21/2020). Social History:   reports that he quit smoking about 10 years ago. His smoking use included cigarettes. He has a 45.00 pack-year smoking history. He has never used smokeless tobacco. He reports previous alcohol use. He reports current drug use. Frequency: 7.00 times per week. Drug: Marijuana. Family history:  family history includes Heart Disease in his mother; High Blood Pressure in his father.     Allergies   Allergen Reactions    Nsaids      Renal      Review of Systems:    All 14 systems were reviewed and are negative  Except for the positive findings  which as documented     BP 93/60   Pulse 62   Temp 98.4 °F (36.9 °C) (Oral)   Resp (!) 32   Ht 5' 9\" (1.753 m)   Wt 211 lb (95.7 kg)   SpO2 94%   BMI 31.16 kg/m²       Intake/Output Summary (Last 24 hours) at 2/2/2021 2488  Last data filed at 2/2/2021 0511  Gross per 24 hour   Intake --   Output 1350 ml   Net -1350 ml       Physical Exam  Vitals signs reviewed. Constitutional:       General: He is not in acute distress. Appearance: Normal appearance. He is obese. He is not ill-appearing. Neck:      Musculoskeletal: Neck supple. No muscular tenderness. Vascular: No carotid bruit. Cardiovascular:      Rate and Rhythm: Normal rate and regular rhythm. Pulses: Normal pulses. Heart sounds: Normal heart sounds. No murmur. Pulmonary:      Effort: Pulmonary effort is normal. No respiratory distress. Breath sounds: Normal breath sounds. Musculoskeletal:         General: No swelling or deformity. Neurological:      Mental Status: He is alert.          Telemetry Reviewed:   Sinus rhythm    Medications:    amiodarone  400 mg Oral BID    escitalopram  20 mg Oral Daily    divalproex  1,000 mg Oral Nightly    atorvastatin  80 mg Oral Daily    donepezil  5 mg Oral Nightly    miconazole   Topical BID    amLODIPine  10 mg Oral Daily    benztropine  0.5 mg Oral BID    digoxin  125 mcg Oral Daily    doxazosin  2 mg Oral 2 times per day    lisinopril  40 mg Oral Daily    metoprolol tartrate  25 mg Oral BID    senna  1 tablet Oral Nightly    sodium chloride flush  10 mL Intravenous 2 times per day    enoxaparin  40 mg Subcutaneous Daily       sodium chloride flush, promethazine **OR** ondansetron, acetaminophen **OR** acetaminophen, magnesium hydroxide    Lab Data:  CBC:   Recent Labs     01/31/21  0600 02/01/21  0757 02/02/21  0323   WBC 7.8 5.8 7.3   HGB 12.5* 12.2* 11.5*   HCT 39.4* 37.5* 35.8*   MCV 90.8 87.8 89.1    144 158     BMP:   Recent Labs     01/31/21  0600 02/01/21  0759  140   K 4.5 3.8    108   CO2 20* 22   BUN 8 8   CREATININE 1.2 1.2     PT/INR: No results for input(s): PROTIME, INR in the last 72 hours. BNP:    No results for input(s): PROBNP in the last 72 hours. TROPONIN:   No results for input(s): TROPONINT in the last 72 hours. ECHO :   echocardiogram 5/11/2020  Summary   Limited study due to patients body habitus. Patient was unable to lie on side. Was unable to press on left chest area due to soreness from a recent lung surgery. Left ventricular function is low normal, EF is estimated at 50-55%. Moderate left ventricular hypertrophy. No significant valvular disease noted. No evidence of pericardial effusion. All labs, medications and tests reviewed by myself , continue all other medications of all above medical condition listed as is except for changes mentioned above. Thank you very much for consult , please call with questions. Electronically signed by JULEE Curran CNP on 2/2/2021 at 4:48 PM      CARDIOLOGY ATTENDING ADDENDUM    I have seen ,spoken to  and examined this patient personally, independently of the nurse practitioner. I have reviewed the hospital care given to date and reviewed all pertinent labs and imaging. The plan was developed mutually at the time of the visit with the patient,  NP   and myself. I have spoken with patient, nursing staff and provided written and verbal instructions . The above note has been reviewed and I agree with the assessment, diagnosis, and treatment plan with changes made by me as follows     HPI:  I have reviewed the above HPI  And agree with above   Afsaneh Nash is a 70 y. o.year old who and presents with had concerns including Generalized Body Aches (overall weakness, hx of strokes).   Chief Complaint   Patient presents with    Generalized Body Aches     overall weakness, hx of strokes     Please review addendum/changes made to note above   Interval history:  Confused , but

## 2021-02-02 NOTE — PROGRESS NOTES
Occupational Therapy      Occupational Therapy Treatment Note    Name: Milena Roa MRN: [id number] :   1950   Date:  2021   Admission Date: 2021 [facility]:  [facility]     Primary Problem: Generalized weakness, A fib with RVR    Restrictions/Precautions: General Precautions, Fall Risk, JUNIOR, Telemetry, Pulse Ox, BP cuff, Bed/chair alarm    Communication with other providers: RN    Subjective:  Patient states: \"I have to poop so bad! \"  Pain: Pt reported neck and shoulder pain but did not quantify    Objective:    Observation: Pt received in supine upon OT arrival. Pleasant and agreeable to treatment. Reported urgently needing to have a bowel movement. Objective Measures: Stable HR throughout session    Treatment, including education:  Therapeutic Activity Training:   Therapeutic activity training was instructed today. Cues were given for safety, sequence, UE/LE placement, awareness, and balance. Self Care Training:   Cues were given for safety, sequence, UE/LE placement, visual cues, and balance. Pt received in supine upon OT arrival. Pt re-educated on role of OT, POC, and importance of OOB activity. Pt reported urgently needing to have bowel movement. Pt transferred supine to sitting EOB max A with HOB elevated to 40'. Pt required mod coaching for scooting legs and use of bed rail. Pt able to sit at EOB level SBA with good static sitting balance. Pt completed sit to stand transfer from bed min A with min cues for safe hand placement. Pt ambulated to bathroom for toileting CGA with RW. Pt required max cues for safe RW mgmt in bathroom in prep for toilet transfer. Pt transferred onto toilet min A with cues for use of grab bar. Pt had large bowel movement and was dependent for task completion. Total assist required for clothing mgmt both directions and for paulino care in standing with CGA for standing balance with RW. Pt required mod A to stand from toilet with cues for use of grab bar.  Pt completed hand hygiene in standing at sink CGA with setup. Setup required for applying soap to hands and locating paper towels. Pt ambulated to chair CGA with RW, and transferred stand to sit min A with max cues for reaching back with arms. Pt educated on and trialed simple neck/scapular ROM exercises for mgmt of reported neck pain and able to demonstrate understanding. Pt left positioned for comfort in chair with all lines intact, all needs within reach, and chair alarm on. Assessment / Impression:    Patient's tolerance of treatment: Well  Adverse Reaction: None  Significant change in status and impact: Similar presentation from evaluation yesterday  Barriers to improvement: Dementia       Plan for Next Session:    Continue per OT POC. Continue to recommend SNF for rehab at discharge.       Time in: 1054  Time out: 1122  Timed treatment minutes: 28  Total treatment time: 28      Electronically signed by:    ACE Sprague/L, [address] [place] [id number]

## 2021-02-03 VITALS
SYSTOLIC BLOOD PRESSURE: 139 MMHG | WEIGHT: 213.1 LBS | BODY MASS INDEX: 31.56 KG/M2 | OXYGEN SATURATION: 97 % | HEART RATE: 63 BPM | TEMPERATURE: 97.9 F | RESPIRATION RATE: 18 BRPM | HEIGHT: 69 IN | DIASTOLIC BLOOD PRESSURE: 71 MMHG

## 2021-02-03 LAB
BASOPHILS ABSOLUTE: 0 K/CU MM
BASOPHILS RELATIVE PERCENT: 0.5 % (ref 0–1)
DIFFERENTIAL TYPE: ABNORMAL
EOSINOPHILS ABSOLUTE: 0.2 K/CU MM
EOSINOPHILS RELATIVE PERCENT: 2.4 % (ref 0–3)
HCT VFR BLD CALC: 38.7 % (ref 42–52)
HEMOGLOBIN: 12.2 GM/DL (ref 13.5–18)
IMMATURE NEUTROPHIL %: 0.6 % (ref 0–0.43)
LYMPHOCYTES ABSOLUTE: 1.1 K/CU MM
LYMPHOCYTES RELATIVE PERCENT: 17.2 % (ref 24–44)
MCH RBC QN AUTO: 28.4 PG (ref 27–31)
MCHC RBC AUTO-ENTMCNC: 31.5 % (ref 32–36)
MCV RBC AUTO: 90.2 FL (ref 78–100)
MONOCYTES ABSOLUTE: 0.5 K/CU MM
MONOCYTES RELATIVE PERCENT: 8 % (ref 0–4)
NUCLEATED RBC %: 0 %
PDW BLD-RTO: 15.1 % (ref 11.7–14.9)
PLATELET # BLD: 157 K/CU MM (ref 140–440)
PMV BLD AUTO: 9.4 FL (ref 7.5–11.1)
RBC # BLD: 4.29 M/CU MM (ref 4.6–6.2)
SARS-COV-2, NAAT: NOT DETECTED
SEGMENTED NEUTROPHILS ABSOLUTE COUNT: 4.5 K/CU MM
SEGMENTED NEUTROPHILS RELATIVE PERCENT: 71.3 % (ref 36–66)
TOTAL IMMATURE NEUTOROPHIL: 0.04 K/CU MM
TOTAL NUCLEATED RBC: 0 K/CU MM
WBC # BLD: 6.3 K/CU MM (ref 4–10.5)

## 2021-02-03 PROCEDURE — 94761 N-INVAS EAR/PLS OXIMETRY MLT: CPT

## 2021-02-03 PROCEDURE — 2580000003 HC RX 258: Performed by: STUDENT IN AN ORGANIZED HEALTH CARE EDUCATION/TRAINING PROGRAM

## 2021-02-03 PROCEDURE — G0378 HOSPITAL OBSERVATION PER HR: HCPCS

## 2021-02-03 PROCEDURE — 85025 COMPLETE CBC W/AUTO DIFF WBC: CPT

## 2021-02-03 PROCEDURE — 6370000000 HC RX 637 (ALT 250 FOR IP): Performed by: INTERNAL MEDICINE

## 2021-02-03 PROCEDURE — U0002 COVID-19 LAB TEST NON-CDC: HCPCS

## 2021-02-03 PROCEDURE — 1200000000 HC SEMI PRIVATE

## 2021-02-03 PROCEDURE — 6360000002 HC RX W HCPCS: Performed by: STUDENT IN AN ORGANIZED HEALTH CARE EDUCATION/TRAINING PROGRAM

## 2021-02-03 PROCEDURE — 6370000000 HC RX 637 (ALT 250 FOR IP): Performed by: STUDENT IN AN ORGANIZED HEALTH CARE EDUCATION/TRAINING PROGRAM

## 2021-02-03 PROCEDURE — 2500000003 HC RX 250 WO HCPCS: Performed by: STUDENT IN AN ORGANIZED HEALTH CARE EDUCATION/TRAINING PROGRAM

## 2021-02-03 PROCEDURE — 36415 COLL VENOUS BLD VENIPUNCTURE: CPT

## 2021-02-03 RX ORDER — DONEPEZIL HYDROCHLORIDE 5 MG/1
5 TABLET, FILM COATED ORAL NIGHTLY
Qty: 30 TABLET | Refills: 3 | DISCHARGE
Start: 2021-02-03

## 2021-02-03 RX ORDER — AMIODARONE HYDROCHLORIDE 200 MG/1
200 TABLET ORAL DAILY
Status: ON HOLD | DISCHARGE
Start: 2021-02-07 | End: 2022-02-24 | Stop reason: HOSPADM

## 2021-02-03 RX ORDER — AMIODARONE HYDROCHLORIDE 400 MG/1
400 TABLET ORAL 2 TIMES DAILY
Status: ON HOLD | DISCHARGE
Start: 2021-02-03 | End: 2021-02-26 | Stop reason: HOSPADM

## 2021-02-03 RX ORDER — ESCITALOPRAM OXALATE 10 MG/1
20 TABLET ORAL DAILY
DISCHARGE
Start: 2021-02-03 | End: 2022-02-19

## 2021-02-03 RX ORDER — ATORVASTATIN CALCIUM 80 MG/1
80 TABLET, FILM COATED ORAL DAILY
Qty: 30 TABLET | Refills: 5 | DISCHARGE
Start: 2021-02-03 | End: 2022-02-19

## 2021-02-03 RX ORDER — DIVALPROEX SODIUM 500 MG/1
1000 TABLET, EXTENDED RELEASE ORAL NIGHTLY
Qty: 60 TABLET | Refills: 5 | DISCHARGE
Start: 2021-02-03 | End: 2022-02-19

## 2021-02-03 RX ORDER — FAMOTIDINE 10 MG
10 TABLET ORAL 2 TIMES DAILY
Qty: 60 TABLET | Refills: 5 | DISCHARGE
Start: 2021-02-03 | End: 2022-02-19

## 2021-02-03 RX ADMIN — MICONAZOLE NITRATE: 2 POWDER TOPICAL at 09:41

## 2021-02-03 RX ADMIN — ENOXAPARIN SODIUM 40 MG: 40 INJECTION SUBCUTANEOUS at 09:41

## 2021-02-03 RX ADMIN — AMLODIPINE BESYLATE 10 MG: 10 TABLET ORAL at 09:40

## 2021-02-03 RX ADMIN — LISINOPRIL 40 MG: 40 TABLET ORAL at 09:40

## 2021-02-03 RX ADMIN — METOPROLOL TARTRATE 25 MG: 25 TABLET, FILM COATED ORAL at 09:40

## 2021-02-03 RX ADMIN — DIGOXIN 125 MCG: 125 TABLET ORAL at 09:40

## 2021-02-03 RX ADMIN — DOXAZOSIN 2 MG: 1 TABLET ORAL at 09:40

## 2021-02-03 RX ADMIN — ESCITALOPRAM OXALATE 20 MG: 10 TABLET ORAL at 09:40

## 2021-02-03 RX ADMIN — ATORVASTATIN CALCIUM 80 MG: 80 TABLET, FILM COATED ORAL at 09:40

## 2021-02-03 RX ADMIN — BENZTROPINE MESYLATE 0.5 MG: 1 TABLET ORAL at 09:40

## 2021-02-03 RX ADMIN — SODIUM CHLORIDE, PRESERVATIVE FREE 10 ML: 5 INJECTION INTRAVENOUS at 09:41

## 2021-02-03 RX ADMIN — AMIODARONE HYDROCHLORIDE 400 MG: 200 TABLET ORAL at 09:40

## 2021-02-03 ASSESSMENT — PAIN SCALES - GENERAL: PAINLEVEL_OUTOF10: 0

## 2021-02-03 NOTE — DISCHARGE SUMMARY
Discharge Summary    Name:  Naomy Pena /Age/Sex: 1950  (75 y.o. male)   MRN & CSN:  [id number] & [id number] Admission Date/Time: 2021  4:37 AM   Attending:  Shanice Mena MD Discharging Physician: Shanice Mena MD     Hospital Course:   Naomy Pena is a 70 y.o.  male  who presents with atrial fibrillation.     -Atrial fibrillation/flutter with RVR  -Possible sick sinus syndrome. HR was 150/min on admission. Heart rate was controlled with oral amiodarone loading dose of 400 mg twice daily for 7 days, in addition to digoxin and metoprolol. He had been off anticoagulation since mid 2020 due to  [address]. Cardiology will explore possible placement of watchman device to reduce stroke risk when he follows up in clinic. Plan  Amiodarone p.o. 400 mg twice daily till 2021, then 200 mg daily after that. Continue digoxin and metoprolol. --Asymptomatic bacteriuria in setting of chronic see catheter (present on admission)  Urine culture grew Pseudomonas aeruginosa  He was treated with IV cefepime for 7 days on his most recent hospitalization. He did not have any symptoms on this hospitalization so he was not treated. See catheter was removed at discharge.     --HTN: stable. Continue amlodipine, doxazosin. --CAD: Stress test May 2020 showed no ischemia.  --HFpEF: well compensated. --Dyslipidemia: statin therapy. -HLD: statin  --Physical deconditioning - ECF placement at discharge. The patient expressed appropriate understanding of and agreement with the discharge recommendations, medications, and plan.      Consults this admission:  IP CONSULT TO HOSPITALIST  IP CONSULT TO CARDIOLOGY    Discharge Instruction:   Follow up appointments:    Primary care physician:    Cardiology within 2 weeks    Diet:  cardiac diet   Activity: activity as tolerated  Disposition: Discharged to:   []Home, []HHC, [x]SNF, []Acute Rehab, []Hospice  Condition on discharge: Stable    Discharge Medications:      Dahiana Heller. Home Medication Instructions BRV:[id number]    Printed on:02/03/21 8491   Medication Information                      amiodarone (CORDARONE) 200 MG tablet  Take 1 tablet by mouth daily             amiodarone (PACERONE) 400 MG tablet  Take 1 tablet by mouth 2 times daily for 3 days             amLODIPine (NORVASC) 10 MG tablet  Take 1 tablet by mouth daily             atorvastatin (LIPITOR) 80 MG tablet  Take 1 tablet by mouth daily             benztropine (COGENTIN) 0.5 MG tablet  Take 1 tablet by mouth 2 times daily             Compression Stockings MISC  by Does not apply route Duration of Treatment:  3 Months  # of pairs:  2    Compression Class Level - 20-30 mmHg*    Style - Knee High             digoxin (LANOXIN) 125 MCG tablet  Take 1 tablet by mouth daily             divalproex (DEPAKOTE ER) 500 MG extended release tablet  Take 2 tablets by mouth nightly             docusate sodium (COLACE) 100 MG capsule  Take 1 capsule by mouth 2 times daily             donepezil (ARICEPT) 5 MG tablet  Take 1 tablet by mouth nightly             doxazosin (CARDURA) 2 MG tablet  Take 1 tablet by mouth every 12 hours             escitalopram (LEXAPRO) 10 MG tablet  Take 2 tablets by mouth daily             famotidine (PEPCID) 10 MG tablet  Take 1 tablet by mouth 2 times daily             lisinopril (PRINIVIL;ZESTRIL) 40 MG tablet  Take 1 tablet by mouth daily             metoprolol tartrate (LOPRESSOR) 25 MG tablet  Take 1 tablet by mouth 2 times daily             miconazole (MICOTIN) 2 % powder  Apply topically 2 times daily.              senna (SENOKOT) 8.6 MG tablet  Take 1 tablet by mouth nightly                 Objective Findings at Discharge:   /71   Pulse 63   Temp 97.9 °F (36.6 °C) (Oral)   Resp 18   Ht 5' 9\" (1.753 m)   Wt 213 lb 1.6 oz (96.7 kg)   SpO2 97%   BMI 31.47 kg/m²            PHYSICAL EXAM  Physical Exam:   GEN    Awake male, sitting upright in bed. RESP  breathing comfortably on room air. CARDIO/VASC           S1/S2 auscultated. Regular rate without appreciable murmurs. No peripheral edema. GI        Abdomen is soft without significant tenderness, masses, or guarding. Bowel sounds are normoactive. MSK    No gross joint deformities. SKIN    Normal coloration, warm, dry. NEURO           normal speech, no lateralizing weakness. PSYCH            Awake, alert, oriented x3      BMP/CBC  Recent Labs     02/01/21  0757 02/02/21  0323 02/03/21  0417     --   --    K 3.8  --   --      --   --    CO2 22  --   --    BUN 8  --   --    CREATININE 1.2  --   --    WBC 5.8 7.3 6.3   HCT 37.5* 35.8* 38.7*    158 157       IMAGING:  CTPA  1. Respiratory motion limits evaluation. 2. No convincing pulmonary embolus to the level of the lobar pulmonary   arteries.  Evaluation more distally is limited. 3. Chronic left-sided empyema, which appears minimally decreased in size when   compared the prior exam.   4. There is persistent opacification involving the lingula and left lower   lobe.    5. Persistent left-sided volume loss with leftward shift of the mediastinum.           Discharge Time of 35 minutes    Electronically signed by Sadia Tom MD on 2/3/2021 at 11:16 AM

## 2021-02-03 NOTE — PLAN OF CARE
Problem: Falls - Risk of:  Goal: Will remain free from falls  Description: Will remain free from falls  Outcome: Ongoing  Goal: Absence of physical injury  Description: Absence of physical injury  Outcome: Ongoing     Problem: Skin Integrity:  Goal: Will show no infection signs and symptoms  Description: Will show no infection signs and symptoms  Outcome: Ongoing  Goal: Absence of new skin breakdown  Description: Absence of new skin breakdown  Outcome: Ongoing     Problem: Cardiac:  Goal: Ability to maintain vital signs within normal range will improve  Description: Ability to maintain vital signs within normal range will improve  Outcome: Ongoing  Goal: Cardiovascular alteration will improve  Description: Cardiovascular alteration will improve  Outcome: Ongoing     Problem: Health Behavior:  Goal: Will modify at least one risk factor affecting health status  Description: Will modify at least one risk factor affecting health status  Outcome: Ongoing  Goal: Identification of resources available to assist in meeting health care needs will improve  Description: Identification of resources available to assist in meeting health care needs will improve  Outcome: Ongoing     Problem: Physical Regulation:  Goal: Complications related to the disease process, condition or treatment will be avoided or minimized  Description: Complications related to the disease process, condition or treatment will be avoided or minimized  Outcome: Ongoing     Problem: Pain:  Goal: Pain level will decrease  Description: Pain level will decrease  Outcome: Ongoing  Goal: Control of acute pain  Description: Control of acute pain  Outcome: Ongoing  Goal: Control of chronic pain  Description: Control of chronic pain  Outcome: Ongoing

## 2021-02-03 NOTE — CARE COORDINATION
CM in to see Pt to follow up on discharge planning. CM call to Mary at [facility] to follow up on referral made 2/1. Left  for return call. 9:25 AM   CM contacted by Mary, Pt accepted. PS sent to Dr. Jonh Conway to update. 1:44 PM   Wheelchair transport set for 1500    CM updated Pt RN  CM updated Mary/Durga  CM updated Pt RADHA Paiz.

## 2021-02-03 NOTE — DISCHARGE INSTR - COC
Continuity of Care Form    Patient Name: Emmanuel Issa :  1950  MRN:  [id number]    Admit date:  2021  Discharge date:  ***    Code Status Order: Full Code   Advance Directives:   Advance Care Flowsheet Documentation       Date/Time Healthcare Directive Type of Healthcare Directive Copy in [address] Agent's Name Healthcare Agent's Phone Number    21 154  No, patient does not have an advance directive for healthcare treatment  Other (Comment) na  Other (Comment) na  -- na  na  na            Admitting Physician:  Ricci Tsai DO  PCP: Patricia Magana MD    Discharging Nurse: [facility] Unit/Room#: 0944/3032-W  Discharging Unit Phone Number: ***    Emergency Contact:   Extended Emergency Contact Information  Primary Emergency Contact: [address] Phone: [phone]  Mobile Phone: [phone]  Relation: Child   needed?  No  Secondary Emergency Contact: Gómez Venegas  [place] Phone: [phone]  Mobile Phone: [phone]  Relation: Child    Past Surgical History:  Past Surgical History:   Procedure Laterality Date    CARPAL TUNNEL RELEASE Bilateral     COLONOSCOPY N/A 2020    COLONOSCOPY POLYPECTOMY SNARE/COLD BIOPSY WITH SPOT INK TATTOO performed by Michael M[facility]quez MD at [facility] Left 's    KNEE SURGERY Bilateral     right knee x 2( scope)/ left knee- 7-8 yr ago   [address] fusion   [address]  N/A 2020    BOWEL RESECTION EXTENDED RIGHT HEMICOLECTOMY performed by Aubrey Burks MD at [address] [place] [address] Left 2013    THORACENTESIS  2016         THORACENTESIS Left 11/15/2016    Dr. Norton Hunger 250mlsremoved     THORACOTOMY Left 2019    with Lysis of adhesions    THORACOTOMY Left 3/18/2019    THORACOSCOPY CONVERTED TO THORACOTOMY WITH LYSIS OF ADHESIONS performed by Jeovanny Buitrago MD at [facility] Somatic dysfunction of cervical region M99.01    Somatic dysfunction of pelvis region M99.05    Somatic dysfunction of both lower extremities M99.06    Empyema lung ([facility] J86.9    Acute kidney injury superimposed on CKD ([facility] N17.9, N18.9    Chronic diastolic heart failure (HCC) I50.32    Hypotensive episode I95.9    Bradycardia on ECG R00.1    Lightheadedness R42    Acute on chronic respiratory failure with hypoxia (HCC) J96.21    Severe mixed bipolar 1 disorder without psychosis ([facility] F31.63    Acute chest pain R07.9    Atrial tachycardia (HCC) I47.1    Overlapping malignant neoplasm of colon (HCC) C18.8    Colon adenocarcinoma (HCC) C18.9    Acute left-sided weakness R53.1    Fall at home [address]. [place], Y92.009    Fall at home, initial encounter [address] 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 ([facility]) E09.65    Localized edema R60.0    Intracranial hemorrhage ([facility]) I62.9    Left hemiparesis ([facility]) G81.94    Dysarthria due to and not concurrent with spontaneous intracerebral hemorrhage I69.122    Gait disturbance R26.9    Bipolar disorder, in partial remission, most recent episode depressed [facility] F31.75    Intraparenchymal hematoma of brain ([facility] S06.360A    SSS (sick sinus syndrome) ([facility] I49.5       Isolation/Infection:   Isolation            No Isolation          Patient Infection Status       Infection Onset Added Last Indicated Last Indicated By Review Planned Expiration Resolved Resolved By    None active    Resolved    COVID-19 Rule Out 09/02/20 09/02/20 09/02/20 COVID-19 (Ordered)   09/02/20 Rule-Out Test Resulted    COVID-19 Rule Out 08/01/20 08/01/20 08/01/20 Covid-19 Ambulatory (Ordered)   08/03/20 Rule-Out Test Resulted    COVID-19 Rule Out 06/01/20 06/01/20 Discharge:   Respiratory Treatments: ***  Oxygen Therapy:  {Therapy; copd oxygen:83250:::0}  Ventilator:    { CC Vent List:[id number]:::0}    Rehab Therapies: {THERAPEUTIC INTERVENTION:6252390845}  Weight Bearing Status/Restrictions: [address] CC Weight Bearin:::0}  Other Medical Equipment (for information only, NOT a DME order):  {EQUIPMENT:265209685}  Other Treatments: ***    Patient's personal belongings (please select all that are sent with patient):  {CHP DME Belongings:368258808:::0}    RN SIGNATURE:  {Esignature:[id number]:::0}      PHYSICIAN SECTION    Prognosis: Good    Condition at Discharge: Stable    Rehab Potential (if transferring to Rehab): Good    Recommended Labs or Other Treatments After Discharge:  [facility] weekly    Physician Certification: I certify the above information and transfer of Maria Simpson.  is necessary for the continuing treatment of the diagnosis listed and that he requires [facility] for less 30 days.      Update Admission H&P: No change in H&P    PHYSICIAN SIGNATURE:  Electronically signed by Annette Hutson MD on 2/3/21 at 11:16 AM EST

## 2021-02-03 NOTE — ADT AUTH CERT
Utilization Reviews       Atrial Fibrillation - Care Day 4 (2/2/2021) by Caitlin Hale RN       Review Status Review Entered   Completed 2/3/2021 09:44      Criteria Review      Care Day: 4 Care Date: 2/2/2021 Level of Care: Intermediate Care    Guideline Day 2    Clinical Status    ( ) * Hemodynamic stability    2/3/2021 9:44 AM EST by Karen Ramos      HR noted 114 and 112    (X) * Sinus rhythm or acceptable ventricular rate    (X) * No evidence of myocardial ischemia    (X) * Mental status at baseline    ( ) * Tachypnea absent    2/3/2021 9:44 AM EST by Karen Ramos      resp rate 32 and 22    (X) * Hypoxemia absent    (X) * Anticoagulants regimen for next level of care established    ( ) * Discharge plans and education understood    Activity    (X) * Ambulatory or acceptable for next level of care    2/3/2021 9:44 AM EST by Karen Ramos      up with assist    Routes    (X) * Oral hydration, medications, and diet    2/3/2021 9:44 AM EST by Karen Ramos      cardiac carb control dysphagia diet    Interventions    (X) Cardiac monitoring    Medications    (X) Possible rate and rhythm control medications    * Milestone   Additional Notes   2/2   98.4 (36.9) 32 62 93/60 - Semi fowlers - - - - - - -     02/02/21 0912 98.2 (36.8) - 114 111/72 - Semi fowlers - - 97 None (Room air)          Scheduled Meds:·  amiodarone, 400 mg, Oral, BID   ·  escitalopram, 20 mg, Oral, Daily   ·  divalproex, 1,000 mg, Oral, Nightly   ·  atorvastatin, 80 mg, Oral, Daily   ·  donepezil, 5 mg, Oral, Nightly   ·  miconazole, , Topical, BID   ·  amLODIPine, 10 mg, Oral, Daily   ·  benztropine, 0.5 mg, Oral, BID   ·  digoxin, 125 mcg, Oral, Daily   ·  doxazosin, 2 mg, Oral, 2 times per day   ·  lisinopril, 40 mg, Oral, Daily   ·  metoprolol tartrate, 25 mg, Oral, BID   ·  senna, 1 tablet, Oral, Nightly   ·  sodium chloride flush, 10 mL, Intravenous, 2 times per day   ·  enoxaparin, 40 mg, Subcutaneous, Daily      CBC Auto Differential [[id number]] (Abnormal) Collected: 02/02/21 0323      Updated: 02/02/21 0337     WBC 7.3 K/CU MM      RBC 4.02 M/CU MM      Hemoglobin 11.5 GM/DL      Hematocrit 35.8 %      MCV 89.1 FL      MCH 28.6 PG      MCHC 32.1 %      RDW 15.1 %      Platelets 922 K/CU MM      MPV 9.1 FL      Differential Type AUTOMATED DIFFERENTIAL     Segs Relative 69.5 %      Lymphocytes % 17.2 %      Monocytes % 9.7 %      Eosinophils % 2.5 %      Basophils % 0.3 %      Segs Absolute 5.1 K/CU MM      Lymphocytes Absolute 1.3 K/CU MM      Monocytes Absolute 0.7 K/CU MM      Eosinophils Absolute 0.2 K/CU MM      Basophils Absolute 0.0 K/CU MM      Nucleated RBC % 0.0 %      Total Nucleated RBC 0.0 K/CU MM      Total Immature Neutrophil 0.06 K/CU MM      Immature Neutrophil % 0.8 %       IM note   No acute issues overnight.  Does not appear confused today.  Heart rate is controlled this morning.  He denies chest pain or shortness of breath. Physical Exam:   GEN    Awake male, sitting upright in bed. RESP  breathing comfortably on room air. CARDIO/VASC           S1/S2 auscultated. Regular rate without appreciable murmurs. No peripheral edema. GI        Abdomen is soft without significant tenderness, masses, or guarding. Bowel sounds are normoactive. MSK    No gross joint deformities. SKIN    Normal coloration, warm, dry. NEURO           normal speech, no lateralizing weakness. PSYCH            Awake, alert, oriented x3      Assessment and Plan:   Albino Courser. is a 70 y.o. Cr Quezada presents with atrial fibrillation.       -Atrial fibrillation/flutter with RVR   -Possible sick sinus syndrome. /min on admission. Heart rate ranges from 50-150s.  There is concern for sick sinus syndrome   Off anticoagulation since mid December 2020 due to [address] diagnosed 12/19/2020.    Heart rate currently controlled with amiodarone.  He is currently on oral loading dose-400 mg twice daily for 7 days then 200 mg once a day ----------------------------------------------------------------------------       [facility] Physician Advisor Recommendation               Based on the clinical acuity, complexity, risk of adverse events, hospital course and data review, it is our recommendation that patient be upgraded to INPATIENT from Observation status.               The final decision of the patient's hospitalization status depends on the attending physician's judgment.               ---------------------------------------------------------------------------       Summary of impressions and findings:                Clinical summary - 70 y.o. with recent intracranial hemorrhage presents with weakness. Found to be in a fib with tachycardia. + UTI. Receiving IVF and IV atb. Initiated amiodarone for a fib. A Fib uncontrolled. Rate . Also on dig and B Blockers. Possible SS syndrome.       Vitals -        Labs and imaging - CT Head Further interval decrease in size of right occipital lobe intraparenchymal subacute hemorrhage. 2. No new intracranial hemorrhage or infarction.  No midline shift.        MCG criteria -        Comments - Upgrade patient to Inpatient status. Difficult to control atrial fibrillation.               Chart reviewed and VUR notified.               Katlyn Gu MD MPH       Physician [facility] [address]. [email or web address]               The information in this document is a recommendation to be used for utilization review and utilization management purposes only. This recommendation is not an order. The recommendation is made based on the information reviewed at the time of the referral, is pursuant to the [facility] Conditions of Participation (42 CFR Part 482), and is neither a judgment nor an assessment with regard to the appropriateness or quality of clinical care.  Nothing in this document may be used to limit, alter, or       affect clinical services provided to the patient named below.  The provider of services is ultimately responsible for the submission of a claim that has met all requirements for correct coding, billing, and reimbursement.          Atrial Fibrillation - Care Day 3 (2/1/2021) by Dee Krishnan RN       Review Status Review Entered   Completed 2/2/2021 11:57      Criteria Review      Care Day: 3 Care Date: 2/1/2021 Level of Care: Intermediate Care    Guideline Day 2    Clinical Status    (X) * Hemodynamic stability    ( ) * Sinus rhythm or acceptable ventricular rate    2/2/2021 11:57 AM EST by David Rand going in and out of A. fib    (X) * No evidence of myocardial ischemia    (X) * Mental status at baseline    (X) * Tachypnea absent    (X) * Hypoxemia absent    (X) * Anticoagulants regimen for next level of care established    ( ) * Discharge plans and education understood    Activity    (X) * Ambulatory or acceptable for next level of care    2/2/2021 11:57 AM EST by Bubba Penaloza      up with assist    Routes    (X) * Oral hydration, medications, and diet    2/2/2021 11:57 AM EST by Bubba Penaloza      cardiac carb control dysphagia diet    Interventions    (X) Cardiac monitoring    Medications    (X) Possible rate and rhythm control medications    * Milestone   Additional Notes   2/1   97.9 (36.6)  16  69  157/108  -  Turns self  -  -  94  None (Room air)       Scheduled Meds:·  amiodarone, 400 mg, Oral, BID   ·  escitalopram, 20 mg, Oral, Daily   ·  divalproex, 1,000 mg, Oral, Nightly   ·  atorvastatin, 80 mg, Oral, Daily   ·  donepezil, 5 mg, Oral, Nightly   ·  miconazole, , Topical, BID   ·  amLODIPine, 10 mg, Oral, Daily   ·  benztropine, 0.5 mg, Oral, BID   ·  digoxin, 125 mcg, Oral, Daily   ·  doxazosin, 2 mg, Oral, 2 times per day   ·  lisinopril, 40 mg, Oral, Daily   ·  metoprolol tartrate, 25 mg, Oral, BID   ·  senna, 1 tablet, Oral, Nightly   ·  sodium chloride flush, 10 mL, Intravenous, 2 times per day   ·  enoxaparin, 40 mg, Subcutaneous, Daily      Basic Metabolic Panel [[id number]] (Abnormal) Collected: 02/01/21 0757      Specimen: Blood Updated: 02/01/21 1013     Sodium 140 MMOL/L      Potassium 3.8 MMOL/L      Chloride 108 mMol/L      CO2 22 MMOL/L      Anion Gap 10     BUN 8 MG/DL      CREATININE 1.2 MG/DL      Glucose 101 MG/DL      Calcium 8.7 MG/DL      GFR Non-African American 60 mL/min/1.73m2      GFR African American >60 mL/min/1.73m2      CBC Auto Differential [[id number]] (Abnormal) Collected: 02/01/21 0757     Updated: 02/01/21 0807     WBC 5.8 K/CU MM      RBC 4.27 M/CU MM      Hemoglobin 12.2 GM/DL      Hematocrit 37.5 %      MCV 87.8 FL      MCH 28.6 PG      MCHC 32.5 %      RDW 15.0 %      Platelets 399 K/CU MM      MPV 8.9 FL      Differential Type AUTOMATED DIFFERENTIAL     Segs Relative 72.4 %      Lymphocytes % 15.9 %      Monocytes % 8.7 %      Eosinophils % 2.2 %      Basophils % 0.3 %      Segs Absolute 4.2 K/CU MM      Lymphocytes Absolute 0.9 K/CU MM      Monocytes Absolute 0.5 K/CU MM      Eosinophils Absolute 0.1 K/CU MM      Basophils Absolute 0.0 K/CU MM      Nucleated RBC % 0.0 %      Total Nucleated RBC 0.0 K/CU MM      Total Immature Neutrophil 0.03 K/CU MM      Immature Neutrophil % 0.5 %      Culture, Urine [[id number]] (Abnormal) Collected: 01/30/21 0651     Specimen: Urine Updated: 02/01/21 0705     Specimen URINE     Special Requests NONE     Culture Final Report     PSEUDOMONAS AERUGINOSA >100,000 CFU/ml      Cardio note   Subjective and  Overnight Events: Patient states that he is feeling fairly well. [facility] was very confused yesterday evening.  He states that he does not remember going home. [facility] currently denies any chest pain or shortness of breath.  He is going in and out of A. fib       Assessment / Plan / Recommendation:        1.  Atrial fibrillation: No anticoagulation due to concerns for recent intracranial hemorrhage and intraventricular bleed.  Will attempt to rate control/rhythm control with amiodarone. 2. Hypertension: Stable.  Continue current medications   3. History of coronary artery disease: Stress test May 2020 showed no ischemia. 4. History of heart failure with preserved ejection fraction.  Echocardiogram of 5/11/2020 showed an ejection fraction of 55%.  Patient to continue guideline directed therapy with metoprolol 25 mg twice daily, digoxin 125 mcg daily Cardura 2 mg twice daily, and Norvasc 10 mg daily. 5. Dyslipidemia: Continue statin therapy    6.  DVT prophylaxis if not contraindicated while in the hospital.     7. Patient is high risk for readmission.  He has issues with adhering to medical recommendations including follow-up, medications, and treatments.

## 2021-02-04 NOTE — PROGRESS NOTES
Physician Progress Note      PATIENT:               Kayley HOOVER #:                  [id number]  :                       1950  ADMIT DATE:       2021 4:37 AM  DISCH DATE:        2/3/2021 3:30 PM  RESPONDING  PROVIDER #:        Marcelino Burger MD          QUERY TEXT:    Dear hospitalist,    Patient admitted with atrial fibrillation. If possible, please document in   progress notes and discharge summary further specificity regarding the type of   atrial fibrillation: The medical record reflects the following:  Risk Factors: Chronic illness  Clinical Indicators: H&P: Atrial fibrillation, not in RVR, Cardiology consult:   Atrial fibrillation: No anticoagulation due to concerns for recent   intracranial hemorrhage intraventricular bleed will start amiodarone to try   and attempt and keep him in sinus rhythm. Multiple progress notes, and   discharge summary states AFib. Treatment: admission, EKG X2, Cardiology consult, labs, monitor. Options provided:  -- Paroxysmal Atrial Fibrillation  -- Longstanding Persistent Atrial Fibrillation  -- Permanent Atrial Fibrillation  -- Persistent Atrial Fibrillation  -- Chronic Atrial Fibrillation, unspecified  -- Other - I will add my own diagnosis  -- Disagree - Not applicable / Not valid  -- Disagree - Clinically unable to determine / Unknown  -- Refer to Clinical Documentation Reviewer    PROVIDER RESPONSE TEXT:    This patient has unspecified chronic atrial fibrillation.     Query created by: Nancy Boo on 2/3/2021 5:39 PM      Electronically signed by:  Marcelino Burger MD 2021 1:31 PM

## 2021-02-05 ENCOUNTER — HOSPITAL ENCOUNTER (OUTPATIENT)
Age: 71
Setting detail: SPECIMEN
Discharge: HOME | End: 2021-02-05

## 2021-02-05 LAB
ALBUMIN SERPL-MCNC: 3.7 GM/DL (ref 3.4–5)
ALP BLD-CCNC: 53 IU/L (ref 40–128)
ALT SERPL-CCNC: 9 U/L (ref 10–40)
ANION GAP SERPL CALCULATED.3IONS-SCNC: 15 MMOL/L (ref 4–16)
AST SERPL-CCNC: 16 IU/L (ref 15–37)
BASOPHILS ABSOLUTE: 0 K/CU MM
BASOPHILS RELATIVE PERCENT: 0.3 % (ref 0–1)
BILIRUB SERPL-MCNC: 0.6 MG/DL (ref 0–1)
BUN BLDV-MCNC: 12 MG/DL (ref 6–23)
CALCIUM SERPL-MCNC: 8.4 MG/DL (ref 8.3–10.6)
CHLORIDE BLD-SCNC: 109 MMOL/L (ref 99–110)
CO2: 17 MMOL/L (ref 21–32)
CREAT SERPL-MCNC: 1.4 MG/DL (ref 0.9–1.3)
CULTURE: NORMAL
CULTURE: NORMAL
DIFFERENTIAL TYPE: ABNORMAL
EOSINOPHILS ABSOLUTE: 0.1 K/CU MM
EOSINOPHILS RELATIVE PERCENT: 1.9 % (ref 0–3)
GFR AFRICAN AMERICAN: >60 ML/MIN/1.73M2
GFR NON-AFRICAN AMERICAN: 50 ML/MIN/1.73M2
GLUCOSE BLD-MCNC: 77 MG/DL (ref 70–99)
HCT VFR BLD CALC: 43.4 % (ref 42–52)
HEMOGLOBIN: 13.5 GM/DL (ref 13.5–18)
IMMATURE NEUTROPHIL %: 1.1 % (ref 0–0.43)
LYMPHOCYTES ABSOLUTE: 1.1 K/CU MM
LYMPHOCYTES RELATIVE PERCENT: 18 % (ref 24–44)
Lab: NORMAL
Lab: NORMAL
MCH RBC QN AUTO: 29 PG (ref 27–31)
MCHC RBC AUTO-ENTMCNC: 31.1 % (ref 32–36)
MCV RBC AUTO: 93.3 FL (ref 78–100)
MONOCYTES ABSOLUTE: 0.4 K/CU MM
MONOCYTES RELATIVE PERCENT: 7.1 % (ref 0–4)
NUCLEATED RBC %: 0 %
PDW BLD-RTO: 14.7 % (ref 11.7–14.9)
PLATELET # BLD: 140 K/CU MM (ref 140–440)
PMV BLD AUTO: 10.2 FL (ref 7.5–11.1)
POTASSIUM SERPL-SCNC: 4.6 MMOL/L (ref 3.5–5.1)
RBC # BLD: 4.65 M/CU MM (ref 4.6–6.2)
SEGMENTED NEUTROPHILS ABSOLUTE COUNT: 4.4 K/CU MM
SEGMENTED NEUTROPHILS RELATIVE PERCENT: 71.6 % (ref 36–66)
SODIUM BLD-SCNC: 141 MMOL/L (ref 135–145)
SPECIMEN: NORMAL
SPECIMEN: NORMAL
TOTAL IMMATURE NEUTOROPHIL: 0.07 K/CU MM
TOTAL NUCLEATED RBC: 0 K/CU MM
TOTAL PROTEIN: 6.5 GM/DL (ref 6.4–8.2)
WBC # BLD: 6.2 K/CU MM (ref 4–10.5)

## 2021-02-05 PROCEDURE — 80053 COMPREHEN METABOLIC PANEL: CPT

## 2021-02-05 PROCEDURE — 36415 COLL VENOUS BLD VENIPUNCTURE: CPT

## 2021-02-05 PROCEDURE — 85025 COMPLETE CBC W/AUTO DIFF WBC: CPT

## 2021-02-15 ENCOUNTER — HOSPITAL ENCOUNTER (OUTPATIENT)
Age: 71
Setting detail: SPECIMEN
Discharge: HOME | End: 2021-02-15

## 2021-02-15 LAB
ALBUMIN SERPL-MCNC: 3.6 GM/DL (ref 3.4–5)
ALP BLD-CCNC: 57 IU/L (ref 40–128)
ALT SERPL-CCNC: 10 U/L (ref 10–40)
ANION GAP SERPL CALCULATED.3IONS-SCNC: 16 MMOL/L (ref 4–16)
AST SERPL-CCNC: 13 IU/L (ref 15–37)
BILIRUB SERPL-MCNC: 0.7 MG/DL (ref 0–1)
BUN BLDV-MCNC: 15 MG/DL (ref 6–23)
CALCIUM SERPL-MCNC: 8.3 MG/DL (ref 8.3–10.6)
CHLORIDE BLD-SCNC: 108 MMOL/L (ref 99–110)
CO2: 16 MMOL/L (ref 21–32)
CREAT SERPL-MCNC: 2 MG/DL (ref 0.9–1.3)
GFR AFRICAN AMERICAN: 40 ML/MIN/1.73M2
GFR NON-AFRICAN AMERICAN: 33 ML/MIN/1.73M2
GLUCOSE BLD-MCNC: 105 MG/DL (ref 70–99)
HCT VFR BLD CALC: 40.7 % (ref 42–52)
HEMOGLOBIN: 13 GM/DL (ref 13.5–18)
MCH RBC QN AUTO: 29 PG (ref 27–31)
MCHC RBC AUTO-ENTMCNC: 31.9 % (ref 32–36)
MCV RBC AUTO: 90.6 FL (ref 78–100)
PDW BLD-RTO: 15.8 % (ref 11.7–14.9)
PLATELET # BLD: 241 K/CU MM (ref 140–440)
PMV BLD AUTO: 9 FL (ref 7.5–11.1)
POTASSIUM SERPL-SCNC: 3.5 MMOL/L (ref 3.5–5.1)
RBC # BLD: 4.49 M/CU MM (ref 4.6–6.2)
SODIUM BLD-SCNC: 140 MMOL/L (ref 135–145)
TOTAL PROTEIN: 6.1 GM/DL (ref 6.4–8.2)
WBC # BLD: 11.5 K/CU MM (ref 4–10.5)

## 2021-02-15 PROCEDURE — 36415 COLL VENOUS BLD VENIPUNCTURE: CPT

## 2021-02-15 PROCEDURE — 85027 COMPLETE CBC AUTOMATED: CPT

## 2021-02-15 PROCEDURE — 80053 COMPREHEN METABOLIC PANEL: CPT

## 2021-02-22 ENCOUNTER — HOSPITAL ENCOUNTER (EMERGENCY)
Age: 71
Discharge: HOME | DRG: 682 | End: 2021-02-22
Attending: EMERGENCY MEDICINE
Payer: MEDICARE

## 2021-02-22 ENCOUNTER — APPOINTMENT (OUTPATIENT)
Dept: CT IMAGING | Age: 71
DRG: 682 | End: 2021-02-22
Payer: MEDICARE

## 2021-02-22 VITALS
BODY MASS INDEX: 31.55 KG/M2 | HEIGHT: 69 IN | TEMPERATURE: 97.6 F | HEART RATE: 57 BPM | WEIGHT: 213 LBS | SYSTOLIC BLOOD PRESSURE: 132 MMHG | OXYGEN SATURATION: 97 % | RESPIRATION RATE: 16 BRPM | DIASTOLIC BLOOD PRESSURE: 70 MMHG

## 2021-02-22 DIAGNOSIS — M54.2 NECK PAIN: Primary | ICD-10-CM

## 2021-02-22 PROCEDURE — 72125 CT NECK SPINE W/O DYE: CPT

## 2021-02-22 PROCEDURE — 99285 EMERGENCY DEPT VISIT HI MDM: CPT

## 2021-02-22 PROCEDURE — 6370000000 HC RX 637 (ALT 250 FOR IP): Performed by: PHYSICIAN ASSISTANT

## 2021-02-22 PROCEDURE — 70450 CT HEAD/BRAIN W/O DYE: CPT

## 2021-02-22 RX ORDER — LIDOCAINE 4 G/G
1 PATCH TOPICAL DAILY
Status: DISCONTINUED | OUTPATIENT
Start: 2021-02-22 | End: 2021-02-22

## 2021-02-22 RX ORDER — LIDOCAINE 4 G/G
1 PATCH TOPICAL ONCE
Status: DISCONTINUED | OUTPATIENT
Start: 2021-02-22 | End: 2021-02-22 | Stop reason: HOSPADM

## 2021-02-22 RX ORDER — OXYCODONE HYDROCHLORIDE AND ACETAMINOPHEN 5; 325 MG/1; MG/1
1 TABLET ORAL ONCE
Status: COMPLETED | OUTPATIENT
Start: 2021-02-22 | End: 2021-02-22

## 2021-02-22 RX ADMIN — OXYCODONE HYDROCHLORIDE AND ACETAMINOPHEN 1 TABLET: 5; 325 TABLET ORAL at 04:46

## 2021-02-22 ASSESSMENT — PAIN DESCRIPTION - LOCATION: LOCATION: NECK

## 2021-02-22 ASSESSMENT — PAIN SCALES - GENERAL: PAINLEVEL_OUTOF10: 9

## 2021-02-22 NOTE — ED PROVIDER NOTES
Triage Chief Complaint:   Neck Pain (Chronic) and Fall Celestine Morgan out of bed; no blood thinners; hit head)    [place]:  Today in the ED I had the pleasure of caring for Libby Burton who is a 70 y.o. male that presents to the emergency department complaining of neck pain. Patient has chronic neck pain. Has had laminectomies in the past has chronic osteophyte per the patient. And chronic right-sided shoulder pain. He states his pain is usually 8/10. He slept of his bed today. Pain is 9/10 so he came here for evaluation. No paresthesias. No headache. No chest pain palpitations no recent illness such as fever chills nausea vomit diarrhea.     ROS:  REVIEW OF SYSTEMS    At least 10 systems reviewed      All other review of systems are negative  See HPI and nursing notes for additional information       Past Medical History:   Diagnosis Date    Anemia     per old chart    Arthritis     Back pain, chronic     \"have pain in lumbar mainly- have 5 bulging discs\"\"in my neck and my lumbar have herniated discs\"    Bipolar 1 disorder ([address]     CAD (coronary artery disease) 01/27/2016    does not follow with a cardiologist    Cerebral artery occlusion with cerebral infarction ([address]     \"had mini stroke in Jan 2016- fast heart rate when I would stand up - smile drooping on one side- lased just the one day\"    Chronic kidney disease (CKD), stage III (moderate)     CKD (chronic kidney disease)     per old chart- consult with Dr Roxanna Stevens with admission 4/2016\"have low kidney function\"    COPD (chronic obstructive pulmonary disease) ([address]     follow with Dr Chris Lee Diabetes mellitus, type 2 ([address] 01/03/2017    Diabetic peripheral neuropathy ([address]     Diastolic CHF ([address] 89/55/1309    Gastric ulcer     H/O cardiovascular stress test 05/26/2014    EF 68% mild ischemia anterior wall    Heart murmur     \"see Dr Williams Haque- last echo 2014\" pt states\"I think they said I have a murmur but no chest pain or palpitations\" on file    Number of children: Not on file    Years of education: Not on file    Highest education level: Not on file   Occupational History    Occupation: built trucks, retired     Employer: [employer] resource strain: Not on file    Food insecurity     Worry: Not on file     Inability: Not on file   Autopilot needs     Medical: Not on file     Non-medical: Not on file   Tobacco Use    Smoking status: Former Smoker     Packs/day: 1.50     Years: 30.00     Pack years: 45.00     Types: Cigarettes     Quit date: 7/24/2010     Years since quitting: 10.5    Smokeless tobacco: Never Used   Substance and Sexual Activity    Alcohol use: Not Currently    Drug use: Yes     Frequency: 7.0 times per week     Types: Marijuana    Sexual activity: Not Currently     Partners: Female   Lifestyle    Physical activity     Days per week: Not on file     Minutes per session: Not on file    Stress: Not on file   Relationships    Social connections     Talks on phone: Not on file     Gets together: Not on file     Attends Mandaen service: Not on file     Active member of club or organization: Not on file     Attends meetings of clubs or organizations: Not on file     Relationship status: Not on file    Intimate partner violence     Fear of current or ex partner: Not on file     Emotionally abused: Not on file     Physically abused: Not on file     Forced sexual activity: Not on file   Other Topics Concern    Not on file   Social History Narrative    Not on file     Current Facility-Administered Medications   Medication Dose Route Frequency Provider Last Rate Last Admin    lidocaine 4 % external patch 1 patch  1 patch Transdermal Daily Nolvia Salgado PA-C        oxyCODONE-acetaminophen (PERCOCET) 5-325 MG per tablet 1 tablet  1 tablet Oral Once Nolvia Salgado PA-C         Current Outpatient Medications   Medication Sig Dispense Refill    amiodarone (PACERONE) 400 MG tablet Take 1 tablet by mouth 2 times daily for 3 days      amiodarone (CORDARONE) 200 MG tablet Take 1 tablet by mouth daily      atorvastatin (LIPITOR) 80 MG tablet Take 1 tablet by mouth daily 30 tablet 5    divalproex (DEPAKOTE ER) 500 MG extended release tablet Take 2 tablets by mouth nightly 60 tablet 5    escitalopram (LEXAPRO) 10 MG tablet Take 2 tablets by mouth daily      donepezil (ARICEPT) 5 MG tablet Take 1 tablet by mouth nightly 30 tablet 3    famotidine (PEPCID) 10 MG tablet Take 1 tablet by mouth 2 times daily 60 tablet 5    doxazosin (CARDURA) 2 MG tablet Take 1 tablet by mouth every 12 hours 60 tablet 0    lisinopril (PRINIVIL;ZESTRIL) 40 MG tablet Take 1 tablet by mouth daily 30 tablet 0    benztropine (COGENTIN) 0.5 MG tablet Take 1 tablet by mouth 2 times daily 60 tablet 0    metoprolol tartrate (LOPRESSOR) 25 MG tablet Take 1 tablet by mouth 2 times daily 60 tablet 0    amLODIPine (NORVASC) 10 MG tablet Take 1 tablet by mouth daily 30 tablet 0    digoxin (LANOXIN) 125 MCG tablet Take 1 tablet by mouth daily 30 tablet 0    miconazole (MICOTIN) 2 % powder Apply topically 2 times daily. 45 g 0    senna (SENOKOT) 8.6 MG tablet Take 1 tablet by mouth nightly 30 tablet 0    docusate sodium (COLACE) 100 MG capsule Take 1 capsule by mouth 2 times daily 20 capsule 3    Compression Stockings MISC by Does not apply route Duration of Treatment:  3 Months  # of pairs:  2    Compression Class Level - 20-30 mmHg*    Style - Knee High 2 each 0     Allergies   Allergen Reactions    Nsaids      Renal        Nursing Notes Reviewed    Physical Exam:  ED Triage Vitals [02/22/21 0142]   Enc Vitals Group      BP (!) 139/112      Pulse (!) 43      Resp 16      Temp 97.6 °F (36.4 °C)      Temp Source Oral      SpO2 98 %      Weight 213 lb (96.6 kg)      Height 5' 9\" (1.753 m)      Head Circumference       Peak Flow       Pain Score       Pain Loc       Pain Edu? Excl. in [address]?       General :Patient is awake alert oriented person place and time no acute distress nontoxic appearing  HEENT: Cephalic atraumatic no corbett sign no raccoon eyes no hemotympanum. Pupils are equally round and reactive to light extraocular motors are intact conjunctivae clear sclerae white there is no injection no icterus. Nose without any rhinorrhea or epistaxis. Oral mucosa is moist no exudate buccal mucosa shows no ulcerations. Uvula is midline    CERVICAL SPINE: There is no cervical midline tenderness to palpation, step-offs, or acute deformities. No posterior midline pain on ROM. The cervical spine has been cleared clinically. Right-sided shoulder tenderness palpation noted along the glenohumeral joint. Full range of motion noted however. Ipsilateral  strength 5/5. Bilateral  strength 5/5 radial ulnar pulse 2+. Neck: Neck is supple full range of motion trachea midline thyroid nonpalpable  Cardiac: Heart regular rate rhythm no murmurs rubs clicks or gallops  Lungs: Lungs are clear to auscultation there is no wheezing rhonchi or rales. There is no use of accessory muscles no nasal flaring identified. Chest wall: There is no tenderness to palpation over the chest wall or over ribs  Abdomen: Abdomen is soft nontender nondistended. There is no firm or pulsatile masses no rebound rigidity or guarding negative Artis's negative McBurney, no peritoneal signs  Suprapubic:  there is no tenderness to palpation over the external bladder   Musculoskeletal: 5 out of 5 strength in all 4 extremities full flexion extension abduction and adduction supination pronation of all extremities and all digits. No obvious muscle atrophy is noted. No focal muscle deficits are appreciated  Dermatology: Skin is warm and dry there is no obvious abscesses lacerations or lesions noted  Psych: Mentation is grossly normal cognition is grossly normal. Affect is appropriate  Neuro:  Motor intact sensory intact cranial nerves II through XII are intact level of the right lateral ventricle. No midline shift. There is intraventricular hemorrhage layering in the left occipital horn. No new intracranial hemorrhage. Senescent changes with parenchymal volume loss enlargement of the ventricles and cerebral sulci. There are nonspecific areas of hypoattenuation within the periventricular and subcortical white matter, which likely represent chronic microvascular ischemic change. Intracranial atherosclerotic disease. ORBITS: The visualized portion of the orbits demonstrate no acute abnormality. SINUSES: The visualized paranasal sinuses and mastoid air cells demonstrate no acute abnormality. SOFT TISSUES/SKULL: No acute abnormality of the visualized skull or soft tissues. 1. Further interval decrease in size of right occipital lobe intraparenchymal subacute hemorrhage. 2. No new intracranial hemorrhage or infarction. No midline shift. Ct Cervical Spine Wo Contrast    Result Date: 2/22/2021  EXAMINATION: CT OF THE CERVICAL SPINE WITHOUT CONTRAST 2/22/2021 2:03 am TECHNIQUE: CT of the cervical spine was performed without the administration of intravenous contrast. Multiplanar reformatted images are provided for review. Dose modulation, iterative reconstruction, and/or weight based adjustment of the mA/kV was utilized to reduce the radiation dose to as low as reasonably achievable. COMPARISON: CT cervical spine without contrast September 1, 2020. HISTORY: ORDERING SYSTEM PROVIDED HISTORY: pain TECHNOLOGIST PROVIDED HISTORY: Reason for exam:->pain Decision Support Exception->Emergency Medical Condition (MA) FINDINGS: BONES/ALIGNMENT: There is no acute fracture or traumatic malalignment. C5-C6 anterior cervical discectomy and fusion is again noted without interval change in appearance or evidence of hardware complication noted. DEGENERATIVE CHANGES: Multilevel mild-to-moderate spondylosis is noted without associated central canal stenosis/compromise identified.  C3/C4 moderate right and mild left, C5/C6 moderate right and mild left, C6/C7 mild bilateral neural foraminal stenosis secondary to encroachment by disc osteophyte complex is identified. SOFT TISSUES: There is no prevertebral soft tissue swelling. Mild centrilobular emphysema is seen within the lung apices. 1. Stable appearance of C5-C6 anterior cervical discectomy and fusion (ACDF) without evidence of hardware complication. 2. Multilevel cervical spine mild-to-moderate spondylosis without associated significant central canal stenosis/compromise. 3. C3/C4 moderate right and mild left, C5/C6 moderate right and mild left and C6/C7 mild bilateral neural foraminal stenosis secondary to encroachment by disc osteophyte complex. 4. Bilateral lung apical mild centrilobular emphysema. Xr Chest Portable    Result Date: 1/30/2021  EXAMINATION: ONE XRAY VIEW OF THE CHEST 1/30/2021 4:51 am COMPARISON: Chest radiograph dated December 19, 2020. HISTORY: ORDERING SYSTEM PROVIDED HISTORY: cough TECHNOLOGIST PROVIDED HISTORY: Reason for exam:->cough Reason for Exam: cough Acuity: Acute Type of Exam: Initial FINDINGS: The cardiomediastinal silhouette is stable. There is a small to moderate-sized left pleural effusion with left basilar opacity. There is no evidence of a pneumothorax. There is no evidence of edema. Small to moderate-sized left pleural effusion with left basilar opacity, likely representing atelectasis/infiltrates. This is noted on multiple prior exams. Cta Pulmonary W Contrast    Result Date: 1/30/2021  EXAMINATION: CTA OF THE CHEST 1/30/2021 9:09 am TECHNIQUE: CTA of the chest was performed after the administration of intravenous contrast.  Multiplanar reformatted images are provided for review. MIP images are provided for review. Dose modulation, iterative reconstruction, and/or weight based adjustment of the mA/kV was utilized to reduce the radiation dose to as low as reasonably achievable.  COMPARISON: 12/15/2019. HISTORY: ORDERING SYSTEM PROVIDED HISTORY: tachycardia TECHNOLOGIST PROVIDED HISTORY: Reason for exam:->tachycardia Decision Support Exception->Emergency Medical Condition (MA) Reason for Exam: Tachy Acuity: Acute Type of Exam: Initial FINDINGS: Pulmonary Arteries: Respiratory motion limits evaluation of the pulmonary arteries. No evidence of a pulmonary embolus to the level of the lobar pulmonary arteries. Evaluation more distally is somewhat limited due to motion. The main pulmonary trunk appears normal in caliber. Mediastinum: No acute abnormality is seen of the thoracic aorta. No bulky mediastinal or hilar adenopathy. Coronary artery calcifications are noted. There is leftward shift of mediastinum, which is unchanged. The heart is normal in size. No pericardial effusion. Lungs/pleura: The central tracheobronchial tree appears grossly patent. There is thickening of the pleura on the left with a loculated fluid collection, which appears minimally decreased in size when compared the prior exam.  There Is persistent opacification within the lingula and left lower lobe. Minimal dependent changes are seen in the right lower lobe. Otherwise, no focal consolidation the right lung. No evidence of a right pleural effusion. No pneumothorax. Upper Abdomen: Limited evaluation of the upper abdomen demonstrates no acute abnormality. Soft Tissues/Bones: Postsurgical changes are seen from a left-sided thoracotomy. No acute osseous abnormality. 1. Respiratory motion limits evaluation. 2. No convincing pulmonary embolus to the level of the lobar pulmonary arteries. Evaluation more distally is limited. 3. Chronic left-sided empyema, which appears minimally decreased in size when compared the prior exam. 4. There is persistent opacification involving the lingula and left lower lobe. 5. Persistent left-sided volume loss with leftward shift of the mediastinum.        MDM:     Interventions given this visit: Orders Placed This Encounter   Medications    lidocaine 4 % external patch 1 patch    oxyCODONE-acetaminophen (PERCOCET) 5-325 MG per tablet 1 tablet     Pt has exacerbation of chronic neck pain. Appears to have radicular pain is also chronic in nature. He did not truly fall out of his bed he more slid out of his bed there is no acute traumatic process. CT scan of the head and neck revealed no acute abnormality. He does have some chronicity however. I estimate there is LOW risk for (including but not limited to) OPEN FRACTURE, COMPARTMENT SYNDROME, DEEP VENOUS THROMBOSIS, COMPLETETENDON RUPTURE, NECROTIZING FASCIITIS, or ACUTE ARTERIAL INJURY, thus I consider the discharge disposition reasonable. [address] Wilson (or their surrogate) and I have discussed the diagnosis and risks, and we agree with discharging home with close follow-up. We also discussed returning to the Emergency Department immediately if new or worsening symptoms occur. We have discussed the symptoms which are most concerning that necessitate immediate return. I independently managed patient today in the ED        BP (!) 139/112   Pulse (!) 43   Temp 97.6 °F (36.4 °C) (Oral)   Resp 16   Ht 5' 9\" (1.753 m)   Wt 213 lb (96.6 kg)   SpO2 98%   BMI 31.45 kg/m²       Clinical Impression:  1. Neck pain        Disposition referral (if applicable):  Milena Palacios MD  [facility]  [phone]    In 2 days      Medhat Suazo MD  [address]  [phone]    In 2 days      Disposition medications (if applicable):  New Prescriptions    No medications on file         Comment: Please note this report has been produced using speech recognition software and may contain errors related to that system including errors in grammar, punctuation, and spelling, as well as words and phrases that may be inappropriate.  If there are any questions or concerns please feel free to contact the dictating provider for clarification.       Marj Barcenas, [id number] [place] [address]  02/22/21 [id number]

## 2021-02-22 NOTE — ED TRIAGE NOTES
Pt to ED by EMS after falling out of bed. Pt is complaining of 9/10 neck pain but states that he does have chronic neck pain. Pt states that he did hit his head but denies use of blood thinners. Pt arrived in C-collar that remains in place.

## 2021-02-22 NOTE — ED PROVIDER NOTES
ATTENDING NOTE  Emory Higgins is a 70 y.o. male who presents s/p fall with neck pain. Chief Complaint   Patient presents with    Neck Pain     Chronic    Angelica Bivins out of bed; no blood thinners; hit head     On my exam:  Vital Signs: /70   Pulse 57   Temp 97.6 °F (36.4 °C) (Oral)   Resp 16   Ht 5' 9\" (1.753 m)   Wt 213 lb (96.6 kg)   SpO2 97%   BMI 31.45 kg/m²   General: Patient appears non-toxic   Heart: bradycardia  Lungs: clear to auscultation bilaterally    CT CERVICAL SPINE WO CONTRAST   Final Result   1. Stable appearance of C5-C6 anterior cervical discectomy and fusion (ACDF)   without evidence of hardware complication. 2. Multilevel cervical spine mild-to-moderate spondylosis without associated   significant central canal stenosis/compromise. 3. C3/C4 moderate right and mild left, C5/C6 moderate right and mild left and   C6/C7 mild bilateral neural foraminal stenosis secondary to encroachment by   disc osteophyte complex. 4. Bilateral lung apical mild centrilobular emphysema. CT HEAD WO CONTRAST   Final Result   1. Resolving intraparenchymal hemorrhage within the right occipital lobe with   intraventricular extension. No new areas of hemorrhage. 2. Generalized cerebral atrophy with chronic microvascular ischemic changes. This patient was seen by the mid-level provider. I have seen and examined the patient face to face, agree with the workup, evaluation, management and diagnosis. Care plan has been discussed. 1. Neck pain        Electronically signed by: Daren Lee, 2/24/2021 11:04 AM        Jose Ashby MD  02/24/21 [id number]

## 2021-02-23 ENCOUNTER — TELEPHONE (OUTPATIENT)
Dept: FAMILY MEDICINE CLINIC | Age: 71
End: 2021-02-23

## 2021-02-23 ENCOUNTER — APPOINTMENT (OUTPATIENT)
Dept: GENERAL RADIOLOGY | Age: 71
DRG: 682 | End: 2021-02-23
Payer: MEDICARE

## 2021-02-23 ENCOUNTER — HOSPITAL ENCOUNTER (INPATIENT)
Age: 71
LOS: 2 days | Discharge: NURSING, REHAB OR LONG-TERM CARE | DRG: 682 | End: 2021-02-26
Attending: INTERNAL MEDICINE | Admitting: INTERNAL MEDICINE
Payer: MEDICARE

## 2021-02-23 DIAGNOSIS — N17.9 AKI (ACUTE KIDNEY INJURY) (HCC): ICD-10-CM

## 2021-02-23 DIAGNOSIS — R77.8 ELEVATED TROPONIN: ICD-10-CM

## 2021-02-23 DIAGNOSIS — R74.8 ELEVATED CK: ICD-10-CM

## 2021-02-23 DIAGNOSIS — M25.552 LEFT HIP PAIN: ICD-10-CM

## 2021-02-23 DIAGNOSIS — R29.6 FREQUENT FALLS: Primary | ICD-10-CM

## 2021-02-23 LAB
BASOPHILS ABSOLUTE: 0 K/CU MM
BASOPHILS RELATIVE PERCENT: 0.5 % (ref 0–1)
DIFFERENTIAL TYPE: ABNORMAL
EOSINOPHILS ABSOLUTE: 0.1 K/CU MM
EOSINOPHILS RELATIVE PERCENT: 1 % (ref 0–3)
HCT VFR BLD CALC: 35.1 % (ref 42–52)
HEMOGLOBIN: 11.4 GM/DL (ref 13.5–18)
IMMATURE NEUTROPHIL %: 0.9 % (ref 0–0.43)
LYMPHOCYTES ABSOLUTE: 1.4 K/CU MM
LYMPHOCYTES RELATIVE PERCENT: 15.7 % (ref 24–44)
MCH RBC QN AUTO: 29.3 PG (ref 27–31)
MCHC RBC AUTO-ENTMCNC: 32.5 % (ref 32–36)
MCV RBC AUTO: 90.2 FL (ref 78–100)
MONOCYTES ABSOLUTE: 0.6 K/CU MM
MONOCYTES RELATIVE PERCENT: 6.9 % (ref 0–4)
NUCLEATED RBC %: 0 %
PDW BLD-RTO: 16.6 % (ref 11.7–14.9)
PLATELET # BLD: 176 K/CU MM (ref 140–440)
PMV BLD AUTO: 9.4 FL (ref 7.5–11.1)
RBC # BLD: 3.89 M/CU MM (ref 4.6–6.2)
SEGMENTED NEUTROPHILS ABSOLUTE COUNT: 6.5 K/CU MM
SEGMENTED NEUTROPHILS RELATIVE PERCENT: 75 % (ref 36–66)
TOTAL IMMATURE NEUTOROPHIL: 0.08 K/CU MM
TOTAL NUCLEATED RBC: 0 K/CU MM
WBC # BLD: 8.7 K/CU MM (ref 4–10.5)

## 2021-02-23 PROCEDURE — 80053 COMPREHEN METABOLIC PANEL: CPT

## 2021-02-23 PROCEDURE — 71045 X-RAY EXAM CHEST 1 VIEW: CPT

## 2021-02-23 PROCEDURE — 73502 X-RAY EXAM HIP UNI 2-3 VIEWS: CPT

## 2021-02-23 PROCEDURE — 85025 COMPLETE CBC W/AUTO DIFF WBC: CPT

## 2021-02-23 PROCEDURE — 99284 EMERGENCY DEPT VISIT MOD MDM: CPT

## 2021-02-23 PROCEDURE — 93005 ELECTROCARDIOGRAM TRACING: CPT | Performed by: PHYSICIAN ASSISTANT

## 2021-02-23 ASSESSMENT — PAIN DESCRIPTION - LOCATION: LOCATION: HIP

## 2021-02-23 ASSESSMENT — PAIN DESCRIPTION - ORIENTATION: ORIENTATION: LEFT

## 2021-02-23 ASSESSMENT — PAIN DESCRIPTION - PAIN TYPE: TYPE: ACUTE PAIN

## 2021-02-23 NOTE — LETTER
[address] [place] [address]   Phone: [phone]  Fax: [phone]    Sheron Villanueva MD        2021      Patient Sandra Ball dob 1950 would benefit from living in an assisted living     facility, he has a very unsteady gait, has had multiple falls and needs assistance     getting up. I believe he would thrive with having assistance in a living facility.       Sincerely,        Sheron Villanueva MD

## 2021-02-23 NOTE — TELEPHONE ENCOUNTER
PT REALLY NEEDS TO BE IN A ASST. LIVING. KELSI IS 72 AND CAN'T REALLY TAKE CARE OF HIM,HE'S FALLING AND LAYING THERE TIL SHE CAN TRY TO GET HIM UP OR SHE'LL CALL SOMEONE TO HELP GET HIM UP. PT STATED HE'S WILLING TO GO TO [facility]. WHAT NEEDS TO BE DONE TO MAKE THIS HAPPEN? PLEASE CALL [employer] AND LET HER KNOW WHAT CAN BE DONE.

## 2021-02-24 PROBLEM — N17.0 ACUTE KIDNEY INJURY (AKI) WITH ACUTE TUBULAR NECROSIS (ATN) (HCC): Status: ACTIVE | Noted: 2021-02-24

## 2021-02-24 LAB
ALBUMIN SERPL-MCNC: 2.8 GM/DL (ref 3.4–5)
ALP BLD-CCNC: 55 IU/L (ref 40–129)
ALT SERPL-CCNC: 9 U/L (ref 10–40)
ANION GAP SERPL CALCULATED.3IONS-SCNC: 7 MMOL/L (ref 4–16)
AST SERPL-CCNC: 16 IU/L (ref 15–37)
BACTERIA: ABNORMAL /HPF
BILIRUB SERPL-MCNC: 0.6 MG/DL (ref 0–1)
BILIRUBIN URINE: NEGATIVE MG/DL
BLOOD, URINE: NEGATIVE
BUN BLDV-MCNC: 20 MG/DL (ref 6–23)
CALCIUM SERPL-MCNC: 8.1 MG/DL (ref 8.3–10.6)
CHLORIDE BLD-SCNC: 109 MMOL/L (ref 99–110)
CLARITY: ABNORMAL
CO2: 25 MMOL/L (ref 21–32)
COLOR: ABNORMAL
CREAT SERPL-MCNC: 2.2 MG/DL (ref 0.9–1.3)
DIGOXIN LEVEL: 1.7 NG/ML (ref 0.8–2)
DOSE AMOUNT: NORMAL
DOSE TIME: NORMAL
GFR AFRICAN AMERICAN: 36 ML/MIN/1.73M2
GFR NON-AFRICAN AMERICAN: 30 ML/MIN/1.73M2
GLUCOSE BLD-MCNC: 103 MG/DL (ref 70–99)
GLUCOSE, URINE: NEGATIVE MG/DL
HYALINE CASTS: >20 /LPF
KETONES, URINE: ABNORMAL MG/DL
LEUKOCYTE ESTERASE, URINE: NEGATIVE
MUCUS: ABNORMAL HPF
NITRITE URINE, QUANTITATIVE: NEGATIVE
PH, URINE: 5 (ref 5–8)
POTASSIUM SERPL-SCNC: 3.8 MMOL/L (ref 3.5–5.1)
PROTEIN UA: 100 MG/DL
RBC URINE: 1 /HPF (ref 0–3)
SODIUM BLD-SCNC: 141 MMOL/L (ref 135–145)
SPECIFIC GRAVITY UA: 1.03 (ref 1–1.03)
SQUAMOUS EPITHELIAL: 1 /HPF
TOTAL CK: 543 IU/L (ref 38–174)
TOTAL PROTEIN: 5.6 GM/DL (ref 6.4–8.2)
TRANSITIONAL EPITHELIAL: <1 /HPF
TRICHOMONAS: ABNORMAL /HPF
TROPONIN T: 0.07 NG/ML
UROBILINOGEN, URINE: 2 MG/DL (ref 0.2–1)
WBC UA: 4 /HPF (ref 0–2)
YEAST: ABNORMAL /HPF

## 2021-02-24 PROCEDURE — 84484 ASSAY OF TROPONIN QUANT: CPT

## 2021-02-24 PROCEDURE — 1200000000 HC SEMI PRIVATE

## 2021-02-24 PROCEDURE — 82550 ASSAY OF CK (CPK): CPT

## 2021-02-24 PROCEDURE — 80162 ASSAY OF DIGOXIN TOTAL: CPT

## 2021-02-24 PROCEDURE — 97530 THERAPEUTIC ACTIVITIES: CPT

## 2021-02-24 PROCEDURE — 93010 ELECTROCARDIOGRAM REPORT: CPT | Performed by: INTERNAL MEDICINE

## 2021-02-24 PROCEDURE — 6360000002 HC RX W HCPCS: Performed by: INTERNAL MEDICINE

## 2021-02-24 PROCEDURE — 81001 URINALYSIS AUTO W/SCOPE: CPT

## 2021-02-24 PROCEDURE — 6370000000 HC RX 637 (ALT 250 FOR IP): Performed by: INTERNAL MEDICINE

## 2021-02-24 PROCEDURE — 2580000003 HC RX 258: Performed by: INTERNAL MEDICINE

## 2021-02-24 PROCEDURE — 2580000003 HC RX 258: Performed by: PHYSICIAN ASSISTANT

## 2021-02-24 PROCEDURE — 97166 OT EVAL MOD COMPLEX 45 MIN: CPT

## 2021-02-24 PROCEDURE — 97535 SELF CARE MNGMENT TRAINING: CPT

## 2021-02-24 PROCEDURE — 99222 1ST HOSP IP/OBS MODERATE 55: CPT | Performed by: INTERNAL MEDICINE

## 2021-02-24 PROCEDURE — 94761 N-INVAS EAR/PLS OXIMETRY MLT: CPT

## 2021-02-24 PROCEDURE — 97163 PT EVAL HIGH COMPLEX 45 MIN: CPT

## 2021-02-24 RX ORDER — DIVALPROEX SODIUM 500 MG/1
1000 TABLET, EXTENDED RELEASE ORAL NIGHTLY
Status: DISCONTINUED | OUTPATIENT
Start: 2021-02-24 | End: 2021-02-26 | Stop reason: HOSPADM

## 2021-02-24 RX ORDER — PROMETHAZINE HYDROCHLORIDE 25 MG/1
12.5 TABLET ORAL EVERY 6 HOURS PRN
Status: DISCONTINUED | OUTPATIENT
Start: 2021-02-24 | End: 2021-02-26 | Stop reason: HOSPADM

## 2021-02-24 RX ORDER — OXYCODONE HYDROCHLORIDE AND ACETAMINOPHEN 5; 325 MG/1; MG/1
1 TABLET ORAL EVERY 4 HOURS PRN
Status: DISCONTINUED | OUTPATIENT
Start: 2021-02-24 | End: 2021-02-26 | Stop reason: HOSPADM

## 2021-02-24 RX ORDER — BENZTROPINE MESYLATE 1 MG/1
0.5 TABLET ORAL 2 TIMES DAILY
Status: DISCONTINUED | OUTPATIENT
Start: 2021-02-24 | End: 2021-02-26 | Stop reason: HOSPADM

## 2021-02-24 RX ORDER — METOPROLOL TARTRATE 50 MG/1
25 TABLET, FILM COATED ORAL 2 TIMES DAILY
Status: DISCONTINUED | OUTPATIENT
Start: 2021-02-24 | End: 2021-02-26 | Stop reason: HOSPADM

## 2021-02-24 RX ORDER — FAMOTIDINE 20 MG/1
10 TABLET, FILM COATED ORAL 2 TIMES DAILY
Status: DISCONTINUED | OUTPATIENT
Start: 2021-02-24 | End: 2021-02-24 | Stop reason: SDUPTHER

## 2021-02-24 RX ORDER — ACETAMINOPHEN 650 MG/1
650 SUPPOSITORY RECTAL EVERY 6 HOURS PRN
Status: DISCONTINUED | OUTPATIENT
Start: 2021-02-24 | End: 2021-02-26 | Stop reason: HOSPADM

## 2021-02-24 RX ORDER — ACETAMINOPHEN 325 MG/1
650 TABLET ORAL EVERY 6 HOURS PRN
Status: DISCONTINUED | OUTPATIENT
Start: 2021-02-24 | End: 2021-02-26 | Stop reason: HOSPADM

## 2021-02-24 RX ORDER — FAMOTIDINE 20 MG/1
20 TABLET, FILM COATED ORAL DAILY
Status: DISCONTINUED | OUTPATIENT
Start: 2021-02-24 | End: 2021-02-26 | Stop reason: HOSPADM

## 2021-02-24 RX ORDER — DOCUSATE SODIUM 100 MG/1
100 CAPSULE, LIQUID FILLED ORAL 2 TIMES DAILY
Status: DISCONTINUED | OUTPATIENT
Start: 2021-02-24 | End: 2021-02-26 | Stop reason: HOSPADM

## 2021-02-24 RX ORDER — DOXAZOSIN MESYLATE 1 MG/1
2 TABLET ORAL EVERY 12 HOURS SCHEDULED
Status: DISCONTINUED | OUTPATIENT
Start: 2021-02-24 | End: 2021-02-26

## 2021-02-24 RX ORDER — MAGNESIUM SULFATE 1 G/100ML
1000 INJECTION INTRAVENOUS PRN
Status: DISCONTINUED | OUTPATIENT
Start: 2021-02-24 | End: 2021-02-26 | Stop reason: HOSPADM

## 2021-02-24 RX ORDER — ONDANSETRON 2 MG/ML
4 INJECTION INTRAMUSCULAR; INTRAVENOUS EVERY 6 HOURS PRN
Status: DISCONTINUED | OUTPATIENT
Start: 2021-02-24 | End: 2021-02-26 | Stop reason: HOSPADM

## 2021-02-24 RX ORDER — SODIUM CHLORIDE 9 MG/ML
INJECTION, SOLUTION INTRAVENOUS CONTINUOUS
Status: DISCONTINUED | OUTPATIENT
Start: 2021-02-24 | End: 2021-02-25

## 2021-02-24 RX ORDER — SODIUM CHLORIDE 0.9 % (FLUSH) 0.9 %
10 SYRINGE (ML) INJECTION PRN
Status: DISCONTINUED | OUTPATIENT
Start: 2021-02-24 | End: 2021-02-26 | Stop reason: HOSPADM

## 2021-02-24 RX ORDER — SODIUM CHLORIDE 9 MG/ML
INJECTION, SOLUTION INTRAVENOUS CONTINUOUS
Status: DISCONTINUED | OUTPATIENT
Start: 2021-02-24 | End: 2021-02-24

## 2021-02-24 RX ORDER — 0.9 % SODIUM CHLORIDE 0.9 %
1000 INTRAVENOUS SOLUTION INTRAVENOUS ONCE
Status: COMPLETED | OUTPATIENT
Start: 2021-02-24 | End: 2021-02-24

## 2021-02-24 RX ORDER — SODIUM CHLORIDE 0.9 % (FLUSH) 0.9 %
10 SYRINGE (ML) INJECTION EVERY 12 HOURS SCHEDULED
Status: DISCONTINUED | OUTPATIENT
Start: 2021-02-24 | End: 2021-02-26 | Stop reason: HOSPADM

## 2021-02-24 RX ORDER — POLYETHYLENE GLYCOL 3350 17 G/17G
17 POWDER, FOR SOLUTION ORAL DAILY PRN
Status: DISCONTINUED | OUTPATIENT
Start: 2021-02-24 | End: 2021-02-26 | Stop reason: HOSPADM

## 2021-02-24 RX ORDER — SENNA PLUS 8.6 MG/1
1 TABLET ORAL NIGHTLY
Status: DISCONTINUED | OUTPATIENT
Start: 2021-02-24 | End: 2021-02-26 | Stop reason: HOSPADM

## 2021-02-24 RX ORDER — AMIODARONE HYDROCHLORIDE 200 MG/1
200 TABLET ORAL DAILY
Status: DISCONTINUED | OUTPATIENT
Start: 2021-02-24 | End: 2021-02-26 | Stop reason: HOSPADM

## 2021-02-24 RX ORDER — POTASSIUM CHLORIDE 7.45 MG/ML
10 INJECTION INTRAVENOUS PRN
Status: DISCONTINUED | OUTPATIENT
Start: 2021-02-24 | End: 2021-02-26 | Stop reason: HOSPADM

## 2021-02-24 RX ORDER — ATORVASTATIN CALCIUM 80 MG/1
80 TABLET, FILM COATED ORAL DAILY
Status: DISCONTINUED | OUTPATIENT
Start: 2021-02-24 | End: 2021-02-26 | Stop reason: HOSPADM

## 2021-02-24 RX ORDER — DONEPEZIL HYDROCHLORIDE 5 MG/1
5 TABLET, FILM COATED ORAL NIGHTLY
Status: DISCONTINUED | OUTPATIENT
Start: 2021-02-24 | End: 2021-02-26 | Stop reason: HOSPADM

## 2021-02-24 RX ORDER — ESCITALOPRAM OXALATE 10 MG/1
20 TABLET ORAL DAILY
Status: DISCONTINUED | OUTPATIENT
Start: 2021-02-24 | End: 2021-02-26 | Stop reason: HOSPADM

## 2021-02-24 RX ADMIN — FAMOTIDINE 20 MG: 20 TABLET ORAL at 10:07

## 2021-02-24 RX ADMIN — ESCITALOPRAM OXALATE 20 MG: 10 TABLET ORAL at 10:06

## 2021-02-24 RX ADMIN — ATORVASTATIN CALCIUM 80 MG: 80 TABLET, FILM COATED ORAL at 10:06

## 2021-02-24 RX ADMIN — METOPROLOL TARTRATE 25 MG: 50 TABLET, FILM COATED ORAL at 20:09

## 2021-02-24 RX ADMIN — BENZTROPINE MESYLATE 0.5 MG: 1 TABLET ORAL at 20:09

## 2021-02-24 RX ADMIN — BENZTROPINE MESYLATE 0.5 MG: 1 TABLET ORAL at 10:07

## 2021-02-24 RX ADMIN — DOCUSATE SODIUM 100 MG: 100 CAPSULE, LIQUID FILLED ORAL at 10:06

## 2021-02-24 RX ADMIN — DOCUSATE SODIUM 100 MG: 100 CAPSULE, LIQUID FILLED ORAL at 20:08

## 2021-02-24 RX ADMIN — DONEPEZIL HYDROCHLORIDE 5 MG: 5 TABLET, FILM COATED ORAL at 20:09

## 2021-02-24 RX ADMIN — SODIUM CHLORIDE 1000 ML: 9 INJECTION, SOLUTION INTRAVENOUS at 00:34

## 2021-02-24 RX ADMIN — METOPROLOL TARTRATE 25 MG: 50 TABLET, FILM COATED ORAL at 10:06

## 2021-02-24 RX ADMIN — SODIUM CHLORIDE, PRESERVATIVE FREE 10 ML: 5 INJECTION INTRAVENOUS at 20:09

## 2021-02-24 RX ADMIN — SODIUM CHLORIDE: 9 INJECTION, SOLUTION INTRAVENOUS at 03:54

## 2021-02-24 RX ADMIN — SODIUM CHLORIDE: 9 INJECTION, SOLUTION INTRAVENOUS at 15:29

## 2021-02-24 RX ADMIN — DIVALPROEX SODIUM 1000 MG: 500 TABLET, FILM COATED, EXTENDED RELEASE ORAL at 20:09

## 2021-02-24 RX ADMIN — SENNOSIDES 8.6 MG: 8.6 TABLET, FILM COATED ORAL at 20:09

## 2021-02-24 RX ADMIN — AMIODARONE HYDROCHLORIDE 200 MG: 200 TABLET ORAL at 10:06

## 2021-02-24 RX ADMIN — ENOXAPARIN SODIUM 40 MG: 40 INJECTION SUBCUTANEOUS at 10:08

## 2021-02-24 ASSESSMENT — PAIN SCALES - WONG BAKER: WONGBAKER_NUMERICALRESPONSE: 4

## 2021-02-24 ASSESSMENT — PAIN DESCRIPTION - ORIENTATION: ORIENTATION: LEFT

## 2021-02-24 ASSESSMENT — PAIN SCALES - GENERAL
PAINLEVEL_OUTOF10: 0
PAINLEVEL_OUTOF10: 0

## 2021-02-24 ASSESSMENT — PAIN DESCRIPTION - LOCATION: LOCATION: HIP

## 2021-02-24 NOTE — PROGRESS NOTES
Patient admitted with hip pain after falling, imaging done so far negative  Patient seen and examined, denies any complaints, seems to be shaky all over, when asked what is wrong, notes shakes occasionally, generalized tremors.   Examination otherwise unremarkable, patient is alert oriented x3    Assessment and plan  -Fall at home, chronic debility, frequent falls, PT OT to evaluate  Doxazosin on hold due to risk of orthostasis  Check orthostatic blood pressure   Digoxin levels within normal limits  -ANA on CKD, likely prerenal  Nephrology was consulted, IV fluids  -Acute urinary retention, monitor, will straight cathetered in ED, may need Zimmer placement  -History of hemorrhagic CVA, CT brain, shows resolving bleed, no new hemorrhage noted  PT OT for evaluation, likely needs placement

## 2021-02-24 NOTE — CONSULTS
Nephrology Service Consultation      [address], [address] [address]  Phone: [phone]  Office Hours: 8:30AM  4:30PM  Monday - Friday            Patient:  Jada Ward. MRN: [id number]  Consulting physician:  MD Maria L  Reason for Consult: elevated creatinine       PCP: Myriam Beltrán MD    HISTORY OF PRESENT ILLNESS:   The patient is a 70 y.o. male with CKD, CA,Afib presented due to a fall at home and hip pain. Renal consult for cr 2.2  BP was elevate initially and is now low normal  He is on room air,   Denies ble edema, diarrhea, vomiting   He is on IVF    REVIEW OF SYSTEMS:  14 point ROS is Negative.  See positive ROS per HPI    Past Medical History:        Diagnosis Date    Anemia     per old chart    Arthritis     Back pain, chronic     \"have pain in lumbar mainly- have 5 bulging discs\"\"in my neck and my lumbar have herniated discs\"    B[facility] 1 disorder [facility]     CAD (coronary artery disease) 01/27/2016    does not follow with a cardiologist    Cerebral artery occlusion with cerebral infarction ([facility]     \"had mini stroke in Jan 2016- fast heart rate when I would stand up - smile drooping on one side- lased just the one day\"    Chronic kidney disease (CKD), stage III (moderate)     CKD (chronic kidney disease)     per old chart- consult with Dr Yoana Ibrahim with admission 4/2016\"have low kidney function\"    COPD (chronic obstructive pulmonary disease) ([facility]     follow with Dr Yamilet Simental Diabetes mellitus, type 2 ([facility] 01/03/2017    Diabetic peripheral neuropathy (HCC)     Diastolic CHF ([facility] 54/54/9449    Gastric ulcer     H/O cardiovascular stress test 05/26/2014    EF 68% mild ischemia anterior wall    Heart murmur     \"see Dr Jen Boston- last echo 2014\" pt states\"I think they said I have a murmur but no chest pain or palpitations\"    Hiatal hernia     History of cardiac cath 06/11/2014    MODERATE  CAD      Hx of migraines  HX OTHER MEDICAL     \"have thinning of kidney walls- they found I had that 15 yrs ago\" see Dr Sridevi Velasquez"    Hyperlipidemia     Hypertension     Intraparenchymal hematoma of brain ([facility]     Lumbar radiculopathy     Overlapping malignant neoplasm of colon ([facility] 05/27/2020    Panic attack     'anything new gives me anxiety\"    Pleural effusion     scheduled for thoracentesis 7/28/2014, 8/17/2016    S/P thoracentesis     left side( per old chart had thora done 4/2016 and one done 2014)    Sleep apnea     sleep study x 2 - last one 4-5 yrs ago- uses c-pap\"    SOBOE (shortness of breath on exertion)     Spinal stenosis        Past Surgical History:        Procedure Laterality Date    CARPAL TUNNEL RELEASE Bilateral 1989    COLONOSCOPY N/A 5/12/2020    COLONOSCOPY POLYPECTOMY SNARE/COLD BIOPSY WITH SPOT INK TATTOO performed by Rome Brantley MD at [facility] Left 2000's    KNEE SURGERY Bilateral     right knee x 2( scope)/ left knee- 7-8 yr ago   [address] fusion   [address]  N/A 5/21/2020    BOWEL RESECTION EXTENDED RIGHT HEMICOLECTOMY performed by Kj Sen MD at [facility] Left 12/20/2013    THORACENTESIS  4/25/2016         THORACENTESIS Left 11/15/2016    Dr. Celeste Foster 250mlsremoved     THORACOTOMY Left 03/18/2019    with Lysis of adhesions    THORACOTOMY Left 3/18/2019    THORACOSCOPY CONVERTED TO THORACOTOMY WITH LYSIS OF ADHESIONS performed by Nina Moran MD at [address] [place] [address]      as a kid    UPPER GASTROINTESTINAL ENDOSCOPY N/A 5/12/2020    EGD BIOPSY performed by Rome Brantley MD at [facility] ENDOSCOPY       Medications:   Prior to Admission medications    Medication Sig Start Date End Date Taking?  Authorizing Provider   amiodarone (PACERONE) 400 MG tablet Take 1 tablet by mouth 2 times daily for 3 days 2/3/21 2/6/21  Norma Mckeon MD TOBACCO:   reports that he quit smoking about 10 years ago. His smoking use included cigarettes. He has a 45.00 pack-year smoking history. He has never used smokeless tobacco.  ETOH:   reports previous alcohol use.   OCCUPATION:      Family History:       Problem Relation Age of Onset    Heart Disease Mother         heart attack    High Blood Pressure Father            Physical Exam:    Vitals: /71   Pulse 72   Temp 97.5 °F (36.4 °C) (Oral)   Resp 16   Ht 5' 9\" (1.753 m)   Wt 213 lb (96.6 kg)   SpO2 95%   BMI 31.45 kg/m²   General appearance: in no acute distress, appears stated age  Skin: Skin color, texture, turgor normal. No rashes or lesions  HEENT: normocephalic, atraumatic  Neck: supple, trachea midline  Lungs: clear to auscultation bilaterally, breathing comfortably on room air  Heart[de-identified] regular rate and rhythm, S1, S2 normal,  Abdomen: soft, non-tender; bowel sounds normal; no masses,   Extremities: extremities normal, atraumatic, no cyanosis or edema  Neurologic: Mental status: alert, oriented, interactive, following commands  Psychiatric: mood and affect appropriate    CBC:   Recent Labs     02/23/21 2045   WBC 8.7   HGB 11.4*        BMP:    Recent Labs     02/23/21 2045      K 3.8      CO2 25   BUN 20   CREATININE 2.2*   GLUCOSE 103*     Hepatic:   Recent Labs     02/23/21 2045   AST 16   ALT 9*   BILITOT 0.6   ALKPHOS 55       -----------------------------------------------------------------      Assessment and Recommendations     Patient Active Problem List    Diagnosis Date Noted    Severe mixed bipolar 1 disorder without psychosis ([facility] 12/17/2019     Priority: High     Class: Acute    Pleural effusion, left 12/19/2013     Priority: High    REYES (dyspnea on exertion) 04/23/2016     Priority: Low    SAMANTHA on CPAP 01/28/2014     Priority: Low    SSS (sick sinus syndrome) ([facility] 01/30/2021    Intraparenchymal hematoma of brain ([facility])     Left hemiparesis ([facility])  Acute respiratory failure ([facility] 11/14/2016    Anemia, chronic disease 04/23/2016    Diuretic-induced hypokalemia 14/65/0349    Diastolic CHF ([facility] 25/03/2600    Coronary artery disease involving native coronary artery of native heart without angina pectoris     Essential hypertension     Chronic obstructive pulmonary disease ([facility] 01/27/2016    TIA (transient ischemic attack) 01/27/2016    Bipolar 1 disorder ([facility] 01/27/2016     -ANA on CKD  -Hip pain s/p fall: no fracture on xray  -HTN  -CHF hx  -Afib hx  -Fluid overoad hx    Suggest:  -Continue IVF  -Hold doxazosin since it has a risk of orthostasis  -Ok to continue meotoprolol for the afib  -Avoid nephrotoxins  -BMP tomorrow      Electronically signed by Stacy Combs DO on 2/24/2021 at 9:37 AM    [address],  [facility]  [place] [address]  PHONE: [phone]  FAX: [phone]

## 2021-02-24 NOTE — ED NOTES
Pt transported to ED via EMS. Fall, left hip pain. HX of falls, family requesting LTC placement post discharge.      Vicente Polanco RN  02/23/21 [id number]

## 2021-02-24 NOTE — CONSULTS
[facility] OCCUPATIONAL THERAPY EVALUATION    Albino Courser., 1950, 4015/4015-A, 2/24/2021    Discharge Recommendation: Skilled Nursing Facility      History:  [place]:  The primary encounter diagnosis was Frequent falls. Diagnoses of Left hip pain, ANA (acute kidney injury) ([address] Elevated CK, and Elevated troponin were also pertinent to this visit. Subjective:  Patient states: Agreeable to therapy. Pain: Pt reports pain in LLE and back. Pain was a limiting factor for mobility this date. Communication with other providers: PT, RN, LSW  Restrictions: General Precautions, Fall Risk    Home Setup/Prior level of function:  Social/Functional History  Lives With: Significant other  Type of Home: House  Home Layout: Two level  Home Access: Stairs to enter with rails  Entrance Stairs - Number of Steps: 4  Entrance Stairs - Rails: Both  Bathroom Shower/Tub: Walk-in shower  Bathroom Toilet: Standard  Home Equipment: Rolling walker, [employer]  ADL Assistance: Needs assistance(girlfriend assists with bathing/dressing (lower body))  Homemaking Assistance: Needs assistance  Ambulation Assistance: Independent(mod I with front wheeled walker)  Transfer Assistance: Independent  Active : No    Examination:  · Observation: Supine in bed upon arrival  · Vision: [employer] [place]  · Hearing: WFL  · Vitals: Stable vitals throughout session    Body Systems and functions:  · ROM: WFL   · Strength: Not formally tested this date. · Sensation: WFL  · Tone: Normal  · Coordination: WFL  · Perception: WNL    Activities of Daily Living (ADLs):  · Feeding: Onelia  (setup and increased time)  · Grooming: SBA anticipate pt would require setup, increased time, and VC. Pt unable to perform at EOB at this time d/t back pain and would complete supine in bed with HOB elevated). · UB bathing: Kiran (recommend pt complete in seated (when tolerable) with setup, increased time, and assist to ensure task is performed thoroughly). · LB bathing: DEP (recommend pt complete in seated. Anticipate pt is dependent for distal reaching at this time d/t pain. Pt also demo decreased activity tolerance for upright sitting this date). · UB dressing: Kiran (setup, increased time, VC, PRN assist). · LB dressing: DEP (pt unable to tolerate EOB sitting this date d/t pain. Pt demo decreased ability to engage in distal reaching. Anticipate pt would require dependent assist in supine at this time). · Toileting: DEP (pt unable to tolerate EOB sitting and remains in supine despite attempts to perform supine to seated bed mobility. Pt incontinent with BM and required assist for rolling for bedpan use and paulino care). Cognitive and Psychosocial Functioning:  · Overall cognitive status: Impaired. Only oriented to  person, city  · Affect: Lethargic    Balance:   · Sitting: Unable at this time d/t pain in LLE and back. · Standing: NT this date d/t pain as limiting factor. Functional Mobility:   · Bed Mobility: ModAx2 (attempted to perform supine to seated bed mobility, however pt unable to tolerate d/t pain. Pt performed rolling in bed for bedpan placement with ModAx2). · Transfers: NT this date. Unable at this time. Limiting factor is pain. · Ambulation: NT this date. Unable at this time. Limiting factor is pain. AM-PAC 6 click short form for inpatient daily activity:   How much help from another person does the patient currently need. .. Unable  Dep A Lot  Max A A Lot   Mod A A Little  Min A A Little   CGA  SBA None   Mod I  Indep  Sup   1. Putting on and taking off regular lower body clothing? [x] 1    [] 2   [] 2   [] 3   [] 3   [] 4      2. Bathing (including washing, rinsing, drying)? [] 1   [x] 2   [] 2 [] 3 [] 3 [] 4   3.  Toileting, which includes using toilet, bedpan, or urinal? [x] 1 [] 2   [] 2   [] 3   [] 3   [] 4     4. Putting on and taking off regular upper body clothing? [] 1   [] 2   [] 2   [x] 3   [] 3    [] 4      5. Taking care of personal grooming such as brushing teeth? [] 1   [] 2    [] 2 [] 3    [x] 3   [] 4      6. Eating meals? [] 1   [] 2   [] 2   [] 3   [] 3   [x] 4      Raw Score:  14     [24=0% impaired(CH), 23=1-19%(CI), 20-22=20-39%(CJ), 15-19=40-59%(CK), 10-14=60-79%(CL), 7-9=80-99%(CM), 6=100%(CN)]    Treatment:  Therapeutic Activity Training:   Therapeutic activity training was instructed today. Cues were given for safety, sequence, UE/LE placement, awareness, and balance. Activities performed today included bed mobility training, sup-sit (attempts). Self Care Training:   Cues were given for safety, sequence, UE/LE placement, visual cues, and balance. Activities performed today included toileting. Safety Measures: Gait belt used, Left in bed, Alarm in place    Assessment:  Assessment  Performance deficits / Impairments: Decreased functional mobility , Decreased balance, Decreased high-level IADLs, Decreased ADL status, Decreased strength, Decreased posture  Treatment Diagnosis: ANA  Prognosis: Good  Decision Making: Medium Complexity  REQUIRES OT FOLLOW UP: Yes  Discharge Recommendations: [address]     Pt is a 70year old M admitted for ANA. Pt reports that prior to admission he required assist in ADLs of bathing, was not responsible for IADLs, and was [place] for functional mobility. This date, pt demo decreased strength, endurance, activity tolerance, and increased pain impacting ADL status. Pt is currently functioning below baseline and would benefit from skilled OT services at SNF. Plan:  Plan  Times per week: 2+  Times per day: Daily      Goals:  1. Pt will complete all aspects of bed mobility for EOB/OOB ADLs ModA. 2. Pt will complete UB/LB bathing with Kiran and AE as needed. 3. Pt will complete all aspects of LB dressing with Krian and AE as needed. 4. Pt will complete all functional transfers to and from bed, chair, toilet, shower chair with ModA and AD. 5. Pt will ambulate HH distance to bathroom for toileting with ModA and AD. 6. Pt will complete all aspects of toileting task with ModA. 7. Pt will complete oral hygiene/grooming routine in standing at sink with Kiran demo good dynamic standing balance for approx 8 minutes. 8. Pt will complete ther ex/ther act with focus on UB strengthening. Time:   Time in: 1203  Time out: 1236  Timed treatment minutes: 23  Total time: 33      Electronically signed by:       ACE Sosa/L, [address] [place] [id number]

## 2021-02-24 NOTE — DISCHARGE INSTR - COC
Continuity of Care Form    Patient Name: Pranav Craig :  1950  MRN:  [id number]    Admit date:  2021  Discharge date: 21    Code Status Order: Full Code   Advance Directives:      Admitting Physician:  MD Maria L  PCP: Lupe Kimball MD    Discharging Nurse: Jah Stovall RN  [address] Unit/Room#: 6191/4635-Z  Discharging Unit Phone Number: [phone]    Emergency Contact:   Extended Emergency Contact Information  Primary Emergency Contact: [address] Phone: [phone]  Mobile Phone: [phone]  Relation: Child   needed?  No  Secondary Emergency Contact: Funmi Herring  Home Phone: [phone]  Mobile Phone: [phone]  Relation: Child    Past Surgical History:  Past Surgical History:   Procedure Laterality Date    CARPAL TUNNEL RELEASE Bilateral     COLONOSCOPY N/A 2020    COLONOSCOPY POLYPECTOMY SNARE/COLD BIOPSY WITH SPOT INK TATTOO performed [facility]ad [facility]alves [facility] Left 's    KNEE SURGERY Bilateral     right knee x 2( scope)/ left knee- 7-8 yr ago   [address] fusion   [address]  N/A 2020    BOWEL RESECTION EXTENDED RIGHT HEMICOLECTOMY performed by Cheryle Martin MD at [address] [place] [address] Left 2013    THORACENTESIS  2016         THORACENTESIS Left 11/15/2016    Dr. Selene Gallagher 250mlsremoved     THORACOTOMY Left 2019    with Lysis of adhesions    THORACOTOMY Left 3/18/2019    THORACOSCOPY CONVERTED TO THORACOTOMY WITH LYSIS OF ADHESIONS performed by Judah Denver, MD at [place] [address]      as a kid    UPPER GASTROINTESTINAL ENDOSCOPY N/A 2020    EGD BIOPSY [facility] Eyad G[facility]alves MD at [facility] ENDOSCOPY       Immunization History:   Immunization History   Administered Date(s) Administered    Influenza A ([id number]) Vaccine PF IM 2010    Influenza Vaccine, unspecified formulation 10/14/2016  Influenza Virus Vaccine 10/03/2013, 10/13/2014, 01/29/2016, 10/14/2016    Influenza, High Dose (Fluzone 65 yrs and older) 11/11/2018    Influenza, Quadv, IM, PF (6 mo and older Fluzone, Flulaval, Fluarix, and 3 yrs and older Afluria) 10/09/2019    Pneumococcal Conjugate 13-valent ([id number]) 10/17/2016    Pneumococcal Polysaccharide ([id number]) 01/01/2009, 10/03/2013    Tdap (Boostrix, Adacel) 06/10/2013       Active Problems:  Patient Active Problem List   Diagnosis Code    Pleural effusion, left J90    SAMANTHA on CPAP G47.33, Z99.89    Chronic obstructive pulmonary disease ([facility] J44.9    TIA (transient ischemic attack) G45.9    Bipolar 1 disorder ([facility]) F31.9    Coronary artery disease involving native coronary artery of native heart without angina pectoris I25.10    Essential hypertension I10    REYES (dyspnea on exertion) R06.00    Anemia, chronic disease D63.8    Diuretic-induced hypokalemia E87.6, B65.5T9K    Diastolic CHF ([facility]) D11.91    Acute respiratory failure ([facility]) J96.00    Hyperkalemia E87.5    Adrenal mass ([facility]) E27.8    Diabetes mellitus, type 2 ([facility]) E11.9    Hyponatremia E87.1    Paroxysmal atrial fibrillation ([facility]) I48.0    Empyema of left pleural space ([facility]) J86.9    Hyperlipidemia E78.5    Lumbar radiculopathy M54.16    CKD (chronic kidney disease) N18.9    Uncontrolled type 2 diabetes mellitus with peripheral neuropathy ([facility]) E11.42, E11.65    Chronic kidney disease (CKD), stage III (moderate) N18.30    Cervical stenosis of spine M48.02    Spinal stenosis of lumbar region without neurogenic claudication M48.061    Somatic dysfunction of back M99.09    Somatic dysfunction of cervical region M99.01    Somatic dysfunction of pelvis region M99.05    Somatic dysfunction of both lower extremities M99.06    Empyema lung ([facility]) J86.9    Acute kidney injury superimposed on CKD ([facility]) N17.9, N18.9    Chronic diastolic heart failure ([facility]) I50.32  Hypotensive episode I95.9    Bradycardia on ECG R00.1    Lightheadedness R42    Acute on chronic respiratory failure with hypoxia (HCC) J96.21    Severe mixed bipolar 1 disorder without psychosis (HCC) F31.63    Acute chest pain R07.9    Atrial tachycardia (HCC) I47.1    Overlapping malignant neoplasm of colon (HCC) C18.8    Colon adenocarcinoma (HCC) C18.9    Acute left-sided weakness R53.1    Fall at home [address]19. [place] Patient, Y92.009    Fall at home, initial encounter [address] [place] Patient, Y92.009    Chronic pain of both shoulders M25.511, G89.29, M25.512    Hypertensive heart disease with stage 5 chronic kidney disease, not on chronic dialysis (HCC) I13.11, N18.5    Bell's palsy G51.0    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 ([facility]) E09.65    Localized edema R60.0    Intracranial hemorrhage ([facility]) I62.9    Left hemiparesis ([facility]) G81.94    Dysarthria due to and not concurrent with spontaneous intracerebral hemorrhage I69.122    Gait disturbance R26.9    Bipolar disorder, in partial remission, most recent episode depressed ([facility]) F31.75    Intraparenchymal hematoma of brain ([facility]) S06.360A    SSS (sick sinus syndrome) ([facility] I49.5       Isolation/Infection:   Isolation            No Isolation          Patient Infection Status       Infection Onset Added Last Indicated Last Indicated By Review Planned Expiration Resolved Resolved By    None active    Resolved    COVID-19 Rule Out 09/02/20 09/02/20 09/02/20 COVID-19 (Ordered)   09/02/20 Rule-Out Test Resulted    COVID-19 Rule Out 08/01/20 08/01/20 08/01/20 Covid-19 Ambulatory (Ordered)   08/03/20 Rule-Out Test Resulted    COVID-19 Rule Out 06/01/20 06/01/20 06/01/20 Covid-19 Ambulatory (Ordered)   06/03/20 Rule-Out Test Resulted    COVID-19 Rule Out 05/26/20 05/26/20 05/28/20 COVID-19 (Ordered)   05/28/20 Rule-Out Test Resulted    MRSA 10/09/19 10/10/19 10/09/19 Culture, Sputum   01/05/21 Taylor Duff LPN            Nurse Assessment: Last Vital Signs: /71   Pulse 76   Temp 98.1 °F (36.7 °C) (Oral)   Resp 19   Ht 5' 9\" (1.753 m)   Wt 213 lb (96.6 kg)   SpO2 95%   BMI 31.45 kg/m²     Last documented pain score (0-10 scale): Pain Level: 8  Last Weight:   Wt Readings from Last 1 Encounters:   02/23/21 213 lb (96.6 kg)     Mental Status:  oriented    IV Access:  - None    Nursing Mobility/ADLs:  Walking   Assisted  Transfer  Assisted  Bathing  Assisted  Dressing  Assisted  Toileting  Assisted  Feeding  Independent  Med Admin  Independent  Med Delivery   whole    Wound Care Documentation and Therapy:        Elimination:  Continence:   · Bowel: No  · Bladder: No  Urinary Catheter: None   Colostomy/Ileostomy/Ileal Conduit: No       Date of Last BM: 2/26/21    Intake/Output Summary (Last 24 hours) at 2/24/2021 0910  Last data filed at 2/24/2021 0759  Gross per 24 hour   Intake    Output 100 ml   Net -100 ml     No intake/output data recorded.     Safety Concerns:     Sundowners [place]rome, History of Falls (last 30 days) and At Risk for Falls    Impairments/Disabilities:      Hearing      Patient's personal belongings (please select all that are sent with patient):  None    RN SIGNATURE:  Electronically signed by Carolin Hernandez RN on 2/26/21 at 1:25 PM EST    CASE MANAGEMENT/SOCIAL WORK SECTION    Inpatient Status Date: 2/23/21    Readmission Risk Assessment Score:  Readmission Risk              Risk of Unplanned Readmission:        44           Discharging to Facility/ Agency   · Name: [facility]  · Address:  · Phone:  · Fax:    Dialysis Facility (if applicable)   · Name:  · Address:  · Dialysis Schedule:  · Phone:  · Fax:    / signature: {Esignature:[id number]}        PHYSICIAN SECTION      Nutrition Therapy:  Current Nutrition Therapy:   - Oral Diet:  Cardiac    Routes of Feeding: Oral  Liquids: No Restrictions  Daily Fluid Restriction: no Last Modified Barium Swallow with Video (Video Swallowing Test): not done    Treatments at the Time of Hospital Discharge:   Respiratory Treatments:None  Oxygen Therapy:  is not on home oxygen therapy. Ventilator:    - No ventilator support    Rehab Therapies: Physical Therapy and Occupational Therapy  Weight Bearing Status/Restrictions: No weight bearing restirctions  Other Medical Equipment (for information only, NOT a DME order): Other Treatments:    Prognosis: Good    Condition at Discharge: Stable    Rehab Potential (if transferring to Rehab): Good    Recommended Labs or Other Treatments After Discharge:    BMP on Wednesday, 03/03/21, pls fax results to [phone], follow up with urology    Physician Certification: I certify the above information and transfer of Rickey Nails.  is necessary for the continuing treatment of the diagnosis listed and that he requires See Joe for greater 30 days.      Update Admission H&P: No change in H&P    PHYSICIAN SIGNATURE:  Electronically signed by Karla Walters MD on 2/26/21 at 11:39 AM EST

## 2021-02-24 NOTE — ED NOTES
Patient admitted to room 4015. Report called to YESENIA Kruse.       Massimo Obregon RN  02/24/21 1916

## 2021-02-24 NOTE — H&P
Hospitalist H&P Note:   Date of Service: 2/24/2021   ? CHIEF COMPLAINT:   Fell on his back    HISTORY OF PRESENT ILLNESS (HPI):   Mr. Liz Mathis, a 70y.o. year old male who  has a past medical history of Anemia, Arthritis, Back pain, chronic, Bipolar 1 disorder ([address] CAD (coronary artery disease), Cerebral artery occlusion with cerebral infarction ([address] Chronic kidney disease (CKD), stage III (moderate), CKD (chronic kidney disease), COPD (chronic obstructive pulmonary disease) ([address] Diabetes mellitus, type 2 ([address] Diabetic peripheral neuropathy ([address] Diastolic CHF ([address] Gastric ulcer, H/O cardiovascular stress test, Heart murmur, Hiatal hernia, History of cardiac cath, Hx of migraines, HX OTHER MEDICAL, Hyperlipidemia, Hypertension, Intraparenchymal hematoma of brain ([address] Lumbar radiculopathy, Overlapping malignant neoplasm of colon ([address] Panic attack, Pleural effusion, S/P thoracentesis, Sleep apnea, SOBOE (shortness of breath on exertion), and Spinal stenosis. Patient came with hip pain after falling. Patient says he woke up to use the toilet but his hand kept shaking and fell on his back onto the bed again. Otherwise he denied any significant chest pain, shortness of breath, fever/chills, nausea/vomiting/abdominal pain, bowel/bladder habit changes. On presentation, Blood pressure 116/86, pulse 73, temperature 97.9 °F (36.6 °C), resp. rate 17, height 5' 9\" (1.753 m), weight 213 lb (96.6 kg), SpO2 100 %.      PAST MEDICAL HISTORY   Past Medical History:   Diagnosis Date    Anemia     per old chart    Arthritis     Back pain, chronic     \"have pain in lumbar mainly- have 5 bulging discs\"\"in my neck and my lumbar have herniated discs\"    Bipolar 1 disorder ([address]     CAD (coronary artery disease) 01/27/2016    does not follow with a cardiologist    Cerebral artery occlusion with cerebral infarction ([address] \"had mini stroke in Jan 2016- fast heart rate when I would stand up - smile drooping on one side- lased just the one day\"    Chronic kidney disease (CKD), stage III (moderate)     CKD (chronic kidney disease)     per old chart- consult with Dr Shira French with admission 4/2016\"have low kidney function\"    COPD (chronic obstructive pulmonary disease) ([facility]     follow with Dr Ifeanyi Mason Diabetes mellitus, type 2 ([facility] 01/03/2017    Diabetic peripheral neuropathy (HCC)     Diastolic CHF ([facility] 47/01/1829    Gastric ulcer     H/O cardiovascular stress test 05/26/2014    EF 68% mild ischemia anterior wall    Heart murmur     \"see Dr Yannick Cordero- last echo 2014\" pt states\"I think they said I have a murmur but no chest pain or palpitations\"    Hiatal hernia     History of cardiac cath 06/11/2014    MODERATE  CAD      Hx of migraines     HX OTHER MEDICAL     \"have thinning of kidney walls- they found I had that 15 yrs ago\" see Dr Jalil Sweeney"    Hyperlipidemia     Hypertension     Intraparenchymal hematoma of brain ([facility]     Lumbar radiculopathy     Overlapping malignant neoplasm of colon ([facility] 05/27/2020    Panic attack     'anything new gives me anxiety\"    Pleural effusion     scheduled for thoracentesis 7/28/2014, 8/17/2016    S/P thoracentesis     left side( per old chart had thora done 4/2016 and one done 2014)    Sleep apnea     sleep study x 2 - last one 4-5 yrs ago- uses c-pap\"    SOBOE (shortness of breath on exertion)     Spinal stenosis           SURGICAL HISTORY:   Past Surgical History:   Procedure Laterality Date    CARPAL TUNNEL RELEASE Bilateral 1989    COLONOSCOPY N/A 5/12/2020    COLONOSCOPY POLYPECTOMY SNARE/COLD BIOPSY WITH SPOT INK TATTOO performed by Jose Green MD at [facility] Left 2000's    KNEE SURGERY Bilateral     right knee x 2( scope)/ left knee- 7-8 yr ago   [address] fusion   [address]  N/A 5/21/2020 BOWEL RESECTION EXTENDED RIGHT HEMICOLECTOMY performed by Annel Ospina MD at [address] Left 12/20/2013    THORACENTESIS  4/25/2016         THORACENTESIS Left 11/15/2016    Dr. Elva Madden 250mlsremoved     THORACOTOMY Left 03/18/2019    with Lysis of adhesions    THORACOTOMY Left 3/18/2019    THORACOSCOPY CONVERTED TO THORACOTOMY WITH LYSIS OF ADHESIONS performed by Patrick Lindsey MD at [facility]      as a kid    UPPER GASTROINTESTINAL ENDOSCOPY N/A 5/12/2020    EGD BIOPSY performed by Dina Azul MD at [address]:   Nsaids   ? SOCIAL HISTORY:    reports that he quit smoking about 10 years ago. His smoking use included cigarettes. He has a 45.00 pack-year smoking history. He has never used smokeless tobacco. He reports previous alcohol use. He reports current drug use. Frequency: 7.00 times per week. Drug: Marijuana. ? FAMILY HISTORY:   Family History   Problem Relation Age of Onset    Heart Disease Mother         heart attack    High Blood Pressure Father       ?     MEDICATIONS:   Current Facility-Administered Medications   Medication Dose Route Frequency Provider Last Rate Last Admin    0.9 % sodium chloride infusion   Intravenous Continuous Manuel Rausch PA-C         Current Outpatient Medications   Medication Sig Dispense Refill    amiodarone (PACERONE) 400 MG tablet Take 1 tablet by mouth 2 times daily for 3 days      amiodarone (CORDARONE) 200 MG tablet Take 1 tablet by mouth daily      atorvastatin (LIPITOR) 80 MG tablet Take 1 tablet by mouth daily 30 tablet 5    divalproex (DEPAKOTE ER) 500 MG extended release tablet Take 2 tablets by mouth nightly 60 tablet 5    escitalopram (LEXAPRO) 10 MG tablet Take 2 tablets by mouth daily      donepezil (ARICEPT) 5 MG tablet Take 1 tablet by mouth nightly 30 tablet 3    famotidine (PEPCID) 10 MG tablet Take 1 tablet by mouth 2 times daily 60 tablet 5  doxazosin (CARDURA) 2 MG tablet Take 1 tablet by mouth every 12 hours 60 tablet 0    lisinopril (PRINIVIL;ZESTRIL) 40 MG tablet Take 1 tablet by mouth daily 30 tablet 0    benztropine (COGENTIN) 0.5 MG tablet Take 1 tablet by mouth 2 times daily 60 tablet 0    metoprolol tartrate (LOPRESSOR) 25 MG tablet Take 1 tablet by mouth 2 times daily 60 tablet 0    amLODIPine (NORVASC) 10 MG tablet Take 1 tablet by mouth daily 30 tablet 0    digoxin (LANOXIN) 125 MCG tablet Take 1 tablet by mouth daily 30 tablet 0    miconazole (MICOTIN) 2 % powder Apply topically 2 times daily. 45 g 0    senna (SENOKOT) 8.6 MG tablet Take 1 tablet by mouth nightly 30 tablet 0    docusate sodium (COLACE) 100 MG capsule Take 1 capsule by mouth 2 times daily 20 capsule 3    Compression Stockings MISC by Does not apply route Duration of Treatment:  3 Months  # of pairs:  2    Compression Class Level - 20-30 mmHg*    Style - Knee High 2 each 0      ?   ? REVIEW OF SYSTEMS:   All systems were reviewed and all were negative except for those mentioned in HPI. PHYSICAL EXAM:   Blood pressure 116/86, pulse 73, temperature 97.9 °F (36.6 °C), resp. rate 17, height 5' 9\" (1.753 m), weight 213 lb (96.6 kg), SpO2 100 %. . Body mass index is 31.45 kg/m². CONSTITUTIONAL: Not in acute distress  HENT: NC/AT Ear: normal, patent without effusion Nose: no deformities, nares patent  EYES:Conjunctiva normal. No discharge. NECK: Neck supple,No JVD /Thyromegaly/LAD   RESP:No chest wall deformities or tenderness. No wheezing or rales. B/L air entry positive+  CVS: Regular rate and rhythm. S1 and S2 normal, no murmurs, clicks, gallops or rubs  GI: Soft, ND/NT,No guarding/rebound/mass/organomegaly. Bowel sounds are normal.    MUSCULAR/EXT:  no pedal edema, no clubbing or cyanosis,Pulses 2+ B/L  CNS: Awake, alert. Cranial nerves intact, no focal neurological deficits.    MOOD/PSYCH: Normal mood and affect  SKIN: Warm and dry,No rashes Lab results:   Results for orders placed or performed during the hospital encounter of 02/23/21   CBC Auto Differential   Result Value Ref Range    WBC 8.7 4.0 - 10.5 K/CU MM    RBC 3.89 (L) 4.6 - 6.2 M/CU MM    Hemoglobin 11.4 (L) 13.5 - 18.0 GM/DL    Hematocrit 35.1 (L) 42 - 52 %    MCV 90.2 78 - 100 FL    MCH 29.3 27 - 31 PG    MCHC 32.5 32.0 - 36.0 %    RDW 16.6 (H) 11.7 - 14.9 %    Platelets 122 137 - 937 K/CU MM    MPV 9.4 7.5 - 11.1 FL    Differential Type AUTOMATED DIFFERENTIAL     Segs Relative 75.0 (H) 36 - 66 %    Lymphocytes % 15.7 (L) 24 - 44 %    Monocytes % 6.9 (H) 0 - 4 %    Eosinophils % 1.0 0 - 3 %    Basophils % 0.5 0 - 1 %    Segs Absolute 6.5 K/CU MM    Lymphocytes Absolute 1.4 K/CU MM    Monocytes Absolute 0.6 K/CU MM    Eosinophils Absolute 0.1 K/CU MM    Basophils Absolute 0.0 K/CU MM    Nucleated RBC % 0.0 %    Total Nucleated RBC 0.0 K/CU MM    Total Immature Neutrophil 0.08 K/CU MM    Immature Neutrophil % 0.9 (H) 0 - 0.43 %   Comprehensive Metabolic Panel w/ Reflex to MG   Result Value Ref Range    Sodium 141 135 - 145 MMOL/L    Potassium 3.8 3.5 - 5.1 MMOL/L    Chloride 109 99 - 110 mMol/L    CO2 25 21 - 32 MMOL/L    BUN 20 6 - 23 MG/DL    CREATININE 2.2 (H) 0.9 - 1.3 MG/DL    Glucose 103 (H) 70 - 99 MG/DL    Calcium 8.1 (L) 8.3 - 10.6 MG/DL    Albumin 2.8 (L) 3.4 - 5.0 GM/DL    Total Protein 5.6 (L) 6.4 - 8.2 GM/DL    Total Bilirubin 0.6 0.0 - 1.0 MG/DL    ALT 9 (L) 10 - 40 U/L    AST 16 15 - 37 IU/L    Alkaline Phosphatase 55 40 - 129 IU/L    GFR Non-African American 30 (L) >60 mL/min/1.73m2    GFR  36 (L) >60 mL/min/1.73m2    Anion Gap 7 4 - 16   Troponin   Result Value Ref Range    Troponin T 0.074 (H) <0.01 NG/ML   CK   Result Value Ref Range    Total  (H) 38 - 174 IU/L   EKG 12 Lead   Result Value Ref Range    Ventricular Rate 71 BPM    Atrial Rate 63 BPM    P-R Interval 182 ms    QRS Duration 104 ms    Q-T Interval 330 ms    QTc Calculation (Bazett) 358 ms

## 2021-02-24 NOTE — PROGRESS NOTES
Physical Therapy    Facility/Department: [facility]  Initial Assessment    NAME: Moisés Dash. : 1950  MRN: [id number]    Date of Service: 2021    Discharge Recommendations:  Subacute/Skilled Nursing Facility        Assessment   Assessment: Pt is a 70 y.o. male with medical history, surgical history, co-morbidities, and personal factors including Anemia, Arthritis, Back pain, chronic, Bipolar 1 disorder ([address] CAD (coronary artery disease), Cerebral artery occlusion with cerebral infarction ([address] Chronic kidney disease (CKD), stage III (moderate), CKD (chronic kidney disease), COPD (chronic obstructive pulmonary disease) ([address] Diabetes mellitus, type 2 ([address] Diabetic peripheral neuropathy ([address] Diastolic CHF ([address] Gastric ulcer, H/O cardiovascular stress test, Heart murmur, Hiatal hernia, History of cardiac cath, Hx of migraines, Hyperlipidemia, Hypertension, Intraparenchymal hematoma of brain ([address] Lumbar radiculopathy, Overlapping malignant neoplasm of colon ([address] Panic attack, Pleural effusion, S/P thoracentesis, Sleep apnea, SOBOE (shortness of breath on exertion), Spinal stenosis, Neck surgery (); Carpal tunnel release (Bilateral, ); knee surgery (Bilateral); thoracentesis (Left, 2013); Elbow surgery (Left, ); Thoracentesis (2016); Tonsillectomy; Thoracentesis (Left, 11/15/2016); thoracotomy (Left, 2019); thoracotomy (Left, 3/18/2019); Upper gastrointestinal endoscopy (N/A, 2020); Colonoscopy (N/A, 2020); and Small intestine surgery (N/A, 2020) with admission for Frequent falls, Left hip pain, ANA (acute kidney injury), Elevated CK, and Elevated troponin. Prior to admission, pt was modified independent with functional mobility and required assistance with ADLs. Examination of body systems reveals decreased strength, decreased balance, decreased aerobic capacity, impaired cognition, and decreased independence with functional mobility. Prognosis: Good  Decision Making: High Complexity  Clinical Presentation: unpredictable characteristics  PT Education: Goals;PT Role;General Safety;Gait Training;Plan of Care; Functional Mobility Training;Equipment;Transfer Training;Orientation  REQUIRES PT FOLLOW UP: Yes  Activity Tolerance  Activity Tolerance: Patient Tolerated treatment well         Restrictions  Restrictions/Precautions  Restrictions/Precautions: General Precautions, Fall Risk  Vision/Hearing  Vision: Within Functional Limits  Hearing: Within functional limits     Subjective  General  Chart Reviewed: Yes  Patient assessed for rehabilitation services?: Yes  Family / Caregiver Present: No  Follows Commands: Impaired  Pain Screening  Patient Currently in Pain: Yes  Pain Assessment  Pain Assessment: Faces  Teague-Baker Pain Rating: Hurts little more  Pain Type: Chronic pain;Acute pain  Pain Location: Hip  Pain Orientation: Left  Vital Signs  Patient Currently in Pain: Yes       Orientation  Orientation  Overall Orientation Status: Impaired  Orientation Level: Disoriented to situation;Disoriented to time;Oriented to person     Social/Functional History  Social/Functional History  Lives With: Significant other  Type of Home: House  Home Layout: Two level  Home Access: Stairs to enter with rails  Entrance Stairs - Number of Steps: 4  Entrance Stairs - Rails: Both  Bathroom Shower/Tub: Walk-in shower  Bathroom Toilet: Standard  Home Equipment: Rolling walker, [employer]  ADL Assistance: Needs assistance(girlfriend assists with bathing/dressing (lower body))  Homemaking Assistance: Needs assistance  Ambulation Assistance: Independent(mod I with front wheeled walker)  Transfer Assistance: Independent  Active : No     Cognition   Cognition  Overall Cognitive Status: Exceptions  Arousal/Alertness: Delayed responses to stimuli  Following Commands: Inconsistently follows commands  Attention Span: Attends with cues to redirect Safety Judgement: Decreased awareness of need for safety;Decreased awareness of need for assistance  Problem Solving: Decreased awareness of errors  Insights: Decreased awareness of deficits  Initiation: Requires cues for some  Sequencing: Requires cues for some    Objective             Strength RLE  Comment: knee extension: at least 3/5 observed functionally; ankle DF: at least 3/5 observed functionally  Strength LLE  Comment: knee extension: at least 3/5 observed functionally; ankle DF: at least 3/5 observed functionally     Sensation  Overall Sensation Status: WNL  Bed mobility  Rolling to Left: Maximum assistance;2 Person assistance;Dependent/Total(with verbal and tactile cues for BUE and BLE placement throughout)  Rolling to Right: Dependent/Total;Maximum assistance;2 Person assistance(with verbal and tactile cues for BUE and BLE placement throughout)  Supine to Sit: Dependent/Total;2 Person assistance  Sit to Supine: Dependent/Total;2 Person assistance           Balance  Posture: Poor(forward head, rounded shoulders, increased thoracic kyphosis)      Therapeutic Activity Training:   Therapeutic activity training was instructed today. Cues were given for safety, sequence, UE/LE placement, awareness, and balance. Activities performed today included bed mobility training, sup-sit. Increased time required for all task completion. Hygiene activities completed during bed mobility.         Plan   Plan  Times per week: 3+  Current Treatment Recommendations: Strengthening, ROM, Balance Training, Functional Mobility Training, Transfer Training, ADL/Self-care Training, Gait Training, Patient/Caregiver Education & Training, IADL Training, Stair training, Cognitive Reorientation, Neuromuscular Re-education, Pain Management, Home Exercise Program, Positioning, Equipment Evaluation, Education, & procurement, Cognitive/Perceptual Training, Endurance Training, Wheelchair Mobility Training, Safety Education & Training Safety Devices  Type of devices: All fall risk precautions in place, Left in bed, Bed alarm in place, Call light within reach, Nurse notified, Gait belt, Patient at risk for falls      AM-PAC Score  AM-PAC Inpatient Mobility Raw Score : 8 (02/24/21 1516)  AM-PAC Inpatient T-Scale Score : 28.52 (02/24/21 1516)  Mobility Inpatient CMS 0-100% Score: 86.62 (02/24/21 1516)  Mobility Inpatient CMS G-Code Modifier : CM (02/24/21 1516)          Goals  Long term goals  Time Frame for Long term goals :  In one week:  Long term goal 1: Pt will complete all bed mobility with modAx2  Long term goal 2: Pt will complete sit <> stand transfers with modAx2  Long term goal 3: Pt will ambulate 5 feet with modAx2 with LRAD  Long term goal 4: Pt will independently complete 3 sets of 10 reps of BLE AROM exercises in available and allowed ROM       Time In: 1156  Time Out: 1234  Total Treatment Time: 38  Timed Code Treatment Minutes: [address] Brandi Alba PT, DPT  License #: [id number]

## 2021-02-24 NOTE — ED NOTES
Pt was not able to urinate in the urinal.  Pt refused being straight cathed at this time.       Sander Gudino, YESENIA  02/24/21 [id number]

## 2021-02-24 NOTE — ED PROVIDER NOTES
Emergency Department Encounter  [facility] EMERGENCY DEPARTMENT    Patient: Janine Estrada. MRN: [id number]  : 1950  Date of Evaluation: 2021  ED Provider: Irving Brady PA-C    Chief Complaint       Chief Complaint   Patient presents with    Hip Pain     left hip pain post fall       Cheryl Moura. is a 70 y.o. male who presents to the emergency department complaining of frequent falls and left hip pain. Patient states he was just released from [address] a few days ago and this is his second ED visit since returning home. He reports today he \"just fell\". Denies hitting his head, LOC, n/v, AMS. He was unable to get up on his own and his girlfriend is unable to help him up either, so he had to lay on the ground for 4-5 hours until his sons came to help him up. Reports only injury is his left hip from falling. Denies any blood thinners. Denies headache, dizziness, chest pain, sob, back pain, abrasions/lacerations. He states he doesn't feel like he can care for himself at home and needs to go back to [address]. Uses a walker for assistance with ambulation. ROS     CONSTITUTIONAL:  Denies fever. EYES:  Denies visual changes. HEAD:  Denies headache. ENT:  Denies earache, nasal congestion, sore throat. NECK:  Denies neck pain. RESPIRATORY:  Denies any shortness of breath. CARDIOVASCULAR:  Denies chest pain. GI:  Denies nausea or vomiting. :  Denies urinary symptoms. MUSCULOSKELETAL:  + hip pain. BACK:  Denies back pain. INTEGUMENT:  Denies skin changes. LYMPHATIC:  Denies lymphadenopathy. NEUROLOGIC:  Denies any LOC.     Past History     Past Medical History:   Diagnosis Date    Anemia     per old chart    Arthritis     Back pain, chronic     \"have pain in lumbar mainly- have 5 bulging discs\"\"in my neck and my lumbar have herniated discs\"    Bipolar 1 disorder ([facility]     CAD (coronary artery disease) 01/27/2016    does not follow with a cardiologist    Cerebral artery occlusion with cerebral infarction ([facility]     \"had mini stroke in Jan 2016- fast heart rate when I would stand up - smile drooping on one side- lased just the one day\"    Chronic kidney disease (CKD), stage III (moderate)     CKD (chronic kidney disease)     per old chart- consult with Dr Thelbert Schaumann with admission 4/2016\"have low kidney function\"    COPD (chronic obstructive pulmonary disease) ([facility]     follow with Dr Gail Houston Diabetes mellitus, type 2 ([facility] 01/03/2017    Diabetic peripheral neuropathy (HCC)     Diastolic CHF ([facility] 90/47/1282    Gastric ulcer     H/O cardiovascular stress test 05/26/2014    EF 68% mild ischemia anterior wall    Heart murmur     \"see Dr Megan Bauer- last echo 2014\" pt states\"I think they said I have a murmur but no chest pain or palpitations\"    Hiatal hernia     History of cardiac cath 06/11/2014    MODERATE  CAD      Hx of migraines     HX OTHER MEDICAL     \"have thinning of kidney walls- they found I had that 15 yrs ago\" see Dr Mary Faustin"    Hyperlipidemia     Hypertension     Intraparenchymal hematoma of brain ([facility]     Lumbar radiculopathy     Overlapping malignant neoplasm of colon ([facility] 05/27/2020    Panic attack     'anything new gives me anxiety\"    Pleural effusion     scheduled for thoracentesis 7/28/2014, 8/17/2016    S/P thoracentesis     left side( per old chart had thora done 4/2016 and one done 2014)    Sleep apnea     sleep study x 2 - last one 4-5 yrs ago- uses c-pap\"    SOBOE (shortness of breath on exertion)     Spinal stenosis      Past Surgical History:   Procedure Laterality Date    CARPAL TUNNEL RELEASE Bilateral 1989    COLONOSCOPY N/A 5/12/2020    COLONOSCOPY POLYPECTOMY SNARE/COLD BIOPSY WITH SPOT INK TATTOO performed by Ronald Briscoe MD at [facility] Left 2000's    KNEE SURGERY Bilateral     right knee x 2( scope)/ left knee- 7-8 yr ago   [employer] Relationship status: Not on file    Intimate partner violence     Fear of current or ex partner: Not on file     Emotionally abused: Not on file     Physically abused: Not on file     Forced sexual activity: Not on file   Other Topics Concern    Not on file   Social History Narrative    Not on file       Medications/Allergies     Previous Medications    AMIODARONE (CORDARONE) 200 MG TABLET    Take 1 tablet by mouth daily    AMIODARONE (PACERONE) 400 MG TABLET    Take 1 tablet by mouth 2 times daily for 3 days    AMLODIPINE (NORVASC) 10 MG TABLET    Take 1 tablet by mouth daily    ATORVASTATIN (LIPITOR) 80 MG TABLET    Take 1 tablet by mouth daily    BENZTROPINE (COGENTIN) 0.5 MG TABLET    Take 1 tablet by mouth 2 times daily    COMPRESSION STOCKINGS MISC    by Does not apply route Duration of Treatment:  3 Months  # of pairs:  2    Compression Class Level - 20-30 mmHg*    Style - Knee High    DIGOXIN (LANOXIN) 125 MCG TABLET    Take 1 tablet by mouth daily    DIVALPROEX (DEPAKOTE ER) 500 MG EXTENDED RELEASE TABLET    Take 2 tablets by mouth nightly    DOCUSATE SODIUM (COLACE) 100 MG CAPSULE    Take 1 capsule by mouth 2 times daily    DONEPEZIL (ARICEPT) 5 MG TABLET    Take 1 tablet by mouth nightly    DOXAZOSIN (CARDURA) 2 MG TABLET    Take 1 tablet by mouth every 12 hours    ESCITALOPRAM (LEXAPRO) 10 MG TABLET    Take 2 tablets by mouth daily    FAMOTIDINE (PEPCID) 10 MG TABLET    Take 1 tablet by mouth 2 times daily    LISINOPRIL (PRINIVIL;ZESTRIL) 40 MG TABLET    Take 1 tablet by mouth daily    METOPROLOL TARTRATE (LOPRESSOR) 25 MG TABLET    Take 1 tablet by mouth 2 times daily    MICONAZOLE (MICOTIN) 2 % POWDER    Apply topically 2 times daily.     SENNA (SENOKOT) 8.6 MG TABLET    Take 1 tablet by mouth nightly     Allergies   Allergen Reactions    Nsaids      Renal         Physical Exam       ED Triage Vitals   BP Temp Temp src Pulse Resp SpO2 Height Weight   02/23/21 2006 02/23/21 1954 -- 02/23/21 1954 02/23/21 1954 02/23/21 1954 02/23/21 1954 02/23/21 1954   127/72 97.9 °F (36.6 °C)  54 18 96 % 5' 9\" (1.753 m) 213 lb (96.6 kg)     GENERAL APPEARANCE:  Well-developed, well-nourished, no acute distress. HEAD:  NC/AT. EYES:  Sclera anicteric. ENT:  Ears, nose, mouth normal.     NECK:  Supple. CARDIO:  RRR. LUNGS:   CTAB. Respirations unlabored. ABDOMEN:  Soft, non-distended, non-tender. BS active. EXTREMITIES:  No acute deformities. No rotation or shortening of the LLE. Minimal ttp over the hip. DP intact, wiggles toes. SKIN:  Warm and dry. NEUROLOGICAL:  Alert and oriented. PSYCHIATRIC:  Normal mood.      Diagnostics     Labs:  Results for orders placed or performed during the hospital encounter of 02/23/21   CBC Auto Differential   Result Value Ref Range    WBC 8.7 4.0 - 10.5 K/CU MM    RBC 3.89 (L) 4.6 - 6.2 M/CU MM    Hemoglobin 11.4 (L) 13.5 - 18.0 GM/DL    Hematocrit 35.1 (L) 42 - 52 %    MCV 90.2 78 - 100 FL    MCH 29.3 27 - 31 PG    MCHC 32.5 32.0 - 36.0 %    RDW 16.6 (H) 11.7 - 14.9 %    Platelets 830 768 - 315 K/CU MM    MPV 9.4 7.5 - 11.1 FL    Differential Type AUTOMATED DIFFERENTIAL     Segs Relative 75.0 (H) 36 - 66 %    Lymphocytes % 15.7 (L) 24 - 44 %    Monocytes % 6.9 (H) 0 - 4 %    Eosinophils % 1.0 0 - 3 %    Basophils % 0.5 0 - 1 %    Segs Absolute 6.5 K/CU MM    Lymphocytes Absolute 1.4 K/CU MM    Monocytes Absolute 0.6 K/CU MM    Eosinophils Absolute 0.1 K/CU MM    Basophils Absolute 0.0 K/CU MM    Nucleated RBC % 0.0 %    Total Nucleated RBC 0.0 K/CU MM    Total Immature Neutrophil 0.08 K/CU MM    Immature Neutrophil % 0.9 (H) 0 - 0.43 %   Comprehensive Metabolic Panel w/ Reflex to MG   Result Value Ref Range    Sodium 141 135 - 145 MMOL/L    Potassium 3.8 3.5 - 5.1 MMOL/L    Chloride 109 99 - 110 mMol/L    CO2 25 21 - 32 MMOL/L    BUN 20 6 - 23 MG/DL    CREATININE 2.2 (H) 0.9 - 1.3 MG/DL    Glucose 103 (H) 70 - 99 MG/DL    Calcium 8.1 (L) 8.3 - 10.6 MG/DL    Albumin 2.8 (L) 3.4 - 5.0 GM/DL    Total Protein 5.6 (L) 6.4 - 8.2 GM/DL    Total Bilirubin 0.6 0.0 - 1.0 MG/DL    ALT 9 (L) 10 - 40 U/L    AST 16 15 - 37 IU/L    Alkaline Phosphatase 55 40 - 129 IU/L    GFR Non-African American 30 (L) >60 mL/min/1.73m2    GFR  36 (L) >60 mL/min/1.73m2    Anion Gap 7 4 - 16   Troponin   Result Value Ref Range    Troponin T 0.074 (H) <0.01 NG/ML   CK   Result Value Ref Range    Total  (H) 38 - 174 IU/L   EKG 12 Lead   Result Value Ref Range    Ventricular Rate 71 BPM    Atrial Rate 63 BPM    P-R Interval 182 ms    QRS Duration 104 ms    Q-T Interval 330 ms    QTc Calculation (Bazett) 358 ms    P Axis 60 degrees    R Axis 4 degrees    T Axis 215 degrees    Diagnosis       Undetermined rhythm  Incomplete right bundle branch block  ST & T wave abnormality, consider inferior ischemia  ST & T wave abnormality, consider anterior ischemia  Abnormal ECG  When compared with ECG of 30-JAN-2021 06:10,  Current undetermined rhythm precludes rhythm comparison, needs review         Radiographs:  Xr Chest Portable    Result Date: 2/23/2021  EXAMINATION: ONE XRAY VIEW OF THE CHEST 2/23/2021 9:41 pm COMPARISON: Prior studies including 12/19/2020 and 01/30/2021 HISTORY: ORDERING SYSTEM PROVIDED HISTORY: frequent falls TECHNOLOGIST PROVIDED HISTORY: Reason for exam:->frequent falls Reason for Exam: frequent falls Acuity: Unknown Type of Exam: Unknown Additional signs and symptoms: none Relevant Medical/Surgical History: pleural effusion, CAD, COPD FINDINGS: There is chronic left pleural and parenchymal disease with volume loss in the inferolateral left hemithorax with suspected loculated pleural fluid. There has been no significant change from 12/19/2020. There is a small band of right basilar atelectasis. Right lung is otherwise clear. Heart size is within normal limits. No acute bone finding.      Chronic pleural and parenchymal disease in the inferolateral left hemithorax with volume loss and suspected loculated pleural fluid. Small band of right basilar atelectasis. Xr Hip 2-3 Vw W Pelvis Left    Result Date: 2/23/2021  EXAMINATION: ONE XRAY VIEW OF THE PELVIS AND TWO XRAY VIEWS LEFT HIP 2/23/2021 9:41 pm COMPARISON: None. HISTORY: ORDERING SYSTEM PROVIDED HISTORY: fall, pain TECHNOLOGIST PROVIDED HISTORY: Reason for exam:->fall, pain Reason for Exam: pain Acuity: Acute Type of Exam: Initial Mechanism of Injury: fall Relevant Medical/Surgical History: none FINDINGS: No evidence of an acute left hip fracture or dislocation. No evidence of a pelvis fracture. Sacroiliac joints and pubic symphysis are unremarkable. There are no significant degenerative changes in the left hip. There are vascular calcifications. No evidence of an acute injury. Procedures/EKG:   Please see Dr. Burdick Foot note for interpretation. ED Course and MDM   -  Patient seen and evaluated in the emergency department. -  Triage and nursing notes reviewed and incorporated. -  Old chart records reviewed and incorporated. -  Supervising physician was Dr. Trista Golden. Patient was seen independently. -  XR of the left hip is non-acute, CXR is stable. Cr is bumped to 2.2. , troponin 0.074. EKG is unchanged. Fluids initiated. Case Management met with patient and spoke with family and do recommend admission to work on placement of patient again. Dr. Efrain Barreto, hospitalist, will admit. In light of current events, I did utilize appropriate PPE (including N95 and surgical face mask, safety glasses, and gloves, as recommended by the health facility/national standard best practice, during my bedside interactions with the patient. Final Impression      1. Frequent falls    2. Left hip pain    3. ANA (acute kidney injury) ([facility]    4. Elevated CK    5.  Elevated troponin          DISPOSITION Admitted 02/24/2021 03:46:27 AM      Erin Perrin PA-C   [employer]        [place], [place]  02/24/21 9963

## 2021-02-24 NOTE — CARE COORDINATION
- room # 32- Erin SANTA-for pt at risk for a readmission -pt has experienced falls recently and has numerous family members Pt was seen here in ER yesterday for some neck pain --pt was sent to [facility] for rehabilitation on February 3 --and eventually released . Currently pt lives at home with an elder \"partner\"--named Rachelle Sharma --[phone]--QQS was contacted . Called Elizabeth (Son) at [phone]- message - no answer . Called 2nd  List Nguyenvanessa Concepcion at [phone]-- he was at work and indicated some family dynamics --Nuha Benitez" is just a friend --and cannot take care of pt --stating he has fallen x 2 in the past 2 days . Another Contact is pt previous wife - Or X-wife -Neris Pritchard - At  [phone]--KSP was very  helpful --said pt laid on the floor for 5 hours  until his sons could come pick him up - he  Is no longer safe at his apartment . She further explained he lives in a rented apartment with his friend Nuha Benitez" -has no savings just his pension from [employer] and --and they are willing to go with a paydown to Medicaid as Dr Lorraine Schroeder office has been advising them prior to this . -Joshua Gilliland being a primary choice -none other given -both of her sons on the contact list were at work . Pt does have [employer] which could be problematic with any attempt for additional rehabilitation . This  informed X-wife to tell her sons to begin the contacts at [facility] and expect calls from [facility] . This  is unable to continue with any greater effort at 233:40 late hour . Pt admitted as an Observation .   Ariela Chavarria / Adia

## 2021-02-24 NOTE — CARE COORDINATION
Call to Judy Brennan, who is listed as pt's emergency contact/SO. Judy Brennan informed LSW that she and pt live together, but they are not in a relationship; they are just \"really good friends\". Judy Brennan stated that pt has fallen twice since returning home from [facility] approximately one week ago. Judy Brennan stated that pt is unable to get himself up when he falls, and she is unable to help him up due to her age and physical ability. Judy Brennan stated that pt would benefit from returning to [facility] for additional rehab.

## 2021-02-24 NOTE — ED TRIAGE NOTES
Pt to ED by EMS after a fall at home. Pt complaining of left hip pain. Pt seen in this ED on 2/21 after a fall out of bed. Pt wanting to be placed in a long term care facility.

## 2021-02-24 NOTE — ED PROVIDER NOTES
The Ekg interpreted by me shows  Atrial flutter   Axis is   Normal  QTc is  normal  Incomplete right bundle branch block  ST Segments: depression in  v2, v3, v4, v5 and v6  No significant change from prior EKG dated 1-           Fuller Brittle, MD  02/23/21 2224

## 2021-02-24 NOTE — ED NOTES
Patient able to urinate into urinal. Urine collected and sent to lab at this time.       Adriana Esqueda RN  02/24/21 1852

## 2021-02-24 NOTE — ED NOTES
Bed side report received from [facility]. Care assumed.       Patel Jackson RN  02/24/21 9210

## 2021-02-25 LAB
ANION GAP SERPL CALCULATED.3IONS-SCNC: 10 MMOL/L (ref 4–16)
BUN BLDV-MCNC: 18 MG/DL (ref 6–23)
CALCIUM SERPL-MCNC: 7.9 MG/DL (ref 8.3–10.6)
CHLORIDE BLD-SCNC: 110 MMOL/L (ref 99–110)
CO2: 22 MMOL/L (ref 21–32)
CREAT SERPL-MCNC: 1.7 MG/DL (ref 0.9–1.3)
GFR AFRICAN AMERICAN: 48 ML/MIN/1.73M2
GFR NON-AFRICAN AMERICAN: 40 ML/MIN/1.73M2
GLUCOSE BLD-MCNC: 91 MG/DL (ref 70–99)
HCT VFR BLD CALC: 36.8 % (ref 42–52)
HEMOGLOBIN: 10.7 GM/DL (ref 13.5–18)
MAGNESIUM: 1.9 MG/DL (ref 1.8–2.4)
MCH RBC QN AUTO: 29.4 PG (ref 27–31)
MCHC RBC AUTO-ENTMCNC: 29.1 % (ref 32–36)
MCV RBC AUTO: 101.1 FL (ref 78–100)
PDW BLD-RTO: 16.3 % (ref 11.7–14.9)
PLATELET # BLD: 138 K/CU MM (ref 140–440)
PMV BLD AUTO: 9.1 FL (ref 7.5–11.1)
POTASSIUM SERPL-SCNC: 3.3 MMOL/L (ref 3.5–5.1)
RBC # BLD: 3.64 M/CU MM (ref 4.6–6.2)
SODIUM BLD-SCNC: 142 MMOL/L (ref 135–145)
WBC # BLD: 6.5 K/CU MM (ref 4–10.5)

## 2021-02-25 PROCEDURE — 80048 BASIC METABOLIC PNL TOTAL CA: CPT

## 2021-02-25 PROCEDURE — 6360000002 HC RX W HCPCS: Performed by: INTERNAL MEDICINE

## 2021-02-25 PROCEDURE — 85027 COMPLETE CBC AUTOMATED: CPT

## 2021-02-25 PROCEDURE — 6370000000 HC RX 637 (ALT 250 FOR IP): Performed by: INTERNAL MEDICINE

## 2021-02-25 PROCEDURE — 36415 COLL VENOUS BLD VENIPUNCTURE: CPT

## 2021-02-25 PROCEDURE — 99232 SBSQ HOSP IP/OBS MODERATE 35: CPT | Performed by: INTERNAL MEDICINE

## 2021-02-25 PROCEDURE — 1200000000 HC SEMI PRIVATE

## 2021-02-25 PROCEDURE — 83735 ASSAY OF MAGNESIUM: CPT

## 2021-02-25 PROCEDURE — 94761 N-INVAS EAR/PLS OXIMETRY MLT: CPT

## 2021-02-25 RX ORDER — POTASSIUM CHLORIDE 20 MEQ/1
20 TABLET, EXTENDED RELEASE ORAL EVERY 4 HOURS
Status: COMPLETED | OUTPATIENT
Start: 2021-02-25 | End: 2021-02-25

## 2021-02-25 RX ADMIN — DIVALPROEX SODIUM 1000 MG: 500 TABLET, FILM COATED, EXTENDED RELEASE ORAL at 21:15

## 2021-02-25 RX ADMIN — METOPROLOL TARTRATE 25 MG: 50 TABLET, FILM COATED ORAL at 10:07

## 2021-02-25 RX ADMIN — ATORVASTATIN CALCIUM 80 MG: 80 TABLET, FILM COATED ORAL at 10:07

## 2021-02-25 RX ADMIN — BENZTROPINE MESYLATE 0.5 MG: 1 TABLET ORAL at 10:07

## 2021-02-25 RX ADMIN — FAMOTIDINE 20 MG: 20 TABLET ORAL at 10:07

## 2021-02-25 RX ADMIN — POTASSIUM CHLORIDE 20 MEQ: 1500 TABLET, EXTENDED RELEASE ORAL at 18:50

## 2021-02-25 RX ADMIN — ESCITALOPRAM OXALATE 20 MG: 10 TABLET ORAL at 10:07

## 2021-02-25 RX ADMIN — BENZTROPINE MESYLATE 0.5 MG: 1 TABLET ORAL at 21:15

## 2021-02-25 RX ADMIN — ENOXAPARIN SODIUM 40 MG: 40 INJECTION SUBCUTANEOUS at 10:09

## 2021-02-25 RX ADMIN — DONEPEZIL HYDROCHLORIDE 5 MG: 5 TABLET, FILM COATED ORAL at 21:15

## 2021-02-25 RX ADMIN — METOPROLOL TARTRATE 25 MG: 50 TABLET, FILM COATED ORAL at 21:15

## 2021-02-25 RX ADMIN — AMIODARONE HYDROCHLORIDE 200 MG: 200 TABLET ORAL at 10:07

## 2021-02-25 RX ADMIN — POTASSIUM CHLORIDE 20 MEQ: 1500 TABLET, EXTENDED RELEASE ORAL at 16:50

## 2021-02-25 ASSESSMENT — PAIN SCALES - GENERAL: PAINLEVEL_OUTOF10: 0

## 2021-02-25 NOTE — PROGRESS NOTES
Hospitalist Progress Note      Name:  Ilana Badillo. /Age/Sex: 1950  (70 y.o. male)   MRN & CSN:  [id number] & [id number] Admission Date/Time: 2021  7:44 PM   Location:  [address] [place] [address] PCP: Abraham Torres MD         Hospital Day: 3    Assessment and Plan:   Ilana Queen is a 70 y.o.  male  who presents with fall at home    · Fall at home, chronic debility, frequent falls   Doxazosin on hold due to risk of orthostasis  Physical therapy on board, plans for discharge to SNF    · ANA on CKD, likely prerenal  Nephrology was consulted, IV fluids, now DC'd  Doxazosin to be DC'd due to risk of recurrent falls    · Acute urinary retention, resolved  History of chronic Zimmer catheter  Monitor for urine retention    Chronic medical condition, medications resumed unless otherwise contraindicated  -History of hemorrhagic CVA, CT brain, shows resolving bleed, no new hemorrhage noted  -Hypertension  -CAD  -Atrial fibrillation/flutter, continue metoprolol, amiodarone, digoxin, calcium levels checked, within normal limits  -HFpEF, not in exacerbation, continue home medication  -Hyperlipidemia on statin    Disposition, awaiting placement       Diet DIET CARDIAC;   DVT Prophylaxis [] Lovenox, []  Heparin, [] SCDs, []No VTE prophylaxis, patient ambulating   GI Prophylaxis [] PPI, [] H2 Blocker, [] No GI prophylaxis, patient is receiving diet/Tube Feeds   Code Status Full Code   Disposition Patient requires continued admission due to    MDM [] Low, [] Moderate,[]  High  Patient's risk as above due to      History of Present Illness:     Pt S&E. Patient seems more alert today, denies any complaints    10-14 point ROS reviewed negative, unless as noted above    Objective:        Intake/Output Summary (Last 24 hours) at 2021 1231  Last data filed at 2021  Gross per 24 hour   Intake 10 ml   Output    Net 10 ml      Vitals:   Vitals:    21 1015   BP: (!) 119/56   Pulse: 56   Resp: 16 Temp: 97.6 °F (36.4 °C)   SpO2: 98%     Physical Exam:    GEN Awake male, sitting upright in bed in no apparent distress. Appears given age. EYES Pupils are equally round. No scleral erythema, discharge, or conjunctivitis. HENT Mucous membranes are moist.   NECK No apparent thyromegaly or masses. RESP Clear to auscultation, no wheezes, rales or rhonchi. Symmetric chest movement while on room air. CARDIO/VASC S1/S2 auscultated. Regular rate without appreciable murmurs, rubs, or gallops. Peripheral pulses equal bilaterally and palpable. No peripheral edema. GI Abdomen is soft without significant tenderness, masses, or guarding. Bowel sounds are normoactive. Rectal exam deferred.  Zimmer catheter is not present. HEME/LYMPH No petechiae or ecchymoses. MSK No gross joint deformities. Spontaneous movement of all extremities  SKIN Normal coloration, warm, dry. NEURO Cranial nerves appear grossly intact, normal speech, no lateralizing weakness. PSYCH Awake, alert, oriented x 4. Affect appropriate.     Medications:   Medications:    amiodarone  200 mg Oral Daily    atorvastatin  80 mg Oral Daily    benztropine  0.5 mg Oral BID    divalproex  1,000 mg Oral Nightly    docusate sodium  100 mg Oral BID    donepezil  5 mg Oral Nightly    [Held by provider] doxazosin  2 mg Oral 2 times per day    escitalopram  20 mg Oral Daily    metoprolol tartrate  25 mg Oral BID    senna  1 tablet Oral Nightly    sodium chloride flush  10 mL Intravenous 2 times per day    enoxaparin  40 mg Subcutaneous Daily    famotidine  20 mg Oral Daily      Infusions:   PRN Meds:     oxyCODONE-acetaminophen, 1 tablet, Q4H PRN      sodium chloride flush, 10 mL, PRN      potassium chloride, 10 mEq, PRN      magnesium sulfate, 1,000 mg, PRN      promethazine, 12.5 mg, Q6H PRN    Or      ondansetron, 4 mg, Q6H PRN      polyethylene glycol, 17 g, Daily PRN      acetaminophen, 650 mg, Q6H PRN    Or

## 2021-02-25 NOTE — PROGRESS NOTES
Nephrology Progress Note        [address], [address] [address]  Phone: [phone]  Office Hours: 8:30AM  4:30PM  Monday - Friday 2/25/2021 8:20 AM  Subjective:   Admit Date: 2/23/2021  PCP: Ana M Garcia MD  Interval History:   BP has been wnl  He is on room air  Doing ok      Diet: DIET CARDIAC;      Data:   Scheduled Meds:   amiodarone  200 mg Oral Daily    atorvastatin  80 mg Oral Daily    benztropine  0.5 mg Oral BID    divalproex  1,000 mg Oral Nightly    docusate sodium  100 mg Oral BID    donepezil  5 mg Oral Nightly    [Held by provider] doxazosin  2 mg Oral 2 times per day    escitalopram  20 mg Oral Daily    metoprolol tartrate  25 mg Oral BID    senna  1 tablet Oral Nightly    sodium chloride flush  10 mL Intravenous 2 times per day    enoxaparin  40 mg Subcutaneous Daily    famotidine  20 mg Oral Daily     Continuous Infusions:   sodium chloride 75 mL/hr at 02/24/21 1529     PRN Meds:oxyCODONE-acetaminophen, sodium chloride flush, potassium chloride, magnesium sulfate, promethazine **OR** ondansetron, polyethylene glycol, acetaminophen **OR** acetaminophen  I/O last 3 completed shifts: In: 10 [I.V.:10]  Out: 100 [Urine:100]  No intake/output data recorded. Intake/Output Summary (Last 24 hours) at 2/25/2021 0820  Last data filed at 2/24/2021 2009  Gross per 24 hour   Intake 10 ml   Output    Net 10 ml       CBC:   Recent Labs     02/23/21 2045 02/25/21  0533   WBC 8.7 6.5   HGB 11.4* 10.7*    138*       BMP:    Recent Labs     02/23/21 2045      K 3.8      CO2 25   BUN 20   CREATININE 2.2*   GLUCOSE 103*     Hepatic:   Recent Labs     02/23/21 2045   AST 16   ALT 9*   BILITOT 0.6   ALKPHOS 55     ABGs:   Lab Results   Component Value Date    PO2ART 112.2 05/21/2020    WLK2QQV 39.7 05/21/2020     INR: No results for input(s): INR in the last 72 hours.     Objective: Vitals: /70   Pulse 60   Temp 98.2 °F (36.8 °C) (Oral)   Resp 18   Ht 5' 9\" (1.753 m)   Wt 213 lb (96.6 kg)   SpO2 96%   BMI 31.45 kg/m²   General appearance: alert and cooperative with exam, in no acute distress  HEENT: normocephalic, atraumatic,   Neck: supple, trachea midline  Lungs: , breathing comfortably on room air  Extremities: extremities atraumatic, no cyanosis or edema  Neurologic: alert, oriented, follows commands, interactive    Assessment and Plan:       Patient Active Problem List     Diagnosis Date Noted    Severe mixed bipolar 1 disorder without psychosis ([address] 12/17/2019       Priority: High       Class: Acute    Pleural effusion, left 12/19/2013       Priority: High    REYES (dyspnea on exertion) 04/23/2016       Priority: Low    SAMANTHA on CPAP 01/28/2014       Priority: Low    SSS (sick sinus syndrome) ([address] 01/30/2021    Intraparenchymal hematoma of brain (HCC)      Left hemiparesis (HCC)      Dysarthria due to and not concurrent with spontaneous intracerebral hemorrhage      Gait disturbance      Bipolar disorder, in partial remission, most recent episode depressed ([address]      Intracranial hemorrhage ([address] 01/04/2021    Hypertensive heart disease with stage 5 chronic kidney disease, not on chronic dialysis ([address] 11/23/2020    Bell's palsy 11/23/2020    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 ([address] 11/23/2020    Localized edema 11/23/2020    Chronic pain of both shoulders 10/05/2020    Fall at home, initial encounter 09/01/2020    Acute left-sided weakness 07/30/2020    Fall at home 07/30/2020    Colon adenocarcinoma ([address] 05/28/2020    Overlapping malignant neoplasm of colon ([address] 05/27/2020    Atrial tachycardia ([address] 05/26/2020    Acute chest pain 05/09/2020    Acute on chronic respiratory failure with hypoxia ([address] 12/15/2019    Lightheadedness 11/15/2019    Hypotensive episode 11/06/2019    Bradycardia on ECG 11/06/2019  Acute kidney injury superimposed on CKD ([address] 10/09/2019    Chronic diastolic heart failure ([address] 10/09/2019    Empyema lung ([address] 10/07/2019    Somatic dysfunction of back 09/11/2019    Somatic dysfunction of cervical region 09/11/2019    Somatic dysfunction of pelvis region 09/11/2019    Somatic dysfunction of both lower extremities 09/11/2019    Cervical stenosis of spine 08/13/2019    Spinal stenosis of lumbar region without neurogenic claudication 08/13/2019    Hyperlipidemia      Lumbar radiculopathy      CKD (chronic kidney disease)      Uncontrolled type 2 diabetes mellitus with peripheral neuropathy (HCC)      Chronic kidney disease (CKD), stage III (moderate)      Empyema of left pleural space (HCC) 03/18/2019    Paroxysmal atrial fibrillation (HCC)      Hyponatremia 01/25/2019    Diabetes mellitus, type 2 ([address] 01/03/2017    Hyperkalemia 11/15/2016    Adrenal mass ([address] 11/15/2016    Acute respiratory failure ([address] 11/14/2016    Anemia, chronic disease 04/23/2016    Diuretic-induced hypokalemia 18/30/9343    Diastolic CHF ([address] 88/50/1998    Coronary artery disease involving native coronary artery of native heart without angina pectoris      Essential hypertension      Chronic obstructive pulmonary disease ([address] 01/27/2016    TIA (transient ischemic attack) 01/27/2016    Bipolar 1 disorder ([address] 01/27/2016      -ANA on CKD  -Hip pain s/p fall: no fracture on xray  -HTN  -CHF hx  -Afib hx  -Fluid overoad hx     Suggest:  -stop IVF, labs pending, encourage him to eat and drink by himself  -Hold doxazosin now and upon dc since it has a risk of orthostasis and he is here with a fall  -Ok to continue meotoprolol for the afib  -Avoid nephrotoxins  -BMP tomorrow                  Electronically signed by Girish Ward DO on 2/25/2021 at 8:20 AM    [address] MD  [address], [address], [address] [place] [place] [address]  PHONE: [phone]  FAX: [phone]

## 2021-02-25 NOTE — CARE COORDINATION
Spoke with Mary at [facility], gilert is pending and she will update once received.

## 2021-02-26 VITALS
HEART RATE: 63 BPM | WEIGHT: 201.2 LBS | HEIGHT: 69 IN | SYSTOLIC BLOOD PRESSURE: 144 MMHG | RESPIRATION RATE: 18 BRPM | BODY MASS INDEX: 29.8 KG/M2 | TEMPERATURE: 98.5 F | DIASTOLIC BLOOD PRESSURE: 91 MMHG | OXYGEN SATURATION: 98 %

## 2021-02-26 LAB
SARS-COV-2, NAAT: NOT DETECTED
SOURCE: NORMAL

## 2021-02-26 PROCEDURE — 6370000000 HC RX 637 (ALT 250 FOR IP): Performed by: INTERNAL MEDICINE

## 2021-02-26 PROCEDURE — 99232 SBSQ HOSP IP/OBS MODERATE 35: CPT | Performed by: INTERNAL MEDICINE

## 2021-02-26 PROCEDURE — 94761 N-INVAS EAR/PLS OXIMETRY MLT: CPT

## 2021-02-26 PROCEDURE — 87635 SARS-COV-2 COVID-19 AMP PRB: CPT

## 2021-02-26 PROCEDURE — 6360000002 HC RX W HCPCS: Performed by: INTERNAL MEDICINE

## 2021-02-26 RX ORDER — SODIUM CHLORIDE, SODIUM LACTATE, POTASSIUM CHLORIDE, CALCIUM CHLORIDE 600; 310; 30; 20 MG/100ML; MG/100ML; MG/100ML; MG/100ML
INJECTION, SOLUTION INTRAVENOUS CONTINUOUS
Status: DISCONTINUED | OUTPATIENT
Start: 2021-02-26 | End: 2021-02-26 | Stop reason: HOSPADM

## 2021-02-26 RX ORDER — DOCUSATE SODIUM 100 MG/1
100 CAPSULE, LIQUID FILLED ORAL 2 TIMES DAILY
Qty: 20 CAPSULE | Refills: 3 | Status: SHIPPED
Start: 2021-02-26

## 2021-02-26 RX ORDER — SPIRONOLACTONE 25 MG/1
25 TABLET ORAL DAILY
Qty: 30 TABLET | Refills: 3 | Status: SHIPPED
Start: 2021-02-26

## 2021-02-26 RX ADMIN — ESCITALOPRAM OXALATE 20 MG: 10 TABLET ORAL at 09:07

## 2021-02-26 RX ADMIN — METOPROLOL TARTRATE 25 MG: 50 TABLET, FILM COATED ORAL at 09:08

## 2021-02-26 RX ADMIN — AMIODARONE HYDROCHLORIDE 200 MG: 200 TABLET ORAL at 09:08

## 2021-02-26 RX ADMIN — FAMOTIDINE 20 MG: 20 TABLET ORAL at 09:08

## 2021-02-26 RX ADMIN — ATORVASTATIN CALCIUM 80 MG: 80 TABLET, FILM COATED ORAL at 09:07

## 2021-02-26 RX ADMIN — ENOXAPARIN SODIUM 40 MG: 40 INJECTION SUBCUTANEOUS at 09:08

## 2021-02-26 RX ADMIN — BENZTROPINE MESYLATE 0.5 MG: 1 TABLET ORAL at 09:08

## 2021-02-26 NOTE — PLAN OF CARE
Problem: Pain:  Goal: Pain level will decrease  Description: Pain level will decrease  2/26/2021 0857 by Quintin Rodas RN  Outcome: Ongoing  2/26/2021 0130 by Cliff Hopkins RN  Outcome: Met This Shift  2/26/2021 0124 by Cliff Hopkins RN  Outcome: Met This Shift  Goal: Control of acute pain  Description: Control of acute pain  2/26/2021 0857 by Quintin Rodas RN  Outcome: Ongoing  2/26/2021 0130 by Cliff Hopkins RN  Outcome: Met This Shift  2/26/2021 0124 by Cliff Hopkins RN  Outcome: Met This Shift  Goal: Control of chronic pain  Description: Control of chronic pain  2/26/2021 0857 by Quintin Rodas RN  Outcome: Ongoing  2/26/2021 0130 by Cliff Hopkins RN  Outcome: Met This Shift  2/26/2021 0124 by Cliff Hopkins RN  Outcome: Met This Shift     Problem: Falls - Risk of:  Goal: Will remain free from falls  Description: Will remain free from falls  2/26/2021 0857 by Quintin Rodas RN  Outcome: Ongoing  2/26/2021 0130 by Cliff Hopkins RN  Outcome: Met This Shift  2/26/2021 0124 by Cliff Hopkins RN  Outcome: Met This Shift  Goal: Absence of physical injury  Description: Absence of physical injury  2/26/2021 0857 by Quintin Rodas RN  Outcome: Ongoing  2/26/2021 0130 by Cliff Hopkins RN  Outcome: Met This Shift  2/26/2021 0124 by Cliff Hopkins RN  Outcome: Met This Shift     Problem: Skin Integrity:  Goal: Will show no infection signs and symptoms  Description: Will show no infection signs and symptoms  2/26/2021 0857 by Quintin Rodas RN  Outcome: Ongoing  2/26/2021 0130 by Cliff Hopkins RN  Outcome: Met This Shift  2/26/2021 0124 by Cliff Hopkins RN  Outcome: Met This Shift  Goal: Absence of new skin breakdown  Description: Absence of new skin breakdown  2/26/2021 0857 by Quintin Rodas RN  Outcome: Ongoing  2/26/2021 0130 by Cliff Hopkins RN  Outcome: Met This Shift  2/26/2021 0124 by Cliff Hopkins RN  Outcome: Met This Shift     Problem: Physical Regulation: Goal: Ability to maintain clinical measurements within normal limits will improve  Description: Ability to maintain clinical measurements within normal limits will improve  2/26/2021 0857 by Matt Browne RN  Outcome: Ongoing  2/26/2021 0130 by Roselie Collet, RN  Outcome: Met This Shift  Goal: Diagnostic test results will improve  Description: Diagnostic test results will improve  2/26/2021 0857 by Matt Browne RN  Outcome: Ongoing  2/26/2021 0130 by Roselie Collet, RN  Outcome: Met This Shift     Problem: Safety:  Goal: Ability to remain free from injury will improve  Description: Ability to remain free from injury will improve  2/26/2021 0857 by Matt Browne RN  Outcome: Ongoing  2/26/2021 0130 by Roselie Collet, RN  Outcome: Met This Shift

## 2021-02-26 NOTE — TELEPHONE ENCOUNTER
explained they need to make first contact, the only thing we can do is give a letter of recommendation, they would like us to do this. I told them we would do this next week. Thee Valdes is inpatient at [facility] right now.

## 2021-02-26 NOTE — CARE COORDINATION
Spoke with Mary at [facility] and pt is approved for admission, packet prepared. Sent PS to Dr Chris Ozuna to update. - requested updated Covid. Pt on discharge, set up with [facility] transport for 3:30pm.  Notified Mary at [facility] and pt's son Magaly Ledesma of discharge and transport time, orders faxed.

## 2021-02-26 NOTE — PLAN OF CARE
Problem: Pain:  Goal: Pain level will decrease  Description: Pain level will decrease  2/26/2021 1327 by Gerldine Baumgarten, RN  Outcome: Completed  2/26/2021 0857 by Gerldine Baumgarten, RN  Outcome: Ongoing  2/26/2021 0130 by Valerio Vogel RN  Outcome: Met This Shift  2/26/2021 0124 by Valerio Vogel RN  Outcome: Met This Shift  Goal: Control of acute pain  Description: Control of acute pain  2/26/2021 1327 by Gerldine Baumgarten, RN  Outcome: Completed  2/26/2021 0857 by Gerldine Baumgarten, RN  Outcome: Ongoing  2/26/2021 0130 by Valerio Vogel RN  Outcome: Met This Shift  2/26/2021 0124 by Valerio Vogel RN  Outcome: Met This Shift  Goal: Control of chronic pain  Description: Control of chronic pain  2/26/2021 1327 by Gerldine Baumgarten, RN  Outcome: Completed  2/26/2021 0857 by Gerldine Baumgarten, RN  Outcome: Ongoing  2/26/2021 0130 by Valerio Vogel RN  Outcome: Met This Shift  2/26/2021 0124 by Valerio Vogel RN  Outcome: Met This Shift     Problem: Falls - Risk of:  Goal: Will remain free from falls  Description: Will remain free from falls  2/26/2021 1327 by Gerldine Baumgarten, RN  Outcome: Completed  2/26/2021 0857 by Gerldine Baumgarten, RN  Outcome: Ongoing  2/26/2021 0130 by Valerio Vogel RN  Outcome: Met This Shift  2/26/2021 0124 by Valerio Vogel RN  Outcome: Met This Shift  Goal: Absence of physical injury  Description: Absence of physical injury  2/26/2021 1327 by Gerldine Baumgarten, RN  Outcome: Completed  2/26/2021 0857 by Gerldine Baumgarten, RN  Outcome: Ongoing  2/26/2021 0130 by Valerio Vogel RN  Outcome: Met This Shift  2/26/2021 0124 by Valerio Vogel RN  Outcome: Met This Shift     Problem: Skin Integrity:  Goal: Will show no infection signs and symptoms  Description: Will show no infection signs and symptoms  2/26/2021 1327 by Gerldine Baumgarten, RN  Outcome: Completed  2/26/2021 0857 by Gerldine Baumgarten, RN  Outcome: Ongoing  2/26/2021 0130 by Valerio Vogel RN  Outcome: Met This Shift 2/26/2021 0124 by Stan Bullock RN  Outcome: Met This Shift  Goal: Absence of new skin breakdown  Description: Absence of new skin breakdown  2/26/2021 1327 by Angelina Krishnamurthy RN  Outcome: Completed  2/26/2021 0857 by Angelina Krishnamurthy RN  Outcome: Ongoing  2/26/2021 0130 by Stan Bullock RN  Outcome: Met This Shift  2/26/2021 0124 by Stan Bullock RN  Outcome: Met This Shift     Problem: Physical Regulation:  Goal: Ability to maintain clinical measurements within normal limits will improve  Description: Ability to maintain clinical measurements within normal limits will improve  2/26/2021 1327 by Angelina Krishnamurthy RN  Outcome: Completed  2/26/2021 0857 by Angelina Krishnamurthy RN  Outcome: Ongoing  2/26/2021 0130 by Stan Bullock RN  Outcome: Met This Shift  Goal: Diagnostic test results will improve  Description: Diagnostic test results will improve  2/26/2021 1327 by Angelina Krishnamurthy RN  Outcome: Completed  2/26/2021 0857 by Angelina Krishnamurthy RN  Outcome: Ongoing  2/26/2021 0130 by Stan Bullock RN  Outcome: Met This Shift     Problem: Safety:  Goal: Ability to remain free from injury will improve  Description: Ability to remain free from injury will improve  2/26/2021 1327 by Angelina Krishnamurthy RN  Outcome: Completed  2/26/2021 0857 by Angelina Krishnamurthy RN  Outcome: Ongoing  2/26/2021 0130 by Stan Bullock RN  Outcome: Met This Shift

## 2021-02-26 NOTE — PROGRESS NOTES
Nephrology Progress Note        [address], [address] [address]  Phone: [phone]  Office Hours: 8:30AM  4:30PM  Monday - Friday 2/26/2021 9:30 AM  Subjective:   Admit Date: 2/23/2021  PCP: Kathy Guidry MD  Interval History:   Doing ok  Having some loose bowels  Not eating much      Diet: DIET CARDIAC;      Data:   Scheduled Meds:   amiodarone  200 mg Oral Daily    atorvastatin  80 mg Oral Daily    benztropine  0.5 mg Oral BID    divalproex  1,000 mg Oral Nightly    docusate sodium  100 mg Oral BID    donepezil  5 mg Oral Nightly    escitalopram  20 mg Oral Daily    metoprolol tartrate  25 mg Oral BID    senna  1 tablet Oral Nightly    sodium chloride flush  10 mL Intravenous 2 times per day    enoxaparin  40 mg Subcutaneous Daily    famotidine  20 mg Oral Daily     Continuous Infusions:  PRN Meds:oxyCODONE-acetaminophen, sodium chloride flush, potassium chloride, magnesium sulfate, promethazine **OR** ondansetron, polyethylene glycol, acetaminophen **OR** acetaminophen  I/O last 3 completed shifts: In: 350 [P.O.:350]  Out: 500 [Urine:500]  No intake/output data recorded. Intake/Output Summary (Last 24 hours) at 2/26/2021 0930  Last data filed at 2/26/2021 2485  Gross per 24 hour   Intake 350 ml   Output 500 ml   Net -150 ml       CBC:   Recent Labs     02/23/21 2045 02/25/21  0533   WBC 8.7 6.5   HGB 11.4* 10.7*    138*       BMP:    Recent Labs     02/23/21 2045 02/25/21  1018    142   K 3.8 3.3*    110   CO2 25 22   BUN 20 18   CREATININE 2.2* 1.7*   GLUCOSE 103* 91     Hepatic:   Recent Labs     02/23/21 2045   AST 16   ALT 9*   BILITOT 0.6   ALKPHOS 55     Troponin: No results for input(s): TROPONINI in the last 72 hours. BNP: No results for input(s): BNP in the last 72 hours. Lipids: No results for input(s): CHOL, HDL in the last 72 hours.     Invalid input(s): LDLCALCU  ABGs:   Lab Results   Component Value Date PO2ART 112.2 05/21/2020    SYP5NCJ 39.7 05/21/2020     INR: No results for input(s): INR in the last 72 hours.     Objective:   Vitals: BP (!) 144/91   Pulse 63   Temp 98.5 °F (36.9 °C) (Oral)   Resp 18   Ht 5' 9\" (1.753 m)   Wt 201 lb 3.2 oz (91.3 kg)   SpO2 98%   BMI 29.71 kg/m²   General appearance: alert and cooperative with exam, in no acute distress  HEENT: normocephalic, atraumatic,   Neck: supple, trachea midline  Lungs: , breathing comfortably on room air  Heart[de-identified] regular rate and rhythm,   Abdomen: soft, non-tender; non distended,  Extremities: extremities atraumatic, no cyanosis or edema  Neurologic: alert, oriented, follows commands, interactive    Assessment and Plan:              Patient Active Problem List     Diagnosis Date Noted    Severe mixed bipolar 1 disorder without psychosis ([address] 12/17/2019       Priority: High       Class: Acute    Pleural effusion, left 12/19/2013       Priority: High    REYES (dyspnea on exertion) 04/23/2016       Priority: Low    SAMANTHA on CPAP 01/28/2014       Priority: Low    SSS (sick sinus syndrome) ([address] 01/30/2021    Intraparenchymal hematoma of brain (HCC)      Left hemiparesis (HCC)      Dysarthria due to and not concurrent with spontaneous intracerebral hemorrhage      Gait disturbance      Bipolar disorder, in partial remission, most recent episode depressed ([address]      Intracranial hemorrhage ([address] 01/04/2021    Hypertensive heart disease with stage 5 chronic kidney disease, not on chronic dialysis ([address] 11/23/2020    Bell's palsy 11/23/2020    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 ([address] 11/23/2020    Localized edema 11/23/2020    Chronic pain of both shoulders 10/05/2020    Fall at home, initial encounter 09/01/2020    Acute left-sided weakness 07/30/2020    Fall at home 07/30/2020    Colon adenocarcinoma ([address] 05/28/2020    Overlapping malignant neoplasm of colon ([address] 05/27/2020  Atrial tachycardia ([address] 05/26/2020    Acute chest pain 05/09/2020    Acute on chronic respiratory failure with hypoxia ([address] 12/15/2019    Lightheadedness 11/15/2019    Hypotensive episode 11/06/2019    Bradycardia on ECG 11/06/2019    Acute kidney injury superimposed on CKD ([address] 10/09/2019    Chronic diastolic heart failure ([address] 10/09/2019    Empyema lung ([address] 10/07/2019    Somatic dysfunction of back 09/11/2019    Somatic dysfunction of cervical region 09/11/2019    Somatic dysfunction of pelvis region 09/11/2019    Somatic dysfunction of both lower extremities 09/11/2019    Cervical stenosis of spine 08/13/2019    Spinal stenosis of lumbar region without neurogenic claudication 08/13/2019    Hyperlipidemia      Lumbar radiculopathy      CKD (chronic kidney disease)      Uncontrolled type 2 diabetes mellitus with peripheral neuropathy (HCC)      Chronic kidney disease (CKD), stage III (moderate)      Empyema of left pleural space (HCC) 03/18/2019    Paroxysmal atrial fibrillation (HCC)      Hyponatremia 01/25/2019    Diabetes mellitus, type 2 ([address] 01/03/2017    Hyperkalemia 11/15/2016    Adrenal mass ([address] 11/15/2016    Acute respiratory failure ([address] 11/14/2016    Anemia, chronic disease 04/23/2016    Diuretic-induced hypokalemia 34/00/0764    Diastolic CHF ([address] 14/07/9204    Coronary artery disease involving native coronary artery of native heart without angina pectoris      Essential hypertension      Chronic obstructive pulmonary disease ([address] 01/27/2016    TIA (transient ischemic attack) 01/27/2016    Bipolar 1 disorder ([address] 01/27/2016      -ANA on CKD  -Hypokalemia  -Hip pain s/p fall: no fracture on xray  -HTN  -CHF hx  -Afib hx  -Fluid overoad hx     Suggest:  -give gently ivf since not eating much  Used to be on aldactone and amlodipine for bp  -Avoid nephrotoxins  -BMP ?            Electronically signed by Scott Richardson DO on 2/26/2021 at 9:30 AM    [address] DO [address]  [place], [place] [address]  PHONE: [phone]  FAX: [phone]

## 2021-02-26 NOTE — DISCHARGE SUMMARY
Discharge Summary    Name:  Fam Friedman /Age/Sex: 1950  (75 y.o. male)   MRN & CSN:  [id number] & [id number] Admission Date/Time: 2021  7:44 PM   Attending:  Stacie Law MD Discharging Physician: Stacie Law MD       Hospital Course:   Fam Friedman is a 70 y.o.  male  who presents with fall at home     · Fall at home, chronic debility, frequent falls   Doxazosin on hold due to risk of orthostasis  Physical therapy on board, plans for discharge to SNF     · ANA on CKD, likely prerenal  Nephrology was consulted, IV fluids, now DC'd  Doxazosin to be DC'd due to risk of recurrent falls  Stop doxazocin, start amlodipine, start aldactone, check BMP on 21     · Acute urinary retention, resolved  History of chronic Zimmer catheter  Monitor for urine retention     Chronic medical condition, medications resumed unless otherwise contraindicated  -History of hemorrhagic CVA, CT brain, shows resolving bleed, no new hemorrhage noted  -Hypertension  -CAD  -Atrial fibrillation/flutter, continue metoprolol, amiodarone, digoxin, calcium levels checked, within normal limits  -HFpEF, not in exacerbation, continue home medication  -Hyperlipidemia on statin    Discharged to NH    The patient expressed appropriate understanding of and agreement with the discharge recommendations, medications, and plan. Consults this admission:  IP CONSULT TO CASE MANAGEMENT  IP CONSULT TO HOSPITALIST  IP CONSULT TO NEPHROLOGY  IP CONSULT TO IV TEAM    Discharge Instruction:   Follow up appointments: Nephrology  Primary care physician:  within 2 weeks    Diet:  Cardiac  Activity: activity as tolerated  Disposition: Discharged to:   []Home, [][facility], [x]SNF, []Acute Rehab, []Hospice   Condition on discharge: Stable    Discharge Medications:      Fugitt, Roxine Cypher.    Home Medication Instructions [facility]    Printed on:21 1412   Medication Information                      amiodarone (CORDARONE) 200 MG tablet  Take 1 tablet by mouth daily             atorvastatin (LIPITOR) 80 MG tablet  Take 1 tablet by mouth daily             benztropine (COGENTIN) 0.5 MG tablet  Take 1 tablet by mouth 2 times daily             Compression Stockings MISC  by Does not apply route Duration of Treatment:  3 Months  # of pairs:  2    Compression Class Level - 20-30 mmHg*    Style - Knee High             digoxin (LANOXIN) 125 MCG tablet  Take 1 tablet by mouth daily             divalproex (DEPAKOTE ER) 500 MG extended release tablet  Take 2 tablets by mouth nightly             docusate sodium (COLACE) 100 MG capsule  Take 1 capsule by mouth 2 times daily Hold for diarrhea             donepezil (ARICEPT) 5 MG tablet  Take 1 tablet by mouth nightly             escitalopram (LEXAPRO) 10 MG tablet  Take 2 tablets by mouth daily             famotidine (PEPCID) 10 MG tablet  Take 1 tablet by mouth 2 times daily             metoprolol tartrate (LOPRESSOR) 25 MG tablet  Take 1 tablet by mouth 2 times daily             miconazole (MICOTIN) 2 % powder  Apply topically 2 times daily. spironolactone (ALDACTONE) 25 MG tablet  Take 1 tablet by mouth daily                 Objective Findings at Discharge:   BP (!) 144/91   Pulse 63   Temp 98.5 °F (36.9 °C) (Oral)   Resp 18   Ht 5' 9\" (1.753 m)   Wt 201 lb 3.2 oz (91.3 kg)   SpO2 98%   BMI 29.71 kg/m²            PHYSICAL EXAM   GEN Awake male, sitting upright in bed in no apparent distress. Appears given age. EYES Pupils are equally round. No scleral erythema, discharge, or conjunctivitis. HENT Mucous membranes are moist.   NECK No apparent thyromegaly or masses. RESP Clear to auscultation, no wheezes, rales or rhonchi. Symmetric chest movement while on room air. CARDIO/VASC S1/S2 auscultated. Regular rate without appreciable murmurs, rubs, or gallops. Peripheral pulses equal bilaterally and palpable. No peripheral edema.   GI Abdomen is soft without significant tenderness, masses, or guarding. Bowel sounds are normoactive. Rectal exam deferred.  Zimmer catheter is not present. HEME/LYMPH No petechiae or ecchymoses. MSK No gross joint deformities. Spontaneous movement of all extremities  SKIN Normal coloration, warm, dry. NEURO Cranial nerves appear grossly intact, normal speech, no lateralizing weakness. PSYCH Awake, alert, oriented x 4. Affect appropriate. BMP/CBC  Recent Labs     02/23/21  2045 02/25/21  0533 02/25/21  1018     --  142   K 3.8  --  3.3*     --  110   CO2 25  --  22   BUN 20  --  18   CREATININE 2.2*  --  1.7*   WBC 8.7 6.5  --    HCT 35.1* 36.8*  --     138*  --        IMAGING:  Ct Head Wo Contrast    Result Date: 2/22/2021  EXAMINATION: CT OF THE HEAD WITHOUT CONTRAST  2/22/2021 2:03 am TECHNIQUE: CT of the head was performed without the administration of intravenous contrast. Dose modulation, iterative reconstruction, and/or weight based adjustment of the mA/kV was utilized to reduce the radiation dose to as low as reasonably achievable. COMPARISON: CT head dated January 30, 2021. HISTORY: ORDERING SYSTEM PROVIDED HISTORY: head pain TECHNOLOGIST PROVIDED HISTORY: Has a \"code stroke\" or \"stroke alert\" been called? ->No Reason for exam:->head pain FINDINGS: BRAIN/VENTRICLES: Generalized cerebral atrophy is noted with chronic microvascular ischemic changes. There is a focal low-attenuation area within the right occipital lobe with minimal residual hyperdensity, suggestive for resolving intraparenchymal hemorrhage. Minimal intraventricular extension is seen into the adjacent right occipital horn. This has slightly improved. No new areas of hemorrhage are identified. ORBITS: The visualized portion of the orbits demonstrate no acute abnormality. SINUSES: The visualized paranasal sinuses and mastoid air cells demonstrate no acute abnormality.  SOFT TISSUES/SKULL:  No acute abnormality of the visualized skull or soft tissues. 1. Resolving intraparenchymal hemorrhage within the right occipital lobe with intraventricular extension. No new areas of hemorrhage. 2. Generalized cerebral atrophy with chronic microvascular ischemic changes. Ct Head Wo Contrast    Result Date: 1/30/2021  EXAMINATION: CT OF THE HEAD WITHOUT CONTRAST  1/30/2021 6:32 am TECHNIQUE: CT of the head was performed without the administration of intravenous contrast. Dose modulation, iterative reconstruction, and/or weight based adjustment of the mA/kV was utilized to reduce the radiation dose to as low as reasonably achievable. COMPARISON: 01/14/2021 HISTORY: ORDERING SYSTEM PROVIDED HISTORY: hx CVA TECHNOLOGIST PROVIDED HISTORY: Reason for exam:->hx CVA Has a \"code stroke\" or \"stroke alert\" been called? ->No Decision Support Exception->Emergency Medical Condition (MA) Reason for Exam: hx CVA Acuity: Acute Type of Exam: Initial FINDINGS: BRAIN/VENTRICLES: Further interval decrease in size of known right occipital lobe subacute hematoma. Slight decrease in attenuation compared to prior study. Local mass effect with sulcal effacement and partial effacement of the right lateral ventricle. No midline shift. There is intraventricular hemorrhage layering in the left occipital horn. No new intracranial hemorrhage. Senescent changes with parenchymal volume loss enlargement of the ventricles and cerebral sulci. There are nonspecific areas of hypoattenuation within the periventricular and subcortical white matter, which likely represent chronic microvascular ischemic change. Intracranial atherosclerotic disease. ORBITS: The visualized portion of the orbits demonstrate no acute abnormality. SINUSES: The visualized paranasal sinuses and mastoid air cells demonstrate no acute abnormality. SOFT TISSUES/SKULL: No acute abnormality of the visualized skull or soft tissues.      1. Further interval decrease in size of right occipital lobe intraparenchymal subacute hemorrhage. 2. No new intracranial hemorrhage or infarction. No midline shift. Ct Cervical Spine Wo Contrast    Result Date: 2/22/2021  EXAMINATION: CT OF THE CERVICAL SPINE WITHOUT CONTRAST 2/22/2021 2:03 am TECHNIQUE: CT of the cervical spine was performed without the administration of intravenous contrast. Multiplanar reformatted images are provided for review. Dose modulation, iterative reconstruction, and/or weight based adjustment of the mA/kV was utilized to reduce the radiation dose to as low as reasonably achievable. COMPARISON: CT cervical spine without contrast September 1, 2020. HISTORY: ORDERING SYSTEM PROVIDED HISTORY: pain TECHNOLOGIST PROVIDED HISTORY: Reason for exam:->pain Decision Support Exception->Emergency Medical Condition (MA) FINDINGS: BONES/ALIGNMENT: There is no acute fracture or traumatic malalignment. C5-C6 anterior cervical discectomy and fusion is again noted without interval change in appearance or evidence of hardware complication noted. DEGENERATIVE CHANGES: Multilevel mild-to-moderate spondylosis is noted without associated central canal stenosis/compromise identified. C3/C4 moderate right and mild left, C5/C6 moderate right and mild left, C6/C7 mild bilateral neural foraminal stenosis secondary to encroachment by disc osteophyte complex is identified. SOFT TISSUES: There is no prevertebral soft tissue swelling. Mild centrilobular emphysema is seen within the lung apices. 1. Stable appearance of C5-C6 anterior cervical discectomy and fusion (ACDF) without evidence of hardware complication. 2. Multilevel cervical spine mild-to-moderate spondylosis without associated significant central canal stenosis/compromise. 3. C3/C4 moderate right and mild left, C5/C6 moderate right and mild left and C6/C7 mild bilateral neural foraminal stenosis secondary to encroachment by disc osteophyte complex. 4. Bilateral lung apical mild centrilobular emphysema.      Xr Chest There is persistent opacification involving the lingula and left lower lobe. 5. Persistent left-sided volume loss with leftward shift of the mediastinum. Xr Hip 2-3 Vw W Pelvis Left    Result Date: 2/23/2021  EXAMINATION: ONE XRAY VIEW OF THE PELVIS AND TWO XRAY VIEWS LEFT HIP 2/23/2021 9:41 pm COMPARISON: None. HISTORY: ORDERING SYSTEM PROVIDED HISTORY: fall, pain TECHNOLOGIST PROVIDED HISTORY: Reason for exam:->fall, pain Reason for Exam: pain Acuity: Acute Type of Exam: Initial Mechanism of Injury: fall Relevant Medical/Surgical History: none FINDINGS: No evidence of an acute left hip fracture or dislocation. No evidence of a pelvis fracture. Sacroiliac joints and pubic symphysis are unremarkable. There are no significant degenerative changes in the left hip. There are vascular calcifications. No evidence of an acute injury.      Discharge Time of 31 minutes    Electronically signed by Robles Crandall MD on 2/26/2021 at 2:12 PM

## 2021-02-26 NOTE — PLAN OF CARE
Problem: Pain:  Goal: Pain level will decrease  Description: Pain level will decrease  2/26/2021 0130 by Lori Salgado RN  Outcome: Met This Shift  2/26/2021 0124 by Lori Salgado RN  Outcome: Met This Shift  Goal: Control of acute pain  Description: Control of acute pain  2/26/2021 0130 by Lori Salgado RN  Outcome: Met This Shift  2/26/2021 0124 by Lori Salgado RN  Outcome: Met This Shift  Goal: Control of chronic pain  Description: Control of chronic pain  2/26/2021 0130 by Lori Salgado RN  Outcome: Met This Shift  2/26/2021 0124 by Lori Salgado RN  Outcome: Met This Shift     Problem: Falls - Risk of:  Goal: Will remain free from falls  Description: Will remain free from falls  2/26/2021 0130 by Lori Salgado RN  Outcome: Met This Shift  2/26/2021 0124 by Lori Salgado RN  Outcome: Met This Shift  Goal: Absence of physical injury  Description: Absence of physical injury  2/26/2021 0130 by Lori Salgado RN  Outcome: Met This Shift  2/26/2021 0124 by Lori Salgado RN  Outcome: Met This Shift     Problem: Skin Integrity:  Goal: Will show no infection signs and symptoms  Description: Will show no infection signs and symptoms  2/26/2021 0130 by Lori Salgado RN  Outcome: Met This Shift  2/26/2021 0124 by Lori Salgado RN  Outcome: Met This Shift  Goal: Absence of new skin breakdown  Description: Absence of new skin breakdown  2/26/2021 0130 by Lori Salgado RN  Outcome: Met This Shift  2/26/2021 0124 by Lori Salgado RN  Outcome: Met This Shift     Problem: Physical Regulation:  Goal: Ability to maintain clinical measurements within normal limits will improve  Description: Ability to maintain clinical measurements within normal limits will improve  Outcome: Met This Shift  Goal: Diagnostic test results will improve  Description: Diagnostic test results will improve  Outcome: Met This Shift     Problem: Safety:  Goal: Ability to remain free from injury will improve Description: Ability to remain free from injury will improve  Outcome: Met This Shift

## 2021-02-26 NOTE — TELEPHONE ENCOUNTER
Please look into how this might be done to get him in assisted living at [facility]  I think they have to make contact their first

## 2021-03-01 ENCOUNTER — HOSPITAL ENCOUNTER (OUTPATIENT)
Age: 71
Setting detail: SPECIMEN
Discharge: HOME | End: 2021-03-01

## 2021-03-01 LAB
ANION GAP SERPL CALCULATED.3IONS-SCNC: 12 MMOL/L (ref 4–16)
BUN BLDV-MCNC: 12 MG/DL (ref 6–23)
CALCIUM SERPL-MCNC: 7.5 MG/DL (ref 8.3–10.6)
CHLORIDE BLD-SCNC: 109 MMOL/L (ref 99–110)
CO2: 20 MMOL/L (ref 21–32)
CREAT SERPL-MCNC: 1.6 MG/DL (ref 0.9–1.3)
EKG ATRIAL RATE: 63 BPM
EKG DIAGNOSIS: NORMAL
EKG P AXIS: 60 DEGREES
EKG P-R INTERVAL: 182 MS
EKG Q-T INTERVAL: 330 MS
EKG QRS DURATION: 104 MS
EKG QTC CALCULATION (BAZETT): 358 MS
EKG R AXIS: 4 DEGREES
EKG T AXIS: 215 DEGREES
EKG VENTRICULAR RATE: 71 BPM
GFR AFRICAN AMERICAN: 52 ML/MIN/1.73M2
GFR NON-AFRICAN AMERICAN: 43 ML/MIN/1.73M2
GLUCOSE BLD-MCNC: 69 MG/DL (ref 70–99)
POTASSIUM SERPL-SCNC: 3.7 MMOL/L (ref 3.5–5.1)
SODIUM BLD-SCNC: 141 MMOL/L (ref 135–145)

## 2021-03-01 PROCEDURE — 87324 CLOSTRIDIUM AG IA: CPT

## 2021-03-01 PROCEDURE — 80048 BASIC METABOLIC PNL TOTAL CA: CPT

## 2021-03-01 PROCEDURE — 36415 COLL VENOUS BLD VENIPUNCTURE: CPT

## 2021-03-03 LAB — CLOSTRIDIUM DIFFICILE, PCR: ABNORMAL

## 2021-03-04 ENCOUNTER — HOSPITAL ENCOUNTER (OUTPATIENT)
Age: 71
Setting detail: SPECIMEN
Discharge: HOME | End: 2021-03-04

## 2021-03-04 LAB
ALBUMIN SERPL-MCNC: 2.6 GM/DL (ref 3.4–5)
ALP BLD-CCNC: 48 IU/L (ref 40–129)
ALT SERPL-CCNC: <5 U/L (ref 10–40)
AMMONIA: 42 UMOL/L (ref 16–60)
ANION GAP SERPL CALCULATED.3IONS-SCNC: 8 MMOL/L (ref 4–16)
AST SERPL-CCNC: 13 IU/L (ref 15–37)
BILIRUB SERPL-MCNC: 0.5 MG/DL (ref 0–1)
BUN BLDV-MCNC: 16 MG/DL (ref 6–23)
CALCIUM SERPL-MCNC: 7.6 MG/DL (ref 8.3–10.6)
CHLORIDE BLD-SCNC: 104 MMOL/L (ref 99–110)
CO2: 23 MMOL/L (ref 21–32)
CREAT SERPL-MCNC: 1.6 MG/DL (ref 0.9–1.3)
DIGOXIN LEVEL: 1.1 NG/ML (ref 0.8–2)
DOSE AMOUNT: NORMAL
DOSE TIME: NORMAL
GFR AFRICAN AMERICAN: 52 ML/MIN/1.73M2
GFR NON-AFRICAN AMERICAN: 43 ML/MIN/1.73M2
GLUCOSE BLD-MCNC: 68 MG/DL (ref 70–99)
HCT VFR BLD CALC: 33.1 % (ref 42–52)
HEMOGLOBIN: 10.5 GM/DL (ref 13.5–18)
MCH RBC QN AUTO: 29.8 PG (ref 27–31)
MCHC RBC AUTO-ENTMCNC: 31.7 % (ref 32–36)
MCV RBC AUTO: 94 FL (ref 78–100)
PDW BLD-RTO: 16.7 % (ref 11.7–14.9)
PLATELET # BLD: 108 K/CU MM (ref 140–440)
PMV BLD AUTO: 9.9 FL (ref 7.5–11.1)
POTASSIUM SERPL-SCNC: 3.8 MMOL/L (ref 3.5–5.1)
RBC # BLD: 3.52 M/CU MM (ref 4.6–6.2)
SODIUM BLD-SCNC: 135 MMOL/L (ref 135–145)
T4 FREE: 1.34 NG/DL (ref 0.9–1.8)
TOTAL PROTEIN: 4.9 GM/DL (ref 6.4–8.2)
TSH HIGH SENSITIVITY: 6.92 UIU/ML (ref 0.27–4.2)
WBC # BLD: 6 K/CU MM (ref 4–10.5)

## 2021-03-04 PROCEDURE — 82140 ASSAY OF AMMONIA: CPT

## 2021-03-04 PROCEDURE — 85027 COMPLETE CBC AUTOMATED: CPT

## 2021-03-04 PROCEDURE — 80053 COMPREHEN METABOLIC PANEL: CPT

## 2021-03-04 PROCEDURE — 36415 COLL VENOUS BLD VENIPUNCTURE: CPT

## 2021-03-04 PROCEDURE — 84439 ASSAY OF FREE THYROXINE: CPT

## 2021-03-04 PROCEDURE — 80162 ASSAY OF DIGOXIN TOTAL: CPT

## 2021-03-04 PROCEDURE — 84443 ASSAY THYROID STIM HORMONE: CPT

## 2021-03-08 ENCOUNTER — HOSPITAL ENCOUNTER (OUTPATIENT)
Age: 71
Setting detail: SPECIMEN
Discharge: HOME | End: 2021-03-08

## 2021-03-08 LAB
ALBUMIN SERPL-MCNC: 3.4 GM/DL (ref 3.4–5)
ALP BLD-CCNC: 59 IU/L (ref 40–128)
ALT SERPL-CCNC: <5 U/L (ref 10–40)
ANION GAP SERPL CALCULATED.3IONS-SCNC: 12 MMOL/L (ref 4–16)
AST SERPL-CCNC: 14 IU/L (ref 15–37)
BILIRUB SERPL-MCNC: 0.7 MG/DL (ref 0–1)
BUN BLDV-MCNC: 13 MG/DL (ref 6–23)
CALCIUM SERPL-MCNC: 8 MG/DL (ref 8.3–10.6)
CHLORIDE BLD-SCNC: 103 MMOL/L (ref 99–110)
CO2: 24 MMOL/L (ref 21–32)
CREAT SERPL-MCNC: 1.5 MG/DL (ref 0.9–1.3)
GFR AFRICAN AMERICAN: 56 ML/MIN/1.73M2
GFR NON-AFRICAN AMERICAN: 46 ML/MIN/1.73M2
GLUCOSE BLD-MCNC: 86 MG/DL (ref 70–99)
HCT VFR BLD CALC: 38.1 % (ref 42–52)
HEMOGLOBIN: 11.7 GM/DL (ref 13.5–18)
MCH RBC QN AUTO: 29.4 PG (ref 27–31)
MCHC RBC AUTO-ENTMCNC: 30.7 % (ref 32–36)
MCV RBC AUTO: 95.7 FL (ref 78–100)
PDW BLD-RTO: 16.1 % (ref 11.7–14.9)
PLATELET # BLD: 122 K/CU MM (ref 140–440)
PMV BLD AUTO: 9.4 FL (ref 7.5–11.1)
POTASSIUM SERPL-SCNC: 3.8 MMOL/L (ref 3.5–5.1)
RBC # BLD: 3.98 M/CU MM (ref 4.6–6.2)
SODIUM BLD-SCNC: 139 MMOL/L (ref 135–145)
TOTAL PROTEIN: 5.8 GM/DL (ref 6.4–8.2)
WBC # BLD: 8.4 K/CU MM (ref 4–10.5)

## 2021-03-08 PROCEDURE — 80053 COMPREHEN METABOLIC PANEL: CPT

## 2021-03-08 PROCEDURE — 85027 COMPLETE CBC AUTOMATED: CPT

## 2021-03-08 PROCEDURE — 36415 COLL VENOUS BLD VENIPUNCTURE: CPT

## 2021-03-11 ENCOUNTER — HOSPITAL ENCOUNTER (OUTPATIENT)
Age: 71
Setting detail: SPECIMEN
Discharge: HOME | End: 2021-03-11

## 2021-03-11 LAB
ALBUMIN SERPL-MCNC: 2.8 GM/DL (ref 3.4–5)
ALP BLD-CCNC: 52 IU/L (ref 40–129)
ALT SERPL-CCNC: 5 U/L (ref 10–40)
ANION GAP SERPL CALCULATED.3IONS-SCNC: 9 MMOL/L (ref 4–16)
AST SERPL-CCNC: 14 IU/L (ref 15–37)
BILIRUB SERPL-MCNC: 0.4 MG/DL (ref 0–1)
BUN BLDV-MCNC: 9 MG/DL (ref 6–23)
CALCIUM SERPL-MCNC: 7.7 MG/DL (ref 8.3–10.6)
CHLORIDE BLD-SCNC: 104 MMOL/L (ref 99–110)
CO2: 26 MMOL/L (ref 21–32)
CREAT SERPL-MCNC: 1.6 MG/DL (ref 0.9–1.3)
GFR AFRICAN AMERICAN: 52 ML/MIN/1.73M2
GFR NON-AFRICAN AMERICAN: 43 ML/MIN/1.73M2
GLUCOSE BLD-MCNC: 67 MG/DL (ref 70–99)
HCT VFR BLD CALC: 32.6 % (ref 42–52)
HEMOGLOBIN: 10.7 GM/DL (ref 13.5–18)
MCH RBC QN AUTO: 30.3 PG (ref 27–31)
MCHC RBC AUTO-ENTMCNC: 32.8 % (ref 32–36)
MCV RBC AUTO: 92.4 FL (ref 78–100)
PDW BLD-RTO: 16.7 % (ref 11.7–14.9)
PLATELET # BLD: 102 K/CU MM (ref 140–440)
PMV BLD AUTO: 9 FL (ref 7.5–11.1)
POTASSIUM SERPL-SCNC: 3.9 MMOL/L (ref 3.5–5.1)
RBC # BLD: 3.53 M/CU MM (ref 4.6–6.2)
SODIUM BLD-SCNC: 139 MMOL/L (ref 135–145)
TOTAL PROTEIN: 5.1 GM/DL (ref 6.4–8.2)
WBC # BLD: 5.8 K/CU MM (ref 4–10.5)

## 2021-03-11 PROCEDURE — 85027 COMPLETE CBC AUTOMATED: CPT

## 2021-03-11 PROCEDURE — 80053 COMPREHEN METABOLIC PANEL: CPT

## 2021-03-11 PROCEDURE — 36415 COLL VENOUS BLD VENIPUNCTURE: CPT

## 2021-03-18 ENCOUNTER — HOSPITAL ENCOUNTER (OUTPATIENT)
Age: 71
Setting detail: SPECIMEN
Discharge: HOME | End: 2021-03-18
Payer: MEDICARE

## 2021-03-18 LAB
ALBUMIN SERPL-MCNC: 3.2 GM/DL (ref 3.4–5)
ALP BLD-CCNC: 54 IU/L (ref 40–129)
ALT SERPL-CCNC: 5 U/L (ref 10–40)
ANION GAP SERPL CALCULATED.3IONS-SCNC: 11 MMOL/L (ref 4–16)
AST SERPL-CCNC: 14 IU/L (ref 15–37)
BACTERIA: ABNORMAL /HPF
BASOPHILS ABSOLUTE: 0 K/CU MM
BASOPHILS RELATIVE PERCENT: 0.3 % (ref 0–1)
BILIRUB SERPL-MCNC: 0.7 MG/DL (ref 0–1)
BILIRUBIN URINE: ABNORMAL MG/DL
BLOOD, URINE: ABNORMAL
BUN BLDV-MCNC: 14 MG/DL (ref 6–23)
CALCIUM SERPL-MCNC: 8.2 MG/DL (ref 8.3–10.6)
CHLORIDE BLD-SCNC: 104 MMOL/L (ref 99–110)
CLARITY: ABNORMAL
CO2: 24 MMOL/L (ref 21–32)
COLOR: YELLOW
CREAT SERPL-MCNC: 2 MG/DL (ref 0.9–1.3)
DIFFERENTIAL TYPE: ABNORMAL
DIGOXIN LEVEL: 1.4 NG/ML (ref 0.8–2)
DOSE AMOUNT: NORMAL
DOSE TIME: NORMAL
EOSINOPHILS ABSOLUTE: 0.1 K/CU MM
EOSINOPHILS RELATIVE PERCENT: 0.6 % (ref 0–3)
GFR AFRICAN AMERICAN: 40 ML/MIN/1.73M2
GFR NON-AFRICAN AMERICAN: 33 ML/MIN/1.73M2
GLUCOSE BLD-MCNC: 91 MG/DL (ref 70–99)
GLUCOSE, URINE: NEGATIVE MG/DL
HCT VFR BLD CALC: 34.6 % (ref 42–52)
HEMOGLOBIN: 10.9 GM/DL (ref 13.5–18)
IMMATURE NEUTROPHIL %: 2.4 % (ref 0–0.43)
KETONES, URINE: ABNORMAL MG/DL
LEUKOCYTE ESTERASE, URINE: ABNORMAL
LYMPHOCYTES ABSOLUTE: 1.3 K/CU MM
LYMPHOCYTES RELATIVE PERCENT: 16.1 % (ref 24–44)
MCH RBC QN AUTO: 30.8 PG (ref 27–31)
MCHC RBC AUTO-ENTMCNC: 31.5 % (ref 32–36)
MCV RBC AUTO: 97.7 FL (ref 78–100)
MONOCYTES ABSOLUTE: 0.9 K/CU MM
MONOCYTES RELATIVE PERCENT: 10.9 % (ref 0–4)
MUCUS: ABNORMAL HPF
NITRITE URINE, QUANTITATIVE: NEGATIVE
NUCLEATED RBC %: 0 %
PDW BLD-RTO: 18.4 % (ref 11.7–14.9)
PH, URINE: 5 (ref 5–8)
PLATELET # BLD: 140 K/CU MM (ref 140–440)
PMV BLD AUTO: 9.5 FL (ref 7.5–11.1)
POTASSIUM SERPL-SCNC: 3.9 MMOL/L (ref 3.5–5.1)
PROTEIN UA: 100 MG/DL
RBC # BLD: 3.54 M/CU MM (ref 4.6–6.2)
RBC URINE: 39 /HPF (ref 0–3)
SEGMENTED NEUTROPHILS ABSOLUTE COUNT: 5.5 K/CU MM
SEGMENTED NEUTROPHILS RELATIVE PERCENT: 69.7 % (ref 36–66)
SODIUM BLD-SCNC: 139 MMOL/L (ref 135–145)
SPECIFIC GRAVITY UA: 1.03 (ref 1–1.03)
TOTAL IMMATURE NEUTOROPHIL: 0.19 K/CU MM
TOTAL NUCLEATED RBC: 0 K/CU MM
TOTAL PROTEIN: 5.6 GM/DL (ref 6.4–8.2)
TRICHOMONAS: ABNORMAL /HPF
UROBILINOGEN, URINE: 2 MG/DL (ref 0.2–1)
WBC # BLD: 7.9 K/CU MM (ref 4–10.5)
WBC CLUMP: ABNORMAL /HPF
WBC UA: 102 /HPF (ref 0–2)

## 2021-03-18 PROCEDURE — 85025 COMPLETE CBC W/AUTO DIFF WBC: CPT

## 2021-03-18 PROCEDURE — 80162 ASSAY OF DIGOXIN TOTAL: CPT

## 2021-03-18 PROCEDURE — 87086 URINE CULTURE/COLONY COUNT: CPT

## 2021-03-18 PROCEDURE — 80053 COMPREHEN METABOLIC PANEL: CPT

## 2021-03-18 PROCEDURE — 81001 URINALYSIS AUTO W/SCOPE: CPT

## 2021-03-18 PROCEDURE — 36415 COLL VENOUS BLD VENIPUNCTURE: CPT

## 2021-03-19 LAB
CULTURE: NORMAL
Lab: NORMAL
SPECIMEN: NORMAL

## 2021-03-22 ENCOUNTER — HOSPITAL ENCOUNTER (OUTPATIENT)
Age: 71
Setting detail: SPECIMEN
Discharge: HOME | End: 2021-03-22
Payer: MEDICARE

## 2021-03-22 LAB
ALBUMIN SERPL-MCNC: 3.4 GM/DL (ref 3.4–5)
ALP BLD-CCNC: 62 IU/L (ref 40–129)
ALT SERPL-CCNC: 6 U/L (ref 10–40)
ANION GAP SERPL CALCULATED.3IONS-SCNC: 15 MMOL/L (ref 4–16)
AST SERPL-CCNC: 13 IU/L (ref 15–37)
BILIRUB SERPL-MCNC: 0.7 MG/DL (ref 0–1)
BUN BLDV-MCNC: 19 MG/DL (ref 6–23)
CALCIUM SERPL-MCNC: 8.4 MG/DL (ref 8.3–10.6)
CHLORIDE BLD-SCNC: 105 MMOL/L (ref 99–110)
CO2: 21 MMOL/L (ref 21–32)
CREAT SERPL-MCNC: 2.1 MG/DL (ref 0.9–1.3)
GFR AFRICAN AMERICAN: 38 ML/MIN/1.73M2
GFR NON-AFRICAN AMERICAN: 31 ML/MIN/1.73M2
GLUCOSE BLD-MCNC: 61 MG/DL (ref 70–99)
HCT VFR BLD CALC: 42.4 % (ref 42–52)
HEMOGLOBIN: 13.1 GM/DL (ref 13.5–18)
MCH RBC QN AUTO: 30.6 PG (ref 27–31)
MCHC RBC AUTO-ENTMCNC: 30.9 % (ref 32–36)
MCV RBC AUTO: 99.1 FL (ref 78–100)
PDW BLD-RTO: 19.5 % (ref 11.7–14.9)
PLATELET # BLD: 154 K/CU MM (ref 140–440)
PMV BLD AUTO: 10 FL (ref 7.5–11.1)
POTASSIUM SERPL-SCNC: 3.8 MMOL/L (ref 3.5–5.1)
RBC # BLD: 4.28 M/CU MM (ref 4.6–6.2)
SODIUM BLD-SCNC: 141 MMOL/L (ref 135–145)
TOTAL PROTEIN: 6 GM/DL (ref 6.4–8.2)
WBC # BLD: 10.5 K/CU MM (ref 4–10.5)

## 2021-03-22 PROCEDURE — 80053 COMPREHEN METABOLIC PANEL: CPT

## 2021-03-22 PROCEDURE — 85027 COMPLETE CBC AUTOMATED: CPT

## 2021-03-22 PROCEDURE — 36415 COLL VENOUS BLD VENIPUNCTURE: CPT

## 2021-03-28 ENCOUNTER — HOSPITAL ENCOUNTER (OUTPATIENT)
Age: 71
Setting detail: SPECIMEN
Discharge: HOME | End: 2021-03-28
Payer: MEDICARE

## 2021-03-28 PROCEDURE — 87324 CLOSTRIDIUM AG IA: CPT

## 2021-03-29 LAB — CLOSTRIDIUM DIFFICILE, PCR: ABNORMAL

## 2021-03-30 ENCOUNTER — HOSPITAL ENCOUNTER (OUTPATIENT)
Age: 71
Setting detail: SPECIMEN
Discharge: HOME | End: 2021-03-30
Payer: MEDICARE

## 2021-03-30 LAB
ALBUMIN SERPL-MCNC: 3.1 GM/DL (ref 3.4–5)
ALP BLD-CCNC: 50 IU/L (ref 40–128)
ALT SERPL-CCNC: <5 U/L (ref 10–40)
ANION GAP SERPL CALCULATED.3IONS-SCNC: 12 MMOL/L (ref 4–16)
AST SERPL-CCNC: 12 IU/L (ref 15–37)
BILIRUB SERPL-MCNC: 0.3 MG/DL (ref 0–1)
BUN BLDV-MCNC: 52 MG/DL (ref 6–23)
CALCIUM SERPL-MCNC: 8.1 MG/DL (ref 8.3–10.6)
CHLORIDE BLD-SCNC: 113 MMOL/L (ref 99–110)
CO2: 23 MMOL/L (ref 21–32)
CREAT SERPL-MCNC: 2.4 MG/DL (ref 0.9–1.3)
GFR AFRICAN AMERICAN: 32 ML/MIN/1.73M2
GFR NON-AFRICAN AMERICAN: 27 ML/MIN/1.73M2
GLUCOSE BLD-MCNC: 132 MG/DL (ref 70–99)
HCT VFR BLD CALC: 42 % (ref 42–52)
HEMOGLOBIN: 12.6 GM/DL (ref 13.5–18)
MCH RBC QN AUTO: 31.6 PG (ref 27–31)
MCHC RBC AUTO-ENTMCNC: 30 % (ref 32–36)
MCV RBC AUTO: 105.3 FL (ref 78–100)
PDW BLD-RTO: 19.2 % (ref 11.7–14.9)
PLATELET # BLD: 115 K/CU MM (ref 140–440)
PMV BLD AUTO: 10.1 FL (ref 7.5–11.1)
POTASSIUM SERPL-SCNC: 4.2 MMOL/L (ref 3.5–5.1)
RBC # BLD: 3.99 M/CU MM (ref 4.6–6.2)
SODIUM BLD-SCNC: 148 MMOL/L (ref 135–145)
TOTAL PROTEIN: 5.5 GM/DL (ref 6.4–8.2)
WBC # BLD: 11.7 K/CU MM (ref 4–10.5)

## 2021-03-30 PROCEDURE — 36415 COLL VENOUS BLD VENIPUNCTURE: CPT

## 2021-03-30 PROCEDURE — 85027 COMPLETE CBC AUTOMATED: CPT

## 2021-03-30 PROCEDURE — 80053 COMPREHEN METABOLIC PANEL: CPT

## 2021-04-01 ENCOUNTER — HOSPITAL ENCOUNTER (OUTPATIENT)
Age: 71
Setting detail: SPECIMEN
Discharge: HOME | End: 2021-04-01
Payer: MEDICARE

## 2021-04-02 ENCOUNTER — HOSPITAL ENCOUNTER (OUTPATIENT)
Age: 71
Setting detail: SPECIMEN
Discharge: HOME | End: 2021-04-02
Payer: MEDICARE

## 2021-04-02 PROCEDURE — 80053 COMPREHEN METABOLIC PANEL: CPT

## 2021-04-03 ENCOUNTER — HOSPITAL ENCOUNTER (OUTPATIENT)
Age: 71
Setting detail: SPECIMEN
Discharge: HOME | End: 2021-04-03
Payer: MEDICARE

## 2021-04-03 LAB
ALBUMIN SERPL-MCNC: 2.8 GM/DL (ref 3.4–5)
ALP BLD-CCNC: 49 IU/L (ref 40–128)
ALT SERPL-CCNC: <5 U/L (ref 10–40)
ANION GAP SERPL CALCULATED.3IONS-SCNC: 7 MMOL/L (ref 4–16)
AST SERPL-CCNC: 12 IU/L (ref 15–37)
BILIRUB SERPL-MCNC: 0.3 MG/DL (ref 0–1)
BUN BLDV-MCNC: 52 MG/DL (ref 6–23)
CALCIUM SERPL-MCNC: 8.1 MG/DL (ref 8.3–10.6)
CHLORIDE BLD-SCNC: 120 MMOL/L (ref 99–110)
CO2: 23 MMOL/L (ref 21–32)
CREAT SERPL-MCNC: 1.6 MG/DL (ref 0.9–1.3)
GFR AFRICAN AMERICAN: 52 ML/MIN/1.73M2
GFR NON-AFRICAN AMERICAN: 43 ML/MIN/1.73M2
GLUCOSE BLD-MCNC: 91 MG/DL (ref 70–99)
POTASSIUM SERPL-SCNC: 5.2 MMOL/L (ref 3.5–5.1)
PROCALCITONIN: 0.18
SODIUM BLD-SCNC: 150 MMOL/L (ref 135–145)
TOTAL PROTEIN: 5.2 GM/DL (ref 6.4–8.2)

## 2021-04-03 PROCEDURE — 84145 PROCALCITONIN (PCT): CPT

## 2021-04-03 PROCEDURE — 36415 COLL VENOUS BLD VENIPUNCTURE: CPT

## 2021-04-03 PROCEDURE — 80053 COMPREHEN METABOLIC PANEL: CPT

## 2021-04-06 ENCOUNTER — HOSPITAL ENCOUNTER (OUTPATIENT)
Age: 71
Setting detail: SPECIMEN
Discharge: HOME | End: 2021-04-06
Payer: MEDICARE

## 2021-04-06 LAB
HCT VFR BLD CALC: 43.8 % (ref 42–52)
HEMOGLOBIN: 12.2 GM/DL (ref 13.5–18)
MCH RBC QN AUTO: 30.7 PG (ref 27–31)
MCHC RBC AUTO-ENTMCNC: 27.9 % (ref 32–36)
MCV RBC AUTO: 110.3 FL (ref 78–100)
PDW BLD-RTO: 17.9 % (ref 11.7–14.9)
PLATELET # BLD: 98 K/CU MM (ref 140–440)
PMV BLD AUTO: 9.4 FL (ref 7.5–11.1)
RBC # BLD: 3.97 M/CU MM (ref 4.6–6.2)
WBC # BLD: 6.8 K/CU MM (ref 4–10.5)

## 2021-04-06 PROCEDURE — 80053 COMPREHEN METABOLIC PANEL: CPT

## 2021-04-06 PROCEDURE — 36415 COLL VENOUS BLD VENIPUNCTURE: CPT

## 2021-04-06 PROCEDURE — 85027 COMPLETE CBC AUTOMATED: CPT

## 2021-04-08 ENCOUNTER — HOSPITAL ENCOUNTER (OUTPATIENT)
Age: 71
Setting detail: SPECIMEN
Discharge: HOME | End: 2021-04-08
Payer: MEDICARE

## 2021-04-08 LAB
HCT VFR BLD CALC: 47.1 % (ref 42–52)
HEMOGLOBIN: 13.2 GM/DL (ref 13.5–18)
MCH RBC QN AUTO: 32.5 PG (ref 27–31)
MCHC RBC AUTO-ENTMCNC: 28 % (ref 32–36)
MCV RBC AUTO: 116 FL (ref 78–100)
PDW BLD-RTO: 17.9 % (ref 11.7–14.9)
PLATELET # BLD: 103 K/CU MM (ref 140–440)
PMV BLD AUTO: 10.5 FL (ref 7.5–11.1)
RBC # BLD: 4.06 M/CU MM (ref 4.6–6.2)
WBC # BLD: 12.4 K/CU MM (ref 4–10.5)

## 2021-04-08 PROCEDURE — 36415 COLL VENOUS BLD VENIPUNCTURE: CPT

## 2021-04-08 PROCEDURE — 85027 COMPLETE CBC AUTOMATED: CPT

## 2021-04-15 ENCOUNTER — HOSPITAL ENCOUNTER (OUTPATIENT)
Age: 71
Setting detail: SPECIMEN
Discharge: HOME | End: 2021-04-15
Payer: MEDICARE

## 2021-04-15 LAB
ALBUMIN SERPL-MCNC: 2.8 GM/DL (ref 3.4–5)
ALP BLD-CCNC: 48 IU/L (ref 40–129)
ALT SERPL-CCNC: 9 U/L (ref 10–40)
ANION GAP SERPL CALCULATED.3IONS-SCNC: 6 MMOL/L (ref 4–16)
AST SERPL-CCNC: 24 IU/L (ref 15–37)
BASOPHILS ABSOLUTE: 0.1 K/CU MM
BASOPHILS RELATIVE PERCENT: 1 % (ref 0–1)
BILIRUB SERPL-MCNC: 0.5 MG/DL (ref 0–1)
BUN BLDV-MCNC: 40 MG/DL (ref 6–23)
CALCIUM SERPL-MCNC: 7.7 MG/DL (ref 8.3–10.6)
CHLORIDE BLD-SCNC: 120 MMOL/L (ref 99–110)
CO2: 28 MMOL/L (ref 21–32)
CREAT SERPL-MCNC: 2 MG/DL (ref 0.9–1.3)
DIFFERENTIAL TYPE: ABNORMAL
EOSINOPHILS ABSOLUTE: 0.1 K/CU MM
EOSINOPHILS RELATIVE PERCENT: 1.4 % (ref 0–3)
GFR AFRICAN AMERICAN: 40 ML/MIN/1.73M2
GFR NON-AFRICAN AMERICAN: 33 ML/MIN/1.73M2
GLUCOSE BLD-MCNC: 76 MG/DL (ref 70–99)
HCT VFR BLD CALC: 37 % (ref 42–52)
HEMOGLOBIN: 10.9 GM/DL (ref 13.5–18)
IMMATURE NEUTROPHIL %: 1.3 % (ref 0–0.43)
LYMPHOCYTES ABSOLUTE: 1.4 K/CU MM
LYMPHOCYTES RELATIVE PERCENT: 22.3 % (ref 24–44)
MCH RBC QN AUTO: 32.2 PG (ref 27–31)
MCHC RBC AUTO-ENTMCNC: 29.5 % (ref 32–36)
MCV RBC AUTO: 109.5 FL (ref 78–100)
MONOCYTES ABSOLUTE: 0.5 K/CU MM
MONOCYTES RELATIVE PERCENT: 7.3 % (ref 0–4)
NUCLEATED RBC %: 0 %
PDW BLD-RTO: 17.5 % (ref 11.7–14.9)
PLATELET # BLD: 129 K/CU MM (ref 140–440)
PMV BLD AUTO: 10.9 FL (ref 7.5–11.1)
POTASSIUM SERPL-SCNC: 4.9 MMOL/L (ref 3.5–5.1)
RBC # BLD: 3.38 M/CU MM (ref 4.6–6.2)
SEGMENTED NEUTROPHILS ABSOLUTE COUNT: 4.2 K/CU MM
SEGMENTED NEUTROPHILS RELATIVE PERCENT: 66.7 % (ref 36–66)
SODIUM BLD-SCNC: 154 MMOL/L (ref 135–145)
TOTAL IMMATURE NEUTOROPHIL: 0.08 K/CU MM
TOTAL NUCLEATED RBC: 0 K/CU MM
TOTAL PROTEIN: 5.3 GM/DL (ref 6.4–8.2)
WBC # BLD: 6.3 K/CU MM (ref 4–10.5)

## 2021-04-15 PROCEDURE — 80053 COMPREHEN METABOLIC PANEL: CPT

## 2021-04-15 PROCEDURE — 36415 COLL VENOUS BLD VENIPUNCTURE: CPT

## 2021-04-15 PROCEDURE — 85025 COMPLETE CBC W/AUTO DIFF WBC: CPT

## 2021-04-29 ENCOUNTER — HOSPITAL ENCOUNTER (OUTPATIENT)
Age: 71
Setting detail: SPECIMEN
Discharge: HOME | End: 2021-04-29
Payer: MEDICARE

## 2021-04-29 LAB
ALBUMIN SERPL-MCNC: 3.1 GM/DL (ref 3.4–5)
ALP BLD-CCNC: 56 IU/L (ref 40–129)
ALT SERPL-CCNC: 15 U/L (ref 10–40)
ANION GAP SERPL CALCULATED.3IONS-SCNC: 9 MMOL/L (ref 4–16)
AST SERPL-CCNC: 21 IU/L (ref 15–37)
BILIRUB SERPL-MCNC: 0.5 MG/DL (ref 0–1)
BUN BLDV-MCNC: 36 MG/DL (ref 6–23)
CALCIUM SERPL-MCNC: 8.3 MG/DL (ref 8.3–10.6)
CHLORIDE BLD-SCNC: 104 MMOL/L (ref 99–110)
CO2: 25 MMOL/L (ref 21–32)
CREAT SERPL-MCNC: 2.3 MG/DL (ref 0.9–1.3)
GFR AFRICAN AMERICAN: 34 ML/MIN/1.73M2
GFR NON-AFRICAN AMERICAN: 28 ML/MIN/1.73M2
GLUCOSE BLD-MCNC: 60 MG/DL (ref 70–99)
HCT VFR BLD CALC: 40.3 % (ref 42–52)
HEMOGLOBIN: 11.8 GM/DL (ref 13.5–18)
MCH RBC QN AUTO: 32.9 PG (ref 27–31)
MCHC RBC AUTO-ENTMCNC: 29.3 % (ref 32–36)
MCV RBC AUTO: 112.3 FL (ref 78–100)
PDW BLD-RTO: 16.8 % (ref 11.7–14.9)
PLATELET # BLD: 123 K/CU MM (ref 140–440)
PMV BLD AUTO: 11.5 FL (ref 7.5–11.1)
POTASSIUM SERPL-SCNC: 4.4 MMOL/L (ref 3.5–5.1)
RBC # BLD: 3.59 M/CU MM (ref 4.6–6.2)
SODIUM BLD-SCNC: 138 MMOL/L (ref 135–145)
TOTAL PROTEIN: 5.3 GM/DL (ref 6.4–8.2)
WBC # BLD: 4.1 K/CU MM (ref 4–10.5)

## 2021-04-29 PROCEDURE — 80053 COMPREHEN METABOLIC PANEL: CPT

## 2021-04-29 PROCEDURE — 36415 COLL VENOUS BLD VENIPUNCTURE: CPT

## 2021-04-29 PROCEDURE — 85027 COMPLETE CBC AUTOMATED: CPT

## 2021-05-04 ENCOUNTER — HOSPITAL ENCOUNTER (OUTPATIENT)
Age: 71
Setting detail: SPECIMEN
Discharge: HOME | End: 2021-05-04
Payer: MEDICARE

## 2021-05-04 LAB
ALBUMIN SERPL-MCNC: 2.9 GM/DL (ref 3.4–5)
ALP BLD-CCNC: 54 IU/L (ref 40–128)
ALT SERPL-CCNC: 12 U/L (ref 10–40)
ANION GAP SERPL CALCULATED.3IONS-SCNC: 8 MMOL/L (ref 4–16)
AST SERPL-CCNC: 15 IU/L (ref 15–37)
BILIRUB SERPL-MCNC: 0.4 MG/DL (ref 0–1)
BUN BLDV-MCNC: 24 MG/DL (ref 6–23)
CALCIUM SERPL-MCNC: 8 MG/DL (ref 8.3–10.6)
CHLORIDE BLD-SCNC: 102 MMOL/L (ref 99–110)
CO2: 26 MMOL/L (ref 21–32)
CREAT SERPL-MCNC: 1.9 MG/DL (ref 0.9–1.3)
GFR AFRICAN AMERICAN: 42 ML/MIN/1.73M2
GFR NON-AFRICAN AMERICAN: 35 ML/MIN/1.73M2
GLUCOSE BLD-MCNC: 67 MG/DL (ref 70–99)
HCT VFR BLD CALC: 35.4 % (ref 42–52)
HEMOGLOBIN: 10.6 GM/DL (ref 13.5–18)
MCH RBC QN AUTO: 31.8 PG (ref 27–31)
MCHC RBC AUTO-ENTMCNC: 29.9 % (ref 32–36)
MCV RBC AUTO: 106.3 FL (ref 78–100)
PDW BLD-RTO: 15.9 % (ref 11.7–14.9)
PLATELET # BLD: 154 K/CU MM (ref 140–440)
PMV BLD AUTO: 9.9 FL (ref 7.5–11.1)
POTASSIUM SERPL-SCNC: 4.7 MMOL/L (ref 3.5–5.1)
RBC # BLD: 3.33 M/CU MM (ref 4.6–6.2)
SODIUM BLD-SCNC: 136 MMOL/L (ref 135–145)
TOTAL PROTEIN: 5.3 GM/DL (ref 6.4–8.2)
WBC # BLD: 5.3 K/CU MM (ref 4–10.5)

## 2021-05-04 PROCEDURE — 36415 COLL VENOUS BLD VENIPUNCTURE: CPT

## 2021-05-04 PROCEDURE — 85027 COMPLETE CBC AUTOMATED: CPT

## 2021-05-04 PROCEDURE — 80053 COMPREHEN METABOLIC PANEL: CPT

## 2021-05-18 ENCOUNTER — HOSPITAL ENCOUNTER (OUTPATIENT)
Age: 71
Setting detail: SPECIMEN
Discharge: HOME | End: 2021-05-18
Payer: MEDICARE

## 2021-05-18 LAB
ANION GAP SERPL CALCULATED.3IONS-SCNC: 12 MMOL/L (ref 4–16)
BUN BLDV-MCNC: 20 MG/DL (ref 6–23)
CALCIUM SERPL-MCNC: 8.6 MG/DL (ref 8.3–10.6)
CHLORIDE BLD-SCNC: 102 MMOL/L (ref 99–110)
CO2: 22 MMOL/L (ref 21–32)
CREAT SERPL-MCNC: 2 MG/DL (ref 0.9–1.3)
GFR AFRICAN AMERICAN: 40 ML/MIN/1.73M2
GFR NON-AFRICAN AMERICAN: 33 ML/MIN/1.73M2
GLUCOSE BLD-MCNC: 128 MG/DL (ref 70–99)
HCT VFR BLD CALC: 36.9 % (ref 42–52)
HEMOGLOBIN: 11.7 GM/DL (ref 13.5–18)
IRON: 70 UG/DL (ref 59–158)
MCH RBC QN AUTO: 31.4 PG (ref 27–31)
MCHC RBC AUTO-ENTMCNC: 29.3 % (ref 32–36)
MCV RBC AUTO: 103.1 FL (ref 78–100)
PDW BLD-RTO: 14.6 % (ref 11.7–14.9)
PLATELET # BLD: 136 K/CU MM (ref 140–440)
PMV BLD AUTO: 10.2 FL (ref 7.5–11.1)
POTASSIUM SERPL-SCNC: 4.5 MMOL/L (ref 3.5–5.1)
RBC # BLD: 3.5 M/CU MM (ref 4.6–6.2)
SODIUM BLD-SCNC: 136 MMOL/L (ref 135–145)
VITAMIN B-12: 692.4 PG/ML (ref 211–911)
WBC # BLD: 5.1 K/CU MM (ref 4–10.5)

## 2021-05-18 PROCEDURE — 83540 ASSAY OF IRON: CPT

## 2021-05-18 PROCEDURE — 80048 BASIC METABOLIC PNL TOTAL CA: CPT

## 2021-05-18 PROCEDURE — 36415 COLL VENOUS BLD VENIPUNCTURE: CPT

## 2021-05-18 PROCEDURE — 85027 COMPLETE CBC AUTOMATED: CPT

## 2021-05-18 PROCEDURE — 82607 VITAMIN B-12: CPT

## 2021-05-23 ENCOUNTER — HOSPITAL ENCOUNTER (EMERGENCY)
Age: 71
Discharge: NURSING, REHAB OR LONG-TERM CARE | End: 2021-05-23
Attending: EMERGENCY MEDICINE
Payer: MEDICARE

## 2021-05-23 ENCOUNTER — APPOINTMENT (OUTPATIENT)
Dept: GENERAL RADIOLOGY | Age: 71
End: 2021-05-23
Payer: MEDICARE

## 2021-05-23 ENCOUNTER — APPOINTMENT (OUTPATIENT)
Dept: CT IMAGING | Age: 71
End: 2021-05-23
Payer: MEDICARE

## 2021-05-23 VITALS
SYSTOLIC BLOOD PRESSURE: 145 MMHG | WEIGHT: 201 LBS | DIASTOLIC BLOOD PRESSURE: 80 MMHG | OXYGEN SATURATION: 100 % | TEMPERATURE: 97.8 F | HEART RATE: 55 BPM | BODY MASS INDEX: 29.68 KG/M2 | RESPIRATION RATE: 16 BRPM

## 2021-05-23 DIAGNOSIS — S09.90XA INJURY OF HEAD, INITIAL ENCOUNTER: ICD-10-CM

## 2021-05-23 DIAGNOSIS — R29.6 FALL IN ELDERLY PATIENT: Primary | ICD-10-CM

## 2021-05-23 PROCEDURE — 71045 X-RAY EXAM CHEST 1 VIEW: CPT

## 2021-05-23 PROCEDURE — 73080 X-RAY EXAM OF ELBOW: CPT

## 2021-05-23 PROCEDURE — 72170 X-RAY EXAM OF PELVIS: CPT

## 2021-05-23 PROCEDURE — 72125 CT NECK SPINE W/O DYE: CPT

## 2021-05-23 PROCEDURE — 70450 CT HEAD/BRAIN W/O DYE: CPT

## 2021-05-23 PROCEDURE — 99285 EMERGENCY DEPT VISIT HI MDM: CPT

## 2021-05-23 ASSESSMENT — PAIN DESCRIPTION - PAIN TYPE: TYPE: ACUTE PAIN

## 2021-05-23 ASSESSMENT — PAIN DESCRIPTION - LOCATION: LOCATION: HEAD

## 2021-05-23 ASSESSMENT — PAIN DESCRIPTION - FREQUENCY: FREQUENCY: CONTINUOUS

## 2021-05-23 ASSESSMENT — PAIN SCALES - GENERAL: PAINLEVEL_OUTOF10: 9

## 2021-05-23 ASSESSMENT — PAIN DESCRIPTION - ONSET: ONSET: ON-GOING

## 2021-05-23 NOTE — ED NOTES
Bed: 02TR-02  Expected date:   Expected time:   Means of arrival:   Comments:  EMS fall head injury     Destiny Plasencia RN  05/23/21 [id number]

## 2021-05-23 NOTE — ED TRIAGE NOTES
Patient to the ED via EMS from [address] s/p fall at nursing home. Reports from EMS, patient fall while attempting to go to the bathroom. EMS reports that patient did hit head, denies LOC, denies anticoagulation therapy. Patient reports pain 8/10 to back of head, no hematoma or laceration noted. Skin tear noted to left elbow, patient denies pain elsewhere. Denies further complaints.

## 2021-05-23 NOTE — ED NOTES
Report for patient called back to patients nurse at [facility].       Sandro Aguila RN  05/23/21 [id number]

## 2021-05-23 NOTE — ED NOTES
Patient discharged back to nursing home at this time. Discharge instructions and follow up care discussed, patient and nursing staff at [facility] voices understanding. Patient transferred to [facility] and left facility without incident at this time.       Armando Jones RN  05/23/21 1114

## 2021-05-23 NOTE — ED PROVIDER NOTES
EMERGENCY DEPARTMENT ENCOUNTER      PCP: Carolyn Acevedo MD    CHIEF COMPLAINT    Chief Complaint   Patient presents with    Fall     hit head, no blood thinners, complaining of head pain, denies LOC          This patient was not evaluated by the attending physician. I have independently evaluated this patient. HPI    Justine Dove is a 70 y.o. male who presents with fall. Onset was prior to arrival.  Context is patient resides at skilled nursing facility and is \"not supposed to walk\" because he states his legs are chronically weak. However, he states he had to use the restroom and a \"did not comment time\" so he decided to get up and attempt ambulate to restroom. He denies any preceding symptoms and states he simply fell because his legs are weak. He believes he may have struck his head on the floor. Denies loss of consciousness. Denies neck pain. Denies blood or clear fluid from orifice. Denies vomiting. New    The fall was mechanical in nature without preceding symptoms. REVIEW OF SYSTEMS    General: No Fever  ENT:  No visual changes. No headache. Cardiac: No Chest Pain, No syncope  Respiratory: No cough or difficulty breathing  GI: No vomiting. No Bloody Stool or Diarrhea  : No Dysuria or Hematuria  MSKTL:  See HPI. No neck or back pain.   Skin:  Denies rash  Neurologic:  Denies headache, focal weakness or sensory changes   Endocrine:  Denies polyuria or polydypsia   Lymphatic:  Denies swollen glands   See HPI and nursing notes for additional information       PAST MEDICAL & SURGICAL HISTORY    Past Medical History:   Diagnosis Date    Anemia     per old chart    Arthritis     Back pain, chronic     \"have pain in lumbar mainly- have 5 bulging discs\"\"in my neck and my lumbar have herniated discs\"    Bipolar 1 disorder ([facility] [address]     CAD (coronary artery disease) 01/27/2016    does not follow with a cardiologist    Cerebral artery occlusion with cerebral infarction ([address] HEMICOLECTOMY performed by Marquetta Kayser, MD at [address] Left 12/20/2013    THORACENTESIS  4/25/2016         THORACENTESIS Left 11/15/2016    Dr. Sourav Jesus 250mlsremoved     THORACOTOMY Left 03/18/2019    with Lysis of adhesions    THORACOTOMY Left 3/18/2019    THORACOSCOPY CONVERTED TO THORACOTOMY WITH LYSIS OF ADHESIONS performed by Evans Kellogg MD at [facility]      as a kid    UPPER GASTROINTESTINAL ENDOSCOPY N/A 5/12/2020    EGD BIOPSY performed by Graciela Orta MD at [address] [place] [address]    Current Outpatient Rx   Medication Sig Dispense Refill    docusate sodium (COLACE) 100 MG capsule Take 1 capsule by mouth 2 times daily Hold for diarrhea 20 capsule 3    spironolactone (ALDACTONE) 25 MG tablet Take 1 tablet by mouth daily 30 tablet 3    amiodarone (CORDARONE) 200 MG tablet Take 1 tablet by mouth daily      atorvastatin (LIPITOR) 80 MG tablet Take 1 tablet by mouth daily 30 tablet 5    divalproex (DEPAKOTE ER) 500 MG extended release tablet Take 2 tablets by mouth nightly 60 tablet 5    escitalopram (LEXAPRO) 10 MG tablet Take 2 tablets by mouth daily      donepezil (ARICEPT) 5 MG tablet Take 1 tablet by mouth nightly 30 tablet 3    famotidine (PEPCID) 10 MG tablet Take 1 tablet by mouth 2 times daily 60 tablet 5    benztropine (COGENTIN) 0.5 MG tablet Take 1 tablet by mouth 2 times daily 60 tablet 0    metoprolol tartrate (LOPRESSOR) 25 MG tablet Take 1 tablet by mouth 2 times daily 60 tablet 0    digoxin (LANOXIN) 125 MCG tablet Take 1 tablet by mouth daily 30 tablet 0    miconazole (MICOTIN) 2 % powder Apply topically 2 times daily.  45 g 0    Compression Stockings MISC by Does not apply route Duration of Treatment:  3 Months  # of pairs:  2    Compression Class Level - 20-30 mmHg*    Style - Knee High 2 each 0       ALLERGIES    Allergies   Allergen Reactions    Nsaids      Renal        SOCIAL & FAMILY HISTORY    Social History Socioeconomic History    Marital status: Single     Spouse name: None    Number of children: None    Years of education: None    Highest education level: None   Occupational History    Occupation: built trucks, retired     Employer: [employer]   Tobacco Use    Smoking status: Former Smoker     Packs/day: 1.50     Years: 30.00     Pack years: 45.00     Types: Cigarettes     Quit date: 7/24/2010     Years since quitting: 10.8    Smokeless tobacco: Never Used   Vaping Use    Vaping Use: Never used   Substance and Sexual Activity    Alcohol use: Not Currently    Drug use: Yes     Frequency: 7.0 times per week     Types: Marijuana    Sexual activity: Not Currently     Partners: Female   Other Topics Concern    None   Social History Narrative    None     Social Determinants of Health     Financial Resource Strain:     Difficulty of Paying Living Expenses:    Food Insecurity:     Worried About Running Out of Food in the Last Year:     Ran Out of Food in the Last Year:    Transportation Needs:     Lack of Transportation (Medical):      Lack of Transportation (Non-Medical):    Physical Activity:     Days of Exercise per Week:     Minutes of Exercise per Session:    Stress:     Feeling of Stress :    Social Connections:     Frequency of Communication with Friends and Family:     Frequency of Social Gatherings with Friends and Family:     Attends Gnosticist Services:     Active Member of Clubs or Organizations:     Attends Club or Organization Meetings:     Marital Status:    Intimate Partner Violence:     Fear of Current or Ex-Partner:     Emotionally Abused:     Physically Abused:     Sexually Abused:      Family History   Problem Relation Age of Onset    Heart Disease Mother         heart attack    High Blood Pressure Father        PHYSICAL EXAM    VITAL SIGNS: /65   Pulse 52   Temp 97.9 °F (36.6 °C) (Oral)   Resp 16   SpO2 100%    Constitutional:  Well developed, well nourished, no acute distress   Eyes: EOMI. PERRL, sclera nonicteric. Anterior chambers clear. Funduscopic exam without any gross abnormality or hemorrhages. HENT:  Atraumatic, no trismus. Ears canals and TMs free of blood or clear fluid. Nasal passages and oropharynx free of blood or clear fluid. No circumferential periorbital ecchymosis or mastoid ecchymosis noted. Neck/Lymphatics: supple, no JVD, no swollen nodes. No posterior neck tenderness. Range of motion without obvious pain or deficit. Respiratory:  Lungs Clear, no retractions   Cardiovascular:   normal rate, no murmurs  GI:  Soft, nontender, normal bowel sounds  Musculoskeletal: There is skin tear over posterior left elbow, otherwise no elbow swelling or discoloration or deformity. Head to toe musculoskeletal exam reveals no obvious evidence of trauma with exception of elbow skin tear. Full range of motion of bilateral upper and lower extremities. Patient appears weak generally, most notable over bilateral lower extremities. He states this is baseline and is the reason he is not supposed to ambulate at skilled nursing facility. Back exam: No swelling or discoloration. No focal tenderness. Sensation and motor intact lower extremities although slow in appears weak both legs-patient states is baseline without new severe nature since fall. Integument:  Well hydrated, no petechiae     Neurologic:    - Alert & oriented person, place, time, and situation, no speech difficulties or slurring.  - No obvious gross motor deficits  - Cranial nerves 2-12 grossly intact  - Negative meningeal signs.  - Sensation intact to light touch  - Strength 5/5 in upper and lower extremities bilaterally  - Normal finger to nose test bilaterally  - Rapid alternating movements intact  - Normal heel-shin bilaterally  - No pronator drift. - Light touch sensation intact throughout. - Upper and lower extremity DTRs 2+ bilaterally.  -No truncal ataxia. Negative skew.     Psych:

## 2021-06-17 NOTE — CARE COORDINATION
LSW met with patient following Care Conference. LSW informed Judy Brennan of recommendations for a shower chair and bedside commode. Judy Nail in agreement for both and these will be ordered from [id number] DME [facility] will have to be private bought). LSW then informed of recommendation for [facility], OT, Nursing, and aide. Judy Nail agrees with recommendation and referral to [facility]. D/C Plan:  Date:  Jan. 26th  DME:  [facility])  HHC:  PT, OT, Nursing, aide [facility])  To:   Home with Judy Nail (transport may be needed) Quality 130: Documentation Of Current Medications In The Medical Record: Current Medications Documented Detail Level: Detailed Quality 431: Preventive Care And Screening: Unhealthy Alcohol Use - Screening: Patient screened for unhealthy alcohol use using a single question and scores less than 2 times per year Quality 226: Preventive Care And Screening: Tobacco Use: Screening And Cessation Intervention: Patient screened for tobacco use and is an ex/non-smoker

## 2021-06-22 ENCOUNTER — HOSPITAL ENCOUNTER (OUTPATIENT)
Age: 71
Setting detail: SPECIMEN
Discharge: HOME | End: 2021-06-22
Payer: MEDICARE

## 2021-06-22 LAB
HCT VFR BLD CALC: 42.5 % (ref 42–52)
HEMOGLOBIN: 12.8 GM/DL (ref 13.5–18)
MCH RBC QN AUTO: 32.8 PG (ref 27–31)
MCHC RBC AUTO-ENTMCNC: 30.1 % (ref 32–36)
MCV RBC AUTO: 109 FL (ref 78–100)
PDW BLD-RTO: 13.8 % (ref 11.7–14.9)
PLATELET # BLD: 140 K/CU MM (ref 140–440)
PMV BLD AUTO: 11.4 FL (ref 7.5–11.1)
RBC # BLD: 3.9 M/CU MM (ref 4.6–6.2)
WBC # BLD: 6.8 K/CU MM (ref 4–10.5)

## 2021-06-22 PROCEDURE — 80053 COMPREHEN METABOLIC PANEL: CPT

## 2021-06-22 PROCEDURE — 36415 COLL VENOUS BLD VENIPUNCTURE: CPT

## 2021-06-22 PROCEDURE — 85027 COMPLETE CBC AUTOMATED: CPT

## 2021-06-23 ENCOUNTER — HOSPITAL ENCOUNTER (OUTPATIENT)
Age: 71
Setting detail: SPECIMEN
Discharge: HOME | End: 2021-06-23
Payer: MEDICARE

## 2021-06-23 LAB
ALBUMIN SERPL-MCNC: 2.8 GM/DL (ref 3.4–5)
ALP BLD-CCNC: 50 IU/L (ref 40–129)
ALT SERPL-CCNC: 6 U/L (ref 10–40)
ANION GAP SERPL CALCULATED.3IONS-SCNC: 10 MMOL/L (ref 4–16)
AST SERPL-CCNC: 14 IU/L (ref 15–37)
BILIRUB SERPL-MCNC: 0.3 MG/DL (ref 0–1)
BUN BLDV-MCNC: 20 MG/DL (ref 6–23)
CALCIUM SERPL-MCNC: 7.8 MG/DL (ref 8.3–10.6)
CHLORIDE BLD-SCNC: 109 MMOL/L (ref 99–110)
CO2: 24 MMOL/L (ref 21–32)
CREAT SERPL-MCNC: 1.7 MG/DL (ref 0.9–1.3)
GFR AFRICAN AMERICAN: 48 ML/MIN/1.73M2
GFR NON-AFRICAN AMERICAN: 40 ML/MIN/1.73M2
GLUCOSE BLD-MCNC: 65 MG/DL (ref 70–99)
POTASSIUM SERPL-SCNC: 4.4 MMOL/L (ref 3.5–5.1)
SODIUM BLD-SCNC: 143 MMOL/L (ref 135–145)
TOTAL PROTEIN: 4.9 GM/DL (ref 6.4–8.2)

## 2021-06-23 PROCEDURE — 36415 COLL VENOUS BLD VENIPUNCTURE: CPT

## 2021-06-23 PROCEDURE — 80053 COMPREHEN METABOLIC PANEL: CPT

## 2021-07-20 ENCOUNTER — TELEPHONE (OUTPATIENT)
Dept: FAMILY MEDICINE CLINIC | Age: 71
End: 2021-07-20

## 2021-07-20 NOTE — TELEPHONE ENCOUNTER
LM FOR PATIENT TO CALL BACK REGARDING HEALTH MAINTENANCE,. HE IS DUE FOR A1C. PLEASE PUT ORDER IN SYSTEM. THANK YOU.

## 2021-07-26 DIAGNOSIS — E11.22 TYPE 2 DIABETES MELLITUS WITH STAGE 1 CHRONIC KIDNEY DISEASE, WITHOUT LONG-TERM CURRENT USE OF INSULIN (HCC): Primary | ICD-10-CM

## 2021-07-26 DIAGNOSIS — N18.1 TYPE 2 DIABETES MELLITUS WITH STAGE 1 CHRONIC KIDNEY DISEASE, WITHOUT LONG-TERM CURRENT USE OF INSULIN (HCC): Primary | ICD-10-CM

## 2021-08-03 ENCOUNTER — TELEPHONE (OUTPATIENT)
Dept: FAMILY MEDICINE CLINIC | Age: 71
End: 2021-08-03

## 2021-08-20 ENCOUNTER — HOSPITAL ENCOUNTER (OUTPATIENT)
Age: 71
Setting detail: SPECIMEN
Discharge: HOME | End: 2021-08-20
Payer: MEDICARE

## 2021-08-20 LAB
ALBUMIN SERPL-MCNC: 3.8 GM/DL (ref 3.4–5)
ALP BLD-CCNC: 87 IU/L (ref 40–128)
ALT SERPL-CCNC: 12 U/L (ref 10–40)
ANION GAP SERPL CALCULATED.3IONS-SCNC: 14 MMOL/L (ref 4–16)
AST SERPL-CCNC: 19 IU/L (ref 15–37)
BILIRUB SERPL-MCNC: 0.4 MG/DL (ref 0–1)
BUN BLDV-MCNC: 24 MG/DL (ref 6–23)
CALCIUM SERPL-MCNC: 8.9 MG/DL (ref 8.3–10.6)
CHLORIDE BLD-SCNC: 107 MMOL/L (ref 99–110)
CHOLESTEROL: 125 MG/DL
CO2: 22 MMOL/L (ref 21–32)
CREAT SERPL-MCNC: 2 MG/DL (ref 0.9–1.3)
DIGOXIN LEVEL: 1.2 NG/ML (ref 0.8–2)
DOSE AMOUNT: NORMAL
DOSE TIME: NORMAL
GFR AFRICAN AMERICAN: 40 ML/MIN/1.73M2
GFR NON-AFRICAN AMERICAN: 33 ML/MIN/1.73M2
GLUCOSE BLD-MCNC: 102 MG/DL (ref 70–99)
HCT VFR BLD CALC: 40.1 % (ref 42–52)
HDLC SERPL-MCNC: 42 MG/DL
HEMOGLOBIN: 11.9 GM/DL (ref 13.5–18)
LDL CHOLESTEROL DIRECT: 49 MG/DL
MCH RBC QN AUTO: 30.5 PG (ref 27–31)
MCHC RBC AUTO-ENTMCNC: 29.7 % (ref 32–36)
MCV RBC AUTO: 102.8 FL (ref 78–100)
PDW BLD-RTO: 13.5 % (ref 11.7–14.9)
PLATELET # BLD: 151 K/CU MM (ref 140–440)
PMV BLD AUTO: 9.6 FL (ref 7.5–11.1)
POTASSIUM SERPL-SCNC: 4.4 MMOL/L (ref 3.5–5.1)
PREALBUMIN: 22 MG/DL (ref 20–40)
RBC # BLD: 3.9 M/CU MM (ref 4.6–6.2)
SODIUM BLD-SCNC: 143 MMOL/L (ref 135–145)
TOTAL PROTEIN: 6.6 GM/DL (ref 6.4–8.2)
TRIGL SERPL-MCNC: 214 MG/DL
WBC # BLD: 6.3 K/CU MM (ref 4–10.5)

## 2021-08-20 PROCEDURE — 80061 LIPID PANEL: CPT

## 2021-08-20 PROCEDURE — 36415 COLL VENOUS BLD VENIPUNCTURE: CPT

## 2021-08-20 PROCEDURE — 84134 ASSAY OF PREALBUMIN: CPT

## 2021-08-20 PROCEDURE — 85027 COMPLETE CBC AUTOMATED: CPT

## 2021-08-20 PROCEDURE — 83721 ASSAY OF BLOOD LIPOPROTEIN: CPT

## 2021-08-20 PROCEDURE — 80162 ASSAY OF DIGOXIN TOTAL: CPT

## 2021-08-20 PROCEDURE — 80053 COMPREHEN METABOLIC PANEL: CPT

## 2021-09-24 ENCOUNTER — HOSPITAL ENCOUNTER (OUTPATIENT)
Age: 71
Setting detail: SPECIMEN
Discharge: HOME | End: 2021-09-24
Payer: MEDICARE

## 2021-09-24 LAB
ALBUMIN SERPL-MCNC: 3.3 GM/DL (ref 3.4–5)
ALP BLD-CCNC: 67 IU/L (ref 40–128)
ALT SERPL-CCNC: 11 U/L (ref 10–40)
ANION GAP SERPL CALCULATED.3IONS-SCNC: 13 MMOL/L (ref 4–16)
AST SERPL-CCNC: 18 IU/L (ref 15–37)
BILIRUB SERPL-MCNC: 0.3 MG/DL (ref 0–1)
BUN BLDV-MCNC: 24 MG/DL (ref 6–23)
CALCIUM SERPL-MCNC: 8.4 MG/DL (ref 8.3–10.6)
CHLORIDE BLD-SCNC: 105 MMOL/L (ref 99–110)
CO2: 22 MMOL/L (ref 21–32)
CREAT SERPL-MCNC: 2.1 MG/DL (ref 0.9–1.3)
GFR AFRICAN AMERICAN: 38 ML/MIN/1.73M2
GFR NON-AFRICAN AMERICAN: 31 ML/MIN/1.73M2
GLUCOSE BLD-MCNC: 79 MG/DL (ref 70–99)
HCT VFR BLD CALC: 35.1 % (ref 42–52)
HEMOGLOBIN: 10.8 GM/DL (ref 13.5–18)
MCH RBC QN AUTO: 29.8 PG (ref 27–31)
MCHC RBC AUTO-ENTMCNC: 30.8 % (ref 32–36)
MCV RBC AUTO: 96.7 FL (ref 78–100)
PDW BLD-RTO: 14 % (ref 11.7–14.9)
PLATELET # BLD: 133 K/CU MM (ref 140–440)
PMV BLD AUTO: 9.7 FL (ref 7.5–11.1)
POTASSIUM SERPL-SCNC: 4.5 MMOL/L (ref 3.5–5.1)
RBC # BLD: 3.63 M/CU MM (ref 4.6–6.2)
SODIUM BLD-SCNC: 140 MMOL/L (ref 135–145)
TOTAL PROTEIN: 6.1 GM/DL (ref 6.4–8.2)
WBC # BLD: 6.3 K/CU MM (ref 4–10.5)

## 2021-09-24 PROCEDURE — 80053 COMPREHEN METABOLIC PANEL: CPT

## 2021-09-24 PROCEDURE — 36415 COLL VENOUS BLD VENIPUNCTURE: CPT

## 2021-09-24 PROCEDURE — 85027 COMPLETE CBC AUTOMATED: CPT

## 2021-10-02 ENCOUNTER — HOSPITAL ENCOUNTER (EMERGENCY)
Age: 71
Discharge: HOME | End: 2021-10-02
Payer: MEDICARE

## 2021-10-02 ENCOUNTER — APPOINTMENT (OUTPATIENT)
Dept: CT IMAGING | Age: 71
End: 2021-10-02
Payer: MEDICARE

## 2021-10-02 VITALS
RESPIRATION RATE: 18 BRPM | HEART RATE: 58 BPM | BODY MASS INDEX: 29.53 KG/M2 | OXYGEN SATURATION: 100 % | DIASTOLIC BLOOD PRESSURE: 117 MMHG | TEMPERATURE: 97.9 F | SYSTOLIC BLOOD PRESSURE: 148 MMHG | WEIGHT: 200 LBS

## 2021-10-02 DIAGNOSIS — S09.90XA INJURY OF HEAD, INITIAL ENCOUNTER: Primary | ICD-10-CM

## 2021-10-02 DIAGNOSIS — S01.01XA LACERATION OF SCALP, INITIAL ENCOUNTER: ICD-10-CM
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
## 2021-10-02 PROBLEM — N18.4 CHRONIC KIDNEY DISEASE, STAGE IV (SEVERE) (HCC): Status: ACTIVE | Noted: 2021-10-02

## 2021-10-02 PROCEDURE — 99285 EMERGENCY DEPT VISIT HI MDM: CPT

## 2021-10-02 PROCEDURE — 72125 CT NECK SPINE W/O DYE: CPT

## 2021-10-02 PROCEDURE — 70450 CT HEAD/BRAIN W/O DYE: CPT

## 2021-10-02 PROCEDURE — 6370000000 HC RX 637 (ALT 250 FOR IP): Performed by: PHYSICIAN ASSISTANT

## 2021-10-02 RX ORDER — LANOLIN ALCOHOL/MO/W.PET/CERES
3 CREAM (GRAM) TOPICAL NIGHTLY PRN
COMMUNITY

## 2021-10-02 RX ORDER — BUSPIRONE HYDROCHLORIDE 5 MG/1
10 TABLET ORAL 2 TIMES DAILY
COMMUNITY

## 2021-10-02 RX ORDER — HYDROCODONE BITARTRATE AND ACETAMINOPHEN 5; 325 MG/1; MG/1
1 TABLET ORAL EVERY 6 HOURS PRN
Status: DISCONTINUED | OUTPATIENT
Start: 2021-10-02 | End: 2021-10-02 | Stop reason: HOSPADM

## 2021-10-02 RX ORDER — TRAMADOL HYDROCHLORIDE 50 MG/1
50 TABLET ORAL EVERY 6 HOURS PRN
COMMUNITY

## 2021-10-02 RX ADMIN — HYDROCODONE BITARTRATE AND ACETAMINOPHEN 1 TABLET: 5; 325 TABLET ORAL at 03:09

## 2021-10-02 RX ADMIN — Medication 3 ML: at 03:11

## 2021-10-02 ASSESSMENT — PAIN SCALES - GENERAL: PAINLEVEL_OUTOF10: 10

## 2021-10-02 ASSESSMENT — PAIN DESCRIPTION - LOCATION: LOCATION: HEAD
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
## 2021-10-02 NOTE — ED PROVIDER NOTES
Triage Chief Complaint:   Fall and Trauma    [place]:  Today in the ED I had the pleasure of caring for Steffen Purvis who is a 70 y.o. male that presents to the emergency department for fall, trauma, laceration to the scalp. Context is patient is a local nursing home. He is not supposed walk without a walker. Try to get out of bed is fell mechanically states he tripped over a cord. Causing him to his head on hard object did not syncopized. However he was brought here for evaluation as he has laceration of the scalp.     ROS:  REVIEW OF SYSTEMS    At least 10 systems reviewed      All other review of systems are negative  See HPI and nursing notes for additional information       Past Medical History:   Diagnosis Date    Anemia     per old chart    Arthritis     Back pain, chronic     \"have pain in lumbar mainly- have 5 bulging discs\"\"in my neck and my lumbar have herniated discs\"    Bipolar 1 disorder ([address]     CAD (coronary artery disease) 01/27/2016    does not follow with a cardiologist    Cerebral artery occlusion with cerebral infarction ([address]     \"had mini stroke in Jan 2016- fast heart rate when I would stand up - smile drooping on one side- lased just the one day\"    Chronic kidney disease (CKD), stage III (moderate) ([facility]     CKD (chronic kidney disease)     per old chart- consult with Dr Teresita Humphrey with admission 4/2016\"have low kidney function\"    COPD (chronic obstructive pulmonary disease) ([address]     follow with Dr Cielo Rojas Diabetes mellitus, type 2 ([address] 01/03/2017    Diabetic peripheral neuropathy ([address]     Diastolic CHF ([address] 85/41/9482    Gastric ulcer     H/O cardiovascular stress test 05/26/2014    EF 68% mild ischemia anterior wall    Heart murmur     \"see Dr Laura Banks- last echo 2014\" pt states\"I think they said I have a murmur but no chest pain or palpitations\"    Hiatal hernia     History of cardiac cath 06/11/2014    MODERATE  CAD      Hx of migraines     HX OTHER MEDICAL     \"have thinning of kidney walls- they found I had that 15 yrs ago\" see Dr Hannah Lewis"    Hyperlipidemia     Hypertension     [facility]ymal [facility]     Lumbar radiculopathy     Overlapping malignant neoplasm of colon ([facility] 05/27/2020    Panic attack     'anything new gives me anxiety\"    Pleural effusion     scheduled for thoracentesis 7/28/2014, 8/17/2016    S/P thoracentesis     left side( per old chart had thora done 4/2016 and one done 2014)    Sleep apnea     sleep study x 2 - last one 4-5 yrs ago- uses c-pap\"    SOBOE (shortness of breath on exertion)     Spinal stenosis      Past Surgical History:   Procedure Laterality Date    CARPAL TUNNEL RELEASE Bilateral 1989    COLONOSCOPY N/A 5/12/2020    COLONOSCOPY POLYPECTOMY SNARE/COLD BIOPSY WITH SPOT INK TATTOO performed by [facility]ara [facility] at [facility] Left 2000's    KNEE SURGERY Bilateral     right knee x 2( scope)/ left knee- 7-8 yr ago   [address] fusion    SMALL INTESTINE SURGERY N/A 5/21/2020    BOWEL RESECTION EXTENDED RIGHT HEMICOLECTOMY performed by Joaquim Langley MD at [address] [place] [address] Left 12/20/2013    THORACENTESIS  4/25/2016         THORACENTESIS Left 11/15/2016    Dr. Demarco Rodriguez 250mlsremoved     THORACOTOMY Left 03/18/2019    with Lysis of adhesions    THORACOTOMY Left 3/18/2019    THORACOSCOPY CONVERTED TO THORACOTOMY WITH LYSIS OF ADHESIONS performed by Nicolas Paul MD at [address] [place] [address]      as a kid    UPPER GASTROINTESTINAL ENDOSCOPY N/A 5/12/2020    EGD BIOPSY performed by [facility]ara [facility] MD at [facility] ENDOSCOPY     Family History   Problem Relation Age of Onset    Heart Disease Mother         heart attack    High Blood Pressure Father      Social History     Socioeconomic History    Marital status: Single     Spouse name: Not on file    Number of children: Not on file    Years of education: Not on file    Highest education level: Not on file   Occupational History    Occupation: [employer], retired     Employer: [employer]   Tobacco Use    Smoking status: Former Smoker     Packs/day: 1.50     Years: 30.00     Pack years: 45.00     Types: Cigarettes     Quit date: 2010     Years since quittin.2    Smokeless tobacco: Never Used   Vaping Use    Vaping Use: Never used   Substance and Sexual Activity    Alcohol use: Not Currently    Drug use: Yes     Frequency: 7.0 times per week     Types: Marijuana    Sexual activity: Not Currently     Partners: Female   Other Topics Concern    Not on file   Social History Narrative    Not on file     Social Determinants of Health     Financial Resource Strain:     Difficulty of Paying Living Expenses:    Food Insecurity:     Worried About Running Out of Food in the Last Year:     [address] in the Last Year:    Transportation Needs:     Lack of Transportation (Medical):  Lack of Transportation (Non-Medical):    Physical Activity:     Days of Exercise per Week:     Minutes of Exercise per Session:    Stress:     Feeling of Stress :    Social Connections:     Frequency of Communication with Friends and Family:     Frequency of Social Gatherings with Friends and Family:     Attends Buddhism Services:     Active Member of Clubs or Organizations:     Attends Club or Organization Meetings:     Marital Status:    Intimate Partner Violence:     Fear of Current or Ex-Partner:     Emotionally Abused:     Physically Abused:     Sexually Abused:      No current facility-administered medications for this encounter. Current Outpatient Medications   Medication Sig Dispense Refill    busPIRone (BUSPAR) 5 MG tablet Take 10 mg by mouth 2 times daily      melatonin 3 MG TABS tablet Take 3 mg by mouth nightly as needed      traMADol (ULTRAM) 50 MG tablet Take 50 mg by mouth every 6 hours as needed for Pain.       docusate sodium (COLACE) 100 MG capsule Take 1 capsule by mouth 2 times daily Hold for diarrhea 20 capsule 3    spironolactone (ALDACTONE) 25 MG tablet Take 1 tablet by mouth daily 30 tablet 3    amiodarone (CORDARONE) 200 MG tablet Take 1 tablet by mouth daily      atorvastatin (LIPITOR) 80 MG tablet Take 1 tablet by mouth daily 30 tablet 5    divalproex (DEPAKOTE ER) 500 MG extended release tablet Take 2 tablets by mouth nightly 60 tablet 5    escitalopram (LEXAPRO) 10 MG tablet Take 2 tablets by mouth daily      donepezil (ARICEPT) 5 MG tablet Take 1 tablet by mouth nightly 30 tablet 3    famotidine (PEPCID) 10 MG tablet Take 1 tablet by mouth 2 times daily 60 tablet 5    benztropine (COGENTIN) 0.5 MG tablet Take 1 tablet by mouth 2 times daily 60 tablet 0    metoprolol tartrate (LOPRESSOR) 25 MG tablet Take 1 tablet by mouth 2 times daily 60 tablet 0    digoxin (LANOXIN) 125 MCG tablet Take 1 tablet by mouth daily 30 tablet 0    miconazole (MICOTIN) 2 % powder Apply topically 2 times daily. 45 g 0    Compression Stockings MISC by Does not apply route Duration of Treatment:  3 Months  # of pairs:  2    Compression Class Level - 20-30 mmHg*    Style - Knee High 2 each 0     Allergies   Allergen Reactions    Nsaids      Renal        Nursing Notes Reviewed    Physical Exam:  ED Triage Vitals [10/02/21 0158]   Enc Vitals Group      BP (!) 148/117      Pulse 58      Resp 18      Temp 97.9 °F (36.6 °C)      Temp Source Oral      SpO2 100 %      Weight       Height       Head Circumference       Peak Flow       Pain Score       Pain Loc       Pain Edu? Excl. in [address]? GENERAL APPEARANCE: Awake and alert. Cooperative. No acute distress. Alert to person place. Not year. Pleasantly demented elderly male. HEAD: Normocephalic. Atraumatic.   No hemotympanum, [place] sign, raccoon eyes, periorbital tenderness, nasal bone tenderness, mandibular tenderness, skull tenderness  CERVICAL SPINE: There is no cervical midline tenderness to palpation, step-offs, or acute education was provided. Instructions were given to return for increasing pain, redness, streaking, discharge, or any other worsening or worrisome concerns. I have reviewed and interpreted all of the currently available lab results from this visit (if applicable):  No results found for this visit on 10/02/21. Radiographs (if obtained):  [] The following radiograph was interpreted by myself in the absence of a radiologist:   [] Radiologist's Report Reviewed:  CT CERVICAL SPINE WO CONTRAST   Final Result   1. No acute abnormality of the cervical spine. 2. C5-C6 anterior cervical discectomy and fusion (ACDF) without evidence of   hardware complication. 3. C3/C4 mild central canal stenosis secondary to encroachment by posterior   disc osteophyte complex. 4. C3/C4 mild bilateral neural foraminal stenosis secondary to encroachment   by the disc osteophyte complex. CT HEAD WO CONTRAST   Final Result   No acute intracranial abnormality. Frontal scalp laceration. No underlying fracture. Stable chronic changes. EKG (if obtained):   Please See Note of attending physician for EKG interpretation. Chart review shows recent radiograph(s):  No results found. MDM:     Interventions given this visit:   Orders Placed This Encounter   Medications    lidocaine-EPINEPHrine-tetracaine (LET) topical solution 3 mL syringe    DISCONTD: HYDROcodone-acetaminophen (NORCO) 5-325 MG per tablet 1 tablet         Pink patient. Neurologically intact. Presents today to the emergency department after having a mechanical fall hitting his head on a hard object CT scan of the head is negative CT scan of C-spine is negative. Wound closure was performed by myself here without complications     I independently managed patient today in the ED    BP (!) 148/117   Pulse 58   Temp 97.9 °F (36.6 °C) (Oral)   Resp 18   Wt 200 lb (90.7 kg)   SpO2 100%   BMI 29.53 kg/m²       Clinical Impression:  1. Injury of head, initial encounter    2. Fall, initial encounter    3. Laceration of scalp, initial encounter        Disposition referral (if applicable):  Young Martell MD  [address] [facility] [address] [phone]      If symptoms worsen or persist    Disposition medications (if applicable):  Discharge Medication List as of 10/2/2021  8:53 AM            Comment: Please note this report has been produced using speech recognition software and may contain errors related to that system including errors in grammar, punctuation, and spelling, as well as words and phrases that may be inappropriate. If there are any questions or concerns please feel free to contact the dictating provider for clarification.       Coleen Lynn, [address]  10/07/21 2031

## 2021-12-27 ENCOUNTER — HOSPITAL ENCOUNTER (OUTPATIENT)
Age: 71
Setting detail: SPECIMEN
Discharge: HOME | End: 2021-12-27
Payer: MEDICARE

## 2021-12-27 LAB
ALBUMIN SERPL-MCNC: 3.5 GM/DL (ref 3.4–5)
ALP BLD-CCNC: 74 IU/L (ref 40–129)
ALT SERPL-CCNC: 14 U/L (ref 10–40)
ANION GAP SERPL CALCULATED.3IONS-SCNC: 19 MMOL/L (ref 4–16)
AST SERPL-CCNC: 22 IU/L (ref 15–37)
BILIRUB SERPL-MCNC: 0.5 MG/DL (ref 0–1)
BUN BLDV-MCNC: 26 MG/DL (ref 6–23)
CALCIUM SERPL-MCNC: 8.8 MG/DL (ref 8.3–10.6)
CHLORIDE BLD-SCNC: 102 MMOL/L (ref 99–110)
CHOLESTEROL: 109 MG/DL
CO2: 18 MMOL/L (ref 21–32)
CREAT SERPL-MCNC: 2 MG/DL (ref 0.9–1.3)
DIGOXIN LEVEL: 1.9 NG/ML (ref 0.8–2)
DOSE AMOUNT: NORMAL
DOSE TIME: NORMAL
GFR AFRICAN AMERICAN: 40 ML/MIN/1.73M2
GFR NON-AFRICAN AMERICAN: 33 ML/MIN/1.73M2
GLUCOSE BLD-MCNC: 74 MG/DL (ref 70–99)
HCT VFR BLD CALC: 43.3 % (ref 42–52)
HDLC SERPL-MCNC: 39 MG/DL
HEMOGLOBIN: 13 GM/DL (ref 13.5–18)
LDL CHOLESTEROL DIRECT: 34 MG/DL
MCH RBC QN AUTO: 29.1 PG (ref 27–31)
MCHC RBC AUTO-ENTMCNC: 30 % (ref 32–36)
MCV RBC AUTO: 97.1 FL (ref 78–100)
PDW BLD-RTO: 15.8 % (ref 11.7–14.9)
PLATELET # BLD: 149 K/CU MM (ref 140–440)
PMV BLD AUTO: 9.7 FL (ref 7.5–11.1)
POTASSIUM SERPL-SCNC: 4.3 MMOL/L (ref 3.5–5.1)
PREALBUMIN: 28 MG/DL (ref 20–40)
RBC # BLD: 4.46 M/CU MM (ref 4.6–6.2)
SODIUM BLD-SCNC: 139 MMOL/L (ref 135–145)
TOTAL PROTEIN: 6.7 GM/DL (ref 6.4–8.2)
TRIGL SERPL-MCNC: 168 MG/DL
WBC # BLD: 7.8 K/CU MM (ref 4–10.5)

## 2021-12-27 PROCEDURE — 36415 COLL VENOUS BLD VENIPUNCTURE: CPT

## 2021-12-27 PROCEDURE — 80053 COMPREHEN METABOLIC PANEL: CPT

## 2021-12-27 PROCEDURE — 80061 LIPID PANEL: CPT

## 2021-12-27 PROCEDURE — 80162 ASSAY OF DIGOXIN TOTAL: CPT

## 2021-12-27 PROCEDURE — 85027 COMPLETE CBC AUTOMATED: CPT

## 2021-12-27 PROCEDURE — 84134 ASSAY OF PREALBUMIN: CPT

## 2021-12-27 PROCEDURE — 83721 ASSAY OF BLOOD LIPOPROTEIN: CPT

## 2022-02-19 ENCOUNTER — HOSPITAL ENCOUNTER (INPATIENT)
Age: 72
LOS: 3 days | Discharge: NURSING, REHAB OR LONG-TERM CARE | DRG: 309 | End: 2022-02-24
Attending: STUDENT IN AN ORGANIZED HEALTH CARE EDUCATION/TRAINING PROGRAM | Admitting: INTERNAL MEDICINE
Payer: MEDICARE

## 2022-02-19 ENCOUNTER — APPOINTMENT (OUTPATIENT)
Dept: GENERAL RADIOLOGY | Age: 72
DRG: 309 | End: 2022-02-19
Payer: MEDICARE

## 2022-02-19 ENCOUNTER — APPOINTMENT (OUTPATIENT)
Dept: CT IMAGING | Age: 72
DRG: 309 | End: 2022-02-19
Payer: MEDICARE
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
## 2022-02-19 DIAGNOSIS — R77.8 ELEVATED TROPONIN: ICD-10-CM

## 2022-02-19 DIAGNOSIS — R00.1 BRADYCARDIA: ICD-10-CM

## 2022-02-19 PROBLEM — R79.89 ELEVATED TROPONIN: Status: ACTIVE | Noted: 2022-02-19

## 2022-02-19 LAB
ANION GAP SERPL CALCULATED.3IONS-SCNC: 10 MMOL/L (ref 4–16)
BASOPHILS ABSOLUTE: 0 K/CU MM
BASOPHILS RELATIVE PERCENT: 0.3 % (ref 0–1)
BUN BLDV-MCNC: 20 MG/DL (ref 6–23)
CALCIUM SERPL-MCNC: 8.9 MG/DL (ref 8.3–10.6)
CHLORIDE BLD-SCNC: 104 MMOL/L (ref 99–110)
CO2: 28 MMOL/L (ref 21–32)
CREAT SERPL-MCNC: 1.9 MG/DL (ref 0.9–1.3)
DIFFERENTIAL TYPE: ABNORMAL
EKG ATRIAL RATE: 41 BPM
EKG DIAGNOSIS: NORMAL
EKG P AXIS: 51 DEGREES
EKG P-R INTERVAL: 174 MS
EKG Q-T INTERVAL: 534 MS
EKG QRS DURATION: 108 MS
EKG QTC CALCULATION (BAZETT): 440 MS
EKG R AXIS: -18 DEGREES
EKG T AXIS: 44 DEGREES
EKG VENTRICULAR RATE: 41 BPM
EOSINOPHILS ABSOLUTE: 0.1 K/CU MM
EOSINOPHILS RELATIVE PERCENT: 1.4 % (ref 0–3)
GFR AFRICAN AMERICAN: 42 ML/MIN/1.73M2
GFR NON-AFRICAN AMERICAN: 35 ML/MIN/1.73M2
GLUCOSE BLD-MCNC: 83 MG/DL (ref 70–99)
HCT VFR BLD CALC: 37.5 % (ref 42–52)
HEMOGLOBIN: 11.8 GM/DL (ref 13.5–18)
IMMATURE NEUTROPHIL %: 0.8 % (ref 0–0.43)
LYMPHOCYTES ABSOLUTE: 1.8 K/CU MM
LYMPHOCYTES RELATIVE PERCENT: 27.4 % (ref 24–44)
MCH RBC QN AUTO: 30.3 PG (ref 27–31)
MCHC RBC AUTO-ENTMCNC: 31.5 % (ref 32–36)
MCV RBC AUTO: 96.2 FL (ref 78–100)
MONOCYTES ABSOLUTE: 0.6 K/CU MM
MONOCYTES RELATIVE PERCENT: 8.6 % (ref 0–4)
NUCLEATED RBC %: 0 %
PDW BLD-RTO: 16.4 % (ref 11.7–14.9)
PLATELET # BLD: 156 K/CU MM (ref 140–440)
PMV BLD AUTO: 8.6 FL (ref 7.5–11.1)
POTASSIUM SERPL-SCNC: 4 MMOL/L (ref 3.5–5.1)
RBC # BLD: 3.9 M/CU MM (ref 4.6–6.2)
SEGMENTED NEUTROPHILS ABSOLUTE COUNT: 4 K/CU MM
SEGMENTED NEUTROPHILS RELATIVE PERCENT: 61.5 % (ref 36–66)
SODIUM BLD-SCNC: 142 MMOL/L (ref 135–145)
TOTAL IMMATURE NEUTOROPHIL: 0.05 K/CU MM
TOTAL NUCLEATED RBC: 0 K/CU MM
TROPONIN T: 0.03 NG/ML
WBC # BLD: 6.5 K/CU MM (ref 4–10.5)

## 2022-02-19 PROCEDURE — 96372 THER/PROPH/DIAG INJ SC/IM: CPT

## 2022-02-19 PROCEDURE — 90471 IMMUNIZATION ADMIN: CPT | Performed by: STUDENT IN AN ORGANIZED HEALTH CARE EDUCATION/TRAINING PROGRAM

## 2022-02-19 PROCEDURE — 6360000002 HC RX W HCPCS: Performed by: INTERNAL MEDICINE

## 2022-02-19 PROCEDURE — 93005 ELECTROCARDIOGRAM TRACING: CPT | Performed by: STUDENT IN AN ORGANIZED HEALTH CARE EDUCATION/TRAINING PROGRAM

## 2022-02-19 PROCEDURE — 73502 X-RAY EXAM HIP UNI 2-3 VIEWS: CPT

## 2022-02-19 PROCEDURE — 80048 BASIC METABOLIC PNL TOTAL CA: CPT

## 2022-02-19 PROCEDURE — 6360000002 HC RX W HCPCS: Performed by: STUDENT IN AN ORGANIZED HEALTH CARE EDUCATION/TRAINING PROGRAM

## 2022-02-19 PROCEDURE — G0378 HOSPITAL OBSERVATION PER HR: HCPCS

## 2022-02-19 PROCEDURE — 2580000003 HC RX 258: Performed by: INTERNAL MEDICINE

## 2022-02-19 PROCEDURE — 70450 CT HEAD/BRAIN W/O DYE: CPT

## 2022-02-19 PROCEDURE — 99285 EMERGENCY DEPT VISIT HI MDM: CPT

## 2022-02-19 PROCEDURE — 90715 TDAP VACCINE 7 YRS/> IM: CPT | Performed by: STUDENT IN AN ORGANIZED HEALTH CARE EDUCATION/TRAINING PROGRAM

## 2022-02-19 PROCEDURE — 85025 COMPLETE CBC W/AUTO DIFF WBC: CPT

## 2022-02-19 PROCEDURE — 73070 X-RAY EXAM OF ELBOW: CPT

## 2022-02-19 PROCEDURE — 84484 ASSAY OF TROPONIN QUANT: CPT

## 2022-02-19 PROCEDURE — 94761 N-INVAS EAR/PLS OXIMETRY MLT: CPT

## 2022-02-19 PROCEDURE — 72125 CT NECK SPINE W/O DYE: CPT

## 2022-02-19 PROCEDURE — 6370000000 HC RX 637 (ALT 250 FOR IP): Performed by: INTERNAL MEDICINE

## 2022-02-19 PROCEDURE — 93010 ELECTROCARDIOGRAM REPORT: CPT | Performed by: INTERNAL MEDICINE

## 2022-02-19 RX ORDER — ESCITALOPRAM OXALATE 10 MG/1
20 TABLET ORAL DAILY
Status: DISCONTINUED | OUTPATIENT
Start: 2022-02-19 | End: 2022-02-24 | Stop reason: HOSPADM

## 2022-02-19 RX ORDER — DOCUSATE SODIUM 100 MG/1
100 CAPSULE, LIQUID FILLED ORAL 2 TIMES DAILY
Status: DISCONTINUED | OUTPATIENT
Start: 2022-02-19 | End: 2022-02-24 | Stop reason: HOSPADM

## 2022-02-19 RX ORDER — DONEPEZIL HYDROCHLORIDE 5 MG/1
5 TABLET, FILM COATED ORAL NIGHTLY
Status: DISCONTINUED | OUTPATIENT
Start: 2022-02-19 | End: 2022-02-24 | Stop reason: HOSPADM

## 2022-02-19 RX ORDER — AMIODARONE HYDROCHLORIDE 200 MG/1
200 TABLET ORAL DAILY
Status: DISCONTINUED | OUTPATIENT
Start: 2022-02-19 | End: 2022-02-24 | Stop reason: HOSPADM

## 2022-02-19 RX ORDER — DIVALPROEX SODIUM 500 MG/1
1000 TABLET, EXTENDED RELEASE ORAL NIGHTLY
Status: DISCONTINUED | OUTPATIENT
Start: 2022-02-19 | End: 2022-02-24 | Stop reason: HOSPADM

## 2022-02-19 RX ORDER — METOPROLOL TARTRATE 50 MG/1
25 TABLET, FILM COATED ORAL 2 TIMES DAILY
Status: DISCONTINUED | OUTPATIENT
Start: 2022-02-19 | End: 2022-02-24 | Stop reason: HOSPADM

## 2022-02-19 RX ORDER — FAMOTIDINE 20 MG/1
10 TABLET, FILM COATED ORAL 2 TIMES DAILY
Status: DISCONTINUED | OUTPATIENT
Start: 2022-02-19 | End: 2022-02-24 | Stop reason: HOSPADM

## 2022-02-19 RX ORDER — SPIRONOLACTONE 25 MG/1
25 TABLET ORAL DAILY
Status: DISCONTINUED | OUTPATIENT
Start: 2022-02-19 | End: 2022-02-24 | Stop reason: HOSPADM

## 2022-02-19 RX ORDER — TRAMADOL HYDROCHLORIDE 50 MG/1
50 TABLET ORAL EVERY 6 HOURS PRN
Status: DISCONTINUED | OUTPATIENT
Start: 2022-02-19 | End: 2022-02-24 | Stop reason: HOSPADM

## 2022-02-19 RX ORDER — BENZTROPINE MESYLATE 1 MG/1
0.5 TABLET ORAL 2 TIMES DAILY
Status: DISCONTINUED | OUTPATIENT
Start: 2022-02-19 | End: 2022-02-24 | Stop reason: HOSPADM

## 2022-02-19 RX ORDER — DIGOXIN 125 MCG
125 TABLET ORAL DAILY
Status: DISCONTINUED | OUTPATIENT
Start: 2022-02-19 | End: 2022-02-21

## 2022-02-19 RX ORDER — HYDRALAZINE HYDROCHLORIDE 20 MG/ML
5 INJECTION INTRAMUSCULAR; INTRAVENOUS EVERY 6 HOURS PRN
Status: DISCONTINUED | OUTPATIENT
Start: 2022-02-19 | End: 2022-02-19 | Stop reason: SDUPTHER

## 2022-02-19 RX ORDER — SODIUM CHLORIDE 0.9 % (FLUSH) 0.9 %
5-40 SYRINGE (ML) INJECTION EVERY 12 HOURS SCHEDULED
Status: DISCONTINUED | OUTPATIENT
Start: 2022-02-19 | End: 2022-02-24 | Stop reason: HOSPADM

## 2022-02-19 RX ORDER — ACETAMINOPHEN 650 MG/1
650 SUPPOSITORY RECTAL EVERY 6 HOURS PRN
Status: DISCONTINUED | OUTPATIENT
Start: 2022-02-19 | End: 2022-02-24 | Stop reason: HOSPADM

## 2022-02-19 RX ORDER — ONDANSETRON 4 MG/1
4 TABLET, ORALLY DISINTEGRATING ORAL EVERY 8 HOURS PRN
Status: DISCONTINUED | OUTPATIENT
Start: 2022-02-19 | End: 2022-02-24 | Stop reason: HOSPADM

## 2022-02-19 RX ORDER — BUSPIRONE HYDROCHLORIDE 5 MG/1
10 TABLET ORAL 2 TIMES DAILY
Status: DISCONTINUED | OUTPATIENT
Start: 2022-02-19 | End: 2022-02-24 | Stop reason: HOSPADM

## 2022-02-19 RX ORDER — POLYETHYLENE GLYCOL 3350 17 G/17G
17 POWDER, FOR SOLUTION ORAL DAILY PRN
Status: DISCONTINUED | OUTPATIENT
Start: 2022-02-19 | End: 2022-02-24 | Stop reason: HOSPADM

## 2022-02-19 RX ORDER — ONDANSETRON 2 MG/ML
4 INJECTION INTRAMUSCULAR; INTRAVENOUS EVERY 6 HOURS PRN
Status: DISCONTINUED | OUTPATIENT
Start: 2022-02-19 | End: 2022-02-24 | Stop reason: HOSPADM

## 2022-02-19 RX ORDER — LANOLIN ALCOHOL/MO/W.PET/CERES
3 CREAM (GRAM) TOPICAL NIGHTLY PRN
Status: DISCONTINUED | OUTPATIENT
Start: 2022-02-19 | End: 2022-02-24 | Stop reason: HOSPADM

## 2022-02-19 RX ORDER — SODIUM CHLORIDE 9 MG/ML
25 INJECTION, SOLUTION INTRAVENOUS PRN
Status: DISCONTINUED | OUTPATIENT
Start: 2022-02-19 | End: 2022-02-24 | Stop reason: HOSPADM

## 2022-02-19 RX ORDER — ATORVASTATIN CALCIUM 40 MG/1
80 TABLET, FILM COATED ORAL DAILY
Status: DISCONTINUED | OUTPATIENT
Start: 2022-02-19 | End: 2022-02-24 | Stop reason: HOSPADM

## 2022-02-19 RX ORDER — ACETAMINOPHEN 325 MG/1
650 TABLET ORAL EVERY 6 HOURS PRN
Status: DISCONTINUED | OUTPATIENT
Start: 2022-02-19 | End: 2022-02-24 | Stop reason: HOSPADM

## 2022-02-19 RX ORDER — SODIUM CHLORIDE 0.9 % (FLUSH) 0.9 %
5-40 SYRINGE (ML) INJECTION PRN
Status: DISCONTINUED | OUTPATIENT
Start: 2022-02-19 | End: 2022-02-24 | Stop reason: HOSPADM

## 2022-02-19 RX ADMIN — SODIUM CHLORIDE, PRESERVATIVE FREE 10 ML: 5 INJECTION INTRAVENOUS at 20:34

## 2022-02-19 RX ADMIN — DOCUSATE SODIUM 100 MG: 100 CAPSULE, LIQUID FILLED ORAL at 20:32

## 2022-02-19 RX ADMIN — ATORVASTATIN CALCIUM 80 MG: 40 TABLET, FILM COATED ORAL at 20:32

## 2022-02-19 RX ADMIN — SPIRONOLACTONE 25 MG: 25 TABLET ORAL at 20:33

## 2022-02-19 RX ADMIN — DIGOXIN 125 MCG: 125 TABLET ORAL at 20:38

## 2022-02-19 RX ADMIN — DONEPEZIL HYDROCHLORIDE 5 MG: 5 TABLET, FILM COATED ORAL at 20:32

## 2022-02-19 RX ADMIN — METOPROLOL TARTRATE 25 MG: 50 TABLET, FILM COATED ORAL at 20:31

## 2022-02-19 RX ADMIN — ESCITALOPRAM 20 MG: 10 TABLET, FILM COATED ORAL at 20:33

## 2022-02-19 RX ADMIN — ENOXAPARIN SODIUM 40 MG: 100 INJECTION SUBCUTANEOUS at 20:31

## 2022-02-19 RX ADMIN — TETANUS TOXOID, REDUCED DIPHTHERIA TOXOID AND ACELLULAR PERTUSSIS VACCINE, ADSORBED 0.5 ML: 5; 2.5; 8; 8; 2.5 SUSPENSION INTRAMUSCULAR at 08:25

## 2022-02-19 RX ADMIN — DIVALPROEX SODIUM 1000 MG: 500 TABLET, FILM COATED, EXTENDED RELEASE ORAL at 20:33

## 2022-02-19 RX ADMIN — BENZTROPINE MESYLATE 0.5 MG: 1 TABLET ORAL at 20:31

## 2022-02-19 RX ADMIN — FAMOTIDINE 10 MG: 20 TABLET ORAL at 20:33

## 2022-02-19 RX ADMIN — BUSPIRONE HYDROCHLORIDE 10 MG: 5 TABLET ORAL at 20:33

## 2022-02-19 ASSESSMENT — PAIN SCALES - GENERAL
PAINLEVEL_OUTOF10: 5
PAINLEVEL_OUTOF10: 7

## 2022-02-19 ASSESSMENT — PAIN DESCRIPTION - LOCATION
LOCATION: HIP;ELBOW
LOCATION: RIB CAGE

## 2022-02-19 ASSESSMENT — ENCOUNTER SYMPTOMS
ABDOMINAL PAIN: 0
COUGH: 0
EYE PAIN: 0

## 2022-02-19 ASSESSMENT — PAIN SCALES - WONG BAKER: WONGBAKER_NUMERICALRESPONSE: 4

## 2022-02-19 ASSESSMENT — PAIN DESCRIPTION - FREQUENCY: FREQUENCY: INTERMITTENT

## 2022-02-19 ASSESSMENT — PAIN DESCRIPTION - PROGRESSION: CLINICAL_PROGRESSION: NOT CHANGED

## 2022-02-19 ASSESSMENT — PAIN - FUNCTIONAL ASSESSMENT
PAIN_FUNCTIONAL_ASSESSMENT: PREVENTS OR INTERFERES WITH MANY ACTIVE NOT PASSIVE ACTIVITIES
PAIN_FUNCTIONAL_ASSESSMENT: 0-10
PAIN_FUNCTIONAL_ASSESSMENT: 0-10

## 2022-02-19 ASSESSMENT — PAIN DESCRIPTION - ORIENTATION: ORIENTATION: LEFT

## 2022-02-19 ASSESSMENT — PAIN DESCRIPTION - ONSET: ONSET: ON-GOING

## 2022-02-19 ASSESSMENT — PAIN DESCRIPTION - PAIN TYPE: TYPE: ACUTE PAIN

## 2022-02-19 NOTE — ED NOTES
Report given to [facility] RN at this time.       Josiah Ely RN  02/19/22 2532

## 2022-02-19 NOTE — ED TRIAGE NOTES
Pt presents to the ED via QCT following a fall yesterday at 0100 at [facility]. Pt was walking with a walker and tripped and fell landing on Left side. Pt is complaining of L hip pain and has a purple bruise in this area. Pt has a lac to L elbow. No blood thinners taken at this time. Pt AxOx1 at baseline with Hx of Dementia. Pt has a Hx of CVA with left sided deficits and weakness.

## 2022-02-19 NOTE — ED NOTES
Pt asking for food and drink, this RN speaks with Dr. Phani Stallworth, states pt can eat. This RN ordering tray now.       Brenda Galaviz RN  02/19/22 7790

## 2022-02-19 NOTE — ED NOTES
CT HEAD WO CONTRAST    Status: Final result       Order Providers    Authorizing Billing   KIET Orellana MD            Signed by    Signed Date/Time Phone Pager   Eliane  2/19/2022  6:19 [phone]      Reading Providers    Read Date Phone Pager   Eliane Mcelroy Sat Feb 19, 2022  6:19 [phone]        CT HEAD WO CONTRAST: Patient Communication     Released  Not seen     Radiation Dose Estimates    No radiation information found for this patient  Narrative   EXAMINATION:   CT OF THE CERVICAL SPINE WITHOUT CONTRAST; CT OF THE HEAD WITHOUT CONTRAST   2/19/2022 6:07 am       TECHNIQUE:   CT of the cervical spine was performed without the administration of   intravenous contrast. Multiplanar reformatted images are provided for review. Dose modulation, iterative reconstruction, and/or weight based adjustment of   the mA/kV was utilized to reduce the radiation dose to as low as reasonably   achievable.; CT of the head was performed without the administration of   intravenous contrast. Dose modulation, iterative reconstruction, and/or   weight based adjustment of the mA/kV was utilized to reduce the radiation   dose to as low as reasonably achievable.       COMPARISON:   10/02/2021       HISTORY:   ORDERING SYSTEM PROVIDED HISTORY: fall   TECHNOLOGIST PROVIDED HISTORY:   Reason for exam:->fall   Decision Support Exception - unselect if not a suspected or confirmed   emergency medical condition->Emergency Medical Condition (MA)   Reason for Exam: Fall; ORDERING SYSTEM PROVIDED HISTORY: fall   TECHNOLOGIST PROVIDED HISTORY:   Has a \"code stroke\" or \"stroke alert\" been called? ->No   Reason for exam:->fall   Decision Support Exception - unselect if not a suspected or confirmed   emergency medical condition->Emergency Medical Condition (MA)   Reason for Exam: Fall       FINDINGS:   CT HEAD:       BRAIN/VENTRICLES: There is no acute intracranial hemorrhage, mass effect or midline shift. No abnormal extra-axial fluid collection.  The gray-white   differentiation is maintained without evidence of an acute infarct.  There is   no evidence of hydrocephalus. Generalized age-related cortical atrophy. Encephalomalacia involving the right parietooccipital lobe.  No intracranial   mass is identified.  Patchy white matter low attenuation is identified   related to chronic microvascular ischemic change.  Intracranial   atherosclerotic calcifications are identified.       ORBITS: Orbits appear unremarkable.  No acute abnormality.       PARANASAL SINUSES: No acute air-fluid level seen in the visualized paranasal   sinuses or mastoid air cells.       SOFT TISSUE/CALVARIUM: The bony calvarium appears intact without fracture. No large soft tissue hematoma is seen.       CT CERVICAL SPINE:       BONES/ALIGNMENT: No acute cervical spine fracture is identified.  Facet   joints appear appropriately aligned.  Straightening of the normal cervical   lordosis.  Anterior cervical fusion identified at the level of C5-C6.       DEGENERATIVE CHANGES: Multilevel mild degenerative disc disease as well as   facet arthropathy and osseous foraminal narrowing similar to the previous   examination.  No acute abnormality is identified.  No severe canal stenosis   is identified.       SOFT TISSUES: No apical pneumothorax is identified.  Carotid calcifications   are identified.           Impression   Stable appearing CT scan of the head without acute intracranial abnormality. Chronic generalized age-related cortical atrophy and microvascular ischemic   change.  Encephalomalacia related to remote infarct as above.       Multilevel degenerative changes seen in the cervical spine, including disc   disease as well as facet arthropathy with foraminal narrowing.  Findings   appear similar to the prior study.       Intact anterior cervical fusion and discectomy at C5-C6.           Vincent Nielsen RN  02/19/22 2022

## 2022-02-19 NOTE — ED NOTES
Bed: ED-17  Expected date:   Expected time:   Means of arrival:   Comments:  [address], RN  02/19/22 5032

## 2022-02-19 NOTE — CARE COORDINATION
CM - Room 17 in ER --Pt admitted for Marked bradycardia , elevated troponin --he  is here after a fall PTA at the [facility] , leaving him with not much more than some abrasions / scrapes. He will eventually be returned to [facility] and may need some rehabilitation orders at discharge.       Rosario Viera / [employer]

## 2022-02-19 NOTE — ED NOTES
Nursing facility verbalizes no information about the fall as reported by off going ED nurse.        Raghavendra Billingsley RN  02/19/22 8651

## 2022-02-19 NOTE — ED PROVIDER NOTES
[facility] Attending Note:    NAME: Donnie Pathak. 67 y.o.  CSN: [id number]  MRN: [id number]   PCP:    Rosetta Lau MD      History:     Chief Complaint:   Fall     HPI:      The history was obtained from the patient and patient's nursing home. Naeem Billingsley is a 67 y.o. male who presents after a fall. Patient reports left elbow pain and hip pain. Remembers the fall but patient is a poor historian. Unable to detail events about the fall. Per nurse aid at the facility patient was with them walking with his walker when he trip over his right foot and fell to the floor. Patient does not take any blood thinners.                                    PMHx:    Past Medical History:   Diagnosis Date    Anemia     per old chart    Arthritis     Back pain, chronic     \"have pain in lumbar mainly- have 5 bulging discs\"\"in my neck and my lumbar have herniated discs\"    Bipolar 1 disorder ([facility]     CAD (coronary artery disease) 01/27/2016    does not follow with a cardiologist    Cerebral artery occlusion with cerebral infarction ([facility]     \"had mini stroke in Jan 2016- fast heart rate when I would stand up - smile drooping on one side- lased just the one day\"    Chronic kidney disease (CKD), stage III (moderate) [facility]     CKD (chronic kidney disease)     per old chart- consult with Dr Ric Lefort with admission 4/2016\"have low kidney function\"    COPD (chronic obstructive pulmonary disease) ([facility]     follow with Dr Steffany Albert Diabetes mellitus, type 2 ([facility] 01/03/2017    Diabetic peripheral neuropathy (HCC)     Diastolic CHF ([facility] 46/99/8110    Gastric ulcer     H/O cardiovascular stress test 05/26/2014    EF 68% mild ischemia anterior wall    Heart murmur     \"see Dr Elham Gee- last echo 2014\" pt states\"I think they said I have a murmur but no chest pain or palpitations\"    Hiatal hernia     History of cardiac cath 06/11/2014    MODERATE  CAD      Hx of migraines     HX OTHER MEDICAL     \"have thinning of kidney walls- they found I had that 15 yrs ago\" see Dr Kristine Jack"    Hyperlipidemia     Hypertension     Intraparenchymal hematoma of brain ([facility]     Lumbar radiculopathy     Overlapping malignant neoplasm of colon ([facility] 05/27/2020    Panic attack     'anything new gives me anxiety\"    Pleural effusion     scheduled for thoracentesis 7/28/2014, 8/17/2016    S/P thoracentesis     left side( per old chart had thora done 4/2016 and one done 2014)    Sleep apnea     sleep study x 2 - last one 4-5 yrs ago- uses c-pap\"    SOBOE (shortness of breath on exertion)     Spinal stenosis        PMSx:    Past Surgical History:   Procedure Laterality Date    CARPAL TUNNEL RELEASE Bilateral 1989    COLONOSCOPY N/A 5/12/2020    COLONOSCOPY POLYPECTOMY SNARE/COLD BIOPSY WITH SPOT INK TATTOO performed by Srikanth Landeros MD at [facility] Left 2000's    KNEE SURGERY Bilateral     right knee x 2( scope)/ left knee- 7-8 yr ago   [address] fusion    SMALL INTESTINE SURGERY N/A 5/21/2020    BOWEL RESECTION EXTENDED RIGHT HEMICOLECTOMY performed by Emelyn Malin MD at [address] [place] [address] Left 12/20/2013    THORACENTESIS  4/25/2016         THORACENTESIS Left 11/15/2016    Dr. Angel Climes 250mlsremoved     THORACOTOMY Left 03/18/2019    with Lysis of adhesions    THORACOTOMY Left 3/18/2019    THORACOSCOPY CONVERTED TO THORACOTOMY WITH LYSIS OF ADHESIONS performed by Prashanth Monsalve MD at [place] [address]      as a kid    UPPER GASTROINTESTINAL ENDOSCOPY N/A 5/12/2020    EGD BIOPSY performed by Srikanth Landeros MD at [address] [place] [address] Rd. Hx:   Family History   Problem Relation Age of Onset    Heart Disease Mother         heart attack    High Blood Pressure Father        SOC.  Hx:   Social History     Socioeconomic History    Marital status: Single     Spouse name: Not on file    Number of children: Not on file    Years of education: Not on file    Highest education level: Not on file   Occupational History    Occupation: [employer], retired     Employer: [employer]   Tobacco Use    Smoking status: Former Smoker     Packs/day: 1.50     Years: 30.00     Pack years: 45.00     Types: Cigarettes     Quit date: 2010     Years since quittin.5    Smokeless tobacco: Never Used   Vaping Use    Vaping Use: Never used   Substance and Sexual Activity    Alcohol use: Not Currently    Drug use: Yes     Frequency: 7.0 times per week     Types: Marijuana Magy Maricruz)    Sexual activity: Not Currently     Partners: Female   Other Topics Concern    Not on file   Social History Narrative    Not on file     Social Determinants of Health     Financial Resource Strain:     Difficulty of Paying Living Expenses: Not on file   Food Insecurity:     Worried About Running Out of Food in the Last Year: Not on file    Venkatesh of Food in the Last Year: Not on file   Transportation Needs:     Lack of Transportation (Medical): Not on file    Lack of Transportation (Non-Medical):  Not on file   Physical Activity:     Days of Exercise per Week: Not on file    Minutes of Exercise per Session: Not on file   Stress:     Feeling of Stress : Not on file   Social Connections:     Frequency of Communication with Friends and Family: Not on file    Frequency of Social Gatherings with Friends and Family: Not on file    Attends Sabianist Services: Not on file    Active Member of [address] [place] [address] or Organizations: Not on file    Attends Club or Organization Meetings: Not on file    Marital Status: Not on file   Intimate Partner Violence:     Fear of Current or Ex-Partner: Not on file    Emotionally Abused: Not on file    Physically Abused: Not on file    Sexually Abused: Not on file   Housing Stability:     Unable to Pay for Housing in the Last Year: Not on file    Number of [place] in the Last Year: Not on file    Unstable Housing in the Last Year: Not on file MEDs:    Previous Medications    AMIODARONE (CORDARONE) 200 MG TABLET    Take 1 tablet by mouth daily    ATORVASTATIN (LIPITOR) 80 MG TABLET    Take 1 tablet by mouth daily    BENZTROPINE (COGENTIN) 0.5 MG TABLET    Take 1 tablet by mouth 2 times daily    BUSPIRONE (BUSPAR) 5 MG TABLET    Take 10 mg by mouth 2 times daily    COMPRESSION STOCKINGS MISC    by Does not apply route Duration of Treatment:  3 Months  # of pairs:  2    Compression Class Level - 20-30 mmHg*    Style - Knee High    DIGOXIN (LANOXIN) 125 MCG TABLET    Take 1 tablet by mouth daily    DIVALPROEX (DEPAKOTE ER) 500 MG EXTENDED RELEASE TABLET    Take 2 tablets by mouth nightly    DOCUSATE SODIUM (COLACE) 100 MG CAPSULE    Take 1 capsule by mouth 2 times daily Hold for diarrhea    DONEPEZIL (ARICEPT) 5 MG TABLET    Take 1 tablet by mouth nightly    ESCITALOPRAM (LEXAPRO) 10 MG TABLET    Take 2 tablets by mouth daily    FAMOTIDINE (PEPCID) 10 MG TABLET    Take 1 tablet by mouth 2 times daily    MELATONIN 3 MG TABS TABLET    Take 3 mg by mouth nightly as needed    METOPROLOL TARTRATE (LOPRESSOR) 25 MG TABLET    Take 1 tablet by mouth 2 times daily    SPIRONOLACTONE (ALDACTONE) 25 MG TABLET    Take 1 tablet by mouth daily    TRAMADOL (ULTRAM) 50 MG TABLET    Take 50 mg by mouth every 6 hours as needed for Pain. ALL:     Allergies   Allergen Reactions    Nsaids      Renal        ROS:  Review of Systems   Constitutional: Negative for chills and fever. HENT: Negative for drooling. Eyes: Negative for pain. Respiratory: Negative for cough. Cardiovascular: Negative for chest pain. Gastrointestinal: Negative for abdominal pain. Musculoskeletal:        Elbow and hip pain   Skin: Negative for rash. Positives andpertinent negatives as per HPI. All other systems were reviewed and are negative.       Physical Exam:      Patient Vitals for the past 24 hrs:   BP Temp Temp src Pulse Resp SpO2 Height Weight   02/19/22 1640 114/63   (!) 41 12 98 %     02/19/22 1430 (!) 118/51   (!) 41 13      02/19/22 1400 (!) 113/55   (!) 42 13      02/19/22 1300 122/61   (!) 45 14      02/19/22 1211    (!) 48 18      02/19/22 1200 (!) 174/78   (!) 47 23      02/19/22 1107 110/82   (!) 40 16 98 %     02/19/22 0834    (!) 42       02/19/22 0831 (!) 148/92   (!) 42 16 98 % 5' 9\" (1.753 m) 210 lb (95.3 kg)   02/19/22 0525  97.8 °F (36.6 °C) Oral     205 lb (93 kg)   02/19/22 0507 (!) 185/63   (!) 43 16 95 %                    Physical Exam  Vitals and nursing note reviewed. Constitutional:       General: He is not in acute distress. Appearance: Normal appearance. He is not toxic-appearing. HENT:      Head: Normocephalic and atraumatic. Comments: No racoon eyes, no corbett sign, no abrasions or lacerations   Eyes:      Conjunctiva/sclera: Conjunctivae normal.      Pupils: Pupils are equal, round, and reactive to light. Cardiovascular:      Rate and Rhythm: Normal rate and regular rhythm. Pulses: Normal pulses. Heart sounds: Normal heart sounds. No murmur heard. No gallop. Pulmonary:      Effort: Pulmonary effort is normal.      Breath sounds: Normal breath sounds. Abdominal:      General: There is no distension. Palpations: Abdomen is soft. Tenderness: There is no abdominal tenderness. There is no guarding or rebound. Musculoskeletal:         General: Normal range of motion. Cervical back: Normal range of motion and neck supple. Comments: Full ROM of elbow, skin tear below elbow. Pelvic stable , full ROM of the hip, no tenderness upon palpation    Skin:     General: Skin is warm and dry. Capillary Refill: Capillary refill takes less than 2 seconds. Neurological:      Mental Status: He is alert and oriented to person, place, and time. Mental status is at baseline.    Psychiatric:         Mood and Affect: Mood normal.         Behavior: Behavior normal. Laboratory & Radiological Imaging (if done):      Labs Reviewed   CBC WITH AUTO DIFFERENTIAL - Abnormal; Notable for the following components:       Result Value    RBC 3.90 (*)     Hemoglobin 11.8 (*)     Hematocrit 37.5 (*)     MCHC 31.5 (*)     RDW 16.4 (*)     Monocytes % 8.6 (*)     Immature Neutrophil % 0.8 (*)     All other components within normal limits   BASIC METABOLIC PANEL W/ REFLEX TO MG FOR LOW K - Abnormal; Notable for the following components:    CREATININE 1.9 (*)     GFR Non- 35 (*)     GFR  42 (*)     All other components within normal limits   TROPONIN - Abnormal; Notable for the following components:    Troponin T 0.034 (*)     All other components within normal limits        Interpretation per the Radiologist below, if available at the time of this note:  XR ELBOW LEFT (2 VIEWS)    Result Date: 2/19/2022  EXAMINATION: TWO XRAY VIEWS OF THE LEFT ELBOW 2/19/2022 7:56 am COMPARISON: None. HISTORY: ORDERING SYSTEM PROVIDED HISTORY: trauma TECHNOLOGIST PROVIDED HISTORY: Reason for exam:->trauma Reason for Exam: pt stated multiple falls, L elbow pain FINDINGS: No fracture, osseous erosion or joint space narrowing were noted. No joint effusion was identified. The bony mineralization appeared normal.  A 5 mm posterior olecranon enthesophyte was noted. No fracture, osseous erosion or joint space narrowing. CT HEAD WO CONTRAST    Result Date: 2/19/2022  EXAMINATION: CT OF THE CERVICAL SPINE WITHOUT CONTRAST; CT OF THE HEAD WITHOUT CONTRAST 2/19/2022 6:07 am TECHNIQUE: CT of the cervical spine was performed without the administration of intravenous contrast. Multiplanar reformatted images are provided for review.  Dose modulation, iterative reconstruction, and/or weight based adjustment of the mA/kV was utilized to reduce the radiation dose to as low as reasonably achievable.; CT of the head was performed without the administration of intravenous contrast. Dose modulation, iterative reconstruction, and/or weight based adjustment of the mA/kV was utilized to reduce the radiation dose to as low as reasonably achievable. COMPARISON: 10/02/2021 HISTORY: ORDERING SYSTEM PROVIDED HISTORY: fall TECHNOLOGIST PROVIDED HISTORY: Reason for exam:->fall Decision Support Exception - unselect if not a suspected or confirmed emergency medical condition->Emergency Medical Condition (MA) Reason for Exam: Fall; ORDERING SYSTEM PROVIDED HISTORY: fall TECHNOLOGIST PROVIDED HISTORY: Has a \"code stroke\" or \"stroke alert\" been called? ->No Reason for exam:->fall Decision Support Exception - unselect if not a suspected or confirmed emergency medical condition->Emergency Medical Condition (MA) Reason for Exam: Fall FINDINGS: CT HEAD: BRAIN/VENTRICLES: There is no acute intracranial hemorrhage, mass effect or midline shift. No abnormal extra-axial fluid collection. The gray-white differentiation is maintained without evidence of an acute infarct. There is no evidence of hydrocephalus. Generalized age-related cortical atrophy. Encephalomalacia involving the right parietooccipital lobe. No intracranial mass is identified. Patchy white matter low attenuation is identified related to chronic microvascular ischemic change. Intracranial atherosclerotic calcifications are identified. ORBITS: Orbits appear unremarkable. No acute abnormality. PARANASAL SINUSES: No acute air-fluid level seen in the visualized paranasal sinuses or mastoid air cells. SOFT TISSUE/CALVARIUM: The bony calvarium appears intact without fracture. No large soft tissue hematoma is seen. CT CERVICAL SPINE: BONES/ALIGNMENT: No acute cervical spine fracture is identified. Facet joints appear appropriately aligned. Straightening of the normal cervical lordosis. Anterior cervical fusion identified at the level of C5-C6.  DEGENERATIVE CHANGES: Multilevel mild degenerative disc disease as well as facet arthropathy and osseous foraminal narrowing similar to the previous examination. No acute abnormality is identified. No severe canal stenosis is identified. SOFT TISSUES: No apical pneumothorax is identified. Carotid calcifications are identified. Stable appearing CT scan of the head without acute intracranial abnormality. Chronic generalized age-related cortical atrophy and microvascular ischemic change. Encephalomalacia related to remote infarct as above. Multilevel degenerative changes seen in the cervical spine, including disc disease as well as facet arthropathy with foraminal narrowing. Findings appear similar to the prior study. Intact anterior cervical fusion and discectomy at C5-C6. CT CERVICAL SPINE WO CONTRAST    Result Date: 2/19/2022  EXAMINATION: CT OF THE CERVICAL SPINE WITHOUT CONTRAST; CT OF THE HEAD WITHOUT CONTRAST 2/19/2022 6:07 am TECHNIQUE: CT of the cervical spine was performed without the administration of intravenous contrast. Multiplanar reformatted images are provided for review. Dose modulation, iterative reconstruction, and/or weight based adjustment of the mA/kV was utilized to reduce the radiation dose to as low as reasonably achievable.; CT of the head was performed without the administration of intravenous contrast. Dose modulation, iterative reconstruction, and/or weight based adjustment of the mA/kV was utilized to reduce the radiation dose to as low as reasonably achievable. COMPARISON: 10/02/2021 HISTORY: ORDERING SYSTEM PROVIDED HISTORY: fall TECHNOLOGIST PROVIDED HISTORY: Reason for exam:->fall Decision Support Exception - unselect if not a suspected or confirmed emergency medical condition->Emergency Medical Condition (MA) Reason for Exam: Fall; ORDERING SYSTEM PROVIDED HISTORY: fall TECHNOLOGIST PROVIDED HISTORY: Has a \"code stroke\" or \"stroke alert\" been called? ->No Reason for exam:->fall Decision Support Exception - unselect if not a suspected or confirmed emergency medical condition->Emergency Medical Condition (MA) Reason for Exam: Fall FINDINGS: CT HEAD: BRAIN/VENTRICLES: There is no acute intracranial hemorrhage, mass effect or midline shift. No abnormal extra-axial fluid collection. The gray-white differentiation is maintained without evidence of an acute infarct. There is no evidence of hydrocephalus. Generalized age-related cortical atrophy. Encephalomalacia involving the right parietooccipital lobe. No intracranial mass is identified. Patchy white matter low attenuation is identified related to chronic microvascular ischemic change. Intracranial atherosclerotic calcifications are identified. ORBITS: Orbits appear unremarkable. No acute abnormality. PARANASAL SINUSES: No acute air-fluid level seen in the visualized paranasal sinuses or mastoid air cells. SOFT TISSUE/CALVARIUM: The bony calvarium appears intact without fracture. No large soft tissue hematoma is seen. CT CERVICAL SPINE: BONES/ALIGNMENT: No acute cervical spine fracture is identified. Facet joints appear appropriately aligned. Straightening of the normal cervical lordosis. Anterior cervical fusion identified at the level of C5-C6. DEGENERATIVE CHANGES: Multilevel mild degenerative disc disease as well as facet arthropathy and osseous foraminal narrowing similar to the previous examination. No acute abnormality is identified. No severe canal stenosis is identified. SOFT TISSUES: No apical pneumothorax is identified. Carotid calcifications are identified. Stable appearing CT scan of the head without acute intracranial abnormality. Chronic generalized age-related cortical atrophy and microvascular ischemic change. Encephalomalacia related to remote infarct as above. Multilevel degenerative changes seen in the cervical spine, including disc disease as well as facet arthropathy with foraminal narrowing. Findings appear similar to the prior study. Intact anterior cervical fusion and discectomy at C5-C6. XR HIP 2-3 VW W PELVIS LEFT    Result Date: 2/19/2022  EXAMINATION: ONE XRAY VIEW OF THE PELVIS AND TWO XRAY VIEWS LEFT HIP 2/19/2022 7:56 am COMPARISON: None. HISTORY: ORDERING SYSTEM PROVIDED HISTORY: trauma fall TECHNOLOGIST PROVIDED HISTORY: Reason for exam:->trauma fall Reason for Exam: pt stated multiple falls, L hip pain, bruise FINDINGS: No fracture or cortical erosion was noted. No symphysis pubis or SI joint diastasis were noted. No hip joint narrowing or dislocation was seen. The bony mineralization was normal.     No fracture or cortical erosion. Medical Decision Making/           Patient is a 67year old male with history of dementia that lives at [facility] where he experienced a witness mechanical fall. Patient has a skin tear to his left elbow was dressed with Steri-Strips and wrapped. Head and cervical CT have no acute findings. XR shows no fracture. Patient tetanus was updated. Patient current vital signs with pulse of 42 and blood pressure of 148/92. Last visits patient's pulse has been around high 50s. EKG and lab work order. EKG shows sinus bradycardia. Laboratory showed elevated tropinin 0.034 which is down from 0.074 which was done a year ago. However, due to the positive troponin and bradycardia will admit to trend troponin and to address if any overlying pathology. Procedure(s):     12 lead EKG per my interpretation:  Bradycardia  Axis is   Left axis deviation  QTc is  normal  There is no specific T wave changes appreciated. There is no specific ST wave changes appreciated. No STEMI  Prior EKG to compare with was available and rate of 71 last EKG      Clinical Impression:      1. Fall, initial encounter    2. Elevated troponin    3.  Bradycardia          Disposition:              Patient is being admitted                                                                      Niya Siu M.D                                                              ED Attending Physician      (Please note that portions of this note have been completed with a voice recognition software. Efforts were made to correct any errors, butoccasionally words are mis-transcribed. Mirlande Lawson MD  02/19/22 1800

## 2022-02-19 NOTE — ED NOTES
This RN in to check pt. Pt requesting to be changed. This RN complies with request.  New linen, draw sheet, cotton pad, depend, and repositioned with another RN. Wound cleaned to left elbow with saline, 4x4 placed, Kerlex wrapped. Xray called to come and take pt for testing. Pt is breathing easy with no complaints at this time.       Calli Davis, YESENIA  02/19/22 1258

## 2022-02-19 NOTE — H&P
History and Physical      Name:  Kate Shah. /Age/Sex: 1950  (73 y.o. male)   MRN & CSN:  [id number] & [id number] Admission Date/Time: 2022  5:05 AM   Location:  ED17/ED-17 PCP: Adriana Carcamo MD       Hospital Day: 1    Assessment and Plan:   Kate Marques is a 67 y.o.  male  who presents with Elevated troponin    1. Mechanical fall, with no apparent injury. X-ray of the pelvis is negative. Patient has a history of frequent falls  2. Bradycardia, patient appears to be chronically bradycardic however a little lower than baseline today. Will repeat EKG in the a.m. Will hold metoprolol for heart rate less than 50. Cardiology to see in the a.m.  3. Elevated troponin, patient does not endorse any chest pain. Will trend troponins to rule out acute coronary syndrome. Continue aspirin 81 mg daily. Repeat EKG in the a.m.   4. History of chronic atrial fibrillation, rate controlled. Patient is on amiodarone and digoxin, will continue. No anticoagulation due to patient's frequent falls  5. Advanced dementia  6. Essential hypertension, currently elevated. With metoprolol on hold, will add as needed IV hydralazine. 7. Coronary artery disease, appears stable elevated troponin continue to monitor. Repeat EKG in the a.m.  8. COPD: Continue bronchodilator treatments as needed. 9. CKD stage III, with baseline creatinine that ranges from 1.4-2.4. Currently, his creatinine is 1.9    Diet ADULT DIET; Regular; Low Sodium (2 gm)   DVT Prophylaxis [] Lovenox, []  Heparin, [] SCDs, [] Ambulation   GI Prophylaxis [] PPI,  [] H2 Blocker,  [] Carafate,  [] Diet/Tube Feeds   Code Status Prior   Disposition Patient requires continued admission due to need for further evaluation.    MDM [] Low, [x] Moderate,[]  High       History of Present Illness:     Chief Complaint: Elevated troponin   Kate Marques is a 67 y.o.  male  Who has a history of advanced dementia essential hypertension, \"have pain in lumbar mainly- have 5 bulging discs\"\"in my neck and my lumbar have herniated discs\"    Bipolar 1 disorder ([facility]     CAD (coronary artery disease) 01/27/2016    does not follow with a cardiologist    Cerebral artery occlusion with cerebral infarction ([facility]     \"had mini stroke in Jan 2016- fast heart rate when I would stand up - smile drooping on one side- lased just the one day\"    Chronic kidney disease (CKD), stage III (moderate) ([facility])     CKD (chronic kidney disease)     per old chart- consult with Dr Sincere Matthews with admission 4/2016\"have low kidney function\"    COPD (chronic obstructive pulmonary disease) ([facility]     follow with Dr Gibson Frankel Diabetes mellitus, type 2 ([facility] 01/03/2017    Diabetic peripheral neuropathy ([facility])     Diastolic CHF ([facility] 33/97/0896    Gastric ulcer     H/O cardiovascular stress test 05/26/2014    EF 68% mild ischemia anterior wall    Heart murmur     \"see Dr Bloom Late- last echo 2014\" pt states\"I think they said I have a murmur but no chest pain or palpitations\"    Hiatal hernia     History of cardiac cath 06/11/2014    MODERATE  CAD      Hx of migraines     HX OTHER MEDICAL     \"have thinning of kidney walls- they found I had that 15 yrs ago\" see Dr Micaela Mujica"    Hyperlipidemia     Hypertension     Intraparenchymal hematoma of brain ([facility]     Lumbar radiculopathy     Overlapping malignant neoplasm of colon ([facility] 05/27/2020    Panic attack     'anything new gives me anxiety\"    Pleural effusion     scheduled for thoracentesis 7/28/2014, 8/17/2016    S/P thoracentesis     left side( per old chart had thora done 4/2016 and one done 2014)    Sleep apnea     sleep study x 2 - last one 4-5 yrs ago- uses c-pap\"    SOBOE (shortness of breath on exertion)     Spinal stenosis      PSHX:  has a past surgical history that includes Neck surgery (1998); Carpal tunnel release (Bilateral, 1989); knee surgery (Bilateral); thoracentesis (Left, 12/20/2013);  Elbow surgery (Left, 's); Thoracentesis (2016); Tonsillectomy; Thoracentesis (Left, 11/15/2016); thoracotomy (Left, 2019); thoracotomy (Left, 3/18/2019); Upper gastrointestinal endoscopy (N/A, 2020); Colonoscopy (N/A, 2020); and Small intestine surgery (N/A, 2020). Allergies: Allergies   Allergen Reactions    Nsaids      Renal        FAM HX: family history includes Heart Disease in his mother; High Blood Pressure in his father. Soc HX:   Social History     Socioeconomic History    Marital status: Single     Spouse name: None    Number of children: None    Years of education: None    Highest education level: None   Occupational History    Occupation: [employer], retired     Employer: [employer]   Tobacco Use    Smoking status: Former Smoker     Packs/day: 1.50     Years: 30.00     Pack years: 45.00     Types: Cigarettes     Quit date: 2010     Years since quittin.5    Smokeless tobacco: Never Used   Vaping Use    Vaping Use: Never used   Substance and Sexual Activity    Alcohol use: Not Currently    Drug use: Yes     Frequency: 7.0 times per week     Types: Marijuana Estella Bachelor)    Sexual activity: Not Currently     Partners: Female   Other Topics Concern    None   Social History Narrative    None     Social Determinants of Health     Financial Resource Strain:     Difficulty of Paying Living Expenses: Not on file   Food Insecurity:     Worried About Running Out of Food in the Last Year: Not on file    Venkatesh of Food in the Last Year: Not on file   Transportation Needs:     Lack of Transportation (Medical): Not on file    Lack of Transportation (Non-Medical):  Not on file   Physical Activity:     Days of Exercise per Week: Not on file    Minutes of Exercise per Session: Not on file   Stress:     Feeling of Stress : Not on file   Social Connections:     Frequency of Communication with Friends and Family: Not on file    Frequency of Social Gatherings with Friends and Family: Not on file    Attends [employer]ism Services: Not on file    Active Member of Clubs or Organizations: Not on file    Attends Club or Organization Meetings: Not on file    Marital Status: Not on file   Intimate Partner Violence:     Fear of Current or Ex-Partner: Not on file    Emotionally Abused: Not on file    Physically Abused: Not on file    Sexually Abused: Not on file   Housing Stability:     Unable to Pay for Housing in the Last Year: Not on file    Number of [place] in the Last Year: Not on file    Unstable Housing in the Last Year: Not on file       Medications:   Medications:    Infusions:   PRN Meds:       Electronically signed by Johanna Eller MD on 2/19/2022 at 6:52 PM

## 2022-02-20 LAB
ANION GAP SERPL CALCULATED.3IONS-SCNC: 10 MMOL/L (ref 4–16)
BASOPHILS ABSOLUTE: 0 K/CU MM
BASOPHILS RELATIVE PERCENT: 0.4 % (ref 0–1)
BUN BLDV-MCNC: 20 MG/DL (ref 6–23)
CALCIUM SERPL-MCNC: 8.4 MG/DL (ref 8.3–10.6)
CHLORIDE BLD-SCNC: 107 MMOL/L (ref 99–110)
CO2: 23 MMOL/L (ref 21–32)
CREAT SERPL-MCNC: 1.9 MG/DL (ref 0.9–1.3)
DIFFERENTIAL TYPE: ABNORMAL
DIGOXIN LEVEL: 1.5 NG/ML (ref 0.8–2)
DOSE AMOUNT: NORMAL
DOSE TIME: NORMAL
EKG ATRIAL RATE: 35 BPM
EKG DIAGNOSIS: NORMAL
EKG Q-T INTERVAL: 504 MS
EKG QRS DURATION: 108 MS
EKG QTC CALCULATION (BAZETT): 455 MS
EKG R AXIS: -31 DEGREES
EKG T AXIS: 62 DEGREES
EKG VENTRICULAR RATE: 49 BPM
EOSINOPHILS ABSOLUTE: 0.1 K/CU MM
EOSINOPHILS RELATIVE PERCENT: 1 % (ref 0–3)
GFR AFRICAN AMERICAN: 42 ML/MIN/1.73M2
GFR NON-AFRICAN AMERICAN: 35 ML/MIN/1.73M2
GLUCOSE BLD-MCNC: 91 MG/DL (ref 70–99)
HCT VFR BLD CALC: 39.2 % (ref 42–52)
HEMOGLOBIN: 11.5 GM/DL (ref 13.5–18)
IMMATURE NEUTROPHIL %: 0.6 % (ref 0–0.43)
LYMPHOCYTES ABSOLUTE: 1.8 K/CU MM
LYMPHOCYTES RELATIVE PERCENT: 25.2 % (ref 24–44)
MCH RBC QN AUTO: 30 PG (ref 27–31)
MCHC RBC AUTO-ENTMCNC: 29.3 % (ref 32–36)
MCV RBC AUTO: 102.3 FL (ref 78–100)
MONOCYTES ABSOLUTE: 0.6 K/CU MM
MONOCYTES RELATIVE PERCENT: 9 % (ref 0–4)
NUCLEATED RBC %: 0 %
PDW BLD-RTO: 16.3 % (ref 11.7–14.9)
PLATELET # BLD: 168 K/CU MM (ref 140–440)
PMV BLD AUTO: 9.9 FL (ref 7.5–11.1)
POTASSIUM SERPL-SCNC: 4.4 MMOL/L (ref 3.5–5.1)
RBC # BLD: 3.83 M/CU MM (ref 4.6–6.2)
SEGMENTED NEUTROPHILS ABSOLUTE COUNT: 4.6 K/CU MM
SEGMENTED NEUTROPHILS RELATIVE PERCENT: 63.8 % (ref 36–66)
SODIUM BLD-SCNC: 140 MMOL/L (ref 135–145)
TOTAL IMMATURE NEUTOROPHIL: 0.04 K/CU MM
TOTAL NUCLEATED RBC: 0 K/CU MM
TROPONIN T: 0.03 NG/ML
TROPONIN T: 0.04 NG/ML
WBC # BLD: 7.1 K/CU MM (ref 4–10.5)

## 2022-02-20 PROCEDURE — 83880 ASSAY OF NATRIURETIC PEPTIDE: CPT

## 2022-02-20 PROCEDURE — 36410 VNPNXR 3YR/> PHY/QHP DX/THER: CPT

## 2022-02-20 PROCEDURE — 96372 THER/PROPH/DIAG INJ SC/IM: CPT

## 2022-02-20 PROCEDURE — C1751 CATH, INF, PER/CENT/MIDLINE: HCPCS

## 2022-02-20 PROCEDURE — 96365 THER/PROPH/DIAG IV INF INIT: CPT

## 2022-02-20 PROCEDURE — 36415 COLL VENOUS BLD VENIPUNCTURE: CPT

## 2022-02-20 PROCEDURE — 2580000003 HC RX 258: Performed by: INTERNAL MEDICINE

## 2022-02-20 PROCEDURE — 76937 US GUIDE VASCULAR ACCESS: CPT

## 2022-02-20 PROCEDURE — 84439 ASSAY OF FREE THYROXINE: CPT

## 2022-02-20 PROCEDURE — 6370000000 HC RX 637 (ALT 250 FOR IP): Performed by: INTERNAL MEDICINE

## 2022-02-20 PROCEDURE — 93010 ELECTROCARDIOGRAM REPORT: CPT | Performed by: INTERNAL MEDICINE

## 2022-02-20 PROCEDURE — 05HC33Z INSERTION OF INFUSION DEVICE INTO LEFT BASILIC VEIN, PERCUTANEOUS APPROACH: ICD-10-PCS | Performed by: INTERNAL MEDICINE

## 2022-02-20 PROCEDURE — 6360000002 HC RX W HCPCS: Performed by: INTERNAL MEDICINE

## 2022-02-20 PROCEDURE — 99291 CRITICAL CARE FIRST HOUR: CPT | Performed by: INTERNAL MEDICINE

## 2022-02-20 PROCEDURE — 84443 ASSAY THYROID STIM HORMONE: CPT

## 2022-02-20 PROCEDURE — G0378 HOSPITAL OBSERVATION PER HR: HCPCS

## 2022-02-20 PROCEDURE — 96366 THER/PROPH/DIAG IV INF ADDON: CPT

## 2022-02-20 PROCEDURE — 2580000003 HC RX 258: Performed by: NURSE PRACTITIONER

## 2022-02-20 PROCEDURE — 94761 N-INVAS EAR/PLS OXIMETRY MLT: CPT

## 2022-02-20 PROCEDURE — 80048 BASIC METABOLIC PNL TOTAL CA: CPT

## 2022-02-20 PROCEDURE — 80162 ASSAY OF DIGOXIN TOTAL: CPT

## 2022-02-20 PROCEDURE — 2000000000 HC ICU R&B

## 2022-02-20 PROCEDURE — 85025 COMPLETE CBC W/AUTO DIFF WBC: CPT

## 2022-02-20 PROCEDURE — 84484 ASSAY OF TROPONIN QUANT: CPT

## 2022-02-20 PROCEDURE — 93005 ELECTROCARDIOGRAM TRACING: CPT | Performed by: NURSE PRACTITIONER

## 2022-02-20 RX ORDER — 0.9 % SODIUM CHLORIDE 0.9 %
250 INTRAVENOUS SOLUTION INTRAVENOUS ONCE
Status: COMPLETED | OUTPATIENT
Start: 2022-02-20 | End: 2022-02-20

## 2022-02-20 RX ORDER — DOPAMINE HYDROCHLORIDE 160 MG/100ML
1-20 INJECTION, SOLUTION INTRAVENOUS CONTINUOUS
Status: DISCONTINUED | OUTPATIENT
Start: 2022-02-20 | End: 2022-02-23

## 2022-02-20 RX ORDER — SODIUM CHLORIDE 9 MG/ML
INJECTION, SOLUTION INTRAVENOUS CONTINUOUS
Status: DISPENSED | OUTPATIENT
Start: 2022-02-20 | End: 2022-02-20

## 2022-02-20 RX ADMIN — SPIRONOLACTONE 25 MG: 25 TABLET ORAL at 08:04

## 2022-02-20 RX ADMIN — DOCUSATE SODIUM 100 MG: 100 CAPSULE, LIQUID FILLED ORAL at 20:25

## 2022-02-20 RX ADMIN — SODIUM CHLORIDE, PRESERVATIVE FREE 10 ML: 5 INJECTION INTRAVENOUS at 20:24

## 2022-02-20 RX ADMIN — SODIUM CHLORIDE 250 ML: 9 INJECTION, SOLUTION INTRAVENOUS at 15:45

## 2022-02-20 RX ADMIN — SODIUM CHLORIDE, PRESERVATIVE FREE 10 ML: 5 INJECTION INTRAVENOUS at 08:05

## 2022-02-20 RX ADMIN — ENOXAPARIN SODIUM 40 MG: 100 INJECTION SUBCUTANEOUS at 08:04

## 2022-02-20 RX ADMIN — MELATONIN 3 MG: at 22:57

## 2022-02-20 RX ADMIN — ESCITALOPRAM 20 MG: 10 TABLET, FILM COATED ORAL at 08:04

## 2022-02-20 RX ADMIN — DIVALPROEX SODIUM 1000 MG: 500 TABLET, FILM COATED, EXTENDED RELEASE ORAL at 20:25

## 2022-02-20 RX ADMIN — DONEPEZIL HYDROCHLORIDE 5 MG: 5 TABLET, FILM COATED ORAL at 20:26

## 2022-02-20 RX ADMIN — BENZTROPINE MESYLATE 0.5 MG: 1 TABLET ORAL at 08:04

## 2022-02-20 RX ADMIN — BENZTROPINE MESYLATE 0.5 MG: 1 TABLET ORAL at 20:25

## 2022-02-20 RX ADMIN — FAMOTIDINE 10 MG: 20 TABLET ORAL at 08:04

## 2022-02-20 RX ADMIN — DOPAMINE HYDROCHLORIDE IN DEXTROSE 2.5 MCG/KG/MIN: 1.6 INJECTION, SOLUTION INTRAVENOUS at 15:33

## 2022-02-20 RX ADMIN — FAMOTIDINE 10 MG: 20 TABLET ORAL at 20:26

## 2022-02-20 RX ADMIN — SODIUM CHLORIDE 100 ML/HR: 9 INJECTION, SOLUTION INTRAVENOUS at 15:43

## 2022-02-20 RX ADMIN — BUSPIRONE HYDROCHLORIDE 10 MG: 5 TABLET ORAL at 20:26

## 2022-02-20 RX ADMIN — ATORVASTATIN CALCIUM 80 MG: 40 TABLET, FILM COATED ORAL at 08:04

## 2022-02-20 RX ADMIN — DOCUSATE SODIUM 100 MG: 100 CAPSULE, LIQUID FILLED ORAL at 08:04

## 2022-02-20 RX ADMIN — BUSPIRONE HYDROCHLORIDE 10 MG: 5 TABLET ORAL at 08:04

## 2022-02-20 ASSESSMENT — PAIN SCALES - GENERAL
PAINLEVEL_OUTOF10: 0

## 2022-02-20 ASSESSMENT — PAIN SCALES - WONG BAKER: WONGBAKER_NUMERICALRESPONSE: 0

## 2022-02-20 NOTE — PLAN OF CARE
Problem: Falls - Risk of:  Goal: Will remain free from falls  Description: Will remain free from falls  2/20/2022 1231 by Dene Denver, RN  Outcome: Ongoing  2/20/2022 0406 by Iza Boyd RN  Outcome: Ongoing  Goal: Absence of physical injury  Description: Absence of physical injury  2/20/2022 1231 by Dene Denver, RN  Outcome: Ongoing  2/20/2022 0406 by Iza Boyd RN  Outcome: Ongoing     Problem: Skin Integrity:  Goal: Will show no infection signs and symptoms  Description: Will show no infection signs and symptoms  2/20/2022 1231 by Dene Denver, RN  Outcome: Ongoing  2/20/2022 0406 by Iza Boyd RN  Outcome: Ongoing  Goal: Absence of new skin breakdown  Description: Absence of new skin breakdown  2/20/2022 1231 by Dene Denver, RN  Outcome: Ongoing  2/20/2022 0406 by Iza Boyd RN  Outcome: Ongoing

## 2022-02-20 NOTE — CONSULTS
Full note to follow      Sinus Bradycardia   3.18 sec pause  Elevated troponin non ACS trend   Advanced Dementia and nursing home resident   Hx of PAF   TIA   HTN  DNR - [facility]     IV dopamine gtt   Hold off BB/Digoxin and Amiodarone   Dig level therapeutic   No acute needs for Temp Pacemaker   EP / Dr. Mireille Ellington to evaluate in am   IV hydralazine for BP control

## 2022-02-20 NOTE — CONSULTS
([facility] Panic attack, Pleural effusion, S/P thoracentesis, Sleep apnea, SOBOE (shortness of breath on exertion), and Spinal stenosis. Past surgical history:   has a past surgical history that includes Neck surgery (1998); Carpal tunnel release (Bilateral, 1989); knee surgery (Bilateral); thoracentesis (Left, 12/20/2013); Elbow surgery (Left, 2000's); Thoracentesis (4/25/2016); Tonsillectomy; Thoracentesis (Left, 11/15/2016); thoracotomy (Left, 03/18/2019); thoracotomy (Left, 3/18/2019); Upper gastrointestinal endoscopy (N/A, 5/12/2020); Colonoscopy (N/A, 5/12/2020); and Small intestine surgery (N/A, 5/21/2020). Social History:   reports that he quit smoking about 11 years ago. His smoking use included cigarettes. He has a 45.00 pack-year smoking history. He has never used smokeless tobacco. He reports previous alcohol use. He reports current drug use. Frequency: 7.00 times per week. Drug: Marijuana [employer]).   Family history:   no family history of CAD, STROKE of DM    Allergies   Allergen Reactions    Nsaids      Renal        0.9 % sodium chloride bolus, Once  0.9 % sodium chloride infusion, Continuous  DOPamine (INTROPIN) 400 mg in dextrose 5 % 250 mL infusion, Continuous  benztropine (COGENTIN) tablet 0.5 mg, BID  [Held by provider] metoprolol tartrate (LOPRESSOR) tablet 25 mg, BID  [Held by provider] digoxin (LANOXIN) tablet 125 mcg, Daily  [Held by provider] amiodarone (CORDARONE) tablet 200 mg, Daily  atorvastatin (LIPITOR) tablet 80 mg, Daily  divalproex (DEPAKOTE ER) extended release tablet 1,000 mg, Nightly  escitalopram (LEXAPRO) tablet 20 mg, Daily  donepezil (ARICEPT) tablet 5 mg, Nightly  famotidine (PEPCID) tablet 10 mg, BID  docusate sodium (COLACE) capsule 100 mg, BID  spironolactone (ALDACTONE) tablet 25 mg, Daily  busPIRone (BUSPAR) tablet 10 mg, BID  melatonin tablet 3 mg, Nightly PRN  traMADol (ULTRAM) tablet 50 mg, Q6H PRN  sodium chloride flush 0.9 % injection 5-40 mL, 2 times per day  sodium chloride flush 0.9 % injection 5-40 mL, PRN  0.9 % sodium chloride infusion, PRN  enoxaparin (LOVENOX) injection 40 mg, Daily  ondansetron (ZOFRAN-ODT) disintegrating tablet 4 mg, Q8H PRN   Or  ondansetron (ZOFRAN) injection 4 mg, Q6H PRN  polyethylene glycol (GLYCOLAX) packet 17 g, Daily PRN  acetaminophen (TYLENOL) tablet 650 mg, Q6H PRN   Or  acetaminophen (TYLENOL) suppository 650 mg, Q6H PRN      Current Facility-Administered Medications   Medication Dose Route Frequency Provider Last Rate Last Admin    0.9 % sodium chloride bolus  250 mL IntraVENous Once [place] Nestle, APRN - CNP        0.9 % sodium chloride infusion   IntraVENous Continuous [place] Nestle, APRN - CNP        DOPamine (INTROPIN) 400 mg in dextrose 5 % 250 mL infusion  1-20 mcg/kg/min IntraVENous Continuous Kenyetta Garcia MD        benztropine (COGENTIN) tablet 0.5 mg  0.5 mg Oral BID Teressa Pastrana MD   0.5 mg at 02/20/22 0804    [Held by provider] metoprolol tartrate (LOPRESSOR) tablet 25 mg  25 mg Oral BID Teressa Pastrana MD   25 mg at 02/19/22 2031    [Held by provider] digoxin (LANOXIN) tablet 125 mcg  125 mcg Oral Daily Teressa Pastrana MD   125 mcg at 02/19/22 2038    [Held by provider] amiodarone (CORDARONE) tablet 200 mg  200 mg Oral Daily Teressa Pastrana MD        atorvastatin (LIPITOR) tablet 80 mg  80 mg Oral Daily Teressa Pastrana MD   80 mg at 02/20/22 0804    divalproex (DEPAKOTE ER) extended release tablet 1,000 mg  1,000 mg Oral Nightly Melody Melendrez MD   1,000 mg at 02/19/22 2033    escitalopram (LEXAPRO) tablet 20 mg  20 mg Oral Daily Melody Melendrez MD   20 mg at 02/20/22 0804    donepezil (ARICEPT) tablet 5 mg  5 mg Oral Nightly Melody Melendrez MD   5 mg at 02/19/22 2032    famotidine (PEPCID) tablet 10 mg  10 mg Oral BID Teressa Pastrana MD   10 mg at 02/20/22 0804    docusate sodium (COLACE) capsule 100 mg  100 mg Oral BID Teressa Pastrana MD   100 mg at 02/20/22 0804    spironolactone (ALDACTONE) tablet 25 mg  25 mg Oral Daily Tom Guaman MD   25 mg at 02/20/22 0804    busPIRone (BUSPAR) tablet 10 mg  10 mg Oral BID Tom Guaman MD   10 mg at 02/20/22 0804    melatonin tablet 3 mg  3 mg Oral Nightly PRN Tom Guaman MD        traMADol (ULTRAM) tablet 50 mg  50 mg Oral Q6H PRN Tom Guaman MD        sodium chloride flush 0.9 % injection 5-40 mL  5-40 mL IntraVENous 2 times per day Tom Guaman MD   10 mL at 02/20/22 0805    sodium chloride flush 0.9 % injection 5-40 mL  5-40 mL IntraVENous PRN Melody Melendrez MD        0.9 % sodium chloride infusion  25 mL IntraVENous PRN Tom Guaman MD        enoxaparin (LOVENOX) injection 40 mg  40 mg SubCUTAneous Daily Melody Melendrez MD   40 mg at 02/20/22 0804    ondansetron (ZOFRAN-ODT) disintegrating tablet 4 mg  4 mg Oral Q8H PRN Tom Guaman MD        Or    ondansetron (ZOFRAN) injection 4 mg  4 mg IntraVENous Q6H PRN Melody Melendrez MD        polyethylene glycol (GLYCOLAX) packet 17 g  17 g Oral Daily PRN Tom Guaman MD        acetaminophen (TYLENOL) tablet 650 mg  650 mg Oral Q6H PRN Tom Guaman MD        Or    acetaminophen (TYLENOL) suppository 650 mg  650 mg Rectal Q6H PRN Tom Guaman MD             Review of Systems:     · Constitutional: No Fever or Weight Loss   · Eyes: No Decreased Vision  · ENT: No Headaches, Hearing Loss or Vertigo  · Cardiovascular:   no chest pain,  no dyspnea on exertion,  no palpitations or loss of consciousness  · Respiratory: No cough or wheezing    · Gastrointestinal: No abdominal pain, appetite loss, blood in stools, constipation, diarrhea or heartburn  · Genitourinary: No dysuria, trouble voiding, or hematuria  · Musculoskeletal:  No gait disturbance, weakness or joint complaints  · Integumentary: No rash or pruritis  · Neurological: No TIA or stroke symptoms  · Psychiatric: No anxiety or depression  · Endocrine: No malaise, fatigue or temperature intolerance  · Hematologic/Lymphatic: No bleeding problems, blood clots or swollen lymph nodes  · Allergic/Immunologic: No nasal congestion or hives    All other systems were reviewed and were negative otherwise. Physical Examination:      Vitals:    02/20/22 1200   BP: (!) 97/52   Pulse: (!) 40   Resp: 12   Temp:    SpO2:       Wt Readings from Last 3 Encounters:   02/19/22 210 lb (95.3 kg)   10/02/21 200 lb (90.7 kg)   05/23/21 201 lb (91.2 kg)     Body mass index is 31.01 kg/m². General Appearance:  No distress, conversant  Constitutional:  Well developed, Well nourished  HEENT:  Normocephalic, Atraumatic, Oropharynx moist   Nose normal. Neck Supple Carotid: no carotid bruit  Eyes:  Conjunctiva normal, No discharge. Respiratory:    Normal breath sounds, No respiratory distress, No wheezing, no use of accessory muscles, diaphragm movement is normal  No chest Tenderness  Cardiovascular: S1-S2 No murmurs auscultated. No rubs, thrills or gallops. Normal  rhythm. Pedal pulses are normal. No pedal edema  GI:  Soft Non tender, non distended. Musculoskeletal:   No tenderness, No cyanosis, No clubbing. Integument:  Warm, Dry, No erythema, No rash. Lymphatic:  No lymphadenopathy noted. Neurologic:  No focal deficits noted. Psychiatric:    Mood normal.       Lab Review     Recent Labs     02/20/22  0149   WBC 7.1   HGB 11.5*   HCT 39.2*         Recent Labs     02/20/22  0149      K 4.4      CO2 23   BUN 20   CREATININE 1.9*     No results for input(s): AST, ALT, ALB, BILIDIR, BILITOT, ALKPHOS in the last 72 hours. No results for input(s): TROPONINI in the last 72 hours. Lab Results   Component Value Date    BNP 38 05/05/2014    BNP 33 12/20/2013    BNP 66.2 06/07/2011     Lab Results   Component Value Date    INR 0.84 12/19/2020    PROTIME 10.1 (L) 12/19/2020         All labs, images, EKGs were personally reviewed      Assessment: 67 y. o.year old with PMH of  has a past medical history of Anemia, Arthritis, Back pain, chronic, Bipolar 1 disorder (HCC), CAD (coronary artery disease), Cerebral artery occlusion with cerebral infarction ([address] Chronic kidney disease (CKD), stage III (moderate) ([address] CKD (chronic kidney disease), COPD (chronic obstructive pulmonary disease) ([address] Diabetes mellitus, type 2 ([address] Diabetic peripheral neuropathy ([facility] Diastolic CHF ([address] Gastric ulcer, H/O cardiovascular stress test, Heart murmur, Hiatal hernia, History of cardiac cath, Hx of migraines, HX OTHER MEDICAL, Hyperlipidemia, Hypertension, Intraparenchymal hematoma of brain ([address] Lumbar radiculopathy, Overlapping malignant neoplasm of colon ([address] Panic attack, Pleural effusion, S/P thoracentesis, Sleep apnea, SOBOE (shortness of breath on exertion), and Spinal stenosis. Medical Decision Making :       1. Symptomatic sinus bradycardia  2. Sinus pause 3.18-second  3. Frequent falls and lethargy  4. Paroxysmal atrial fibrillation    Agree with transferring patient to ICU  Start patient on IV dopamine drip  IV atropine as needed  Hold off on beta-blocker digoxin and amiodarone  Digoxin level was therapeutic  No need of temporary pacemaker currently  Likely await weaned off response of AV venkat blocking agents  EP to evaluate patient in the morning   Discussed the option of permanent pacemaker with the patient, although patient is DNR CC, is agreeable for pacemaker if indicated  Patient not on anticoagulation due to history of intracranial hemorrhage in December 2020      5. Elevated troponin: Demand ischemia. Nonischemic EKG. History of chronically elevated troponin. Obtain echocardiogram in the morning. Medical management  6. Essential hypertension: IV hydralazine as needed  7. History of CAD: Stable  8.  Hyperlipidemia: Continue statins       Thank you for the consult    Dr. Airam Fonseca  2/20/2022 12:17 PM         Critical time is performed by myself in  35 minutes exclusive of separately billable procedures. This time includes bedside physical exams and repeat evaluation, close medical management, titration of IV pressors drip, discussion with consultants, review of diagnostic testing results and monitoring for potential decompensation.

## 2022-02-20 NOTE — CONSULTS
Consult completed. Discussed procedure with patient, verbal consent obtained. Placed an [employer] Endurance Extended Dwell Peripheral Catheter 18 gauge 8 cm to the left upper arm, basilic vein, on first attempt with ultrasound guidance using sterile technique, good blood return noted, flushed easily with 20 ml of normal saline. DSD with bio patch applied to site, patient tolerated fairly well.

## 2022-02-20 NOTE — PROGRESS NOTES
Hospitalist Progress Note      Name:  Oralia Zamora /Age/Sex: 1950  (73 y.o. male)   MRN & CSN:  [id number] & [id number] Admission Date/Time: 2022  5:05 AM   Location:  -A PCP: Rian Corey MD         Hospital Day: 2    Assessment and Plan:   Oralia Zamora is a 67 y.o.  male with history of advanced dementia, hypertension, CAD, COPD, atrial fibrillation presents from nursing home for frequency falls. Patient was found bradycardia and elevated troponin. Symptomatic bradycardia  -Patient's heart rate was 38-39, patient is very lethargic this morning, he did not eat his breakfast  -EKG showed junctional rhythm  -Held metoprolol, digoxin, and amiodarone  -Digoxin level is 1.5  -Patient is transferred to intermediate care for close monitor  -Cardiology Dr. John Duncan is on board: Started dopamine drip, EP consulted. Elevated troponin  -Patient denied chest pain  -Troponin was 0.034 in admission, history of elevated troponin at 0.03 to 0.07 before. Frequent fall  -PT OT evaluation    History of chronic atrial fibrillation  -Close monitor heart rate, hold amiodarone, digoxin and metoprolol for now    Advanced dementia  -Continue home meds    Essential hypertension  -IV hydralazine as needed for blood pressure control    COPD  -No exacerbation, in room air  -Bronchodilator as needed    CKD stage III  -Creatinine 1.9, on his baseline    DVT prophylaxis  -SCDs    Dr. Mildred Mccoy evaluated patient in the room.     Diet ADULT DIET; Easy to Chew; Low Sodium (2 gm)   DVT Prophylaxis [] Lovenox, []  Heparin, [x] SCDs, [] Ambulation   GI Prophylaxis [] PPI,  [] H2 Blocker,  [] Carafate,  [] Diet/Tube Feeds   Code Status DNR-CCA   Disposition Patient requires continued admission due to medical condition     History of Present Illness:     Chief Complaint: Elevated troponin and frequent falls    Oralia Zamora is a 67 y.o.  male with history of advanced dementia, hypertension, CAD, COPD, atrial fibrillation was sent from nursing home for frequent follow-up. Patient was found bradycardia with a heart rate of 40s and elevated troponin at 0.034. Patient was found heart rate dropped to 30s with severe lethargic in the morning. Patient was transferred to intermediate care. Cardiology consulted. Ten point ROS reviewed negative, unless as noted above    Objective:   No intake or output data in the 24 hours ending 02/20/22 1116   Vitals:   Vitals:    02/20/22 0800   BP: (!) 114/57   Pulse: (!) 49   Resp: 16   Temp: 97.8 °F (36.6 °C)   SpO2: 100%     Physical Exam:   GEN Awake male, sitting upright in bed in no apparent distress. Appears given age. EYES Pupils are equally round. No scleral erythema, discharge, or conjunctivitis. HENT Mucous membranes are moist. Oral pharynx without exudates, no evidence of thrush. NECK Supple, no apparent thyromegaly or masses. RESP Clear to auscultation, no wheezes, rales or rhonchi. Symmetric chest movement while on room air. CARDIO/VASC bradycardia, no bilateral lower extremities edema  GI Abdomen is soft without significant tenderness, masses, or guarding. Bowel sounds are normoactive. Rectal exam deferred.  No costovertebral angle tenderness. Normal appearing external genitalia. Zimmer catheter is not present. HEME/LYMPH No palpable cervical lymphadenopathy and no hepatosplenomegaly. No petechiae or ecchymoses. MSK No gross joint deformities. SKIN Normal coloration, warm, dry. NEURO Cranial nerves appear grossly intact, normal speech, no lateralizing weakness. PSYCH Awake, alert, oriented x 4. Affect appropriate.     Medications:   Medications:    sodium chloride  250 mL IntraVENous Once    benztropine  0.5 mg Oral BID    [Held by provider] metoprolol tartrate  25 mg Oral BID    [Held by provider] digoxin  125 mcg Oral Daily    [Held by provider] amiodarone  200 mg Oral Daily    atorvastatin  80 mg Oral Daily    divalproex 1,000 mg Oral Nightly    escitalopram  20 mg Oral Daily    donepezil  5 mg Oral Nightly    famotidine  10 mg Oral BID    docusate sodium  100 mg Oral BID    spironolactone  25 mg Oral Daily    busPIRone  10 mg Oral BID    sodium chloride flush  5-40 mL IntraVENous 2 times per day    enoxaparin  40 mg SubCUTAneous Daily      Infusions:    sodium chloride      sodium chloride       PRN Meds: melatonin, 3 mg, Nightly PRN  traMADol, 50 mg, Q6H PRN  sodium chloride flush, 5-40 mL, PRN  sodium chloride, 25 mL, PRN  ondansetron, 4 mg, Q8H PRN   Or  ondansetron, 4 mg, Q6H PRN  polyethylene glycol, 17 g, Daily PRN  acetaminophen, 650 mg, Q6H PRN   Or  acetaminophen, 650 mg, Q6H PRN          Patient is still admitted because medical condition.  The anticipated discharge is in pending    Electronically signed by La Nena Keys CNP on 2/20/2022 at 11:16 AM

## 2022-02-20 NOTE — FLOWSHEET NOTE
Patient received from [id number] per bed, monitor and RN--Dr. Ariadne Resendiz at bedside requesting to see tele strips with pauses--chart still on [address]--chart requested

## 2022-02-21 PROBLEM — R00.1 SYMPTOMATIC BRADYCARDIA: Status: ACTIVE | Noted: 2022-02-21

## 2022-02-21 LAB
LV EF: 53 %
LVEF MODALITY: NORMAL

## 2022-02-21 PROCEDURE — 93306 TTE W/DOPPLER COMPLETE: CPT

## 2022-02-21 PROCEDURE — 2000000000 HC ICU R&B

## 2022-02-21 PROCEDURE — 84484 ASSAY OF TROPONIN QUANT: CPT

## 2022-02-21 PROCEDURE — 6360000002 HC RX W HCPCS: Performed by: INTERNAL MEDICINE

## 2022-02-21 PROCEDURE — 6370000000 HC RX 637 (ALT 250 FOR IP): Performed by: NURSE PRACTITIONER

## 2022-02-21 PROCEDURE — 96372 THER/PROPH/DIAG INJ SC/IM: CPT

## 2022-02-21 PROCEDURE — 96366 THER/PROPH/DIAG IV INF ADDON: CPT

## 2022-02-21 PROCEDURE — 6370000000 HC RX 637 (ALT 250 FOR IP): Performed by: INTERNAL MEDICINE

## 2022-02-21 PROCEDURE — 99223 1ST HOSP IP/OBS HIGH 75: CPT | Performed by: INTERNAL MEDICINE

## 2022-02-21 PROCEDURE — APPNB60 APP NON BILLABLE TIME 46-60 MINS: Performed by: NURSE PRACTITIONER

## 2022-02-21 PROCEDURE — 99291 CRITICAL CARE FIRST HOUR: CPT | Performed by: INTERNAL MEDICINE

## 2022-02-21 PROCEDURE — 2580000003 HC RX 258: Performed by: INTERNAL MEDICINE

## 2022-02-21 RX ORDER — MIDODRINE HYDROCHLORIDE 5 MG/1
5 TABLET ORAL
Status: DISCONTINUED | OUTPATIENT
Start: 2022-02-21 | End: 2022-02-22

## 2022-02-21 RX ADMIN — SODIUM CHLORIDE, PRESERVATIVE FREE 10 ML: 5 INJECTION INTRAVENOUS at 21:16

## 2022-02-21 RX ADMIN — BENZTROPINE MESYLATE 0.5 MG: 1 TABLET ORAL at 08:00

## 2022-02-21 RX ADMIN — BUSPIRONE HYDROCHLORIDE 10 MG: 5 TABLET ORAL at 21:00

## 2022-02-21 RX ADMIN — DOPAMINE HYDROCHLORIDE IN DEXTROSE 5 MCG/KG/MIN: 1.6 INJECTION, SOLUTION INTRAVENOUS at 06:07

## 2022-02-21 RX ADMIN — SODIUM CHLORIDE, PRESERVATIVE FREE 10 ML: 5 INJECTION INTRAVENOUS at 08:01

## 2022-02-21 RX ADMIN — FAMOTIDINE 10 MG: 20 TABLET ORAL at 21:00

## 2022-02-21 RX ADMIN — DIVALPROEX SODIUM 1000 MG: 500 TABLET, FILM COATED, EXTENDED RELEASE ORAL at 21:01

## 2022-02-21 RX ADMIN — BENZTROPINE MESYLATE 0.5 MG: 1 TABLET ORAL at 21:00

## 2022-02-21 RX ADMIN — DOCUSATE SODIUM 100 MG: 100 CAPSULE, LIQUID FILLED ORAL at 07:59

## 2022-02-21 RX ADMIN — ATORVASTATIN CALCIUM 80 MG: 40 TABLET, FILM COATED ORAL at 08:00

## 2022-02-21 RX ADMIN — FAMOTIDINE 10 MG: 20 TABLET ORAL at 08:00

## 2022-02-21 RX ADMIN — ESCITALOPRAM 20 MG: 10 TABLET, FILM COATED ORAL at 07:59

## 2022-02-21 RX ADMIN — SPIRONOLACTONE 25 MG: 25 TABLET ORAL at 08:00

## 2022-02-21 RX ADMIN — DOPAMINE HYDROCHLORIDE IN DEXTROSE 5 MCG/KG/MIN: 1.6 INJECTION, SOLUTION INTRAVENOUS at 20:59

## 2022-02-21 RX ADMIN — DONEPEZIL HYDROCHLORIDE 5 MG: 5 TABLET, FILM COATED ORAL at 20:59

## 2022-02-21 RX ADMIN — MIDODRINE HYDROCHLORIDE 5 MG: 5 TABLET ORAL at 18:33

## 2022-02-21 RX ADMIN — BUSPIRONE HYDROCHLORIDE 10 MG: 5 TABLET ORAL at 07:59

## 2022-02-21 RX ADMIN — DOCUSATE SODIUM 100 MG: 100 CAPSULE, LIQUID FILLED ORAL at 21:01

## 2022-02-21 RX ADMIN — ENOXAPARIN SODIUM 40 MG: 100 INJECTION SUBCUTANEOUS at 08:01

## 2022-02-21 ASSESSMENT — PAIN SCALES - GENERAL
PAINLEVEL_OUTOF10: 0

## 2022-02-21 NOTE — PROGRESS NOTES
Hospitalist Progress Note      Name:  Kate Shah. /Age/Sex: 1950  (73 y.o. male)   MRN & CSN:  [id number] & [id number] Admission Date/Time: 2022  5:05 AM   Location:  -A PCP: Adriana Carcamo MD         Hospital Day: 3    Assessment and Plan:   Kate Marques is a 67 y.o.  male  who presents with Elevated troponin     1) Symptomatic bradycardia with 3.18s Sinus pause  -EKG showed junctional rhythm  -Was on BB, Digoxin, Amio but currently on hold  -Started on IV Dopamine; wean off as tolerated  -Follow TTE  -Cardiology and EP on board     2) Elevated troponin  -Likely due to demand  -No acute ischemic changes on EKG  -Cardiology on board; continue medical management      3) Recurrent Mechanical fall  -PT/OT on board     4) History of chronic atrial fibrillation  -Continue to hold BB, digoxin and amio    Other Chronic Medical Condition; medication resumed unless contraindicated      Advanced dementia     Essential hypertension    COPD     CKD stage III; at baseline    Diet ADULT DIET; Easy to Chew; Low Sodium (2 gm)  ADULT ORAL NUTRITION SUPPLEMENT; Breakfast, Lunch, Dinner, PM Snack; Standard High Calorie/High Protein Oral Supplement   DVT Prophylaxis [] Lovenox, []  Heparin, [] SCDs, [] Ambulation   GI Prophylaxis [] PPI,  [] H2 Blocker,  [] Carafate,  [] Diet/Tube Feeds   Code Status DNR-CCA   Disposition TBD   MDM      History of Present Illness:     Patient was seen and examined  Denied any chest pain or worsening SOB  No fever, chills  No dizziness, palpitations    Ten point ROS reviewed negative, unless as noted above    Objective:        Intake/Output Summary (Last 24 hours) at 2022 1128  Last data filed at 2022 0800  Gross per 24 hour   Intake 379.75 ml   Output 700 ml   Net -320.25 ml      Vitals:   Vitals:    22 1100   BP: 128/73   Pulse: 74   Resp: 18   Temp:    SpO2: 94%     Physical Exam:   GEN Awake male, laying in bed in no apparent distress. Appears given age. EYES Pupils are equally round. No scleral erythema, discharge, or conjunctivitis. HENT Mucous membranes are moist. Oral pharynx without exudates, no evidence of thrush. NECK Supple, no apparent thyromegaly or masses. RESP Clear to auscultation, no wheezes, rales or rhonchi. Symmetric chest movement while on room air. CARDIO/VASC S1/S2 auscultated. Regular rate without appreciable murmurs, rubs, or gallops. No JVD or carotid bruits. Peripheral pulses equal bilaterally and palpable. No peripheral edema. GI Abdomen is soft without significant tenderness, masses, or guarding. Bowel sounds are normoactive. Rectal exam deferred.  No costovertebral angle tenderness. Zimmer catheter is not present. HEME/LYMPH No palpable cervical lymphadenopathy and no hepatosplenomegaly. No petechiae or ecchymoses. MSK No gross joint deformities. SKIN Normal coloration, warm, dry. NEURO Cranial nerves appear grossly intact, normal speech, no lateralizing weakness. PSYCH Awake, alert, oriented x 4. Affect appropriate.     Medications:   Medications:    benztropine  0.5 mg Oral BID    [Held by provider] metoprolol tartrate  25 mg Oral BID    [Held by provider] digoxin  125 mcg Oral Daily    [Held by provider] amiodarone  200 mg Oral Daily    atorvastatin  80 mg Oral Daily    divalproex  1,000 mg Oral Nightly    escitalopram  20 mg Oral Daily    donepezil  5 mg Oral Nightly    famotidine  10 mg Oral BID    docusate sodium  100 mg Oral BID    spironolactone  25 mg Oral Daily    busPIRone  10 mg Oral BID    sodium chloride flush  5-40 mL IntraVENous 2 times per day    enoxaparin  40 mg SubCUTAneous Daily      Infusions:    DOPamine 5 mcg/kg/min (02/21/22 0946)    sodium chloride       PRN Meds: melatonin, 3 mg, Nightly PRN  traMADol, 50 mg, Q6H PRN  sodium chloride flush, 5-40 mL, PRN  sodium chloride, 25 mL, PRN  ondansetron, 4 mg, Q8H PRN Or  ondansetron, 4 mg, Q6H PRN  polyethylene glycol, 17 g, Daily PRN  acetaminophen, 650 mg, Q6H PRN   Or  acetaminophen, 650 mg, Q6H PRN          Electronically signed by Jero Perdomo MD on 2/21/2022 at 11:28 AM

## 2022-02-21 NOTE — PROGRESS NOTES
RN spoke with NP at bedside regarding plan of care. Per NP, will start PO midodrine to help wean off Dopamine.

## 2022-02-21 NOTE — PROGRESS NOTES
CARDIOLOGY PROGRESS NOTE                                                  Name:  Sheila Win. /Age/Sex: 1950  (67 y.o. male)   MRN & CSN:  [id number] & [id number] Admission Date/Time: 2022  5:05 AM   Location:  -A PCP: Kristofer Tomlinson MD         Admit Date:  2022  Hospital Day: 3      SUBJECTIVE:   Seen patient as follow up as consultation for Bradycardia     No chest pain. No shortness of breath  No palpations    TELEMETRY: SR ~ 75/min        Intake/Output Summary (Last 24 hours) at 2022 0806  Last data filed at 2022 2233  Gross per 24 hour   Intake 120 ml   Output 700 ml   Net -580 ml       Assessment/Plan:         1. Symptomatic sinus bradycardia - Now in SR with IV Dopamine  2. Sinus pause 3.18-second  3. Frequent falls and lethargy  4. Paroxysmal atrial fibrillation     ICU Evaluation   On IV dopamine drip  IV atropine as needed  Hold off on beta-blocker digoxin and amiodarone  Digoxin level was therapeutic  No need of temporary pacemaker currently  Likely await weaned off response of AV venkat blocking agents  EP to evaluate patient today   Discussed the option of permanent pacemaker with the patient, although patient is DNR CC, is agreeable for pacemaker if indicated  Patient not on anticoagulation due to history of intracranial hemorrhage in 2020        5. Elevated troponin: Demand ischemia. Nonischemic EKG. History of chronically elevated troponin. Obtain echocardiogram in the morning. Medical management    6. Essential hypertension: IV hydralazine as needed    7. History of CAD: Stable    8.  Hyperlipidemia: Continue statins        Past medical history:    has a past medical history of Anemia, Arthritis, Back pain, chronic, Bipolar 1 disorder ([address] CAD (coronary artery disease), Cerebral artery occlusion with cerebral infarction ([address] Chronic kidney disease (CKD), stage III (moderate) ([address] CKD (chronic kidney disease), COPD (chronic obstructive pulmonary disease) ([facility] Diabetes mellitus, type 2 ([facility] Diabetic peripheral neuropathy ([address] Diastolic CHF ([address] Gastric ulcer, H/O cardiovascular stress test, Heart murmur, Hiatal hernia, History of cardiac cath, Hx of migraines, HX OTHER MEDICAL, Hyperlipidemia, Hypertension, Intraparenchymal hematoma of brain ([address] Lumbar radiculopathy, Overlapping malignant neoplasm of colon ([address] Panic attack, Pleural effusion, S/P thoracentesis, Sleep apnea, SOBOE (shortness of breath on exertion), and Spinal stenosis. Past surgical history:   has a past surgical history that includes Neck surgery (1998); Carpal tunnel release (Bilateral, 1989); knee surgery (Bilateral); thoracentesis (Left, 12/20/2013); Elbow surgery (Left, 2000's); Thoracentesis (4/25/2016); Tonsillectomy; Thoracentesis (Left, 11/15/2016); thoracotomy (Left, 03/18/2019); thoracotomy (Left, 3/18/2019); Upper gastrointestinal endoscopy (N/A, 5/12/2020); Colonoscopy (N/A, 5/12/2020); and Small intestine surgery (N/A, 5/21/2020). Social History:   reports that he quit smoking about 11 years ago. His smoking use included cigarettes. He has a 45.00 pack-year smoking history. He has never used smokeless tobacco. He reports previous alcohol use. He reports current drug use. Frequency: 7.00 times per week. Drug: Marijuana Ardia Zo). Family history:  family history includes Heart Disease in his mother; High Blood Pressure in his father. OBJECTIVE:     BP (!) 151/73   Pulse 70   Temp 97.8 °F (36.6 °C) (Oral)   Resp 15   Ht 5' 9\" (1.753 m)   Wt 210 lb (95.3 kg)   SpO2 93%   BMI 31.01 kg/m²       Intake/Output Summary (Last 24 hours) at 2/21/2022 9398  Last data filed at 2/20/2022 2233  Gross per 24 hour   Intake 120 ml   Output 700 ml   Net -580 ml       Physical Exam:    Constitutional:  Well developed, Well nourished, No acute distress, Non-toxic appearance.    HENT:  Normocephalic, Atraumatic, Bilateral external ears normal, Oropharynx moist, No oral exudates, Nose normal. Neck- Normal range of motion, No tenderness, Supple, No stridor. Eyes:  EOMI, Conjunctiva normal, No discharge. Respiratory:  Normal breath sounds, No respiratory distress, No wheezing, No chest tenderness. ,no use of accessory muscles, diaphragm movement is normal  Cardiovascular S1-S2 No Murmurs, added sounds. Normal rate rhythm. No rubs gallops. Carotid pulses and amplitude are normal no bruit noted. Pedal pulses normal femoral pulses normal.  No pedal edema  GI:  Bowel sounds normal, Soft, No tenderness  : No CVA tenderness. Musculoskeletal: No edema, No tenderness, No cyanosis, No clubbing. Back- No tenderness. Integument:  Warm, Dry, No erythema, No rash. Lymphatic:  No lymphadenopathy noted. Neurologic:  Alert & oriented x 3, No focal deficits noted.    Psychiatric:  Affect normal, Judgment normal, Mood normal.           MEDICATIONS:     benztropine  0.5 mg Oral BID    [Held by provider] metoprolol tartrate  25 mg Oral BID    [Held by provider] digoxin  125 mcg Oral Daily    [Held by provider] amiodarone  200 mg Oral Daily    atorvastatin  80 mg Oral Daily    divalproex  1,000 mg Oral Nightly    escitalopram  20 mg Oral Daily    donepezil  5 mg Oral Nightly    famotidine  10 mg Oral BID    docusate sodium  100 mg Oral BID    spironolactone  25 mg Oral Daily    busPIRone  10 mg Oral BID    sodium chloride flush  5-40 mL IntraVENous 2 times per day    enoxaparin  40 mg SubCUTAneous Daily      DOPamine 5 mcg/kg/min (02/21/22 9835)    sodium chloride       melatonin, traMADol, sodium chloride flush, sodium chloride, ondansetron **OR** ondansetron, polyethylene glycol, acetaminophen **OR** acetaminophen  Allergies   Allergen Reactions    Nsaids      Renal        Lab Data:  CBC:   Recent Labs     02/19/22  0955 02/20/22  0149   WBC 6.5 7.1   HGB 11.8* 11.5*   HCT 37.5* 39.2*   MCV 96.2 102.3*    168     BMP:   Recent Labs 02/19/22  0955 02/20/22  0149    140   K 4.0 4.4    107   CO2 28 23   BUN 20 20   CREATININE 1.9* 1.9*        Anshul Jha MD, MD 2/21/2022 8:06 AM     Critical time is performed by myself in  35 minutes exclusive of separately billable procedures. This time includes bedside physical exams and repeat evaluation, close medical management,  titration of IV pressors drip, discussion with consultants, review of diagnostic testing results and monitoring for potential decompensation.

## 2022-02-21 NOTE — PROGRESS NOTES
Lonza Tommy, NP notified of HR dropping to 52 when attempting to wean Dopamine gtt. BP remained stable 122/64 (81).

## 2022-02-21 NOTE — PROGRESS NOTES
Romayne Areola, NP at bedside to discuss plan of care with this RN. Per NP, plan to wean off Dopamine per order parameters and keep holding metoprolol, digoxin, and amiodarone. Plan for possible pacemaker, but will d/w Dr. Yris Nagy.

## 2022-02-21 NOTE — PROGRESS NOTES
HR 53 and BP 78/45 (57) after rate/dose change to Dopamine. Pt states he feels lightheaded. Estefany Rocha NP notified and gtt rate increased per parameters.

## 2022-02-21 NOTE — DISCHARGE INSTR - COC
Continuity of Care Form    Patient Name: Oralia Muro. :  1950  MRN:  [id number]    Admit date:  2022  Discharge date:  2022      Code Status Order: DNR-CCA   Advance Directives:      Admitting Physician:  Blake Telles MD  PCP: Rian Corey MD    Discharging Nurse: Shira Grimaldo Unit/Room#: 3110/3110-A  Discharging Unit Phone Number: **[phone]*    Emergency Contact:   Extended Emergency Contact Information  Primary Emergency Contact: [address] Phone: [phone]  Work Phone: [phone]  Mobile Phone: [phone]  Relation: Child   needed?  No  Secondary Emergency Contact: Joviat Zuleta  Home Phone: [phone]  Work Phone: [phone]  Mobile Phone: [phone]  Relation: Child    Past Surgical History:  Past Surgical History:   Procedure Laterality Date    CARPAL TUNNEL RELEASE Bilateral     COLONOSCOPY N/A 2020    COLONOSCOPY POLYPECTOMY SNARE/COLD BIOPSY WITH SPOT INK TATTOO performed by Gemini Fontana MD at [place] [address] Left 2000's    KNEE SURGERY Bilateral     right knee x 2( scope)/ left knee- 7-8 yr ago    [address] fusion    SMALL INTESTINE SURGERY N/A 2020    BOWEL RESECTION EXTENDED RIGHT HEMICOLECTOMY performed by Jany uPente MD at [address] [place] [address] Left 2013    THORACENTESIS  2016         THORACENTESIS Left 11/15/2016    Dr. Sharath Caputo 250mlsremoved     THORACOTOMY Left 2019    with Lysis of adhesions    THORACOTOMY Left 3/18/2019    THORACOSCOPY CONVERTED TO THORACOTOMY WITH LYSIS OF ADHESIONS performed by Frank Arnett MD at [address] [place] [address]      as a kid    UPPER GASTROINTESTINAL ENDOSCOPY N/A 2020    EGD BIOPSY performed by Gemini Fontana MD at [facility] ENDOSCOPY       Immunization History:   Immunization History   Administered Date(s) Administered    Influenza A ([id number]) Vaccine PF IM 2010 Influenza Vaccine, unspecified formulation 10/14/2016    Influenza Virus Vaccine 10/03/2013, 10/13/2014, 01/29/2016, 10/14/2016    Influenza, High Dose (Fluzone 65 yrs and older) 11/11/2018    Influenza, Quadv, IM, PF (6 mo and older Fluzone, Flulaval, Fluarix, and 3 yrs and older Afluria) 10/09/2019    Pneumococcal Conjugate 13-valent ([id number]) 10/17/2016    Pneumococcal Polysaccharide ([id number]) 01/01/2009, 10/03/2013    Tdap (Boostrix, Adacel) 06/10/2013, 02/19/2022       Active Problems:  Patient Active Problem List   Diagnosis Code    Pleural effusion, left J90    SAMANTHA on CPAP G47.33, Z99.89    Chronic obstructive pulmonary disease (HCC) J44.9    TIA (transient ischemic attack) G45.9    Bipolar 1 disorder ([facility]) F31.9    Coronary artery disease involving native coronary artery of native heart without angina pectoris I25.10    Essential hypertension I10    REYES (dyspnea on exertion) R06.00    Anemia, chronic disease D63.8    Diuretic-induced hypokalemia E87.6, E71.4E4N    Diastolic CHF ([facility]) Y03.82    Acute respiratory failure ([facility]) J96.00    Hyperkalemia E87.5    Adrenal mass ([facility]) E27.8    Diabetes mellitus, type 2 ([facility]) E11.9    Hyponatremia E87.1    Paroxysmal atrial fibrillation ([facility]) I48.0    Empyema of left pleural space ([facility]) J86.9    Hyperlipidemia E78.5    Lumbar radiculopathy M54.16    CKD (chronic kidney disease) N18.9    Uncontrolled type 2 diabetes mellitus with peripheral neuropathy ([facility]) E11.42, E11.65    Chronic kidney disease (CKD), stage III (moderate) ([facility]) N18.30    Cervical stenosis of spine M48.02    Spinal stenosis of lumbar region without neurogenic claudication M48.061    Somatic dysfunction of back M99.09    Somatic dysfunction of cervical region M99.01    Somatic dysfunction of pelvis region M99.05    Somatic dysfunction of both lower extremities M99.06    Empyema lung ([address] J86.9    ANA (acute kidney injury) ([address] N17.9    Chronic diastolic heart failure ([facility]) I50.32    Hypotensive (Ordered)   08/03/20 Rule-Out Test Resulted    COVID-19 (Rule Out) 06/01/20 06/01/20 06/01/20 Covid-19 Ambulatory (Ordered)   06/03/20 Rule-Out Test Resulted    COVID-19 (Rule Out) 05/26/20 05/26/20 05/28/20 COVID-19 (Ordered)   05/28/20 Rule-Out Test Resulted    MRSA 10/09/19 10/10/19 10/09/19 Culture, Sputum   01/05/21 Mildred Carcamo LPN            Nurse Assessment:  Last Vital Signs: BP (!) 139/54   Pulse (!) 42   Temp 97.8 °F (36.6 °C) (Oral)   Resp 11   Ht 5' 9\" (1.753 m)   Wt 210 lb (95.3 kg)   SpO2 96%   BMI 31.01 kg/m²     Last documented pain score (0-10 scale): Pain Level: 4  Last Weight:   Wt Readings from Last 1 Encounters:   02/19/22 210 lb (95.3 kg)     Mental Status:  {IP PT MENTAL STATUS:20030}    IV Access:  - None    Nursing Mobility/ADLs:  Walking   Assisted  Transfer  Assisted  Bathing  Assisted  Dressing  Assisted  Toileting  Assisted  Feeding  Assisted  Med Admin  Assisted  Med Delivery   whole    Wound Care Documentation and Therapy:  Wound 02/21/22 Arm Left; Lower;Proximal;Posterior (Active)   Wound Etiology Skin Tear 02/24/22 0842   Dressing Status Clean;Dry; Intact 02/24/22 0842   Wound Cleansed Cleansed with saline;Irrigated with saline 02/22/22 0800   Dressing/Treatment ABD;Dry dressing 02/24/22 0842   Dressing Change Due 02/23/22 02/22/22 0800   Wound Assessment Bleeding 02/22/22 0800   Drainage Amount Small 02/22/22 0800   Drainage Description Serosanguinous 02/22/22 0800   Odor None 02/22/22 0800   Sveta-wound Assessment Fragile; Intact 02/22/22 0800   Number of days: 3        Elimination:  Continence: Bowel: No  Bladder: No  Urinary Catheter: None   Colostomy/Ileostomy/Ileal Conduit: {YES / MI:37712}       Date of Last BM: 2/23/2022    Intake/Output Summary (Last 24 hours) at 2/24/2022 1524  Last data filed at 2/24/2022 1054  Gross per 24 hour   Intake 566.85 ml   Output --   Net 566.85 ml     I/O last 3 completed shifts:   In: 26 [P.O.:460]  Out: -     Safety Concerns: History of Falls (last 30 days)    Impairments/Disabilities:      None    Patient's personal belongings (please select all that are sent with patient):  None    RN SIGNATURE:  Electronically signed by Simona Matthew RN on 2/24/22 at 2:44 PM EST    CASE MANAGEMENT/SOCIAL WORK SECTION    Inpatient Status Date: ***    Readmission Risk Assessment Score:  Readmission Risk              Risk of Unplanned Readmission:  23           Discharging to Facility/ Agency   Name: [facility]  Address:  Critical access hospital:[phone]  Fax:[phone]      PHYSICIAN SECTION    Prognosis: Fair    Condition at Discharge: Stable    Rehab Potential (if transferring to Rehab): Fair    Nutrition Therapy:  Current Nutrition Therapy:   - Oral Diet:  Low Sodium (2gm)    Routes of Feeding: Oral  Liquids: No Restrictions  Daily Fluid Restriction: no  Last Modified Barium Swallow with Video (Video Swallowing Test): not done    Treatments at the Time of Hospital Discharge:   Respiratory Treatments: Oxygen  Oxygen Therapy:  is not on home oxygen therapy. Ventilator:    - No ventilator support    Rehab Therapies: Physical Therapy and Occupational Therapy  Weight Bearing Status/Restrictions: No weight bearing restirctions  Other Medical Equipment (for information only, NOT a DME order): Other Treatments: None    Recommended Labs or Other Treatments After Discharge: None    Physician Certification: I certify the above information and transfer of Steffen Hoff.  is necessary for the continuing treatment of the diagnosis listed and that he requires [facility] for greater 30 days.      Update Admission H&P: No change in H&P    PHYSICIAN SIGNATURE:  Electronically signed by Joyce Alpers, MD on 2/24/22 at 2:19 PM EST

## 2022-02-21 NOTE — CONSULTS
Electrophysiology Consult Note      Reason for consultation:  Bradycardia, Sinus pause    Chief complaint : Confusion    Referring physician: Darren Gan      Primary care physician: Clovis Khan MD      History of Present Illness:     Patient is a 28-year-old male with a history of anemia, coronary artery disease, CVA, chronic kidney disease, diabetes mellitus type 2, diabetic neuropathy, diastolic heart failure, hypertension, hyperlipidemia, stroke, dementia, sleep apnea presented to the hospital s/p fall. Patient is alert to self only and unable to give much information. All information obtained from chart. Patient does have frequent falls. Patient was found to be bradycardic. Troponin was also elevated. Patient denies chest pain, palpitations, shortness of breath, lightheadedness, dizziness, edema or syncope. EP was consulted for management of bradycardia. Patient is currently on dopamine drip.   Patient has been on amiodarone, digoxin and beta-blocker which are on hold        Pastmedical history:   Past Medical History:   Diagnosis Date    Anemia     per old chart    Arthritis     Back pain, chronic     \"have pain in lumbar mainly- have 5 bulging discs\"\"in my neck and my lumbar have herniated discs\"    Bipolar 1 disorder ([facility]     CAD (coronary artery disease) 01/27/2016    does not follow with a cardiologist    Cerebral artery occlusion with cerebral infarction ([facility]     \"had mini stroke in Jan 2016- fast heart rate when I would stand up - smile drooping on one side- lased just the one day\"    Chronic kidney disease (CKD), stage III (moderate) [facility]     CKD (chronic kidney disease)     per old chart- consult with Dr James Cook with admission 4/2016\"have low kidney function\"    COPD (chronic obstructive pulmonary disease) ([facility]     follow with Dr Emory Duran Diabetes mellitus, type 2 ([facility] 01/03/2017    Diabetic peripheral neuropathy ([facility]     Diastolic CHF ([facility] 22/21/4647    Gastric ulcer     H/O cardiovascular stress test 05/26/2014    EF 68% mild ischemia anterior wall    Heart murmur     \"see Dr Gabe Umana- last echo 2014\" pt states\"I think they said I have a murmur but no chest pain or palpitations\"    Hiatal hernia     History of cardiac cath 06/11/2014    MODERATE  CAD      Hx of migraines     HX OTHER MEDICAL     \"have thinning of kidney walls- they found I had that 15 yrs ago\" see Dr Patience Oneill"    Hyperlipidemia     Hypertension     Intraparenchymal hematoma of brain ([facility]     Lumbar radiculopathy     Overlapping malignant neoplasm of colon ([facility] 05/27/2020    Panic attack     'anything new gives me anxiety\"    Pleural effusion     scheduled for thoracentesis 7/28/2014, 8/17/2016    S/P thoracentesis     left side( per old chart had thora done 4/2016 and one done 2014)    Sleep apnea     sleep study x 2 - last one 4-5 yrs ago- uses c-pap\"    SOBOE (shortness of breath on exertion)     Spinal stenosis        Surgical history :   Past Surgical History:   Procedure Laterality Date    CARPAL TUNNEL RELEASE Bilateral 1989    COLONOSCOPY N/A 5/12/2020    COLONOSCOPY POLYPECTOMY SNARE/COLD BIOPSY WITH SPOT INK TATTOO [place] [facility]    KNEE SURGERY Bilateral     right knee x 2( scope)/ left knee- 7-8 yr ago   [address] fusion   [address]  N/A 5/21/2020    BOWEL RESECTION EXTENDED RIGHT HEMICOLECTOMY performed by Anitra Jaimes MD at [address] [place] [address] Left 12/20/2013    THORACENTESIS  4/25/2016         THORACENTESIS Left 11/15/2016    Dr. Jarod Olivera 250mlsremoved     THORACOTOMY Left 03/18/2019    with Lysis of adhesions    THORACOTOMY Left 3/18/2019    THORACOSCOPY CONVERTED TO THORACOTOMY WITH LYSIS OF ADHESIONS performed by Jack Shelton MD at [address] [place] [address]      as a kid    UPPER GASTROINTESTINAL ENDOSCOPY N/A 5/12/2020    EGD BIOPSY performed by Alec Wallace MD at [facility] ENDOSCOPY       Family history:   Family History   Problem Relation Age of Onset    Heart Disease Mother         heart attack    High Blood Pressure Father        Social history :  reports that he quit smoking about 11 years ago. His smoking use included cigarettes. He has a 45.00 pack-year smoking history. He has never used smokeless tobacco. He reports previous alcohol use. He reports current drug use. Frequency: 7.00 times per week. Drug: Marijuana [employer]). Allergies   Allergen Reactions    Nsaids      Renal        No current facility-administered medications on file prior to encounter. Current Outpatient Medications on File Prior to Encounter   Medication Sig Dispense Refill    busPIRone (BUSPAR) 5 MG tablet Take 10 mg by mouth 2 times daily      melatonin 3 MG TABS tablet Take 3 mg by mouth nightly as needed      traMADol (ULTRAM) 50 MG tablet Take 50 mg by mouth every 6 hours as needed for Pain.       docusate sodium (COLACE) 100 MG capsule Take 1 capsule by mouth 2 times daily Hold for diarrhea 20 capsule 3    spironolactone (ALDACTONE) 25 MG tablet Take 1 tablet by mouth daily 30 tablet 3    amiodarone (CORDARONE) 200 MG tablet Take 1 tablet by mouth daily      atorvastatin (LIPITOR) 80 MG tablet Take 1 tablet by mouth daily 30 tablet 5    divalproex (DEPAKOTE ER) 500 MG extended release tablet Take 2 tablets by mouth nightly 60 tablet 5    escitalopram (LEXAPRO) 10 MG tablet Take 2 tablets by mouth daily      donepezil (ARICEPT) 5 MG tablet Take 1 tablet by mouth nightly 30 tablet 3    famotidine (PEPCID) 10 MG tablet Take 1 tablet by mouth 2 times daily 60 tablet 5    benztropine (COGENTIN) 0.5 MG tablet Take 1 tablet by mouth 2 times daily 60 tablet 0    metoprolol tartrate (LOPRESSOR) 25 MG tablet Take 1 tablet by mouth 2 times daily 60 tablet 0    digoxin (LANOXIN) 125 MCG tablet Take 1 tablet by mouth daily 30 tablet 0  Compression Stockings MISC by Does not apply route Duration of Treatment:  3 Months  # of pairs:  2    Compression Class Level - 20-30 mmHg*    Style - Knee High 2 each 0       Review of Systems:   Review of Systems   Unable to perform ROS: Dementia           Examination:      Vitals:    02/21/22 1100 02/21/22 1200 02/21/22 1300 02/21/22 1400   BP: 128/73 124/72 96/65 107/62   Pulse: 74 65 62 77   Resp: 18 15 13 19   Temp:  97.9 °F (36.6 °C)     TempSrc:  Oral     SpO2: 94% 98% 97% 98%   Weight:       Height:            Body mass index is 31.01 kg/m². Physical Exam  Constitutional:       Appearance: He is not ill-appearing. Comments: Patient has dementia and only alert to self. He does not know where he is and why he is here. Does not know the time    HENT:      Head: Normocephalic and atraumatic. Nose: No congestion. Mouth/Throat:      Mouth: Mucous membranes are moist.   Eyes:      Pupils: Pupils are equal, round, and reactive to light. Cardiovascular:      Rate and Rhythm: Normal rate and regular rhythm. Heart sounds: No murmur heard. Pulmonary:      Effort: Pulmonary effort is normal.      Breath sounds: No rhonchi or rales. Abdominal:      General: Abdomen is flat. There is no distension. Palpations: Abdomen is soft. Musculoskeletal:      Right lower leg: No edema. Left lower leg: No edema. Skin:     General: Skin is warm. Neurological:      Mental Status: He is disoriented.       Comments: Patient is diagnosed with dementia       CBC:   Lab Results   Component Value Date    WBC 7.1 02/20/2022    HGB 11.5 02/20/2022    HCT 39.2 02/20/2022     02/20/2022     Lipids:  Lab Results   Component Value Date    CHOL 109 12/27/2021    TRIG 168 (H) 12/27/2021    HDL 39 (L) 12/27/2021    LDLDIRECT 34 12/27/2021     PT/INR:   Lab Results   Component Value Date    INR 0.84 12/19/2020        BMP:    Lab Results   Component Value Date     02/20/2022    K 4.4 02/20/2022     02/20/2022    CO2 23 02/20/2022    BUN 20 02/20/2022     CMP:   Lab Results   Component Value Date    AST 22 12/27/2021    PROT 6.7 12/27/2021    BILITOT 0.5 12/27/2021    ALKPHOS 74 12/27/2021     TSH:  No results found for: TSH    EKGINTERPRETATION - EKG Interpretation:        IMPRESSION / RECOMMENDATIONS:     Bradycardia  PAF  Hypertension  Hyperlipidemia  Advanced dementia  Frequent falls  Coronary artery disease  History of GI bleed  TIA  Diabetes mellitus type 2  Colon Adenocarcinoma    Patient is currently on dopamine drip and heart rate is in sinus 70s. I reviewed the patient's strips where they were some pauses. Digoxin level was up and patient is also on amiodarone so that could be the reason to do so I agree with the plan of holding digoxin and amiodarone and metoprolol for now. Patient is also high risk for fall so he is not on anticoagulation other than the DVT prophylaxis  Patient is having hypotension episodes when dopamine is getting weaned off but heart rates have been remaining in 50s so probably we could start a low-dose midodrine and wean off dopamine to see if he does continue to have significant bradycardia    Will hold off on pacemaker to see if stopping the medication improves the heart rate    Will follow along    Thanks again for allowing me to participate in care of this patient. Please call me if you have any questions. With best regards. Rock Preet MD, 2/21/2022 4:34 PM     Please note this report has been partially produced using speech recognition software and may contain errors related to that system including errors in grammar, punctuation, and spelling, as well as words and phrases that may be inappropriate. If there are any questions or concerns please feel free to contact the dictating provider for clarification.

## 2022-02-22 LAB
DIGOXIN LEVEL: 1.4 NG/ML (ref 0.8–2)
DOSE AMOUNT: NORMAL
DOSE TIME: NORMAL
TROPONIN T: 0.05 NG/ML
TROPONIN T: 0.06 NG/ML

## 2022-02-22 PROCEDURE — 6360000002 HC RX W HCPCS: Performed by: INTERNAL MEDICINE

## 2022-02-22 PROCEDURE — 6370000000 HC RX 637 (ALT 250 FOR IP): Performed by: INTERNAL MEDICINE

## 2022-02-22 PROCEDURE — 99291 CRITICAL CARE FIRST HOUR: CPT | Performed by: INTERNAL MEDICINE

## 2022-02-22 PROCEDURE — 2140000000 HC CCU INTERMEDIATE R&B

## 2022-02-22 PROCEDURE — 96372 THER/PROPH/DIAG INJ SC/IM: CPT

## 2022-02-22 PROCEDURE — 94761 N-INVAS EAR/PLS OXIMETRY MLT: CPT

## 2022-02-22 PROCEDURE — 80162 ASSAY OF DIGOXIN TOTAL: CPT

## 2022-02-22 PROCEDURE — 97535 SELF CARE MNGMENT TRAINING: CPT

## 2022-02-22 PROCEDURE — 6370000000 HC RX 637 (ALT 250 FOR IP): Performed by: NURSE PRACTITIONER

## 2022-02-22 PROCEDURE — 99231 SBSQ HOSP IP/OBS SF/LOW 25: CPT | Performed by: NURSE PRACTITIONER

## 2022-02-22 PROCEDURE — 97166 OT EVAL MOD COMPLEX 45 MIN: CPT

## 2022-02-22 PROCEDURE — 36415 COLL VENOUS BLD VENIPUNCTURE: CPT

## 2022-02-22 PROCEDURE — 84484 ASSAY OF TROPONIN QUANT: CPT

## 2022-02-22 PROCEDURE — 2580000003 HC RX 258: Performed by: INTERNAL MEDICINE

## 2022-02-22 RX ORDER — MIDODRINE HYDROCHLORIDE 5 MG/1
7.5 TABLET ORAL
Status: DISCONTINUED | OUTPATIENT
Start: 2022-02-22 | End: 2022-02-22

## 2022-02-22 RX ORDER — MIDODRINE HYDROCHLORIDE 5 MG/1
10 TABLET ORAL
Status: DISCONTINUED | OUTPATIENT
Start: 2022-02-22 | End: 2022-02-24 | Stop reason: HOSPADM

## 2022-02-22 RX ADMIN — ESCITALOPRAM 20 MG: 10 TABLET, FILM COATED ORAL at 08:18

## 2022-02-22 RX ADMIN — DOCUSATE SODIUM 100 MG: 100 CAPSULE, LIQUID FILLED ORAL at 19:54

## 2022-02-22 RX ADMIN — SPIRONOLACTONE 25 MG: 25 TABLET ORAL at 08:18

## 2022-02-22 RX ADMIN — BENZTROPINE MESYLATE 0.5 MG: 1 TABLET ORAL at 19:53

## 2022-02-22 RX ADMIN — ATORVASTATIN CALCIUM 80 MG: 40 TABLET, FILM COATED ORAL at 08:18

## 2022-02-22 RX ADMIN — SODIUM CHLORIDE, PRESERVATIVE FREE 10 ML: 5 INJECTION INTRAVENOUS at 08:19

## 2022-02-22 RX ADMIN — FAMOTIDINE 10 MG: 20 TABLET ORAL at 08:18

## 2022-02-22 RX ADMIN — DONEPEZIL HYDROCHLORIDE 5 MG: 5 TABLET, FILM COATED ORAL at 19:54

## 2022-02-22 RX ADMIN — MIDODRINE HYDROCHLORIDE 5 MG: 5 TABLET ORAL at 11:14

## 2022-02-22 RX ADMIN — FAMOTIDINE 10 MG: 20 TABLET ORAL at 19:52

## 2022-02-22 RX ADMIN — DOCUSATE SODIUM 100 MG: 100 CAPSULE, LIQUID FILLED ORAL at 08:18

## 2022-02-22 RX ADMIN — SODIUM CHLORIDE, PRESERVATIVE FREE 10 ML: 5 INJECTION INTRAVENOUS at 20:01

## 2022-02-22 RX ADMIN — BENZTROPINE MESYLATE 0.5 MG: 1 TABLET ORAL at 08:25

## 2022-02-22 RX ADMIN — MIDODRINE HYDROCHLORIDE 10 MG: 5 TABLET ORAL at 16:58

## 2022-02-22 RX ADMIN — MIDODRINE HYDROCHLORIDE 5 MG: 5 TABLET ORAL at 08:18

## 2022-02-22 RX ADMIN — DIVALPROEX SODIUM 1000 MG: 500 TABLET, FILM COATED, EXTENDED RELEASE ORAL at 19:58

## 2022-02-22 RX ADMIN — BUSPIRONE HYDROCHLORIDE 10 MG: 5 TABLET ORAL at 19:55

## 2022-02-22 RX ADMIN — ENOXAPARIN SODIUM 40 MG: 100 INJECTION SUBCUTANEOUS at 08:19

## 2022-02-22 ASSESSMENT — PAIN SCALES - GENERAL
PAINLEVEL_OUTOF10: 0

## 2022-02-22 NOTE — PROGRESS NOTES
Progress Note  Date:2022       Room:  Patient Name:Felipe Heller Jr. YOB: 1950     Age:72 y.o. States he is cold  Subjective    Subjective:  Symptoms:  Stable. Diet:  Adequate intake. Pain:  He reports no pain. Review of Systems  Objective         Vitals Last 24 Hours:  TEMPERATURE:  Temp  Av.8 °F (36.6 °C)  Min: 97.5 °F (36.4 °C)  Max: 98.1 °F (36.7 °C)  RESPIRATIONS RANGE: Resp  Av.5  Min: 12  Max: 31  PULSE OXIMETRY RANGE: SpO2  Av.5 %  Min: 93 %  Max: 100 %  PULSE RANGE: Pulse  Av.9  Min: 54  Max: 91  BLOOD PRESSURE RANGE: Systolic (97FJQ), GQS:269 , Min:96 , MHC:374   ; Diastolic (47ADJ), NI, Min:57, Max:118    I/O (24Hr): Intake/Output Summary (Last 24 hours) at 2022 0729  Last data filed at 2022 0630  Gross per 24 hour   Intake 781 ml   Output 725 ml   Net 56 ml     Objective:  General Appearance:  Comfortable. Vital signs: (most recent): Blood pressure (!) 114/55, pulse 54, temperature 97.9 °F (36.6 °C), temperature source Oral, resp. rate 12, height 5' 9\" (1.753 m), weight 210 lb (95.3 kg), SpO2 95 %. (Dopamine). HEENT: Normal HEENT exam.    Lungs:  Normal effort. Heart: Normal rate. Abdomen: Abdomen is soft. Extremities: Decreased range of motion. Neurological: Patient is alert. Pupils:  Pupils are equal, round, and reactive to light. Skin:  Warm. Labs/Imaging/Diagnostics    Labs:  CBC:  Recent Labs     22  0922   WBC 6.5 7.1   RBC 3.90* 3.83*   HGB 11.8* 11.5*   HCT 37.5* 39.2*   MCV 96.2 102.3*   RDW 16.4* 16.3*    168     CHEMISTRIES:  Recent Labs     22  0922    140   K 4.0 4.4    107   CO2 28 23   BUN 20 20   CREATININE 1.9* 1.9*   GLUCOSE 83 91     PT/INR:No results for input(s): PROTIME, INR in the last 72 hours. APTT:No results for input(s): APTT in the last 72 hours.   LIVER PROFILE:No results for input(s): AST, ALT, BILIDIR, BILITOT, ALKPHOS in the last 72 hours. Imaging Last 24 Hours:  Echocardiogram complete 2D with doppler with color    Result Date: 2/21/2022  Transthoracic Echocardiography Report (TTE)  Demographics   Patient Name       Dev Kelley   Date of Study       02/21/2022                        Date of Birth      1950         Gender              Male   Age                67 year(s)         Race                   Patient Number     [id number]         Room Number         2102   Visit Number       [id number]   Corporate ID       [id number]   Accession Number   [id number]         Rommel Hancock RDMS   Ordering Physician Celeste Garrett CNP        Interpreting        Eren Madrid MD  Procedure Type of Study   TTE procedure:ECHOCARDIOGRAM COMPLETE 2D W DOPPLER W COLOR. Procedure Date Date: 02/21/2022 Start: 07:49 AM Study Location: Portable Technical Quality: Fair visualization Indications:Congestive heart failure. Patient Status: Routine Height: 69 inches Weight: 210 pounds BSA: 2.11 m2 BMI: 31.01 kg/m2 HR: 6 bpm BP: 137/64 mmHg  Conclusions   Summary  Left ventricular systolic function is normal.  Ejection fraction is visually estimated at 50-55%. The non coronary cusp of the aortic valve appears heavily calcified and  restricted. Mild aortic regurgitation; PHT: 563 msec. No evidence of any pericardial effusion. Signature   ------------------------------------------------------------------  Electronically signed by Eren Walker MD (Interpreting  physician) on 02/21/2022 at 12:49 PM  ------------------------------------------------------------------   Findings   Left Ventricle  Left ventricular systolic function is normal.  Ejection fraction is visually estimated at 50-55%. Mild left ventricular hypertrophy.   Left ventricle size is normal.  No regional wall motion abnormalites. Indeterminate diastolic function; E/A flow reversal is noted. Left Atrium  Essentially normal left atrium. Right Atrium  Essentially normal right atrium. Right Ventricle  Essentially normal right ventricle. Aortic Valve  The non coronary cusp of the aortic valve appears heavily calcified and  restricted. Mild aortic regurgitation; PHT: 563 msec. Mitral Valve  Structurally normal mitral valve. Tricuspid Valve  Mild tricuspid regurgitation; RVSP: 30 mmHg. Pulmonic Valve  Pulmonic valve is structurally normal.   Pericardial Effusion  No evidence of any pericardial effusion. Pleural Effusion  No evidence of pleural effusion. Miscellaneous  IVC and abdominal aorta were not well visualized.   M-Mode/2D Measurements & Calculations   LV Diastolic Dimension:  LV Systolic Dimension:  LA Dimension: 3.1 cmAO Root  4.05 cm                  2.96 cm                 Dimension: 2.9 cmLA Area:  LV FS:26.9 %             LV Volume Diastolic: 51 04.8 cm2  LV PW Diastolic: 2.23 cm ml  Septum Diastolic: 9.70   LV Volume Systolic: 21  cm                       ml  CO: 0.37 l/min           LV EDV/LV EDV Index: 51 RV Diastolic Dimension:  CI: 5.51 l/m*m2          ml/24 m2LV ESV/LV ESV   2.75 cm                           Index: 21 ml/10 m2  LV Area Diastolic: 74.5  EF Calculated (A4C):    LA/Aorta: 1.07  cm2                      58.8 %  LV Area Systolic: 16.1   EF Calculated (2D): 53  LA volume/Index: 34 ml  cm2                      %                       /16m2                            LV Length: 6.94 cm                            LVOT: 2 cm  Doppler Measurements & Calculations   MV Peak E-Wave: 45.4 AV Peak Velocity: 142 cm/s    LVOT Peak Velocity: 111  cm/s                 AV Peak Gradient: 8.07 mmHg   cm/s  MV Peak A-Wave: 106  AV Mean Velocity: 95.3 cm/s   LVOT Mean Velocity: 79.1  cm/s                 AV Mean Gradient: 4 mmHg      cm/s  MV E/A Ratio: 0.43   AV VTI: 26.8 cm LVOT Peak Gradient: 5  MV Peak Gradient:    AV Area (Continuity):2.31 cm2 mmHgLVOT Mean Gradient: 3  0.82 mmHg                                          mmHg                       LVOT VTI: 19.7 cm             Estimated RVSP: 30 mmHg  MV P1/2t: 56 msec    AV P1/2t: 636 msec            Estimated RAP:3 mmHg  MVA by PHT:3.93 cm2  Estimated PASP: 30.25 mmHg   MV E' Septal                                       TR Velocity:261 cm/s  Velocity: 4.72 cm/s                                TR Gradient:27.25 mmHg  MV E' Lateral  Velocity: 7.13 cm/s  MV E/E' septal: 9.62  MV E/E' lateral:  6.37      Assessment//Plan           Hospital Problems           Last Modified POA    * (Principal) Elevated troponin 2/19/2022 Yes    Fall 2/21/2022 Yes    Bradycardia 2/21/2022 Yes        Assessment & Plan  Symptomatic bradycardia with sinus pause  -was on BB,dig and amio and held  -dopamine  -echo with normal EF and calcified aortic jael  Elevated trop  -due to demand supply mismatch  Recurrent mechanical fall  -PT/OT and will go back to SNF  Chronic Afib  -not on AC due to hx of IC hemorrhage 20202 and recurrent fals  -above med held  Dementia  -on buspar, depakote, aricept and congentin  HTN  -BB held with bradycardia  CKD 3  -on aldactone and stable and potassium wnl  AOCD  -stable    transfer out of ICU and wean off dopamine and make sure HR stable.    Electronically signed by Temo Adams MD on 2/22/22 at 7:29 AM EST

## 2022-02-22 NOTE — PROGRESS NOTES
Occupational Therapy    [facility] ACUTE CARE OCCUPATIONAL THERAPY EVALUATION  Jenna Colunga., 1950, 3110/3110-A, 2/22/2022    History  [place]:  The primary encounter diagnosis was Fall, initial encounter. Diagnoses of Elevated troponin and Bradycardia were also pertinent to this visit. Patient  has a past medical history of Anemia, Arthritis, Back pain, chronic, Bipolar 1 disorder ([address] CAD (coronary artery disease), Cerebral artery occlusion with cerebral infarction ([address] Chronic kidney disease (CKD), stage III (moderate) ([address] CKD (chronic kidney disease), COPD (chronic obstructive pulmonary disease) ([address] Diabetes mellitus, type 2 ([address] Diabetic peripheral neuropathy ([address] Diastolic CHF ([address] Gastric ulcer, H/O cardiovascular stress test, Heart murmur, Hiatal hernia, History of cardiac cath, Hx of migraines, HX OTHER MEDICAL, Hyperlipidemia, Hypertension, Intraparenchymal hematoma of brain ([address] Lumbar radiculopathy, Overlapping malignant neoplasm of colon ([address] Panic attack, Pleural effusion, S/P thoracentesis, Sleep apnea, SOBOE (shortness of breath on exertion), and Spinal stenosis. Patient  has a past surgical history that includes Neck surgery (1998); Carpal tunnel release (Bilateral, 1989); knee surgery (Bilateral); thoracentesis (Left, 12/20/2013); Elbow surgery (Left, 2000's); Thoracentesis (4/25/2016); Tonsillectomy; Thoracentesis (Left, 11/15/2016); thoracotomy (Left, 03/18/2019); thoracotomy (Left, 3/18/2019); Upper gastrointestinal endoscopy (N/A, 5/12/2020); Colonoscopy (N/A, 5/12/2020); and Small intestine surgery (N/A, 5/21/2020). Subjective:  Patient states: \"We are at a house\". Pain:  No.    Communication with other providers:  Handoff to RN  Restrictions: General Precautions, Fall Risk    Home Setup/Prior level of function   Pt is a poor historian. Per case management notes pt was at [facility] for rehab.  Last social functional taken 6 months ago 02/24/2021    Lives With: Significant other  Type of Home: House  Home Layout: Two level  Home Access: Stairs to enter with rails  Entrance Stairs - Number of Steps: 4  Entrance Stairs - Rails: Both  Bathroom Shower/Tub: Walk-in shower  Bathroom Toilet: Standard  Home Equipment: Rolling walker, [employer]  ADL Assistance: Needs assistance(girlfriend assists with bathing/dressing (lower body))  Homemaking Assistance: Needs assistance  Ambulation Assistance: Independent(mod I with front wheeled walker)  Transfer Assistance: Independent  Active : No    Examination of body systems (includes body structures/functions, activity/participation limitations):  · Observation:  Supine in bed upon arrival, agreeable to therapy  · Vision:  Glasses  · Hearing:  [place]  · Cardiopulmonary:  Vitals stable throughout session. On 02. Body Systems and functions:  · ROM R/L:  LUE: guarding no shoulder flexion.  Pt relates it to an accident, unable to relate what accident due to dementia (most likely fall at SNF) distal WFL; RUE: WFL    · Strength R/L:  LUE:2/5; RUE:4-/5,   · Sensation: Denies numbness  · Tone: Normal  · Coordination: Tremors at all times increases w/ movement, decreased speed/accuracy BUE  · Perception: min decreased initiation/sequencing, multiple VCs and tactile cues to overcome    Activities of Daily Living (ADLs):  · Feeding: Setup/CGA (feeding sitting upright in chair at end of session)  · Grooming: CGA (washing hands/face w/ warm washcloth)  · UB bathing: David  · LB bathing: maxA (washing paulino area/buttocks in stand after pt soiled depends supine in bed)  · UB dressing: David  · LB dressing: maxA (doffing soiled depends in stand)  · Toileting: maxA (See LB bathing/dressing for details)    Cognitive and Psychosocial Functioning:  · Overall cognitive status: Dementia at baseline, AxO to self only, decreased short term memory, decreased problem solving/safety awareness, decreased insight into deficits, min re-direction to task, decreased initiation/sequencing, increased processing time  · Affect: Pleasantly confused       Mobility:  · Supine to sit:  maxA  · Transfers: David STS from EOB up to RW w/ 2 STS, stand step maxA from EOB to reclining chair  · Sitting balance:  modA moving to SBA w/ use of bilateral bed rails and proper positioning. · Standing balance:  David w/ RW ~2 minutes before fatiguing  · Functional Mobility: modA w/ RW ~4 feet before fatiguing  · Toilet/Shower Transfers: DNT               [facility] Daily Activity Inpatient   How much help for putting on and taking off regular lower body clothing?: Total  How much help for Bathing?: A Lot  How much help for Toileting?: Total  How much help for putting on and taking off regular upper body clothing?: A Little  How much help for taking care of personal grooming?: A Little  How much help for eating meals?: A Little  [facility] Inpatient Daily Activity Raw Score: 13  AM-PAC Inpatient ADL T-Scale Score : 32.03  ADL Inpatient CMS 0-100% Score: 63.03  ADL Inpatient CMS G-Code Modifier : CL    Treatment:  Self Care Training:   Cues were given for safety, sequence, UE/LE placement, visual cues, and balance. Activities performed today included grooming, toileting, LB bathing/dressing    Therapeutic Activity Training:   Therapeutic activity training was instructed today. Cues were given for safety, sequence, UE/LE placement, awareness, and balance. Activities performed today included bed mobility training, sup-sit, sit-stand, functional mobility, stand to sit      Safety: patient left in chair with chair alarm, call light within reach, RN notified, gait belt used. Assessment:  Pt is a 68 yo male admitted from home for elevated troponin. Pt at baseline needs assistance for ADLs needs assistance for high level IADLs and is Independent for functional transfers/mobility w/ AD.  Pt currently presents w/ deficits in ADL and high level IADL independence, functional activity tolerance, dynamic sitting and standing balance and tolerance and functional transfers, BUE strength, cognition and OOB activity. Pt would benefit from continued acute care OT services w/ discharge to SNF  Complexity: Moderate  Prognosis: Good, no significant barriers to participation at this time.    Plan  Times per week: 3x+  Times per day: Daily  Current Treatment Recommendations: [address] Re-education,Patient/Caregiver Education & Training,Equipment Evaluation, Education, & procurement,Self-Care / ADL,ROM,Cognitive Reorientation,Balance Training,Pain Management,Home Management Training,Cognitive/Perceptual Training,Functional Mobility Training,Safety Education & Training,Positioning     Equipment: defer    Goals:  Pt goal: go home  Time Frame for STGs: discharge  Goal 1: Pt will perform UE ADLs Supervision  Goal 2: Pt will perform LE ADLs SBA  Goal 3: Pt will perform toileting SBA  Goal 4: Pt will perform functional transfer w/ AD SBA  Goal 5: Pt will perform functional mobility w/ AD SBA  Goal 6: Pt will perform therex/theract in order to increase functional activity tolerance and dynamic standing balance    Treatment plan:  Pt will perform functional task in stand reaching in all 3 planes in order to increase dynamic standing balance and functional activity tolerance    Recommendations for NURSING activity: Up to chair for all 3 meals and up to [facility] for all toileting needs    Time:   Time in: 1655  Time out: 1719  Timed treatment minutes: 9 minutes  Total time: 24 minutes    Electronically signed by:   Erik BELLO/JOSE [id number]  5:32 PM,2/22/2022

## 2022-02-22 NOTE — PLAN OF CARE
Problem: Falls - Risk of:  Goal: Will remain free from falls  Description: Will remain free from falls  2/22/2022 1036 by Ceferino Freeman RN  Outcome: Ongoing  2/22/2022 0418 by Paulette Ramírez RN  Outcome: Ongoing  Goal: Absence of physical injury  Description: Absence of physical injury  2/22/2022 1036 by Ceferino Freeman RN  Outcome: Ongoing  2/22/2022 0418 by Paulette Ramírez RN  Outcome: Ongoing     Problem: Skin Integrity:  Goal: Will show no infection signs and symptoms  Description: Will show no infection signs and symptoms  2/22/2022 1036 by Ceferino Freeman RN  Outcome: Ongoing  2/22/2022 0418 by Paulette Ramírez RN  Outcome: Ongoing  Goal: Absence of new skin breakdown  Description: Absence of new skin breakdown  2/22/2022 1036 by Ceferino Freeman RN  Outcome: Ongoing  2/22/2022 0418 by Paulette Ramírez RN  Outcome: Ongoing

## 2022-02-22 NOTE — CARE COORDINATION
Pt is a LTR at [facility] and may return when medically ready. Pt is a bed hold. No pre-cert needed. Rapid Covid needed on day of d/c. D/C INSTRUCTIONS ARE ON FRONT OF PACKET LOCATED WITH SOFT CHART.   DOROTEO      .

## 2022-02-22 NOTE — PROGRESS NOTES
Admit Date:  2/19/2022    Admission diagnosis / Complaint:   Bradycardia        Subjective:  Mr. Erick Ching is resting in bed. He is very sleepy. Objective:   /61   Pulse 59   Temp 97.9 °F (36.6 °C) (Oral)   Resp 13   Ht 5' 9\" (1.753 m)   Wt 210 lb (95.3 kg)   SpO2 95%   BMI 31.01 kg/m²     Intake/Output Summary (Last 24 hours) at 2/22/2022 1647  Last data filed at 2/22/2022 1159  Gross per 24 hour   Intake 641.25 ml   Output 400 ml   Net 241.25 ml       TELEMETRY: Sinus bradycardia   has a past medical history of Anemia, Arthritis, Back pain, chronic, Bipolar 1 disorder (HCC), CAD (coronary artery disease), Cerebral artery occlusion with cerebral infarction ([address] Chronic kidney disease (CKD), stage III (moderate) ([address] CKD (chronic kidney disease), COPD (chronic obstructive pulmonary disease) ([address] Diabetes mellitus, type 2 ([address] Diabetic peripheral neuropathy ([address] Diastolic CHF ([address] Gastric ulcer, H/O cardiovascular stress test, Heart murmur, Hiatal hernia, History of cardiac cath, Hx of migraines, HX OTHER MEDICAL, Hyperlipidemia, Hypertension, Intraparenchymal hematoma of brain (HCC), Lumbar radiculopathy, Overlapping malignant neoplasm of colon ([address] Panic attack, Pleural effusion, S/P thoracentesis, Sleep apnea, SOBOE (shortness of breath on exertion), and Spinal stenosis. has a past surgical history that includes Neck surgery (1998); Carpal tunnel release (Bilateral, 1989); knee surgery (Bilateral); thoracentesis (Left, 12/20/2013); Elbow surgery (Left, 2000's); Thoracentesis (4/25/2016); Tonsillectomy; Thoracentesis (Left, 11/15/2016); thoracotomy (Left, 03/18/2019); thoracotomy (Left, 3/18/2019); Upper gastrointestinal endoscopy (N/A, 5/12/2020); Colonoscopy (N/A, 5/12/2020); and Small intestine surgery (N/A, 5/21/2020).   Physical Exam:  General: sleepy, confused  Skin:  Warm and dry  Neck:  JVD not appreciated  Chest:  Clear to auscultation, respiration easy  Cardiovascular:  RRR (bradycardic) S1S2  Abdomen:  Soft nontender  Extremities:   No edema    Medications:    midodrine  10 mg Oral TID WC    benztropine  0.5 mg Oral BID    [Held by provider] metoprolol tartrate  25 mg Oral BID    [Held by provider] amiodarone  200 mg Oral Daily    atorvastatin  80 mg Oral Daily    divalproex  1,000 mg Oral Nightly    escitalopram  20 mg Oral Daily    donepezil  5 mg Oral Nightly    famotidine  10 mg Oral BID    docusate sodium  100 mg Oral BID    spironolactone  25 mg Oral Daily    busPIRone  10 mg Oral BID    sodium chloride flush  5-40 mL IntraVENous 2 times per day    enoxaparin  40 mg SubCUTAneous Daily      DOPamine 3 mcg/kg/min (02/22/22 1154)    sodium chloride       melatonin, traMADol, sodium chloride flush, sodium chloride, ondansetron **OR** ondansetron, polyethylene glycol, acetaminophen **OR** acetaminophen    Lab Data:  CBC:   Recent Labs     02/20/22  0149   WBC 7.1   HGB 11.5*   HCT 39.2*   .3*        BMP:   Recent Labs     02/20/22  0149      K 4.4      CO2 23   BUN 20   CREATININE 1.9*     LIVER PROFILE: No results for input(s): AST, ALT, LIPASE, BILIDIR, BILITOT, ALKPHOS in the last 72 hours. Invalid input(s): AMYLASE,  ALB  PT/INR: No results for input(s): PROTIME, INR in the last 72 hours. APTT: No results for input(s): APTT in the last 72 hours. BNP:  No results for input(s): BNP in the last 72 hours. TROPONIN: @TROPONINI:3@      Assessment and Plan    Bradycardia  PAF   HTN  Hyperlipidemia  Advanced Dementia  Frequent Falls   CAD  History of GI bleed  TIA  DM type 2  Colon Adenocarcinoma    Patient was started on midodrine 5 mg TID yesterday  RN able to titrate dopamine down to 3 mcg  HR in the 50's, BP is soft  Will uptitrate midodrine to 10 mg TID. Discussed with RN to attempt to titrate off dopamine after giving increased dose of midodrine.    Patient noted to be in sinus rhythm at this time  Do not recommend long term AC as patient has history of GI bleed and frequent falls. Talib Ramírez, APRN - CNP, 2/22/2022 4:47 PM     Please note this report has been partially produced using speech recognition software and may contain errors related to that system including errors in grammar, punctuation, and spelling, as well as words and phrases that may be inappropriate. If there are any questions or concerns please feel free to contact the dictating provider for clarification.

## 2022-02-23 LAB
ANION GAP SERPL CALCULATED.3IONS-SCNC: 12 MMOL/L (ref 4–16)
BUN BLDV-MCNC: 21 MG/DL (ref 6–23)
CALCIUM SERPL-MCNC: 8.8 MG/DL (ref 8.3–10.6)
CHLORIDE BLD-SCNC: 102 MMOL/L (ref 99–110)
CO2: 26 MMOL/L (ref 21–32)
CREAT SERPL-MCNC: 1.7 MG/DL (ref 0.9–1.3)
GFR AFRICAN AMERICAN: 48 ML/MIN/1.73M2
GFR NON-AFRICAN AMERICAN: 40 ML/MIN/1.73M2
GLUCOSE BLD-MCNC: 76 MG/DL (ref 70–99)
MAGNESIUM: 2.2 MG/DL (ref 1.8–2.4)
POTASSIUM SERPL-SCNC: 4.3 MMOL/L (ref 3.5–5.1)
SODIUM BLD-SCNC: 140 MMOL/L (ref 135–145)
T4 FREE: 0.86 NG/DL (ref 0.9–1.8)
TSH HIGH SENSITIVITY: 3.9 UIU/ML (ref 0.27–4.2)

## 2022-02-23 PROCEDURE — 6370000000 HC RX 637 (ALT 250 FOR IP): Performed by: INTERNAL MEDICINE

## 2022-02-23 PROCEDURE — 97110 THERAPEUTIC EXERCISES: CPT

## 2022-02-23 PROCEDURE — 99233 SBSQ HOSP IP/OBS HIGH 50: CPT | Performed by: INTERNAL MEDICINE

## 2022-02-23 PROCEDURE — 84443 ASSAY THYROID STIM HORMONE: CPT

## 2022-02-23 PROCEDURE — 97535 SELF CARE MNGMENT TRAINING: CPT

## 2022-02-23 PROCEDURE — 97530 THERAPEUTIC ACTIVITIES: CPT

## 2022-02-23 PROCEDURE — 6370000000 HC RX 637 (ALT 250 FOR IP): Performed by: NURSE PRACTITIONER

## 2022-02-23 PROCEDURE — 80048 BASIC METABOLIC PNL TOTAL CA: CPT

## 2022-02-23 PROCEDURE — APPSS60 APP SPLIT SHARED TIME 46-60 MINUTES: Performed by: NURSE PRACTITIONER

## 2022-02-23 PROCEDURE — 97162 PT EVAL MOD COMPLEX 30 MIN: CPT

## 2022-02-23 PROCEDURE — 6360000002 HC RX W HCPCS: Performed by: INTERNAL MEDICINE

## 2022-02-23 PROCEDURE — 82533 TOTAL CORTISOL: CPT

## 2022-02-23 PROCEDURE — 94761 N-INVAS EAR/PLS OXIMETRY MLT: CPT

## 2022-02-23 PROCEDURE — 96372 THER/PROPH/DIAG INJ SC/IM: CPT

## 2022-02-23 PROCEDURE — 83735 ASSAY OF MAGNESIUM: CPT

## 2022-02-23 PROCEDURE — 6360000002 HC RX W HCPCS: Performed by: NURSE PRACTITIONER

## 2022-02-23 PROCEDURE — 99231 SBSQ HOSP IP/OBS SF/LOW 25: CPT | Performed by: NURSE PRACTITIONER

## 2022-02-23 PROCEDURE — 97116 GAIT TRAINING THERAPY: CPT

## 2022-02-23 PROCEDURE — 36415 COLL VENOUS BLD VENIPUNCTURE: CPT

## 2022-02-23 PROCEDURE — 84439 ASSAY OF FREE THYROXINE: CPT

## 2022-02-23 PROCEDURE — 2140000000 HC CCU INTERMEDIATE R&B

## 2022-02-23 RX ORDER — DOPAMINE HYDROCHLORIDE 160 MG/100ML
1-20 INJECTION, SOLUTION INTRAVENOUS CONTINUOUS
Status: DISCONTINUED | OUTPATIENT
Start: 2022-02-23 | End: 2022-02-24 | Stop reason: HOSPADM

## 2022-02-23 RX ADMIN — MELATONIN 3 MG: at 21:29

## 2022-02-23 RX ADMIN — FAMOTIDINE 10 MG: 20 TABLET ORAL at 08:52

## 2022-02-23 RX ADMIN — DONEPEZIL HYDROCHLORIDE 5 MG: 5 TABLET, FILM COATED ORAL at 21:29

## 2022-02-23 RX ADMIN — DOCUSATE SODIUM 100 MG: 100 CAPSULE, LIQUID FILLED ORAL at 21:28

## 2022-02-23 RX ADMIN — DIVALPROEX SODIUM 1000 MG: 500 TABLET, FILM COATED, EXTENDED RELEASE ORAL at 21:29

## 2022-02-23 RX ADMIN — SPIRONOLACTONE 25 MG: 25 TABLET ORAL at 08:52

## 2022-02-23 RX ADMIN — BUSPIRONE HYDROCHLORIDE 10 MG: 5 TABLET ORAL at 21:28

## 2022-02-23 RX ADMIN — BENZTROPINE MESYLATE 0.5 MG: 1 TABLET ORAL at 21:28

## 2022-02-23 RX ADMIN — FAMOTIDINE 10 MG: 20 TABLET ORAL at 21:28

## 2022-02-23 RX ADMIN — BUSPIRONE HYDROCHLORIDE 10 MG: 5 TABLET ORAL at 08:52

## 2022-02-23 RX ADMIN — TRAMADOL HYDROCHLORIDE 50 MG: 50 TABLET, COATED ORAL at 21:29

## 2022-02-23 RX ADMIN — ESCITALOPRAM 20 MG: 10 TABLET, FILM COATED ORAL at 08:51

## 2022-02-23 RX ADMIN — MIDODRINE HYDROCHLORIDE 10 MG: 5 TABLET ORAL at 11:49

## 2022-02-23 RX ADMIN — MIDODRINE HYDROCHLORIDE 10 MG: 5 TABLET ORAL at 08:51

## 2022-02-23 RX ADMIN — BENZTROPINE MESYLATE 0.5 MG: 1 TABLET ORAL at 08:51

## 2022-02-23 RX ADMIN — ENOXAPARIN SODIUM 40 MG: 100 INJECTION SUBCUTANEOUS at 08:52

## 2022-02-23 RX ADMIN — MIDODRINE HYDROCHLORIDE 10 MG: 5 TABLET ORAL at 17:12

## 2022-02-23 RX ADMIN — DOCUSATE SODIUM 100 MG: 100 CAPSULE, LIQUID FILLED ORAL at 08:52

## 2022-02-23 RX ADMIN — ATORVASTATIN CALCIUM 80 MG: 40 TABLET, FILM COATED ORAL at 08:51

## 2022-02-23 RX ADMIN — DOPAMINE HYDROCHLORIDE IN DEXTROSE 2 MCG/KG/MIN: 1.6 INJECTION, SOLUTION INTRAVENOUS at 17:52

## 2022-02-23 ASSESSMENT — PAIN SCALES - PAIN ASSESSMENT IN ADVANCED DEMENTIA (PAINAD)
BODYLANGUAGE: 0
TOTALSCORE: 0
FACIALEXPRESSION: 0
CONSOLABILITY: 0
NEGVOCALIZATION: 0
FACIALEXPRESSION: 0
BODYLANGUAGE: 0
TOTALSCORE: 0
BODYLANGUAGE: 0
CONSOLABILITY: 0
FACIALEXPRESSION: 0
TOTALSCORE: 0
TOTALSCORE: 0
BREATHING: 0
FACIALEXPRESSION: 0
NEGVOCALIZATION: 0
BREATHING: 0
BREATHING: 0
FACIALEXPRESSION: 0
NEGVOCALIZATION: 0
TOTALSCORE: 0
BODYLANGUAGE: 0
BREATHING: 0
BODYLANGUAGE: 0
TOTALSCORE: 0
BODYLANGUAGE: 0
FACIALEXPRESSION: 0
NEGVOCALIZATION: 0
NEGVOCALIZATION: 0
CONSOLABILITY: 0
BREATHING: 0
CONSOLABILITY: 0
BREATHING: 0
CONSOLABILITY: 0
CONSOLABILITY: 0
NEGVOCALIZATION: 0

## 2022-02-23 ASSESSMENT — PAIN DESCRIPTION - ORIENTATION: ORIENTATION: LEFT

## 2022-02-23 ASSESSMENT — PAIN SCALES - GENERAL
PAINLEVEL_OUTOF10: 4
PAINLEVEL_OUTOF10: 0

## 2022-02-23 ASSESSMENT — PAIN DESCRIPTION - PROGRESSION: CLINICAL_PROGRESSION: GRADUALLY WORSENING

## 2022-02-23 ASSESSMENT — PAIN DESCRIPTION - LOCATION: LOCATION: ARM;HIP;RIB CAGE

## 2022-02-23 ASSESSMENT — PAIN SCALES - WONG BAKER: WONGBAKER_NUMERICALRESPONSE: 0

## 2022-02-23 ASSESSMENT — PAIN DESCRIPTION - PAIN TYPE: TYPE: ACUTE PAIN

## 2022-02-23 ASSESSMENT — PAIN DESCRIPTION - DESCRIPTORS: DESCRIPTORS: ACHING;CRUSHING;DISCOMFORT

## 2022-02-23 ASSESSMENT — ENCOUNTER SYMPTOMS: SHORTNESS OF BREATH: 0

## 2022-02-23 ASSESSMENT — PAIN DESCRIPTION - FREQUENCY: FREQUENCY: INTERMITTENT

## 2022-02-23 ASSESSMENT — PAIN DESCRIPTION - ONSET: ONSET: ON-GOING

## 2022-02-23 NOTE — CONSULTS
Kay Cyr [address] 1950, 3110/3110-A, 2/23/2022    History  [place]:  The primary encounter diagnosis was Fall, initial encounter. Diagnoses of Elevated troponin and Bradycardia were also pertinent to this visit. Patient  has a past medical history of Anemia, Arthritis, Back pain, chronic, Bipolar 1 disorder ([address] CAD (coronary artery disease), Cerebral artery occlusion with cerebral infarction ([address] Chronic kidney disease (CKD), stage III (moderate) ([address] CKD (chronic kidney disease), COPD (chronic obstructive pulmonary disease) ([address] Diabetes mellitus, type 2 ([address] Diabetic peripheral neuropathy ([address] Diastolic CHF ([address] Gastric ulcer, H/O cardiovascular stress test, Heart murmur, Hiatal hernia, History of cardiac cath, Hx of migraines, HX OTHER MEDICAL, Hyperlipidemia, Hypertension, Intraparenchymal hematoma of brain ([address] Lumbar radiculopathy, Overlapping malignant neoplasm of colon ([address] Panic attack, Pleural effusion, S/P thoracentesis, Sleep apnea, SOBOE (shortness of breath on exertion), and Spinal stenosis. Patient  has a past surgical history that includes Neck surgery (1998); Carpal tunnel release (Bilateral, 1989); knee surgery (Bilateral); thoracentesis (Left, 12/20/2013); Elbow surgery (Left, 2000's); Thoracentesis (4/25/2016); Tonsillectomy; Thoracentesis (Left, 11/15/2016); thoracotomy (Left, 03/18/2019); thoracotomy (Left, 3/18/2019); Upper gastrointestinal endoscopy (N/A, 5/12/2020); Colonoscopy (N/A, 5/12/2020); and Small intestine surgery (N/A, 5/21/2020). Subjective:  Patient states: \"My arms and legs are jerking\". Pain:  \"Some\" pt unable to state location or rate. Communication with other providers:  Handoff to RN, co-tx with [facility]  Restrictions: Fall risk, on 3.5L 02, tele, Sp02, general precaution    Home Setup/Prior level of function    Pt is a poor historian.  Per case management notes pt was at [facility]. Last social functional taken 6 months ago 02/24/2021:    Lives With: Significant other  Type of Home: House  Home Layout: Two level  Home Access: Stairs to enter with rails  Entrance Stairs - Number of Steps: 4  Entrance Stairs - Rails: Both  Bathroom Shower/Tub: Walk-in shower  Bathroom Toilet: Standard  Home Equipment: Rolling walker, [employer]  ADL Assistance: Needs assistance(girlfriend assists with bathing/dressing (lower body))  Homemaking Assistance: Needs assistance  Ambulation Assistance: Independent(mod I with front wheeled walker)  Transfer Assistance: Independent  Active : No    Examination of body systems (includes body structures/functions, activity/participation limitations):  · Observation:  Pt is awake in semi fowlers with n/c on upon arrival  · Vision:  Glasses  · Hearing:  [place]  · Cardiopulmonary:  On 3.5L 02, stable. HR variable. Ranges  BPM- RN aware. · Cognition: Impaired, pt with impaired processing- intermittent confusion, see OT/SLP note for further evaluation. Musculoskeletal  · ROM R/L:  WFL BLE, decreased knee EXT- lacking 25* CALVIN. Increased resistance at all LE joints to PROM, notable rigidity. · Strength R/L: RLE 3-/5, LLE 3/5 decreased in function and endurance. · Neuro:  Increased LE rigidity, Parkinsonian-like presentation: Bradykinesia, Tremor, postural rigidity. UE dyskinesia with reaching tasks. · Gait pattern: Pt demonstrates step-to shuffling pattern with poor foot clearance, poor initiation, intermittent freezing episodes, crouch gait posture. Mobility:  · Supine to sit: Max  · Transfers: Mod  · Sitting balance:  Min-SBA. · Standing balance:  Min-Mod.    · Gait: Mod    [facility] 6 Clicks Inpatient Mobility:  AM-PAC Inpatient Mobility Raw Score : 11    Treatment:    Bed mobility: PT encourages sup>sit, provides v/c for sequencing.  Pt demonstrates poor ability to advance LE, requires Max cues for sequencing with poor carryover, Max A for LE/ hips to EOB and Max A for trunk to upright. Pt with x1 attempt to advance trunk to upright with mod A, failed- required Max A. PT v/c for scooting to EOB, pt requires Max A. Sitting balance: Seated EOB pt demonstrates initial poor balance, increased postural instability with increased rigidity and retro lean. Max cues for relaxation and feet to floor as pt rests in LE flexed position. With time and BUE support pt advances from [address] A to SBA, BUE support required for maintaining upright. Additional seated time on BR commode x4' with SBA for safety. Sit<>Stand: Pt performed STS from EOB to RW  with Mod A, v/c for proper sequencing. Pt demonstrates increased time to upright, requires increased cues for anterior WS, feet to floor, BLE blocked for transfer. Return to seated on commode pt demonstrates significant LE tremoring, fear, difficulty, sequencing descent, MAX v/c for safe sequencing and total of x2' to achieve seated. Return to stand with Mod A, increased cues for sequence, increased retropulsion. End of session return to chair with Mod A to control descent. Standing balance at BR sink x4' with pt significant increased difficulty sequencing hand-washing tasks, increased L lateral lean throughout, Min-Mod A.   Gait: Pt AMB x10 ft to BR with RW, step-to shuffling pattern with poor foot clearance, poor initiation, intermittent freezing episodes, crouch gait posture, Mod A to heavy Mod A for management of RW, cues for increased SL and stepping initiation. Pt with increased retro lean throughout, requires Mod A for turning of RW in order to direct mobility. End of session additional x7' to chair with significant increased time, difficulty with sequencing. End of session pt left in recliner with BIANCA Sesay assisting,  with lines managed, call light, phone, exit alarm, tray, all needs, RN aware. Assessment:    Pt is a 68 y/o male admitted 2/19 with c/o  Fall, pt is LTC resident at [place].   Patient with significant h/o advanced dementia essential hypertension, coronary disease, COPD, atrial fibrillation, see chart. Pt has been performing ADLs/IADLs with increased assist from staff, unable to clearly determine d/t pt poor historian. At this time pt appears to be functioning below baseline. Pt is now presenting with impairments in LE strength, functional endurance, safety awareness, balance, bradykinesia and neuromotor impairments, fall risk, gait and mobility deficits. Pt would benefit from skilled PT services in order to address impairments and promote return to PLOF. PT to recommend d/c to ECF with PT. Complexity: Moderate  Prognosis: Good, no significant barriers to participation at this time. Plan Times per week: 3+/week, 1 week,   Discharge Recommendations: ECF with PT  Equipment: Defer    Goals:  Short term goals  Time Frame for Short term goals: 1 week  Short term goal 1: Pt will AMB x35 ft with RW, Mod A, chair follow for safety, cues for sequencing. Short term goal 2: Pt will perform sit<>stand from standard surface to RW with Min A and cues for sequencing.   Short term goal 3: Pt will tolerate standing dynamic activity x10 minutes with BUE support and CGA       Treatment plan:  Bed mobility, transfers, balance, gait, TA, TX    Recommendations for NURSING mobility: SPT to/from chair with Max A x2    Time:   Time in: 14:11  Time out: 14:48  Timed treatment minutes: 26  Total time: 37    Electronically signed by:    Sravan Sarabia, PT  2/55/0690, 4:64 PM  PT Lic #: [id number]

## 2022-02-23 NOTE — ADT AUTH CERT
Utilization Reviews         Syncope - Care Day 4 (2/22/2022) by Juan R Irizarry LPN       Review Status Review Entered   Completed 2/23/2022 11:49      Criteria Review      Care Day: 4 Care Date: 2/22/2022 Level of Care: Inpatient Floor    Guideline Day 2    Clinical Status    (X) * Hemodynamic stability    (X) * No acute cardiac or neurologic events    (X) * Mental status at baseline    2/23/2022 11:49 AM EST by Ronaldo Delacruz & oriented x 3    (X) * Dangerous arrhythmia absent    2/23/2022 11:49 AM EST by [email or web address]      Normal rate    (X) * No further syncope    ( ) * No evidence of etiology requiring ongoing inpatient care or monitoring (eg, unstable cardiac rhythm or conduction defect)    ( ) * Discharge plans and education understood    2/23/2022 11:49 AM EST by [email or web address]      No discharge    Activity    (X) * Ambulatory or acceptable for next level of care    Routes    (X) * Oral hydration    2/23/2022 11:49 AM EST by [email or web address]      ADULT DIET; Easy to Chew; Low Sodium    (X) * Oral medications or regimen acceptable for next level of care    2/23/2022 11:49 AM EST by Aliza Mckee see med list    (X) * Oral diet or acceptable for next level of care    2/23/2022 11:49 AM EST by [email or web address]      ADULT DIET; Easy to Chew; Low Sodium    * Milestone   Additional Notes   2/22/22--CD 4      Vitals: 157/90 P96 R18 97.5F 98% RA       Abnl/Pertinent Labs      2/22/2022 01:41   Troponin T: 0.046 (H)      2/22/2022 11:03   Troponin T: 0.058 (H)      Physical Exam:   Constitutional:  Well developed, Well nourished, No acute distress, Non-toxic appearance. HENT:  Normocephalic, Atraumatic, Bilateral external ears normal, Oropharynx moist, No oral exudates, Nose normal. Neck- Normal range of motion, No tenderness, Supple, No stridor. Eyes:  EOMI, Conjunctiva normal, No discharge. Respiratory:  Normal breath sounds, No respiratory distress, No wheezing, No chest tenderness. ,no use of accessory muscles, diaphragm movement is normal   Cardiovascular S1-S2 No Murmurs, added sounds.  Normal rate rhythm.  No rubs gallops.  Carotid pulses and amplitude are normal no bruit noted.  Pedal pulses normal femoral pulses normal.  No pedal edema   GI:  Bowel sounds normal, Soft, No tenderness   : No CVA tenderness. Musculoskeletal: No edema, No tenderness, No cyanosis, No clubbing. Back- No tenderness. Integument:  Warm, Dry, No erythema, No rash. Lymphatic:  No lymphadenopathy noted. Neurologic:  Alert & oriented x 3, No focal deficits noted. Psychiatric:  Affect normal, Judgment normal, Mood normal.    -------------      **IM   Assessment & Plan      Symptomatic bradycardia with sinus pause   -was on BB,dig and amio and held   -dopamine   -echo with normal EF and calcified aortic jael   Elevated trop   -due to demand supply mismatch   Recurrent mechanical fall   -PT/OT and will go back to SNF   Chronic Afib   -not on AC due to hx of IC hemorrhage 20202 and recurrent fals   -above med held      transfer out of ICU and wean off dopamine and make sure HR stable   -------------------      **CARDIO      Assessment/Plan:       Symptomatic sinus bradycardia - Now in SR with IV Dopamine   Sinus pause 3.18-second      On IV dopamine drip -attempted to wean off however patient developed bradycardia and hypotension overnight.       IV atropine as needed       Hold off on beta-blocker digoxin and amiodarone   Recheck digoxin level today       EP follow-up   ------------      **EP      Assessment and Plan   Bradycardia      Patient was started on midodrine 5 mg TID yesterday   RN able to titrate dopamine down to 3 mcg   HR in the 50's, BP is soft   Will uptitrate midodrine to 10 mg TID. Discussed with RN to attempt to titrate off dopamine after giving increased dose of midodrine.     Patient noted to be in sinus rhythm at this time   Do not recommend long term AC as patient has history of GI bleed and frequent falls.        Medications:      midodrine (PROAMATINE) tablet 5 mg   Dose: 5 mg   Freq: 3 TIMES DAILY WITH MEALS Route: PO   DOPamine (INTROPIN) 400 mg in dextrose 5 % 250 mL infusion   Rate: 3.6-71.5 mL/hr Dose: 1-20 mcg/kg/min   Weight Dosing Info: 95.3 kg   Freq: CONTINUOUS Route: IV      midodrine, 10 mg, Oral, TID WC   benztropine, 0.5 mg, Oral, BID   [Held by provider] metoprolol tartrate, 25 mg, Oral, BID   [Held by provider] amiodarone, 200 mg, Oral, Daily   atorvastatin, 80 mg, Oral, Daily   divalproex, 1,000 mg, Oral, Nightly   escitalopram, 20 mg, Oral, Daily   donepezil, 5 mg, Oral, Nightly   famotidine, 10 mg, Oral, BID   docusate sodium, 100 mg, Oral, BID   spironolactone, 25 mg, Oral, Daily   busPIRone, 10 mg, Oral, BID   enoxaparin, 40 mg, SubCUTAneous, Daily           Syncope - Care Day 3 (2/21/2022) by Gilberto Sellers LPN       Review Status Review Entered   Completed 2/23/2022 11:36      Criteria Review      Care Day: 3 Care Date: 2/21/2022 Level of Care: ICU    Guideline Day 2    Clinical Status    (X) * Hemodynamic stability    (X) * No acute cardiac or neurologic events    (X) * Mental status at baseline    2/23/2022 11:36 AM EST by Kate Rivas & oriented x 3    (X) * Dangerous arrhythmia absent    2/23/2022 11:36 AM EST by Georgie Agudelo      Normal rate rhythm    (X) * No further syncope    ( ) * No evidence of etiology requiring ongoing inpatient care or monitoring (eg, unstable cardiac rhythm or conduction defect)    ( ) * Discharge plans and education understood    2/23/2022 11:36 AM EST by Georgie Agudelo      No discharge    Activity    (X) * Ambulatory or acceptable for next level of care    Routes    (X) * Oral hydration    2/23/2022 11:36 AM EST by Georgie Agudelo      ADULT DIET; Easy to Chew; Low Sodium    (X) * Oral medications or regimen acceptable for next level of care    2/23/2022 11:36 AM EST by Georgie Agudelo      see med list    (X) * Oral diet or acceptable for next level of care    2/23/2022 11:36 AM EST by Tacey Crater      ADULT DIET; Easy to Chew; Low Sodium    * Milestone   Additional Notes   2/21/22--CD 3      Vitals: 158/79 P77 97.9F 98% RA R19       Radiology:      Transthoracic Echocardiography Report (TTE)    Conclusions    Summary    Left ventricular systolic function is normal.    Ejection fraction is visually estimated at 50-55%.    The non coronary cusp of the aortic valve appears heavily calcified and    restricted.   Leydi Gaona aortic regurgitation; PHT: 563 msec.    No evidence of any pericardial effusion.       Physical Exam:   GEN    Awake male, laying in bed in no apparent distress. Appears given age. EYES   Pupils are equally round.  No scleral erythema, discharge, or conjunctivitis. HENT  Mucous membranes are moist. Oral pharynx without exudates, no evidence of thrush. NECK  Supple, no apparent thyromegaly or masses. RESP  Clear to auscultation, no wheezes, rales or rhonchi.  Symmetric chest movement while on room air. CARDIO/VASC           S1/S2 auscultated. Regular rate without appreciable murmurs, rubs, or gallops. No JVD or carotid bruits. Peripheral pulses equal bilaterally and palpable. No peripheral edema. GI        Abdomen is soft without significant tenderness, masses, or guarding. Bowel sounds are normoactive. Rectal exam deferred.        No costovertebral angle tenderness. Zimmer catheter is not present. HEME/LYMPH            No palpable cervical lymphadenopathy and no hepatosplenomegaly. No petechiae or ecchymoses. MSK    No gross joint deformities. SKIN    Normal coloration, warm, dry. NEURO           Cranial nerves appear grossly intact, normal speech, no lateralizing weakness.    PSYCH            Awake, alert, oriented x 4.  Affect appropriate.   --------------      **IM      Assessment and Plan:   Anamaria Arnold is a 67 y.o. Chago Graces presents with Elevated troponin       1) Symptomatic bradycardia with 3.18s Sinus pause   -EKG showed junctional rhythm   -Was on BB, Digoxin, Amio but currently on hold   -Started on IV Dopamine; wean off as tolerated   -Follow TTE   -Cardiology and EP on board       2) Elevated troponin   -Likely due to demand   -No acute ischemic changes on EKG   -Cardiology on board; continue medical managemet   -------------------      **CARDIO      Assessment/Plan:    Symptomatic sinus bradycardia - Now in SR with IV Dopamine      ICU Evaluation    On IV dopamine drip   IV atropine as needed   Hold off on beta-blocker digoxin and amiodarone   Digoxin level was therapeutic   No need of temporary pacemaker currently   Likely await weaned off response of AV venkat blocking agents   EP to evaluate patient today    Discussed the option of permanent pacemaker with the patient, although patient is DNR CC, is agreeable for pacemaker if indicated   -------------      **EP      IMPRESSION / RECOMMENDATIONS:    Bradycardia      Patient is currently on dopamine drip and heart rate is in sinus 70s.  I reviewed the patient's strips where they were some pauses.  Digoxin level was up and patient is also on amiodarone so that could be the reason to do so I agree with the plan of holding digoxin and amiodarone and metoprolol for now.       Patient is also high risk for fall so he is not on anticoagulation other than the DVT prophylaxis   Patient is having hypotension episodes when dopamine is getting weaned off but heart rates have been remaining in 50s so probably we could start a low-dose midodrine and wean off dopamine to see if he does continue to have significant bradycardia       Will hold off on pacemaker to see if stopping the medication improves the heart rate      Medications:      midodrine (PROAMATINE) tablet 5 mg   Dose: 5 mg   Freq: 3 TIMES DAILY WITH MEALS Route: PO   DOPamine (INTROPIN) 400 mg in dextrose 5 % 250 mL infusion   Rate: 3.6-71.5 mL/hr Dose: 1-20 mcg/kg/min   Weight Dosing Info: 95.3 kg Freq:  CONTINUOUS Route: IV      benztropine, 0.5 mg, Oral, BID   [Held by provider] metoprolol tartrate, 25 mg, Oral, BID   [Held by provider] amiodarone, 200 mg, Oral, Daily   atorvastatin, 80 mg, Oral, Daily   divalproex, 1,000 mg, Oral, Nightly   escitalopram, 20 mg, Oral, Daily   donepezil, 5 mg, Oral, Nightly   famotidine, 10 mg, Oral, BID   docusate sodium, 100 mg, Oral, BID   spironolactone, 25 mg, Oral, Daily   busPIRone, 10 mg, Oral, BID   enoxaparin, 40 mg, SubCUTAneous, Daily

## 2022-02-23 NOTE — PROGRESS NOTES
Dopamine turned off. Patient's HR currently at 43. Will continue to watch patient and turn back on if patient's HR drops below 40. Electronically signed: [facility], [id number], 2/23/22

## 2022-02-23 NOTE — PROGRESS NOTES
Admit Date:  2/19/2022    Admission diagnosis / Complaint:   Bradycardia        Subjective:  Mr. Chantel Manuel is resting in bed. He was sleeping when I entered the room. Heart rate 49. Dopamine at 2.5mcg. BP is stable. I woke patient up and heart rate increased to 53 bpm. Patient is confused. He tells me that the landlord was there to collect the money. He is alert to self only. Objective:   BP (!) 145/60   Pulse 51   Temp 98 °F (36.7 °C) (Oral)   Resp 13   Ht 5' 9\" (1.753 m)   Wt 210 lb (95.3 kg)   SpO2 98%   BMI 31.01 kg/m²       Intake/Output Summary (Last 24 hours) at 2/23/2022 1118  Last data filed at 2/23/2022 0500  Gross per 24 hour   Intake 840 ml   Output    Net 840 ml       TELEMETRY: Sinus bradycardia   has a past medical history of Anemia, Arthritis, Back pain, chronic, Bipolar 1 disorder (HCC), CAD (coronary artery disease), Cerebral artery occlusion with cerebral infarction ([address] Chronic kidney disease (CKD), stage III (moderate) ([address] CKD (chronic kidney disease), COPD (chronic obstructive pulmonary disease) ([address] Diabetes mellitus, type 2 ([address] Diabetic peripheral neuropathy ([address] Diastolic CHF ([address] Gastric ulcer, H/O cardiovascular stress test, Heart murmur, Hiatal hernia, History of cardiac cath, Hx of migraines, HX OTHER MEDICAL, Hyperlipidemia, Hypertension, Intraparenchymal hematoma of brain (HCC), Lumbar radiculopathy, Overlapping malignant neoplasm of colon ([address] Panic attack, Pleural effusion, S/P thoracentesis, Sleep apnea, SOBOE (shortness of breath on exertion), and Spinal stenosis. has a past surgical history that includes Neck surgery (1998); Carpal tunnel release (Bilateral, 1989); knee surgery (Bilateral); thoracentesis (Left, 12/20/2013); Elbow surgery (Left, 2000's); Thoracentesis (4/25/2016); Tonsillectomy; Thoracentesis (Left, 11/15/2016); thoracotomy (Left, 03/18/2019); thoracotomy (Left, 3/18/2019); Upper gastrointestinal endoscopy (N/A, 5/12/2020);  Colonoscopy (N/A, 5/12/2020); and Small intestine surgery (N/A, 5/21/2020). Physical Exam:  General: sleepy, confused  Skin:  Warm and dry  Neck:  JVD not appreciated  Chest:  Clear to auscultation, respiration easy  Cardiovascular:  RRR (bradycardic) S1S2  Abdomen:  Soft nontender  Extremities:   No edema    Medications:    midodrine  10 mg Oral TID WC    benztropine  0.5 mg Oral BID    [Held by provider] metoprolol tartrate  25 mg Oral BID    [Held by provider] amiodarone  200 mg Oral Daily    atorvastatin  80 mg Oral Daily    divalproex  1,000 mg Oral Nightly    escitalopram  20 mg Oral Daily    donepezil  5 mg Oral Nightly    famotidine  10 mg Oral BID    docusate sodium  100 mg Oral BID    spironolactone  25 mg Oral Daily    busPIRone  10 mg Oral BID    sodium chloride flush  5-40 mL IntraVENous 2 times per day    enoxaparin  40 mg SubCUTAneous Daily      DOPamine 1.5 mcg/kg/min (02/23/22 1044)    sodium chloride       melatonin, traMADol, sodium chloride flush, sodium chloride, ondansetron **OR** ondansetron, polyethylene glycol, acetaminophen **OR** acetaminophen    Lab Data:  CBC:   No results for input(s): WBC, HGB, HCT, MCV, PLT in the last 72 hours. BMP:   Recent Labs     02/23/22  0600      K 4.3      CO2 26   BUN 21   CREATININE 1.7*     LIVER PROFILE: No results for input(s): AST, ALT, LIPASE, BILIDIR, BILITOT, ALKPHOS in the last 72 hours. Invalid input(s): AMYLASE,  ALB  PT/INR: No results for input(s): PROTIME, INR in the last 72 hours. APTT: No results for input(s): APTT in the last 72 hours. BNP:  No results for input(s): BNP in the last 72 hours. TROPONIN: @TROPONINI:3@      Assessment and Plan    Bradycardia  PAF   HTN  Hyperlipidemia  Advanced Dementia  Frequent Falls   CAD  History of GI bleed  TIA  DM type 2  Colon Adenocarcinoma    Midodrine increased to 10 mg TID.    Attempting to titrate off dopamine drip  I was told by RN overnight that heart rate dropped to 43 bpm while sleeping when dopamine was titrated down. Currently dopamine is a 2.5 mcg. Discussed with staff to attempt to titrate off dopamine drip with parameters to maintain heart rate above 40 bpm as BP has been stable  Patient also has been asleep most of the time    Patient noted to be in sinus rhythm at this time  Do not recommend long term AC as patient has history of GI bleed and frequent falls. Reno Albrecht, APRN - CNP, 2/23/2022 11:18 AM     Please note this report has been partially produced using speech recognition software and may contain errors related to that system including errors in grammar, punctuation, and spelling, as well as words and phrases that may be inappropriate. If there are any questions or concerns please feel free to contact the dictating provider for clarification.

## 2022-02-23 NOTE — PROGRESS NOTES
This Nurse talked to [facility] this morning. Dopamine to be titrated down to a HR above 40. Electronically signed: Peyman George.  [facility], [id number], 2/23/22

## 2022-02-23 NOTE — PROGRESS NOTES
Occupational Therapy Treatment Note      Name: Elif Durán. MRN: [id number] :   1950   Date:  2022   Admission Date: 2022 Room:  [facility]     Primary Problem:  The primary encounter diagnosis was Fall, initial encounter. Diagnoses of Elevated troponin and Bradycardia were also pertinent to this visit. Patient  has a past medical history of Anemia, Arthritis, Back pain, chronic, Bipolar 1 disorder ([address] CAD (coronary artery disease), Cerebral artery occlusion with cerebral infarction ([address] Chronic kidney disease (CKD), stage III (moderate) ([address] CKD (chronic kidney disease), COPD (chronic obstructive pulmonary disease) ([address] Diabetes mellitus, type 2 ([address] Diabetic peripheral neuropathy ([address] Diastolic CHF ([address] Gastric ulcer, H/O cardiovascular stress test, Heart murmur, Hiatal hernia, History of cardiac cath, Hx of migraines, HX OTHER MEDICAL, Hyperlipidemia, Hypertension, Intraparenchymal hematoma of brain ([address] Lumbar radiculopathy, Overlapping malignant neoplasm of colon ([address] Panic attack, Pleural effusion, S/P thoracentesis, Sleep apnea, SOBOE (shortness of breath on exertion), and Spinal stenosis. Restrictions/Precautions:  General Precautions, Fall Risk    Communication with other providers:  Per chart review and Nurse patient is appropriate for therapeutic intervention. Co tx with Eleni Canavan PT for safety    Subjective:  Patient states:  Agreeable to OT therapy. Pain: Denied however grimace but unable to verbalize where or level (location, type, intensity)    Objective:    Observation:  In semi go's position upon arrival.     Treatment, including education:  Self Care Training:   Cues were given for safety, sequence, UE/LE placement, visual cues, and balance. Activities performed today included dressing, toileting, hand hygiene, and grooming. Min A x1 with Max A while standing for posterior perineal area due to standing balance.  Demo lateral and retropulsion throughout tx. Req mod/max verbal cues. Inconsistent with seq with hand washing and body mechanics at sink utilizing RW. Req [place]ois to initiate with intermittent CGA to complete task     Therapeutic Activity Training:   Therapeutic activity training was instructed today. Cues were given for safety, sequence, UE/LE placement, awareness, and balance. Bed mobility:Max A with all aspects of bed mobility  Sitting balance: P+ due to lateral and posterior lean  Sit/stand transfers: Mod A from bed, Max A from toilet; [address] for hand placement     Functional Mobility: Mod A x 1 and Min A x1 to guide RW in tight spaces. Bed>toilet>recliner    Activities performed today included bed mobility training, transfers, functional mobility  to increase strength, activity tolerance to facilitate IND c ADL tasks, func transfers / mobility with G safety awareness carryover    Therapeutic Exercise:  BUE strengthening ex targeting utilizing 2#  shoulder press, chest press, shoulder abd/add, shoulder horiz abd/add, concentric arm circles clockwise/counter clockwise, bicep/triceps curls x10 reps. Demo CGA with [place]quois to initiate and complete task for pace and corrected techs. Assessment / Impression:  Inconsistent with seq and with increase tremors. HR at rest upon arrival 44; however, with bathroom related activities HR jump 120. HR decrease when placed into recliner with BLE elevated into 60s after 5 mins of RB. Patient's tolerance of treatment: Fair  Adverse Reaction: none  Significant change in status and impact:  none  Barriers to improvement: safety, cognition, transfers, strength and activity tolerance    Safety  Patient educated on role of OT , benefits of OT and rationale for therapeutic intervention. Patient safely in recliner + alarm set at end of session, with call light/phone in reach, and nursing aware. Gait belt was used for func transfers / mobility.     Plan for Next Session:    Continue OT POC    Time in:  1410  Time out: 1453  Timed treatment minutes:  43  Total treatment time:  37      Electronically signed by:    JENNI Benjamin,   2/23/2022, 12:34 PM

## 2022-02-23 NOTE — PROGRESS NOTES
Hospitalist Progress Note      Name:  Carlos Alberto De La Cruz /Age/Sex: 1950  (73 y.o. male)   MRN & CSN:  [id number] & [id number] Admission Date/Time: 2022  5:05 AM   [facility]:  [facility] PCP: Elaine Waite MD         Hospital Day: 5    Assessment and Plan:   Carlos Alberto De La Cruz is a 67 y.o.  male a past medical history of coronary artery disease, hypertension, COPD, dementia, atrial fibrillation, presented from nursing home after a fall. 1. Symptomatic bradycardia with sinus pause  2. Mechanical fall, likely contributed by #1 above. 3. History of dementia  4. Essential hypertension  5. COPD without acute exacerbation  6. Chronic atrial fibrillation  7. Nonspecific troponin elevation      With plans to wean patient off dopamine drip, patient becomes hypotensive and bradycardic. Has been started on midodrine 10 mg 3 times daily  Noted plans per EP. We will continue to monitor. Interval History     Patient was seen and examined at the bedside, patient not participating in conversation. Objective: Intake/Output Summary (Last 24 hours) at 2022 1617  Last data filed at 2022 1311  Gross per 24 hour   Intake 700 ml   Output    Net 700 ml      Vitals:   Vitals:    22 1600   BP: (!) 94/36   Pulse: (!) 47   Resp: 9   Temp:    SpO2:      Physical Exam:   GEN asleep, but easily arousable, not able to participate in conversation. Luis A Awkward EYES Pupils are equally round. No scleral erythema, discharge, or conjunctivitis. HENT Mucous membranes are moist. Oral pharynx without exudates, no evidence of thrush. NECK Supple, no apparent thyromegaly or masses. RESP Clear to auscultation, no wheezes, rales or rhonchi. Symmetric chest movement while on room air. CARD: S1/S2 auscultated. No peripheral edema. GI Abdomen is soft without significant tenderness, masses, or guarding. Bowel sounds are normoactive. Rectal exam deferred.       No costovertebral angle tenderness. Normal appearing external genitalia. Zimmer catheter is not present. MSK No gross joint deformities. SKIN Normal coloration, warm, dry.     NEURO Cranial nerves appear grossly intact, normal speech, cognitive impairment      Medications:   Medications:    midodrine  10 mg Oral TID WC    benztropine  0.5 mg Oral BID    [Held by provider] metoprolol tartrate  25 mg Oral BID    [Held by provider] amiodarone  200 mg Oral Daily    atorvastatin  80 mg Oral Daily    divalproex  1,000 mg Oral Nightly    escitalopram  20 mg Oral Daily    donepezil  5 mg Oral Nightly    famotidine  10 mg Oral BID    docusate sodium  100 mg Oral BID    spironolactone  25 mg Oral Daily    busPIRone  10 mg Oral BID    sodium chloride flush  5-40 mL IntraVENous 2 times per day    enoxaparin  40 mg SubCUTAneous Daily      Infusions:    DOPamine 6 mcg/kg/min (02/23/22 1612)    sodium chloride       PRN Meds: melatonin, 3 mg, Nightly PRN  traMADol, 50 mg, Q6H PRN  sodium chloride flush, 5-40 mL, PRN  sodium chloride, 25 mL, PRN  ondansetron, 4 mg, Q8H PRN   Or  ondansetron, 4 mg, Q6H PRN  polyethylene glycol, 17 g, Daily PRN  acetaminophen, 650 mg, Q6H PRN   Or  acetaminophen, 650 mg, Q6H PRN          Electronically signed by Tiffany Lopez MD on 2/23/2022 at 4:17 PM

## 2022-02-23 NOTE — PROGRESS NOTES
Cardiology Progress Note     Today's Plan:    Admit Date:  2/19/2022    Consult reason/ Seen today for: bradycardia    Subjective and  Overnight Events: Patient denies any chest pain or dizziness but reports weakness. Denies being able to ambulate. Assessment / Plan / Recommendation:     1. Symptomatic sinus brdaycardia: 3.18 sec. Pause. Dopamine drip continues to be titrated. Remains in sinus bradycardia. TSH and digoxin level normal   2. Frequent falls and lethargy: No improvement, soft BP midodrine titrated to max dose. 3. PAF: No anticoagulation due to GI bleed and frequent falls. Currently sinus bradycardia. 4. Elevated troponin: Type II demand ischemic leak, no ischemic changes seen on EKG. Hx chronic elevation. EF remains intact. 5. Hypotension: Currently stable on dopamine infusion and midodrine 10 mg 3 times daily. 6. Dyslipidemia: continue statins  7. DVT prophylaxis if not contraindicated. History of Presenting Illness:    Chief complain on admission : 67 y. o.year old who is admitted for  Chief Complaint   Patient presents with   Carty Fall     02/18/2022 0100        Past medical history:    has a past medical history of Anemia, Arthritis, Back pain, chronic, Bipolar 1 disorder ([address] CAD (coronary artery disease), Cerebral artery occlusion with cerebral infarction ([address] Chronic kidney disease (CKD), stage III (moderate) ([address] CKD (chronic kidney disease), COPD (chronic obstructive pulmonary disease) ([address] Diabetes mellitus, type 2 ([address] Diabetic peripheral neuropathy ([address] Diastolic CHF ([address] Gastric ulcer, H/O cardiovascular stress test, Heart murmur, Hiatal hernia, History of cardiac cath, Hx of migraines, HX OTHER MEDICAL, Hyperlipidemia, Hypertension, Intraparenchymal hematoma of brain ([address] Lumbar radiculopathy, Overlapping malignant neoplasm of colon ([address] Panic attack, Pleural effusion, S/P thoracentesis, Sleep apnea, SOBOE (shortness of breath on exertion), and Spinal stenosis. Past surgical history:   has a past surgical history that includes Neck surgery (1998); Carpal tunnel release (Bilateral, 1989); knee surgery (Bilateral); thoracentesis (Left, 12/20/2013); Elbow surgery (Left, 2000's); Thoracentesis (4/25/2016); Tonsillectomy; Thoracentesis (Left, 11/15/2016); thoracotomy (Left, 03/18/2019); thoracotomy (Left, 3/18/2019); Upper gastrointestinal endoscopy (N/A, 5/12/2020); Colonoscopy (N/A, 5/12/2020); and Small intestine surgery (N/A, 5/21/2020). Social History:   reports that he quit smoking about 11 years ago. His smoking use included cigarettes. He has a 45.00 pack-year smoking history. He has never used smokeless tobacco. He reports previous alcohol use. He reports current drug use. Frequency: 7.00 times per week. Drug: Marijuana Derek Elina). Family history:  family history includes Heart Disease in his mother; High Blood Pressure in his father. Allergies   Allergen Reactions    Nsaids      Renal        Review of Systems:  Review of Systems   Constitutional: Positive for fatigue. Respiratory: Negative for shortness of breath. Cardiovascular: Negative for chest pain, palpitations and leg swelling. Musculoskeletal: Negative. Skin: Negative. Neurological: Positive for weakness. Negative for dizziness. All other systems reviewed and are negative. /60   Pulse 54   Temp 98.2 °F (36.8 °C) (Oral)   Resp 19   Ht 5' 9\" (1.753 m)   Wt 210 lb (95.3 kg)   SpO2 98%   BMI 31.01 kg/m²       Intake/Output Summary (Last 24 hours) at 2/23/2022 1032  Last data filed at 2/23/2022 0500  Gross per 24 hour   Intake 840 ml   Output    Net 840 ml       Physical Exam:  Physical Exam  Constitutional:       Appearance: He is well-developed. Cardiovascular:      Rate and Rhythm: Regular rhythm. Bradycardia present. Pulses: Intact distal pulses.            Dorsalis pedis pulses are 2+ on the right side and 2+ on the left side. Posterior tibial pulses are 2+ on the right side and 2+ on the left side. Heart sounds: Normal heart sounds, S1 normal and S2 normal.   Pulmonary:      Effort: Pulmonary effort is normal.      Breath sounds: Normal breath sounds. Musculoskeletal:         General: Normal range of motion. Skin:     General: Skin is warm and dry. Neurological:      Mental Status: He is alert and oriented to person, place, and time. Medications:    midodrine  10 mg Oral TID WC    benztropine  0.5 mg Oral BID    [Held by provider] metoprolol tartrate  25 mg Oral BID    [Held by provider] amiodarone  200 mg Oral Daily    atorvastatin  80 mg Oral Daily    divalproex  1,000 mg Oral Nightly    escitalopram  20 mg Oral Daily    donepezil  5 mg Oral Nightly    famotidine  10 mg Oral BID    docusate sodium  100 mg Oral BID    spironolactone  25 mg Oral Daily    busPIRone  10 mg Oral BID    sodium chloride flush  5-40 mL IntraVENous 2 times per day    enoxaparin  40 mg SubCUTAneous Daily      DOPamine 2 mcg/kg/min (02/23/22 0300)    sodium chloride       melatonin, traMADol, sodium chloride flush, sodium chloride, ondansetron **OR** ondansetron, polyethylene glycol, acetaminophen **OR** acetaminophen    Lab Data:  CBC: No results for input(s): WBC, HGB, HCT, MCV, PLT in the last 72 hours. BMP:   Recent Labs     02/23/22  0600      K 4.3      CO2 26   BUN 21   CREATININE 1.7*     PT/INR: No results for input(s): PROTIME, INR in the last 72 hours. BNP:  No results for input(s): PROBNP in the last 72 hours. TROPONIN:   Recent Labs     02/22/22  0141 02/22/22  1103   TROPONINT 0.046* 0.058*        Impression:  Principal Problem:    Elevated troponin  Active Problems:    Fall    Bradycardia  Resolved Problems:    * No resolved hospital problems.  *       All labs, medications and tests reviewed by myself, continue all other medications of all above medical condition listed as is except for changes mentioned above. Thank you   Please call with questions. Electronically signed by JULEE Rivera CNP on 2/23/2022 at 10:32 AM           CARDIOLOGY ATTENDING ADDENDUM    I have seen, spoken to and examined this patient personally, independent of the NP/PAC. I have reviewed the hospital care given to date and reviewed all pertinent labs and imaging. I have spoken with patient, nursing staff and provided written and verbal instructions . The above note has been reviewed. I have spent substantive amount of time in formulating patient care. Physical Exam:    General:   Awake, alert  Head:normal  Eye:normal  Chest:   Clear to auscultation  0 Basilar crackles   Cardiovascular:  S1S2   Abdomen: soft   Extremities:  0 edema  Pulses; palpable      MEDICAL DECISION MAKING :         1. Symptomatic sinus bradycardia - Now in SR with IV Dopamine  2. Sinus pause 3.18-second  3. Frequent falls and lethargy  4. Paroxysmal atrial fibrillation     On low-dose IV dopamine  IV atropine as needed     Hold off on beta-blocker digoxin and amiodarone  Dig level normal  EP follow-up  On midodrine   Consideration for pacemaker per EP  Not on oral anticoagulation for A. fib due to history of GI bleed     6. Elevated troponin: Demand ischemia.  Nonischemic EKG.  History of chronically elevated troponin.  Echocardiogram shows preserved EF no ischemic work-up planned. 7. Essential hypertension: Currently borderline low blood pressure     7. History of CAD: Stable     8. Hyperlipidemia: Continue statins     Echo     Summary   Left ventricular systolic function is normal.   Ejection fraction is visually estimated at 50-55%. The non coronary cusp of the aortic valve appears heavily calcified and   restricted. Mild aortic regurgitation; PHT: 563 msec. No evidence of any pericardial effusion.       Dr. Valdo Evans MD

## 2022-02-24 VITALS
DIASTOLIC BLOOD PRESSURE: 71 MMHG | HEIGHT: 69 IN | HEART RATE: 47 BPM | RESPIRATION RATE: 21 BRPM | SYSTOLIC BLOOD PRESSURE: 133 MMHG | WEIGHT: 210 LBS | TEMPERATURE: 97.8 F | BODY MASS INDEX: 31.1 KG/M2 | OXYGEN SATURATION: 96 %

## 2022-02-24 LAB
CORTISOL - AM: 16.5 UG/DL (ref 6–18.4)
CORTISOL - PM: 14.1 UG/DL (ref 2.7–10.5)
SARS-COV-2, NAAT: NOT DETECTED
SOURCE: NORMAL

## 2022-02-24 PROCEDURE — 87635 SARS-COV-2 COVID-19 AMP PRB: CPT

## 2022-02-24 PROCEDURE — 99232 SBSQ HOSP IP/OBS MODERATE 35: CPT | Performed by: INTERNAL MEDICINE

## 2022-02-24 PROCEDURE — 2580000003 HC RX 258: Performed by: INTERNAL MEDICINE

## 2022-02-24 PROCEDURE — 6360000002 HC RX W HCPCS: Performed by: INTERNAL MEDICINE

## 2022-02-24 PROCEDURE — APPSS60 APP SPLIT SHARED TIME 46-60 MINUTES: Performed by: NURSE PRACTITIONER

## 2022-02-24 PROCEDURE — 82533 TOTAL CORTISOL: CPT

## 2022-02-24 PROCEDURE — 6370000000 HC RX 637 (ALT 250 FOR IP): Performed by: INTERNAL MEDICINE

## 2022-02-24 PROCEDURE — 36415 COLL VENOUS BLD VENIPUNCTURE: CPT

## 2022-02-24 PROCEDURE — 6370000000 HC RX 637 (ALT 250 FOR IP): Performed by: NURSE PRACTITIONER

## 2022-02-24 PROCEDURE — 96372 THER/PROPH/DIAG INJ SC/IM: CPT

## 2022-02-24 PROCEDURE — 2700000000 HC OXYGEN THERAPY PER DAY

## 2022-02-24 PROCEDURE — 94761 N-INVAS EAR/PLS OXIMETRY MLT: CPT

## 2022-02-24 RX ORDER — MIDODRINE HYDROCHLORIDE 10 MG/1
10 TABLET ORAL
Qty: 90 TABLET | Refills: 0
Start: 2022-02-24

## 2022-02-24 RX ADMIN — MIDODRINE HYDROCHLORIDE 10 MG: 5 TABLET ORAL at 12:40

## 2022-02-24 RX ADMIN — BUSPIRONE HYDROCHLORIDE 10 MG: 5 TABLET ORAL at 09:29

## 2022-02-24 RX ADMIN — DOCUSATE SODIUM 100 MG: 100 CAPSULE, LIQUID FILLED ORAL at 09:29

## 2022-02-24 RX ADMIN — SPIRONOLACTONE 25 MG: 25 TABLET ORAL at 09:27

## 2022-02-24 RX ADMIN — SODIUM CHLORIDE, PRESERVATIVE FREE 10 ML: 5 INJECTION INTRAVENOUS at 09:32

## 2022-02-24 RX ADMIN — ATORVASTATIN CALCIUM 80 MG: 40 TABLET, FILM COATED ORAL at 09:29

## 2022-02-24 RX ADMIN — MIDODRINE HYDROCHLORIDE 10 MG: 5 TABLET ORAL at 09:39

## 2022-02-24 RX ADMIN — ENOXAPARIN SODIUM 40 MG: 100 INJECTION SUBCUTANEOUS at 09:30

## 2022-02-24 RX ADMIN — FAMOTIDINE 10 MG: 20 TABLET ORAL at 09:29

## 2022-02-24 RX ADMIN — ESCITALOPRAM 20 MG: 10 TABLET, FILM COATED ORAL at 09:29

## 2022-02-24 RX ADMIN — BENZTROPINE MESYLATE 0.5 MG: 1 TABLET ORAL at 09:27

## 2022-02-24 ASSESSMENT — ENCOUNTER SYMPTOMS: SHORTNESS OF BREATH: 0

## 2022-02-24 NOTE — CARE COORDINATION
Pt is a LTR at [facility] and may return when medically ready. Pt is a bed hold. No pre-cert needed. Rapid Covid needed on day of d/c. D/C INSTRUCTIONS ARE ON FRONT OF PACKET LOCATED WITH SOFT CHART.   TE

## 2022-02-24 NOTE — PROGRESS NOTES
Admit Date:  2/19/2022    Admission diagnosis / Complaint :  Bradycardia      Subjective:  Mr. Tory Meyer is resting in bed and sleeping. Heart rate in 40s. Patient is off dopamine and blood pressure is around 130s over 50s currently    Patient is dementia and is altered mental status even after waking him up he is only alert to self      Objective:   BP (!) 139/54   Pulse (!) 42   Temp 97.8 °F (36.6 °C) (Oral)   Resp 11   Ht 5' 9\" (1.753 m)   Wt 210 lb (95.3 kg)   SpO2 96%   BMI 31.01 kg/m²     Intake/Output Summary (Last 24 hours) at 2/24/2022 1448  Last data filed at 2/24/2022 1054  Gross per 24 hour   Intake 566.85 ml   Output    Net 566.85 ml       TELEMETRY: Sinus bradycardia   has a past medical history of Anemia, Arthritis, Back pain, chronic, Bipolar 1 disorder (HCC), CAD (coronary artery disease), Cerebral artery occlusion with cerebral infarction ([address] Chronic kidney disease (CKD), stage III (moderate) ([address] CKD (chronic kidney disease), COPD (chronic obstructive pulmonary disease) ([address] Diabetes mellitus, type 2 ([address] Diabetic peripheral neuropathy ([address] Diastolic CHF ([address] Gastric ulcer, H/O cardiovascular stress test, Heart murmur, Hiatal hernia, History of cardiac cath, Hx of migraines, HX OTHER MEDICAL, Hyperlipidemia, Hypertension, Intraparenchymal hematoma of brain (HCC), Lumbar radiculopathy, Overlapping malignant neoplasm of colon ([address] Panic attack, Pleural effusion, S/P thoracentesis, Sleep apnea, SOBOE (shortness of breath on exertion), and Spinal stenosis. has a past surgical history that includes Neck surgery (1998); Carpal tunnel release (Bilateral, 1989); knee surgery (Bilateral); thoracentesis (Left, 12/20/2013); Elbow surgery (Left, 2000's); Thoracentesis (4/25/2016); Tonsillectomy; Thoracentesis (Left, 11/15/2016); thoracotomy (Left, 03/18/2019); thoracotomy (Left, 3/18/2019); Upper gastrointestinal endoscopy (N/A, 5/12/2020);  Colonoscopy (N/A, 5/12/2020); and Small intestine surgery (N/A, 5/21/2020). Physical Exam:  General:  Awake, alert, NAD  Skin:  Warm and dry  Neck:  JVD none  Chest:  Clear to auscultation, respiration easy  Cardiovascular:  RRR S1S2, bradycardia  Abdomen:  Soft non tender  Extremities:  no edema    Medications:    midodrine  10 mg Oral TID WC    benztropine  0.5 mg Oral BID    [Held by provider] metoprolol tartrate  25 mg Oral BID    [Held by provider] amiodarone  200 mg Oral Daily    atorvastatin  80 mg Oral Daily    divalproex  1,000 mg Oral Nightly    escitalopram  20 mg Oral Daily    donepezil  5 mg Oral Nightly    famotidine  10 mg Oral BID    docusate sodium  100 mg Oral BID    spironolactone  25 mg Oral Daily    busPIRone  10 mg Oral BID    sodium chloride flush  5-40 mL IntraVENous 2 times per day    enoxaparin  40 mg SubCUTAneous Daily      DOPamine Stopped (02/24/22 0943)    sodium chloride       melatonin, traMADol, sodium chloride flush, sodium chloride, ondansetron **OR** ondansetron, polyethylene glycol, acetaminophen **OR** acetaminophen    Lab Data:  CBC: No results for input(s): WBC, HGB, HCT, MCV, PLT in the last 72 hours. BMP:   Recent Labs     02/23/22  0600      K 4.3      CO2 26   BUN 21   CREATININE 1.7*     LIVER PROFILE: No results for input(s): AST, ALT, LIPASE, BILIDIR, BILITOT, ALKPHOS in the last 72 hours. Invalid input(s): AMYLASE,  ALB  PT/INR: No results for input(s): PROTIME, INR in the last 72 hours. APTT: No results for input(s): APTT in the last 72 hours. BNP:  No results for input(s): BNP in the last 72 hours. TROPONIN: @TROPONINI:3@      Assessment:    Bradycardia  PAF  Hypertension  Hyperlipidemia  Advanced dementia  Frequent falls  Coronary artery disease  History of GI bleed  TIA  Diabetes mellitus type 2  Colon Adenocarcinoma      Patient is off dopamine drip now and blood pressure is well maintained on midodrine.   Heart rate is in sinus bradycardia but there is no hemodynamic compromise at this point. Patient has advanced dementia and is only alert to self. Cortisol levels were done to see if patient had any nerve sufficiency but the levels are in the range. Probably midodrine took some time to get to steady state. Given that patient blood pressure is stable and even in bradycardia I would not recommend pacemaker at this point. Patient most of the time is sleeping and resting so heart rate probably is going to be low as patient is not having any movement    Watch him overnight and if patient is stable over night then he can be discharged tomorrow      Harsha Ulloa MD, 2/24/2022 2:48 PM     Please note this report has been partially produced using speech recognition software and may contain errors related to that system including errors in grammar, punctuation, and spelling, as well as words and phrases that may be inappropriate. If there are any questions or concerns please feel free to contact the dictating provider for clarification.

## 2022-02-24 NOTE — PROGRESS NOTES
Cardiology Progress Note     Today's Plan: wean off dopamine    Admit Date:  2/19/2022    Consult reason/ Seen today for: bradycardia    Subjective and  Overnight Events: Patient denies any chest pain or dizziness but reports weakness. Denies being able to ambulate. Assessment / Plan / Recommendation:     1. Symptomatic sinus brdaycardia: 3.18 sec. Pause. Dopamine drip continues to be titrated. Remains in sinus bradycardia. TSH and digoxin level normal. Patient remains lethargic. EP continues to evaluate the need for PPM.   2. Frequent falls and lethargy: No improvement, soft BP midodrine titrated to max dose. 3. PAF: No anticoagulation due to GI bleed and frequent falls. Currently sinus bradycardia. 4. Elevated troponin: Type II demand ischemic leak, no ischemic changes seen on EKG. Hx chronic elevation. EF remains intact. 5. Hypotension: Currently stable on dopamine infusion and midodrine 10 mg 3 times daily. 6. Dyslipidemia: continue statins  7. DVT prophylaxis if not contraindicated. History of Presenting Illness:    Chief complain on admission : 67 y. o.year old who is admitted for  Chief Complaint   Patient presents with   Wen [place] Fall     02/18/2022 0100        Past medical history:    has a past medical history of Anemia, Arthritis, Back pain, chronic, Bipolar 1 disorder ([address] CAD (coronary artery disease), Cerebral artery occlusion with cerebral infarction ([address] Chronic kidney disease (CKD), stage III (moderate) ([address] CKD (chronic kidney disease), COPD (chronic obstructive pulmonary disease) ([address] Diabetes mellitus, type 2 ([address] Diabetic peripheral neuropathy ([address] Diastolic CHF ([address] Gastric ulcer, H/O cardiovascular stress test, Heart murmur, Hiatal hernia, History of cardiac cath, Hx of migraines, HX OTHER MEDICAL, Hyperlipidemia, Hypertension, Intraparenchymal hematoma of brain ([address] Lumbar radiculopathy, Overlapping malignant neoplasm of colon ([facility] Panic attack, Pleural effusion, S/P thoracentesis, Sleep apnea, SOBOE (shortness of breath on exertion), and Spinal stenosis. Past surgical history:   has a past surgical history that includes Neck surgery (1998); Carpal tunnel release (Bilateral, 1989); knee surgery (Bilateral); thoracentesis (Left, 12/20/2013); Elbow surgery (Left, 2000's); Thoracentesis (4/25/2016); Tonsillectomy; Thoracentesis (Left, 11/15/2016); thoracotomy (Left, 03/18/2019); thoracotomy (Left, 3/18/2019); Upper gastrointestinal endoscopy (N/A, 5/12/2020); Colonoscopy (N/A, 5/12/2020); and Small intestine surgery (N/A, 5/21/2020). Social History:   reports that he quit smoking about 11 years ago. His smoking use included cigarettes. He has a 45.00 pack-year smoking history. He has never used smokeless tobacco. He reports previous alcohol use. He reports current drug use. Frequency: 7.00 times per week. Drug: Marijuana Magy Maricruz). Family history:  family history includes Heart Disease in his mother; High Blood Pressure in his father. Allergies   Allergen Reactions    Nsaids      Renal        Review of Systems:  Review of Systems   Constitutional: Positive for fatigue. Respiratory: Negative for shortness of breath. Cardiovascular: Negative for chest pain, palpitations and leg swelling. Musculoskeletal: Negative. Skin: Negative. Neurological: Positive for weakness. Negative for dizziness. All other systems reviewed and are negative. BP (!) 107/58   Pulse (!) 46   Temp 97.9 °F (36.6 °C) (Oral)   Resp 11   Ht 5' 9\" (1.753 m)   Wt 210 lb (95.3 kg)   SpO2 96%   BMI 31.01 kg/m²       Intake/Output Summary (Last 24 hours) at 2/24/2022 1116  Last data filed at 2/24/2022 1054  Gross per 24 hour   Intake 616.85 ml   Output    Net 616.85 ml       Physical Exam:  Physical Exam  Constitutional:       Appearance: He is well-developed.    Cardiovascular:      Rate and Rhythm: Regular rhythm. Bradycardia present. Pulses: Intact distal pulses. Dorsalis pedis pulses are 2+ on the right side and 2+ on the left side. Posterior tibial pulses are 2+ on the right side and 2+ on the left side. Heart sounds: Normal heart sounds, S1 normal and S2 normal.   Pulmonary:      Effort: Pulmonary effort is normal.      Breath sounds: Normal breath sounds. Musculoskeletal:         General: Normal range of motion. Skin:     General: Skin is warm and dry. Neurological:      Mental Status: He is alert and oriented to person, place, and time. Medications:    midodrine  10 mg Oral TID WC    benztropine  0.5 mg Oral BID    [Held by provider] metoprolol tartrate  25 mg Oral BID    [Held by provider] amiodarone  200 mg Oral Daily    atorvastatin  80 mg Oral Daily    divalproex  1,000 mg Oral Nightly    escitalopram  20 mg Oral Daily    donepezil  5 mg Oral Nightly    famotidine  10 mg Oral BID    docusate sodium  100 mg Oral BID    spironolactone  25 mg Oral Daily    busPIRone  10 mg Oral BID    sodium chloride flush  5-40 mL IntraVENous 2 times per day    enoxaparin  40 mg SubCUTAneous Daily      DOPamine Stopped (02/24/22 0943)    sodium chloride       melatonin, traMADol, sodium chloride flush, sodium chloride, ondansetron **OR** ondansetron, polyethylene glycol, acetaminophen **OR** acetaminophen    Lab Data:  CBC: No results for input(s): WBC, HGB, HCT, MCV, PLT in the last 72 hours. BMP:   Recent Labs     02/23/22  0600      K 4.3      CO2 26   BUN 21   CREATININE 1.7*     PT/INR: No results for input(s): PROTIME, INR in the last 72 hours. BNP:  No results for input(s): PROBNP in the last 72 hours.   TROPONIN:   Recent Labs     02/22/22  0141 02/22/22  1103   TROPONINT 0.046* 0.058*        Impression:  Principal Problem:    Elevated troponin  Active Problems:    Fall    Bradycardia  Resolved Problems:    * No resolved hospital problems. *       All labs, medications and tests reviewed by myself, continue all other medications of all above medical condition listed as is except for changes mentioned above. Thank you   Please call with questions. Electronically signed by Sofia Hatch. JULEE Ty CNP on 2/24/2022 at 11:16 AM         CARDIOLOGY ATTENDING ADDENDUM    I have seen, spoken to and examined this patient personally, independent of the NP/PAC. I have reviewed the hospital care given to date and reviewed all pertinent labs and imaging. I have spoken with patient, nursing staff and provided written and verbal instructions . The above note has been reviewed. I have spent substantive amount of time in formulating patient care.        lethargic     Physical Exam:    General:   Awake   Head:normal  Eye:normal  Chest:  Clear to auscultation  0 Basilar crackles   Cardiovascular:  S1S2   Abdomen: soft   Extremities:   0 edema  Pulses; palpable      MEDICAL DECISION MAKING :     Wean off dopamine  Cortisol level within normal limits  Avoid AV venkat blocking agents  Further management by EP    Cardiology will sign off please call us with question    Dr. Анна Hart MD

## 2022-02-24 NOTE — DISCHARGE SUMMARY
Discharge Summary    Name:  Arleth Lucia. /Age/Sex: 1950  (73 y.o. male)   MRN & CSN:  [id number] & [id number] Admission Date/Time: 2022  5:05 AM   Attending:  Megan Cedillo MD Discharging Physician: Megan Cedilol MD     Hospital Course:   Arleth Tse is a 67 y.o.  male  who presents with Elevated troponin  70-year-old male past medical history of chronic atrial fibrillation, history of dementia, hypertension, COPD, coronary artery disease who presented from a nursing home after a fall. Patient was found to be bradycardic, was symptomatic, had a mechanical fall at the nursing home. He was on beta-blockers, digoxin for his chronic atrial fibrillation. He was admitted to the ICU, was started on dopamine drip. He has gradually been weaned off the dopamine drip. Beta-blocker and digoxin have been discontinued. He has been started on midodrine and is stable. He was seen by cardiology as well as electrophysiology with no plans for pacemaker placement since patient is asymptomatic at this time. He will be discharged back to the nursing home. See updated medication list.    The patient expressed appropriate understanding of and agreement with the discharge recommendations, medications, and plan.      Consults this admission:  IP CONSULT TO HOSPITALIST  IP CONSULT TO CARDIOLOGY  IP CONSULT TO IV TEAM    Discharge Instruction:   Follow up appointments:   Primary care physician:  within 2 weeks    Diet:  cardiac diet   Activity: activity as tolerated  Disposition: Discharged to:   []Home, [][facility], [x]SNF, []Acute Rehab, []Hospice   Condition on discharge: Stable    Discharge Medications:        Medication List      START taking these medications    midodrine 10 MG tablet  Commonly known as: PROAMATINE  Take 1 tablet by mouth 3 times daily (with meals)        CONTINUE taking these medications    atorvastatin 80 MG tablet  Commonly known as: LIPITOR  Take 1 tablet by mouth daily benztropine 0.5 MG tablet  Commonly known as: COGENTIN  Take 1 tablet by mouth 2 times daily     busPIRone 5 MG tablet  Commonly known as: BUSPAR     Compression Stockings Misc  by Does not apply route Duration of Treatment:  3 Months  # of pairs:  2    Compression Class Level - 20-30 mmHg*    Style - Knee High     divalproex 500 MG extended release tablet  Commonly known as: DEPAKOTE ER  Take 2 tablets by mouth nightly     docusate sodium 100 MG capsule  Commonly known as: Colace  Take 1 capsule by mouth 2 times daily Hold for diarrhea     donepezil 5 MG tablet  Commonly known as: ARICEPT  Take 1 tablet by mouth nightly     escitalopram 10 MG tablet  Commonly known as: LEXAPRO  Take 2 tablets by mouth daily     famotidine 10 MG tablet  Commonly known as: PEPCID  Take 1 tablet by mouth 2 times daily     melatonin 3 MG Tabs tablet     spironolactone 25 MG tablet  Commonly known as: Aldactone  Take 1 tablet by mouth daily     traMADol 50 MG tablet  Commonly known as: ULTRAM        STOP taking these medications    amiodarone 200 MG tablet  Commonly known as: CORDARONE     digoxin 125 MCG tablet  Commonly known as: LANOXIN     metoprolol tartrate 25 MG tablet  Commonly known as: LOPRESSOR           Where to Get Your Medications      Information about where to get these medications is not yet available    Ask your nurse or doctor about these medications  · midodrine 10 MG tablet         Objective Findings at Discharge:   BP (!) 139/49   Pulse (!) 45   Temp 97.8 °F (36.6 °C) (Oral)   Resp 18   Ht 5' 9\" (1.753 m)   Wt 210 lb (95.3 kg)   SpO2 96%   BMI 31.01 kg/m²            PHYSICAL EXAM   GEN Awake male, sitting upright in bed in no apparent distress. Appears given age. EYES Pupils are equally round. No scleral erythema, discharge, or conjunctivitis. HENT Mucous membranes are moist. Oral pharynx without exudates, no evidence of thrush. NECK Supple, no apparent thyromegaly or masses.   RESP Clear to auscultation anteriorly. CARDIO/VASC S1/S2 auscultated. Regular  No peripheral edema. GI Abdomen is soft without significant tenderness, masses, or guarding. Bowel sounds are normoactive. Rectal exam deferred.  No costovertebral angle tenderness. Normal appearing external genitalia. Zimmer catheter is not present. HEME/LYMPH No palpable cervical lymphadenopathy and no hepatosplenomegaly. No petechiae or ecchymoses. MSK No gross joint deformities. NEURO No focal deficits, has cognitive impairment  PSYCH Awake, alert, oriented x 4. Affect appropriate.     BMP/CBC  Recent Labs     02/23/22  0600      K 4.3      CO2 26   BUN 21   CREATININE 1.7*       IMAGING:  None    Discharge Time of 35 minutes    Electronically signed by Vasyl Herring MD on 2/24/2022 at 2:20 PM

## 2022-02-24 NOTE — PLAN OF CARE
Problem: Falls - Risk of:  Goal: Will remain free from falls  Description: Will remain free from falls  Outcome: Completed  Goal: Absence of physical injury  Description: Absence of physical injury  Outcome: Completed     Problem: Skin Integrity:  Goal: Will show no infection signs and symptoms  Description: Will show no infection signs and symptoms  Outcome: Completed  Goal: Absence of new skin breakdown  Description: Absence of new skin breakdown  Outcome: Completed     Problem: Cardiac Output - Decreased:  Goal: Hemodynamic stability will improve  Description: Hemodynamic stability will improve  Outcome: Completed     Problem: Pain:  Description: Pain management should include both nonpharmacologic and pharmacologic interventions.   Goal: Pain level will decrease  Description: Pain level will decrease  Outcome: Completed  Goal: Control of acute pain  Description: Control of acute pain  Outcome: Completed  Goal: Control of chronic pain  Description: Control of chronic pain  Outcome: Completed

## 2022-02-24 NOTE — CARE COORDINATION
Transport arranged with [facility]. ETA is between 1630 and 1700. Notified pt's nurse, Mary/Durga, and pt's son Yancy Homes via . AVS faxed and placed in packet. Rapid Covid is in process.   TE

## 2022-02-25 ENCOUNTER — HOSPITAL ENCOUNTER (OUTPATIENT)
Age: 72
Setting detail: SPECIMEN
Discharge: HOME | End: 2022-02-25
Payer: MEDICARE

## 2022-02-25 LAB
ALBUMIN SERPL-MCNC: 3.3 GM/DL (ref 3.4–5)
ALP BLD-CCNC: 89 IU/L (ref 40–128)
ALT SERPL-CCNC: 10 U/L (ref 10–40)
ANION GAP SERPL CALCULATED.3IONS-SCNC: 15 MMOL/L (ref 4–16)
AST SERPL-CCNC: 24 IU/L (ref 15–37)
BASOPHILS ABSOLUTE: 0 K/CU MM
BASOPHILS RELATIVE PERCENT: 0.2 % (ref 0–1)
BILIRUB SERPL-MCNC: 0.6 MG/DL (ref 0–1)
BUN BLDV-MCNC: 27 MG/DL (ref 6–23)
CALCIUM SERPL-MCNC: 8.8 MG/DL (ref 8.3–10.6)
CHLORIDE BLD-SCNC: 99 MMOL/L (ref 99–110)
CO2: 23 MMOL/L (ref 21–32)
CREAT SERPL-MCNC: 2 MG/DL (ref 0.9–1.3)
DIFFERENTIAL TYPE: ABNORMAL
EOSINOPHILS ABSOLUTE: 0 K/CU MM
EOSINOPHILS RELATIVE PERCENT: 0.2 % (ref 0–3)
GFR AFRICAN AMERICAN: 40 ML/MIN/1.73M2
GFR NON-AFRICAN AMERICAN: 33 ML/MIN/1.73M2
GLUCOSE BLD-MCNC: 59 MG/DL (ref 70–99)
HCT VFR BLD CALC: 39.3 % (ref 42–52)
HEMOGLOBIN: 11.7 GM/DL (ref 13.5–18)
IMMATURE NEUTROPHIL %: 0.5 % (ref 0–0.43)
LYMPHOCYTES ABSOLUTE: 1.6 K/CU MM
LYMPHOCYTES RELATIVE PERCENT: 12.5 % (ref 24–44)
MCH RBC QN AUTO: 30 PG (ref 27–31)
MCHC RBC AUTO-ENTMCNC: 29.8 % (ref 32–36)
MCV RBC AUTO: 100.8 FL (ref 78–100)
MONOCYTES ABSOLUTE: 0.7 K/CU MM
MONOCYTES RELATIVE PERCENT: 5.5 % (ref 0–4)
NUCLEATED RBC %: 0 %
PDW BLD-RTO: 15.9 % (ref 11.7–14.9)
PLATELET # BLD: 161 K/CU MM (ref 140–440)
PMV BLD AUTO: 9.2 FL (ref 7.5–11.1)
POTASSIUM SERPL-SCNC: 5.3 MMOL/L (ref 3.5–5.1)
RBC # BLD: 3.9 M/CU MM (ref 4.6–6.2)
SEGMENTED NEUTROPHILS ABSOLUTE COUNT: 10.3 K/CU MM
SEGMENTED NEUTROPHILS RELATIVE PERCENT: 81.1 % (ref 36–66)
SODIUM BLD-SCNC: 137 MMOL/L (ref 135–145)
TOTAL IMMATURE NEUTOROPHIL: 0.06 K/CU MM
TOTAL NUCLEATED RBC: 0 K/CU MM
TOTAL PROTEIN: 6.4 GM/DL (ref 6.4–8.2)
WBC # BLD: 12.7 K/CU MM (ref 4–10.5)

## 2022-02-25 PROCEDURE — 36415 COLL VENOUS BLD VENIPUNCTURE: CPT

## 2022-02-25 PROCEDURE — 85025 COMPLETE CBC W/AUTO DIFF WBC: CPT

## 2022-02-25 PROCEDURE — 80053 COMPREHEN METABOLIC PANEL: CPT

## 2022-03-11 ENCOUNTER — HOSPITAL ENCOUNTER (OUTPATIENT)
Age: 72
Setting detail: SPECIMEN
Discharge: HOME | End: 2022-03-11
Payer: MEDICARE

## 2022-03-11 LAB — TSH HIGH SENSITIVITY: 10.1 UIU/ML (ref 0.27–4.2)

## 2022-03-11 PROCEDURE — 36415 COLL VENOUS BLD VENIPUNCTURE: CPT

## 2022-03-11 PROCEDURE — 84443 ASSAY THYROID STIM HORMONE: CPT

## 2022-03-21 PROBLEM — R77.8 ELEVATED TROPONIN: Status: RESOLVED | Noted: 2022-02-19 | Resolved: 2022-03-21

## 2022-03-21 PROBLEM — R79.89 ELEVATED TROPONIN: Status: RESOLVED | Noted: 2022-02-19 | Resolved: 2022-03-21
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
## 2022-04-22 ENCOUNTER — HOSPITAL ENCOUNTER (OUTPATIENT)
Age: 72
Setting detail: SPECIMEN
Discharge: HOME | End: 2022-04-22
Payer: MEDICARE

## 2022-04-22 LAB
T4 FREE: 1.31 NG/DL (ref 0.9–1.8)
TSH HIGH SENSITIVITY: 4.02 UIU/ML (ref 0.27–4.2)

## 2022-04-22 PROCEDURE — 84443 ASSAY THYROID STIM HORMONE: CPT

## 2022-04-22 PROCEDURE — 84439 ASSAY OF FREE THYROXINE: CPT

## 2022-04-22 PROCEDURE — 36415 COLL VENOUS BLD VENIPUNCTURE: CPT

## 2022-05-31 ENCOUNTER — HOSPITAL ENCOUNTER (OUTPATIENT)
Age: 72
Setting detail: SPECIMEN
Discharge: HOME | End: 2022-05-31

## 2022-05-31 LAB
ALBUMIN SERPL-MCNC: 3.6 GM/DL (ref 3.4–5)
ALP BLD-CCNC: 78 IU/L (ref 40–128)
ALT SERPL-CCNC: 31 U/L (ref 10–40)
ANION GAP SERPL CALCULATED.3IONS-SCNC: 16 MMOL/L (ref 4–16)
AST SERPL-CCNC: 21 IU/L (ref 15–37)
BILIRUB SERPL-MCNC: 0.3 MG/DL (ref 0–1)
BUN BLDV-MCNC: 41 MG/DL (ref 6–23)
CALCIUM SERPL-MCNC: 8.5 MG/DL (ref 8.3–10.6)
CHLORIDE BLD-SCNC: 103 MMOL/L (ref 99–110)
CO2: 20 MMOL/L (ref 21–32)
CREAT SERPL-MCNC: 1.3 MG/DL (ref 0.9–1.3)
D DIMER: 1011 NG/ML(DDU)
GFR AFRICAN AMERICAN: >60 ML/MIN/1.73M2
GFR NON-AFRICAN AMERICAN: 54 ML/MIN/1.73M2
GLUCOSE BLD-MCNC: 105 MG/DL (ref 70–99)
HCT VFR BLD CALC: 43.7 % (ref 42–52)
HEMOGLOBIN: 13.8 GM/DL (ref 13.5–18)
MCH RBC QN AUTO: 28.8 PG (ref 27–31)
MCHC RBC AUTO-ENTMCNC: 31.6 % (ref 32–36)
MCV RBC AUTO: 91 FL (ref 78–100)
PDW BLD-RTO: 14 % (ref 11.7–14.9)
PLATELET # BLD: 174 K/CU MM (ref 140–440)
PMV BLD AUTO: 10.3 FL (ref 7.5–11.1)
POTASSIUM SERPL-SCNC: 4.3 MMOL/L (ref 3.5–5.1)
PROCALCITONIN: 0.06
RBC # BLD: 4.8 M/CU MM (ref 4.6–6.2)
SODIUM BLD-SCNC: 139 MMOL/L (ref 135–145)
TOTAL PROTEIN: 6.1 GM/DL (ref 6.4–8.2)
WBC # BLD: 13.6 K/CU MM (ref 4–10.5)

## 2022-05-31 PROCEDURE — 80053 COMPREHEN METABOLIC PANEL: CPT

## 2022-05-31 PROCEDURE — 36415 COLL VENOUS BLD VENIPUNCTURE: CPT

## 2022-05-31 PROCEDURE — 85379 FIBRIN DEGRADATION QUANT: CPT

## 2022-05-31 PROCEDURE — 85027 COMPLETE CBC AUTOMATED: CPT

## 2022-05-31 PROCEDURE — 84145 PROCALCITONIN (PCT): CPT

## 2022-06-08 ENCOUNTER — HOSPITAL ENCOUNTER (OUTPATIENT)
Age: 72
Setting detail: SPECIMEN
Discharge: HOME | End: 2022-06-08

## 2022-06-08 LAB
ALBUMIN SERPL-MCNC: 3.5 GM/DL (ref 3.4–5)
ALP BLD-CCNC: 96 IU/L (ref 40–128)
ALT SERPL-CCNC: 39 U/L (ref 10–40)
ANION GAP SERPL CALCULATED.3IONS-SCNC: 12 MMOL/L (ref 4–16)
AST SERPL-CCNC: 29 IU/L (ref 15–37)
BILIRUB SERPL-MCNC: 0.3 MG/DL (ref 0–1)
BUN BLDV-MCNC: 39 MG/DL (ref 6–23)
CALCIUM SERPL-MCNC: 8.2 MG/DL (ref 8.3–10.6)
CHLORIDE BLD-SCNC: 104 MMOL/L (ref 99–110)
CO2: 19 MMOL/L (ref 21–32)
CREAT SERPL-MCNC: 1.8 MG/DL (ref 0.9–1.3)
GFR AFRICAN AMERICAN: 45 ML/MIN/1.73M2
GFR NON-AFRICAN AMERICAN: 37 ML/MIN/1.73M2
GLUCOSE BLD-MCNC: 93 MG/DL (ref 70–99)
POTASSIUM SERPL-SCNC: 5 MMOL/L (ref 3.5–5.1)
REASON FOR REJECTION: NORMAL
REJECTED TEST: NORMAL
SODIUM BLD-SCNC: 135 MMOL/L (ref 135–145)
TOTAL PROTEIN: 5.9 GM/DL (ref 6.4–8.2)

## 2022-06-08 PROCEDURE — 80053 COMPREHEN METABOLIC PANEL: CPT

## 2022-06-08 PROCEDURE — 36415 COLL VENOUS BLD VENIPUNCTURE: CPT

## 2022-07-24 ENCOUNTER — HOSPITAL ENCOUNTER (OUTPATIENT)
Age: 72
Setting detail: SPECIMEN
Discharge: HOME | End: 2022-07-24
Payer: MEDICARE

## 2022-07-24 PROCEDURE — 81001 URINALYSIS AUTO W/SCOPE: CPT

## 2022-07-25 ENCOUNTER — HOSPITAL ENCOUNTER (OUTPATIENT)
Age: 72
Setting detail: SPECIMEN
Discharge: HOME | End: 2022-07-25

## 2022-07-25 LAB
ALBUMIN SERPL-MCNC: 4.1 GM/DL (ref 3.4–5)
ALP BLD-CCNC: 65 IU/L (ref 40–128)
ALT SERPL-CCNC: 14 U/L (ref 10–40)
ANION GAP SERPL CALCULATED.3IONS-SCNC: 11 MMOL/L (ref 4–16)
AST SERPL-CCNC: 14 IU/L (ref 15–37)
BACTERIA: NEGATIVE /HPF
BILIRUB SERPL-MCNC: 0.2 MG/DL (ref 0–1)
BILIRUBIN URINE: NEGATIVE MG/DL
BLOOD, URINE: NEGATIVE
BUN BLDV-MCNC: 30 MG/DL (ref 6–23)
CALCIUM SERPL-MCNC: 9.1 MG/DL (ref 8.3–10.6)
CHLORIDE BLD-SCNC: 106 MMOL/L (ref 99–110)
CLARITY: CLEAR
CO2: 24 MMOL/L (ref 21–32)
COLOR: YELLOW
CREAT SERPL-MCNC: 1.7 MG/DL (ref 0.9–1.3)
GFR AFRICAN AMERICAN: 48 ML/MIN/1.73M2
GFR NON-AFRICAN AMERICAN: 40 ML/MIN/1.73M2
GLUCOSE BLD-MCNC: 113 MG/DL (ref 70–99)
GLUCOSE, URINE: NEGATIVE MG/DL
HCT VFR BLD CALC: 39.1 % (ref 42–52)
HEMOGLOBIN: 11.9 GM/DL (ref 13.5–18)
KETONES, URINE: NEGATIVE MG/DL
LEUKOCYTE ESTERASE, URINE: NEGATIVE
MCH RBC QN AUTO: 29.2 PG (ref 27–31)
MCHC RBC AUTO-ENTMCNC: 30.4 % (ref 32–36)
MCV RBC AUTO: 96.1 FL (ref 78–100)
MUCUS: ABNORMAL HPF
NITRITE URINE, QUANTITATIVE: NEGATIVE
PDW BLD-RTO: 18.6 % (ref 11.7–14.9)
PH, URINE: 6 (ref 5–8)
PLATELET # BLD: 198 K/CU MM (ref 140–440)
PMV BLD AUTO: 9.9 FL (ref 7.5–11.1)
POTASSIUM SERPL-SCNC: 4.1 MMOL/L (ref 3.5–5.1)
PROTEIN UA: ABNORMAL MG/DL
RBC # BLD: 4.07 M/CU MM (ref 4.6–6.2)
RBC URINE: 2 /HPF (ref 0–3)
SODIUM BLD-SCNC: 141 MMOL/L (ref 135–145)
SPECIFIC GRAVITY UA: 1.02 (ref 1–1.03)
SQUAMOUS EPITHELIAL: 1 /HPF
TOTAL PROTEIN: 6.4 GM/DL (ref 6.4–8.2)
TRICHOMONAS: ABNORMAL /HPF
UROBILINOGEN, URINE: 0.2 MG/DL (ref 0.2–1)
WBC # BLD: 6.8 K/CU MM (ref 4–10.5)
WBC UA: 4 /HPF (ref 0–2)

## 2022-07-25 PROCEDURE — 36415 COLL VENOUS BLD VENIPUNCTURE: CPT

## 2022-07-25 PROCEDURE — 80053 COMPREHEN METABOLIC PANEL: CPT

## 2022-07-25 PROCEDURE — 85027 COMPLETE CBC AUTOMATED: CPT

## 2022-08-08 ENCOUNTER — HOSPITAL ENCOUNTER (OUTPATIENT)
Age: 72
Setting detail: SPECIMEN
Discharge: HOME | End: 2022-08-08
Payer: MEDICARE

## 2022-08-08 LAB
ANION GAP SERPL CALCULATED.3IONS-SCNC: 12 MMOL/L (ref 4–16)
BUN BLDV-MCNC: 18 MG/DL (ref 6–23)
CALCIUM SERPL-MCNC: 8.4 MG/DL (ref 8.3–10.6)
CHLORIDE BLD-SCNC: 108 MMOL/L (ref 99–110)
CO2: 23 MMOL/L (ref 21–32)
CREAT SERPL-MCNC: 1.8 MG/DL (ref 0.9–1.3)
GFR AFRICAN AMERICAN: 45 ML/MIN/1.73M2
GFR NON-AFRICAN AMERICAN: 37 ML/MIN/1.73M2
GLUCOSE BLD-MCNC: 77 MG/DL (ref 70–99)
HCT VFR BLD CALC: 34.6 % (ref 42–52)
HEMOGLOBIN: 10.6 GM/DL (ref 13.5–18)
MCH RBC QN AUTO: 29.8 PG (ref 27–31)
MCHC RBC AUTO-ENTMCNC: 30.6 % (ref 32–36)
MCV RBC AUTO: 97.2 FL (ref 78–100)
PDW BLD-RTO: 17.2 % (ref 11.7–14.9)
PLATELET # BLD: 128 K/CU MM (ref 140–440)
PMV BLD AUTO: 10.1 FL (ref 7.5–11.1)
POTASSIUM SERPL-SCNC: 4.4 MMOL/L (ref 3.5–5.1)
RBC # BLD: 3.56 M/CU MM (ref 4.6–6.2)
SODIUM BLD-SCNC: 143 MMOL/L (ref 135–145)
WBC # BLD: 5.3 K/CU MM (ref 4–10.5)

## 2022-08-08 PROCEDURE — 85027 COMPLETE CBC AUTOMATED: CPT

## 2022-08-08 PROCEDURE — 36415 COLL VENOUS BLD VENIPUNCTURE: CPT

## 2022-08-08 PROCEDURE — 80048 BASIC METABOLIC PNL TOTAL CA: CPT

## 2022-09-12 ENCOUNTER — HOSPITAL ENCOUNTER (OUTPATIENT)
Age: 72
Setting detail: SPECIMEN
Discharge: HOME | End: 2022-09-12
Payer: MEDICARE

## 2022-09-12 LAB — TSH HIGH SENSITIVITY: 1.33 UIU/ML (ref 0.27–4.2)

## 2022-09-12 PROCEDURE — 36415 COLL VENOUS BLD VENIPUNCTURE: CPT

## 2022-09-12 PROCEDURE — 84443 ASSAY THYROID STIM HORMONE: CPT

## 2022-09-14 ENCOUNTER — HOSPITAL ENCOUNTER (OUTPATIENT)
Age: 72
Setting detail: SPECIMEN
Discharge: HOME | End: 2022-09-14
Payer: MEDICARE

## 2022-09-14 LAB
ALBUMIN SERPL-MCNC: 3.6 GM/DL (ref 3.4–5)
ALP BLD-CCNC: 66 IU/L (ref 40–128)
ALT SERPL-CCNC: 7 U/L (ref 10–40)
ANION GAP SERPL CALCULATED.3IONS-SCNC: 11 MMOL/L (ref 4–16)
AST SERPL-CCNC: 15 IU/L (ref 15–37)
BILIRUB SERPL-MCNC: 0.3 MG/DL (ref 0–1)
BUN BLDV-MCNC: 19 MG/DL (ref 6–23)
CALCIUM SERPL-MCNC: 8.5 MG/DL (ref 8.3–10.6)
CHLORIDE BLD-SCNC: 107 MMOL/L (ref 99–110)
CO2: 21 MMOL/L (ref 21–32)
CREAT SERPL-MCNC: 2.1 MG/DL (ref 0.9–1.3)
GFR AFRICAN AMERICAN: 38 ML/MIN/1.73M2
GFR NON-AFRICAN AMERICAN: 31 ML/MIN/1.73M2
GLUCOSE BLD-MCNC: 68 MG/DL (ref 70–99)
HCT VFR BLD CALC: 37.1 % (ref 42–52)
HEMOGLOBIN: 11.1 GM/DL (ref 13.5–18)
MCH RBC QN AUTO: 29.8 PG (ref 27–31)
MCHC RBC AUTO-ENTMCNC: 29.9 % (ref 32–36)
MCV RBC AUTO: 99.7 FL (ref 78–100)
PDW BLD-RTO: 12.8 % (ref 11.7–14.9)
PLATELET # BLD: 131 K/CU MM (ref 140–440)
PMV BLD AUTO: 9.8 FL (ref 7.5–11.1)
POTASSIUM SERPL-SCNC: 4.3 MMOL/L (ref 3.5–5.1)
RBC # BLD: 3.72 M/CU MM (ref 4.6–6.2)
SODIUM BLD-SCNC: 139 MMOL/L (ref 135–145)
TOTAL PROTEIN: 5.8 GM/DL (ref 6.4–8.2)
WBC # BLD: 5.1 K/CU MM (ref 4–10.5)

## 2022-09-14 PROCEDURE — 36415 COLL VENOUS BLD VENIPUNCTURE: CPT

## 2022-09-14 PROCEDURE — 80053 COMPREHEN METABOLIC PANEL: CPT

## 2022-09-14 PROCEDURE — 85027 COMPLETE CBC AUTOMATED: CPT

## 2022-11-15 ENCOUNTER — HOSPITAL ENCOUNTER (EMERGENCY)
Age: 72
Discharge: HOME | End: 2022-11-15
Attending: EMERGENCY MEDICINE
Payer: MEDICARE

## 2022-11-15 ENCOUNTER — APPOINTMENT (OUTPATIENT)
Dept: CT IMAGING | Age: 72
End: 2022-11-15
Payer: MEDICARE

## 2022-11-15 VITALS
HEART RATE: 63 BPM | RESPIRATION RATE: 17 BRPM | OXYGEN SATURATION: 98 % | HEIGHT: 69 IN | DIASTOLIC BLOOD PRESSURE: 70 MMHG | WEIGHT: 200 LBS | SYSTOLIC BLOOD PRESSURE: 129 MMHG | BODY MASS INDEX: 29.62 KG/M2 | TEMPERATURE: 98.2 F

## 2022-11-15 DIAGNOSIS — R40.4 TRANSIENT ALTERATION OF AWARENESS: Primary | ICD-10-CM

## 2022-11-15 LAB
ALBUMIN SERPL-MCNC: 4.2 GM/DL (ref 3.4–5)
ALP BLD-CCNC: 82 IU/L (ref 40–129)
ALT SERPL-CCNC: 14 U/L (ref 10–40)
ANION GAP SERPL CALCULATED.3IONS-SCNC: 12 MMOL/L (ref 4–16)
AST SERPL-CCNC: 23 IU/L (ref 15–37)
BASOPHILS ABSOLUTE: 0 K/CU MM
BASOPHILS RELATIVE PERCENT: 0.4 % (ref 0–1)
BILIRUB SERPL-MCNC: 0.5 MG/DL (ref 0–1)
BUN BLDV-MCNC: 26 MG/DL (ref 6–23)
CALCIUM SERPL-MCNC: 9.9 MG/DL (ref 8.3–10.6)
CHLORIDE BLD-SCNC: 107 MMOL/L (ref 99–110)
CO2: 24 MMOL/L (ref 21–32)
CREAT SERPL-MCNC: 2.2 MG/DL (ref 0.9–1.3)
DIFFERENTIAL TYPE: ABNORMAL
EOSINOPHILS ABSOLUTE: 0.1 K/CU MM
EOSINOPHILS RELATIVE PERCENT: 1.6 % (ref 0–3)
GFR SERPL CREATININE-BSD FRML MDRD: 31 ML/MIN/1.73M2
GLUCOSE BLD-MCNC: 108 MG/DL (ref 70–99)
HCT VFR BLD CALC: 41.4 % (ref 42–52)
HEMOGLOBIN: 13.2 GM/DL (ref 13.5–18)
IMMATURE NEUTROPHIL %: 0.4 % (ref 0–0.43)
LYMPHOCYTES ABSOLUTE: 1 K/CU MM
LYMPHOCYTES RELATIVE PERCENT: 17.8 % (ref 24–44)
MCH RBC QN AUTO: 29.5 PG (ref 27–31)
MCHC RBC AUTO-ENTMCNC: 31.9 % (ref 32–36)
MCV RBC AUTO: 92.6 FL (ref 78–100)
MONOCYTES ABSOLUTE: 0.5 K/CU MM
MONOCYTES RELATIVE PERCENT: 9.3 % (ref 0–4)
NUCLEATED RBC %: 0 %
PDW BLD-RTO: 14.3 % (ref 11.7–14.9)
PLATELET # BLD: 172 K/CU MM (ref 140–440)
PMV BLD AUTO: 9.2 FL (ref 7.5–11.1)
POTASSIUM SERPL-SCNC: 4.5 MMOL/L (ref 3.5–5.1)
RBC # BLD: 4.47 M/CU MM (ref 4.6–6.2)
SEGMENTED NEUTROPHILS ABSOLUTE COUNT: 4 K/CU MM
SEGMENTED NEUTROPHILS RELATIVE PERCENT: 70.5 % (ref 36–66)
SODIUM BLD-SCNC: 143 MMOL/L (ref 135–145)
TOTAL IMMATURE NEUTOROPHIL: 0.02 K/CU MM
TOTAL NUCLEATED RBC: 0 K/CU MM
TOTAL PROTEIN: 7.9 GM/DL (ref 6.4–8.2)
WBC # BLD: 5.6 K/CU MM (ref 4–10.5)

## 2022-11-15 PROCEDURE — 70450 CT HEAD/BRAIN W/O DYE: CPT

## 2022-11-15 PROCEDURE — 99284 EMERGENCY DEPT VISIT MOD MDM: CPT

## 2022-11-15 PROCEDURE — 80053 COMPREHEN METABOLIC PANEL: CPT

## 2022-11-15 PROCEDURE — 85025 COMPLETE CBC W/AUTO DIFF WBC: CPT

## 2022-11-15 ASSESSMENT — PAIN - FUNCTIONAL ASSESSMENT: PAIN_FUNCTIONAL_ASSESSMENT: NONE - DENIES PAIN

## 2022-11-15 NOTE — ED PROVIDER NOTES
Triage Chief Complaint:   Fatigue (Pt from [facility]- slow to wake up this morning)    [place]:  Crystal Phan is a 67 y.o. male that presents for changes in mental status. Patient is from nursing facility and reportedly was unable to be aroused this morning. Per reports, the patient was not acting appropriately when he went to bed last night but at shift change this morning, nurses were unable to wake him. Reports were that his eyes were closed and he was not responding to any kind of stimuli. EMS reports that when they arrived, the patient was awake, talking and at baseline. He does have a history of dementia and bipolar disorder. Patient denying any complaints here except for chronic back pain. States that staff at the nursing facility are lying and that he was not unarousable. I spoke with staff nurse who states that he had been complaining of his chronic back pain around 5 AM and so she went in to give him a pain pill and states that at that time he was sitting on the side of the bed with his eyes closed and was not responding. States that he was not taking the pill or drinking through a straw. States that he had to be helped back up into bed and remained unresponsive. States that he had multiple bowel movements on himself which is not like him. No reported recent falls. She states that staff last night did say that he was having some of these episodes last night. No other reported recent illness. She states that prior to EMS arrival, the patient did return back to baseline. ROS:  At least 10 systems reviewed and otherwise acutely negative except as in the [address]     Past Medical History:   Diagnosis Date    Anemia     per old chart    Arthritis     Back pain, chronic     \"have pain in lumbar mainly- have 5 bulging discs\"\"in my neck and my lumbar have herniated discs\"    Bipolar 1 disorder (HCC)     CAD (coronary artery disease) 01/27/2016    does not follow with a cardiologist    Cerebral artery occlusion with cerebral infarction ([facility]     \"had mini stroke in Jan 2016- fast heart rate when I would stand up - smile drooping on one side- lased just the one day\"    Chronic kidney disease (CKD), stage III (moderate) (HCC)     CKD (chronic kidney disease)     per old chart- consult with Dr Jordin Noriega with admission 4/2016\"have low kidney function\"    COPD (chronic obstructive pulmonary disease) ([facility]     follow with Dr Reinaldo Enrique    Diabetes mellitus, type 2 ([facility] 01/03/2017    Diabetic peripheral neuropathy ([facility])     Diastolic CHF ([facility] 18/56/3364    Gastric ulcer     H/O cardiovascular stress test 05/26/2014    EF 68% mild ischemia anterior wall    Heart murmur     \"see Dr Kya Valentine- last echo 2014\" pt states\"I think they said I have a murmur but no chest pain or palpitations\"    Hiatal hernia     History of cardiac cath 06/11/2014    MODERATE  CAD      Hx of migraines     HX OTHER MEDICAL     \"have thinning of kidney walls- they found I had that 15 yrs ago\" see Dr Mack Old"    Hyperlipidemia     Hypertension     Intraparenchymal hematoma of brain     Lumbar radiculopathy     Overlapping malignant neoplasm of colon ([facility] 05/27/2020    Panic attack     'anything new gives me anxiety\"    Pleural effusion     scheduled for thoracentesis 7/28/2014, 8/17/2016    S/P thoracentesis     left side( per old chart had thora done 4/2016 and one done 2014)    Sleep apnea     sleep study x 2 - last one 4-5 yrs ago- uses c-pap\"    SOBOE (shortness of breath on exertion)     Spinal stenosis      Past Surgical History:   Procedure Laterality Date    CARPAL TUNNEL RELEASE Bilateral 1989    COLONOSCOPY N/A 5/12/2020    COLONOSCOPY POLYPECTOMY SNARE/COLD BIOPSY WITH SPOT INK TATTOO performed by Layo Leung MD at [facility] Left 2000's    KNEE SURGERY Bilateral     right knee x 2( scope)/ left knee- 7-8 yr ago    [address] fusion    SMALL INTESTINE SURGERY N/A 5/21/2020    BOWEL RESECTION EXTENDED RIGHT HEMICOLECTOMY performed by Katie Shore MD at [address] [place] [address] Street Left 2013    THORACENTESIS  2016         THORACENTESIS Left 11/15/2016    Dr. Daniela Mgirs 250mlsremoved     THORACOTOMY Left 2019    with Lysis of adhesions    THORACOTOMY Left 3/18/2019    THORACOSCOPY CONVERTED TO THORACOTOMY WITH LYSIS OF ADHESIONS performed by Sheree Cordero MD at [facility]      as a kid    UPPER GASTROINTESTINAL ENDOSCOPY N/A 2020    EGD BIOPSY performed by Tia Morejon MD at [facility] ENDOSCOPY     Family History   Problem Relation Age of Onset    Heart Disease Mother         heart attack    High Blood Pressure Father      Social History     Socioeconomic History    Marital status: Single     Spouse name: Not on file    Number of children: Not on file    Years of education: Not on file    Highest education level: Not on file   Occupational History    Occupation: [employer], retired     Employer: [employer]   Tobacco Use    Smoking status: Former     Packs/day: 1.50     Years: 30.00     Pack years: 45.00     Types: Cigarettes     Quit date: 2010     Years since quittin.3    Smokeless tobacco: Never   Vaping Use    Vaping Use: Never used   Substance and Sexual Activity    Alcohol use: Not Currently    Drug use: Yes     Frequency: 7.0 times per week     Types: Marijuana Maryann Pleas)    Sexual activity: Not Currently     Partners: Female   Other Topics Concern    Not on file   Social History Narrative    Not on file     Social Determinants of Health     Financial Resource Strain: Not on file   Food Insecurity: Not on file   Transportation Needs: Not on file   Physical Activity: Not on file   Stress: Not on file   Social Connections: Not on file   Intimate Partner Violence: Not on file   Housing Stability: Not on file     No current facility-administered medications for this encounter.      Current Outpatient Medications   Medication Sig Dispense Refill    midodrine (PROAMATINE) 10 MG tablet Take 1 tablet by mouth 3 times daily (with meals) 90 tablet 0    busPIRone (BUSPAR) 5 MG tablet Take 10 mg by mouth 2 times daily      melatonin 3 MG TABS tablet Take 3 mg by mouth nightly as needed      traMADol (ULTRAM) 50 MG tablet Take 50 mg by mouth every 6 hours as needed for Pain. docusate sodium (COLACE) 100 MG capsule Take 1 capsule by mouth 2 times daily Hold for diarrhea 20 capsule 3    spironolactone (ALDACTONE) 25 MG tablet Take 1 tablet by mouth daily 30 tablet 3    atorvastatin (LIPITOR) 80 MG tablet Take 1 tablet by mouth daily 30 tablet 5    divalproex (DEPAKOTE ER) 500 MG extended release tablet Take 2 tablets by mouth nightly 60 tablet 5    escitalopram (LEXAPRO) 10 MG tablet Take 2 tablets by mouth daily      donepezil (ARICEPT) 5 MG tablet Take 1 tablet by mouth nightly 30 tablet 3    famotidine (PEPCID) 10 MG tablet Take 1 tablet by mouth 2 times daily 60 tablet 5    benztropine (COGENTIN) 0.5 MG tablet Take 1 tablet by mouth 2 times daily 60 tablet 0    Compression Stockings MISC by Does not apply route Duration of Treatment:  3 Months  # of pairs:  2    Compression Class Level - 20-30 mmHg*    Style - Knee High 2 each 0     Allergies   Allergen Reactions    Nsaids      Renal        Nursing Notes Reviewed    Physical Exam:  ED Triage Vitals   Enc Vitals Group      BP       Pulse       Resp       Temp       Temp src       SpO2       Weight       Height       Head Circumference       Peak Flow       Pain Score       Pain Loc       Pain Edu? Excl. in [address]? GENERAL APPEARANCE: Awake and alert. Mildly confused. Cooperative. No acute distress. HEAD: Normocephalic. Atraumatic. EYES: EOM's grossly intact. Sclera anicteric. ENT: Mucous membranes are moist. Tolerates saliva. No trismus. NECK: Supple. No meningismus. Trachea midline. HEART: RRR. Radial pulses 2+. LUNGS: Respirations unlabored. CTAB  ABDOMEN: Soft. Non-tender. No guarding or rebound.   EXTREMITIES: No acute deformities. SKIN: Warm and dry. NEUROLOGICAL: EOMI, PERRL, symmetric eyebrow raise and nasolabial folds, no hearing deficits, normal sensation in all branches of trigeminal nerve. Full strength and sensation in the bilateral upper and lower extremities. DTRs 2+ in bilateral patella distributions. No dysmetria. No slurred speech. No aphasia. No nystagmus. PSYCHIATRIC: Normal mood.     I have reviewed and interpreted all of the currently available lab results from this visit (if applicable):  Results for orders placed or performed during the hospital encounter of 11/15/22   CBC with Auto Differential   Result Value Ref Range    WBC 5.6 4.0 - 10.5 K/CU MM    RBC 4.47 (L) 4.6 - 6.2 M/CU MM    Hemoglobin 13.2 (L) 13.5 - 18.0 GM/DL    Hematocrit 41.4 (L) 42 - 52 %    MCV 92.6 78 - 100 FL    MCH 29.5 27 - 31 PG    MCHC 31.9 (L) 32.0 - 36.0 %    RDW 14.3 11.7 - 14.9 %    Platelets 035 868 - 480 K/CU MM    MPV 9.2 7.5 - 11.1 FL    Differential Type AUTOMATED DIFFERENTIAL     Segs Relative 70.5 (H) 36 - 66 %    Lymphocytes % 17.8 (L) 24 - 44 %    Monocytes % 9.3 (H) 0 - 4 %    Eosinophils % 1.6 0 - 3 %    Basophils % 0.4 0 - 1 %    Segs Absolute 4.0 K/CU MM    Lymphocytes Absolute 1.0 K/CU MM    Monocytes Absolute 0.5 K/CU MM    Eosinophils Absolute 0.1 K/CU MM    Basophils Absolute 0.0 K/CU MM    Nucleated RBC % 0.0 %    Total Nucleated RBC 0.0 K/CU MM    Total Immature Neutrophil 0.02 K/CU MM    Immature Neutrophil % 0.4 0 - 0.43 %   Comprehensive Metabolic Panel w/ Reflex to MG   Result Value Ref Range    Sodium 143 135 - 145 MMOL/L    Potassium 4.5 3.5 - 5.1 MMOL/L    Chloride 107 99 - 110 mMol/L    CO2 24 21 - 32 MMOL/L    BUN 26 (H) 6 - 23 MG/DL    Creatinine 2.2 (H) 0.9 - 1.3 MG/DL    Est, Glom Filt Rate 31 (L) >60 mL/min/1.73m2    Glucose 108 (H) 70 - 99 MG/DL    Calcium 9.9 8.3 - 10.6 MG/DL    Albumin 4.2 3.4 - 5.0 GM/DL    Total Protein 7.9 6.4 - 8.2 GM/DL    Total Bilirubin 0.5 0.0 - 1.0 MG/DL ALT 14 10 - 40 U/L    AST 23 15 - 37 IU/L    Alkaline Phosphatase 82 40 - 129 IU/L    Anion Gap 12 4 - 16      Radiographs (if obtained):  [] The following radiograph was interpreted by myself in the absence of a radiologist:  [] Radiologist's Report Reviewed:    EKG (if obtained): (All EKG's are interpreted by myself in the absence of a cardiologist)    MDM:  Plan of care is discussed thoroughly with the patient and family if present. If performed, all imaging and lab work also discussed with patient. All relevant prior results and chart reviewed if available. Patient presents as above with undifferentiated change in mental status prior to arrival here. He is currently back at baseline. He is alert and oriented although mildly confused. He does have a history of dementia. He has no focal neurologic deficits on his exam and has normal vital signs. He denies any acute complaints at this time. CT head does not show any evidence of acute abnormality. Metabolic work-up is largely unremarkable for any acute findings. Patient will be discharged back to facility. Doing well on multiple reevaluations. Clinical Impression:  1.  Transient alteration of awareness      (Please note that portions of this note may have been completed with a voice recognition program. Efforts were made to edit the dictations but occasionally words are mis-transcribed.)    MD Darek Gustafson MD  11/15/22 [id number]

## 2022-11-15 NOTE — ED TRIAGE NOTES
Pt arrived to the ED from [facility] by EMS. Per EMS the nursing facility staff stated pt was lethargic this morning and slow to arouse. Pt was unable to wake up and was unresponsive per staff but still breathing. On arrival pt is awake and alert. Pt denies any needs other than back pain.

## 2022-11-18 ENCOUNTER — HOSPITAL ENCOUNTER (OUTPATIENT)
Age: 72
Setting detail: SPECIMEN
Discharge: HOME | End: 2022-11-18
Payer: MEDICARE

## 2022-11-18 LAB
ALBUMIN SERPL-MCNC: 4 GM/DL (ref 3.4–5)
ALP BLD-CCNC: 81 IU/L (ref 40–128)
ALT SERPL-CCNC: 14 U/L (ref 10–40)
ANION GAP SERPL CALCULATED.3IONS-SCNC: 11 MMOL/L (ref 4–16)
AST SERPL-CCNC: 21 IU/L (ref 15–37)
BASOPHILS ABSOLUTE: 0 K/CU MM
BASOPHILS RELATIVE PERCENT: 0.2 % (ref 0–1)
BILIRUB SERPL-MCNC: 0.4 MG/DL (ref 0–1)
BUN BLDV-MCNC: 35 MG/DL (ref 6–23)
CALCIUM SERPL-MCNC: 8.8 MG/DL (ref 8.3–10.6)
CHLORIDE BLD-SCNC: 107 MMOL/L (ref 99–110)
CO2: 22 MMOL/L (ref 21–32)
CREAT SERPL-MCNC: 2 MG/DL (ref 0.9–1.3)
DIFFERENTIAL TYPE: ABNORMAL
EOSINOPHILS ABSOLUTE: 0.4 K/CU MM
EOSINOPHILS RELATIVE PERCENT: 5.9 % (ref 0–3)
GFR SERPL CREATININE-BSD FRML MDRD: 35 ML/MIN/1.73M2
GLUCOSE BLD-MCNC: 125 MG/DL (ref 70–99)
HCT VFR BLD CALC: 40 % (ref 42–52)
HEMOGLOBIN: 12.3 GM/DL (ref 13.5–18)
IMMATURE NEUTROPHIL %: 0.3 % (ref 0–0.43)
LYMPHOCYTES ABSOLUTE: 1.3 K/CU MM
LYMPHOCYTES RELATIVE PERCENT: 21.9 % (ref 24–44)
MCH RBC QN AUTO: 29.4 PG (ref 27–31)
MCHC RBC AUTO-ENTMCNC: 30.8 % (ref 32–36)
MCV RBC AUTO: 95.7 FL (ref 78–100)
MONOCYTES ABSOLUTE: 0.5 K/CU MM
MONOCYTES RELATIVE PERCENT: 8.4 % (ref 0–4)
NUCLEATED RBC %: 0 %
PDW BLD-RTO: 14.4 % (ref 11.7–14.9)
PLATELET # BLD: 148 K/CU MM (ref 140–440)
PMV BLD AUTO: 9.3 FL (ref 7.5–11.1)
POTASSIUM SERPL-SCNC: 4.2 MMOL/L (ref 3.5–5.1)
RBC # BLD: 4.18 M/CU MM (ref 4.6–6.2)
SEGMENTED NEUTROPHILS ABSOLUTE COUNT: 3.8 K/CU MM
SEGMENTED NEUTROPHILS RELATIVE PERCENT: 63.3 % (ref 36–66)
SODIUM BLD-SCNC: 140 MMOL/L (ref 135–145)
TOTAL IMMATURE NEUTOROPHIL: 0.02 K/CU MM
TOTAL NUCLEATED RBC: 0 K/CU MM
TOTAL PROTEIN: 6.5 GM/DL (ref 6.4–8.2)
WBC # BLD: 6 K/CU MM (ref 4–10.5)

## 2022-11-18 PROCEDURE — 85025 COMPLETE CBC W/AUTO DIFF WBC: CPT

## 2022-11-18 PROCEDURE — 36415 COLL VENOUS BLD VENIPUNCTURE: CPT

## 2022-11-18 PROCEDURE — 80053 COMPREHEN METABOLIC PANEL: CPT

## 2022-11-23 ENCOUNTER — HOSPITAL ENCOUNTER (OUTPATIENT)
Age: 72
Setting detail: SPECIMEN
Discharge: HOME | End: 2022-11-23
Payer: MEDICARE

## 2022-11-23 LAB
ANION GAP SERPL CALCULATED.3IONS-SCNC: 13 MMOL/L (ref 4–16)
BUN BLDV-MCNC: 27 MG/DL (ref 6–23)
CALCIUM SERPL-MCNC: 9.1 MG/DL (ref 8.3–10.6)
CHLORIDE BLD-SCNC: 105 MMOL/L (ref 99–110)
CO2: 22 MMOL/L (ref 21–32)
CREAT SERPL-MCNC: 2.1 MG/DL (ref 0.9–1.3)
GFR SERPL CREATININE-BSD FRML MDRD: 33 ML/MIN/1.73M2
GLUCOSE BLD-MCNC: 115 MG/DL (ref 70–99)
POTASSIUM SERPL-SCNC: 3.8 MMOL/L (ref 3.5–5.1)
SODIUM BLD-SCNC: 140 MMOL/L (ref 135–145)

## 2022-11-23 PROCEDURE — 36415 COLL VENOUS BLD VENIPUNCTURE: CPT

## 2022-11-23 PROCEDURE — 80048 BASIC METABOLIC PNL TOTAL CA: CPT

## 2022-12-27 ENCOUNTER — HOSPITAL ENCOUNTER (OUTPATIENT)
Age: 72
Setting detail: SPECIMEN
Discharge: HOME | End: 2022-12-27
Payer: MEDICARE

## 2022-12-27 LAB
ALBUMIN SERPL-MCNC: 3.7 GM/DL (ref 3.4–5)
ALP BLD-CCNC: 69 IU/L (ref 40–128)
ALT SERPL-CCNC: 14 U/L (ref 10–40)
ANION GAP SERPL CALCULATED.3IONS-SCNC: 12 MMOL/L (ref 4–16)
AST SERPL-CCNC: 13 IU/L (ref 15–37)
BILIRUB SERPL-MCNC: 0.3 MG/DL (ref 0–1)
BUN BLDV-MCNC: 49 MG/DL (ref 6–23)
CALCIUM SERPL-MCNC: 8.6 MG/DL (ref 8.3–10.6)
CHLORIDE BLD-SCNC: 103 MMOL/L (ref 99–110)
CHOLESTEROL: 156 MG/DL
CO2: 22 MMOL/L (ref 21–32)
CREAT SERPL-MCNC: 1.9 MG/DL (ref 0.9–1.3)
GFR SERPL CREATININE-BSD FRML MDRD: 37 ML/MIN/1.73M2
GLUCOSE BLD-MCNC: 156 MG/DL (ref 70–99)
HDLC SERPL-MCNC: 56 MG/DL
LDL CHOLESTEROL CALCULATED: 29 MG/DL
POTASSIUM SERPL-SCNC: 4.8 MMOL/L (ref 3.5–5.1)
SODIUM BLD-SCNC: 137 MMOL/L (ref 135–145)
TOTAL PROTEIN: 6.2 GM/DL (ref 6.4–8.2)
TRIGL SERPL-MCNC: 357 MG/DL

## 2022-12-27 PROCEDURE — 80061 LIPID PANEL: CPT

## 2022-12-27 PROCEDURE — 36415 COLL VENOUS BLD VENIPUNCTURE: CPT

## 2022-12-27 PROCEDURE — 80053 COMPREHEN METABOLIC PANEL: CPT

## 2023-01-27 ENCOUNTER — HOSPITAL ENCOUNTER (EMERGENCY)
Age: 73
Discharge: HOME | End: 2023-01-27
Attending: EMERGENCY MEDICINE
Payer: MEDICARE

## 2023-01-27 ENCOUNTER — APPOINTMENT (OUTPATIENT)
Dept: GENERAL RADIOLOGY | Age: 73
End: 2023-01-27
Payer: MEDICARE

## 2023-01-27 ENCOUNTER — HOSPITAL ENCOUNTER (EMERGENCY)
Age: 73
Discharge: NURSING, REHAB OR LONG-TERM CARE | End: 2023-01-28
Payer: MEDICARE

## 2023-01-27 VITALS
SYSTOLIC BLOOD PRESSURE: 153 MMHG | OXYGEN SATURATION: 98 % | TEMPERATURE: 97.8 F | DIASTOLIC BLOOD PRESSURE: 74 MMHG | HEART RATE: 74 BPM | RESPIRATION RATE: 18 BRPM

## 2023-01-27 DIAGNOSIS — G89.29 CHRONIC NECK PAIN: Primary | ICD-10-CM

## 2023-01-27 DIAGNOSIS — M54.2 CHRONIC NECK PAIN: Primary | ICD-10-CM

## 2023-01-27 DIAGNOSIS — M54.2 NECK PAIN ON LEFT SIDE: Primary | ICD-10-CM

## 2023-01-27 PROCEDURE — 99283 EMERGENCY DEPT VISIT LOW MDM: CPT

## 2023-01-27 PROCEDURE — 6370000000 HC RX 637 (ALT 250 FOR IP): Performed by: PHYSICIAN ASSISTANT

## 2023-01-27 PROCEDURE — 72040 X-RAY EXAM NECK SPINE 2-3 VW: CPT

## 2023-01-27 PROCEDURE — 6370000000 HC RX 637 (ALT 250 FOR IP): Performed by: EMERGENCY MEDICINE

## 2023-01-27 RX ORDER — CYCLOBENZAPRINE HCL 10 MG
10 TABLET ORAL ONCE
Status: COMPLETED | OUTPATIENT
Start: 2023-01-27 | End: 2023-01-27

## 2023-01-27 RX ORDER — LIDOCAINE 50 MG/G
1 PATCH TOPICAL DAILY
Qty: 30 PATCH | Refills: 0 | Status: SHIPPED | OUTPATIENT
Start: 2023-01-27 | End: 2023-02-26

## 2023-01-27 RX ORDER — ACETAMINOPHEN 500 MG
1000 TABLET ORAL ONCE
Status: COMPLETED | OUTPATIENT
Start: 2023-01-27 | End: 2023-01-27

## 2023-01-27 RX ORDER — TRAMADOL HYDROCHLORIDE 50 MG/1
50 TABLET ORAL ONCE
Status: COMPLETED | OUTPATIENT
Start: 2023-01-27 | End: 2023-01-27

## 2023-01-27 RX ORDER — ACETAMINOPHEN 500 MG
500 TABLET ORAL 4 TIMES DAILY PRN
Qty: 30 TABLET | Refills: 0 | Status: SHIPPED | OUTPATIENT
Start: 2023-01-27

## 2023-01-27 RX ORDER — LIDOCAINE 4 G/G
1 PATCH TOPICAL ONCE
Status: DISCONTINUED | OUTPATIENT
Start: 2023-01-27 | End: 2023-01-28 | Stop reason: HOSPADM

## 2023-01-27 RX ORDER — CYCLOBENZAPRINE HCL 10 MG
10 TABLET ORAL 3 TIMES DAILY PRN
Qty: 15 TABLET | Refills: 0 | Status: SHIPPED | OUTPATIENT
Start: 2023-01-27

## 2023-01-27 RX ADMIN — TRAMADOL HYDROCHLORIDE 50 MG: 50 TABLET, COATED ORAL at 19:35

## 2023-01-27 RX ADMIN — CYCLOBENZAPRINE 10 MG: 10 TABLET, FILM COATED ORAL at 02:17

## 2023-01-27 RX ADMIN — CYCLOBENZAPRINE 10 MG: 10 TABLET, FILM COATED ORAL at 19:35

## 2023-01-27 RX ADMIN — ACETAMINOPHEN 1000 MG: 500 TABLET ORAL at 02:17

## 2023-01-27 RX ADMIN — ACETAMINOPHEN 1000 MG: 500 TABLET ORAL at 19:35

## 2023-01-27 ASSESSMENT — LIFESTYLE VARIABLES
HOW OFTEN DO YOU HAVE A DRINK CONTAINING ALCOHOL: NEVER
HOW MANY STANDARD DRINKS CONTAINING ALCOHOL DO YOU HAVE ON A TYPICAL DAY: PATIENT DOES NOT DRINK

## 2023-01-27 ASSESSMENT — PAIN DESCRIPTION - ORIENTATION: ORIENTATION: LEFT

## 2023-01-27 ASSESSMENT — PAIN - FUNCTIONAL ASSESSMENT: PAIN_FUNCTIONAL_ASSESSMENT: 0-10

## 2023-01-27 ASSESSMENT — PAIN DESCRIPTION - DESCRIPTORS: DESCRIPTORS: STABBING

## 2023-01-27 ASSESSMENT — PAIN DESCRIPTION - LOCATION: LOCATION: NECK

## 2023-01-27 ASSESSMENT — PAIN DESCRIPTION - FREQUENCY: FREQUENCY: INTERMITTENT

## 2023-01-27 ASSESSMENT — PAIN SCALES - GENERAL: PAINLEVEL_OUTOF10: 10

## 2023-01-27 NOTE — ED TRIAGE NOTES
Has been having neck pain for a few months, increased today.   Was told he had fractured cervical vertebrae in the past.

## 2023-01-27 NOTE — ED PROVIDER NOTES
Triage Chief Complaint:   Neck Pain (Pt presents to the ED via EMS with c/o neck pain. Pt states pain is chronic but has been increasing over the past 2 months. )    [place]:  Tamie Montero is a 68 y.o. male that presents via EMS from nursing facility for chronic neck pain that has been present for over 4 years. States that he has pain in the left posterior neck worse with movement. No recent trauma. Denies any fevers, motor or sensory deficits or radiation of pain. He is on tramadol and states that he has tried Biofreeze without improvement. No other acute complaints. ROS:  At least 6 systems reviewed and otherwise acutely negative except as in the [address]     Past Medical History:   Diagnosis Date    Anemia     per old chart    Arthritis     Back pain, chronic     \"have pain in lumbar mainly- have 5 bulging discs\"\"in my neck and my lumbar have herniated discs\"    Bipolar 1 disorder (HCC)     CAD (coronary artery disease) 01/27/2016    does not follow with a cardiologist    Cerebral artery occlusion with cerebral infarction ([facility]     \"had mini stroke in Jan 2016- fast heart rate when I would stand up - smile drooping on one side- lased just the one day\"    Chronic kidney disease (CKD), stage III (moderate) ([facility])     CKD (chronic kidney disease)     per old chart- consult with Dr Kristi Nash with admission 4/2016\"have low kidney function\"    COPD (chronic obstructive pulmonary disease) ([facility]     follow with Dr Malorie Campos    Diabetes mellitus, type 2 ([facility] 01/03/2017    Diabetic peripheral neuropathy ([facility])     Diastolic CHF ([facility] 54/46/2903    Gastric ulcer     H/O cardiovascular stress test 05/26/2014    EF 68% mild ischemia anterior wall    Heart murmur     \"see Dr Anoop Holt- last echo 2014\" pt states\"I think they said I have a murmur but no chest pain or palpitations\"    Hiatal hernia     History of cardiac cath 06/11/2014    MODERATE  CAD      Hx of migraines     HX OTHER MEDICAL     \"have thinning of kidney walls- they found I had that 15 yrs ago\" see Dr Heather Samuel"    Hyperlipidemia     Hypertension     Intraparenchymal hematoma of brain     Lumbar radiculopathy     Overlapping malignant neoplasm of colon ([address] 05/27/2020    Panic attack     'anything new gives me anxiety\"    Pleural effusion     scheduled for thoracentesis 7/28/2014, 8/17/2016    S/P thoracentesis     left side( per old chart had thora done 4/2016 and one done 2014)    Sleep apnea     sleep study x 2 - last one 4-5 yrs ago- uses c-pap\"    SOBOE (shortness of breath on exertion)     Spinal stenosis      Past Surgical History:   Procedure Laterality Date    CARPAL TUNNEL RELEASE Bilateral 1989    COLONOSCOPY N/A 5/12/2020    COLONOSCOPY POLYPECTOMY SNARE/COLD BIOPSY WITH SPOT INK TATTOO performed by Oleg Hamilton MD at [facility] Left 2000's    KNEE SURGERY Bilateral     right knee x 2( scope)/ left knee- 7-8 yr ago    [address] [place] fusion    SMALL INTESTINE SURGERY N/A 5/21/2020    BOWEL RESECTION EXTENDED RIGHT HEMICOLECTOMY performed by Dorota Garcia MD at [address] [place] [address] Left 12/20/2013    THORACENTESIS  4/25/2016         THORACENTESIS Left 11/15/2016    Dr. Hall 250mlsremoved     THORACOTOMY Left 03/18/2019    with Lysis of adhesions    THORACOTOMY Left 3/18/2019    THORACOSCOPY CONVERTED TO THORACOTOMY WITH LYSIS OF ADHESIONS performed by Mikie Velazquez MD at [address] [place] [address]      as a kid    UPPER GASTROINTESTINAL ENDOSCOPY N/A 5/12/2020    EGD BIOPSY performed by Oleg Hamilton MD at [facility] History   Problem Relation Age of Onset    Heart Disease Mother         heart attack    High Blood Pressure Father      Social History     Socioeconomic History    Marital status: Single     Spouse name: Not on file    Number of children: Not on file    Years of education: Not on file    Highest education level: Not on file   Occupational History    Occupation: built trucks, retired Employer: [employer]   Tobacco Use    Smoking status: Former     Packs/day: 1.50     Years: 30.00     Pack years: 45.00     Types: Cigarettes     Quit date: 2010     Years since quittin.5    Smokeless tobacco: Never   Vaping Use    Vaping Use: Never used   Substance and Sexual Activity    Alcohol use: Not Currently    Drug use: Yes     Frequency: 7.0 times per week     Types: Marijuana Mihai Kos)    Sexual activity: Not Currently     Partners: Female   Other Topics Concern    Not on file   Social History Narrative    Not on file     Social Determinants of Health     Financial Resource Strain: Not on file   Food Insecurity: Not on file   Transportation Needs: Not on file   Physical Activity: Not on file   Stress: Not on file   Social Connections: Not on file   Intimate Partner Violence: Not on file   Housing Stability: Not on file     Current Facility-Administered Medications   Medication Dose Route Frequency Provider Last Rate Last Admin    acetaminophen (TYLENOL) tablet 1,000 mg  1,000 mg Oral Once Norah Orantes MD        cyclobenzaprine (FLEXERIL) tablet 10 mg  10 mg Oral Once Norah Orantes MD         Current Outpatient Medications   Medication Sig Dispense Refill    midodrine (PROAMATINE) 10 MG tablet Take 1 tablet by mouth 3 times daily (with meals) 90 tablet 0    busPIRone (BUSPAR) 5 MG tablet Take 10 mg by mouth 2 times daily      melatonin 3 MG TABS tablet Take 3 mg by mouth nightly as needed      traMADol (ULTRAM) 50 MG tablet Take 50 mg by mouth every 6 hours as needed for Pain.       docusate sodium (COLACE) 100 MG capsule Take 1 capsule by mouth 2 times daily Hold for diarrhea 20 capsule 3    spironolactone (ALDACTONE) 25 MG tablet Take 1 tablet by mouth daily 30 tablet 3    atorvastatin (LIPITOR) 80 MG tablet Take 1 tablet by mouth daily 30 tablet 5    divalproex (DEPAKOTE ER) 500 MG extended release tablet Take 2 tablets by mouth nightly 60 tablet 5    escitalopram (LEXAPRO) 10 MG tablet Take 2 tablets by mouth daily      donepezil (ARICEPT) 5 MG tablet Take 1 tablet by mouth nightly 30 tablet 3    famotidine (PEPCID) 10 MG tablet Take 1 tablet by mouth 2 times daily 60 tablet 5    benztropine (COGENTIN) 0.5 MG tablet Take 1 tablet by mouth 2 times daily 60 tablet 0    Compression Stockings MISC by Does not apply route Duration of Treatment:  3 Months  # of pairs:  2    Compression Class Level - 20-30 mmHg*    Style - Knee High 2 each 0     Allergies   Allergen Reactions    Nsaids      Renal        Nursing Notes Reviewed    Physical Exam:  ED Triage Vitals [01/27/23 0148]   Enc Vitals Group      BP (!) 153/74      Heart Rate 74      Resp 18      Temp 97.8 °F (36.6 °C)      Temp Source Oral      SpO2 98 %      Weight       Height       Head Circumference       Peak Flow       Pain Score       Pain Loc       Pain Edu? Excl. in [address]? GENERAL APPEARANCE: Awake and alert. Cooperative. No acute distress. HEENT: Head NC/AT, EOM's grossly intact. Conjunctiva anicteric. Mucous membranes moist. Tolerates saliva. No trismus. Neck supple. Trachea midline. Limited range of motion secondary to pain. Tenderness to palpation left posterior neck without any midline tenderness or deformity      I have reviewed and interpreted all of the currently available lab results from this visit (if applicable):  No results found for this visit on 01/27/23. Radiographs (if obtained):  [] The following radiograph was interpreted by myself in the absence of a radiologist:  [] Radiologist's Report Reviewed:    Medical Decision Making and ED Course:    CC/HPI Summary, DDx, ED Course, and Reassessment: Patient presents as above with acute on chronic moderate neck pain, left posterior without radiation. He is neurovascularly intact without any complaints of motor or sensory deficits. No new injury. Low suspicion for cervical fracture, epidural hematoma or abscess, ACS. Patient given Flexeril and Tylenol here.   Discharged and instructed to follow-up with PCP. History from : Patient    Limitations to history : None    Patient was given the following medications:  Medications   acetaminophen (TYLENOL) tablet 1,000 mg (has no administration in time range)   cyclobenzaprine (FLEXERIL) tablet 10 mg (has no administration in time range)       Independent Imaging Interpretation by me:     EKG (if obtained): (All EKG's are interpreted by myself in the absence of a cardiologist)     Chronic conditions affecting care: cervical pain    Discussion with Other Profesionals : None    Social Determinants : None    Disposition Considerations (tests considered but not done, Shared Decision Making, Pt Expectation of Test or Tx.): X-ray/CT considered but given chronicity of pain, nonfocal neurologic exam and no new injury not performed    Appropriate for outpatient management      I am the Primary Clinician of Record. Clinical Impression:  1.  Chronic neck pain      (Please note that portions of this note may have been completed with a voice recognition program. Efforts were made to edit the dictations but occasionally words are mis-transcribed.)    MD Jeane Lopez MD  01/27/23 0481

## 2023-01-28 VITALS
HEART RATE: 65 BPM | TEMPERATURE: 98.1 F | RESPIRATION RATE: 16 BRPM | DIASTOLIC BLOOD PRESSURE: 75 MMHG | OXYGEN SATURATION: 95 % | HEIGHT: 70 IN | WEIGHT: 218 LBS | BODY MASS INDEX: 31.21 KG/M2 | SYSTOLIC BLOOD PRESSURE: 151 MMHG

## 2023-01-28 NOTE — ED PROVIDER NOTES
Emergency Department Encounter  [facility] EMERGENCY DEPARTMENT    Patient: Felipe Heller Jr.  MRN: [id number]  : 1950  Date of Evaluation: 2023  ED Provider: Erin Kevin PA-C    Chief Complaint       Chief Complaint   Patient presents with    Neck Pain     X4 years, states the pain is getting worse. Seen yesterday for the same       [place]     Felipe Heller Jr. is a 73 y.o. male who presents to the emergency department for neck pain.  Patient reports neck pain for the last 4 years.  He states he was told he had multiple fractured vertebrae at that time but states he didn't undergo any type of surgical repair or intervention.  Pain flares up from time to time.  Localized to the left para-cervical area.  Non-radiating.  Stabbing.  Severity 10/10.  Worse with movement.  Denies numbness, tingling, weakness.  He denies any new injuries/trauma.    ROS     CONSTITUTIONAL:  Denies fever.  HEAD:  Denies headache.  ENT:  Denies earache, nasal congestion, sore throat.  NECK:  +neck pain.  MUSCULOSKELETAL:  Denies extremity pain or swelling.  BACK:  Denies back pain.  INTEGUMENT:  Denies skin changes.  NEUROLOGIC:  Denies any numbness/tingling.      Past History     Past Medical History:   Diagnosis Date    Anemia     per old chart    Arthritis     Back pain, chronic     \"have pain in lumbar mainly- have 5 bulging discs\"\"in my neck and my lumbar have herniated discs\"    Bipolar 1 disorder (HCC)     CAD (coronary artery disease) 2016    does not follow with a cardiologist    Cerebral artery occlusion with cerebral infarction (HCC)     \"had mini stroke in 2016- fast heart rate when I would stand up - smile drooping on one side- lased just the one day\"    Chronic kidney disease (CKD), stage III (moderate) (HCC)     CKD (chronic kidney disease)     per old chart- consult with Dr Diggs with admission 2016\"have low kidney function\"    COPD (chronic obstructive  pulmonary disease) ([facility]     follow with Dr Wm Majano    Diabetes mellitus, type 2 ([facility] 01/03/2017    Diabetic peripheral neuropathy (HCC)     Diastolic CHF ([facility] 06/65/0114    Gastric ulcer     H/O cardiovascular stress test 05/26/2014    EF 68% mild ischemia anterior wall    Heart murmur     \"see Dr Matthew Branch- last echo 2014\" pt states\"I think they said I have a murmur but no chest pain or palpitations\"    Hiatal hernia     History of cardiac cath 06/11/2014    MODERATE  CAD      Hx of migraines     HX OTHER MEDICAL     \"have thinning of kidney walls- they found I had that 15 yrs ago\" see Dr Olman Betancourt"    Hyperlipidemia     Hypertension     Intraparenchymal hematoma of brain     Lumbar radiculopathy     Overlapping malignant neoplasm of colon ([facility] 05/27/2020    Panic attack     'anything new gives me anxiety\"    Pleural effusion     scheduled for thoracentesis 7/28/2014, 8/17/2016    S/P thoracentesis     left side( per old chart had thora done 4/2016 and one done 2014)    Sleep apnea     sleep study x 2 - last one 4-5 yrs ago- uses c-pap\"    SOBOE (shortness of breath on exertion)     Spinal stenosis      Past Surgical History:   Procedure Laterality Date    CARPAL TUNNEL RELEASE Bilateral 1989    COLONOSCOPY N/A 5/12/2020    COLONOSCOPY POLYPECTOMY SNARE/COLD BIOPSY WITH SPOT INK TATTOO performed by Arturo Bernstein MD at [facility] Left 2000's    KNEE SURGERY Bilateral     right knee x 2( scope)/ left knee- 7-8 yr ago    [address] [place] fusion    SMALL INTESTINE SURGERY N/A 5/21/2020    BOWEL RESECTION EXTENDED RIGHT HEMICOLECTOMY performed by Iqra Pan MD at [address] Left 12/20/2013    THORACENTESIS  4/25/2016         THORACENTESIS Left 11/15/2016    Dr. 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, retired     Employer: [employer]   Tobacco Use    Smoking status: Former     Packs/day: 1.50     Years: 30.00     Pack years: 45.00     Types: Cigarettes     Quit date: 2010     Years since quittin.5    Smokeless tobacco: Never   Vaping Use    Vaping Use: Never used   Substance and Sexual Activity    Alcohol use: Not Currently    Drug use: Not Currently     Frequency: 7.0 times per week     Types: Marijuana Анна Geoffrey)    Sexual activity: Not Currently     Partners: Female       Medications/Allergies     Previous Medications    ATORVASTATIN (LIPITOR) 80 MG TABLET    Take 1 tablet by mouth daily    BENZTROPINE (COGENTIN) 0.5 MG TABLET    Take 1 tablet by mouth 2 times daily    BUSPIRONE (BUSPAR) 5 MG TABLET    Take 10 mg by mouth 2 times daily    COMPRESSION STOCKINGS MISC    by Does not apply route Duration of Treatment:  3 Months  # of pairs:  2    Compression Class Level - 20-30 mmHg*    Style - Knee High    DIVALPROEX (DEPAKOTE ER) 500 MG EXTENDED RELEASE TABLET    Take 2 tablets by mouth nightly    DOCUSATE SODIUM (COLACE) 100 MG CAPSULE    Take 1 capsule by mouth 2 times daily Hold for diarrhea    DONEPEZIL (ARICEPT) 5 MG TABLET    Take 1 tablet by mouth nightly    ESCITALOPRAM (LEXAPRO) 10 MG TABLET    Take 2 tablets by mouth daily    FAMOTIDINE (PEPCID) 10 MG TABLET    Take 1 tablet by mouth 2 times daily    MELATONIN 3 MG TABS TABLET    Take 3 mg by mouth nightly as needed    MIDODRINE (PROAMATINE) 10 MG TABLET    Take 1 tablet by mouth 3 times daily (with meals)    SPIRONOLACTONE (ALDACTONE) 25 MG TABLET    Take 1 tablet by mouth daily    TRAMADOL (ULTRAM) 50 MG TABLET    Take 50 mg by mouth every 6 hours as needed for Pain. Allergies   Allergen Reactions    Nsaids      Renal         Physical Exam       ED Triage Vitals   BP Temp Temp Source Heart Rate Resp SpO2 Height Weight   01/27/23 1809 01/27/23 1809 01/27/23 1809 01/27/23 1809 01/27/23 1809 01/27/23 1809 01/27/23 1806 01/27/23 1806   (!) 160/74 98.1 °F (36.7 °C) Oral 65 16 94 % 5' 10\" (1.778 m) 218 lb (98.9 kg)     GENERAL APPEARANCE:  Well-developed, well-nourished, no acute distress. HEAD:  NC/AT. EYES:  Sclera anicteric. ENT:  Ears, nose, mouth normal.     NECK:  Supple. Mild left para-cervical tenderness, no midline ttp. CARDIO:  RRR. LUNGS:   CTAB. Respirations unlabored. EXTREMITIES:  No acute deformities. SKIN:  Warm and dry. NEUROLOGICAL:  Alert and oriented. PSYCHIATRIC:  Normal mood. Diagnostics     Radiographs:  XR CERVICAL SPINE (2-3 VIEWS)    Result Date: 1/27/2023  EXAMINATION: 4 XRAY VIEWS OF THE CERVICAL SPINE 1/27/2023 7:47 pm COMPARISON: CT cervical spine 02/19/2022 HISTORY: ORDERING SYSTEM PROVIDED HISTORY: pain x 4 years TECHNOLOGIST PROVIDED HISTORY: Reason for exam:->pain x 4 years Reason for Exam: pain x 4 years Additional signs and symptoms: na Relevant Medical/Surgical History: na FINDINGS: The skull base through the C6 vertebral body are fully visualized on the lateral projection. Normal alignment. No fracture of evidence of spondylolisthesis. ACDF at C5-C6. No evidence of hardware fracture or loosening. Multilevel marginal osteophytosis and intervertebral disc space narrowing, moderate and greatest at C4-C5, unchanged. .  Multilevel facet and uncovertebral arthropathy. Normal visualized lung apices. No acute osseous abnormality. ACDF at C5-C6 without evidence of hardware complication. Unchanged moderate multilevel degenerative disc disease.       ED Course and MDM     CC/HPI Summary, DDx, ED Course, and Reassessment:  Patient presents to the ED with chief complaint of neck pain. Differential diagnosis includes but is not limited to fracture, DDD, herniated disc, strain, others. Pain is atraumatic but patient bounced back after a visit yesterday, so plain film XR was obtained. Results were discussed with patient. No acute findings. No hardware complication. Upon re-examination, he is sleeping soundly. He reports feeling better and ready for discharge back to [facility]. Rx Tylenol, Flexeril, and Lidoderm patches. FU with PCP, return as needed. History from : Patient    Limitations to history : None    Patient was given the following medications:  Medications   lidocaine 4 % external patch 1 patch (1 patch TransDERmal Patch Applied 1/27/23 1935)   traMADol (ULTRAM) tablet 50 mg (50 mg Oral Given 1/27/23 1935)   acetaminophen (TYLENOL) tablet 1,000 mg (1,000 mg Oral Given 1/27/23 1935)   cyclobenzaprine (FLEXERIL) tablet 10 mg (10 mg Oral Given 1/27/23 1935)       Independent Imaging Interpretation by me:  None    EKG (if obtained):  Please see supervising physician's note for interpretation. Chronic conditions affecting care: Chronic neck pain, previous cervical fusion    Discussion with Other Profesionals : None    Social Determinants : None    Records Reviewed : Inpatient Notes from visit yesterday    Disposition Considerations (tests considered but not done, Shared Decision Making, Pt Expectation of Test or Tx.):   Appropriate for outpatient management as discussed above    I am the Primary Clinician of Record. Supervising physician was Dr. Andreia Vazquez. Patient was seen independently. In light of current events, I did utilize appropriate PPE (including N95 and surgical face mask, safety glasses, and gloves, as recommended by the health facility/national standard best practice, during my bedside interactions with the patient. Final Impression      1.  Neck pain on left side          DISPOSITION        Erin M Yrn Noel, e [address], [place]  01/28/23 0015

## 2023-04-20 ENCOUNTER — HOSPITAL ENCOUNTER (OUTPATIENT)
Age: 73
Setting detail: SPECIMEN
Discharge: HOME | End: 2023-04-20
Payer: MEDICARE

## 2023-04-20 LAB
ALBUMIN SERPL-MCNC: 3.9 GM/DL (ref 3.4–5)
ALP BLD-CCNC: 92 IU/L (ref 40–128)
ALT SERPL-CCNC: 10 U/L (ref 10–40)
ANION GAP SERPL CALCULATED.3IONS-SCNC: 14 MMOL/L (ref 4–16)
AST SERPL-CCNC: 14 IU/L (ref 15–37)
BILIRUB SERPL-MCNC: 0.2 MG/DL (ref 0–1)
BUN SERPL-MCNC: 28 MG/DL (ref 6–23)
CALCIUM SERPL-MCNC: 8.7 MG/DL (ref 8.3–10.6)
CHLORIDE BLD-SCNC: 108 MMOL/L (ref 99–110)
CO2: 20 MMOL/L (ref 21–32)
CREAT SERPL-MCNC: 2.2 MG/DL (ref 0.9–1.3)
GFR SERPL CREATININE-BSD FRML MDRD: 31 ML/MIN/1.73M2
GLUCOSE SERPL-MCNC: 137 MG/DL (ref 70–99)
HCT VFR BLD CALC: 37.6 % (ref 42–52)
HEMOGLOBIN: 11.2 GM/DL (ref 13.5–18)
MCH RBC QN AUTO: 30.5 PG (ref 27–31)
MCHC RBC AUTO-ENTMCNC: 29.8 % (ref 32–36)
MCV RBC AUTO: 102.5 FL (ref 78–100)
PDW BLD-RTO: 13.9 % (ref 11.7–14.9)
PLATELET # BLD: 147 K/CU MM (ref 140–440)
PMV BLD AUTO: 9 FL (ref 7.5–11.1)
POTASSIUM SERPL-SCNC: 4.3 MMOL/L (ref 3.5–5.1)
PRO-BNP: 181.6 PG/ML
RBC # BLD: 3.67 M/CU MM (ref 4.6–6.2)
SODIUM BLD-SCNC: 142 MMOL/L (ref 135–145)
TOTAL PROTEIN: 6 GM/DL (ref 6.4–8.2)
WBC # BLD: 7.7 K/CU MM (ref 4–10.5)

## 2023-04-20 PROCEDURE — 85027 COMPLETE CBC AUTOMATED: CPT

## 2023-04-20 PROCEDURE — 80053 COMPREHEN METABOLIC PANEL: CPT

## 2023-04-20 PROCEDURE — 83880 ASSAY OF NATRIURETIC PEPTIDE: CPT

## 2023-04-20 PROCEDURE — 36415 COLL VENOUS BLD VENIPUNCTURE: CPT

## 2023-05-03 ENCOUNTER — HOSPITAL ENCOUNTER (OUTPATIENT)
Age: 73
Setting detail: SPECIMEN
Discharge: HOME | End: 2023-05-03
Payer: MEDICARE

## 2023-05-03 LAB
ANION GAP SERPL CALCULATED.3IONS-SCNC: 14 MMOL/L (ref 4–16)
BUN SERPL-MCNC: 29 MG/DL (ref 6–23)
CALCIUM SERPL-MCNC: 8.6 MG/DL (ref 8.3–10.6)
CHLORIDE BLD-SCNC: 104 MMOL/L (ref 99–110)
CO2: 20 MMOL/L (ref 21–32)
CREAT SERPL-MCNC: 2.5 MG/DL (ref 0.9–1.3)
GFR SERPL CREATININE-BSD FRML MDRD: 26 ML/MIN/1.73M2
GLUCOSE SERPL-MCNC: 104 MG/DL (ref 70–99)
POTASSIUM SERPL-SCNC: 4.5 MMOL/L (ref 3.5–5.1)
PRO-BNP: 120.7 PG/ML
SODIUM BLD-SCNC: 138 MMOL/L (ref 135–145)

## 2023-05-03 PROCEDURE — 36415 COLL VENOUS BLD VENIPUNCTURE: CPT

## 2023-05-03 PROCEDURE — 83880 ASSAY OF NATRIURETIC PEPTIDE: CPT

## 2023-05-03 PROCEDURE — 80048 BASIC METABOLIC PNL TOTAL CA: CPT

## 2023-05-26 ENCOUNTER — HOSPITAL ENCOUNTER (EMERGENCY)
Age: 73
Discharge: OTHER INSTITUTION | End: 2023-05-27
Attending: EMERGENCY MEDICINE
Payer: MEDICARE

## 2023-05-26 ENCOUNTER — APPOINTMENT (OUTPATIENT)
Dept: GENERAL RADIOLOGY | Age: 73
End: 2023-05-26
Attending: EMERGENCY MEDICINE
Payer: MEDICARE

## 2023-05-26 DIAGNOSIS — R53.1 GENERAL WEAKNESS: Primary | ICD-10-CM

## 2023-05-26 LAB
ALBUMIN SERPL-MCNC: 3.4 GM/DL (ref 3.4–5)
ALP BLD-CCNC: 71 IU/L (ref 40–129)
ALT SERPL-CCNC: 12 U/L (ref 10–40)
AMMONIA: 24 UMOL/L (ref 16–60)
ANION GAP SERPL CALCULATED.3IONS-SCNC: 11 MMOL/L (ref 4–16)
AST SERPL-CCNC: 15 IU/L (ref 15–37)
B PARAP IS1001 DNA NPH QL NAA+NON-PROBE: NOT DETECTED
B PERT.PT PRMT NPH QL NAA+NON-PROBE: NOT DETECTED
BASE EXCESS: 2 (ref 0–3.3)
BASOPHILS ABSOLUTE: 0 K/CU MM
BASOPHILS RELATIVE PERCENT: 0.3 % (ref 0–1)
BILIRUB SERPL-MCNC: 0.5 MG/DL (ref 0–1)
BUN SERPL-MCNC: 29 MG/DL (ref 6–23)
C PNEUM DNA NPH QL NAA+NON-PROBE: NOT DETECTED
CALCIUM SERPL-MCNC: 8.8 MG/DL (ref 8.3–10.6)
CHLORIDE BLD-SCNC: 104 MMOL/L (ref 99–110)
CO2: 21 MMOL/L (ref 21–32)
COMMENT: ABNORMAL
CREAT SERPL-MCNC: 2.1 MG/DL (ref 0.9–1.3)
DIFFERENTIAL TYPE: ABNORMAL
DOSE AMOUNT: ABNORMAL
DOSE TIME: ABNORMAL
EOSINOPHILS ABSOLUTE: 0.2 K/CU MM
EOSINOPHILS RELATIVE PERCENT: 3 % (ref 0–3)
FLUAV H1 2009 PAN RNA NPH NAA+NON-PROBE: NOT DETECTED
FLUAV H1 RNA NPH QL NAA+NON-PROBE: NOT DETECTED
FLUAV H3 RNA NPH QL NAA+NON-PROBE: NOT DETECTED
FLUAV RNA NPH QL NAA+NON-PROBE: NOT DETECTED
FLUBV RNA NPH QL NAA+NON-PROBE: NOT DETECTED
GFR SERPL CREATININE-BSD FRML MDRD: 33 ML/MIN/1.73M2
GLUCOSE SERPL-MCNC: 126 MG/DL (ref 70–99)
HADV DNA NPH QL NAA+NON-PROBE: NOT DETECTED
HCO3 VENOUS: 23.7 MMOL/L (ref 19–25)
HCOV 229E RNA NPH QL NAA+NON-PROBE: NOT DETECTED
HCOV HKU1 RNA NPH QL NAA+NON-PROBE: NOT DETECTED
HCOV NL63 RNA NPH QL NAA+NON-PROBE: NOT DETECTED
HCOV OC43 RNA NPH QL NAA+NON-PROBE: NOT DETECTED
HCT VFR BLD CALC: 39.9 % (ref 42–52)
HEMOGLOBIN: 12.4 GM/DL (ref 13.5–18)
HMPV RNA NPH QL NAA+NON-PROBE: NOT DETECTED
HPIV1 RNA NPH QL NAA+NON-PROBE: NOT DETECTED
HPIV2 RNA NPH QL NAA+NON-PROBE: NOT DETECTED
HPIV3 RNA NPH QL NAA+NON-PROBE: NOT DETECTED
HPIV4 RNA NPH QL NAA+NON-PROBE: NOT DETECTED
IMMATURE NEUTROPHIL %: 1 % (ref 0–0.43)
LYMPHOCYTES ABSOLUTE: 1.1 K/CU MM
LYMPHOCYTES RELATIVE PERCENT: 13.3 % (ref 24–44)
M PNEUMO DNA NPH QL NAA+NON-PROBE: NOT DETECTED
MCH RBC QN AUTO: 30.2 PG (ref 27–31)
MCHC RBC AUTO-ENTMCNC: 31.1 % (ref 32–36)
MCV RBC AUTO: 97.3 FL (ref 78–100)
MONOCYTES ABSOLUTE: 0.9 K/CU MM
MONOCYTES RELATIVE PERCENT: 11 % (ref 0–4)
NUCLEATED RBC %: 0 %
O2 SAT, VEN: 93.5 % (ref 50–70)
PCO2, VEN: 44 MMHG (ref 38–52)
PDW BLD-RTO: 13.2 % (ref 11.7–14.9)
PH VENOUS: 7.34 (ref 7.32–7.42)
PLATELET # BLD: 161 K/CU MM (ref 140–440)
PMV BLD AUTO: 8.7 FL (ref 7.5–11.1)
PO2, VEN: 98 MMHG (ref 28–48)
POTASSIUM SERPL-SCNC: 4.6 MMOL/L (ref 3.5–5.1)
PRO-BNP: 392.3 PG/ML
RBC # BLD: 4.1 M/CU MM (ref 4.6–6.2)
RSV RNA NPH QL NAA+NON-PROBE: NOT DETECTED
RV+EV RNA NPH QL NAA+NON-PROBE: NOT DETECTED
SARS-COV-2 RNA NPH QL NAA+NON-PROBE: NOT DETECTED
SEGMENTED NEUTROPHILS ABSOLUTE COUNT: 5.6 K/CU MM
SEGMENTED NEUTROPHILS RELATIVE PERCENT: 71.4 % (ref 36–66)
SODIUM BLD-SCNC: 136 MMOL/L (ref 135–145)
TOTAL IMMATURE NEUTOROPHIL: 0.08 K/CU MM
TOTAL NUCLEATED RBC: 0 K/CU MM
TOTAL PROTEIN: 6.9 GM/DL (ref 6.4–8.2)
TROPONIN T: 0.03 NG/ML
VALPROIC ACID LEVEL: 32.7 UG/ML (ref 50–100)
WBC # BLD: 7.9 K/CU MM (ref 4–10.5)

## 2023-05-26 PROCEDURE — 82805 BLOOD GASES W/O2 SATURATION: CPT

## 2023-05-26 PROCEDURE — 85025 COMPLETE CBC W/AUTO DIFF WBC: CPT

## 2023-05-26 PROCEDURE — 82140 ASSAY OF AMMONIA: CPT

## 2023-05-26 PROCEDURE — 80164 ASSAY DIPROPYLACETIC ACD TOT: CPT

## 2023-05-26 PROCEDURE — 0202U NFCT DS 22 TRGT SARS-COV-2: CPT

## 2023-05-26 PROCEDURE — 93005 ELECTROCARDIOGRAM TRACING: CPT | Performed by: EMERGENCY MEDICINE

## 2023-05-26 PROCEDURE — 6370000000 HC RX 637 (ALT 250 FOR IP): Performed by: EMERGENCY MEDICINE

## 2023-05-26 PROCEDURE — 80053 COMPREHEN METABOLIC PANEL: CPT

## 2023-05-26 PROCEDURE — 99285 EMERGENCY DEPT VISIT HI MDM: CPT

## 2023-05-26 PROCEDURE — 83880 ASSAY OF NATRIURETIC PEPTIDE: CPT

## 2023-05-26 PROCEDURE — 71045 X-RAY EXAM CHEST 1 VIEW: CPT

## 2023-05-26 PROCEDURE — 84484 ASSAY OF TROPONIN QUANT: CPT

## 2023-05-26 RX ORDER — ASPIRIN 81 MG/1
324 TABLET, CHEWABLE ORAL ONCE
Status: COMPLETED | OUTPATIENT
Start: 2023-05-26 | End: 2023-05-26

## 2023-05-26 RX ADMIN — ASPIRIN 324 MG: 81 TABLET, CHEWABLE ORAL at 17:28

## 2023-05-26 ASSESSMENT — LIFESTYLE VARIABLES
HOW MANY STANDARD DRINKS CONTAINING ALCOHOL DO YOU HAVE ON A TYPICAL DAY: PATIENT DOES NOT DRINK
HOW OFTEN DO YOU HAVE A DRINK CONTAINING ALCOHOL: NEVER

## 2023-05-26 ASSESSMENT — PAIN - FUNCTIONAL ASSESSMENT: PAIN_FUNCTIONAL_ASSESSMENT: NONE - DENIES PAIN

## 2023-05-26 NOTE — ED PROVIDER NOTES
Emergency Department Encounter  Location: [address] [place] [address] EMERGENCY DEPARTMENT    Patient: Joe Devlin.   MRN: [id number]  : 1950  Date of evaluation: 2023  ED Provider: Mauricio Portillo DO    Chief Complaint:    Cough and Fatigue    [place]:  Joe Snider is a 68 y.o. male that presents to the emergency department ***      Past Medical History:   Diagnosis Date    Anemia     per old chart    Arthritis     Back pain, chronic     \"have pain in lumbar mainly- have 5 bulging discs\"\"in my neck and my lumbar have herniated discs\"    Bipolar 1 disorder (HCC)     CAD (coronary artery disease) 2016    does not follow with a cardiologist    Cerebral artery occlusion with cerebral infarction ([facility]     \"had mini stroke in 2016- fast heart rate when I would stand up - smile drooping on one side- lased just the one day\"    Chronic kidney disease (CKD), stage III (moderate) ([facility])     CKD (chronic kidney disease)     per old chart- consult with Dr Amrit Bose with admission 2016\"have low kidney function\"    COPD (chronic obstructive pulmonary disease) ([facility]     follow with Dr Luma Ramirez    Diabetes mellitus, type 2 ([facility] 2017    Diabetic peripheral neuropathy ([facility]     Diastolic CHF ([facility]     Gastric ulcer     H/O cardiovascular stress test 2014    EF 68% mild ischemia anterior wall    Heart murmur     \"see Dr Rubin Farrell- last echo \" pt states\"I think they said I have a murmur but no chest pain or palpitations\"    Hiatal hernia     History of cardiac cath 2014    MODERATE  CAD      Hx of migraines     HX OTHER MEDICAL     \"have thinning of kidney walls- they found I had that 15 yrs ago\" see Dr Morgan Currie"    Hyperlipidemia     Hypertension     Intraparenchymal hematoma of brain     Lumbar radiculopathy     Overlapping malignant neoplasm of colon ([facility] 2020    Panic attack     'anything new gives me anxiety\"    Pleural effusion     scheduled

## 2023-05-27 VITALS
HEIGHT: 70 IN | RESPIRATION RATE: 18 BRPM | HEART RATE: 67 BPM | DIASTOLIC BLOOD PRESSURE: 65 MMHG | SYSTOLIC BLOOD PRESSURE: 169 MMHG | OXYGEN SATURATION: 98 % | WEIGHT: 218 LBS | BODY MASS INDEX: 31.21 KG/M2 | TEMPERATURE: 97.9 F

## 2023-05-27 LAB — TROPONIN T: 0.04 NG/ML

## 2023-05-27 PROCEDURE — 84484 ASSAY OF TROPONIN QUANT: CPT

## 2023-05-28 LAB
EKG ATRIAL RATE: 99 BPM
EKG DIAGNOSIS: NORMAL
EKG P AXIS: 39 DEGREES
EKG P-R INTERVAL: 158 MS
EKG Q-T INTERVAL: 376 MS
EKG QRS DURATION: 106 MS
EKG QTC CALCULATION (BAZETT): 482 MS
EKG R AXIS: -16 DEGREES
EKG T AXIS: 48 DEGREES
EKG VENTRICULAR RATE: 99 BPM

## 2023-05-28 PROCEDURE — 93010 ELECTROCARDIOGRAM REPORT: CPT | Performed by: INTERNAL MEDICINE

## 2023-05-29 ENCOUNTER — HOSPITAL ENCOUNTER (OUTPATIENT)
Age: 73
Setting detail: SPECIMEN
Discharge: HOME | End: 2023-05-29
Payer: MEDICARE

## 2023-05-29 LAB
BACTERIA: ABNORMAL /HPF
BILIRUBIN URINE: NEGATIVE MG/DL
BLOOD, URINE: NEGATIVE
CLARITY: CLEAR
COLOR: YELLOW
GLUCOSE, URINE: NEGATIVE MG/DL
HYALINE CASTS: 2 /LPF
KETONES, URINE: NEGATIVE MG/DL
LEUKOCYTE ESTERASE, URINE: NEGATIVE
MUCUS: ABNORMAL HPF
NITRITE URINE, QUANTITATIVE: NEGATIVE
PH, URINE: 5 (ref 5–8)
PROTEIN UA: 30 MG/DL
RBC URINE: 2 /HPF (ref 0–3)
SPECIFIC GRAVITY UA: >1.03 (ref 1–1.03)
TRICHOMONAS: ABNORMAL /HPF
UROBILINOGEN, URINE: 1 MG/DL (ref 0.2–1)
WBC UA: 28 /HPF (ref 0–2)

## 2023-05-29 PROCEDURE — 81001 URINALYSIS AUTO W/SCOPE: CPT

## 2023-05-29 PROCEDURE — 87086 URINE CULTURE/COLONY COUNT: CPT

## 2023-05-30 LAB
CULTURE: NORMAL
Lab: NORMAL
SPECIMEN: NORMAL

## 2023-05-31 ENCOUNTER — HOSPITAL ENCOUNTER (OUTPATIENT)
Age: 73
Setting detail: SPECIMEN
Discharge: HOME | End: 2023-05-31
Payer: MEDICARE

## 2023-05-31 LAB
HCT VFR BLD CALC: 34.3 % (ref 42–52)
HEMOGLOBIN: 10.4 GM/DL (ref 13.5–18)
MCH RBC QN AUTO: 29.8 PG (ref 27–31)
MCHC RBC AUTO-ENTMCNC: 30.3 % (ref 32–36)
MCV RBC AUTO: 98.3 FL (ref 78–100)
PDW BLD-RTO: 13.4 % (ref 11.7–14.9)
PLATELET # BLD: 159 K/CU MM (ref 140–440)
PMV BLD AUTO: 8.7 FL (ref 7.5–11.1)
PROCALCITONIN SERPL-MCNC: 0.1 NG/ML
RBC # BLD: 3.49 M/CU MM (ref 4.6–6.2)
WBC # BLD: 8 K/CU MM (ref 4–10.5)

## 2023-05-31 PROCEDURE — 84145 PROCALCITONIN (PCT): CPT

## 2023-05-31 PROCEDURE — 36415 COLL VENOUS BLD VENIPUNCTURE: CPT

## 2023-05-31 PROCEDURE — 85027 COMPLETE CBC AUTOMATED: CPT

## 2023-06-19 ENCOUNTER — HOSPITAL ENCOUNTER (OUTPATIENT)
Age: 73
Setting detail: SPECIMEN
Discharge: HOME | End: 2023-06-19
Payer: MEDICARE

## 2023-06-19 LAB
T4 FREE SERPL-MCNC: 1.36 NG/DL (ref 0.9–1.8)
TSH SERPL DL<=0.005 MIU/L-ACNC: 1.61 UIU/ML (ref 0.27–4.2)

## 2023-06-19 PROCEDURE — 84439 ASSAY OF FREE THYROXINE: CPT

## 2023-06-19 PROCEDURE — 84443 ASSAY THYROID STIM HORMONE: CPT

## 2023-06-19 PROCEDURE — 36415 COLL VENOUS BLD VENIPUNCTURE: CPT

## 2023-07-18 ENCOUNTER — OFFICE VISIT (OUTPATIENT)
Dept: CARDIOLOGY CLINIC | Age: 73
End: 2023-07-18
Payer: MEDICARE

## 2023-07-18 VITALS
WEIGHT: 238 LBS | HEART RATE: 65 BPM | HEIGHT: 70 IN | SYSTOLIC BLOOD PRESSURE: 138 MMHG | DIASTOLIC BLOOD PRESSURE: 78 MMHG | OXYGEN SATURATION: 97 % | BODY MASS INDEX: 34.07 KG/M2

## 2023-07-18 DIAGNOSIS — I47.1 ATRIAL TACHYCARDIA (HCC): ICD-10-CM

## 2023-07-18 DIAGNOSIS — I50.32 CHRONIC DIASTOLIC HEART FAILURE (HCC): ICD-10-CM

## 2023-07-18 DIAGNOSIS — R00.1 BRADYCARDIA: ICD-10-CM

## 2023-07-18 DIAGNOSIS — E78.5 HYPERLIPIDEMIA, UNSPECIFIED HYPERLIPIDEMIA TYPE: ICD-10-CM

## 2023-07-18 DIAGNOSIS — Z99.89 OSA ON CPAP: ICD-10-CM

## 2023-07-18 DIAGNOSIS — R42 LIGHTHEADEDNESS: ICD-10-CM

## 2023-07-18 DIAGNOSIS — I62.9 INTRACRANIAL HEMORRHAGE (HCC): ICD-10-CM

## 2023-07-18 DIAGNOSIS — S06.33AS: ICD-10-CM

## 2023-07-18 DIAGNOSIS — R07.9 ACUTE CHEST PAIN: ICD-10-CM

## 2023-07-18 DIAGNOSIS — R06.09 DOE (DYSPNEA ON EXERTION): ICD-10-CM

## 2023-07-18 DIAGNOSIS — I95.9 HYPOTENSIVE EPISODE: ICD-10-CM

## 2023-07-18 DIAGNOSIS — J96.21 ACUTE ON CHRONIC RESPIRATORY FAILURE WITH HYPOXIA (HCC): ICD-10-CM

## 2023-07-18 DIAGNOSIS — I10 ESSENTIAL HYPERTENSION: ICD-10-CM

## 2023-07-18 DIAGNOSIS — G45.9 TIA (TRANSIENT ISCHEMIC ATTACK): Primary | ICD-10-CM

## 2023-07-18 DIAGNOSIS — G47.33 OSA ON CPAP: ICD-10-CM

## 2023-07-18 DIAGNOSIS — I48.0 PAROXYSMAL ATRIAL FIBRILLATION (HCC): ICD-10-CM

## 2023-07-18 PROCEDURE — 3078F DIAST BP <80 MM HG: CPT | Performed by: INTERNAL MEDICINE

## 2023-07-18 PROCEDURE — 3075F SYST BP GE 130 - 139MM HG: CPT | Performed by: INTERNAL MEDICINE

## 2023-07-18 PROCEDURE — 99204 OFFICE O/P NEW MOD 45 MIN: CPT | Performed by: INTERNAL MEDICINE

## 2023-07-18 NOTE — PATIENT INSTRUCTIONS
[facility] Locations - No appointment necessary. Sites open Monday to Friday. Call your preferred location for test preparation, business   hours and other information you need. [employer] accepts [employer]. [address] [facility]. [address] [facility], [facility]  Phone: [phone]     **It is YOUR responsibilty to bring medication bottles and/or updated medication list to [address]. This will allow us to better serve you and all your healthcare needs**    Thank you for allowing us to care for you today! We want to ensure we can follow your treatment plan and we strive to give you the best outcomes and experience possible. If you ever have a life threatening emergency and call 911 - for an ambulance (EMS)   Our providers can only care for you at:   [facility] or [facility]. Even if you have someone take you or you drive yourself we can only care for you in a [facility] facility. Our providers are not setup at the other healthcare locations! Please be informed that if you contact our office outside of normal business hours the physician on call cannot help with any scheduling or rescheduling issues, procedure instruction questions or any type of medication issue. We advise you for any urgent/emergency that you go to the nearest emergency room! PLEASE CALL OUR OFFICE DURING NORMAL BUSINESS HOURS    Monday - Friday   8 am to 5 pm    [place]: [address]: [phone]    [place]:  [phone]    We are committed to providing you the best care possible. If you receive a survey after visiting one of our offices, please take time to share your experience concerning your physician office visit. These surveys are confidential and no health information about you is shared. We are eager to improve for you and we are counting on your feedback to help make that happen.

## 2023-07-18 NOTE — ASSESSMENT & PLAN NOTE
History of A-fib and intracranial bleed hence not on anticoagulation get 30-day monitor to see A-fib burden continues to have A-fib burden will need to evaluate for possible watchman

## 2023-07-18 NOTE — ASSESSMENT & PLAN NOTE
He is more or less wheelchair-bound unstable on his feet on midodrine 5 mg 3 times daily blood pressure is in 140s right now

## 2023-08-07 ENCOUNTER — PROCEDURE VISIT (OUTPATIENT)
Dept: CARDIOLOGY CLINIC | Age: 73
End: 2023-08-07

## 2023-08-07 ENCOUNTER — PROCEDURE VISIT (OUTPATIENT)
Dept: CARDIOLOGY CLINIC | Age: 73
End: 2023-08-07
Payer: MEDICARE

## 2023-08-07 ENCOUNTER — NURSE ONLY (OUTPATIENT)
Dept: CARDIOLOGY CLINIC | Age: 73
End: 2023-08-07

## 2023-08-07 DIAGNOSIS — I48.0 PAROXYSMAL ATRIAL FIBRILLATION (HCC): ICD-10-CM

## 2023-08-07 DIAGNOSIS — I62.9 INTRACRANIAL HEMORRHAGE (HCC): ICD-10-CM

## 2023-08-07 DIAGNOSIS — G45.9 TIA (TRANSIENT ISCHEMIC ATTACK): ICD-10-CM

## 2023-08-07 DIAGNOSIS — S06.33AS: ICD-10-CM

## 2023-08-07 DIAGNOSIS — R94.31 ABNORMAL EKG: Primary | ICD-10-CM

## 2023-08-07 DIAGNOSIS — R42 LIGHTHEADEDNESS: ICD-10-CM

## 2023-08-07 DIAGNOSIS — R06.09 DOE (DYSPNEA ON EXERTION): ICD-10-CM

## 2023-08-07 DIAGNOSIS — R00.1 BRADYCARDIA: ICD-10-CM

## 2023-08-07 DIAGNOSIS — I47.1 ATRIAL TACHYCARDIA (HCC): ICD-10-CM

## 2023-08-07 DIAGNOSIS — G45.9 TIA (TRANSIENT ISCHEMIC ATTACK): Primary | ICD-10-CM

## 2023-08-07 LAB
LV EF: 53 %
LV EF: 75 %
LVEF MODALITY: NORMAL
LVEF MODALITY: NORMAL

## 2023-08-07 PROCEDURE — 93306 TTE W/DOPPLER COMPLETE: CPT | Performed by: INTERNAL MEDICINE

## 2023-08-07 NOTE — PROGRESS NOTES
30 days e-cardio monitor placed.  # [id number]. Instructed patient on how to record the event and to call monitoring center at [phone] if any problems arise. Instructed patient to disconnect the lead wires from the electrodes before bathing or showering and reattach them afterwards. Instructed patient that the electrodes should be changed every 3 days or if they no longer adhere to the skin. Patient to mail package after the monitor has ended. Patient verbalized understanding.

## 2023-08-10 ENCOUNTER — TELEPHONE (OUTPATIENT)
Dept: CARDIOLOGY CLINIC | Age: 73
End: 2023-08-10

## 2023-08-18 ENCOUNTER — TELEPHONE (OUTPATIENT)
Dept: CARDIOLOGY CLINIC | Age: 73
End: 2023-08-18

## 2023-08-18 NOTE — TELEPHONE ENCOUNTER
Received notification from [employer] that they have gotten no transmission from 30 day monitor that patient is wearing. I called patient to see what was going on and the number is to Naila Raymond. I spoke with patients nurse Juliette Sequeira and she said that she would go to him room and see what is going on with the monitor. I explained to her that he should have 2 monitors and should charge one while wearing the other. I also advised her to call the number on the box to see what is going on with the monitor. She voiced understanding.

## 2023-08-22 ENCOUNTER — HOSPITAL ENCOUNTER (OUTPATIENT)
Age: 73
Setting detail: SPECIMEN
Discharge: HOME | End: 2023-08-22
Payer: MEDICARE

## 2023-08-22 PROCEDURE — 81003 URINALYSIS AUTO W/O SCOPE: CPT

## 2023-08-22 PROCEDURE — 87086 URINE CULTURE/COLONY COUNT: CPT

## 2023-08-23 ENCOUNTER — HOSPITAL ENCOUNTER (OUTPATIENT)
Age: 73
Setting detail: SPECIMEN
Discharge: HOME | End: 2023-08-23
Payer: MEDICARE

## 2023-08-23 LAB
ANION GAP SERPL CALCULATED.3IONS-SCNC: 17 MMOL/L (ref 4–16)
BILIRUBIN URINE: NEGATIVE MG/DL
BLOOD, URINE: NEGATIVE
BUN SERPL-MCNC: 31 MG/DL (ref 6–23)
CALCIUM SERPL-MCNC: 8.5 MG/DL (ref 8.3–10.6)
CHLORIDE BLD-SCNC: 105 MMOL/L (ref 99–110)
CLARITY: CLEAR
CO2: 19 MMOL/L (ref 21–32)
COLOR: YELLOW
COMMENT UA: NORMAL
CREAT SERPL-MCNC: 2.6 MG/DL (ref 0.9–1.3)
GFR SERPL CREATININE-BSD FRML MDRD: 25 ML/MIN/1.73M2
GLUCOSE SERPL-MCNC: 91 MG/DL (ref 70–99)
GLUCOSE, URINE: NEGATIVE MG/DL
HCT VFR BLD CALC: 36 % (ref 42–52)
HEMOGLOBIN: 10.4 GM/DL (ref 13.5–18)
KETONES, URINE: NEGATIVE MG/DL
LEUKOCYTE ESTERASE, URINE: NEGATIVE
MCH RBC QN AUTO: 29.7 PG (ref 27–31)
MCHC RBC AUTO-ENTMCNC: 28.9 % (ref 32–36)
MCV RBC AUTO: 102.9 FL (ref 78–100)
NITRITE URINE, QUANTITATIVE: NEGATIVE
PDW BLD-RTO: 13.5 % (ref 11.7–14.9)
PH, URINE: 5.5 (ref 5–8)
PLATELET # BLD: 162 K/CU MM (ref 140–440)
PMV BLD AUTO: 9.4 FL (ref 7.5–11.1)
POTASSIUM SERPL-SCNC: 4.5 MMOL/L (ref 3.5–5.1)
PROCALCITONIN SERPL-MCNC: 0.06 NG/ML
PROTEIN UA: NEGATIVE MG/DL
RBC # BLD: 3.5 M/CU MM (ref 4.6–6.2)
SODIUM BLD-SCNC: 141 MMOL/L (ref 135–145)
SPECIFIC GRAVITY UA: 1.02 (ref 1–1.03)
UROBILINOGEN, URINE: 1 MG/DL (ref 0.2–1)
WBC # BLD: 7.4 K/CU MM (ref 4–10.5)

## 2023-08-23 PROCEDURE — 80048 BASIC METABOLIC PNL TOTAL CA: CPT

## 2023-08-23 PROCEDURE — 84145 PROCALCITONIN (PCT): CPT

## 2023-08-23 PROCEDURE — 36415 COLL VENOUS BLD VENIPUNCTURE: CPT

## 2023-08-23 PROCEDURE — 85027 COMPLETE CBC AUTOMATED: CPT

## 2023-08-24 LAB
CULTURE: NORMAL
Lab: NORMAL
SPECIMEN: NORMAL

## 2023-09-12 ENCOUNTER — HOSPITAL ENCOUNTER (OUTPATIENT)
Age: 73
Setting detail: SPECIMEN
Discharge: HOME | End: 2023-09-12
Payer: MEDICARE

## 2023-09-12 LAB — TSH SERPL DL<=0.005 MIU/L-ACNC: 1.94 UIU/ML (ref 0.27–4.2)

## 2023-09-12 PROCEDURE — 84443 ASSAY THYROID STIM HORMONE: CPT

## 2023-09-12 PROCEDURE — 36415 COLL VENOUS BLD VENIPUNCTURE: CPT

## 2023-09-18 ENCOUNTER — OFFICE VISIT (OUTPATIENT)
Dept: CARDIOLOGY CLINIC | Age: 73
End: 2023-09-18
Payer: MEDICARE

## 2023-09-18 VITALS
SYSTOLIC BLOOD PRESSURE: 94 MMHG | DIASTOLIC BLOOD PRESSURE: 62 MMHG | HEIGHT: 69 IN | BODY MASS INDEX: 35.15 KG/M2 | HEART RATE: 61 BPM | OXYGEN SATURATION: 95 %

## 2023-09-18 DIAGNOSIS — R00.1 BRADYCARDIA: ICD-10-CM

## 2023-09-18 DIAGNOSIS — G45.9 TIA (TRANSIENT ISCHEMIC ATTACK): ICD-10-CM

## 2023-09-18 DIAGNOSIS — Z99.89 OSA ON CPAP: Primary | ICD-10-CM

## 2023-09-18 DIAGNOSIS — F31.9 BIPOLAR 1 DISORDER (HCC): ICD-10-CM

## 2023-09-18 DIAGNOSIS — G47.33 OSA ON CPAP: Primary | ICD-10-CM

## 2023-09-18 DIAGNOSIS — N18.5 HYPERTENSIVE HEART DISEASE WITH HEART FAILURE AND STAGE 5 CHRONIC KIDNEY DISEASE, NOT ON CHRONIC DIALYSIS, UNSPECIFIED HEART FAILURE TYPE (HCC): ICD-10-CM

## 2023-09-18 DIAGNOSIS — I62.9 INTRACRANIAL HEMORRHAGE (HCC): ICD-10-CM

## 2023-09-18 DIAGNOSIS — I13.2 HYPERTENSIVE HEART DISEASE WITH HEART FAILURE AND STAGE 5 CHRONIC KIDNEY DISEASE, NOT ON CHRONIC DIALYSIS, UNSPECIFIED HEART FAILURE TYPE (HCC): ICD-10-CM

## 2023-09-18 DIAGNOSIS — R29.6 FREQUENT FALLS: ICD-10-CM

## 2023-09-18 DIAGNOSIS — I50.32 CHRONIC DIASTOLIC CONGESTIVE HEART FAILURE (HCC): ICD-10-CM

## 2023-09-18 DIAGNOSIS — I25.10 CORONARY ARTERY DISEASE INVOLVING NATIVE CORONARY ARTERY OF NATIVE HEART WITHOUT ANGINA PECTORIS: ICD-10-CM

## 2023-09-18 DIAGNOSIS — I10 ESSENTIAL HYPERTENSION: ICD-10-CM

## 2023-09-18 DIAGNOSIS — G81.94 LEFT HEMIPARESIS (HCC): ICD-10-CM

## 2023-09-18 DIAGNOSIS — I47.1 ATRIAL TACHYCARDIA (HCC): ICD-10-CM

## 2023-09-18 PROCEDURE — 99214 OFFICE O/P EST MOD 30 MIN: CPT | Performed by: INTERNAL MEDICINE

## 2023-09-18 PROCEDURE — 1123F ACP DISCUSS/DSCN MKR DOCD: CPT | Performed by: INTERNAL MEDICINE

## 2023-09-18 PROCEDURE — 3074F SYST BP LT 130 MM HG: CPT | Performed by: INTERNAL MEDICINE

## 2023-09-18 PROCEDURE — 3078F DIAST BP <80 MM HG: CPT | Performed by: INTERNAL MEDICINE

## 2023-09-18 RX ORDER — CLONIDINE HYDROCHLORIDE 0.1 MG/1
0.1 TABLET ORAL 2 TIMES DAILY
Qty: 60 TABLET | Refills: 3 | Status: SHIPPED | OUTPATIENT
Start: 2023-09-18

## 2023-09-18 RX ORDER — MENTHOL 40 MG/ML
GEL TOPICAL
COMMUNITY

## 2023-09-18 RX ORDER — MEMANTINE HYDROCHLORIDE 10 MG/1
TABLET ORAL
COMMUNITY
Start: 2023-09-17

## 2023-09-18 RX ORDER — VERAPAMIL HYDROCHLORIDE 180 MG/1
180 CAPSULE, EXTENDED RELEASE ORAL DAILY
COMMUNITY
Start: 2023-09-15 | End: 2023-09-18

## 2023-09-18 RX ORDER — LEVOTHYROXINE SODIUM 0.07 MG/1
TABLET ORAL
COMMUNITY
Start: 2023-08-08

## 2023-09-18 RX ORDER — RISPERIDONE 0.25 MG/1
0.25 TABLET ORAL 2 TIMES DAILY
COMMUNITY
Start: 2023-08-30

## 2023-09-18 RX ORDER — CLONIDINE HYDROCHLORIDE 0.2 MG/1
0.2 TABLET ORAL 2 TIMES DAILY
COMMUNITY
Start: 2023-09-10 | End: 2023-09-18

## 2023-09-18 RX ORDER — METHOCARBAMOL 750 MG/1
750 TABLET, FILM COATED ORAL AS NEEDED
COMMUNITY
Start: 2023-08-21

## 2023-09-18 RX ORDER — VERAPAMIL HYDROCHLORIDE 120 MG/1
120 CAPSULE, EXTENDED RELEASE ORAL DAILY
Qty: 90 CAPSULE | Refills: 4 | Status: SHIPPED | OUTPATIENT
Start: 2023-09-18

## 2023-09-18 RX ORDER — HYDROCODONE BITARTRATE AND ACETAMINOPHEN 7.5; 325 MG/1; MG/1
TABLET ORAL
COMMUNITY
Start: 2023-09-06

## 2023-09-18 RX ORDER — LISINOPRIL 20 MG/1
20 TABLET ORAL DAILY
COMMUNITY
Start: 2023-08-26

## 2023-09-18 RX ORDER — TRAZODONE HYDROCHLORIDE 50 MG/1
50 TABLET ORAL NIGHTLY
COMMUNITY
Start: 2023-09-17

## 2023-09-18 RX ORDER — LIDOCAINE 4 G/G
1 PATCH TOPICAL AS NEEDED
COMMUNITY

## 2023-09-18 RX ORDER — CLONIDINE HYDROCHLORIDE 0.1 MG/1
0.1 TABLET ORAL 2 TIMES DAILY
Qty: 90 TABLET | Refills: 3 | Status: SHIPPED | OUTPATIENT
Start: 2023-09-18 | End: 2023-09-18 | Stop reason: ALTCHOICE

## 2023-09-18 NOTE — PATIENT INSTRUCTIONS
Please be informed that if you contact our office outside of normal business hours the physician on call cannot help with any scheduling or rescheduling issues, procedure instruction questions or any type of medication issue. We advise you for any urgent/emergency that you go to the nearest emergency room! PLEASE CALL OUR OFFICE DURING NORMAL BUSINESS HOURS    Monday - Friday   8 am to 5 pm    [place]: [address] [place]: [phone]    [place]:  [phone]    **It is YOUR responsibilty to bring medication bottles and/or updated medication list to [facility]. This will allow us to better serve you and all your healthcare needs**    Thank you for allowing us to care for you today! We want to ensure we can follow your treatment plan and we strive to give you the best outcomes and experience possible. If you ever have a life threatening emergency and call 911 - for an ambulance (EMS)   Our providers can only care for you at:   [facility] or [facility]. Even if you have someone take you or you drive yourself we can only care for you in a [facility] facility. Our providers are not setup at the other healthcare locations! We are committed to providing you the best care possible. If you receive a survey after visiting one of our offices, please take time to share your experience concerning your physician office visit. These surveys are confidential and no health information about you is shared. We are eager to improve for you and we are counting on your feedback to help make that happen.

## 2023-09-18 NOTE — PROGRESS NOTES
CARDIOLOGY  NOTE    Chief Complaint: leg swelling     HPI:   Farzaneh Soto is a 68y.o. year old who has Past medical history as noted below. Farzaneh Soto comes in from nursing home where he has been resident for some time now he has history of bradycardia which was diagnosed many years ago while he was hospitalized but at that time he did not get a pacemaker he does have history of atrial fibrillation he has been noticed to have increased bilateral lower extremity swelling but complains of significant back pain he is in wheelchair as he has difficulty ambulating much he says he has quit smoking now and has been having memory issues. He is tired and weak, he is seen in office with aid from nursing [place] and his med list includes clonidine and verpamil 180mg   He is sleeping and weak all the time is in wheel chair    He does have history of atrial fibrillation with rapid heart rate in the past but he has history of intracranial hemorrhage and intravascular extension back in December 2022 and since then he has not been on anticoagulation due to intracranial bleed he does have history of coronary artery disease and chronic kidney disease as well. He is seen in the office today mostly because of increased bilateral lower extremity swelling and shortness of breath with exertion  Furthermore he was noted to have profound bradycardia while he was on beta-blockers and digoxin for A-fib management in 2022 at that time he required dopamine drip and was eventually taken off all rate lowering meds seen by EPS and considered not a candidate for pacemaker at that time.         Current Outpatient Medications   Medication Sig Dispense Refill    risperiDONE (RISPERDAL) 0.25 MG tablet 1 tablet 2 times daily      cloNIDine (CATAPRES) 0.2 MG tablet 1 tablet 2 times daily      lisinopril (PRINIVIL;ZESTRIL) 20 MG tablet 1 tablet daily      memantine (NAMENDA) 10 MG tablet       methocarbamol (ROBAXIN) 750

## 2023-10-09 ENCOUNTER — HOSPITAL ENCOUNTER (OUTPATIENT)
Age: 73
Setting detail: SPECIMEN
Discharge: HOME | End: 2023-10-09
Payer: MEDICARE

## 2023-10-09 LAB
ANION GAP SERPL CALCULATED.3IONS-SCNC: 17 MMOL/L (ref 4–16)
BUN SERPL-MCNC: 38 MG/DL (ref 6–23)
CALCIUM SERPL-MCNC: 8.4 MG/DL (ref 8.3–10.6)
CHLORIDE BLD-SCNC: 104 MMOL/L (ref 99–110)
CO2: 20 MMOL/L (ref 21–32)
CREAT SERPL-MCNC: 2.8 MG/DL (ref 0.9–1.3)
GFR SERPL CREATININE-BSD FRML MDRD: 23 ML/MIN/1.73M2
GLUCOSE SERPL-MCNC: 90 MG/DL (ref 70–99)
POTASSIUM SERPL-SCNC: 4.2 MMOL/L (ref 3.5–5.1)
SODIUM BLD-SCNC: 141 MMOL/L (ref 135–145)

## 2023-10-09 PROCEDURE — 80048 BASIC METABOLIC PNL TOTAL CA: CPT

## 2023-10-09 PROCEDURE — 36415 COLL VENOUS BLD VENIPUNCTURE: CPT

## 2023-10-18 ENCOUNTER — HOSPITAL ENCOUNTER (OUTPATIENT)
Age: 73
Setting detail: SPECIMEN
Discharge: HOME | End: 2023-10-18
Payer: MEDICARE

## 2023-10-18 LAB
ANION GAP SERPL CALCULATED.3IONS-SCNC: 10 MMOL/L (ref 4–16)
BUN SERPL-MCNC: 24 MG/DL (ref 6–23)
CALCIUM SERPL-MCNC: 8.6 MG/DL (ref 8.3–10.6)
CHLORIDE BLD-SCNC: 108 MMOL/L (ref 99–110)
CO2: 23 MMOL/L (ref 21–32)
CREAT SERPL-MCNC: 2.2 MG/DL (ref 0.9–1.3)
GFR SERPL CREATININE-BSD FRML MDRD: 31 ML/MIN/1.73M2
GLUCOSE SERPL-MCNC: 134 MG/DL (ref 70–99)
HCT VFR BLD CALC: 37.9 % (ref 42–52)
HEMOGLOBIN: 11.3 GM/DL (ref 13.5–18)
MCH RBC QN AUTO: 28.5 PG (ref 27–31)
MCHC RBC AUTO-ENTMCNC: 29.8 % (ref 32–36)
MCV RBC AUTO: 95.7 FL (ref 78–100)
PDW BLD-RTO: 13.5 % (ref 11.7–14.9)
PLATELET # BLD: 154 K/CU MM (ref 140–440)
PMV BLD AUTO: 9.4 FL (ref 7.5–11.1)
POTASSIUM SERPL-SCNC: 4.5 MMOL/L (ref 3.5–5.1)
RBC # BLD: 3.96 M/CU MM (ref 4.6–6.2)
SODIUM BLD-SCNC: 141 MMOL/L (ref 135–145)
T4 FREE SERPL-MCNC: 1.32 NG/DL (ref 0.9–1.8)
TSH SERPL DL<=0.005 MIU/L-ACNC: 1.97 UIU/ML (ref 0.27–4.2)
WBC # BLD: 6.6 K/CU MM (ref 4–10.5)

## 2023-10-18 PROCEDURE — 80048 BASIC METABOLIC PNL TOTAL CA: CPT

## 2023-10-18 PROCEDURE — 84443 ASSAY THYROID STIM HORMONE: CPT

## 2023-10-18 PROCEDURE — 85027 COMPLETE CBC AUTOMATED: CPT

## 2023-10-18 PROCEDURE — 84439 ASSAY OF FREE THYROXINE: CPT

## 2023-10-18 PROCEDURE — 36415 COLL VENOUS BLD VENIPUNCTURE: CPT

## 2023-11-03 ENCOUNTER — HOSPITAL ENCOUNTER (OUTPATIENT)
Age: 73
Setting detail: SPECIMEN
Discharge: HOME | End: 2023-11-03
Payer: MEDICARE

## 2023-11-03 LAB
ALBUMIN SERPL-MCNC: 3.4 GM/DL (ref 3.4–5)
ALP BLD-CCNC: 72 IU/L (ref 40–128)
ALT SERPL-CCNC: 10 U/L (ref 10–40)
ANION GAP SERPL CALCULATED.3IONS-SCNC: 10 MMOL/L (ref 4–16)
AST SERPL-CCNC: 14 IU/L (ref 15–37)
BILIRUB SERPL-MCNC: 0.2 MG/DL (ref 0–1)
BUN SERPL-MCNC: 19 MG/DL (ref 6–23)
CALCIUM SERPL-MCNC: 8.6 MG/DL (ref 8.3–10.6)
CHLORIDE BLD-SCNC: 109 MMOL/L (ref 99–110)
CHOLEST SERPL-MCNC: 106 MG/DL
CO2: 23 MMOL/L (ref 21–32)
CREAT SERPL-MCNC: 2 MG/DL (ref 0.9–1.3)
GFR SERPL CREATININE-BSD FRML MDRD: 35 ML/MIN/1.73M2
GLUCOSE SERPL-MCNC: 106 MG/DL (ref 70–99)
HDLC SERPL-MCNC: 39 MG/DL
LDLC SERPL CALC-MCNC: 42 MG/DL
POTASSIUM SERPL-SCNC: 4.4 MMOL/L (ref 3.5–5.1)
SODIUM BLD-SCNC: 142 MMOL/L (ref 135–145)
TOTAL PROTEIN: 5.5 GM/DL (ref 6.4–8.2)
TRIGL SERPL-MCNC: 123 MG/DL

## 2023-11-03 PROCEDURE — 80061 LIPID PANEL: CPT

## 2023-11-03 PROCEDURE — 36415 COLL VENOUS BLD VENIPUNCTURE: CPT

## 2023-11-03 PROCEDURE — 80053 COMPREHEN METABOLIC PANEL: CPT

## 2023-12-14 ENCOUNTER — OFFICE VISIT (OUTPATIENT)
Dept: CARDIOLOGY CLINIC | Age: 73
End: 2023-12-14

## 2023-12-14 VITALS
SYSTOLIC BLOOD PRESSURE: 136 MMHG | OXYGEN SATURATION: 96 % | DIASTOLIC BLOOD PRESSURE: 84 MMHG | HEIGHT: 70 IN | HEART RATE: 67 BPM | BODY MASS INDEX: 34.15 KG/M2

## 2023-12-14 DIAGNOSIS — I50.32 CHRONIC DIASTOLIC CONGESTIVE HEART FAILURE (HCC): ICD-10-CM

## 2023-12-14 DIAGNOSIS — I48.0 PAROXYSMAL ATRIAL FIBRILLATION (HCC): ICD-10-CM

## 2023-12-14 DIAGNOSIS — G47.33 OSA ON CPAP: ICD-10-CM

## 2023-12-14 DIAGNOSIS — F99 INAPPROPRIATE BEHAVIOR: ICD-10-CM

## 2023-12-14 DIAGNOSIS — R00.1 BRADYCARDIA: ICD-10-CM

## 2023-12-14 DIAGNOSIS — E78.2 MIXED HYPERLIPIDEMIA: ICD-10-CM

## 2023-12-14 DIAGNOSIS — G45.9 TIA (TRANSIENT ISCHEMIC ATTACK): ICD-10-CM

## 2023-12-14 DIAGNOSIS — I25.10 CORONARY ARTERY DISEASE INVOLVING NATIVE CORONARY ARTERY OF NATIVE HEART WITHOUT ANGINA PECTORIS: Primary | ICD-10-CM

## 2023-12-14 DIAGNOSIS — S06.33AS: ICD-10-CM

## 2023-12-14 DIAGNOSIS — I10 ESSENTIAL HYPERTENSION: ICD-10-CM

## 2023-12-14 RX ORDER — IPRATROPIUM BROMIDE AND ALBUTEROL 20; 100 UG/1; UG/1
1 SPRAY, METERED RESPIRATORY (INHALATION) EVERY 6 HOURS PRN
COMMUNITY
Start: 2023-11-21

## 2023-12-14 NOTE — PROGRESS NOTES
CARDIOLOGY  NOTE    2023    Felipe Matamoros. (:  1950) is a 68 y.o. [facility] established patient with Dr. Irlanda Jaimes, here for evaluation of the following chief complaint(s):  3 Month Follow-Up, Dizziness (A little bit), and Shortness of Breath (On occ)        SUBJECTIVE/OBJECTIVE:    HUBERT Shrestha has Past medical history as noted below. He states that he is having dry mouth and swelling in his throat. He states that he is doing fairly well. He states that he is doing some walking with therapy but not on his own. He is using wheel chair mostly. He does have history of atrial fibrillation, intracranial hemorrhage and intravascular extension back in 2022, coronary artery disease and chronic kidney disease as well. He has a tendency to be flirtatious with female medical staff and can be inappropriate, telling staff they are pretty and that he likes \"young ladies\". He will joke about chasing the nursing staff at facilities too. Patient is a former smoker, quit a few years ago. Patient denies issues obtaining or taking medications. Patient is active and does not do organized exercise. Patient denies chest pain, shortness of breath, palpitations, dizziness, orthopnea, lower leg swelling, or syncope. Review of Systems    Vitals:    23 1308   BP: 136/84   Site: Left Upper Arm   Position: Sitting   Cuff Size: Medium Adult   Pulse: 67   SpO2: 96%   Height: 1.778 m (5' 10\")       Wt Readings from Last 3 Encounters:   23 108 kg (238 lb)   23 98.9 kg (218 lb)   23 98.9 kg (218 lb)       BP Readings from Last 3 Encounters:   23 136/84   23 94/62   23 138/78       Prior to Admission medications    Medication Sig Start Date End Date Taking?  Authorizing Provider   COMBIVENT RESPIMAT  MCG/ACT AERS inhaler 1 puff every 6 hours as needed 23  Yes Provider, MD Elena   risperiDONE (RISPERDAL) 0.25 MG tablet 1 tablet 2 times daily

## 2023-12-14 NOTE — PATIENT INSTRUCTIONS
Please be informed that if you contact our office outside of normal business hours the physician on call cannot help with any scheduling or rescheduling issues, procedure instruction questions or any type of medication issue. We advise you for any urgent/emergency that you go to the nearest emergency room! PLEASE CALL OUR OFFICE DURING NORMAL BUSINESS HOURS    Monday - Friday   8 am to 5 pm    [place]: [address]: [phone]    [place]:  [phone]    **It is YOUR responsibilty to bring medication bottles and/or updated medication list to [facility]. This will allow us to better serve you and all your healthcare needs**    Thank you for allowing us to care for you today! We want to ensure we can follow your treatment plan and we strive to give you the best outcomes and experience possible. If you ever have a life threatening emergency and call 911 - for an ambulance (EMS)   Our providers can only care for you at:   [facility] or [facility]. Even if you have someone take you or you drive yourself we can only care for you in a [facility] facility. Our providers are not setup at the other healthcare locations! We are committed to providing you the best care possible. If you receive a survey after visiting one of our offices, please take time to share your experience concerning your physician office visit. These surveys are confidential and no health information about you is shared. We are eager to improve for you and we are counting on your feedback to help make that happen.

## 2024-01-03 ENCOUNTER — HOSPITAL ENCOUNTER (OUTPATIENT)
Age: 74
Setting detail: SPECIMEN
Discharge: HOME | End: 2024-01-03
Payer: MEDICARE

## 2024-01-03 LAB
ESTIMATED AVERAGE GLUCOSE: 137 MG/DL
HBA1C MFR BLD: 6.4 % (ref 4.2–6.3)

## 2024-01-03 PROCEDURE — 36415 COLL VENOUS BLD VENIPUNCTURE: CPT

## 2024-01-03 PROCEDURE — 83036 HEMOGLOBIN GLYCOSYLATED A1C: CPT

## 2024-01-04 ENCOUNTER — HOSPITAL ENCOUNTER (OUTPATIENT)
Age: 74
Setting detail: SPECIMEN
Discharge: HOME | End: 2024-01-04
Payer: MEDICARE

## 2024-01-04 LAB
CREAT UR-MCNC: 93.7 MG/DL (ref 39–259)
MICROALBUMIN 24H UR-MCNC: 2.8 MG/DL
MICROALBUMIN/CREAT UR-RTO: 29.9 MG/G CREAT (ref 0–30)

## 2024-01-04 PROCEDURE — 82043 UR ALBUMIN QUANTITATIVE: CPT

## 2024-01-04 PROCEDURE — 82570 ASSAY OF URINE CREATININE: CPT

## 2024-01-16 ENCOUNTER — HOSPITAL ENCOUNTER (OUTPATIENT)
Age: 74
Setting detail: SPECIMEN
Discharge: HOME | End: 2024-01-16
Payer: MEDICARE

## 2024-01-16 PROCEDURE — 80164 ASSAY DIPROPYLACETIC ACD TOT: CPT

## 2024-01-22 ENCOUNTER — HOSPITAL ENCOUNTER (OUTPATIENT)
Age: 74
Setting detail: SPECIMEN
Discharge: HOME | End: 2024-01-22
Payer: MEDICARE

## 2024-01-22 LAB
DOSE AMOUNT: NORMAL
DOSE TIME: NORMAL
VALPROIC ACID LEVEL: 50.9 UG/ML (ref 50–100)

## 2024-01-22 PROCEDURE — 80164 ASSAY DIPROPYLACETIC ACD TOT: CPT

## 2024-01-22 PROCEDURE — 36415 COLL VENOUS BLD VENIPUNCTURE: CPT

## 2024-02-14 NOTE — CARE COORDINATION
Hgb drop 8.1 to 7.2 this AM on labs. NOC intensivist notified. Will convey to day provider, Subq Heparin held for now until order to give, hx of afib/clot noted.    Patient is from [facility]. Contacted [facility]; [facility] for Evelyn. Jany Merritt, RN     [phone] Received call from [facility]. Confirmed that patient is LTC bed hold. No needs to return.  Jany Merritt RN     WHEN PATIENT IS MEDICALLY STABLE TO RETURN TO [facility]  CALL REPORT TO Travis Dunne  [phone]  COMPLETE [facility]ER[facility] - RN/PHYSICIAN   Merle Martinez TO [facility]  [phone]  CALL FAMILY  CALL TRANSPORT -   SUPERIOR  [phone]  MED TRANS  [phone]  [facility]  [phone]

## 2024-02-21 ENCOUNTER — HOSPITAL ENCOUNTER (OUTPATIENT)
Age: 74
Setting detail: SPECIMEN
Discharge: HOME | End: 2024-02-21
Payer: MEDICARE

## 2024-02-21 LAB
ALBUMIN SERPL-MCNC: 4.3 GM/DL (ref 3.4–5)
ALP BLD-CCNC: 79 IU/L (ref 40–129)
ALT SERPL-CCNC: 9 U/L (ref 10–40)
ANION GAP SERPL CALCULATED.3IONS-SCNC: 16 MMOL/L (ref 7–16)
AST SERPL-CCNC: 22 IU/L (ref 15–37)
BACTERIA: NEGATIVE /HPF
BILIRUB SERPL-MCNC: 0.5 MG/DL (ref 0–1)
BILIRUBIN URINE: NEGATIVE MG/DL
BLOOD, URINE: NEGATIVE
BUN SERPL-MCNC: 38 MG/DL (ref 6–23)
CALCIUM SERPL-MCNC: 9 MG/DL (ref 8.3–10.6)
CHLORIDE BLD-SCNC: 104 MMOL/L (ref 99–110)
CLARITY: CLEAR
CO2: 21 MMOL/L (ref 21–32)
COLOR: YELLOW
CREAT SERPL-MCNC: 2.6 MG/DL (ref 0.9–1.3)
GFR SERPL CREATININE-BSD FRML MDRD: 25 ML/MIN/1.73M2
GLUCOSE SERPL-MCNC: 88 MG/DL (ref 70–99)
GLUCOSE, URINE: NEGATIVE MG/DL
HCT VFR BLD CALC: 45.7 % (ref 42–52)
HEMOGLOBIN: 13.2 GM/DL (ref 13.5–18)
KETONES, URINE: ABNORMAL MG/DL
LEUKOCYTE ESTERASE, URINE: ABNORMAL
MCH RBC QN AUTO: 27.3 PG (ref 27–31)
MCHC RBC AUTO-ENTMCNC: 28.9 % (ref 32–36)
MCV RBC AUTO: 94.6 FL (ref 78–100)
MUCUS: ABNORMAL HPF
NITRITE URINE, QUANTITATIVE: POSITIVE
PDW BLD-RTO: 15.7 % (ref 11.7–14.9)
PH, URINE: 5.5 (ref 5–8)
PLATELET # BLD: 209 K/CU MM (ref 140–440)
PMV BLD AUTO: 9.6 FL (ref 7.5–11.1)
POTASSIUM SERPL-SCNC: 4.9 MMOL/L (ref 3.5–5.1)
PROTEIN UA: ABNORMAL MG/DL
RBC # BLD: 4.83 M/CU MM (ref 4.6–6.2)
RBC URINE: 55 /HPF (ref 0–3)
SODIUM BLD-SCNC: 141 MMOL/L (ref 135–145)
SPECIFIC GRAVITY UA: 1.02 (ref 1–1.03)
SQUAMOUS EPITHELIAL: 1 /HPF
TOTAL PROTEIN: 7.3 GM/DL (ref 6.4–8.2)
TRICHOMONAS: ABNORMAL /HPF
UROBILINOGEN, URINE: 1 MG/DL (ref 0.2–1)
WBC # BLD: 7.3 K/CU MM (ref 4–10.5)
WBC CLUMP: ABNORMAL /HPF
WBC UA: 170 /HPF (ref 0–2)
YEAST: ABNORMAL /HPF

## 2024-02-21 PROCEDURE — 87077 CULTURE AEROBIC IDENTIFY: CPT

## 2024-02-21 PROCEDURE — 87186 SC STD MICRODIL/AGAR DIL: CPT

## 2024-02-21 PROCEDURE — 80053 COMPREHEN METABOLIC PANEL: CPT

## 2024-02-21 PROCEDURE — 81001 URINALYSIS AUTO W/SCOPE: CPT

## 2024-02-21 PROCEDURE — 87086 URINE CULTURE/COLONY COUNT: CPT

## 2024-02-21 PROCEDURE — 85027 COMPLETE CBC AUTOMATED: CPT

## 2024-02-21 PROCEDURE — 36415 COLL VENOUS BLD VENIPUNCTURE: CPT

## 2024-02-23 LAB
CULTURE: ABNORMAL
CULTURE: ABNORMAL
Lab: ABNORMAL
SPECIMEN: ABNORMAL

## 2024-02-26 ENCOUNTER — HOSPITAL ENCOUNTER (OUTPATIENT)
Age: 74
Setting detail: SPECIMEN
Discharge: HOME | End: 2024-02-26
Payer: MEDICARE

## 2024-02-26 LAB
ANION GAP SERPL CALCULATED.3IONS-SCNC: 15 MMOL/L (ref 7–16)
BUN SERPL-MCNC: 28 MG/DL (ref 6–23)
CALCIUM SERPL-MCNC: 9.1 MG/DL (ref 8.3–10.6)
CHLORIDE BLD-SCNC: 104 MMOL/L (ref 99–110)
CO2: 22 MMOL/L (ref 21–32)
CREAT SERPL-MCNC: 2.3 MG/DL (ref 0.9–1.3)
GFR SERPL CREATININE-BSD FRML MDRD: 29 ML/MIN/1.73M2
GLUCOSE SERPL-MCNC: 85 MG/DL (ref 70–99)
POTASSIUM SERPL-SCNC: 4.5 MMOL/L (ref 3.5–5.1)
SODIUM BLD-SCNC: 141 MMOL/L (ref 135–145)

## 2024-02-26 PROCEDURE — 36415 COLL VENOUS BLD VENIPUNCTURE: CPT

## 2024-02-26 PROCEDURE — 80048 BASIC METABOLIC PNL TOTAL CA: CPT

## 2024-02-27 ENCOUNTER — HOSPITAL ENCOUNTER (OUTPATIENT)
Age: 74
Setting detail: SPECIMEN
Discharge: HOME | End: 2024-02-27
Payer: MEDICARE

## 2024-02-27 LAB
ANION GAP SERPL CALCULATED.3IONS-SCNC: 15 MMOL/L (ref 7–16)
BUN SERPL-MCNC: 32 MG/DL (ref 6–23)
CALCIUM SERPL-MCNC: 8.9 MG/DL (ref 8.3–10.6)
CHLORIDE BLD-SCNC: 102 MMOL/L (ref 99–110)
CO2: 22 MMOL/L (ref 21–32)
CREAT SERPL-MCNC: 2.6 MG/DL (ref 0.9–1.3)
GFR SERPL CREATININE-BSD FRML MDRD: 25 ML/MIN/1.73M2
GLUCOSE SERPL-MCNC: 117 MG/DL (ref 70–99)
POTASSIUM SERPL-SCNC: 4.7 MMOL/L (ref 3.5–5.1)
SODIUM BLD-SCNC: 139 MMOL/L (ref 135–145)

## 2024-02-27 PROCEDURE — 36415 COLL VENOUS BLD VENIPUNCTURE: CPT

## 2024-02-27 PROCEDURE — 80048 BASIC METABOLIC PNL TOTAL CA: CPT

## 2024-03-12 ENCOUNTER — HOSPITAL ENCOUNTER (OUTPATIENT)
Age: 74
Setting detail: SPECIMEN
Discharge: HOME | End: 2024-03-12
Payer: MEDICARE

## 2024-03-12 LAB — TSH SERPL DL<=0.005 MIU/L-ACNC: 1.58 UIU/ML (ref 0.27–4.2)

## 2024-03-12 PROCEDURE — 84443 ASSAY THYROID STIM HORMONE: CPT

## 2024-03-12 PROCEDURE — 36415 COLL VENOUS BLD VENIPUNCTURE: CPT

## 2024-03-25 ENCOUNTER — OFFICE VISIT (OUTPATIENT)
Dept: CARDIOLOGY CLINIC | Age: 74
End: 2024-03-25
Payer: MEDICARE

## 2024-03-25 VITALS
OXYGEN SATURATION: 89 % | DIASTOLIC BLOOD PRESSURE: 80 MMHG | HEIGHT: 70 IN | HEART RATE: 65 BPM | SYSTOLIC BLOOD PRESSURE: 128 MMHG | BODY MASS INDEX: 34.15 KG/M2

## 2024-03-25 DIAGNOSIS — I47.19 ATRIAL TACHYCARDIA (HCC): ICD-10-CM

## 2024-03-25 DIAGNOSIS — R07.9 ACUTE CHEST PAIN: ICD-10-CM

## 2024-03-25 DIAGNOSIS — G47.33 OSA ON CPAP: ICD-10-CM

## 2024-03-25 DIAGNOSIS — N18.1 TYPE 2 DIABETES MELLITUS WITH STAGE 1 CHRONIC KIDNEY DISEASE, WITHOUT LONG-TERM CURRENT USE OF INSULIN (HCC): ICD-10-CM

## 2024-03-25 DIAGNOSIS — I25.10 CORONARY ARTERY DISEASE INVOLVING NATIVE CORONARY ARTERY OF NATIVE HEART WITHOUT ANGINA PECTORIS: ICD-10-CM

## 2024-03-25 DIAGNOSIS — G81.94 LEFT HEMIPARESIS (HCC): ICD-10-CM

## 2024-03-25 DIAGNOSIS — I50.32 CHRONIC DIASTOLIC HEART FAILURE (HCC): ICD-10-CM

## 2024-03-25 DIAGNOSIS — N18.5 HYPERTENSIVE HEART DISEASE WITH HEART FAILURE AND STAGE 5 CHRONIC KIDNEY DISEASE, NOT ON CHRONIC DIALYSIS, UNSPECIFIED HEART FAILURE TYPE (HCC): ICD-10-CM

## 2024-03-25 DIAGNOSIS — E11.22 TYPE 2 DIABETES MELLITUS WITH STAGE 1 CHRONIC KIDNEY DISEASE, WITHOUT LONG-TERM CURRENT USE OF INSULIN (HCC): ICD-10-CM

## 2024-03-25 DIAGNOSIS — R60.0 LOCALIZED EDEMA: ICD-10-CM

## 2024-03-25 DIAGNOSIS — J96.21 ACUTE ON CHRONIC RESPIRATORY FAILURE WITH HYPOXIA (HCC): ICD-10-CM

## 2024-03-25 DIAGNOSIS — R00.1 BRADYCARDIA: Primary | ICD-10-CM

## 2024-03-25 DIAGNOSIS — I13.2 HYPERTENSIVE HEART DISEASE WITH HEART FAILURE AND STAGE 5 CHRONIC KIDNEY DISEASE, NOT ON CHRONIC DIALYSIS, UNSPECIFIED HEART FAILURE TYPE (HCC): ICD-10-CM

## 2024-03-25 DIAGNOSIS — E78.2 MIXED HYPERLIPIDEMIA: ICD-10-CM

## 2024-03-25 PROCEDURE — 1036F TOBACCO NON-USER: CPT | Performed by: INTERNAL MEDICINE

## 2024-03-25 PROCEDURE — 1123F ACP DISCUSS/DSCN MKR DOCD: CPT | Performed by: INTERNAL MEDICINE

## 2024-03-25 PROCEDURE — 3044F HG A1C LEVEL LT 7.0%: CPT | Performed by: INTERNAL MEDICINE

## 2024-03-25 PROCEDURE — 3017F COLORECTAL CA SCREEN DOC REV: CPT | Performed by: INTERNAL MEDICINE

## 2024-03-25 PROCEDURE — 2022F DILAT RTA XM EVC RTNOPTHY: CPT | Performed by: INTERNAL MEDICINE

## 2024-03-25 PROCEDURE — 3079F DIAST BP 80-89 MM HG: CPT | Performed by: INTERNAL MEDICINE

## 2024-03-25 PROCEDURE — G8427 DOCREV CUR MEDS BY ELIG CLIN: HCPCS | Performed by: INTERNAL MEDICINE

## 2024-03-25 PROCEDURE — G8484 FLU IMMUNIZE NO ADMIN: HCPCS | Performed by: INTERNAL MEDICINE

## 2024-03-25 PROCEDURE — 3074F SYST BP LT 130 MM HG: CPT | Performed by: INTERNAL MEDICINE

## 2024-03-25 PROCEDURE — G8417 CALC BMI ABV UP PARAM F/U: HCPCS | Performed by: INTERNAL MEDICINE

## 2024-03-25 PROCEDURE — 99214 OFFICE O/P EST MOD 30 MIN: CPT | Performed by: INTERNAL MEDICINE

## 2024-03-25 RX ORDER — POLYVINYL ALCOHOL 14 MG/ML
2 SOLUTION/ DROPS OPHTHALMIC EVERY 6 HOURS PRN
COMMUNITY

## 2024-03-25 NOTE — PROGRESS NOTES
CARDIOLOGY  NOTE    Chief Complaint: leg swelling     HPI:   Felipe is a 74 y.o. year old who has Past medical history as noted below.  Felipe comes in from nursing home where he has been resident for some time now he has history of bradycardia which was diagnosed many years ago while he was hospitalized but at that time he did not get a pacemaker he does have history of atrial fibrillation he has been noticed to have increased bilateral lower extremity swelling but complains of significant back pain he is in wheelchair as he has difficulty ambulating much he says he has quit smoking now and has been having memory issues.  He is tired and weak, he is seen in office with aid from nursing home and his med list includes clonidine and verpamil 120mg   He is sleeping and weak all the time is in wheel chair  He is not using his cpap     He does have history of atrial fibrillation with rapid heart rate in the past but he has history of intracranial hemorrhage and intravascular extension back in December 2022 and since then he has not been on anticoagulation due to intracranial bleed he does have history of coronary artery disease and chronic kidney disease as well.  He is seen in the office today mostly because of increased bilateral lower extremity swelling and shortness of breath with exertion  Furthermore he was noted to have profound bradycardia while he was on beta-blockers and digoxin for A-fib management in 2022 at that time he required dopamine drip and was eventually taken off all rate lowering meds seen by EPS and considered not a candidate for pacemaker at that time.        Current Outpatient Medications   Medication Sig Dispense Refill    polyvinyl alcohol (LIQUIFILM TEARS) 1.4 % ophthalmic solution Place 2 drops into both eyes every 6 hours as needed for Dry Eyes      COMBIVENT RESPIMAT  MCG/ACT AERS inhaler 1 puff every 6 hours as needed      lisinopril

## 2024-03-25 NOTE — PATIENT INSTRUCTIONS
**It is YOUR responsibilty to bring medication bottles and/or updated medication list to EACH APPOINTMENT. This will allow us to better serve you and all your healthcare needs**  ,Thank you for allowing us to care for you today!   We want to ensure we can follow your treatment plan and we strive to give you the best outcomes and experience possible.   If you ever have a life threatening emergency and call 911 - for an ambulance (EMS)   Our providers can only care for you at:   [facility] or [facility].   Even if you have someone take you or you drive yourself we can only care for you in a [facility] facility. Our providers are not setup at the other healthcare locations!   Please be informed that if you contact our office outside of normal business hours the physician on call cannot help with any scheduling or rescheduling issues, procedure instruction questions or any type of medication issue.    We advise you for any urgent/emergency that you go to the nearest emergency room!    PLEASE CALL OUR OFFICE DURING NORMAL BUSINESS HOURS    Monday - Friday   8 am to 5 pm    [place]: [phone]    [place]: [phone]    [place]:  [phone]  We are committed to providing you the best care possible.    If you receive a survey after visiting one of our offices, please take time to share your experience concerning your physician office visit.  These surveys are confidential and no health information about you is shared.    We are eager to improve for you and we are counting on your feedback to help make that happen.

## 2024-04-02 ENCOUNTER — HOSPITAL ENCOUNTER (OUTPATIENT)
Age: 74
Setting detail: SPECIMEN
Discharge: HOME | End: 2024-04-02
Payer: MEDICARE

## 2024-04-02 LAB
ANION GAP SERPL CALCULATED.3IONS-SCNC: 12 MMOL/L (ref 7–16)
BUN SERPL-MCNC: 22 MG/DL (ref 6–23)
CALCIUM SERPL-MCNC: 8.6 MG/DL (ref 8.3–10.6)
CHLORIDE BLD-SCNC: 107 MMOL/L (ref 99–110)
CO2: 22 MMOL/L (ref 21–32)
CREAT SERPL-MCNC: 2.3 MG/DL (ref 0.9–1.3)
GFR SERPL CREATININE-BSD FRML MDRD: 29 ML/MIN/1.73M2
GLUCOSE SERPL-MCNC: 106 MG/DL (ref 70–99)
HCT VFR BLD CALC: 39.6 % (ref 42–52)
HEMOGLOBIN: 11.9 GM/DL (ref 13.5–18)
MCH RBC QN AUTO: 28.1 PG (ref 27–31)
MCHC RBC AUTO-ENTMCNC: 30.1 % (ref 32–36)
MCV RBC AUTO: 93.4 FL (ref 78–100)
PDW BLD-RTO: 16 % (ref 11.7–14.9)
PLATELET # BLD: 171 K/CU MM (ref 140–440)
PMV BLD AUTO: 9.5 FL (ref 7.5–11.1)
POTASSIUM SERPL-SCNC: 4.2 MMOL/L (ref 3.5–5.1)
RBC # BLD: 4.24 M/CU MM (ref 4.6–6.2)
SODIUM BLD-SCNC: 141 MMOL/L (ref 135–145)
WBC # BLD: 6.7 K/CU MM (ref 4–10.5)

## 2024-04-02 PROCEDURE — 85027 COMPLETE CBC AUTOMATED: CPT

## 2024-04-02 PROCEDURE — 80048 BASIC METABOLIC PNL TOTAL CA: CPT

## 2024-04-02 PROCEDURE — 36415 COLL VENOUS BLD VENIPUNCTURE: CPT

## 2024-06-07 ENCOUNTER — HOSPITAL ENCOUNTER (OUTPATIENT)
Age: 74
Setting detail: SPECIMEN
Discharge: HOME | End: 2024-06-07
Payer: MEDICARE

## 2024-06-07 LAB
CHOLEST SERPL-MCNC: 121 MG/DL
DOSE AMOUNT: NORMAL
DOSE TIME: NORMAL
HDLC SERPL-MCNC: 36 MG/DL
LDLC SERPL CALC-MCNC: 54 MG/DL
TRIGL SERPL-MCNC: 154 MG/DL
VALPROIC ACID LEVEL: 68.6 UG/ML (ref 50–100)

## 2024-06-07 PROCEDURE — 36415 COLL VENOUS BLD VENIPUNCTURE: CPT

## 2024-06-07 PROCEDURE — 80164 ASSAY DIPROPYLACETIC ACD TOT: CPT

## 2024-06-07 PROCEDURE — 80061 LIPID PANEL: CPT

## 2024-07-17 ENCOUNTER — HOSPITAL ENCOUNTER (OUTPATIENT)
Age: 74
Setting detail: SPECIMEN
Discharge: HOME | End: 2024-07-17
Payer: MEDICARE

## 2024-07-17 PROCEDURE — 81001 URINALYSIS AUTO W/SCOPE: CPT

## 2024-07-17 PROCEDURE — 87086 URINE CULTURE/COLONY COUNT: CPT

## 2024-07-17 PROCEDURE — 81003 URINALYSIS AUTO W/O SCOPE: CPT

## 2024-07-17 PROCEDURE — 87077 CULTURE AEROBIC IDENTIFY: CPT

## 2024-07-18 LAB
BACTERIA: NEGATIVE /HPF
BILIRUBIN, URINE: NEGATIVE MG/DL
BLOOD, URINE: ABNORMAL
CLARITY, UA: CLEAR
COLOR, UA: YELLOW
GLUCOSE URINE: NEGATIVE MG/DL
KETONES, URINE: ABNORMAL MG/DL
LEUKOCYTE ESTERASE, URINE: ABNORMAL
MUCUS: ABNORMAL HPF
NITRITE URINE, QUANTITATIVE: NEGATIVE
PH, URINE: 6.5 (ref 5–8)
PROTEIN UA: ABNORMAL MG/DL
RBC URINE: 13 /HPF (ref 0–3)
SPECIFIC GRAVITY UA: 1.01 (ref 1–1.03)
SQUAMOUS EPITHELIAL: 2 /HPF
TRICHOMONAS: ABNORMAL /HPF
UROBILINOGEN, URINE: 4 MG/DL (ref 0.2–1)
WBC UA: 10 /HPF (ref 0–2)

## 2024-07-19 LAB
CULTURE: ABNORMAL
CULTURE: ABNORMAL
Lab: ABNORMAL
SPECIMEN: ABNORMAL

## 2024-08-01 ENCOUNTER — HOSPITAL ENCOUNTER (OUTPATIENT)
Age: 74
Setting detail: SPECIMEN
Discharge: HOME | End: 2024-08-01
Payer: MEDICARE

## 2024-08-01 LAB
ALBUMIN SERPL-MCNC: 3.7 GM/DL (ref 3.4–5)
ALP BLD-CCNC: 70 IU/L (ref 40–128)
ALT SERPL-CCNC: 8 U/L (ref 10–40)
ANION GAP SERPL CALCULATED.3IONS-SCNC: 16 MMOL/L (ref 7–16)
AST SERPL-CCNC: 16 IU/L (ref 15–37)
BASOPHILS ABSOLUTE: 0 K/CU MM
BASOPHILS RELATIVE PERCENT: 0.5 % (ref 0–1)
BILIRUB SERPL-MCNC: 0.3 MG/DL (ref 0–1)
BUN SERPL-MCNC: 21 MG/DL (ref 6–23)
CALCIUM SERPL-MCNC: 8.4 MG/DL (ref 8.3–10.6)
CHLORIDE BLD-SCNC: 105 MMOL/L (ref 99–110)
CO2: 20 MMOL/L (ref 21–32)
CREAT SERPL-MCNC: 2.5 MG/DL (ref 0.9–1.3)
DIFFERENTIAL TYPE: ABNORMAL
EOSINOPHILS ABSOLUTE: 0.3 K/CU MM
EOSINOPHILS RELATIVE PERCENT: 3.7 % (ref 0–3)
GFR, ESTIMATED: 26 ML/MIN/1.73M2
GLUCOSE SERPL-MCNC: 151 MG/DL (ref 70–99)
HCT VFR BLD CALC: 38.1 % (ref 42–52)
HEMOGLOBIN: 11.4 GM/DL (ref 13.5–18)
IMMATURE NEUTROPHIL %: 0.6 % (ref 0–0.43)
LYMPHOCYTES ABSOLUTE: 2.2 K/CU MM
LYMPHOCYTES RELATIVE PERCENT: 25.4 % (ref 24–44)
MCH RBC QN AUTO: 27.5 PG (ref 27–31)
MCHC RBC AUTO-ENTMCNC: 29.9 % (ref 32–36)
MCV RBC AUTO: 91.8 FL (ref 78–100)
MONOCYTES ABSOLUTE: 0.6 K/CU MM
MONOCYTES RELATIVE PERCENT: 6.9 % (ref 0–4)
NEUTROPHILS ABSOLUTE: 5.4 K/CU MM
NEUTROPHILS RELATIVE PERCENT: 62.9 % (ref 36–66)
NUCLEATED RBC %: 0 %
PDW BLD-RTO: 16.1 % (ref 11.7–14.9)
PLATELET # BLD: 144 K/CU MM (ref 140–440)
PMV BLD AUTO: 9.5 FL (ref 7.5–11.1)
POTASSIUM SERPL-SCNC: 4.8 MMOL/L (ref 3.5–5.1)
PROCALCITONIN SERPL-MCNC: 0.06 NG/ML
RBC # BLD: 4.15 M/CU MM (ref 4.6–6.2)
SODIUM BLD-SCNC: 141 MMOL/L (ref 135–145)
TOTAL IMMATURE NEUTOROPHIL: 0.05 K/CU MM
TOTAL NUCLEATED RBC: 0 K/CU MM
TOTAL PROTEIN: 6.8 GM/DL (ref 6.4–8.2)
WBC # BLD: 8.6 K/CU MM (ref 4–10.5)

## 2024-08-01 PROCEDURE — 80053 COMPREHEN METABOLIC PANEL: CPT

## 2024-08-01 PROCEDURE — 85025 COMPLETE CBC W/AUTO DIFF WBC: CPT

## 2024-08-01 PROCEDURE — 84145 PROCALCITONIN (PCT): CPT

## 2024-08-01 PROCEDURE — 36415 COLL VENOUS BLD VENIPUNCTURE: CPT

## 2024-09-06 ENCOUNTER — HOSPITAL ENCOUNTER (OUTPATIENT)
Age: 74
Setting detail: SPECIMEN
Discharge: HOME | End: 2024-09-06
Payer: MEDICARE

## 2024-09-06 ENCOUNTER — HOSPITAL ENCOUNTER (OUTPATIENT)
Age: 74
Setting detail: SPECIMEN
Discharge: HOME | End: 2024-09-06

## 2024-09-06 LAB
ALBUMIN SERPL-MCNC: 4 GM/DL (ref 3.4–5)
ALP BLD-CCNC: 82 IU/L (ref 40–128)
ALT SERPL-CCNC: 7 U/L (ref 10–40)
ANION GAP SERPL CALCULATED.3IONS-SCNC: 16 MMOL/L (ref 7–16)
AST SERPL-CCNC: 15 IU/L (ref 15–37)
BILIRUB SERPL-MCNC: 0.4 MG/DL (ref 0–1)
BUN SERPL-MCNC: 20 MG/DL (ref 6–23)
CALCIUM SERPL-MCNC: 8.7 MG/DL (ref 8.3–10.6)
CHLORIDE BLD-SCNC: 100 MMOL/L (ref 99–110)
CO2: 21 MMOL/L (ref 21–32)
CREAT SERPL-MCNC: 2.1 MG/DL (ref 0.9–1.3)
GFR, ESTIMATED: 32 ML/MIN/1.73M2
GLUCOSE SERPL-MCNC: 149 MG/DL (ref 70–99)
HCT VFR BLD CALC: 38.6 % (ref 42–52)
HEMOGLOBIN: 11.6 GM/DL (ref 13.5–18)
MCH RBC QN AUTO: 27.6 PG (ref 27–31)
MCHC RBC AUTO-ENTMCNC: 30.1 % (ref 32–36)
MCV RBC AUTO: 91.7 FL (ref 78–100)
PDW BLD-RTO: 16.4 % (ref 11.7–14.9)
PLATELET # BLD: 129 K/CU MM (ref 140–440)
PMV BLD AUTO: 9.2 FL (ref 7.5–11.1)
POTASSIUM SERPL-SCNC: 4.3 MMOL/L (ref 3.5–5.1)
PROCALCITONIN SERPL-MCNC: 0.09 NG/ML
RBC # BLD: 4.21 M/CU MM (ref 4.6–6.2)
SODIUM BLD-SCNC: 137 MMOL/L (ref 135–145)
TOTAL PROTEIN: 7.5 GM/DL (ref 6.4–8.2)
WBC # BLD: 9.2 K/CU MM (ref 4–10.5)

## 2024-09-06 PROCEDURE — 36415 COLL VENOUS BLD VENIPUNCTURE: CPT

## 2024-09-06 PROCEDURE — 85027 COMPLETE CBC AUTOMATED: CPT

## 2024-09-06 PROCEDURE — 80053 COMPREHEN METABOLIC PANEL: CPT

## 2024-09-06 PROCEDURE — 84145 PROCALCITONIN (PCT): CPT

## 2024-09-06 PROCEDURE — 9900360100 HC STAT COLLECTION FEE SNF

## 2024-09-12 ENCOUNTER — HOSPITAL ENCOUNTER (OUTPATIENT)
Age: 74
Setting detail: SPECIMEN
Discharge: HOME | End: 2024-09-12
Payer: MEDICARE

## 2024-09-12 LAB — TSH SERPL DL<=0.05 MIU/L-ACNC: 0.46 UIU/ML (ref 0.27–4.2)

## 2024-09-12 PROCEDURE — 36415 COLL VENOUS BLD VENIPUNCTURE: CPT

## 2024-09-12 PROCEDURE — 84443 ASSAY THYROID STIM HORMONE: CPT

## 2024-09-24 ENCOUNTER — TELEPHONE (OUTPATIENT)
Dept: CARDIOLOGY CLINIC | Age: 74
End: 2024-09-24

## 2024-10-03 ENCOUNTER — OFFICE VISIT (OUTPATIENT)
Dept: CARDIOLOGY CLINIC | Age: 74
End: 2024-10-03
Payer: MEDICARE

## 2024-10-03 VITALS
DIASTOLIC BLOOD PRESSURE: 84 MMHG | BODY MASS INDEX: 34.07 KG/M2 | HEART RATE: 70 BPM | SYSTOLIC BLOOD PRESSURE: 130 MMHG | WEIGHT: 238 LBS | OXYGEN SATURATION: 97 % | HEIGHT: 70 IN

## 2024-10-03 DIAGNOSIS — N18.1 TYPE 2 DIABETES MELLITUS WITH STAGE 1 CHRONIC KIDNEY DISEASE, WITHOUT LONG-TERM CURRENT USE OF INSULIN (HCC): ICD-10-CM

## 2024-10-03 DIAGNOSIS — E78.2 MIXED HYPERLIPIDEMIA: ICD-10-CM

## 2024-10-03 DIAGNOSIS — R00.1 BRADYCARDIA: ICD-10-CM

## 2024-10-03 DIAGNOSIS — I50.32 CHRONIC DIASTOLIC HEART FAILURE (HCC): ICD-10-CM

## 2024-10-03 DIAGNOSIS — I10 ESSENTIAL HYPERTENSION: ICD-10-CM

## 2024-10-03 DIAGNOSIS — I25.10 CORONARY ARTERY DISEASE INVOLVING NATIVE CORONARY ARTERY OF NATIVE HEART WITHOUT ANGINA PECTORIS: ICD-10-CM

## 2024-10-03 DIAGNOSIS — G47.33 OSA ON CPAP: ICD-10-CM

## 2024-10-03 DIAGNOSIS — I48.0 HYPERCOAGULABLE STATE DUE TO PAROXYSMAL ATRIAL FIBRILLATION (HCC): ICD-10-CM

## 2024-10-03 DIAGNOSIS — D68.69 HYPERCOAGULABLE STATE DUE TO PAROXYSMAL ATRIAL FIBRILLATION (HCC): ICD-10-CM

## 2024-10-03 DIAGNOSIS — N18.5 HYPERTENSIVE HEART DISEASE WITH HEART FAILURE AND STAGE 5 CHRONIC KIDNEY DISEASE, NOT ON CHRONIC DIALYSIS, UNSPECIFIED HEART FAILURE TYPE (HCC): ICD-10-CM

## 2024-10-03 DIAGNOSIS — E11.22 TYPE 2 DIABETES MELLITUS WITH STAGE 1 CHRONIC KIDNEY DISEASE, WITHOUT LONG-TERM CURRENT USE OF INSULIN (HCC): ICD-10-CM

## 2024-10-03 DIAGNOSIS — I48.0 PAROXYSMAL ATRIAL FIBRILLATION (HCC): ICD-10-CM

## 2024-10-03 DIAGNOSIS — N18.31 STAGE 3A CHRONIC KIDNEY DISEASE (HCC): ICD-10-CM

## 2024-10-03 DIAGNOSIS — R26.9 GAIT DISTURBANCE: ICD-10-CM

## 2024-10-03 DIAGNOSIS — I47.19 ATRIAL TACHYCARDIA (HCC): Primary | ICD-10-CM

## 2024-10-03 DIAGNOSIS — I13.2 HYPERTENSIVE HEART DISEASE WITH HEART FAILURE AND STAGE 5 CHRONIC KIDNEY DISEASE, NOT ON CHRONIC DIALYSIS, UNSPECIFIED HEART FAILURE TYPE (HCC): ICD-10-CM

## 2024-10-03 PROCEDURE — 1123F ACP DISCUSS/DSCN MKR DOCD: CPT | Performed by: NURSE PRACTITIONER

## 2024-10-03 PROCEDURE — 3017F COLORECTAL CA SCREEN DOC REV: CPT | Performed by: NURSE PRACTITIONER

## 2024-10-03 PROCEDURE — G8427 DOCREV CUR MEDS BY ELIG CLIN: HCPCS | Performed by: NURSE PRACTITIONER

## 2024-10-03 PROCEDURE — G8484 FLU IMMUNIZE NO ADMIN: HCPCS | Performed by: NURSE PRACTITIONER

## 2024-10-03 PROCEDURE — 3079F DIAST BP 80-89 MM HG: CPT | Performed by: NURSE PRACTITIONER

## 2024-10-03 PROCEDURE — 3044F HG A1C LEVEL LT 7.0%: CPT | Performed by: NURSE PRACTITIONER

## 2024-10-03 PROCEDURE — 1036F TOBACCO NON-USER: CPT | Performed by: NURSE PRACTITIONER

## 2024-10-03 PROCEDURE — 99214 OFFICE O/P EST MOD 30 MIN: CPT | Performed by: NURSE PRACTITIONER

## 2024-10-03 PROCEDURE — 2022F DILAT RTA XM EVC RTNOPTHY: CPT | Performed by: NURSE PRACTITIONER

## 2024-10-03 PROCEDURE — G8417 CALC BMI ABV UP PARAM F/U: HCPCS | Performed by: NURSE PRACTITIONER

## 2024-10-03 PROCEDURE — 3075F SYST BP GE 130 - 139MM HG: CPT | Performed by: NURSE PRACTITIONER

## 2024-10-03 RX ORDER — PRAZOSIN HYDROCHLORIDE 1 MG/1
1 CAPSULE ORAL NIGHTLY
COMMUNITY
Start: 2024-09-30 | End: 2024-10-03 | Stop reason: ALTCHOICE

## 2024-10-03 RX ORDER — SILVER SULFADIAZINE 10 MG/G
CREAM TOPICAL
COMMUNITY
Start: 2024-09-06

## 2024-10-03 ASSESSMENT — ENCOUNTER SYMPTOMS
COUGH: 0
SHORTNESS OF BREATH: 0

## 2024-10-03 NOTE — PROGRESS NOTES
Physical Therapy Discharge Summary    Name: Jeannie Hutchins  MRN: [id number]   Principal Problem: Acute on chronic diastolic heart failure     Patient Discharged from acute Physical Therapy on 10/31/20.  Please refer to prior PT notes for functional status.     Assessment:     Patient appropriate for care in another setting.    Objective:     GOALS:   Multidisciplinary Problems     Physical Therapy Goals     Not on file          Multidisciplinary Problems (Resolved)        Problem: Physical Therapy Goal    Goal Priority Disciplines Outcome Goal Variances Interventions   Physical Therapy Goal   (Resolved)     PT, PT/OT Met     Description: Goals to be met by: 20     Patient will increase functional independence with mobility by performin. Supine to sit with Stand-by Assistance  2. Sit to stand transfer with Supervision  3. Gait  x 50 feet with Supervision with or without AD   4. Ascend/descend 3 stair with right Handrails Contact Guard Assistance    5. Lower extremity exercise program x10 reps per handout, with independence                     Reasons for Discontinuation of Therapy Services  Transfer to alternate level of care.      Plan:     Patient Discharged to: Home with Home Health Service.    Gracy Plunkett, PT  2020   urged.  We discussed that for the  prevention of ASCVD our  goal is aggressive risk modification.Patient is encouraged to exercise if they can , educated about  brisk walk for 30 minutes  at least 3 to 4 times a week if there are no physical limitations  Various goals were discussed and questions answered. Continue current medications. Appropriate prescriptions are addressed and refills ordered.  Questions answered and patient verbalizes understanding.  Call for any problems, questions, or concerns.Greater than 60 % of time spent counseling besides reviewing data and images       No follow-ups on file.      An electronic signature was used to authenticate this note.    Electronically signed by JULEE Kent CNP on 10/3/2024 at 9:32 PM

## 2024-10-07 ENCOUNTER — HOSPITAL ENCOUNTER (OUTPATIENT)
Age: 74
Setting detail: SPECIMEN
Discharge: HOME | End: 2024-10-07
Payer: MEDICARE

## 2024-10-07 LAB
ANION GAP SERPL CALCULATED.3IONS-SCNC: 15 MMOL/L (ref 9–17)
BUN SERPL-MCNC: 21 MG/DL (ref 7–20)
CALCIUM SERPL-MCNC: 8.5 MG/DL (ref 8.3–10.6)
CHLORIDE SERPL-SCNC: 106 MMOL/L (ref 99–110)
CO2 SERPL-SCNC: 21 MMOL/L (ref 21–32)
CREAT SERPL-MCNC: 2.1 MG/DL (ref 0.8–1.3)
GFR, ESTIMATED: 31 ML/MIN/1.73M2
GLUCOSE SERPL-MCNC: 104 MG/DL (ref 74–99)
POTASSIUM SERPL-SCNC: 4.4 MMOL/L (ref 3.5–5.1)
SODIUM SERPL-SCNC: 142 MMOL/L (ref 136–145)

## 2024-10-07 PROCEDURE — 80048 BASIC METABOLIC PNL TOTAL CA: CPT

## 2024-10-07 PROCEDURE — 36415 COLL VENOUS BLD VENIPUNCTURE: CPT

## 2024-10-14 ENCOUNTER — HOSPITAL ENCOUNTER (OUTPATIENT)
Age: 74
Setting detail: SPECIMEN
Discharge: HOME | End: 2024-10-14
Payer: MEDICARE

## 2024-10-14 LAB
ANION GAP SERPL CALCULATED.3IONS-SCNC: 11 MMOL/L (ref 9–17)
BUN SERPL-MCNC: 16 MG/DL (ref 7–20)
CALCIUM SERPL-MCNC: 8.4 MG/DL (ref 8.3–10.6)
CHLORIDE SERPL-SCNC: 107 MMOL/L (ref 99–110)
CO2 SERPL-SCNC: 25 MMOL/L (ref 21–32)
CREAT SERPL-MCNC: 2 MG/DL (ref 0.8–1.3)
GFR, ESTIMATED: 33 ML/MIN/1.73M2
GLUCOSE SERPL-MCNC: 184 MG/DL (ref 74–99)
POTASSIUM SERPL-SCNC: 4.6 MMOL/L (ref 3.5–5.1)
SODIUM SERPL-SCNC: 142 MMOL/L (ref 136–145)

## 2024-10-14 PROCEDURE — 36415 COLL VENOUS BLD VENIPUNCTURE: CPT

## 2024-10-14 PROCEDURE — 80048 BASIC METABOLIC PNL TOTAL CA: CPT

## 2024-10-16 ENCOUNTER — HOSPITAL ENCOUNTER (OUTPATIENT)
Age: 74
Setting detail: SPECIMEN
Discharge: HOME | End: 2024-10-16
Payer: MEDICARE

## 2024-10-16 LAB
CREAT UR-MCNC: 36.2 MG/DL (ref 39–259)
EST. AVERAGE GLUCOSE BLD GHB EST-MCNC: 147 MG/DL
HBA1C MFR BLD: 6.7 % (ref 4.2–6.3)
MICROALBUMIN UR-MCNC: <1 MG/L
MICROALBUMIN/CREAT UR-RTO: ABNORMAL MCG/MG CREAT (ref 0–2)

## 2024-10-16 PROCEDURE — 36415 COLL VENOUS BLD VENIPUNCTURE: CPT

## 2024-10-16 PROCEDURE — 82043 UR ALBUMIN QUANTITATIVE: CPT

## 2024-10-16 PROCEDURE — 82570 ASSAY OF URINE CREATININE: CPT

## 2024-10-16 PROCEDURE — 83036 HEMOGLOBIN GLYCOSYLATED A1C: CPT

## 2024-10-30 ENCOUNTER — HOSPITAL ENCOUNTER (OUTPATIENT)
Age: 74
Setting detail: SPECIMEN
Discharge: HOME | End: 2024-10-30
Payer: MEDICARE

## 2024-10-30 LAB
ANION GAP SERPL CALCULATED.3IONS-SCNC: 9 MMOL/L (ref 9–17)
BASOPHILS # BLD: 0.01 K/UL
BASOPHILS NFR BLD: 0 % (ref 0–1)
BUN SERPL-MCNC: 15 MG/DL (ref 7–20)
CALCIUM SERPL-MCNC: 8.1 MG/DL (ref 8.3–10.6)
CHLORIDE SERPL-SCNC: 108 MMOL/L (ref 99–110)
CO2 SERPL-SCNC: 24 MMOL/L (ref 21–32)
CREAT SERPL-MCNC: 2 MG/DL (ref 0.8–1.3)
EOSINOPHIL # BLD: 0.28 K/UL
EOSINOPHILS RELATIVE PERCENT: 4 % (ref 0–3)
ERYTHROCYTE [DISTWIDTH] IN BLOOD BY AUTOMATED COUNT: 17.1 % (ref 11.7–14.9)
GFR, ESTIMATED: 32 ML/MIN/1.73M2
GLUCOSE SERPL-MCNC: 209 MG/DL (ref 74–99)
HCT VFR BLD AUTO: 32 % (ref 42–52)
HGB BLD-MCNC: 9.8 G/DL (ref 13.5–18)
IMM GRANULOCYTES # BLD AUTO: 0.04 K/UL
IMM GRANULOCYTES NFR BLD: 1 %
LYMPHOCYTES NFR BLD: 1.39 K/UL
LYMPHOCYTES RELATIVE PERCENT: 22 % (ref 24–44)
MCH RBC QN AUTO: 28.2 PG (ref 27–31)
MCHC RBC AUTO-ENTMCNC: 30.6 G/DL (ref 32–36)
MCV RBC AUTO: 92.2 FL (ref 78–100)
MONOCYTES NFR BLD: 0.57 K/UL
MONOCYTES NFR BLD: 9 % (ref 0–4)
NEUTROPHILS NFR BLD: 64 % (ref 36–66)
NEUTS SEG NFR BLD: 4.05 K/UL
PLATELET # BLD AUTO: 108 K/UL (ref 140–440)
PMV BLD AUTO: 9.4 FL (ref 7.5–11.1)
POTASSIUM SERPL-SCNC: 4 MMOL/L (ref 3.5–5.1)
RBC # BLD AUTO: 3.47 M/UL (ref 4.6–6.2)
SODIUM SERPL-SCNC: 141 MMOL/L (ref 136–145)
WBC OTHER # BLD: 6.3 K/UL (ref 4–10.5)

## 2024-10-30 PROCEDURE — 36415 COLL VENOUS BLD VENIPUNCTURE: CPT

## 2024-10-30 PROCEDURE — 85025 COMPLETE CBC W/AUTO DIFF WBC: CPT

## 2024-10-30 PROCEDURE — 80048 BASIC METABOLIC PNL TOTAL CA: CPT

## 2024-11-01 NOTE — PROGRESS NOTES
IR Procedure at [facility]:  Left a message with the  at [facility] to have patient's nurse to call me back for PAT.    NPO at Midnight      Follow your directions as prescribed by the doctor for your procedure and medications.  3.   Consult your provider as to when to stop blood thinner  4.   Do not take any pain medication within 6 hours of your procedure  5.   Do not drink any alcoholic beverages or use any street drugs 24 hours before procedure.  6.   Please wear simple, loose fitting clothing to the hospital.  Do not bring valuables (money,             credit cards, checkbooks, etc.)     7.   If you  have a Living Will and Durable Power of  for Healthcare, please bring in a copy.  8.   Please bring picture ID,  insurance card, paperwork from the doctors office            (H & P, Consent,  & card for implantable devices).  9.   Report to the information desk on the ground floor.  10. Take a shower the night before or morning of your procedure, do not apply any lotion, oil or powder.

## 2024-11-05 NOTE — PROGRESS NOTES
IR Procedure at [facility]:  Left a message on Erin\"s cell phone, she is  The DON at [facility]. Told her patient needs to arrive at 1100 at [facility] on 11/6/2024 for his procedure at 1200. Also went over below instructions and told her patient can take his medications as scheduled and left her my phone number to call back is she has any questions.      Follow your directions as prescribed by the doctor for your procedure and medications.  3.   Consult your provider as to when to stop blood thinner  4.   Do not take any pain medication within 6 hours of your procedure  5.   Do not drink any alcoholic beverages or use any street drugs 24 hours before procedure.  6.   Please wear simple, loose fitting clothing to the hospital.  Do not bring valuables (money,             credit cards, checkbooks, etc.)     7.   If you  have a Living Will and Durable Power of  for Healthcare, please bring in a copy.  8.   Please bring picture ID,  insurance card, paperwork from the doctors office            (H & P, Consent,  & card for implantable devices).  9.   Report to the information desk on the ground floor.  10. Take a shower the night before or morning of your procedure, do not apply any lotion, oil or powder.

## 2024-11-06 ENCOUNTER — HOSPITAL ENCOUNTER (OUTPATIENT)
Dept: INTERVENTIONAL RADIOLOGY/VASCULAR | Age: 74
Discharge: HOME | End: 2024-11-06
Payer: MEDICARE

## 2024-11-06 VITALS
OXYGEN SATURATION: 99 % | HEIGHT: 70 IN | HEART RATE: 81 BPM | BODY MASS INDEX: 30.06 KG/M2 | SYSTOLIC BLOOD PRESSURE: 155 MMHG | TEMPERATURE: 97.8 F | DIASTOLIC BLOOD PRESSURE: 84 MMHG | WEIGHT: 210 LBS | RESPIRATION RATE: 16 BRPM

## 2024-11-06 DIAGNOSIS — J90 PLEURAL EFFUSION: ICD-10-CM

## 2024-11-06 LAB
ERYTHROCYTE [DISTWIDTH] IN BLOOD BY AUTOMATED COUNT: 17 % (ref 11.7–14.9)
HCT VFR BLD AUTO: 39.3 % (ref 42–52)
HGB BLD-MCNC: 12 G/DL (ref 13.5–18)
INR PPP: 1
MCH RBC QN AUTO: 28.1 PG (ref 27–31)
MCHC RBC AUTO-ENTMCNC: 30.5 G/DL (ref 32–36)
MCV RBC AUTO: 92 FL (ref 78–100)
PARTIAL THROMBOPLASTIN TIME: 31.1 SEC (ref 25.1–37.1)
PLATELET # BLD AUTO: 162 K/UL (ref 140–440)
PMV BLD AUTO: 9.3 FL (ref 7.5–11.1)
PROTHROMBIN TIME: 13.3 SEC (ref 11.7–14.5)
RBC # BLD AUTO: 4.27 M/UL (ref 4.6–6.2)
WBC OTHER # BLD: 8.2 K/UL (ref 4–10.5)

## 2024-11-06 PROCEDURE — 85610 PROTHROMBIN TIME: CPT

## 2024-11-06 PROCEDURE — 85730 THROMBOPLASTIN TIME PARTIAL: CPT

## 2024-11-06 PROCEDURE — 85027 COMPLETE CBC AUTOMATED: CPT

## 2024-11-06 PROCEDURE — 2500000003 HC RX 250 WO HCPCS

## 2024-11-06 PROCEDURE — 76604 US EXAM CHEST: CPT

## 2024-11-06 PROCEDURE — 76604 US EXAM CHEST: CPT | Performed by: RADIOLOGY

## 2024-11-06 NOTE — PROGRESS NOTES
To IR holding room 7. IV started and labs drawn. VSS. Nursing home meds reviewed. No blood thinners noted. Call light in reach. No needs at this time. Employee from nursing home at bedside with patient.

## 2024-11-06 NOTE — PRE SEDATION
Sedation Pre-Procedure Note    Patient Name: Felipe Heller Jr.   YOB: 1950  Room/Bed: Room/bed info not found  Medical Record Number: [id number]  Date: 11/6/2024   Time: 12:39 PM       Indication:  pleural fluid    Consent: I have discussed with the patient and/or the patient representative the indication, alternatives, and the possible risks and/or complications of the planned procedure and the anesthesia methods. The patient and/or patient representative appear to understand and agree to proceed.    Vital Signs:   Vitals:    11/06/24 1113   BP: (!) 155/84   Pulse: 81   Resp: 16   Temp: 97.8 °F (36.6 °C)   SpO2: 99%       Past Medical History:   has a past medical history of Anemia, Arthritis, Back pain, chronic, Bipolar 1 disorder ([facility]), CAD (coronary artery disease), Cerebral artery occlusion with cerebral infarction ([facility]), Chronic kidney disease (CKD), stage III (moderate) ([facility]), CKD (chronic kidney disease), COPD (chronic obstructive pulmonary disease) ([facility]), Diabetes mellitus, type 2 ([facility]), Diabetic peripheral neuropathy ([facility]), Diastolic CHF ([facility]), Gastric ulcer, H/O cardiovascular stress test, Heart murmur, Hiatal hernia, History of cardiac cath, Hx of migraines, HX OTHER MEDICAL, Hyperlipidemia, Hypertension, Intraparenchymal hematoma of brain, Lumbar radiculopathy, Overlapping malignant neoplasm of colon (HCC), Panic attack, Pleural effusion, S/P thoracentesis, Sleep apnea, SOBOE (shortness of breath on exertion), and Spinal stenosis.    Past Surgical History:   has a past surgical history that includes Neck surgery (1998); Carpal tunnel release (Bilateral, 1989); knee surgery (Bilateral); thoracentesis (Left, 12/20/2013); Elbow surgery (Left, 2000's); Thoracentesis (4/25/2016); Tonsillectomy; Thoracentesis (Left, 11/15/2016); thoracotomy (Left, 03/18/2019); thoracotomy (Left, 3/18/2019); Upper gastrointestinal endoscopy (N/A, 5/12/2020); Colonoscopy (N/A, 5/12/2020); and Small intestine

## 2024-11-06 NOTE — BRIEF OP NOTE
Brief Postoperative Note      Patient: Felipe Heller Jr.  YOB: 1950  MRN: [id number]    Date of Procedure: 11/6/2024    Pleural effusion    Post-Op Diagnosis:  No pleural fluid bilaterally       Limited Ultrasound bilaterally    SIS Maria  Assistant:  * No surgical staff found *    Anesthesia: * No anesthesia type entered *    Estimated Blood Loss (mL): None    Complications: None    Specimens:   * No specimens in log *    Implants:  * No implants in log *      Drains: * No LDAs found *    Findings:  Infection Present At Time Of Surgery (PATOS) (choose all levels that have infection present):  No infection present  Other Findings: No fluid    Electronically signed by Flavia Maria MD on 11/6/2024 at 12:41 PM

## 2024-11-06 NOTE — PROGRESS NOTES
Patient returned from IR from procedure. No fluid to be drained at this time. [facility] medical transportation was called to pick patient up.

## 2024-11-30 ENCOUNTER — APPOINTMENT (OUTPATIENT)
Dept: CT IMAGING | Age: 74
DRG: 481 | End: 2024-11-30
Payer: MEDICARE

## 2024-11-30 ENCOUNTER — HOSPITAL ENCOUNTER (INPATIENT)
Age: 74
LOS: 3 days | Discharge: NURSING, REHAB OR LONG-TERM CARE | DRG: 481 | End: 2024-12-04
Attending: EMERGENCY MEDICINE | Admitting: STUDENT IN AN ORGANIZED HEALTH CARE EDUCATION/TRAINING PROGRAM
Payer: MEDICARE
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
## 2024-11-30 DIAGNOSIS — S72.92XA CLOSED FRACTURE OF LEFT FEMUR, INITIAL ENCOUNTER (HCC): ICD-10-CM

## 2024-11-30 DIAGNOSIS — S72.002A CLOSED FRACTURE OF LEFT HIP, INITIAL ENCOUNTER (HCC): Primary | ICD-10-CM

## 2024-11-30 PROCEDURE — 99285 EMERGENCY DEPT VISIT HI MDM: CPT

## 2024-11-30 PROCEDURE — 80053 COMPREHEN METABOLIC PANEL: CPT

## 2024-11-30 PROCEDURE — 6360000002 HC RX W HCPCS: Performed by: EMERGENCY MEDICINE

## 2024-11-30 PROCEDURE — 96375 TX/PRO/DX INJ NEW DRUG ADDON: CPT

## 2024-11-30 PROCEDURE — 36415 COLL VENOUS BLD VENIPUNCTURE: CPT

## 2024-11-30 PROCEDURE — 93005 ELECTROCARDIOGRAM TRACING: CPT | Performed by: EMERGENCY MEDICINE

## 2024-11-30 PROCEDURE — 96374 THER/PROPH/DIAG INJ IV PUSH: CPT

## 2024-11-30 PROCEDURE — 85025 COMPLETE CBC W/AUTO DIFF WBC: CPT

## 2024-11-30 RX ORDER — ONDANSETRON 2 MG/ML
4 INJECTION INTRAMUSCULAR; INTRAVENOUS ONCE
Status: COMPLETED | OUTPATIENT
Start: 2024-11-30 | End: 2024-11-30

## 2024-11-30 RX ORDER — MORPHINE SULFATE 4 MG/ML
4 INJECTION, SOLUTION INTRAMUSCULAR; INTRAVENOUS ONCE
Status: COMPLETED | OUTPATIENT
Start: 2024-11-30 | End: 2024-11-30

## 2024-11-30 RX ADMIN — ONDANSETRON 4 MG: 2 INJECTION INTRAMUSCULAR; INTRAVENOUS at 23:51

## 2024-11-30 RX ADMIN — MORPHINE SULFATE 4 MG: 4 INJECTION, SOLUTION INTRAMUSCULAR; INTRAVENOUS at 23:51

## 2024-11-30 ASSESSMENT — PAIN SCALES - GENERAL
PAINLEVEL_OUTOF10: 10
PAINLEVEL_OUTOF10: 10

## 2024-11-30 ASSESSMENT — PAIN - FUNCTIONAL ASSESSMENT: PAIN_FUNCTIONAL_ASSESSMENT: 0-10

## 2024-12-01 ENCOUNTER — APPOINTMENT (OUTPATIENT)
Dept: CT IMAGING | Age: 74
DRG: 481 | End: 2024-12-01
Payer: MEDICARE

## 2024-12-01 ENCOUNTER — APPOINTMENT (OUTPATIENT)
Dept: GENERAL RADIOLOGY | Age: 74
DRG: 481 | End: 2024-12-01
Payer: MEDICARE

## 2024-12-01 ENCOUNTER — ANESTHESIA EVENT (OUTPATIENT)
Dept: OPERATING ROOM | Age: 74
DRG: 481 | End: 2024-12-01
Payer: MEDICARE

## 2024-12-01 ENCOUNTER — APPOINTMENT (OUTPATIENT)
Dept: GENERAL RADIOLOGY | Age: 74
DRG: 481 | End: 2024-12-01
Attending: EMERGENCY MEDICINE
Payer: MEDICARE

## 2024-12-01 ENCOUNTER — ANESTHESIA (OUTPATIENT)
Dept: OPERATING ROOM | Age: 74
DRG: 481 | End: 2024-12-01
Payer: MEDICARE

## 2024-12-01 PROBLEM — S72.92XA CLOSED FRACTURE OF LEFT FEMUR, INITIAL ENCOUNTER (HCC): Status: ACTIVE | Noted: 2024-12-01

## 2024-12-01 LAB
25(OH)D3 SERPL-MCNC: 8.8 NG/ML (ref 30–150)
ABO + RH BLD: NORMAL
ALBUMIN SERPL-MCNC: 3.1 G/DL (ref 3.4–5)
ALBUMIN SERPL-MCNC: 3.5 G/DL (ref 3.4–5)
ALBUMIN/GLOB SERPL: 1.3 {RATIO} (ref 1.1–2.2)
ALBUMIN/GLOB SERPL: 1.4 {RATIO} (ref 1.1–2.2)
ALP SERPL-CCNC: 64 U/L (ref 40–129)
ALP SERPL-CCNC: 75 U/L (ref 40–129)
ALT SERPL-CCNC: 10 U/L (ref 10–40)
ALT SERPL-CCNC: <5 U/L (ref 10–40)
ANION GAP SERPL CALCULATED.3IONS-SCNC: 15 MMOL/L (ref 9–17)
ANION GAP SERPL CALCULATED.3IONS-SCNC: 9 MMOL/L (ref 9–17)
AST SERPL-CCNC: 21 U/L (ref 15–37)
AST SERPL-CCNC: 29 U/L (ref 15–37)
BASOPHILS # BLD: 0.02 K/UL
BASOPHILS # BLD: 0.04 K/UL
BASOPHILS NFR BLD: 0 % (ref 0–1)
BASOPHILS NFR BLD: 1 % (ref 0–1)
BILIRUB SERPL-MCNC: 0.3 MG/DL (ref 0–1)
BILIRUB SERPL-MCNC: 0.4 MG/DL (ref 0–1)
BLOOD BANK SAMPLE EXPIRATION: NORMAL
BLOOD GROUP ANTIBODIES SERPL: NEGATIVE
BUN SERPL-MCNC: 19 MG/DL (ref 7–20)
BUN SERPL-MCNC: 19 MG/DL (ref 7–20)
CALCIUM SERPL-MCNC: 8.6 MG/DL (ref 8.3–10.6)
CALCIUM SERPL-MCNC: 8.6 MG/DL (ref 8.3–10.6)
CHLORIDE SERPL-SCNC: 105 MMOL/L (ref 99–110)
CHLORIDE SERPL-SCNC: 109 MMOL/L (ref 99–110)
CO2 SERPL-SCNC: 19 MMOL/L (ref 21–32)
CO2 SERPL-SCNC: 21 MMOL/L (ref 21–32)
CREAT SERPL-MCNC: 1.9 MG/DL (ref 0.8–1.3)
CREAT SERPL-MCNC: 2.2 MG/DL (ref 0.8–1.3)
EKG ATRIAL RATE: 88 BPM
EKG DIAGNOSIS: NORMAL
EKG P AXIS: 49 DEGREES
EKG P-R INTERVAL: 184 MS
EKG Q-T INTERVAL: 406 MS
EKG QRS DURATION: 112 MS
EKG QTC CALCULATION (BAZETT): 491 MS
EKG R AXIS: -23 DEGREES
EKG T AXIS: 31 DEGREES
EKG VENTRICULAR RATE: 88 BPM
EOSINOPHIL # BLD: 0.05 K/UL
EOSINOPHIL # BLD: 0.26 K/UL
EOSINOPHILS RELATIVE PERCENT: 1 % (ref 0–3)
EOSINOPHILS RELATIVE PERCENT: 4 % (ref 0–3)
ERYTHROCYTE [DISTWIDTH] IN BLOOD BY AUTOMATED COUNT: 15.7 % (ref 11.7–14.9)
ERYTHROCYTE [DISTWIDTH] IN BLOOD BY AUTOMATED COUNT: 15.7 % (ref 11.7–14.9)
GFR, ESTIMATED: 30 ML/MIN/1.73M2
GFR, ESTIMATED: 34 ML/MIN/1.73M2
GLUCOSE BLD-MCNC: 139 MG/DL (ref 74–99)
GLUCOSE BLD-MCNC: 143 MG/DL (ref 74–99)
GLUCOSE BLD-MCNC: 145 MG/DL (ref 74–99)
GLUCOSE BLD-MCNC: 182 MG/DL (ref 74–99)
GLUCOSE SERPL-MCNC: 127 MG/DL (ref 74–99)
GLUCOSE SERPL-MCNC: 129 MG/DL (ref 74–99)
HCT VFR BLD AUTO: 36.4 % (ref 42–52)
HCT VFR BLD AUTO: 42.3 % (ref 42–52)
HGB BLD-MCNC: 11.3 G/DL (ref 13.5–18)
HGB BLD-MCNC: 13.2 G/DL (ref 13.5–18)
IMM GRANULOCYTES # BLD AUTO: 0.05 K/UL
IMM GRANULOCYTES # BLD AUTO: 0.06 K/UL
IMM GRANULOCYTES NFR BLD: 1 %
IMM GRANULOCYTES NFR BLD: 1 %
INR PPP: 1
LYMPHOCYTES NFR BLD: 1.16 K/UL
LYMPHOCYTES NFR BLD: 1.69 K/UL
LYMPHOCYTES RELATIVE PERCENT: 13 % (ref 24–44)
LYMPHOCYTES RELATIVE PERCENT: 27 % (ref 24–44)
MCH RBC QN AUTO: 28.4 PG (ref 27–31)
MCH RBC QN AUTO: 28.6 PG (ref 27–31)
MCHC RBC AUTO-ENTMCNC: 31 G/DL (ref 32–36)
MCHC RBC AUTO-ENTMCNC: 31.2 G/DL (ref 32–36)
MCV RBC AUTO: 91.5 FL (ref 78–100)
MCV RBC AUTO: 91.6 FL (ref 78–100)
MONOCYTES NFR BLD: 0.51 K/UL
MONOCYTES NFR BLD: 0.66 K/UL
MONOCYTES NFR BLD: 8 % (ref 0–4)
MONOCYTES NFR BLD: 8 % (ref 0–4)
NEUTROPHILS NFR BLD: 59 % (ref 36–66)
NEUTROPHILS NFR BLD: 78 % (ref 36–66)
NEUTS SEG NFR BLD: 3.61 K/UL
NEUTS SEG NFR BLD: 6.75 K/UL
PLATELET # BLD AUTO: 166 K/UL (ref 140–440)
PLATELET, FLUORESCENCE: 125 K/UL (ref 140–440)
PMV BLD AUTO: 10 FL (ref 7.5–11.1)
PMV BLD AUTO: 9.5 FL (ref 7.5–11.1)
POTASSIUM SERPL-SCNC: 4.8 MMOL/L (ref 3.5–5.1)
POTASSIUM SERPL-SCNC: 4.9 MMOL/L (ref 3.5–5.1)
PROT SERPL-MCNC: 5.5 G/DL (ref 6.4–8.2)
PROT SERPL-MCNC: 6.1 G/DL (ref 6.4–8.2)
PROTHROMBIN TIME: 13.3 SEC (ref 11.7–14.5)
RBC # BLD AUTO: 3.98 M/UL (ref 4.6–6.2)
RBC # BLD AUTO: 4.62 M/UL (ref 4.6–6.2)
SODIUM SERPL-SCNC: 139 MMOL/L (ref 136–145)
SODIUM SERPL-SCNC: 139 MMOL/L (ref 136–145)
WBC OTHER # BLD: 6.2 K/UL (ref 4–10.5)
WBC OTHER # BLD: 8.7 K/UL (ref 4–10.5)

## 2024-12-01 PROCEDURE — 86850 RBC ANTIBODY SCREEN: CPT

## 2024-12-01 PROCEDURE — 82306 VITAMIN D 25 HYDROXY: CPT

## 2024-12-01 PROCEDURE — 70450 CT HEAD/BRAIN W/O DYE: CPT

## 2024-12-01 PROCEDURE — C1769 GUIDE WIRE: HCPCS | Performed by: STUDENT IN AN ORGANIZED HEALTH CARE EDUCATION/TRAINING PROGRAM

## 2024-12-01 PROCEDURE — 6360000002 HC RX W HCPCS: Performed by: STUDENT IN AN ORGANIZED HEALTH CARE EDUCATION/TRAINING PROGRAM

## 2024-12-01 PROCEDURE — C1713 ANCHOR/SCREW BN/BN,TIS/BN: HCPCS | Performed by: STUDENT IN AN ORGANIZED HEALTH CARE EDUCATION/TRAINING PROGRAM

## 2024-12-01 PROCEDURE — 7100000001 HC PACU RECOVERY - ADDTL 15 MIN: Performed by: STUDENT IN AN ORGANIZED HEALTH CARE EDUCATION/TRAINING PROGRAM

## 2024-12-01 PROCEDURE — 6360000002 HC RX W HCPCS

## 2024-12-01 PROCEDURE — 3600000012 HC SURGERY LEVEL 2 ADDTL 15MIN: Performed by: STUDENT IN AN ORGANIZED HEALTH CARE EDUCATION/TRAINING PROGRAM

## 2024-12-01 PROCEDURE — 96375 TX/PRO/DX INJ NEW DRUG ADDON: CPT

## 2024-12-01 PROCEDURE — 2709999900 HC NON-CHARGEABLE SUPPLY: Performed by: STUDENT IN AN ORGANIZED HEALTH CARE EDUCATION/TRAINING PROGRAM

## 2024-12-01 PROCEDURE — 6360000002 HC RX W HCPCS: Performed by: ANESTHESIOLOGY

## 2024-12-01 PROCEDURE — 2580000003 HC RX 258: Performed by: STUDENT IN AN ORGANIZED HEALTH CARE EDUCATION/TRAINING PROGRAM

## 2024-12-01 PROCEDURE — 86901 BLOOD TYPING SEROLOGIC RH(D): CPT

## 2024-12-01 PROCEDURE — 2780000010 HC IMPLANT OTHER: Performed by: STUDENT IN AN ORGANIZED HEALTH CARE EDUCATION/TRAINING PROGRAM

## 2024-12-01 PROCEDURE — 73700 CT LOWER EXTREMITY W/O DYE: CPT

## 2024-12-01 PROCEDURE — 7100000000 HC PACU RECOVERY - FIRST 15 MIN: Performed by: STUDENT IN AN ORGANIZED HEALTH CARE EDUCATION/TRAINING PROGRAM

## 2024-12-01 PROCEDURE — 2720000010 HC SURG SUPPLY STERILE: Performed by: STUDENT IN AN ORGANIZED HEALTH CARE EDUCATION/TRAINING PROGRAM

## 2024-12-01 PROCEDURE — 1200000000 HC SEMI PRIVATE

## 2024-12-01 PROCEDURE — 6370000000 HC RX 637 (ALT 250 FOR IP): Performed by: EMERGENCY MEDICINE

## 2024-12-01 PROCEDURE — 6370000000 HC RX 637 (ALT 250 FOR IP): Performed by: STUDENT IN AN ORGANIZED HEALTH CARE EDUCATION/TRAINING PROGRAM

## 2024-12-01 PROCEDURE — 3700000001 HC ADD 15 MINUTES (ANESTHESIA): Performed by: STUDENT IN AN ORGANIZED HEALTH CARE EDUCATION/TRAINING PROGRAM

## 2024-12-01 PROCEDURE — 86900 BLOOD TYPING SEROLOGIC ABO: CPT

## 2024-12-01 PROCEDURE — 2500000003 HC RX 250 WO HCPCS

## 2024-12-01 PROCEDURE — 73080 X-RAY EXAM OF ELBOW: CPT

## 2024-12-01 PROCEDURE — 85610 PROTHROMBIN TIME: CPT

## 2024-12-01 PROCEDURE — 72170 X-RAY EXAM OF PELVIS: CPT

## 2024-12-01 PROCEDURE — 2700000000 HC OXYGEN THERAPY PER DAY

## 2024-12-01 PROCEDURE — 3700000000 HC ANESTHESIA ATTENDED CARE: Performed by: STUDENT IN AN ORGANIZED HEALTH CARE EDUCATION/TRAINING PROGRAM

## 2024-12-01 PROCEDURE — 72125 CT NECK SPINE W/O DYE: CPT

## 2024-12-01 PROCEDURE — 76000 FLUOROSCOPY <1 HR PHYS/QHP: CPT

## 2024-12-01 PROCEDURE — 6360000002 HC RX W HCPCS: Performed by: EMERGENCY MEDICINE

## 2024-12-01 PROCEDURE — 71250 CT THORAX DX C-: CPT

## 2024-12-01 PROCEDURE — 82947 ASSAY GLUCOSE BLOOD QUANT: CPT

## 2024-12-01 PROCEDURE — 0QS706Z REPOSITION LEFT UPPER FEMUR WITH INTRAMEDULLARY INTERNAL FIXATION DEVICE, OPEN APPROACH: ICD-10-PCS | Performed by: STUDENT IN AN ORGANIZED HEALTH CARE EDUCATION/TRAINING PROGRAM

## 2024-12-01 PROCEDURE — 93010 ELECTROCARDIOGRAM REPORT: CPT | Performed by: INTERNAL MEDICINE

## 2024-12-01 PROCEDURE — 36415 COLL VENOUS BLD VENIPUNCTURE: CPT

## 2024-12-01 PROCEDURE — 73552 X-RAY EXAM OF FEMUR 2/>: CPT

## 2024-12-01 PROCEDURE — 73200 CT UPPER EXTREMITY W/O DYE: CPT

## 2024-12-01 PROCEDURE — 94761 N-INVAS EAR/PLS OXIMETRY MLT: CPT

## 2024-12-01 PROCEDURE — 3600000002 HC SURGERY LEVEL 2 BASE: Performed by: STUDENT IN AN ORGANIZED HEALTH CARE EDUCATION/TRAINING PROGRAM

## 2024-12-01 DEVICE — IMPLANTABLE DEVICE: Type: IMPLANTABLE DEVICE | Status: FUNCTIONAL

## 2024-12-01 DEVICE — SCREW BNE L40MM DIA5MM ST TIB LT GRN TI ST CANN LOK FULL: Type: IMPLANTABLE DEVICE | Status: FUNCTIONAL

## 2024-12-01 DEVICE — NAIL IM L170MM DIA10MM NK 125DEG SHT PROX FEM GRN TI-15MO: Type: IMPLANTABLE DEVICE | Status: FUNCTIONAL

## 2024-12-01 RX ORDER — DOCUSATE SODIUM 100 MG/1
100 CAPSULE, LIQUID FILLED ORAL 2 TIMES DAILY
Status: DISCONTINUED | OUTPATIENT
Start: 2024-12-01 | End: 2024-12-04 | Stop reason: HOSPADM

## 2024-12-01 RX ORDER — SODIUM CHLORIDE 9 MG/ML
INJECTION, SOLUTION INTRAVENOUS PRN
Status: DISCONTINUED | OUTPATIENT
Start: 2024-12-01 | End: 2024-12-04 | Stop reason: HOSPADM

## 2024-12-01 RX ORDER — SODIUM CHLORIDE 0.9 % (FLUSH) 0.9 %
5-40 SYRINGE (ML) INJECTION PRN
Status: DISCONTINUED | OUTPATIENT
Start: 2024-12-01 | End: 2024-12-01 | Stop reason: HOSPADM

## 2024-12-01 RX ORDER — SODIUM CHLORIDE 9 MG/ML
INJECTION, SOLUTION INTRAVENOUS PRN
Status: DISCONTINUED | OUTPATIENT
Start: 2024-12-01 | End: 2024-12-01 | Stop reason: HOSPADM

## 2024-12-01 RX ORDER — SODIUM CHLORIDE 0.9 % (FLUSH) 0.9 %
5-40 SYRINGE (ML) INJECTION PRN
Status: DISCONTINUED | OUTPATIENT
Start: 2024-12-01 | End: 2024-12-04 | Stop reason: HOSPADM

## 2024-12-01 RX ORDER — LABETALOL HYDROCHLORIDE 5 MG/ML
10 INJECTION, SOLUTION INTRAVENOUS
Status: DISCONTINUED | OUTPATIENT
Start: 2024-12-01 | End: 2024-12-01 | Stop reason: HOSPADM

## 2024-12-01 RX ORDER — OXYCODONE AND ACETAMINOPHEN 5; 325 MG/1; MG/1
1 TABLET ORAL ONCE
Status: COMPLETED | OUTPATIENT
Start: 2024-12-01 | End: 2024-12-01

## 2024-12-01 RX ORDER — TRANEXAMIC ACID 10 MG/ML
INJECTION, SOLUTION INTRAVENOUS
Status: DISCONTINUED | OUTPATIENT
Start: 2024-12-01 | End: 2024-12-01 | Stop reason: SDUPTHER

## 2024-12-01 RX ORDER — MORPHINE SULFATE 4 MG/ML
4 INJECTION, SOLUTION INTRAMUSCULAR; INTRAVENOUS EVERY 4 HOURS PRN
Status: DISCONTINUED | OUTPATIENT
Start: 2024-12-01 | End: 2024-12-04 | Stop reason: HOSPADM

## 2024-12-01 RX ORDER — SENNA AND DOCUSATE SODIUM 50; 8.6 MG/1; MG/1
1 TABLET, FILM COATED ORAL 2 TIMES DAILY
Status: DISCONTINUED | OUTPATIENT
Start: 2024-12-01 | End: 2024-12-04 | Stop reason: HOSPADM

## 2024-12-01 RX ORDER — DIVALPROEX SODIUM 500 MG/1
500 TABLET, DELAYED RELEASE ORAL NIGHTLY
Status: DISCONTINUED | OUTPATIENT
Start: 2024-12-01 | End: 2024-12-04 | Stop reason: HOSPADM

## 2024-12-01 RX ORDER — INSULIN LISPRO 100 [IU]/ML
0-4 INJECTION, SOLUTION INTRAVENOUS; SUBCUTANEOUS
Status: DISCONTINUED | OUTPATIENT
Start: 2024-12-01 | End: 2024-12-04 | Stop reason: HOSPADM

## 2024-12-01 RX ORDER — VITAMIN B COMPLEX
2000 TABLET ORAL DAILY
Status: DISCONTINUED | OUTPATIENT
Start: 2024-12-08 | End: 2024-12-04 | Stop reason: HOSPADM

## 2024-12-01 RX ORDER — ONDANSETRON 2 MG/ML
4 INJECTION INTRAMUSCULAR; INTRAVENOUS EVERY 6 HOURS PRN
Status: DISCONTINUED | OUTPATIENT
Start: 2024-12-01 | End: 2024-12-04 | Stop reason: HOSPADM

## 2024-12-01 RX ORDER — PROPOFOL 10 MG/ML
INJECTION, EMULSION INTRAVENOUS
Status: DISCONTINUED | OUTPATIENT
Start: 2024-12-01 | End: 2024-12-01 | Stop reason: SDUPTHER

## 2024-12-01 RX ORDER — POTASSIUM CHLORIDE 1500 MG/1
40 TABLET, EXTENDED RELEASE ORAL PRN
Status: DISCONTINUED | OUTPATIENT
Start: 2024-12-01 | End: 2024-12-04 | Stop reason: HOSPADM

## 2024-12-01 RX ORDER — POTASSIUM CHLORIDE 7.45 MG/ML
10 INJECTION INTRAVENOUS PRN
Status: DISCONTINUED | OUTPATIENT
Start: 2024-12-01 | End: 2024-12-04 | Stop reason: HOSPADM

## 2024-12-01 RX ORDER — TRAZODONE HYDROCHLORIDE 50 MG/1
25 TABLET, FILM COATED ORAL NIGHTLY
Status: DISCONTINUED | OUTPATIENT
Start: 2024-12-01 | End: 2024-12-04 | Stop reason: HOSPADM

## 2024-12-01 RX ORDER — FENTANYL CITRATE 50 UG/ML
50 INJECTION, SOLUTION INTRAMUSCULAR; INTRAVENOUS EVERY 5 MIN PRN
Status: DISCONTINUED | OUTPATIENT
Start: 2024-12-01 | End: 2024-12-01 | Stop reason: HOSPADM

## 2024-12-01 RX ORDER — OXYCODONE HYDROCHLORIDE 5 MG/1
5 TABLET ORAL EVERY 4 HOURS PRN
Status: DISCONTINUED | OUTPATIENT
Start: 2024-12-01 | End: 2024-12-04 | Stop reason: HOSPADM

## 2024-12-01 RX ORDER — PRAZOSIN HYDROCHLORIDE 1 MG/1
1 CAPSULE ORAL NIGHTLY
COMMUNITY

## 2024-12-01 RX ORDER — SODIUM BICARBONATE 650 MG/1
650 TABLET ORAL 2 TIMES DAILY
COMMUNITY

## 2024-12-01 RX ORDER — GLUCAGON 1 MG/ML
1 KIT INJECTION PRN
Status: DISCONTINUED | OUTPATIENT
Start: 2024-12-01 | End: 2024-12-04 | Stop reason: HOSPADM

## 2024-12-01 RX ORDER — MAGNESIUM SULFATE IN WATER 40 MG/ML
2000 INJECTION, SOLUTION INTRAVENOUS PRN
Status: DISCONTINUED | OUTPATIENT
Start: 2024-12-01 | End: 2024-12-04 | Stop reason: HOSPADM

## 2024-12-01 RX ORDER — ACETAMINOPHEN 650 MG/1
650 SUPPOSITORY RECTAL EVERY 6 HOURS PRN
Status: DISCONTINUED | OUTPATIENT
Start: 2024-12-01 | End: 2024-12-01

## 2024-12-01 RX ORDER — SODIUM CHLORIDE 0.9 % (FLUSH) 0.9 %
5-40 SYRINGE (ML) INJECTION EVERY 12 HOURS SCHEDULED
Status: DISCONTINUED | OUTPATIENT
Start: 2024-12-01 | End: 2024-12-04 | Stop reason: HOSPADM

## 2024-12-01 RX ORDER — DEXAMETHASONE SODIUM PHOSPHATE 4 MG/ML
INJECTION, SOLUTION INTRA-ARTICULAR; INTRALESIONAL; INTRAMUSCULAR; INTRAVENOUS; SOFT TISSUE
Status: DISCONTINUED | OUTPATIENT
Start: 2024-12-01 | End: 2024-12-01 | Stop reason: SDUPTHER

## 2024-12-01 RX ORDER — ONDANSETRON 2 MG/ML
4 INJECTION INTRAMUSCULAR; INTRAVENOUS
Status: DISCONTINUED | OUTPATIENT
Start: 2024-12-01 | End: 2024-12-01 | Stop reason: HOSPADM

## 2024-12-01 RX ORDER — LIDOCAINE HYDROCHLORIDE 20 MG/ML
INJECTION, SOLUTION INTRAVENOUS
Status: DISCONTINUED | OUTPATIENT
Start: 2024-12-01 | End: 2024-12-01 | Stop reason: SDUPTHER

## 2024-12-01 RX ORDER — OXYCODONE HYDROCHLORIDE 5 MG/1
2.5 TABLET ORAL EVERY 4 HOURS PRN
Status: DISCONTINUED | OUTPATIENT
Start: 2024-12-01 | End: 2024-12-04 | Stop reason: HOSPADM

## 2024-12-01 RX ORDER — DIVALPROEX SODIUM 250 MG/1
250 TABLET, DELAYED RELEASE ORAL EVERY MORNING
COMMUNITY

## 2024-12-01 RX ORDER — ONDANSETRON 2 MG/ML
INJECTION INTRAMUSCULAR; INTRAVENOUS
Status: DISCONTINUED | OUTPATIENT
Start: 2024-12-01 | End: 2024-12-01 | Stop reason: SDUPTHER

## 2024-12-01 RX ORDER — PHENYLEPHRINE HCL IN 0.9% NACL 1 MG/10 ML
SYRINGE (ML) INTRAVENOUS
Status: DISCONTINUED | OUTPATIENT
Start: 2024-12-01 | End: 2024-12-01 | Stop reason: SDUPTHER

## 2024-12-01 RX ORDER — HYDROCODONE BITARTRATE AND ACETAMINOPHEN 5; 325 MG/1; MG/1
1 TABLET ORAL EVERY 6 HOURS PRN
Status: DISCONTINUED | OUTPATIENT
Start: 2024-12-01 | End: 2024-12-01

## 2024-12-01 RX ORDER — DIVALPROEX SODIUM 500 MG/1
500 TABLET, DELAYED RELEASE ORAL NIGHTLY
COMMUNITY

## 2024-12-01 RX ORDER — DROPERIDOL 2.5 MG/ML
0.62 INJECTION, SOLUTION INTRAMUSCULAR; INTRAVENOUS
Status: DISCONTINUED | OUTPATIENT
Start: 2024-12-01 | End: 2024-12-01 | Stop reason: HOSPADM

## 2024-12-01 RX ORDER — ALBUTEROL SULFATE 90 UG/1
2 INHALANT RESPIRATORY (INHALATION) EVERY 6 HOURS PRN
COMMUNITY

## 2024-12-01 RX ORDER — ENOXAPARIN SODIUM 100 MG/ML
40 INJECTION SUBCUTANEOUS DAILY
Status: DISCONTINUED | OUTPATIENT
Start: 2024-12-01 | End: 2024-12-04 | Stop reason: HOSPADM

## 2024-12-01 RX ORDER — SPIRONOLACTONE 50 MG/1
25 TABLET, FILM COATED ORAL DAILY
Status: DISCONTINUED | OUTPATIENT
Start: 2024-12-01 | End: 2024-12-04 | Stop reason: HOSPADM

## 2024-12-01 RX ORDER — ROCURONIUM BROMIDE 10 MG/ML
INJECTION, SOLUTION INTRAVENOUS
Status: DISCONTINUED | OUTPATIENT
Start: 2024-12-01 | End: 2024-12-01 | Stop reason: SDUPTHER

## 2024-12-01 RX ORDER — LEVOTHYROXINE SODIUM 75 UG/1
75 TABLET ORAL DAILY
Status: DISCONTINUED | OUTPATIENT
Start: 2024-12-01 | End: 2024-12-04 | Stop reason: HOSPADM

## 2024-12-01 RX ORDER — ASPIRIN 81 MG/1
81 TABLET, CHEWABLE ORAL DAILY
Status: ON HOLD | COMMUNITY
End: 2024-12-03

## 2024-12-01 RX ORDER — NALOXONE HYDROCHLORIDE 0.4 MG/ML
INJECTION, SOLUTION INTRAMUSCULAR; INTRAVENOUS; SUBCUTANEOUS PRN
Status: DISCONTINUED | OUTPATIENT
Start: 2024-12-01 | End: 2024-12-01 | Stop reason: HOSPADM

## 2024-12-01 RX ORDER — ASPIRIN 81 MG/1
81 TABLET, CHEWABLE ORAL DAILY
Status: DISCONTINUED | OUTPATIENT
Start: 2024-12-01 | End: 2024-12-04 | Stop reason: HOSPADM

## 2024-12-01 RX ORDER — POLYETHYLENE GLYCOL 3350 17 G/17G
17 POWDER, FOR SOLUTION ORAL DAILY PRN
Status: DISCONTINUED | OUTPATIENT
Start: 2024-12-01 | End: 2024-12-04 | Stop reason: HOSPADM

## 2024-12-01 RX ORDER — ALBUTEROL SULFATE 90 UG/1
2 INHALANT RESPIRATORY (INHALATION) EVERY 6 HOURS PRN
Status: DISCONTINUED | OUTPATIENT
Start: 2024-12-01 | End: 2024-12-04 | Stop reason: HOSPADM

## 2024-12-01 RX ORDER — CYCLOBENZAPRINE HCL 10 MG
10 TABLET ORAL 3 TIMES DAILY PRN
Status: DISCONTINUED | OUTPATIENT
Start: 2024-12-01 | End: 2024-12-04 | Stop reason: HOSPADM

## 2024-12-01 RX ORDER — INSULIN LISPRO 100 [IU]/ML
0-4 INJECTION, SOLUTION INTRAVENOUS; SUBCUTANEOUS EVERY 4 HOURS
Status: DISCONTINUED | OUTPATIENT
Start: 2024-12-01 | End: 2024-12-04

## 2024-12-01 RX ORDER — HYDROMORPHONE HYDROCHLORIDE 1 MG/ML
0.5 INJECTION, SOLUTION INTRAMUSCULAR; INTRAVENOUS; SUBCUTANEOUS ONCE
Status: COMPLETED | OUTPATIENT
Start: 2024-12-01 | End: 2024-12-01

## 2024-12-01 RX ORDER — SODIUM CHLORIDE 9 MG/ML
INJECTION, SOLUTION INTRAVENOUS PRN
Status: DISCONTINUED | OUTPATIENT
Start: 2024-12-01 | End: 2024-12-01

## 2024-12-01 RX ORDER — SENNA AND DOCUSATE SODIUM 50; 8.6 MG/1; MG/1
1 TABLET, FILM COATED ORAL DAILY
COMMUNITY

## 2024-12-01 RX ORDER — VITAMIN B COMPLEX
6000 TABLET ORAL DAILY
Status: DISCONTINUED | OUTPATIENT
Start: 2024-12-01 | End: 2024-12-04 | Stop reason: HOSPADM

## 2024-12-01 RX ORDER — MIDODRINE HYDROCHLORIDE 5 MG/1
5 TABLET ORAL
Status: DISCONTINUED | OUTPATIENT
Start: 2024-12-01 | End: 2024-12-04

## 2024-12-01 RX ORDER — OXYCODONE HYDROCHLORIDE 5 MG/1
5 TABLET ORAL
Status: DISCONTINUED | OUTPATIENT
Start: 2024-12-01 | End: 2024-12-01 | Stop reason: HOSPADM

## 2024-12-01 RX ORDER — LISINOPRIL 20 MG/1
20 TABLET ORAL DAILY
Status: DISCONTINUED | OUTPATIENT
Start: 2024-12-01 | End: 2024-12-04 | Stop reason: HOSPADM

## 2024-12-01 RX ORDER — SODIUM CHLORIDE 0.9 % (FLUSH) 0.9 %
5-40 SYRINGE (ML) INJECTION EVERY 12 HOURS SCHEDULED
Status: DISCONTINUED | OUTPATIENT
Start: 2024-12-01 | End: 2024-12-01 | Stop reason: HOSPADM

## 2024-12-01 RX ORDER — CLONIDINE HYDROCHLORIDE 0.1 MG/1
0.1 TABLET ORAL EVERY 8 HOURS
Status: DISCONTINUED | OUTPATIENT
Start: 2024-12-01 | End: 2024-12-04 | Stop reason: HOSPADM

## 2024-12-01 RX ORDER — FENTANYL CITRATE 50 UG/ML
INJECTION, SOLUTION INTRAMUSCULAR; INTRAVENOUS
Status: DISCONTINUED | OUTPATIENT
Start: 2024-12-01 | End: 2024-12-01 | Stop reason: SDUPTHER

## 2024-12-01 RX ORDER — ONDANSETRON 4 MG/1
4 TABLET, ORALLY DISINTEGRATING ORAL EVERY 8 HOURS PRN
Status: DISCONTINUED | OUTPATIENT
Start: 2024-12-01 | End: 2024-12-04 | Stop reason: HOSPADM

## 2024-12-01 RX ORDER — SODIUM BICARBONATE 650 MG/1
650 TABLET ORAL 2 TIMES DAILY
Status: DISCONTINUED | OUTPATIENT
Start: 2024-12-01 | End: 2024-12-04 | Stop reason: HOSPADM

## 2024-12-01 RX ORDER — ACETAMINOPHEN 500 MG
1000 TABLET ORAL EVERY 8 HOURS
Status: DISCONTINUED | OUTPATIENT
Start: 2024-12-01 | End: 2024-12-04 | Stop reason: HOSPADM

## 2024-12-01 RX ORDER — SODIUM CHLORIDE, SODIUM LACTATE, POTASSIUM CHLORIDE, CALCIUM CHLORIDE 600; 310; 30; 20 MG/100ML; MG/100ML; MG/100ML; MG/100ML
INJECTION, SOLUTION INTRAVENOUS CONTINUOUS
Status: DISCONTINUED | OUTPATIENT
Start: 2024-12-01 | End: 2024-12-02

## 2024-12-01 RX ORDER — LIDOCAINE HYDROCHLORIDE 10 MG/ML
5 INJECTION, SOLUTION INFILTRATION; PERINEURAL ONCE
Status: DISCONTINUED | OUTPATIENT
Start: 2024-12-01 | End: 2024-12-04

## 2024-12-01 RX ORDER — HYDRALAZINE HYDROCHLORIDE 20 MG/ML
10 INJECTION INTRAMUSCULAR; INTRAVENOUS
Status: DISCONTINUED | OUTPATIENT
Start: 2024-12-01 | End: 2024-12-01 | Stop reason: HOSPADM

## 2024-12-01 RX ORDER — MEMANTINE HYDROCHLORIDE 10 MG/1
10 TABLET ORAL 2 TIMES DAILY
Status: DISCONTINUED | OUTPATIENT
Start: 2024-12-01 | End: 2024-12-04 | Stop reason: HOSPADM

## 2024-12-01 RX ORDER — ACETAMINOPHEN 325 MG/1
650 TABLET ORAL EVERY 6 HOURS PRN
Status: DISCONTINUED | OUTPATIENT
Start: 2024-12-01 | End: 2024-12-01

## 2024-12-01 RX ORDER — DEXTROSE MONOHYDRATE 100 MG/ML
INJECTION, SOLUTION INTRAVENOUS CONTINUOUS PRN
Status: DISCONTINUED | OUTPATIENT
Start: 2024-12-01 | End: 2024-12-04 | Stop reason: HOSPADM

## 2024-12-01 RX ORDER — BENZTROPINE MESYLATE 1 MG/1
0.5 TABLET ORAL 2 TIMES DAILY
Status: DISCONTINUED | OUTPATIENT
Start: 2024-12-01 | End: 2024-12-04 | Stop reason: HOSPADM

## 2024-12-01 RX ORDER — MENTHOL 40 MG/ML
GEL TOPICAL PRN
COMMUNITY

## 2024-12-01 RX ORDER — ATORVASTATIN CALCIUM 40 MG/1
40 TABLET, FILM COATED ORAL DAILY
COMMUNITY

## 2024-12-01 RX ORDER — DIVALPROEX SODIUM 250 MG/1
250 TABLET, FILM COATED, EXTENDED RELEASE ORAL EVERY MORNING
Status: DISCONTINUED | OUTPATIENT
Start: 2024-12-01 | End: 2024-12-04 | Stop reason: HOSPADM

## 2024-12-01 RX ORDER — ESCITALOPRAM OXALATE 10 MG/1
10 TABLET ORAL DAILY
Status: DISCONTINUED | OUTPATIENT
Start: 2024-12-01 | End: 2024-12-04 | Stop reason: HOSPADM

## 2024-12-01 RX ORDER — ATORVASTATIN CALCIUM 40 MG/1
40 TABLET, FILM COATED ORAL DAILY
Status: DISCONTINUED | OUTPATIENT
Start: 2024-12-01 | End: 2024-12-04 | Stop reason: HOSPADM

## 2024-12-01 RX ADMIN — ROCURONIUM BROMIDE 50 MG: 10 INJECTION, SOLUTION INTRAVENOUS at 14:02

## 2024-12-01 RX ADMIN — PROPOFOL 120 MG: 10 INJECTION, EMULSION INTRAVENOUS at 14:02

## 2024-12-01 RX ADMIN — SUGAMMADEX 200 MG: 100 INJECTION, SOLUTION INTRAVENOUS at 15:21

## 2024-12-01 RX ADMIN — Medication 0.1 MG: at 14:08

## 2024-12-01 RX ADMIN — MEMANTINE 10 MG: 10 TABLET ORAL at 21:00

## 2024-12-01 RX ADMIN — WATER 2000 MG: 1 INJECTION INTRAMUSCULAR; INTRAVENOUS; SUBCUTANEOUS at 18:02

## 2024-12-01 RX ADMIN — Medication 0.1 MG: at 14:34

## 2024-12-01 RX ADMIN — TRANEXAMIC ACID 1 G: 10 INJECTION, SOLUTION INTRAVENOUS at 14:26

## 2024-12-01 RX ADMIN — HYDROMORPHONE HYDROCHLORIDE 0.5 MG: 1 INJECTION, SOLUTION INTRAMUSCULAR; INTRAVENOUS; SUBCUTANEOUS at 00:34

## 2024-12-01 RX ADMIN — DEXAMETHASONE SODIUM PHOSPHATE 4 MG: 4 INJECTION, SOLUTION INTRAMUSCULAR; INTRAVENOUS at 14:10

## 2024-12-01 RX ADMIN — OXYCODONE 2.5 MG: 5 TABLET ORAL at 20:59

## 2024-12-01 RX ADMIN — LEVOTHYROXINE SODIUM 75 MCG: 0.07 TABLET ORAL at 06:44

## 2024-12-01 RX ADMIN — SODIUM CHLORIDE, POTASSIUM CHLORIDE, SODIUM LACTATE AND CALCIUM CHLORIDE: 600; 310; 30; 20 INJECTION, SOLUTION INTRAVENOUS at 18:08

## 2024-12-01 RX ADMIN — OXYCODONE HYDROCHLORIDE AND ACETAMINOPHEN 1 TABLET: 5; 325 TABLET ORAL at 02:28

## 2024-12-01 RX ADMIN — SODIUM CHLORIDE, POTASSIUM CHLORIDE, SODIUM LACTATE AND CALCIUM CHLORIDE: 600; 310; 30; 20 INJECTION, SOLUTION INTRAVENOUS at 11:42

## 2024-12-01 RX ADMIN — CEFAZOLIN 2000 MG: 2 INJECTION, POWDER, FOR SOLUTION INTRAMUSCULAR; INTRAVENOUS at 14:16

## 2024-12-01 RX ADMIN — ONDANSETRON 4 MG: 2 INJECTION INTRAMUSCULAR; INTRAVENOUS at 14:10

## 2024-12-01 RX ADMIN — FENTANYL CITRATE 100 MCG: 50 INJECTION, SOLUTION INTRAMUSCULAR; INTRAVENOUS at 14:45

## 2024-12-01 RX ADMIN — Medication 0.1 MG: at 14:56

## 2024-12-01 RX ADMIN — DOCUSATE SODIUM 100 MG: 100 CAPSULE, LIQUID FILLED ORAL at 20:58

## 2024-12-01 RX ADMIN — ACETAMINOPHEN 1000 MG: 500 TABLET ORAL at 18:03

## 2024-12-01 RX ADMIN — SODIUM BICARBONATE 650 MG: 650 TABLET ORAL at 21:00

## 2024-12-01 RX ADMIN — Medication 0.2 MG: at 14:14

## 2024-12-01 RX ADMIN — BENZTROPINE MESYLATE 0.5 MG: 1 TABLET ORAL at 20:58

## 2024-12-01 RX ADMIN — Medication 0.1 MG: at 15:04

## 2024-12-01 RX ADMIN — Medication 0.1 MG: at 15:07

## 2024-12-01 RX ADMIN — Medication 0.1 MG: at 14:31

## 2024-12-01 RX ADMIN — FENTANYL CITRATE 50 MCG: 50 INJECTION, SOLUTION INTRAMUSCULAR; INTRAVENOUS at 16:00

## 2024-12-01 RX ADMIN — Medication 0.1 MG: at 15:02

## 2024-12-01 RX ADMIN — LIDOCAINE HYDROCHLORIDE 100 MG: 20 INJECTION, SOLUTION INTRAVENOUS at 14:02

## 2024-12-01 RX ADMIN — SENNOSIDES AND DOCUSATE SODIUM 1 TABLET: 50; 8.6 TABLET ORAL at 21:00

## 2024-12-01 RX ADMIN — DIVALPROEX SODIUM 500 MG: 500 TABLET, DELAYED RELEASE ORAL at 20:59

## 2024-12-01 ASSESSMENT — PAIN DESCRIPTION - DESCRIPTORS
DESCRIPTORS: ACHING
DESCRIPTORS: ACHING
DESCRIPTORS: ACHING;THROBBING
DESCRIPTORS: PATIENT UNABLE TO DESCRIBE
DESCRIPTORS: ACHING
DESCRIPTORS: DISCOMFORT;ACHING
DESCRIPTORS: PATIENT UNABLE TO DESCRIBE

## 2024-12-01 ASSESSMENT — PAIN SCALES - GENERAL
PAINLEVEL_OUTOF10: 7
PAINLEVEL_OUTOF10: 8
PAINLEVEL_OUTOF10: 6
PAINLEVEL_OUTOF10: 10
PAINLEVEL_OUTOF10: 9
PAINLEVEL_OUTOF10: 10
PAINLEVEL_OUTOF10: 6
PAINLEVEL_OUTOF10: 5
PAINLEVEL_OUTOF10: 8
PAINLEVEL_OUTOF10: 10
PAINLEVEL_OUTOF10: 6
PAINLEVEL_OUTOF10: 10
PAINLEVEL_OUTOF10: 9
PAINLEVEL_OUTOF10: 9
PAINLEVEL_OUTOF10: 7
PAINLEVEL_OUTOF10: 8

## 2024-12-01 ASSESSMENT — PAIN DESCRIPTION - ORIENTATION
ORIENTATION: LEFT

## 2024-12-01 ASSESSMENT — PAIN DESCRIPTION - LOCATION
LOCATION: HIP
LOCATION: LEG
LOCATION: HIP
LOCATION: HIP
LOCATION: HIP;LEG
LOCATION: HIP
LOCATION: LEG
LOCATION: HIP

## 2024-12-01 ASSESSMENT — PAIN DESCRIPTION - FREQUENCY
FREQUENCY: CONTINUOUS

## 2024-12-01 ASSESSMENT — PAIN DESCRIPTION - PAIN TYPE
TYPE: SURGICAL PAIN

## 2024-12-01 ASSESSMENT — PAIN DESCRIPTION - ONSET
ONSET: ON-GOING

## 2024-12-01 ASSESSMENT — PAIN SCALES - WONG BAKER
WONGBAKER_NUMERICALRESPONSE: HURTS WORST

## 2024-12-01 NOTE — ED PROVIDER NOTES
(HCC)    Dysarthria due to and not concurrent with spontaneous intracerebral hemorrhage    Gait disturbance    Bipolar disorder, in partial remission, most recent episode depressed (HCC)    Intraparenchymal hematoma of brain    SSS (sick sinus syndrome) (HCC)    Frequent falls    Chronic kidney disease, stage IV (severe) (HCC)    Bradycardia    Inappropriate behavior    Hypercoagulable state due to paroxysmal atrial fibrillation (HCC)    Closed fracture of left femur, initial encounter ([facility])         SURGICAL HISTORY       Past Surgical History:   Procedure Laterality Date    CARPAL TUNNEL RELEASE Bilateral 1989    COLONOSCOPY N/A 5/12/2020    COLONOSCOPY POLYPECTOMY SNARE/COLD BIOPSY WITH SPOT INK TATTOO performed by Katie Rodriguez MD at [facility] ENDOSCOPY    ELBOW SURGERY Left 2000's    KNEE SURGERY Bilateral     right knee x 2( scope)/ left knee- 7-8 yr ago    NECK SURGERY  1998    C Spine fusion    SMALL INTESTINE SURGERY N/A 5/21/2020    BOWEL RESECTION EXTENDED RIGHT HEMICOLECTOMY performed by Zayda Moore MD at [facility] OR    THORACENTESIS Left 12/20/2013    THORACENTESIS  4/25/2016         THORACENTESIS Left 11/15/2016    Dr. Dutta 250mlsremoved     THORACOTOMY Left 03/18/2019    with Lysis of adhesions    THORACOTOMY Left 3/18/2019    THORACOSCOPY CONVERTED TO THORACOTOMY WITH LYSIS OF ADHESIONS performed by Thom Zapata MD at [facility] OR    TONSILLECTOMY      as a kid    UPPER GASTROINTESTINAL ENDOSCOPY N/A 5/12/2020    EGD BIOPSY performed by Katie Rodriguez MD at [facility] ENDOSCOPY         CURRENT MEDICATIONS       Previous Medications    ALBUTEROL SULFATE HFA (PROVENTIL;VENTOLIN;PROAIR) 108 (90 BASE) MCG/ACT INHALER    Inhale 2 puffs into the lungs every 6 hours as needed for Wheezing or Shortness of Breath    ASPIRIN 81 MG CHEWABLE TABLET    Take 1 tablet by mouth daily    ATORVASTATIN (LIPITOR) 80 MG TABLET    Take 1 tablet by mouth daily    BENZTROPINE (COGENTIN) 0.5 MG TABLET    Take 1 tablet by mouth 2 times  left elbow was obtained    CT did show a left intertrochanteric hip fracture.  Otherwise nonacute.  X-ray of the elbow concerning for possible radial head fracture so I did obtain a CT as indicated by radiology.    He does have an abrasion after the left elbow but no discrete laceration or puncture wound.  We did dress the abrasion at the elbow, the subcutaneous tissue was intact.    He will be admitted to hospitalist for orthopedic evaluation and consultation.  I did consult orthopedics and spoke with Dr. Stratton.  He is requesting some additional x-rays and was ordered.    Patient's labs overall stable.  Does have some CKD and a serum creatinine is 1.0 which is within his baseline range.  No leukocytosis.    Patient be admitted to hospital for further evaluation and management.  Is received several runs of pain medication the emergency department.  Left lower extremity continues to be neurovascularly intact with intact DP pulses.  He can grossly move the leg       Amount and/or Complexity of Data Reviewed  Decide to obtain previous medical records or to obtain history from someone other than the patient: yes        -  Patient seen and evaluated in the emergency department.  -  Triage and nursing notes reviewed and incorporated.  -  Old chart records reviewed and incorporated.  -  Work-up included:  See above  -  Results discussed with patient.      CONSULTS:  IP CONSULT TO ORTHOPEDIC SURGERY    PROCEDURES:  None performed unless otherwise noted below     Procedures        FINAL IMPRESSION      1. Closed fracture of left hip, initial encounter ([facility])    2. Fall, initial encounter          DISPOSITION/PLAN   DISPOSITION Admitted 12/01/2024 03:11:44 AM      PATIENT REFERRED TO:  No follow-up provider specified.    DISCHARGE MEDICATIONS:  New Prescriptions    No medications on file       ED Provider Disposition Time  DISPOSITION Admitted 12/01/2024 03:11:44 AM           Appropriate personal protective equipment was

## 2024-12-01 NOTE — H&P
History and Physical      Name:  Felipe Heller Jr. /Age/Sex: 1950  (74 y.o. male)   MRN & CSN:  [id number] & [id number] Encounter Date/Time: 2024 2:46 AM EST   Location:  [place] [address] PCP: No primary care provider on file.       Hospital Day: 2    Assessment and Plan:     Patient is a 74 y.o. male who presented with fall and left hip pain.        Left Femur Fracture  - Patient presents following a mechanical fall   - Imaging shows intertrochanteric fracture of proximal left femur   - Neurovascularly intact    -NPO for surgical evaluation/intervention    -Pain control   -Vit D level pending   -PT/OT eval following surgery   -1200mg calcium daily   -Pt will need bisphosphonate 2 weeks following fracture    Afib  - not on AC due to hx of GI Bleed and ICH    CAD  -Continue aspirin, statin, not on beta-blocker due to bradycardia     Chronic HFpEF  - Euvolemic on exam    CKD 3b  - Cr at baseline, start PO bicarb due to HCO3 <22. Start PO bicarb 650mg BID, titrate to HCO3 of 24    T2DM  - LCSI  - Hold PO meds   - Hypoglycemic protocol     Chronic Dementia   - Continue namenda     HTN  -Continue clonidine, aldactone and lisinopril    HLD  -Continue statin     GERD  -Continue PPI    Bipolar  Disorder   -Continue home meds      Hypothyroidism   -Continue Synthroid      Checklist:  Advanced directive: full  Diet: NPO  DVT ppx: Lovenox   Sugar: BG goal of 140-180 while inpatient    Disposition: admit to inpatient.  Estimated discharge: 4 day(s).  Current living situation: NH.  Expected disposition: NH    Spoke with ED provider who recommended admission for the patient and I agree with that plan.  Personally reviewed lab studies and imaging.  EKG interpreted personally and results as stated above.  Imaging that was interpreted personally and results as stated above.    History of Present Illness:     Chief Complaint:    Chief Complaint   Patient presents with    Fall    Hip Pain     left       Patient is a 74  N/A 2020    BOWEL RESECTION EXTENDED RIGHT HEMICOLECTOMY performed by Zayda Moore MD at [facility] OR    THORACENTESIS Left 2013    THORACENTESIS  2016         THORACENTESIS Left 11/15/2016    Dr. Dutta 250mlsremoved     THORACOTOMY Left 2019    with Lysis of adhesions    THORACOTOMY Left 3/18/2019    THORACOSCOPY CONVERTED TO THORACOTOMY WITH LYSIS OF ADHESIONS performed by Thom Zapata MD at [facility] OR    TONSILLECTOMY      as a kid    UPPER GASTROINTESTINAL ENDOSCOPY N/A 2020    EGD BIOPSY performed by Katie Rodriguez MD at [facility] ENDOSCOPY       Allergies:   Allergies   Allergen Reactions    Nsaids      Renal        FHx: family history includes Heart Disease in his mother; High Blood Pressure in his father.    SHx:   Social History     Socioeconomic History    Marital status: Single     Spouse name: None    Number of children: None    Years of education: None    Highest education level: None   Occupational History    Occupation: [employer], retired     Employer: [employer]   Tobacco Use    Smoking status: Former     Current packs/day: 0.00     Average packs/day: 1.5 packs/day for 30.0 years (45.0 ttl pk-yrs)     Types: Cigarettes     Start date: 1980     Quit date: 2010     Years since quittin.3    Smokeless tobacco: Never   Vaping Use    Vaping status: Never Used   Substance and Sexual Activity    Alcohol use: Not Currently     Comment: caffiene:  2-3 cups of coffee a day, 3 cokes a day    Drug use: Not Currently     Frequency: 7.0 times per week     Types: Marijuana (Weed)    Sexual activity: Not Currently     Partners: Female       Medications Prior to Admission     Prior to Admission medications    Medication Sig Start Date End Date Taking? Authorizing Provider   silver sulfADIAZINE (SILVADENE) 1 % cream  24   ProviderElena MD   lisinopril (PRINIVIL;ZESTRIL) 20 MG tablet 1 tablet daily 23   Elena Collier MD   memantine (NAMENDA) 10 MG tablet

## 2024-12-01 NOTE — ED NOTES
ED TO INPATIENT SBAR HANDOFF    Patient Name: Felipe Heller Jr.   :  1950  74 y.o.   Preferred Name  Alpesh   Family/Caregiver Present no   Restraints no   C-SSRS: Risk of Suicide: No Risk  Sitter no   Sepsis Risk Score        Situation  Chief Complaint   Patient presents with    Fall    Hip Pain     left     Brief Description of Patient's Condition: Patient presents to the ED via EMS from [place] with complaints of a fall and left hip pain. Staff states that the patient was going to the bathroom when he slipped and fell. Denies LOC or use of blood thinners. IV established by medics and 50mcg fentanyl given. Rates pain 10/10 at arrival. Patient has a closed fracture of the left hip. Patient has hx of dementia but is alert and oriented to self and situation. Patient given multiple pain medications and had a drop in oxygen and BP. Patient is on 2L NC at this time with sats in the 90s. Patient resting in bed at this time.   Mental Status: alert  Arrived from: nursing home    Imaging:   XR ELBOW LEFT (MIN 3 VIEWS)   Final Result   Findings/impression: Abnormal anterior and posterior fat pads with suggestion of   a minimally displaced radial head fracture without obvious displacement or   dislocation. CT is definitive as deemed clinically necessary.         Electronically signed by Garry Blanchard      CT CHEST ABDOMEN PELVIS WO CONTRAST Additional Contrast? None   Final Result      Acute intertrochanteric fracture of the proximal left femur with mild   fragmentary displacement and angulation.       Otherwise, no CT evidence of acute posttraumatic intrathoracic or   intra-abdominal abnormality.            Electronically signed by Tom Garza DO      CT HIP LEFT WO CONTRAST   Final Result   Mildly comminuted acute intertrochanteric fracture of the proximal left   femur/left hip.          Electronically signed by Tom Garza DO      CT CERVICAL SPINE WO CONTRAST   Final Result   Motion degraded exam. Within  peripheral neuropathy (HCC)     Diastolic CHF (HCC) 04/23/2016    Gastric ulcer     H/O cardiovascular stress test 05/26/2014    EF 68% mild ischemia anterior wall    Heart murmur     \"see Dr Mendoza- last echo 2014\" pt states\"I think they said I have a murmur but no chest pain or palpitations\"    Hiatal hernia     History of cardiac cath 06/11/2014    MODERATE  CAD      Hx of migraines     HX OTHER MEDICAL     \"have thinning of kidney walls- they found I had that 15 yrs ago\" see Dr Diggs\"    Hyperlipidemia     Hypertension     Intraparenchymal hematoma of brain     Lumbar radiculopathy     Overlapping malignant neoplasm of colon (HCC) 05/27/2020    Panic attack     'anything new gives me anxiety\"    Pleural effusion     scheduled for thoracentesis 7/28/2014, 8/17/2016    S/P thoracentesis     left side( per old chart had thora done 4/2016 and one done 2014)    Sleep apnea     sleep study x 2 - last one 4-5 yrs ago- uses c-pap\"    SOBOE (shortness of breath on exertion)     Spinal stenosis        Assessment    Vitals: MEWS Score: 1  Level of Consciousness: Alert (0)   Vitals:    12/01/24 0228 12/01/24 0232 12/01/24 0301 12/01/24 0312   BP: 100/80 114/74 98/67 98/67   Pulse: 98 94 90    Resp:       Temp:       TempSrc:       SpO2: 100% 100%     Weight:       Height:           PO Status: Nothing by Mouth    O2 Flow Rate: O2 Device: None (Room air)      Cardiac Rhythm: NS    Last documented pain medication administered: Percocet @ 0228    NIH Score: NIH       Active LDA's:   Peripheral IV 11/30/24 Right Wrist (Active)       Pertinent or High Risk Medications/Drips: no   If Yes, please provide details: none      Blood Product Administration: no  If Yes, please provide details: none    Recommendation    Incomplete orders: Admission orders   Additional Comments: none   If any further questions, please call Sending RN at [phone]    Electronically signed by: Electronically signed by Mery Cheek RN on 12/1/2024 at 3:24

## 2024-12-01 NOTE — ANESTHESIA POSTPROCEDURE EVALUATION
Department of Anesthesiology  Postprocedure Note    Patient: Felipe Heller Jr.  MRN: [id number]  YOB: 1950  Date of evaluation: 12/1/2024    Procedure Summary       Date: 12/01/24 Room / Location: [address]    Anesthesia Start: 1356 Anesthesia Stop: 1551    Procedure: FEMUR INTRAMEDULLARY NAIL FRANCHESKA INSERTION ANTEGRADE (Left: Leg Upper) Diagnosis:       Closed fracture of neck of left femur, initial encounter ([facility])      (Closed fracture of neck of left femur, initial encounter ([facility]) [S72.002A])    Surgeons: Kalen Stratton MD Responsible Provider: Marc Barragan MD    Anesthesia Type: General ASA Status: 4 - Emergent            Anesthesia Type: General    Deisy Phase I: Deisy Score: 8    Deisy Phase II:      Anesthesia Post Evaluation    Patient location during evaluation: PACU  Patient participation: complete - patient participated  Level of consciousness: sleepy but conscious  Airway patency: patent  Nausea & Vomiting: no nausea and no vomiting  Cardiovascular status: hemodynamically stable  Respiratory status: acceptable, nasal cannula and spontaneous ventilation  Hydration status: stable  Pain management: adequate    No notable events documented.  
2.13

## 2024-12-01 NOTE — PROGRESS NOTES
Spoke with pt son Lloyd. Update given. Lloyd reported pt has hx CVA and dementia. As noted on chart, Lloyd is the primary decision maker and this was confirmed. Lloyd stated will be in later this morning. Room info given.

## 2024-12-01 NOTE — PROGRESS NOTES
Preoperative Risk assessment using 2014 ACC/AHA guidelines     1) Emergent procedure YES    2) Active Cardiac Condition No (decompensated HF, Arrhythmia, MI <3 weeks, severe valve disease)    3) Risk Level of Procedure (1 point for high risk procedure) Intermediate Risk (intraperitoneal, intrathoracic, HENT, orthopedic, or carotid endarterectomy, etc.)       4)RCRI (1 point for each)   [x] H/O Ischemic Cardiovascular disease (MI, postive exercise stress test, current chest pain, nitrate therapy, pathological q wave)   [x] H/O CVA (TIA or stroke)   [x] H/O Heart failure (Pulm edema, B/L rales, S3, PND, CXR with increased pulm vasculature)   [] H/O DM (requiring insulin)   [x] Cr > 2.0    5) Current MET's 1. Considering this an emergent procedure will need this procedure however cardiology consulted for postop optimization of his cardiac issues       Based on the number of risk factors 4 the % risk of major cardiac complications (30-day risk of non-fatal MI, non-fatal cardiac arrest or death) is 5.4%. The % risk of MI, pulmonary edema, ventricular fibrillation, primary cardiac arrest or complete heart block is 9.1% (0.5%, 1.3%, 3.6%, 9.1%).    Bleeding risk by procedure     According to the 2014 ACC/AHA preoperative risk assessment Felipe Heller Jr. is a High risk for cardiac complications during intermediate risk procedure and may continue as planned.       Further recommendations from consultants: Cardiology

## 2024-12-01 NOTE — OP NOTE
OPERATIVE NOTE    PROCEDURE DATE: 12/1/2024    SURGEON: Surgeons and Role:     * Kalen Stratton MD - Primary    PREOPERATIVE DIAGNOSIS: Left intertrochanteric femur fracture    POSTOPERATIVE DIAGNOSIS: Same    OPERATIONS:  Left hip cephalomedullary nail    ESTIMATED BLOOD LOSS: 200 mL    ANAESTHESIA TYPE: General    SPECIMENS: * No specimens in log *    IMPLANTS:   Implant Name Type Inv. Item Serial No.  Lot No. LRB No. Used Action   NAIL IM L170MM [id number] NK 125DEG SHT PROX FEM GRN TI-15MO - [id number]  NAIL IM L170MM [id number] NK 125DEG SHT PROX FEM GRN TI-15MO  DEPUY SYNTHES [place] [id number] Left 1 Implanted   BLADE IM L105MM DIA10.35MM PROX FEM G TI FERDINAND FEN STEPHANIE FOR - [id number]  BLADE IM L105MM DIA10.35MM PROX FEM G TI FERDINAND FEN STEPHANIE FOR  DEPUY SYNTHES [place]-WD [id number] Left 1 Implanted   SCREW BNE L40MM DIA5MM ST TIB LT GRN TI ST FERDINAND NUSRAT FULL - [id number]  SCREW BNE L40MM DIA5MM ST TIB LT GRN TI ST FERDINAND NUSRAT FULL  DEPUY SYNTHES [place]-WD [id number] Left 1 Implanted       COMPLICATIONS: None    DISPOSITION:  To PACU in stable condition    BRIEF HISTORY AND INDICATION:  Felipe Heller Jr. is a 74 y.o. male w/ PMH sig for CKD 3, COPD, DM 2, peripheral neuropathy, bipolar, and gastric ulcer presents to [facility] following a fall from standing height complaining of left hip pain. Pt experienced immediate pain in the left hip and was unable to ambulate following the fall.  Endorses pain in the left elbow. Denies numbness/tingling of the LLE. Pt is normally not ambulatory in a wheelchair but does transfer.  States that he had a CVA in the past. Pt denies blood thinner use. Denies tobacco/EtOH/illicits.    It was discussed with the patient and family members about the nature, extent, and severity of their injuries and overall prognosis and treatment consideration including conservative and surgical.  Anticipated course of benefits, possible risks and complications were explained. All questions answered.  No  guidepin was confirmed to be central both in AP and lateral view.  The neck was drilled to cleanse with bleeding accordance with 's recommendations. A 105 mm lag screw was placed over the guidepin and advanced to adequate depth. Next, the compression screw was placed and advanced until adequate compression was obtained.    Through the targeting guide a percutaneous skin incision was made for the distal interlock screw. A bicortical 40mm screw through the dynamic hole of the nail was placed.    Final intra-op fluoro imaging showed satisfactory fracture reduction and appropriate position of the nail. Thorough irrigation was performed and the incision was closed with 0 Vicryl for deep fascia, 2-0 for subcutaneous tissue, and 3-0 Monocryl for skin.  Dermabond and Ag dressings were placed over the incision sites. The patient was subsequently positioned supine on the hospital bed, extubated, and transferred to PACU in stable condition. All sponge, needle, and instrument counts were correct at the end of the procedure. I was present and performed all key portions of the procedure.     PLAN:  - WB status: WBAT LLE  - Antibiotics: Ancef x24 hrs  - DVT Prophylaxis: SCDs, Lovenox in house, patient will require at least 4 weeks of 81 mg aspirin twice daily from orthopedics perspective  - Pain control  - PT/OT  - Dispo planning    Kalen Stratton MD  12/1/2024 3:34 PM

## 2024-12-01 NOTE — ANESTHESIA PRE PROCEDURE
tissues.   Abdominal:   (+) obese          Vascular: negative vascular ROS.          ROS comment: Peripheral neuropathy . Other Findings:         Anesthesia Plan      general     ASA 4 - emergent       Induction: intravenous.      Anesthetic plan and risks discussed with patient and child/children.    Use of blood products discussed with patient and child/children whom consented to blood products.    Plan discussed with CRNA.    Attending anesthesiologist reviewed and agrees with Preprocedure content              Marc Barragan MD   12/1/2024      Pre Anesthesia Evaluation complete. Anesthesia plan, risks, benefits, alternatives, and personnel discussed with patient and/or legal guardian. Patient and/or legal guardian verbalized an understanding and agreed to proceed. Anesthesia plan discussed with care team members and agreed upon.  Marc Barragan MD  12/1/2024

## 2024-12-01 NOTE — PROGRESS NOTES
Orthopedic Acknowledgement    Aware of pt with L intertrochanteric femur fracture. Imaging viewed remotely. Full consult note to follow. Plan for surgery later today pending medical optimization.    Type and screen  NPO  Consent for surgery    Kalen Stratton MD  12/1/2024 7:39 AM

## 2024-12-01 NOTE — BRIEF OP NOTE
Brief Postoperative Note      Patient: Felipe Heller Jr.  YOB: 1950  MRN: [id number]    Date of Procedure: 12/1/2024    Pre-Op Diagnosis Codes:      * Close left intertrochanteric femur fracture    Post-Op Diagnosis: Same       Procedure(s):  FEMUR INTRAMEDULLARY NAIL FRANCHESKA INSERTION ANTEGRADE    Surgeon(s):  Kalen Stratton MD    Assistant:  * No surgical staff found *    Anesthesia: General    Estimated Blood Loss (mL): 200     Complications: None    Specimens:   * No specimens in log *    Implants:  Implant Name Type Inv. Item Serial No.  Lot No. LRB No. Used Action   NAIL IM L170MM [id number] NK 125DEG SHT PROX FEM GRN TI-15MO - [id number]  NAIL IM L170MM [id number] NK 125DEG SHT PROX FEM GRN TI-15MO  DEPUY SYN[employer]ES [place]- [id number] Left 1 Implanted   BLADE IM L105MM DIA10.35MM PROX FEM G TI FERDINAND FEN STEPHAINE FOR - [id number]  BLADE IM L105MM DIA10.35MM PROX FEM G TI FERDINAND FEN STEPHANIE FOR  DEPUY SYNTHES [place]-WD [id number] Left 1 Implanted   SCREW BNE L40MM DIA5MM ST TIB LT GRN TI ST FERDINAND NUSRAT FULL - [id number]  SCREW BNE L40MM DIA5MM ST TIB LT GRN TI ST FERDINAND NUSRAT FULL  DEPUY SYNTHES [place]- [id number] Left 1 Implanted         Drains: * No LDAs found *    Findings:  Infection Present At Time Of Surgery (PATOS) (choose all levels that have infection present):  No infection present    Electronically signed by Kalen Stratton MD on 12/1/2024 at 3:33 PM

## 2024-12-01 NOTE — PROGRESS NOTES
4 Eyes Skin Assessment     NAME:  Felipe Heller Jr.  YOB: 1950  MEDICAL RECORD NUMBER:  [id number]    The patient is being assessed for  Post-Op Surgical    I agree that at least one RN has performed a thorough Head to Toe Skin Assessment on the patient. ALL assessment sites listed below have been assessed.      Areas assessed by both nurses:    Head, Face, Ears, Shoulders, Back, Chest, Arms, Elbows, Hands, Sacrum. Buttock, Coccyx, Ischium, Legs. Feet and Heels, and Under Medical Devices         Does the Patient have a Wound? Yes wound(s) were present on assessment. LDA wound assessment was Initiated and completed by RN    LEFT HIP/LEG SURGICAL DRSG  SKIN TEAR LEFT ELBOW S/P fall   Coccyx slightly reddish in color. No open wound or skin breakdown noted.  Scattered bruising bilat arms.       Monster Prevention initiated by RN: Yes  Wound Care Orders initiated by RN: Yes, Wound care consult order In EPIC    Pressure Injury (Stage 3,4, Unstageable, DTI, NWPT, and Complex wounds) if present, place Wound referral order by RN under : No    New Ostomies, if present place, Ostomy referral order under : No     Nurse 1 eSignature: Electronically signed by Meena Jackson RN on 12/1/24 at 4:53 PM EST    **SHARE this note so that the co-signing nurse can place an eSignature**    Nurse 2 eSignature: Electronically signed by Mayelin Rodriguez RN on 12/1/24 at 5:33 PM EST

## 2024-12-01 NOTE — PROGRESS NOTES
Patient seen earlier by my colleague. Please refer to his note for detail H&P. Continue pain management and wait for ortho evaluation

## 2024-12-01 NOTE — ED NOTES
Dr. Landeros notified of patients BP. States he just got dilaudid and is sleeping so just to monitor him. No new orders at this time.

## 2024-12-01 NOTE — CONSULTS
AOx3, NAD, somnolent, cooperative, follows commands  Head atraumatic, normocephalic  CV: Appropriate perfusion to distal extremities B/L  Pulm: Non-labored respirations  Abdomen: Non-distended.  CNS: Sensation and motor function grossly intact.  Skin: No rash or erythema    Msk:   RUE: Deformities not noted. No apparent skin lesions, swelling, erythema, or ecchymosis. Non-tender to palpation of clavicle, shoulder, arm, elbow, forearm, wrist, or hand. Range of motion in shoulder, elbow, wrist, and MCP joints within functional range. Motor function and sensation intact in axillary, median, ulnar and radial nerve distributions. Radial pulse 2+ and brisk capillary refill.     LUE: Dressing in place at left elbow. Non-tender to palpation of clavicle, shoulder, arm, elbow, forearm, wrist, or hand. Range of motion in shoulder, elbow, wrist, and MCP joints within functional range. Motor function and sensation intact in Axillary, median, ulnar and radial nerve distributions. Radial pulse 2+ and brisk capillary refill.    LLE: Limb shortened and rotated. No apparent skin lesions, swelling, erythema, or ecchymosis. Range of motion in hip and knee limited by pain. Groin pain elicited with log roll of leg. Non-tender to palpation of thigh, knee, leg, ankle, or foot. Compartments soft and compressible in leg and thigh. Unable to perform straight leg raise. Motor function to foot intact with +DF/PF/EHL/FHL. SILT Sa/James/T/SP/DP. Dorsalis pedis and posterior tibial pulses intact and brisk capillary refill.    RLE:  Deformities not noted. No apparent skin lesions, swelling, erythema, or ecchymosis. Range of motion in hip, knee and ankle normal.  Non-tender to palpation of hip, thigh, knee, leg, ankle, or foot. Compartments soft and compressible in leg and thigh. Able to perform straight leg raise. Motor function to foot intact with +DF/PF/EHL/FHL. SILT Sa/James/T/SP/DP. Dorsalis pedis and posterior tibial pulses intact and brisk

## 2024-12-01 NOTE — PROGRESS NOTES
1543- pt received from OR. Monitors placed and alarms on. Report received from Petr CORTES. Pt drowsy but arouses to name being called.  1555- pt resting comfortably. Denies any pain or nausea.  1559- blood glucose 146  1600- pt medicated for complaints of pain.  1612- pt resting comfortably. States pain is tolerable and denies any nausea.  1616- report called to Meena ESCOBAR nurse prior to transport to inpatient room.  1619- pt transported to inpatient room.

## 2024-12-01 NOTE — PROGRESS NOTES
This RN called Durga ANG for list of recent medications to complete adm med rec. Nurse to fax over copy of current meds.

## 2024-12-01 NOTE — ED TRIAGE NOTES
Patient presents to the ED via EMS from [facility] with complaints of a fall with left hip injury and left elbow laceration. Staff states he as going to the bathroom when he slipped and fell. Denies LOC or use of blood thinners. IV established by medics and 50mcg fentanyl given. Rates pain 10/10 at this time.

## 2024-12-02 LAB
ALBUMIN SERPL-MCNC: 3.1 G/DL (ref 3.4–5)
ALBUMIN/GLOB SERPL: 1.2 {RATIO} (ref 1.1–2.2)
ALP SERPL-CCNC: 60 U/L (ref 40–129)
ALT SERPL-CCNC: <5 U/L (ref 10–40)
ANION GAP SERPL CALCULATED.3IONS-SCNC: 9 MMOL/L (ref 9–17)
AST SERPL-CCNC: 24 U/L (ref 15–37)
BASOPHILS # BLD: 0.02 K/UL
BASOPHILS NFR BLD: 0 % (ref 0–1)
BILIRUB SERPL-MCNC: 0.2 MG/DL (ref 0–1)
BNP SERPL-MCNC: 1212 PG/ML (ref 0–125)
BUN SERPL-MCNC: 22 MG/DL (ref 7–20)
CALCIUM SERPL-MCNC: 8.3 MG/DL (ref 8.3–10.6)
CHLORIDE SERPL-SCNC: 106 MMOL/L (ref 99–110)
CO2 SERPL-SCNC: 24 MMOL/L (ref 21–32)
CREAT SERPL-MCNC: 2.2 MG/DL (ref 0.8–1.3)
EOSINOPHIL # BLD: 0 K/UL
EOSINOPHILS RELATIVE PERCENT: 0 % (ref 0–3)
ERYTHROCYTE [DISTWIDTH] IN BLOOD BY AUTOMATED COUNT: 15.8 % (ref 11.7–14.9)
GFR, ESTIMATED: 30 ML/MIN/1.73M2
GLUCOSE BLD-MCNC: 117 MG/DL (ref 74–99)
GLUCOSE BLD-MCNC: 123 MG/DL (ref 74–99)
GLUCOSE BLD-MCNC: 135 MG/DL (ref 74–99)
GLUCOSE BLD-MCNC: 136 MG/DL (ref 74–99)
GLUCOSE BLD-MCNC: 146 MG/DL (ref 74–99)
GLUCOSE BLD-MCNC: 180 MG/DL (ref 74–99)
GLUCOSE SERPL-MCNC: 140 MG/DL (ref 74–99)
HCT VFR BLD AUTO: 30.5 % (ref 42–52)
HGB BLD-MCNC: 9.3 G/DL (ref 13.5–18)
IMM GRANULOCYTES # BLD AUTO: 0.06 K/UL
IMM GRANULOCYTES NFR BLD: 1 %
LYMPHOCYTES NFR BLD: 1.07 K/UL
LYMPHOCYTES RELATIVE PERCENT: 11 % (ref 24–44)
MCH RBC QN AUTO: 28.6 PG (ref 27–31)
MCHC RBC AUTO-ENTMCNC: 30.5 G/DL (ref 32–36)
MCV RBC AUTO: 93.8 FL (ref 78–100)
MONOCYTES NFR BLD: 0.93 K/UL
MONOCYTES NFR BLD: 9 % (ref 0–4)
NEUTROPHILS NFR BLD: 79 % (ref 36–66)
NEUTS SEG NFR BLD: 7.96 K/UL
PLATELET # BLD AUTO: 130 K/UL (ref 140–440)
PMV BLD AUTO: 9.3 FL (ref 7.5–11.1)
POTASSIUM SERPL-SCNC: 5.4 MMOL/L (ref 3.5–5.1)
PROT SERPL-MCNC: 5.6 G/DL (ref 6.4–8.2)
RBC # BLD AUTO: 3.25 M/UL (ref 4.6–6.2)
SODIUM SERPL-SCNC: 139 MMOL/L (ref 136–145)
TROPONIN I SERPL HS-MCNC: 50 NG/L (ref 0–22)
WBC OTHER # BLD: 10 K/UL (ref 4–10.5)

## 2024-12-02 PROCEDURE — 94761 N-INVAS EAR/PLS OXIMETRY MLT: CPT

## 2024-12-02 PROCEDURE — 6360000002 HC RX W HCPCS: Performed by: STUDENT IN AN ORGANIZED HEALTH CARE EDUCATION/TRAINING PROGRAM

## 2024-12-02 PROCEDURE — 1200000000 HC SEMI PRIVATE

## 2024-12-02 PROCEDURE — 6370000000 HC RX 637 (ALT 250 FOR IP): Performed by: STUDENT IN AN ORGANIZED HEALTH CARE EDUCATION/TRAINING PROGRAM

## 2024-12-02 PROCEDURE — 94010 BREATHING CAPACITY TEST: CPT

## 2024-12-02 PROCEDURE — 97162 PT EVAL MOD COMPLEX 30 MIN: CPT

## 2024-12-02 PROCEDURE — 99222 1ST HOSP IP/OBS MODERATE 55: CPT | Performed by: INTERNAL MEDICINE

## 2024-12-02 PROCEDURE — 2580000003 HC RX 258: Performed by: STUDENT IN AN ORGANIZED HEALTH CARE EDUCATION/TRAINING PROGRAM

## 2024-12-02 PROCEDURE — 51798 US URINE CAPACITY MEASURE: CPT

## 2024-12-02 PROCEDURE — 82947 ASSAY GLUCOSE BLOOD QUANT: CPT

## 2024-12-02 PROCEDURE — 83880 ASSAY OF NATRIURETIC PEPTIDE: CPT

## 2024-12-02 PROCEDURE — 84484 ASSAY OF TROPONIN QUANT: CPT

## 2024-12-02 PROCEDURE — 85025 COMPLETE CBC W/AUTO DIFF WBC: CPT

## 2024-12-02 PROCEDURE — 51701 INSERT BLADDER CATHETER: CPT

## 2024-12-02 PROCEDURE — 97530 THERAPEUTIC ACTIVITIES: CPT

## 2024-12-02 PROCEDURE — 36415 COLL VENOUS BLD VENIPUNCTURE: CPT

## 2024-12-02 PROCEDURE — 2700000000 HC OXYGEN THERAPY PER DAY

## 2024-12-02 PROCEDURE — 80053 COMPREHEN METABOLIC PANEL: CPT

## 2024-12-02 PROCEDURE — 97166 OT EVAL MOD COMPLEX 45 MIN: CPT

## 2024-12-02 RX ADMIN — DIVALPROEX SODIUM 500 MG: 500 TABLET, DELAYED RELEASE ORAL at 23:56

## 2024-12-02 RX ADMIN — ENOXAPARIN SODIUM 40 MG: 100 INJECTION SUBCUTANEOUS at 09:29

## 2024-12-02 RX ADMIN — Medication 6000 UNITS: at 09:28

## 2024-12-02 RX ADMIN — ACETAMINOPHEN 1000 MG: 500 TABLET ORAL at 09:29

## 2024-12-02 RX ADMIN — ACETAMINOPHEN 1000 MG: 500 TABLET ORAL at 18:35

## 2024-12-02 RX ADMIN — MEMANTINE 10 MG: 10 TABLET ORAL at 09:29

## 2024-12-02 RX ADMIN — WATER 2000 MG: 1 INJECTION INTRAMUSCULAR; INTRAVENOUS; SUBCUTANEOUS at 01:52

## 2024-12-02 RX ADMIN — BENZTROPINE MESYLATE 0.5 MG: 1 TABLET ORAL at 23:53

## 2024-12-02 RX ADMIN — SODIUM BICARBONATE 650 MG: 650 TABLET ORAL at 23:53

## 2024-12-02 RX ADMIN — ACETAMINOPHEN 1000 MG: 500 TABLET ORAL at 00:48

## 2024-12-02 RX ADMIN — ATORVASTATIN CALCIUM 40 MG: 40 TABLET, FILM COATED ORAL at 09:29

## 2024-12-02 RX ADMIN — DOCUSATE SODIUM 100 MG: 100 CAPSULE, LIQUID FILLED ORAL at 23:52

## 2024-12-02 RX ADMIN — SENNOSIDES AND DOCUSATE SODIUM 1 TABLET: 50; 8.6 TABLET ORAL at 09:28

## 2024-12-02 RX ADMIN — ACETAMINOPHEN 1000 MG: 500 TABLET ORAL at 23:52

## 2024-12-02 RX ADMIN — SODIUM ZIRCONIUM CYCLOSILICATE 10 G: 10 POWDER, FOR SUSPENSION ORAL at 09:28

## 2024-12-02 RX ADMIN — DIVALPROEX SODIUM 250 MG: 250 TABLET, FILM COATED, EXTENDED RELEASE ORAL at 09:29

## 2024-12-02 RX ADMIN — DOCUSATE SODIUM 100 MG: 100 CAPSULE, LIQUID FILLED ORAL at 09:29

## 2024-12-02 RX ADMIN — OXYCODONE 5 MG: 5 TABLET ORAL at 05:36

## 2024-12-02 RX ADMIN — SENNOSIDES AND DOCUSATE SODIUM 1 TABLET: 50; 8.6 TABLET ORAL at 23:53

## 2024-12-02 RX ADMIN — MIDODRINE HYDROCHLORIDE 5 MG: 5 TABLET ORAL at 09:29

## 2024-12-02 RX ADMIN — LEVOTHYROXINE SODIUM 75 MCG: 0.07 TABLET ORAL at 05:36

## 2024-12-02 RX ADMIN — BENZTROPINE MESYLATE 0.5 MG: 1 TABLET ORAL at 09:29

## 2024-12-02 RX ADMIN — MIDODRINE HYDROCHLORIDE 5 MG: 5 TABLET ORAL at 13:02

## 2024-12-02 RX ADMIN — SODIUM BICARBONATE 650 MG: 650 TABLET ORAL at 09:28

## 2024-12-02 RX ADMIN — MEMANTINE 10 MG: 10 TABLET ORAL at 23:53

## 2024-12-02 RX ADMIN — OXYCODONE 5 MG: 5 TABLET ORAL at 18:07

## 2024-12-02 RX ADMIN — ESCITALOPRAM OXALATE 10 MG: 10 TABLET ORAL at 09:29

## 2024-12-02 ASSESSMENT — PAIN SCALES - GENERAL
PAINLEVEL_OUTOF10: 8
PAINLEVEL_OUTOF10: 4
PAINLEVEL_OUTOF10: 6
PAINLEVEL_OUTOF10: 9
PAINLEVEL_OUTOF10: 10

## 2024-12-02 ASSESSMENT — PAIN DESCRIPTION - PAIN TYPE
TYPE: SURGICAL PAIN
TYPE: ACUTE PAIN
TYPE: SURGICAL PAIN

## 2024-12-02 ASSESSMENT — PAIN DESCRIPTION - LOCATION
LOCATION: LEG;HIP
LOCATION: HIP
LOCATION: BACK
LOCATION: LEG;HIP
LOCATION: HIP

## 2024-12-02 ASSESSMENT — PULMONARY FUNCTION TESTS
FEV1 (%PREDICTED): 55
PIF_VALUE: 55
POST BRONCHODILATOR FEV1/FVC: 66

## 2024-12-02 ASSESSMENT — PAIN DESCRIPTION - DESCRIPTORS
DESCRIPTORS: ACHING
DESCRIPTORS: DISCOMFORT;ACHING
DESCRIPTORS: ACHING;DISCOMFORT

## 2024-12-02 ASSESSMENT — COPD QUESTIONNAIRES
QUESTION4_WALKINCLINE: 3
CAT_TOTALSCORE: 15
GOLD_GROUP: GROUP B
QUESTION6_LEAVINGHOUSE: 3
GOLD_GRADE: 2
QUESTION8_ENERGYLEVEL: 3
QUESTION7_SLEEPQUALITY: 3
QUESTION5_HOMEACTIVITIES: 2
TOTAL_EXACERBATIONS_PASTYEAR: 0
QUESTION3_CHESTTIGHTNESS: 1
QUESTION1_COUGHFREQUENCY: 0
QUESTION2_CHESTPHLEGM: 0

## 2024-12-02 ASSESSMENT — PAIN DESCRIPTION - ORIENTATION
ORIENTATION: LEFT
ORIENTATION: LOWER
ORIENTATION: LEFT

## 2024-12-02 ASSESSMENT — PAIN - FUNCTIONAL ASSESSMENT

## 2024-12-02 ASSESSMENT — PAIN DESCRIPTION - FREQUENCY: FREQUENCY: CONTINUOUS

## 2024-12-02 ASSESSMENT — PAIN DESCRIPTION - ONSET: ONSET: ON-GOING

## 2024-12-02 NOTE — CONSULTS
[facility] ACUTE CARE PHYSICAL THERAPY EVALUATION  Felipe Heller Jr., 1950, 1106/1106-A, 12/2/2024    History  [place]:  The primary encounter diagnosis was Closed fracture of left hip, initial encounter ([facility]). A diagnosis of Fall, initial encounter was also pertinent to this visit.  Patient  has a past medical history of Anemia, Arthritis, Back pain, chronic, Bipolar 1 disorder ([facility]), CAD (coronary artery disease), Cerebral artery occlusion with cerebral infarction ([facility]), Chronic kidney disease (CKD), stage III (moderate) ([facility]), CKD (chronic kidney disease), COPD (chronic obstructive pulmonary disease) ([facility]), Diabetes mellitus, type 2 ([facility]), Diabetic peripheral neuropathy ([facility]), Diastolic CHF ([facility]), Gastric ulcer, H/O cardiovascular stress test, Heart murmur, Hiatal hernia, History of cardiac cath, Hx of migraines, HX OTHER MEDICAL, Hyperlipidemia, Hypertension, Intraparenchymal hematoma of brain, Lumbar radiculopathy, Overlapping malignant neoplasm of colon ([facility]), Panic attack, Pleural effusion, S/P thoracentesis, Sleep apnea, SOBOE (shortness of breath on exertion), and Spinal stenosis.  Patient  has a past surgical history that includes Neck surgery (1998); Carpal tunnel release (Bilateral, 1989); knee surgery (Bilateral); thoracentesis (Left, 12/20/2013); Elbow surgery (Left, 2000's); Thoracentesis (4/25/2016); Tonsillectomy; Thoracentesis (Left, 11/15/2016); thoracotomy (Left, 03/18/2019); thoracotomy (Left, 3/18/2019); Upper gastrointestinal endoscopy (N/A, 5/12/2020); Colonoscopy (N/A, 5/12/2020); Small intestine surgery (N/A, 5/21/2020); and Femur Surgery (Left, 12/1/2024).    Recommendation: Facility for moderate post-acute rehabilitation, anticipate 1-2 hours per day and 5 days per week.    Subjective:  Patient states: \"I like coke, not diet coke\".    Pain:  8/10 pain in LLE at surgical site at rest.    Communication with other providers:  RN approval for therapy session, co-eval with OT  Garry  Restrictions: general precautions, falls, WBAT LLE    Home Setup/Prior level of function  Social/Functional History  Lives With: Alone  Type of Home: Assisted living (from [facility])  Home Layout: One level  Home Access: Level entry  Bathroom Shower/Tub: Walk-in shower  Bathroom Toilet: Handicap height  Bathroom Equipment: Shower chair  Home Equipment: Wheelchair - Manual  Receives Help From: Family  ADL Assistance: Needs assistance  Homemaking Assistance: Needs assistance  Ambulation Assistance: Needs assistance (transfers to/from wheelchair with assist, mainly utilizes wheelchair per son)  Transfer Assistance: Needs assistance  Active : No    Examination of body systems (includes body structures/functions, activity/participation limitations):  Observation:  Supine in bed upon arrival, agreeable to therapy  Vision:  glasses  Hearing:  WFL  Cardiopulmonary:  stable vitals on 1.5 L O2 via nasal cannula, pt endorsing dizziness sitting EOB, /96  Cognition: AxO x3, pt with hx of dementia, see OT/SLP note for further evaluation.    Musculoskeletal  ROM R/L:  WFL BLEs, RLE limited in AROM due to pain.    Strength R/L:  at least 3+/5 RLE and at least 2+/5 LLE observed during functional activity, moderate impairment in function and endurance.    Neuro:  Some postural tremors and increased rigidity in BLEs following mobility      Mobility:  Rolling L/R:  NT  Supine to sit: maxA x2 for BLE advancement to EOB, hip pivot, and trunk uprighting. Required increased time and effort due to pain. Verbal cues for sequencing  Sit to supine: maxA x2 for BLE advancement back onto bed, hip pivot, and trunk uprighting. Verbal cues for sequencing  maxA x2 to boost to HOB  Transfers:  NT pt deferred due to pain and dizziness  Sitting balance:  Fair+ static with BUE support on EOB, CGA to SBA for safety. No LOB observed throughout. Pt able to tolerate brief period of static sitting before requesting to return to supine due to

## 2024-12-02 NOTE — PROGRESS NOTES
Occupational Therapy    [facility] ACUTE CARE OCCUPATIONAL THERAPY EVALUATION    Felipe DAS Arron Nj, 1950, 1106/1106-A, 12/2/2024    Discharge Recommendation: Encourage minimal to moderate OT services at discharge, typically 1-2 hours/day, 5 days/week    History:  [place]:  The primary encounter diagnosis was Closed fracture of left hip, initial encounter ([facility]). A diagnosis of Fall, initial encounter was also pertinent to this visit.    Subjective:  Patient states: \"I had an older wife and a younger wife.\"  Pain: Pt reported 8/10 surgical pain in Lt hip at rest  Communication with other providers: PT ROBBY Ortiz  Restrictions: General Precautions, Fall Risk, WBAT Lt LE, 1.5L o2, Telemetry, Bed exit alarm    Home Setup/Prior level of function:  Social/Functional History  Lives With: Alone  Type of Home: Assisted living (from [facility])  Home Layout: One level  Home Access: Level entry  Bathroom Shower/Tub: Walk-in shower  Bathroom Toilet: Handicap height  Bathroom Equipment: Shower chair  Home Equipment: Wheelchair - Manual  Receives Help From: Family  ADL Assistance: Needs assistance (staff assists with bathing/dressing/toileting)  Homemaking Assistance: Needs assistance  Ambulation Assistance: Needs assistance (transfers to/from wheelchair with assist, mainly utilizes wheelchair per son)  Transfer Assistance: Needs assistance  Active : No  Occupation: Retired     Examination:  Observation: Supine in bed upon arrival. Somewhat confused but agreeable to evaluation.  Vision: WFL (Glasses)  Hearing: WFL  Vitals: Stable vitals throughout session on 1.5L o2. BP of 135/96 seated EOB (pt reported lightheadedness despite stable reading)    Body Systems and functions:  ROM: Passive shoulder flexion grossly 0-90' in BL UEs, Unable to accurately assess AROM due to decreased command following  Strength: At least 3+/5 MMT observed functionally at elbow flexors, elbow extensors, and grasp in BL

## 2024-12-02 NOTE — PROGRESS NOTES
Progress Note      Name:  Felipe Heller Jr. /Age/Sex: 1950  (74 y.o. male)   MRN & CSN:  [id number] & [id number] Encounter Date/Time: 2024 2:46 AM EST   Location:  [address] [place] [address]-A PCP: No primary care provider on file.       Hospital Day: 3    Assessment and Plan:     Patient is a 74 y.o. male who presented with fall and left hip pain.        Left Femur Fracture  S/p FEMUR INTRAMEDULLARY NAIL FRANCHESKA INSERTION ANTEGRADE   Patient presents following a mechanical fall   Imaging shows intertrochanteric fracture of proximal left femur   Neurovascularly intact  Vit D level low - supplemented   PT/OT eval for disposition  1200mg calcium daily   Ortho note reviewed - Will need DVT prophylaxis for at least 4 weeks on DC  Oxy and Morphine for Pain       CKD 3b with hyperkalemia  - Cr at baseline, start PO bicarb due to HCO3 <22. Start PO bicarb 650mg BID, titrate to HCO3 of 24  CR stable at 2.2  Will give lokelma today for k 5.4. Repeat K in AM    Chronic HFpEF  Euvolemic on exam  BNP and trop elevated  Cardiology consulted for post op optimization given he is high cardiac risk    Evaluated by cardiology today - Patient stable from cardiac stand point. Continue present management and follow up as OP    Afib  - not on AC due to hx of GI Bleed and ICH    CAD  -Continue aspirin, statin, not on beta-blocker due to bradycardia         T2DM  - LCSI  - Hold PO meds   - Hypoglycemic protocol     Chronic Dementia   - Continue namenda     HTN  -Continue clonidine, aldactone and lisinopril    HLD  -Continue statin     GERD  -Continue PPI    Bipolar  Disorder   -Continue home meds      Hypothyroidism   -Continue Synthroid      Checklist:  Advanced directive: full  Diet: NPO  DVT ppx: Lovenox   Sugar: BG goal of 140-180 while inpatient    Disposition: admit to inpatient.  Estimated discharge: 4 day(s).  Current living situation: NH.  Expected disposition: NH  Continued admission - due to Pending PT/OT eval and awaiting  [facility] OR    KNEE SURGERY Bilateral     right knee x 2( scope)/ left knee- 7-8 yr ago    NECK SURGERY  1998    C Spine fusion    SMALL INTESTINE SURGERY N/A 2020    BOWEL RESECTION EXTENDED RIGHT HEMICOLECTOMY performed by Zayda Moore MD at [facility] OR    THORACENTESIS Left 2013    THORACENTESIS  2016         THORACENTESIS Left 11/15/2016    Dr. Dutta 250mlsremoved     THORACOTOMY Left 2019    with Lysis of adhesions    THORACOTOMY Left 3/18/2019    THORACOSCOPY CONVERTED TO THORACOTOMY WITH LYSIS OF ADHESIONS performed by Thom Zapata MD at [facility] OR    TONSILLECTOMY      as a kid    UPPER GASTROINTESTINAL ENDOSCOPY N/A 2020    EGD BIOPSY performed by Katie Rodriguez MD at [facility] ENDOSCOPY       Allergies:   Allergies   Allergen Reactions    Nsaids      Renal        FHx: family history includes Heart Disease in his mother; High Blood Pressure in his father.    SHx:   Social History     Socioeconomic History    Marital status: Single     Spouse name: None    Number of children: None    Years of education: None    Highest education level: None   Occupational History    Occupation: [employer], retired     Employer: [employer]   Tobacco Use    Smoking status: Former     Current packs/day: 0.00     Average packs/day: 1.5 packs/day for 30.0 years (45.0 ttl pk-yrs)     Types: Cigarettes     Start date: 1980     Quit date: 2010     Years since quittin.3    Smokeless tobacco: Never   Vaping Use    Vaping status: Never Used   Substance and Sexual Activity    Alcohol use: Not Currently     Comment: caffiene:  2-3 cups of coffee a day, 3 cokes a day    Drug use: Not Currently     Frequency: 7.0 times per week     Types: Marijuana (Weed)    Sexual activity: Not Currently     Partners: Female     Social Determinants of Health     Food Insecurity: No Food Insecurity (2024)    Hunger Vital Sign     Worried About Running Out of Food in the Last Year: Never true     Ran Out of Food in the

## 2024-12-02 NOTE — PROGRESS NOTES
[facility]    Orthopedic Surgery   ?     Progress Note     Name:  Felipe Heller Jr.  Date/Time of Admission: 2024 11:15 PM   CSN: [id number]  Attending Provider: Scott Siegel,Estelle   Room/Bed:  1106/1106-A  : 1950  74 y.o.      Today's Date 2024   Hospital Day: 3    Assessment    74 y.o. male 1 Day Post-Op S/P Procedure(s):  FEMUR INTRAMEDULLARY NAIL FRANCHESKA INSERTION ANTEGRADE    Principal Problem:    Closed fracture of left femur, initial encounter ([facility])  Resolved Problems:    * No resolved hospital problems. *      Plan   WB status: WBAT LLE  Antibiotics: Ancef complete  DVT Prophylaxis: SCDs, Lovenox, Pt will require at least 4 weeks of 81mg ASA BID from orthopedic perspective  Hb 9.3 today  Pain control  PT/OT  Dispo planning    Subjective   No acute events overnight. No concerns per nursing. Tolerating PO intake. No new complaints. Pain controlled. Denies numbness or paresthesias. All questions answered.     Objective     Vitals:    24 0530   BP: (!) 154/84   Pulse:    Resp: 18   Temp:    SpO2:     Temp (48hrs), Av.5 °F (36.4 °C), Min:97.1 °F (36.2 °C), Max:97.9 °F (36.6 °C)      EXAM:  Gen: NAD, lying in bed   LLE: Dressing in place, no strike through. +DF/PF/EHL/FHL. SILT Sa/SP/DP/T/James. Tips of toes symmetrically warm to contralateral side, brisk capillary refill    Labs:   CBC/COAGS  Recent Labs     24  2350 24  0655 24  0513   WBC 6.2 8.7 10.0   HGB 13.2* 11.3* 9.3*   HCT 42.3 36.4* 30.5*     --  130*   INR  --  1.0  --        BMP  Recent Labs     24  0616 24  0513    139   K 4.9 5.4*    106   CO2  24   BUN 19 22*   CREATININE 2.2* 2.2*   ALBUMIN 3.1* 3.1*         Electronically signed by: Kalen Stratton MD, 2024 6:30 AM

## 2024-12-02 NOTE — DISCHARGE INSTRUCTIONS
Orthopedic Surgery Discharge Instructions    Surgery to Repair a Hip Fracture  Your Recovery  Surgery for a hip fracture repairs a broken hip bone. When you leave the hospital after surgery, you will probably be walking with crutches or a walker. You may be able to climb a few stairs and get in and out of bed and chairs. But you will need someone to help you at home for the next few weeks or until you have more energy and can move around better. If there is no one to help you at home, you may go to a rehabilitation center or long-term care center.  You will leave the hospital with a new surgical incision and bandage. If your incision is closed with sutures (stitches) or staples, they will likely be removed during your follow-up appointment approximately 1 - 3 weeks after your surgery. You may still have some mild pain, and the area may be swollen for 3 to 4 months after surgery. Continue reading for more detailed wound care instructions.  You will continue the rehabilitation program (rehab) you started in the hospital. The better you do with your rehab exercises, the quicker you will get your strength and movement back. Most people are able to return to work 4 weeks to 4 months after surgery, but it may take 6 months to 1 year for you to fully recover. Some people, especially older people, are never able to move quite as well as they used to.  You heal best when you take good care of yourself. Eat a variety of healthy foods, and don't smoke.  Each person recovers at a different pace. Follow the steps below to get better as quickly as possible.    Follow-Up Appointments:   Your follow up appointment is already scheduled. Please refer to your discharge paperwork to the date and time.  If there is an emergency and you are unable to reach anyone in the office, please go straight to the emergency room.  Follow-up care is a key part of your treatment and safety. Be sure to make and go to all appointments, and call your

## 2024-12-02 NOTE — CARE COORDINATION
[employer] initiated via [employer]: APPROVED  [employer] Auth ID [id number]  SNF  12/02/2024 12/03/2024-12/05/2024  Approved

## 2024-12-02 NOTE — PROGRESS NOTES
Current GOLD classification       GOLD Stage:    Group:    Recorded domestic exacerbations past 12 months:    Current recorded COPD Assessment Tool (CAT) score of    Current eosinophil count: 0.4     Inhaler Device   Acceptable for Use   Respimat  Not Breath Actuated Yes   MDI  Not Breath Actuated Yes           DPI  Observed PIF   using  In-Check Meter   Optimal PIF   Acceptable for Use   HANDIHALER 55 >30 Y   Pressair 55 >45 Y   NEOHALER 55 >50 Y   Diskus 55 >60 N   ELLIPTA 55 >60 N     Records show this patient was using ALBUTEROL maintenance therapy prior to admission.          LONG-ACTING (LABA)   Arformoterol (Brovana) NEBULIZER   Indacaterol (Arcapta) NEOHALER   Olodaterol (Striverdi) Respimat   Salmeterol (Serevent) MDI, DISKUS   LONG-ACTING (LAMA)   Aclidinium bromide (Tudorza) PRESSAIR   Glycopyrronium bromide (Seebri) NEOHALER   Tiotropium (Spiriva) Respimat, HANDIHALER   Umeclidinium (Incruse) ELLIPTA   (LABA/LAMA)   Formoterol/glycopyrronium (Bevespi) MDI   Indacaterol/glycopyrronium (Utibron) NEOHALER   Vilanterol/umeclidinium (Anoro) ELLIPTA   Olodaterol/tiotropium (Stiolto) Respimat   (LABA/ICS)   Formoterol/budesomide (Symbicort) MDI   Formoterol/mometasone (Dulera) MDI   Salmeterol/fluticasone (Advair) MDI, DISKUS   Vilanterol/fluticasone (Breo) ELLIPTA   (LABA/LAMA/ICS)   Fluticasone/umeclidinium/vilanterol (Trelegy) ELLIPTA   Budesonide/glycopyrrolate/formoterol fumarate (Beztri) aerosphere      12/02/24 1505   Spirometry Assessment   FEV1 (%PRED) 55   Post Bronchodilator FEV/FVC 66   COPD Exacerbations in last year 0   PIF 55 L/min   COPD Assessment (CAT Score)   Cough Assessment 0   Phlegm Assessment 0   Chest tightness 1   Walking on an incline 3   Home Activities 2   Confident Leaving The Home 3   Sleeping Soundly 3   Have Energy 3   Assessment Score 15   $RT COPD Assessment Yes   GOLD Staging   Gold Grade 2   Group Group B

## 2024-12-02 NOTE — CONSULTS
[facility] Nurse  Consult Note       Felipe Heller Jr.  AGE: 74 y.o.   GENDER: male  : 1950  TODAY'S DATE:  2024    Subjective:     Reason for Evaluation and Assessment: wound care eval.      Felipe Heller Jr. is a 74 y.o. male referred by:   [x] Physician  [] Nursing  [] Other:     Wound Identification:  Wound Type: traumatic and skin tear  Contributing Factors: chronic pressure, decreased mobility, obesity, and fall        PAST MEDICAL HISTORY        Diagnosis Date    Anemia     per old chart    Arthritis     Back pain, chronic     \"have pain in lumbar mainly- have 5 bulging discs\"\"in my neck and my lumbar have herniated discs\"    Bipolar 1 disorder ([facility])     CAD (coronary artery disease) 2016    does not follow with a cardiologist    Cerebral artery occlusion with cerebral infarction ([facility])     \"had mini stroke in 2016- fast heart rate when I would stand up - smile drooping on one side- lased just the one day\"    Chronic kidney disease (CKD), stage III (moderate) ([facility])     CKD (chronic kidney disease)     per old chart- consult with Dr Diggs with admission 2016\"have low kidney function\"    COPD (chronic obstructive pulmonary disease) ([facility])     follow with Dr Alva    Diabetes mellitus, type 2 ([facility]) 2017    Diabetic peripheral neuropathy ([facility])     Diastolic CHF ([facility]) 2016    Gastric ulcer     H/O cardiovascular stress test 2014    EF 68% mild ischemia anterior wall    Heart murmur     \"see Dr Mendoza- last echo \" pt states\"I think they said I have a murmur but no chest pain or palpitations\"    Hiatal hernia     History of cardiac cath 2014    MODERATE  CAD      Hx of migraines     HX OTHER MEDICAL     \"have thinning of kidney walls- they found I had that 15 yrs ago\" see Dr Diggs\"    Hyperlipidemia     Hypertension     Intraparenchymal hematoma of brain     Lumbar radiculopathy     Overlapping malignant neoplasm of colon (HCC)  2020    Panic attack     'anything new gives me anxiety\"    Pleural effusion     scheduled for thoracentesis 2014, 2016    S/P thoracentesis     left side( per old chart had thora done 2016 and one done )    Sleep apnea     sleep study x 2 - last one 4-5 yrs ago- uses c-pap\"    SOBOE (shortness of breath on exertion)     Spinal stenosis        PAST SURGICAL HISTORY    Past Surgical History:   Procedure Laterality Date    CARPAL TUNNEL RELEASE Bilateral     COLONOSCOPY N/A 2020    COLONOSCOPY POLYPECTOMY SNARE/COLD BIOPSY WITH SPOT INK TATTOO performed by Katie Rodriguez MD at [facility] ENDOSCOPY    ELBOW SURGERY Left 's    FEMUR SURGERY Left 2024    FEMUR INTRAMEDULLARY NAIL FRANCHESKA INSERTION ANTEGRADE performed by Kalen Stratton MD at [facility] OR    KNEE SURGERY Bilateral     right knee x 2( scope)/ left knee- 7-8 yr ago    NECK SURGERY      C Spine fusion    SMALL INTESTINE SURGERY N/A 2020    BOWEL RESECTION EXTENDED RIGHT HEMICOLECTOMY performed by Zayda Moore MD at [facility] OR    THORACENTESIS Left 2013    THORACENTESIS  2016         THORACENTESIS Left 11/15/2016    Dr. Dutta 250mlsremoved     THORACOTOMY Left 2019    with Lysis of adhesions    THORACOTOMY Left 3/18/2019    THORACOSCOPY CONVERTED TO THORACOTOMY WITH LYSIS OF ADHESIONS performed by Thom Zapata MD at [facility] OR    TONSILLECTOMY      as a kid    UPPER GASTROINTESTINAL ENDOSCOPY N/A 2020    EGD BIOPSY performed by Katie Rodriguez MD at [facility] ENDOSCOPY       FAMILY HISTORY    Family History   Problem Relation Age of Onset    Heart Disease Mother         heart attack    High Blood Pressure Father        SOCIAL HISTORY    Social History     Tobacco Use    Smoking status: Former     Current packs/day: 0.00     Average packs/day: 1.5 packs/day for 30.0 years (45.0 ttl pk-yrs)     Types: Cigarettes     Start date: 1980     Quit date: 2010     Years since quittin.3    Smokeless

## 2024-12-02 NOTE — PLAN OF CARE
Problem: Chronic Conditions and Co-morbidities  Goal: Patient's chronic conditions and co-morbidity symptoms are monitored and maintained or improved  12/2/2024 0352 by Gina Bermudez LPN  Outcome: Progressing  12/1/2024 1538 by Meena Jackson RN  Outcome: Progressing     Problem: Discharge Planning  Goal: Discharge to home or other facility with appropriate resources  12/2/2024 0352 by Gina Bermudez LPN  Outcome: Progressing  12/1/2024 1538 by Meena Jackson RN  Outcome: Progressing     Problem: Pain  Goal: Verbalizes/displays adequate comfort level or baseline comfort level  12/2/2024 0352 by Gina Bermudez LPN  Outcome: Progressing  Flowsheets (Taken 12/1/2024 1600 by Irma Morgan RN)  Verbalizes/displays adequate comfort level or baseline comfort level: Administer analgesics based on type and severity of pain and evaluate response  12/1/2024 1538 by Meena Jackson RN  Outcome: Progressing     Problem: Skin/Tissue Integrity  Goal: Absence of new skin breakdown  Description: 1.  Monitor for areas of redness and/or skin breakdown  2.  Assess vascular access sites hourly  3.  Every 4-6 hours minimum:  Change oxygen saturation probe site  4.  Every 4-6 hours:  If on nasal continuous positive airway pressure, respiratory therapy assess nares and determine need for appliance change or resting period.  12/2/2024 0352 by Gina Bermudez LPN  Outcome: Progressing  12/1/2024 1538 by Meena Jackson RN  Outcome: Progressing     Problem: ABCDS Injury Assessment  Goal: Absence of physical injury  12/2/2024 0352 by Gina Bermudez LPN  Outcome: Progressing  12/1/2024 1538 by Meena Jackson RN  Outcome: Progressing     Problem: Safety - Adult  Goal: Free from fall injury  12/2/2024 0352 by Gina Bermudez LPN  Outcome: Progressing  12/1/2024 1538 by Meena Jackson RN  Outcome: Progressing

## 2024-12-02 NOTE — CONSULTS
CARDIOLOGY CONSULT NOTE         Reason for consultation: Postoperative cardiac management    Primary care physician: No primary care provider on file.      Chief Complaints :  Chief Complaint   Patient presents with    Fall    Hip Pain     left        History of present illness:Felipe is a 74 y.o.year old male who has previous history of coronary artery disease, atrial fibrillation, chronic HFpEF, CKD stage IIIb, chronic dementia who is status post hip fracture and hip surgery.  We are asked to see him for cardiac optimization after his surgery.  Upon my evaluation patient states that he has mild to moderate pain in left hip.  He states that he fell and broke his hip.  He denies any chest pain, shortness of breath at this time.    Review of Systems:     All systems negative except as marked.        Physical Examination:    Vitals:    12/02/24 0530   BP: (!) 154/84   Pulse:    Resp: 18   Temp:    SpO2:         General Appearance: Elderly  male, in no distress.  Constitutional:  No acute distress, non-toxic appearance.    HENT:  Normocephalic, Atraumatic,   Eyes:  PERRL, EOMI, Conjunctiva normal, No discharge.   Respiratory:  No respiratory distress, No wheezing  Cardiovascular: S1, S2, no murmurs, gallops. JVD wnl  Abdomen /GI:   Soft, No tenderness   Genitourinary: No costovertebral angle tenderness   Musculoskeletal: Left hip postop changes noted  Integument:  Well hydrated, no rash   Neurologic:  Alert & oriented x 3, no focal deficits noted       Medical decision making and Data review:    Lab Review   Recent Labs     12/02/24  0513   WBC 10.0   HGB 9.3*   HCT 30.5*   *      Recent Labs     12/02/24  0513      K 5.4*      CO2 24   BUN 22*   CREATININE 2.2*     Recent Labs     12/02/24  0513   AST 24   ALT <5*   BILITOT 0.2   ALKPHOS 60     No results for input(s): \"TROPONINT\" in the last 72 hours.    Recent Labs     12/02/24  0513   PROBNP 1,212*     Lab Results   Component Value Date

## 2024-12-02 NOTE — PROGRESS NOTES
Physician Progress Note      PATIENT:               KAYLEN VICK  CSN #:                  [id number]  :                       1950  ADMIT DATE:       2024 11:15 PM  DISCH DATE:  RESPONDING  PROVIDER #:        Scott Castrejon MD          QUERY TEXT:    Pt admitted with Left Femur Fracture. Pt noted to have HGB 13.2-11.3-9.3. If   possible, please document in the progress notes and discharge summary if you   are evaluating and/or treating any of the following:    The medical record reflects the following:  Risk Factors: Left Femur Fracture  Clinical Indicators:  HGB 13.2-11.3-9.3, HCT 42.3-36.4-30.5  Treatment: Left hip cephalomedullary nail, Labs, monitoring  Options provided:  -- Postoperative acute blood loss anemia  -- Precipitous drop in Hemoglobin and Hematocrit  -- Other - I will add my own diagnosis  -- Disagree - Not applicable / Not valid  -- Disagree - Clinically unable to determine / Unknown  -- Refer to Clinical Documentation Reviewer    PROVIDER RESPONSE TEXT:    This patient has a precipitous drop in Hemoglobin and Hematocrit.    Query created by: Daphne Shepard on 2024 12:24 PM      Electronically signed by:  Scott Castrejon MD 2024 12:26 PM

## 2024-12-03 LAB
ALBUMIN SERPL-MCNC: 3 G/DL (ref 3.4–5)
ALBUMIN/GLOB SERPL: 1.1 {RATIO} (ref 1.1–2.2)
ALP SERPL-CCNC: 68 U/L (ref 40–129)
ALT SERPL-CCNC: <5 U/L (ref 10–40)
ANION GAP SERPL CALCULATED.3IONS-SCNC: 9 MMOL/L (ref 9–17)
AST SERPL-CCNC: 26 U/L (ref 15–37)
BASOPHILS # BLD: 0.03 K/UL
BASOPHILS NFR BLD: 0 % (ref 0–1)
BILIRUB SERPL-MCNC: 0.4 MG/DL (ref 0–1)
BILIRUB UR QL STRIP: NEGATIVE
BNP SERPL-MCNC: 1260 PG/ML (ref 0–125)
BUN SERPL-MCNC: 21 MG/DL (ref 7–20)
CALCIUM SERPL-MCNC: 8.5 MG/DL (ref 8.3–10.6)
CHARACTER UR: ABNORMAL
CHLORIDE SERPL-SCNC: 107 MMOL/L (ref 99–110)
CLARITY UR: CLEAR
CO2 SERPL-SCNC: 24 MMOL/L (ref 21–32)
COLOR UR: YELLOW
CREAT SERPL-MCNC: 2 MG/DL (ref 0.8–1.3)
EOSINOPHIL # BLD: 0.11 K/UL
EOSINOPHILS RELATIVE PERCENT: 1 % (ref 0–3)
ERYTHROCYTE [DISTWIDTH] IN BLOOD BY AUTOMATED COUNT: 15.9 % (ref 11.7–14.9)
ERYTHROCYTE [DISTWIDTH] IN BLOOD BY AUTOMATED COUNT: 16.1 % (ref 11.7–14.9)
GFR, ESTIMATED: 33 ML/MIN/1.73M2
GLUCOSE BLD-MCNC: 108 MG/DL (ref 74–99)
GLUCOSE BLD-MCNC: 111 MG/DL (ref 74–99)
GLUCOSE BLD-MCNC: 123 MG/DL (ref 74–99)
GLUCOSE BLD-MCNC: 89 MG/DL (ref 74–99)
GLUCOSE BLD-MCNC: 96 MG/DL (ref 74–99)
GLUCOSE BLD-MCNC: 99 MG/DL (ref 74–99)
GLUCOSE SERPL-MCNC: 79 MG/DL (ref 74–99)
GLUCOSE UR STRIP-MCNC: NEGATIVE MG/DL
HCT VFR BLD AUTO: 28.3 % (ref 42–52)
HCT VFR BLD AUTO: 28.4 % (ref 42–52)
HGB BLD-MCNC: 8.5 G/DL (ref 13.5–18)
HGB BLD-MCNC: 8.8 G/DL (ref 13.5–18)
HGB UR QL STRIP.AUTO: ABNORMAL
IMM GRANULOCYTES # BLD AUTO: 0.06 K/UL
IMM GRANULOCYTES NFR BLD: 1 %
KETONES UR STRIP-MCNC: ABNORMAL MG/DL
LEUKOCYTE ESTERASE UR QL STRIP: NEGATIVE
LYMPHOCYTES NFR BLD: 1.49 K/UL
LYMPHOCYTES RELATIVE PERCENT: 17 % (ref 24–44)
MCH RBC QN AUTO: 28.2 PG (ref 27–31)
MCH RBC QN AUTO: 28.6 PG (ref 27–31)
MCHC RBC AUTO-ENTMCNC: 29.9 G/DL (ref 32–36)
MCHC RBC AUTO-ENTMCNC: 31.1 G/DL (ref 32–36)
MCV RBC AUTO: 91.9 FL (ref 78–100)
MCV RBC AUTO: 94.4 FL (ref 78–100)
MONOCYTES NFR BLD: 0.81 K/UL
MONOCYTES NFR BLD: 9 % (ref 0–4)
NEUTROPHILS NFR BLD: 71 % (ref 36–66)
NEUTS SEG NFR BLD: 6.19 K/UL
NITRITE UR QL STRIP: NEGATIVE
PH UR STRIP: 6 [PH] (ref 5–8)
PLATELET # BLD AUTO: 143 K/UL (ref 140–440)
PLATELET # BLD AUTO: 144 K/UL (ref 140–440)
PMV BLD AUTO: 9.3 FL (ref 7.5–11.1)
PMV BLD AUTO: 9.6 FL (ref 7.5–11.1)
POTASSIUM SERPL-SCNC: 4.3 MMOL/L (ref 3.5–5.1)
PROT SERPL-MCNC: 5.6 G/DL (ref 6.4–8.2)
PROT UR STRIP-MCNC: ABNORMAL MG/DL
RBC # BLD AUTO: 3.01 M/UL (ref 4.6–6.2)
RBC # BLD AUTO: 3.08 M/UL (ref 4.6–6.2)
RBC #/AREA URNS HPF: 11 /HPF (ref 0–2)
SODIUM SERPL-SCNC: 140 MMOL/L (ref 136–145)
SP GR UR STRIP: 1.02 (ref 1–1.03)
TRICHOMONAS #/AREA URNS HPF: ABNORMAL /[HPF]
UROBILINOGEN UR STRIP-ACNC: 0.2 EU/DL (ref 0–1)
WBC #/AREA URNS HPF: 7 /HPF (ref 0–5)
WBC OTHER # BLD: 8.7 K/UL (ref 4–10.5)
WBC OTHER # BLD: 8.9 K/UL (ref 4–10.5)

## 2024-12-03 PROCEDURE — 97112 NEUROMUSCULAR REEDUCATION: CPT

## 2024-12-03 PROCEDURE — 80053 COMPREHEN METABOLIC PANEL: CPT

## 2024-12-03 PROCEDURE — 97530 THERAPEUTIC ACTIVITIES: CPT

## 2024-12-03 PROCEDURE — 6370000000 HC RX 637 (ALT 250 FOR IP): Performed by: STUDENT IN AN ORGANIZED HEALTH CARE EDUCATION/TRAINING PROGRAM

## 2024-12-03 PROCEDURE — 85025 COMPLETE CBC W/AUTO DIFF WBC: CPT

## 2024-12-03 PROCEDURE — 83880 ASSAY OF NATRIURETIC PEPTIDE: CPT

## 2024-12-03 PROCEDURE — 36415 COLL VENOUS BLD VENIPUNCTURE: CPT

## 2024-12-03 PROCEDURE — 2580000003 HC RX 258: Performed by: STUDENT IN AN ORGANIZED HEALTH CARE EDUCATION/TRAINING PROGRAM

## 2024-12-03 PROCEDURE — 85027 COMPLETE CBC AUTOMATED: CPT

## 2024-12-03 PROCEDURE — 94761 N-INVAS EAR/PLS OXIMETRY MLT: CPT

## 2024-12-03 PROCEDURE — 81001 URINALYSIS AUTO W/SCOPE: CPT

## 2024-12-03 PROCEDURE — 1200000000 HC SEMI PRIVATE

## 2024-12-03 PROCEDURE — 82962 GLUCOSE BLOOD TEST: CPT

## 2024-12-03 PROCEDURE — 51702 INSERT TEMP BLADDER CATH: CPT

## 2024-12-03 PROCEDURE — 6360000002 HC RX W HCPCS: Performed by: STUDENT IN AN ORGANIZED HEALTH CARE EDUCATION/TRAINING PROGRAM

## 2024-12-03 PROCEDURE — 2700000000 HC OXYGEN THERAPY PER DAY

## 2024-12-03 RX ORDER — ACETAMINOPHEN 500 MG
1000 TABLET ORAL 3 TIMES DAILY
Qty: 180 TABLET | Refills: 0 | Status: SHIPPED | OUTPATIENT
Start: 2024-12-03

## 2024-12-03 RX ORDER — ASPIRIN 81 MG/1
81 TABLET ORAL DAILY
Qty: 90 TABLET | Refills: 0 | Status: SHIPPED | OUTPATIENT
Start: 2024-12-03

## 2024-12-03 RX ORDER — OXYCODONE HYDROCHLORIDE 5 MG/1
5 TABLET ORAL EVERY 6 HOURS PRN
Qty: 28 TABLET | Refills: 0 | Status: SHIPPED | OUTPATIENT
Start: 2024-12-03 | End: 2024-12-04

## 2024-12-03 RX ORDER — ASPIRIN 81 MG/1
81 TABLET, CHEWABLE ORAL 2 TIMES DAILY
Qty: 30 TABLET | Refills: 3 | Status: SHIPPED | OUTPATIENT
Start: 2024-12-03

## 2024-12-03 RX ADMIN — SODIUM CHLORIDE, PRESERVATIVE FREE 10 ML: 5 INJECTION INTRAVENOUS at 00:13

## 2024-12-03 RX ADMIN — ACETAMINOPHEN 1000 MG: 500 TABLET ORAL at 17:19

## 2024-12-03 RX ADMIN — BENZTROPINE MESYLATE 0.5 MG: 1 TABLET ORAL at 21:05

## 2024-12-03 RX ADMIN — ASPIRIN 81 MG: 81 TABLET, CHEWABLE ORAL at 08:47

## 2024-12-03 RX ADMIN — SODIUM BICARBONATE 650 MG: 650 TABLET ORAL at 08:47

## 2024-12-03 RX ADMIN — MEMANTINE 10 MG: 10 TABLET ORAL at 08:47

## 2024-12-03 RX ADMIN — TRAZODONE HYDROCHLORIDE 25 MG: 50 TABLET ORAL at 21:06

## 2024-12-03 RX ADMIN — MEMANTINE 10 MG: 10 TABLET ORAL at 21:05

## 2024-12-03 RX ADMIN — OXYCODONE 5 MG: 5 TABLET ORAL at 04:53

## 2024-12-03 RX ADMIN — OXYCODONE 5 MG: 5 TABLET ORAL at 21:16

## 2024-12-03 RX ADMIN — DIVALPROEX SODIUM 250 MG: 250 TABLET, FILM COATED, EXTENDED RELEASE ORAL at 09:02

## 2024-12-03 RX ADMIN — BENZTROPINE MESYLATE 0.5 MG: 1 TABLET ORAL at 08:47

## 2024-12-03 RX ADMIN — DOCUSATE SODIUM 100 MG: 100 CAPSULE, LIQUID FILLED ORAL at 08:47

## 2024-12-03 RX ADMIN — SENNOSIDES AND DOCUSATE SODIUM 1 TABLET: 50; 8.6 TABLET ORAL at 08:47

## 2024-12-03 RX ADMIN — ACETAMINOPHEN 1000 MG: 500 TABLET ORAL at 08:47

## 2024-12-03 RX ADMIN — CLONIDINE HYDROCHLORIDE 0.1 MG: 0.1 TABLET ORAL at 14:58

## 2024-12-03 RX ADMIN — SENNOSIDES AND DOCUSATE SODIUM 1 TABLET: 50; 8.6 TABLET ORAL at 21:06

## 2024-12-03 RX ADMIN — LEVOTHYROXINE SODIUM 75 MCG: 0.07 TABLET ORAL at 04:55

## 2024-12-03 RX ADMIN — DOCUSATE SODIUM 100 MG: 100 CAPSULE, LIQUID FILLED ORAL at 21:06

## 2024-12-03 RX ADMIN — ENOXAPARIN SODIUM 40 MG: 100 INJECTION SUBCUTANEOUS at 08:49

## 2024-12-03 RX ADMIN — ATORVASTATIN CALCIUM 40 MG: 40 TABLET, FILM COATED ORAL at 08:46

## 2024-12-03 RX ADMIN — DIVALPROEX SODIUM 500 MG: 500 TABLET, DELAYED RELEASE ORAL at 21:06

## 2024-12-03 RX ADMIN — SODIUM BICARBONATE 650 MG: 650 TABLET ORAL at 21:06

## 2024-12-03 RX ADMIN — OXYCODONE 5 MG: 5 TABLET ORAL at 17:19

## 2024-12-03 RX ADMIN — Medication 6000 UNITS: at 08:46

## 2024-12-03 RX ADMIN — ESCITALOPRAM OXALATE 10 MG: 10 TABLET ORAL at 08:47

## 2024-12-03 RX ADMIN — CLONIDINE HYDROCHLORIDE 0.1 MG: 0.1 TABLET ORAL at 21:06

## 2024-12-03 ASSESSMENT — PAIN SCALES - GENERAL
PAINLEVEL_OUTOF10: 3
PAINLEVEL_OUTOF10: 9
PAINLEVEL_OUTOF10: 8

## 2024-12-03 ASSESSMENT — PAIN - FUNCTIONAL ASSESSMENT
PAIN_FUNCTIONAL_ASSESSMENT: PREVENTS OR INTERFERES SOME ACTIVE ACTIVITIES AND ADLS
PAIN_FUNCTIONAL_ASSESSMENT: PREVENTS OR INTERFERES WITH MANY ACTIVE NOT PASSIVE ACTIVITIES

## 2024-12-03 ASSESSMENT — PAIN DESCRIPTION - DESCRIPTORS
DESCRIPTORS: ACHING

## 2024-12-03 ASSESSMENT — PAIN DESCRIPTION - LOCATION
LOCATION: HIP;NECK
LOCATION: HIP
LOCATION: HIP

## 2024-12-03 ASSESSMENT — PAIN DESCRIPTION - PAIN TYPE: TYPE: SURGICAL PAIN

## 2024-12-03 ASSESSMENT — PAIN DESCRIPTION - ORIENTATION
ORIENTATION: LEFT
ORIENTATION: POSTERIOR;LEFT
ORIENTATION: LEFT

## 2024-12-03 ASSESSMENT — PAIN SCALES - WONG BAKER: WONGBAKER_NUMERICALRESPONSE: HURTS A LITTLE BIT

## 2024-12-03 NOTE — PROGRESS NOTES
Physical Therapy    Physical Therapy Treatment Note  Name: Felipe Heller Jr. MRN: [id number] :   1950   Date:  12/3/2024   Admission Date: 2024 Room:  [address] [place], [place] [address]   Restrictions/Precautions:  general precautions, falls, WBAT LLE  Communication with other providers:  RN approval for therapy session, co-tx with YOAV Castañeda  Subjective:  Patient states: \"Do you think she liked that one?\"  Pain:   Location, Type, Intensity (0/10 to 10/10):  8/10 pain in L hip at surgical site at rest. Pt requesting pain medication at end of session, YESENIA Webb notified.   Objective:    Observation: Supine in bed upon arrival, agreeable to therapy with verbal encouragement  Objective Measures:  stable vitals on 1.5L O2 throughout session    Treatment, including education/measures:  Supine to sit: maxA x2 for BLE advancement to EOB, hip pivot, and trunk uprighting. Verbal cues for sequencing    Sit to stand: from EOB to standing at RW, modA x2 for lift assist. Verbal cues for sequencing and UE placement  Stand to sit: from standing at RW to seated EOB, modA x2 for controlled descent to surface. Verbal cues for sequencing.  SPT: from EOB > recliner, modA x2 for lift assist and hip guidance. Pt able to bear weight and push through BLEs to assist with transfer. Verbal cues for sequencing    Seated balance: Pt maintains static sitting at EOB with good tolerance, close SBA for safety. Pt with improved upright posture this date. No LOB observed throughout  Standing balance: Pt completed brief period of static standing at RW, modA x2 for lift assist and stabilization. Pt remained slightly forward flexed throughout, improved with cues. Pt able to tolerate ~1 minute of standing before requesting to return to sitting due to fatigue.    Educated pt on PT POC, role of PT, progress towards goals, importance of OOB mobility, WBAT LLE    Assessment / Impression:    Patient's tolerance of treatment:  Fair   Adverse Reaction: none  Significant  change in status and impact:  improved activity tolerance  Barriers to improvement:  cognition, pain, activity tolerance    Plan for Next Session:    Bed mobility, transfers, gait, activity tolerance    Time in:  1448  Time out:  1517  Timed treatment minutes:  29  Total treatment time:  29    Previously filed items:  Social/Functional History  Lives With: Alone  Type of Home: Assisted living (from [facility])  Home Layout: One level  Home Access: Level entry  Bathroom Shower/Tub: Walk-in shower  Bathroom Toilet: Handicap height  Bathroom Equipment: Shower chair  Home Equipment: Wheelchair - Manual  Receives Help From: Family  Prior Level of Assist for ADLs: Needs assistance  Prior Level of Assist for Homemaking: Needs assistance  Prior Level of Assist for Transfers: Needs assistance  Active : No        Short Term Goals  Time Frame for Short Term Goals: 1 week  Short Term Goal 1: Pt will complete roll to L and R side with use of bed features, David  Short Term Goal 2: Pt will complete supine <> sit modA  Short Term Goal 3: Pt will complete sit <> stand modA x2  Short Term Goal 4: Pt will complete SPT between level surfaces modA x2  Short Term Goal 5: Pt will ambulate 10ft with LRAD modA x2  Additional Goals?: Yes  Short Term Goal 6: Pt will complete light dynamic seated activity x10 minutes with single UE support as needed, supervision    Electronically signed by:    Diana Bass, PT  12/3/2024, 4:43 PM

## 2024-12-03 NOTE — PROGRESS NOTES
[facility]    Orthopedic Surgery   ?     Progress Note     Name:  Felipe Heller Jr.  Date/Time of Admission: 2024 11:15 PM   CSN: [id number]  Attending Provider: Renaldo Mondragon MD   Room/Bed:  1106/1106-A  : 1950  74 y.o.      Today's Date 12/3/2024   Hospital Day: 4    Assessment    74 y.o. male 2 Days Post-Op S/P Procedure(s):  FEMUR INTRAMEDULLARY NAIL FRANCHESKA INSERTION ANTEGRADE    Principal Problem:    Closed fracture of left femur, initial encounter ([facility])  Resolved Problems:    * No resolved hospital problems. *      Plan   WB status: WBAT LLE  Antibiotics: Complete  DVT Prophylaxis: SCDs, Lovenox, Pt will require at least 4 weeks of 81mg ASA BID from orthopedic perspective   Hb 8.8 today  Pain control  PT/OT  Dispo planning  Patient is okay for discharge from an orthopedic standpoint as long as safe discharge disposition in place and patient meeting PT goals for discharge  Prescription for DVT prophylaxis and pain medication in chart  Appropriate follow-up instructions and discharge instructions in electronic chart  Please call with questions.     Subjective   No acute events overnight. No concerns per nursing. Tolerating PO intake. No new complaints. Pain controlled. Denies numbness or paresthesias. All questions answered.     Objective     Vitals:    24 1139   BP:    Pulse: 91   Resp:    Temp:    SpO2:     Temp (48hrs), Av.7 °F (36.5 °C), Min:97.1 °F (36.2 °C), Max:98.2 °F (36.8 °C)      EXAM:  Gen: NAD, lying in bed   LLE: Dressing in place, no strike through. +DF/PF/EHL/FHL. SILT Sa/SP/DP/T/James. Tips of toes symmetrically warm to contralateral side, brisk capillary refill    Labs:   CBC/COAGS  Recent Labs     24  0655 24  0513 24  0931 24  1103   WBC 8.7   < > 8.7 8.9   HGB 11.3*   < > 8.5* 8.8*   HCT 36.4*   < > 28.4* 28.3*   PLT  --    < > 144 143   INR 1.0  --   --   --     < > = values in this interval not displayed.

## 2024-12-03 NOTE — CARE COORDINATION
Chart reviewed and patient discussed in IDR. Precert for [facility] approved. [facility] can [facility] to [facility] once medically ready to discharge.

## 2024-12-03 NOTE — CONSULTS
Consult received, chart review complete.  No IV medications at this time, therapeutic needs currently met without PIV access.  Please re-consult for any further needs.    Consult the Vascular Access Team for questions, concerns, or change in patient's condition.

## 2024-12-03 NOTE — PROGRESS NOTES
Occupational Therapy    Occupational Therapy Treatment Note    Name: Felipe Heller Jr. MRN: [id number] :   1950   Date:  12/3/2024   Admission Date: 2024 Room:  [address] [place] [address]     Primary Problem: Lt hip intertrochanteric fracture s/p Cephalomedullary nailing    Restrictions/Precautions: General Precautions, Fall Risk, WBAT Lt LE, 1.5L o2, Telemetry, Bed exit alarm     Communication with other providers: PT YESENIA Ortiz    Subjective:  Patient states: \"My whole left hip hurts!\"  Pain: Pt reported 8/10 surgical pain in Lt hip at rest    Objective:    Observation: Pt received in supine upon OT arrival. Pleasantly confused, but cooperative with treatment.  Objective Measures: Stable vitals throughout session, pt disoriented to full situation    Treatment, including education:  Therapeutic Activity Training:   Therapeutic activity training was instructed today.  Cues were given for safety, sequence, UE/LE placement, awareness, and balance.    Neuro-Muscular re-education:  Cues were given for position, posture, kinesthetic sense, safety, recruitment, and rationale.  Cues were verbal and/or tactile.    Pt received in supine upon OT arrival. Re-educated on role of OT, POC, WBAT status Lt LE, and importance of EOB/OOB activity  Pt dependent with donning BL socks at bed level  Pt transferred supine to sitting EOB max A x 2 with HOB elevated to 40'. Significant assist required for scooting LEs to edge and max trunk boost required for uprighting  Pt able to sit at EOB level initially CGA but progressing to SBA with improved postural control and tolerance with WB through Lt hip this date; pt required intermittent cues for maintaining anterior pelvic tilt and midline orientation  Pt completed sit to stand transfer from bed mod A x 2 with mod cues for technique/safe hand placement   Pt stood at RW x 1 minute with mod A x 2 for support and max verbal/tactile cues for trunk extension/upright posture  Pt fatigued

## 2024-12-03 NOTE — PROGRESS NOTES
V2.0    [facility] Progress Note      Name:  Felipe Heller Jr. /Age/Sex: 1950  (74 y.o. male)   MRN & CSN:  [id number] & [id number] Encounter Date/Time: 12/3/2024 10:21 AM EST   [facility] PCP: No primary care provider on file.     Attending:Renaldo Mondragon MD       Hospital Day: 4    Assessment and Recommendations   Felipe Heller Jr. is a 74 y.o. male with pmh of hypertension, hyperlipidemia, CKD stage IV, CAD, atrial fibrillation, GERD, type 2 diabetes mellitus, hypothyroidism, SAMANTHA, bipolar disorder, chronic dementia who presents with Closed fracture of left femur, initial encounter ([facility])      # Left Femur Fracture S/p FEMUR INTRAMEDULLARY NAIL FRANCHESKA INSERTION ANTEGRADE : Patient presents following a mechanical fall   Imaging shows intertrochanteric fracture of proximal left femur, Neurovascularly intact, Vit D level low - supplemented, PT/OT evaluated and recommended SNF, Ortho note reviewed - Will need DVT prophylaxis for at least 4 weeks on DC, Oxy and Morphine for Pain.      # CKD 3b with hyperkalemia: Cr at baseline, start PO bicarb due to HCO3 <22. Start PO bicarb 650mg BID, titrate to HCO3 of 24, CR stable at 2.2, no labs from today    # Chronic HFpEF: Euvolemic on exam, BNP and trop elevated, Cardiology consulted for post op optimization given he is high cardiac risk    Evaluated by cardiology today - Patient stable from cardiac stand point. Continue present management and follow up as OP    # Afib: not on AC due to hx of GI Bleed and ICH    # CAD: Continue aspirin, statin, not on beta-blocker due to bradycardia     # T2DM: Last A1c 6.7%, LCSI, Hold PO meds, Hypoglycemic protocol     # Chronic Dementia: Continue namenda     # HTN: On clonidine, aldactone and lisinopril but held due to hypotension and hyperkalemia as hypotension resolved restart clonidine and restart lisinopril and Aldactone once potassium is normalized.    # HLD: Continue statin     # GERD: Continue    Component Value Date/Time    LABA1C 6.7 10/16/2024 07:24 AM     TSH:   Lab Results   Component Value Date/Time    TSH 0.46 09/12/2024 06:39 AM     Troponin:   Lab Results   Component Value Date/Time    TROPONINT 0.043 05/27/2023 02:41 AM    TROPONINT 0.031 05/26/2023 04:57 PM    TROPONINT 0.058 02/22/2022 11:03 AM     Lactic Acid: No results for input(s): \"LACTA\" in the last 72 hours.  BNP:   Recent Labs     12/02/24  0513   PROBNP 1,212*     UA:  Lab Results   Component Value Date/Time    NITRU NEGATIVE 12/03/2024 06:30 AM    COLORU Yellow 12/03/2024 06:30 AM    PHUR 6.0 12/03/2024 06:30 AM    WBCUA 7 12/03/2024 06:30 AM    RBCUA 11 12/03/2024 06:30 AM    RBCUA 13 07/17/2024 10:30 AM    MUCUS RARE 07/17/2024 10:30 AM    TRICHOMONAS None seen 12/03/2024 06:30 AM    YEAST FEW 02/21/2024 04:15 AM    BACTERIA NEGATIVE 07/17/2024 10:30 AM    CLARITYU CLEAR 07/17/2024 10:30 AM    LEUKOCYTESUR NEGATIVE 12/03/2024 06:30 AM    UROBILINOGEN 0.2 12/03/2024 06:30 AM    BILIRUBINUR NEGATIVE 12/03/2024 06:30 AM    BLOODU TRACE 07/17/2024 10:30 AM    GLUCOSEU NEGATIVE 12/03/2024 06:30 AM    KETUA TRACE 12/03/2024 06:30 AM    AMORPHOUS OCCASIONAL 03/16/2019 10:21 AM     Urine Cultures: No results found for: \"LABURIN\"  Blood Cultures: No results found for: \"BC\"  No results found for: \"BLOODCULT2\"  Organism:   Lab Results   Component Value Date/Time    ORG STP 05/27/2017 02:45 PM         Electronically signed by Renaldo Mondragon MD on 12/3/2024 at 10:21 AM

## 2024-12-04 VITALS
BODY MASS INDEX: 30.93 KG/M2 | HEART RATE: 96 BPM | RESPIRATION RATE: 18 BRPM | DIASTOLIC BLOOD PRESSURE: 97 MMHG | HEIGHT: 70 IN | WEIGHT: 216.05 LBS | TEMPERATURE: 98 F | SYSTOLIC BLOOD PRESSURE: 141 MMHG | OXYGEN SATURATION: 97 %

## 2024-12-04 LAB
BNP SERPL-MCNC: 1917 PG/ML (ref 0–125)
GLUCOSE BLD-MCNC: 103 MG/DL (ref 74–99)
GLUCOSE BLD-MCNC: 110 MG/DL (ref 74–99)
GLUCOSE BLD-MCNC: 128 MG/DL (ref 74–99)
GLUCOSE BLD-MCNC: 147 MG/DL (ref 74–99)
GLUCOSE BLD-MCNC: 98 MG/DL (ref 74–99)

## 2024-12-04 PROCEDURE — 97530 THERAPEUTIC ACTIVITIES: CPT

## 2024-12-04 PROCEDURE — 6360000002 HC RX W HCPCS: Performed by: STUDENT IN AN ORGANIZED HEALTH CARE EDUCATION/TRAINING PROGRAM

## 2024-12-04 PROCEDURE — 94761 N-INVAS EAR/PLS OXIMETRY MLT: CPT

## 2024-12-04 PROCEDURE — 51798 US URINE CAPACITY MEASURE: CPT

## 2024-12-04 PROCEDURE — 6370000000 HC RX 637 (ALT 250 FOR IP): Performed by: STUDENT IN AN ORGANIZED HEALTH CARE EDUCATION/TRAINING PROGRAM

## 2024-12-04 PROCEDURE — 2700000000 HC OXYGEN THERAPY PER DAY

## 2024-12-04 PROCEDURE — 36415 COLL VENOUS BLD VENIPUNCTURE: CPT

## 2024-12-04 PROCEDURE — 83880 ASSAY OF NATRIURETIC PEPTIDE: CPT

## 2024-12-04 PROCEDURE — 82962 GLUCOSE BLOOD TEST: CPT

## 2024-12-04 RX ORDER — TAMSULOSIN HYDROCHLORIDE 0.4 MG/1
0.4 CAPSULE ORAL DAILY
Qty: 30 CAPSULE | Refills: 3 | Status: SHIPPED | OUTPATIENT
Start: 2024-12-04

## 2024-12-04 RX ORDER — CHOLECALCIFEROL (VITAMIN D3) 50 MCG
TABLET ORAL
Qty: 60 TABLET | Refills: 1 | Status: SHIPPED | OUTPATIENT
Start: 2024-12-04 | End: 2025-01-08

## 2024-12-04 RX ORDER — OXYCODONE HYDROCHLORIDE 5 MG/1
5 TABLET ORAL EVERY 6 HOURS PRN
Qty: 12 TABLET | Refills: 0 | Status: SHIPPED | OUTPATIENT
Start: 2024-12-04 | End: 2024-12-07

## 2024-12-04 RX ORDER — TAMSULOSIN HYDROCHLORIDE 0.4 MG/1
0.4 CAPSULE ORAL DAILY
Status: DISCONTINUED | OUTPATIENT
Start: 2024-12-04 | End: 2024-12-04 | Stop reason: HOSPADM

## 2024-12-04 RX ADMIN — LISINOPRIL 20 MG: 20 TABLET ORAL at 08:07

## 2024-12-04 RX ADMIN — OXYCODONE 5 MG: 5 TABLET ORAL at 08:07

## 2024-12-04 RX ADMIN — SENNOSIDES AND DOCUSATE SODIUM 1 TABLET: 50; 8.6 TABLET ORAL at 08:06

## 2024-12-04 RX ADMIN — BENZTROPINE MESYLATE 0.5 MG: 1 TABLET ORAL at 19:54

## 2024-12-04 RX ADMIN — OXYCODONE 5 MG: 5 TABLET ORAL at 17:22

## 2024-12-04 RX ADMIN — ACETAMINOPHEN 1000 MG: 500 TABLET ORAL at 08:08

## 2024-12-04 RX ADMIN — CLONIDINE HYDROCHLORIDE 0.1 MG: 0.1 TABLET ORAL at 14:06

## 2024-12-04 RX ADMIN — Medication 6000 UNITS: at 08:08

## 2024-12-04 RX ADMIN — LEVOTHYROXINE SODIUM 75 MCG: 0.07 TABLET ORAL at 05:50

## 2024-12-04 RX ADMIN — DIVALPROEX SODIUM 500 MG: 500 TABLET, DELAYED RELEASE ORAL at 20:01

## 2024-12-04 RX ADMIN — ASPIRIN 81 MG: 81 TABLET, CHEWABLE ORAL at 08:08

## 2024-12-04 RX ADMIN — ESCITALOPRAM OXALATE 10 MG: 10 TABLET ORAL at 08:08

## 2024-12-04 RX ADMIN — DIVALPROEX SODIUM 250 MG: 250 TABLET, FILM COATED, EXTENDED RELEASE ORAL at 08:19

## 2024-12-04 RX ADMIN — MEMANTINE 10 MG: 10 TABLET ORAL at 08:09

## 2024-12-04 RX ADMIN — CLONIDINE HYDROCHLORIDE 0.1 MG: 0.1 TABLET ORAL at 20:01

## 2024-12-04 RX ADMIN — DOCUSATE SODIUM 100 MG: 100 CAPSULE, LIQUID FILLED ORAL at 08:08

## 2024-12-04 RX ADMIN — DOCUSATE SODIUM 100 MG: 100 CAPSULE, LIQUID FILLED ORAL at 19:53

## 2024-12-04 RX ADMIN — ACETAMINOPHEN 1000 MG: 500 TABLET ORAL at 17:23

## 2024-12-04 RX ADMIN — ATORVASTATIN CALCIUM 40 MG: 40 TABLET, FILM COATED ORAL at 08:08

## 2024-12-04 RX ADMIN — CLONIDINE HYDROCHLORIDE 0.1 MG: 0.1 TABLET ORAL at 05:50

## 2024-12-04 RX ADMIN — ACETAMINOPHEN 1000 MG: 500 TABLET ORAL at 00:25

## 2024-12-04 RX ADMIN — MEMANTINE 10 MG: 10 TABLET ORAL at 19:54

## 2024-12-04 RX ADMIN — BENZTROPINE MESYLATE 0.5 MG: 1 TABLET ORAL at 08:08

## 2024-12-04 RX ADMIN — TAMSULOSIN HYDROCHLORIDE 0.4 MG: 0.4 CAPSULE ORAL at 17:22

## 2024-12-04 RX ADMIN — SPIRONOLACTONE 25 MG: 50 TABLET ORAL at 08:08

## 2024-12-04 RX ADMIN — SODIUM BICARBONATE 650 MG: 650 TABLET ORAL at 08:06

## 2024-12-04 RX ADMIN — ENOXAPARIN SODIUM 40 MG: 100 INJECTION SUBCUTANEOUS at 08:07

## 2024-12-04 RX ADMIN — SENNOSIDES AND DOCUSATE SODIUM 1 TABLET: 50; 8.6 TABLET ORAL at 19:54

## 2024-12-04 RX ADMIN — SODIUM BICARBONATE 650 MG: 650 TABLET ORAL at 19:54

## 2024-12-04 RX ADMIN — TRAZODONE HYDROCHLORIDE 25 MG: 50 TABLET ORAL at 19:53

## 2024-12-04 ASSESSMENT — PAIN DESCRIPTION - ORIENTATION
ORIENTATION: LEFT

## 2024-12-04 ASSESSMENT — PAIN DESCRIPTION - LOCATION
LOCATION: HIP

## 2024-12-04 ASSESSMENT — PAIN SCALES - WONG BAKER: WONGBAKER_NUMERICALRESPONSE: HURTS A LITTLE BIT

## 2024-12-04 ASSESSMENT — PAIN DESCRIPTION - DESCRIPTORS
DESCRIPTORS: ACHING
DESCRIPTORS: ACHING;DISCOMFORT

## 2024-12-04 ASSESSMENT — PAIN SCALES - GENERAL
PAINLEVEL_OUTOF10: 7
PAINLEVEL_OUTOF10: 7
PAINLEVEL_OUTOF10: 5

## 2024-12-04 NOTE — PROGRESS NOTES
12/04/24 1625   Encounter Summary   Encounter Overview/Reason Spiritual/Emotional Needs   Encounter Code  Assessment by  services   Service Provided For Patient   Referral/Consult From [facility] [place]   Support System Family members   Last Encounter  12/04/24  (Pt expressed feeling ok today, he liked to talk, hard to hear at times, but he enjoyed talking about his martin, he shared his fear of some things, he fidns meaning and hope in God, open to prayer, and expressed cece as a result, high risk readmit)   Complexity of Encounter Low   Begin Time 1610   End Time  1626   Total Time Calculated 16 min   Spiritual/Emotional needs   Type Spiritual Support   Assessment/Intervention/Outcome   Assessment Calm;Concerns with suffering;Hopeful;Stress overload   Intervention Active listening;Discussed illness injury and it’s impact;Discussed relationship with God;Prayer (assurance of)/[place]elle;Sustaining Presence/Ministry of presence   Outcome Comfort;Coping;Expressed feelings, needs, and concerns;Expressed Gratitude   Plan and Referrals   Plan/Referrals Continue Support (comment)

## 2024-12-04 NOTE — CARE COORDINATION
This RN CM set up transport for 1:30pm with [facility]. Voiding trial pending at this time. Pt placed on will call. CM following.

## 2024-12-04 NOTE — PROGRESS NOTES
Physical Therapy Treatment Note  Name: Felipe Heller Jr. MRN: [id number] :   1950   Date:  2024   Admission Date: 2024 Room:  [address] [place] [address]   Restrictions/Precautions:general precautions, falls, WBAT LLE   Communication with other providers:  Pt okay to see for therapy per RN   Subjective:  Patient states:  Agreeable to session.   Pain:   Location, Type, Intensity (0/10 to 10/10):  Endorses   Objective:    Observation:  Pt supine in bed upon PTA arrival.   Objective Measures:  Tele, stable  Treatment, including education/measures:    Therapeutic Activity Training:   Therapeutic activity training was instructed today.  Cues were given for safety, sequence, UE/LE placement, awareness, and balance. Activities performed today included bed mobility training, sup-sit, sit-stand, SPT.    Mobility:  Sup > sit: Max A x 2 with HOB raised.   Scooting: Max A x 2 to scoot to EOB.   STS: Mod A x 2 from EOB to RW, pt stood for ~1min with attempt at step-pivot transfer.  SPT: Pt unable to perform step-pivot transfer dt difficulty advancing LEs toward recliner. Squat-pivot transfer performed from EOB > recliner with Mod A x 2.     Safety:   Pt returned safely to recliner with chair alarm activated, call light in reach, all needs met.   Assessment / Impression:    Pt tolerated OOB activities well this date but with substantial functional mobility deficits noted, significant pain limiting ambulation and movement.   Patient's tolerance of treatment: Fair   Adverse Reaction: none  Significant change in status and impact:  none  Barriers to improvement:  none  Plan for Next Session:    Plan to continue OOB activities.   Time in:  1130  Time out:  1153  Timed treatment minutes:  23  Total treatment time:  23    Previously filed items:  Social/Functional History  Lives With: Alone  Type of Home: [facility]isted living (from [facility])  Home Layout: One level  Home Access: Level entry  Bathroom Shower/Tub: Walk-in shower  Bathroom  Toilet: Handicap height  Bathroom Equipment: Shower chair  Home Equipment: Wheelchair - Manual  Receives Help From: Family  Prior Level of Assist for ADLs: Needs assistance  Prior Level of Assist for Homemaking: Needs assistance  Prior Level of Assist for Transfers: Needs assistance  Active : No        Short Term Goals  Time Frame for Short Term Goals: 1 week  Short Term Goal 1: Pt will complete roll to L and R side with use of bed features, David  Short Term Goal 2: Pt will complete supine <> sit modA  Short Term Goal 3: Pt will complete sit <> stand modA x2  Short Term Goal 4: Pt will complete SPT between level surfaces modA x2  Short Term Goal 5: Pt will ambulate 10ft with LRAD modA x2  Additional Goals?: Yes  Short Term Goal 6: Pt will complete light dynamic seated activity x10 minutes with single UE support as needed, supervision    Electronically signed by:    Marlys Cordoba, PTA  12/4/2024, 3:54 PM

## 2024-12-04 NOTE — PROGRESS NOTES
Occupational Therapy    Occupational Therapy Treatment Note    Name: Felipe Heller Jr. MRN: [id number] :   1950   Date:  2024   Admission Date: 2024 Room:  [address] [place] [address]     Restrictions/Precautions:General Precautions, Fall Risk, WBAT Lt LE, 1.5L o2, Telemetry, Bed exit alarm     Communication with other providers: RN approved session, co tx with PTA for tolerance.     Subjective:  Patient states:  Pt agreeable to therapy session.   Pain:   noted pain with mobility in LLE did not rate.     Objective:    Observation: Pt supine in bed upon arrival.    Objective Measures:  Pt alert to self.     Treatment, including education:  Therapeutic Activity Training:   Therapeutic activity training was instructed today.  Cues were given for safety, sequence, UE/LE placement, awareness, and balance.    Activities performed today included bed mobility training, sup-sit, sit-stand, SPT.    Pt completed supine to sit with head of bed elevated and use of bed rails with MAX A x 2.  Pt completed sitting edge of bed with close SBA and completed sit to stand from edge of bed with WW and  MOD A x2 and completed static stand with CGA. Pt unable to take steps this date. Pt seated edge of bed. Pt completed second sit to stand with arm in arm assist with MOD A x2 for squat pivot to chair with MOD A x2. Pt seated in chair and completed boost in chair with partial stand with MOD A x2.  Pt completed seated in chair and BP assessed due to dizziness and BP appeared high and RN aware. Pt needs met and call light in reach and chair alarm on.     Assessment / Impression:    Patient's tolerance of treatment: fair  Adverse Reaction: None  Significant change in status and impact:   Barriers to improvement: None noted      Plan for Next Session:    Continue OT POC    Time in:  1130  Time out:  1153  Timed treatment minutes:  23  Total treatment time:  23      Electronically signed by:      JENNI Dsouza

## 2024-12-04 NOTE — DISCHARGE SUMMARY
already taking a medication with the same name, and this prescription was added. Make sure you understand how and when to take each.     * aspirin 81 MG chewable tablet  Take 1 tablet by mouth 2 times daily  What changed: when to take this     * divalproex 250 MG DR tablet  Commonly known as: DEPAKOTE  What changed: Another medication with the same name was removed. Continue taking this medication, and follow the directions you see here.     * divalproex 500 MG DR tablet  Commonly known as: DEPAKOTE  What changed: Another medication with the same name was removed. Continue taking this medication, and follow the directions you see here.           * This list has 4 medication(s) that are the same as other medications prescribed for you. Read the directions carefully, and ask your doctor or other care provider to review them with you.                CONTINUE taking these medications      albuterol sulfate  (90 Base) MCG/ACT inhaler  Commonly known as: PROVENTIL;VENTOLIN;PROAIR     atorvastatin 40 MG tablet  Commonly known as: LIPITOR     benztropine 0.5 MG tablet  Commonly known as: COGENTIN  Take 1 tablet by mouth 2 times daily     Biofreeze Cool The Pain 4 % Gel  Generic drug: Menthol (Topical Analgesic)     carboxymethylcellulose 1 % ophthalmic solution     cloNIDine 0.1 MG tablet  Commonly known as: CATAPRES  Take 1 tablet by mouth 2 times daily     Compression Stockings Misc  by Does not apply route Duration of Treatment:  3 Months  # of pairs:  2    Compression Class Level - 20-30 mmHg*    Style - Knee High     cyclobenzaprine 10 MG tablet  Commonly known as: FLEXERIL  Take 1 tablet by mouth 3 times daily as needed for Muscle spasms     docusate sodium 100 MG capsule  Commonly known as: Colace  Take 1 capsule by mouth 2 times daily Hold for diarrhea     escitalopram 10 MG tablet  Commonly known as: LEXAPRO  Take 2 tablets by mouth daily     famotidine 10 MG tablet  Commonly known as: PEPCID  Take 1 tablet  by mouth 2 times daily     levothyroxine 75 MCG tablet  Commonly known as: SYNTHROID     lidocaine 4 % external patch     lisinopril 20 MG tablet  Commonly known as: PRINIVIL;ZESTRIL     memantine 10 MG tablet  Commonly known as: NAMENDA     prazosin 1 MG capsule  Commonly known as: MINIPRESS     sennosides-docusate sodium 8.6-50 MG tablet  Commonly known as: SENOKOT-S     silver sulfADIAZINE 1 % cream  Commonly known as: SILVADENE     sodium bicarbonate 650 MG tablet     spironolactone 25 MG tablet  Commonly known as: Aldactone  Take 1 tablet by mouth daily     traZODone 50 MG tablet  Commonly known as: DESYREL            STOP taking these medications      HYDROcodone-acetaminophen 7.5-325 MG per tablet  Commonly known as: NORCO               Where to Get Your Medications        These medications were sent to [facility]  [address] - P [phone] - F [phone]  [address] [place] [address]      Phone: [phone]   tamsulosin 0.4 MG capsule  vitamin D 50 MCG (2000 UT) Tabs tablet       These medications were sent to [address] - P [phone] - F [phone]  [address] [place] [address]      Phone: [phone]   acetaminophen 500 MG tablet  aspirin 81 MG chewable tablet  aspirin 81 MG EC tablet       You can get these medications from any pharmacy    Bring a paper prescription for each of these medications  oxyCODONE 5 MG immediate release tablet        Objective Findings at Discharge:   /79   Pulse 82   Temp 97.3 °F (36.3 °C) (Oral)   Resp 20   Ht 1.778 m (5' 10\")   Wt 98 kg (216 lb 0.8 oz)   SpO2 98%   PF (!) 18 L/min   BMI 31.00 kg/m²       Physical Exam:   General: Afebrile, no distress  Eyes: EOMI  ENT: neck supple  Cardiovascular: S1 S2 normal no murmur  Respiratory: Clear to auscultation  Gastrointestinal: Soft, non tender  Genitourinary: no suprapubic

## 2024-12-04 NOTE — PLAN OF CARE
Problem: Safety - Adult  Goal: Free from fall injury  12/3/2024 2340 by Cheli Blanco LPN  Outcome: Progressing  12/3/2024 1143 by Tracey Ortiz RN  Outcome: Progressing

## 2024-12-04 NOTE — PLAN OF CARE
Problem: Safety - Adult  Goal: Free from fall injury  12/4/2024 1104 by Tracey Ortiz, RN  Outcome: Progressing  12/3/2024 2340 by Cheli Blanco LPN  Outcome: Progressing

## 2024-12-04 NOTE — DISCHARGE INSTR - COC
Continuity of Care Form    Patient Name: Felipe Heller Jr.   :  1950  MRN:  [id number]    Admit date:  2024  Discharge date:  ***    Code Status Order: Full Code   Advance Directives:   Advance Care Flowsheet Documentation             Admitting Physician:  Noreen Osuna MD  PCP: No primary care provider on file.    Discharging Nurse: ***  Discharging Hospital Unit/Room#: 1106/1106-A  Discharging Unit Phone Number: ***    Emergency Contact:   Extended Emergency Contact Information  Primary Emergency Contact: Jamil Heller  Home Phone: [phone]  Work Phone: [phone]  Mobile Phone: [phone]  Relation: Child  Secondary Emergency Contact: Lloyd Heller  Home Phone: [phone]  Work Phone: [phone]  Mobile Phone: [phone]  Relation: Child   needed? No    Past Surgical History:  Past Surgical History:   Procedure Laterality Date    CARPAL TUNNEL RELEASE Bilateral     COLONOSCOPY N/A 2020    COLONOSCOPY POLYPECTOMY SNARE/COLD BIOPSY WITH SPOT INK TATTOO performed by Katie Rodriguez MD at [facility] ENDOSCOPY    ELBOW SURGERY Left 's    FEMUR SURGERY Left 2024    FEMUR INTRAMEDULLARY NAIL FRANCHESKA INSERTION ANTEGRADE performed by Kalen Stratton MD at [facility] OR    KNEE SURGERY Bilateral     right knee x 2( scope)/ left knee- 7-8 yr ago    NECK SURGERY      C Spine fusion    SMALL INTESTINE SURGERY N/A 2020    BOWEL RESECTION EXTENDED RIGHT HEMICOLECTOMY performed by Zayda Moore MD at [facility] OR    THORACENTESIS Left 2013    THORACENTESIS  2016         THORACENTESIS Left 11/15/2016    Dr. Dutta 250mlsremoved     THORACOTOMY Left 2019    with Lysis of adhesions    THORACOTOMY Left 3/18/2019    THORACOSCOPY CONVERTED TO THORACOTOMY WITH LYSIS OF ADHESIONS performed by Thom Zapata MD at [facility] OR    TONSILLECTOMY      as a kid    UPPER GASTROINTESTINAL ENDOSCOPY N/A 2020    EGD BIOPSY performed by Katie Rodriguez MD at [facility] ENDOSCOPY  Assessment Score:  Readmission Risk              Risk of Unplanned Readmission:  31           Discharging to Facility/ Agency   Name:   Address:  Phone:  Fax:    Dialysis Facility (if applicable)   Name:  Address:  Dialysis Schedule:  Phone:  Fax:    / signature: {Esignature:[id number]}    PHYSICIAN SECTION    Prognosis: Good    Condition at Discharge: Stable    Rehab Potential (if transferring to Rehab): Good    Recommended Labs or Other Treatments After Discharge: pt/ot    Physician Certification: I certify the above information and transfer of Felipe Heller   is necessary for the continuing treatment of the diagnosis listed and that he requires Skilled Nursing Facility for less 30 days.     Update Admission H&P: No change in H&P    PHYSICIAN SIGNATURE:  Electronically signed by Renaldo Mondragon MD on 12/4/24 at 8:01 AM EST

## 2024-12-06 ENCOUNTER — HOSPITAL ENCOUNTER (OUTPATIENT)
Age: 74
Setting detail: SPECIMEN
Discharge: HOME | End: 2024-12-06
Payer: MEDICARE

## 2024-12-06 LAB
ANION GAP SERPL CALCULATED.3IONS-SCNC: 11 MMOL/L (ref 9–17)
BASOPHILS # BLD: 0.02 K/UL
BASOPHILS NFR BLD: 0 % (ref 0–1)
BUN SERPL-MCNC: 24 MG/DL (ref 7–20)
CALCIUM SERPL-MCNC: 8.3 MG/DL (ref 8.3–10.6)
CHLORIDE SERPL-SCNC: 104 MMOL/L (ref 99–110)
CHOLEST SERPL-MCNC: 99 MG/DL (ref 125–199)
CO2 SERPL-SCNC: 24 MMOL/L (ref 21–32)
CREAT SERPL-MCNC: 1.7 MG/DL (ref 0.8–1.3)
EOSINOPHIL # BLD: 0.2 K/UL
EOSINOPHILS RELATIVE PERCENT: 3 % (ref 0–3)
ERYTHROCYTE [DISTWIDTH] IN BLOOD BY AUTOMATED COUNT: 16.5 % (ref 11.7–14.9)
EST. AVERAGE GLUCOSE BLD GHB EST-MCNC: 126 MG/DL
GFR, ESTIMATED: 39 ML/MIN/1.73M2
GLUCOSE SERPL-MCNC: 96 MG/DL (ref 74–99)
HBA1C MFR BLD: 6 % (ref 4.2–6.3)
HCT VFR BLD AUTO: 24.2 % (ref 42–52)
HDLC SERPL-MCNC: 33 MG/DL
HGB BLD-MCNC: 7.3 G/DL (ref 13.5–18)
IMM GRANULOCYTES # BLD AUTO: 0.1 K/UL
IMM GRANULOCYTES NFR BLD: 2 %
LDLC SERPL CALC-MCNC: 30 MG/DL
LYMPHOCYTES NFR BLD: 1.25 K/UL
LYMPHOCYTES RELATIVE PERCENT: 21 % (ref 24–44)
MCH RBC QN AUTO: 28.1 PG (ref 27–31)
MCHC RBC AUTO-ENTMCNC: 30.2 G/DL (ref 32–36)
MCV RBC AUTO: 93.1 FL (ref 78–100)
MONOCYTES NFR BLD: 0.51 K/UL
MONOCYTES NFR BLD: 8 % (ref 0–4)
NEUTROPHILS NFR BLD: 66 % (ref 36–66)
NEUTS SEG NFR BLD: 4.02 K/UL
PLATELET # BLD AUTO: 176 K/UL (ref 140–440)
PMV BLD AUTO: 8.9 FL (ref 7.5–11.1)
POTASSIUM SERPL-SCNC: 4.4 MMOL/L (ref 3.5–5.1)
RBC # BLD AUTO: 2.6 M/UL (ref 4.6–6.2)
SODIUM SERPL-SCNC: 139 MMOL/L (ref 136–145)
TRIGL SERPL-MCNC: 180 MG/DL
TSH SERPL DL<=0.05 MIU/L-ACNC: 2.42 UIU/ML (ref 0.27–4.2)
WBC OTHER # BLD: 6.1 K/UL (ref 4–10.5)

## 2024-12-06 PROCEDURE — 84443 ASSAY THYROID STIM HORMONE: CPT

## 2024-12-06 PROCEDURE — 80048 BASIC METABOLIC PNL TOTAL CA: CPT

## 2024-12-06 PROCEDURE — 83036 HEMOGLOBIN GLYCOSYLATED A1C: CPT

## 2024-12-06 PROCEDURE — 80061 LIPID PANEL: CPT

## 2024-12-06 PROCEDURE — 85025 COMPLETE CBC W/AUTO DIFF WBC: CPT

## 2024-12-06 PROCEDURE — 36415 COLL VENOUS BLD VENIPUNCTURE: CPT

## 2024-12-09 ENCOUNTER — HOSPITAL ENCOUNTER (OUTPATIENT)
Age: 74
Setting detail: SPECIMEN
Discharge: HOME | End: 2024-12-09
Payer: MEDICARE

## 2024-12-09 LAB
DATE LAST DOSE: ABNORMAL
TME LAST DOSE: ABNORMAL H
VALPROATE SERPL-MCNC: 49 UG/ML (ref 50–100)
VANCOMYCIN DOSE: ABNORMAL MG

## 2024-12-09 PROCEDURE — 36415 COLL VENOUS BLD VENIPUNCTURE: CPT

## 2024-12-09 PROCEDURE — 80164 ASSAY DIPROPYLACETIC ACD TOT: CPT

## 2024-12-12 ENCOUNTER — HOSPITAL ENCOUNTER (OUTPATIENT)
Age: 74
Setting detail: SPECIMEN
Discharge: HOME | End: 2024-12-12
Payer: MEDICARE

## 2024-12-12 LAB
ALBUMIN SERPL-MCNC: 3.6 G/DL (ref 3.4–5)
ALBUMIN/GLOB SERPL: 1.3 {RATIO} (ref 1.1–2.2)
ALP SERPL-CCNC: 176 U/L (ref 40–129)
ALT SERPL-CCNC: 11 U/L (ref 10–40)
ANION GAP SERPL CALCULATED.3IONS-SCNC: 16 MMOL/L (ref 9–17)
AST SERPL-CCNC: 27 U/L (ref 15–37)
BILIRUB SERPL-MCNC: 1.2 MG/DL (ref 0–1)
BUN SERPL-MCNC: 36 MG/DL (ref 7–20)
CALCIUM SERPL-MCNC: 9 MG/DL (ref 8.3–10.6)
CHLORIDE SERPL-SCNC: 105 MMOL/L (ref 99–110)
CO2 SERPL-SCNC: 20 MMOL/L (ref 21–32)
CREAT SERPL-MCNC: 2.7 MG/DL (ref 0.8–1.3)
ERYTHROCYTE [DISTWIDTH] IN BLOOD BY AUTOMATED COUNT: 17.6 % (ref 11.7–14.9)
GFR, ESTIMATED: 23 ML/MIN/1.73M2
GLUCOSE SERPL-MCNC: 186 MG/DL (ref 74–99)
HCT VFR BLD AUTO: 29.1 % (ref 42–52)
HGB BLD-MCNC: 8.8 G/DL (ref 13.5–18)
MCH RBC QN AUTO: 28.9 PG (ref 27–31)
MCHC RBC AUTO-ENTMCNC: 30.2 G/DL (ref 32–36)
MCV RBC AUTO: 95.7 FL (ref 78–100)
PLATELET # BLD AUTO: 303 K/UL (ref 140–440)
PMV BLD AUTO: 9.1 FL (ref 7.5–11.1)
POTASSIUM SERPL-SCNC: 4.3 MMOL/L (ref 3.5–5.1)
PROCALCITONIN SERPL-MCNC: 0.2 NG/ML
PROT SERPL-MCNC: 6.4 G/DL (ref 6.4–8.2)
RBC # BLD AUTO: 3.04 M/UL (ref 4.6–6.2)
SODIUM SERPL-SCNC: 140 MMOL/L (ref 136–145)
WBC OTHER # BLD: 13.1 K/UL (ref 4–10.5)

## 2024-12-12 PROCEDURE — 87086 URINE CULTURE/COLONY COUNT: CPT

## 2024-12-12 PROCEDURE — 80053 COMPREHEN METABOLIC PANEL: CPT

## 2024-12-12 PROCEDURE — 84145 PROCALCITONIN (PCT): CPT

## 2024-12-12 PROCEDURE — 85027 COMPLETE CBC AUTOMATED: CPT

## 2024-12-12 PROCEDURE — 36415 COLL VENOUS BLD VENIPUNCTURE: CPT

## 2024-12-12 PROCEDURE — 81001 URINALYSIS AUTO W/SCOPE: CPT

## 2024-12-13 ENCOUNTER — HOSPITAL ENCOUNTER (OUTPATIENT)
Age: 74
Setting detail: SPECIMEN
Discharge: HOME | End: 2024-12-13
Payer: MEDICARE

## 2024-12-13 LAB
ANION GAP SERPL CALCULATED.3IONS-SCNC: 16 MMOL/L (ref 9–17)
BUN SERPL-MCNC: 41 MG/DL (ref 7–20)
CALCIUM SERPL-MCNC: 9 MG/DL (ref 8.3–10.6)
CHLORIDE SERPL-SCNC: 105 MMOL/L (ref 99–110)
CO2 SERPL-SCNC: 20 MMOL/L (ref 21–32)
CREAT SERPL-MCNC: 2.9 MG/DL (ref 0.8–1.3)
ERYTHROCYTE [DISTWIDTH] IN BLOOD BY AUTOMATED COUNT: 17.6 % (ref 11.7–14.9)
GFR, ESTIMATED: 22 ML/MIN/1.73M2
GLUCOSE SERPL-MCNC: 125 MG/DL (ref 74–99)
HCT VFR BLD AUTO: 31.1 % (ref 42–52)
HGB BLD-MCNC: 8.9 G/DL (ref 13.5–18)
MCH RBC QN AUTO: 27.5 PG (ref 27–31)
MCHC RBC AUTO-ENTMCNC: 28.6 G/DL (ref 32–36)
MCV RBC AUTO: 96 FL (ref 78–100)
PLATELET # BLD AUTO: 319 K/UL (ref 140–440)
PMV BLD AUTO: 8.9 FL (ref 7.5–11.1)
POTASSIUM SERPL-SCNC: 4.6 MMOL/L (ref 3.5–5.1)
RBC # BLD AUTO: 3.24 M/UL (ref 4.6–6.2)
SODIUM SERPL-SCNC: 142 MMOL/L (ref 136–145)
WBC OTHER # BLD: 13 K/UL (ref 4–10.5)

## 2024-12-13 PROCEDURE — 80048 BASIC METABOLIC PNL TOTAL CA: CPT

## 2024-12-13 PROCEDURE — 85027 COMPLETE CBC AUTOMATED: CPT

## 2024-12-13 PROCEDURE — 36415 COLL VENOUS BLD VENIPUNCTURE: CPT

## 2024-12-15 LAB
BILIRUB UR QL STRIP: ABNORMAL
CASTS #/AREA URNS LPF: ABNORMAL /LPF
CLARITY UR: ABNORMAL
COLOR UR: YELLOW
GLUCOSE UR STRIP-MCNC: NEGATIVE MG/DL
HGB UR QL STRIP.AUTO: ABNORMAL
KETONES UR STRIP-MCNC: ABNORMAL MG/DL
LEUKOCYTE ESTERASE UR QL STRIP: ABNORMAL
MICROORGANISM SPEC CULT: NORMAL
MUCOUS THREADS URNS QL MICRO: PRESENT
NITRITE UR QL STRIP: POSITIVE
PH UR STRIP: 6 [PH] (ref 5–8)
PROT UR STRIP-MCNC: >=300 MG/DL
RBC #/AREA URNS HPF: ABNORMAL /HPF
SP GR UR STRIP: >1.03 (ref 1–1.03)
SPECIMEN DESCRIPTION: NORMAL
UROBILINOGEN UR STRIP-ACNC: 4 EU/DL (ref 0.2–1)
WBC #/AREA URNS HPF: ABNORMAL /HPF

## 2024-12-16 ENCOUNTER — HOSPITAL ENCOUNTER (OUTPATIENT)
Age: 74
Setting detail: SPECIMEN
Discharge: HOME | End: 2024-12-16
Payer: MEDICARE

## 2024-12-16 PROCEDURE — 36415 COLL VENOUS BLD VENIPUNCTURE: CPT

## 2024-12-17 LAB
MICROORGANISM SPEC CULT: ABNORMAL
SPECIMEN DESCRIPTION: ABNORMAL

## 2025-06-21 NOTE — PROGRESS NOTES
Gilberto Del Rosario is a 26 year old male presenting to the walk-in clinic today for athletes foot x1 week.      Pt states he is having redness and itching between toes and cracking of skin.      Medications reviewed and updated.    Patient would like communication of their results via:      Cell Phone:   Telephone Information:   Mobile [phone]     Okay to leave a message containing results? Yes         AM-PAC Daily Activity Inpatient   How much help for putting on and taking off regular lower body clothing?: A Lot  How much help for Bathing?: A Little  How much help for Toileting?: A Little  How much help for putting on and taking off regular upper body clothing?: None  How much help for taking care of personal grooming?: A Little  How much help for eating meals?: None  AM-[facility] Inpatient Daily Activity Raw Score: 19  AM-PAC Inpatient ADL T-Scale Score : 40.22  ADL Inpatient CMS 0-100% Score: 42.8  ADL Inpatient CMS G-Code Modifier : CK    Treatment:  Therapeutic Activity Training:   Therapeutic activity training was instructed today. Cues were given for safety, sequence, UE/LE placement, awareness, and balance. Activities performed today included bed mobility training, sup-sit, sit-stand, functional mobility, stand to sit    Safety: patient left in chair with chair alarm, call light within reach, RN notified, gait belt used. Assessment:  Pt is a 78 yo male admitted from [facility] for pneumonia. Pt currently presents w/ deficits in ADL and high level IADL independence, functional activity tolerance, dynamic sitting and standing balance and tolerance and functional transfers and BUE strength Pt would benefit from continued acute care OT services w/ discharge to SNF  Complexity: Moderate  Prognosis: Good, no significant barriers to participation at this time.    Plan  Times per week: 2x+  Times per day: Daily  Current Treatment Recommendations: Strengthening, Endurance Training, Patient/Caregiver Education & Training, Equipment Evaluation, Education, & procurement, Balance Training, Pain Management, Self-Care / ADL, Functional Mobility Training, Safety Education & Training, Home Management Training     Equipment: defer    Goals:  Pt goal: go home  Time Frame for STGs: discharge  Goal 1: Pt will perform UE ADLs Independent  Goal 2: Pt will perform LE ADLs Juliano w/ AD  Goal 3: Pt will perform toileting

## (undated) DEVICE — POSITIONER HD AD W4.5XH8XL9IN HIGHLY RESILIENT FOAM CMFRT

## (undated) DEVICE — SUTURE SZ 0 27IN 5/8 CIR UR-6  TAPER PT VIOLET ABSRB VICRYL J603H

## (undated) DEVICE — DRESSING,GAUZE,PETROLATUM,CURAD,1"X8",ST: Brand: CURAD

## (undated) DEVICE — Z INACTIVE USE 2641838 CLIP INT L ORNG TI TRNSVRS GRV CHEVRON SHP W/ PRECIS TIP TO

## (undated) DEVICE — LINER,SEMI-RIGID,3000CC,50EA/CS: Brand: MEDLINE

## (undated) DEVICE — SPONGE LAP W18XL18IN WHT COT 4 PLY FLD STRUNG RADPQ DISP ST

## (undated) DEVICE — SOLUTION ANTIFOG VIS SYS CLEARIFY LAPSCP

## (undated) DEVICE — FORCEPS BX L240CM JAW DIA2.8MM L CAP W/ NDL MIC MESH TOOTH

## (undated) DEVICE — Z DISCONTINUED (USE MFG CAT MVABO)  TUBING GAS SAMPLING STD 6.5 FT FEMALE CONN SMRT CAPNOLINE

## (undated) DEVICE — DRESSING WND W4XL10IN UNIV ANTIMIC TELFA

## (undated) DEVICE — TOWEL,OR,DSP,ST,BLUE,STD,6/PK,12PK/CS: Brand: MEDLINE

## (undated) DEVICE — YANKAUER,BULB TIP,W/O VENT,RIGID,STERILE: Brand: MEDLINE

## (undated) DEVICE — ELECTRODE ES L2.75IN S STL INSUL BLDE W/ SL EDGE

## (undated) DEVICE — ELECTRODE ES AD CRDLSS PT RET REM POLYHESIVE

## (undated) DEVICE — RELOAD STPL L75MM OPN H3.8MM CLS 1.5MM WIRE DIA0.2MM REG

## (undated) DEVICE — GLOVE SURG SZ 65 THK91MIL LTX FREE SYN POLYISOPRENE

## (undated) DEVICE — SOLUTION IV 1000ML 0.9% SOD CHL FOR IRRIG PLAS CONT

## (undated) DEVICE — DRAPE,UTILITY,XL,4/PK,STERILE: Brand: MEDLINE

## (undated) DEVICE — Z INACTIVE USE 2641837 CLIP LIG M BLU TI HRT SHP WIRE HORZ 600 PER BX

## (undated) DEVICE — CONTAINER,SPECIMEN,OR STERILE,4OZ: Brand: MEDLINE

## (undated) DEVICE — NEEDLE HYPO 23GA L1.5IN TURQ POLYPR HUB S STL THN WALL IM

## (undated) DEVICE — SUTURE PERMAHAND SZ 2-0 L17X18IN NONABSORBABLE BLK SILK SA65H

## (undated) DEVICE — SUTURE VCRL SZ 0 L27IN ABSRB UD L36MM CT-1 1/2 CIR J260H

## (undated) DEVICE — PENCIL ES CRD L10FT HND SWCHING ROCK SWCH W/ EDGE COAT BLDE

## (undated) DEVICE — CLEANER,CAUTERY TIP,2X2",STERILE: Brand: MEDLINE

## (undated) DEVICE — ELECTRODE BLDE L6.5IN CAUT EXT DISP

## (undated) DEVICE — SUTURE VCRL SZ 2 L54IN ABSRB UD L65MM TP-1 1/2 CIR J880T

## (undated) DEVICE — GLOVE ORANGE PI 7   MSG9070

## (undated) DEVICE — SUTURE MCRYL SZ 3-0 L27IN ABSRB UD L24MM PS-1 3/8 CIR PRIM Y936H

## (undated) DEVICE — YANKAUER,FLEXIBLE HANDLE,REGLR CAPACITY: Brand: MEDLINE INDUSTRIES, INC.

## (undated) DEVICE — SUTURE ETHBND EXCEL SZ 2-0 L36IN NONABSORBABLE GRN L26MM SH X523H

## (undated) DEVICE — SET TBNG DISP TIP FOR AHTO

## (undated) DEVICE — CLIP INT M L GRN TI TRNSVRS GRV CHEVRON SHP W/ PRECIS TIP

## (undated) DEVICE — INTENDED FOR TISSUE SEPARATION, AND OTHER PROCEDURES THAT REQUIRE A SHARP SURGICAL BLADE TO PUNCTURE OR CUT.: Brand: BARD-PARKER ® STAINLESS STEEL BLADES

## (undated) DEVICE — TUBE ET 39FR DIA5.3MM L POLYUR TRACH CUF SNAP LOK CONN DISP

## (undated) DEVICE — Device

## (undated) DEVICE — NEEDLE SCLERO 25GA L240CM OD0.51MM ID0.24MM EXTN L4MM SHTH

## (undated) DEVICE — PROTECTOR EYE PT SELF ADH NS OPT GRD LF

## (undated) DEVICE — Z DISCONTINUED NO SUB IDED TUBING ETCO2 AD L6.5FT NSL ORAL CVD PRNG NONFLARED TIP OVR

## (undated) DEVICE — COUNTER NDL 30 COUNT FOAM STRP SGL MAG

## (undated) DEVICE — CONNECTOR IV 3/8X3/8X3/8 Y

## (undated) DEVICE — GLOVE SURG SZ 6 THK91MIL LTX FREE SYN POLYISOPRENE ANTI

## (undated) DEVICE — CORD ES L15FT PT RET REUSE VALLEYLAB REM

## (undated) DEVICE — DRESSING TRNSPAR W2XL2.75IN FLM SHT SEMIPERMEABLE WIND

## (undated) DEVICE — LINER SUCT CANSTR 1500CC SEMI RIG W/ POR HYDROPHOBIC SHUT

## (undated) DEVICE — SUTURE SURGLON SZ 1 L18IN NONABSORBABLE BLK GS 21 L37MM 1 2 8886196272

## (undated) DEVICE — SUTURE VCRL SZ 3-0 L27IN ABSRB VLT L26MM SH 1/2 CIR J316H

## (undated) DEVICE — SKIN AFFIX SURG ADHESIVE 72/CS 0.55ML: Brand: MEDLINE

## (undated) DEVICE — TOTAL TRAY, DB, 100% SILI FOLEY, 16FR 10: Brand: MEDLINE

## (undated) DEVICE — SUTURE VCRL SZ 3-0 L36IN ABSRB VLT SH L26MM 1/2 CIR DBL J527H

## (undated) DEVICE — GLOVE SURG SZ 7 FNGR THK94MIL TRNSLUC YEL LTX HYDRGEL GRP

## (undated) DEVICE — TUBING INSUFFLATOR HEAT HI FLO SET PNEUMOCLEAR

## (undated) DEVICE — SOLUTION IV IRRIG WATER 1000ML POUR BRL 2F7114

## (undated) DEVICE — DUAL LUMEN STOMACH TUBE: Brand: SALEM SUMP

## (undated) DEVICE — BLADE ES L2.75IN ELASTOMERIC COAT DURABLE BEND UPTO 90DEG

## (undated) DEVICE — APPLICATOR MEDICATED 26 CC SOLUTION HI LT ORNG CHLORAPREP

## (undated) DEVICE — MITT SURG PREP L ADH DISPOSABLE

## (undated) DEVICE — CHLORAPREP 26ML ORANGE

## (undated) DEVICE — MARKER SURG SKIN UTIL REGULAR/FINE 2 TIP W/ RUL AND 9 LBL

## (undated) DEVICE — ELECTRODE ENDOSCP L36CM PTFE SPAT CRV DISP CLEANCOAT

## (undated) DEVICE — SUTURE NRLN SZ 1 L18IN NONABSORBABLE BLK L36MM CT-1 1/2 CIR C520D

## (undated) DEVICE — TROCAR ENDOSCP L100MM DIA11MM STBL SL BLDELSS DISP ENDOPATH

## (undated) DEVICE — 3M™ IOBAN™ 2 ANTIMICROBIAL INCISE DRAPE 6650EZ: Brand: IOBAN™ 2

## (undated) DEVICE — THORACIC CATHETER, RIGHT ANGLE, SILICONE, WITH CLOTSTOP®: Brand: AXIOM® ATRAUM™ WITH CLOTSTOP®

## (undated) DEVICE — BIT DRL L330MM DIA4.2MM CALIB 100MM 3 FLUT QUIK CPL

## (undated) DEVICE — CAUTERY: TIP CLEANER XR 100/CS: Brand: MEDICAL ACTION INDUSTRIES

## (undated) DEVICE — PACK SURG LAP CHOLE

## (undated) DEVICE — SCISSORS ENDOSCP DIA5MM CRV MPLR CAUT W/ RATCH HNDL

## (undated) DEVICE — E-Z CLEAN, NON-STICK, PTFE COATED, ELECTROSURGICAL BLADE ELECTRODE, MODIFIED EXTENDED INSULATION, 4 INCH (10.2 CM): Brand: MEGADYNE

## (undated) DEVICE — ANESTHESIA CIRCUIT ADULT-LF: Brand: MEDLINE INDUSTRIES, INC.

## (undated) DEVICE — SUTURE PERMAHAND SZ 2-0 L18IN NONABSORBABLE BLK L26MM SH C012D

## (undated) DEVICE — MARKER,SKIN,WI/RULER AND LABELS: Brand: MEDLINE

## (undated) DEVICE — STAPLER SKIN H3.9MM WIRE DIA0.58MM CRWN 6.9MM 35 STPL ROT

## (undated) DEVICE — SYRINGE, LUER LOCK, 10ML: Brand: MEDLINE

## (undated) DEVICE — TUBING, SUCTION, 9/32" X 10', STRAIGHT: Brand: MEDLINE

## (undated) DEVICE — DISSECTOR LAP DIA5MM BLNT TIP ENDOPATH

## (undated) DEVICE — GOWN,SIRUS,FABRNF,RAGLAN,L,ST,30/CS: Brand: MEDLINE

## (undated) DEVICE — DRESSING HYDROFIBER AQUACEL AG ADVANTAGE 3.5X6 IN

## (undated) DEVICE — SUTURE PDS II SZ 1 L96IN ABSRB VLT TP-1 L65MM 1/2 CIR Z880G

## (undated) DEVICE — THORACIC CATHETER, STRAIGHT, SILICONE, WITH CLOTSTOP®: Brand: AXIOM® ATRAUM™ WITH CLOTSTOP®

## (undated) DEVICE — COUNTER NDL 60 COUNT FOAM STRP SGL MAG

## (undated) DEVICE — BLADE CLIPPER GEN PURP NS

## (undated) DEVICE — DRAIN SURG SGL COLL PT TB FOR ATS BG OASIS

## (undated) DEVICE — GUIDEWIRE ORTH L400MM DIA3.2MM FOR TFN

## (undated) DEVICE — SEALER ENDOSCP NANO COAT OPN DIV CRV L JAW LIGASURE IMPACT

## (undated) DEVICE — THORACIC CATHETER,STRAIGHT: Brand: ARGYLE

## (undated) DEVICE — SUTURE ABSORBABLE BRAIDED 2-0 CT-1 27 IN UD VICRYL J259H

## (undated) DEVICE — STAPLER INT L75MM CUT LN L73MM STPL LN L77MM BLU B FRM 8

## (undated) DEVICE — PAD GZ BORD ST 4INX10IN 2X8IN